# Patient Record
Sex: FEMALE | Race: BLACK OR AFRICAN AMERICAN | NOT HISPANIC OR LATINO | Employment: OTHER | URBAN - METROPOLITAN AREA
[De-identification: names, ages, dates, MRNs, and addresses within clinical notes are randomized per-mention and may not be internally consistent; named-entity substitution may affect disease eponyms.]

---

## 2017-01-06 ENCOUNTER — HOSPITAL ENCOUNTER (INPATIENT)
Facility: HOSPITAL | Age: 67
LOS: 10 days | Discharge: HOME HEALTH | DRG: 140 | End: 2017-01-16
Attending: EMERGENCY MEDICINE | Admitting: INTERNAL MEDICINE
Payer: COMMERCIAL

## 2017-01-06 ENCOUNTER — APPOINTMENT (EMERGENCY)
Dept: RADIOLOGY | Facility: HOSPITAL | Age: 67
DRG: 140 | End: 2017-01-06
Payer: COMMERCIAL

## 2017-01-06 DIAGNOSIS — J44.1 ACUTE EXACERBATION OF CHRONIC OBSTRUCTIVE PULMONARY DISEASE (COPD) (HCC): Primary | ICD-10-CM

## 2017-01-06 LAB
ANION GAP SERPL CALCULATED.3IONS-SCNC: 8 MMOL/L (ref 4–13)
APTT PPP: 29 SECONDS (ref 24–36)
ATRIAL RATE: 89 BPM
BASOPHILS # BLD AUTO: 0.04 THOUSANDS/ΜL (ref 0–0.1)
BASOPHILS NFR BLD AUTO: 1 % (ref 0–1)
BUN SERPL-MCNC: 12 MG/DL (ref 5–25)
CALCIUM SERPL-MCNC: 10 MG/DL (ref 8.3–10.1)
CHLORIDE SERPL-SCNC: 103 MMOL/L (ref 100–108)
CO2 SERPL-SCNC: 33 MMOL/L (ref 21–32)
CREAT SERPL-MCNC: 0.86 MG/DL (ref 0.6–1.3)
EOSINOPHIL # BLD AUTO: 0.26 THOUSAND/ΜL (ref 0–0.61)
EOSINOPHIL NFR BLD AUTO: 3 % (ref 0–6)
ERYTHROCYTE [DISTWIDTH] IN BLOOD BY AUTOMATED COUNT: 15.4 % (ref 11.6–15.1)
GFR SERPL CREATININE-BSD FRML MDRD: >60 ML/MIN/1.73SQ M
GLUCOSE SERPL-MCNC: 352 MG/DL (ref 65–140)
GLUCOSE SERPL-MCNC: 94 MG/DL (ref 65–140)
HCT VFR BLD AUTO: 32 % (ref 34.8–46.1)
HGB BLD-MCNC: 10.1 G/DL (ref 11.5–15.4)
INR PPP: 1 (ref 0.86–1.16)
LYMPHOCYTES # BLD AUTO: 2.44 THOUSANDS/ΜL (ref 0.6–4.47)
LYMPHOCYTES NFR BLD AUTO: 31 % (ref 14–44)
MCH RBC QN AUTO: 27.7 PG (ref 26.8–34.3)
MCHC RBC AUTO-ENTMCNC: 31.6 G/DL (ref 31.4–37.4)
MCV RBC AUTO: 88 FL (ref 82–98)
MONOCYTES # BLD AUTO: 0.55 THOUSAND/ΜL (ref 0.17–1.22)
MONOCYTES NFR BLD AUTO: 7 % (ref 4–12)
NEUTROPHILS # BLD AUTO: 4.51 THOUSANDS/ΜL (ref 1.85–7.62)
NEUTS SEG NFR BLD AUTO: 58 % (ref 43–75)
NRBC BLD AUTO-RTO: 0 /100 WBCS
NT-PROBNP SERPL-MCNC: 213 PG/ML
P AXIS: 48 DEGREES
PLATELET # BLD AUTO: 319 THOUSANDS/UL (ref 149–390)
PMV BLD AUTO: 8.8 FL (ref 8.9–12.7)
POTASSIUM SERPL-SCNC: 3 MMOL/L (ref 3.5–5.3)
PR INTERVAL: 206 MS
PROTHROMBIN TIME: 13.2 SECONDS (ref 12–14.3)
QRS AXIS: -54 DEGREES
QRSD INTERVAL: 106 MS
QT INTERVAL: 332 MS
QTC INTERVAL: 403 MS
RBC # BLD AUTO: 3.65 MILLION/UL (ref 3.81–5.12)
SODIUM SERPL-SCNC: 144 MMOL/L (ref 136–145)
SPECIMEN SOURCE: NORMAL
T WAVE AXIS: 17 DEGREES
TROPONIN I BLD-MCNC: 0 NG/ML (ref 0–0.08)
VENTRICULAR RATE: 89 BPM
WBC # BLD AUTO: 7.8 THOUSAND/UL (ref 4.31–10.16)

## 2017-01-06 PROCEDURE — 84484 ASSAY OF TROPONIN QUANT: CPT

## 2017-01-06 PROCEDURE — 85610 PROTHROMBIN TIME: CPT | Performed by: EMERGENCY MEDICINE

## 2017-01-06 PROCEDURE — 93005 ELECTROCARDIOGRAM TRACING: CPT

## 2017-01-06 PROCEDURE — 83880 ASSAY OF NATRIURETIC PEPTIDE: CPT | Performed by: EMERGENCY MEDICINE

## 2017-01-06 PROCEDURE — 71020 HB CHEST X-RAY 2VW FRONTAL&LATL: CPT

## 2017-01-06 PROCEDURE — 82948 REAGENT STRIP/BLOOD GLUCOSE: CPT

## 2017-01-06 PROCEDURE — 94640 AIRWAY INHALATION TREATMENT: CPT

## 2017-01-06 PROCEDURE — 85730 THROMBOPLASTIN TIME PARTIAL: CPT | Performed by: EMERGENCY MEDICINE

## 2017-01-06 PROCEDURE — 99285 EMERGENCY DEPT VISIT HI MDM: CPT

## 2017-01-06 PROCEDURE — 80048 BASIC METABOLIC PNL TOTAL CA: CPT | Performed by: EMERGENCY MEDICINE

## 2017-01-06 PROCEDURE — 96374 THER/PROPH/DIAG INJ IV PUSH: CPT

## 2017-01-06 PROCEDURE — 85025 COMPLETE CBC W/AUTO DIFF WBC: CPT | Performed by: EMERGENCY MEDICINE

## 2017-01-06 PROCEDURE — 36415 COLL VENOUS BLD VENIPUNCTURE: CPT | Performed by: EMERGENCY MEDICINE

## 2017-01-06 RX ORDER — BUPROPION HYDROCHLORIDE 100 MG/1
200 TABLET, EXTENDED RELEASE ORAL EVERY 12 HOURS
Status: DISCONTINUED | OUTPATIENT
Start: 2017-01-06 | End: 2017-01-16 | Stop reason: HOSPADM

## 2017-01-06 RX ORDER — QUETIAPINE FUMARATE 25 MG/1
100 TABLET, FILM COATED ORAL
Status: DISCONTINUED | OUTPATIENT
Start: 2017-01-06 | End: 2017-01-16 | Stop reason: HOSPADM

## 2017-01-06 RX ORDER — METHYLPREDNISOLONE SODIUM SUCCINATE 125 MG/2ML
125 INJECTION, POWDER, LYOPHILIZED, FOR SOLUTION INTRAMUSCULAR; INTRAVENOUS ONCE
Status: COMPLETED | OUTPATIENT
Start: 2017-01-06 | End: 2017-01-06

## 2017-01-06 RX ORDER — LEVALBUTEROL 1.25 MG/.5ML
1.25 SOLUTION, CONCENTRATE RESPIRATORY (INHALATION)
Status: DISCONTINUED | OUTPATIENT
Start: 2017-01-06 | End: 2017-01-06

## 2017-01-06 RX ORDER — ACETAMINOPHEN 325 MG/1
650 TABLET ORAL EVERY 6 HOURS PRN
Status: DISCONTINUED | OUTPATIENT
Start: 2017-01-06 | End: 2017-01-16 | Stop reason: HOSPADM

## 2017-01-06 RX ORDER — LEVOTHYROXINE SODIUM 0.1 MG/1
100 TABLET ORAL
Status: DISCONTINUED | OUTPATIENT
Start: 2017-01-07 | End: 2017-01-16 | Stop reason: HOSPADM

## 2017-01-06 RX ORDER — IPRATROPIUM BROMIDE AND ALBUTEROL SULFATE 2.5; .5 MG/3ML; MG/3ML
SOLUTION RESPIRATORY (INHALATION)
Status: COMPLETED
Start: 2017-01-06 | End: 2017-01-06

## 2017-01-06 RX ORDER — SODIUM CHLORIDE FOR INHALATION 0.9 %
3 VIAL, NEBULIZER (ML) INHALATION
Status: DISCONTINUED | OUTPATIENT
Start: 2017-01-06 | End: 2017-01-16 | Stop reason: HOSPADM

## 2017-01-06 RX ORDER — METHYLPREDNISOLONE SODIUM SUCCINATE 40 MG/ML
40 INJECTION, POWDER, LYOPHILIZED, FOR SOLUTION INTRAMUSCULAR; INTRAVENOUS EVERY 8 HOURS
Status: DISCONTINUED | OUTPATIENT
Start: 2017-01-06 | End: 2017-01-07

## 2017-01-06 RX ORDER — LISINOPRIL 10 MG/1
10 TABLET ORAL DAILY
Status: DISCONTINUED | OUTPATIENT
Start: 2017-01-07 | End: 2017-01-16 | Stop reason: HOSPADM

## 2017-01-06 RX ORDER — FUROSEMIDE 20 MG/1
20 TABLET ORAL 2 TIMES DAILY
Status: DISCONTINUED | OUTPATIENT
Start: 2017-01-06 | End: 2017-01-16 | Stop reason: HOSPADM

## 2017-01-06 RX ORDER — GABAPENTIN 400 MG/1
400 CAPSULE ORAL
Status: DISCONTINUED | OUTPATIENT
Start: 2017-01-07 | End: 2017-01-16 | Stop reason: HOSPADM

## 2017-01-06 RX ORDER — SODIUM CHLORIDE FOR INHALATION 0.9 %
3 VIAL, NEBULIZER (ML) INHALATION EVERY 6 HOURS PRN
Status: DISCONTINUED | OUTPATIENT
Start: 2017-01-06 | End: 2017-01-06

## 2017-01-06 RX ORDER — IPRATROPIUM BROMIDE AND ALBUTEROL SULFATE 2.5; .5 MG/3ML; MG/3ML
3 SOLUTION RESPIRATORY (INHALATION)
Status: DISCONTINUED | OUTPATIENT
Start: 2017-01-06 | End: 2017-01-06

## 2017-01-06 RX ORDER — DILTIAZEM HYDROCHLORIDE 300 MG/1
300 CAPSULE, COATED, EXTENDED RELEASE ORAL DAILY
Status: DISCONTINUED | OUTPATIENT
Start: 2017-01-07 | End: 2017-01-16 | Stop reason: HOSPADM

## 2017-01-06 RX ORDER — ASPIRIN 81 MG/1
81 TABLET, CHEWABLE ORAL DAILY
Status: DISCONTINUED | OUTPATIENT
Start: 2017-01-07 | End: 2017-01-16 | Stop reason: HOSPADM

## 2017-01-06 RX ORDER — POTASSIUM CHLORIDE 20 MEQ/1
40 TABLET, EXTENDED RELEASE ORAL ONCE
Status: COMPLETED | OUTPATIENT
Start: 2017-01-06 | End: 2017-01-06

## 2017-01-06 RX ORDER — ASPIRIN 81 MG/1
81 TABLET, CHEWABLE ORAL ONCE
Status: COMPLETED | OUTPATIENT
Start: 2017-01-06 | End: 2017-01-06

## 2017-01-06 RX ORDER — LEVALBUTEROL 1.25 MG/.5ML
1.25 SOLUTION, CONCENTRATE RESPIRATORY (INHALATION)
Status: DISCONTINUED | OUTPATIENT
Start: 2017-01-07 | End: 2017-01-16 | Stop reason: HOSPADM

## 2017-01-06 RX ORDER — HEPARIN SODIUM 5000 [USP'U]/ML
5000 INJECTION, SOLUTION INTRAVENOUS; SUBCUTANEOUS EVERY 8 HOURS SCHEDULED
Status: DISCONTINUED | OUTPATIENT
Start: 2017-01-06 | End: 2017-01-16 | Stop reason: HOSPADM

## 2017-01-06 RX ORDER — GUAIFENESIN 600 MG
1200 TABLET, EXTENDED RELEASE 12 HR ORAL EVERY 12 HOURS
Status: DISCONTINUED | OUTPATIENT
Start: 2017-01-06 | End: 2017-01-16 | Stop reason: HOSPADM

## 2017-01-06 RX ADMIN — IPRATROPIUM BROMIDE AND ALBUTEROL SULFATE 3 ML: 2.5; .5 SOLUTION RESPIRATORY (INHALATION) at 17:00

## 2017-01-06 RX ADMIN — BUPROPION HYDROCHLORIDE 200 MG: 100 TABLET, FILM COATED, EXTENDED RELEASE ORAL at 12:17

## 2017-01-06 RX ADMIN — ISODIUM CHLORIDE 3 ML: 0.03 SOLUTION RESPIRATORY (INHALATION) at 21:00

## 2017-01-06 RX ADMIN — QUETIAPINE FUMARATE 100 MG: 25 TABLET, FILM COATED ORAL at 21:46

## 2017-01-06 RX ADMIN — HEPARIN SODIUM 5000 UNITS: 5000 INJECTION, SOLUTION INTRAVENOUS; SUBCUTANEOUS at 21:49

## 2017-01-06 RX ADMIN — IPRATROPIUM BROMIDE AND ALBUTEROL SULFATE 3 ML: .5; 3 SOLUTION RESPIRATORY (INHALATION) at 17:00

## 2017-01-06 RX ADMIN — FLUTICASONE PROPIONATE AND SALMETEROL 1 PUFF: 50; 250 POWDER RESPIRATORY (INHALATION) at 23:11

## 2017-01-06 RX ADMIN — LEVALBUTEROL 1.25 MG: 1.25 SOLUTION, CONCENTRATE RESPIRATORY (INHALATION) at 21:00

## 2017-01-06 RX ADMIN — IPRATROPIUM BROMIDE 1 MG: 0.5 SOLUTION RESPIRATORY (INHALATION) at 18:34

## 2017-01-06 RX ADMIN — GUAIFENESIN 1200 MG: 600 TABLET, EXTENDED RELEASE ORAL at 21:46

## 2017-01-06 RX ADMIN — ALBUTEROL SULFATE 10 MG: 2.5 SOLUTION RESPIRATORY (INHALATION) at 18:34

## 2017-01-06 RX ADMIN — ASPIRIN 81 MG 81 MG: 81 TABLET ORAL at 16:59

## 2017-01-06 RX ADMIN — POTASSIUM CHLORIDE 40 MEQ: 1500 TABLET, EXTENDED RELEASE ORAL at 18:34

## 2017-01-06 RX ADMIN — METHYLPREDNISOLONE SODIUM SUCCINATE 125 MG: 125 INJECTION, POWDER, FOR SOLUTION INTRAMUSCULAR; INTRAVENOUS at 17:00

## 2017-01-07 LAB
GLUCOSE SERPL-MCNC: 154 MG/DL (ref 65–140)
GLUCOSE SERPL-MCNC: 204 MG/DL (ref 65–140)
GLUCOSE SERPL-MCNC: 236 MG/DL (ref 65–140)
GLUCOSE SERPL-MCNC: 326 MG/DL (ref 65–140)

## 2017-01-07 PROCEDURE — 82948 REAGENT STRIP/BLOOD GLUCOSE: CPT

## 2017-01-07 PROCEDURE — 94760 N-INVAS EAR/PLS OXIMETRY 1: CPT

## 2017-01-07 PROCEDURE — 94640 AIRWAY INHALATION TREATMENT: CPT

## 2017-01-07 RX ORDER — LORAZEPAM 0.5 MG/1
0.5 TABLET ORAL EVERY 8 HOURS PRN
Status: DISCONTINUED | OUTPATIENT
Start: 2017-01-07 | End: 2017-01-16 | Stop reason: HOSPADM

## 2017-01-07 RX ORDER — METHYLPREDNISOLONE SODIUM SUCCINATE 40 MG/ML
20 INJECTION, POWDER, LYOPHILIZED, FOR SOLUTION INTRAMUSCULAR; INTRAVENOUS EVERY 8 HOURS
Status: DISCONTINUED | OUTPATIENT
Start: 2017-01-07 | End: 2017-01-11

## 2017-01-07 RX ORDER — POTASSIUM CHLORIDE 20 MEQ/1
20 TABLET, EXTENDED RELEASE ORAL 2 TIMES DAILY
Status: DISCONTINUED | OUTPATIENT
Start: 2017-01-07 | End: 2017-01-16 | Stop reason: HOSPADM

## 2017-01-07 RX ORDER — OXYCODONE HYDROCHLORIDE 5 MG/1
5 TABLET ORAL ONCE
Status: COMPLETED | OUTPATIENT
Start: 2017-01-07 | End: 2017-01-07

## 2017-01-07 RX ADMIN — ACETAMINOPHEN 650 MG: 325 TABLET, FILM COATED ORAL at 22:41

## 2017-01-07 RX ADMIN — GABAPENTIN 400 MG: 400 CAPSULE ORAL at 16:50

## 2017-01-07 RX ADMIN — ISODIUM CHLORIDE 3 ML: 0.03 SOLUTION RESPIRATORY (INHALATION) at 13:30

## 2017-01-07 RX ADMIN — METHYLPREDNISOLONE SODIUM SUCCINATE 40 MG: 40 INJECTION, POWDER, FOR SOLUTION INTRAMUSCULAR; INTRAVENOUS at 05:12

## 2017-01-07 RX ADMIN — BUPROPION HYDROCHLORIDE 200 MG: 100 TABLET, FILM COATED, EXTENDED RELEASE ORAL at 20:02

## 2017-01-07 RX ADMIN — LEVALBUTEROL 1.25 MG: 1.25 SOLUTION, CONCENTRATE RESPIRATORY (INHALATION) at 07:21

## 2017-01-07 RX ADMIN — FLUTICASONE PROPIONATE AND SALMETEROL 1 PUFF: 50; 250 POWDER RESPIRATORY (INHALATION) at 08:28

## 2017-01-07 RX ADMIN — FUROSEMIDE 20 MG: 20 TABLET ORAL at 16:51

## 2017-01-07 RX ADMIN — LISINOPRIL 10 MG: 10 TABLET ORAL at 08:20

## 2017-01-07 RX ADMIN — METHYLPREDNISOLONE SODIUM SUCCINATE 20 MG: 40 INJECTION, POWDER, FOR SOLUTION INTRAMUSCULAR; INTRAVENOUS at 20:02

## 2017-01-07 RX ADMIN — GUAIFENESIN 1200 MG: 600 TABLET, EXTENDED RELEASE ORAL at 20:02

## 2017-01-07 RX ADMIN — FUROSEMIDE 20 MG: 20 TABLET ORAL at 08:20

## 2017-01-07 RX ADMIN — INSULIN LISPRO 8 UNITS: 100 INJECTION, SOLUTION INTRAVENOUS; SUBCUTANEOUS at 16:49

## 2017-01-07 RX ADMIN — QUETIAPINE FUMARATE 100 MG: 25 TABLET, FILM COATED ORAL at 21:55

## 2017-01-07 RX ADMIN — HEPARIN SODIUM 5000 UNITS: 5000 INJECTION, SOLUTION INTRAVENOUS; SUBCUTANEOUS at 21:56

## 2017-01-07 RX ADMIN — DILTIAZEM HYDROCHLORIDE 300 MG: 300 CAPSULE, COATED, EXTENDED RELEASE ORAL at 08:31

## 2017-01-07 RX ADMIN — INSULIN LISPRO 4 UNITS: 100 INJECTION, SOLUTION INTRAVENOUS; SUBCUTANEOUS at 14:29

## 2017-01-07 RX ADMIN — POTASSIUM CHLORIDE 20 MEQ: 1500 TABLET, EXTENDED RELEASE ORAL at 12:33

## 2017-01-07 RX ADMIN — GABAPENTIN 400 MG: 400 CAPSULE ORAL at 08:20

## 2017-01-07 RX ADMIN — BUPROPION HYDROCHLORIDE 200 MG: 100 TABLET, FILM COATED, EXTENDED RELEASE ORAL at 08:30

## 2017-01-07 RX ADMIN — METHYLPREDNISOLONE SODIUM SUCCINATE 20 MG: 40 INJECTION, POWDER, FOR SOLUTION INTRAMUSCULAR; INTRAVENOUS at 12:33

## 2017-01-07 RX ADMIN — ISODIUM CHLORIDE 3 ML: 0.03 SOLUTION RESPIRATORY (INHALATION) at 07:21

## 2017-01-07 RX ADMIN — INSULIN LISPRO 2 UNITS: 100 INJECTION, SOLUTION INTRAVENOUS; SUBCUTANEOUS at 09:30

## 2017-01-07 RX ADMIN — POTASSIUM CHLORIDE 20 MEQ: 1500 TABLET, EXTENDED RELEASE ORAL at 16:50

## 2017-01-07 RX ADMIN — GUAIFENESIN 1200 MG: 600 TABLET, EXTENDED RELEASE ORAL at 08:20

## 2017-01-07 RX ADMIN — LEVOTHYROXINE SODIUM 100 MCG: 100 TABLET ORAL at 06:10

## 2017-01-07 RX ADMIN — LEVALBUTEROL 1.25 MG: 1.25 SOLUTION, CONCENTRATE RESPIRATORY (INHALATION) at 19:19

## 2017-01-07 RX ADMIN — LORAZEPAM 0.5 MG: 0.5 TABLET ORAL at 16:50

## 2017-01-07 RX ADMIN — TIOTROPIUM BROMIDE 18 MCG: 18 CAPSULE ORAL; RESPIRATORY (INHALATION) at 08:20

## 2017-01-07 RX ADMIN — ASPIRIN 81 MG 81 MG: 81 TABLET ORAL at 08:20

## 2017-01-07 RX ADMIN — LEVALBUTEROL 1.25 MG: 1.25 SOLUTION, CONCENTRATE RESPIRATORY (INHALATION) at 13:30

## 2017-01-07 RX ADMIN — OXYCODONE HYDROCHLORIDE 5 MG: 5 TABLET ORAL at 22:42

## 2017-01-07 RX ADMIN — ISODIUM CHLORIDE 3 ML: 0.03 SOLUTION RESPIRATORY (INHALATION) at 19:19

## 2017-01-07 RX ADMIN — FLUTICASONE PROPIONATE AND SALMETEROL 1 PUFF: 50; 250 POWDER RESPIRATORY (INHALATION) at 21:55

## 2017-01-08 LAB
GLUCOSE SERPL-MCNC: 138 MG/DL (ref 65–140)
GLUCOSE SERPL-MCNC: 163 MG/DL (ref 65–140)
GLUCOSE SERPL-MCNC: 176 MG/DL (ref 65–140)
GLUCOSE SERPL-MCNC: 290 MG/DL (ref 65–140)

## 2017-01-08 PROCEDURE — 94640 AIRWAY INHALATION TREATMENT: CPT

## 2017-01-08 PROCEDURE — 94760 N-INVAS EAR/PLS OXIMETRY 1: CPT

## 2017-01-08 PROCEDURE — 82948 REAGENT STRIP/BLOOD GLUCOSE: CPT

## 2017-01-08 RX ORDER — INSULIN GLARGINE 100 [IU]/ML
10 INJECTION, SOLUTION SUBCUTANEOUS
Status: DISCONTINUED | OUTPATIENT
Start: 2017-01-08 | End: 2017-01-13

## 2017-01-08 RX ORDER — POTASSIUM CHLORIDE 20 MEQ/1
20 TABLET, EXTENDED RELEASE ORAL 2 TIMES DAILY
Status: DISCONTINUED | OUTPATIENT
Start: 2017-01-08 | End: 2017-01-08

## 2017-01-08 RX ADMIN — GUAIFENESIN 1200 MG: 600 TABLET, EXTENDED RELEASE ORAL at 08:07

## 2017-01-08 RX ADMIN — HEPARIN SODIUM 5000 UNITS: 5000 INJECTION, SOLUTION INTRAVENOUS; SUBCUTANEOUS at 21:34

## 2017-01-08 RX ADMIN — ASPIRIN 81 MG 81 MG: 81 TABLET ORAL at 08:07

## 2017-01-08 RX ADMIN — ACETAMINOPHEN 650 MG: 325 TABLET, FILM COATED ORAL at 13:56

## 2017-01-08 RX ADMIN — LEVALBUTEROL 1.25 MG: 1.25 SOLUTION, CONCENTRATE RESPIRATORY (INHALATION) at 13:22

## 2017-01-08 RX ADMIN — LEVALBUTEROL 1.25 MG: 1.25 SOLUTION, CONCENTRATE RESPIRATORY (INHALATION) at 18:50

## 2017-01-08 RX ADMIN — LISINOPRIL 10 MG: 10 TABLET ORAL at 08:07

## 2017-01-08 RX ADMIN — POTASSIUM CHLORIDE 20 MEQ: 1500 TABLET, EXTENDED RELEASE ORAL at 17:09

## 2017-01-08 RX ADMIN — POTASSIUM CHLORIDE 20 MEQ: 1500 TABLET, EXTENDED RELEASE ORAL at 08:07

## 2017-01-08 RX ADMIN — LEVOTHYROXINE SODIUM 100 MCG: 100 TABLET ORAL at 06:15

## 2017-01-08 RX ADMIN — HEPARIN SODIUM 5000 UNITS: 5000 INJECTION, SOLUTION INTRAVENOUS; SUBCUTANEOUS at 13:54

## 2017-01-08 RX ADMIN — METHYLPREDNISOLONE SODIUM SUCCINATE 20 MG: 40 INJECTION, POWDER, FOR SOLUTION INTRAMUSCULAR; INTRAVENOUS at 11:47

## 2017-01-08 RX ADMIN — INSULIN GLARGINE 10 UNITS: 100 INJECTION, SOLUTION SUBCUTANEOUS at 21:34

## 2017-01-08 RX ADMIN — ISODIUM CHLORIDE 3 ML: 0.03 SOLUTION RESPIRATORY (INHALATION) at 18:50

## 2017-01-08 RX ADMIN — BUPROPION HYDROCHLORIDE 200 MG: 100 TABLET, FILM COATED, EXTENDED RELEASE ORAL at 21:33

## 2017-01-08 RX ADMIN — FLUTICASONE PROPIONATE AND SALMETEROL 1 PUFF: 50; 250 POWDER RESPIRATORY (INHALATION) at 08:06

## 2017-01-08 RX ADMIN — METHYLPREDNISOLONE SODIUM SUCCINATE 20 MG: 40 INJECTION, POWDER, FOR SOLUTION INTRAMUSCULAR; INTRAVENOUS at 21:34

## 2017-01-08 RX ADMIN — ISODIUM CHLORIDE 3 ML: 0.03 SOLUTION RESPIRATORY (INHALATION) at 13:22

## 2017-01-08 RX ADMIN — HEPARIN SODIUM 5000 UNITS: 5000 INJECTION, SOLUTION INTRAVENOUS; SUBCUTANEOUS at 06:14

## 2017-01-08 RX ADMIN — DILTIAZEM HYDROCHLORIDE 300 MG: 300 CAPSULE, COATED, EXTENDED RELEASE ORAL at 08:06

## 2017-01-08 RX ADMIN — GABAPENTIN 400 MG: 400 CAPSULE ORAL at 08:07

## 2017-01-08 RX ADMIN — BUPROPION HYDROCHLORIDE 200 MG: 100 TABLET, FILM COATED, EXTENDED RELEASE ORAL at 08:07

## 2017-01-08 RX ADMIN — TIOTROPIUM BROMIDE 18 MCG: 18 CAPSULE ORAL; RESPIRATORY (INHALATION) at 08:06

## 2017-01-08 RX ADMIN — INSULIN LISPRO 6 UNITS: 100 INJECTION, SOLUTION INTRAVENOUS; SUBCUTANEOUS at 11:47

## 2017-01-08 RX ADMIN — LEVALBUTEROL 1.25 MG: 1.25 SOLUTION, CONCENTRATE RESPIRATORY (INHALATION) at 07:14

## 2017-01-08 RX ADMIN — FLUTICASONE PROPIONATE AND SALMETEROL 1 PUFF: 50; 250 POWDER RESPIRATORY (INHALATION) at 21:33

## 2017-01-08 RX ADMIN — GABAPENTIN 400 MG: 400 CAPSULE ORAL at 17:09

## 2017-01-08 RX ADMIN — GUAIFENESIN 1200 MG: 600 TABLET, EXTENDED RELEASE ORAL at 21:33

## 2017-01-08 RX ADMIN — QUETIAPINE FUMARATE 100 MG: 25 TABLET, FILM COATED ORAL at 21:33

## 2017-01-08 RX ADMIN — INSULIN LISPRO 2 UNITS: 100 INJECTION, SOLUTION INTRAVENOUS; SUBCUTANEOUS at 08:06

## 2017-01-08 RX ADMIN — FUROSEMIDE 20 MG: 20 TABLET ORAL at 17:09

## 2017-01-08 RX ADMIN — METHYLPREDNISOLONE SODIUM SUCCINATE 20 MG: 40 INJECTION, POWDER, FOR SOLUTION INTRAMUSCULAR; INTRAVENOUS at 04:28

## 2017-01-08 RX ADMIN — ISODIUM CHLORIDE 3 ML: 0.03 SOLUTION RESPIRATORY (INHALATION) at 07:15

## 2017-01-08 RX ADMIN — FUROSEMIDE 20 MG: 20 TABLET ORAL at 08:07

## 2017-01-08 RX ADMIN — LORAZEPAM 0.5 MG: 0.5 TABLET ORAL at 13:56

## 2017-01-09 LAB
GLUCOSE SERPL-MCNC: 137 MG/DL (ref 65–140)
GLUCOSE SERPL-MCNC: 222 MG/DL (ref 65–140)

## 2017-01-09 PROCEDURE — 94760 N-INVAS EAR/PLS OXIMETRY 1: CPT

## 2017-01-09 PROCEDURE — 94640 AIRWAY INHALATION TREATMENT: CPT

## 2017-01-09 PROCEDURE — 82948 REAGENT STRIP/BLOOD GLUCOSE: CPT

## 2017-01-09 RX ADMIN — POTASSIUM CHLORIDE 20 MEQ: 1500 TABLET, EXTENDED RELEASE ORAL at 09:39

## 2017-01-09 RX ADMIN — HEPARIN SODIUM 5000 UNITS: 5000 INJECTION, SOLUTION INTRAVENOUS; SUBCUTANEOUS at 06:00

## 2017-01-09 RX ADMIN — LISINOPRIL 10 MG: 10 TABLET ORAL at 09:40

## 2017-01-09 RX ADMIN — BUPROPION HYDROCHLORIDE 200 MG: 100 TABLET, FILM COATED, EXTENDED RELEASE ORAL at 09:40

## 2017-01-09 RX ADMIN — INSULIN GLARGINE 10 UNITS: 100 INJECTION, SOLUTION SUBCUTANEOUS at 22:06

## 2017-01-09 RX ADMIN — ISODIUM CHLORIDE 3 ML: 0.03 SOLUTION RESPIRATORY (INHALATION) at 07:16

## 2017-01-09 RX ADMIN — GUAIFENESIN 1200 MG: 600 TABLET, EXTENDED RELEASE ORAL at 20:49

## 2017-01-09 RX ADMIN — DILTIAZEM HYDROCHLORIDE 300 MG: 300 CAPSULE, COATED, EXTENDED RELEASE ORAL at 09:40

## 2017-01-09 RX ADMIN — POTASSIUM CHLORIDE 20 MEQ: 1500 TABLET, EXTENDED RELEASE ORAL at 18:33

## 2017-01-09 RX ADMIN — FLUTICASONE PROPIONATE AND SALMETEROL 1 PUFF: 50; 250 POWDER RESPIRATORY (INHALATION) at 09:38

## 2017-01-09 RX ADMIN — QUETIAPINE FUMARATE 100 MG: 25 TABLET, FILM COATED ORAL at 22:06

## 2017-01-09 RX ADMIN — ACETAMINOPHEN 650 MG: 325 TABLET, FILM COATED ORAL at 15:34

## 2017-01-09 RX ADMIN — HEPARIN SODIUM 5000 UNITS: 5000 INJECTION, SOLUTION INTRAVENOUS; SUBCUTANEOUS at 13:19

## 2017-01-09 RX ADMIN — GABAPENTIN 400 MG: 400 CAPSULE ORAL at 15:31

## 2017-01-09 RX ADMIN — METHYLPREDNISOLONE SODIUM SUCCINATE 20 MG: 40 INJECTION, POWDER, FOR SOLUTION INTRAMUSCULAR; INTRAVENOUS at 12:18

## 2017-01-09 RX ADMIN — FUROSEMIDE 20 MG: 20 TABLET ORAL at 18:33

## 2017-01-09 RX ADMIN — FLUTICASONE PROPIONATE AND SALMETEROL 1 PUFF: 50; 250 POWDER RESPIRATORY (INHALATION) at 20:51

## 2017-01-09 RX ADMIN — METHYLPREDNISOLONE SODIUM SUCCINATE 20 MG: 40 INJECTION, POWDER, FOR SOLUTION INTRAMUSCULAR; INTRAVENOUS at 20:50

## 2017-01-09 RX ADMIN — BUPROPION HYDROCHLORIDE 200 MG: 100 TABLET, FILM COATED, EXTENDED RELEASE ORAL at 20:49

## 2017-01-09 RX ADMIN — LEVALBUTEROL 1.25 MG: 1.25 SOLUTION, CONCENTRATE RESPIRATORY (INHALATION) at 19:01

## 2017-01-09 RX ADMIN — GUAIFENESIN 1200 MG: 600 TABLET, EXTENDED RELEASE ORAL at 09:39

## 2017-01-09 RX ADMIN — LEVALBUTEROL 1.25 MG: 1.25 SOLUTION, CONCENTRATE RESPIRATORY (INHALATION) at 13:15

## 2017-01-09 RX ADMIN — FUROSEMIDE 20 MG: 20 TABLET ORAL at 09:41

## 2017-01-09 RX ADMIN — ISODIUM CHLORIDE 3 ML: 0.03 SOLUTION RESPIRATORY (INHALATION) at 13:15

## 2017-01-09 RX ADMIN — LEVOTHYROXINE SODIUM 100 MCG: 100 TABLET ORAL at 06:00

## 2017-01-09 RX ADMIN — LEVALBUTEROL 1.25 MG: 1.25 SOLUTION, CONCENTRATE RESPIRATORY (INHALATION) at 07:16

## 2017-01-09 RX ADMIN — ASPIRIN 81 MG 81 MG: 81 TABLET ORAL at 09:40

## 2017-01-09 RX ADMIN — METHYLPREDNISOLONE SODIUM SUCCINATE 20 MG: 40 INJECTION, POWDER, FOR SOLUTION INTRAMUSCULAR; INTRAVENOUS at 04:52

## 2017-01-09 RX ADMIN — LORAZEPAM 0.5 MG: 0.5 TABLET ORAL at 15:33

## 2017-01-09 RX ADMIN — ACETAMINOPHEN 650 MG: 325 TABLET, FILM COATED ORAL at 09:44

## 2017-01-09 RX ADMIN — ISODIUM CHLORIDE 3 ML: 0.03 SOLUTION RESPIRATORY (INHALATION) at 19:01

## 2017-01-09 RX ADMIN — TIOTROPIUM BROMIDE 18 MCG: 18 CAPSULE ORAL; RESPIRATORY (INHALATION) at 09:38

## 2017-01-09 RX ADMIN — GABAPENTIN 400 MG: 400 CAPSULE ORAL at 08:00

## 2017-01-09 RX ADMIN — HEPARIN SODIUM 5000 UNITS: 5000 INJECTION, SOLUTION INTRAVENOUS; SUBCUTANEOUS at 22:05

## 2017-01-10 LAB
GLUCOSE SERPL-MCNC: 126 MG/DL (ref 65–140)
GLUCOSE SERPL-MCNC: 132 MG/DL (ref 65–140)

## 2017-01-10 PROCEDURE — 94640 AIRWAY INHALATION TREATMENT: CPT

## 2017-01-10 PROCEDURE — 82948 REAGENT STRIP/BLOOD GLUCOSE: CPT

## 2017-01-10 PROCEDURE — 94760 N-INVAS EAR/PLS OXIMETRY 1: CPT

## 2017-01-10 RX ORDER — LEVALBUTEROL 1.25 MG/.5ML
1.25 SOLUTION, CONCENTRATE RESPIRATORY (INHALATION) EVERY 6 HOURS PRN
Status: DISCONTINUED | OUTPATIENT
Start: 2017-01-10 | End: 2017-01-16 | Stop reason: HOSPADM

## 2017-01-10 RX ORDER — MIRTAZAPINE 15 MG/1
15 TABLET, FILM COATED ORAL
Status: DISCONTINUED | OUTPATIENT
Start: 2017-01-10 | End: 2017-01-16 | Stop reason: HOSPADM

## 2017-01-10 RX ORDER — TRAMADOL HYDROCHLORIDE 50 MG/1
50 TABLET ORAL EVERY 6 HOURS PRN
Status: DISCONTINUED | OUTPATIENT
Start: 2017-01-10 | End: 2017-01-16 | Stop reason: HOSPADM

## 2017-01-10 RX ORDER — SODIUM CHLORIDE FOR INHALATION 0.9 %
3 VIAL, NEBULIZER (ML) INHALATION EVERY 6 HOURS PRN
Status: DISCONTINUED | OUTPATIENT
Start: 2017-01-10 | End: 2017-01-16 | Stop reason: HOSPADM

## 2017-01-10 RX ADMIN — METHYLPREDNISOLONE SODIUM SUCCINATE 20 MG: 40 INJECTION, POWDER, FOR SOLUTION INTRAMUSCULAR; INTRAVENOUS at 05:23

## 2017-01-10 RX ADMIN — LEVALBUTEROL 1.25 MG: 1.25 SOLUTION, CONCENTRATE RESPIRATORY (INHALATION) at 19:41

## 2017-01-10 RX ADMIN — GABAPENTIN 400 MG: 400 CAPSULE ORAL at 18:21

## 2017-01-10 RX ADMIN — HEPARIN SODIUM 5000 UNITS: 5000 INJECTION, SOLUTION INTRAVENOUS; SUBCUTANEOUS at 21:58

## 2017-01-10 RX ADMIN — BUPROPION HYDROCHLORIDE 200 MG: 100 TABLET, FILM COATED, EXTENDED RELEASE ORAL at 08:32

## 2017-01-10 RX ADMIN — POTASSIUM CHLORIDE 20 MEQ: 1500 TABLET, EXTENDED RELEASE ORAL at 08:33

## 2017-01-10 RX ADMIN — GUAIFENESIN 1200 MG: 600 TABLET, EXTENDED RELEASE ORAL at 08:32

## 2017-01-10 RX ADMIN — GABAPENTIN 400 MG: 400 CAPSULE ORAL at 08:33

## 2017-01-10 RX ADMIN — LEVALBUTEROL 1.25 MG: 1.25 SOLUTION, CONCENTRATE RESPIRATORY (INHALATION) at 07:48

## 2017-01-10 RX ADMIN — GUAIFENESIN 1200 MG: 600 TABLET, EXTENDED RELEASE ORAL at 21:57

## 2017-01-10 RX ADMIN — HEPARIN SODIUM 5000 UNITS: 5000 INJECTION, SOLUTION INTRAVENOUS; SUBCUTANEOUS at 05:23

## 2017-01-10 RX ADMIN — LISINOPRIL 10 MG: 10 TABLET ORAL at 08:32

## 2017-01-10 RX ADMIN — ACETAMINOPHEN 650 MG: 325 TABLET, FILM COATED ORAL at 05:25

## 2017-01-10 RX ADMIN — ACETAMINOPHEN 650 MG: 325 TABLET, FILM COATED ORAL at 11:45

## 2017-01-10 RX ADMIN — TIOTROPIUM BROMIDE 18 MCG: 18 CAPSULE ORAL; RESPIRATORY (INHALATION) at 08:29

## 2017-01-10 RX ADMIN — ISODIUM CHLORIDE 3 ML: 0.03 SOLUTION RESPIRATORY (INHALATION) at 13:17

## 2017-01-10 RX ADMIN — FLUTICASONE PROPIONATE AND SALMETEROL 1 PUFF: 50; 250 POWDER RESPIRATORY (INHALATION) at 08:29

## 2017-01-10 RX ADMIN — QUETIAPINE FUMARATE 100 MG: 25 TABLET, FILM COATED ORAL at 21:57

## 2017-01-10 RX ADMIN — FLUTICASONE PROPIONATE AND SALMETEROL 1 PUFF: 50; 250 POWDER RESPIRATORY (INHALATION) at 22:03

## 2017-01-10 RX ADMIN — DILTIAZEM HYDROCHLORIDE 300 MG: 300 CAPSULE, COATED, EXTENDED RELEASE ORAL at 08:33

## 2017-01-10 RX ADMIN — FUROSEMIDE 20 MG: 20 TABLET ORAL at 18:21

## 2017-01-10 RX ADMIN — INSULIN GLARGINE 10 UNITS: 100 INJECTION, SOLUTION SUBCUTANEOUS at 21:57

## 2017-01-10 RX ADMIN — MIRTAZAPINE 15 MG: 15 TABLET, FILM COATED ORAL at 21:55

## 2017-01-10 RX ADMIN — ASPIRIN 81 MG 81 MG: 81 TABLET ORAL at 08:33

## 2017-01-10 RX ADMIN — ISODIUM CHLORIDE 3 ML: 0.03 SOLUTION RESPIRATORY (INHALATION) at 19:41

## 2017-01-10 RX ADMIN — FUROSEMIDE 20 MG: 20 TABLET ORAL at 08:32

## 2017-01-10 RX ADMIN — METHYLPREDNISOLONE SODIUM SUCCINATE 20 MG: 40 INJECTION, POWDER, FOR SOLUTION INTRAMUSCULAR; INTRAVENOUS at 21:58

## 2017-01-10 RX ADMIN — ISODIUM CHLORIDE 3 ML: 0.03 SOLUTION RESPIRATORY (INHALATION) at 07:48

## 2017-01-10 RX ADMIN — POTASSIUM CHLORIDE 20 MEQ: 1500 TABLET, EXTENDED RELEASE ORAL at 18:21

## 2017-01-10 RX ADMIN — BUPROPION HYDROCHLORIDE 200 MG: 100 TABLET, FILM COATED, EXTENDED RELEASE ORAL at 22:03

## 2017-01-10 RX ADMIN — HEPARIN SODIUM 5000 UNITS: 5000 INJECTION, SOLUTION INTRAVENOUS; SUBCUTANEOUS at 14:05

## 2017-01-10 RX ADMIN — TRAMADOL HYDROCHLORIDE 50 MG: 50 TABLET, COATED ORAL at 18:21

## 2017-01-10 RX ADMIN — LEVALBUTEROL 1.25 MG: 1.25 SOLUTION, CONCENTRATE RESPIRATORY (INHALATION) at 13:17

## 2017-01-10 RX ADMIN — METHYLPREDNISOLONE SODIUM SUCCINATE 20 MG: 40 INJECTION, POWDER, FOR SOLUTION INTRAMUSCULAR; INTRAVENOUS at 13:30

## 2017-01-10 RX ADMIN — LEVOTHYROXINE SODIUM 100 MCG: 100 TABLET ORAL at 06:02

## 2017-01-11 ENCOUNTER — APPOINTMENT (INPATIENT)
Dept: RADIOLOGY | Facility: HOSPITAL | Age: 67
DRG: 140 | End: 2017-01-11
Payer: COMMERCIAL

## 2017-01-11 PROCEDURE — 71020 HB CHEST X-RAY 2VW FRONTAL&LATL: CPT

## 2017-01-11 PROCEDURE — 94640 AIRWAY INHALATION TREATMENT: CPT

## 2017-01-11 PROCEDURE — 94760 N-INVAS EAR/PLS OXIMETRY 1: CPT

## 2017-01-11 RX ORDER — PREDNISONE 20 MG/1
40 TABLET ORAL DAILY
Status: DISCONTINUED | OUTPATIENT
Start: 2017-01-12 | End: 2017-01-16 | Stop reason: HOSPADM

## 2017-01-11 RX ADMIN — ISODIUM CHLORIDE 3 ML: 0.03 SOLUTION RESPIRATORY (INHALATION) at 20:34

## 2017-01-11 RX ADMIN — FUROSEMIDE 20 MG: 20 TABLET ORAL at 09:15

## 2017-01-11 RX ADMIN — HEPARIN SODIUM 5000 UNITS: 5000 INJECTION, SOLUTION INTRAVENOUS; SUBCUTANEOUS at 13:13

## 2017-01-11 RX ADMIN — LEVALBUTEROL 1.25 MG: 1.25 SOLUTION, CONCENTRATE RESPIRATORY (INHALATION) at 20:34

## 2017-01-11 RX ADMIN — TRAMADOL HYDROCHLORIDE 50 MG: 50 TABLET, COATED ORAL at 09:16

## 2017-01-11 RX ADMIN — FLUTICASONE PROPIONATE AND SALMETEROL 1 PUFF: 50; 250 POWDER RESPIRATORY (INHALATION) at 22:07

## 2017-01-11 RX ADMIN — HEPARIN SODIUM 5000 UNITS: 5000 INJECTION, SOLUTION INTRAVENOUS; SUBCUTANEOUS at 22:08

## 2017-01-11 RX ADMIN — METHYLPREDNISOLONE SODIUM SUCCINATE 20 MG: 40 INJECTION, POWDER, FOR SOLUTION INTRAMUSCULAR; INTRAVENOUS at 13:08

## 2017-01-11 RX ADMIN — TRAMADOL HYDROCHLORIDE 50 MG: 50 TABLET, COATED ORAL at 16:33

## 2017-01-11 RX ADMIN — POTASSIUM CHLORIDE 20 MEQ: 1500 TABLET, EXTENDED RELEASE ORAL at 17:25

## 2017-01-11 RX ADMIN — BUPROPION HYDROCHLORIDE 200 MG: 100 TABLET, FILM COATED, EXTENDED RELEASE ORAL at 22:08

## 2017-01-11 RX ADMIN — LEVALBUTEROL 1.25 MG: 1.25 SOLUTION, CONCENTRATE RESPIRATORY (INHALATION) at 13:16

## 2017-01-11 RX ADMIN — TRAMADOL HYDROCHLORIDE 50 MG: 50 TABLET, COATED ORAL at 01:25

## 2017-01-11 RX ADMIN — ACETAMINOPHEN 650 MG: 325 TABLET, FILM COATED ORAL at 23:36

## 2017-01-11 RX ADMIN — ISODIUM CHLORIDE 3 ML: 0.03 SOLUTION RESPIRATORY (INHALATION) at 07:38

## 2017-01-11 RX ADMIN — GUAIFENESIN 1200 MG: 600 TABLET, EXTENDED RELEASE ORAL at 09:15

## 2017-01-11 RX ADMIN — GABAPENTIN 400 MG: 400 CAPSULE ORAL at 16:33

## 2017-01-11 RX ADMIN — ASPIRIN 81 MG 81 MG: 81 TABLET ORAL at 09:15

## 2017-01-11 RX ADMIN — FLUTICASONE PROPIONATE AND SALMETEROL 1 PUFF: 50; 250 POWDER RESPIRATORY (INHALATION) at 09:18

## 2017-01-11 RX ADMIN — LEVOTHYROXINE SODIUM 100 MCG: 100 TABLET ORAL at 05:20

## 2017-01-11 RX ADMIN — POTASSIUM CHLORIDE 20 MEQ: 1500 TABLET, EXTENDED RELEASE ORAL at 09:16

## 2017-01-11 RX ADMIN — TIOTROPIUM BROMIDE 18 MCG: 18 CAPSULE ORAL; RESPIRATORY (INHALATION) at 09:18

## 2017-01-11 RX ADMIN — LEVALBUTEROL 1.25 MG: 1.25 SOLUTION, CONCENTRATE RESPIRATORY (INHALATION) at 07:38

## 2017-01-11 RX ADMIN — ISODIUM CHLORIDE 3 ML: 0.03 SOLUTION RESPIRATORY (INHALATION) at 13:16

## 2017-01-11 RX ADMIN — HEPARIN SODIUM 5000 UNITS: 5000 INJECTION, SOLUTION INTRAVENOUS; SUBCUTANEOUS at 05:20

## 2017-01-11 RX ADMIN — FUROSEMIDE 20 MG: 20 TABLET ORAL at 17:25

## 2017-01-11 RX ADMIN — MIRTAZAPINE 15 MG: 15 TABLET, FILM COATED ORAL at 22:08

## 2017-01-11 RX ADMIN — METHYLPREDNISOLONE SODIUM SUCCINATE 20 MG: 40 INJECTION, POWDER, FOR SOLUTION INTRAMUSCULAR; INTRAVENOUS at 05:20

## 2017-01-11 RX ADMIN — BUPROPION HYDROCHLORIDE 200 MG: 100 TABLET, FILM COATED, EXTENDED RELEASE ORAL at 09:16

## 2017-01-11 RX ADMIN — QUETIAPINE FUMARATE 100 MG: 25 TABLET, FILM COATED ORAL at 22:08

## 2017-01-11 RX ADMIN — LORAZEPAM 0.5 MG: 0.5 TABLET ORAL at 10:58

## 2017-01-11 RX ADMIN — GABAPENTIN 400 MG: 400 CAPSULE ORAL at 09:00

## 2017-01-11 RX ADMIN — LISINOPRIL 10 MG: 10 TABLET ORAL at 09:16

## 2017-01-11 RX ADMIN — INSULIN GLARGINE 10 UNITS: 100 INJECTION, SOLUTION SUBCUTANEOUS at 22:08

## 2017-01-11 RX ADMIN — GUAIFENESIN 1200 MG: 600 TABLET, EXTENDED RELEASE ORAL at 22:07

## 2017-01-11 RX ADMIN — DILTIAZEM HYDROCHLORIDE 300 MG: 300 CAPSULE, COATED, EXTENDED RELEASE ORAL at 09:16

## 2017-01-12 PROCEDURE — 94760 N-INVAS EAR/PLS OXIMETRY 1: CPT

## 2017-01-12 PROCEDURE — 94640 AIRWAY INHALATION TREATMENT: CPT

## 2017-01-12 RX ADMIN — LORAZEPAM 0.5 MG: 0.5 TABLET ORAL at 22:32

## 2017-01-12 RX ADMIN — HEPARIN SODIUM 5000 UNITS: 5000 INJECTION, SOLUTION INTRAVENOUS; SUBCUTANEOUS at 14:18

## 2017-01-12 RX ADMIN — ISODIUM CHLORIDE 3 ML: 0.03 SOLUTION RESPIRATORY (INHALATION) at 13:47

## 2017-01-12 RX ADMIN — MENTHOL 5.4 MG: 5.4 LOZENGE ORAL at 14:22

## 2017-01-12 RX ADMIN — FUROSEMIDE 20 MG: 20 TABLET ORAL at 10:00

## 2017-01-12 RX ADMIN — GUAIFENESIN 1200 MG: 600 TABLET, EXTENDED RELEASE ORAL at 10:00

## 2017-01-12 RX ADMIN — ISODIUM CHLORIDE 3 ML: 0.03 SOLUTION RESPIRATORY (INHALATION) at 07:49

## 2017-01-12 RX ADMIN — TIOTROPIUM BROMIDE 18 MCG: 18 CAPSULE ORAL; RESPIRATORY (INHALATION) at 10:00

## 2017-01-12 RX ADMIN — BUPROPION HYDROCHLORIDE 200 MG: 100 TABLET, FILM COATED, EXTENDED RELEASE ORAL at 10:00

## 2017-01-12 RX ADMIN — INSULIN GLARGINE 10 UNITS: 100 INJECTION, SOLUTION SUBCUTANEOUS at 22:34

## 2017-01-12 RX ADMIN — POTASSIUM CHLORIDE 20 MEQ: 1500 TABLET, EXTENDED RELEASE ORAL at 17:11

## 2017-01-12 RX ADMIN — HEPARIN SODIUM 5000 UNITS: 5000 INJECTION, SOLUTION INTRAVENOUS; SUBCUTANEOUS at 06:03

## 2017-01-12 RX ADMIN — LORAZEPAM 0.5 MG: 0.5 TABLET ORAL at 10:14

## 2017-01-12 RX ADMIN — LEVALBUTEROL 1.25 MG: 1.25 SOLUTION, CONCENTRATE RESPIRATORY (INHALATION) at 07:49

## 2017-01-12 RX ADMIN — ASPIRIN 81 MG 81 MG: 81 TABLET ORAL at 10:00

## 2017-01-12 RX ADMIN — PREDNISONE 40 MG: 20 TABLET ORAL at 10:00

## 2017-01-12 RX ADMIN — POTASSIUM CHLORIDE 20 MEQ: 1500 TABLET, EXTENDED RELEASE ORAL at 10:00

## 2017-01-12 RX ADMIN — FLUTICASONE PROPIONATE AND SALMETEROL 1 PUFF: 50; 250 POWDER RESPIRATORY (INHALATION) at 22:31

## 2017-01-12 RX ADMIN — MIRTAZAPINE 15 MG: 15 TABLET, FILM COATED ORAL at 22:32

## 2017-01-12 RX ADMIN — FUROSEMIDE 20 MG: 20 TABLET ORAL at 17:11

## 2017-01-12 RX ADMIN — GABAPENTIN 400 MG: 400 CAPSULE ORAL at 09:00

## 2017-01-12 RX ADMIN — TRAMADOL HYDROCHLORIDE 50 MG: 50 TABLET, COATED ORAL at 16:16

## 2017-01-12 RX ADMIN — HEPARIN SODIUM 5000 UNITS: 5000 INJECTION, SOLUTION INTRAVENOUS; SUBCUTANEOUS at 22:34

## 2017-01-12 RX ADMIN — LEVALBUTEROL 1.25 MG: 1.25 SOLUTION, CONCENTRATE RESPIRATORY (INHALATION) at 13:47

## 2017-01-12 RX ADMIN — GABAPENTIN 400 MG: 400 CAPSULE ORAL at 16:13

## 2017-01-12 RX ADMIN — FLUTICASONE PROPIONATE AND SALMETEROL 1 PUFF: 50; 250 POWDER RESPIRATORY (INHALATION) at 10:00

## 2017-01-12 RX ADMIN — ISODIUM CHLORIDE 3 ML: 0.03 SOLUTION RESPIRATORY (INHALATION) at 19:58

## 2017-01-12 RX ADMIN — LEVALBUTEROL 1.25 MG: 1.25 SOLUTION, CONCENTRATE RESPIRATORY (INHALATION) at 19:58

## 2017-01-12 RX ADMIN — TRAMADOL HYDROCHLORIDE 50 MG: 50 TABLET, COATED ORAL at 10:14

## 2017-01-12 RX ADMIN — LISINOPRIL 10 MG: 10 TABLET ORAL at 10:00

## 2017-01-12 RX ADMIN — BUPROPION HYDROCHLORIDE 200 MG: 100 TABLET, FILM COATED, EXTENDED RELEASE ORAL at 22:32

## 2017-01-12 RX ADMIN — GUAIFENESIN 1200 MG: 600 TABLET, EXTENDED RELEASE ORAL at 22:31

## 2017-01-12 RX ADMIN — QUETIAPINE FUMARATE 100 MG: 25 TABLET, FILM COATED ORAL at 22:32

## 2017-01-12 RX ADMIN — DILTIAZEM HYDROCHLORIDE 300 MG: 300 CAPSULE, COATED, EXTENDED RELEASE ORAL at 10:00

## 2017-01-12 RX ADMIN — LEVOTHYROXINE SODIUM 100 MCG: 100 TABLET ORAL at 06:03

## 2017-01-13 LAB
GLUCOSE SERPL-MCNC: 123 MG/DL (ref 65–140)
GLUCOSE SERPL-MCNC: 138 MG/DL (ref 65–140)

## 2017-01-13 PROCEDURE — 82948 REAGENT STRIP/BLOOD GLUCOSE: CPT

## 2017-01-13 PROCEDURE — 94760 N-INVAS EAR/PLS OXIMETRY 1: CPT

## 2017-01-13 PROCEDURE — 94640 AIRWAY INHALATION TREATMENT: CPT

## 2017-01-13 RX ADMIN — ISODIUM CHLORIDE 3 ML: 0.03 SOLUTION RESPIRATORY (INHALATION) at 19:22

## 2017-01-13 RX ADMIN — DILTIAZEM HYDROCHLORIDE 300 MG: 300 CAPSULE, COATED, EXTENDED RELEASE ORAL at 08:26

## 2017-01-13 RX ADMIN — POTASSIUM CHLORIDE 20 MEQ: 1500 TABLET, EXTENDED RELEASE ORAL at 17:06

## 2017-01-13 RX ADMIN — ACETAMINOPHEN 650 MG: 325 TABLET, FILM COATED ORAL at 14:56

## 2017-01-13 RX ADMIN — TRAMADOL HYDROCHLORIDE 50 MG: 50 TABLET, COATED ORAL at 08:33

## 2017-01-13 RX ADMIN — FUROSEMIDE 20 MG: 20 TABLET ORAL at 17:05

## 2017-01-13 RX ADMIN — PREDNISONE 40 MG: 20 TABLET ORAL at 08:25

## 2017-01-13 RX ADMIN — FUROSEMIDE 20 MG: 20 TABLET ORAL at 08:25

## 2017-01-13 RX ADMIN — TIOTROPIUM BROMIDE 18 MCG: 18 CAPSULE ORAL; RESPIRATORY (INHALATION) at 08:26

## 2017-01-13 RX ADMIN — GUAIFENESIN 1200 MG: 600 TABLET, EXTENDED RELEASE ORAL at 08:26

## 2017-01-13 RX ADMIN — LEVALBUTEROL 1.25 MG: 1.25 SOLUTION, CONCENTRATE RESPIRATORY (INHALATION) at 13:56

## 2017-01-13 RX ADMIN — METFORMIN HYDROCHLORIDE 500 MG: 500 TABLET, FILM COATED ORAL at 17:05

## 2017-01-13 RX ADMIN — FLUTICASONE PROPIONATE AND SALMETEROL 1 PUFF: 50; 250 POWDER RESPIRATORY (INHALATION) at 21:24

## 2017-01-13 RX ADMIN — ASPIRIN 81 MG 81 MG: 81 TABLET ORAL at 08:25

## 2017-01-13 RX ADMIN — HEPARIN SODIUM 5000 UNITS: 5000 INJECTION, SOLUTION INTRAVENOUS; SUBCUTANEOUS at 05:39

## 2017-01-13 RX ADMIN — ISODIUM CHLORIDE 3 ML: 0.03 SOLUTION RESPIRATORY (INHALATION) at 13:56

## 2017-01-13 RX ADMIN — FLUTICASONE PROPIONATE AND SALMETEROL 1 PUFF: 50; 250 POWDER RESPIRATORY (INHALATION) at 08:26

## 2017-01-13 RX ADMIN — LEVALBUTEROL 1.25 MG: 1.25 SOLUTION, CONCENTRATE RESPIRATORY (INHALATION) at 19:21

## 2017-01-13 RX ADMIN — LISINOPRIL 10 MG: 10 TABLET ORAL at 08:25

## 2017-01-13 RX ADMIN — GABAPENTIN 400 MG: 400 CAPSULE ORAL at 17:06

## 2017-01-13 RX ADMIN — BUPROPION HYDROCHLORIDE 200 MG: 100 TABLET, FILM COATED, EXTENDED RELEASE ORAL at 08:26

## 2017-01-13 RX ADMIN — ISODIUM CHLORIDE 3 ML: 0.03 SOLUTION RESPIRATORY (INHALATION) at 08:09

## 2017-01-13 RX ADMIN — TRAMADOL HYDROCHLORIDE 50 MG: 50 TABLET, COATED ORAL at 21:34

## 2017-01-13 RX ADMIN — MIRTAZAPINE 15 MG: 15 TABLET, FILM COATED ORAL at 21:24

## 2017-01-13 RX ADMIN — TRAMADOL HYDROCHLORIDE 50 MG: 50 TABLET, COATED ORAL at 14:49

## 2017-01-13 RX ADMIN — LEVOTHYROXINE SODIUM 100 MCG: 100 TABLET ORAL at 05:39

## 2017-01-13 RX ADMIN — LEVALBUTEROL 1.25 MG: 1.25 SOLUTION, CONCENTRATE RESPIRATORY (INHALATION) at 08:09

## 2017-01-13 RX ADMIN — HEPARIN SODIUM 5000 UNITS: 5000 INJECTION, SOLUTION INTRAVENOUS; SUBCUTANEOUS at 21:24

## 2017-01-13 RX ADMIN — QUETIAPINE FUMARATE 100 MG: 25 TABLET, FILM COATED ORAL at 21:24

## 2017-01-13 RX ADMIN — GABAPENTIN 400 MG: 400 CAPSULE ORAL at 08:25

## 2017-01-13 RX ADMIN — BUPROPION HYDROCHLORIDE 200 MG: 100 TABLET, FILM COATED, EXTENDED RELEASE ORAL at 21:24

## 2017-01-13 RX ADMIN — METFORMIN HYDROCHLORIDE 500 MG: 500 TABLET, FILM COATED ORAL at 12:41

## 2017-01-13 RX ADMIN — HEPARIN SODIUM 5000 UNITS: 5000 INJECTION, SOLUTION INTRAVENOUS; SUBCUTANEOUS at 12:41

## 2017-01-13 RX ADMIN — POTASSIUM CHLORIDE 20 MEQ: 1500 TABLET, EXTENDED RELEASE ORAL at 08:25

## 2017-01-13 RX ADMIN — GUAIFENESIN 1200 MG: 600 TABLET, EXTENDED RELEASE ORAL at 21:24

## 2017-01-14 LAB
GLUCOSE SERPL-MCNC: 143 MG/DL (ref 65–140)
GLUCOSE SERPL-MCNC: 151 MG/DL (ref 65–140)
GLUCOSE SERPL-MCNC: 78 MG/DL (ref 65–140)

## 2017-01-14 PROCEDURE — 94760 N-INVAS EAR/PLS OXIMETRY 1: CPT

## 2017-01-14 PROCEDURE — 82948 REAGENT STRIP/BLOOD GLUCOSE: CPT

## 2017-01-14 PROCEDURE — 94640 AIRWAY INHALATION TREATMENT: CPT

## 2017-01-14 RX ADMIN — BUPROPION HYDROCHLORIDE 200 MG: 100 TABLET, FILM COATED, EXTENDED RELEASE ORAL at 21:44

## 2017-01-14 RX ADMIN — QUETIAPINE FUMARATE 100 MG: 25 TABLET, FILM COATED ORAL at 21:44

## 2017-01-14 RX ADMIN — MENTHOL 5.4 MG: 5.4 LOZENGE ORAL at 10:27

## 2017-01-14 RX ADMIN — DILTIAZEM HYDROCHLORIDE 300 MG: 300 CAPSULE, COATED, EXTENDED RELEASE ORAL at 08:24

## 2017-01-14 RX ADMIN — POTASSIUM CHLORIDE 20 MEQ: 1500 TABLET, EXTENDED RELEASE ORAL at 17:26

## 2017-01-14 RX ADMIN — ASPIRIN 81 MG 81 MG: 81 TABLET ORAL at 08:22

## 2017-01-14 RX ADMIN — GUAIFENESIN 1200 MG: 600 TABLET, EXTENDED RELEASE ORAL at 21:45

## 2017-01-14 RX ADMIN — MENTHOL 5.4 MG: 5.4 LOZENGE ORAL at 05:15

## 2017-01-14 RX ADMIN — ISODIUM CHLORIDE 3 ML: 0.03 SOLUTION RESPIRATORY (INHALATION) at 13:10

## 2017-01-14 RX ADMIN — TRAMADOL HYDROCHLORIDE 50 MG: 50 TABLET, COATED ORAL at 10:27

## 2017-01-14 RX ADMIN — ISODIUM CHLORIDE 3 ML: 0.03 SOLUTION RESPIRATORY (INHALATION) at 19:17

## 2017-01-14 RX ADMIN — GUAIFENESIN 1200 MG: 600 TABLET, EXTENDED RELEASE ORAL at 08:20

## 2017-01-14 RX ADMIN — ISODIUM CHLORIDE 3 ML: 0.03 SOLUTION RESPIRATORY (INHALATION) at 08:23

## 2017-01-14 RX ADMIN — BUPROPION HYDROCHLORIDE 200 MG: 100 TABLET, FILM COATED, EXTENDED RELEASE ORAL at 08:25

## 2017-01-14 RX ADMIN — FUROSEMIDE 20 MG: 20 TABLET ORAL at 17:26

## 2017-01-14 RX ADMIN — GABAPENTIN 400 MG: 400 CAPSULE ORAL at 08:22

## 2017-01-14 RX ADMIN — FUROSEMIDE 20 MG: 20 TABLET ORAL at 08:22

## 2017-01-14 RX ADMIN — MIRTAZAPINE 15 MG: 15 TABLET, FILM COATED ORAL at 21:45

## 2017-01-14 RX ADMIN — METFORMIN HYDROCHLORIDE 500 MG: 500 TABLET, FILM COATED ORAL at 08:22

## 2017-01-14 RX ADMIN — POTASSIUM CHLORIDE 20 MEQ: 1500 TABLET, EXTENDED RELEASE ORAL at 08:22

## 2017-01-14 RX ADMIN — HEPARIN SODIUM 5000 UNITS: 5000 INJECTION, SOLUTION INTRAVENOUS; SUBCUTANEOUS at 13:55

## 2017-01-14 RX ADMIN — LEVALBUTEROL 1.25 MG: 1.25 SOLUTION, CONCENTRATE RESPIRATORY (INHALATION) at 08:22

## 2017-01-14 RX ADMIN — METFORMIN HYDROCHLORIDE 500 MG: 500 TABLET, FILM COATED ORAL at 16:02

## 2017-01-14 RX ADMIN — MENTHOL 5.4 MG: 5.4 LOZENGE ORAL at 16:00

## 2017-01-14 RX ADMIN — GABAPENTIN 400 MG: 400 CAPSULE ORAL at 16:02

## 2017-01-14 RX ADMIN — LISINOPRIL 10 MG: 10 TABLET ORAL at 08:22

## 2017-01-14 RX ADMIN — HEPARIN SODIUM 5000 UNITS: 5000 INJECTION, SOLUTION INTRAVENOUS; SUBCUTANEOUS at 21:45

## 2017-01-14 RX ADMIN — TIOTROPIUM BROMIDE 18 MCG: 18 CAPSULE ORAL; RESPIRATORY (INHALATION) at 08:32

## 2017-01-14 RX ADMIN — LEVOTHYROXINE SODIUM 100 MCG: 100 TABLET ORAL at 05:11

## 2017-01-14 RX ADMIN — HEPARIN SODIUM 5000 UNITS: 5000 INJECTION, SOLUTION INTRAVENOUS; SUBCUTANEOUS at 05:11

## 2017-01-14 RX ADMIN — ACETAMINOPHEN 650 MG: 325 TABLET, FILM COATED ORAL at 16:02

## 2017-01-14 RX ADMIN — MENTHOL 5.4 MG: 5.4 LOZENGE ORAL at 02:02

## 2017-01-14 RX ADMIN — FLUTICASONE PROPIONATE AND SALMETEROL 1 PUFF: 50; 250 POWDER RESPIRATORY (INHALATION) at 21:45

## 2017-01-14 RX ADMIN — FLUTICASONE PROPIONATE AND SALMETEROL 1 PUFF: 50; 250 POWDER RESPIRATORY (INHALATION) at 08:24

## 2017-01-14 RX ADMIN — LEVALBUTEROL 1.25 MG: 1.25 SOLUTION, CONCENTRATE RESPIRATORY (INHALATION) at 13:10

## 2017-01-14 RX ADMIN — LEVALBUTEROL 1.25 MG: 1.25 SOLUTION, CONCENTRATE RESPIRATORY (INHALATION) at 19:17

## 2017-01-14 RX ADMIN — PREDNISONE 40 MG: 20 TABLET ORAL at 08:22

## 2017-01-14 RX ADMIN — TRAMADOL HYDROCHLORIDE 50 MG: 50 TABLET, COATED ORAL at 21:45

## 2017-01-15 PROCEDURE — 94640 AIRWAY INHALATION TREATMENT: CPT

## 2017-01-15 PROCEDURE — 94760 N-INVAS EAR/PLS OXIMETRY 1: CPT

## 2017-01-15 RX ADMIN — MENTHOL 5.4 MG: 5.4 LOZENGE ORAL at 17:21

## 2017-01-15 RX ADMIN — HEPARIN SODIUM 5000 UNITS: 5000 INJECTION, SOLUTION INTRAVENOUS; SUBCUTANEOUS at 14:35

## 2017-01-15 RX ADMIN — ISODIUM CHLORIDE 3 ML: 0.03 SOLUTION RESPIRATORY (INHALATION) at 19:25

## 2017-01-15 RX ADMIN — DILTIAZEM HYDROCHLORIDE 300 MG: 300 CAPSULE, COATED, EXTENDED RELEASE ORAL at 08:53

## 2017-01-15 RX ADMIN — METFORMIN HYDROCHLORIDE 500 MG: 500 TABLET, FILM COATED ORAL at 16:06

## 2017-01-15 RX ADMIN — ASPIRIN 81 MG 81 MG: 81 TABLET ORAL at 08:51

## 2017-01-15 RX ADMIN — TIOTROPIUM BROMIDE 18 MCG: 18 CAPSULE ORAL; RESPIRATORY (INHALATION) at 08:52

## 2017-01-15 RX ADMIN — METFORMIN HYDROCHLORIDE 500 MG: 500 TABLET, FILM COATED ORAL at 08:52

## 2017-01-15 RX ADMIN — LEVALBUTEROL 1.25 MG: 1.25 SOLUTION, CONCENTRATE RESPIRATORY (INHALATION) at 19:25

## 2017-01-15 RX ADMIN — QUETIAPINE FUMARATE 100 MG: 25 TABLET, FILM COATED ORAL at 22:35

## 2017-01-15 RX ADMIN — ISODIUM CHLORIDE 3 ML: 0.03 SOLUTION RESPIRATORY (INHALATION) at 13:23

## 2017-01-15 RX ADMIN — LEVALBUTEROL 1.25 MG: 1.25 SOLUTION, CONCENTRATE RESPIRATORY (INHALATION) at 08:15

## 2017-01-15 RX ADMIN — GABAPENTIN 400 MG: 400 CAPSULE ORAL at 15:11

## 2017-01-15 RX ADMIN — GUAIFENESIN 1200 MG: 600 TABLET, EXTENDED RELEASE ORAL at 08:51

## 2017-01-15 RX ADMIN — LEVOTHYROXINE SODIUM 100 MCG: 100 TABLET ORAL at 09:12

## 2017-01-15 RX ADMIN — BUPROPION HYDROCHLORIDE 200 MG: 100 TABLET, FILM COATED, EXTENDED RELEASE ORAL at 20:39

## 2017-01-15 RX ADMIN — LEVALBUTEROL 1.25 MG: 1.25 SOLUTION, CONCENTRATE RESPIRATORY (INHALATION) at 13:23

## 2017-01-15 RX ADMIN — FUROSEMIDE 20 MG: 20 TABLET ORAL at 17:21

## 2017-01-15 RX ADMIN — GUAIFENESIN 1200 MG: 600 TABLET, EXTENDED RELEASE ORAL at 20:39

## 2017-01-15 RX ADMIN — PREDNISONE 40 MG: 20 TABLET ORAL at 08:52

## 2017-01-15 RX ADMIN — GABAPENTIN 400 MG: 400 CAPSULE ORAL at 08:51

## 2017-01-15 RX ADMIN — MIRTAZAPINE 15 MG: 15 TABLET, FILM COATED ORAL at 22:35

## 2017-01-15 RX ADMIN — TRAMADOL HYDROCHLORIDE 50 MG: 50 TABLET, COATED ORAL at 15:11

## 2017-01-15 RX ADMIN — ISODIUM CHLORIDE 3 ML: 0.03 SOLUTION RESPIRATORY (INHALATION) at 08:15

## 2017-01-15 RX ADMIN — MENTHOL 5.4 MG: 5.4 LOZENGE ORAL at 10:31

## 2017-01-15 RX ADMIN — FUROSEMIDE 20 MG: 20 TABLET ORAL at 08:52

## 2017-01-15 RX ADMIN — FLUTICASONE PROPIONATE AND SALMETEROL 1 PUFF: 50; 250 POWDER RESPIRATORY (INHALATION) at 20:39

## 2017-01-15 RX ADMIN — HEPARIN SODIUM 5000 UNITS: 5000 INJECTION, SOLUTION INTRAVENOUS; SUBCUTANEOUS at 06:14

## 2017-01-15 RX ADMIN — LISINOPRIL 10 MG: 10 TABLET ORAL at 08:51

## 2017-01-15 RX ADMIN — BUPROPION HYDROCHLORIDE 200 MG: 100 TABLET, FILM COATED, EXTENDED RELEASE ORAL at 08:53

## 2017-01-15 RX ADMIN — FLUTICASONE PROPIONATE AND SALMETEROL 1 PUFF: 50; 250 POWDER RESPIRATORY (INHALATION) at 08:52

## 2017-01-15 RX ADMIN — POTASSIUM CHLORIDE 20 MEQ: 1500 TABLET, EXTENDED RELEASE ORAL at 08:51

## 2017-01-15 RX ADMIN — HEPARIN SODIUM 5000 UNITS: 5000 INJECTION, SOLUTION INTRAVENOUS; SUBCUTANEOUS at 22:35

## 2017-01-15 RX ADMIN — POTASSIUM CHLORIDE 20 MEQ: 1500 TABLET, EXTENDED RELEASE ORAL at 17:21

## 2017-01-16 VITALS
HEART RATE: 76 BPM | RESPIRATION RATE: 16 BRPM | DIASTOLIC BLOOD PRESSURE: 57 MMHG | HEIGHT: 62 IN | BODY MASS INDEX: 45.84 KG/M2 | SYSTOLIC BLOOD PRESSURE: 101 MMHG | OXYGEN SATURATION: 99 % | WEIGHT: 249.12 LBS | TEMPERATURE: 97.2 F

## 2017-01-16 PROCEDURE — 94760 N-INVAS EAR/PLS OXIMETRY 1: CPT

## 2017-01-16 PROCEDURE — 94640 AIRWAY INHALATION TREATMENT: CPT

## 2017-01-16 RX ORDER — POTASSIUM CHLORIDE 20 MEQ/1
20 TABLET, EXTENDED RELEASE ORAL 2 TIMES DAILY
Qty: 60 TABLET | Refills: 0 | Status: SHIPPED | OUTPATIENT
Start: 2017-01-16 | End: 2017-12-20

## 2017-01-16 RX ORDER — LEVALBUTEROL 1.25 MG/.5ML
1.25 SOLUTION, CONCENTRATE RESPIRATORY (INHALATION) EVERY 6 HOURS PRN
Qty: 1 VIAL | Refills: 0 | Status: SHIPPED | OUTPATIENT
Start: 2017-01-16 | End: 2017-02-15

## 2017-01-16 RX ORDER — PREDNISONE 10 MG/1
TABLET ORAL
Qty: 30 TABLET | Refills: 0 | Status: SHIPPED | OUTPATIENT
Start: 2017-01-16 | End: 2017-12-20

## 2017-01-16 RX ORDER — MIRTAZAPINE 15 MG/1
15 TABLET, FILM COATED ORAL
Qty: 30 TABLET | Refills: 0 | Status: SHIPPED | OUTPATIENT
Start: 2017-01-16 | End: 2017-02-05 | Stop reason: HOSPADM

## 2017-01-16 RX ADMIN — ASPIRIN 81 MG 81 MG: 81 TABLET ORAL at 08:02

## 2017-01-16 RX ADMIN — LEVOTHYROXINE SODIUM 100 MCG: 100 TABLET ORAL at 06:12

## 2017-01-16 RX ADMIN — TRAMADOL HYDROCHLORIDE 50 MG: 50 TABLET, COATED ORAL at 06:12

## 2017-01-16 RX ADMIN — BUPROPION HYDROCHLORIDE 200 MG: 100 TABLET, FILM COATED, EXTENDED RELEASE ORAL at 08:03

## 2017-01-16 RX ADMIN — FUROSEMIDE 20 MG: 20 TABLET ORAL at 08:02

## 2017-01-16 RX ADMIN — LEVALBUTEROL 1.25 MG: 1.25 SOLUTION, CONCENTRATE RESPIRATORY (INHALATION) at 08:19

## 2017-01-16 RX ADMIN — POTASSIUM CHLORIDE 20 MEQ: 1500 TABLET, EXTENDED RELEASE ORAL at 08:02

## 2017-01-16 RX ADMIN — GUAIFENESIN 1200 MG: 600 TABLET, EXTENDED RELEASE ORAL at 08:02

## 2017-01-16 RX ADMIN — METFORMIN HYDROCHLORIDE 500 MG: 500 TABLET, FILM COATED ORAL at 08:03

## 2017-01-16 RX ADMIN — TIOTROPIUM BROMIDE 18 MCG: 18 CAPSULE ORAL; RESPIRATORY (INHALATION) at 08:06

## 2017-01-16 RX ADMIN — ISODIUM CHLORIDE 3 ML: 0.03 SOLUTION RESPIRATORY (INHALATION) at 08:18

## 2017-01-16 RX ADMIN — DILTIAZEM HYDROCHLORIDE 300 MG: 300 CAPSULE, COATED, EXTENDED RELEASE ORAL at 08:05

## 2017-01-16 RX ADMIN — PREDNISONE 40 MG: 20 TABLET ORAL at 08:02

## 2017-01-16 RX ADMIN — GABAPENTIN 400 MG: 400 CAPSULE ORAL at 08:03

## 2017-01-16 RX ADMIN — FLUTICASONE PROPIONATE AND SALMETEROL 1 PUFF: 50; 250 POWDER RESPIRATORY (INHALATION) at 08:02

## 2017-01-16 RX ADMIN — LISINOPRIL 10 MG: 10 TABLET ORAL at 08:02

## 2017-01-17 ENCOUNTER — GENERIC CONVERSION - ENCOUNTER (OUTPATIENT)
Dept: OTHER | Facility: OTHER | Age: 67
End: 2017-01-17

## 2017-01-31 ENCOUNTER — HOSPITAL ENCOUNTER (INPATIENT)
Facility: HOSPITAL | Age: 67
LOS: 5 days | Discharge: HOME HEALTH | DRG: 140 | End: 2017-02-05
Attending: EMERGENCY MEDICINE | Admitting: INTERNAL MEDICINE
Payer: COMMERCIAL

## 2017-01-31 ENCOUNTER — APPOINTMENT (EMERGENCY)
Dept: RADIOLOGY | Facility: HOSPITAL | Age: 67
DRG: 140 | End: 2017-01-31
Payer: COMMERCIAL

## 2017-01-31 DIAGNOSIS — Z72.0 TOBACCO ABUSE: ICD-10-CM

## 2017-01-31 DIAGNOSIS — J44.1 COPD EXACERBATION (HCC): Primary | ICD-10-CM

## 2017-01-31 DIAGNOSIS — I10 HYPERTENSION: ICD-10-CM

## 2017-01-31 DIAGNOSIS — J45.909 ASTHMA: ICD-10-CM

## 2017-01-31 DIAGNOSIS — B20 HIV DISEASE (HCC): ICD-10-CM

## 2017-01-31 PROBLEM — F32.A DEPRESSION: Chronic | Status: ACTIVE | Noted: 2017-01-31

## 2017-01-31 PROBLEM — R06.02 SHORTNESS OF BREATH: Status: ACTIVE | Noted: 2017-01-31

## 2017-01-31 LAB
ALBUMIN SERPL BCP-MCNC: 3.5 G/DL (ref 3.5–5)
ALP SERPL-CCNC: 102 U/L (ref 46–116)
ALT SERPL W P-5'-P-CCNC: 19 U/L (ref 12–78)
ANION GAP SERPL CALCULATED.3IONS-SCNC: 7 MMOL/L (ref 4–13)
AST SERPL W P-5'-P-CCNC: 10 U/L (ref 5–45)
ATRIAL RATE: 89 BPM
BASOPHILS # BLD AUTO: 0.02 THOUSANDS/ΜL (ref 0–0.1)
BASOPHILS NFR BLD AUTO: 0 % (ref 0–1)
BILIRUB SERPL-MCNC: 0.13 MG/DL (ref 0.2–1)
BUN SERPL-MCNC: 15 MG/DL (ref 5–25)
CALCIUM SERPL-MCNC: 10 MG/DL (ref 8.3–10.1)
CHLORIDE SERPL-SCNC: 109 MMOL/L (ref 100–108)
CO2 SERPL-SCNC: 31 MMOL/L (ref 21–32)
CREAT SERPL-MCNC: 1.03 MG/DL (ref 0.6–1.3)
EOSINOPHIL # BLD AUTO: 0.22 THOUSAND/ΜL (ref 0–0.61)
EOSINOPHIL NFR BLD AUTO: 4 % (ref 0–6)
ERYTHROCYTE [DISTWIDTH] IN BLOOD BY AUTOMATED COUNT: 17 % (ref 11.6–15.1)
GFR SERPL CREATININE-BSD FRML MDRD: >60 ML/MIN/1.73SQ M
GLUCOSE SERPL-MCNC: 137 MG/DL (ref 65–140)
GLUCOSE SERPL-MCNC: 99 MG/DL (ref 65–140)
HCT VFR BLD AUTO: 32.1 % (ref 34.8–46.1)
HGB BLD-MCNC: 10.1 G/DL (ref 11.5–15.4)
LYMPHOCYTES # BLD AUTO: 2 THOUSANDS/ΜL (ref 0.6–4.47)
LYMPHOCYTES NFR BLD AUTO: 35 % (ref 14–44)
MCH RBC QN AUTO: 28.5 PG (ref 26.8–34.3)
MCHC RBC AUTO-ENTMCNC: 31.5 G/DL (ref 31.4–37.4)
MCV RBC AUTO: 90 FL (ref 82–98)
MONOCYTES # BLD AUTO: 0.34 THOUSAND/ΜL (ref 0.17–1.22)
MONOCYTES NFR BLD AUTO: 6 % (ref 4–12)
NEUTROPHILS # BLD AUTO: 3.15 THOUSANDS/ΜL (ref 1.85–7.62)
NEUTS SEG NFR BLD AUTO: 55 % (ref 43–75)
NRBC BLD AUTO-RTO: 0 /100 WBCS
NT-PROBNP SERPL-MCNC: 61 PG/ML
P AXIS: 43 DEGREES
PLATELET # BLD AUTO: 430 THOUSANDS/UL (ref 149–390)
PMV BLD AUTO: 8.8 FL (ref 8.9–12.7)
POTASSIUM SERPL-SCNC: 3.5 MMOL/L (ref 3.5–5.3)
PR INTERVAL: 188 MS
PROT SERPL-MCNC: 7.6 G/DL (ref 6.4–8.2)
QRS AXIS: -54 DEGREES
QRSD INTERVAL: 100 MS
QT INTERVAL: 332 MS
QTC INTERVAL: 403 MS
RBC # BLD AUTO: 3.55 MILLION/UL (ref 3.81–5.12)
SODIUM SERPL-SCNC: 147 MMOL/L (ref 136–145)
SPECIMEN SOURCE: NORMAL
T WAVE AXIS: 24 DEGREES
TROPONIN I BLD-MCNC: 0.01 NG/ML (ref 0–0.08)
VENTRICULAR RATE: 89 BPM
WBC # BLD AUTO: 5.73 THOUSAND/UL (ref 4.31–10.16)

## 2017-01-31 PROCEDURE — 84484 ASSAY OF TROPONIN QUANT: CPT

## 2017-01-31 PROCEDURE — 94644 CONT INHLJ TX 1ST HOUR: CPT

## 2017-01-31 PROCEDURE — 85025 COMPLETE CBC W/AUTO DIFF WBC: CPT | Performed by: PHYSICIAN ASSISTANT

## 2017-01-31 PROCEDURE — 99285 EMERGENCY DEPT VISIT HI MDM: CPT

## 2017-01-31 PROCEDURE — 96374 THER/PROPH/DIAG INJ IV PUSH: CPT

## 2017-01-31 PROCEDURE — 71020 HB CHEST X-RAY 2VW FRONTAL&LATL: CPT

## 2017-01-31 PROCEDURE — 82948 REAGENT STRIP/BLOOD GLUCOSE: CPT

## 2017-01-31 PROCEDURE — 83880 ASSAY OF NATRIURETIC PEPTIDE: CPT | Performed by: PHYSICIAN ASSISTANT

## 2017-01-31 PROCEDURE — 94640 AIRWAY INHALATION TREATMENT: CPT

## 2017-01-31 PROCEDURE — 80053 COMPREHEN METABOLIC PANEL: CPT | Performed by: PHYSICIAN ASSISTANT

## 2017-01-31 PROCEDURE — 36415 COLL VENOUS BLD VENIPUNCTURE: CPT | Performed by: PHYSICIAN ASSISTANT

## 2017-01-31 PROCEDURE — 93005 ELECTROCARDIOGRAM TRACING: CPT

## 2017-01-31 RX ORDER — GABAPENTIN 400 MG/1
400 CAPSULE ORAL
Status: DISCONTINUED | OUTPATIENT
Start: 2017-02-01 | End: 2017-02-05 | Stop reason: HOSPADM

## 2017-01-31 RX ORDER — LISINOPRIL 10 MG/1
10 TABLET ORAL DAILY
Status: DISCONTINUED | OUTPATIENT
Start: 2017-02-01 | End: 2017-02-05 | Stop reason: HOSPADM

## 2017-01-31 RX ORDER — METHYLPREDNISOLONE SODIUM SUCCINATE 125 MG/2ML
125 INJECTION, POWDER, LYOPHILIZED, FOR SOLUTION INTRAMUSCULAR; INTRAVENOUS ONCE
Status: COMPLETED | OUTPATIENT
Start: 2017-01-31 | End: 2017-01-31

## 2017-01-31 RX ORDER — DILTIAZEM HYDROCHLORIDE 300 MG/1
300 CAPSULE, COATED, EXTENDED RELEASE ORAL DAILY
Status: DISCONTINUED | OUTPATIENT
Start: 2017-02-01 | End: 2017-02-05 | Stop reason: HOSPADM

## 2017-01-31 RX ORDER — MIRTAZAPINE 15 MG/1
15 TABLET, FILM COATED ORAL
Status: DISCONTINUED | OUTPATIENT
Start: 2017-01-31 | End: 2017-02-04

## 2017-01-31 RX ORDER — ASPIRIN 81 MG/1
81 TABLET, CHEWABLE ORAL DAILY
Status: DISCONTINUED | OUTPATIENT
Start: 2017-02-01 | End: 2017-02-05 | Stop reason: HOSPADM

## 2017-01-31 RX ORDER — BUPROPION HYDROCHLORIDE 100 MG/1
200 TABLET, EXTENDED RELEASE ORAL 2 TIMES DAILY
Status: DISCONTINUED | OUTPATIENT
Start: 2017-02-01 | End: 2017-02-05 | Stop reason: HOSPADM

## 2017-01-31 RX ORDER — LEVALBUTEROL 1.25 MG/.5ML
1.25 SOLUTION, CONCENTRATE RESPIRATORY (INHALATION) EVERY 6 HOURS PRN
Status: DISCONTINUED | OUTPATIENT
Start: 2017-01-31 | End: 2017-02-01

## 2017-01-31 RX ORDER — LEVALBUTEROL 1.25 MG/.5ML
1.25 SOLUTION, CONCENTRATE RESPIRATORY (INHALATION)
Status: DISCONTINUED | OUTPATIENT
Start: 2017-02-01 | End: 2017-02-05 | Stop reason: HOSPADM

## 2017-01-31 RX ORDER — POTASSIUM CHLORIDE 20 MEQ/1
20 TABLET, EXTENDED RELEASE ORAL 2 TIMES DAILY
Status: DISCONTINUED | OUTPATIENT
Start: 2017-02-01 | End: 2017-02-05 | Stop reason: HOSPADM

## 2017-01-31 RX ORDER — LEVOTHYROXINE SODIUM 0.1 MG/1
100 TABLET ORAL
Status: DISCONTINUED | OUTPATIENT
Start: 2017-02-01 | End: 2017-02-05 | Stop reason: HOSPADM

## 2017-01-31 RX ORDER — GUAIFENESIN 600 MG
1200 TABLET, EXTENDED RELEASE 12 HR ORAL 2 TIMES DAILY PRN
Status: DISCONTINUED | OUTPATIENT
Start: 2017-01-31 | End: 2017-02-03

## 2017-01-31 RX ORDER — FUROSEMIDE 20 MG/1
20 TABLET ORAL 2 TIMES DAILY
Status: DISCONTINUED | OUTPATIENT
Start: 2017-02-01 | End: 2017-02-05 | Stop reason: HOSPADM

## 2017-01-31 RX ORDER — QUETIAPINE FUMARATE 25 MG/1
100 TABLET, FILM COATED ORAL
Status: DISCONTINUED | OUTPATIENT
Start: 2017-01-31 | End: 2017-02-05 | Stop reason: HOSPADM

## 2017-01-31 RX ADMIN — IPRATROPIUM BROMIDE 1 MG: 0.5 SOLUTION RESPIRATORY (INHALATION) at 16:02

## 2017-01-31 RX ADMIN — ALBUTEROL SULFATE 10 MG: 2.5 SOLUTION RESPIRATORY (INHALATION) at 16:02

## 2017-01-31 RX ADMIN — METHYLPREDNISOLONE SODIUM SUCCINATE 125 MG: 125 INJECTION, POWDER, FOR SOLUTION INTRAMUSCULAR; INTRAVENOUS at 19:08

## 2017-02-01 LAB
GLUCOSE SERPL-MCNC: 122 MG/DL (ref 65–140)
GLUCOSE SERPL-MCNC: 158 MG/DL (ref 65–140)
GLUCOSE SERPL-MCNC: 175 MG/DL (ref 65–140)
GLUCOSE SERPL-MCNC: 198 MG/DL (ref 65–140)

## 2017-02-01 PROCEDURE — 94760 N-INVAS EAR/PLS OXIMETRY 1: CPT

## 2017-02-01 PROCEDURE — 82948 REAGENT STRIP/BLOOD GLUCOSE: CPT

## 2017-02-01 PROCEDURE — 94640 AIRWAY INHALATION TREATMENT: CPT

## 2017-02-01 RX ORDER — TRAMADOL HYDROCHLORIDE 50 MG/1
50 TABLET ORAL EVERY 6 HOURS PRN
Status: DISCONTINUED | OUTPATIENT
Start: 2017-02-01 | End: 2017-02-05 | Stop reason: HOSPADM

## 2017-02-01 RX ORDER — METHYLPREDNISOLONE SODIUM SUCCINATE 40 MG/ML
40 INJECTION, POWDER, LYOPHILIZED, FOR SOLUTION INTRAMUSCULAR; INTRAVENOUS EVERY 8 HOURS SCHEDULED
Status: DISCONTINUED | OUTPATIENT
Start: 2017-02-01 | End: 2017-02-03

## 2017-02-01 RX ADMIN — BUPROPION HYDROCHLORIDE 200 MG: 100 TABLET, FILM COATED, EXTENDED RELEASE ORAL at 08:19

## 2017-02-01 RX ADMIN — POTASSIUM CHLORIDE 20 MEQ: 1500 TABLET, EXTENDED RELEASE ORAL at 17:01

## 2017-02-01 RX ADMIN — METHYLPREDNISOLONE SODIUM SUCCINATE 40 MG: 40 INJECTION, POWDER, FOR SOLUTION INTRAMUSCULAR; INTRAVENOUS at 21:34

## 2017-02-01 RX ADMIN — LISINOPRIL 10 MG: 10 TABLET ORAL at 08:19

## 2017-02-01 RX ADMIN — QUETIAPINE FUMARATE 100 MG: 25 TABLET, FILM COATED ORAL at 21:34

## 2017-02-01 RX ADMIN — LEVALBUTEROL 1.25 MG: 1.25 SOLUTION, CONCENTRATE RESPIRATORY (INHALATION) at 01:00

## 2017-02-01 RX ADMIN — LEVALBUTEROL 1.25 MG: 1.25 SOLUTION, CONCENTRATE RESPIRATORY (INHALATION) at 20:11

## 2017-02-01 RX ADMIN — MIRTAZAPINE 15 MG: 15 TABLET, FILM COATED ORAL at 21:34

## 2017-02-01 RX ADMIN — GABAPENTIN 400 MG: 400 CAPSULE ORAL at 17:00

## 2017-02-01 RX ADMIN — GABAPENTIN 400 MG: 400 CAPSULE ORAL at 08:18

## 2017-02-01 RX ADMIN — INSULIN LISPRO 1 UNITS: 100 INJECTION, SOLUTION INTRAVENOUS; SUBCUTANEOUS at 08:29

## 2017-02-01 RX ADMIN — LEVALBUTEROL 1.25 MG: 1.25 SOLUTION, CONCENTRATE RESPIRATORY (INHALATION) at 13:53

## 2017-02-01 RX ADMIN — ENOXAPARIN SODIUM 40 MG: 40 INJECTION SUBCUTANEOUS at 08:19

## 2017-02-01 RX ADMIN — IPRATROPIUM BROMIDE 0.5 MG: 0.5 SOLUTION RESPIRATORY (INHALATION) at 13:53

## 2017-02-01 RX ADMIN — BUPROPION HYDROCHLORIDE 200 MG: 100 TABLET, FILM COATED, EXTENDED RELEASE ORAL at 17:04

## 2017-02-01 RX ADMIN — ASPIRIN 81 MG 81 MG: 81 TABLET ORAL at 08:19

## 2017-02-01 RX ADMIN — FLUTICASONE PROPIONATE AND SALMETEROL 1 PUFF: 50; 250 POWDER RESPIRATORY (INHALATION) at 08:19

## 2017-02-01 RX ADMIN — NICOTINE 1 PATCH: 7 PATCH, EXTENDED RELEASE TRANSDERMAL at 08:19

## 2017-02-01 RX ADMIN — QUETIAPINE FUMARATE 100 MG: 25 TABLET, FILM COATED ORAL at 00:01

## 2017-02-01 RX ADMIN — METFORMIN HYDROCHLORIDE 500 MG: 500 TABLET, FILM COATED ORAL at 17:01

## 2017-02-01 RX ADMIN — METFORMIN HYDROCHLORIDE 500 MG: 500 TABLET, FILM COATED ORAL at 08:19

## 2017-02-01 RX ADMIN — POTASSIUM CHLORIDE 20 MEQ: 1500 TABLET, EXTENDED RELEASE ORAL at 08:18

## 2017-02-01 RX ADMIN — INSULIN LISPRO 2 UNITS: 100 INJECTION, SOLUTION INTRAVENOUS; SUBCUTANEOUS at 11:25

## 2017-02-01 RX ADMIN — DILTIAZEM HYDROCHLORIDE 300 MG: 300 CAPSULE, COATED, EXTENDED RELEASE ORAL at 08:19

## 2017-02-01 RX ADMIN — TRAMADOL HYDROCHLORIDE 50 MG: 50 TABLET, COATED ORAL at 21:33

## 2017-02-01 RX ADMIN — FLUTICASONE PROPIONATE AND SALMETEROL 1 PUFF: 50; 250 POWDER RESPIRATORY (INHALATION) at 21:33

## 2017-02-01 RX ADMIN — MIRTAZAPINE 15 MG: 15 TABLET, FILM COATED ORAL at 00:31

## 2017-02-01 RX ADMIN — TIOTROPIUM BROMIDE 18 MCG: 18 CAPSULE ORAL; RESPIRATORY (INHALATION) at 08:19

## 2017-02-01 RX ADMIN — LEVALBUTEROL 1.25 MG: 1.25 SOLUTION, CONCENTRATE RESPIRATORY (INHALATION) at 07:45

## 2017-02-01 RX ADMIN — METHYLPREDNISOLONE SODIUM SUCCINATE 40 MG: 40 INJECTION, POWDER, FOR SOLUTION INTRAMUSCULAR; INTRAVENOUS at 14:34

## 2017-02-01 RX ADMIN — LEVOTHYROXINE SODIUM 100 MCG: 100 TABLET ORAL at 05:51

## 2017-02-01 RX ADMIN — IPRATROPIUM BROMIDE 0.5 MG: 0.5 SOLUTION RESPIRATORY (INHALATION) at 07:45

## 2017-02-01 RX ADMIN — IPRATROPIUM BROMIDE 0.5 MG: 0.5 SOLUTION RESPIRATORY (INHALATION) at 01:00

## 2017-02-01 RX ADMIN — FUROSEMIDE 20 MG: 20 TABLET ORAL at 08:19

## 2017-02-01 RX ADMIN — IPRATROPIUM BROMIDE 0.5 MG: 0.5 SOLUTION RESPIRATORY (INHALATION) at 20:11

## 2017-02-01 RX ADMIN — FUROSEMIDE 20 MG: 20 TABLET ORAL at 17:01

## 2017-02-02 LAB
GLUCOSE SERPL-MCNC: 110 MG/DL (ref 65–140)
GLUCOSE SERPL-MCNC: 144 MG/DL (ref 65–140)
GLUCOSE SERPL-MCNC: 179 MG/DL (ref 65–140)
GLUCOSE SERPL-MCNC: 203 MG/DL (ref 65–140)

## 2017-02-02 PROCEDURE — 94760 N-INVAS EAR/PLS OXIMETRY 1: CPT

## 2017-02-02 PROCEDURE — 82948 REAGENT STRIP/BLOOD GLUCOSE: CPT

## 2017-02-02 PROCEDURE — 94640 AIRWAY INHALATION TREATMENT: CPT

## 2017-02-02 RX ADMIN — TRAMADOL HYDROCHLORIDE 50 MG: 50 TABLET, COATED ORAL at 05:44

## 2017-02-02 RX ADMIN — LEVOTHYROXINE SODIUM 100 MCG: 100 TABLET ORAL at 05:44

## 2017-02-02 RX ADMIN — ASPIRIN 81 MG 81 MG: 81 TABLET ORAL at 08:13

## 2017-02-02 RX ADMIN — TIOTROPIUM BROMIDE 18 MCG: 18 CAPSULE ORAL; RESPIRATORY (INHALATION) at 08:13

## 2017-02-02 RX ADMIN — IPRATROPIUM BROMIDE 0.5 MG: 0.5 SOLUTION RESPIRATORY (INHALATION) at 19:00

## 2017-02-02 RX ADMIN — NICOTINE 1 PATCH: 7 PATCH, EXTENDED RELEASE TRANSDERMAL at 08:13

## 2017-02-02 RX ADMIN — FLUTICASONE PROPIONATE AND SALMETEROL 1 PUFF: 50; 250 POWDER RESPIRATORY (INHALATION) at 08:14

## 2017-02-02 RX ADMIN — FUROSEMIDE 20 MG: 20 TABLET ORAL at 10:15

## 2017-02-02 RX ADMIN — POTASSIUM CHLORIDE 20 MEQ: 1500 TABLET, EXTENDED RELEASE ORAL at 08:13

## 2017-02-02 RX ADMIN — LEVALBUTEROL 1.25 MG: 1.25 SOLUTION, CONCENTRATE RESPIRATORY (INHALATION) at 19:00

## 2017-02-02 RX ADMIN — BUPROPION HYDROCHLORIDE 200 MG: 100 TABLET, FILM COATED, EXTENDED RELEASE ORAL at 17:39

## 2017-02-02 RX ADMIN — BUPROPION HYDROCHLORIDE 200 MG: 100 TABLET, FILM COATED, EXTENDED RELEASE ORAL at 08:14

## 2017-02-02 RX ADMIN — GABAPENTIN 400 MG: 400 CAPSULE ORAL at 17:39

## 2017-02-02 RX ADMIN — METFORMIN HYDROCHLORIDE 500 MG: 500 TABLET, FILM COATED ORAL at 08:13

## 2017-02-02 RX ADMIN — LISINOPRIL 10 MG: 10 TABLET ORAL at 10:14

## 2017-02-02 RX ADMIN — LEVALBUTEROL 1.25 MG: 1.25 SOLUTION, CONCENTRATE RESPIRATORY (INHALATION) at 13:25

## 2017-02-02 RX ADMIN — GABAPENTIN 400 MG: 400 CAPSULE ORAL at 08:12

## 2017-02-02 RX ADMIN — LEVALBUTEROL 1.25 MG: 1.25 SOLUTION, CONCENTRATE RESPIRATORY (INHALATION) at 07:58

## 2017-02-02 RX ADMIN — METFORMIN HYDROCHLORIDE 500 MG: 500 TABLET, FILM COATED ORAL at 17:39

## 2017-02-02 RX ADMIN — DILTIAZEM HYDROCHLORIDE 300 MG: 300 CAPSULE, COATED, EXTENDED RELEASE ORAL at 10:15

## 2017-02-02 RX ADMIN — TRAMADOL HYDROCHLORIDE 50 MG: 50 TABLET, COATED ORAL at 19:42

## 2017-02-02 RX ADMIN — INSULIN LISPRO 1 UNITS: 100 INJECTION, SOLUTION INTRAVENOUS; SUBCUTANEOUS at 08:14

## 2017-02-02 RX ADMIN — POTASSIUM CHLORIDE 20 MEQ: 1500 TABLET, EXTENDED RELEASE ORAL at 17:39

## 2017-02-02 RX ADMIN — MIRTAZAPINE 15 MG: 15 TABLET, FILM COATED ORAL at 21:13

## 2017-02-02 RX ADMIN — INSULIN LISPRO 2 UNITS: 100 INJECTION, SOLUTION INTRAVENOUS; SUBCUTANEOUS at 12:31

## 2017-02-02 RX ADMIN — ENOXAPARIN SODIUM 40 MG: 40 INJECTION SUBCUTANEOUS at 08:12

## 2017-02-02 RX ADMIN — IPRATROPIUM BROMIDE 0.5 MG: 0.5 SOLUTION RESPIRATORY (INHALATION) at 07:58

## 2017-02-02 RX ADMIN — FLUTICASONE PROPIONATE AND SALMETEROL 1 PUFF: 50; 250 POWDER RESPIRATORY (INHALATION) at 21:13

## 2017-02-02 RX ADMIN — FUROSEMIDE 20 MG: 20 TABLET ORAL at 17:39

## 2017-02-02 RX ADMIN — IPRATROPIUM BROMIDE 0.5 MG: 0.5 SOLUTION RESPIRATORY (INHALATION) at 13:25

## 2017-02-02 RX ADMIN — QUETIAPINE FUMARATE 100 MG: 25 TABLET, FILM COATED ORAL at 21:13

## 2017-02-03 LAB
GLUCOSE SERPL-MCNC: 115 MG/DL (ref 65–140)
GLUCOSE SERPL-MCNC: 148 MG/DL (ref 65–140)
GLUCOSE SERPL-MCNC: 149 MG/DL (ref 65–140)
GLUCOSE SERPL-MCNC: 98 MG/DL (ref 65–140)

## 2017-02-03 PROCEDURE — 94640 AIRWAY INHALATION TREATMENT: CPT

## 2017-02-03 PROCEDURE — 94760 N-INVAS EAR/PLS OXIMETRY 1: CPT

## 2017-02-03 PROCEDURE — 82948 REAGENT STRIP/BLOOD GLUCOSE: CPT

## 2017-02-03 RX ORDER — PREDNISONE 20 MG/1
40 TABLET ORAL 2 TIMES DAILY WITH MEALS
Status: DISCONTINUED | OUTPATIENT
Start: 2017-02-03 | End: 2017-02-04

## 2017-02-03 RX ORDER — GUAIFENESIN 600 MG
1200 TABLET, EXTENDED RELEASE 12 HR ORAL 2 TIMES DAILY
Status: DISCONTINUED | OUTPATIENT
Start: 2017-02-03 | End: 2017-02-05 | Stop reason: HOSPADM

## 2017-02-03 RX ADMIN — DILTIAZEM HYDROCHLORIDE 300 MG: 300 CAPSULE, COATED, EXTENDED RELEASE ORAL at 08:30

## 2017-02-03 RX ADMIN — LEVOTHYROXINE SODIUM 100 MCG: 100 TABLET ORAL at 05:33

## 2017-02-03 RX ADMIN — GABAPENTIN 400 MG: 400 CAPSULE ORAL at 17:26

## 2017-02-03 RX ADMIN — GUAIFENESIN 1200 MG: 600 TABLET, EXTENDED RELEASE ORAL at 17:25

## 2017-02-03 RX ADMIN — ENOXAPARIN SODIUM 40 MG: 40 INJECTION SUBCUTANEOUS at 08:31

## 2017-02-03 RX ADMIN — BUPROPION HYDROCHLORIDE 200 MG: 100 TABLET, FILM COATED, EXTENDED RELEASE ORAL at 17:25

## 2017-02-03 RX ADMIN — TRAMADOL HYDROCHLORIDE 50 MG: 50 TABLET, COATED ORAL at 12:13

## 2017-02-03 RX ADMIN — POTASSIUM CHLORIDE 20 MEQ: 1500 TABLET, EXTENDED RELEASE ORAL at 17:26

## 2017-02-03 RX ADMIN — FUROSEMIDE 20 MG: 20 TABLET ORAL at 08:31

## 2017-02-03 RX ADMIN — LISINOPRIL 10 MG: 10 TABLET ORAL at 08:31

## 2017-02-03 RX ADMIN — FLUTICASONE PROPIONATE AND SALMETEROL 1 PUFF: 50; 250 POWDER RESPIRATORY (INHALATION) at 08:30

## 2017-02-03 RX ADMIN — POTASSIUM CHLORIDE 20 MEQ: 1500 TABLET, EXTENDED RELEASE ORAL at 08:31

## 2017-02-03 RX ADMIN — PREDNISONE 40 MG: 20 TABLET ORAL at 17:26

## 2017-02-03 RX ADMIN — TIOTROPIUM BROMIDE 18 MCG: 18 CAPSULE ORAL; RESPIRATORY (INHALATION) at 08:30

## 2017-02-03 RX ADMIN — MIRTAZAPINE 15 MG: 15 TABLET, FILM COATED ORAL at 21:15

## 2017-02-03 RX ADMIN — BUPROPION HYDROCHLORIDE 200 MG: 100 TABLET, FILM COATED, EXTENDED RELEASE ORAL at 08:30

## 2017-02-03 RX ADMIN — ASPIRIN 81 MG 81 MG: 81 TABLET ORAL at 08:31

## 2017-02-03 RX ADMIN — LEVALBUTEROL 1.25 MG: 1.25 SOLUTION, CONCENTRATE RESPIRATORY (INHALATION) at 19:49

## 2017-02-03 RX ADMIN — LEVALBUTEROL 1.25 MG: 1.25 SOLUTION, CONCENTRATE RESPIRATORY (INHALATION) at 07:47

## 2017-02-03 RX ADMIN — IPRATROPIUM BROMIDE 0.5 MG: 0.5 SOLUTION RESPIRATORY (INHALATION) at 14:20

## 2017-02-03 RX ADMIN — FUROSEMIDE 20 MG: 20 TABLET ORAL at 17:33

## 2017-02-03 RX ADMIN — LEVALBUTEROL 1.25 MG: 1.25 SOLUTION, CONCENTRATE RESPIRATORY (INHALATION) at 14:20

## 2017-02-03 RX ADMIN — METFORMIN HYDROCHLORIDE 500 MG: 500 TABLET, FILM COATED ORAL at 08:31

## 2017-02-03 RX ADMIN — GUAIFENESIN 1200 MG: 600 TABLET, EXTENDED RELEASE ORAL at 12:03

## 2017-02-03 RX ADMIN — METFORMIN HYDROCHLORIDE 500 MG: 500 TABLET, FILM COATED ORAL at 17:26

## 2017-02-03 RX ADMIN — TRAMADOL HYDROCHLORIDE 50 MG: 50 TABLET, COATED ORAL at 05:33

## 2017-02-03 RX ADMIN — IPRATROPIUM BROMIDE 0.5 MG: 0.5 SOLUTION RESPIRATORY (INHALATION) at 19:49

## 2017-02-03 RX ADMIN — IPRATROPIUM BROMIDE 0.5 MG: 0.5 SOLUTION RESPIRATORY (INHALATION) at 07:47

## 2017-02-03 RX ADMIN — QUETIAPINE FUMARATE 100 MG: 25 TABLET, FILM COATED ORAL at 21:15

## 2017-02-03 RX ADMIN — TRAMADOL HYDROCHLORIDE 50 MG: 50 TABLET, COATED ORAL at 23:00

## 2017-02-03 RX ADMIN — GABAPENTIN 400 MG: 400 CAPSULE ORAL at 08:31

## 2017-02-04 LAB
GLUCOSE SERPL-MCNC: 101 MG/DL (ref 65–140)
GLUCOSE SERPL-MCNC: 119 MG/DL (ref 65–140)
GLUCOSE SERPL-MCNC: 124 MG/DL (ref 65–140)
GLUCOSE SERPL-MCNC: 365 MG/DL (ref 65–140)

## 2017-02-04 PROCEDURE — 94640 AIRWAY INHALATION TREATMENT: CPT

## 2017-02-04 PROCEDURE — 94760 N-INVAS EAR/PLS OXIMETRY 1: CPT

## 2017-02-04 PROCEDURE — 82948 REAGENT STRIP/BLOOD GLUCOSE: CPT

## 2017-02-04 RX ORDER — POTASSIUM CHLORIDE 750 MG/1
10 TABLET, EXTENDED RELEASE ORAL DAILY
Status: DISCONTINUED | OUTPATIENT
Start: 2017-02-04 | End: 2017-02-05 | Stop reason: HOSPADM

## 2017-02-04 RX ORDER — PREDNISONE 20 MG/1
60 TABLET ORAL DAILY
Status: DISCONTINUED | OUTPATIENT
Start: 2017-02-05 | End: 2017-02-05 | Stop reason: HOSPADM

## 2017-02-04 RX ORDER — PREDNISONE 20 MG/1
20 TABLET ORAL DAILY
Status: COMPLETED | OUTPATIENT
Start: 2017-02-04 | End: 2017-02-04

## 2017-02-04 RX ORDER — MIRTAZAPINE 15 MG/1
7.5 TABLET, FILM COATED ORAL
Status: DISCONTINUED | OUTPATIENT
Start: 2017-02-04 | End: 2017-02-05 | Stop reason: HOSPADM

## 2017-02-04 RX ADMIN — GUAIFENESIN 1200 MG: 600 TABLET, EXTENDED RELEASE ORAL at 08:38

## 2017-02-04 RX ADMIN — BUPROPION HYDROCHLORIDE 200 MG: 100 TABLET, FILM COATED, EXTENDED RELEASE ORAL at 08:39

## 2017-02-04 RX ADMIN — PREDNISONE 20 MG: 20 TABLET ORAL at 14:47

## 2017-02-04 RX ADMIN — METFORMIN HYDROCHLORIDE 500 MG: 500 TABLET, FILM COATED ORAL at 16:22

## 2017-02-04 RX ADMIN — GUAIFENESIN 1200 MG: 600 TABLET, EXTENDED RELEASE ORAL at 17:24

## 2017-02-04 RX ADMIN — POTASSIUM CHLORIDE 10 MEQ: 750 TABLET, EXTENDED RELEASE ORAL at 16:21

## 2017-02-04 RX ADMIN — DILTIAZEM HYDROCHLORIDE 300 MG: 300 CAPSULE, COATED, EXTENDED RELEASE ORAL at 08:39

## 2017-02-04 RX ADMIN — POTASSIUM CHLORIDE 20 MEQ: 1500 TABLET, EXTENDED RELEASE ORAL at 17:27

## 2017-02-04 RX ADMIN — PREDNISONE 40 MG: 20 TABLET ORAL at 08:38

## 2017-02-04 RX ADMIN — ASPIRIN 81 MG 81 MG: 81 TABLET ORAL at 08:38

## 2017-02-04 RX ADMIN — LISINOPRIL 10 MG: 10 TABLET ORAL at 08:38

## 2017-02-04 RX ADMIN — TIOTROPIUM BROMIDE 18 MCG: 18 CAPSULE ORAL; RESPIRATORY (INHALATION) at 08:39

## 2017-02-04 RX ADMIN — POTASSIUM CHLORIDE 20 MEQ: 1500 TABLET, EXTENDED RELEASE ORAL at 08:38

## 2017-02-04 RX ADMIN — LEVALBUTEROL 1.25 MG: 1.25 SOLUTION, CONCENTRATE RESPIRATORY (INHALATION) at 07:54

## 2017-02-04 RX ADMIN — GABAPENTIN 400 MG: 400 CAPSULE ORAL at 08:37

## 2017-02-04 RX ADMIN — ENOXAPARIN SODIUM 40 MG: 40 INJECTION SUBCUTANEOUS at 08:38

## 2017-02-04 RX ADMIN — IPRATROPIUM BROMIDE 0.5 MG: 0.5 SOLUTION RESPIRATORY (INHALATION) at 19:57

## 2017-02-04 RX ADMIN — IPRATROPIUM BROMIDE 0.5 MG: 0.5 SOLUTION RESPIRATORY (INHALATION) at 13:44

## 2017-02-04 RX ADMIN — GABAPENTIN 400 MG: 400 CAPSULE ORAL at 16:20

## 2017-02-04 RX ADMIN — INSULIN LISPRO 6 UNITS: 100 INJECTION, SOLUTION INTRAVENOUS; SUBCUTANEOUS at 17:26

## 2017-02-04 RX ADMIN — LEVOTHYROXINE SODIUM 100 MCG: 100 TABLET ORAL at 05:10

## 2017-02-04 RX ADMIN — LEVALBUTEROL 1.25 MG: 1.25 SOLUTION, CONCENTRATE RESPIRATORY (INHALATION) at 19:57

## 2017-02-04 RX ADMIN — QUETIAPINE FUMARATE 100 MG: 25 TABLET, FILM COATED ORAL at 21:55

## 2017-02-04 RX ADMIN — BUPROPION HYDROCHLORIDE 200 MG: 100 TABLET, FILM COATED, EXTENDED RELEASE ORAL at 17:25

## 2017-02-04 RX ADMIN — METFORMIN HYDROCHLORIDE 500 MG: 500 TABLET, FILM COATED ORAL at 08:38

## 2017-02-04 RX ADMIN — IPRATROPIUM BROMIDE 0.5 MG: 0.5 SOLUTION RESPIRATORY (INHALATION) at 07:54

## 2017-02-04 RX ADMIN — FUROSEMIDE 20 MG: 20 TABLET ORAL at 17:25

## 2017-02-04 RX ADMIN — FUROSEMIDE 20 MG: 20 TABLET ORAL at 08:38

## 2017-02-04 RX ADMIN — FOLIC ACID-PYRIDOXINE-CYANOCOBALAMIN TAB 2.5-25-2 MG 1 TABLET: 2.5-25-2 TAB at 17:25

## 2017-02-04 RX ADMIN — MIRTAZAPINE 7.5 MG: 15 TABLET, FILM COATED ORAL at 21:57

## 2017-02-04 RX ADMIN — LEVALBUTEROL 1.25 MG: 1.25 SOLUTION, CONCENTRATE RESPIRATORY (INHALATION) at 13:44

## 2017-02-04 RX ADMIN — TRAMADOL HYDROCHLORIDE 50 MG: 50 TABLET, COATED ORAL at 16:25

## 2017-02-05 VITALS
WEIGHT: 244.05 LBS | DIASTOLIC BLOOD PRESSURE: 74 MMHG | TEMPERATURE: 97.9 F | HEIGHT: 62 IN | RESPIRATION RATE: 20 BRPM | SYSTOLIC BLOOD PRESSURE: 115 MMHG | HEART RATE: 102 BPM | OXYGEN SATURATION: 97 % | BODY MASS INDEX: 44.91 KG/M2

## 2017-02-05 LAB
GLUCOSE SERPL-MCNC: 87 MG/DL (ref 65–140)
GLUCOSE SERPL-MCNC: 95 MG/DL (ref 65–140)

## 2017-02-05 PROCEDURE — 94640 AIRWAY INHALATION TREATMENT: CPT

## 2017-02-05 PROCEDURE — 94760 N-INVAS EAR/PLS OXIMETRY 1: CPT

## 2017-02-05 PROCEDURE — 82948 REAGENT STRIP/BLOOD GLUCOSE: CPT

## 2017-02-05 PROCEDURE — 94761 N-INVAS EAR/PLS OXIMETRY MLT: CPT

## 2017-02-05 RX ORDER — MIRTAZAPINE 7.5 MG/1
7.5 TABLET, FILM COATED ORAL
Qty: 30 TABLET | Refills: 0 | Status: SHIPPED | OUTPATIENT
Start: 2017-02-05 | End: 2018-04-16 | Stop reason: HOSPADM

## 2017-02-05 RX ORDER — TRAMADOL HYDROCHLORIDE 50 MG/1
50 TABLET ORAL EVERY 6 HOURS PRN
Qty: 10 TABLET | Refills: 0 | Status: SHIPPED | OUTPATIENT
Start: 2017-02-05 | End: 2017-12-20

## 2017-02-05 RX ADMIN — FOLIC ACID-PYRIDOXINE-CYANOCOBALAMIN TAB 2.5-25-2 MG 1 TABLET: 2.5-25-2 TAB at 09:15

## 2017-02-05 RX ADMIN — POTASSIUM CHLORIDE 10 MEQ: 750 TABLET, EXTENDED RELEASE ORAL at 09:17

## 2017-02-05 RX ADMIN — PREDNISONE 60 MG: 20 TABLET ORAL at 09:13

## 2017-02-05 RX ADMIN — ENOXAPARIN SODIUM 40 MG: 40 INJECTION SUBCUTANEOUS at 09:12

## 2017-02-05 RX ADMIN — LEVALBUTEROL 1.25 MG: 1.25 SOLUTION, CONCENTRATE RESPIRATORY (INHALATION) at 07:33

## 2017-02-05 RX ADMIN — FUROSEMIDE 20 MG: 20 TABLET ORAL at 09:16

## 2017-02-05 RX ADMIN — DILTIAZEM HYDROCHLORIDE 300 MG: 300 CAPSULE, COATED, EXTENDED RELEASE ORAL at 09:12

## 2017-02-05 RX ADMIN — ASPIRIN 81 MG 81 MG: 81 TABLET ORAL at 09:13

## 2017-02-05 RX ADMIN — IPRATROPIUM BROMIDE 0.5 MG: 0.5 SOLUTION RESPIRATORY (INHALATION) at 07:33

## 2017-02-05 RX ADMIN — GUAIFENESIN 1200 MG: 600 TABLET, EXTENDED RELEASE ORAL at 09:12

## 2017-02-05 RX ADMIN — TRAMADOL HYDROCHLORIDE 50 MG: 50 TABLET, COATED ORAL at 05:10

## 2017-02-05 RX ADMIN — LEVALBUTEROL 1.25 MG: 1.25 SOLUTION, CONCENTRATE RESPIRATORY (INHALATION) at 13:52

## 2017-02-05 RX ADMIN — GABAPENTIN 400 MG: 400 CAPSULE ORAL at 09:13

## 2017-02-05 RX ADMIN — BUPROPION HYDROCHLORIDE 200 MG: 100 TABLET, FILM COATED, EXTENDED RELEASE ORAL at 09:13

## 2017-02-05 RX ADMIN — IPRATROPIUM BROMIDE 0.5 MG: 0.5 SOLUTION RESPIRATORY (INHALATION) at 13:52

## 2017-02-05 RX ADMIN — POTASSIUM CHLORIDE 20 MEQ: 1500 TABLET, EXTENDED RELEASE ORAL at 09:18

## 2017-02-05 RX ADMIN — LISINOPRIL 10 MG: 10 TABLET ORAL at 09:12

## 2017-02-05 RX ADMIN — METFORMIN HYDROCHLORIDE 500 MG: 500 TABLET, FILM COATED ORAL at 09:15

## 2017-02-05 RX ADMIN — NICOTINE 1 PATCH: 7 PATCH, EXTENDED RELEASE TRANSDERMAL at 09:27

## 2017-02-05 RX ADMIN — LEVOTHYROXINE SODIUM 100 MCG: 100 TABLET ORAL at 06:35

## 2017-02-06 ENCOUNTER — GENERIC CONVERSION - ENCOUNTER (OUTPATIENT)
Dept: OTHER | Facility: OTHER | Age: 67
End: 2017-02-06

## 2017-12-20 ENCOUNTER — HOSPITAL ENCOUNTER (EMERGENCY)
Facility: HOSPITAL | Age: 67
Discharge: HOME | End: 2017-12-20
Attending: EMERGENCY MEDICINE | Admitting: EMERGENCY MEDICINE
Payer: MEDICARE

## 2017-12-20 VITALS
TEMPERATURE: 98.2 F | SYSTOLIC BLOOD PRESSURE: 152 MMHG | DIASTOLIC BLOOD PRESSURE: 109 MMHG | RESPIRATION RATE: 20 BRPM | OXYGEN SATURATION: 94 % | HEART RATE: 98 BPM

## 2017-12-20 DIAGNOSIS — E11.40 DIABETIC NEUROPATHY, PAINFUL (HCC): Primary | ICD-10-CM

## 2017-12-20 DIAGNOSIS — T78.3XXA ANGIOEDEMA OF LIPS: ICD-10-CM

## 2017-12-20 DIAGNOSIS — E87.6 HYPOKALEMIA: ICD-10-CM

## 2017-12-20 LAB
ALBUMIN SERPL BCP-MCNC: 3.9 G/DL (ref 3.5–5)
ALP SERPL-CCNC: 81 U/L (ref 46–116)
ALT SERPL W P-5'-P-CCNC: 16 U/L (ref 12–78)
ANION GAP SERPL CALCULATED.3IONS-SCNC: 9 MMOL/L (ref 4–13)
AST SERPL W P-5'-P-CCNC: 12 U/L (ref 5–45)
BASOPHILS # BLD AUTO: 0.03 THOUSANDS/ΜL (ref 0–0.1)
BASOPHILS NFR BLD AUTO: 1 % (ref 0–1)
BILIRUB SERPL-MCNC: 0.35 MG/DL (ref 0.2–1)
BUN SERPL-MCNC: 12 MG/DL (ref 5–25)
CALCIUM SERPL-MCNC: 10.3 MG/DL (ref 8.3–10.1)
CHLORIDE SERPL-SCNC: 105 MMOL/L (ref 100–108)
CO2 SERPL-SCNC: 30 MMOL/L (ref 21–32)
CREAT SERPL-MCNC: 0.96 MG/DL (ref 0.6–1.3)
EOSINOPHIL # BLD AUTO: 0.15 THOUSAND/ΜL (ref 0–0.61)
EOSINOPHIL NFR BLD AUTO: 3 % (ref 0–6)
ERYTHROCYTE [DISTWIDTH] IN BLOOD BY AUTOMATED COUNT: 16.7 % (ref 11.6–15.1)
GFR SERPL CREATININE-BSD FRML MDRD: 72 ML/MIN/1.73SQ M
GLUCOSE SERPL-MCNC: 118 MG/DL (ref 65–140)
HCT VFR BLD AUTO: 35.5 % (ref 34.8–46.1)
HGB BLD-MCNC: 11.1 G/DL (ref 11.5–15.4)
LYMPHOCYTES # BLD AUTO: 2.19 THOUSANDS/ΜL (ref 0.6–4.47)
LYMPHOCYTES NFR BLD AUTO: 39 % (ref 14–44)
MAGNESIUM SERPL-MCNC: 1.9 MG/DL (ref 1.6–2.6)
MCH RBC QN AUTO: 27.1 PG (ref 26.8–34.3)
MCHC RBC AUTO-ENTMCNC: 31.3 G/DL (ref 31.4–37.4)
MCV RBC AUTO: 87 FL (ref 82–98)
MONOCYTES # BLD AUTO: 0.36 THOUSAND/ΜL (ref 0.17–1.22)
MONOCYTES NFR BLD AUTO: 6 % (ref 4–12)
NEUTROPHILS # BLD AUTO: 2.91 THOUSANDS/ΜL (ref 1.85–7.62)
NEUTS SEG NFR BLD AUTO: 51 % (ref 43–75)
NRBC BLD AUTO-RTO: 0 /100 WBCS
PLATELET # BLD AUTO: 313 THOUSANDS/UL (ref 149–390)
PMV BLD AUTO: 9.6 FL (ref 8.9–12.7)
POTASSIUM SERPL-SCNC: 3 MMOL/L (ref 3.5–5.3)
PROT SERPL-MCNC: 7.9 G/DL (ref 6.4–8.2)
RBC # BLD AUTO: 4.09 MILLION/UL (ref 3.81–5.12)
SODIUM SERPL-SCNC: 144 MMOL/L (ref 136–145)
WBC # BLD AUTO: 5.64 THOUSAND/UL (ref 4.31–10.16)

## 2017-12-20 PROCEDURE — 96375 TX/PRO/DX INJ NEW DRUG ADDON: CPT

## 2017-12-20 PROCEDURE — 80053 COMPREHEN METABOLIC PANEL: CPT | Performed by: EMERGENCY MEDICINE

## 2017-12-20 PROCEDURE — 96374 THER/PROPH/DIAG INJ IV PUSH: CPT

## 2017-12-20 PROCEDURE — 83735 ASSAY OF MAGNESIUM: CPT | Performed by: EMERGENCY MEDICINE

## 2017-12-20 PROCEDURE — 99284 EMERGENCY DEPT VISIT MOD MDM: CPT

## 2017-12-20 PROCEDURE — 85025 COMPLETE CBC W/AUTO DIFF WBC: CPT | Performed by: EMERGENCY MEDICINE

## 2017-12-20 PROCEDURE — 36415 COLL VENOUS BLD VENIPUNCTURE: CPT | Performed by: EMERGENCY MEDICINE

## 2017-12-20 RX ORDER — DILTIAZEM HYDROCHLORIDE 180 MG/1
180 CAPSULE, COATED, EXTENDED RELEASE ORAL DAILY
COMMUNITY

## 2017-12-20 RX ORDER — LORAZEPAM 2 MG/ML
1 INJECTION INTRAMUSCULAR ONCE
Status: COMPLETED | OUTPATIENT
Start: 2017-12-20 | End: 2017-12-20

## 2017-12-20 RX ORDER — POTASSIUM CHLORIDE 20 MEQ/1
40 TABLET, EXTENDED RELEASE ORAL ONCE
Status: COMPLETED | OUTPATIENT
Start: 2017-12-20 | End: 2017-12-20

## 2017-12-20 RX ORDER — DIPHENHYDRAMINE HYDROCHLORIDE 50 MG/ML
50 INJECTION INTRAMUSCULAR; INTRAVENOUS ONCE
Status: COMPLETED | OUTPATIENT
Start: 2017-12-20 | End: 2017-12-20

## 2017-12-20 RX ORDER — ATORVASTATIN CALCIUM 20 MG/1
20 TABLET, FILM COATED ORAL DAILY
COMMUNITY

## 2017-12-20 RX ORDER — MELATONIN
1000 DAILY
COMMUNITY

## 2017-12-20 RX ORDER — OXYCODONE HYDROCHLORIDE AND ACETAMINOPHEN 5; 325 MG/1; MG/1
1 TABLET ORAL ONCE
Status: COMPLETED | OUTPATIENT
Start: 2017-12-20 | End: 2017-12-20

## 2017-12-20 RX ORDER — BUPROPION HYDROCHLORIDE 300 MG/1
300 TABLET ORAL DAILY
COMMUNITY
End: 2020-05-27

## 2017-12-20 RX ORDER — OXYCODONE HYDROCHLORIDE AND ACETAMINOPHEN 5; 325 MG/1; MG/1
1 TABLET ORAL EVERY 4 HOURS PRN
Qty: 10 TABLET | Refills: 0 | Status: SHIPPED | OUTPATIENT
Start: 2017-12-20 | End: 2017-12-30

## 2017-12-20 RX ORDER — TOPIRAMATE 50 MG/1
50 TABLET, FILM COATED ORAL EVERY 12 HOURS SCHEDULED
COMMUNITY

## 2017-12-20 RX ORDER — MONTELUKAST SODIUM 10 MG/1
10 TABLET ORAL
COMMUNITY

## 2017-12-20 RX ORDER — POTASSIUM CHLORIDE 20 MEQ/1
40 TABLET, EXTENDED RELEASE ORAL 2 TIMES DAILY
Qty: 4 TABLET | Refills: 0 | Status: SHIPPED | OUTPATIENT
Start: 2017-12-20 | End: 2018-04-16 | Stop reason: HOSPADM

## 2017-12-20 RX ORDER — METHYLPREDNISOLONE SODIUM SUCCINATE 125 MG/2ML
125 INJECTION, POWDER, LYOPHILIZED, FOR SOLUTION INTRAMUSCULAR; INTRAVENOUS ONCE
Status: COMPLETED | OUTPATIENT
Start: 2017-12-20 | End: 2017-12-20

## 2017-12-20 RX ADMIN — FAMOTIDINE 20 MG: 10 INJECTION, SOLUTION INTRAVENOUS at 08:33

## 2017-12-20 RX ADMIN — DIPHENHYDRAMINE HYDROCHLORIDE 50 MG: 50 INJECTION, SOLUTION INTRAMUSCULAR; INTRAVENOUS at 08:36

## 2017-12-20 RX ADMIN — OXYCODONE HYDROCHLORIDE AND ACETAMINOPHEN 1 TABLET: 5; 325 TABLET ORAL at 10:17

## 2017-12-20 RX ADMIN — METHYLPREDNISOLONE SODIUM SUCCINATE 125 MG: 125 INJECTION, POWDER, FOR SOLUTION INTRAMUSCULAR; INTRAVENOUS at 08:30

## 2017-12-20 RX ADMIN — LORAZEPAM 1 MG: 2 INJECTION INTRAMUSCULAR; INTRAVENOUS at 08:28

## 2017-12-20 RX ADMIN — POTASSIUM CHLORIDE 40 MEQ: 1500 TABLET, EXTENDED RELEASE ORAL at 09:33

## 2017-12-20 NOTE — DISCHARGE INSTRUCTIONS
Angioedema   WHAT YOU NEED TO KNOW:   Angioedema is sudden swelling caused by fluid that collects in deep layers of the skin  Swelling occurs most often on the face, lips, tongue, or throat, but it can happen anywhere on the body  Your risk for angioedema increases if you have food or insect allergies or a family history of angioedema  Emotional stress, autoimmune disorders, and medicines, such as ACE inhibitors, also increase your risk  Your symptoms may be mild, or you may develop anaphylaxis  Anaphylaxis is a sudden, life-threatening reaction that needs immediate treatment  DISCHARGE INSTRUCTIONS:   Call 911 for signs or symptoms of anaphylaxis,  such as trouble breathing, swelling in your mouth or throat, or wheezing  You may also have itching, a rash, hives, or feel like you are going to faint  Return to the emergency department if:   · You have sudden behavior changes or irritability  · You are dizzy and your heart is beating faster than usual   Contact your healthcare provider if:   · Your swelling does not improve, even after you take your medicines  · You have questions or concerns about your condition or care  Medicines:   · Antihistamines  decrease mild symptoms such as itching or a rash  · Epinephrine  is used to treat severe allergic reactions such as anaphylaxis  · Steroids: This medicine may be given to decrease redness, pain, and swelling  · Take your medicine as directed  Contact your healthcare provider if you think your medicine is not helping or if you have side effects  Tell him of her if you are allergic to any medicine  Keep a list of the medicines, vitamins, and herbs you take  Include the amounts, and when and why you take them  Bring the list or the pill bottles to follow-up visits  Carry your medicine list with you in case of an emergency    Steps to take for signs or symptoms of anaphylaxis:   · Immediately  give 1 shot of epinephrine only into the outer thigh muscle  · Leave the shot in place  as directed  Your healthcare provider may recommend you leave it in place for up to 10 seconds before you remove it  This helps make sure all of the epinephrine is delivered  · Call 911 and go to the emergency department,  even if the shot improved symptoms  Do not drive yourself  Bring the used epinephrine shot with you  Safety precautions to take if you are at risk for anaphylaxis:   · Keep 2 shots of epinephrine with you at all times  You may need a second shot, because epinephrine only works for about 20 minutes and symptoms may return  Your healthcare provider can show you and family members how to give the shot  Check the expiration date every month and replace it before it expires  · Create an action plan  Your healthcare provider can help you create a written plan that explains the allergy and an emergency plan to treat a reaction  The plan explains when to give a second epinephrine shot if symptoms return or do not improve after the first  Give copies of the action plan and emergency instructions to family members, work and school staff, and  providers  Show them how to give a shot of epinephrine  · Be careful when you exercise  If you have had exercise-induced anaphylaxis, do not exercise right after you eat  Stop exercising right away if you start to develop any signs or symptoms of anaphylaxis  You may first feel tired, warm, or have itchy skin  Hives, swelling, and severe breathing problems may develop if you continue to exercise  · Carry medical alert identification  Wear medical alert jewelry or carry a card that explains the allergy  Ask your healthcare provider where to get these items  · Keep a symptom diary  Include information about how often your symptoms occur, how long they last, and if they are mild or severe  Also keep information on what you ate, what happened, or which medicines you took before the swelling started  · Avoid triggers  Triggers include foods, medicines, and other things that you know cause symptoms  You may need to see a specialist, such as an allergist or dietitian, to learn what to avoid  Follow up with your healthcare provider as directed:  Write down your questions so you remember to ask them during your visits  © 2017 [facility] Information is for End User's use only and may not be sold, redistributed or otherwise used for commercial purposes  All illustrations and images included in CareNotes® are the copyrighted property of [employer]  or Ahmet Boswell  The above information is an  only  It is not intended as medical advice for individual conditions or treatments  Talk to your doctor, nurse or pharmacist before following any medical regimen to see if it is safe and effective for you  Diabetic Peripheral Neuropathy   WHAT YOU NEED TO KNOW:   Diabetic peripheral neuropathy (DPN) is damage to the nerves in your arms, hands, legs, and feet  DPN is most common in the legs and feet and can increase your risk for foot ulcers  Nerve pain caused by DPN can limit your mobility, and affect your quality of life  DISCHARGE INSTRUCTIONS:   Return to the emergency department if:   · Your legs or feet start to turn blue or black  · You have a wound that does not heal or is red, swollen, or draining fluid  Contact your healthcare provider if:   · You begin to have symptoms  · Your blood sugar level is higher or lower than healthcare providers have told you it should be  · You have redness, calluses, or sores on your feet  · You have questions or concerns about your condition or care  Medicines:   · Medicine  may be given to decrease nerve pain  · Take your medicine as directed  Contact your healthcare provider if you think your medicine is not helping or if you have side effects  Tell him or her if you are allergic to any medicine   Keep a list of the medicines, vitamins, and herbs you take  Include the amounts, and when and why you take them  Bring the list or the pill bottles to follow-up visits  Carry your medicine list with you in case of an emergency  Control your blood sugar:  Keep your blood sugar levels as close to normal as possible by taking your medicines as directed  Check your blood sugar levels as often as directed  Contact your healthcare provider if your levels are higher than they should be  · Follow the meal plan  that your healthcare or dietitian gave you  This meal plan can help you control your blood sugar and decrease your symptoms  · Exercise regularly  Exercise can help keep your blood sugar level steady and help you manage your weight  Exercise for at least 30 minutes, 5 days a week  Ask your healthcare provider about the best exercise plan for you  Use caution when you exercise if you have decreased feeling in your feet  · Maintain a healthy weight  Ask your healthcare provider how much you should weigh  A healthy weight can help you control your diabetes  Ask him to help you create a weight loss plan if you are overweight  Even a 10 to 15 pound weight loss can help you manage your blood sugar level  · Limit alcohol  Alcohol affects your blood sugar level and can make it harder to manage your diabetes  Women should limit alcohol to 1 drink a day  Men should limit alcohol to 2 drinks a day  A drink of alcohol is 12 ounces of beer, 5 ounces of wine, or 1½ ounces of liquor  Care for your feet:  Check your feet each day for cuts, scratches, calluses, or other wounds  Look for redness and swelling, and feel for warmth  Wear shoes that fit well  Check your shoes for rocks or other objects that can hurt your feet  Do not walk barefoot or wear shoes without socks  Wear cotton socks to help keep your feet dry  Do not smoke:  Nicotine can worsen your symptoms and make it more difficult to manage your diabetes   Do not use e-cigarettes or smokeless tobacco in place of cigarettes or to help you quit  They still contain nicotine  Ask your healthcare provider for information if you currently smoke and need help quitting  Follow up with your healthcare provider as directed: You will need to have your feet checked at least once each year  Write down your questions so you remember to ask them during your visits  © 2017 [address] Information is for End User's use only and may not be sold, redistributed or otherwise used for commercial purposes  All illustrations and images included in CareNotes® are the copyrighted property of [employer]  or Ahmet Boswell  The above information is an  only  It is not intended as medical advice for individual conditions or treatments  Talk to your doctor, nurse or pharmacist before following any medical regimen to see if it is safe and effective for you  Hypokalemia   WHAT YOU NEED TO KNOW:   Hypokalemia is a low level of potassium in your blood  Potassium helps control how your muscles, heart, and digestive system work  Hypokalemia occurs when your body loses too much potassium or does not absorb enough from food  DISCHARGE INSTRUCTIONS:   Return to the emergency department if:   · You cannot move your arm or leg  · You have a fast or irregular heartbeat  · You are too tired or weak to stand up  Contact your healthcare provider if:   · You are vomiting, or you have diarrhea  · You have numbness or tingling in your arms or legs  · Your symptoms do not go away or they get worse  · You have questions or concerns about your condition or care  Medicines:   · Potassium  will be given to bring your potassium levels back to normal     · Take your medicine as directed  Contact your healthcare provider if you think your medicine is not helping or if you have side effects  Tell him of her if you are allergic to any medicine   Keep a list of the medicines, vitamins, and herbs you take  Include the amounts, and when and why you take them  Bring the list or the pill bottles to follow-up visits  Carry your medicine list with you in case of an emergency  Eat foods that are high in potassium:  Foods that are high in potassium include bananas, oranges, tomatoes, potatoes, and avocado  [place] beans, turkey, salmon, lean beef, yogurt, and milk are also high in potassium  Ask your healthcare provider or dietitian for more information about foods that are high in potassium  Follow up with your healthcare provider as directed:  Write down your questions so you remember to ask them during your visits  © 2017 [address] Information is for End User's use only and may not be sold, redistributed or otherwise used for commercial purposes  All illustrations and images included in CareNotes® are the copyrighted property of [employer]  or Ahmet Boswell  The above information is an  only  It is not intended as medical advice for individual conditions or treatments  Talk to your doctor, nurse or pharmacist before following any medical regimen to see if it is safe and effective for you

## 2017-12-20 NOTE — ED NOTES
Patient c/o pain attempted to call Dr Jd Sue, unable to get [facility] of physician at this time  Patient however reports that tongue and lip swelling feels better and reports speech has also improved       Eduard Jackson RN  12/20/17 1015

## 2017-12-20 NOTE — ED NOTES
Patient moving up and down, side to side, throwing body backwards and forwards, reports its uncontrollable "Buzzing" and she cant help it, Reports it is pain reaction        Snow Clinton RN  12/20/17 3616

## 2017-12-20 NOTE — ED NOTES
Patient now reporting tongue swelling and lower lip swelling, reports she is talking differently  Report by EMS was that they were dispatched to the house for Allergic reaction but that upon arrival patient reported leg pain       Cecily Bonilla RN  12/20/17 0800

## 2017-12-20 NOTE — ED PROVIDER NOTES
History  Chief Complaint   Patient presents with    Leg Pain     Patient reports bilateral leg pain, reports hx  of diabetic neuropathy for which she takes gabapentin for  Patient presents to ED via EMS wearing formal high heel shoes  Patient reports she has been wearing high heels her whole life  17-year-old female presents for evaluation of tongue and lower lip swelling beginning today  The patient also has a complaint of worsening of her chronic neuropathic pain in her legs  She states that that got significantly worse this morning  The patient called an ambulance for an allergic reaction but then was primarily complaining about the leg pain to EMS  The patient denies any associated chest pain, pressure  The patient states she has chronic shortness of breath due to her asthma and it is not any significantly worse than usual   The patient denies trouble handling her secretions  History provided by:  Patient  Leg Pain   Associated symptoms: no fever        Prior to Admission Medications   Prescriptions Last Dose Informant Patient Reported? Taking? QUEtiapine (SEROquel) 100 mg tablet   Yes No   Sig: Take 200 mg by mouth daily at bedtime     aspirin 81 MG tablet   Yes No   Sig: Take 81 mg by mouth daily  atorvastatin (LIPITOR) 20 mg tablet   Yes Yes   Sig: Take 20 mg by mouth daily   buPROPion (WELLBUTRIN XL) 300 mg 24 hr tablet   Yes Yes   Sig: Take 300 mg by mouth daily   cholecalciferol (VITAMIN D3) 1,000 units tablet   Yes Yes   Sig: Take 1,000 Units by mouth daily   diltiazem (CARDIZEM CD) 180 mg 24 hr capsule   Yes Yes   Sig: Take 180 mg by mouth daily   fluticasone-salmeterol (ADVAIR) 250-50 mcg/dose   Yes No   Sig: Inhale 1 puff every 12 (twelve) hours  gabapentin (NEURONTIN) 400 mg capsule   Yes No   Sig: Take 800 mg by mouth 3 (three) times a day     levothyroxine 100 mcg tablet   Yes No   Sig: Take 100 mcg by mouth daily     metFORMIN (GLUCOPHAGE) 500 mg tablet   Yes No   Sig: Take 500 mg by mouth daily with breakfast     mirtazapine (REMERON) 7 5 MG tablet Past Week at Unknown time  No Yes   Sig: Take 1 tablet by mouth daily at bedtime for 30 days   montelukast (SINGULAIR) 10 mg tablet   Yes Yes   Sig: Take 10 mg by mouth daily at bedtime   tiotropium (SPIRIVA) 18 mcg inhalation capsule   Yes No   Sig: Place 18 mcg into inhaler and inhale daily  topiramate (TOPAMAX) 50 MG tablet   Yes Yes   Sig: Take 50 mg by mouth every 12 (twelve) hours      Facility-Administered Medications: None       Past Medical History:   Diagnosis Date    Bipolar 1 disorder ([place] [address] )     Cardiac disease     COPD (chronic obstructive pulmonary disease) ([place] [address] )     Diabetes mellitus ([place] [address] )     HIV (human immunodeficiency virus infection) ([place] [address] ) 3/24/2016    Hypertension     Osteoarthritis     Tobacco abuse        History reviewed  No pertinent surgical history  Family History   Problem Relation Age of Onset    No Known Problems Mother     Diabetes Father     Heart disease Neg Hx     Cancer Neg Hx      I have reviewed and agree with the history as documented  Social History   Substance Use Topics    Smoking status: Current Every Day Smoker     Packs/day: 0 20     Years: 45 00     Types: Cigarettes    Smokeless tobacco: Never Used      Comment: Currently smoking 1-2 cigarettes a day    Alcohol use No        Review of Systems   Constitutional: Negative for chills and fever  HENT: Positive for facial swelling ( Lower lip)  Respiratory: Positive for shortness of breath  Negative for chest tightness  Cardiovascular: Negative for chest pain and leg swelling  Musculoskeletal:        Leg pain   All other systems reviewed and are negative        Physical Exam  ED Triage Vitals   Temperature Pulse Respirations Blood Pressure SpO2   12/20/17 0756 12/20/17 0756 12/20/17 0756 12/20/17 0756 12/20/17 0756   98 1 °F (36 7 °C) 84 20 105/71 93 %      Temp Source Heart Rate Source Patient Position - Orthostatic VS BP Location FiO2 (%)   12/20/17 0756 12/20/17 0756 12/20/17 0756 12/20/17 0756 --   Oral Monitor Lying Right arm       Pain Score       12/20/17 0757       Worst Possible Pain           Orthostatic Vital Signs  Vitals:    12/20/17 0756 12/20/17 1006 12/20/17 1015   BP: 105/71 (!) 152/109    Pulse: 84 98 98   Patient Position - Orthostatic VS: Lying Lying        Physical Exam   Constitutional: She is oriented to person, place, and time  She appears well-developed and well-nourished  No distress  Patient is restless in the bed constantly moving her legs   HENT:   Head: Normocephalic and atraumatic  Right Ear: External ear normal    Left Ear: External ear normal    Patient was swelling of tongue and lower lip   Eyes: Conjunctivae and EOM are normal  Pupils are equal, round, and reactive to light  No scleral icterus  Neck: Normal range of motion  Cardiovascular: Normal rate, regular rhythm and normal heart sounds  Pulmonary/Chest: Effort normal  No respiratory distress  She has wheezes ( [place]) in the right lower field and the left lower field  Abdominal: Soft  Bowel sounds are normal  There is no tenderness  There is no rebound and no guarding  Musculoskeletal: Normal range of motion  She exhibits no edema  Neurological: She is alert and oriented to person, place, and time  Skin: Skin is warm and dry  No rash noted  Psychiatric: Her mood appears anxious  Nursing note and vitals reviewed        ED Medications  Medications   diphenhydrAMINE (BENADRYL) injection 50 mg (50 mg Intravenous Given 12/20/17 0836)   LORazepam (ATIVAN) 2 mg/mL injection 1 mg (1 mg Intravenous Given 12/20/17 0828)   famotidine (PEPCID) injection 20 mg (20 mg Intravenous Given 12/20/17 0833)   methylPREDNISolone sodium succinate (Solu-MEDROL) injection 125 mg (125 mg Intravenous Given 12/20/17 0830)   potassium chloride (K-DUR,KLOR-CON) CR tablet 40 mEq (40 mEq Oral Given 12/20/17 0933)   oxyCODONE-acetaminophen (PERCOCET) 5-325 mg per tablet 1 tablet (1 tablet Oral Given 12/20/17 1017)       Diagnostic Studies  Results Reviewed     Procedure Component Value Units Date/Time    Comprehensive metabolic panel [[id number]]  (Abnormal) Collected:  12/20/17 0839    Lab Status:  Final result Specimen:  Blood from Arm, Right Updated:  12/20/17 9748     Sodium 144 mmol/L      Potassium 3 0 (L) mmol/L      Chloride 105 mmol/L      CO2 30 mmol/L      Anion Gap 9 mmol/L      BUN 12 mg/dL      Creatinine 0 96 mg/dL      Glucose 118 mg/dL      Calcium 10 3 (H) mg/dL      AST 12 U/L      ALT 16 U/L      Alkaline Phosphatase 81 U/L      Total Protein 7 9 g/dL      Albumin 3 9 g/dL      Total Bilirubin 0 35 mg/dL      eGFR 72 ml/min/1 73sq m     Narrative:         National Kidney Disease Education Program recommendations are as follows:  GFR calculation is accurate only with a steady state creatinine  Chronic Kidney disease less than 60 ml/min/1 73 sq  meters  Kidney failure less than 15 ml/min/1 73 sq  meters      Magnesium [[id number]]  (Normal) Collected:  12/20/17 0839    Lab Status:  Final result Specimen:  Blood from Arm, Right Updated:  12/20/17 0903     Magnesium 1 9 mg/dL     CBC and differential [[id number]]  (Abnormal) Collected:  12/20/17 0839    Lab Status:  Final result Specimen:  Blood from Arm, Right Updated:  12/20/17 0847     WBC 5 64 Thousand/uL      RBC 4 09 Million/uL      Hemoglobin 11 1 (L) g/dL      Hematocrit 35 5 %      MCV 87 fL      MCH 27 1 pg      MCHC 31 3 (L) g/dL      RDW 16 7 (H) %      MPV 9 6 fL      Platelets 614 Thousands/uL      nRBC 0 /100 WBCs      Neutrophils Relative 51 %      Lymphocytes Relative 39 %      Monocytes Relative 6 %      Eosinophils Relative 3 %      Basophils Relative 1 %      Neutrophils Absolute 2 91 Thousands/µL      Lymphocytes Absolute 2 19 Thousands/µL      Monocytes Absolute 0 36 Thousand/µL      Eosinophils Absolute 0 15 Thousand/µL      Basophils Absolute 0 03 Thousands/µL                  No orders to display              Procedures  Procedures       Phone Contacts  ED Phone Contact    ED Course  ED Course as of Dec 20 1620   Wed Dec 20, 2017   0911 K-Dur ordered Potassium: (!) 3 0   0913 Patient was sleeping in no acute distress when I went back to re-evaluate the patient  I discussed the low potassium and she states that she has had leg pain before with low potassium  She symptomatic leave feels improved related to her swollen tongue  1105 Patient re-evaluated after pain medication once again she is sleeping and in no distress  The plan is for the patient be discharged home with a prescription for some potassium  She should follow up with her primary care doctor to discuss replacing her lisinopril                                MDM  Number of Diagnoses or Management Options  Angioedema of lips: new and requires workup  Diabetic neuropathy, painful ([facility] [address] ): new and requires workup  Hypokalemia: new and requires workup     Amount and/or Complexity of Data Reviewed  Clinical lab tests: ordered and reviewed  Review and summarize past medical records: yes      CritCare Time    Disposition  Final diagnoses:   Diabetic neuropathy, painful ([address] )   Hypokalemia   Angioedema of lips     Time reflects when diagnosis was documented in both MDM as applicable and the Disposition within this note     Time User Action Codes Description Comment    12/20/2017  9:12 AM Myrtle Rumble Add [E11 40] Diabetic neuropathy, painful ([address] )     12/20/2017  9:12 AM Myrtle Rumble Add [E87 6] Hypokalemia     12/20/2017 11:05 AM Myrtle Rumble Add Stevphen Pinta  3XXA] Angioedema of lips       ED Disposition     ED Disposition Condition Comment    Discharge  [facility] discharge to home/self care      Condition at discharge: Stable        Follow-up Information     Follow up With Specialties Details Why Contact Info    Bassam Masterson DO  Schedule an appointment as soon as possible for a visit For further evaluation of your blood pressure meds as you should stop the lisinopril [address]  [address]  [place] [address] [phone]          Discharge Medication List as of 12/20/2017 11:08 AM      START taking these medications    Details   oxyCODONE-acetaminophen (PERCOCET) 5-325 mg per tablet Take 1 tablet by mouth every 4 (four) hours as needed for moderate pain for up to 10 days Max Daily Amount: 6 tablets, Starting Wed 12/20/2017, Until Sat 12/30/2017, Print      potassium chloride (K-DUR,KLOR-CON) 20 mEq tablet Take 2 tablets by mouth 2 (two) times a day for 1 day, Starting Wed 12/20/2017, Until Thu 12/21/2017, Print         CONTINUE these medications which have NOT CHANGED    Details   atorvastatin (LIPITOR) 20 mg tablet Take 20 mg by mouth daily, Historical Med      buPROPion (WELLBUTRIN XL) 300 mg 24 hr tablet Take 300 mg by mouth daily, Historical Med      cholecalciferol (VITAMIN D3) 1,000 units tablet Take 1,000 Units by mouth daily, Historical Med      diltiazem (CARDIZEM CD) 180 mg 24 hr capsule Take 180 mg by mouth daily, Historical Med      mirtazapine (REMERON) 7 5 MG tablet Take 1 tablet by mouth daily at bedtime for 30 days, Starting Sun 2/5/2017, Until Wed 12/20/2017, Print      montelukast (SINGULAIR) 10 mg tablet Take 10 mg by mouth daily at bedtime, Historical Med      topiramate (TOPAMAX) 50 MG tablet Take 50 mg by mouth every 12 (twelve) hours, Historical Med      aspirin 81 MG tablet Take 81 mg by mouth daily  , Until Discontinued, Historical Med      fluticasone-salmeterol (ADVAIR) 250-50 mcg/dose Inhale 1 puff every 12 (twelve) hours  , Until Discontinued, Historical Med      gabapentin (NEURONTIN) 400 mg capsule Take 800 mg by mouth 3 (three) times a day  , Historical Med      levothyroxine 100 mcg tablet Take 100 mcg by mouth daily  , Until Discontinued, Historical Med      metFORMIN (GLUCOPHAGE) 500 mg tablet Take 500 mg by mouth daily with breakfast  , Historical Med      QUEtiapine (SEROquel) 100 mg tablet Take 200 mg by mouth daily at bedtime  , Historical Med      tiotropium (SPIRIVA) 18 mcg inhalation capsule Place 18 mcg into inhaler and inhale daily  , Until Discontinued, Historical Med           No discharge procedures on file      ED Provider  Electronically Signed by           Felicitas Wilkerson DO  12/20/17 9422

## 2018-01-16 NOTE — MISCELLANEOUS
History of Present Illness  HPI: HFU call  Pt admitted 1/6-1/16/2017 with AE COPD  Only contact number available is to daughterFelix, whom was not available to talk at present, asked to call back at another time    COPD Hospital Discharge Initial Follow-Up Call: The patient is being contacted for follow-up after hospitalization  The date of discharge is 1/16/2017  I spoke with daughterFelix   Patient was identified as medium risk for readmission per risk stratification  The patient was discharged to home with [facility]           Signatures   Electronically signed by : Kajal Green, ; Jan 17 2017  1:38PM EST                       (Author)

## 2018-03-07 NOTE — MISCELLANEOUS
History of Present Illness  COPD Subsequent Hospital Discharge Phone Calls: The patient is being contacted for continued follow-up after hospitalization  The patient was discharged from the hospital on 2/5/17  A message was left on voicemail requesting a return call  Narrative summary:  Patient had 2 admissions within 30 days  Unable to reach by phone  Left detailed message on # listed in [employer]  Will communicate with ORI RN  Signatures   Electronically signed by :  RT Katarzyna; Feb 6 2017 12:35PM EST                       (Author)

## 2018-03-07 NOTE — MISCELLANEOUS
History of Present Illness  COPD Subsequent Hospital Discharge Phone Calls: The patient is being contacted for continued follow-up after hospitalization  The patient was discharged from the hospital on 2/5/17  She was readmitted on 1/31/17  I spoke with Magy Sanchez, Daughter  Patient was identified as high risk for readmission per risk stratification  The patient was discharged to home with [address] [place] [address]  The patient was seen by her PCP on 2/6/17  The patient has not been seen by her Pulmonologist since discharge from the hospital      The patient is a PPD smoker  mMRC Scale Rating:   II -- Walks slower than people of the same age on level ground because of SOB and/or have to stop for breath when walking at my own pace  Counseling and topics reviewed:  maintenance inhaler and frequency are Advair, Spiriva, Levalbuterol, other medications reviewed are Prednisone, Nicotine patch, importance of continued adherence to medication regimen and physician follow-up and COPD Care Number given  Narrative summary:  Patient was at her Dr  Isai Cousin when I called  She missed her 12 noon appt and is waiting to be seen at 2 PM  We reviewed her medication and she had not filled the scripts due to concern over the changes that were not explained  clearly  She will ask the PCP and also ask for a script for the nebulizer and portable O2  Oxygen concentrator beeping last night  [facility] made a swap after daughter contacted them  Contacted [facility] by email for clarification on O2 and nebulizer  ORI RN to resume care and review medication  Future Appointments    Date/Time Provider Specialty Site   02/14/2017 10:25 MARIA LUISA Boateng  [facility]ulmonary [facility]     Signatures   Electronically signed by :  RT Placido; Feb 6 2017  1:58PM EST                       (Author)

## 2018-03-07 NOTE — MISCELLANEOUS
History of Present Illness  HPI: Late entry: HFU call placed on March 31st 2016  Message left on daughters voice mail  Home number provided not in service        Signatures   Electronically signed by : Makenna Branch, ; Apr 22 2016  2:02PM EST                       (Author)

## 2018-03-30 ENCOUNTER — HOSPITAL ENCOUNTER (INPATIENT)
Facility: HOSPITAL | Age: 68
LOS: 4 days | DRG: 459 | End: 2018-04-04
Attending: EMERGENCY MEDICINE | Admitting: INTERNAL MEDICINE
Payer: MEDICARE

## 2018-03-30 ENCOUNTER — APPOINTMENT (OUTPATIENT)
Dept: RADIOLOGY | Facility: HOSPITAL | Age: 68
DRG: 459 | End: 2018-03-30
Payer: MEDICARE

## 2018-03-30 DIAGNOSIS — E11.9 DIABETES MELLITUS (HCC): ICD-10-CM

## 2018-03-30 DIAGNOSIS — R26.2 AMBULATORY DYSFUNCTION: ICD-10-CM

## 2018-03-30 DIAGNOSIS — M54.59 INTRACTABLE LOW BACK PAIN: ICD-10-CM

## 2018-03-30 DIAGNOSIS — M54.9 BACK PAIN: ICD-10-CM

## 2018-03-30 DIAGNOSIS — I10 HYPERTENSION: ICD-10-CM

## 2018-03-30 DIAGNOSIS — F31.9 BIPOLAR 1 DISORDER (HCC): ICD-10-CM

## 2018-03-30 DIAGNOSIS — M25.551 PAIN OF RIGHT HIP JOINT: ICD-10-CM

## 2018-03-30 DIAGNOSIS — F32.A DEPRESSION, UNSPECIFIED DEPRESSION TYPE: Chronic | ICD-10-CM

## 2018-03-30 DIAGNOSIS — J44.1 COPD WITH ACUTE EXACERBATION (HCC): Primary | ICD-10-CM

## 2018-03-30 PROBLEM — M25.559 HIP PAIN: Status: ACTIVE | Noted: 2018-03-30

## 2018-03-30 LAB
ANION GAP SERPL CALCULATED.3IONS-SCNC: 9 MMOL/L (ref 4–13)
BACTERIA UR QL AUTO: ABNORMAL /HPF
BASOPHILS # BLD AUTO: 0.04 THOUSANDS/ΜL (ref 0–0.1)
BASOPHILS NFR BLD AUTO: 1 % (ref 0–1)
BILIRUB UR QL STRIP: NEGATIVE
BUN SERPL-MCNC: 13 MG/DL (ref 5–25)
CALCIUM SERPL-MCNC: 10.7 MG/DL (ref 8.3–10.1)
CHLORIDE SERPL-SCNC: 106 MMOL/L (ref 100–108)
CLARITY UR: CLEAR
CO2 SERPL-SCNC: 28 MMOL/L (ref 21–32)
COLOR UR: YELLOW
CREAT SERPL-MCNC: 0.93 MG/DL (ref 0.6–1.3)
EOSINOPHIL # BLD AUTO: 0.22 THOUSAND/ΜL (ref 0–0.61)
EOSINOPHIL NFR BLD AUTO: 4 % (ref 0–6)
ERYTHROCYTE [DISTWIDTH] IN BLOOD BY AUTOMATED COUNT: 15.8 % (ref 11.6–15.1)
EXT PREG TEST URINE: NEGATIVE
GFR SERPL CREATININE-BSD FRML MDRD: 75 ML/MIN/1.73SQ M
GLUCOSE SERPL-MCNC: 101 MG/DL (ref 65–140)
GLUCOSE SERPL-MCNC: 111 MG/DL (ref 65–140)
GLUCOSE UR STRIP-MCNC: NEGATIVE MG/DL
HCT VFR BLD AUTO: 34.7 % (ref 34.8–46.1)
HGB BLD-MCNC: 11 G/DL (ref 11.5–15.4)
HGB UR QL STRIP.AUTO: ABNORMAL
KETONES UR STRIP-MCNC: NEGATIVE MG/DL
LEUKOCYTE ESTERASE UR QL STRIP: ABNORMAL
LYMPHOCYTES # BLD AUTO: 2.08 THOUSANDS/ΜL (ref 0.6–4.47)
LYMPHOCYTES NFR BLD AUTO: 33 % (ref 14–44)
MCH RBC QN AUTO: 27.3 PG (ref 26.8–34.3)
MCHC RBC AUTO-ENTMCNC: 31.7 G/DL (ref 31.4–37.4)
MCV RBC AUTO: 86 FL (ref 82–98)
MONOCYTES # BLD AUTO: 0.63 THOUSAND/ΜL (ref 0.17–1.22)
MONOCYTES NFR BLD AUTO: 10 % (ref 4–12)
NEUTROPHILS # BLD AUTO: 3.29 THOUSANDS/ΜL (ref 1.85–7.62)
NEUTS SEG NFR BLD AUTO: 52 % (ref 43–75)
NITRITE UR QL STRIP: NEGATIVE
NON-SQ EPI CELLS URNS QL MICRO: ABNORMAL /HPF
NRBC BLD AUTO-RTO: 0 /100 WBCS
PH UR STRIP.AUTO: 6 [PH] (ref 4.5–8)
PLATELET # BLD AUTO: 301 THOUSANDS/UL (ref 149–390)
PMV BLD AUTO: 9.4 FL (ref 8.9–12.7)
POTASSIUM SERPL-SCNC: 3.1 MMOL/L (ref 3.5–5.3)
PROT UR STRIP-MCNC: NEGATIVE MG/DL
RBC # BLD AUTO: 4.03 MILLION/UL (ref 3.81–5.12)
RBC #/AREA URNS AUTO: ABNORMAL /HPF
SODIUM SERPL-SCNC: 143 MMOL/L (ref 136–145)
SP GR UR STRIP.AUTO: <=1.005 (ref 1–1.03)
UROBILINOGEN UR QL STRIP.AUTO: 0.2 E.U./DL
WBC # BLD AUTO: 6.26 THOUSAND/UL (ref 4.31–10.16)
WBC #/AREA URNS AUTO: ABNORMAL /HPF

## 2018-03-30 PROCEDURE — 99285 EMERGENCY DEPT VISIT HI MDM: CPT

## 2018-03-30 PROCEDURE — 81002 URINALYSIS NONAUTO W/O SCOPE: CPT | Performed by: EMERGENCY MEDICINE

## 2018-03-30 PROCEDURE — 82948 REAGENT STRIP/BLOOD GLUCOSE: CPT

## 2018-03-30 PROCEDURE — 73521 X-RAY EXAM HIPS BI 2 VIEWS: CPT

## 2018-03-30 PROCEDURE — 81025 URINE PREGNANCY TEST: CPT | Performed by: EMERGENCY MEDICINE

## 2018-03-30 PROCEDURE — 99220 PR INITIAL OBSERVATION CARE/DAY 70 MINUTES: CPT | Performed by: PHYSICIAN ASSISTANT

## 2018-03-30 PROCEDURE — 94640 AIRWAY INHALATION TREATMENT: CPT

## 2018-03-30 PROCEDURE — 85025 COMPLETE CBC W/AUTO DIFF WBC: CPT | Performed by: EMERGENCY MEDICINE

## 2018-03-30 PROCEDURE — 36415 COLL VENOUS BLD VENIPUNCTURE: CPT | Performed by: EMERGENCY MEDICINE

## 2018-03-30 PROCEDURE — 80048 BASIC METABOLIC PNL TOTAL CA: CPT | Performed by: EMERGENCY MEDICINE

## 2018-03-30 PROCEDURE — 81001 URINALYSIS AUTO W/SCOPE: CPT

## 2018-03-30 RX ORDER — LEVOTHYROXINE SODIUM 0.1 MG/1
100 TABLET ORAL
Status: DISCONTINUED | OUTPATIENT
Start: 2018-03-31 | End: 2018-04-04 | Stop reason: HOSPADM

## 2018-03-30 RX ORDER — ATORVASTATIN CALCIUM 20 MG/1
20 TABLET, FILM COATED ORAL
Status: DISCONTINUED | OUTPATIENT
Start: 2018-03-31 | End: 2018-04-04 | Stop reason: HOSPADM

## 2018-03-30 RX ORDER — QUETIAPINE FUMARATE 200 MG/1
200 TABLET, FILM COATED ORAL
Status: DISCONTINUED | OUTPATIENT
Start: 2018-03-30 | End: 2018-04-04 | Stop reason: HOSPADM

## 2018-03-30 RX ORDER — LIDOCAINE 50 MG/G
1 PATCH TOPICAL ONCE
Status: COMPLETED | OUTPATIENT
Start: 2018-03-30 | End: 2018-03-31

## 2018-03-30 RX ORDER — ASPIRIN 81 MG/1
81 TABLET, CHEWABLE ORAL DAILY
Status: DISCONTINUED | OUTPATIENT
Start: 2018-03-31 | End: 2018-04-04 | Stop reason: HOSPADM

## 2018-03-30 RX ORDER — POTASSIUM CHLORIDE 20 MEQ/1
40 TABLET, EXTENDED RELEASE ORAL 2 TIMES DAILY
Status: DISCONTINUED | OUTPATIENT
Start: 2018-03-31 | End: 2018-04-03

## 2018-03-30 RX ORDER — GABAPENTIN 400 MG/1
800 CAPSULE ORAL 3 TIMES DAILY
Status: DISCONTINUED | OUTPATIENT
Start: 2018-03-30 | End: 2018-04-04 | Stop reason: HOSPADM

## 2018-03-30 RX ORDER — MIRTAZAPINE 15 MG/1
7.5 TABLET, FILM COATED ORAL
Status: DISCONTINUED | OUTPATIENT
Start: 2018-03-30 | End: 2018-04-04 | Stop reason: HOSPADM

## 2018-03-30 RX ORDER — METHOCARBAMOL 500 MG/1
1000 TABLET, FILM COATED ORAL ONCE
Status: COMPLETED | OUTPATIENT
Start: 2018-03-30 | End: 2018-03-30

## 2018-03-30 RX ORDER — IPRATROPIUM BROMIDE AND ALBUTEROL SULFATE 2.5; .5 MG/3ML; MG/3ML
3 SOLUTION RESPIRATORY (INHALATION) ONCE
Status: COMPLETED | OUTPATIENT
Start: 2018-03-30 | End: 2018-03-30

## 2018-03-30 RX ORDER — MAGNESIUM HYDROXIDE/ALUMINUM HYDROXICE/SIMETHICONE 120; 1200; 1200 MG/30ML; MG/30ML; MG/30ML
30 SUSPENSION ORAL EVERY 6 HOURS PRN
Status: DISCONTINUED | OUTPATIENT
Start: 2018-03-30 | End: 2018-04-04 | Stop reason: HOSPADM

## 2018-03-30 RX ORDER — MELATONIN
1000 DAILY
Status: DISCONTINUED | OUTPATIENT
Start: 2018-03-31 | End: 2018-04-04 | Stop reason: HOSPADM

## 2018-03-30 RX ORDER — LIDOCAINE 50 MG/G
1 PATCH TOPICAL DAILY
Status: DISCONTINUED | OUTPATIENT
Start: 2018-03-31 | End: 2018-04-04 | Stop reason: HOSPADM

## 2018-03-30 RX ORDER — METHOCARBAMOL 500 MG/1
750 TABLET, FILM COATED ORAL EVERY 6 HOURS PRN
Status: DISCONTINUED | OUTPATIENT
Start: 2018-03-30 | End: 2018-04-04 | Stop reason: HOSPADM

## 2018-03-30 RX ORDER — DILTIAZEM HYDROCHLORIDE 180 MG/1
180 CAPSULE, COATED, EXTENDED RELEASE ORAL DAILY
Status: DISCONTINUED | OUTPATIENT
Start: 2018-03-31 | End: 2018-04-04 | Stop reason: HOSPADM

## 2018-03-30 RX ORDER — ACETAMINOPHEN 325 MG/1
650 TABLET ORAL EVERY 6 HOURS PRN
Status: DISCONTINUED | OUTPATIENT
Start: 2018-03-30 | End: 2018-04-04 | Stop reason: HOSPADM

## 2018-03-30 RX ORDER — MONTELUKAST SODIUM 10 MG/1
10 TABLET ORAL
Status: DISCONTINUED | OUTPATIENT
Start: 2018-03-30 | End: 2018-04-04 | Stop reason: HOSPADM

## 2018-03-30 RX ORDER — POTASSIUM CHLORIDE 20 MEQ/1
20 TABLET, EXTENDED RELEASE ORAL 2 TIMES DAILY
Status: DISCONTINUED | OUTPATIENT
Start: 2018-03-31 | End: 2018-04-03

## 2018-03-30 RX ORDER — OXYCODONE HYDROCHLORIDE AND ACETAMINOPHEN 5; 325 MG/1; MG/1
1 TABLET ORAL ONCE
Status: COMPLETED | OUTPATIENT
Start: 2018-03-30 | End: 2018-03-30

## 2018-03-30 RX ORDER — TOPIRAMATE 25 MG/1
50 TABLET ORAL EVERY 12 HOURS SCHEDULED
Status: DISCONTINUED | OUTPATIENT
Start: 2018-03-30 | End: 2018-04-04 | Stop reason: HOSPADM

## 2018-03-30 RX ORDER — OXYCODONE HYDROCHLORIDE AND ACETAMINOPHEN 5; 325 MG/1; MG/1
1 TABLET ORAL EVERY 4 HOURS PRN
Status: DISCONTINUED | OUTPATIENT
Start: 2018-03-30 | End: 2018-04-02

## 2018-03-30 RX ORDER — NICOTINE 21 MG/24HR
1 PATCH, TRANSDERMAL 24 HOURS TRANSDERMAL DAILY
Status: DISCONTINUED | OUTPATIENT
Start: 2018-03-31 | End: 2018-04-04 | Stop reason: HOSPADM

## 2018-03-30 RX ORDER — IBUPROFEN 600 MG/1
600 TABLET ORAL ONCE
Status: COMPLETED | OUTPATIENT
Start: 2018-03-30 | End: 2018-03-30

## 2018-03-30 RX ORDER — BUPROPION HYDROCHLORIDE 150 MG/1
300 TABLET ORAL DAILY
Status: DISCONTINUED | OUTPATIENT
Start: 2018-03-31 | End: 2018-04-04 | Stop reason: HOSPADM

## 2018-03-30 RX ORDER — ONDANSETRON 2 MG/ML
4 INJECTION INTRAMUSCULAR; INTRAVENOUS EVERY 6 HOURS PRN
Status: DISCONTINUED | OUTPATIENT
Start: 2018-03-30 | End: 2018-04-04 | Stop reason: HOSPADM

## 2018-03-30 RX ADMIN — LIDOCAINE 1 PATCH: 50 PATCH CUTANEOUS at 19:04

## 2018-03-30 RX ADMIN — TOPIRAMATE 50 MG: 25 TABLET, FILM COATED ORAL at 23:18

## 2018-03-30 RX ADMIN — IPRATROPIUM BROMIDE AND ALBUTEROL SULFATE 3 ML: .5; 3 SOLUTION RESPIRATORY (INHALATION) at 19:06

## 2018-03-30 RX ADMIN — METHOCARBAMOL 1000 MG: 500 TABLET ORAL at 19:03

## 2018-03-30 RX ADMIN — OXYCODONE HYDROCHLORIDE AND ACETAMINOPHEN 1 TABLET: 5; 325 TABLET ORAL at 19:03

## 2018-03-30 RX ADMIN — QUETIAPINE FUMARATE 200 MG: 200 TABLET, FILM COATED ORAL at 23:17

## 2018-03-30 RX ADMIN — IBUPROFEN 600 MG: 600 TABLET ORAL at 19:03

## 2018-03-30 RX ADMIN — MONTELUKAST SODIUM 10 MG: 10 TABLET, COATED ORAL at 23:17

## 2018-03-30 RX ADMIN — MIRTAZAPINE 7.5 MG: 15 TABLET, FILM COATED ORAL at 23:17

## 2018-03-30 RX ADMIN — GABAPENTIN 800 MG: 400 CAPSULE ORAL at 23:17

## 2018-03-30 NOTE — ED PROVIDER NOTES
History  Chief Complaint   Patient presents with    Back Pain     States with history of back pain was seen at PCP x1 month ago sent for injections in lower back for pain unsure of what kind of medication was used states with back pain off and on since, x1 weeks ago pain got worse at times legs go numb and out form under her had multiple falls in past few days  Patient with chronic back pain, worse over the past 2 days to the point where it has become severe  Patient now complaining of trouble walking and trouble standing secondary to pain  Pain worse with attempted movement  Patient states that she has been taking an unknown pain medication at home without relief  The patient also has associated shortness of breath and wheezing  History provided by:  Patient      Prior to Admission Medications   Prescriptions Last Dose Informant Patient Reported? Taking? QUEtiapine (SEROquel) 100 mg tablet   Yes Yes   Sig: Take 200 mg by mouth daily at bedtime     aspirin 81 MG tablet   Yes Yes   Sig: Take 81 mg by mouth daily  atorvastatin (LIPITOR) 20 mg tablet   Yes Yes   Sig: Take 20 mg by mouth daily   buPROPion (WELLBUTRIN XL) 300 mg 24 hr tablet   Yes Yes   Sig: Take 300 mg by mouth daily   cholecalciferol (VITAMIN D3) 1,000 units tablet   Yes Yes   Sig: Take 1,000 Units by mouth daily   diltiazem (CARDIZEM CD) 180 mg 24 hr capsule   Yes Yes   Sig: Take 180 mg by mouth daily   fluticasone-salmeterol (ADVAIR) 250-50 mcg/dose   Yes Yes   Sig: Inhale 1 puff every 12 (twelve) hours  gabapentin (NEURONTIN) 400 mg capsule   Yes Yes   Sig: Take 800 mg by mouth 3 (three) times a day     levothyroxine 100 mcg tablet   Yes Yes   Sig: Take 100 mcg by mouth daily     metFORMIN (GLUCOPHAGE) 500 mg tablet   Yes Yes   Sig: Take 500 mg by mouth daily with breakfast     mirtazapine (REMERON) 7 5 MG tablet   No Yes   Sig: Take 1 tablet by mouth daily at bedtime for 30 days   montelukast (SINGULAIR) 10 mg tablet   Yes Yes Sig: Take 10 mg by mouth daily at bedtime   potassium chloride (K-DUR,KLOR-CON) 20 mEq tablet   No Yes   Sig: Take 2 tablets by mouth 2 (two) times a day for 1 day   tiotropium (SPIRIVA) 18 mcg inhalation capsule   Yes Yes   Sig: Place 18 mcg into inhaler and inhale daily  topiramate (TOPAMAX) 50 MG tablet   Yes Yes   Sig: Take 50 mg by mouth every 12 (twelve) hours      Facility-Administered Medications: None       Past Medical History:   Diagnosis Date    Bipolar 1 disorder ([place] [address] )     Cardiac disease     COPD (chronic obstructive pulmonary disease) ([place] [address] )     Diabetes mellitus ([place] [address] )     Disease of thyroid gland     HIV (human immunodeficiency virus infection) ([place] [address] ) 3/24/2016    Hypertension     Osteoarthritis     Tobacco abuse        Past Surgical History:   Procedure Laterality Date    HERNIA REPAIR      THYROIDECTOMY         Family History   Problem Relation Age of Onset    No Known Problems Mother     Diabetes Father     Heart disease Neg Hx     Cancer Neg Hx      I have reviewed and agree with the history as documented  Social History   Substance Use Topics    Smoking status: Current Every Day Smoker     Packs/day: 0 20     Years: 45 00     Types: Cigarettes    Smokeless tobacco: Never Used      Comment: Currently smoking 1-2 cigarettes a day    Alcohol use No        Review of Systems   Constitutional: Negative for chills and fever  Respiratory: Positive for shortness of breath and wheezing  Gastrointestinal: Negative for abdominal distention and abdominal pain  Genitourinary: Negative for difficulty urinating and dysuria  Musculoskeletal: Positive for back pain and gait problem  All other systems reviewed and are negative        Physical Exam  ED Triage Vitals [03/30/18 1835]   Temperature Pulse Respirations Blood Pressure SpO2   98 2 °F (36 8 °C) 88 22 146/82 98 %      Temp Source Heart Rate Source Patient Position - Orthostatic VS BP Location FiO2 (%)   Oral Monitor Sitting Right arm --      Pain Score       Worst Possible Pain           Orthostatic Vital Signs  Vitals:    03/30/18 1835 03/30/18 2235   BP: 146/82 142/78   Pulse: 88 86   Patient Position - Orthostatic VS: Sitting Sitting       Physical Exam   Constitutional: She is oriented to person, place, and time  She appears well-developed and well-nourished  No distress  HENT:   Head: Normocephalic and atraumatic  Right Ear: External ear normal    Left Ear: External ear normal    Eyes: Conjunctivae and EOM are normal  Pupils are equal, round, and reactive to light  No scleral icterus  Neck: Normal range of motion  Cardiovascular: Normal rate, regular rhythm and normal heart sounds  Pulmonary/Chest: Effort normal  Tachypnea noted  No respiratory distress  She has decreased breath sounds in the right lower field and the left lower field  She has wheezes in the right middle field, the right lower field, the left middle field and the left lower field  Abdominal: Soft  Bowel sounds are normal  There is no tenderness  There is no rebound and no guarding  Musculoskeletal: She exhibits no edema  Lumbar back: She exhibits decreased range of motion, pain and spasm  She exhibits no bony tenderness  Neurological: She is alert and oriented to person, place, and time  Skin: Skin is warm and dry  No rash noted  Psychiatric: She has a normal mood and affect  Nursing note and vitals reviewed        ED Medications  Medications   lidocaine (LIDODERM) 5 % patch 1 patch (1 patch Transdermal Medication Applied 3/30/18 1904)   aspirin chewable tablet 81 mg (not administered)   atorvastatin (LIPITOR) tablet 20 mg (not administered)   buPROPion (WELLBUTRIN XL) 24 hr tablet 300 mg (not administered)   cholecalciferol (VITAMIN D3) tablet 1,000 Units (not administered)   diltiazem (CARDIZEM CD) 24 hr capsule 180 mg (not administered)   fluticasone-salmeterol (ADVAIR) 250-50 mcg/dose inhaler 1 puff (not administered)   gabapentin (NEURONTIN) capsule 800 mg (not administered)   levothyroxine tablet 100 mcg (not administered)   metFORMIN (GLUCOPHAGE) tablet 500 mg (not administered)   mirtazapine (REMERON) tablet 7 5 mg (not administered)   montelukast (SINGULAIR) tablet 10 mg (not administered)   potassium chloride (K-DUR,KLOR-CON) CR tablet 40 mEq (not administered)   QUEtiapine (SEROquel) tablet 200 mg (not administered)   tiotropium (SPIRIVA) capsule for inhaler 18 mcg (not administered)   topiramate (TOPAMAX) tablet 50 mg (not administered)   ondansetron (ZOFRAN) injection 4 mg (not administered)   aluminum-magnesium hydroxide-simethicone (MYLANTA) 200-200-20 mg/5 mL oral suspension 30 mL (not administered)   nicotine (NICODERM CQ) 14 mg/24hr TD 24 hr patch 1 patch (not administered)   enoxaparin (LOVENOX) subcutaneous injection 40 mg (not administered)   insulin lispro (HumaLOG) 100 units/mL subcutaneous injection 1-6 Units (not administered)   acetaminophen (TYLENOL) tablet 650 mg (not administered)   methocarbamol (ROBAXIN) tablet 750 mg (not administered)   lidocaine (LIDODERM) 5 % patch 1 patch (not administered)   oxyCODONE-acetaminophen (PERCOCET) 5-325 mg per tablet 1 tablet (not administered)   potassium chloride (K-DUR,KLOR-CON) CR tablet 20 mEq (not administered)   methocarbamol (ROBAXIN) tablet 1,000 mg (1,000 mg Oral Given 3/30/18 1903)   oxyCODONE-acetaminophen (PERCOCET) 5-325 mg per tablet 1 tablet (1 tablet Oral Given 3/30/18 1903)   ibuprofen (MOTRIN) tablet 600 mg (600 mg Oral Given 3/30/18 1903)   ipratropium-albuterol (DUO-NEB) 0 5-2 5 mg/3 mL inhalation solution 3 mL (3 mL Nebulization Given 3/30/18 1906)       Diagnostic Studies  Results Reviewed     Procedure Component Value Units Date/Time    Basic metabolic panel [[id number]]  (Abnormal) Collected:  03/30/18 2124    Lab Status:  Final result Specimen:  Blood from Arm, Left Updated:  03/30/18 2150     Sodium 143 mmol/L      Potassium 3 1 (L) mmol/L      Chloride 106 mmol/L      CO2 28 mmol/L      Anion Gap 9 mmol/L      BUN 13 mg/dL      Creatinine 0 93 mg/dL      Glucose 111 mg/dL      Calcium 10 7 (H) mg/dL      eGFR 75 ml/min/1 73sq m     Narrative:         National Kidney Disease Education Program recommendations are as follows:  GFR calculation is accurate only with a steady state creatinine  Chronic Kidney disease less than 60 ml/min/1 73 sq  meters  Kidney failure less than 15 ml/min/1 73 sq  meters      CBC and differential [[id number]]  (Abnormal) Collected:  03/30/18 2124    Lab Status:  Final result Specimen:  Blood from Arm, Left Updated:  03/30/18 2143     WBC 6 26 Thousand/uL      RBC 4 03 Million/uL      Hemoglobin 11 0 (L) g/dL      Hematocrit 34 7 (L) %      MCV 86 fL      MCH 27 3 pg      MCHC 31 7 g/dL      RDW 15 8 (H) %      MPV 9 4 fL      Platelets 654 Thousands/uL      nRBC 0 /100 WBCs      Neutrophils Relative 52 %      Lymphocytes Relative 33 %      Monocytes Relative 10 %      Eosinophils Relative 4 %      Basophils Relative 1 %      Neutrophils Absolute 3 29 Thousands/µL      Lymphocytes Absolute 2 08 Thousands/µL      Monocytes Absolute 0 63 Thousand/µL      Eosinophils Absolute 0 22 Thousand/µL      Basophils Absolute 0 04 Thousands/µL     Urine Microscopic [[id number]]  (Abnormal) Collected:  03/30/18 1858    Lab Status:  Final result Specimen:  Urine from Urine, Clean Catch Updated:  03/30/18 1935     RBC, UA None Seen /hpf      WBC, UA 2-4 (A) /hpf      Epithelial Cells Occasional /hpf      Bacteria, UA Occasional /hpf     POCT urinalysis dipstick [[id number]]  (Abnormal) Resulted:  03/30/18 1900    Lab Status:  Final result Specimen:  Urine Updated:  03/30/18 1900    POCT pregnancy, urine [[id number]]  (Normal) Resulted:  03/30/18 1900    Lab Status:  Final result Updated:  03/30/18 1900     EXT PREG TEST UR (Ref: Negative) negative    ED Urine Macroscopic [[id number]]  (Abnormal) Collected:  03/30/18 1858    Lab Status:  Final result Specimen:  Urine Updated:  03/30/18 1858     Color, UA Yellow     Clarity, UA Clear     pH, UA 6 0     Leukocytes, UA Small (A)     Nitrite, UA Negative     Protein, UA Negative mg/dl      Glucose, UA Negative mg/dl      Ketones, UA Negative mg/dl      Urobilinogen, UA 0 2 E U /dl      Bilirubin, UA Negative     Blood, UA Small (A)     Specific [place], UA <=1 005    Narrative:       CLINITEK RESULT                 XR hips bilateral 2 vw w pelvis if performed    (Results Pending)   MRI inpatient order    (Results Pending)              Procedures  Procedures       Phone Contacts  ED Phone Contact    ED Course  ED Course as of Mar 30 2253   Fri Mar 30, 2018   2034 Patient with improved work of breathing after [place]  The patient still unable to ambulate after pain medication  SLIM paged    2039 I discussed the case with the hospitalist  We reviewed the HPI, pertinent PMH, ED course and workup  Hospitalist agreed with plan and will admit the patient to the hospital                                 MDM  Number of Diagnoses or Management Options  Ambulatory dysfunction: new and requires workup  Bipolar 1 disorder ([facility] ): minor  COPD with acute exacerbation ([facility] ): new and requires workup  Diabetes mellitus ([facility] ): minor  Hypertension: minor  Intractable low back pain: new and requires workup  Diagnosis management comments: The plan is to give the patient a DuoNeb for the shortness of breath and begin pain management and attempt to control the low back pain which is preventing ambulation         Amount and/or Complexity of Data Reviewed  Clinical lab tests: ordered and reviewed  Review and summarize past medical records: yes      CritCare Time    Disposition  Final diagnoses:   COPD with acute exacerbation ([facility] )   Ambulatory dysfunction   Intractable low back pain   Diabetes mellitus ([facility] )   Hypertension   [facility]     Time reflects when diagnosis was documented in both MDM as applicable and the Disposition within this note Time User Action Codes Description Comment    3/30/2018  8:44 PM Lore Nayak Add [J44 1] COPD with acute exacerbation ([facility] )     3/30/2018  8:44 PM Lula Curling B Add [R26 2] Ambulatory dysfunction     3/30/2018  8:44 PM Lula Curling B Add [M54 5] Intractable low back pain     3/30/2018  8:44 PM Lore Nayak Add [E11 9] Diabetes mellitus ([place] [address] )     3/30/2018  8:44 PM Lore Nayak Add [I10] Hypertension     3/30/2018  8:44 PM Lula Curling B Add [F31 9] Bipolar 1 disorder ([place] [address] )     3/30/2018 10:27 PM Iam Boots A Add [M25 551] Pain of right hip joint     3/30/2018 10:27 PM Bernice Lyla Modify [M25 551] Pain of right hip joint     3/30/2018 10:27 PM Daysi Beck Add [M54 9] Back pain     3/30/2018 10:27 PM Ritika Beck Modify [M54 9] Back pain       ED Disposition     ED Disposition Condition Comment    Admit  Case was discussed with Daysi Agustin and the patient's admission status was agreed to be Admission Status: observation status to the service of Dr Victoria Code   Follow-up Information    None       Current Discharge Medication List      CONTINUE these medications which have NOT CHANGED    Details   aspirin 81 MG tablet Take 81 mg by mouth daily  atorvastatin (LIPITOR) 20 mg tablet Take 20 mg by mouth daily      buPROPion (WELLBUTRIN XL) 300 mg 24 hr tablet Take 300 mg by mouth daily      cholecalciferol (VITAMIN D3) 1,000 units tablet Take 1,000 Units by mouth daily      diltiazem (CARDIZEM CD) 180 mg 24 hr capsule Take 180 mg by mouth daily      fluticasone-salmeterol (ADVAIR) 250-50 mcg/dose Inhale 1 puff every 12 (twelve) hours  gabapentin (NEURONTIN) 400 mg capsule Take 800 mg by mouth 3 (three) times a day        levothyroxine 100 mcg tablet Take 100 mcg by mouth daily        metFORMIN (GLUCOPHAGE) 500 mg tablet Take 500 mg by mouth daily with breakfast        mirtazapine (REMERON) 7 5 MG tablet Take 1 tablet by mouth daily at bedtime for 30 days  Qty: 30 tablet, Refills: 0      montelukast (SINGULAIR) 10 mg tablet Take 10 mg by mouth daily at bedtime      potassium chloride (K-DUR,KLOR-CON) 20 mEq tablet Take 2 tablets by mouth 2 (two) times a day for 1 day  Qty: 4 tablet, Refills: 0      QUEtiapine (SEROquel) 100 mg tablet Take 200 mg by mouth daily at bedtime        tiotropium (SPIRIVA) 18 mcg inhalation capsule Place 18 mcg into inhaler and inhale daily  topiramate (TOPAMAX) 50 MG tablet Take 50 mg by mouth every 12 (twelve) hours           No discharge procedures on file      ED Provider  Electronically Signed by           Jessie Alcantara DO  03/30/18 6501

## 2018-03-31 ENCOUNTER — APPOINTMENT (OUTPATIENT)
Dept: MRI IMAGING | Facility: HOSPITAL | Age: 68
DRG: 459 | End: 2018-03-31
Payer: MEDICARE

## 2018-03-31 PROBLEM — F32.A DEPRESSION: Chronic | Status: RESOLVED | Noted: 2017-01-31 | Resolved: 2018-03-31

## 2018-03-31 LAB
ERYTHROCYTE [DISTWIDTH] IN BLOOD BY AUTOMATED COUNT: 16.2 % (ref 11.6–15.1)
EST. AVERAGE GLUCOSE BLD GHB EST-MCNC: 146 MG/DL
GLUCOSE SERPL-MCNC: 100 MG/DL (ref 65–140)
GLUCOSE SERPL-MCNC: 114 MG/DL (ref 65–140)
GLUCOSE SERPL-MCNC: 122 MG/DL (ref 65–140)
GLUCOSE SERPL-MCNC: 205 MG/DL (ref 65–140)
HBA1C MFR BLD: 6.7 % (ref 4.2–6.3)
HCT VFR BLD AUTO: 34.4 % (ref 34.8–46.1)
HGB BLD-MCNC: 10.7 G/DL (ref 11.5–15.4)
MCH RBC QN AUTO: 27.2 PG (ref 26.8–34.3)
MCHC RBC AUTO-ENTMCNC: 31.1 G/DL (ref 31.4–37.4)
MCV RBC AUTO: 88 FL (ref 82–98)
PLATELET # BLD AUTO: 271 THOUSANDS/UL (ref 149–390)
PMV BLD AUTO: 9.6 FL (ref 8.9–12.7)
RBC # BLD AUTO: 3.93 MILLION/UL (ref 3.81–5.12)
WBC # BLD AUTO: 7.1 THOUSAND/UL (ref 4.31–10.16)

## 2018-03-31 PROCEDURE — 82948 REAGENT STRIP/BLOOD GLUCOSE: CPT

## 2018-03-31 PROCEDURE — 99232 SBSQ HOSP IP/OBS MODERATE 35: CPT | Performed by: INTERNAL MEDICINE

## 2018-03-31 PROCEDURE — 99222 1ST HOSP IP/OBS MODERATE 55: CPT | Performed by: ORTHOPAEDIC SURGERY

## 2018-03-31 PROCEDURE — 94760 N-INVAS EAR/PLS OXIMETRY 1: CPT

## 2018-03-31 PROCEDURE — 94660 CPAP INITIATION&MGMT: CPT

## 2018-03-31 PROCEDURE — 83036 HEMOGLOBIN GLYCOSYLATED A1C: CPT | Performed by: PHYSICIAN ASSISTANT

## 2018-03-31 PROCEDURE — 85027 COMPLETE CBC AUTOMATED: CPT | Performed by: PHYSICIAN ASSISTANT

## 2018-03-31 RX ADMIN — ASPIRIN 81 MG 81 MG: 81 TABLET ORAL at 09:09

## 2018-03-31 RX ADMIN — FLUTICASONE PROPIONATE AND SALMETEROL 1 PUFF: 50; 250 POWDER RESPIRATORY (INHALATION) at 09:10

## 2018-03-31 RX ADMIN — TOPIRAMATE 50 MG: 25 TABLET, FILM COATED ORAL at 09:09

## 2018-03-31 RX ADMIN — MONTELUKAST SODIUM 10 MG: 10 TABLET, COATED ORAL at 22:10

## 2018-03-31 RX ADMIN — MIRTAZAPINE 7.5 MG: 15 TABLET, FILM COATED ORAL at 22:09

## 2018-03-31 RX ADMIN — FLUTICASONE PROPIONATE AND SALMETEROL 1 PUFF: 50; 250 POWDER RESPIRATORY (INHALATION) at 22:10

## 2018-03-31 RX ADMIN — OXYCODONE HYDROCHLORIDE AND ACETAMINOPHEN 1 TABLET: 5; 325 TABLET ORAL at 22:10

## 2018-03-31 RX ADMIN — VITAMIN D, TAB 1000IU (100/BT) 1000 UNITS: 25 TAB at 09:10

## 2018-03-31 RX ADMIN — NICOTINE 1 PATCH: 14 PATCH TRANSDERMAL at 10:28

## 2018-03-31 RX ADMIN — GABAPENTIN 800 MG: 400 CAPSULE ORAL at 22:09

## 2018-03-31 RX ADMIN — ATORVASTATIN CALCIUM 20 MG: 20 TABLET, FILM COATED ORAL at 16:15

## 2018-03-31 RX ADMIN — BUPROPION HYDROCHLORIDE 300 MG: 150 TABLET, FILM COATED, EXTENDED RELEASE ORAL at 09:10

## 2018-03-31 RX ADMIN — LIDOCAINE 1 PATCH: 50 PATCH CUTANEOUS at 09:10

## 2018-03-31 RX ADMIN — TOPIRAMATE 50 MG: 25 TABLET, FILM COATED ORAL at 22:08

## 2018-03-31 RX ADMIN — TIOTROPIUM BROMIDE 18 MCG: 18 CAPSULE ORAL; RESPIRATORY (INHALATION) at 09:10

## 2018-03-31 RX ADMIN — INSULIN LISPRO 2 UNITS: 100 INJECTION, SOLUTION INTRAVENOUS; SUBCUTANEOUS at 18:10

## 2018-03-31 RX ADMIN — POTASSIUM CHLORIDE 40 MEQ: 1500 TABLET, EXTENDED RELEASE ORAL at 18:10

## 2018-03-31 RX ADMIN — QUETIAPINE FUMARATE 200 MG: 200 TABLET, FILM COATED ORAL at 22:09

## 2018-03-31 RX ADMIN — LEVOTHYROXINE SODIUM 100 MCG: 100 TABLET ORAL at 05:29

## 2018-03-31 RX ADMIN — ENOXAPARIN SODIUM 40 MG: 40 INJECTION SUBCUTANEOUS at 09:10

## 2018-03-31 RX ADMIN — OXYCODONE HYDROCHLORIDE AND ACETAMINOPHEN 1 TABLET: 5; 325 TABLET ORAL at 16:14

## 2018-03-31 RX ADMIN — POTASSIUM CHLORIDE 40 MEQ: 1500 TABLET, EXTENDED RELEASE ORAL at 09:09

## 2018-03-31 RX ADMIN — METFORMIN HYDROCHLORIDE 500 MG: 500 TABLET, FILM COATED ORAL at 09:10

## 2018-03-31 RX ADMIN — GABAPENTIN 800 MG: 400 CAPSULE ORAL at 16:15

## 2018-03-31 RX ADMIN — GABAPENTIN 800 MG: 400 CAPSULE ORAL at 09:09

## 2018-03-31 NOTE — H&P
History and Physical - [facility] Internal Medicine    Patient Information: Emelia Katz 74 y o  female MRN: [id number]  Unit/Bed#: ED 14 Encounter: [id number]  Admitting Physician: Sudhir Matthew PA-C  PCP: Myrtle Cueva DO  Date of Admission:  03/30/18    Assessment/Plan:    Hospital Problem List:     Principal Problem:    Back pain  Active Problems:    HIV (human immunodeficiency virus infection) ([place] [address] )    Hypertension    Diabetes mellitus ([place] [address] )    COPD (chronic obstructive pulmonary disease) ([place] [address] )    Bipolar 1 disorder ([place] [address] )    Depression    Hip pain    hypokalemia    Plan for the Primary Problem(s):  · Intractable back/right hip/right leg pain  · Admit to med/surg  Continue PO narcotics and muscle relaxer  Continue neurontin  Order hip xray and MRI lumbar spine  Consult ortho  Continue PT and case management  Plan for Additional Problems:   · HIV- patient states her CD4 counts are good and she is not on meds at home  · HTN- continue home meds  · DM- blood glucose well controlled on metformin, will continue  Order accuchecks with coverage  · COPD- continue home inhalers and oxygen  · Bipolar- on seroquel, remeron, wellbutrin  · hypokalemia    VTE Prophylaxis: Enoxaparin (Lovenox)  / sequential compression device   Code Status: full code  POLST: There is no POLST form on file for this patient (pre-hospital)    Anticipated Length of Stay:  Patient will be admitted on an Observation basis with an anticipated length of stay of  Less than 2 midnights  Justification for Hospital Stay: patient requires PT and ortho consult    Total Time for Visit, including Counseling / Coordination of Care: 45 minutes  Greater than 50% of this total time spent on direct patient counseling and coordination of care  Chief Complaint:   Back and hip pain    History of Present Illness:    Emelia Katz is a 74 y o  female who presents with intractable low back and right leg pain   She states her legs has been giving out on her and she is falling at home  She had had pain for years but severe over the past 2 days  She has seen KIKI Palomo physical medicine and rehab at [address] [place] [address] [facility] and has a L5-S1 facet block with no relief  She also reportedly saw Dr Manav Carlson from [facility] and had a hip injection for severe right hip DJD  She also complains of some wheezing in the ED  She has known COPD and uses oxygen at home  Review of Systems:    Review of Systems   Constitutional: Positive for activity change  HENT: Negative  Eyes: Negative  Respiratory: Positive for wheezing  Cardiovascular: Negative  Gastrointestinal: Negative  Endocrine: Negative  Genitourinary: Negative  Musculoskeletal: Positive for arthralgias, back pain and gait problem  Skin: Negative  Allergic/Immunologic: Negative  Hematological: Negative  Psychiatric/Behavioral: Negative  Past Medical and Surgical History:     Past Medical History:   Diagnosis Date    Bipolar 1 disorder ([place] [address] )     Cardiac disease     COPD (chronic obstructive pulmonary disease) ([place] [address] )     Diabetes mellitus ([place] [address] )     Disease of thyroid gland     HIV (human immunodeficiency virus infection) ([place] [address] ) 3/24/2016    Hypertension     Osteoarthritis     Tobacco abuse        Past Surgical History:   Procedure Laterality Date    HERNIA REPAIR      THYROIDECTOMY         Meds/Allergies:    Prior to Admission medications    Medication Sig Start Date End Date Taking? Authorizing Provider   aspirin 81 MG tablet Take 81 mg by mouth daily     Yes Historical Provider, MD   atorvastatin (LIPITOR) 20 mg tablet Take 20 mg by mouth daily   Yes Historical Provider, MD   buPROPion (WELLBUTRIN XL) 300 mg 24 hr tablet Take 300 mg by mouth daily   Yes Historical Provider, MD   cholecalciferol (VITAMIN D3) 1,000 units tablet Take 1,000 Units by mouth daily   Yes Historical Provider, MD   diltiazem (CARDIZEM CD) 180 mg 24 hr capsule Take 180 mg by mouth daily   Yes Historical Provider, MD fluticasone-salmeterol (ADVAIR) 250-50 mcg/dose Inhale 1 puff every 12 (twelve) hours  Yes Historical Provider, MD   gabapentin (NEURONTIN) 400 mg capsule Take 800 mg by mouth 3 (three) times a day     Yes Historical Provider, MD   levothyroxine 100 mcg tablet Take 100 mcg by mouth daily  Yes Historical Provider, MD   metFORMIN (GLUCOPHAGE) 500 mg tablet Take 500 mg by mouth daily with breakfast     Yes Historical Provider, MD   mirtazapine (REMERON) 7 5 MG tablet Take 1 tablet by mouth daily at bedtime for 30 days 2/5/17 3/30/18 Yes Shahid Contreras MD   montelukast (SINGULAIR) 10 mg tablet Take 10 mg by mouth daily at bedtime   Yes Historical Provider, MD   potassium chloride (K-DUR,KLOR-CON) 20 mEq tablet Take 2 tablets by mouth 2 (two) times a day for 1 day 12/20/17 3/30/18 Yes Von Robledo DO   QUEtiapine (SEROquel) 100 mg tablet Take 200 mg by mouth daily at bedtime     Yes Historical Provider, MD   tiotropium (SPIRIVA) 18 mcg inhalation capsule Place 18 mcg into inhaler and inhale daily  Yes Historical Provider, MD   topiramate (TOPAMAX) 50 MG tablet Take 50 mg by mouth every 12 (twelve) hours   Yes Historical Provider, MD     I have reveiwed home medications using records provided by [facility]  Allergies:    Allergies   Allergen Reactions    Prozac [Fluoxetine Hcl] Rash    Sulfa Antibiotics Itching    Quinine        Social History:     Marital Status:      Patient Pre-hospital Living Situation: lives with daughter  Patient Pre-hospital Level of Mobility: limited   Patient Pre-hospital Diet Restrictions: diabetic  Substance Use History:   History   Alcohol Use No     History   Smoking Status    Current Every Day Smoker    Packs/day: 0 20    Years: 45 00    Types: Cigarettes   Smokeless Tobacco    Never Used     Comment: Currently smoking 1-2 cigarettes a day     History   Drug Use No     Comment: former IV drug user       Family History:    non-contributory    Physical Exam:     Vitals: Blood Pressure: 146/82 (03/30/18 1835)  Pulse: 88 (03/30/18 1835)  Temperature: 98 2 °F (36 8 °C) (03/30/18 1835)  Temp Source: Oral (03/30/18 1835)  Respirations: 22 (03/30/18 1835)  Weight - Scale: 111 kg (245 lb 9 5 oz) (03/30/18 1835)  SpO2: 98 % (03/30/18 1835)    Physical Exam   Constitutional: She is oriented to person, place, and time  She appears well-developed and well-nourished  No distress  HENT:   Head: Normocephalic and atraumatic  Eyes: Conjunctivae are normal  Pupils are equal, round, and reactive to light  Neck: Normal range of motion  Neck supple  No JVD present  No tracheal deviation present  No thyromegaly present  Cardiovascular: Normal rate and regular rhythm  Pulmonary/Chest: Effort normal  No respiratory distress  She has wheezes  Abdominal: Soft  She exhibits no distension and no mass  There is no tenderness  There is no rebound and no guarding  Musculoskeletal:   Painful restricted ROM right hip  Pain to palpation over lumbar spine  Sensation intact  Motor difficult to assess due to pain  Lymphadenopathy:     She has no cervical adenopathy  Neurological: She is alert and oriented to person, place, and time  Skin: Skin is warm and dry  No rash noted  She is not diaphoretic  No erythema  No pallor  Psychiatric: She has a normal mood and affect  Vitals reviewed  Additional Data:     Lab Results: I have personally reviewed pertinent reports  Results from last 7 days  Lab Units 03/30/18 2124   WBC Thousand/uL 6 26   HEMOGLOBIN g/dL 11 0*   HEMATOCRIT % 34 7*   PLATELETS Thousands/uL 301   NEUTROS PCT % 52   LYMPHS PCT % 33   MONOS PCT % 10   EOS PCT % 4       Results from last 7 days  Lab Units 03/30/18  2124   SODIUM mmol/L 143   POTASSIUM mmol/L 3 1*   CHLORIDE mmol/L 106   CO2 mmol/L 28   BUN mg/dL 13   CREATININE mg/dL 0 93   CALCIUM mg/dL 10 7*   GLUCOSE RANDOM mg/dL 111           Imaging: I have personally reviewed pertinent reports        No results found  EKG, Pathology, and Other Studies Reviewed on Admission:   · EKG: none    Allscripts / Epic Records Reviewed: Yes     ** Please Note: This note has been constructed using a voice recognition system   **

## 2018-03-31 NOTE — CASE MANAGEMENT
Initial Clinical Review    Admission: Date/Time/Statement:   OBS  ORDER    3/30  @    2046     Orders Placed This Encounter   Procedures    Place in Observation (expected length of stay for this patient is less than two midnights)     Standing Status:   Standing     Number of Occurrences:   1     Order Specific Question:   Admitting Physician     Answer:   Hector Ackerman [[id number]]     Order Specific Question:   Level of Care     Answer:   Med Surg [16]         ED: Date/Time/Mode of Arrival:   ED Arrival Information     Expected Arrival Acuity Means of Arrival Escorted By Service Admission Type    - 3/30/2018 18:17 Urgent Ambulance [facility] General Medicine Urgent    Arrival Complaint    Back Pain          Chief Complaint:   Chief Complaint   Patient presents with    Back Pain     States with history of back pain was seen at PCP x1 month ago sent for injections in lower back for pain unsure of what kind of medication was used states with back pain off and on since, x1 weeks ago pain got worse at times legs go numb and out form under her had multiple falls in past few days  History of Illness:    Isra Westbrook is a 74 y o  female who presents with intractable low back and right leg pain  She states her legs has been giving out on her and she is falling at home  She had had pain for years but severe over the past 2 days  She has seen KIKI Palomo physical medicine and rehab at [address] [place] [address] [address] and has a L5-S1 facet block with no relief  She also reportedly saw Dr Hargrove Late from ortho and had a hip injection for severe right hip DJD  She also complains of some wheezing in the ED   She has known COPD and uses oxygen at home         ED Vital Signs:   ED Triage Vitals [03/30/18 1835]   Temperature Pulse Respirations Blood Pressure SpO2   98 2 °F (36 8 °C) 88 22 146/82 98 %      Temp Source Heart Rate Source Patient Position - Orthostatic VS BP Location FiO2 (%)   Oral Monitor Sitting Right arm --      Pain Score       Worst Possible Pain        Wt Readings from Last 1 Encounters:   03/30/18 117 kg (257 lb 15 oz)       Vital Signs (abnormal):   above    Abnormal Labs/Diagnostic Test Results:    K  3 1  H/H   11/34 7  X ray  B/L  Hips:     Right greater than left hip degenerative osteoarthritis  ED Treatment:   Medication Administration from 03/30/2018 1817 to 03/30/2018 5352       Date/Time Order Dose Route Action Action by Comments     03/30/2018 1903 methocarbamol (ROBAXIN) tablet 1,000 mg 1,000 mg Oral Given Hailey Grijalva RN      03/30/2018 1904 lidocaine (LIDODERM) 5 % patch 1 patch 1 patch Transdermal Medication Applied Hailey Grijalva RN      03/30/2018 1903 oxyCODONE-acetaminophen (PERCOCET) 5-325 mg per tablet 1 tablet 1 tablet Oral Given Hailey Grijalva RN      03/30/2018 1903 ibuprofen (MOTRIN) tablet 600 mg 600 mg Oral Given Hailey Grijalva RN      03/30/2018 1906 ipratropium-albuterol (DUO-NEB) 0 5-2 5 mg/3 mL inhalation solution 3 mL 3 mL Nebulization Given Hailey Grijalva RN           Past Medical/Surgical History:    Active Ambulatory Problems     Diagnosis Date Noted    Arthritis 03/24/2016    HIV [facility] immunodeficiency virus infection) ([facility] ) 03/24/2016    Osteoarthritis 03/24/2016    Acute exacerbation of chronic obstructive pulmonary disease (COPD) ([facility] ) 03/30/2016    Hypertension     Diabetes mellitus ([facility] )     COPD (chronic obstructive pulmonary disease) ([place] [address] )     Bipolar 1 disorder (HCC)     Shortness of breath 01/31/2017    Depression 01/31/2017     Resolved Ambulatory Problems     Diagnosis Date Noted    COPD exacerbation ([facility] ) 03/24/2016    Bronchial asthma 03/24/2016    Type 2 diabetes mellitus ([facility] ) 03/24/2016    HTN (hypertension) 03/24/2016    DVT (deep vein thrombosis) in pregnancy ([facility] ) 03/24/2016    HIV disease ([facility] )     Asthma     Tobacco abuse     Asthma 01/31/2017    Hypertension 01/31/2017     Past Medical History:   Diagnosis Date    Bipolar 1 disorder ([facility] )     Cardiac disease     COPD (chronic obstructive pulmonary disease) ([place] [address] )     Diabetes mellitus ([place] [address] )     Disease of thyroid gland     HIV (human immunodeficiency virus infection) ([place] [address] ) 3/24/2016    Hypertension     Osteoarthritis     Tobacco abuse        Admitting Diagnosis: Diabetes mellitus ([place] [address] ) [E11 9]  Back pain [M54 9]  Hypertension [I10]  COPD with acute exacerbation ([place] [address] ) [J44 1]  Bipolar 1 disorder ([place] [address] ) [F31 9]  Intractable low back pain [M54 5]  Ambulatory dysfunction [R26 2]  Pain of right hip joint [M25 551]    Age/Sex: 74 y o  female    Assessment/Plan:   Plan for the Primary Problem(s):  · Intractable back/right hip/right leg pain  ? Admit to med/surg  Continue PO narcotics and muscle relaxer  Continue neurontin  Order hip xray and MRI lumbar spine  Consult ortho  Continue PT and case management       Plan for Additional Problems:   · HIV- patient states her CD4 counts are good and she is not on meds at home  · HTN- continue home meds  · DM- blood glucose well controlled on metformin, will continue  Order accuchecks with coverage  · COPD- continue home inhalers and oxygen  · Bipolar- on seroquel, remeron, wellbutrin  · hypokalemia     VTE Prophylaxis: Enoxaparin (Lovenox)  / sequential compression device   Code Status: full code  POLST: There is no POLST form on file for this patient (pre-hospital)     Anticipated Length of Stay:  Patient will be admitted on an Observation basis with an anticipated length of stay of  Less than 2 midnights     Justification for Hospital Stay: patient requires PT and ortho consult    Admission Orders:  OBS  ORDER    3/30  @   2046  Scheduled Meds:   Current Facility-Administered Medications:  acetaminophen 650 mg Oral Q6H PRN Shadi Serna, PA-C   aluminum-magnesium hydroxide-simethicone 30 mL Oral Q6H PRN Shadi Serna, PA-GUILLE   aspirin 81 mg Oral Daily BETINA Benítez-GUILLE   atorvastatin 20 mg Oral Daily With [employer], PA-C   buPROPion 300 mg Oral Daily [employer] Kiran, LEA   cholecalciferol 1,000 Units Oral Daily Dario Ortiz, LEA   diltiazem 180 mg Oral Daily Dario Ortiz, LEA   enoxaparin 40 mg Subcutaneous Daily Dario Ortiz, LEA   fluticasone-salmeterol 1 puff Inhalation Q12H [facility] HOME Dario Ortiz, LEA   gabapentin 800 mg Oral TID Dario Ortiz, LEA   insulin lispro 1-6 Units Subcutaneous TID AC Dario Ortiz, LEA   levothyroxine 100 mcg Oral Early Morning Dario Ortiz, LEA   lidocaine 1 patch Transdermal Daily Dario Ortiz, LEA   metFORMIN 500 mg Oral Daily With Breakfast Dario Ortiz, LEA   methocarbamol 750 mg Oral Q6H PRN Dario Ortiz, LEA   mirtazapine 7 5 mg Oral HS Dario Ortiz, LEA   montelukast 10 mg Oral HS Dario Ortiz, LEA   nicotine 1 patch Transdermal Daily Dario Ortiz, LEA   ondansetron 4 mg Intravenous Q6H PRN Dario Ortiz, LEA   oxyCODONE-acetaminophen 1 tablet Oral Q4H PRN Dario Ortiz, LEA   potassium chloride 20 mEq Oral BID Dario Ortiz, LEA   potassium chloride 40 mEq Oral BID Dario Ortiz, LEA   QUEtiapine 200 mg Oral HS Dario Ortiz, LEA   tiotropium 18 mcg Inhalation Daily Dario Ortiz, LEA   topiramate 50 mg Oral Q12H [facility] HOME Dario Ortiz, LEA     Continuous Infusions:    PRN Meds:   acetaminophen    aluminum-magnesium hydroxide-simethicone    methocarbamol    ondansetron    oxyCODONE-acetaminophen     CCD diet  PT/OT  Cons ortho  MRI  L/S  Spine  O2  2L

## 2018-03-31 NOTE — MALNUTRITION/BMI
This medical record reflects one or more clinical indicators suggestive of malnutrition and/or morbid obesity  Malnutrition Findings:              BMI Findings:  BMI Classifications: Morbid Obesity 45-49 9     briefly discussed balance at meals, reviewed CHO foods, pt has had diet ed in the past, provided outpt RD contact information for one on one nutrition counseling, adjusted diet to CCD1 which can aid w/ wt loss     Body mass index is 47 18 kg/m²  See Nutrition note dated 3/31/18 for additional details  Completed nutrition assessment is viewable in the nutrition documentation

## 2018-03-31 NOTE — PHYSICAL THERAPY NOTE
Physical Therapy Cancellation Note        Pt off floor for MRI  Will cont to follow       El Latif, PT

## 2018-03-31 NOTE — CONSULTS
Consultation - Cookie Tuan 74 y o  female MRN: [id number]  Unit/Bed#: E5 -01 Encounter: [id number]      Assessment/Plan     Assessment:  Severe R hip OA, back pain    Plan: Will check XR lumbar spine  MRI lumbar spine pending  Pain control prn  PT/OT- WBAT  Med mgt per SLIM    History of Present Illness   Physician Requesting Consult: Jarret Manning MD  Reason for Consult / Principal Problem: back pain, R hip pain  HPI: Anna Delcid is a 74y o  year old female who presents with R hip pain, back pain radiating into bilateral thighs  Pain in back started on Wednesday  Denies falls or trauma  Pain worsened Thursday and she had difficulty walking  Admits pain in R hip for a long time  Pain is across low back and radiates to the lateral aspect of hip and thigh  Denies pain going down legs past knees  She admits hx of sciatica  She states she has not seen a specialist in the past for these problems  Consults    ROS: see HPI, all other systems negative      Historical Information   Past Medical History:   Diagnosis Date    [facility] disorder [facility] )     Cardiac disease     COPD (chronic obstructive pulmonary disease) ([facility] )     Diabetes mellitus ([facility] )     Disease of thyroid gland     HIV (human immunodeficiency virus infection) ([place] [address] ) 3/24/2016    Hypertension     Osteoarthritis     Tobacco abuse      Past Surgical History:   Procedure Laterality Date    HERNIA REPAIR      THYROIDECTOMY       Social History   History   Alcohol Use No     History   Drug Use No     Comment: former IV drug user     History   Smoking Status    Current Every Day Smoker    Packs/day: 0 20    Years: 45 00    Types: Cigarettes   Smokeless Tobacco    Never Used     Comment: Currently smoking 1-2 cigarettes a day     Family History:   Family History   Problem Relation Age of Onset    No Known Problems Mother     Diabetes Father     Heart disease Neg Hx     Cancer Neg Hx        Meds/Allergies Prescriptions Prior to Admission   Medication    aspirin 81 MG tablet    atorvastatin (LIPITOR) 20 mg tablet    buPROPion (WELLBUTRIN XL) 300 mg 24 hr tablet    cholecalciferol (VITAMIN D3) 1,000 units tablet    diltiazem (CARDIZEM CD) 180 mg 24 hr capsule    fluticasone-salmeterol (ADVAIR) 250-50 mcg/dose    gabapentin (NEURONTIN) 400 mg capsule    levothyroxine 100 mcg tablet    metFORMIN (GLUCOPHAGE) 500 mg tablet    mirtazapine (REMERON) 7 5 MG tablet    montelukast (SINGULAIR) 10 mg tablet    potassium chloride (K-DUR,KLOR-CON) 20 mEq tablet    QUEtiapine (SEROquel) 100 mg tablet    tiotropium (SPIRIVA) 18 mcg inhalation capsule    topiramate (TOPAMAX) 50 MG tablet     Allergies   Allergen Reactions    Prozac [Fluoxetine Hcl] Rash    Sulfa Antibiotics Itching    Quinine        Objective   Vitals: Blood pressure 108/65, pulse 63, temperature (!) 97 °F (36 1 °C), temperature source Temporal, resp  rate 18, height 5' 2" (1 575 m), weight 117 kg (257 lb 15 oz), SpO2 98 %, not currently breastfeeding  ,Body mass index is 47 18 kg/m²  No intake or output data in the 24 hours ending 03/31/18 0822  No intake/output data recorded      Invasive Devices     Peripheral Intravenous Line            Peripheral IV 12/20/17 Right Arm 100 days                PE:  Gen:  Awake and alert  HEENT:  Hearing intact  Heart:  Regular rate  Lungs: no audible wheezing  GI: no abdominal distension    Ortho Exam   B/L LE:  EHL/AT/GS/quads/hamstrings 5/5, R iliopsoas 4/5, L iliopsoas 5/5, sensation decreased L4, L5, sensation grossly intact S1, palpable pedal pulses, tolerates PROM without pain L leg, pain with R hip ROM  R hip:  Pain with ROM, no TTP over greater trochanter, no TTP over R SIJ, no erythema, no swelling  L hip:  No pain with ROM  Back:  Negative SLR, no TTP over spinous processes or paraspinal musculature    Lab Results:   CBC:   Lab Results   Component Value Date    WBC 7 10 03/31/2018    HGB 10 7 (L) 03/31/2018    HCT 34 4 (L) 03/31/2018    MCV 88 03/31/2018     03/31/2018    MCH 27 2 03/31/2018    MCHC 31 1 (L) 03/31/2018    RDW 16 2 (H) 03/31/2018    MPV 9 6 03/31/2018    NRBC 0 03/30/2018     CMP:   Lab Results   Component Value Date     03/30/2018     03/30/2018    CO2 28 03/30/2018    ANIONGAP 9 03/30/2018    BUN 13 03/30/2018    CREATININE 0 93 03/30/2018    GLUCOSE 111 03/30/2018    CALCIUM 10 7 (H) 03/30/2018    EGFR 75 03/30/2018     Imaging Studies: I have personally reviewed pertinent films in PACS   XR R hip:  Severe DJD    Code Status: Level 1 - Full Code  Advance Directive and Living Will:      Power of :    POLST:

## 2018-03-31 NOTE — PROGRESS NOTES
2 attempts to draw blood from pt  Both times pt is pulling arm away while needle in pt's arm  Pt yelling at nurses "this hurts, you would pull away too!"  Attempt made to calm pt in order to finish collecting blood however pt still remains uncooperative, only able to send for CBC at this time  Will attempt at a later time

## 2018-03-31 NOTE — PROGRESS NOTES
Progress Note - Den Pires 1950, 74 y o  female MRN: [id number]    Unit/Bed#: E5 -01 Encounter: [id number]    Primary Care Provider: Kala Esquivel DO   Date and time admitted to hospital: 3/30/2018  6:17 PM        Back pain   Assessment & Plan    Currently in MRI   PT/OOB         * Hip pain   Assessment & Plan    2/2 OA  Appreciate ortho eval   Conservative management, PT/OOB  Bipolar 1 disorder ([place] [address] )   Assessment & Plan    C/w home meds         COPD (chronic obstructive pulmonary disease) ([place] [address] )   Assessment & Plan    C/w home meds         Diabetes mellitus ([place] [address] )   Assessment & Plan    Controlled, accuchecks, c/w metformin, ISS         Hypertension   Assessment & Plan    C/w home meds         HIV (human immunodeficiency virus infection) ([place] [address] )   Assessment & Plan    Not on meds                 Subjective:   Having some pain in her hip and back  No other complaints  Objective:     Vitals:   Temp (24hrs), Av 5 °F (36 4 °C), Min:97 °F (36 1 °C), Max:98 2 °F (36 8 °C)    HR:  [63-88] 63  Resp:  [18-22] 18  BP: (108-146)/(65-82) 108/65  SpO2:  [93 %-99 %] 98 %  Body mass index is 47 18 kg/m²       Input and Output Summary (last 24 hours):     No intake or output data in the 24 hours ending 18 1048    Physical Exam:     Physical Exam  GEN: NAD  HEENT: PERRL  CARDIO: s1 s2 RRR  LUNGS: CTA  ABD: Soft, NT/ND  EXT: No edema   Musculo: mild pain with RLE ROM         Additional Data:     Labs:      Results from last 7 days  Lab Units 18  0609 18  2124   WBC Thousand/uL 7 10 6 26   HEMOGLOBIN g/dL 10 7* 11 0*   HEMATOCRIT % 34 4* 34 7*   PLATELETS Thousands/uL 271 301   NEUTROS PCT %  --  52   LYMPHS PCT %  --  33   MONOS PCT %  --  10   EOS PCT %  --  4       Results from last 7 days  Lab Units 18  2124   SODIUM mmol/L 143   POTASSIUM mmol/L 3 1*   CHLORIDE mmol/L 106   CO2 mmol/L 28   BUN mg/dL 13   CREATININE mg/dL 0 93   CALCIUM mg/dL 10 7*   GLUCOSE RANDOM mg/dL 111 * I Have Reviewed All Lab Data Listed Above  * Additional Pertinent Lab Tests Reviewed: All Labs For Current Hospital Admission Reviewed    Imaging:    Imaging Reports Reviewed Today Include:  All available       Recent Cultures (last 7 days):           Last 24 Hours Medication List:     Current Facility-Administered Medications:  acetaminophen 650 mg Oral Q6H PRN Chevis Moras, PA-C   aluminum-magnesium hydroxide-simethicone 30 mL Oral Q6H PRN Chevis Moras, PA-C   aspirin 81 mg Oral Daily Chevis Moras, PA-C   atorvastatin 20 mg Oral Daily With [employer], PA-C   buPROPion 300 mg Oral Daily Chevis Moras, PA-C   cholecalciferol 1,000 Units Oral Daily Chevis Moras, PA-C   diltiazem 180 mg Oral Daily Chevis Moras, PA-C   enoxaparin 40 mg Subcutaneous Daily Chevis Moras, PA-C   fluticasone-salmeterol 1 puff Inhalation Q12H [facility] HOME Chevis Moras, PA-C   gabapentin 800 mg Oral TID Chevis Moras, PA-C   insulin lispro 1-6 Units Subcutaneous TID AC Chevis Moras, PA-C   levothyroxine 100 mcg Oral Early Morning Chevis Moras, PA-C   lidocaine 1 patch Transdermal Daily Chevis Moras, PA-C   metFORMIN 500 mg Oral Daily With Breakfast Chevis Moras, PA-C   methocarbamol 750 mg Oral Q6H PRN Chevis Moras, PA-C   mirtazapine 7 5 mg Oral HS Chevis Moras, PA-C   montelukast 10 mg Oral HS Chevis Moras, PA-C   nicotine 1 patch Transdermal Daily Chevis Moras, PA-C   ondansetron 4 mg Intravenous Q6H PRN Chevis Moras, PA-C   oxyCODONE-acetaminophen 1 tablet Oral Q4H PRN Chevis Moras, PA-C   potassium chloride 20 mEq Oral BID Chevis Moras, PA-C   potassium chloride 40 mEq Oral BID Chevis Moras, PA-C   QUEtiapine 200 mg Oral HS Chevis Moras, PA-C   tiotropium 18 mcg Inhalation Daily Chevis Moras, PA-C   topiramate 50 mg Oral Q12H [facility] HOME Chevis Ab, PA-C        Today, Patient Was Seen By: Jarret Manning MD    ** Please Note: This note has been constructed using a voice recognition system   **

## 2018-03-31 NOTE — ED NOTES
Multiple attempts made to obtain IV access without success  Admitting made aware of same, ok to send pt to floor without IV        Shante Lim, LUZMA  03/30/18 7022

## 2018-03-31 NOTE — ED NOTES
Pt ringing call bell stating she needs to void  Attempted to get pt oob to commode, pt unable to bear weight on lower extremities, assisted back to sitting at edge of bed, then placed on bedpan  Pt then requested to sit on commode, stood up and was able to pivot to sit on commode        Rashid Melendez RN  03/30/18 2028

## 2018-04-01 ENCOUNTER — APPOINTMENT (INPATIENT)
Dept: MRI IMAGING | Facility: HOSPITAL | Age: 68
DRG: 459 | End: 2018-04-01
Payer: MEDICARE

## 2018-04-01 LAB
GLUCOSE SERPL-MCNC: 117 MG/DL (ref 65–140)
GLUCOSE SERPL-MCNC: 149 MG/DL (ref 65–140)
GLUCOSE SERPL-MCNC: 164 MG/DL (ref 65–140)
GLUCOSE SERPL-MCNC: 166 MG/DL (ref 65–140)

## 2018-04-01 PROCEDURE — 99232 SBSQ HOSP IP/OBS MODERATE 35: CPT | Performed by: INTERNAL MEDICINE

## 2018-04-01 PROCEDURE — 94660 CPAP INITIATION&MGMT: CPT

## 2018-04-01 PROCEDURE — 99231 SBSQ HOSP IP/OBS SF/LOW 25: CPT | Performed by: ORTHOPAEDIC SURGERY

## 2018-04-01 PROCEDURE — 72148 MRI LUMBAR SPINE W/O DYE: CPT

## 2018-04-01 PROCEDURE — 97163 PT EVAL HIGH COMPLEX 45 MIN: CPT | Performed by: PHYSICAL THERAPIST

## 2018-04-01 PROCEDURE — 82948 REAGENT STRIP/BLOOD GLUCOSE: CPT

## 2018-04-01 PROCEDURE — G8979 MOBILITY GOAL STATUS: HCPCS | Performed by: PHYSICAL THERAPIST

## 2018-04-01 PROCEDURE — G8978 MOBILITY CURRENT STATUS: HCPCS | Performed by: PHYSICAL THERAPIST

## 2018-04-01 RX ORDER — DEXAMETHASONE SODIUM PHOSPHATE 4 MG/ML
4 INJECTION, SOLUTION INTRA-ARTICULAR; INTRALESIONAL; INTRAMUSCULAR; INTRAVENOUS; SOFT TISSUE EVERY 6 HOURS SCHEDULED
Status: DISCONTINUED | OUTPATIENT
Start: 2018-04-01 | End: 2018-04-01

## 2018-04-01 RX ORDER — MORPHINE SULFATE 2 MG/ML
2 INJECTION, SOLUTION INTRAMUSCULAR; INTRAVENOUS ONCE
Status: DISCONTINUED | OUTPATIENT
Start: 2018-04-01 | End: 2018-04-04 | Stop reason: HOSPADM

## 2018-04-01 RX ORDER — DEXAMETHASONE 4 MG/1
4 TABLET ORAL EVERY 6 HOURS SCHEDULED
Status: DISCONTINUED | OUTPATIENT
Start: 2018-04-02 | End: 2018-04-04 | Stop reason: HOSPADM

## 2018-04-01 RX ADMIN — METFORMIN HYDROCHLORIDE 500 MG: 500 TABLET, FILM COATED ORAL at 10:41

## 2018-04-01 RX ADMIN — ASPIRIN 81 MG 81 MG: 81 TABLET ORAL at 10:40

## 2018-04-01 RX ADMIN — OXYCODONE HYDROCHLORIDE AND ACETAMINOPHEN 1 TABLET: 5; 325 TABLET ORAL at 18:12

## 2018-04-01 RX ADMIN — GABAPENTIN 800 MG: 400 CAPSULE ORAL at 10:40

## 2018-04-01 RX ADMIN — DILTIAZEM HYDROCHLORIDE 180 MG: 180 CAPSULE, COATED, EXTENDED RELEASE ORAL at 10:38

## 2018-04-01 RX ADMIN — FLUTICASONE PROPIONATE AND SALMETEROL 1 PUFF: 50; 250 POWDER RESPIRATORY (INHALATION) at 10:43

## 2018-04-01 RX ADMIN — BUPROPION HYDROCHLORIDE 300 MG: 150 TABLET, FILM COATED, EXTENDED RELEASE ORAL at 10:39

## 2018-04-01 RX ADMIN — OXYCODONE HYDROCHLORIDE AND ACETAMINOPHEN 1 TABLET: 5; 325 TABLET ORAL at 12:21

## 2018-04-01 RX ADMIN — INSULIN LISPRO 1 UNITS: 100 INJECTION, SOLUTION INTRAVENOUS; SUBCUTANEOUS at 18:13

## 2018-04-01 RX ADMIN — FLUTICASONE PROPIONATE AND SALMETEROL 1 PUFF: 50; 250 POWDER RESPIRATORY (INHALATION) at 21:27

## 2018-04-01 RX ADMIN — LIDOCAINE 1 PATCH: 50 PATCH CUTANEOUS at 10:41

## 2018-04-01 RX ADMIN — TOPIRAMATE 50 MG: 25 TABLET, FILM COATED ORAL at 10:39

## 2018-04-01 RX ADMIN — DEXAMETHASONE 4 MG: 4 TABLET ORAL at 23:20

## 2018-04-01 RX ADMIN — POTASSIUM CHLORIDE 40 MEQ: 1500 TABLET, EXTENDED RELEASE ORAL at 18:12

## 2018-04-01 RX ADMIN — OXYCODONE HYDROCHLORIDE AND ACETAMINOPHEN 1 TABLET: 5; 325 TABLET ORAL at 07:32

## 2018-04-01 RX ADMIN — ATORVASTATIN CALCIUM 20 MG: 20 TABLET, FILM COATED ORAL at 18:12

## 2018-04-01 RX ADMIN — LEVOTHYROXINE SODIUM 100 MCG: 100 TABLET ORAL at 06:51

## 2018-04-01 RX ADMIN — NICOTINE 1 PATCH: 14 PATCH TRANSDERMAL at 10:42

## 2018-04-01 RX ADMIN — ENOXAPARIN SODIUM 40 MG: 40 INJECTION SUBCUTANEOUS at 10:38

## 2018-04-01 RX ADMIN — TOPIRAMATE 50 MG: 25 TABLET, FILM COATED ORAL at 21:28

## 2018-04-01 RX ADMIN — GABAPENTIN 800 MG: 400 CAPSULE ORAL at 21:27

## 2018-04-01 RX ADMIN — MONTELUKAST SODIUM 10 MG: 10 TABLET, COATED ORAL at 21:28

## 2018-04-01 RX ADMIN — POTASSIUM CHLORIDE 40 MEQ: 1500 TABLET, EXTENDED RELEASE ORAL at 10:39

## 2018-04-01 RX ADMIN — MIRTAZAPINE 7.5 MG: 15 TABLET, FILM COATED ORAL at 21:28

## 2018-04-01 RX ADMIN — VITAMIN D, TAB 1000IU (100/BT) 1000 UNITS: 25 TAB at 10:39

## 2018-04-01 RX ADMIN — GABAPENTIN 800 MG: 400 CAPSULE ORAL at 18:12

## 2018-04-01 RX ADMIN — TIOTROPIUM BROMIDE 18 MCG: 18 CAPSULE ORAL; RESPIRATORY (INHALATION) at 10:37

## 2018-04-01 RX ADMIN — QUETIAPINE FUMARATE 200 MG: 200 TABLET, FILM COATED ORAL at 21:27

## 2018-04-01 NOTE — PHYSICIAN ADVISOR
Current patient class: Observation  The patient is currently on Hospital Day: 2      The patient was admitted to the hospital at N/A on N/A for the following diagnosis:  Diabetes mellitus ([facility] ) [E11 9]  Back pain [M54 9]  Hypertension [I10]  COPD with acute exacerbation ([facility] ) [J44 1]  Bipolar 1 disorder ([place] [address] ) [F31 9]  Intractable low back pain [M54 5]  Ambulatory dysfunction [R26 2]  Pain of right hip joint [M25 551]       There is documentation in the medical record of an expected length of stay of at least 2 midnights  The patient is therefore expected to satisfy the 2 midnight benchmark and given the 2 midnight presumption is appropriate for INPATIENT ADMISSION  Given this expectation of a satisfying stay, CMS instructs us that the patient is most often appropriate for inpatient admission under part A provided medical necessity is documented in the chart  After review of the relevant documentation, labs, vital signs and test results, the patient is appropriate for INPATIENT ADMISSION  Admission to the hospital as an inpatient is a complex decision making process which requires the practitioner to consider the patients presenting complaint, history and physical examination and all relevant testing  With this in mind, in this case, the patient was deemed appropriate for INPATIENT ADMISSION  After review of the documentation and testing available at the time of the admission I concur with this clinical determination of medical necessity  Rationale is as follows:     The patient is a 74 yrs old Female who presented to the ED at 3/30/2018  6:17 PM with a chief complaint of Back Pain (States with history of back pain was seen at PCP x1 month ago sent for injections in lower back for pain unsure of what kind of medication was used states with back pain off and on since, x1 weeks ago pain got worse at times legs go numb and out form under her had multiple falls in past few days  )    The patients vitals on arrival were ED Triage Vitals [03/30/18 1835]   Temperature Pulse Respirations Blood Pressure SpO2   98 2 °F (36 8 °C) 88 22 146/82 98 %      Temp Source Heart Rate Source Patient Position - Orthostatic VS BP Location FiO2 (%)   Oral Monitor Sitting Right arm --      Pain Score       Worst Possible Pain           Past Medical History:   Diagnosis Date    Bipolar 1 disorder (HCC)     Cardiac disease     [facility]) [facility])     Diabetes mellitus ([facility] )     Disease of thyroid gland     HIV (human immunodeficiency virus infection) ([facility] ) 3/24/2016    Hypertension     Osteoarthritis     Tobacco abuse      Past Surgical History:   Procedure Laterality Date    HERNIA REPAIR      THYROIDECTOMY             Consults have been placed to:   IP CONSULT TO CASE MANAGEMENT  IP CONSULT TO ORTHOPEDIC SURGERY    Vitals:    03/31/18 0115 03/31/18 0359 03/31/18 0753 03/31/18 1616   BP:   108/65 145/84   BP Location:   Right arm Right arm   Pulse:   63 80   Resp:   18 18   Temp:   (!) 97 °F (36 1 °C) 98 1 °F (36 7 °C)   TempSrc:   Temporal Temporal   SpO2: 93% 94% 98% 96%   Weight:       Height:           Most recent labs:    Recent Labs      03/30/18   2124  03/31/18   0609   WBC  6 26  7 10   HGB  11 0*  10 7*   HCT  34 7*  34 4*   PLT  301  271   K  3 1*   --    NA  143   --    CALCIUM  10 7*   --    BUN  13   --    CREATININE  0 93   --        Scheduled Meds:  Current Facility-Administered Medications:  acetaminophen 650 mg Oral Q6H PRN Oris Push, PA-C   aluminum-magnesium hydroxide-simethicone 30 mL Oral Q6H PRN Oris Push, PA-C   aspirin 81 mg Oral Daily Oris Push, PA-C   atorvastatin 20 mg Oral Daily With [employer], PA-C   buPROPion 300 mg Oral Daily Oris Push, PA-C   cholecalciferol 1,000 Units Oral Daily Oris Push, PA-C   diltiazem 180 mg Oral Daily Oris Push, PA-C   enoxaparin 40 mg Subcutaneous Daily Oris Push, PA-C fluticasone-salmeterol 1 puff Inhalation Q12H [facility] Marylou Wade PA-C   gabapentin 800 mg Oral TID Marylou Wade PA-C   insulin lispro 1-6 Units Subcutaneous TID AC Marylou Wade PA-C   levothyroxine 100 mcg Oral Early Morning Marylou Wade PA-C   lidocaine 1 patch Transdermal Daily Marylou Wade PA-C   metFORMIN 500 mg Oral Daily With Breakfast Marylou Wade PA-C   methocarbamol 750 mg Oral Q6H PRN Marylou Wade PA-C   mirtazapine 7 5 mg Oral HS Marylou Wade PA-C   montelukast 10 mg Oral HS Marylou Wade PA-C   nicotine 1 patch Transdermal Daily Marylou Wade PA-C   ondansetron 4 mg Intravenous Q6H PRN Marylou Wade PA-C   oxyCODONE-acetaminophen 1 tablet Oral Q4H PRN Marylou Wade PA-C   potassium chloride 20 mEq Oral BID Marylou Wade PA-C   potassium chloride 40 mEq Oral BID Marylou Wade PA-C   QUEtiapine 200 mg Oral HS Marylou Wade PA-C   tiotropium 18 mcg Inhalation Daily Marylou Wade PA-C   topiramate 50 mg Oral Q12H [facility] Marylou Wade PA-C     Continuous Infusions:   PRN Meds:   acetaminophen    aluminum-magnesium hydroxide-simethicone    methocarbamol    ondansetron    oxyCODONE-acetaminophen    Surgical procedures (if appropriate):

## 2018-04-01 NOTE — RESPIRATORY THERAPY NOTE
Pt refusing CPAP tonight    Pt states "she does not like the mask on her face while sleeping and she is tired of fighting with it " Pt comfortable on 2L NC

## 2018-04-01 NOTE — PROGRESS NOTES
Progress Note - Orthopedics   Dago Alberto 74 y o  female MRN: [id number]  Unit/Bed#: E5 -01 Encounter: [id number]    Assessment:  Lumbar foraminal stenosis, thoracic disc herniation, severe R hip OA    Plan: Will obtain MRI thoracic spine to further evaluate disc herniation in low thoracic spine noted on lumbar spine MRI  PT/OT-WBAT with walker  Pain control prn  Med mgt per SLIM    Subjective:  Patient seen examined  Patient is sitting up in bed eating lunch  Appears comfortable  Admits low back pain  Per old records patient last saw PM&R on 10/12/17  She has had a L5-S1 facet block in the past   She has also had bilateral lumbar medial branch blocks at L5-S1 in the past   It was also noted that she had a hip injection and has seen Dr Sheila Obrien in the past     Vitals: Blood pressure 117/58, pulse 83, temperature 98 8 °F (37 1 °C), temperature source Temporal, resp  rate 18, height 5' 2" (1 575 m), weight 117 kg (257 lb 15 oz), SpO2 96 %, not currently breastfeeding  ,Body mass index is 47 18 kg/m²      No intake or output data in the 24 hours ending 04/01/18 1229    Invasive Devices     Peripheral Intravenous Line            Peripheral IV 12/20/17 Right Arm 102 days                Ortho Exam:   B/L LE:  L EHL/AT/GS/quads/hamstrings/iliopsoas 5/5, R AT/GS 5/5, EHL 4/5, Quads/hamstrings/iliopsoas 3/5, sensation grossly intact L4, L5, S1, palpable pedal pulse  R hip:  Pain with AROM/PROM  Back: negative SLR B/L, negative clonus    Lab, Imaging and other studies:  MRI lumbar spine:  L3-4 mild bilateral foraminal stenosis, L4-L5 disc herniation with bilateral foraminal stenosis-severe on the right, L5-S1 disc herniation with moderate bilateral foraminal stenosis, right worse than left, T10/11 disc herniation, T11-T12 small disc herniation

## 2018-04-01 NOTE — PHYSICAL THERAPY NOTE
Physical Therapy Evaluation      Patient Active Problem List   Diagnosis    Arthritis    HIV (human immunodeficiency virus infection) ([place] [address] )    Osteoarthritis    Acute exacerbation of chronic obstructive pulmonary disease (COPD) ([place] [address] )    Hypertension    Diabetes mellitus ([place] [address] )    COPD (chronic obstructive pulmonary disease) ([place] [address] )    Bipolar 1 disorder (HCC)    Shortness of breath    Back pain    Hip pain       Past Medical History:   Diagnosis Date    Bipolar 1 disorder ([place] [address] )     Cardiac disease     COPD (chronic obstructive pulmonary disease) ([place] [address] )     Diabetes mellitus ([place] [address] )     Disease of thyroid gland     HIV (human immunodeficiency virus infection) ([place] [address] ) 3/24/2016    Hypertension     Osteoarthritis     Tobacco abuse        Past Surgical History:   Procedure Laterality Date    HERNIA REPAIR      THYROIDECTOMY        04/01/18 1133   Note Type   Note type Eval only   Pain Assessment   Pain Assessment FLACC   Pain Rating: FLACC (Rest) - Face 0   Pain Rating: FLACC (Rest) - Legs 0   Pain Rating: FLACC (Rest) - Activity 0   Pain Rating: FLACC (Rest) - Cry 1   Pain Rating: FLACC (Rest) - Consolability 0   Score: FLACC (Rest) 1   Home Living   Type of Home House   Home Layout Two level;Stairs to enter with rails  (2+1 stairs to enter, 16 stairs to inside flight)   Prior Function   Level of [place] Independent with ADLs and functional mobility   Lives With Daughter   Receives Help From Family   ADL Assistance Independent   IADLs Needs assistance   Falls in the last 6 months 5 to 10   Comments pt reports using rw for amb  had 2 Marry about 1 month ago and now having all these falls  pt reporting back and hip pain R>L  has djd  pt reports legs just give out without warning  reports she has home O2, uses HS with c pap, used continuously in the past  reports she is often SOB   daughter works and is not home much   Restrictions/Precautions   Other Precautions Fall Risk;Pain;O2   General   Additional Pertinent History pt was breathless when talking during intervieww without O2, O2 placed at 2L and pt able to speak more easily  Family/Caregiver Present No   Cognition   Overall Cognitive Status WFL   Orientation Level Oriented X4   RUE Assessment   RUE Assessment X  (strength 4+/5)   LUE Assessment   LUE Assessment X  (strength 4+/5)   RLE Assessment   RLE Assessment X  (strength 3-/5)   LLE Assessment   LLE Assessment X  (strength 3/5)   Coordination   Movements are Fluid and Coordinated (? submaximal effort  on testing, give way weakness)   Sensation X   Light Touch   RLE Light Touch Impaired   RLE Light Touch Comments foot   LLE Light Touch Impaired   LLE Light Touch Comments foot   Bed Mobility   Rolling R 7  Independent   Rolling L 7  Independent   Supine to Sit 7  Independent   Sit to Supine 7  Independent   Transfers   Sit to Stand 3  Moderate assistance   Additional items Assist x 1;Verbal cues; Impulsive   Stand to Sit 3  Moderate assistance   Additional items Assist x 1; Impulsive;Verbal cues   Ambulation/Elevation   Gait pattern Not tested  (unable to take step forward, knees giving way)   Balance   Static Sitting Normal   Dynamic Sitting Normal   Static Standing Poor   Dynamic Standing Poor   Endurance Deficit   Endurance Deficit Yes   Endurance Deficit Description kim   Activity Tolerance   Activity Tolerance Treatment limited secondary to medical complications (Comment); Patient limited by fatigue   Assessment   Prognosis Fair   Problem List Decreased strength;Decreased endurance; Impaired balance;Decreased mobility; Impaired judgement;Decreased safety awareness; Impaired sensation;Obesity;Pain   Assessment pt admitted with 5 falls in the last month, 4 in the last week  pt dx with lbp, r>l hip OA and refered to PT   pt was indep using rw PTA but reporting that for the last week her knees have just given way  pt cites that she has no warning that that is going to happen   pt demonstrated severe functoinal limitatoins due to recent falls and chronic debility  pt reports falls have increased since her last CASSANDRA  pt also reports being out of breath due to asthma is also a concern  pt has severe deficits in strength, balance, gait sequencing and stability, safety awareness   pt does not have good family support  curently very high fall ris, pt was able to stand for only 15 seconds, on 2 attempts  pt will need skilled PT and rehab  Barriers to Discharge Decreased caregiver support; Inaccessible home environment   Goals   Patient Goals not fall    STG Expiration Date 04/08/18   Short Term Goal #1 min assist for transfers, amb using rw for 25'  improve strength and balance by 1/2 grade  demonstrate good safety practices   improve activity tolerance to 45 minutes  Plan   Treatment/Interventions Functional transfer training;LE strengthening/ROM; Therapeutic exercise; Endurance training;Patient/family training;Equipment eval/education; Bed mobility;Gait training; Compensatory technique education   PT Frequency 5x/wk   Recommendation   Recommendation Post acute IP rehab   PT - OK to Discharge Yes  (rehab)   Additional Comments rehab   Modified [place] Scale   Modified [place] Scale 4   Barthel Index   Feeding 10   Bathing 5   Grooming Score 5   Dressing Score 5   Bladder Score 10   Bowels Score 10   Toilet Use Score 5   Transfers (Bed/Chair) Score 5   Mobility (Level Surface) Score 0   Stairs Score 0   Barthel Index Score 55   History: co - morbidities, fall risk, use of assistive device, assist for adl's, O2, inaccesible home  Exam: impairments in locomotion, musculoskeletal, balance, joint integrity,  pulmonary,   Clinical: unstable/unpredictable  Complexity:high Joya Cade, PT

## 2018-04-01 NOTE — PROGRESS NOTES
Progress Note - Moncho Valdez 1950, 74 y o  female MRN: [id number]    Unit/Bed#: E5 -01 Encounter: [id number]    Primary Care Provider: Eduardo Quiros DO   Date and time admitted to hospital: 3/30/2018  6:17 PM        Back pain   Assessment & Plan    MRI today  Pain control   PT/OOB         * Hip pain   Assessment & Plan    2/2 OA  Appreciate ortho eval   Conservative management, PT/OOB  Bipolar 1 disorder ([facility] )   Assessment & Plan    C/w home meds         COPD (chronic obstructive pulmonary disease) ([place] [address] )   Assessment & Plan    C/w home meds         Diabetes mellitus ([place] [address] )   Assessment & Plan    Controlled, accuchecks, c/w metformin, ISS         Hypertension   Assessment & Plan    C/w home meds         HIV (human immunodeficiency virus infection) ([place] [address] )   Assessment & Plan    Not on meds               Subjective:   Couldn't tolerate MRI yesterday due to back pain  She is going today  Objective:     Vitals:   Temp (24hrs), Av 2 °F (36 8 °C), Min:97 9 °F (36 6 °C), Max:98 8 °F (37 1 °C)    HR:  [79-83] 83  Resp:  [18] 18  BP: (117-145)/(58-92) 117/58  SpO2:  [93 %-96 %] 96 %  Body mass index is 47 18 kg/m²       Input and Output Summary (last 24 hours):     No intake or output data in the 24 hours ending 18 1005    Physical Exam:     Physical Exam    GEN: NAD  HEENT: PERRL  CARDIO: s1 s2 RRR  LUNGS: CTA  ABD: Soft, NT/ND  EXT: No edema   Musculo: mild lumbar paraspinal tenderness, mild pain with RLE ROM       Additional Data:     Labs:      Results from last 7 days  Lab Units 18  0609 18  2124   WBC Thousand/uL 7 10 6 26   HEMOGLOBIN g/dL 10 7* 11 0*   HEMATOCRIT % 34 4* 34 7*   PLATELETS Thousands/uL 271 301   NEUTROS PCT %  --  52   LYMPHS PCT %  --  33   MONOS PCT %  --  10   EOS PCT %  --  4       Results from last 7 days  Lab Units 18  2124   SODIUM mmol/L 143   POTASSIUM mmol/L 3 1*   CHLORIDE mmol/L 106   CO2 mmol/L 28   BUN mg/dL 13   CREATININE mg/dL 0 93 CALCIUM mg/dL 10 7*   GLUCOSE RANDOM mg/dL 111           * I Have Reviewed All Lab Data Listed Above  * Additional Pertinent Lab Tests Reviewed:  [facility] [facility] Admission Reviewed      Recent Cultures (last 7 days):           Last 24 Hours Medication List:     Current Facility-Administered Medications:  acetaminophen 650 mg Oral Q6H PRN Delcia Lines, PA-C   aluminum-magnesium hydroxide-simethicone 30 mL Oral Q6H PRN Delcia Lines, PA-C   aspirin 81 mg Oral Daily Delcia Lines, PA-C   atorvastatin 20 mg Oral Daily With [employer], PA-C   buPROPion 300 mg Oral Daily Delcia Lines, PA-C   cholecalciferol 1,000 Units Oral Daily Delcia Lines, PA-C   diltiazem 180 mg Oral Daily Delcia Lines, PA-C   enoxaparin 40 mg Subcutaneous Daily Delcia Lines, PA-C   fluticasone-salmeterol 1 puff Inhalation Q12H [facility] Delcia Lines, PA-C   gabapentin 800 mg Oral TID Delcia Lines, PA-C   insulin lispro 1-6 Units Subcutaneous TID AC Delcia Lines, PA-C   levothyroxine 100 mcg Oral Early Morning Delcia Lines, PA-C   lidocaine 1 patch Transdermal Daily Delcia Lines, PA-C   metFORMIN 500 mg Oral Daily With Breakfast Delcia Lines, PA-C   methocarbamol 750 mg Oral Q6H PRN Delcia Lines, PA-C   mirtazapine 7 5 mg Oral HS Delcia Lines, PA-C   montelukast 10 mg Oral HS Delcia Lines, PA-C   morphine injection 2 mg Intravenous Once Barb Mckay MD   nicotine 1 patch Transdermal Daily Delcia Lines, PA-C   ondansetron 4 mg Intravenous Q6H PRN Delcia Lines, PA-C   oxyCODONE-acetaminophen 1 tablet Oral Q4H PRN Delcia Lines, PA-C   potassium chloride 20 mEq Oral BID Delcia Lines, PA-C   potassium chloride 40 mEq Oral BID Delcia Lines, PA-C   QUEtiapine 200 mg Oral HS Delcia Lines, PA-C   tiotropium 18 mcg Inhalation Daily Delcia Lines, PA-C   topiramate 50 mg Oral Q12H [facility] Guillen        Today, Patient Was Seen By: Barb Mckay, MD    ** Please Note: This note has been constructed using a voice recognition system   **

## 2018-04-01 NOTE — PLAN OF CARE
Problem: PHYSICAL THERAPY ADULT  Goal: Performs mobility at highest level of function for planned discharge setting  See evaluation for individualized goals  Treatment/Interventions: Functional transfer training, LE strengthening/ROM, Therapeutic exercise, Endurance training, Patient/family training, Equipment eval/education, Bed mobility, Gait training, Compensatory technique education          See flowsheet documentation for full assessment, interventions and recommendations  Prognosis: Fair  Problem List: Decreased strength, Decreased endurance, Impaired balance, Decreased mobility, Impaired judgement, Decreased safety awareness, Impaired sensation, Obesity, Pain  Assessment: pt admitted with 5 falls in the last month, 4 in the last week  pt dx with lbp, r>l hip OA and refered to PT   pt was indep using rw PTA but reporting that for the last week her knees have just given way  pt cites that she has no warning that that is going to happen  pt demonstrated severe functoinal limitatoins due to recent falls and chronic debility  pt reports falls have increased since her last CASSANDRA  pt also reports being out of breath due to asthma is also a concern  pt has severe deficits in strength, balance, gait sequencing and stability, safety awareness   pt does not have good family support  curently very high fall ris, pt was able to stand for only 15 seconds, on 2 attempts  pt will need skilled PT and rehab  Barriers to Discharge: Decreased caregiver support, Inaccessible home environment     Recommendation: Post acute IP rehab     PT - OK to Discharge: Yes (rehab)    See flowsheet documentation for full assessment

## 2018-04-02 PROBLEM — E66.01 MORBID OBESITY (HCC): Status: ACTIVE | Noted: 2018-04-02

## 2018-04-02 LAB
AMORPH PHOS CRY URNS QL MICRO: ABNORMAL /HPF
BACTERIA UR QL AUTO: ABNORMAL /HPF
BILIRUB UR QL STRIP: NEGATIVE
CLARITY UR: CLEAR
COLOR UR: YELLOW
GLUCOSE SERPL-MCNC: 156 MG/DL (ref 65–140)
GLUCOSE SERPL-MCNC: 156 MG/DL (ref 65–140)
GLUCOSE SERPL-MCNC: 189 MG/DL (ref 65–140)
GLUCOSE SERPL-MCNC: 219 MG/DL (ref 65–140)
GLUCOSE UR STRIP-MCNC: NEGATIVE MG/DL
HGB UR QL STRIP.AUTO: ABNORMAL
KETONES UR STRIP-MCNC: NEGATIVE MG/DL
LEUKOCYTE ESTERASE UR QL STRIP: NEGATIVE
NITRITE UR QL STRIP: NEGATIVE
NON-SQ EPI CELLS URNS QL MICRO: ABNORMAL /HPF
PH UR STRIP.AUTO: 7.5 [PH] (ref 4.5–8)
PROT UR STRIP-MCNC: NEGATIVE MG/DL
RBC #/AREA URNS AUTO: ABNORMAL /HPF
SP GR UR STRIP.AUTO: 1.02 (ref 1–1.03)
UROBILINOGEN UR QL STRIP.AUTO: 0.2 E.U./DL
WBC #/AREA URNS AUTO: ABNORMAL /HPF

## 2018-04-02 PROCEDURE — 97530 THERAPEUTIC ACTIVITIES: CPT | Performed by: PHYSICAL THERAPIST

## 2018-04-02 PROCEDURE — 99232 SBSQ HOSP IP/OBS MODERATE 35: CPT | Performed by: INTERNAL MEDICINE

## 2018-04-02 PROCEDURE — G8987 SELF CARE CURRENT STATUS: HCPCS

## 2018-04-02 PROCEDURE — 97116 GAIT TRAINING THERAPY: CPT | Performed by: PHYSICAL THERAPIST

## 2018-04-02 PROCEDURE — 82948 REAGENT STRIP/BLOOD GLUCOSE: CPT

## 2018-04-02 PROCEDURE — 81001 URINALYSIS AUTO W/SCOPE: CPT | Performed by: INTERNAL MEDICINE

## 2018-04-02 PROCEDURE — 97166 OT EVAL MOD COMPLEX 45 MIN: CPT

## 2018-04-02 PROCEDURE — 94660 CPAP INITIATION&MGMT: CPT

## 2018-04-02 PROCEDURE — G8988 SELF CARE GOAL STATUS: HCPCS

## 2018-04-02 PROCEDURE — 94760 N-INVAS EAR/PLS OXIMETRY 1: CPT

## 2018-04-02 RX ORDER — HYDROMORPHONE HYDROCHLORIDE 2 MG/1
1 TABLET ORAL EVERY 4 HOURS PRN
Status: DISCONTINUED | OUTPATIENT
Start: 2018-04-02 | End: 2018-04-04 | Stop reason: HOSPADM

## 2018-04-02 RX ORDER — OXYCODONE HCL 10 MG/1
10 TABLET, FILM COATED, EXTENDED RELEASE ORAL EVERY 12 HOURS SCHEDULED
Status: DISCONTINUED | OUTPATIENT
Start: 2018-04-02 | End: 2018-04-04 | Stop reason: HOSPADM

## 2018-04-02 RX ADMIN — MONTELUKAST SODIUM 10 MG: 10 TABLET, COATED ORAL at 22:00

## 2018-04-02 RX ADMIN — TOPIRAMATE 50 MG: 25 TABLET, FILM COATED ORAL at 21:59

## 2018-04-02 RX ADMIN — TOPIRAMATE 50 MG: 25 TABLET, FILM COATED ORAL at 08:36

## 2018-04-02 RX ADMIN — OXYCODONE HYDROCHLORIDE 10 MG: 10 TABLET, FILM COATED, EXTENDED RELEASE ORAL at 16:14

## 2018-04-02 RX ADMIN — ENOXAPARIN SODIUM 40 MG: 40 INJECTION SUBCUTANEOUS at 08:38

## 2018-04-02 RX ADMIN — POTASSIUM CHLORIDE 40 MEQ: 1500 TABLET, EXTENDED RELEASE ORAL at 08:37

## 2018-04-02 RX ADMIN — MIRTAZAPINE 7.5 MG: 15 TABLET, FILM COATED ORAL at 22:00

## 2018-04-02 RX ADMIN — POTASSIUM CHLORIDE 20 MEQ: 1500 TABLET, EXTENDED RELEASE ORAL at 17:42

## 2018-04-02 RX ADMIN — INSULIN LISPRO 1 UNITS: 100 INJECTION, SOLUTION INTRAVENOUS; SUBCUTANEOUS at 08:34

## 2018-04-02 RX ADMIN — ATORVASTATIN CALCIUM 20 MG: 20 TABLET, FILM COATED ORAL at 16:14

## 2018-04-02 RX ADMIN — DILTIAZEM HYDROCHLORIDE 180 MG: 180 CAPSULE, COATED, EXTENDED RELEASE ORAL at 08:37

## 2018-04-02 RX ADMIN — DEXAMETHASONE 4 MG: 4 TABLET ORAL at 17:41

## 2018-04-02 RX ADMIN — DEXAMETHASONE 4 MG: 4 TABLET ORAL at 11:39

## 2018-04-02 RX ADMIN — GABAPENTIN 800 MG: 400 CAPSULE ORAL at 16:14

## 2018-04-02 RX ADMIN — NICOTINE 1 PATCH: 14 PATCH TRANSDERMAL at 08:38

## 2018-04-02 RX ADMIN — HYDROMORPHONE HYDROCHLORIDE 1 MG: 2 TABLET ORAL at 22:11

## 2018-04-02 RX ADMIN — GABAPENTIN 800 MG: 400 CAPSULE ORAL at 08:37

## 2018-04-02 RX ADMIN — BUPROPION HYDROCHLORIDE 300 MG: 150 TABLET, FILM COATED, EXTENDED RELEASE ORAL at 08:37

## 2018-04-02 RX ADMIN — FLUTICASONE PROPIONATE AND SALMETEROL 1 PUFF: 50; 250 POWDER RESPIRATORY (INHALATION) at 22:02

## 2018-04-02 RX ADMIN — DEXAMETHASONE 4 MG: 4 TABLET ORAL at 23:27

## 2018-04-02 RX ADMIN — GABAPENTIN 800 MG: 400 CAPSULE ORAL at 22:00

## 2018-04-02 RX ADMIN — METFORMIN HYDROCHLORIDE 500 MG: 500 TABLET, FILM COATED ORAL at 08:37

## 2018-04-02 RX ADMIN — LEVOTHYROXINE SODIUM 100 MCG: 100 TABLET ORAL at 06:07

## 2018-04-02 RX ADMIN — DEXAMETHASONE 4 MG: 4 TABLET ORAL at 06:07

## 2018-04-02 RX ADMIN — INSULIN LISPRO 1 UNITS: 100 INJECTION, SOLUTION INTRAVENOUS; SUBCUTANEOUS at 16:19

## 2018-04-02 RX ADMIN — INSULIN LISPRO 2 UNITS: 100 INJECTION, SOLUTION INTRAVENOUS; SUBCUTANEOUS at 11:39

## 2018-04-02 RX ADMIN — LIDOCAINE 1 PATCH: 50 PATCH CUTANEOUS at 08:38

## 2018-04-02 RX ADMIN — FLUTICASONE PROPIONATE AND SALMETEROL 1 PUFF: 50; 250 POWDER RESPIRATORY (INHALATION) at 10:00

## 2018-04-02 RX ADMIN — VITAMIN D, TAB 1000IU (100/BT) 1000 UNITS: 25 TAB at 08:37

## 2018-04-02 RX ADMIN — QUETIAPINE FUMARATE 200 MG: 200 TABLET, FILM COATED ORAL at 21:59

## 2018-04-02 RX ADMIN — TIOTROPIUM BROMIDE 18 MCG: 18 CAPSULE ORAL; RESPIRATORY (INHALATION) at 08:35

## 2018-04-02 RX ADMIN — HYDROMORPHONE HYDROCHLORIDE 1 MG: 2 TABLET ORAL at 17:44

## 2018-04-02 RX ADMIN — POTASSIUM CHLORIDE 40 MEQ: 1500 TABLET, EXTENDED RELEASE ORAL at 17:41

## 2018-04-02 RX ADMIN — POTASSIUM CHLORIDE 20 MEQ: 1500 TABLET, EXTENDED RELEASE ORAL at 08:39

## 2018-04-02 RX ADMIN — ASPIRIN 81 MG 81 MG: 81 TABLET ORAL at 08:37

## 2018-04-02 NOTE — PLAN OF CARE
Problem: OCCUPATIONAL THERAPY ADULT  Goal: Performs self-care activities at highest level of function for planned discharge setting  See evaluation for individualized goals  Treatment Interventions: ADL retraining, Functional transfer training, UE strengthening/ROM, Endurance training, Cognitive reorientation, Patient/family training, Equipment evaluation/education, Compensatory technique education, Energy conservation, Activityengagement          See flowsheet documentation for full assessment, interventions and recommendations  Limitation: Decreased ADL status, Decreased UE strength, Decreased Safe judgement during ADL, Decreased cognition, Decreased endurance, Decreased self-care trans, Decreased high-level ADLs  Prognosis: Good, Fair  Assessment: Pt is a 74 y o  female seen for OT evaluation s/p admit to [facility] [address] on 3/30/2018 w/ Hip pain and back pain  Comorbidities affecting pt's functional performance at time of assessment include: arthritis, HTN, COPD, HIV, DM, bipolar disorder, COPD  Personal factors affecting pt at time of IE include:impaired cognition, alone at times during day while daughter works  Prior to admission, pt was living w/ daughter and reports mainly stays on 2nd floor  Pt reports independent w/ UB ADLs, assist at times for LB ADLs, assist showers, independent functional transfers and mobility w/ RW at times  Upon evaluation: Pt requires MIN assist sit>stand w/ VCs for UE positioning, MIN assist functional transfers w/ RW, MIN assist functional mobility w/ RW, MOD assist LB ADLs, MAX assist toileting 2* the following deficits impacting occupational performance: decreased strength and endurance, shakiness and tremors, impaired balance, impaired activity tolerance, impaired functional reach, increased back pain   Pt to benefit from continued skilled OT tx while in the hospital to address deficits as defined above and maximize level of functional independence w ADL's and functional mobility  Occupational Performance areas to address include: grooming, bathing/shower, toilet hygiene, dressing, functional mobility and clothing management, formal cognitive assessment  From OT standpoint, recommendation at time of d/c would be short term rehab       OT Discharge Recommendation: Short Term Rehab  OT - OK to Discharge:  (to rehab when medically stable)      Comments: Harry Herrera MS, OTR/L

## 2018-04-02 NOTE — CASE MANAGEMENT
Continued Stay Review    Date: 3/31/18    Vital Signs:     Temp (24hrs), Av 5 °F (36 4 °C), Min:97 °F (36 1 °C), Max:98 2 °F (36 8 °C)   HR:  [63-88] 63  Resp:  [18-22] 18  BP: (108-146)/(65-82) 108/65  SpO2:  [93 %-99 %] 98 %      Current Facility-Administered Medications:  acetaminophen 650 mg Oral Q6H PRN   aluminum-magnesium hydroxide-simethicone 30 mL Oral Q6H PRN   aspirin 81 mg Oral Daily   atorvastatin 20 mg Oral Daily With Dinner   buPROPion 300 mg Oral Daily   cholecalciferol 1,000 Units Oral Daily   diltiazem 180 mg Oral Daily   enoxaparin 40 mg Subcutaneous Daily   fluticasone-salmeterol 1 puff Inhalation Q12H CARLOS   gabapentin 800 mg Oral TID   insulin lispro 1-6 Units Subcutaneous TID AC   levothyroxine 100 mcg Oral Early Morning   lidocaine 1 patch Transdermal Daily   metFORMIN 500 mg Oral Daily With Breakfast   methocarbamol 750 mg Oral Q6H PRN   mirtazapine 7 5 mg Oral HS   montelukast 10 mg Oral HS   nicotine 1 patch Transdermal Daily   ondansetron 4 mg Intravenous Q6H PRN   oxyCODONE-acetaminophen 1 tablet Oral Q4H PRN   potassium chloride 20 mEq Oral BID   potassium chloride 40 mEq Oral BID   QUEtiapine 200 mg Oral HS   tiotropium 18 mcg Inhalation Daily   topiramate 50 mg Oral Q12H [address]        Abnormal Labs/Diagnostic Results:       MRI pending       Results from last 7 days  Lab Units 18  0609 18  2124   WBC Thousand/uL 7 10 6 26   HEMOGLOBIN g/dL 10 7* 11 0*   HEMATOCRIT % 34 4* 34 7*   PLATELETS Thousands/uL 271 301   NEUTROS PCT %  --  52   LYMPHS PCT %  --  33   MONOS PCT %  --  10   EOS PCT           Results from last 7 days  Lab Units 18  2124   SODIUM mmol/L 143   POTASSIUM mmol/L 3 1*   CHLORIDE mmol/L 106   CO2 mmol/L 28   BUN mg/dL 13   CREATININE mg/dL 0 93   CALCIUM mg/dL 10 7*   GLUCOSE RANDOM mg/dL 111        Age/Sex: 74 y o  female     Assessment/Plan:     Back pain   Assessment & Plan     Currently in MRI   PT/OOB           * Hip pain   Assessment & Plan     2/2 OA  Appreciate ortho eval   Conservative management, PT/OOB            Bipolar 1 disorder (formerly [facility])   Assessment & Plan     C/w home meds           COPD (chronic obstructive pulmonary disease) (formerly [facility])   Assessment & Plan     C/w home meds           Diabetes mellitus ([facility] )   Assessment & Plan     Controlled, accuchecks, c/w metformin, ISS           Hypertension   Assessment & Plan     C/w home meds           HIV (human immunodeficiency virus infection) ([facility] )   Assessment & Plan     Not on meds                Discharge Plan: TBD

## 2018-04-02 NOTE — ASSESSMENT & PLAN NOTE
Patient states the city for count has been good  No CD4 count in our system  Patient difficulty IV access  Patient not on any anti retrovirals

## 2018-04-02 NOTE — OCCUPATIONAL THERAPY NOTE
[address] Evaluation     Patient Name: Emory Grey  FKMXT'E Date: 4/2/2018  Problem List  Patient Active Problem List   Diagnosis    Arthritis    HIV (human immunodeficiency virus infection) ([place] [address] )    Osteoarthritis    Acute exacerbation of chronic obstructive pulmonary disease (COPD) ([place] [address] )    Hypertension    Diabetes mellitus ([place] [address] )    COPD (chronic obstructive pulmonary disease) ([place] [address] )    Bipolar 1 disorder ([place] [address] )    Tobacco use    Shortness of breath    Back pain    Hip pain     Past Medical History  Past Medical History:   Diagnosis Date    Bipolar 1 disorder ([place] [address] )     Cardiac disease     COPD (chronic obstructive pulmonary disease) ([place] [address] )     Diabetes mellitus ([place] [address] )     Disease of thyroid gland     HIV (human immunodeficiency virus infection) ([place] [address] ) 3/24/2016    Hypertension     Osteoarthritis     Tobacco abuse      Past Surgical History  Past Surgical History:   Procedure Laterality Date    HERNIA REPAIR      THYROIDECTOMY             04/02/18 1507   Note Type   Note type Eval/Treat   Restrictions/Precautions   Other Precautions Cognitive; Fall Risk;Pain;O2   Pain Assessment   Pain Assessment 0-10   Pain Score 5   Pain Type Acute pain;Chronic pain   Pain Location Back   Pain Orientation Left   Hospital Pain Intervention(s) Ambulation/increased activity;Repositioned   Response to Interventions tolerated   Home Living   Type of [facility] Two level;Stairs to enter with rails;Bed/bath upstairs  (2+1 SARAVANAN; 15 steps to bed/bath)   Bathroom Shower/Tub Tub/shower unit   Bathroom Toilet Standard   Bathroom Equipment Grab bars in shower; Shower chair;Commode   [address] Walker   Additional Comments reports us eof walker occaisonally; pt reports has home aides from 8-2pm to assist w/ showers, cooking and cleaning   Prior Function   Level of [place] Independent with ADLs and functional mobility   Lives With Daughter   Robe Watson From Family   ADL Assistance Independent  (aides assist w/ showers; socks and shoes at times)   IADLs Needs assistance   Falls in the last 6 months 5 to 10   Vocational Retired   Comments pt reports use of walker occaisonally; reports daughter works during day and she does have a cellphone on her at all times; pt reports legs give out on her at times; pt reports mainly [address] aides assist at times; mainly stays on 2nd floor of home   Lifestyle   Autonomy per pt independent w/ ADLs, independent w/ functional transfers and mobility w/ no AD, daughter transports her to appointments and leaves her food and drinks in her room when she leaves for work   Reciprocal Relationships daughter and home aides   Service to Others retired [employer]   Intrinsic Gratification playing cards w/ granddaughter   ADL   Where Assessed Chair   Eating Assistance 5  Supervision/Setup   Grooming Assistance 5  Supervision/Setup   UB [address] 4  Minimal Assistance   LB [address] 3  Moderate Assistance   [address] 4  Minimal Assistance   [address] 3  Moderate Assistance   [address]  2  Maximal Assistance   Bed Mobility   Additional Comments pt OOB in [facility] upon start of session   Transfers   Sit to Stand 4  Minimal assistance   Additional items Assist x 1; Increased time required;Verbal cues;Armrests   Stand to Sit 4  Minimal assistance   Additional items Assist x 1; Increased time required;Verbal cues   Additional Comments VCs positioning   Functional Mobility   Functional Mobility 3  Moderate assistance   Additional Comments assist x 1   Additional items Rolling walker   Balance   Static Sitting Good   Dynamic Sitting Fair +   Static Standing Poor +   Dynamic Standing Poor   Ambulatory Poor   Activity Tolerance   Activity Tolerance Patient limited by fatigue;Treatment limited secondary to medical complications (Comment)   Nurse Made Aware appropriate to see per Ze SEGAL Assessment   RUE Assessment WFL  (4-/5)   LUE Assessment   LUE Assessment WFL  (4-/5)   Hand Function   Gross Motor Coordination Functional   Fine Motor Coordination Functional   Sensation   Light Touch No apparent deficits   Sharp/Dull No apparent deficits   Proprioception   Proprioception No apparent deficits   Vision-Basic Assessment   Current Vision Wears glasses all the time   Vision - Complex Assessment   Ocular Range of Motion [facility]   Acuity Able to read clock/calendar on wall without difficulty   Perception   Inattention/Neglect Appears intact   Cognition   Overall Cognitive Status Impaired   Arousal/Participation Alert; Cooperative   Attention Attends with cues to redirect   Orientation Level Oriented to person;Oriented to place  (reported March and 2012)   Memory Decreased short term memory;Decreased recall of precautions   Following Commands Follows one step commands with increased time or repetition   Comments pt w/ impaired insight, impaired safety awareness, decreased insight   Assessment   Limitation Decreased ADL status; Decreased UE strength;Decreased Safe judgement during ADL;Decreased cognition;Decreased endurance;Decreased self-care trans;Decreased high-level ADLs   Prognosis Good;Fair   Assessment Pt is a 74 y o  female seen for OT evaluation s/p admit to [facility] [address]  on 3/30/2018 w/ Hip pain and back pain  Comorbidities affecting pt's functional performance at time of assessment include: arthritis, HTN, COPD, HIV, DM, bipolar disorder, COPD  Personal factors affecting pt at time of IE include:impaired cognition, alone at times during day while daughter works  Prior to admission, pt was living w/ daughter and reports mainly stays on 2nd floor  Pt reports independent w/ UB ADLs, assist at times for LB ADLs, assist showers, independent functional transfers and mobility w/ RW at times   Upon evaluation: Pt requires MIN assist sit>stand w/ VCs for UE positioning, MIN assist functional transfers w/ RW, MIN assist functional mobility w/ RW, MOD assist LB ADLs, MAX assist toileting 2* the following deficits impacting occupational performance: decreased strength and endurance, shakiness and tremors, impaired balance, impaired activity tolerance, impaired functional reach, increased back pain  Pt SPO2 on 2L after standing dropped to 90% from 96% at rest Pt to benefit from continued skilled OT tx while in the hospital to address deficits as defined above and maximize level of functional independence w ADL's and functional mobility  Occupational Performance areas to address include: grooming, bathing/shower, toilet hygiene, dressing, functional mobility and clothing management, formal cognitive assessment  From OT standpoint, recommendation at time of d/c would be short term rehab  Goals   Patient Goals get better   LTG Time Frame 10-14   Long Term Goal please see below goals   Plan   Treatment Interventions ADL retraining;Functional transfer training;UE strengthening/ROM; Endurance training;Cognitive reorientation;Patient/family training;Equipment evaluation/education; Compensatory technique education; Energy conservation; Activityengagement   Goal Expiration Date 04/16/18   OT Frequency 3-5x/wk   Recommendation   OT Discharge Recommendation Short Term Rehab   OT - OK to Discharge (to rehab when medically stable)   Barthel Index   Feeding 10   Bathing 0   Grooming Score 5   Dressing Score 5   Bladder Score 10   Bowels Score 10   Toilet Use Score 5   Transfers (Bed/Chair) Score 10   Mobility (Level Surface) Score 0   Stairs Score 0   Barthel Index Score 55   Modified [place] Scale   Modified [place] Scale 4     Occupational Therapy Goals to be met in 10-14 days:  1) Pt will improve activity tolerance to G for min 30 min txment sessions  2) Pt will complete ADLs/self care w/ supervision  3) Pt will complete toileting w/ supervision w/ G hygiene/thoroughness using DME PRN  4) Pt will improve functional transfers on/off all surfaces using DME PRN w/ G balance/safety including toileting w/ supervision  5) Pt will improve fx'l mobility during I/ADl/leisure tasks using DME PRN w/ g balance/safety w/ supervision  6) Pt will engage in ongoing cognitive assessment w/ G participation to A w/ safe d/c planning/recommendations  7) Pt will demonstrate G carryover of pt/caregiver education and training as appropriate w/ mod I  w/ G tolerance  8) Pt will engage in depression screen/leisure interest checklist w/ G participation to monitor s/s depression and ID 3 positive coping strategies to A w/ emotional regulation and management  9) Pt will demonstrate 100% carryover of E C  techniques w/ mod I t/o fx'l I/ADL/leisure tasks w/o cues s/p skilled education  10) Pt will engage in activity configuration activity w/ G participation and mod I to increase time management skills and improve participation in a structured routine to improve overall quality of life  11) Pt will demonstrate 100% carryover of LHAE for LB ADLs/self care and leisure s/p skilled education w/ mod I and G participation  12) Pt will demonstrate improved standing tolerance to 3-5 minutes during functional tasks w/ no LOB to enhance ADL performance    Documentation completed by: Audelia Calvillo MS, OTR/L

## 2018-04-02 NOTE — PROGRESS NOTES
Progress Note - Oswaldo Velez 1950, 74 y o  female MRN: [id number]    Unit/Bed#: E5 -01 Encounter: [id number]    Primary Care Provider: Nini Almanzar DO   Date and time admitted to hospital: 3/30/2018  6:17 PM        * Hip pain and back pain   Assessment & Plan    MRI thoracic medicine today  MRI LUMBAR spine from 04/01: Potentially significant right foraminal stenosis L4-L5, correlate for right L4 radiculitis  Severe low lumbar osteoarthritis  Cord compression at the T9-T10 level related to disc disease  ord edema/gliosis is present  Pain control needs to be better --patient has no IV access  Patient currently on oral Decadron  Add OxyContin, and Dilaudid as needed orally  Will discontinue Percocet  Orthopedic surgery on board           COPD (chronic obstructive pulmonary disease) ([facility] )   Assessment & Plan    Patient is on chronic home oxygen at 3 liters/minute  COPD at baseline not in acute exacerbation  Continue with Singulair, Spiriva, Advair and Decadron        Diabetes mellitus ([facility] )   Assessment & Plan    Continue on metformin, sliding scale insulin  Since patient is on steroids, blood sugars maybe erratic  Hemoglobin A1c at 6 7  Hypertension   Assessment & Plan    Blood pressure is reasonable  Continue Cardizem  Bipolar 1 disorder ([facility] )   Assessment & Plan    Stable active issues  Continue Seroquel  HIV (human immunodeficiency virus infection) ([facility] )   Assessment & Plan    Patient states the city for count has been good  No CD4 count in our system  Patient difficulty IV access  Patient not on any anti retrovirals  Tobacco use   Assessment & Plan    Patient continues smoke 1-2 cigarettes per day  Consult smoking cessation, patient currently nicotine patch                 ______________________________________________________________________    SUBJECTIVE:   Patient seen and examined    She appears comfortable with states have back pain continues to bother her  Patient has no IV access hence receiving oral pain medications  Continue getting necks and numbness of bilateral lower extremities  She denies bowel or bladder incontinence, perianal anesthesia  Awaiting MRI thoracic spine today  OBJECTIVE:     Vitals:   HR:  [67-87] 75  Resp:  [18] 18  BP: (144-153)/(73-92) 153/73  SpO2:  [95 %-99 %] 95 %  Temp (24hrs), Av 3 °F (36 8 °C), Min:97 8 °F (36 6 °C), Max:98 9 °F (37 2 °C)  Current: Temperature: 98 6 °F (37 °C)  No intake or output data in the 24 hours ending 18 1550    Physical Exam:   GENERAL: AAO x 3, obese  HEENT: atraumatic, normocephalic  Oral mucosa moist, no icterus, pallor  PERRLA +  Neck supple, no JVD, no lymphadenopathy, no thryomegaly  CHEST: B/L breath sounds heard, occasional wheezing  CVS: S1, S2  No cyanosis/clubbing or edema  ABDOMEN: Soft/obese/NT/BS heard  NEUROLOGICAL: CN II -XI grossly intact  No focal motor or sensory deficits  No signs of meningeal irritation or cerebellar dysfunction  EXTREMITIES: No cyanosis/clubbing or edema  MUSCULOSKELETAL: Mild lumbar paraspinal tenderness, mild pain with RLE ROM     LABS:  REVIEWED FROM     Scheduled Meds:    Current Facility-Administered Medications:  acetaminophen 650 mg Oral Q6H PRN [facility], PA-C   aluminum-magnesium hydroxide-simethicone 30 mL Oral Q6H PRN [facility], [facility]-C   aspirin 81 mg Oral Daily [facility], PA-C   atorvastatin 20 mg Oral Daily With [employer], PA-C   buPROPion 300 mg Oral Daily [place] [facility], PA-C   cholecalciferol 1,000 Units Oral Daily [facility], PA-C   dexamethasone 4 mg Oral Q6H [facility] Samir Root MD   diltiazem 180 mg Oral Daily [place] [facility], PA-C   enoxaparin 40 mg Subcutaneous Daily [place] Child, PA-C   fluticasone-salmeterol 1 puff Inhalation Q12H [facility], PA-C   gabapentin 800 mg Oral TID [facility], PA-C   HYDROmorphone 1 mg Oral Q4H PRN Jackson Doll MD insulin lispro 1-6 Units Subcutaneous TID AC Chevis Moras, PA-C   levothyroxine 100 mcg Oral Early Morning Chevis Moras, PA-C   lidocaine 1 patch Transdermal Daily Chevis Moras, PA-C   metFORMIN 500 mg Oral Daily With Breakfast Chevis Moras, PA-C   methocarbamol 750 mg Oral Q6H PRN Chevis Moras, PA-C   mirtazapine 7 5 mg Oral HS Chevis Moras, PA-C   montelukast 10 mg Oral HS Chevis Moras, PA-C   morphine injection 2 mg Intravenous Once Jarret Manning MD   nicotine 1 patch Transdermal Daily Chevis Moras, PA-C   ondansetron 4 mg Intravenous Q6H PRN Chevis Moras, PA-C   oxyCODONE 10 mg Oral Q12H [facility] Vitaly Gamble MD   potassium chloride 20 mEq Oral BID Chevis Moras, PA-C   potassium chloride 40 mEq Oral BID Chevis Moras, PA-C   QUEtiapine 200 mg Oral HS Chevis Moras, PA-C   tiotropium 18 mcg Inhalation Daily Chevis Moras, PA-C   topiramate 50 mg Oral Q12H [facility] Chevis Moras, PA-C       PRN Meds:    acetaminophen    aluminum-magnesium hydroxide-simethicone    HYDROmorphone    methocarbamol    ondansetron      VTE Prophylaxis:  Enoxaparin subcutaneously  Patient Centered Rounds: I have discussed today's plans with nursing staff today  DISPOSITION: Pending further workup  Anticipate hospitalization for next 2-3 days  Time Spent for Care: 20 minutes   More than 50% of total time spent on counseling and coordination of care as described above  Family will be updated  Joie Gil MD  HOSPITALIST SERVICES  4/2/2018    PLEASE NOTE:  This encounter was completed utilizing the [employer] Direct Speech Voice Recognition Software  Grammatical errors, random word insertions, pronoun errors and incomplete sentences are occasional consequences of the system due to software limitations, ambient noise and hardware issues  These may be missed by proof reading prior to affixing electronic signature   Any questions or concerns about the content, text or information contained within the body of this dictation should be directly addressed to the physician for clarification  Please do not hesitate to call me directly if you have any any questions or concerns

## 2018-04-02 NOTE — PHYSICAL THERAPY NOTE
Physical Therapy Progress Note     04/02/18 1010   Pain Assessment   Pain Assessment 0-10   Pain Score 4   Pain Type Acute pain;Chronic pain;Neuropathic pain   Pain Location Back;Leg   Pain Orientation Lower;Right   Hospital Pain Intervention(s) MD notified (Comment); Ambulation/increased activity; Emotional support   Response to Interventions tolerated well  Restrictions/Precautions   Other Precautions Fall Risk;Pain   General   Chart Reviewed Yes   Family/Caregiver Present No   Cognition   Overall Cognitive Status WFL   Orientation Level Oriented X4   Subjective   Subjective pain a little better today but increases when standing   Transfers   Sit to Stand 4  Minimal assistance   Additional items Assist x 1; Increased time required; Impulsive;Verbal cues   Stand to Sit 4  Minimal assistance   Additional items Assist x 1; Increased time required; Impulsive;Verbal cues   Ambulation/Elevation   Gait pattern Ataxia; Short stride; Inconsistent zara; Shuffling;Decreased foot clearance   Gait Assistance 3  Moderate assist   Additional items Assist x 1;Verbal cues; Tactile cues   Assistive Device Rolling walker   Distance 4' forward and back   Balance   Static Sitting Normal   Dynamic Sitting Normal   Static Standing Poor   Dynamic Standing Poor   Ambulatory Poor   Endurance Deficit   Endurance Deficit Yes   Endurance Deficit Description marked kim after standing 30 seconds  spO2 dropped from 97% to 88% on first attempt standing  able to maintain spO2 at 97% on second try  room air   Activity Tolerance   Activity Tolerance Patient limited by fatigue;Patient limited by pain;Treatment limited secondary to medical complications (Comment)   Exercises   TKR Sitting;20 reps;AROM; Bilateral  (paced breathing)   Assessment   Prognosis Fair   Problem List Decreased strength;Decreased endurance; Impaired balance;Decreased mobility; Decreased safety awareness; Impaired sensation;Obesity;Pain   Assessment pt had less pain today   slr negative  pt able to perofrm 20 reps ther ex in sitting  pt stood for 30-45 seconds x3 using rw  pt able to take a few steps forward and back   noted to be breathless with minimal activity  pt instructed in paced breathing  pt tends to hold breath  monitored spO2 on 2 occasions while standing  pt started at spO2 97% on RA at rest  first time dropped to 88% standing and second time did not  pt appeared equally breathless both times  pt needed repeated instruction in safety  continues to need rehab  Barriers to Discharge Decreased caregiver support   Goals   Patient Goals not fall   STG Expiration Date 04/08/18   Treatment Day 1   Plan   Treatment/Interventions Functional transfer training;LE strengthening/ROM; Therapeutic exercise;Elevations; Endurance training;Patient/family training;Equipment eval/education; Bed mobility;Gait training;Spoke to nursing   PT Frequency 5x/wk   Recommendation   Recommendation Post acute IP rehab   PT - OK to Discharge Yes  (rehab)   Additional Comments rehab     Maren Vega, PT

## 2018-04-02 NOTE — ASSESSMENT & PLAN NOTE
Continue on metformin, sliding scale insulin  Since patient is on steroids, blood sugars maybe erratic  Hemoglobin A1c at 6 7

## 2018-04-02 NOTE — ASSESSMENT & PLAN NOTE
Patient continues smoke 1-2 cigarettes per day  Consult smoking cessation, patient currently nicotine patch

## 2018-04-02 NOTE — PLAN OF CARE
Problem: PHYSICAL THERAPY ADULT  Goal: Performs mobility at highest level of function for planned discharge setting  See evaluation for individualized goals  Treatment/Interventions: Functional transfer training, LE strengthening/ROM, Therapeutic exercise, Endurance training, Patient/family training, Equipment eval/education, Bed mobility, Gait training, Compensatory technique education          See flowsheet documentation for full assessment, interventions and recommendations  Outcome: Progressing  Prognosis: Fair  Problem List: Decreased strength, Decreased endurance, Impaired balance, Decreased mobility, Decreased safety awareness, Impaired sensation, Obesity, Pain  Assessment: pt had less pain today  slr negative  pt able to perofrm 20 reps ther ex in sitting  pt stood for 30-45 seconds x3 using rw  pt able to take a few steps forward and back   noted to be breathless with minimal activity  pt instructed in paced breathing  pt tends to hold breath  monitored spO2 on 2 occasions while standing  pt started at spO2 97% on RA at rest  first time dropped to 88% standing and second time did not  pt appeared equally breathless both times  pt needed repeated instruction in safety  continues to need rehab  Barriers to Discharge: Decreased caregiver support     Recommendation: Post acute IP rehab     PT - OK to Discharge: Yes (rehab)    See flowsheet documentation for full assessment

## 2018-04-02 NOTE — ASSESSMENT & PLAN NOTE
MRI thoracic medicine today  MRI LUMBAR spine from 04/01: Potentially significant right foraminal stenosis L4-L5, correlate for right L4 radiculitis  Severe low lumbar osteoarthritis  Cord compression at the T9-T10 level related to disc disease  ord edema/gliosis is present  Pain control needs to be better --patient has no IV access  Patient currently on oral Decadron  Add OxyContin, and Dilaudid as needed orally  Will discontinue Percocet    Orthopedic surgery on board

## 2018-04-02 NOTE — ASSESSMENT & PLAN NOTE
Patient is on chronic home oxygen at 3 liters/minute  COPD at baseline not in acute exacerbation    Continue with Singulair, Spiriva, Advair and Decadron

## 2018-04-03 ENCOUNTER — APPOINTMENT (INPATIENT)
Dept: MRI IMAGING | Facility: HOSPITAL | Age: 68
DRG: 459 | End: 2018-04-03
Payer: MEDICARE

## 2018-04-03 LAB
ANION GAP SERPL CALCULATED.3IONS-SCNC: 13 MMOL/L (ref 4–13)
BUN SERPL-MCNC: 29 MG/DL (ref 5–25)
CALCIUM SERPL-MCNC: 10.4 MG/DL (ref 8.3–10.1)
CHLORIDE SERPL-SCNC: 98 MMOL/L (ref 100–108)
CO2 SERPL-SCNC: 23 MMOL/L (ref 21–32)
CREAT SERPL-MCNC: 1.24 MG/DL (ref 0.6–1.3)
GFR SERPL CREATININE-BSD FRML MDRD: 53 ML/MIN/1.73SQ M
GLUCOSE SERPL-MCNC: 212 MG/DL (ref 65–140)
GLUCOSE SERPL-MCNC: 221 MG/DL (ref 65–140)
GLUCOSE SERPL-MCNC: 221 MG/DL (ref 65–140)
GLUCOSE SERPL-MCNC: 246 MG/DL (ref 65–140)
GLUCOSE SERPL-MCNC: 343 MG/DL (ref 65–140)
POTASSIUM SERPL-SCNC: 5 MMOL/L (ref 3.5–5.3)
SODIUM SERPL-SCNC: 134 MMOL/L (ref 136–145)

## 2018-04-03 PROCEDURE — 72146 MRI CHEST SPINE W/O DYE: CPT

## 2018-04-03 PROCEDURE — 99232 SBSQ HOSP IP/OBS MODERATE 35: CPT | Performed by: INTERNAL MEDICINE

## 2018-04-03 PROCEDURE — 94760 N-INVAS EAR/PLS OXIMETRY 1: CPT

## 2018-04-03 PROCEDURE — 94660 CPAP INITIATION&MGMT: CPT

## 2018-04-03 PROCEDURE — 82948 REAGENT STRIP/BLOOD GLUCOSE: CPT

## 2018-04-03 PROCEDURE — 80048 BASIC METABOLIC PNL TOTAL CA: CPT | Performed by: INTERNAL MEDICINE

## 2018-04-03 RX ADMIN — ASPIRIN 81 MG 81 MG: 81 TABLET ORAL at 08:41

## 2018-04-03 RX ADMIN — DEXAMETHASONE 4 MG: 4 TABLET ORAL at 17:50

## 2018-04-03 RX ADMIN — BUPROPION HYDROCHLORIDE 300 MG: 150 TABLET, FILM COATED, EXTENDED RELEASE ORAL at 08:40

## 2018-04-03 RX ADMIN — TOPIRAMATE 50 MG: 25 TABLET, FILM COATED ORAL at 08:41

## 2018-04-03 RX ADMIN — INSULIN LISPRO 2 UNITS: 100 INJECTION, SOLUTION INTRAVENOUS; SUBCUTANEOUS at 12:11

## 2018-04-03 RX ADMIN — MONTELUKAST SODIUM 10 MG: 10 TABLET, COATED ORAL at 22:27

## 2018-04-03 RX ADMIN — HYDROMORPHONE HYDROCHLORIDE 1 MG: 2 TABLET ORAL at 13:29

## 2018-04-03 RX ADMIN — METFORMIN HYDROCHLORIDE 500 MG: 500 TABLET, FILM COATED ORAL at 08:41

## 2018-04-03 RX ADMIN — FLUTICASONE PROPIONATE AND SALMETEROL 1 PUFF: 50; 250 POWDER RESPIRATORY (INHALATION) at 08:39

## 2018-04-03 RX ADMIN — TIOTROPIUM BROMIDE 18 MCG: 18 CAPSULE ORAL; RESPIRATORY (INHALATION) at 08:39

## 2018-04-03 RX ADMIN — GABAPENTIN 800 MG: 400 CAPSULE ORAL at 17:52

## 2018-04-03 RX ADMIN — TOPIRAMATE 50 MG: 25 TABLET, FILM COATED ORAL at 22:28

## 2018-04-03 RX ADMIN — DILTIAZEM HYDROCHLORIDE 180 MG: 180 CAPSULE, COATED, EXTENDED RELEASE ORAL at 08:41

## 2018-04-03 RX ADMIN — DEXAMETHASONE 4 MG: 4 TABLET ORAL at 05:11

## 2018-04-03 RX ADMIN — GABAPENTIN 800 MG: 400 CAPSULE ORAL at 08:40

## 2018-04-03 RX ADMIN — DEXAMETHASONE 4 MG: 4 TABLET ORAL at 12:11

## 2018-04-03 RX ADMIN — OXYCODONE HYDROCHLORIDE 10 MG: 10 TABLET, FILM COATED, EXTENDED RELEASE ORAL at 08:41

## 2018-04-03 RX ADMIN — QUETIAPINE FUMARATE 200 MG: 200 TABLET, FILM COATED ORAL at 23:42

## 2018-04-03 RX ADMIN — INSULIN LISPRO 2 UNITS: 100 INJECTION, SOLUTION INTRAVENOUS; SUBCUTANEOUS at 08:51

## 2018-04-03 RX ADMIN — INSULIN LISPRO 3 UNITS: 100 INJECTION, SOLUTION INTRAVENOUS; SUBCUTANEOUS at 17:49

## 2018-04-03 RX ADMIN — GABAPENTIN 800 MG: 400 CAPSULE ORAL at 22:27

## 2018-04-03 RX ADMIN — DEXAMETHASONE 4 MG: 4 TABLET ORAL at 23:43

## 2018-04-03 RX ADMIN — FLUTICASONE PROPIONATE AND SALMETEROL 1 PUFF: 50; 250 POWDER RESPIRATORY (INHALATION) at 22:28

## 2018-04-03 RX ADMIN — MIRTAZAPINE 7.5 MG: 15 TABLET, FILM COATED ORAL at 23:42

## 2018-04-03 RX ADMIN — NICOTINE 1 PATCH: 14 PATCH TRANSDERMAL at 08:42

## 2018-04-03 RX ADMIN — VITAMIN D, TAB 1000IU (100/BT) 1000 UNITS: 25 TAB at 08:41

## 2018-04-03 RX ADMIN — ATORVASTATIN CALCIUM 20 MG: 20 TABLET, FILM COATED ORAL at 17:50

## 2018-04-03 RX ADMIN — OXYCODONE HYDROCHLORIDE 10 MG: 10 TABLET, FILM COATED, EXTENDED RELEASE ORAL at 22:27

## 2018-04-03 RX ADMIN — ENOXAPARIN SODIUM 40 MG: 40 INJECTION SUBCUTANEOUS at 08:46

## 2018-04-03 RX ADMIN — POTASSIUM CHLORIDE 20 MEQ: 1500 TABLET, EXTENDED RELEASE ORAL at 08:43

## 2018-04-03 RX ADMIN — LEVOTHYROXINE SODIUM 100 MCG: 100 TABLET ORAL at 05:11

## 2018-04-03 RX ADMIN — LIDOCAINE 1 PATCH: 50 PATCH CUTANEOUS at 08:42

## 2018-04-03 NOTE — ASSESSMENT & PLAN NOTE
· MRI thoracic medicine awaited since 04/02/2018  · MRI LUMBAR spine from 04/01: Potentially significant right foraminal stenosis L4-L5, correlate for right L4 radiculitis  Severe low lumbar osteoarthritis  Cord compression at the T9-T10 level related to disc disease  ord edema/gliosis is present  · Patient currently on oral Decadron  · Continue with OxyContin, and Dilaudid as needed orally -- adequate response    · Orthopedic surgery on board

## 2018-04-03 NOTE — CONSULTS
Per pt, she has someone who comes into her home, daily, to assist w/ preparing meals  She usually eats two meals per day  Her daughter will prepare dinner  Pt sometimes skips this meal, or eats later in the evening  She mainly eats meats, mashed potatoes, some vegetables  She drinks juice  She stated she does not have a glucometer at home to test her blood sugars  If this is needed, please coordinate with case management prior to discharge  Began to review carb containing foods with patient and [employer] cooking and shopping  Will continue to review concepts tomorrow and during hospital stay  Pt also provided handouts

## 2018-04-03 NOTE — PROGRESS NOTES
Progress Note - Wenceslao Ribera 1950, 74 y o  female MRN: [id number]    Unit/Bed#: E5 -01 Encounter: [id number]    Primary Care Provider: Thaddeus Alberts DO   Date and time admitted to hospital: 3/30/2018  6:17 PM        * Hip pain   Assessment & Plan    · MRI thoracic medicine awaited since 04/02/2018  · MRI LUMBAR spine from 04/01: Potentially significant right foraminal stenosis L4-L5, correlate for right L4 radiculitis  Severe low lumbar osteoarthritis  Cord compression at the T9-T10 level related to disc disease  ord edema/gliosis is present  · Patient currently on oral Decadron  · Continue with OxyContin, and Dilaudid as needed orally -- adequate response  · Orthopedic surgery on board           Back pain   Assessment & Plan    · Refer above please  COPD (chronic obstructive pulmonary disease) ([address] )   Assessment & Plan    · Patient is on chronic home oxygen at 3 liters/minute  · COPD at baseline not in acute exacerbation  · Continue with Singulair, Spiriva, Advair and Decadron        Diabetes mellitus ([address] )   Assessment & Plan    · Continue on metformin, sliding scale insulin  · Since patient is on steroids, blood sugars maybe erratic  · Hemoglobin A1c at 6 7  Hypertension   Assessment & Plan    · Blood pressure is reasonable  · Continue Cardizem  Bipolar 1 disorder ([address] )   Assessment & Plan    · Stable active issues  · Continue Seroquel  HIV (human immunodeficiency virus infection) ([address] )   Assessment & Plan    · Patient states lety Cd4 count has been good  · No CD4 count in our system  · Patient difficulty IV access  · Patient not on any anti retrovirals  · Patient diagnosed in 12  Tobacco use   Assessment & Plan    · Patient continues smoke 1-2 cigarettes per day  · Consult smoking cessation, patient currently nicotine patch  Morbid obesity ([address] )   Assessment & Plan    · Lifestyle modifications          SUBJECTIVE:   Patient seen and examined  She appears comfortable, back pain much better controlled with current pain regimen  Patient still awaiting MRI thoracic spine  OBJECTIVE:      Vitals:   Vitals:    04/03/18 0743   BP: 107/54   Pulse: 83   Resp: 18   Temp: 97 9 °F (36 6 °C)   SpO2: 93%     No intake or output data in the 24 hours ending 04/03/18 1129       Physical Exam:   GENERAL: AAO x 3, obese  HEENT: atraumatic, normocephalic  Oral mucosa moist, no icterus, pallor  PERRLA +  Neck supple, no JVD, no lymphadenopathy, no thryomegaly  CHEST: B/L breath sounds heard, occasional wheezing  CVS: S1, S2  No cyanosis/clubbing or edema  ABDOMEN: Soft/obese/NT/BS heard  NEUROLOGICAL: CN II -XI grossly intact  No focal motor or sensory deficits  No signs of meningeal irritation or cerebellar dysfunction  EXTREMITIES: No cyanosis/clubbing or edema  MUSCULOSKELETAL: Mild lumbar paraspinal tenderness, mild pain with RLE ROM      LABS:  REVIEWED FROM 03/31    Scheduled Meds:     Current Facility-Administered Medications:  acetaminophen 650 mg Oral Q6H PRN Cahto Freshwater, PA-C   aluminum-magnesium hydroxide-simethicone 30 mL Oral Q6H PRN Cahto Freshwater, PA-C   aspirin 81 mg Oral Daily Cahto Freshwater, PA-C   atorvastatin 20 mg Oral Daily With [employer], PA-C   buPROPion 300 mg Oral Daily Cahto Freshwater, PA-C   cholecalciferol 1,000 Units Oral Daily Cahto Freshwater, PA-C   dexamethasone 4 mg Oral Q6H [facility] Lisa Grant MD   diltiazem 180 mg Oral Daily Cahto Freshwater, PA-C   enoxaparin 40 mg Subcutaneous Daily Cahto Freshwater, PA-C   fluticasone-salmeterol 1 puff Inhalation Q12H [facility] Cahto Freshwater, PA-C   gabapentin 800 mg Oral TID Cahto Freshwater, PA-C   HYDROmorphone 1 mg Oral Q4H PRN Victorino Humphreys MD   insulin lispro 1-6 Units Subcutaneous TID AC Cahto Freshwater, PA-C   levothyroxine 100 mcg Oral Early Morning Cahto Freshwater, PA-C   lidocaine 1 patch Transdermal Daily Cahto Freshwater, PA-C   metFORMIN 500 mg Oral Daily With Breakfast Orlin Gamble PA-C   methocarbamol 750 mg Oral Q6H PRN Orlin Gamble PA-C   mirtazapine 7 5 mg Oral HS BETINA Arredondo-GUILLE   montelukast 10 mg Oral HS Orlin Gamble PA-C   morphine injection 2 mg Intravenous Once Olaf Vasques MD   nicotine 1 patch Transdermal Daily Orlin Gamble PA-C   ondansetron 4 mg Intravenous Q6H PRN Orlin Gamble PA-C   oxyCODONE 10 mg Oral Q12H [address] Margret Gonzalez MD   potassium chloride 20 mEq Oral BID Orlin Gamble PA-C   QUEtiapine 200 mg Oral HS Orlin Gamble PA-C   tiotropium 18 mcg Inhalation Daily Orlin Gamble PA-C   topiramate 50 mg Oral Q12H [address] Orlin Gamble PA-C         PRN Meds:    acetaminophen    aluminum-magnesium hydroxide-simethicone    HYDROmorphone    methocarbamol    ondansetron        VTE Prophylaxis:  Enoxaparin subcutaneously      Patient Centered Rounds: I have discussed today's plans with nursing staff today      DISPOSITION: Pending further workup  Anticipate hospitalization for next 2-3 days      Time Spent for Care: 20 minutes   More than 50% of total time spent on counseling and coordination of care as described above  Family will be updated   Ratna Juarez MD  HOSPITALIST SERVICES  4/3/2018     PLEASE NOTE:  This encounter was completed utilizing the [employer] Direct Speech Voice Recognition Software  Grammatical errors, random word insertions, pronoun errors and incomplete sentences are occasional consequences of the system due to software limitations, ambient noise and hardware issues  These may be missed by proof reading prior to affixing electronic signature  Any questions or concerns about the content, text or information contained within the body of this dictation should be directly addressed to the physician for clarification  Please do not hesitate to call me directly if you have any any questions or concerns

## 2018-04-03 NOTE — ASSESSMENT & PLAN NOTE
· Patient is on chronic home oxygen at 3 liters/minute  · COPD at baseline not in acute exacerbation    · Continue with Singulair, Spiriva, Advair and Decadron

## 2018-04-03 NOTE — ASSESSMENT & PLAN NOTE
· Patient continues smoke 1-2 cigarettes per day  · Consult smoking cessation, patient currently nicotine patch

## 2018-04-03 NOTE — ASSESSMENT & PLAN NOTE
· Continue on metformin, sliding scale insulin  · Since patient is on steroids, blood sugars maybe erratic  · Hemoglobin A1c at 6 7

## 2018-04-03 NOTE — ASSESSMENT & PLAN NOTE
· Patient states lety Cd4 count has been good  · No CD4 count in our system  · Patient difficulty IV access  · Patient not on any anti retrovirals  · Patient diagnosed in 12

## 2018-04-04 ENCOUNTER — APPOINTMENT (INPATIENT)
Dept: RADIOLOGY | Facility: HOSPITAL | Age: 68
DRG: 459 | End: 2018-04-04
Payer: MEDICARE

## 2018-04-04 ENCOUNTER — HOSPITAL ENCOUNTER (INPATIENT)
Facility: HOSPITAL | Age: 68
LOS: 12 days | Discharge: NURSING, REHAB OR LONG-TERM CARE | DRG: 459 | End: 2018-04-16
Attending: INTERNAL MEDICINE | Admitting: INTERNAL MEDICINE
Payer: MEDICARE

## 2018-04-04 VITALS
SYSTOLIC BLOOD PRESSURE: 125 MMHG | RESPIRATION RATE: 21 BRPM | HEIGHT: 62 IN | HEART RATE: 74 BPM | OXYGEN SATURATION: 97 % | BODY MASS INDEX: 47.47 KG/M2 | WEIGHT: 257.94 LBS | DIASTOLIC BLOOD PRESSURE: 82 MMHG | TEMPERATURE: 99 F

## 2018-04-04 DIAGNOSIS — M54.9 BACK PAIN: ICD-10-CM

## 2018-04-04 DIAGNOSIS — Z01.818 PREOPERATIVE CLEARANCE: Primary | ICD-10-CM

## 2018-04-04 DIAGNOSIS — F31.9 BIPOLAR 1 DISORDER (HCC): ICD-10-CM

## 2018-04-04 DIAGNOSIS — R41.89 COGNITIVE DECLINE: ICD-10-CM

## 2018-04-04 DIAGNOSIS — B20 HIV (HUMAN IMMUNODEFICIENCY VIRUS INFECTION) (HCC): Chronic | ICD-10-CM

## 2018-04-04 DIAGNOSIS — M51.24 THORACIC DISC HERNIATION: ICD-10-CM

## 2018-04-04 DIAGNOSIS — F32.A DEPRESSION, UNSPECIFIED DEPRESSION TYPE: Chronic | ICD-10-CM

## 2018-04-04 DIAGNOSIS — R50.81 FEVER IN OTHER DISEASES: ICD-10-CM

## 2018-04-04 PROBLEM — R35.0 URINARY FREQUENCY: Status: ACTIVE | Noted: 2018-04-04

## 2018-04-04 PROBLEM — M48.061 NEURAL FORAMINAL STENOSIS OF LUMBAR SPINE: Status: ACTIVE | Noted: 2018-04-04

## 2018-04-04 LAB
ANION GAP SERPL CALCULATED.3IONS-SCNC: 5 MMOL/L (ref 4–13)
BASOPHILS # BLD AUTO: 0.01 THOUSANDS/ΜL (ref 0–0.1)
BASOPHILS NFR BLD AUTO: 0 % (ref 0–1)
BUN SERPL-MCNC: 36 MG/DL (ref 5–25)
CALCIUM SERPL-MCNC: 10.7 MG/DL (ref 8.3–10.1)
CHLORIDE SERPL-SCNC: 105 MMOL/L (ref 100–108)
CO2 SERPL-SCNC: 27 MMOL/L (ref 21–32)
CREAT SERPL-MCNC: 1.1 MG/DL (ref 0.6–1.3)
EOSINOPHIL # BLD AUTO: 0 THOUSAND/ΜL (ref 0–0.61)
EOSINOPHIL NFR BLD AUTO: 0 % (ref 0–6)
ERYTHROCYTE [DISTWIDTH] IN BLOOD BY AUTOMATED COUNT: 16.9 % (ref 11.6–15.1)
GFR SERPL CREATININE-BSD FRML MDRD: 61 ML/MIN/1.73SQ M
GLUCOSE SERPL-MCNC: 175 MG/DL (ref 65–140)
GLUCOSE SERPL-MCNC: 198 MG/DL (ref 65–140)
GLUCOSE SERPL-MCNC: 207 MG/DL (ref 65–140)
GLUCOSE SERPL-MCNC: 304 MG/DL (ref 65–140)
GLUCOSE SERPL-MCNC: 411 MG/DL (ref 65–140)
HCT VFR BLD AUTO: 37.9 % (ref 34.8–46.1)
HGB BLD-MCNC: 12.1 G/DL (ref 11.5–15.4)
LYMPHOCYTES # BLD AUTO: 1.53 THOUSANDS/ΜL (ref 0.6–4.47)
LYMPHOCYTES NFR BLD AUTO: 11 % (ref 14–44)
MCH RBC QN AUTO: 27.3 PG (ref 26.8–34.3)
MCHC RBC AUTO-ENTMCNC: 31.9 G/DL (ref 31.4–37.4)
MCV RBC AUTO: 85 FL (ref 82–98)
MONOCYTES # BLD AUTO: 1.4 THOUSAND/ΜL (ref 0.17–1.22)
MONOCYTES NFR BLD AUTO: 10 % (ref 4–12)
NEUTROPHILS # BLD AUTO: 11.49 THOUSANDS/ΜL (ref 1.85–7.62)
NEUTS SEG NFR BLD AUTO: 79 % (ref 43–75)
NRBC BLD AUTO-RTO: 0 /100 WBCS
PLATELET # BLD AUTO: 386 THOUSANDS/UL (ref 149–390)
PMV BLD AUTO: 9.1 FL (ref 8.9–12.7)
POTASSIUM SERPL-SCNC: 4.7 MMOL/L (ref 3.5–5.3)
RBC # BLD AUTO: 4.44 MILLION/UL (ref 3.81–5.12)
SODIUM SERPL-SCNC: 137 MMOL/L (ref 136–145)
WBC # BLD AUTO: 14.62 THOUSAND/UL (ref 4.31–10.16)

## 2018-04-04 PROCEDURE — 97116 GAIT TRAINING THERAPY: CPT

## 2018-04-04 PROCEDURE — 82948 REAGENT STRIP/BLOOD GLUCOSE: CPT

## 2018-04-04 PROCEDURE — 85025 COMPLETE CBC W/AUTO DIFF WBC: CPT | Performed by: INTERNAL MEDICINE

## 2018-04-04 PROCEDURE — 99239 HOSP IP/OBS DSCHRG MGMT >30: CPT | Performed by: INTERNAL MEDICINE

## 2018-04-04 PROCEDURE — 99231 SBSQ HOSP IP/OBS SF/LOW 25: CPT | Performed by: PHYSICIAN ASSISTANT

## 2018-04-04 PROCEDURE — 80048 BASIC METABOLIC PNL TOTAL CA: CPT | Performed by: INTERNAL MEDICINE

## 2018-04-04 PROCEDURE — 83036 HEMOGLOBIN GLYCOSYLATED A1C: CPT | Performed by: INTERNAL MEDICINE

## 2018-04-04 PROCEDURE — 87389 HIV-1 AG W/HIV-1&-2 AB AG IA: CPT | Performed by: INTERNAL MEDICINE

## 2018-04-04 PROCEDURE — 97530 THERAPEUTIC ACTIVITIES: CPT

## 2018-04-04 PROCEDURE — 99223 1ST HOSP IP/OBS HIGH 75: CPT | Performed by: INTERNAL MEDICINE

## 2018-04-04 PROCEDURE — 94660 CPAP INITIATION&MGMT: CPT

## 2018-04-04 PROCEDURE — 72128 CT CHEST SPINE W/O DYE: CPT

## 2018-04-04 PROCEDURE — 94760 N-INVAS EAR/PLS OXIMETRY 1: CPT

## 2018-04-04 RX ORDER — METHOCARBAMOL 500 MG/1
750 TABLET, FILM COATED ORAL EVERY 6 HOURS PRN
Status: DISCONTINUED | OUTPATIENT
Start: 2018-04-04 | End: 2018-04-16 | Stop reason: HOSPADM

## 2018-04-04 RX ORDER — QUETIAPINE FUMARATE 200 MG/1
200 TABLET, FILM COATED ORAL
Status: CANCELLED | OUTPATIENT
Start: 2018-04-04

## 2018-04-04 RX ORDER — MONTELUKAST SODIUM 10 MG/1
10 TABLET ORAL
Status: DISCONTINUED | OUTPATIENT
Start: 2018-04-04 | End: 2018-04-16 | Stop reason: HOSPADM

## 2018-04-04 RX ORDER — HYDROMORPHONE HYDROCHLORIDE 2 MG/1
1 TABLET ORAL EVERY 4 HOURS PRN
Status: DISCONTINUED | OUTPATIENT
Start: 2018-04-04 | End: 2018-04-09

## 2018-04-04 RX ORDER — ASPIRIN 81 MG/1
81 TABLET, CHEWABLE ORAL DAILY
Status: DISCONTINUED | OUTPATIENT
Start: 2018-04-05 | End: 2018-04-05

## 2018-04-04 RX ORDER — LEVOTHYROXINE SODIUM 0.1 MG/1
100 TABLET ORAL
Status: CANCELLED | OUTPATIENT
Start: 2018-04-05

## 2018-04-04 RX ORDER — LEVOTHYROXINE SODIUM 0.05 MG/1
100 TABLET ORAL
Status: DISCONTINUED | OUTPATIENT
Start: 2018-04-05 | End: 2018-04-16 | Stop reason: HOSPADM

## 2018-04-04 RX ORDER — ONDANSETRON 4 MG/1
4 TABLET, ORALLY DISINTEGRATING ORAL EVERY 6 HOURS PRN
Status: DISCONTINUED | OUTPATIENT
Start: 2018-04-04 | End: 2018-04-16 | Stop reason: HOSPADM

## 2018-04-04 RX ORDER — DILTIAZEM HYDROCHLORIDE 180 MG/1
180 CAPSULE, COATED, EXTENDED RELEASE ORAL DAILY
Status: DISCONTINUED | OUTPATIENT
Start: 2018-04-05 | End: 2018-04-16 | Stop reason: HOSPADM

## 2018-04-04 RX ORDER — GABAPENTIN 400 MG/1
800 CAPSULE ORAL 3 TIMES DAILY
Status: CANCELLED | OUTPATIENT
Start: 2018-04-04

## 2018-04-04 RX ORDER — ATORVASTATIN CALCIUM 20 MG/1
20 TABLET, FILM COATED ORAL
Status: DISCONTINUED | OUTPATIENT
Start: 2018-04-05 | End: 2018-04-16 | Stop reason: HOSPADM

## 2018-04-04 RX ORDER — MELATONIN
1000 DAILY
Status: DISCONTINUED | OUTPATIENT
Start: 2018-04-05 | End: 2018-04-16 | Stop reason: HOSPADM

## 2018-04-04 RX ORDER — MAGNESIUM HYDROXIDE/ALUMINUM HYDROXICE/SIMETHICONE 120; 1200; 1200 MG/30ML; MG/30ML; MG/30ML
30 SUSPENSION ORAL EVERY 6 HOURS PRN
Status: DISCONTINUED | OUTPATIENT
Start: 2018-04-04 | End: 2018-04-16 | Stop reason: HOSPADM

## 2018-04-04 RX ORDER — MIRTAZAPINE 15 MG/1
7.5 TABLET, FILM COATED ORAL
Status: DISCONTINUED | OUTPATIENT
Start: 2018-04-04 | End: 2018-04-16 | Stop reason: HOSPADM

## 2018-04-04 RX ORDER — DILTIAZEM HYDROCHLORIDE 180 MG/1
180 CAPSULE, COATED, EXTENDED RELEASE ORAL DAILY
Status: CANCELLED | OUTPATIENT
Start: 2018-04-05

## 2018-04-04 RX ORDER — INSULIN GLARGINE 100 [IU]/ML
18 INJECTION, SOLUTION SUBCUTANEOUS
Status: DISCONTINUED | OUTPATIENT
Start: 2018-04-04 | End: 2018-04-04

## 2018-04-04 RX ORDER — MAGNESIUM HYDROXIDE/ALUMINUM HYDROXICE/SIMETHICONE 120; 1200; 1200 MG/30ML; MG/30ML; MG/30ML
30 SUSPENSION ORAL EVERY 6 HOURS PRN
Status: CANCELLED | OUTPATIENT
Start: 2018-04-04

## 2018-04-04 RX ORDER — LIDOCAINE 50 MG/G
1 PATCH TOPICAL DAILY
Status: CANCELLED | OUTPATIENT
Start: 2018-04-05

## 2018-04-04 RX ORDER — NICOTINE 21 MG/24HR
1 PATCH, TRANSDERMAL 24 HOURS TRANSDERMAL DAILY
Status: DISCONTINUED | OUTPATIENT
Start: 2018-04-05 | End: 2018-04-16 | Stop reason: HOSPADM

## 2018-04-04 RX ORDER — ATORVASTATIN CALCIUM 20 MG/1
20 TABLET, FILM COATED ORAL
Status: CANCELLED | OUTPATIENT
Start: 2018-04-04

## 2018-04-04 RX ORDER — ACETAMINOPHEN 325 MG/1
650 TABLET ORAL EVERY 6 HOURS PRN
Status: DISCONTINUED | OUTPATIENT
Start: 2018-04-04 | End: 2018-04-06

## 2018-04-04 RX ORDER — QUETIAPINE FUMARATE 200 MG/1
200 TABLET, FILM COATED ORAL
Status: DISCONTINUED | OUTPATIENT
Start: 2018-04-04 | End: 2018-04-16 | Stop reason: HOSPADM

## 2018-04-04 RX ORDER — OXYCODONE HCL 10 MG/1
10 TABLET, FILM COATED, EXTENDED RELEASE ORAL EVERY 12 HOURS SCHEDULED
Status: DISCONTINUED | OUTPATIENT
Start: 2018-04-04 | End: 2018-04-06

## 2018-04-04 RX ORDER — TOPIRAMATE 25 MG/1
50 TABLET ORAL EVERY 12 HOURS SCHEDULED
Status: DISCONTINUED | OUTPATIENT
Start: 2018-04-04 | End: 2018-04-16 | Stop reason: HOSPADM

## 2018-04-04 RX ORDER — DEXAMETHASONE 4 MG/1
4 TABLET ORAL EVERY 6 HOURS SCHEDULED
Status: DISCONTINUED | OUTPATIENT
Start: 2018-04-04 | End: 2018-04-06

## 2018-04-04 RX ORDER — NICOTINE 21 MG/24HR
1 PATCH, TRANSDERMAL 24 HOURS TRANSDERMAL DAILY
Status: CANCELLED | OUTPATIENT
Start: 2018-04-05

## 2018-04-04 RX ORDER — BUPROPION HYDROCHLORIDE 150 MG/1
300 TABLET ORAL DAILY
Status: CANCELLED | OUTPATIENT
Start: 2018-04-05

## 2018-04-04 RX ORDER — MELATONIN
1000 DAILY
Status: CANCELLED | OUTPATIENT
Start: 2018-04-05

## 2018-04-04 RX ORDER — MIRTAZAPINE 15 MG/1
7.5 TABLET, FILM COATED ORAL
Status: CANCELLED | OUTPATIENT
Start: 2018-04-04

## 2018-04-04 RX ORDER — TOPIRAMATE 25 MG/1
50 TABLET ORAL EVERY 12 HOURS SCHEDULED
Status: CANCELLED | OUTPATIENT
Start: 2018-04-04

## 2018-04-04 RX ORDER — ACETAMINOPHEN 325 MG/1
650 TABLET ORAL EVERY 6 HOURS PRN
Status: CANCELLED | OUTPATIENT
Start: 2018-04-04

## 2018-04-04 RX ORDER — ONDANSETRON 2 MG/ML
4 INJECTION INTRAMUSCULAR; INTRAVENOUS EVERY 6 HOURS PRN
Status: CANCELLED | OUTPATIENT
Start: 2018-04-04

## 2018-04-04 RX ORDER — HYDROMORPHONE HYDROCHLORIDE 2 MG/1
1 TABLET ORAL EVERY 4 HOURS PRN
Status: CANCELLED | OUTPATIENT
Start: 2018-04-04

## 2018-04-04 RX ORDER — INSULIN GLARGINE 100 [IU]/ML
18 INJECTION, SOLUTION SUBCUTANEOUS
Status: CANCELLED | OUTPATIENT
Start: 2018-04-04

## 2018-04-04 RX ORDER — INSULIN GLARGINE 100 [IU]/ML
22 INJECTION, SOLUTION SUBCUTANEOUS
Status: DISCONTINUED | OUTPATIENT
Start: 2018-04-04 | End: 2018-04-15

## 2018-04-04 RX ORDER — MONTELUKAST SODIUM 10 MG/1
10 TABLET ORAL
Status: CANCELLED | OUTPATIENT
Start: 2018-04-04

## 2018-04-04 RX ORDER — LIDOCAINE 50 MG/G
1 PATCH TOPICAL DAILY
Status: DISCONTINUED | OUTPATIENT
Start: 2018-04-05 | End: 2018-04-16 | Stop reason: HOSPADM

## 2018-04-04 RX ORDER — ONDANSETRON 2 MG/ML
4 INJECTION INTRAMUSCULAR; INTRAVENOUS EVERY 6 HOURS PRN
Status: DISCONTINUED | OUTPATIENT
Start: 2018-04-04 | End: 2018-04-04

## 2018-04-04 RX ORDER — DEXAMETHASONE 4 MG/1
4 TABLET ORAL EVERY 6 HOURS SCHEDULED
Status: CANCELLED | OUTPATIENT
Start: 2018-04-04

## 2018-04-04 RX ORDER — METHOCARBAMOL 500 MG/1
750 TABLET, FILM COATED ORAL EVERY 6 HOURS PRN
Status: CANCELLED | OUTPATIENT
Start: 2018-04-04

## 2018-04-04 RX ORDER — GABAPENTIN 400 MG/1
800 CAPSULE ORAL 3 TIMES DAILY
Status: DISCONTINUED | OUTPATIENT
Start: 2018-04-04 | End: 2018-04-16 | Stop reason: HOSPADM

## 2018-04-04 RX ORDER — INSULIN GLARGINE 100 [IU]/ML
18 INJECTION, SOLUTION SUBCUTANEOUS
Status: DISCONTINUED | OUTPATIENT
Start: 2018-04-04 | End: 2018-04-04 | Stop reason: HOSPADM

## 2018-04-04 RX ORDER — NITROFURANTOIN 25; 75 MG/1; MG/1
100 CAPSULE ORAL 2 TIMES DAILY WITH MEALS
Status: DISCONTINUED | OUTPATIENT
Start: 2018-04-05 | End: 2018-04-06

## 2018-04-04 RX ORDER — BUPROPION HYDROCHLORIDE 150 MG/1
300 TABLET ORAL DAILY
Status: DISCONTINUED | OUTPATIENT
Start: 2018-04-05 | End: 2018-04-16 | Stop reason: HOSPADM

## 2018-04-04 RX ORDER — ASPIRIN 81 MG/1
81 TABLET, CHEWABLE ORAL DAILY
Status: CANCELLED | OUTPATIENT
Start: 2018-04-05

## 2018-04-04 RX ORDER — OXYCODONE HCL 10 MG/1
10 TABLET, FILM COATED, EXTENDED RELEASE ORAL EVERY 12 HOURS SCHEDULED
Status: CANCELLED | OUTPATIENT
Start: 2018-04-04

## 2018-04-04 RX ADMIN — DEXAMETHASONE 4 MG: 4 TABLET ORAL at 21:25

## 2018-04-04 RX ADMIN — ENOXAPARIN SODIUM 40 MG: 40 INJECTION SUBCUTANEOUS at 09:05

## 2018-04-04 RX ADMIN — MIRTAZAPINE 7.5 MG: 15 TABLET, FILM COATED ORAL at 21:24

## 2018-04-04 RX ADMIN — NICOTINE 1 PATCH: 14 PATCH TRANSDERMAL at 09:08

## 2018-04-04 RX ADMIN — ATORVASTATIN CALCIUM 20 MG: 20 TABLET, FILM COATED ORAL at 16:12

## 2018-04-04 RX ADMIN — BUPROPION HYDROCHLORIDE 300 MG: 150 TABLET, FILM COATED, EXTENDED RELEASE ORAL at 08:58

## 2018-04-04 RX ADMIN — QUETIAPINE 200 MG: 100 TABLET ORAL at 21:24

## 2018-04-04 RX ADMIN — OXYCODONE HYDROCHLORIDE 10 MG: 10 TABLET, FILM COATED, EXTENDED RELEASE ORAL at 21:25

## 2018-04-04 RX ADMIN — OXYCODONE HYDROCHLORIDE 10 MG: 10 TABLET, FILM COATED, EXTENDED RELEASE ORAL at 08:59

## 2018-04-04 RX ADMIN — TOPIRAMATE 50 MG: 25 TABLET, FILM COATED ORAL at 21:25

## 2018-04-04 RX ADMIN — DEXAMETHASONE 4 MG: 4 TABLET ORAL at 06:09

## 2018-04-04 RX ADMIN — INSULIN LISPRO 6 UNITS: 100 INJECTION, SOLUTION INTRAVENOUS; SUBCUTANEOUS at 09:15

## 2018-04-04 RX ADMIN — INSULIN GLARGINE 22 UNITS: 100 INJECTION, SOLUTION SUBCUTANEOUS at 21:25

## 2018-04-04 RX ADMIN — INSULIN LISPRO 3 UNITS: 100 INJECTION, SOLUTION INTRAVENOUS; SUBCUTANEOUS at 12:10

## 2018-04-04 RX ADMIN — GABAPENTIN 800 MG: 400 CAPSULE ORAL at 21:24

## 2018-04-04 RX ADMIN — TOPIRAMATE 50 MG: 25 TABLET, FILM COATED ORAL at 09:00

## 2018-04-04 RX ADMIN — FLUTICASONE PROPIONATE AND SALMETEROL 1 PUFF: 50; 250 POWDER RESPIRATORY (INHALATION) at 21:26

## 2018-04-04 RX ADMIN — DEXAMETHASONE 4 MG: 4 TABLET ORAL at 12:10

## 2018-04-04 RX ADMIN — FLUTICASONE PROPIONATE AND SALMETEROL 1 PUFF: 50; 250 POWDER RESPIRATORY (INHALATION) at 09:03

## 2018-04-04 RX ADMIN — MONTELUKAST SODIUM 10 MG: 10 TABLET, COATED ORAL at 21:25

## 2018-04-04 RX ADMIN — VITAMIN D, TAB 1000IU (100/BT) 1000 UNITS: 25 TAB at 09:00

## 2018-04-04 RX ADMIN — GABAPENTIN 800 MG: 400 CAPSULE ORAL at 08:59

## 2018-04-04 RX ADMIN — METFORMIN HYDROCHLORIDE 500 MG: 500 TABLET, FILM COATED ORAL at 09:00

## 2018-04-04 RX ADMIN — TIOTROPIUM BROMIDE 18 MCG: 18 CAPSULE ORAL; RESPIRATORY (INHALATION) at 09:13

## 2018-04-04 RX ADMIN — ASPIRIN 81 MG 81 MG: 81 TABLET ORAL at 08:59

## 2018-04-04 RX ADMIN — DILTIAZEM HYDROCHLORIDE 180 MG: 180 CAPSULE, COATED, EXTENDED RELEASE ORAL at 08:59

## 2018-04-04 RX ADMIN — INSULIN LISPRO 3 UNITS: 100 INJECTION, SOLUTION INTRAVENOUS; SUBCUTANEOUS at 09:09

## 2018-04-04 RX ADMIN — DEXAMETHASONE 4 MG: 4 TABLET ORAL at 23:04

## 2018-04-04 RX ADMIN — LIDOCAINE 1 PATCH: 50 PATCH CUTANEOUS at 09:01

## 2018-04-04 RX ADMIN — LEVOTHYROXINE SODIUM 100 MCG: 100 TABLET ORAL at 06:09

## 2018-04-04 RX ADMIN — INSULIN LISPRO 4 UNITS: 100 INJECTION, SOLUTION INTRAVENOUS; SUBCUTANEOUS at 12:11

## 2018-04-04 RX ADMIN — GABAPENTIN 800 MG: 400 CAPSULE ORAL at 16:12

## 2018-04-04 NOTE — ASSESSMENT & PLAN NOTE
· MRI thoracic medicine from 04/03:  broad-based the thoracic disc herniation at T9-T10 level along with the ligamentous hypertrophy with severe central canal narrowing with possible cord  edema,/gliosis related to cord impingement   Also bilateral moderate-to-severe foraminal narrowing  · Patient currently on oral Decadron, OxyContin, and Dilaudid as needed orally -- continue  · Case was discussed with Neurosurgery Dr Janiya Lainez --> recommended transfer to [facility] possible operative intervention  · Patient will require CT thoracic spine without contrast to evaluate for disc calcification and aid in operative planning  Ordered

## 2018-04-04 NOTE — ASSESSMENT & PLAN NOTE
Patient smokes 2-3 cigarettes daily approximately  Counseled about cessation  Continue nicotine patch for now

## 2018-04-04 NOTE — PLAN OF CARE
Problem: OCCUPATIONAL THERAPY ADULT  Goal: Performs self-care activities at highest level of function for planned discharge setting  See evaluation for individualized goals  Treatment Interventions: ADL retraining, Functional transfer training, UE strengthening/ROM, Endurance training, Cognitive reorientation, Patient/family training, Equipment evaluation/education, Compensatory technique education, Energy conservation, Activityengagement          See flowsheet documentation for full assessment, interventions and recommendations  Outcome: Progressing  Limitation: Decreased ADL status, Decreased UE strength, Decreased Safe judgement during ADL, Decreased cognition, Decreased endurance, Decreased self-care trans, Decreased high-level ADLs  Prognosis: Good, Fair  Assessment: Pt seen for skilled OT session focused on functional transfers and mobility  Pt required MOD assist sit>stand w/ VCs from positioning  Pt impulsively ambulating w/ RW and abruptly sitting down into recliner and required VCs for hand positioning and safety and demonstrated safe transfers  Pt w/ SOB noted and on RA pt w/ 89-90% and required cues for proper breathing and after a few minutes improved to 94% while on 2L O2  Pt stated, "I'm not doing anything else, I want to eat"  Pt seated in bedside chair at end of session w/ all needs met  Pt continues to be limited due to decreased strength and endurance, impaired balance, increased pain, impulsive, decreased safety awareness  Recommend STR when medically stable        OT Discharge Recommendation: Short Term Rehab  OT - OK to Discharge:  (to rehab when medically stable )      Comments: Jose Antonio Paniagua MS, OTR/L

## 2018-04-04 NOTE — OCCUPATIONAL THERAPY NOTE
[address] Progress Note     Patient Name: Bernardo Pepe  [id number] Date: 4/4/2018  Problem List  Patient Active Problem List   Diagnosis    Arthritis    HIV (human immunodeficiency virus infection) ([place] [address] )    Osteoarthritis    Acute exacerbation of chronic obstructive pulmonary disease (COPD) ([place] [address] )    Hypertension    Type 2 diabetes mellitus with hyperglycemia ([place] [address] )    COPD (chronic obstructive pulmonary disease) ([place] [address] )    Bipolar 1 disorder ([place] [address] )    Tobacco use    Shortness of breath    Back pain    Hip pain    Morbid obesity ([place] [address] )    Ambulatory dysfunction               04/04/18 1207   Restrictions/Precautions   Weight Bearing Precautions Per Order No   Other Precautions Cognitive; Fall Risk;O2   Pain Assessment   Pain Assessment 0-10   Pain Score 4   Pain Type Acute pain   Pain Location Back   Pain Orientation Bilateral;Left   Hospital Pain Intervention(s) Ambulation/increased activity;Repositioned   Response to Interventions tolerated   ADL   Where Assessed Chair   Eating Assistance 7  Independent   Transfers   Sit to Stand 3  Moderate assistance   Additional items Assist x 1; Increased time required;Verbal cues; Impulsive   Stand to Sit 3  Moderate assistance   Additional items Assist x 1; Increased time required;Verbal cues;Armrests   Stand pivot 3  Moderate assistance   Additional items Assist x 1; Increased time required;Verbal cues;Armrests   Additional Comments VCs for positioning and safety; implusive; not using UEs to reach back for recliner   Cognition   Overall Cognitive Status Impaired   Arousal/Participation Alert   Attention Attends with cues to redirect   Orientation Level Oriented to person;Oriented to place; Disoriented to time   Memory Decreased short term memory;Decreased recall of precautions;Decreased recall of recent events   Following Commands Follows one step commands with increased time or repetition   Comments pt w/ impaired safety awareness, decreased insight   Additional Activities   Additional Activities (education on sitting on surface w/ back support)   Additional Activities Comments pt receptive and requires continued education   Activity Tolerance   Activity Tolerance Patient limited by fatigue;Treatment limited secondary to medical complications (Comment)   Medical Staff Made Aware appropriate to see per RN   Assessment   Assessment Pt seen for skilled OT session focused on functional transfers and mobility  Pt required MOD assist sit>stand w/ VCs from positioning  Pt impulsively ambulating w/ RW and abruptly sitting down into recliner and required VCs for hand positioning and safety and demonstrated safe transfers  Pt w/ SOB noted and on RA pt w/ 89-90% and required cues for proper breathing and after a few minutes improved to 94% while on 2L O2  Pt stated, "I'm not doing anything else, I want to eat"  Pt seated in bedside chair at end of session w/ all needs met  Pt continues to be limited due to decreased strength and endurance, impaired balance, increased pain, impulsive, decreased safety awareness  Recommend STR when medically stable  Plan   Treatment Interventions ADL retraining;Functional transfer training; Endurance training;Patient/family training; Activityengagement   Goal Expiration Date 04/16/18   Treatment Day 1   OT Frequency 3-5x/wk   Recommendation   OT Discharge Recommendation Short Term Rehab   OT - OK to Discharge (to rehab when medically stable )   Barthel Index   Feeding 10   Bathing 0   Grooming Score 5   Dressing Score 5   Bladder Score 10   Bowels Score 10   Toilet Use Score 5   Transfers (Bed/Chair) Score 10   Mobility (Level Surface) Score 0   Stairs Score 0   Barthel Index Score 55   Modified [place] Scale   Modified [place] Scale 4     Documentation completed by: Fabby Glaser MS, OTR/L

## 2018-04-04 NOTE — PROGRESS NOTES
Progress Note - Gracie Chavis 1950, 74 y o  female MRN: [id number]    Unit/Bed#: E5 -01 Encounter: [id number]    Primary Care Provider: Vicenta Miller DO   Date and time admitted to hospital: 3/30/2018  6:17 PM        * Hip pain   Assessment & Plan    · MRI thoracic medicine from 04/03: There is broad-based the thoracic disc herniation at T9-T10 level along with the ligamentous hypertrophy with severe central canal narrowing with associated the increased signal intensity within the cord which may be due to cord  edema,/gliosis related to cord impingement  There is associated bilateral moderate-to-severe foraminal narrowing  This was also seen on the previous MRI of the lumbar spine performed on March 31, 2018  Mild central canal narrowing at the T8-9 level with the broad-based ridging with disc and osteophyte complex  Mild ridging at T7-8 level without significant central canal narrowing or foraminal narrowing  Moderate to severe bilateral foraminal narrowing at T10-11, T9-10 and severe left foraminal narrowing at T11-12  · MRI LUMBAR spine from 04/01: Potentially significant right foraminal stenosis L4-L5, correlate for right L4 radiculitis  Severe low lumbar osteoarthritis  Cord compression at the T9-T10 level related to disc disease  ord edema/gliosis is present  · Patient currently on oral Decadron  · Continue with OxyContin, and Dilaudid as needed orally -- adequate response  · Orthopedic surgery on board --reconsulted them again to comment on results of MRI thoracic spine from yesterday  · If no surgical intervention, plan for inpatient rehab            Back pain   Assessment & Plan    · Refer above please  COPD (chronic obstructive pulmonary disease) ([facility] [address] )   Assessment & Plan    · Patient is on chronic home oxygen at 3 liters/minute  · COPD at baseline not in acute exacerbation    · Continue with Singulair, Spiriva, Advair and Decadron        Type 2 diabetes mellitus with hyperglycemia ([facility] [address] )   Assessment & Plan    · Blood sugars uncontrolled most likely secondary to Decadron  · Start patient on Lantus, Humalog and sliding scale insulin to continue  · Continue on metformin, sliding scale insulin  · Hemoglobin A1c at 6 7 from 2018  Hypertension   Assessment & Plan    · Blood pressure is reasonable  · Continue Cardizem  Bipolar 1 disorder [facility])   Assessment & Plan    · Patient requesting to be seen by Psychiatry  · Continue Seroquel  HIV (human immunodeficiency virus infection) ([facility] [address] )   Assessment & Plan    · Patient states her Cd4 count has been good  · No CD4 count in our system  · Patient difficulty IV access  · Patient not on any anti retrovirals  · Patient diagnosed in 12  Tobacco use   Assessment & Plan    · Patient continues smoke 1-2 cigarettes per day  · Consult smoking cessation, patient currently nicotine patch                 ______________________________________________________________________    SUBJECTIVE:   Patient seen and examined  She appears comfortable  Continues to have back pain and tingling numbness pain of lower extremities  Blood sugars have been erratic secondary to Decadron  Finally MRI thoracic spine has been completed --awaiting Orthopedic surgery's input  Most likely require inpatient rehab  OBJECTIVE:     Vitals:   HR:  [] 104  Resp:  [20-21] 20  BP: (108-127)/(58-67) 127/67  SpO2:  [94 %-96 %] 94 %  Temp (24hrs), Av 3 °F (36 8 °C), Min:97 6 °F (36 4 °C), Max:99 1 °F (37 3 °C)  Current: Temperature: 98 3 °F (36 8 °C)  No intake or output data in the 24 hours ending 18 1143    Physical Exam:   GENERAL: AAO x 3, obese  HEENT: atraumatic, normocephalic  Oral mucosa moist, no icterus, pallor  PERRLA +  Neck supple, no JVD, no lymphadenopathy, no thryomegaly  CHEST: B/L breath sounds heard, occasional wheezing  CVS: S1, S2  No cyanosis/clubbing or edema    ABDOMEN: Soft/obese/NT/BS heard   NEUROLOGICAL: CN II -XI grossly intact  No focal motor or sensory deficits  No signs of meningeal irritation or cerebellar dysfunction    EXTREMITIES: Mild lumbar paraspinal tenderness, mild pain with RLE ROM     LABS:  Results for [place] (MRN [id number]) as of 4/4/2018 11:30   4/3/2018 12:01 4/3/2018 16:02 4/3/2018 20:24 4/4/2018 08:33 4/4/2018 10:52   POC Glucose  246 (H) 343 (H) 411 (H) 304 (H)   eGFR 53       Sodium 134 (L)       Potassium 5 0       Chloride 98 (L)       Anion Gap 13       BUN 29 (H)       Creatinine 1 24       Glucose 221 (H)       Calcium 10 4 (H)         Scheduled Meds:    Current Facility-Administered Medications:  acetaminophen 650 mg Oral Q6H PRN Jean Rice PA-C   aluminum-magnesium hydroxide-simethicone 30 mL Oral Q6H PRN Jean Rice PA-C   aspirin 81 mg Oral Daily Jean Rice PA-C   atorvastatin 20 mg Oral Daily With ComLEA mcdonald   buPROPion 300 mg Oral Daily Jean Rice PA-C   cholecalciferol 1,000 Units Oral Daily Jean Rice PA-C   dexamethasone 4 mg Oral Q6H [facility] Saira Piper MD   diltiazem 180 mg Oral Daily Jean Rice PA-C   enoxaparin 40 mg Subcutaneous Daily Jean Rice PA-C   fluticasone-salmeterol 1 puff Inhalation Q12H [facility] Jean Rice PA-C   gabapentin 800 mg Oral TID Jean Rice PA-C   HYDROmorphone 1 mg Oral Q4H PRN Rhonda Leblanc MD   insulin glargine 18 Units Subcutaneous HS Dana Park MD   insulin lispro 1-6 Units Subcutaneous TID AC Jean Rice PA-C   insulin lispro 3 Units Subcutaneous TID With Meals Rhonda Leblanc MD   levothyroxine 100 mcg Oral Early Morning Jean Rice PA-C   lidocaine 1 patch Transdermal Daily Jean Rice PA-C   metFORMIN 500 mg Oral Daily With Breakfast Jean Rice PA-C   methocarbamol 750 mg Oral Q6H PRN Jean Rice PA-C   mirtazapine 7 5 mg Oral HS Jean Rice PA-C   montelukast 10 mg Oral HS Jean Rice PA-C morphine injection 2 mg Intravenous Once Shawna Bailey MD   nicotine 1 patch Transdermal Daily Oris Push, PA-C   ondansetron 4 mg Intravenous Q6H PRN Oris Push, PA-C   oxyCODONE 10 mg Oral Q12H [facility] Susan Rico MD   QUEtiapine 200 mg Oral HS Oris Push, PA-C   tiotropium 18 mcg Inhalation Daily Oris Push, PA-C   topiramate 50 mg Oral Q12H [facility] Oris Push, PA-C     PRN Meds:    acetaminophen    aluminum-magnesium hydroxide-simethicone    HYDROmorphone    methocarbamol    ondansetron      VTE Prophylaxis:  Enoxaparin  Patient Centered Rounds: I have discussed today's plans with nursing staff today  DISPOSITION:  Awaiting Orthopedic surgery input  Most likely will require rehab  Time Spent for Care: 20 minutes   More than 50% of total time spent on counseling and coordination of care as described above  Family will be updated  Madhavi Connor MD  HOSPITALIST SERVICES  4/4/2018    PLEASE NOTE:  This encounter was completed utilizing the [employer] Direct Speech Voice Recognition Software  Grammatical errors, random word insertions, pronoun errors and incomplete sentences are occasional consequences of the system due to software limitations, ambient noise and hardware issues  These may be missed by proof reading prior to affixing electronic signature  Any questions or concerns about the content, text or information contained within the body of this dictation should be directly addressed to the physician for clarification  Please do not hesitate to call me directly if you have any any questions or concerns

## 2018-04-04 NOTE — ASSESSMENT & PLAN NOTE
· Blood sugars uncontrolled most likely secondary to Decadron  · Start patient on Lantus, Humalog and sliding scale insulin to continue  · Continue on metformin, sliding scale insulin  · Hemoglobin A1c at 6 7 from 03/31/2018

## 2018-04-04 NOTE — ASSESSMENT & PLAN NOTE
· Patient states her Cd4 count has been good  · No CD4 count in our system  · Patient difficulty IV access  · Patient not on any anti retrovirals  · Patient diagnosed in 12

## 2018-04-04 NOTE — ASSESSMENT & PLAN NOTE
Patient on metformin at home  Currently uncontrolled because of dexamethasone most likely  Will hold metformin for now  Continue Lantus and lispro standing plus sliding scale  I increased the dose of Lantus to 28 units from 18 and lispro from 3 units to 5 units standing 3 times daily with meals  Continue to monitor    Check HbA1c

## 2018-04-04 NOTE — ASSESSMENT & PLAN NOTE
· MRI thoracic medicine from 04/03: There is broad-based the thoracic disc herniation at T9-T10 level along with the ligamentous hypertrophy with severe central canal narrowing with associated the increased signal intensity within the cord which may be due to cord  edema,/gliosis related to cord impingement  There is associated bilateral moderate-to-severe foraminal narrowing  This was also seen on the previous MRI of the lumbar spine performed on March 31, 2018  Mild central canal narrowing at the T8-9 level with the broad-based ridging with disc and osteophyte complex  Mild ridging at T7-8 level without significant central canal narrowing or foraminal narrowing  Moderate to severe bilateral foraminal narrowing at T10-11, T9-10 and severe left foraminal narrowing at T11-12  · MRI LUMBAR spine from 04/01: Potentially significant right foraminal stenosis L4-L5, correlate for right L4 radiculitis  Severe low lumbar osteoarthritis  Cord compression at the T9-T10 level related to disc disease  ord edema/gliosis is present  · Patient currently on oral Decadron  · Continue with OxyContin, and Dilaudid as needed orally -- adequate response  · Orthopedic surgery on board --reconsulted them again to comment on results of MRI thoracic spine from yesterday    · If no surgical intervention, plan for inpatient rehab

## 2018-04-04 NOTE — ASSESSMENT & PLAN NOTE
Since last 2 days  No dysuria or fever  Possible cystitis or it could be due to increased blood sugars due to dexamethasone use  Urinalysis not very impressive for UTI but does show bacteriuria  Will start the patient on Macrobid for now pending urine cultures

## 2018-04-04 NOTE — PLAN OF CARE
Problem: DISCHARGE PLANNING - CARE MANAGEMENT  Goal: Discharge to post-acute care or home with appropriate resources  INTERVENTIONS:  - Conduct assessment to determine patient/family and health care team treatment goals, and need for post-acute services based on payer coverage, community resources, and patient preferences, and barriers to discharge  - Address psychosocial, clinical, and financial barriers to discharge as identified in assessment in conjunction with the patient/family and health care team  - Arrange appropriate level of post-acute services according to patients   needs and preference and payer coverage in collaboration with the physician and health care team  - Communicate with and update the patient/family, physician, and health care team regarding progress on the discharge plan  - Arrange appropriate transportation to post-acute venues  Outcome: Progressing  Referral sent to [facility] dept following for remainder of dcp

## 2018-04-04 NOTE — H&P
H&P- Yara Garcia 1950, 74 y o  female MRN: [id number]    Unit/Bed#: [facility] 932-01 Encounter: [id number]    Primary Care Provider: Antonino Alonzo DO   Date and time admitted to hospital: 4/4/2018  5:34 PM        * Thoracic disc herniation   Assessment & Plan    · MRI thoracic medicine from 04/03:  broad-based the thoracic disc herniation at T9-T10 level along with the ligamentous hypertrophy with severe central canal narrowing with possible cord  edema,/gliosis related to cord impingement   Also bilateral moderate-to-severe foraminal narrowing  · Patient currently on oral Decadron, OxyContin, and Dilaudid as needed orally -- continue  · Case was discussed with Neurosurgery Dr Thomas Escoto --> recommend ed transfer to [facility] possible operative intervention  · Patient will require CT thoracic spine without contrast to evaluate for disc calcification and aid in operative planning  Ordered  Neural foraminal stenosis of lumbar spine   Assessment & Plan    MRI LUMBAR spine from 04/01: Potentially significant right foraminal stenosis L4-L5, correlate for right L4 radiculitis   Severe low lumbar osteoarthritis  Ambulatory dysfunction   Assessment & Plan    Due to the vertebral disease as above  Had multiple falls  PT OT evaluation  Might need rehab  Back pain   Assessment & Plan    Due to thoracic disc herniation and lumbar foraminal stenosis  Continue oxy and Dilaudid for now the patient came in with as it is working well for her  Morbid obesity ([facility] [address] )   Assessment & Plan    Might be contributing to the vertebral disease  Will benefit from weight loss  Tobacco use   Assessment & Plan    Patient smokes 2-3 cigarettes daily approximately  Counseled about cessation  Continue nicotine patch for now  COPD (chronic obstructive pulmonary disease) ([facility] [address] )   Assessment & Plan    Not in exacerbation  Continue Spiriva and Singulair          Type 2 diabetes mellitus with hyperglycemia [facility])   Assessment & Plan    Patient on metformin at home  Currently uncontrolled because of dexamethasone most likely  Will hold metformin for now  Continue Lantus and lispro standing plus sliding scale  I increased the dose of Lantus to 28 units from 18 and lispro from 3 units to 5 units standing 3 times daily with meals  Continue to monitor  Check HbA1c        Hypertension   Assessment & Plan    Currently well controlled  Continue Cardizem  HIV (human immunodeficiency virus infection) ([facility] [address] )   Assessment & Plan    Diagnosed in 1986  Was never treated for it  As per the patient she never any problems with that  Will check HIV Ag/antibody and CD4 count for now  If positive can check viral load and consult ID  VTE Prophylaxis: Enoxaparin (Lovenox)  / sequential compression device   Code Status: full  POLST: POLST form is not discussed and not completed at this time  Discussion with family: patient     Anticipated Length of Stay:  Patient will be admitted on an Inpatient basis with an anticipated length of stay of  > 2 midnights  Justification for Hospital Stay: above    Total Time for Visit, including Counseling / Coordination of Care: 45 minutes  Greater than 50% of this total time spent on direct patient counseling and coordination of care  Chief Complaint:   Transferred from [facility] for Neurosurgical eval for thoracic intervertebral disc herniation with lower extremity weakness and ambulatory dysfunction  History of Present Illness:    Ramone Mary is a 74 y o  female who presents as a transfer from [facility] for neurosurgical evaluation for thoracic intervertebral disc herniation with bilateral lower extremity weakness and ambulatory dysfunction  Patient does have known history of chronic back pain  But in last 3 weeks the back pain just got worse  She also had 3-4 falls in last 3 weeks where her to legs would just give up    1st fall was when she was [place], 2 others were at home when she was just standing to get something out from the refrigerator and other time when she was trying to open the door  Denies any prodromal symptoms  After the fall she is not able to stand back herself because of significant weakness in the legs and pain in the lower back  She also complains of tingling and numbness in both her legs chronically that just got worse recently in last 2 weeks  She denies any prodromal symptoms prior to falls  She is also complaining of increased urinary frequency for last 2 days  Denies any dysuria or fever  She had MRI done at [facility] of the thoracic and the lumbar spine that showed disc herniation as above and lumbar neural foraminal stenosis  The case was discussed with Neurosurgery and they recommended transferring the patient to [place] for further evaluation and possible surgical intervention  As per the patient she does have history of HIV that was diagnosed in 12  She was never treated for it  She never had any problems with that  Review of Systems:    Review of Systems   Constitutional: Negative for activity change, appetite change, fatigue, fever and unexpected weight change  HENT: Negative for congestion and sore throat  Eyes: Negative for photophobia and visual disturbance  Respiratory: Negative for cough and shortness of breath  Cardiovascular: Negative for chest pain and palpitations  Gastrointestinal: Negative for abdominal pain, diarrhea, nausea and vomiting  Endocrine: Positive for polyuria  Genitourinary: Positive for frequency  Negative for difficulty urinating, dysuria and urgency  Musculoskeletal: Positive for back pain and gait problem  Negative for arthralgias and myalgias  Skin: Negative for rash  Neurological: Negative for dizziness, syncope and light-headedness  Psychiatric/Behavioral: Negative for agitation and behavioral problems         Past Medical and Surgical History:     Past Medical History:   Diagnosis Date    Bipolar 1 disorder ([place] [address] )     Cardiac disease     COPD (chronic obstructive pulmonary disease) ([place] [address] )     Diabetes mellitus ([place] [address] )     Disease of thyroid gland     HIV (human immunodeficiency virus infection) ([place] [address] ) 3/24/2016    Hypertension     Osteoarthritis     Tobacco abuse        Past Surgical History:   Procedure Laterality Date    HERNIA REPAIR      THYROIDECTOMY         Meds/Allergies:    Prior to Admission medications    Medication Sig Start Date End Date Taking? Authorizing Provider   aspirin 81 MG tablet Take 81 mg by mouth daily  Historical Provider, MD   atorvastatin (LIPITOR) 20 mg tablet Take 20 mg by mouth daily    Historical Provider, MD   buPROPion (WELLBUTRIN XL) 300 mg 24 hr tablet Take 300 mg by mouth daily    Historical Provider, MD   cholecalciferol (VITAMIN D3) 1,000 units tablet Take 1,000 Units by mouth daily    Historical Provider, MD   diltiazem (CARDIZEM CD) 180 mg 24 hr capsule Take 180 mg by mouth daily    Historical Provider, MD   fluticasone-salmeterol (ADVAIR) 250-50 mcg/dose Inhale 1 puff every 12 (twelve) hours  Historical Provider, MD   gabapentin (NEURONTIN) 400 mg capsule Take 800 mg by mouth 3 (three) times a day      Historical Provider, MD   levothyroxine 100 mcg tablet Take 100 mcg by mouth daily      Historical Provider, MD   metFORMIN (GLUCOPHAGE) 500 mg tablet Take 500 mg by mouth daily with breakfast      Historical Provider, MD   mirtazapine (REMERON) 7 5 MG tablet Take 1 tablet by mouth daily at bedtime for 30 days 2/5/17 3/30/18  Lul Hyman MD   montelukast (SINGULAIR) 10 mg tablet Take 10 mg by mouth daily at bedtime    Historical Provider, MD   potassium chloride (K-DUR,KLOR-CON) 20 mEq tablet Take 2 tablets by mouth 2 (two) times a day for 1 day 12/20/17 3/30/18  Rowena Howe DO   QUEtiapine (SEROquel) 100 mg tablet Take 200 mg by mouth daily at bedtime      Historical Provider, MD   tiotropium (SPIRIVA) 18 mcg inhalation capsule Place 18 mcg into inhaler and inhale daily  Historical Provider, MD   topiramate (TOPAMAX) 50 MG tablet Take 50 mg by mouth every 12 (twelve) hours    Historical Provider, MD     I have reviewed home medications with patient personally  Allergies: Allergies   Allergen Reactions    Prozac [Fluoxetine Hcl] Rash    Sulfa Antibiotics Itching    Quinine        Social History:     Marital Status:    Occupation:   Patient Pre-hospital Living Situation:   Patient Pre-hospital Level of Mobility:   Patient Pre-hospital Diet Restrictions:   Substance Use History:   History   Alcohol Use No     History   Smoking Status    Current Every Day Smoker    Packs/day: 0 20    Years: 45 00    Types: Cigarettes   Smokeless Tobacco    Never Used     Comment: Currently smoking 1-2 cigarettes a day     History   Drug Use No     Comment: former IV drug user       Family History:    Family History   Problem Relation Age of Onset    No Known Problems Mother     Diabetes Father     Heart disease Neg Hx     Cancer Neg Hx        Physical Exam:     Vitals:   Blood Pressure: 154/81 (04/04/18 1736)  Pulse: 94 (04/04/18 1736)  Temperature: 97 5 °F (36 4 °C) (04/04/18 1736)  Temp Source: Oral (04/04/18 1736)  Respirations: 18 (04/04/18 1736)  Height: 5' 2" (157 5 cm) (04/04/18 1736)  Weight - Scale: 90 7 kg (200 lb) (04/04/18 1736)  SpO2: 93 % (04/04/18 1736)    Physical Exam    Constitutional: Pt appears well-developed and well-nourished  Not in any acute distress  Morbidly obese  HENT:   Head: Normocephalic and atraumatic  Eyes: EOM are normal    Neck: Neck supple  Cardiovascular: Normal rate, regular rhythm, normal heart sounds and intact distal pulses  Exam reveals no gallop and no friction rub  No murmur heard  Pulmonary/Chest: Effort normal and breath sounds normal  No respiratory distress  Pt has no wheezes or rales  Abdominal: Soft  Non-distended, Non-tender  Bowel sounds are normal    Musculoskeletal: Normal range of motion  Neurological: alert and oriented to person, place, and time  Normal strength and sensations in upper extremities  Motor strength 4-5/5 in bilateral lower extremities with decreased sensations bilaterally  Psychiatric: normal mood and affect  Additional Data:     Lab Results: I have personally reviewed pertinent reports  Results from last 7 days  Lab Units 03/31/18  0609 03/30/18  2124   WBC Thousand/uL 7 10 6 26   HEMOGLOBIN g/dL 10 7* 11 0*   HEMATOCRIT % 34 4* 34 7*   PLATELETS Thousands/uL 271 301   NEUTROS PCT %  --  52   LYMPHS PCT %  --  33   MONOS PCT %  --  10   EOS PCT %  --  4       Results from last 7 days  Lab Units 04/03/18  1201   SODIUM mmol/L 134*   POTASSIUM mmol/L 5 0   CHLORIDE mmol/L 98*   CO2 mmol/L 23   BUN mg/dL 29*   CREATININE mg/dL 1 24   CALCIUM mg/dL 10 4*   GLUCOSE RANDOM mg/dL 221*           Imaging: I have personally reviewed pertinent reports  CT spine thoracic wo contrast    (Results Pending)       EKG, Pathology, and Other Studies Reviewed on Admission:   · EKG:     Allscripts / Epic Records Reviewed: Yes     ** Please Note: This note has been constructed using a voice recognition system   **

## 2018-04-04 NOTE — SOCIAL WORK
CM met with pt at bedside  She resides with her daughter, who works during the day  Pt has to climb steps at home and does have difficulty  Pt reported she uses a cane to ambulate  Pt has home O2 and is generally on 2L  She reported she is unsure who delivers oxygen, as her daughter sets up that  Previous note states that [place] deliver  Pt has home health aide, [employer], who assists with ADLs and transports to appts, etc   Pt is unsure which agency [employer] is from, as daughter set up  Hx of [facility]  Hx of STR at [facility]  Pt is unsure of pharmcy; daughter would know  Pt reported being diagnosed with bipolar, which is currently managed by her PCP  She stated PCP was in process of getting her seen by psychiatrist   Pt denied any inpatient hospitalization or suicide attempts within the past 2 years  Pt does not have POA/AD, but would want daughter Cyrilla Fothergill to make decisions for her, if necessary  PT is recommending STR  Pt chose [facility]  CM sent referral   CM dept following for remaining of dcp

## 2018-04-04 NOTE — ASSESSMENT & PLAN NOTE
MRI LUMBAR spine from 04/01: Potentially significant right foraminal stenosis L4-L5, correlate for right L4 radiculitis   Severe low lumbar osteoarthritis

## 2018-04-04 NOTE — DISCHARGE SUMMARY
1 \[facility] [place] [facility]\""    NAME: Tim Tomlin  AGE: 74 y o  SEX: female   MRN: [id number]   Encounter: [id number]   Unit/Bed: E5 -01     Admitting Provider:  Levi Canas MD  Discharge Provider:  Lana Smith MD  Admission Date: 3/30/2018       Discharge Date: 04/04/18   LOS: 4  Primary Care Physician at Discharge: Polly Borja [phone]    DISCHARGE DIAGNOSES  · Acute on chronic back pain with progressive gait abnormality  · Significant T9-T10 thoracic disc disease with cord edema  · Diabetes mellitus type 2 with hyperglycemia secondary to Decadron  · Hypokalemia -- resolved    SECONDARY DIAGNOSES  · COPD on home oxygen at 3 liters/minute continuous  · Chronic back pain with DJD  · Diabetes mellitus type 2 on metformin  · Hypertension  · Bipolar 1 disorder  · Depression  · HIV  · Ongoing tobacco use  · Obesity    HOSPITAL COURSE:  Tim Tomlin is a 74 y o  female who presents with intractable low back and right leg pain  She states her legs has been giving out on her and she is falling at home  She had had pain for years but severe over the past 2 days  She has seen KIKI Palomo physical medicine and rehab at [facility] and has a L5-S1 facet block with no relief  She also reportedly saw Dr Manav Carlson from ortho and had a hip injection for severe right hip DJD  She also complains of some wheezing in the ED  She has   Below is the short summary of hospital stay:  Acute on chronic back pain with significant T9-T10 thoracic disc disease with cord edema  Right hip pain     · MRI thoracic medicine from 04/03: There is broad-based the thoracic disc herniation at T9-T10 level along with the ligamentous hypertrophy with severe central canal narrowing with associated the increased signal intensity within the cord which may be due to cord  edema,/gliosis related to cord impingement   There is associated bilateral moderate-to-severe foraminal narrowing  This was also seen on the previous MRI of the lumbar spine performed on March 31, 2018  Mild central canal narrowing at the T8-9 level with the broad-based ridging with disc and osteophyte complex  Mild ridging at T7-8 level without significant central canal narrowing or foraminal narrowing  Moderate to severe bilateral foraminal narrowing at T10-11, T9-10 and severe left foraminal narrowing at T11-12  · MRI LUMBAR spine from 04/01: Potentially significant right foraminal stenosis L4-L5, correlate for right L4 radiculitis   Severe low lumbar osteoarthritis  Cord compression at the T9-T10 level related to disc disease   ord edema/gliosis is present  · Patient currently on oral Decadron, OxyContin, and Dilaudid as needed orally -- adequate response  · Case discussed extensively with Neurosurgery Dr Kaushal Salgado --> recommends transfer to [facility] possible operative intervention  · Patient will require CT thoracic spine without contrast to evaluate for disc calcification and aid in operative planning  · I I have discussed the case with [facility] at [facility] ( Dr Delia Tran MD) --> patient has been accepted  · Update patient and she is agreeable  COPD (chronic obstructive pulmonary disease) ([facility] [address] )     · Patient is on chronic home oxygen at 3 liters/minute  · COPD at baseline not in acute exacerbation  · Continue with Singulair, Spiriva, Advair and Decadron  Type 2 diabetes mellitus with hyperglycemia (HCC)     · Blood sugars uncontrolled most likely secondary to Decadron  · Start patient on Lantus, Humalog and sliding scale insulin to continue  · Continue on metformin, sliding scale insulin  · Hemoglobin A1c at 6 7 from 03/31/2018  Hypertension     · Blood pressure is reasonable  · Continue Cardizem  Bipolar 1 disorder ([address] )     · Patient requesting to be seen by Psychiatry    · Continue Seroquel  HIV (human immunodeficiency virus infection) ([facility] [address] )   Assessment & Plan     · Patient states her Cd4 count has been good  · No CD4 count in our system  · Patient difficulty IV access  · Patient not on any anti retrovirals  · Patient diagnosed in 12  Tobacco use     · Patient continues smoke 1-2 cigarettes per day  · Consult smoking cessation, patient currently nicotine patch  CONSULTING PROVIDERS   · Orthopedic surgery      PROCEDURES PERFORMED  Xr Hips Bilateral 2 Vw W Pelvis If Performed    Result Date: 3/31/2018  Narrative: BILATERAL HIPS AND PELVIS INDICATION:  Hip pain COMPARISON:  12/12/2014 VIEWS:  XR HIPS BILATERAL 2 VW W PELVIS IF PERFORMED  FINDINGS: The bony pelvis appears intact  Visualized lower lumbar spine is unremarkable  LEFT HIP: Moderate to severe degenerative change  Joint space alignment is maintained  Soft tissues are unremarkable  RIGHT HIP: Severe degenerative osteoarthritis  Joint space alignment is maintained  Soft tissues are unremarkable  Impression: Right greater than left hip degenerative osteoarthritis  Workstation performed: [id number]     Mri Thoracic Spine Wo Contrast    Result Date: 4/4/2018  Narrative: MRI THORACIC SPINE WITHOUT CONTRAST INDICATION:  Disc herniation COMPARISON:  March 31, 2018 TECHNIQUE:  Sagittal T1, sagittal T2, sagittal inversion recovery, axial T2,  axial 2D MERGE  IMAGE QUALITY: Diagnostic  FINDINGS: ALIGNMENT: Normal alignment of the thoracic spine  No compression fracture  No subluxation  No scoliosis  MARROW SIGNAL:  Normal marrow signal is identified within the visualized bony structures  No discrete marrow lesion  THORACIC CORD: There is T2 hyperintensity noted within the cord at the level of foot T9-T10 level which may be due to cord edema or gliosis   PARAVERTEBRAL SOFT TISSUES:  Normal  THORACIC DEGENERATIVE CHANGE: At T9-10 level there is broad-based disc herniation with ligamentous hypertrophic with severe central canal narrowing  There is associated cord edema/gliosis  This was also seen on the previous MRI of the lumbar spine performed on March 31, 2018  There is moderate to severe bilateral foraminal narrowing At the T8-9 level there is a ridging with small disc and osteophyte complex with the mild ligamentous hypertrophy and mild central canal narrowing and there is moderate  bilateral foraminal narrowing At T10-11 level there is moderate to severe bilateral foraminal narrowing with ridging and foraminal protrusion  There is no significant central canal narrowing  There is mild ligamentous hypertrophy T11-12 level demonstrates the moderate to severe left foraminal narrowing and no significant right foraminal narrowing  There is no significant central canal narrowing At T12-L1 level appear unremarkable At T7-T8 level there is minimal ridging with no significant central canal narrowing At T6-7 level there is mild ridging with no significant central canal narrowing At T5-6 level there is no significant central canal narrowing of foraminal narrowing T4-5 level appear unremarkable T3-4 level appear unremarkable T2-3 level appear unremarkable T1-T2 level appear unremarkable     Impression: There is broad-based the thoracic disc herniation at T9-T10 level along with the ligamentous hypertrophy with severe central canal narrowing with associated the increased signal intensity within the cord which may be due to cord edema,/gliosis related to cord impingement  There is associated bilateral moderate-to-severe foraminal narrowing    This was also seen on the previous MRI of the lumbar spine performed on March 31, 2018 Mild central canal narrowing at the T8-9 level with the broad-based ridging with disc and osteophyte complex Mild ridging at T7-8 level without significant central canal narrowing or foraminal narrowing Moderate to severe bilateral foraminal narrowing at T10-11, T9-10 and severe left foraminal narrowing at T11-12 Workstation performed: [id number]     Mri Lumbar Spine Wo Contrast    Result Date: 4/1/2018  Narrative: MRI LUMBAR SPINE WITHOUT CONTRAST INDICATION:  Low back pain radiating into right leg COMPARISON:  None  TECHNIQUE:  Sagittal T1, sagittal T2, sagittal inversion recovery, axial T1 and axial T2, coronal T2   IMAGE QUALITY:  Diagnostic FINDINGS: ALIGNMENT:  Minor degenerative anterolisthesis L4-L5 and L5-S1  Thickening or possible ossification of the anterior longitudinal ligament from the L2 level to the L5 level  MARROW SIGNAL:  Normal marrow signal is identified within the visualized bony structures  No discrete marrow lesion  DISTAL CORD AND CONUS: Loss of disc height and circumferential bulge/protrusion at the T9-T10 level  The cord is compressed and high signal edema/gliosis is present  MR of the thoracic spine is suggested  Conus terminates at L1-L2 and is normal in appearance  PARASPINAL SOFT TISSUES:  Paraspinal soft tissues are unremarkable  High signal lower pole right renal mass consistent with cysts  SACRUM:  Normal signal within the sacrum  No evidence of insufficiency or stress fracture  LOWER THORACIC DISC SPACES:  Significant disc disease T9-10 resulting in canal stenosis, cord compression, at 10 cord edema/gliosis  Dedicated MR of the thoracic spine suggested  LUMBAR DISC SPACES: L1-L2:  Normal  L2-L3:  Minor bulge  L3-L4:  Minor bulge  Marginal osteophytes L4-L5:  Degenerative grade 1 anterolisthesis  Advanced bilateral facet arthrosis  Potentially significant right foraminal stenosis, correlate for right L4 radiculitis  L5-S1:  Advanced facet arthrosis, grade 1 anterolisthesis, mild left foraminal stenosis  Impression: Potentially significant right foraminal stenosis L4-L5, correlate for right L4 radiculitis  Severe low lumbar osteoarthritis  Cord compression at the T9-T10 level related to disc disease  Cord edema/gliosis is present    Dedicated MR of the thoracic spine is suggested  Workstation performed: [id number]       PERTINENT LAB DATAS  Results for Elena Crawford (MRN [id number]) as of 4/4/2018 13:53   4/3/2018 12:01 4/3/2018 16:02 4/3/2018 20:24 4/4/2018 08:33 4/4/2018 10:52   POC Glucose  246 (H) 343 (H) 411 (H) 304 (H)   eGFR 53       Sodium 134 (L)       Potassium 5 0       Chloride 98 (L)       CO2 23       Anion Gap 13       BUN 29 (H)       Creatinine 1 24       Glucose 221 (H)         PHYSICAL EXAM:  Vitals:   Blood Pressure: 127/67 (04/04/18 0835)  Pulse: 104 (04/04/18 0835)  Temperature: 98 3 °F (36 8 °C) (04/04/18 0835)  Temp Source: Temporal (04/04/18 0835)  Respirations: 20 (04/04/18 0835)  Height: 5' 2" (157 5 cm) (03/30/18 2335)  Weight - Scale: 117 kg (257 lb 15 oz) (03/30/18 2335)  SpO2: 94 % (04/04/18 0835)    GENERAL: AAO x 3, obese  HEENT: atraumatic, normocephalic  Oral mucosa moist, no icterus, pallor  PERRLA +  Neck supple, no JVD, no lymphadenopathy, no thryomegaly  CHEST: B/L breath sounds heard, occasional wheezing  CVS: S1, S2  No cyanosis/clubbing or edema  ABDOMEN: Soft/obese/NT/BS heard  NEUROLOGICAL: CN II -XI grossly intact  4/5 B/L  LE power  No signs of meningeal irritation or cerebellar dysfunction  EXTREMITIES: Mild lumbar paraspinal tenderness, mild pain with RLE ROM     Discharge Disposition:  Transfer to [place], [place]     Facility: Transfer to [place], [place] ( Accepting physician: Dr Delia Tran MD)    Test Results Pending at Discharge: NA    Medications   Please see After Visit Summary for reconciled discharge medications provided to patient and family        Diet restrictions:        Diet Orders            Start     Ordered    04/02/18 0826  Room Service  Once     Question:  Type of Service  Answer:  Room Service-Appropriate    04/02/18 0825 03/31/18 1215  Diet Joe/CHO Controlled; Consistent Carbohydrate Diet Level 1 (4 carb servings/60 grams CHO/meal)  (ED Bridging Orders Panel)  Diet effective now Question Answer Comment   Diet Type Joe/CHO Controlled    Joe/CHO Controlled Consistent Carbohydrate Diet Level 1 (4 carb servings/60 grams CHO/meal)    RD to adjust diet per protocol? Yes        03/31/18 1214        Activity restrictions: No strenuous activity  Discharge Condition: fair      Code Status: Level 1 - Full Code    Discharge Statement   I spent 37 minutes discharging the patient  This time was spent on the day of discharge  Greater than 50% of total time was spent with the patient and / or family counseling and / or coordination of care  Nicole Li MD  HOSPITALIST SERVICES  4/4/2018    PLEASE NOTE:  This encounter was completed utilizing the [employer] Direct Speech Voice Recognition Software  Grammatical errors, random word insertions, pronoun errors and incomplete sentences are occasional consequences of the system due to software limitations, ambient noise and hardware issues  These may be missed by proof reading prior to affixing electronic signature  Any questions or concerns about the content, text or information contained within the body of this dictation should be directly addressed to the physician for clarification  Please do not hesitate to call me directly if you have any any questions or concerns

## 2018-04-04 NOTE — PROGRESS NOTES
Called by Adrián Espino attending regarding Mrs Jyothi Mendoza  She recently presented with progressive gait difficulty  MRI L and T-spine were performed and significant for a T9-10 thoracic disc with cord edema  Given her symptomatology and radiographic findings I have recommended transfer to [facility] for evaluation  She should have a CT of her thoracic spine performed to evaluate for disc calcification and aid in operative planning    Please consult Neurosurgery and contact us upon arrival

## 2018-04-04 NOTE — PLAN OF CARE
Problem: PHYSICAL THERAPY ADULT  Goal: Performs mobility at highest level of function for planned discharge setting  See evaluation for individualized goals  Treatment/Interventions: Functional transfer training, LE strengthening/ROM, Therapeutic exercise, Endurance training, Patient/family training, Equipment eval/education, Bed mobility, Gait training, Compensatory technique education          See flowsheet documentation for full assessment, interventions and recommendations  Outcome: Progressing  Prognosis: Fair  Problem List: Decreased strength, Decreased range of motion, Decreased endurance, Impaired balance, Decreased mobility, Obesity, Impaired judgement, Decreased safety awareness, Decreased cognition, Pain, Impaired sensation  Assessment: Pt  seated at EOB upon my arrival  Performance of HEP seated at EOB  Continues to require increased A with all transfers with cues provided for proper technique  Pt  remains impulsive during transfers requiring constant cueing for proper hand placement/increased safety awareness  Progressed with a limited amb  trial with use of RW and A of therapist with cues provided for improvement of LE sequencing  Remains limited by fatigue, with a quick transfer into bedside chair  Repositioned seated in bedside chair at end of treatment session  PT will continue to recommend rehab upon d/c for continued improvement of noted impairments above  Barriers to Discharge: Decreased caregiver support, Inaccessible home environment     Recommendation: Post acute IP rehab     PT - OK to Discharge: Yes (if d/c to rehab when medically stable )    See flowsheet documentation for full assessment

## 2018-04-04 NOTE — PROGRESS NOTES
Progress Note - Orthopedics   Shady Lam 74 y o  female MRN: [id number]  Unit/Bed#: E5 -01 Encounter: [id number]    Assessment:  Lumbar foraminal stenosis, thoracic disc herniation, severe R hip OA    Plan:I  Continue with PT/OT-WBAT with walker  Pain control prn  Med mgt per SLIM  Patient currently stable recommend follow up as out patient with spine surgery on discharge  Subjective:  Patient seen examined  Patient is sitting up in bed comfortably and states that she is comfortable as long as she is not trying to ambulate  She has been transferring while in the hospital but denies any gait training  When she has pain she states it is in the R lower back with radiation to the lateral aspect of the R hip  States that her weakness has been a ongoing issue that has limited her activity for months  She does rely on a aid or family members at home for help  She did have a fall at home at which time she states her legs just gave out on her so that is when she decided to come to the hospital and she has had more pain since  Since being in the hospital her pain seems to have improve but she is not sure since she has not tried to walk  Per old records patient last saw PM&R on 10/12/17  She has had a L5-S1 facet block in the past   She has also had bilateral lumbar medial branch blocks at L5-S1 in the past   It was also noted that she had a hip injection and has seen Dr Mukesh Canas in the past     Vitals: Blood pressure 127/67, pulse 104, temperature 98 3 °F (36 8 °C), temperature source Temporal, resp  rate 20, height 5' 2" (1 575 m), weight 117 kg (257 lb 15 oz), SpO2 94 %, not currently breastfeeding  ,Body mass index is 47 18 kg/m²      No intake or output data in the 24 hours ending 04/04/18 1125    Invasive Devices     Peripheral Intravenous Line            Peripheral IV 12/20/17 Right Arm 105 days                Ortho Exam:   B/L LE:  L EHL/AT/GS/quads/hamstrings/iliopsoas 5/5, R AT/GS 5/5, EHL 5/5, Quads/hamstrings/iliopsoas 3/5, sensation grossly intact L4, L5, S1, palpable pedal pulse  R hip:  Pain with AROM/PROM in the aspect of the R gluteal/ SI area with some radiation to the R groin  Back: negative SLR B/L, negative clonus    Lab, Imaging and other studies:  MRI lumbar spine:  L3-4 mild bilateral foraminal stenosis, L4-L5 disc herniation with bilateral foraminal stenosis-severe on the right, L5-S1 disc herniation with moderate bilateral foraminal stenosis, right worse than left, T10/11 disc herniation, T11-T12 small disc herniation    MRI Thoracici spine: There is broad-based the thoracic disc herniation at T9-T10 level along with the ligamentous hypertrophy with severe central canal narrowing with associated the increased signal intensity within the cord which may be due to cord edema,/gliosis related to cord impingement  There is associated bilateral moderate-to-severe foraminal narrowing    Mild central canal narrowing at the T8-9 level with the broad-based ridging with disc and osteophyte complex, Mild ridging at T7-8 level without significant central canal narrowing or foraminal narrowing, Moderate to severe bilateral foraminal narrowing at T10-11, T9-10 and severe left foraminal narrowing at T11-12

## 2018-04-04 NOTE — PHYSICAL THERAPY NOTE
Physical Therapy Progress Note     04/04/18 1208   Pain Assessment   Pain Assessment 0-10   Pain Score 4   Pain Type Acute pain   Pain Location Back   Pain Orientation Bilateral;Left   Hospital Pain Intervention(s) Ambulation/increased activity;Repositioned   Response to Interventions Tolerated  Restrictions/Precautions   Weight Bearing Precautions Per Order No   Other Precautions Cognitive; Fall Risk;O2;Multiple lines;Pain   General   Chart Reviewed Yes   Response to Previous Treatment Patient reporting fatigue but able to participate  Family/Caregiver Present Yes   Subjective   Subjective Willing to participate in therapy this AM    Transfers   Sit to Stand 3  Moderate assistance   Additional items Assist x 1;Bedrails; Increased time required; Impulsive;Verbal cues   Stand to Sit 3  Moderate assistance   Additional items Assist x 1; Armrests; Increased time required; Impulsive;Verbal cues   Ambulation/Elevation   Gait pattern Wide SELENE; Decreased foot clearance; Forward Flexion; Short stride; Excessively slow; Step to   Gait Assistance 3  Moderate assist   Additional items Assist x 1;Verbal cues; Tactile cues; Other (Comment)  (second present for line management)   Assistive Device Rolling walker   Distance 10'   Balance   Static Sitting Good   Dynamic Sitting Fair +   Static Standing Poor +   Dynamic Standing Poor   Ambulatory Poor +   Endurance Deficit   Endurance Deficit Yes   Endurance Deficit Description fatigue/pain   Activity Tolerance   Activity Tolerance Patient limited by fatigue;Patient limited by pain   Nurse Made Aware Yes   Exercises   TKR Sitting;10 reps;AROM; Bilateral   Assessment   Prognosis Fair   Problem List Decreased strength;Decreased range of motion;Decreased endurance; Impaired balance;Decreased mobility;Obesity; Impaired judgement;Decreased safety awareness;Decreased cognition;Pain; Impaired sensation   Assessment Pt  seated at EOB upon my arrival  Performance of HEP seated at EOB   Continues to require increased A with all transfers with cues provided for proper technique  Pt  remains impulsive during transfers requiring constant cueing for proper hand placement/increased safety awareness  Progressed with a limited amb  trial with use of RW and A of therapist with cues provided for improvement of LE sequencing  Remains limited by fatigue, with a quick transfer into bedside chair  Repositioned seated in bedside chair at end of treatment session  PT will continue to recommend rehab upon d/c for continued improvement of noted impairments above  Barriers to Discharge Decreased caregiver support; Inaccessible home environment   Goals   Patient Goals To rest    STG Expiration Date 04/08/18   Treatment Day 2   Plan   Treatment/Interventions Functional transfer training;LE strengthening/ROM; Endurance training; Therapeutic exercise;Gait training;Spoke to case management;Spoke to nursing;OT;Family   Progress Slow progress, decreased activity tolerance   PT Frequency 5x/wk   Recommendation   Recommendation Post acute IP rehab   Equipment Recommended Walker  (RW)   PT - OK to Discharge Yes  (if d/c to rehab when medically stable )     Heidi Enriquez, PTA

## 2018-04-04 NOTE — ASSESSMENT & PLAN NOTE
Diagnosed in 1986  Was never treated for it  As per the patient she never any problems with that  Will check HIV Ag/antibody and CD4 count for now  If positive can check viral load and consult ID

## 2018-04-04 NOTE — CASE MANAGEMENT
Continued Stay Review    Date: 4/4/18    Vital Signs: /67 (BP Location: Left arm)   Pulse 104   Temp 98 3 °F (36 8 °C) (Temporal)   Resp 20   Ht 5' 2" (1 575 m)   Wt 117 kg (257 lb 15 oz)   LMP  (LMP Unknown)   SpO2 94%   BMI 47 18 kg/m²     Medications:   Scheduled Meds:   Current Facility-Administered Medications:  acetaminophen 650 mg Oral Q6H PRN Bonita Childes, PA-C   aluminum-magnesium hydroxide-simethicone 30 mL Oral Q6H PRN Bonita Childes, PA-C   aspirin 81 mg Oral Daily Kaylee Childes, PA-C   atorvastatin 20 mg Oral Daily With [employer], PA-C   buPROPion 300 mg Oral Daily Kaylee Childes, PA-C   cholecalciferol 1,000 Units Oral Daily Bonita Childes, PA-C   dexamethasone 4 mg Oral Q6H [facility] Samir Root MD   diltiazem 180 mg Oral Daily Bonita Childes, PA-C   enoxaparin 40 mg Subcutaneous Daily Bonita Childes, PA-C   fluticasone-salmeterol 1 puff Inhalation Q12H [facility] Childes, PA-C   gabapentin 800 mg Oral TID Kaylee Childes, PA-C   HYDROmorphone 1 mg Oral Q4H PRN Jackson Doll MD   insulin glargine 18 Units Subcutaneous HS Dana Wallace MD   insulin lispro 1-6 Units Subcutaneous TID AC Bonita Childes, PA-C   insulin lispro 3 Units Subcutaneous TID With Meals Jackson Doll MD   levothyroxine 100 mcg Oral Early Morning Kaylee Childes, PA-C   lidocaine 1 patch Transdermal Daily Bonita Childes, PA-C   metFORMIN 500 mg Oral Daily With Breakfast Bonita Childes, PA-C   methocarbamol 750 mg Oral Q6H PRN Bonita Childes, PA-C   mirtazapine 7 5 mg Oral HS Bonita Childes, PA-C   montelukast 10 mg Oral HS Bonita Childes, PA-C   morphine injection 2 mg Intravenous Once Samir Root MD   nicotine 1 patch Transdermal Daily Bonita Childes, PA-GUILLE   ondansetron 4 mg Intravenous Q6H PRN Bonita Childes, PA-C   oxyCODONE 10 mg Oral Q12H [facility] Jackson Doll MD   QUEtiapine 200 mg Oral HS Kaylee Lora PA-C   tiotropium 18 mcg Inhalation Daily Swetha Nowak LEA Beck   topiramate 50 mg Oral Q12H [facility] Maximiliano Deng PA-C     Continuous Infusions:    PRN Meds:   acetaminophen    aluminum-magnesium hydroxide-simethicone    HYDROmorphone X3    methocarbamol    ondansetron    Abnormal Labs/Diagnostic Results:   ,304    4/3 MRI THORACIC SPINE:There is broad-based the thoracic disc herniation at T9-T10 level along with the ligamentous hypertrophy with severe central canal narrowing with associated the increased signal intensity within the cord which may be due to cord   edema,/gliosis related to cord impingement  There is associated bilateral moderate-to-severe foraminal narrowing  This was also seen on the previous MRI of the lumbar spine performed on March 31, 2018     Mild central canal narrowing at the T8-9 level with the broad-based ridging with disc and osteophyte complex     Mild ridging at T7-8 level without significant central canal narrowing or foraminal narrowing     Moderate to severe bilateral foraminal narrowing at T10-11, T9-10 and severe left foraminal narrowing at T11-12    Age/Sex: 74 y o  female     Assessment/Plan:   * Hip pain   Assessment & Plan     · MRI thoracic medicine from 04/03: There is broad-based the thoracic disc herniation at T9-T10 level along with the ligamentous hypertrophy with severe central canal narrowing with associated the increased signal intensity within the cord which may be due to cord  edema,/gliosis related to cord impingement   There is associated bilateral moderate-to-severe foraminal narrowing  This was also seen on the previous MRI of the lumbar spine performed on March 31, 2018  Mild central canal narrowing at the T8-9 level with the broad-based ridging with disc and osteophyte complex  Mild ridging at T7-8 level without significant central canal narrowing or foraminal narrowing   Moderate to severe bilateral foraminal narrowing at T10-11, T9-10 and severe left foraminal narrowing at T11-12  · MRI LUMBAR spine from 04/01: Potentially significant right foraminal stenosis L4-L5, correlate for right L4 radiculitis   Severe low lumbar osteoarthritis  Cord compression at the T9-T10 level related to disc disease   ord edema/gliosis is present  · Patient currently on oral Decadron  · Continue with OxyContin, and Dilaudid as needed orally -- adequate response  · Orthopedic surgery on board --reconsulted them again to comment on results of MRI thoracic spine from yesterday  · If no surgical intervention, plan for inpatient rehab              Back pain   Assessment & Plan     · Refer above please           COPD (chronic obstructive pulmonary disease) ([facility] )   Assessment & Plan     · Patient is on chronic home oxygen at 3 liters/minute  · COPD at baseline not in acute exacerbation  · Continue with Singulair, Spiriva, Advair and Decadron          Type 2 diabetes mellitus with hyperglycemia (HCC)   Assessment & Plan     · Blood sugars uncontrolled most likely secondary to Decadron  · Start patient on Lantus, Humalog and sliding scale insulin to continue  · Continue on metformin, sliding scale insulin  · Hemoglobin A1c at 6 7 from 03/31/2018           Hypertension   Assessment & Plan     · Blood pressure is reasonable  · Continue Cardizem           Bipolar 1 disorder [facility])   Assessment & Plan     · Patient requesting to be seen by Psychiatry  · Continue Seroquel           HIV (human immunodeficiency virus infection) ([facility] )   Assessment & Plan     · Patient states her Cd4 count has been good  · No CD4 count in our system  · Patient difficulty IV access  · Patient not on any anti retrovirals  · Patient diagnosed in 12           Tobacco use   Assessment & Plan     · Patient continues smoke 1-2 cigarettes per day    · Consult smoking cessation, patient currently nicotine patch                  Discharge Plan: TO BE DETERMINED

## 2018-04-04 NOTE — ASSESSMENT & PLAN NOTE
Due to thoracic disc herniation and lumbar foraminal stenosis  Continue oxy and Dilaudid for now the patient came in with as it is working well for her

## 2018-04-05 PROBLEM — F31.9 BIPOLAR DISORDER (HCC): Status: RESOLVED | Noted: 2018-04-05 | Resolved: 2018-04-05

## 2018-04-05 PROBLEM — F31.9 BIPOLAR DISORDER (HCC): Status: ACTIVE | Noted: 2018-04-05

## 2018-04-05 PROBLEM — D72.829 LEUKOCYTOSIS: Status: ACTIVE | Noted: 2018-04-05

## 2018-04-05 PROBLEM — F31.32 BIPOLAR DISORDER, CURRENT EPISODE DEPRESSED, MODERATE (HCC): Status: ACTIVE | Noted: 2018-04-05

## 2018-04-05 LAB
ANION GAP SERPL CALCULATED.3IONS-SCNC: 7 MMOL/L (ref 4–13)
BUN SERPL-MCNC: 36 MG/DL (ref 5–25)
CALCIUM SERPL-MCNC: 10.4 MG/DL (ref 8.3–10.1)
CHLORIDE SERPL-SCNC: 104 MMOL/L (ref 100–108)
CO2 SERPL-SCNC: 25 MMOL/L (ref 21–32)
CREAT SERPL-MCNC: 0.99 MG/DL (ref 0.6–1.3)
ERYTHROCYTE [DISTWIDTH] IN BLOOD BY AUTOMATED COUNT: 17.1 % (ref 11.6–15.1)
EST. AVERAGE GLUCOSE BLD GHB EST-MCNC: 151 MG/DL
GFR SERPL CREATININE-BSD FRML MDRD: 69 ML/MIN/1.73SQ M
GLUCOSE SERPL-MCNC: 154 MG/DL (ref 65–140)
GLUCOSE SERPL-MCNC: 154 MG/DL (ref 65–140)
GLUCOSE SERPL-MCNC: 163 MG/DL (ref 65–140)
GLUCOSE SERPL-MCNC: 213 MG/DL (ref 65–140)
GLUCOSE SERPL-MCNC: 263 MG/DL (ref 65–140)
HBA1C MFR BLD: 6.9 % (ref 4.2–6.3)
HCT VFR BLD AUTO: 37.1 % (ref 34.8–46.1)
HGB BLD-MCNC: 11.4 G/DL (ref 11.5–15.4)
MCH RBC QN AUTO: 26.9 PG (ref 26.8–34.3)
MCHC RBC AUTO-ENTMCNC: 30.7 G/DL (ref 31.4–37.4)
MCV RBC AUTO: 88 FL (ref 82–98)
PLATELET # BLD AUTO: 399 THOUSANDS/UL (ref 149–390)
PMV BLD AUTO: 9.2 FL (ref 8.9–12.7)
POTASSIUM SERPL-SCNC: 4.6 MMOL/L (ref 3.5–5.3)
RBC # BLD AUTO: 4.24 MILLION/UL (ref 3.81–5.12)
SODIUM SERPL-SCNC: 136 MMOL/L (ref 136–145)
WBC # BLD AUTO: 13.6 THOUSAND/UL (ref 4.31–10.16)

## 2018-04-05 PROCEDURE — 97110 THERAPEUTIC EXERCISES: CPT

## 2018-04-05 PROCEDURE — 99222 1ST HOSP IP/OBS MODERATE 55: CPT | Performed by: PSYCHIATRY & NEUROLOGY

## 2018-04-05 PROCEDURE — 99233 SBSQ HOSP IP/OBS HIGH 50: CPT | Performed by: INTERNAL MEDICINE

## 2018-04-05 PROCEDURE — G8978 MOBILITY CURRENT STATUS: HCPCS

## 2018-04-05 PROCEDURE — 97163 PT EVAL HIGH COMPLEX 45 MIN: CPT

## 2018-04-05 PROCEDURE — 82948 REAGENT STRIP/BLOOD GLUCOSE: CPT

## 2018-04-05 PROCEDURE — 85027 COMPLETE CBC AUTOMATED: CPT | Performed by: INTERNAL MEDICINE

## 2018-04-05 PROCEDURE — 86361 T CELL ABSOLUTE COUNT: CPT | Performed by: INTERNAL MEDICINE

## 2018-04-05 PROCEDURE — 94760 N-INVAS EAR/PLS OXIMETRY 1: CPT

## 2018-04-05 PROCEDURE — 94660 CPAP INITIATION&MGMT: CPT

## 2018-04-05 PROCEDURE — G8979 MOBILITY GOAL STATUS: HCPCS

## 2018-04-05 PROCEDURE — 80048 BASIC METABOLIC PNL TOTAL CA: CPT | Performed by: INTERNAL MEDICINE

## 2018-04-05 PROCEDURE — 99222 1ST HOSP IP/OBS MODERATE 55: CPT | Performed by: PHYSICIAN ASSISTANT

## 2018-04-05 RX ADMIN — DEXAMETHASONE 4 MG: 4 TABLET ORAL at 17:05

## 2018-04-05 RX ADMIN — VITAMIN D, TAB 1000IU (100/BT) 1000 UNITS: 25 TAB at 08:24

## 2018-04-05 RX ADMIN — LEVOTHYROXINE SODIUM 100 MCG: 100 TABLET ORAL at 05:18

## 2018-04-05 RX ADMIN — DILTIAZEM HYDROCHLORIDE 180 MG: 180 CAPSULE, EXTENDED RELEASE ORAL at 08:26

## 2018-04-05 RX ADMIN — ATORVASTATIN CALCIUM 20 MG: 20 TABLET, FILM COATED ORAL at 17:04

## 2018-04-05 RX ADMIN — DEXAMETHASONE 4 MG: 4 TABLET ORAL at 11:52

## 2018-04-05 RX ADMIN — QUETIAPINE 200 MG: 100 TABLET ORAL at 21:31

## 2018-04-05 RX ADMIN — INSULIN GLARGINE 22 UNITS: 100 INJECTION, SOLUTION SUBCUTANEOUS at 21:31

## 2018-04-05 RX ADMIN — NITROFURANTOIN (MONOHYDRATE/MACROCRYSTALS) 100 MG: 75; 25 CAPSULE ORAL at 17:04

## 2018-04-05 RX ADMIN — INSULIN LISPRO 5 UNITS: 100 INJECTION, SOLUTION INTRAVENOUS; SUBCUTANEOUS at 17:03

## 2018-04-05 RX ADMIN — GABAPENTIN 800 MG: 400 CAPSULE ORAL at 21:31

## 2018-04-05 RX ADMIN — OXYCODONE HYDROCHLORIDE 10 MG: 10 TABLET, FILM COATED, EXTENDED RELEASE ORAL at 21:31

## 2018-04-05 RX ADMIN — MONTELUKAST SODIUM 10 MG: 10 TABLET, COATED ORAL at 21:31

## 2018-04-05 RX ADMIN — INSULIN LISPRO 3 UNITS: 100 INJECTION, SOLUTION INTRAVENOUS; SUBCUTANEOUS at 11:52

## 2018-04-05 RX ADMIN — GABAPENTIN 800 MG: 400 CAPSULE ORAL at 17:04

## 2018-04-05 RX ADMIN — HYDROMORPHONE HYDROCHLORIDE 1 MG: 2 TABLET ORAL at 17:10

## 2018-04-05 RX ADMIN — DEXAMETHASONE 4 MG: 4 TABLET ORAL at 05:18

## 2018-04-05 RX ADMIN — INSULIN LISPRO 5 UNITS: 100 INJECTION, SOLUTION INTRAVENOUS; SUBCUTANEOUS at 11:52

## 2018-04-05 RX ADMIN — INSULIN LISPRO 1 UNITS: 100 INJECTION, SOLUTION INTRAVENOUS; SUBCUTANEOUS at 17:03

## 2018-04-05 RX ADMIN — BUPROPION HYDROCHLORIDE 300 MG: 150 TABLET, FILM COATED, EXTENDED RELEASE ORAL at 08:24

## 2018-04-05 RX ADMIN — MIRTAZAPINE 7.5 MG: 15 TABLET, FILM COATED ORAL at 21:31

## 2018-04-05 RX ADMIN — INSULIN LISPRO 5 UNITS: 100 INJECTION, SOLUTION INTRAVENOUS; SUBCUTANEOUS at 08:27

## 2018-04-05 RX ADMIN — TOPIRAMATE 50 MG: 25 TABLET, FILM COATED ORAL at 21:31

## 2018-04-05 RX ADMIN — OXYCODONE HYDROCHLORIDE 10 MG: 10 TABLET, FILM COATED, EXTENDED RELEASE ORAL at 08:24

## 2018-04-05 RX ADMIN — GABAPENTIN 800 MG: 400 CAPSULE ORAL at 08:24

## 2018-04-05 RX ADMIN — ENOXAPARIN SODIUM 40 MG: 40 INJECTION SUBCUTANEOUS at 08:25

## 2018-04-05 RX ADMIN — TIOTROPIUM BROMIDE 18 MCG: 18 CAPSULE ORAL; RESPIRATORY (INHALATION) at 08:27

## 2018-04-05 RX ADMIN — LIDOCAINE 1 PATCH: 50 PATCH TOPICAL at 08:24

## 2018-04-05 RX ADMIN — FLUTICASONE PROPIONATE AND SALMETEROL 1 PUFF: 50; 250 POWDER RESPIRATORY (INHALATION) at 21:30

## 2018-04-05 RX ADMIN — HYDROMORPHONE HYDROCHLORIDE 1 MG: 2 TABLET ORAL at 12:02

## 2018-04-05 RX ADMIN — TOPIRAMATE 50 MG: 25 TABLET, FILM COATED ORAL at 08:24

## 2018-04-05 RX ADMIN — NICOTINE 1 PATCH: 14 PATCH, EXTENDED RELEASE TRANSDERMAL at 08:24

## 2018-04-05 RX ADMIN — NITROFURANTOIN (MONOHYDRATE/MACROCRYSTALS) 100 MG: 75; 25 CAPSULE ORAL at 08:27

## 2018-04-05 RX ADMIN — FLUTICASONE PROPIONATE AND SALMETEROL 1 PUFF: 50; 250 POWDER RESPIRATORY (INHALATION) at 08:26

## 2018-04-05 RX ADMIN — METHOCARBAMOL 750 MG: 750 TABLET ORAL at 12:03

## 2018-04-05 RX ADMIN — INSULIN LISPRO 1 UNITS: 100 INJECTION, SOLUTION INTRAVENOUS; SUBCUTANEOUS at 08:26

## 2018-04-05 NOTE — SOCIAL WORK
Cm reviewed role of cm to pt at bedside, and spoke with daughter Horacio Steele [phone] via telephone  Pt lives with daughter in 2 story home  PTA ADLs, uses cane  Pt has O2 delivered to home [facility]  Pt reports she has a nurses aide Vanessa Forte that comes to assist her 3x weekly, but does not recall agency  Pt reports her bipolar disorder is treated through her PCP  Pt has used [facility] in the past, pt and daughter agreeable to refer sent to facility  Horacio Steele reports pt was IV drug user, has been sober 10 yrs with no inpatient treatment       Cm sent referral to [facility]      ~I have reviewed and agree to above documentation~  Dion Reyes RN

## 2018-04-05 NOTE — ASSESSMENT & PLAN NOTE
- transferred from the [facility] for neurosurgery evaluation  - MRI revealed disc herniation at the T9-T10 level - and her CT imaging confirmed a similar findings with stenosis and to a lesser degree at the T7-T8 and T8-T9 levels   - PRN pain control - continue Decadron regimen for anti inflammatory/swelling effect  -s/p T9-11 fusion with T9-10 discectomy  -f/u NS recs

## 2018-04-05 NOTE — PHYSICAL THERAPY NOTE
Physical Therapy Initial Evaluation   Kaushik Valdez, PT   04/05/18 1023   Note Type   Note type Eval only   Pain Assessment   Pain Assessment 0-10   Pain Score 8   Pain Location Back; Hip   Pain Orientation Right   Hospital Pain Intervention(s) Repositioned; Ambulation/increased activity; Emotional support   Response to Interventions tolerated  Home Living   Type of [address] Two level;Bed/bath upstairs;Stairs to enter without rails  (full flight with rails to 2nd floor)   Bathroom Shower/Tub Tub/shower unit   Bathroom Toilet Standard   Bathroom Equipment Grab bars in shower; Shower chair   [address] Walker;Cane;Grab bars   Additional Comments Has home health aide 8am-2pm but alone 2 hrs/day   Prior Function   Level of [place] Needs assistance with IADLs; Needs assistance with ADLs and functional mobility   Lives With Daughter   [address] [facility] Needs assistance   IADLs Needs assistance   Falls in the last 6 months 5 to 10   Vocational (not working  )   Restrictions/Precautions   [place] Bearing Precautions Per Order No   Other Precautions Cognitive; Fall Risk;O2;Pain   General   Additional Pertinent History dx: transferred from [facility] for possible surg for thoracic disc herniation T9-10  PMH: mult recent falls at home, lumbar spinal stenosis, urinary frequency, HTN, + HIV, arthritis, Bipolar disorder, L4 radiculitis, + smoker, LE weakness, morbid obesity, ambulatory dysfunction  Family/Caregiver Present No   Cognition   Overall Cognitive Status Impaired   Arousal/Participation Alert   Attention Within functional limits   Orientation Level Oriented X4   Memory Decreased long term memory   Following Commands Follows one step commands with increased time or repetition   Comments Pleasant and cooperative      RLE Assessment   RLE Assessment (4-/5 but fluctuating during ADL's  )   LLE Assessment   LLE Assessment (4-/5 but fluctuating during ADL's  LE's give out  )   Coordination   Movements are Fluid and Coordinated 0  (Becomes ataxic when LE's weaken while walking  )   Sensation (numbness/tingling all 4's intermittently  )   Bed Mobility   Rolling R (Pt seated in recliner upon PT's entry into room  )   Additional Comments Pt remained seated in recliner with call bell on tray in front after completion of eval   Pt agreeable to calling for RN assist as needed for mobility  RN aware pt OOB  RN ok with no chair alarm  Transfers   Sit to Stand 5  Supervision   Additional items Assist x 1; Increased time required;Verbal cues   Stand to Sit 5  Supervision   Additional items Assist x 1; Impulsive;Verbal cues   Toilet transfer 5  Supervision   Additional items Assist x 1; Increased time required;Standard toilet  (grab bar used)   Additional Comments LE's tend to give out in upright positions due to transient weakness/fluctuating strength  Ambulation/Elevation   Gait pattern Improper Weight shift; Forward Flexion; Short stride; Excessively slow  (Kept RW too far anterior to body  Improved with VC's  )   Gait Assistance 5  Supervision   Additional items Assist x 1;Verbal cues   Assistive Device Rolling walker   Distance 6'X2 while on 2 L O2     (O2 sat's 97%   Pt with min-mod SOB  )   Stair Management Assistance Not tested   Balance   Static Sitting Good   Dynamic Sitting Fair +   Static Standing Fair -   Dynamic Standing Fair -   Ambulatory Poor +   Endurance Deficit   Endurance Deficit Yes   Endurance Deficit Description SOB/ LE weakness/ gen weakness  Activity Tolerance   Activity Tolerance Patient limited by fatigue;Patient limited by pain; Other (Comment)  (SOB  Was on 2 L O2 with sat's at 97%  )   Medical Staff Made Aware RNRuddy Plummer consented to allow pt to participate in PT eval   RN made aware of outcome of session  Nurse Made Aware yes   Assessment   Prognosis Fair   Problem List Decreased strength;Decreased endurance; Impaired balance;Decreased mobility; Decreased coordination;Decreased safety awareness; Obesity;Pain   Assessment Pt is 74 y o  female seen for PT evaluation s/p admit to [facility] on 4/4/2018 w/ Thoracic disc herniation  Pt was transferred from [facility] for neurosurg eval for possible surgery  PT consulted to assess pt's functional mobility and d/c needs  Order placed for PT eval and tx, w/ activity as tolerated order  Comorbidities affecting pt's physical performance at time of assessment include: hx of multiple falls at home, arthritis, obesity, hx of + HIV without rx with dx many yrs ago, lumbar spinal stenosis, urinary frequency, L 4 radiculitis, + smoker, and Bipolar disorder    PTA, pt was requiring A for mobility, ambulates household distances, has 2+1 SARAVANAN, lives w/ her daughter who works in 2 story level home and has home health daily from 8am to 2pm with only 2 hrs of alone time/day  Pt has hx of multiple falls at home    Personal factors affecting pt at time of IE include: lives in 2 story house, ambulating w/ assistive device, stairs to enter home, inability to ambulate household distances, inability to navigate level surfaces w/o external assistance, decreased cognition, positive fall history, limited insight into impairments, inability to perform IADLs, inability to perform ADLs, inability to live alone and is currently presenting with below baseline mobility skill level  Pt currenlty presenting with LE weakness that progressively worsens with increased distance ambulated as well as with slight ataxia that increases with increased upright time as well  This greatly negatively impacts pt's safety putting her at increased risk for falls    Please find objective findings from PT assessment regarding body systems outlined above with impairments and limitations including weakness, impaired balance, decreased endurance, impaired coordination, gait deviations, pain, decreased activity tolerance, decreased functional mobility tolerance, decreased safety awareness, fall risk, SOB upon exertion and decreased cognition  The following objective measures performed on IE also reveal limitations: Barthel Index: 50/100  Pt's clinical presentation is currently unstable/unpredictable seen in pt's presentation of pt most likely requiring upcoming surgery, pt with hx of both Bipolar disorder and untreated HIV, pt with hx of multiple falls at home, and pending and abnormal lab values present  Pt to benefit from continued PT tx to address deficits as defined above and maximize level of functional independent mobility and consistency  From PT/mobility sta   Barriers to Discharge Decreased caregiver support  (cannot make 1st floor arrangments as per pt report  )   Goals   Patient Goals Pt didn't express goals for PT rx's  Pt was fully cooperative, however  STG Expiration Date 04/15/18   Short Term Goal #1 1  Bed mobility activities performed with close supervision   2  Transfers with RW, supplimental O2, distant supervision and correct technique with re: to RW use  (Keeps body inside RW )  3  Ambulation 48' with RW, good safety, close supervision and correct RW technique  4  LE strength improves by 1/2 MMT in 10 days  Treatment Day 0   Plan   Treatment/Interventions Functional transfer training;LE strengthening/ROM; Patient/family training;Equipment eval/education; Bed mobility;Gait training;Spoke to nursing;Spoke to case management   PT Frequency 5x/wk   Recommendation   Recommendation Other (Comment)  (inpt rehab  )   Equipment Recommended Conan Boxer   PT - OK to Discharge Yes  (But dependent on medical status/possible interventions  )   Barthel Index   Feeding 10   Bathing 0   Grooming Score 5   Dressing Score 5   Bladder Score 5   Bowels Score 10   Toilet Use Score 5   Transfers (Bed/Chair) Score 10   Mobility (Level Surface) Score 0   Stairs Score 0   Barthel Index Score 50

## 2018-04-05 NOTE — PLAN OF CARE
INFECTION - ADULT     Absence or prevention of progression during hospitalization Progressing     Absence of fever/infection during neutropenic period Progressing        Knowledge Deficit     Patient/family/caregiver demonstrates understanding of disease process, treatment plan, medications, and discharge instructions Progressing        PAIN - ADULT     Verbalizes/displays adequate comfort level or baseline comfort level Progressing        SAFETY ADULT     Patient will remain free of falls Progressing     Maintain or return to baseline ADL function Progressing     Maintain or return mobility status to optimal level Progressing

## 2018-04-05 NOTE — PLAN OF CARE
Problem: PHYSICAL THERAPY ADULT  Goal: Performs mobility at highest level of function for planned discharge setting  See evaluation for individualized goals  Treatment/Interventions: Functional transfer training, LE strengthening/ROM, Patient/family training, Equipment eval/education, Bed mobility, Gait training, Spoke to nursing, Spoke to case management  Equipment Recommended: Sandy Duran       See flowsheet documentation for full assessment, interventions and recommendations  Prognosis: Fair  Problem List: Decreased strength, Decreased endurance, Impaired balance, Decreased mobility, Decreased coordination, Decreased safety awareness, Obesity, Pain  Assessment: Pt is 74 y o  female seen for PT evaluation s/p admit to [facility] on 4/4/2018 w/ Thoracic disc herniation  Pt was transferred from [facility] for neurosurg eval for possible surgery  PT consulted to assess pt's functional mobility and d/c needs  Order placed for PT eval and tx, w/ activity as tolerated order  Comorbidities affecting pt's physical performance at time of assessment include: hx of multiple falls at home, arthritis, obesity, hx of + HIV without rx with dx many yrs ago, lumbar spinal stenosis, urinary frequency, L 4 radiculitis, + smoker, and Bipolar disorder    PTA, pt was requiring A for mobility, ambulates household distances, has 2+1 SARAVANAN, lives w/ her daughter who works in 2 story level home and has home health daily from 8am to 2pm with only 2 hrs of alone time/day  Pt has hx of multiple falls at home     Personal factors affecting pt at time of IE include: lives in 2 story house, ambulating w/ assistive device, stairs to enter home, inability to ambulate household distances, inability to navigate level surfaces w/o external assistance, decreased cognition, positive fall history, limited insight into impairments, inability to perform IADLs, inability to perform ADLs, inability to live alone and is currently presenting with below baseline mobility skill level  Pt óscar presenting with LE weakness that progressively worsens with increased distance ambulated as well as with slight ataxia that increases with increased upright time as well  This greatly negatively impacts pt's safety putting her at increased risk for falls  Please find objective findings from PT assessment regarding body systems outlined above with impairments and limitations including weakness, impaired balance, decreased endurance, impaired coordination, gait deviations, pain, decreased activity tolerance, decreased functional mobility tolerance, decreased safety awareness, fall risk, SOB upon exertion and decreased cognition  The following objective measures performed on IE also reveal limitations: Barthel Index: 50/100  Pt's clinical presentation is currently unstable/unpredictable seen in pt's presentation of pt most likely requiring upcoming surgery, pt with hx of both Bipolar disorder and untreated HIV, pt with hx of multiple falls at home, and pending and abnormal lab values present  Pt to benefit from continued PT tx to address deficits as defined above and maximize level of functional independent mobility and consistency  From PT/mobility sta  Barriers to Discharge: Decreased caregiver support (cannot make 1st floor arrangments as per pt report  )     Recommendation: Other (Comment) (inpt rehab  )     PT - OK to Discharge: Yes (But dependent on medical status/possible interventions  )    See flowsheet documentation for full assessment

## 2018-04-05 NOTE — SOCIAL WORK
Cm received a return phone call from patient's daughter Charlotte Benson, who reported that patient lives with her in a 2 story home with 2 steps to enter with rails  Patient has a cane, c-pap, and nebulizer at home  Patient has not followed up with a psychiatrist as recommended by her PCP  Patient's HHA agency is through [address] [place] [address] with an aide provided from 8am-2pm  Patient's preferred pharmacy of choice is [facility] on [address] [facility]  Patient's PCP is through [facility] in [facility]  [employer] process started and psych consult pending

## 2018-04-05 NOTE — CASE MANAGEMENT
Continued Stay Review    Date: 4/4 Transfer from [address] [place] [address] Unit  Pt at [facility] from 3/30 thru 4/4 04/04/18 1820  Inpatient Admission Once     Transfer Service: General Medicine       Question Answer Comment   Admitting Physician Weston SWANSON    Level of Care Med Surg    Estimated length of stay More than 2 Midnights    Certification I certify that inpatient services are medically necessary for this patient for a duration of greater than two midnights  See H&P and MD Progress Notes for additional information about the patient's course of treatment                Vital Signs: /95 (BP Location: Left arm)   Pulse 93   Temp 97 8 °F (36 6 °C) (Oral)   Resp 18   Ht 5' 2" (1 575 m)   Wt 90 7 kg (200 lb)   LMP 04/01/2013 (Approximate)   SpO2 94%   BMI 36 58 kg/m²     Medications:   Scheduled Meds:   Current Facility-Administered Medications:  atorvastatin 20 mg Oral Daily With Dinner   buPROPion 300 mg Oral Daily   cholecalciferol 1,000 Units Oral Daily   dexamethasone 4 mg Oral Q6H [address]   diltiazem 180 mg Oral Daily   enoxaparin 40 mg Subcutaneous Daily   fluticasone-salmeterol 1 puff Inhalation Q12H CARLOS   gabapentin 800 mg Oral TID   HYDROmorphone 1 mg Oral Q4H PRN   insulin glargine 22 Units Subcutaneous HS   insulin lispro 1-6 Units Subcutaneous TID AC   insulin lispro 5 Units Subcutaneous TID With Meals   levothyroxine 100 mcg Oral Early Morning   lidocaine 1 patch Transdermal Daily   methocarbamol 750 mg Oral Q6H PRN   mirtazapine 7 5 mg Oral HS   montelukast 10 mg Oral HS   nicotine 1 patch Transdermal Daily   nitrofurantoin 100 mg Oral BID With Meals   oxyCODONE 10 mg Oral Q12H [address]   QUEtiapine 200 mg Oral HS   tiotropium 18 mcg Inhalation Daily   topiramate 50 mg Oral Q12H [address]     Continuous Infusions:    PRN Meds:   acetaminophen    aluminum-magnesium hydroxide-simethicone    HYDROmorphone    methocarbamol    ondansetron    Abnormal Labs/Diagnostic Results: CT Spine Thoracic - Marked degeneration of the thoracic spine as described  Multifactorial canal and foraminal stenosis at the T9-10 level as described  Changes, although less significant at T8-9 and T7-T8      04/04/18 1935    WBC 4 31 - 10 16 Thousand/uL 14 62         Age/Sex: 74 y o  female     Assessment/Plan:   Transferred from [facility] for Neurosurgical eval for thoracic intervertebral disc herniation with lower extremity weakness and ambulatory dysfunction      * Thoracic disc herniation   Assessment & Plan     · MRI thoracic medicine from 04/03:  broad-based the thoracic disc herniation at T9-T10 level along with the ligamentous hypertrophy with severe central canal narrowing with possible cord  edema,/gliosis related to cord impingement   Also bilateral moderate-to-severe foraminal narrowing  · Patient currently on oral Decadron, OxyContin, and Dilaudid as needed orally -- continue  · Case was discussed with Neurosurgery Dr Franco Burks ed transfer to [facility] possible operative intervention  · Patient will require CT thoracic spine without contrast to evaluate for disc calcification and aid in operative planning  Ordered           Neural foraminal stenosis of lumbar spine   Assessment & Plan     MRI LUMBAR spine from 04/01: Potentially significant right foraminal stenosis L4-L5, correlate for right L4 radiculitis   Severe low lumbar osteoarthritis           Ambulatory dysfunction   Assessment & Plan     Due to the vertebral disease as above  Had multiple falls  PT OT evaluation  Might need rehab           Back pain   Assessment & Plan     Due to thoracic disc herniation and lumbar foraminal stenosis  Continue oxy and Dilaudid for now the patient came in with as it is working well for her           Morbid obesity ([address] )   Assessment & Plan     Might be contributing to the vertebral disease    Will benefit from weight loss           Tobacco use   Assessment & Plan     Patient smokes 2-3 cigarettes daily approximately  Counseled about cessation  Continue nicotine patch for now           COPD (chronic obstructive pulmonary disease) ([facility] )   Assessment & Plan     Not in exacerbation  Continue Spiriva and Singulair           Type 2 diabetes mellitus with hyperglycemia ([facility] )   Assessment & Plan     Patient on metformin at home  Currently uncontrolled because of dexamethasone most likely  Will hold metformin for now  Continue Lantus and lispro standing plus sliding scale  I increased the dose of Lantus to 28 units from 18 and lispro from 3 units to 5 units standing 3 times daily with meals  Continue to monitor  Check HbA1c          Hypertension   Assessment & Plan     Currently well controlled  Continue Cardizem           HIV (human immunodeficiency virus infection) ([facility] )   Assessment & Plan     Diagnosed in 1986  Was never treated for it  As per the patient she never any problems with that  Will check HIV Ag/antibody and CD4 count for now  If positive can check viral load and consult ID              VTE Prophylaxis: Enoxaparin (Lovenox)  / sequential compression device   Code Status: full  POLST: POLST form is not discussed and not completed at this time  Discussion with family: patient      Anticipated Length of Stay:  Patient will be admitted on an Inpatient basis with an anticipated length of stay of  > 2 midnights     Justification for Hospital Stay: above      Discharge Plan:

## 2018-04-05 NOTE — SOCIAL WORK
MCG Guide Used for Initial Round: Back Pain RRG  Optimal GLOS: 1  Hospital Day: *1 day  DC Readiness:   · Discharge readiness is indicated by patient meeting Recovery Milestones, including ALL of the following:  ? Acute etiologies requiring continued inpatient care absent  ? Pain absent or managed  ? Ambulation as tolerated[K]  ? Oral hydration, medications, and diet  ? Discharge plans and education understood    Identified Barriers: Neurosurgical Intervention  Discussion Date (Time): 04/05/18 mehul HERNÁNDEZ

## 2018-04-05 NOTE — PROGRESS NOTES
Rounding note with Dr Villegas Public to patient re: continuing steroid medication, pain medications and need for surgery for patient comfort

## 2018-04-05 NOTE — PLAN OF CARE
Problem: PHYSICAL THERAPY ADULT  Goal: Performs mobility at highest level of function for planned discharge setting  See evaluation for individualized goals  Treatment/Interventions: Functional transfer training, LE strengthening/ROM, Patient/family training, Equipment eval/education, Bed mobility, Gait training, Spoke to nursing, Spoke to case management  Equipment Recommended: Josh Vaz       See flowsheet documentation for full assessment, interventions and recommendations  Outcome: Progressing  Prognosis: Fair  Problem List: Decreased strength, Decreased endurance, Impaired balance, Decreased mobility, Decreased safety awareness, Obesity, Pain  Assessment: Pt seen for physical therapy treatment consisting of completion of seated and standing LE exercises with limited pt tolerance due to pain and progressive LE weakness with increased time spent in standing  Pt was cooperative and willing to participate at her maximum level despite her physical limitations  Pt will require inpatient rehab once she is medically ready for d/c    Barriers to Discharge: Decreased caregiver support     Recommendation: Other (Comment) (inpatient rehab  )     PT - OK to Discharge: Yes    See flowsheet documentation for full assessment

## 2018-04-05 NOTE — CONSULTS
Consultation - Neurosurgery   Evangelist Brewer 74 y o  female MRN: [id number]  [facility] Encounter: [id number]  Consult completed on 4/5/18    Inpatient consult to Neurosurgery  Consult performed by: Pal Garcia ordered by: Joseline Muñiz          Assessment/Plan     Assessment:  1  T9-10 herniated disc  2  Central canal narrowing with associated cord signal change  3  History of COPD  4  History of HIV+  5  Diabetes mellitus  6  Cardiac disease     Plan:  · Exam: AAOx3  MONTIEL wo focal weakness  LT and PP intact  3/3 JPS intact  DSS intact  Midline T9-10 tenderness on palpation  · Imaging: personally reviewed and reviewed with attending on 4/5/18:  · CT thoracic wo  · MRI thoracic wo: broad based thoracic disc herniation at T9-10 with severe central canal narrowing with increased signal intensity within the cord, associated bilateral moderate-severe foraminal narrowing  · Patient has been receiving 81mg ASA - hold ASA for a minimal of 5 days  · DVT ppx: SCDs, lovenox  · Pain control deferred to primary team - recommend to d/c scheduled oxycodone and decadron   · Medical management:  · WBC 13 60 - possible reactive to decadron   · Hgb 6 9  · Patient on Macrobid - uncertain why? · UA with +blood, negative leukocytes and nitrates   · CD4 count pending   · Patient will likely need medical clearance, pulmonary and cardiology clearance  · No emergent surgical intervention  Patient will require medical management prior, likely OR next week  Call with questions or concerns  History of Present Illness    HPI: Evangelist Brewer is a 74y o  year old female with PMH including hypertension, HIV, diabetes mellitus, COPD, asthma, cardiac disease, bipolar who presents with complaint of back pain  The patient states within the past week she had 3 falls, one occurring each day  She states she started to feel "funny" the day following her last fall   She admits to constant low midline back pain with radiation intermittently into right hip that is exacerbated with movement  She rates the pain as a constant 8 out of 10 but with intermittent elevation of 10 out of 10  She did develop urinary complaints stating she is now "leaking" when attempting to walk to the bathroom  The patient states she called her daughter when her back was bothering her, and her daughter called EMS  When EMS arrived they assisted her to the bathroom where she did have an episode of urinary incontinence  She was evaluated at [facility], a MRI of the thoracic spine showed a large central herniated disc at T9-10 with cord signal changes  Mrs Joss Napoles admits to having a fall years ago in [place] landing straight on her back  She admits to intermittent aches and pains where she's be evaluated by a physician, receive medications and the pain would resolve  She denies any serious injuries following  The patient admits to a progressive gait decline she states she has to use a cane/walker more frequently this year than prior due to leg weakness  She admits she is still able to do her normal activities but her legs get tired quickly which she did not pay any mind to  Review of Systems   Constitutional: Negative for activity change  HENT: Negative for tinnitus and trouble swallowing  Eyes: Positive for visual disturbance (intermittent)  Respiratory: Positive for cough, shortness of breath and wheezing  Cardiovascular: Negative for chest pain  Gastrointestinal: Negative for abdominal pain, constipation, diarrhea, nausea and vomiting  Genitourinary: Positive for difficulty urinating (urinary incontinence), frequency and urgency  Negative for dysuria  Musculoskeletal: Positive for back pain  Negative for neck pain  Skin: Negative for rash and wound  Allergic/Immunologic: Negative for environmental allergies and food allergies  Neurological: Positive for weakness and headaches  Negative for dizziness and numbness  Hematological: Does not bruise/bleed easily         Historical Information   Past Medical History:   Diagnosis Date    Bipolar 1 disorder ([place] [address] )     Cardiac disease     COPD (chronic obstructive pulmonary disease) ([place] [address] )     Diabetes mellitus ([place] [address] )     Disease of thyroid gland     HIV (human immunodeficiency virus infection) ([place] [address] ) 3/24/2016    Hypertension     Osteoarthritis     Tobacco abuse      Past Surgical History:   Procedure Laterality Date    HERNIA REPAIR      THYROIDECTOMY       History   Alcohol Use No     History   Drug Use No     Comment: former IV drug user     History   Smoking Status    Current Every Day Smoker    Packs/day: 0 20    Years: 45 00    Types: Cigarettes   Smokeless Tobacco    Never Used     Comment: Currently smoking 1-2 cigarettes a day     Family History   Problem Relation Age of Onset    No Known Problems Mother     Diabetes Father     Heart disease Neg Hx     Cancer Neg Hx        Meds/Allergies   all current active meds have been reviewed and current meds:   Current Facility-Administered Medications   Medication Dose Route Frequency    acetaminophen (TYLENOL) tablet 650 mg  650 mg Oral Q6H PRN    aluminum-magnesium hydroxide-simethicone (MYLANTA) 200-200-20 mg/5 mL oral suspension 30 mL  30 mL Oral Q6H PRN    atorvastatin (LIPITOR) tablet 20 mg  20 mg Oral Daily With Dinner    buPROPion (WELLBUTRIN XL) 24 hr tablet 300 mg  300 mg Oral Daily    cholecalciferol (VITAMIN D3) tablet 1,000 Units  1,000 Units Oral Daily    dexamethasone (DECADRON) tablet 4 mg  4 mg Oral Q6H [address]    diltiazem (CARDIZEM CD) 24 hr capsule 180 mg  180 mg Oral Daily    enoxaparin (LOVENOX) subcutaneous injection 40 mg  40 mg Subcutaneous Daily    fluticasone-salmeterol (ADVAIR) 250-50 mcg/dose inhaler 1 puff  1 puff Inhalation Q12H [address]    gabapentin (NEURONTIN) capsule 800 mg  800 mg Oral TID    HYDROmorphone (DILAUDID) tablet 1 mg  1 mg Oral Q4H PRN    insulin glargine (LANTUS) subcutaneous injection 22 Units  22 Units Subcutaneous HS    insulin lispro (HumaLOG) 100 units/mL subcutaneous injection 1-6 Units  1-6 Units Subcutaneous TID AC    insulin lispro (HumaLOG) 100 units/mL subcutaneous injection 5 Units  5 Units Subcutaneous TID With Meals    levothyroxine tablet 100 mcg  100 mcg Oral Early Morning    lidocaine (LIDODERM) 5 % patch 1 patch  1 patch Transdermal Daily    methocarbamol (ROBAXIN) tablet 750 mg  750 mg Oral Q6H PRN    mirtazapine (REMERON) tablet 7 5 mg  7 5 mg Oral HS    montelukast (SINGULAIR) tablet 10 mg  10 mg Oral HS    nicotine (NICODERM CQ) 14 mg/24hr TD 24 hr patch 1 patch  1 patch Transdermal Daily    nitrofurantoin (MACROBID) extended-release capsule 100 mg  100 mg Oral BID With Meals    ondansetron (ZOFRAN-ODT) dispersible tablet 4 mg  4 mg Oral Q6H PRN    oxyCODONE (OxyCONTIN) 12 hr tablet 10 mg  10 mg Oral Q12H CARLOS    QUEtiapine (SEROquel) tablet 200 mg  200 mg Oral HS    tiotropium (SPIRIVA) capsule for inhaler 18 mcg  18 mcg Inhalation Daily    topiramate (TOPAMAX) tablet 50 mg  50 mg Oral Q12H [address]     Allergies   Allergen Reactions    Prozac [Fluoxetine Hcl] Rash    Sulfa Antibiotics Itching    Quinine        Objective   I/O       04/03 0701 - 04/04 0700 04/04 0701 - 04/05 0700 04/05 0701 - 04/06 0700    P  O   960 480    Total Intake(mL/kg)  960 (10 6) 480 (5 3)    Net   +960 +480           Unmeasured Urine Occurrence  2 x           Physical Exam   Constitutional: She appears well-developed and well-nourished  HENT:   Head: Normocephalic and atraumatic  Eyes: EOM are normal  Pupils are equal, round, and reactive to light  Cardiovascular: Normal rate  Pulmonary/Chest: Effort normal  No respiratory distress  She has wheezes (auditory wheezing present)  Abdominal: Soft  There is no tenderness  There is no rebound  Musculoskeletal:        Cervical back: She exhibits no bony tenderness          Thoracic back: She exhibits bony tenderness  Lumbar back: She exhibits no bony tenderness  Midline T9-10 tenderness on palpation      Neurological: She is alert  She has normal strength  Reflex Scores:       Bicep reflexes are 1+ on the right side and 1+ on the left side  Brachioradialis reflexes are 1+ on the right side and 1+ on the left side  Patellar reflexes are 1+ on the right side and 1+ on the left side  Achilles reflexes are 1+ on the right side and 1+ on the left side  Skin: Skin is warm  Psychiatric: Her speech is normal and behavior is normal  Thought content normal      Neurologic Exam     Mental Status   Oriented to person  Oriented to place  Disoriented to year and month  Registration: recalls 3 of 3 objects  Recall of objects at 5 minutes: recalls 0 out of 3 objects  Follows 2 step commands  Attention: decreased  Concentration: decreased  Speech: speech is normal   Level of consciousness: alert  Knowledge: good  Able to perform simple calculations  Able to name object  Able to repeat  Normal comprehension  Cranial Nerves     CN II   Visual fields full to confrontation  CN III, IV, VI   Pupils are equal, round, and reactive to light  Extraocular motions are normal    Right pupil: Size: 3 mm  Shape: regular  Reactivity: brisk  Left pupil: Size: 3 mm  Shape: regular  Reactivity: brisk  CN III: no CN III palsy  CN VI: no CN VI palsy  Nystagmus: none   Diplopia: none  Ophthalmoparesis: none  Upgaze: normal  Downgaze: normal  Conjugate gaze: present    CN V   Facial sensation intact  CN VII   Facial expression full, symmetric  CN VIII   CN VIII normal      CN XII   Tongue deviation: none    Motor Exam   Muscle bulk: normal  Overall muscle tone: normal  Right arm pronator drift: absent  Left arm pronator drift: absent    Strength   Strength 5/5 throughout       Sensory Exam   Light touch normal    Proprioception normal    Pinprick normal    Normal LT and PP in anterior and posterior thorax     Gait, Coordination, and Reflexes     Tremor   Resting tremor: absent    Reflexes   Right brachioradialis: 1+  Left brachioradialis: 1+  Right biceps: 1+  Left biceps: 1+  Right patellar: 1+  Left patellar: 1+  Right achilles: 1+  Left achilles: 1+  Right Ocllins: absent  Left Collins: absent  Right ankle clonus: absent  Left ankle clonus: absent      Vitals:Blood pressure 150/95, pulse 93, temperature 97 8 °F (36 6 °C), temperature source Oral, resp  rate 18, height 5' 2" (1 575 m), weight 90 7 kg (200 lb), last menstrual period 04/01/2013, SpO2 94 %, not currently breastfeeding  ,Body mass index is 36 58 kg/m²  Lab Results:     Results from last 7 days  Lab Units 04/05/18 0515 04/04/18 1935 03/31/18  0609 03/30/18  2124   WBC Thousand/uL 13 60* 14 62* 7 10 6 26   HEMOGLOBIN g/dL 11 4* 12 1 10 7* 11 0*   HEMATOCRIT % 37 1 37 9 34 4* 34 7*   PLATELETS Thousands/uL 399* 386 271 301   NEUTROS PCT %  --  79*  --  52   MONOS PCT %  --  10  --  10       Results from last 7 days  Lab Units 04/05/18 0515 04/04/18 1935 04/03/18  1201   SODIUM mmol/L 136 137 134*   POTASSIUM mmol/L 4 6 4 7 5 0   CHLORIDE mmol/L 104 105 98*   CO2 mmol/L 25 27 23   BUN mg/dL 36* 36* 29*   CREATININE mg/dL 0 99 1 10 1 24   CALCIUM mg/dL 10 4* 10 7* 10 4*   GLUCOSE RANDOM mg/dL 163* 175* 221*                 No results found for: TROPONINT  ABG:No results found for: PHART, PNY9VUG, PO2ART, DZT0AXA, Y1HOKLQG, BEART, SOURCE    Imaging Studies: I have personally reviewed pertinent reports  and I have personally reviewed pertinent films in PACS    EKG, Pathology, and Other Studies: I have personally reviewed pertinent reports     and I have personally reviewed pertinent films in PACS    VTE Prophylaxis: Sequential compression device Jenniferevgeny Goncalves)     Code Status: Level 1 - Full Code  Advance Directive and Living Will:      Power of :    POLST:      Counseling / Coordination of Care  I spent 45 minutes with the patient

## 2018-04-05 NOTE — PHYSICAL THERAPY NOTE
Physical Therapy Treatment Note:  Floyd Euceda, PT     04/05/18 1045   Pain Assessment   Pain Assessment 0-10   Pain Score 8   Pain Type Acute pain   Pain Location Back; Hip   Pain Orientation Right   Restrictions/Precautions   Weight Bearing Precautions Per Order No   Other Precautions Cognitive;O2;Fall Risk;Pain   General   Chart Reviewed Yes   Response to Previous Treatment Patient with no complaints from previous session  Family/Caregiver Present No   Cognition   Overall Cognitive Status Impaired   Arousal/Participation Alert; Responsive; Cooperative   Attention Within functional limits   Orientation Level Oriented X4   Memory Decreased long term memory   Following Commands Follows one step commands with increased time or repetition   Transfers   Sit to Stand 5  Supervision   Additional items Assist x 1; Increased time required;Verbal cues   Stand to Sit 5  Supervision   Additional items Assist x 1; Increased time required;Verbal cues   Ambulation/Elevation   Additional items (Pt too fatigued after ex's and walking to/from bathroom  )   Balance   Static Sitting Good   Dynamic Sitting Fair +   Static Standing Fair -   Dynamic Standing Fair -   Ambulatory Poor +   Endurance Deficit   Endurance Deficit Yes   Endurance Deficit Description SOB/ LE weakness/ gen weakness   Activity Tolerance   Activity Tolerance Patient limited by fatigue;Patient limited by pain   Medical Staff [address] [place] [address] consented to allow pt to participate in PT rx  Reinaldo Rosales also informed of PT rec for d/c  Nurse Made Aware yes   Exercises   Hip Flexion Sitting;20 reps;AROM; Bilateral   Hip Abduction Sitting;20 reps;AROM; Bilateral   Hip Adduction Sitting;20 reps;AROM; Bilateral   Knee AROM Long Arc Quad Sitting;20 reps;AROM; Bilateral   Ankle Pumps Sitting;20 reps;AROM; Bilateral   Squat Standing;AROM; Bilateral  (8 rep's  Knee buckling  )   Marching Standing;AROM; Bilateral;5 reps  (Limited by LE weakness  )   Assessment   Prognosis Fair Problem List Decreased strength;Decreased endurance; Impaired balance;Decreased mobility; Decreased safety awareness; Obesity;Pain   Assessment Pt seen for physical therapy treatment consisting of completion of seated and standing LE exercises with limited pt tolerance due to pain and progressive LE weakness with increased time spent in standing  Pt was cooperative and willing to participate at her maximum level despite her physical limitations  Pt will require inpatient rehab once she is medically ready for d/c     Barriers to Discharge Decreased caregiver support   Goals   Patient Goals " I want to be walking " as per pt report  Short Term Goal #1 Goals still appropriate  Treatment Day 1   Plan   Treatment/Interventions Functional transfer training;LE strengthening/ROM; Endurance training;Patient/family training;Equipment eval/education; Bed mobility;Gait training;Spoke to nursing   Progress Slow progress, decreased activity tolerance   PT Frequency 5x/wk   Recommendation   Recommendation Other (Comment)  (inpatient rehab  )   Equipment Recommended Chinyere Martinez   PT - OK to Discharge Yes   Additional Comments yes if to rehab when medically ready for d/c

## 2018-04-05 NOTE — ASSESSMENT & PLAN NOTE
- home Metformin regimen held during hospitalization  - continue inpatient basal insulin with additional SSI coverage per [employer] - likely exacerbated blood sugar level secondary to Decadron use  - HgA1c of 6 9

## 2018-04-05 NOTE — PROGRESS NOTES
[facility] - Internal Medicine Progress Note       PATIENT INFORMATION      Patient: Melissa Summers 74 y o  female   MRN: [id number]  PCP: Rolan Parsons DO  Unit/Bed#: [facility] 932-01 Encounter: [id number]  Date Of Visit: 04/05/18       ASSESSMENTS & PLAN     Thoracic disc herniation   Assessment & Plan    - transferred from the [facility] for neurosurgery evaluation  - MRI revealed disc herniation at the T9-T10 level - and her CT imaging confirmed a similar findings with stenosis and to a lesser degree at the T7-T8 and T8-T9 levels   - PRN pain control - continue Decadron regimen for anti inflammatory/swelling effect      Lumbar stenosis   Assessment & Plan    - imaging revealed stenosis at the L4-L5 level  - await neurosurgery input  - PRN pain control      Tobacco use   Assessment & Plan    - transdermal nicotine patch on board  - counseled on tobacco cessation      Leukocytosis   Assessment & Plan    - likely secondary to Decadron  - remains afebrile - monitor WBC count      COPD (chronic obstructive pulmonary disease) (HCC)   Assessment & Plan    - stable/asymptomatic  - continue Advair/Spiriva regimen      Type 2 diabetes mellitus with hyperglycemia (HCC)   Assessment & Plan    - home Metformin regimen held  - continue inpatient basal insulin with additional SSI coverage per [employer] - likely exacerbated blood sugar level secondary to Decadron use  - HgA1c of 6 9      Hypertension   Assessment & Plan    - low-sodium diet encouraged  - continue Cardizem      Bipolar disorder    Assessment & Plan    - continue Seroquel/Wellbutrin  - psychiatry input to be appreciated      HIV (human immunodeficiency virus infection)    Assessment & Plan    - await CD4 count   - diagnosed over 30 years ago and never underwent treatment         VTE Prophylaxis:  Lovenox      SUBJECTIVE     Seen and examined  Sitting upright in a chair upon my encounter    States pain is intermittent but better than when she came in yesterday  She denies any urinary or bowel incontinence but does state she has had some increased urinary frequency acknowledging she was recently diagnosed with diabetes  Denies any saddle anesthesia  Overall, she remains in pleasant/hopeful spirits  OBJECTIVE     Vitals:   Temp (24hrs), Av 1 °F (36 7 °C), Min:97 5 °F (36 4 °C), Max:99 °F (37 2 °C)    HR:  [74-94] 93  Resp:  [17-21] 18  BP: (125-154)/(79-95) 150/95  SpO2:  [93 %-97 %] 94 %  Body mass index is 36 58 kg/m²  Input and Output Summary (last 24 hours):        Intake/Output Summary (Last 24 hours) at 18 1056  Last data filed at 18 1000   Gross per 24 hour   Intake             1440 ml   Output                0 ml   Net             1440 ml       Physical Exam:     GENERAL:  Obese - intermittent distress from back pain  HEAD:  Normocephalic - atraumatic  EYES: PERRL - EOMI   MOUTH:  Mucosa moist  NECK:  Supple - full range of motion  CARDIAC:  Regular rate/rhythm - S1/S2 positive  PULMONARY:  Clear breath sounds bilaterally - nonlabored respirations  ABDOMEN:  Soft - nontender/nondistended - active bowel sounds  MUSCULOSKELETAL:  Motor strength/range of motion somewhat deconditioned  NEUROLOGIC:  Alert/oriented x 3 - no bowel/urinary incontinence - no saddle anesthesia  SKIN:  Chronic wrinkles/blemishes   PSYCHIATRIC:  Mood/affect age-appropriate      ADDITIONAL DATA       Labs & Recent Cultures:       Results from last 7 days  Lab Units 1815 18  1935   WBC Thousand/uL 13 60* 14 62*   HEMOGLOBIN g/dL 11 4* 12 1   HEMATOCRIT % 37 1 37 9   PLATELETS Thousands/uL 399* 386   NEUTROS PCT %  --  79*   LYMPHS PCT %  --  11*   MONOS PCT %  --  10   EOS PCT %  --  0       Results from last 7 days  Lab Units 18  0515   SODIUM mmol/L 136   POTASSIUM mmol/L 4 6   CHLORIDE mmol/L 104   CO2 mmol/L 25   BUN mg/dL 36*   CREATININE mg/dL 0 99   CALCIUM mg/dL 10 4*   GLUCOSE RANDOM mg/dL 163*         Last 24 Hours Medication List:     Current Facility-Administered Medications:  acetaminophen 650 mg Oral Q6H PRN Devin Carrera MD   aluminum-magnesium hydroxide-simethicone 30 mL Oral Q6H PRN Devin Carrera MD   atorvastatin 20 mg Oral Daily With Fariba Caal MD   buPROPion 300 mg Oral Daily Devin Carrera MD   cholecalciferol 1,000 Units Oral Daily Devin Carrera MD   dexamethasone 4 mg Oral Q6H [address] Devin Carrera MD   diltiazem 180 mg Oral Daily Devin Carrera MD   enoxaparin 40 mg Subcutaneous Daily Deivn Carrera MD   fluticasone-salmeterol 1 puff Inhalation Q12H [address] Devin Carrera MD   gabapentin 800 mg Oral TID Devin Carrera MD   HYDROmorphone 1 mg Oral Q4H PRN Devin Carrera MD   insulin glargine 22 Units Subcutaneous HS Devin Carrera MD   insulin lispro 1-6 Units Subcutaneous TID AC Devin Carrera MD   insulin lispro 5 Units Subcutaneous TID With Meals Devin Carrera MD   levothyroxine 100 mcg Oral Early Morning Devin Carrera MD   lidocaine 1 patch Transdermal Daily Devin Carrera MD   methocarbamol 750 mg Oral Q6H PRN Devin Carrera MD   mirtazapine 7 5 mg Oral HS Devin Carrera MD   montelukast 10 mg Oral HS Devin Carrera MD   nicotine 1 patch Transdermal Daily Devin Carrera MD   nitrofurantoin 100 mg Oral BID With Meals Devin Carrera MD   ondansetron 4 mg Oral Q6H PRN Arti Cantu PA-C   oxyCODONE 10 mg Oral Q12H [address] Devin Carrera MD   QUEtiapine 200 mg Oral HS Devin Carrera MD   tiotropium 18 mcg Inhalation Daily Devin Carrera MD   topiramate 50 mg Oral Q12H Valentina Wang MD          Time Spent for Care: 38 minutes  More than 50% of total time spent on counseling and coordination of care as described above        Current Length of Stay: 1 day(s)      Code Status: Level 1 - Full Code       ** Please Note: This note is constructed using a voice recognition dictation system   **

## 2018-04-05 NOTE — ASSESSMENT & PLAN NOTE
- imaging revealed stenosis at the L4-L5 level  - no emergent neurosurgery intervention  - PRN pain control

## 2018-04-05 NOTE — OCCUPATIONAL THERAPY NOTE
OCCUPATIONAL THERAPY SCREEN:    ORDERS RECEIVED  CHART REVIEW COMPLETED  PER MEDICAL TEAM DURING ROUNDS, PT IS PLANNED FOR OR WITH  NEUROSX  PLEASE RECONSULT POST-OP  RECOMMEND CONT PARTICIPATION IN SELF-CARE/MOBILITY WITH NURSING STAFF/RESTORATIVE AS APPROPRIATE       Chisé Diacik, MOT, OTR/L
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
## 2018-04-06 LAB
BASOPHILS # BLD AUTO: 0 X10E3/UL (ref 0–0.2)
BASOPHILS NFR BLD AUTO: 0 %
CD3+CD4+ CELLS # BLD: 422 /UL (ref 359–1519)
CD3+CD4+ CELLS NFR BLD: 26.4 % (ref 30.8–58.5)
EOSINOPHIL # BLD AUTO: 0 X10E3/UL (ref 0–0.4)
EOSINOPHIL NFR BLD AUTO: 0 %
ERYTHROCYTE [DISTWIDTH] IN BLOOD BY AUTOMATED COUNT: 17.3 % (ref 12.3–15.4)
GLUCOSE SERPL-MCNC: 148 MG/DL (ref 65–140)
GLUCOSE SERPL-MCNC: 159 MG/DL (ref 65–140)
GLUCOSE SERPL-MCNC: 169 MG/DL (ref 65–140)
GLUCOSE SERPL-MCNC: 221 MG/DL (ref 65–140)
HCT VFR BLD AUTO: 35.8 % (ref 34–46.6)
HGB BLD-MCNC: 11.5 G/DL (ref 11.1–15.9)
IMM GRANULOCYTES # BLD: 0.2 X10E3/UL (ref 0–0.1)
IMM GRANULOCYTES NFR BLD: 1 %
LYMPHOCYTES # BLD AUTO: 1.6 X10E3/UL (ref 0.7–3.1)
LYMPHOCYTES NFR BLD AUTO: 13 %
MCH RBC QN AUTO: 27.3 PG (ref 26.6–33)
MCHC RBC AUTO-ENTMCNC: 32.1 G/DL (ref 31.5–35.7)
MCV RBC AUTO: 85 FL (ref 79–97)
MONOCYTES # BLD AUTO: 0.8 X10E3/UL (ref 0.1–0.9)
MONOCYTES NFR BLD AUTO: 6 %
NEUTROPHILS # BLD AUTO: 9.9 X10E3/UL (ref 1.4–7)
NEUTROPHILS NFR BLD AUTO: 80 %
PLATELET # BLD AUTO: 250 X10E3/UL (ref 150–379)
RBC # BLD AUTO: 4.22 X10E6/UL (ref 3.77–5.28)
WBC # BLD AUTO: 12.5 X10E3/UL (ref 3.4–10.8)

## 2018-04-06 PROCEDURE — 97530 THERAPEUTIC ACTIVITIES: CPT

## 2018-04-06 PROCEDURE — 99222 1ST HOSP IP/OBS MODERATE 55: CPT | Performed by: INTERNAL MEDICINE

## 2018-04-06 PROCEDURE — 99233 SBSQ HOSP IP/OBS HIGH 50: CPT | Performed by: INTERNAL MEDICINE

## 2018-04-06 PROCEDURE — 99232 SBSQ HOSP IP/OBS MODERATE 35: CPT | Performed by: PHYSICIAN ASSISTANT

## 2018-04-06 PROCEDURE — 82948 REAGENT STRIP/BLOOD GLUCOSE: CPT

## 2018-04-06 RX ORDER — ACETAMINOPHEN 325 MG/1
975 TABLET ORAL EVERY 8 HOURS SCHEDULED
Status: DISCONTINUED | OUTPATIENT
Start: 2018-04-06 | End: 2018-04-16 | Stop reason: HOSPADM

## 2018-04-06 RX ORDER — OXYCODONE HYDROCHLORIDE 10 MG/1
10 TABLET ORAL EVERY 4 HOURS PRN
Status: DISCONTINUED | OUTPATIENT
Start: 2018-04-06 | End: 2018-04-11

## 2018-04-06 RX ORDER — OXYCODONE HYDROCHLORIDE 5 MG/1
5 TABLET ORAL EVERY 4 HOURS PRN
Status: DISCONTINUED | OUTPATIENT
Start: 2018-04-06 | End: 2018-04-11

## 2018-04-06 RX ORDER — DEXAMETHASONE 2 MG/1
2 TABLET ORAL EVERY 6 HOURS SCHEDULED
Status: DISCONTINUED | OUTPATIENT
Start: 2018-04-07 | End: 2018-04-08

## 2018-04-06 RX ADMIN — DEXAMETHASONE 4 MG: 4 TABLET ORAL at 17:30

## 2018-04-06 RX ADMIN — OXYCODONE HYDROCHLORIDE 10 MG: 10 TABLET ORAL at 17:33

## 2018-04-06 RX ADMIN — METHOCARBAMOL 750 MG: 750 TABLET ORAL at 14:16

## 2018-04-06 RX ADMIN — DEXAMETHASONE 4 MG: 4 TABLET ORAL at 05:10

## 2018-04-06 RX ADMIN — DILTIAZEM HYDROCHLORIDE 180 MG: 180 CAPSULE, EXTENDED RELEASE ORAL at 09:22

## 2018-04-06 RX ADMIN — MONTELUKAST SODIUM 10 MG: 10 TABLET, COATED ORAL at 21:19

## 2018-04-06 RX ADMIN — INSULIN GLARGINE 22 UNITS: 100 INJECTION, SOLUTION SUBCUTANEOUS at 21:18

## 2018-04-06 RX ADMIN — GABAPENTIN 800 MG: 400 CAPSULE ORAL at 21:17

## 2018-04-06 RX ADMIN — MIRTAZAPINE 7.5 MG: 15 TABLET, FILM COATED ORAL at 21:18

## 2018-04-06 RX ADMIN — LIDOCAINE 1 PATCH: 50 PATCH TOPICAL at 09:20

## 2018-04-06 RX ADMIN — VITAMIN D, TAB 1000IU (100/BT) 1000 UNITS: 25 TAB at 09:20

## 2018-04-06 RX ADMIN — GABAPENTIN 800 MG: 400 CAPSULE ORAL at 17:30

## 2018-04-06 RX ADMIN — TOPIRAMATE 50 MG: 25 TABLET, FILM COATED ORAL at 09:20

## 2018-04-06 RX ADMIN — HYDROMORPHONE HYDROCHLORIDE 1 MG: 2 TABLET ORAL at 05:13

## 2018-04-06 RX ADMIN — ACETAMINOPHEN 650 MG: 325 TABLET, FILM COATED ORAL at 14:16

## 2018-04-06 RX ADMIN — LEVOTHYROXINE SODIUM 100 MCG: 100 TABLET ORAL at 05:10

## 2018-04-06 RX ADMIN — ACETAMINOPHEN 975 MG: 325 TABLET, FILM COATED ORAL at 21:17

## 2018-04-06 RX ADMIN — DEXAMETHASONE 4 MG: 4 TABLET ORAL at 00:47

## 2018-04-06 RX ADMIN — BUPROPION HYDROCHLORIDE 300 MG: 150 TABLET, FILM COATED, EXTENDED RELEASE ORAL at 09:20

## 2018-04-06 RX ADMIN — NITROFURANTOIN (MONOHYDRATE/MACROCRYSTALS) 100 MG: 75; 25 CAPSULE ORAL at 09:22

## 2018-04-06 RX ADMIN — OXYCODONE HYDROCHLORIDE 10 MG: 10 TABLET, FILM COATED, EXTENDED RELEASE ORAL at 09:20

## 2018-04-06 RX ADMIN — QUETIAPINE 200 MG: 100 TABLET ORAL at 21:18

## 2018-04-06 RX ADMIN — INSULIN LISPRO 5 UNITS: 100 INJECTION, SOLUTION INTRAVENOUS; SUBCUTANEOUS at 09:21

## 2018-04-06 RX ADMIN — INSULIN LISPRO 1 UNITS: 100 INJECTION, SOLUTION INTRAVENOUS; SUBCUTANEOUS at 17:30

## 2018-04-06 RX ADMIN — ENOXAPARIN SODIUM 40 MG: 40 INJECTION SUBCUTANEOUS at 09:20

## 2018-04-06 RX ADMIN — ATORVASTATIN CALCIUM 20 MG: 20 TABLET, FILM COATED ORAL at 17:30

## 2018-04-06 RX ADMIN — NICOTINE 1 PATCH: 14 PATCH, EXTENDED RELEASE TRANSDERMAL at 09:20

## 2018-04-06 RX ADMIN — INSULIN LISPRO 5 UNITS: 100 INJECTION, SOLUTION INTRAVENOUS; SUBCUTANEOUS at 17:29

## 2018-04-06 RX ADMIN — GABAPENTIN 800 MG: 400 CAPSULE ORAL at 09:20

## 2018-04-06 RX ADMIN — INSULIN LISPRO 5 UNITS: 100 INJECTION, SOLUTION INTRAVENOUS; SUBCUTANEOUS at 11:39

## 2018-04-06 RX ADMIN — TOPIRAMATE 50 MG: 25 TABLET, FILM COATED ORAL at 21:17

## 2018-04-06 RX ADMIN — FLUTICASONE PROPIONATE AND SALMETEROL 1 PUFF: 50; 250 POWDER RESPIRATORY (INHALATION) at 09:21

## 2018-04-06 RX ADMIN — TIOTROPIUM BROMIDE 18 MCG: 18 CAPSULE ORAL; RESPIRATORY (INHALATION) at 09:21

## 2018-04-06 RX ADMIN — DEXAMETHASONE 4 MG: 4 TABLET ORAL at 11:39

## 2018-04-06 RX ADMIN — FLUTICASONE PROPIONATE AND SALMETEROL 1 PUFF: 50; 250 POWDER RESPIRATORY (INHALATION) at 21:21

## 2018-04-06 RX ADMIN — INSULIN LISPRO 2 UNITS: 100 INJECTION, SOLUTION INTRAVENOUS; SUBCUTANEOUS at 11:39

## 2018-04-06 NOTE — CONSULTS
Inpatient consult to Acute Pain Service  Consult performed by: [facility]  Consult ordered by: Eva Jones        Consultation - Anesthesia Acute Pain Management   Clifford Enriquez 74 y o  female MRN: [id number]  Unit/Bed#: [facility] 932-01 Encounter: [id number]               Consult Time:  30 minutes    2201 ; Patient aged 72 years & older:  2201 No  [facility] was not discussed  Assessment/Plan     Assessment:   74 y o  Female S/P falls at home, positive T9-10 herniated disc, being evaluated for acute pain  Plan: PDMP REVIEWED   1  Acute pain- Patient rates current pain level 8/10, located to her mid, lower-back  Described as stabbing in nature  States the medication she is receiving "sometimes" helps her  Pain has decreased to a level 06/10  Denies use of pain medications at home  PDMP reviewed  Tentative for upcoming neurosurgery next week  Start   Tylenol 975 mg PO Q 8 HRs scheduled ( DENIES LIVER DX, AST12/ALT16 in 12/2017)  Robaxin 750 mg PO Q 6 HRS PRN   Oxycodone 5 mg PO Q 4 HRS PRN moderate pain - hold for sedation   Oxycodone 10 mg PO Q 4 HRS PRN sever pain - hold for sedation    Discontinue   OxyconTIN ER 10 mg PO      Continue   Gabapentin 800 mg PO TID- (home med)   Lidoderm 5% patch, 1 patch topical daily     2  BOWEL REGIMEN - start colace, senna     3  PULSE OX- Place of continuous pulse ox while titration of new medication    4  Plan of care- I spoke to primary team patient will remain inpatient until surgery, receiving PICC line and pre-op clearances  APS TO FOLLOW, PLEASE CALL [phone] BETWEEN [phone] WITH QUESTIONS     History of Present Illness    Admit Date:  4/4/2018  Hospital Day:  2 days  Primary Service:  General Medicine  Attending Provider:  Naveen Monson MD  Physician Requesting Consult: Naveen Monson MD  Reason for Consult / Principal Problem: acute pain    HPI: Clifford Enriquez is a 74y o  year old female who presents with acute, mid, lower back pain    Patient states she has had chronic back pain, however, the pain has got increasingly worst over the last week  She has had multiple falls at home RT increased LE weakness  Patient is currently sitting OOB to chair rating her pain 8/10  States "sometimes" the medication she receives helps her  She is able to eat and drink, and watching movies on her I-PAD during my exam   I explained  Treating patient with a multimodal approach to her pain, patient was receptive  Rounding with SLIM occurred while I was present and patient will stay inpatient until upcoming surgery  Review of Systems   Constitutional: Negative for activity change, appetite change, chills, diaphoresis, fatigue and fever  Respiratory: Negative for apnea, cough, choking, chest tightness, shortness of breath, wheezing and stridor  Cardiovascular: Negative for chest pain, palpitations and leg swelling  Gastrointestinal: Negative for abdominal distention and abdominal pain  Musculoskeletal: Positive for arthralgias, back pain and gait problem  Neurological: Negative for dizziness, tremors, seizures, syncope, facial asymmetry, light-headedness, numbness and headaches  Psychiatric/Behavioral: Negative for agitation, behavioral problems, confusion, self-injury and suicidal ideas  The patient is not nervous/anxious and is not hyperactive  Pain History:  Pain History: chronic back pain   Current pain location(s): mid lower back   Pain Scale: 6-8/10   Severity:  Stabbing knife     Physical Exam   Constitutional: She is oriented to person, place, and time  She appears well-developed and well-nourished  No distress  HENT:   Head: Normocephalic  Eyes: Pupils are equal, round, and reactive to light  Neck: Normal range of motion  Cardiovascular: Normal rate  Pulmonary/Chest: Effort normal    Abdominal: Soft  Musculoskeletal: She exhibits edema, tenderness and deformity     Neurological: She is alert and oriented to person, place, and time    Skin: Skin is warm and dry  She is not diaphoretic  Psychiatric: She has a normal mood and affect   Her behavior is normal  Judgment and thought content normal        Historical Information   Past Medical History:   Diagnosis Date    Bipolar 1 disorder ([place] [address] )     Cardiac disease     COPD (chronic obstructive pulmonary disease) ([place] [address] )     Diabetes mellitus ([place] [address] )     Disease of thyroid gland     HIV (human immunodeficiency virus infection) ([place] [address] ) 3/24/2016    Hypertension     Osteoarthritis     Tobacco abuse      Past Surgical History:   Procedure Laterality Date    HERNIA REPAIR      THYROIDECTOMY       Social History   History   Alcohol Use No     History   Drug Use No     Comment: former IV drug user     History   Smoking Status    Current Every Day Smoker    Packs/day: 0 20    Years: 45 00    Types: Cigarettes   Smokeless Tobacco    Never Used     Comment: Currently smoking 1-2 cigarettes a day     Family History: non-contributory    Meds/Allergies   Current Facility-Administered Medications   Medication Dose Route Frequency    acetaminophen (TYLENOL) tablet 650 mg  650 mg Oral Q6H PRN    aluminum-magnesium hydroxide-simethicone (MYLANTA) 200-200-20 mg/5 mL oral suspension 30 mL  30 mL Oral Q6H PRN    atorvastatin (LIPITOR) tablet 20 mg  20 mg Oral Daily With Dinner    buPROPion (WELLBUTRIN XL) 24 hr tablet 300 mg  300 mg Oral Daily    cholecalciferol (VITAMIN D3) tablet 1,000 Units  1,000 Units Oral Daily    dexamethasone (DECADRON) tablet 4 mg  4 mg Oral Q6H [facility]    diltiazem (CARDIZEM CD) 24 hr capsule 180 mg  180 mg Oral Daily    enoxaparin (LOVENOX) subcutaneous injection 40 mg  40 mg Subcutaneous Daily    fluticasone-salmeterol (ADVAIR) 250-50 mcg/dose inhaler 1 puff  1 puff Inhalation Q12H [facility]    gabapentin (NEURONTIN) capsule 800 mg  800 mg Oral TID    HYDROmorphone (DILAUDID) tablet 1 mg  1 mg Oral Q4H PRN    insulin glargine (LANTUS) subcutaneous injection 22 Units  22 Units Subcutaneous HS    insulin lispro (HumaLOG) 100 units/mL subcutaneous injection 1-6 Units  1-6 Units Subcutaneous TID AC    insulin lispro (HumaLOG) 100 units/mL subcutaneous injection 5 Units  5 Units Subcutaneous TID With Meals    levothyroxine tablet 100 mcg  100 mcg Oral Early Morning    lidocaine (LIDODERM) 5 % patch 1 patch  1 patch Transdermal Daily    methocarbamol (ROBAXIN) tablet 750 mg  750 mg Oral Q6H PRN    mirtazapine (REMERON) tablet 7 5 mg  7 5 mg Oral HS    montelukast (SINGULAIR) tablet 10 mg  10 mg Oral HS    nicotine (NICODERM CQ) 14 mg/24hr TD 24 hr patch 1 patch  1 patch Transdermal Daily    ondansetron (ZOFRAN-ODT) dispersible tablet 4 mg  4 mg Oral Q6H PRN    oxyCODONE (OxyCONTIN) 12 hr tablet 10 mg  10 mg Oral Q12H CARLOS    QUEtiapine (SEROquel) tablet 200 mg  200 mg Oral HS    tiotropium (SPIRIVA) capsule for inhaler 18 mcg  18 mcg Inhalation Daily    topiramate (TOPAMAX) tablet 50 mg  50 mg Oral Q12H [facility]     Prescriptions Prior to Admission   Medication    aspirin 81 MG tablet    atorvastatin (LIPITOR) 20 mg tablet    buPROPion (WELLBUTRIN XL) 300 mg 24 hr tablet    cholecalciferol (VITAMIN D3) 1,000 units tablet    diltiazem (CARDIZEM CD) 180 mg 24 hr capsule    fluticasone-salmeterol (ADVAIR) 250-50 mcg/dose    gabapentin (NEURONTIN) 400 mg capsule    levothyroxine 100 mcg tablet    metFORMIN (GLUCOPHAGE) 500 mg tablet    mirtazapine (REMERON) 7 5 MG tablet    montelukast (SINGULAIR) 10 mg tablet    potassium chloride (K-DUR,KLOR-CON) 20 mEq tablet    QUEtiapine (SEROquel) 100 mg tablet    tiotropium (SPIRIVA) 18 mcg inhalation capsule    topiramate (TOPAMAX) 50 MG tablet         Allergies   Allergen Reactions    Prozac [Fluoxetine Hcl] Rash    Sulfa Antibiotics Itching    Quinine        Objective   Temp:  [96 7 °F (35 9 °C)-98 2 °F (36 8 °C)] 97 7 °F (36 5 °C)  HR:  [89-99] 90  Resp:  [18] 18  BP: (113-138)/(69-85) 138/85    Intake/Output Summary (Last 24 hours) at 04/06/18 1247  Last data filed at 04/06/18 0930   Gross per 24 hour   Intake             2740 ml   Output                0 ml   Net             2740 ml       Lab Results: I have personally reviewed pertinent labs  Imaging Studies: I have personally reviewed pertinent reports  EKG, Pathology, and Other Studies: I have personally reviewed pertinent reports  Counseling / Coordination of Care  Total floor / unit time spent today 30 minutes minutes  Greater than 50% of total time was spent with the patient and / or family counseling and / or coordination of care  Please note that the APS provides consultative services regarding pain management only  With the exception of ketamine and epidural infusions and except when indicated, final decisions regarding starting or changing doses of analgesic medications are at the discretion of the consulting service  Off hours consultation and/or medication management is generally not available  Karyle Pillion, CRNP  April 6, 2018  12:47 PM          Review of Systems   Constitutional: Negative for activity change, appetite change, chills, diaphoresis, fatigue and fever  Respiratory: Negative for apnea, cough, choking, chest tightness, shortness of breath, wheezing and stridor  Cardiovascular: Negative for chest pain, palpitations and leg swelling  Gastrointestinal: Negative for abdominal distention and abdominal pain  Musculoskeletal: Positive for arthralgias, back pain and gait problem  Neurological: Negative for dizziness, tremors, seizures, syncope, facial asymmetry, light-headedness, numbness and headaches  Psychiatric/Behavioral: Negative for agitation, behavioral problems, confusion, self-injury and suicidal ideas  The patient is not nervous/anxious and is not hyperactive  Physical Exam   Constitutional: She is oriented to person, place, and time  She appears well-developed and well-nourished  No distress     HENT:   Head: Normocephalic  Eyes: Pupils are equal, round, and reactive to light  Neck: Normal range of motion  Cardiovascular: Normal rate  Pulmonary/Chest: Effort normal    Abdominal: Soft  Musculoskeletal: She exhibits edema, tenderness and deformity  Neurological: She is alert and oriented to person, place, and time  Skin: Skin is warm and dry  She is not diaphoretic  Psychiatric: She has a normal mood and affect   Her behavior is normal  Judgment and thought content normal

## 2018-04-06 NOTE — CONSULTS
Consultation - Pulmonary Medicine   Leroy Martinez 74 y o  female MRN: [id number]  Unit/Bed#: [place] 932-01 Encounter: [id number]      Assessment/Plan:    1  Chronic hypoxic respiratory failure  1  Reports having 2 L oxygen therapy at home, during assessment she was currently on room air satting 97%  2  Continue to monitor oxygen saturation titrate oxygen therapy if necessary to maintain oxygen saturation of equal to or greater than 88%  3  Recommend pulmonary toilet to include incentive spirometry and out of bed as tolerated  2  Presumed Chronic obstructive pulmonary disease of unknown severity without acute exacerbation  1  She has not had a Chronic obstructive pulmonary disease exacerbation over year  2  Run her last exacerbation we recommended outpatient pulmonary follow-up for spirometry as well as outpatient sleep study which is not been completed  3  She maintains on Advair Spiriva p r n  nebulizer and rescue inhaler and reports compliance with this regimen  4  Currently no symptoms of Chronic obstructive pulmonary disease exacerbation and is currently at her baseline  3  Suspected obstructive sleep apnea  1  Continue CPAP q h s   2  Will continue recommend outpatient follow-up for sleep study    * from pulmonary standpoint she is on low to moderate risk for pulmonary complications and is appropriate for surgical intervention  We will sign off for now please call with any concerns    History of Present Illness   Physician Requesting Consult: Hannah Chun MD  Reason for Consult / Principal Problem: pre op clearance  Hx and PE limited by: none  Chief Complaint: preop clearance  HPI: Leroy Martinez is a 74 y o   female being seen in consult at [place] [address]  for preop clearance due to t9-10 herniated disc with central canal narrowing and cord signal change  She has a pmh significant for: HIV, presumed COPD, presumed PASHA, tobacco abuse, DM, and bipolar disorder     She was most recently hospitalized in February, 2017 with a COPD exacerbation  She has not been hospitalized or treated outpatient for an exacerbation since February  From a Pulmonary stand point she maintains on Advair, Spiriva and albuterol nebulizer  She reports compliance with this regimen at homes  She uses her albuterol nebulizer a few times a month as well as her rescue inhaler  She denies any recent increase in use  She currently feels her breathing is at her baseline  At home she intermittently wears oxygen at 2 [facility]  Inpatient consult to Pulmonology  Consult performed by: Nelia Tipton ordered by: Johann Ruggiero          Review of Systems   Constitutional: Negative for chills, fatigue, fever and unexpected weight change  HENT: Negative for congestion, postnasal drip, rhinorrhea, sinus pain, sinus pressure, sore throat and trouble swallowing  Eyes: Negative for discharge  Respiratory: Positive for shortness of breath (at baseline)  Negative for cough, choking, chest tightness, wheezing and stridor  Cardiovascular: Negative for chest pain and leg swelling  Gastrointestinal: Negative for constipation, diarrhea, nausea and vomiting  Endocrine: Negative for cold intolerance  Genitourinary: Negative for difficulty urinating  Musculoskeletal: Negative for arthralgias and myalgias  Skin: Negative for color change, pallor, rash and wound  Neurological: Negative for dizziness and headaches  Hematological: Does not bruise/bleed easily  Psychiatric/Behavioral: Negative for agitation, confusion and hallucinations  All other systems reviewed and are negative        Historical Information   Past Medical History:   Diagnosis Date    Bipolar 1 disorder ([place] [address] )     Cardiac disease     COPD (chronic obstructive pulmonary disease) ([place] [address] )     Diabetes mellitus ([place] [address] )     Disease of thyroid gland     HIV (human immunodeficiency virus infection) ([place] [address] ) 3/24/2016    Hypertension     Osteoarthritis     Tobacco abuse      Past Surgical History:   Procedure Laterality Date    HERNIA REPAIR      THYROIDECTOMY       Social History   History   Alcohol Use No     History   Drug Use No     Comment: former IV drug user     History   Smoking Status    Current Every Day Smoker    Packs/day: 0 20    Years: 45 00    Types: Cigarettes   Smokeless Tobacco    Never Used     Comment: Currently smoking 1-2 cigarettes a day     Occupational History: no exposures    Family History: non-contributory    Meds/Allergies   all current active meds have been reviewed, pertinent pulmonary meds have been reviewed, current meds:   Current Facility-Administered Medications   Medication Dose Route Frequency    acetaminophen (TYLENOL) tablet 650 mg  650 mg Oral Q6H PRN    aluminum-magnesium hydroxide-simethicone (MYLANTA) 200-200-20 mg/5 mL oral suspension 30 mL  30 mL Oral Q6H PRN    atorvastatin (LIPITOR) tablet 20 mg  20 mg Oral Daily With Dinner    buPROPion (WELLBUTRIN XL) 24 hr tablet 300 mg  300 mg Oral Daily    cholecalciferol (VITAMIN D3) tablet 1,000 Units  1,000 Units Oral Daily    dexamethasone (DECADRON) tablet 4 mg  4 mg Oral Q6H [address]    diltiazem (CARDIZEM CD) 24 hr capsule 180 mg  180 mg Oral Daily    enoxaparin (LOVENOX) subcutaneous injection 40 mg  40 mg Subcutaneous Daily    fluticasone-salmeterol (ADVAIR) 250-50 mcg/dose inhaler 1 puff  1 puff Inhalation Q12H [address]    gabapentin (NEURONTIN) capsule 800 mg  800 mg Oral TID    HYDROmorphone (DILAUDID) tablet 1 mg  1 mg Oral Q4H PRN    insulin glargine (LANTUS) subcutaneous injection 22 Units  22 Units Subcutaneous HS    insulin lispro (HumaLOG) 100 units/mL subcutaneous injection 1-6 Units  1-6 Units Subcutaneous TID AC    insulin lispro (HumaLOG) 100 units/mL subcutaneous injection 5 Units  5 Units Subcutaneous TID With Meals    levothyroxine tablet 100 mcg  100 mcg Oral Early Morning    lidocaine (LIDODERM) 5 % patch 1 patch  1 patch Transdermal Daily    methocarbamol (ROBAXIN) tablet 750 mg  750 mg Oral Q6H PRN    mirtazapine (REMERON) tablet 7 5 mg  7 5 mg Oral HS    montelukast (SINGULAIR) tablet 10 mg  10 mg Oral HS    nicotine (NICODERM CQ) 14 mg/24hr TD 24 hr patch 1 patch  1 patch Transdermal Daily    ondansetron (ZOFRAN-ODT) dispersible tablet 4 mg  4 mg Oral Q6H PRN    oxyCODONE (OxyCONTIN) 12 hr tablet 10 mg  10 mg Oral Q12H CARLOS    QUEtiapine (SEROquel) tablet 200 mg  200 mg Oral HS    tiotropium (SPIRIVA) capsule for inhaler 18 mcg  18 mcg Inhalation Daily    topiramate (TOPAMAX) tablet 50 mg  50 mg Oral Q12H [facility]    and [facility] meds:   Prior to Admission Medications   Prescriptions Last Dose Informant Patient Reported? Taking? QUEtiapine (SEROquel) 100 mg tablet   Yes No   Sig: Take 200 mg by mouth daily at bedtime     aspirin 81 MG tablet   Yes No   Sig: Take 81 mg by mouth daily  atorvastatin (LIPITOR) 20 mg tablet   Yes No   Sig: Take 20 mg by mouth daily   buPROPion (WELLBUTRIN XL) 300 mg 24 hr tablet   Yes No   Sig: Take 300 mg by mouth daily   cholecalciferol (VITAMIN D3) 1,000 units tablet   Yes No   Sig: Take 1,000 Units by mouth daily   diltiazem (CARDIZEM CD) 180 mg 24 hr capsule   Yes No   Sig: Take 180 mg by mouth daily   fluticasone-salmeterol (ADVAIR) 250-50 mcg/dose   Yes No   Sig: Inhale 1 puff every 12 (twelve) hours  gabapentin (NEURONTIN) 400 mg capsule   Yes No   Sig: Take 800 mg by mouth 3 (three) times a day     levothyroxine 100 mcg tablet   Yes No   Sig: Take 100 mcg by mouth daily     metFORMIN (GLUCOPHAGE) 500 mg tablet   Yes No   Sig: Take 500 mg by mouth daily with breakfast     mirtazapine (REMERON) 7 5 MG tablet   No No   Sig: Take 1 tablet by mouth daily at bedtime for 30 days   montelukast (SINGULAIR) 10 mg tablet   Yes No   Sig: Take 10 mg by mouth daily at bedtime   potassium chloride (K-DUR,KLOR-CON) 20 mEq tablet   No No   Sig: Take 2 tablets by mouth 2 (two) times a day for 1 day   tiotropium (SPIRIVA) 18 mcg inhalation capsule   Yes No   Sig: Place 18 mcg into inhaler and inhale daily  topiramate (TOPAMAX) 50 MG tablet   Yes No   Sig: Take 50 mg by mouth every 12 (twelve) hours      Facility-Administered Medications: None       Allergies   Allergen Reactions    Prozac [Fluoxetine Hcl] Rash    Sulfa Antibiotics Itching    Quinine        Objective   Vitals: Blood pressure 138/85, pulse 90, temperature 97 7 °F (36 5 °C), temperature source Oral, resp  rate 18, height 5' 2" (1 575 m), weight 90 7 kg (200 lb), last menstrual period 04/01/2013, SpO2 97 %, not currently breastfeeding  RA,Body mass index is 36 58 kg/m²  Intake/Output Summary (Last 24 hours) at 04/06/18 1438  Last data filed at 04/06/18 1300   Gross per 24 hour   Intake             2740 ml   Output                0 ml   Net             2740 ml     Invasive Devices     Peripheral Intravenous Line            Peripheral IV 12/20/17 Right Arm 107 days                Physical Exam   Constitutional: She is oriented to person, place, and time  She appears well-developed and well-nourished  No distress  HENT:   Head: Normocephalic and atraumatic  Eyes: Conjunctivae and EOM are normal  Pupils are equal, round, and reactive to light  Neck: Normal range of motion  Neck supple  No JVD present  Cardiovascular: Normal rate, regular rhythm and normal heart sounds  Exam reveals no gallop and no friction rub  No murmur heard  Pulmonary/Chest: Effort normal and breath sounds normal  No respiratory distress  She has no decreased breath sounds  She has no wheezes  She has no rhonchi  She has no rales  She exhibits no tenderness  Abdominal: Soft  Bowel sounds are normal    Musculoskeletal: Normal range of motion  She exhibits no edema  Lymphadenopathy:     She has no cervical adenopathy  Neurological: She is alert and oriented to person, place, and time  Skin: Skin is warm and dry  No rash noted  No erythema  No pallor     Psychiatric: She has a normal mood and affect  Her behavior is normal  Judgment and thought content normal    Vitals reviewed  Lab Results: I have personally reviewed pertinent lab results  ,    None    Imaging Studies: none     EKG, Pathology, and Other Studies:none    Pulmonary Results (PFTs, PSG): none    VTE Prophylaxis: Sequential compression device (Venodyne)  and Enoxaparin (Lovenox)    Code Status: Level 1 - Full Code        Portions of the record may have been created with voice recognition software  Occasional wrong word or "sound a like" substitutions may have occurred due to the inherent limitations of voice recognition software  Read the chart carefully and recognize, using context, where substitutions have occurred

## 2018-04-06 NOTE — PROGRESS NOTES
PICC Line attempted   Ros Vogel  Vein not cannulated and pt wanted to stop procedure and have PICC placed 04/07/2018

## 2018-04-06 NOTE — PROGRESS NOTES
[address] Hospitalist Service - Internal Medicine Progress Note       PATIENT INFORMATION      Patient: Ramone Mary 74 y o  female   MRN: [id number]  PCP: Jose Grullon DO  [facility] Encounter: [id number]  Date Of Visit: 04/06/18       ASSESSMENTS & PLAN     Thoracic disc herniation   Assessment & Plan    - transferred from the [facility] for neurosurgery evaluation who have agreed to OR intervention after optimization of cardiopulmonary status with pulmonology clearance   - MRI revealed disc herniation at the T9-T10 level - and her CT imaging confirmed a similar findings with stenosis and to a lesser degree at the T7-T8 and T8-T9 levels   - PRN pain control (pain management input appreciated & adjustments to analgesia regimen made accordingly)   - will wean off Decadron (has received QID dosing x 4 days already and cannot just discontinue) per neurosurgery input       Lumbar stenosis   Assessment & Plan    - imaging revealed stenosis at the L4-L5 level  - PRN pain and spasm control  - neurosurgery following       Tobacco use   Assessment & Plan    - transdermal nicotine patch on board  - counseled on tobacco cessation      Leukocytosis   Assessment & Plan    - likely secondary to Decadron  - remains afebrile - monitor WBC count      COPD (chronic obstructive pulmonary disease) - Chronic hypoxic respiratory failure    Assessment & Plan    - stable/asymptomatic - baseline of 2 L oxygen via cannula   - continue Advair/Spiriva regimen      Type 2 diabetes mellitus with hyperglycemia    Assessment & Plan    - home Metformin regimen held  - continue inpatient basal insulin with additional SSI coverage per [employer] - likely exacerbated blood sugar levels from Decadron use which should return to relative baseline ranges after tapering/cessation of steroid regimen  - HgA1c of 6 9      Hypertension   Assessment & Plan    - low-sodium diet encouraged  - continue Cardizem      Bipolar disorder Assessment & Plan    - continue Seroquel/Wellbutrin  - psychiatry input to be appreciated      HIV (human immunodeficiency virus infection)    Assessment & Plan    - awaiting CD4 count   - diagnosed over 30 years ago and never underwent treatment         VTE Prophylaxis:  Lovenox      SUBJECTIVE     Seen and examined earlier today  Per nursing, increased difficulty seen in placing and maintaining intravenous access  Patient was counseled on necessity of a PICC line and consent was obtained earlier today  She complains of intermittent back pain but denies any worsening at this time  OBJECTIVE     Vitals:   Temp (24hrs), Av 6 °F (36 4 °C), Min:96 7 °F (35 9 °C), Max:98 1 °F (36 7 °C)    HR:  [89-92] 92  Resp:  [17-18] 17  BP: (113-146)/(69-85) 146/82  SpO2:  [96 %-98 %] 98 %  Body mass index is 36 58 kg/m²  Input and Output Summary (last 24 hours):        Intake/Output Summary (Last 24 hours) at 18 1727  Last data filed at 18 1300   Gross per 24 hour   Intake             2740 ml   Output                0 ml   Net             2740 ml       Physical Exam:     GENERAL:  Obese - waxing/waning distress from back pain  HEAD:  Normocephalic - atraumatic  EYES: PERRL - EOMI   MOUTH:  Mucosa moist  NECK:  Supple - full range of motion  CARDIAC:  Regular rate/rhythm - S1/S2 positive  PULMONARY:  Clear to auscultation bilaterally - nonlabored respirations  ABDOMEN:  Soft - nontender/nondistended - active bowel sounds  MUSCULOSKELETAL:  Motor strength/range of motion deconditioned from back pain  NEUROLOGIC:  Alert/oriented x 3   SKIN:  Age-appropriate wrinkles/blemishes       ADDITIONAL DATA       Labs & Recent Cultures:       Results from last 7 days  Lab Units 18  0540 18  0515 18  1935   WBC Thousand/uL  --  13 60* 14 62*   HEMOGLOBIN g/dL  --  11 4* 12 1   SL AMB LAB HEMOGLOBIN g/dL 11 5  --   --    HEMATOCRIT %  --  37 1 37 9   SL AMB HEMATOCRIT % 35 8  --   --    PLATELETS Thousands/uL  --  399* 386   SL AMB PLATELET COUNT C69R4/  --   --    NEUTROS PCT %  --   --  79*   LYMPHS PCT %  --   --  11*   MONOS PCT %  --   --  10   EOS PCT %  --   --  0       Results from last 7 days  Lab Units 04/05/18  0515   SODIUM mmol/L 136   POTASSIUM mmol/L 4 6   CHLORIDE mmol/L 104   CO2 mmol/L 25   BUN mg/dL 36*   CREATININE mg/dL 0 99   CALCIUM mg/dL 10 4*   GLUCOSE RANDOM mg/dL 163*         Last 24 Hours Medication List:     Current Facility-Administered Medications:  acetaminophen 975 mg Oral Q8H Mathieu Berg MD   aluminum-magnesium hydroxide-simethicone 30 mL Oral Q6H PRN Varsha Guadarrama MD   atorvastatin 20 mg Oral Daily With Jennifer Louis MD   buPROPion 300 mg Oral Daily Varsha Guadarrama MD   cholecalciferol 1,000 Units Oral Daily Varsha Guadarrama MD   dexamethasone 4 mg Oral Q6H [address] Varsha Guadarrama MD   diltiazem 180 mg Oral Daily Varsha Guadarrama MD   enoxaparin 40 mg Subcutaneous Daily Varsha Guadarrama MD   fluticasone-salmeterol 1 puff Inhalation Q12H [address] Varsha Guadarrama MD   gabapentin 800 mg Oral TID Varsha Guadarrama MD   HYDROmorphone 1 mg Oral Q4H PRN Varsha Guadarrama MD   insulin glargine 22 Units Subcutaneous HS Varsha Guadarrama MD   insulin lispro 1-6 Units Subcutaneous TID AC Varsha Guadarrama MD   insulin lispro 5 Units Subcutaneous TID With Meals Varsha Guadarrama MD   levothyroxine 100 mcg Oral Early Morning Varsha Guadarrama MD   lidocaine 1 patch Transdermal Daily Varsha Guadarrama MD   methocarbamol 750 mg Oral Q6H PRN Varsha Guadarrama MD   mirtazapine 7 5 mg Oral HS Varsha Guadarrama MD   montelukast 10 mg Oral HS Varsha Guadarrama MD   nicotine 1 patch Transdermal Daily Varsha Guadarrama MD   ondansetron 4 mg Oral Q6H PRN Arti Cantu PA-C   oxyCODONE 10 mg Oral Q4H PRN Delonte Finney MD   oxyCODONE 5 mg Oral Q4H PRN Delonte Finney MD   QUEtiapine 200 mg Oral HS Varsha Guadarrama MD   tiotropium 18 mcg Inhalation Daily Sarah Adan MD   topiramate 50 mg Oral Q12H Abilio Rodriguez MD          Time Spent for Care: 37 minutes  More than 50% of total time spent on counseling and coordination of care as described above  Current Length of Stay: 2 day(s)      Code Status: Level 1 - Full Code         ** Please Note: This note is constructed using a voice recognition dictation system   **

## 2018-04-06 NOTE — PROGRESS NOTES
Progress Note - Neurosurgery   Cecilia Valdez 74 y o  female MRN: [id number]  Unit/Bed#: [facility] 932-01 Encounter: [id number]    Assessment:  1  T9-10 herniated disc  2  Central canal narrowing with associated cord signal change likely secondary to #1  3  History of COPD  4  History of HIV+  5  Diabetes mellitus  6  Cardiac disease     Plan:  · Exam: AAOx3  MONTIEL with slight decreased strength in HE 5-/5  LT intact  3/3 JPS intact  Generalized midline T6 to lumbar tenderness on palpation  · Imaging: personally reviewed and reviewed by attending:  · CT thoracic wo: demonstrating degeneration of thoracic spine with multifactorial canal and foraminal stenosis at T9-10    · History of ASA use - continue to hold ASA at this time  · PT/OT: recommend rehab   · DVT ppx: SCDs, lovenox  · Pain control: deferred to primary team - recommend d/c'ing scheduled oxycodone 10mg and decadron   · Medical management:  · WBC 13 6  · CD4 count  · Patient will need medical, pulmonary and cardiology clearance  No emergent surgical intervention  Patient will require medical management prior to any surgery  Planning for OR next week  Call with questions or concerns    Subjective/Objective   Chief Complaint: "I'm okay"    Subjective: patient admits to having generalized back pain that feels like it is starting in the right side and radiating to the left  She describes the pain as aching and rated 8 out of 10  She admits to having improvement with the medications  She does admit to still having urinary leaking when attempting to walk to the bathroom  She denies headaches, dizziness, change in vision, chest pain, abdominal pain/N/V, weakness or numbness  She admits to intermittently having SOB but nothing outside her normal, she does wear 2L of O2 at home  Objective: sitting in chair, resting head on table  NAD  I/O       04/04 0701 - 04/05 0700 04/05 0701 - 04/06 0700 04/06 0701 - 04/07 0700    P  O  960 2740 480    Total Intake(mL/kg) 960 (10 6) 2740 (30 2) 480 (5 3)    Net +960 +2740 +480           Unmeasured Urine Occurrence 2 x 9 x 1 x          Invasive Devices     Peripheral Intravenous Line            Peripheral IV 12/20/17 Right Arm 107 days                Physical Exam:  Vitals: Blood pressure 138/85, pulse 90, temperature 97 7 °F (36 5 °C), temperature source Oral, resp  rate 18, height 5' 2" (1 575 m), weight 90 7 kg (200 lb), last menstrual period 04/01/2013, SpO2 97 %, not currently breastfeeding  ,Body mass index is 36 58 kg/m²  General appearance: alert, appears stated age, cooperative and no distress  Head: Normocephalic, without obvious abnormality, atraumatic  Eyes: tracks appropriately in room  Neck: no midline cervical tenderness on palpation   Back: generalized midline tenderness on palpation from approximately T6 to low lumbar  Lungs: nasal cannula in place, non labored breathing  Heart: regular heart rate  Neurologic:   Mental status: Alert, oriented x2, disoriented to time, thought content appropriate  Speech is fluent, clear  Cranial nerves: grossly intact (Cranial nerves II-XII)  Facial symmetry at rest and with expression  Sensory: normal to LT in bilateral upper and lower extremities with exception to decreased sensation to ankles down bilaterally which is baseline  DSS intact  Motor: moving all extremities, strength 5/5 with exception to 5-/5 in HE and KE     Reflexes: 2+ and symmetric  negative negron, negative clonus  Coordination: 3/3 JPS intact      Lab Results:    Results from last 7 days  Lab Units 04/05/18 0515 04/04/18 1935 03/31/18  0609 03/30/18  2124   WBC Thousand/uL 13 60* 14 62* 7 10 6 26   HEMOGLOBIN g/dL 11 4* 12 1 10 7* 11 0*   HEMATOCRIT % 37 1 37 9 34 4* 34 7*   PLATELETS Thousands/uL 399* 386 271 301   NEUTROS PCT %  --  79*  --  52   MONOS PCT %  --  10  --  10       Results from last 7 days  Lab Units 04/05/18 0515 04/04/18 1935 04/03/18  1201   SODIUM mmol/L 136 137 134*   POTASSIUM mmol/L 4 6 4 7 5 0   CHLORIDE mmol/L 104 105 98*   CO2 mmol/L 25 27 23   BUN mg/dL 36* 36* 29*   CREATININE mg/dL 0 99 1 10 1 24   CALCIUM mg/dL 10 4* 10 7* 10 4*   GLUCOSE RANDOM mg/dL 163* 175* 221*                 No results found for: TROPONINT  ABG:No results found for: PHART, TPF6WOK, PO2ART, SPM5VTZ, A8XLWIRY, BEART, SOURCE    Imaging Studies: I have personally reviewed pertinent reports  and I have personally reviewed pertinent films in PACS  CT spine thoracic wo contrast   Final Result      Marked degeneration of the thoracic spine as described  Multifactorial canal and foraminal stenosis at the T9-10 level as described  Changes, although less significant at T8-9 and T7-T8  Workstation performed: [id number]           EKG, Pathology, and Other Studies: I have personally reviewed pertinent reports        VTE  Prophylaxis: Sequential compression device (Venodyne)  and Enoxaparin (Lovenox)

## 2018-04-06 NOTE — PHYSICAL THERAPY NOTE
PHYSICAL THERAPY NOTE      Patient Name: Shauna Casanova  BJGHI'[id number] Date: 4/6/2018 04/06/18 0945   Pain Assessment   Pain Assessment 0-10   Pain Score 6   Pain Type Acute pain   Pain Location Back   Restrictions/Precautions   Weight Bearing Precautions Per Order No   Other Precautions Cognitive; Fall Risk;Pain   General   Chart Reviewed Yes   Response to Previous Treatment Patient reporting fatigue but able to participate  Family/Caregiver Present No   Cognition   Overall Cognitive Status Impaired   Arousal/Participation Alert; Responsive   Attention Attends with cues to redirect   Orientation Level Oriented X4   Following Commands Follows one step commands with increased time or repetition   Subjective   Subjective Encouraged pt to ambulate, to which she responded "Hell no " Pt reports pain is too severe  Bed Mobility   Additional Comments pt already upright, sitting EOB   Transfers   Sit to Stand 5  Supervision   Additional items Assist x 1; Increased time required;Verbal cues   Stand to Sit 5  Supervision   Additional items Assist x 1;Verbal cues; Increased time required   Stand pivot 4  Minimal assistance   Additional items Assist x 1; Increased time required;Verbal cues   Ambulation/Elevation   Gait pattern (pt refusing at this time)   Balance   Static Sitting Good   Dynamic Sitting Fair +   Static Standing Fair -   Dynamic Standing Poor +   Endurance Deficit   Endurance Deficit Yes   Endurance Deficit Description pain   Activity Tolerance   Activity Tolerance Patient limited by pain; Patient limited by fatigue   Nurse Made Aware RN confirmed pt appropriate for PT tx  Assessment   Prognosis Fair   Problem List Decreased strength;Decreased endurance; Impaired balance;Decreased mobility; Decreased safety awareness; Obesity;Pain   Assessment Pt not agreeable to attempting ambulation today and was requesting to eat her cereal  Pt was seated EOB and leanind L due to knee portion of bed being elevated   Pt agreeable to transferring to chair to eat breakfast  Supervision sit<>stand and Estuardo/CGA for stand pivot transfer to chair  Pt was attempting to use rolling table for support, requiring max cues for safety  Will continue to follow pt and progress as able  Barriers to Discharge Inaccessible home environment;Decreased caregiver support   Goals   Patient Goals to eat her cereal   STG Expiration Date 04/15/18   Plan   Treatment/Interventions Functional transfer training;LE strengthening/ROM; Therapeutic exercise; Endurance training;Cognitive reorientation;Patient/family training;Equipment eval/education; Bed mobility;Gait training;Spoke to nursing   Progress Slow progress, decreased activity tolerance   PT Frequency 5x/wk   Recommendation   Recommendation Post acute IP rehab   Equipment Recommended Walker   PT - OK to Discharge Yes  (to rehab when medically cleared)     Relda John, PT

## 2018-04-06 NOTE — SOCIAL WORK
Cm reviewed patient during care coordination rounds  Patient is not medically stable for d/c today  Patient is anticipated for surgery next week  Patient is a [employer] and paperwork sent to [facility] office  Referral sent to [facility]  Awaiting medical clearance

## 2018-04-06 NOTE — PLAN OF CARE
Problem: PHYSICAL THERAPY ADULT  Goal: Performs mobility at highest level of function for planned discharge setting  See evaluation for individualized goals  Treatment/Interventions: Functional transfer training, LE strengthening/ROM, Patient/family training, Equipment eval/education, Bed mobility, Gait training, Spoke to nursing, Spoke to case management  Equipment Recommended: Laura Arrieta       See flowsheet documentation for full assessment, interventions and recommendations  Outcome: Not Progressing  Prognosis: Fair  Problem List: Decreased strength, Decreased endurance, Impaired balance, Decreased mobility, Decreased safety awareness, Obesity, Pain  Assessment: Pt not agreeable to attempting ambulation today and was requesting to eat her cereal  Pt was seated EOB and leanind L due to knee portion of bed being elevated  Pt agreeable to transferring to chair to eat breakfast  Supervision sit<>stand and Estuardo/CGA for stand pivot transfer to chair  Pt was attempting to use rolling table for support, requiring max cues for safety  Will continue to follow pt and progress as able  Barriers to Discharge: Inaccessible home environment, Decreased caregiver support     Recommendation: Post acute IP rehab     PT - OK to Discharge: Yes (to rehab when medically cleared)    See flowsheet documentation for full assessment

## 2018-04-07 ENCOUNTER — APPOINTMENT (INPATIENT)
Dept: RADIOLOGY | Facility: HOSPITAL | Age: 68
DRG: 459 | End: 2018-04-07
Payer: MEDICARE

## 2018-04-07 PROBLEM — E78.5 HYPERLIPIDEMIA: Status: ACTIVE | Noted: 2018-04-07

## 2018-04-07 LAB
ANION GAP SERPL CALCULATED.3IONS-SCNC: 5 MMOL/L (ref 4–13)
BASOPHILS # BLD MANUAL: 0 THOUSAND/UL (ref 0–0.1)
BASOPHILS NFR MAR MANUAL: 0 % (ref 0–1)
BUN SERPL-MCNC: 31 MG/DL (ref 5–25)
CALCIUM SERPL-MCNC: 10 MG/DL (ref 8.3–10.1)
CHLORIDE SERPL-SCNC: 100 MMOL/L (ref 100–108)
CO2 SERPL-SCNC: 31 MMOL/L (ref 21–32)
CREAT SERPL-MCNC: 0.91 MG/DL (ref 0.6–1.3)
EOSINOPHIL # BLD MANUAL: 0 THOUSAND/UL (ref 0–0.4)
EOSINOPHIL NFR BLD MANUAL: 0 % (ref 0–6)
ERYTHROCYTE [DISTWIDTH] IN BLOOD BY AUTOMATED COUNT: 16.5 % (ref 11.6–15.1)
GFR SERPL CREATININE-BSD FRML MDRD: 77 ML/MIN/1.73SQ M
GIANT PLATELETS BLD QL SMEAR: PRESENT
GLUCOSE SERPL-MCNC: 146 MG/DL (ref 65–140)
GLUCOSE SERPL-MCNC: 180 MG/DL (ref 65–140)
GLUCOSE SERPL-MCNC: 185 MG/DL (ref 65–140)
GLUCOSE SERPL-MCNC: 185 MG/DL (ref 65–140)
GLUCOSE SERPL-MCNC: 196 MG/DL (ref 65–140)
HCT VFR BLD AUTO: 36.9 % (ref 34.8–46.1)
HGB BLD-MCNC: 11.8 G/DL (ref 11.5–15.4)
LYMPHOCYTES # BLD AUTO: 1.37 THOUSAND/UL (ref 0.6–4.47)
LYMPHOCYTES # BLD AUTO: 10 % (ref 14–44)
MCH RBC QN AUTO: 27.2 PG (ref 26.8–34.3)
MCHC RBC AUTO-ENTMCNC: 32 G/DL (ref 31.4–37.4)
MCV RBC AUTO: 85 FL (ref 82–98)
METAMYELOCYTES NFR BLD MANUAL: 1 % (ref 0–1)
MONOCYTES # BLD AUTO: 0.96 THOUSAND/UL (ref 0–1.22)
MONOCYTES NFR BLD: 7 % (ref 4–12)
MYELOCYTES NFR BLD MANUAL: 3 % (ref 0–1)
NEUTROPHILS # BLD MANUAL: 10.16 THOUSAND/UL (ref 1.85–7.62)
NEUTS SEG NFR BLD AUTO: 74 % (ref 43–75)
NRBC BLD AUTO-RTO: 0 /100 WBCS
OVALOCYTES BLD QL SMEAR: PRESENT
PLATELET # BLD AUTO: 409 THOUSANDS/UL (ref 149–390)
PLATELET BLD QL SMEAR: ADEQUATE
PMV BLD AUTO: 8.9 FL (ref 8.9–12.7)
POTASSIUM SERPL-SCNC: 4.4 MMOL/L (ref 3.5–5.3)
RBC # BLD AUTO: 4.34 MILLION/UL (ref 3.81–5.12)
RBC MORPH BLD: PRESENT
SODIUM SERPL-SCNC: 136 MMOL/L (ref 136–145)
VARIANT LYMPHS # BLD AUTO: 5 %
WBC # BLD AUTO: 13.73 THOUSAND/UL (ref 4.31–10.16)

## 2018-04-07 PROCEDURE — 85027 COMPLETE CBC AUTOMATED: CPT | Performed by: INTERNAL MEDICINE

## 2018-04-07 PROCEDURE — 71045 X-RAY EXAM CHEST 1 VIEW: CPT

## 2018-04-07 PROCEDURE — 85007 BL SMEAR W/DIFF WBC COUNT: CPT | Performed by: INTERNAL MEDICINE

## 2018-04-07 PROCEDURE — 99231 SBSQ HOSP IP/OBS SF/LOW 25: CPT | Performed by: PHYSICIAN ASSISTANT

## 2018-04-07 PROCEDURE — 02HV33Z INSERTION OF INFUSION DEVICE INTO SUPERIOR VENA CAVA, PERCUTANEOUS APPROACH: ICD-10-PCS | Performed by: INTERNAL MEDICINE

## 2018-04-07 PROCEDURE — 36569 INSJ PICC 5 YR+ W/O IMAGING: CPT

## 2018-04-07 PROCEDURE — 94760 N-INVAS EAR/PLS OXIMETRY 1: CPT

## 2018-04-07 PROCEDURE — 97530 THERAPEUTIC ACTIVITIES: CPT

## 2018-04-07 PROCEDURE — 97116 GAIT TRAINING THERAPY: CPT

## 2018-04-07 PROCEDURE — 94660 CPAP INITIATION&MGMT: CPT

## 2018-04-07 PROCEDURE — 82948 REAGENT STRIP/BLOOD GLUCOSE: CPT

## 2018-04-07 PROCEDURE — C1751 CATH, INF, PER/CENT/MIDLINE: HCPCS

## 2018-04-07 PROCEDURE — 80048 BASIC METABOLIC PNL TOTAL CA: CPT | Performed by: INTERNAL MEDICINE

## 2018-04-07 PROCEDURE — 99233 SBSQ HOSP IP/OBS HIGH 50: CPT | Performed by: INTERNAL MEDICINE

## 2018-04-07 RX ADMIN — ACETAMINOPHEN 975 MG: 325 TABLET, FILM COATED ORAL at 05:58

## 2018-04-07 RX ADMIN — LIDOCAINE 1 PATCH: 50 PATCH TOPICAL at 09:19

## 2018-04-07 RX ADMIN — TIOTROPIUM BROMIDE 18 MCG: 18 CAPSULE ORAL; RESPIRATORY (INHALATION) at 09:10

## 2018-04-07 RX ADMIN — ALUMINUM HYDROXIDE, MAGNESIUM HYDROXIDE, AND SIMETHICONE 30 ML: 200; 200; 20 SUSPENSION ORAL at 14:14

## 2018-04-07 RX ADMIN — LEVOTHYROXINE SODIUM 100 MCG: 100 TABLET ORAL at 05:58

## 2018-04-07 RX ADMIN — INSULIN LISPRO 5 UNITS: 100 INJECTION, SOLUTION INTRAVENOUS; SUBCUTANEOUS at 17:52

## 2018-04-07 RX ADMIN — NICOTINE 1 PATCH: 14 PATCH, EXTENDED RELEASE TRANSDERMAL at 09:20

## 2018-04-07 RX ADMIN — ENOXAPARIN SODIUM 40 MG: 40 INJECTION SUBCUTANEOUS at 09:21

## 2018-04-07 RX ADMIN — OXYCODONE HYDROCHLORIDE 5 MG: 5 TABLET ORAL at 09:23

## 2018-04-07 RX ADMIN — QUETIAPINE 200 MG: 100 TABLET ORAL at 21:34

## 2018-04-07 RX ADMIN — INSULIN LISPRO 5 UNITS: 100 INJECTION, SOLUTION INTRAVENOUS; SUBCUTANEOUS at 09:11

## 2018-04-07 RX ADMIN — INSULIN LISPRO 5 UNITS: 100 INJECTION, SOLUTION INTRAVENOUS; SUBCUTANEOUS at 11:56

## 2018-04-07 RX ADMIN — ACETAMINOPHEN 975 MG: 325 TABLET, FILM COATED ORAL at 21:35

## 2018-04-07 RX ADMIN — MONTELUKAST SODIUM 10 MG: 10 TABLET, COATED ORAL at 21:34

## 2018-04-07 RX ADMIN — DEXAMETHASONE 2 MG: 2 TABLET ORAL at 11:56

## 2018-04-07 RX ADMIN — ACETAMINOPHEN 975 MG: 325 TABLET, FILM COATED ORAL at 14:13

## 2018-04-07 RX ADMIN — GABAPENTIN 800 MG: 400 CAPSULE ORAL at 09:22

## 2018-04-07 RX ADMIN — DEXAMETHASONE 2 MG: 2 TABLET ORAL at 01:04

## 2018-04-07 RX ADMIN — DILTIAZEM HYDROCHLORIDE 180 MG: 180 CAPSULE, EXTENDED RELEASE ORAL at 09:23

## 2018-04-07 RX ADMIN — TOPIRAMATE 50 MG: 25 TABLET, FILM COATED ORAL at 21:34

## 2018-04-07 RX ADMIN — MIRTAZAPINE 7.5 MG: 15 TABLET, FILM COATED ORAL at 21:35

## 2018-04-07 RX ADMIN — ATORVASTATIN CALCIUM 20 MG: 20 TABLET, FILM COATED ORAL at 16:08

## 2018-04-07 RX ADMIN — BUPROPION HYDROCHLORIDE 300 MG: 150 TABLET, FILM COATED, EXTENDED RELEASE ORAL at 09:22

## 2018-04-07 RX ADMIN — INSULIN LISPRO 2 UNITS: 100 INJECTION, SOLUTION INTRAVENOUS; SUBCUTANEOUS at 09:12

## 2018-04-07 RX ADMIN — INSULIN GLARGINE 22 UNITS: 100 INJECTION, SOLUTION SUBCUTANEOUS at 21:35

## 2018-04-07 RX ADMIN — GABAPENTIN 800 MG: 400 CAPSULE ORAL at 21:34

## 2018-04-07 RX ADMIN — VITAMIN D, TAB 1000IU (100/BT) 1000 UNITS: 25 TAB at 09:22

## 2018-04-07 RX ADMIN — DEXAMETHASONE 2 MG: 2 TABLET ORAL at 17:52

## 2018-04-07 RX ADMIN — DEXAMETHASONE 2 MG: 2 TABLET ORAL at 05:58

## 2018-04-07 RX ADMIN — OXYCODONE HYDROCHLORIDE 10 MG: 10 TABLET ORAL at 21:36

## 2018-04-07 RX ADMIN — OXYCODONE HYDROCHLORIDE 10 MG: 10 TABLET ORAL at 16:08

## 2018-04-07 RX ADMIN — TOPIRAMATE 50 MG: 25 TABLET, FILM COATED ORAL at 09:22

## 2018-04-07 RX ADMIN — GABAPENTIN 800 MG: 400 CAPSULE ORAL at 16:08

## 2018-04-07 RX ADMIN — FLUTICASONE PROPIONATE AND SALMETEROL 1 PUFF: 50; 250 POWDER RESPIRATORY (INHALATION) at 21:34

## 2018-04-07 RX ADMIN — INSULIN LISPRO 1 UNITS: 100 INJECTION, SOLUTION INTRAVENOUS; SUBCUTANEOUS at 17:53

## 2018-04-07 RX ADMIN — FLUTICASONE PROPIONATE AND SALMETEROL 1 PUFF: 50; 250 POWDER RESPIRATORY (INHALATION) at 09:10

## 2018-04-07 RX ADMIN — INSULIN LISPRO 1 UNITS: 100 INJECTION, SOLUTION INTRAVENOUS; SUBCUTANEOUS at 11:57

## 2018-04-07 NOTE — PHYSICAL THERAPY NOTE
Physical Therapy Progress Note     04/07/18 1130   Pain Assessment   Pain Assessment No/denies pain   Pain Score No Pain   Restrictions/Precautions   Other Precautions Fall Risk   General   Chart Reviewed Yes   Response to Previous Treatment Patient with no complaints from previous session  Family/Caregiver Present No   Cognition   Arousal/Participation Alert; Cooperative   Orientation Level Oriented X4   Following Commands Follows one step commands without difficulty   Subjective   Subjective has  no  c/o  "my legs give out on me "   Bed Mobility   Supine to Sit 7  Independent   Transfers   Sit to Stand 5  Supervision   Additional items Assist x 1; Increased time required; Impulsive   Stand to Sit 5  Supervision   Additional items Assist x 1; Increased time required;Verbal cues; Impulsive   Stand pivot 5  Supervision   Additional items Assist x 1; Impulsive;Verbal cues   Ambulation/Elevation   Gait pattern Forward Flexion;Decreased foot clearance; Excessively slow   Gait Assistance 4  Minimal assist   Additional items Assist x 1;Verbal cues; Tactile cues   Assistive Device Rolling walker   Distance 5ft 40ft   Balance   Static Sitting Good   Static Standing Fair -   Ambulatory Poor +   Endurance Deficit   Endurance Deficit Yes   Endurance Deficit Description fatigue SOB   Activity Tolerance   Activity Tolerance Patient limited by fatigue   Nurse [id number] [place] [address] [place] rn    Exercises   THR Sitting;20 reps;AROM; Bilateral   Assessment   Prognosis Fair   Problem List Decreased strength;Decreased endurance; Impaired balance;Decreased mobility; Decreased safety awareness; Obesity   Assessment pt is pleasant and  cooperative  but  impulsive  received pt  sitting on EOB   she  did  complete  therex BLE arom  on EOB  tx interuppted by RT  for stat chest  xray   pt  than  did amb  40 ft using  rw      gait is  very slow , mildly unsteady  c 1 episode of slight  knee buckling on  approach to  chr     pt  requires   min A for  upright mob   instruction needed  for proper rw  use   she  will  benefit  from  continued PT intervention    Barriers to Discharge Inaccessible home environment   Goals   Patient Goals have  surg next week    STG Expiration Date 04/15/18   Treatment Day 2   Plan   Treatment/Interventions Functional transfer training;LE strengthening/ROM; Patient/family training;Bed mobility;Gait training;Spoke to nursing   Progress Slow progress, medical status limitations   PT Frequency 5x/wk   Recommendation   Recommendation Post acute IP rehab   Equipment Recommended Walker   PT - OK to Discharge Yes  (to rehab  when med ready )     Kirti Cole, PTA

## 2018-04-07 NOTE — PLAN OF CARE
Problem: PHYSICAL THERAPY ADULT  Goal: Performs mobility at highest level of function for planned discharge setting  See evaluation for individualized goals  Treatment/Interventions: Functional transfer training, LE strengthening/ROM, Patient/family training, Equipment eval/education, Bed mobility, Gait training, Spoke to nursing, Spoke to case management  Equipment Recommended: Wero Harrington       See flowsheet documentation for full assessment, interventions and recommendations  Outcome: Progressing  Prognosis: Fair  Problem List: Decreased strength, Decreased endurance, Impaired balance, Decreased mobility, Decreased safety awareness, Obesity  Assessment: pt is pleasant and  cooperative  but  impulsive  received pt  sitting on EOB   she  did  complete  therex BLE arom  on EOB  tx interuppted by RT  for stat chest  xray   pt  than  did amb  40 ft using  rw      gait is  very slow , mildly unsteady  c 1 episode of slight  knee buckling on  approach to  chr     pt  requires   min A for  upright  mob   instruction needed  for proper rw  use   she  will  benefit  from  continued PT intervention   Barriers to Discharge: Inaccessible home environment     Recommendation: Post acute IP rehab     PT - OK to Discharge: Yes (to rehab  when med ready )    See flowsheet documentation for full assessment

## 2018-04-07 NOTE — PLAN OF CARE
Discharge to post-acute care or home with appropriate resources Progressing      Absence or prevention of progression during hospitalization Progressing      Absence of fever/infection during neutropenic period Progressing      Patient/family/caregiver demonstrates understanding of disease process, treatment plan, medications, and discharge instructions Progressing      Maintain or return mobility to safest level of function Progressing      Maintain proper alignment of affected body part Progressing      Verbalizes/displays adequate comfort level or baseline comfort level Progressing      Patient will remain free of falls Progressing      Patient will remain free of falls Progressing      Maintain or return to baseline ADL function Progressing

## 2018-04-07 NOTE — PROGRESS NOTES
[address] Hospitalist Service - Internal Medicine Progress Note       PATIENT INFORMATION      Patient: Lilia Mendoza 74 y o  female   MRN: [id number]  PCP: Andrew Rosales DO  Unit/Bed#: [facility] 932-01 Encounter: [id number]  Date Of Visit: 04/07/18       ASSESSMENTS & PLAN     Thoracic disc herniation   Assessment & Plan    - transferred from the [facility] for neurosurgery evaluation with tentative plan for OR intervention after optimization of cardiopulmonary status and pulmonology clearance   - MRI revealed disc herniation at the T9-T10 level - and her CT imaging confirmed a similar findings with stenosis and to a lesser degree at the T7-T8 and T8-T9 levels   - PRN pain control (pain management input appreciated & adjustments to analgesia regimen made accordingly)   - will wean off Decadron (has received QID dosing x 3-4 days prior to transfer to [facility]) per neurosurgery input - decrease to 2 mg q12 hrs tomorrow       Lumbar stenosis   Assessment & Plan    - imaging revealed stenosis at the L4-L5 level  - PRN pain and spasm control  - neurosurgery following - PT/OT as tolerated      Hyperlipidemia   Assessment & Plan    - continue Lipitor - held ASA in anticipation of surgical intervention         Tobacco use   Assessment & Plan    - transdermal nicotine patch on board  - counseled on tobacco cessation      Leukocytosis   Assessment & Plan    - likely secondary to Decadron  - remains afebrile - monitor WBC count      COPD (chronic obstructive pulmonary disease) - Chronic hypoxic respiratory failure    Assessment & Plan    - stable/asymptomatic - general baseline of 2 L oxygen via cannula although currently saturating on room air comfortably  - continue Advair/Spiriva regimen  - pulmonology following and clearance requested prior to surgical intervention      Type 2 diabetes mellitus with hyperglycemia    Assessment & Plan    - home Metformin regimen held  - continue inpatient basal insulin with additional SSI coverage per Accu-Cheks - likely exacerbated blood sugar levels from Decadron use which should return to relative baseline ranges after tapering/cessation of steroid regimen  - HgA1c of 6 9      Hypertension   Assessment & Plan    - low-sodium diet encouraged  - continue Cardizem      Bipolar disorder    Assessment & Plan    - continue Seroquel/Wellbutrin  - psychiatry input noted      HIV (human immunodeficiency virus infection)    Assessment & Plan    - absolute CD4 count within normal limits - CD4 % helper T-cell count low @ 26 4 (30 8 - 58 5% is normal range) - needs outpatient followup   - diagnosed over 30 years ago and never underwent treatment         VTE Prophylaxis:  Lovenocali DOBBS     Seen/examined today  Underwent successful placement of PICC line which she is happy about  She reports mild improvement in her back pain today and is generally more alert/awake  No other complaints currently  OBJECTIVE     Vitals:   Temp (24hrs), Av 9 °F (36 6 °C), Min:97 6 °F (36 4 °C), Max:98 2 °F (36 8 °C)    HR:  [92-96] 96  Resp:  [14-18] 18  BP: (134-146)/(78-82) 142/78  SpO2:  [96 %-99 %] 97 %  Body mass index is 36 58 kg/m²  Input and Output Summary (last 24 hours):        Intake/Output Summary (Last 24 hours) at 18  Last data filed at 18 1850   Gross per 24 hour   Intake             1005 ml   Output                1 ml   Net             1004 ml       Physical Exam:     GENERAL:  Obese - improved distress currently from back pain  HEAD:  Normocephalic - atraumatic  EYES: PERRL - EOMI   MOUTH:  Mucosa moist  NECK:  Supple - full range of motion  CARDIAC:  Regular rate/rhythm - S1/S2 positive  PULMONARY:  Clear breath sounds bilaterally - nonlabored respirations  ABDOMEN:  Soft - nontender/nondistended - active bowel sounds  MUSCULOSKELETAL:  Motor strength/range of motion deconditioned from back pain  NEUROLOGIC:  Alert/oriented x 3   SKIN:  Chronic wrinkles/blemishes       ADDITIONAL DATA       Labs & Recent Cultures:       Results from last 7 days  Lab Units 04/07/18  0539  04/04/18  1935   WBC Thousand/uL 13 73*  < > 14 62*   HEMOGLOBIN g/dL 11 8  < > 12 1   SL AMB LAB HEMOGLOBIN   --   < >  --    HEMATOCRIT % 36 9  < > 37 9   SL AMB HEMATOCRIT   --   < >  --    PLATELETS Thousands/uL 409*  < > 386   SL AMB PLATELET COUNT   --   < >  --    NEUTROS PCT %  --   --  79*   LYMPHS PCT %  --   --  11*   LYMPHO PCT % 10*  --   --    MONOS PCT %  --   --  10   MONO PCT MAN % 7  --   --    EOS PCT %  --   --  0   EOSINO PCT MANUAL % 0  --   --    < > = values in this interval not displayed      Results from last 7 days  Lab Units 04/07/18  0539   SODIUM mmol/L 136   POTASSIUM mmol/L 4 4   CHLORIDE mmol/L 100   CO2 mmol/L 31   BUN mg/dL 31*   CREATININE mg/dL 0 91   CALCIUM mg/dL 10 0   GLUCOSE RANDOM mg/dL 180*         Last 24 Hours Medication List:     Current Facility-Administered Medications:  acetaminophen 975 mg Oral Q8H Santi Mathur MD   aluminum-magnesium hydroxide-simethicone 30 mL Oral Q6H PRN Shanae Robert MD   atorvastatin 20 mg Oral Daily With Jacqui Godwin MD   buPROPion 300 mg Oral Daily Shanae Robert MD   cholecalciferol 1,000 Units Oral Daily Shanae Robert MD   dexamethasone 2 mg Oral Q6H [address] Charlie Pisano MD   diltiazem 180 mg Oral Daily Shanae Robert MD   enoxaparin 40 mg Subcutaneous Daily Shanae Robert MD   fluticasone-salmeterol 1 puff Inhalation Q12H Meryl Shelton MD   gabapentin 800 mg Oral TID Shanae Robert MD   HYDROmorphone 1 mg Oral Q4H PRN Shanae Robert MD   insulin glargine 22 Units Subcutaneous HS Shanae Robert MD   insulin lispro 1-6 Units Subcutaneous TID AC Shanae Robert MD   insulin lispro 5 Units Subcutaneous TID With Meals Shanae Robert MD   levothyroxine 100 mcg Oral Early Morning Shanae Robert MD   lidocaine 1 patch Transdermal Daily Orion Stewart MD   methocarbamol 750 mg Oral Q6H PRN Orion Stewart MD   mirtazapine 7 5 mg Oral HS Orion Stewart MD   montelukast 10 mg Oral HS Orion Stewart MD   nicotine 1 patch Transdermal Daily Orion Stewart MD   ondansetron 4 mg Oral Q6H PRN Arti Cantu PA-C   oxyCODONE 10 mg Oral Q4H PRN Kaveh Cotto MD   oxyCODONE 5 mg Oral Q4H PRN Kaveh Cotto MD   QUEtiapine 200 mg Oral HS Orion Stewart MD   tiotropium 18 mcg Inhalation Daily Orion Stewart MD   topiramate 50 mg Oral Q12H Frederic Ovalle MD          Time Spent for Care: 36 minutes  More than 50% of total time spent on counseling and coordination of care as described above  Current Length of Stay: 3 day(s)      Code Status: Level 1 - Full Code         ** Please Note: This note is constructed using a voice recognition dictation system   **

## 2018-04-07 NOTE — PROCEDURES
Insert PICC line  Date/Time: 4/7/2018 11:01 AM  Performed by: Erica Pollock  Authorized by: Kadie Andrews     Patient location:  Bedside  Other Assisting Provider: Yes (comment) (Beverly de la cruz)    Consent:     Consent obtained:  Written (Md obtained in chart)    Consent given by:  Patient  Universal protocol:     Procedure explained and questions answered to patient or proxy's satisfaction: yes      Relevant documents present and verified: yes      Test results available and properly labeled: yes      Imaging studies available: yes      Required blood products, implants, devices, and special equipment available: yes      Site/side marked: yes      Patient identity confirmed:  Verbally with patient and arm band  Pre-procedure details:     Hand hygiene: Hand hygiene performed prior to insertion      Sterile barrier technique: All elements of maximal sterile technique followed      Skin preparation:  ChloraPrep    Skin preparation agent: Skin preparation agent completely dried prior to procedure    Indications:     PICC line indications: no peripheral vascular access    Anesthesia (see MAR for exact dosages):      Anesthesia method:  Local infiltration    Local anesthetic:  Lidocaine 1% w/o epi  Procedure details:     Location:  Brachial    Vessel type: vein      Laterality:  Left    Approach: percutaneous endoscopic technique used      Patient position:  Flat    Procedural supplies:  Double lumen    Catheter size:  5 Fr    Landmarks identified: yes      Ultrasound guidance: yes      Sterile ultrasound techniques: Sterile gel and sterile probe covers were used      Number of attempts:  1    Successful placement: yes      Vessel of catheter tip end:  Chest Xray needed to confirm placement    Total catheter length (cm):  52    Catheter out on skin (cm):  0    Max flow rate:  999    Arm circumference:  36  Post-procedure details:     Post-procedure:  Securement device placed and dressing applied    Assessment:  Blood return through all ports, placement verification pending x-ray result and free fluid flow    Post-procedure complications: none      Patient tolerance of procedure:   Tolerated well, no immediate complications  Comments:      Pink stickers applied LUZMA garcia notified   Ref [id number]  Lot [id number]

## 2018-04-07 NOTE — PROGRESS NOTES
Progress Note - Neurosurgery   Den Pires 74 y o  female MRN: [id number]  Unit/Bed#: [facility] 932-01 Encounter: [id number]    Assessment:  1  T9-10 herniated disc  2  Central canal narrowing with associated cord signal change likely secondary to #1  3  History of COPD  4  History of HIV+  5  Diabetes mellitus  6  Cardiac disease       Plan:  -ASA on hold  - working with PT/OT  - pain management  - appropriate medical clearances  - for surgery sometime next week    Subjective/Objective     No specific complaints      Intake/Output       04/07/18 0701 - 04/08/18 0700      [id number] 0710-7205 Total       Intake    P O   480  -- 480    Total Intake 480 -- 480       Output    Urine  --  -- --    Unmeasured Urine Occurrence 1 x -- 1 x    Stool  --  -- --    Unmeasured Stool Occurrence 1 x -- 1 x    Total Output -- -- --       Net I/O     480 -- 480          Invasive Devices     Peripherally Inserted Central Catheter Line            PICC Line 04/07/18 Left Brachial less than 1 day          Peripheral Intravenous Line            Peripheral IV 12/20/17 Right Arm 108 days                Physical Exam:     Vitals: Blood pressure 134/80, pulse 94, temperature 98 2 °F (36 8 °C), temperature source Oral, resp  rate 14, height 5' 2" (1 575 m), weight 90 7 kg (200 lb), last menstrual period 04/01/2013, SpO2 98 %, not currently breastfeeding  ,Body mass index is 36 58 kg/m²  Awake and alert  Moves all extremities  Good lower extremity strength  Lungs clear  Heart regular  Abdomen soft    Lab Results: I have personally reviewed pertinent results  Imaging Studies: I have personally reviewed pertinent reports          VTE Pharmacologic Prophylaxis: Enoxaparin (Lovenox)    VTE Mechanical Prophylaxis: sequential compression device

## 2018-04-08 LAB
ANION GAP SERPL CALCULATED.3IONS-SCNC: 5 MMOL/L (ref 4–13)
BASOPHILS # BLD MANUAL: 0 THOUSAND/UL (ref 0–0.1)
BASOPHILS NFR MAR MANUAL: 0 % (ref 0–1)
BUN SERPL-MCNC: 36 MG/DL (ref 5–25)
CALCIUM SERPL-MCNC: 9.5 MG/DL (ref 8.3–10.1)
CHLORIDE SERPL-SCNC: 103 MMOL/L (ref 100–108)
CO2 SERPL-SCNC: 29 MMOL/L (ref 21–32)
CREAT SERPL-MCNC: 0.83 MG/DL (ref 0.6–1.3)
EOSINOPHIL # BLD MANUAL: 0 THOUSAND/UL (ref 0–0.4)
EOSINOPHIL NFR BLD MANUAL: 0 % (ref 0–6)
ERYTHROCYTE [DISTWIDTH] IN BLOOD BY AUTOMATED COUNT: 16.7 % (ref 11.6–15.1)
GFR SERPL CREATININE-BSD FRML MDRD: 86 ML/MIN/1.73SQ M
GLUCOSE SERPL-MCNC: 139 MG/DL (ref 65–140)
GLUCOSE SERPL-MCNC: 161 MG/DL (ref 65–140)
GLUCOSE SERPL-MCNC: 181 MG/DL (ref 65–140)
GLUCOSE SERPL-MCNC: 187 MG/DL (ref 65–140)
GLUCOSE SERPL-MCNC: 205 MG/DL (ref 65–140)
HCT VFR BLD AUTO: 34.6 % (ref 34.8–46.1)
HGB BLD-MCNC: 10.8 G/DL (ref 11.5–15.4)
LYMPHOCYTES # BLD AUTO: 1.12 THOUSAND/UL (ref 0.6–4.47)
LYMPHOCYTES # BLD AUTO: 8 % (ref 14–44)
MCH RBC QN AUTO: 27.1 PG (ref 26.8–34.3)
MCHC RBC AUTO-ENTMCNC: 31.2 G/DL (ref 31.4–37.4)
MCV RBC AUTO: 87 FL (ref 82–98)
METAMYELOCYTES NFR BLD MANUAL: 4 % (ref 0–1)
MONOCYTES # BLD AUTO: 1.81 THOUSAND/UL (ref 0–1.22)
MONOCYTES NFR BLD: 13 % (ref 4–12)
NEUTROPHILS # BLD MANUAL: 9.76 THOUSAND/UL (ref 1.85–7.62)
NEUTS BAND NFR BLD MANUAL: 1 % (ref 0–8)
NEUTS SEG NFR BLD AUTO: 69 % (ref 43–75)
NRBC BLD AUTO-RTO: 0 /100 WBCS
PLATELET # BLD AUTO: 359 THOUSANDS/UL (ref 149–390)
PLATELET BLD QL SMEAR: ADEQUATE
PMV BLD AUTO: 9 FL (ref 8.9–12.7)
POTASSIUM SERPL-SCNC: 4.1 MMOL/L (ref 3.5–5.3)
PROMYELOCYTES NFR BLD MANUAL: 1 % (ref 0–0)
RBC # BLD AUTO: 3.99 MILLION/UL (ref 3.81–5.12)
SODIUM SERPL-SCNC: 137 MMOL/L (ref 136–145)
TSH SERPL DL<=0.05 MIU/L-ACNC: 0.27 UIU/ML (ref 0.36–3.74)
VARIANT LYMPHS # BLD AUTO: 4 %
WBC # BLD AUTO: 13.94 THOUSAND/UL (ref 4.31–10.16)

## 2018-04-08 PROCEDURE — 85027 COMPLETE CBC AUTOMATED: CPT | Performed by: INTERNAL MEDICINE

## 2018-04-08 PROCEDURE — 99232 SBSQ HOSP IP/OBS MODERATE 35: CPT | Performed by: INTERNAL MEDICINE

## 2018-04-08 PROCEDURE — 99231 SBSQ HOSP IP/OBS SF/LOW 25: CPT | Performed by: PHYSICIAN ASSISTANT

## 2018-04-08 PROCEDURE — 80048 BASIC METABOLIC PNL TOTAL CA: CPT | Performed by: INTERNAL MEDICINE

## 2018-04-08 PROCEDURE — 94660 CPAP INITIATION&MGMT: CPT

## 2018-04-08 PROCEDURE — 85007 BL SMEAR W/DIFF WBC COUNT: CPT | Performed by: INTERNAL MEDICINE

## 2018-04-08 PROCEDURE — 82948 REAGENT STRIP/BLOOD GLUCOSE: CPT

## 2018-04-08 PROCEDURE — 94760 N-INVAS EAR/PLS OXIMETRY 1: CPT

## 2018-04-08 PROCEDURE — 84443 ASSAY THYROID STIM HORMONE: CPT | Performed by: INTERNAL MEDICINE

## 2018-04-08 RX ORDER — DEXAMETHASONE 2 MG/1
2 TABLET ORAL EVERY 12 HOURS SCHEDULED
Status: DISCONTINUED | OUTPATIENT
Start: 2018-04-08 | End: 2018-04-09

## 2018-04-08 RX ADMIN — VITAMIN D, TAB 1000IU (100/BT) 1000 UNITS: 25 TAB at 08:28

## 2018-04-08 RX ADMIN — GABAPENTIN 800 MG: 400 CAPSULE ORAL at 22:35

## 2018-04-08 RX ADMIN — BUPROPION HYDROCHLORIDE 300 MG: 150 TABLET, FILM COATED, EXTENDED RELEASE ORAL at 08:28

## 2018-04-08 RX ADMIN — OXYCODONE HYDROCHLORIDE 10 MG: 10 TABLET ORAL at 12:40

## 2018-04-08 RX ADMIN — ENOXAPARIN SODIUM 40 MG: 40 INJECTION SUBCUTANEOUS at 08:28

## 2018-04-08 RX ADMIN — FLUTICASONE PROPIONATE AND SALMETEROL 1 PUFF: 50; 250 POWDER RESPIRATORY (INHALATION) at 08:26

## 2018-04-08 RX ADMIN — GABAPENTIN 800 MG: 400 CAPSULE ORAL at 16:10

## 2018-04-08 RX ADMIN — TOPIRAMATE 50 MG: 25 TABLET, FILM COATED ORAL at 08:28

## 2018-04-08 RX ADMIN — MONTELUKAST SODIUM 10 MG: 10 TABLET, COATED ORAL at 22:42

## 2018-04-08 RX ADMIN — DEXAMETHASONE 2 MG: 2 TABLET ORAL at 01:11

## 2018-04-08 RX ADMIN — ACETAMINOPHEN 975 MG: 325 TABLET, FILM COATED ORAL at 22:36

## 2018-04-08 RX ADMIN — OXYCODONE HYDROCHLORIDE 10 MG: 10 TABLET ORAL at 08:29

## 2018-04-08 RX ADMIN — ACETAMINOPHEN 975 MG: 325 TABLET, FILM COATED ORAL at 13:49

## 2018-04-08 RX ADMIN — INSULIN LISPRO 5 UNITS: 100 INJECTION, SOLUTION INTRAVENOUS; SUBCUTANEOUS at 12:41

## 2018-04-08 RX ADMIN — NICOTINE 1 PATCH: 14 PATCH, EXTENDED RELEASE TRANSDERMAL at 08:28

## 2018-04-08 RX ADMIN — MIRTAZAPINE 7.5 MG: 15 TABLET, FILM COATED ORAL at 22:36

## 2018-04-08 RX ADMIN — INSULIN GLARGINE 22 UNITS: 100 INJECTION, SOLUTION SUBCUTANEOUS at 22:37

## 2018-04-08 RX ADMIN — ACETAMINOPHEN 975 MG: 325 TABLET, FILM COATED ORAL at 06:05

## 2018-04-08 RX ADMIN — LIDOCAINE 1 PATCH: 50 PATCH TOPICAL at 08:28

## 2018-04-08 RX ADMIN — DEXAMETHASONE 2 MG: 2 TABLET ORAL at 22:36

## 2018-04-08 RX ADMIN — TOPIRAMATE 50 MG: 25 TABLET, FILM COATED ORAL at 22:42

## 2018-04-08 RX ADMIN — OXYCODONE HYDROCHLORIDE 5 MG: 5 TABLET ORAL at 22:36

## 2018-04-08 RX ADMIN — QUETIAPINE 200 MG: 100 TABLET ORAL at 22:36

## 2018-04-08 RX ADMIN — DEXAMETHASONE 2 MG: 2 TABLET ORAL at 12:39

## 2018-04-08 RX ADMIN — TIOTROPIUM BROMIDE 18 MCG: 18 CAPSULE ORAL; RESPIRATORY (INHALATION) at 08:26

## 2018-04-08 RX ADMIN — INSULIN LISPRO 1 UNITS: 100 INJECTION, SOLUTION INTRAVENOUS; SUBCUTANEOUS at 12:42

## 2018-04-08 RX ADMIN — ATORVASTATIN CALCIUM 20 MG: 20 TABLET, FILM COATED ORAL at 16:10

## 2018-04-08 RX ADMIN — DILTIAZEM HYDROCHLORIDE 180 MG: 180 CAPSULE, EXTENDED RELEASE ORAL at 08:28

## 2018-04-08 RX ADMIN — INSULIN LISPRO 5 UNITS: 100 INJECTION, SOLUTION INTRAVENOUS; SUBCUTANEOUS at 08:29

## 2018-04-08 RX ADMIN — INSULIN LISPRO 5 UNITS: 100 INJECTION, SOLUTION INTRAVENOUS; SUBCUTANEOUS at 18:55

## 2018-04-08 RX ADMIN — LEVOTHYROXINE SODIUM 100 MCG: 100 TABLET ORAL at 06:05

## 2018-04-08 RX ADMIN — DEXAMETHASONE 2 MG: 2 TABLET ORAL at 06:05

## 2018-04-08 RX ADMIN — GABAPENTIN 800 MG: 400 CAPSULE ORAL at 08:28

## 2018-04-08 NOTE — PROGRESS NOTES
Progress Note - Neurosurgery   Shady Lam 74 y o  female MRN: [id number]  Unit/Bed#: [facility] 932-01 Encounter: [id number]    Assessment:  1  T9-10 herniated disc  2  Central canal narrowing with associated cord signal change likely secondary to #1  3  History of COPD  4  History of HIV+  5  Diabetes mellitus  6  Cardiac disease       Plan:  - ASA on hold  - PT/OT  - adequate pain control     - for surgery sometime next week        Subjective/Objective       No new complaints      Invasive Devices     Peripherally Inserted Central Catheter Line            PICC Line 04/07/18 Left Brachial 1 day          Peripheral Intravenous Line            Peripheral IV 12/20/17 Right Arm 109 days                Physical Exam:     Vitals: Blood pressure 139/97, pulse 92, temperature 97 5 °F (36 4 °C), temperature source Oral, resp  rate 18, height 5' 2" (1 575 m), weight 90 7 kg (200 lb), last menstrual period 04/01/2013, SpO2 97 %, not currently breastfeeding  ,Body mass index is 36 58 kg/m²  Awake and alert  Moves extremities  LE strength full  Lungs clear   Heart regular  Abdomen soft      Lab Results: I have personally reviewed pertinent results  Imaging Studies: I have personally reviewed pertinent reports          VTE Pharmacologic Prophylaxis: Enoxaparin (Lovenox)    VTE Mechanical Prophylaxis: sequential compression device

## 2018-04-08 NOTE — PROGRESS NOTES
[address] Hospitalist Service - Internal Medicine Progress Note       PATIENT INFORMATION      Patient: Angel Mora 74 y o  female   MRN: [id number]  PCP: Amador Gracia, DO  Unit/Bed#: [facility] 932-01 Encounter: [id number]  Date Of Visit: 04/08/18       ASSESSMENTS & PLAN     Thoracic disc herniation   Assessment & Plan    - transferred from the [facility] for neurosurgery evaluation with tentative plan for OR intervention this coming week as she has been cleared pre-op by pulmonology   - MRI revealed disc herniation at the T9-T10 level - and her CT imaging confirmed a similar findings with stenosis and to a lesser degree at the T7-T8 and T8-T9 levels   - PRN pain control (pain management input appreciated & adjustments to analgesia regimen made accordingly)   - being weaned off Decadron (has received QID dosing x 3-4 days prior to transfer to [facility]) per neurosurgery input - decreased to 2 mg q12 hrs today - patient reports mild improvement in back pain today      Lumbar stenosis   Assessment & Plan    - imaging revealed stenosis at the L4-L5 level  - PRN pain and spasm control   - neurosurgery following - PT/OT as tolerated      Hyperlipidemia   Assessment & Plan    - continue Lipitor - held ASA in anticipation of surgical intervention         Tobacco use   Assessment & Plan    - transdermal nicotine patch on board  - counseled on tobacco cessation      Leukocytosis   Assessment & Plan    - likely secondary to Decadron  - remains afebrile - monitor WBC count      COPD (chronic obstructive pulmonary disease) - Chronic hypoxic respiratory failure - history of PASHA   Assessment & Plan    - stable/asymptomatic - general baseline of 2 L oxygen via cannula although currently saturating on room air comfortably (patient reports only using it as needed at home) - c/w CPAP QHS and encourage weight loss as possible   - continue Advair/Spiriva regimen  - pulmonology following and have cleared patient for surgery       Type 2 diabetes mellitus with hyperglycemia    Assessment & Plan    - home Metformin regimen held  - continue inpatient basal insulin with additional SSI coverage per Accu-Cheks - likely exacerbated blood sugar levels from Decadron use which should return to relative baseline ranges after tapering/cessation of steroid regimen  - HgA1c of 6 9      Hypertension   Assessment & Plan    - low-sodium diet encouraged  - continue Cardizem      Bipolar disorder    Assessment & Plan    - continue Seroquel/Wellbutrin  - psychiatry input noted      HIV (human immunodeficiency virus infection)    Assessment & Plan    - absolute CD4 count within normal limits - CD4 % helper T-cell count low @ 26 4 (30 8 - 58 5% is normal range) - needs outpatient followup   - diagnosed over 30 years ago and never underwent treatment         VTE Prophylaxis:  Lovenox      SUBJECTIVE     No overnight events  She is sitting upright in her bed with visitors at bedside  States her back pain has somewhat improved today  She remains in pleasant spirits due to her visitors  Denies fever/chills or other constitutional symptoms at this time  Continues to deny urinary/bowel incontinence or saddle anesthesia  OBJECTIVE     Vitals:   Temp (24hrs), Av 6 °F (36 4 °C), Min:97 5 °F (36 4 °C), Max:97 9 °F (36 6 °C)    HR:  [92-99] 99  Resp:  [18] 18  BP: (115-139)/(72-97) 115/72  SpO2:  [96 %-98 %] 98 %  Body mass index is 36 58 kg/m²  Input and Output Summary (last 24 hours):        Intake/Output Summary (Last 24 hours) at 18 1618  Last data filed at 18 1443   Gross per 24 hour   Intake             1260 ml   Output                0 ml   Net             1260 ml       Physical Exam:     GENERAL:  Obese - no acute distress reporting improvement in back pain  HEAD:  Normocephalic - atraumatic  EYES: PERRL - EOMI   MOUTH:  Mucosa moist  NECK:  Supple - full range of motion  CARDIAC:  Regular rate/rhythm - S1/S2 positive  PULMONARY:  Clear to auscultation bilaterally - nonlabored respirations currently  ABDOMEN:  Soft - nontender/nondistended - active bowel sounds  MUSCULOSKELETAL:  Motor strength/range of motion deconditioned from back pain  NEUROLOGIC:  Alert/oriented x 3   SKIN:  Age-appropriate wrinkles/blemishes       ADDITIONAL DATA       Labs & Recent Cultures:       Results from last 7 days  Lab Units 04/08/18  0607  04/04/18  1935   WBC Thousand/uL 13 94*  < > 14 62*   HEMOGLOBIN g/dL 10 8*  < > 12 1   SL AMB LAB HEMOGLOBIN   --   < >  --    HEMATOCRIT % 34 6*  < > 37 9   SL AMB HEMATOCRIT   --   < >  --    PLATELETS Thousands/uL 359  < > 386   SL AMB PLATELET COUNT   --   < >  --    NEUTROS PCT %  --   --  79*   LYMPHS PCT %  --   --  11*   LYMPHO PCT % 8*  < >  --    MONOS PCT %  --   --  10   MONO PCT MAN % 13*  < >  --    EOS PCT %  --   --  0   EOSINO PCT MANUAL % 0  < >  --    < > = values in this interval not displayed      Results from last 7 days  Lab Units 04/08/18  0607   SODIUM mmol/L 137   POTASSIUM mmol/L 4 1   CHLORIDE mmol/L 103   CO2 mmol/L 29   BUN mg/dL 36*   CREATININE mg/dL 0 83   CALCIUM mg/dL 9 5   GLUCOSE RANDOM mg/dL 181*         Last 24 Hours Medication List:     Current Facility-Administered Medications:  acetaminophen 975 mg Oral Q8H Leigh Chandler MD   aluminum-magnesium hydroxide-simethicone 30 mL Oral Q6H PRN Hugo Moreland MD   atorvastatin 20 mg Oral Daily With Mendel Helper, MD   buPROPion 300 mg Oral Daily Hugo Moreland MD   cholecalciferol 1,000 Units Oral Daily Hugo Moreland MD   dexamethasone 2 mg Oral Q6H [address] Diane Hassan MD   diltiazem 180 mg Oral Daily Hugo Moreland MD   enoxaparin 40 mg Subcutaneous Daily Hugo Moreland MD   fluticasone-salmeterol 1 puff Inhalation Q12H [address] Hugo Moreland MD   gabapentin 800 mg Oral TID Hugo Moreland MD   HYDROmorphone 1 mg Oral Q4H PRN Hugo Moreland MD   insulin glargine 22 Units Subcutaneous HS Jamie Friday, MD   insulin lispro 1-6 Units Subcutaneous 4x Daily (AC & HS) Jamel Emerson MD   insulin lispro 5 Units Subcutaneous TID With Meals Jamie Friday, MD   levothyroxine 100 mcg Oral Early Morning Jamieey Friday, MD   lidocaine 1 patch Transdermal Daily Jamieey Friday, MD   methocarbamol 750 mg Oral Q6H PRN [facility] Friday, MD   mirtazapine 7 5 mg Oral HS Jamieey Friday, MD   montelukast 10 mg Oral HS Jamieey Friday, MD   nicotine 1 patch Transdermal Daily Jamieey Friday, MD   ondansetron 4 mg Oral Q6H PRN Arti Cantu PA-C   oxyCODONE 10 mg Oral Q4H PRN Jamel Emerson MD   oxyCODONE 5 mg Oral Q4H PRN Jamel Emerson MD   QUEtiapine 200 mg Oral HS Jamieey Friday, MD   tiotropium 18 mcg Inhalation Daily Jamieey Friday, MD   topiramate 50 mg Oral Q12H Yasmine Pérez MD          Time Spent for Care: 34 minutes  More than 50% of total time spent on counseling and coordination of care as described above  Current Length of Stay: 4 day(s)      Code Status: Level 1 - Full Code         ** Please Note: This note is constructed using a voice recognition dictation system   **

## 2018-04-09 ENCOUNTER — ANESTHESIA EVENT (INPATIENT)
Dept: PERIOP | Facility: HOSPITAL | Age: 68
DRG: 459 | End: 2018-04-09
Payer: MEDICARE

## 2018-04-09 LAB
ABO GROUP BLD: NORMAL
ANION GAP SERPL CALCULATED.3IONS-SCNC: 5 MMOL/L (ref 4–13)
ANISOCYTOSIS BLD QL SMEAR: PRESENT
BASOPHILS # BLD MANUAL: 0 THOUSAND/UL (ref 0–0.1)
BASOPHILS NFR MAR MANUAL: 0 % (ref 0–1)
BILIRUB UR QL STRIP: NEGATIVE
BLD GP AB SCN SERPL QL: NEGATIVE
BUN SERPL-MCNC: 31 MG/DL (ref 5–25)
CALCIUM SERPL-MCNC: 9.2 MG/DL (ref 8.3–10.1)
CHLORIDE SERPL-SCNC: 104 MMOL/L (ref 100–108)
CLARITY UR: CLEAR
CO2 SERPL-SCNC: 30 MMOL/L (ref 21–32)
COLOR UR: YELLOW
CREAT SERPL-MCNC: 0.8 MG/DL (ref 0.6–1.3)
EOSINOPHIL # BLD MANUAL: 0 THOUSAND/UL (ref 0–0.4)
EOSINOPHIL NFR BLD MANUAL: 0 % (ref 0–6)
ERYTHROCYTE [DISTWIDTH] IN BLOOD BY AUTOMATED COUNT: 17 % (ref 11.6–15.1)
GFR SERPL CREATININE-BSD FRML MDRD: 89 ML/MIN/1.73SQ M
GLUCOSE SERPL-MCNC: 128 MG/DL (ref 65–140)
GLUCOSE SERPL-MCNC: 132 MG/DL (ref 65–140)
GLUCOSE SERPL-MCNC: 136 MG/DL (ref 65–140)
GLUCOSE SERPL-MCNC: 189 MG/DL (ref 65–140)
GLUCOSE SERPL-MCNC: 97 MG/DL (ref 65–140)
GLUCOSE UR STRIP-MCNC: NEGATIVE MG/DL
HCT VFR BLD AUTO: 34.2 % (ref 34.8–46.1)
HGB BLD-MCNC: 10.5 G/DL (ref 11.5–15.4)
HGB UR QL STRIP.AUTO: NEGATIVE
HIV 1+2 AB+HIV1 P24 AG SERPL QL IA: REACTIVE
HYPERCHROMIA BLD QL SMEAR: PRESENT
INR PPP: 0.98 (ref 0.86–1.16)
KETONES UR STRIP-MCNC: NEGATIVE MG/DL
LEUKOCYTE ESTERASE UR QL STRIP: NEGATIVE
LYMPHOCYTES # BLD AUTO: 15 % (ref 14–44)
LYMPHOCYTES # BLD AUTO: 2.14 THOUSAND/UL (ref 0.6–4.47)
MCH RBC QN AUTO: 26.9 PG (ref 26.8–34.3)
MCHC RBC AUTO-ENTMCNC: 30.7 G/DL (ref 31.4–37.4)
MCV RBC AUTO: 88 FL (ref 82–98)
MONOCYTES # BLD AUTO: 1.85 THOUSAND/UL (ref 0–1.22)
MONOCYTES NFR BLD: 13 % (ref 4–12)
NEUTROPHILS # BLD MANUAL: 10.25 THOUSAND/UL (ref 1.85–7.62)
NEUTS BAND NFR BLD MANUAL: 3 % (ref 0–8)
NEUTS SEG NFR BLD AUTO: 69 % (ref 43–75)
NITRITE UR QL STRIP: NEGATIVE
NRBC BLD AUTO-RTO: 0 /100 WBCS
PH UR STRIP.AUTO: 7 [PH] (ref 4.5–8)
PLATELET # BLD AUTO: 321 THOUSANDS/UL (ref 149–390)
PLATELET BLD QL SMEAR: ADEQUATE
PMV BLD AUTO: 8.9 FL (ref 8.9–12.7)
POIKILOCYTOSIS BLD QL SMEAR: PRESENT
POTASSIUM SERPL-SCNC: 4.1 MMOL/L (ref 3.5–5.3)
PROT UR STRIP-MCNC: NEGATIVE MG/DL
PROTHROMBIN TIME: 13 SECONDS (ref 12.1–14.4)
RBC # BLD AUTO: 3.91 MILLION/UL (ref 3.81–5.12)
RBC MORPH BLD: PRESENT
RH BLD: NEGATIVE
SODIUM SERPL-SCNC: 139 MMOL/L (ref 136–145)
SP GR UR STRIP.AUTO: 1.01 (ref 1–1.03)
SPECIMEN EXPIRATION DATE: NORMAL
UROBILINOGEN UR QL STRIP.AUTO: 1 E.U./DL
WBC # BLD AUTO: 14.24 THOUSAND/UL (ref 4.31–10.16)

## 2018-04-09 PROCEDURE — 82948 REAGENT STRIP/BLOOD GLUCOSE: CPT

## 2018-04-09 PROCEDURE — 85027 COMPLETE CBC AUTOMATED: CPT | Performed by: INTERNAL MEDICINE

## 2018-04-09 PROCEDURE — 99233 SBSQ HOSP IP/OBS HIGH 50: CPT | Performed by: NEUROLOGICAL SURGERY

## 2018-04-09 PROCEDURE — 97116 GAIT TRAINING THERAPY: CPT

## 2018-04-09 PROCEDURE — 81003 URINALYSIS AUTO W/O SCOPE: CPT | Performed by: NEUROLOGICAL SURGERY

## 2018-04-09 PROCEDURE — 86850 RBC ANTIBODY SCREEN: CPT | Performed by: NEUROLOGICAL SURGERY

## 2018-04-09 PROCEDURE — 99232 SBSQ HOSP IP/OBS MODERATE 35: CPT | Performed by: INTERNAL MEDICINE

## 2018-04-09 PROCEDURE — 85610 PROTHROMBIN TIME: CPT | Performed by: NEUROLOGICAL SURGERY

## 2018-04-09 PROCEDURE — 80048 BASIC METABOLIC PNL TOTAL CA: CPT | Performed by: INTERNAL MEDICINE

## 2018-04-09 PROCEDURE — 97110 THERAPEUTIC EXERCISES: CPT

## 2018-04-09 PROCEDURE — 85007 BL SMEAR W/DIFF WBC COUNT: CPT | Performed by: INTERNAL MEDICINE

## 2018-04-09 PROCEDURE — 86901 BLOOD TYPING SEROLOGIC RH(D): CPT | Performed by: NEUROLOGICAL SURGERY

## 2018-04-09 PROCEDURE — 86900 BLOOD TYPING SEROLOGIC ABO: CPT | Performed by: NEUROLOGICAL SURGERY

## 2018-04-09 PROCEDURE — 86920 COMPATIBILITY TEST SPIN: CPT

## 2018-04-09 RX ORDER — SODIUM CHLORIDE AND POTASSIUM CHLORIDE .9; .15 G/100ML; G/100ML
75 SOLUTION INTRAVENOUS CONTINUOUS
Status: DISCONTINUED | OUTPATIENT
Start: 2018-04-10 | End: 2018-04-13

## 2018-04-09 RX ORDER — DEXAMETHASONE 2 MG/1
2 TABLET ORAL ONCE
Status: DISCONTINUED | OUTPATIENT
Start: 2018-04-09 | End: 2018-04-09

## 2018-04-09 RX ORDER — DEXAMETHASONE 2 MG/1
1 TABLET ORAL EVERY 12 HOURS SCHEDULED
Status: DISCONTINUED | OUTPATIENT
Start: 2018-04-10 | End: 2018-04-09

## 2018-04-09 RX ORDER — CHLORHEXIDINE GLUCONATE 0.12 MG/ML
15 RINSE ORAL EVERY 12 HOURS SCHEDULED
Status: DISPENSED | OUTPATIENT
Start: 2018-04-09 | End: 2018-04-10

## 2018-04-09 RX ADMIN — LIDOCAINE 1 PATCH: 50 PATCH TOPICAL at 09:17

## 2018-04-09 RX ADMIN — DILTIAZEM HYDROCHLORIDE 180 MG: 180 CAPSULE, EXTENDED RELEASE ORAL at 09:17

## 2018-04-09 RX ADMIN — VITAMIN D, TAB 1000IU (100/BT) 1000 UNITS: 25 TAB at 09:17

## 2018-04-09 RX ADMIN — MONTELUKAST SODIUM 10 MG: 10 TABLET, COATED ORAL at 21:07

## 2018-04-09 RX ADMIN — OXYCODONE HYDROCHLORIDE 10 MG: 10 TABLET ORAL at 13:10

## 2018-04-09 RX ADMIN — ACETAMINOPHEN 975 MG: 325 TABLET, FILM COATED ORAL at 21:06

## 2018-04-09 RX ADMIN — FLUTICASONE PROPIONATE AND SALMETEROL 1 PUFF: 50; 250 POWDER RESPIRATORY (INHALATION) at 21:06

## 2018-04-09 RX ADMIN — TOPIRAMATE 50 MG: 25 TABLET, FILM COATED ORAL at 21:06

## 2018-04-09 RX ADMIN — TIOTROPIUM BROMIDE 18 MCG: 18 CAPSULE ORAL; RESPIRATORY (INHALATION) at 09:17

## 2018-04-09 RX ADMIN — GABAPENTIN 800 MG: 400 CAPSULE ORAL at 17:33

## 2018-04-09 RX ADMIN — ENOXAPARIN SODIUM 40 MG: 40 INJECTION SUBCUTANEOUS at 09:18

## 2018-04-09 RX ADMIN — ACETAMINOPHEN 975 MG: 325 TABLET, FILM COATED ORAL at 05:05

## 2018-04-09 RX ADMIN — ACETAMINOPHEN 975 MG: 325 TABLET, FILM COATED ORAL at 15:22

## 2018-04-09 RX ADMIN — GABAPENTIN 800 MG: 400 CAPSULE ORAL at 09:17

## 2018-04-09 RX ADMIN — LEVOTHYROXINE SODIUM 100 MCG: 100 TABLET ORAL at 05:05

## 2018-04-09 RX ADMIN — ALUMINUM HYDROXIDE, MAGNESIUM HYDROXIDE, AND SIMETHICONE 30 ML: 200; 200; 20 SUSPENSION ORAL at 21:15

## 2018-04-09 RX ADMIN — OXYCODONE HYDROCHLORIDE 10 MG: 10 TABLET ORAL at 05:05

## 2018-04-09 RX ADMIN — DEXAMETHASONE 2 MG: 2 TABLET ORAL at 09:17

## 2018-04-09 RX ADMIN — INSULIN LISPRO 5 UNITS: 100 INJECTION, SOLUTION INTRAVENOUS; SUBCUTANEOUS at 12:31

## 2018-04-09 RX ADMIN — BUPROPION HYDROCHLORIDE 300 MG: 150 TABLET, FILM COATED, EXTENDED RELEASE ORAL at 09:17

## 2018-04-09 RX ADMIN — INSULIN LISPRO 5 UNITS: 100 INJECTION, SOLUTION INTRAVENOUS; SUBCUTANEOUS at 09:16

## 2018-04-09 RX ADMIN — NICOTINE 1 PATCH: 14 PATCH, EXTENDED RELEASE TRANSDERMAL at 09:20

## 2018-04-09 RX ADMIN — GABAPENTIN 800 MG: 400 CAPSULE ORAL at 21:07

## 2018-04-09 RX ADMIN — TOPIRAMATE 50 MG: 25 TABLET, FILM COATED ORAL at 09:17

## 2018-04-09 RX ADMIN — FLUTICASONE PROPIONATE AND SALMETEROL 1 PUFF: 50; 250 POWDER RESPIRATORY (INHALATION) at 09:17

## 2018-04-09 RX ADMIN — INSULIN LISPRO 5 UNITS: 100 INJECTION, SOLUTION INTRAVENOUS; SUBCUTANEOUS at 17:34

## 2018-04-09 RX ADMIN — FLUTICASONE PROPIONATE AND SALMETEROL 1 PUFF: 50; 250 POWDER RESPIRATORY (INHALATION) at 00:58

## 2018-04-09 RX ADMIN — ATORVASTATIN CALCIUM 20 MG: 20 TABLET, FILM COATED ORAL at 17:33

## 2018-04-09 RX ADMIN — MIRTAZAPINE 7.5 MG: 15 TABLET, FILM COATED ORAL at 21:07

## 2018-04-09 RX ADMIN — INSULIN GLARGINE 22 UNITS: 100 INJECTION, SOLUTION SUBCUTANEOUS at 21:06

## 2018-04-09 RX ADMIN — QUETIAPINE 200 MG: 100 TABLET ORAL at 21:06

## 2018-04-09 NOTE — PROGRESS NOTES
Progress Note - Anesthesia Acute Pain Management    Yara Garcia 74 y o  female MRN: [id number]  Unit/Bed#: [facility] 932-01 Encounter: [id number]      Assessment:   Principal Problem:    Thoracic disc herniation  Active Problems:    HIV (human immunodeficiency virus infection)     Hypertension    Type 2 diabetes mellitus with hyperglycemia (HCC)    COPD (chronic obstructive pulmonary disease) - Chronic hypoxic respiratory failure    Tobacco use    Lumbar stenosis    Leukocytosis    Bipolar disorder, current episode depressed, moderate (HCC)    Hyperlipidemia      Plan:   - Pt reports pain well controlled despite high pain scores   She has not been utilizing IV opioid medication   - Recommend:   - D/C IV opioids until after surgery   - No other changes at this time  - APS will continue to follow; will reassess after surgery  - D/W SLIM    Pain History  Current pain location(s): mid back  Pain Scale: 9/10    Meds/Allergies   all current active meds have been reviewed    Allergies   Allergen Reactions    Prozac [Fluoxetine Hcl] Rash    Sulfa Antibiotics Itching    Quinine        Objective     Temp:  [97 2 °F (36 2 °C)-97 9 °F (36 6 °C)] 97 8 °F (36 6 °C)  HR:  [91-99] 91  Resp:  [18] 18  BP: (115-148)/(61-72) 148/71      Intake/Output Summary (Last 24 hours) at 04/09/18 0911  Last data filed at 04/09/18 9813   Gross per 24 hour   Intake             2560 ml   Output                0 ml   Net             2560 ml                 Physical Exam: /71 (BP Location: Right arm)   Pulse 91   Temp 97 8 °F (36 6 °C) (Oral)   Resp 18   Ht 5' 2" (1 575 m)   Wt 90 7 kg (200 lb)   LMP 04/01/2013 (Approximate)   SpO2 98%   BMI 36 58 kg/m²     Gen: NAD, sleeping sitting up but easily awakened  HEENT:  PER, EOMI  CV: deferred  Pul: deferred  Ext:  No cyanosis  Neuro: AAOx3; CN II-XII grossly intact; moves all extremities    Lab Results:  Lab Results   Component Value Date    WBC 14 24 (H) 04/09/2018    HGB 10 5 (L) 04/09/2018 HCT 34 2 (L) 04/09/2018    MCV 88 04/09/2018     04/09/2018     Lab Results   Component Value Date    GLUCOSE 97 04/09/2018    CALCIUM 9 2 04/09/2018     04/09/2018    K 4 1 04/09/2018    CO2 30 04/09/2018     04/09/2018    BUN 31 (H) 04/09/2018    CREATININE 0 80 04/09/2018     Imaging Studies: I have personally reviewed pertinent reports  EKG, Pathology, and Other Studies: I have personally reviewed pertinent reports        SIGNATURE: Yandy Chacon MD  DATE: April 9, 2018  TIME: 9:11 AM

## 2018-04-09 NOTE — PHYSICAL THERAPY NOTE
Physical Therapy Progress Note     04/09/18 7135   Pain Assessment   Pain Assessment No/denies pain   Pain Score No Pain   Restrictions/Precautions   Weight Bearing Precautions Per Order No   Other Precautions Fall Risk   Subjective   Subjective The patient states she has no pain right now and says her left knee gives out when walking  Transfers   Sit to Stand 5  Supervision   Additional items Armrests; Impulsive;Verbal cues   Stand to Sit 5  Supervision   Additional items Armrests; Impulsive;Verbal cues   Toilet transfer 5  Supervision   Additional items Increased time required; Impulsive;Verbal cues;Standard toilet  (grab bar used)   Ambulation/Elevation   Gait pattern L Knee Narciso; Improper Weight shift; Forward Flexion;Decreased foot clearance;Decreased L stance; Short stride; Step to;Excessively slow   Gait Assistance 4  Minimal assist   Additional items Assist x 1;Verbal cues   Assistive Device Rolling walker   Distance 30ft, 40ft, 30ft, 45ft   Balance   Static Sitting Good   Dynamic Sitting Fair +   Static Standing Fair -   Dynamic Standing Poor +   Ambulatory Poor +   Endurance Deficit   Endurance Deficit Yes   Endurance Deficit Description fatigue   Activity Tolerance   Activity Tolerance Patient tolerated treatment well;Patient limited by fatigue   Nurse Neda Chavez RN   Exercises   Heelslides Sitting;10 reps;AROM; Bilateral   Ankle Pumps Sitting;10 reps;AROM; Bilateral   Assessment   Prognosis Fair   Problem List Decreased strength;Decreased endurance; Impaired balance;Decreased mobility; Decreased safety awareness; Obesity   Assessment The patient demonstrated improvement in mobility by ambulating a total of 145 feet today  She needed seated rest breaks when fatigued  She was impulsive when she wanted to sit down for a rest by continuing to advancing the walker before sitting  With continuous verbal instruction, she acknowledged to stop walking before sitting   Patient's left knee narciso while ambulating and seems to have decreased strength  Patient continues to require skilled PT to improve functional mobility  Barriers to Discharge Inaccessible home environment   Goals   Patient Goals To get better  STG Expiration Date 04/15/18   Treatment Day 3   Plan   Treatment/Interventions Functional transfer training;LE strengthening/ROM; Therapeutic exercise; Endurance training;Patient/family training;Equipment eval/education; Bed mobility;Gait training   Progress Progressing toward goals   PT Frequency 5x/wk   Recommendation   Recommendation Post acute IP rehab   Equipment Recommended Kristen MEREDITHA

## 2018-04-09 NOTE — CASE MANAGEMENT
Continued Stay Review    Date: 04/08/18    Vital Signs: /71 (BP Location: Right arm)   Pulse 91   Temp 97 8 °F (36 6 °C) (Oral)   Resp 18   Ht 5' 2" (1 575 m)   Wt 90 7 kg (200 lb)   LMP 04/01/2013 (Approximate)   SpO2 98%   BMI 36 58 kg/m²     Medications:   Scheduled Meds:   Current Facility-Administered Medications:  acetaminophen 975 mg Oral Q8H [facility]   aluminum-magnesium hydroxide-simethicone 30 mL Oral Q6H PRN   atorvastatin 20 mg Oral Daily With Dinner   buPROPion 300 mg Oral Daily   cholecalciferol 1,000 Units Oral Daily   dexamethasone 2 mg Oral Q12H [facility]   diltiazem 180 mg Oral Daily   enoxaparin 40 mg Subcutaneous Daily   fluticasone-salmeterol 1 puff Inhalation Q12H CARLOS   gabapentin 800 mg Oral TID   insulin glargine 22 Units Subcutaneous HS   insulin lispro 1-6 Units Subcutaneous 4x Daily (AC & HS)   insulin lispro 5 Units Subcutaneous TID With Meals   levothyroxine 100 mcg Oral Early Morning   lidocaine 1 patch Transdermal Daily   methocarbamol 750 mg Oral Q6H PRN   mirtazapine 7 5 mg Oral HS   montelukast 10 mg Oral HS   nicotine 1 patch Transdermal Daily   ondansetron 4 mg Oral Q6H PRN   oxyCODONE 10 mg Oral Q4H PRN   oxyCODONE 5 mg Oral Q4H PRN   QUEtiapine 200 mg Oral HS   tiotropium 18 mcg Inhalation Daily   topiramate 50 mg Oral Q12H [facility]     Continuous Infusions:    PRN Meds: aluminum-magnesium hydroxide-simethicone    methocarbamol    ondansetron    oxyCODONE    oxyCODONE    Abnormal Labs/Diagnostic Results:   Results from last 7 days  Lab 04/08/18  0607   WBC 13 94*   HEMOGLOBIN 10 8*   HEMATOCRIT 34 6*   LYMPHO PCT 8*   MONO PCT MAN 13*      Results from last 7 days  Lab 04/08/18  0607   BUN 36*   GLUCOSE RANDOM 181*     Age/Sex: 74 y o  female     Assessment/Plan:   Thoracic disc herniation   Assessment & Plan     - transferred from the [facility] for neurosurgery evaluation with tentative plan for OR intervention this coming week as she has been cleared pre-op by pulmonology   - MRI revealed disc herniation at the T9-T10 level - and her CT imaging confirmed a similar findings with stenosis and to a lesser degree at the T7-T8 and T8-T9 levels   - PRN pain control (pain management input appreciated & adjustments to analgesia regimen made accordingly)   - being weaned off Decadron (has received QID dosing x 3-4 days prior to transfer to [facility]) per neurosurgery input - decreased to 2 mg q12 hrs today - patient reports mild improvement in back pain today       Lumbar stenosis   Assessment & Plan     - imaging revealed stenosis at the L4-L5 level  - PRN pain and spasm control   - neurosurgery following - PT/OT as tolerated           Hyperlipidemia   Assessment & Plan     - continue Lipitor - held ASA in anticipation of surgical intervention          Tobacco use   Assessment & Plan     - transdermal nicotine patch on board  - counseled on tobacco cessation       Leukocytosis   Assessment & Plan     - likely secondary to Decadron  - remains afebrile - monitor WBC count       COPD (chronic obstructive pulmonary disease) - Chronic hypoxic respiratory failure - history of PASHA   Assessment & Plan     - stable/asymptomatic - general baseline of 2 L oxygen via cannula although currently saturating on room air comfortably (patient reports only using it as needed at home) - c/w CPAP QHS and encourage weight loss as possible   - continue Advair/Spiriva regimen  - pulmonology following and have cleared patient for surgery        Type 2 diabetes mellitus with hyperglycemia    Assessment & Plan     - home Metformin regimen held  - continue inpatient basal insulin with additional SSI coverage per [employer]u-[employer]ks - likely exacerbated blood sugar levels from Decadron use which should return to relative baseline ranges after tapering/cessation of steroid regimen  - HgA1c of 6 9       Hypertension   Assessment & Plan     - low-sodium diet encouraged  - continue Cardizem       Bipolar disorder Assessment & Plan     - continue Seroquel/Wellbutrin  - psychiatry input noted       HIV (human immunodeficiency virus infection)    Assessment & Plan     - absolute CD4 count within normal limits - CD4 % helper T-cell count low @ 26 4 (30 8 - 58 5% is normal range) - needs outpatient followup   - diagnosed over 30 years ago and never underwent treatment           VTE Prophylaxis:  Lovenox    Discharge Plan: TBD      Thank you,  [facility] in the [employer] by Ahmet Boswell for 2017  Network Utilization Review Department  Phone: [phone]; Fax [phone]  ATTENTION: The Network Utilization Review Department is now centralized for our 7 Facilities  Make a note that we have a new phone and fax numbers for our Department  Please call with any questions or concerns to [phone] and carefully follow the prompts so that you are directed to the right person  All voicemails are confidential  Fax any determinations, approvals, denials, and requests for initial or continue stay review clinical to [phone]  Due to HIGH CALL volume, it would be easier if you could please send faxed requests to expedite your requests and in part, help us provide discharge notifications faster

## 2018-04-09 NOTE — PLAN OF CARE
Problem: PHYSICAL THERAPY ADULT  Goal: Performs mobility at highest level of function for planned discharge setting  See evaluation for individualized goals  Treatment/Interventions: Functional transfer training, LE strengthening/ROM, Patient/family training, Equipment eval/education, Bed mobility, Gait training, Spoke to nursing, Spoke to case management  Equipment Recommended: Ferdinand Briscoe       See flowsheet documentation for full assessment, interventions and recommendations  Outcome: Progressing  Prognosis: Fair  Problem List: Decreased strength, Decreased endurance, Impaired balance, Decreased mobility, Decreased safety awareness, Obesity  Assessment: The patient demonstrated improvement in mobility by ambulating a total of 145 feet today  She needed seated rest breaks when fatigued  She was impulsive when she wanted to sit down for a rest by continuing to advancing the walker before sitting  With continuous verbal instruction, she acknowledged to stop walking before sitting  Patient's left knee narciso while ambulating and seems to have decreased strength  Patient continues to require skilled PT to improve functional mobility  Barriers to Discharge: Inaccessible home environment     Recommendation: Post acute IP rehab     PT - OK to Discharge: Yes (to rehab  when med ready )    See flowsheet documentation for full assessment

## 2018-04-09 NOTE — ANESTHESIA PREPROCEDURE EVALUATION
Review of Systems/Medical History  Patient summary reviewed  Chart reviewed  No history of anesthetic complications     Cardiovascular  EKG reviewed, Exercise tolerance: poor,  Hyperlipidemia, Hypertension ,    Pulmonary  Smoker cigarette smoker  , COPD moderate- medication dependent , Shortness of breath, Not oxygen dependent ,        GI/Hepatic    GERD ,             Endo/Other  Diabetes well controlled type 2 Insulin,   Obesity  morbid obesity   GYN       Hematology  Anemia ,     Musculoskeletal    Arthritis     Neurology   Psychology   Psychiatric history, Depression , bipolar disorder,   Chronic opioid dependence            Physical Exam    Airway    Mallampati score: II  TM Distance: >3 FB  Neck ROM: limited     Dental   Comment: edentuolous,     Cardiovascular  Rhythm: regular, Rate: normal,     Pulmonary  Pulmonary exam normal No rhonchi, No wheezes,     Other Findings        Anesthesia Plan  ASA Score- 3     Anesthesia Type- general with ASA Monitors  Additional Monitors: arterial line and central venous line  Airway Plan: ETT  Comment: Moderate to high risk patient for moderate risk procedure  COPD and presumptive PASHA have been optimized        Plan Factors-  Patient did not smoke on day of surgery  Induction- intravenous  Postoperative Plan- Plan for postoperative opioid use  Planned trial extubation    Informed Consent- Anesthetic plan and risks discussed with patient  I personally reviewed this patient with the CRNA  Discussed and agreed on the Anesthesia Plan with the CRNA           Lab Results   Component Value Date    WBC 14 24 (H) 04/09/2018    HGB 10 5 (L) 04/09/2018    HCT 34 2 (L) 04/09/2018    MCV 88 04/09/2018     04/09/2018     Lab Results   Component Value Date    GLUCOSE 97 04/09/2018    CALCIUM 9 2 04/09/2018     04/09/2018    K 4 1 04/09/2018    CO2 30 04/09/2018     04/09/2018    BUN 31 (H) 04/09/2018    CREATININE 0 80 04/09/2018     Lab Results Component Value Date    INR 1 00 01/06/2017    INR 0 90 12/26/2014    INR 1 02 12/10/2014    PROTIME 13 2 01/06/2017    PROTIME 12 4 12/26/2014    PROTIME 13 6 12/10/2014     Lab Results   Component Value Date    PTT 29 01/06/2017

## 2018-04-09 NOTE — PROGRESS NOTES
Progress Note - Neurosurgery   Shady Lam 74 y o  female MRN: [id number]  Unit/Bed#: [facility] 932-01 Encounter: [id number]      Assessment:  1  T9-10 herniated disc  2  Central canal narrowing with associated cord signal change likely secondary to #1  3  History of COPD  4  History of HIV+  5  Diabetes mellitus  6  Cardiac disease     Plan:  · Exam: AAOx3  MONTIEL with slight decreased strength in HE 5/5  LT intact  3/3 JPS intact  Generalized midline T11 to lumbar tenderness on palpation  · Imaging: personally reviewed and reviewed by attending:  · CT thoracic wo: demonstrating degeneration of thoracic spine with multifactorial canal and foraminal stenosis at T9-10    · History of ASA use - continue to hold ASA at this time  · PT/OT: recommend rehab   · DVT ppx: SCDs, lovenox  · Pain control: deferred to primary team - recommend d/c'ing scheduled oxycodone 10mg and decadron   · Medical management:  · WBC 13 6  · CD4 count  · APS consulted - appreciate recommendations  · Recommend geriatric consult, patient with short term memory problems   No emergent surgical intervention  Patient will require medical management prior to any surgery  Planning for OR this week  Call with questions or concerns    Subjective/Objective   Chief Complaint: "I'm doing okay"    Subjective: patient states she's doing okay, she admits to still having back pain  She states the pain is located lower back that radiates across her back bilaterally and into her right knee  She states the pain is intermittent  She describes the pain as aching with intermittent sharp pain  She admits to still having "leaking" when she attempts to go to the bathroom but denies any bowel incontinence  She denies any weakness  Objective: sitting on edge of bed, feeding self  NAD  I/O       04/07 0701 - 04/08 0700 04/08 0701 - 04/09 0700 04/09 0701 - 04/10 0700    P  O  1125 2560 800    Total Intake(mL/kg) 1125 (12 4) 2560 (28 2) 800 (8 8)    Urine (mL/kg/hr) 1 (0)      Stool 0 (0)      Total Output 1        Net +1124 +2560 +800           Unmeasured Urine Occurrence 7 x 2 x     Unmeasured Stool Occurrence 1 x            Invasive Devices     Peripherally Inserted Central Catheter Line            PICC Line 18 Left Brachial 1 day          Peripheral Intravenous Line            Peripheral IV 17 Right Arm 110 days                Physical Exam:  Vitals: Blood pressure 148/71, pulse 91, temperature 97 8 °F (36 6 °C), temperature source Oral, resp  rate 18, height 5' 2" (1 575 m), weight 90 7 kg (200 lb), last menstrual period 2013, SpO2 98 %, not currently breastfeeding  ,Body mass index is 36 58 kg/m²  General appearance: alert, appears stated age, cooperative and no distress  Head: Normocephalic, without obvious abnormality, atraumatic  Eyes: tracks in room appropriately  Neck: no midline cervical tenderness on palpation   Back: midline T11 to L5 tenderness on palpation  Lungs: non labored breathing  Heart: regular heart rate  Neurologic:   Mental status: Alert, oriented x self, hospital  Disoriented to date  Difficulty with short term memory, able to tell me her daughter's  which she was informed was today, then asked today's date and patient was unable to answer  Speech is fluent, intermittently difficult to understand  Cranial nerves: grossly intact (Cranial nerves II-XII)  Facial symmetry at rest and with expression  Tongue is midline  Sensory: normal to LT in bilateral anterior and posterior thorax, upper and lower extremities  Reported "weird" sensation in right upper thigh  DSS intact  Motor: moving all extremities without focal weakness   Strength 5/5 throughout  Reflexes: 2+ and symmetric  negative negron, negative clonus  Coordination: 3/3 JPS intact      Lab Results:    Results from last 7 days  Lab Units 18  0458 18  0607 18  0539  18  1935   WBC Thousand/uL 14 24* 13 94* 13 73*  < > 14 62*   HEMOGLOBIN g/dL 10 5* 10 8* 11 8  < > 12 1   SL AMB LAB HEMOGLOBIN   --   --   --   < >  --    HEMATOCRIT % 34 2* 34 6* 36 9  < > 37 9   SL AMB HEMATOCRIT   --   --   --   < >  --    PLATELETS Thousands/uL 321 359 409*  < > 386   SL AMB PLATELET COUNT   --   --   --   < >  --    NEUTROS PCT %  --   --   --   --  79*   MONOS PCT %  --   --   --   --  10   MONO PCT MAN %  --  13* 7  --   --    < > = values in this interval not displayed  Results from last 7 days  Lab Units 04/09/18  0458 04/08/18  0607 04/07/18  0539   SODIUM mmol/L 139 137 136   POTASSIUM mmol/L 4 1 4 1 4 4   CHLORIDE mmol/L 104 103 100   CO2 mmol/L 30 29 31   BUN mg/dL 31* 36* 31*   CREATININE mg/dL 0 80 0 83 0 91   CALCIUM mg/dL 9 2 9 5 10 0   GLUCOSE RANDOM mg/dL 97 181* 180*                 No results found for: TROPONINT  ABG:No results found for: PHART, CHE5FEW, PO2ART, JZM0BCT, V3JHGFGG, BEART, SOURCE    Imaging Studies: I have personally reviewed pertinent reports  and I have personally reviewed pertinent films in PACS  XR chest PICC line portable   Final Result      Left PICC line tip overlying cavoatrial junction  Workstation performed: [id number]         XR chest PICC line portable   Final Result      Right PICC line tip overlying caudal right atrium recommend withdrawing 7 to 8 cm  The study was marked in [facility] for immediate notification  Workstation performed: [id number]         CT spine thoracic wo contrast   Final Result      Marked degeneration of the thoracic spine as described  Multifactorial canal and foraminal stenosis at the T9-10 level as described  Changes, although less significant at T8-9 and T7-T8  Workstation performed: [id number]           EKG, Pathology, and Other Studies: I have personally reviewed pertinent reports        VTE  Prophylaxis: Sequential compression device (Venodyne)  and Enoxaparin (Lovenox)

## 2018-04-09 NOTE — PROGRESS NOTES
[facility] Hospitalist Service - Internal Medicine Progress Note       PATIENT INFORMATION      Patient: Matt Holly 74 y o  female   MRN: [id number]  PCP: Amanda Lu DO  [facility]: [id number]  Date Of Visit: 04/09/18       ASSESSMENTS & PLAN     Thoracic disc herniation   Assessment & Plan    - transferred from [facility] for neurosurgery evaluation with tentative plan for OR intervention later this week as she has been cleared pre-op by pulmonology   - MRI revealed disc herniation at the T9-T10 level - and her CT imaging confirmed a similar findings with stenosis and to a lesser degree at the T7-T8 and T8-T9 levels   - PRN pain control (pain management input appreciated & adjustments to analgesia regimen made accordingly)   - continue to wean off Decadron (has received QID dosing x 3-4 days prior to transfer to [facility]) per neurosurgery input - will decrease to 1 mg Q12 hrs tomorrow - patient reports mild improvement in back pain with rest since admission      Lumbar stenosis   Assessment & Plan    - imaging revealed stenosis at the L4-L5 level  - PRN pain and spasm control   - neurosurgery following - PT/OT as tolerated      Hyperlipidemia   Assessment & Plan    - continue Lipitor - held ASA in anticipation of surgical intervention         Tobacco use   Assessment & Plan    - transdermal nicotine patch on board  - counseled on tobacco cessation      Leukocytosis   Assessment & Plan    - likely secondary to Decadron  - continues to remain afebrile - monitor WBC count      COPD (chronic obstructive pulmonary disease) - Chronic hypoxic respiratory failure - history of PASHA   Assessment & Plan    - stable/asymptomatic - previous "baseline of 2 L oxygen via cannula but continues to saturate on room air comfortably (patient reports only using it as needed at home) - c/w CPAP QHS and encourage weight loss as possible   - continue Advair/Spiriva regimen  - pulmonology input appreciated and have cleared patient for surgery       Type 2 diabetes mellitus with hyperglycemia    Assessment & Plan    - home Metformin regimen held  - continue inpatient basal insulin with additional SSI coverage per [employer] - likely exacerbated blood sugar levels from Decadron use which should return to relative baseline ranges after tapering/cessation of steroid regimen  - HgA1c of 6 9      Hypertension   Assessment & Plan    - low-sodium diet encouraged  - continue Cardizem      Bipolar disorder - Neurocognitive impairment   Assessment & Plan    - continue Seroquel/Wellbutrin  - psychiatry/behavior health input noted      HIV (human immunodeficiency virus infection)    Assessment & Plan    - absolute CD4 count within normal limits - CD4 % helper T-cell count low @ 26 4 (30 8 - 58 5% is normal range) - needs outpatient followup   - diagnosed over 30 years ago and never underwent treatment         VTE Prophylaxis:  Lovenox      SUBJECTIVE     No new complaints at this time  Resting comfortably sitting upright in a chair during my encounter earlier in the day  Awaiting surgical intervention later this week  Denies any shortness of breath or other constitutional symptoms at this time  OBJECTIVE     Vitals:   Temp (24hrs), Av 9 °F (36 6 °C), Min:97 2 °F (36 2 °C), Max:98 7 °F (37 1 °C)    HR:  [91-97] 96  Resp:  [18-20] 20  BP: (126-157)/(61-81) 157/81  SpO2:  [97 %-100 %] 97 %  Body mass index is 36 58 kg/m²  Input and Output Summary (last 24 hours):        Intake/Output Summary (Last 24 hours) at 18 1947  Last data filed at 18 1851   Gross per 24 hour   Intake             3400 ml   Output                0 ml   Net             3400 ml       Physical Exam:     GENERAL:  Obese - no immediate distress as back pain is stable/improved  HEAD:  Normocephalic - atraumatic  EYES: PERRL - EOMI   MOUTH:  Mucosa moist  NECK:  Supple - full range of motion  CARDIAC:  Regular rate/rhythm - S1/S2 positive  PULMONARY:  Clear but diminished breath sounds bilaterally - nonlabored respirations currently  ABDOMEN:  Soft - nontender/nondistended - active bowel sounds  MUSCULOSKELETAL:  Motor strength/range of motion deconditioned from back pain  NEUROLOGIC:  Alert/oriented x 3   SKIN:  Age-appropriate wrinkles/blemishes       ADDITIONAL DATA       Labs & Recent Cultures:       Results from last 7 days  Lab Units 04/09/18  0458  04/04/18  1935   WBC Thousand/uL 14 24*  < > 14 62*   HEMOGLOBIN g/dL 10 5*  < > 12 1   SL AMB LAB HEMOGLOBIN   --   < >  --    HEMATOCRIT % 34 2*  < > 37 9   SL AMB HEMATOCRIT   --   < >  --    PLATELETS Thousands/uL 321  < > 386   SL AMB PLATELET COUNT   --   < >  --    NEUTROS PCT %  --   --  79*   LYMPHS PCT %  --   --  11*   LYMPHO PCT % 15  < >  --    MONOS PCT %  --   --  10   MONO PCT MAN % 13*  < >  --    EOS PCT %  --   --  0   EOSINO PCT MANUAL % 0  < >  --    < > = values in this interval not displayed      Results from last 7 days  Lab Units 04/09/18  0458   SODIUM mmol/L 139   POTASSIUM mmol/L 4 1   CHLORIDE mmol/L 104   CO2 mmol/L 30   BUN mg/dL 31*   CREATININE mg/dL 0 80   CALCIUM mg/dL 9 2   GLUCOSE RANDOM mg/dL 97         Last 24 Hours Medication List:     Current Facility-Administered Medications:  acetaminophen 975 mg Oral Q8H Cecy Anna MD   aluminum-magnesium hydroxide-simethicone 30 mL Oral Q6H PRN Kvng Carson MD   atorvastatin 20 mg Oral Daily With Ashvin Davis MD   buPROPion 300 mg Oral Daily Kvng Carson MD   cholecalciferol 1,000 Units Oral Daily Kvng Carson MD   dexamethasone 2 mg Oral Q12H [facility] Chan Sanz MD   diltiazem 180 mg Oral Daily Kvng Carson MD   enoxaparin 40 mg Subcutaneous Daily Kvng Carson MD   fluticasone-salmeterol 1 puff Inhalation Q12H [facility] Kvng Carson MD   gabapentin 800 mg Oral TID Kvng Carson MD   insulin glargine 22 Units Subcutaneous HS Kvng Carson MD insulin lispro 1-6 Units Subcutaneous 4x Daily (AC & HS) Markie Duarte MD   insulin lispro 5 Units Subcutaneous TID With Meals Chika Crawley MD   levothyroxine 100 mcg Oral Early Morning Chika Crawley MD   lidocaine 1 patch Transdermal Daily Chika Crawley MD   methocarbamol 750 mg Oral Q6H PRN Chika Crawley MD   mirtazapine 7 5 mg Oral HS Chika Crawley MD   montelukast 10 mg Oral HS Chika Crawley MD   nicotine 1 patch Transdermal Daily Chika Crawley MD   ondansetron 4 mg Oral Q6H PRN Arti Cantu PA-C   oxyCODONE 10 mg Oral Q4H PRN Markie Duarte MD   oxyCODONE 5 mg Oral Q4H PRN Markie Duarte MD   QUEtiapine 200 mg Oral HS Chika Crawley MD   tiotropium 18 mcg Inhalation Daily Chika Crawley MD   topiramate 50 mg Oral Q12H Luz Davila MD          Time Spent for Care: 33 minutes  More than 50% of total time spent on counseling and coordination of care as described above  Current Length of Stay: 5 day(s)      Code Status: Level 1 - Full Code         ** Please Note: This note is constructed using a voice recognition dictation system   **

## 2018-04-10 ENCOUNTER — APPOINTMENT (INPATIENT)
Dept: RADIOLOGY | Facility: HOSPITAL | Age: 68
DRG: 459 | End: 2018-04-10
Payer: MEDICARE

## 2018-04-10 ENCOUNTER — APPOINTMENT (INPATIENT)
Dept: RADIOLOGY | Facility: HOSPITAL | Age: 68
DRG: 459 | End: 2018-04-10
Attending: ANESTHESIOLOGY
Payer: MEDICARE

## 2018-04-10 ENCOUNTER — ANESTHESIA (INPATIENT)
Dept: PERIOP | Facility: HOSPITAL | Age: 68
DRG: 459 | End: 2018-04-10
Payer: MEDICARE

## 2018-04-10 LAB
ANION GAP SERPL CALCULATED.3IONS-SCNC: 3 MMOL/L (ref 4–13)
ANISOCYTOSIS BLD QL SMEAR: PRESENT
APTT PPP: 18 SECONDS (ref 23–35)
BASOPHILS # BLD MANUAL: 0 THOUSAND/UL (ref 0–0.1)
BASOPHILS NFR MAR MANUAL: 0 % (ref 0–1)
BUN SERPL-MCNC: 23 MG/DL (ref 5–25)
CALCIUM SERPL-MCNC: 9.1 MG/DL
CHLORIDE SERPL-SCNC: 111 MMOL/L (ref 100–108)
CO2 SERPL-SCNC: 29 MMOL/L (ref 21–32)
CREAT SERPL-MCNC: 0.71 MG/DL (ref 0.6–1.3)
EOSINOPHIL # BLD MANUAL: 0.14 THOUSAND/UL (ref 0–0.4)
EOSINOPHIL NFR BLD MANUAL: 1 % (ref 0–6)
ERYTHROCYTE [DISTWIDTH] IN BLOOD BY AUTOMATED COUNT: 17 % (ref 11.6–15.1)
GFR SERPL CREATININE-BSD FRML MDRD: 103 ML/MIN/1.73SQ M
GLUCOSE SERPL-MCNC: 149 MG/DL (ref 65–140)
GLUCOSE SERPL-MCNC: 151 MG/DL (ref 65–140)
GLUCOSE SERPL-MCNC: 154 MG/DL (ref 65–140)
GLUCOSE SERPL-MCNC: 97 MG/DL (ref 65–140)
HCT VFR BLD AUTO: 33.1 % (ref 34.8–46.1)
HGB BLD-MCNC: 10.2 G/DL (ref 11.5–15.4)
INR PPP: 1.06 (ref 0.86–1.16)
LYMPHOCYTES # BLD AUTO: 1.77 THOUSAND/UL (ref 0.6–4.47)
LYMPHOCYTES # BLD AUTO: 13 % (ref 14–44)
MACROCYTES BLD QL AUTO: PRESENT
MCH RBC QN AUTO: 26.4 PG (ref 26.8–34.3)
MCHC RBC AUTO-ENTMCNC: 30.8 G/DL (ref 31.4–37.4)
MCV RBC AUTO: 86 FL (ref 82–98)
MONOCYTES # BLD AUTO: 0.54 THOUSAND/UL (ref 0–1.22)
MONOCYTES NFR BLD: 4 % (ref 4–12)
MYELOCYTES NFR BLD MANUAL: 2 % (ref 0–1)
NEUTROPHILS # BLD MANUAL: 10.89 THOUSAND/UL (ref 1.85–7.62)
NEUTS SEG NFR BLD AUTO: 80 % (ref 43–75)
NRBC BLD AUTO-RTO: 0 /100 WBCS
PLATELET # BLD AUTO: 292 THOUSANDS/UL (ref 149–390)
PLATELET BLD QL SMEAR: ADEQUATE
PMV BLD AUTO: 9 FL (ref 8.9–12.7)
POIKILOCYTOSIS BLD QL SMEAR: PRESENT
POTASSIUM SERPL-SCNC: 4.9 MMOL/L (ref 3.5–5.3)
PROTHROMBIN TIME: 13.8 SECONDS (ref 12.1–14.4)
RBC # BLD AUTO: 3.86 MILLION/UL (ref 3.81–5.12)
RBC MORPH BLD: PRESENT
SODIUM SERPL-SCNC: 143 MMOL/L (ref 136–145)
WBC # BLD AUTO: 13.61 THOUSAND/UL (ref 4.31–10.16)

## 2018-04-10 PROCEDURE — 0RG7071 FUSION OF 2 TO 7 THORACIC VERTEBRAL JOINTS WITH AUTOLOGOUS TISSUE SUBSTITUTE, POSTERIOR APPROACH, POSTERIOR COLUMN, OPEN APPROACH: ICD-10-PCS | Performed by: NEUROLOGICAL SURGERY

## 2018-04-10 PROCEDURE — 22610 ARTHRD PST TQ 1NTRSPC THRC: CPT | Performed by: NEUROLOGICAL SURGERY

## 2018-04-10 PROCEDURE — C1713 ANCHOR/SCREW BN/BN,TIS/BN: HCPCS | Performed by: NEUROLOGICAL SURGERY

## 2018-04-10 PROCEDURE — 63055 DECOMPRESS SPINAL CORD THRC: CPT | Performed by: NEUROLOGICAL SURGERY

## 2018-04-10 PROCEDURE — 84132 ASSAY OF SERUM POTASSIUM: CPT

## 2018-04-10 PROCEDURE — 94760 N-INVAS EAR/PLS OXIMETRY 1: CPT

## 2018-04-10 PROCEDURE — 20936 SP BONE AGRFT LOCAL ADD-ON: CPT | Performed by: NEUROLOGICAL SURGERY

## 2018-04-10 PROCEDURE — 85027 COMPLETE CBC AUTOMATED: CPT | Performed by: INTERNAL MEDICINE

## 2018-04-10 PROCEDURE — 82330 ASSAY OF CALCIUM: CPT

## 2018-04-10 PROCEDURE — 95940 IONM IN OPERATNG ROOM 15 MIN: CPT | Performed by: NEUROLOGICAL SURGERY

## 2018-04-10 PROCEDURE — 0RB90ZZ EXCISION OF THORACIC VERTEBRAL DISC, OPEN APPROACH: ICD-10-PCS | Performed by: NEUROLOGICAL SURGERY

## 2018-04-10 PROCEDURE — 85007 BL SMEAR W/DIFF WBC COUNT: CPT | Performed by: INTERNAL MEDICINE

## 2018-04-10 PROCEDURE — 85014 HEMATOCRIT: CPT

## 2018-04-10 PROCEDURE — 72070 X-RAY EXAM THORAC SPINE 2VWS: CPT

## 2018-04-10 PROCEDURE — 82947 ASSAY GLUCOSE BLOOD QUANT: CPT

## 2018-04-10 PROCEDURE — 85610 PROTHROMBIN TIME: CPT | Performed by: PHYSICIAN ASSISTANT

## 2018-04-10 PROCEDURE — 84295 ASSAY OF SERUM SODIUM: CPT

## 2018-04-10 PROCEDURE — 82803 BLOOD GASES ANY COMBINATION: CPT

## 2018-04-10 PROCEDURE — 82948 REAGENT STRIP/BLOOD GLUCOSE: CPT

## 2018-04-10 PROCEDURE — 99232 SBSQ HOSP IP/OBS MODERATE 35: CPT | Performed by: HOSPITALIST

## 2018-04-10 PROCEDURE — 85730 THROMBOPLASTIN TIME PARTIAL: CPT | Performed by: PHYSICIAN ASSISTANT

## 2018-04-10 PROCEDURE — 20930 SP BONE ALGRFT MORSEL ADD-ON: CPT | Performed by: NEUROLOGICAL SURGERY

## 2018-04-10 PROCEDURE — 94660 CPAP INITIATION&MGMT: CPT

## 2018-04-10 PROCEDURE — 22842 INSERT SPINE FIXATION DEVICE: CPT | Performed by: NEUROLOGICAL SURGERY

## 2018-04-10 PROCEDURE — 71045 X-RAY EXAM CHEST 1 VIEW: CPT

## 2018-04-10 PROCEDURE — 22614 ARTHRD PST TQ 1NTRSPC EA ADD: CPT | Performed by: NEUROLOGICAL SURGERY

## 2018-04-10 PROCEDURE — 87081 CULTURE SCREEN ONLY: CPT | Performed by: PHYSICIAN ASSISTANT

## 2018-04-10 PROCEDURE — 80048 BASIC METABOLIC PNL TOTAL CA: CPT | Performed by: INTERNAL MEDICINE

## 2018-04-10 DEVICE — IMPLANTABLE DEVICE: Type: IMPLANTABLE DEVICE | Site: SPINE THORACIC | Status: FUNCTIONAL

## 2018-04-10 DEVICE — BONE GRAFT SUBSTITUTE, FOAM PACK, BETA-TRICALCIUM PHOSPHATE AND TYPE I BOVINE COLLAGEN
Type: IMPLANTABLE DEVICE | Site: SPINE THORACIC | Status: FUNCTIONAL
Brand: VITOSS

## 2018-04-10 RX ORDER — LIDOCAINE HYDROCHLORIDE AND EPINEPHRINE 10; 10 MG/ML; UG/ML
INJECTION, SOLUTION INFILTRATION; PERINEURAL AS NEEDED
Status: DISCONTINUED | OUTPATIENT
Start: 2018-04-10 | End: 2018-04-10 | Stop reason: HOSPADM

## 2018-04-10 RX ORDER — KETAMINE HYDROCHLORIDE 50 MG/ML
INJECTION, SOLUTION, CONCENTRATE INTRAMUSCULAR; INTRAVENOUS AS NEEDED
Status: DISCONTINUED | OUTPATIENT
Start: 2018-04-10 | End: 2018-04-10 | Stop reason: SURG

## 2018-04-10 RX ORDER — ALBUTEROL SULFATE 90 UG/1
AEROSOL, METERED RESPIRATORY (INHALATION) AS NEEDED
Status: DISCONTINUED | OUTPATIENT
Start: 2018-04-10 | End: 2018-04-10 | Stop reason: SURG

## 2018-04-10 RX ORDER — GLYCOPYRROLATE 0.2 MG/ML
INJECTION INTRAMUSCULAR; INTRAVENOUS AS NEEDED
Status: DISCONTINUED | OUTPATIENT
Start: 2018-04-10 | End: 2018-04-10 | Stop reason: SURG

## 2018-04-10 RX ORDER — ONDANSETRON 2 MG/ML
INJECTION INTRAMUSCULAR; INTRAVENOUS AS NEEDED
Status: DISCONTINUED | OUTPATIENT
Start: 2018-04-10 | End: 2018-04-10 | Stop reason: SURG

## 2018-04-10 RX ORDER — SODIUM CHLORIDE, SODIUM LACTATE, POTASSIUM CHLORIDE, CALCIUM CHLORIDE 600; 310; 30; 20 MG/100ML; MG/100ML; MG/100ML; MG/100ML
INJECTION, SOLUTION INTRAVENOUS CONTINUOUS PRN
Status: DISCONTINUED | OUTPATIENT
Start: 2018-04-10 | End: 2018-04-10 | Stop reason: SURG

## 2018-04-10 RX ORDER — SODIUM CHLORIDE 9 MG/ML
100 INJECTION, SOLUTION INTRAVENOUS CONTINUOUS
Status: DISCONTINUED | OUTPATIENT
Start: 2018-04-10 | End: 2018-04-13

## 2018-04-10 RX ORDER — SUCCINYLCHOLINE CHLORIDE 20 MG/ML
INJECTION INTRAMUSCULAR; INTRAVENOUS AS NEEDED
Status: DISCONTINUED | OUTPATIENT
Start: 2018-04-10 | End: 2018-04-10 | Stop reason: SURG

## 2018-04-10 RX ORDER — ROCURONIUM BROMIDE 10 MG/ML
INJECTION, SOLUTION INTRAVENOUS AS NEEDED
Status: DISCONTINUED | OUTPATIENT
Start: 2018-04-10 | End: 2018-04-10 | Stop reason: SURG

## 2018-04-10 RX ORDER — PROPOFOL 10 MG/ML
INJECTION, EMULSION INTRAVENOUS CONTINUOUS PRN
Status: DISCONTINUED | OUTPATIENT
Start: 2018-04-10 | End: 2018-04-10 | Stop reason: SURG

## 2018-04-10 RX ORDER — MIDAZOLAM HYDROCHLORIDE 1 MG/ML
INJECTION INTRAMUSCULAR; INTRAVENOUS AS NEEDED
Status: DISCONTINUED | OUTPATIENT
Start: 2018-04-10 | End: 2018-04-10 | Stop reason: SURG

## 2018-04-10 RX ORDER — SODIUM CHLORIDE 9 MG/ML
INJECTION, SOLUTION INTRAVENOUS CONTINUOUS PRN
Status: DISCONTINUED | OUTPATIENT
Start: 2018-04-10 | End: 2018-04-10 | Stop reason: SURG

## 2018-04-10 RX ORDER — MAGNESIUM SULFATE HEPTAHYDRATE 40 MG/ML
INJECTION, SOLUTION INTRAVENOUS AS NEEDED
Status: DISCONTINUED | OUTPATIENT
Start: 2018-04-10 | End: 2018-04-10 | Stop reason: SURG

## 2018-04-10 RX ORDER — PROPOFOL 10 MG/ML
INJECTION, EMULSION INTRAVENOUS AS NEEDED
Status: DISCONTINUED | OUTPATIENT
Start: 2018-04-10 | End: 2018-04-10 | Stop reason: SURG

## 2018-04-10 RX ORDER — DIPHENHYDRAMINE HYDROCHLORIDE 50 MG/ML
12.5 INJECTION INTRAMUSCULAR; INTRAVENOUS ONCE AS NEEDED
Status: DISCONTINUED | OUTPATIENT
Start: 2018-04-10 | End: 2018-04-10 | Stop reason: HOSPADM

## 2018-04-10 RX ORDER — ONDANSETRON 2 MG/ML
4 INJECTION INTRAMUSCULAR; INTRAVENOUS ONCE AS NEEDED
Status: DISCONTINUED | OUTPATIENT
Start: 2018-04-10 | End: 2018-04-10 | Stop reason: HOSPADM

## 2018-04-10 RX ORDER — ALBUTEROL SULFATE 2.5 MG/3ML
2.5 SOLUTION RESPIRATORY (INHALATION) ONCE
Status: DISCONTINUED | OUTPATIENT
Start: 2018-04-10 | End: 2018-04-10 | Stop reason: HOSPADM

## 2018-04-10 RX ADMIN — CHLORHEXIDINE GLUCONATE 15 ML: 1.2 RINSE ORAL at 07:16

## 2018-04-10 RX ADMIN — TOPIRAMATE 50 MG: 25 TABLET, FILM COATED ORAL at 21:14

## 2018-04-10 RX ADMIN — SODIUM CHLORIDE AND POTASSIUM CHLORIDE 75 ML/HR: .9; .15 SOLUTION INTRAVENOUS at 00:46

## 2018-04-10 RX ADMIN — VANCOMYCIN HYDROCHLORIDE 1000 MG: 10 INJECTION, POWDER, LYOPHILIZED, FOR SOLUTION INTRAVENOUS at 08:30

## 2018-04-10 RX ADMIN — PROPOFOL 120 MG: 10 INJECTION, EMULSION INTRAVENOUS at 07:41

## 2018-04-10 RX ADMIN — ATORVASTATIN CALCIUM 20 MG: 20 TABLET, FILM COATED ORAL at 18:03

## 2018-04-10 RX ADMIN — DEXAMETHASONE SODIUM PHOSPHATE 10 MG: 10 INJECTION INTRAMUSCULAR; INTRAVENOUS at 09:09

## 2018-04-10 RX ADMIN — PROPOFOL 120 MCG/KG/MIN: 10 INJECTION, EMULSION INTRAVENOUS at 08:21

## 2018-04-10 RX ADMIN — ACETAMINOPHEN 975 MG: 325 TABLET, FILM COATED ORAL at 21:14

## 2018-04-10 RX ADMIN — PHENYLEPHRINE HYDROCHLORIDE 20 MCG/MIN: 10 INJECTION INTRAVENOUS at 08:21

## 2018-04-10 RX ADMIN — KETAMINE HYDROCHLORIDE 50 MG: 50 INJECTION, SOLUTION INTRAMUSCULAR; INTRAVENOUS at 08:30

## 2018-04-10 RX ADMIN — ONDANSETRON 4 MG: 2 INJECTION INTRAMUSCULAR; INTRAVENOUS at 12:08

## 2018-04-10 RX ADMIN — Medication 0.8 MCG/KG/HR: at 08:21

## 2018-04-10 RX ADMIN — DILTIAZEM HYDROCHLORIDE 180 MG: 180 CAPSULE, EXTENDED RELEASE ORAL at 05:13

## 2018-04-10 RX ADMIN — MONTELUKAST SODIUM 10 MG: 10 TABLET, COATED ORAL at 21:15

## 2018-04-10 RX ADMIN — OXYCODONE HYDROCHLORIDE 10 MG: 10 TABLET ORAL at 19:16

## 2018-04-10 RX ADMIN — HYDROMORPHONE HYDROCHLORIDE 1 MG: 1 INJECTION, SOLUTION INTRAMUSCULAR; INTRAVENOUS; SUBCUTANEOUS at 09:03

## 2018-04-10 RX ADMIN — ROCURONIUM BROMIDE 20 MG: 10 INJECTION INTRAVENOUS at 09:16

## 2018-04-10 RX ADMIN — INSULIN GLARGINE 22 UNITS: 100 INJECTION, SOLUTION SUBCUTANEOUS at 21:28

## 2018-04-10 RX ADMIN — HYDROMORPHONE HYDROCHLORIDE 0.5 MG: 1 INJECTION, SOLUTION INTRAMUSCULAR; INTRAVENOUS; SUBCUTANEOUS at 16:14

## 2018-04-10 RX ADMIN — FLUTICASONE PROPIONATE AND SALMETEROL 1 PUFF: 50; 250 POWDER RESPIRATORY (INHALATION) at 21:17

## 2018-04-10 RX ADMIN — HYDROMORPHONE HYDROCHLORIDE 0.5 MG: 1 INJECTION, SOLUTION INTRAMUSCULAR; INTRAVENOUS; SUBCUTANEOUS at 13:39

## 2018-04-10 RX ADMIN — ACETAMINOPHEN 975 MG: 325 TABLET, FILM COATED ORAL at 05:13

## 2018-04-10 RX ADMIN — CEFAZOLIN SODIUM 2000 MG: 2 SOLUTION INTRAVENOUS at 15:18

## 2018-04-10 RX ADMIN — HYDROMORPHONE HYDROCHLORIDE 0.5 MG: 1 INJECTION, SOLUTION INTRAMUSCULAR; INTRAVENOUS; SUBCUTANEOUS at 13:53

## 2018-04-10 RX ADMIN — SUCCINYLCHOLINE CHLORIDE 100 MG: 20 INJECTION, SOLUTION INTRAMUSCULAR; INTRAVENOUS at 07:41

## 2018-04-10 RX ADMIN — MAGNESIUM SULFATE HEPTAHYDRATE 2 G: 40 INJECTION, SOLUTION INTRAVENOUS at 11:37

## 2018-04-10 RX ADMIN — OXYCODONE HYDROCHLORIDE 10 MG: 10 TABLET ORAL at 15:07

## 2018-04-10 RX ADMIN — MIDAZOLAM HYDROCHLORIDE 2 MG: 1 INJECTION, SOLUTION INTRAMUSCULAR; INTRAVENOUS at 07:38

## 2018-04-10 RX ADMIN — PROPOFOL 30 MG: 10 INJECTION, EMULSION INTRAVENOUS at 08:15

## 2018-04-10 RX ADMIN — ALBUTEROL SULFATE 2.5 MG: 2.5 SOLUTION RESPIRATORY (INHALATION) at 12:30

## 2018-04-10 RX ADMIN — KETAMINE HYDROCHLORIDE 0.1 MG/KG/HR: 50 INJECTION, SOLUTION INTRAMUSCULAR; INTRAVENOUS at 08:21

## 2018-04-10 RX ADMIN — GABAPENTIN 800 MG: 400 CAPSULE ORAL at 05:13

## 2018-04-10 RX ADMIN — GLYCOPYRROLATE 0.2 MG: 0.2 INJECTION, SOLUTION INTRAMUSCULAR; INTRAVENOUS at 08:15

## 2018-04-10 RX ADMIN — CEFAZOLIN SODIUM 2000 MG: 2 SOLUTION INTRAVENOUS at 08:43

## 2018-04-10 RX ADMIN — LIDOCAINE HYDROCHLORIDE 5 ML: 20 INJECTION, SOLUTION INTRAVENOUS at 07:41

## 2018-04-10 RX ADMIN — MIRTAZAPINE 7.5 MG: 15 TABLET, FILM COATED ORAL at 21:15

## 2018-04-10 RX ADMIN — LEVOTHYROXINE SODIUM 100 MCG: 100 TABLET ORAL at 05:13

## 2018-04-10 RX ADMIN — INSULIN LISPRO 5 UNITS: 100 INJECTION, SOLUTION INTRAVENOUS; SUBCUTANEOUS at 18:02

## 2018-04-10 RX ADMIN — QUETIAPINE 200 MG: 100 TABLET ORAL at 21:14

## 2018-04-10 RX ADMIN — ALBUTEROL SULFATE 4 PUFF: 90 AEROSOL, METERED RESPIRATORY (INHALATION) at 07:38

## 2018-04-10 RX ADMIN — GABAPENTIN 800 MG: 400 CAPSULE ORAL at 21:15

## 2018-04-10 RX ADMIN — SODIUM CHLORIDE 100 ML/HR: 0.9 INJECTION, SOLUTION INTRAVENOUS at 13:56

## 2018-04-10 RX ADMIN — HYDROMORPHONE HYDROCHLORIDE 1 MG: 1 INJECTION, SOLUTION INTRAMUSCULAR; INTRAVENOUS; SUBCUTANEOUS at 08:40

## 2018-04-10 RX ADMIN — SODIUM CHLORIDE: 0.9 INJECTION, SOLUTION INTRAVENOUS at 08:15

## 2018-04-10 RX ADMIN — SODIUM CHLORIDE, SODIUM LACTATE, POTASSIUM CHLORIDE, AND CALCIUM CHLORIDE: .6; .31; .03; .02 INJECTION, SOLUTION INTRAVENOUS at 07:34

## 2018-04-10 NOTE — ANESTHESIA PROCEDURE NOTES
Arterial Line Insertion  Date/Time: 4/10/2018 7:46 AM  Performed by: Viji Carrillo by: Gene Armenta   Consent: Written consent obtained  Risks and benefits: risks, benefits and alternatives were discussed  Consent given by: patient  Required items: required blood products, implants, devices, and special equipment available  Patient identity confirmed: arm band and hospital-assigned identification number  Time out: Immediately prior to procedure a "time out" was called to verify the correct patient, procedure, equipment, support staff and site/side marked as required  Preparation: Patient was prepped and draped in the usual sterile fashion    Indications: hemodynamic monitoring  Orientation:  LeftLocation: radial artery    Sedation:  Patient sedated: yes (Under GA)  Abebe's test normal: yes  Needle gauge: 20  Seldinger technique: Seldinger technique used  Number of attempts: 1  Post-procedure: dressing applied and chlorhexidine patch applied  Post-procedure CNS: normal  Patient tolerance: Patient tolerated the procedure well with no immediate complications  Comments: Procedure time 746-748 AM

## 2018-04-10 NOTE — ANESTHESIA POSTPROCEDURE EVALUATION
Post-Op Assessment Note      CV Status:  Stable    Mental Status:  Alert and awake    Hydration Status:  Euvolemic    PONV Controlled:  Controlled    Airway Patency:  Patent    Post Op Vitals Reviewed: Yes          Staff: Anesthesiologist           /85 (04/10/18 1235)    Temp 98 1 °F (36 7 °C) (04/10/18 1235)    Pulse 77 (04/10/18 1235)   Resp 15 (04/10/18 1235)    SpO2 97 % (04/10/18 1235)

## 2018-04-10 NOTE — ANESTHESIA PROCEDURE NOTES
Central Line Insertion  Performed by: Fito Cole by: Nani Mcfadden   Date/Time: 4/10/2018 8:14 AM  Catheter Type:  triple lumen  Indications: vascular access  Location details: right internal jugular  Catheter size: 7 Fr  Patient position: Trendelenburg    Sedation:  Patient sedated: Under GA  Assessment: blood return through all ports  Preparation: skin prepped with 2% chlorhexidine  Skin prep agent dried: skin prep agent completely dried prior to procedure  Sterile barriers: all five maximum sterile barriers used - cap, mask, sterile gown, sterile gloves, and large sterile sheet  Hand hygiene: hand hygiene performed prior to central venous catheter insertion  Ultrasound guidance: yes  sterile gel and probe cover used in ultrasound-guided central venous catheter insertionultrasound permanent image saved  Consent: Written consent obtained  Risks and benefits: risks, benefits and alternatives were discussed  Consent given by: patient  Required items: required blood products, implants, devices, and special equipment available  Patient identity confirmed: arm band and hospital-assigned identification number  Time out: Immediately prior to procedure a "time out" was called to verify the correct patient, procedure, equipment, support staff and site/side marked as required    Site selection rationale: US guidance, morbid obesity   Pre-procedure: landmarks identified  Vessel of Catheter Tip End: SVC  Number of attempts: 1  Successful placement: yes  Post-procedure: line sutured and dressing applied  Patient tolerance: Patient tolerated the procedure well with no immediate complications  independent trained observer present for patient monitoring Shamar Webber RN  Comments: Procedure time [id number]  Lot # [id number] Triple Lumen 16cm

## 2018-04-10 NOTE — PROGRESS NOTES
Progress Note - Anesthesia Acute Pain Management    Antonieta Springer 74 y o  female MRN: [id number]  Unit/Bed#: OR POOL Encounter: [id number]      Assessment:   Principal Problem:    Thoracic disc herniation  Active Problems:    HIV (human immunodeficiency virus infection)     Hypertension    Type 2 diabetes mellitus with hyperglycemia (HCC)    COPD (chronic obstructive pulmonary disease) - Chronic hypoxic respiratory failure    Tobacco use    Lumbar stenosis    Leukocytosis    Bipolar disorder, current episode depressed, moderate (HCC)    Hyperlipidemia      Plan:   - Pt seen in PACU after undergoing T9-11 fusion with T9/10 discectomy  She was still very sleepy but able to be awakened and was answering questions appropriately  She reported marked improvement in her pain already before falling back to sleep   - Recommend:   - No changes to current regimen at this time  - APS will continue to follow      Meds/Allergies   all current active meds have been reviewed    Allergies   Allergen Reactions    Prozac [Fluoxetine Hcl] Rash    Sulfa Antibiotics Itching    Quinine        Objective     Temp:  [97 6 °F (36 4 °C)-98 7 °F (37 1 °C)] 98 1 °F (36 7 °C)  HR:  [76-96] 76  Resp:  [15-21] 21  BP: (115-157)/(75-85) 116/75      Intake/Output Summary (Last 24 hours) at 04/10/18 1306  Last data filed at 04/10/18 1243   Gross per 24 hour   Intake             4060 ml   Output             2500 ml   Net             1560 ml                 Physical Exam: /75   Pulse 76   Temp 98 1 °F (36 7 °C)   Resp 21   Ht 5' 2" (1 575 m)   Wt 90 7 kg (200 lb)   LMP 04/01/2013 (Approximate)   SpO2 96%   BMI 36 58 kg/m²     Gen: NAD, sleepy  HEENT:  PER, EOMI  CV: Regular rate  Pul: Nonlabored  Ext:  No cyanosis  Neuro: sleepy but rousable, oriented appropriately; CNs II-XII grossly intact, XI deferred; exam o/w deferred 2/2 pt sleepy    Lab Results:  Lab Results   Component Value Date    WBC 13 61 (H) 04/10/2018    HGB 10 2 (L) 04/10/2018    HCT 33 1 (L) 04/10/2018    MCV 86 04/10/2018     04/10/2018     Lab Results   Component Value Date    GLUCOSE 97 04/10/2018    CALCIUM 9 1 04/10/2018     04/10/2018    K 4 9 04/10/2018    CO2 29 04/10/2018     (H) 04/10/2018    BUN 23 04/10/2018    CREATININE 0 71 04/10/2018     Imaging Studies: I have personally reviewed pertinent reports  EKG, Pathology, and Other Studies: I have personally reviewed pertinent reports        SIGNATURE: Adenike Mcmahon MD  DATE: April 10, 2018  TIME: 1:06 PM

## 2018-04-10 NOTE — OP NOTE
OPERATIVE REPORT  PATIENT NAME: Shady Lam    :  1950  MRN: [id number]  Pt Location: [facility] OR ROOM 17    SURGERY DATE: 4/10/2018    Surgeon(s) and Role:     * Michael Schuler MD - Primary    Preop Diagnosis:  Thoracic disc herniation [M51 24]    Post-Op Diagnosis Codes: * Thoracic disc herniation [M51 24]    Procedures:  1  T9-11 pedical screw placement  2  T9-11 arthrodesis with Vitoss allograft and locally harvest autograft  3  T9/10 discectomy  4  Resection of left T10 pedicle  5  Bilateral T9/10 facetectomies  6  T9 and T10 laminectomies  7  Use of intraoperative ultrasound  8  Use of intraoperative SSEP, MEP, EMG  9  Use of intraoperative fluroscopy    Specimen(s):  * No specimens in log *    Estimated Blood Loss:   Minimal    Drains:  Closed/Suction Drain Posterior Back Bulb 15 Fr  (Active)   Site Description Unable to view 4/10/2018  1:30 PM   Dressing Status Clean;Dry; Intact 4/10/2018  1:30 PM   Drainage Appearance Bloody 4/10/2018  1:30 PM   Status To bulb suction 4/10/2018  1:30 PM   Intake (mL) 20 mL 4/10/2018  1:30 PM   Number of days: 0       Urethral Catheter Latex 16 Fr  (Active)   Site Assessment Clean;Skin intact 4/10/2018  1:30 PM   Collection Container Standard drainage bag 4/10/2018  1:30 PM   Securement Method Securing device (Describe) 4/10/2018  1:30 PM   Output (mL) 400 mL 4/10/2018  1:30 PM   Number of days: 0       Anesthesia Type:   General    Operative Indications:  Thoracic disc herniation [M51 24]  This is a 30-year-old female with multiple medical comorbidities including HIV, diabetes, COPD, morbid obesity, and home oxygen use who presented with several weeks of worsening falls and frequency of falls  She also had worsening symptoms of urinary urgency  As such she presents to the emergency room and MRI of her lumbar spine and thoracic spine were completed  This revealed a significant T9-10 disc with cord compression and cord edema both rostral and caudal to this    A CT scan revealed calcified portions of the disc on both sides of the cord ventrally  Additionally there was calcified ligamentum flavum  On exam she was found to be myelopathic with some right lower extremity weakness  As such we discussed the risks and benefits of a T9-10 diskectomy with possible instrumentation from T9-11  She understood the risks and benefits of the procedure including bleeding, infection, stroke, paralysis, and death  Operative Findings:  Severe T9/10 compression    Complications:   None    Procedure and Technique:  After obtaining written informed consent patient was brought to the operating room  General endotracheal anesthesia was then induced  Arterial line and central line placed per anesthesia's request   Her mean arterial pressures were then maintained greater than 85 for the entire duration of the case  After placement of neuro monitoring leads she was flipped prone onto a Santiago table  All her pressure points were padded  Back was then prepped and draped in the usual sterile fashion  Baseline motor evoked potential in set and the sensory evoked potentials were obtained  There were no motor evoked potentials present in the feet  The this was thought to be likely due to her multiple medical comorbidities and anesthetic  There were SSEPs  A surgical time-out was performed  Fluoroscopy was brought into the field and help localize the T9 to the 11 pedicles  An incision was made with a 10 blade  Monopolar cautery was then used to dissect down to the thoracodorsal fascia  The fascia was then dissected and then opened with monopolar cautery  In a subperiosteal fashion the paraspinal muscles were elevated from the T9, T10, and T11 lamina  The transverse processes of T9 through 11 or exposed  Allis clamps were placed on the transverse processes and fluoroscopy was used to confirm our levels  After level confirmation we began our instrumentation    Using a Master Equation drill a  hole was made at the transverse process laminar junction of T9  Using fluoroscopy a curved sharp pedicle probe was placed down the pedicle  A ball-tip probe was then used to feel and ensure all walls were intact  This  hole was then tapped with a 4 mm tap after confirming circumferential walls and a floor a 5 x 40 mm [employer] screw was placed  This was similarly done on the right-hand side as well as at the bilateral T11 pedicles  I then turned my attention to the decompression and diskectomy I performed a T9 and T10 laminectomy  The spinal cord was under clear compression  Based on the MRI there was significant calcification along the facet on the right-hand side  Using a combination of Midas Darryl drill and Kerrisons a right T9-10 facetectomy was performed  Significant amount of calcified ligamentum was resected  This was then repeated on the contralateral side  Based on the MRI there was significant calcified ventral disc compressing the cord as well  Because of this I decided to remove the left-hand side pedicle  Using a Midas Darryl drill the pedicle was shelled out and a curette was used to in fracture the medial pedicle wall  A portion of the T10 vertebral body was resected as well  This allowed access to the T9-10 disc space and allowed me to visualize the ventral portion of the thecal sac  Bipolar cautery was then used to coagulate epidural veins and 11 blade was used to incise the T9-10 disc space  Using combination of Dwayne curettes calcified portion of disc was then resected  The ventral portion here was significantly decompressed  In addition the lateral portion of the thecal sac was also decompressed  After completing this I turned my attention back to the right-hand side  I ensured that the complete facet of T9 and 10 on the right-hand side was removed  There was no more calcified ligamentum  Based on the MRI there was a small portion of calcified disc here  Bipolar cautery was used to coagulate epidural veins  Once again a diskectomy was performed using downgoing Dwayne curette  Once I was satisfied with my diskectomy the wound was irrigated and ultrasound was brought into the field  This helped confirm circumferential decompression of thecal sac  There was a free-floating and pulsatile spinal cord visualized  As the pedicle remained intact on the right T10 another 5 x 40 mm screw was placed  Next slightly kyphotic rods were placed and set caps were placed  These were then final tightened  Final AP and lateral images were then obtained  The wound was then copiously irrigated with 3 L antibiotic irrigation  The transverse processes then decorticated and 10 cc of Vitoss allograft was mixed with locally harvested autograft  This was used to aid in arthrodesis  Next a 13 Tamazight PVC drain was tunneled in a subfascial fashion  The muscle was closed using 0 Vicryl  The fascia was closed using 0 Vicryl  The deep dermal layer was closed using inverted 2 0 Vicryl  Skin was closed using a 3 0 V lock  Glue was used on the skin  The drain was secured in place with a 2 0 nylon  A sterile dressing was applied  Motor evoked potentials and somatosensory evoked potentials remained stable throughout the case  There were 2 uninterrupted and correct surgical counts  A surgical sign-out was performed  The patient was woken from general anesthesia and found to be in her baseline neurologic condition and moving her legs with baseline strength    She is transferred to the PACU         I was present for the entire procedure    Patient Disposition:  PACU     SIGNATURE: Salvador Vidales MD  DATE: April 10, 2018  TIME: 3:47 PM

## 2018-04-10 NOTE — PHYSICAL THERAPY NOTE
Physical Therapy Cancellation Note    The pt  Is off of the floor at the OR  Please re-consult physical therapy when medically appropriate  Thank you      Mark Gonzalez, PTA

## 2018-04-10 NOTE — PROGRESS NOTES
Progress Note - Londell Gain 1950, 74 y o  female MRN: [id number]    Unit/Bed#: [facility] 932-01 Encounter: [id number]    Primary Care Provider: Jose Grullon DO   Date and time admitted to hospital: 4/4/2018  5:34 PM        Hyperlipidemia   Assessment & Plan    Stable  Continue meds        Bipolar disorder, current episode depressed, moderate ([address] )   Assessment & Plan    Continue above mgt        Leukocytosis   Assessment & Plan    - likely secondary to Decadron  - remains afebrile - monitor WBC count        Lumbar stenosis   Assessment & Plan    - imaging revealed stenosis at the L4-L5 level  - no emergent neurosurgery intervention  - PRN pain control          Tobacco use   Assessment & Plan    - transdermal nicotine patch on board  - counseled on tobacco cessation          COPD (chronic obstructive pulmonary disease) - Chronic hypoxic respiratory failure   Assessment & Plan    - stable/asymptomatic  - continue Advair/Spiriva regimen          Type 2 diabetes mellitus with hyperglycemia (HCC)   Assessment & Plan    - home Metformin regimen held during hospitalization  - continue inpatient basal insulin with additional SSI coverage per [employer] - likely exacerbated blood sugar level secondary to Decadron use  - HgA1c of 6 9        Hypertension   Assessment & Plan    - low-sodium diet encouraged  -/75mmHg post-op  - continue Cardizem          HIV (human immunodeficiency virus infection)    Assessment & Plan    -HIV +  -Cd4% 26 4  -diagnosed over 30 years ago and never underwent treatment   -consult ID tomorrow- when patient is more awake and alert            * Thoracic disc herniation   Assessment & Plan    - transferred from the [facility] for [facility] evaluation  - MRI revealed disc herniation at the T9-T10 level - and her CT imaging confirmed a similar findings with stenosis and to a lesser degree at the T7-T8 and T8-T9 levels   - PRN pain control - continue Decadron regimen for anti inflammatory/swelling effect  -s/p T9-11 fusion with T9-10 discectomy  -f/u NS recs        Bipolar disorder (HCC)resolved as of 2018   Assessment & Plan    - continue Seroquel/Wellbutrin  - psychiatry input to be appreciated            VTE Pharmacologic Prophylaxis:   Pharmacologic: Enoxaparin (Lovenox)  Mechanical VTE Prophylaxis in Place: Yes    Patient Centered Rounds: I have performed bedside rounds with nursing staff today  Discussions with Specialists or Other Care Team Provider: yes    Education and Discussions with Family / Patient: yes    Time Spent for Care: 45 minutes  More than 50% of total time spent on counseling and coordination of care as described above  Current Length of Stay: 6 day(s)    Current Patient Status: Inpatient   Certification Statement: The patient will continue to require additional inpatient hospital stay due to med mgt    Discharge Plan: rehab    Code Status: Level 1 - Full Code      Subjective:   Patient was seen examined in bed, in no apparent clinical distress, somnolent due to anesthesia post-operatively  ROS remarkable for generalized pain/fatigue  ROS negative otherwise  Objective:     Vitals:   Temp (24hrs), Av 9 °F (36 6 °C), Min:97 5 °F (36 4 °C), Max:98 4 °F (36 9 °C)    HR:  [76-96] 80  Resp:  [15-29] 20  BP: (104-151)/(75-85) 141/75  SpO2:  [95 %-99 %] 96 %  Body mass index is 36 58 kg/m²  Input and Output Summary (last 24 hours): Intake/Output Summary (Last 24 hours) at 04/10/18 1659  Last data filed at 04/10/18 1356   Gross per 24 hour   Intake             4180 ml   Output             2900 ml   Net             1280 ml       Physical Exam:     Physical Exam   Constitutional: She is oriented to person, place, and time  No distress  Somnolent  Morbidly obese   HENT:   Head: Normocephalic and atraumatic  Mouth/Throat: Oropharynx is clear and moist    Eyes: Pupils are equal, round, and reactive to light  Neck: Normal range of motion   No JVD present  Cardiovascular: Normal rate  No murmur heard  Pulmonary/Chest: Effort normal and breath sounds normal  No respiratory distress  She has no wheezes  Abdominal: Soft  Bowel sounds are normal  She exhibits no distension  There is no tenderness  Musculoskeletal: Normal range of motion  She exhibits no edema  Neurological: She is alert and oriented to person, place, and time  No cranial nerve deficit  Skin: Skin is warm  No erythema  Psychiatric: She has a normal mood and affect  Her behavior is normal        Additional Data:     Labs:      Results from last 7 days  Lab Units 04/10/18  0504  04/04/18  1935   WBC Thousand/uL 13 61*  < > 14 62*   HEMOGLOBIN g/dL 10 2*  < > 12 1   SL AMB LAB HEMOGLOBIN   --   < >  --    HEMATOCRIT % 33 1*  < > 37 9   SL AMB HEMATOCRIT   --   < >  --    PLATELETS Thousands/uL 292  < > 386   SL AMB PLATELET COUNT   --   < >  --    NEUTROS PCT %  --   --  79*   LYMPHS PCT %  --   --  11*   LYMPHO PCT % 13*  < >  --    MONOS PCT %  --   --  10   MONO PCT MAN % 4  < >  --    EOS PCT %  --   --  0   EOSINO PCT MANUAL % 1  < >  --    < > = values in this interval not displayed  Results from last 7 days  Lab Units 04/10/18  0504   SODIUM mmol/L 143   POTASSIUM mmol/L 4 9   CHLORIDE mmol/L 111*   CO2 mmol/L 29   BUN mg/dL 23   CREATININE mg/dL 0 71   CALCIUM mg/dL 9 1   GLUCOSE RANDOM mg/dL 97       Results from last 7 days  Lab Units 04/09/18  2052   INR  0 98       * I Have Reviewed All Lab Data Listed Above  * Additional Pertinent Lab Tests Reviewed:  [address] Admission Reviewed    Imaging:    Imaging Reports Reviewed Today Recent Cultures (last 7 days):           Last 24 Hours Medication List:     Current Facility-Administered Medications:  acetaminophen 975 mg Oral Q8H Mathieu Berg MD    aluminum-magnesium hydroxide-simethicone 30 mL Oral Q6H PRN Varsha Guadarrama MD    atorvastatin 20 mg Oral Daily With Jennifer Louis MD buPROPion 300 mg Oral Daily Maira Burns MD    cefazolin 2,000 mg Intravenous Q8H Fela López MD Last Rate: 2,000 mg (04/10/18 1518)   chlorhexidine 15 mL Swish & Spit Q12H [facility] Fela López MD    cholecalciferol 1,000 Units Oral Daily Maira Burns MD    diltiazem 180 mg Oral Daily Maira Burns MD    fluticasone-salmeterol 1 puff Inhalation Q12H [facility] Maira Burns MD    gabapentin 800 mg Oral TID Maira Burns MD    HYDROmorphone 0 5 mg Intravenous Q3H PRN Fela López MD    insulin glargine 22 Units Subcutaneous HS Maira Burns MD    insulin lispro 1-6 Units Subcutaneous 4x Daily (AC & HS) Ana Huffman MD    insulin lispro 5 Units Subcutaneous TID With Meals Maira Burns MD    levothyroxine 100 mcg Oral Early Morning Maira Burns MD    lidocaine 1 patch Transdermal Daily Maira Burns MD    methocarbamol 750 mg Oral Q6H PRN Maira Burns MD    mirtazapine 7 5 mg Oral HS Maira Burns MD    montelukast 10 mg Oral HS Maira Burns MD    nicotine 1 patch Transdermal Daily Maira Burns MD    ondansetron 4 mg Oral Q6H PRN Arti Cantu PA-C    oxyCODONE 10 mg Oral Q4H PRN Ana Huffman MD    oxyCODONE 5 mg Oral Q4H PRN Ana Huffman MD    QUEtiapine 200 mg Oral HS Maira Burns MD    sodium chloride 100 mL/hr Intravenous Continuous Jacob Nguyen PA-C Last Rate: 100 mL/hr (04/10/18 1356)   sodium chloride 0 9 % with KCl 20 mEq/L 75 mL/hr Intravenous Continuous Fela López MD Last Rate: Stopped (04/10/18 1500)   tiotropium 18 mcg Inhalation Daily Maira Burns MD    topiramate 50 mg Oral Q12H Jah Shen MD         Today, Patient Was Seen By: Connor Mariscal MD    ** Please Note: Dictation voice to text software may have been used in the creation of this document   **

## 2018-04-10 NOTE — ANESTHESIA POSTPROCEDURE EVALUATION
Post-Op Assessment Note      /75 (04/10/18 1315)    Temp      Pulse 78 (04/10/18 1315)   Resp 18 (04/10/18 1315)    SpO2 99 % (04/10/18 1315)    CXR showed no ptx and appropriate position of RIJ  RIJ CVC not indicated for post op use so was removed in pacu without complications     Syd Carrillo DO

## 2018-04-11 ENCOUNTER — APPOINTMENT (INPATIENT)
Dept: RADIOLOGY | Facility: HOSPITAL | Age: 68
DRG: 459 | End: 2018-04-11
Payer: MEDICARE

## 2018-04-11 LAB
ALBUMIN SERPL BCP-MCNC: 2.5 G/DL (ref 3.5–5)
ALP SERPL-CCNC: 60 U/L (ref 46–116)
ALT SERPL W P-5'-P-CCNC: 16 U/L (ref 12–78)
ANION GAP SERPL CALCULATED.3IONS-SCNC: 4 MMOL/L (ref 4–13)
AST SERPL W P-5'-P-CCNC: 10 U/L (ref 5–45)
BASOPHILS # BLD AUTO: 0.01 THOUSANDS/ΜL (ref 0–0.1)
BASOPHILS NFR BLD AUTO: 0 % (ref 0–1)
BILIRUB SERPL-MCNC: 0.22 MG/DL (ref 0.2–1)
BUN SERPL-MCNC: 22 MG/DL (ref 5–25)
CALCIUM SERPL-MCNC: 7.7 MG/DL
CHLORIDE SERPL-SCNC: 111 MMOL/L (ref 100–108)
CO2 SERPL-SCNC: 27 MMOL/L (ref 21–32)
CREAT SERPL-MCNC: 0.54 MG/DL (ref 0.6–1.3)
EOSINOPHIL # BLD AUTO: 0 THOUSAND/ΜL (ref 0–0.61)
EOSINOPHIL NFR BLD AUTO: 0 % (ref 0–6)
ERYTHROCYTE [DISTWIDTH] IN BLOOD BY AUTOMATED COUNT: 17.4 % (ref 11.6–15.1)
GFR SERPL CREATININE-BSD FRML MDRD: 115 ML/MIN/1.73SQ M
GLUCOSE SERPL-MCNC: 113 MG/DL (ref 65–140)
GLUCOSE SERPL-MCNC: 117 MG/DL (ref 65–140)
GLUCOSE SERPL-MCNC: 118 MG/DL (ref 65–140)
GLUCOSE SERPL-MCNC: 326 MG/DL (ref 65–140)
GLUCOSE SERPL-MCNC: 79 MG/DL (ref 65–140)
HCT VFR BLD AUTO: 27.7 % (ref 34.8–46.1)
HGB BLD-MCNC: 8.4 G/DL (ref 11.5–15.4)
LYMPHOCYTES # BLD AUTO: 1.96 THOUSANDS/ΜL (ref 0.6–4.47)
LYMPHOCYTES NFR BLD AUTO: 17 % (ref 14–44)
MCH RBC QN AUTO: 26.8 PG (ref 26.8–34.3)
MCHC RBC AUTO-ENTMCNC: 30.3 G/DL (ref 31.4–37.4)
MCV RBC AUTO: 89 FL (ref 82–98)
MONOCYTES # BLD AUTO: 1.17 THOUSAND/ΜL (ref 0.17–1.22)
MONOCYTES NFR BLD AUTO: 10 % (ref 4–12)
MRSA NOSE QL CULT: NORMAL
NEUTROPHILS # BLD AUTO: 7.96 THOUSANDS/ΜL (ref 1.85–7.62)
NEUTS SEG NFR BLD AUTO: 73 % (ref 43–75)
NRBC BLD AUTO-RTO: 0 /100 WBCS
PLATELET # BLD AUTO: 181 THOUSANDS/UL (ref 149–390)
PMV BLD AUTO: 8.4 FL (ref 8.9–12.7)
POTASSIUM SERPL-SCNC: 3.8 MMOL/L (ref 3.5–5.3)
PROT SERPL-MCNC: 5.4 G/DL (ref 6.4–8.2)
RBC # BLD AUTO: 3.13 MILLION/UL (ref 3.81–5.12)
SODIUM SERPL-SCNC: 142 MMOL/L (ref 136–145)
WBC # BLD AUTO: 11.32 THOUSAND/UL (ref 4.31–10.16)

## 2018-04-11 PROCEDURE — 97164 PT RE-EVAL EST PLAN CARE: CPT

## 2018-04-11 PROCEDURE — G8987 SELF CARE CURRENT STATUS: HCPCS

## 2018-04-11 PROCEDURE — 97167 OT EVAL HIGH COMPLEX 60 MIN: CPT

## 2018-04-11 PROCEDURE — 85025 COMPLETE CBC W/AUTO DIFF WBC: CPT | Performed by: HOSPITALIST

## 2018-04-11 PROCEDURE — 72070 X-RAY EXAM THORAC SPINE 2VWS: CPT

## 2018-04-11 PROCEDURE — 82948 REAGENT STRIP/BLOOD GLUCOSE: CPT

## 2018-04-11 PROCEDURE — 99232 SBSQ HOSP IP/OBS MODERATE 35: CPT | Performed by: HOSPITALIST

## 2018-04-11 PROCEDURE — 80053 COMPREHEN METABOLIC PANEL: CPT | Performed by: HOSPITALIST

## 2018-04-11 PROCEDURE — G8988 SELF CARE GOAL STATUS: HCPCS

## 2018-04-11 RX ORDER — OXYCODONE HYDROCHLORIDE 5 MG/1
5 TABLET ORAL EVERY 4 HOURS PRN
Status: DISCONTINUED | OUTPATIENT
Start: 2018-04-11 | End: 2018-04-11

## 2018-04-11 RX ORDER — ZOLPIDEM TARTRATE 5 MG/1
5 TABLET ORAL
Status: DISCONTINUED | OUTPATIENT
Start: 2018-04-11 | End: 2018-04-16 | Stop reason: HOSPADM

## 2018-04-11 RX ORDER — OXYCODONE HYDROCHLORIDE 5 MG/1
2.5 TABLET ORAL EVERY 4 HOURS PRN
Status: DISCONTINUED | OUTPATIENT
Start: 2018-04-11 | End: 2018-04-16 | Stop reason: HOSPADM

## 2018-04-11 RX ORDER — OXYCODONE HYDROCHLORIDE 5 MG/1
5 TABLET ORAL EVERY 4 HOURS PRN
Status: DISCONTINUED | OUTPATIENT
Start: 2018-04-11 | End: 2018-04-16 | Stop reason: HOSPADM

## 2018-04-11 RX ADMIN — FLUTICASONE PROPIONATE AND SALMETEROL 1 PUFF: 50; 250 POWDER RESPIRATORY (INHALATION) at 10:04

## 2018-04-11 RX ADMIN — MONTELUKAST SODIUM 10 MG: 10 TABLET, COATED ORAL at 21:49

## 2018-04-11 RX ADMIN — HYDROMORPHONE HYDROCHLORIDE 0.5 MG: 1 INJECTION, SOLUTION INTRAMUSCULAR; INTRAVENOUS; SUBCUTANEOUS at 12:51

## 2018-04-11 RX ADMIN — OXYCODONE HYDROCHLORIDE 10 MG: 10 TABLET ORAL at 00:37

## 2018-04-11 RX ADMIN — TOPIRAMATE 50 MG: 25 TABLET, FILM COATED ORAL at 09:58

## 2018-04-11 RX ADMIN — GABAPENTIN 800 MG: 400 CAPSULE ORAL at 05:39

## 2018-04-11 RX ADMIN — LEVOTHYROXINE SODIUM 100 MCG: 100 TABLET ORAL at 05:40

## 2018-04-11 RX ADMIN — LIDOCAINE 1 PATCH: 50 PATCH TOPICAL at 10:00

## 2018-04-11 RX ADMIN — OXYCODONE HYDROCHLORIDE 5 MG: 5 TABLET ORAL at 22:57

## 2018-04-11 RX ADMIN — INSULIN GLARGINE 22 UNITS: 100 INJECTION, SOLUTION SUBCUTANEOUS at 21:57

## 2018-04-11 RX ADMIN — ENOXAPARIN SODIUM 40 MG: 40 INJECTION SUBCUTANEOUS at 13:44

## 2018-04-11 RX ADMIN — GABAPENTIN 800 MG: 400 CAPSULE ORAL at 12:51

## 2018-04-11 RX ADMIN — MIRTAZAPINE 7.5 MG: 15 TABLET, FILM COATED ORAL at 21:49

## 2018-04-11 RX ADMIN — TOPIRAMATE 50 MG: 25 TABLET, FILM COATED ORAL at 21:49

## 2018-04-11 RX ADMIN — SODIUM CHLORIDE 100 ML/HR: 0.9 INJECTION, SOLUTION INTRAVENOUS at 00:41

## 2018-04-11 RX ADMIN — BUPROPION HYDROCHLORIDE 300 MG: 150 TABLET, FILM COATED, EXTENDED RELEASE ORAL at 09:58

## 2018-04-11 RX ADMIN — OXYCODONE HYDROCHLORIDE 5 MG: 5 TABLET ORAL at 15:58

## 2018-04-11 RX ADMIN — ACETAMINOPHEN 975 MG: 325 TABLET, FILM COATED ORAL at 05:40

## 2018-04-11 RX ADMIN — ATORVASTATIN CALCIUM 20 MG: 20 TABLET, FILM COATED ORAL at 15:58

## 2018-04-11 RX ADMIN — CEFAZOLIN SODIUM 2000 MG: 2 SOLUTION INTRAVENOUS at 09:59

## 2018-04-11 RX ADMIN — VITAMIN D, TAB 1000IU (100/BT) 1000 UNITS: 25 TAB at 09:58

## 2018-04-11 RX ADMIN — ZOLPIDEM TARTRATE 5 MG: 5 TABLET ORAL at 01:35

## 2018-04-11 RX ADMIN — ACETAMINOPHEN 975 MG: 325 TABLET, FILM COATED ORAL at 21:49

## 2018-04-11 RX ADMIN — METHOCARBAMOL 750 MG: 750 TABLET ORAL at 10:50

## 2018-04-11 RX ADMIN — GABAPENTIN 800 MG: 400 CAPSULE ORAL at 21:48

## 2018-04-11 RX ADMIN — CEFAZOLIN SODIUM 2000 MG: 2 SOLUTION INTRAVENOUS at 00:38

## 2018-04-11 RX ADMIN — QUETIAPINE 200 MG: 100 TABLET ORAL at 21:49

## 2018-04-11 RX ADMIN — TIOTROPIUM BROMIDE 18 MCG: 18 CAPSULE ORAL; RESPIRATORY (INHALATION) at 10:03

## 2018-04-11 RX ADMIN — NICOTINE 1 PATCH: 14 PATCH, EXTENDED RELEASE TRANSDERMAL at 09:58

## 2018-04-11 RX ADMIN — OXYCODONE HYDROCHLORIDE 5 MG: 5 TABLET ORAL at 10:50

## 2018-04-11 RX ADMIN — INSULIN LISPRO 5 UNITS: 100 INJECTION, SOLUTION INTRAVENOUS; SUBCUTANEOUS at 12:52

## 2018-04-11 RX ADMIN — INSULIN LISPRO 5 UNITS: 100 INJECTION, SOLUTION INTRAVENOUS; SUBCUTANEOUS at 18:00

## 2018-04-11 RX ADMIN — ACETAMINOPHEN 975 MG: 325 TABLET, FILM COATED ORAL at 13:43

## 2018-04-11 RX ADMIN — METHOCARBAMOL 750 MG: 750 TABLET ORAL at 22:57

## 2018-04-11 RX ADMIN — DILTIAZEM HYDROCHLORIDE 180 MG: 180 CAPSULE, EXTENDED RELEASE ORAL at 05:39

## 2018-04-11 RX ADMIN — FLUTICASONE PROPIONATE AND SALMETEROL 1 PUFF: 50; 250 POWDER RESPIRATORY (INHALATION) at 21:48

## 2018-04-11 NOTE — PLAN OF CARE
Problem: PHYSICAL THERAPY ADULT  Goal: Performs mobility at highest level of function for planned discharge setting  See evaluation for individualized goals  Treatment/Interventions: Functional transfer training, LE strengthening/ROM, Elevations, Therapeutic exercise, Endurance training, Cognitive reorientation, Patient/family training, Equipment eval/education, Bed mobility, Gait training, Compensatory technique education, Continued evaluation, Spoke to nursing  Equipment Recommended: Xenia Arrington       See flowsheet documentation for full assessment, interventions and recommendations  Prognosis: Fair  Problem List: Decreased strength, Decreased endurance, Impaired balance, Decreased mobility, Decreased safety awareness, Obesity  Assessment: pt reassessed s/p T9-11 fusion currently in TLSO exhibits significant funct mob limitations; requires mod assist w transf and amb 3 ft bed -chair using RW; required increased assist x2 w sit-stand using raised bed height, and LE stabilization, improved stabilty w amb; O2 sats on NC declined w exertion to 72% improved w pursed lip breathing; rec  cont PT IP rehab when med cleared; updated goals as noted below  Barriers to Discharge: Inaccessible home environment     Recommendation: Post acute IP rehab     PT - OK to Discharge: Yes    See flowsheet documentation for full assessment

## 2018-04-11 NOTE — PROGRESS NOTES
Progress Note - Pool Endo 1950, 74 y o  female MRN: [id number]    Unit/Bed#: [facility] 932-01 Encounter: [id number]    Primary Care Provider: Bassam Masterson DO   Date and time admitted to hospital: 4/4/2018  5:34 PM        Hyperlipidemia   Assessment & Plan    Stable  Continue meds        Bipolar disorder, current episode depressed, moderate ([address] )   Assessment & Plan    Continue above mgt        Leukocytosis   Assessment & Plan    - likely secondary to Decadron  - remains afebrile - monitor WBC count        Lumbar stenosis   Assessment & Plan    - imaging revealed stenosis at the L4-L5 level  - no emergent neurosurgery intervention  - PRN pain control  -Aqua K back heating pad          Tobacco use   Assessment & Plan    - transdermal nicotine patch   - counseled on tobacco cessation          COPD (chronic obstructive pulmonary disease) - Chronic hypoxic respiratory failure   Assessment & Plan    - stable/asymptomatic  - continue Advair/Spiriva regimen          Type 2 diabetes mellitus with hyperglycemia (HCC)   Assessment & Plan    - home Metformin regimen held during hospitalization  - HgA1c of 6 9%  -continue BG monitoring  -ISS          Hypertension   Assessment & Plan    - low-sodium diet encouraged  -BPcontrolled  - continue Cardizem          HIV (human immunodeficiency virus infection)    Assessment & Plan    -HIV +  -Cd4% 26 4  -diagnosed over 30 years ago   -no HAART at this time  -follow with Dr Cordero in [facility]  -ID discussion about inpatient HAART therapy initiated during hospitalization not recommended            * Thoracic disc herniation   Assessment & Plan    - transferred from the [facility] for neurosurgery evaluation  - MRI revealed disc herniation at the T9-T10 level - and her CT imaging confirmed a similar findings with stenosis and to a lesser degree at the T7-T8 and T8-T9 levels   -s/p  POD#1 T9-11 fusion with T9-10 discectomy  -f/u NS recs  -pain control -  F/u recs by acute pain service            VTE Pharmacologic Prophylaxis:   Pharmacologic: lovenox  Mechanical VTE Prophylaxis in Place: Yes    Patient Centered Rounds: I have performed bedside rounds with nursing staff today  Discussions with Specialists or Other Care Team Provider: yes    Education and Discussions with Family / Patient: yes    Time Spent for Care: 45 minutes  More than 50% of total time spent on counseling and coordination of care as described above  Current Length of Stay: 7 day(s)    Current Patient Status: Inpatient   Certification Statement: The patient will continue to require additional inpatient hospital stay due to med mgt    Discharge Plan: rehab    Code Status: Level 1 - Full Code      Subjective:   Patient reported generalized body aches, and back pain  Denied any chest pain, shortness of breath, changes in bowel/urinary habits  ROS negative otherwise  Objective:     Vitals:   Temp (24hrs), Av 3 °F (36 8 °C), Min:97 5 °F (36 4 °C), Max:99 2 °F (37 3 °C)    HR:  [] 101  Resp:  [18-20] 20  BP: ()/(55-87) 98/55  SpO2:  [95 %-99 %] 97 %  Body mass index is 36 58 kg/m²  Input and Output Summary (last 24 hours): Intake/Output Summary (Last 24 hours) at 18 1720  Last data filed at 18 1527   Gross per 24 hour   Intake              530 ml   Output             1815 ml   Net            -1285 ml       Physical Exam:     Physical Exam   Constitutional: She is oriented to person, place, and time  She appears well-developed  She appears distressed  obese   HENT:   Head: Normocephalic and atraumatic  Mouth/Throat: Oropharynx is clear and moist    Eyes: EOM are normal  Pupils are equal, round, and reactive to light  Neck: Normal range of motion  No JVD present  Cardiovascular: Normal rate  Exam reveals no gallop and no friction rub  No murmur heard  Pulmonary/Chest: Effort normal and breath sounds normal  No respiratory distress  She has no wheezes  Abdominal: Soft  Bowel sounds are normal  She exhibits no distension  There is no tenderness  Musculoskeletal: Normal range of motion  She exhibits no edema, tenderness or deformity  Neurological: She is alert and oriented to person, place, and time  No cranial nerve deficit  Coordination normal    TRISTEN drain   Skin: Skin is warm  No erythema  Psychiatric: She has a normal mood and affect  Her behavior is normal  Judgment and thought content normal          Additional Data:     Labs:      Results from last 7 days  Lab Units 04/11/18  1017   WBC Thousand/uL 11 32*   HEMOGLOBIN g/dL 8 4*   HEMATOCRIT % 27 7*   PLATELETS Thousands/uL 181   NEUTROS PCT % 73   LYMPHS PCT % 17   MONOS PCT % 10   EOS PCT % 0       Results from last 7 days  Lab Units 04/11/18  1017   SODIUM mmol/L 142   POTASSIUM mmol/L 3 8   CHLORIDE mmol/L 111*   CO2 mmol/L 27   BUN mg/dL 22   CREATININE mg/dL 0 54*   CALCIUM mg/dL 7 7   TOTAL PROTEIN g/dL 5 4*   BILIRUBIN TOTAL mg/dL 0 22   ALK PHOS U/L 60   ALT U/L 16   AST U/L 10   GLUCOSE RANDOM mg/dL 79       Results from last 7 days  Lab Units 04/10/18  1529   INR  1 06       * I Have Reviewed All Lab Data Listed Above  * Additional Pertinent Lab Tests Reviewed:  [address] Admission Reviewed    Imaging:    Imaging Reports Reviewed Today  Recent Cultures (last 7 days):           Last 24 Hours Medication List:     Current Facility-Administered Medications:  acetaminophen 975 mg Oral Q8H [address] Shannon Mendoza MD    alteplase 2 mg Intracatheter Once aKrlos Jacobsen PA-C    aluminum-magnesium hydroxide-simethicone 30 mL Oral Q6H PRN Zoltan Gauthier MD    atorvastatin 20 mg Oral Daily With Jus Sims MD    buPROPion 300 mg Oral Daily Zoltan Gauthier MD    cholecalciferol 1,000 Units Oral Daily Zoltan Gauthier MD    diltiazem 180 mg Oral Daily Zoltan Gauthier MD    enoxaparin 40 mg Subcutaneous Q24H [address] Farooq Burt PA-C fluticasone-salmeterol 1 puff Inhalation Q12H [facility] Hal Guzman MD    gabapentin 800 mg Oral TID Hal Guzman MD    HYDROmorphone 0 5 mg Intravenous Q3H PRN Ronan Mccullough MD    insulin glargine 22 Units Subcutaneous HS Hal Guzman MD    insulin lispro 1-6 Units Subcutaneous 4x Daily (AC & HS) Chanelle Roldan MD    insulin lispro 5 Units Subcutaneous TID With Meals Hal Guzman MD    levothyroxine 100 mcg Oral Early Morning Hal Guzman MD    lidocaine 1 patch Transdermal Daily Hal Guzman MD    methocarbamol 750 mg Oral Q6H PRN Hal Guzman MD    mirtazapine 7 5 mg Oral HS Hal Guzman MD    montelukast 10 mg Oral HS Hal Guzman MD    nicotine 1 patch Transdermal Daily Hal Guzman MD    ondansetron 4 mg Oral Q6H PRN Arti Cantu PA-C    oxyCODONE 2 5 mg Oral Q4H PRN Brunilda Shaw MD    oxyCODONE 5 mg Oral Q4H PRN Brunilda Shaw MD    QUEtiapine 200 mg Oral HS Hal Guzman MD    sodium chloride 100 mL/hr Intravenous Continuous Julian Lynn PA-C Last Rate: 100 mL/hr (04/11/18 0041)   sodium chloride 0 9 % with KCl 20 mEq/L 75 mL/hr Intravenous Continuous Ronan Mccullough MD Last Rate: Stopped (04/10/18 1500)   tiotropium 18 mcg Inhalation Daily Hal Guzman MD    topiramate 50 mg Oral Q12H [facility] Hal Guzman MD    zolpidem 5 mg Oral HS PRN Tuyet Cantu MD         Today, Patient Was Seen By: Brunilda Shaw MD    ** Please Note: Dictation voice to text software may have been used in the creation of this document   **

## 2018-04-11 NOTE — ASSESSMENT & PLAN NOTE
- home Metformin regimen held during hospitalization  - HgA1c of 6 9%  -continue BG monitoring  -ISS

## 2018-04-11 NOTE — PLAN OF CARE
INFECTION - ADULT     Absence or prevention of progression during hospitalization Progressing     Absence of fever/infection during neutropenic period Progressing        Knowledge Deficit     Patient/family/caregiver demonstrates understanding of disease process, treatment plan, medications, and discharge instructions Progressing        MUSCULOSKELETAL - ADULT     Maintain or return mobility to safest level of function Progressing     Maintain proper alignment of affected body part Progressing        PAIN - ADULT     Verbalizes/displays adequate comfort level or baseline comfort level Progressing        Potential for Falls     Patient will remain free of falls Progressing        Prexisting or High Potential for Compromised Skin Integrity     Skin integrity is maintained or improved Progressing        SAFETY ADULT     Patient will remain free of falls Progressing     Maintain or return to baseline ADL function Progressing     Maintain or return mobility status to optimal level Progressing

## 2018-04-11 NOTE — OCCUPATIONAL THERAPY NOTE
[address] [facility] Evaluation     Patient Name: Ramone Mary  JRCGT'B Date: 4/11/2018  Problem List  Patient Active Problem List   Diagnosis    Arthritis    HIV (human immunodeficiency virus infection)     Osteoarthritis    Hypertension    Type 2 diabetes mellitus with hyperglycemia ([facility]    COPD (chronic obstructive pulmonary disease) - Chronic hypoxic respiratory failure    Bipolar 1 disorder (HCC)    Tobacco use    Shortness of breath    Back pain    Hip pain    Morbid obesity ([facility]    Ambulatory dysfunction    Thoracic disc herniation    Lumbar stenosis    Urinary frequency    Leukocytosis    Bipolar disorder, current episode depressed, moderate ([facility]    Hyperlipidemia     Past Medical History  Past Medical History:   Diagnosis Date    Bipolar 1 disorder ([facility]     Cardiac disease     COPD (chronic obstructive pulmonary disease) ([facility]     Diabetes mellitus ([facility]     Disease of thyroid gland     [facility] 3/24/2016    Hypertension     Osteoarthritis     Tobacco abuse      Past Surgical History  Past Surgical History:   Procedure Laterality Date    HERNIA REPAIR      LUMBAR FUSION N/A 4/10/2018    Procedure: T9/10 discectomy with T9-T11 fusion;  Surgeon: Linda Davison MD;  [facility];  Service: Neurosurgery    THYROIDECTOMY           04/11/18 8460   Note Type   Note type Eval/Treat   Restrictions/Precautions   Weight Bearing Precautions Per Order No   Braces or Orthoses TLSO   Other Precautions Chair Alarm;O2;Telemetry; Fall Risk;Pain;Spinal precautions  (Chair alarm on at end of therapy session )   Pain Assessment   Pain Assessment 0-10   Pain Score 8   Pain Type Acute pain;Surgical pain   Pain Location Back   Pain Orientation Upper   Hospital Pain Intervention(s) Ambulation/increased activity;Repositioned   Response to Interventions tolerated   Home Living   Type of Home House   Home Layout Two level   Bathroom Shower/Tub Tub/shower unit   Bathroom Toilet Standard   Bathroom Equipment Shower chair   Bathroom Accessibility Accessible   Home Equipment Walker;Cane   Additional Comments Pt lives with her daughter in a 2 story home and has home health aids from 8am-2pm   Prior Function   Level of [place] Independent with ADLs and functional mobility   Lives With Daughter   Receives Help From Family;Home health   ADL Assistance Independent   IADLs Independent   Falls in the last 6 months 1 to 4  (3)   Vocational Retired   Comments Pt reports she was I w/ ADLS and IADLS, & required use of rw or SPC for community use PTA   Lifestyle   Autonomy Pt reports I w/ ADLS/IADLS PTA  Pt denies driving    Reciprocal Relationships Pt lives with her daughter who works until [employer]  Pt has home health aids from 8am-2pm   Service to Others Pt is retired   Intrinsic Gratification Pt mostly sedentary PTA  Enjoys spending time with her grandchildren    Psychosocial   Psychosocial (WDL) WDL   ADL   Eating Assistance 5  Supervision/Setup   Grooming Assistance 5  Supervision/Setup   UB Bathing Assistance 3  Moderate Assistance   LB Bathing Assistance 2  Maximal [address] 3  Moderate [address] 1  Total [address]  2  Maximal Assistance   Bed Mobility   Supine to Sit 3  Moderate assistance   Additional items Assist x 2; Increased time required;Verbal cues;LE management   Sit to Supine Unable to assess   Transfers   Sit to Stand 3  Moderate assistance   Additional items Assist x 2; Increased time required;Verbal cues   Stand to Sit 3  Moderate assistance   Additional items Assist x 2; Increased time required;Verbal cues   Additional Comments Pt performed functional transfers w/ mod A x2 requiring A for force production to clear buttocks from bed   Pt required A x3rd person to block B/L knees to encourage proper weight shift   Functional Mobility   Functional Mobility 3  Moderate assistance Additional Comments Pt performed functional mobility for short in room distances using rw w/ mod A x1   Additional items Rolling walker   Balance   Static Sitting Fair -   Dynamic Sitting Poor +   Static Standing Poor +   Dynamic Standing Poor   Ambulatory Poor   Activity Tolerance   Activity Tolerance Patient limited by fatigue;Patient limited by pain   Nurse Made Aware LUZMA Almanza confirm pt appropriate for therapy session and updated post session    RUE Assessment   RUE Assessment WFL   LUE Assessment   LUE Assessment WFL   Hand Function   Gross Motor Coordination Functional   Fine Motor Coordination Functional   Cognition   Overall Cognitive Status WFL   Arousal/Participation Alert; Responsive; Cooperative   Attention Attends with cues to redirect   Memory Within functional limits   Following Commands Follows one step commands with increased time or repetition   Comments Pt is alert and cooperative  Pt easily distracted and anxious requiring cues to redirect  Pt educated on spinal precautions    Assessment   Limitation Decreased ADL status; Decreased endurance;Decreased self-care trans;Decreased high-level ADLs   Prognosis Fair   Assessment Pt is a 75 y/o female seen for OT eval s/p adm to [facility] as a transfer from [facility] for neurosurgical evaluation 2*  thoracic intervertebral disc herniation with bilateral lower extremity weakness and ambulatory dysfunction  Pt received the following procedures on 4/10/18: T9-11 pedical screw placement; T9-11 arthrodesis with Vitoss allograft and locally harvest autograft; T9/10 discectomy; Resection of left T10 pedicle; Bilateral T9/10 facetectomies; T9 and T10 laminectomies  Comorbidities include a h/o bipolar 1 disorder, COPD on 2L O2 at home, cardiac disease, DM, HIV, HTN, OA and tobacco use  Pt with active OT orders and up with assistance orders   Pt lives with her daughter in a 2 story home and has home health aids from 8am-2pm  Pt reports she was I w/ ADLS and IADLS, & required use of rw or SPC for community use PTA  Prior PT eval reports pt required A for ADLS/IADLS, along with other inaccuracies  Unclear if pt is a reliable historian  Pt is currently demonstrating the following occupational deficits: mod A UB ADLS, total A LB ADLS, mod A x2 functional transfers and mod A x1 (w/ SBA fo 2nd) for short in room mobility using rw  Pt with deficits and limitations in all baseline areas of occupation 2* significant pain, decreased endurance/activity tolerance, decreased functional forward reach, decreased ADL status, impaired balance, decreased mobility status, anxiety, spinal precautions, and inaccessible home environment  The following Occupational Performance Areas to address include: bathing/shower, toilet hygiene, dressing, functional mobility, community mobility, clothing management, meal prep and household maintenance  Pt scored overall 50/100 on the Barthel Index  Based on the aforementioned OT evaluation, functional performance deficits, and assessments, pt has been identified as a high complexity evaluation  Recommend STR upon D/C  Pt to continue to benefit from acute immediate OT services to address the following goals 3-5x/wk to  w/in 7-10 days:   Goals   Patient Goals to have less pain    Plan   Treatment Interventions ADL retraining;Functional transfer training; Endurance training;Cognitive reorientation;Patient/family training;Equipment evaluation/education; Compensatory technique education;Continued evaluation; Energy conservation; Activityengagement   Goal Expiration Date 18   OT Frequency 3-5x/wk   Recommendation   OT Discharge Recommendation Short Term Rehab   OT - OK to Discharge Yes  (to STR when medically stable)   Barthel Index   Feeding 10   Bathing 0   Grooming Score 5   Dressing Score 5   Bladder Score 10   Bowels Score 10   Toilet Use Score 5   Transfers (Bed/Chair) Score 5   Mobility (Level Surface) Score 0   Stairs Score 0   Barthel Index Score 50 Modified [place] Scale   Modified [place] Scale 4       GOALS:    1) Pt will improve activity tolerance to G for min 30 min txment sessions  2) Pt will complete ADLs/self care w/ min A w/ G hyiene/thoroughness using adaptive equipment as needed w/ min cues fro cog support  3) Pt will complete toileting w/ min A w/ G hygiene/thoroughness using DME as needed  4) Pt will improve functional transfers on/off all surfaces using DME as needed w/ G balance/safety including toileting w/ min A  5) Pt will improve functional mobility during ADL/IADL/leisure tasks using DME as needed w/ g balance/safety w/ min A  6) Pt will engage in ongoing cognitive assessment w/ G participation w/ min A to assist w/ safe d/c planning/recommendations  7) Pt will demonstrate G carryover of pt/caregiver education and training as appropriate w/ min A w/o cues w/ G tolerance  8) Pt will demonstrate 100% carryover of learned E C  techniques s/p skilled education w/o cues t/o functional ADL/ IADL/leisure interest tasks w/ min A  9) Pt will demonstrate 100% carryover of precautions s/p review w/o cues w/ min A w/ G tolerance/participation t/o functional ADL/IADL/leisure tasks      Linda Polanco MS, OTR/L

## 2018-04-11 NOTE — ASSESSMENT & PLAN NOTE
- transferred from the [facility] for neurosurgery evaluation  - MRI revealed disc herniation at the T9-T10 level - and her CT imaging confirmed a similar findings with stenosis and to a lesser degree at the T7-T8 and T8-T9 levels   -s/p  POD#1 T9-11 fusion with T9-10 discectomy  -f/u NS recs  -pain control -  F/u recs by acute pain service

## 2018-04-11 NOTE — PROGRESS NOTES
04/11/18 [address] Affiliation Shinto   Plan of Care   Comments  introduces self and begins to [facility] a caring relationship through which the pt is helped   provided a safe space for the pt to express her thoughts and feelings  Assessment Completed by: Other (Comment)  (Dept   referral)

## 2018-04-11 NOTE — PHYSICAL THERAPY NOTE
PT Reevaluation     04/11/18 1610   Note Type   Note type Re-eval   Pain Assessment   Pain Assessment 0-10   Pain Score 8   Pain Location Back   Hospital Pain Intervention(s) Ambulation/increased activity   Response to Interventions increaesd pain w mob   Home Living   Type of [address] Multi-level   Prior Function   Level of [place] Independent with ADLs and functional mobility   Lives With Daughter   Restrictions/Precautions   Braces or Orthoses TLSO   General   Family/Caregiver Present No   Cognition   Arousal/Participation Alert   Attention Attends with cues to redirect   Memory Within functional limits   RUE Assessment   RUE Assessment WFL   LUE Assessment   LUE Assessment WFL   RLE Assessment   RLE Assessment X   Strength RLE   RLE Overall Strength 2+/5   LLE Assessment   LLE Assessment X   Strength LLE   LLE Overall Strength 2+/5   Coordination   Movements are Fluid and Coordinated 0   Bed Mobility   Supine to Sit 3  Moderate assistance   Additional items HOB elevated; Increased time required;Verbal cues;LE management;Assist x 2   Transfers   Sit to Stand 3  Moderate assistance   Additional items Assist x 2; Increased time required;Verbal cues   Stand to Sit 3  Moderate assistance   Additional items Assist x 2;Verbal cues; Increased time required   Stand pivot 3  Moderate assistance   Additional items Increased time required;Verbal cues  (RW)   Ambulation/Elevation   Gait pattern Improper Weight shift; Poor UE support;Decreased foot clearance   Gait Assistance 3  Moderate assist   Additional items Verbal cues; Tactile cues   Assistive Device Rolling walker   Distance 3 ft   Balance   Static Standing Fair -  (RW)   Dynamic Standing Poor  (RW)   Endurance Deficit   Endurance Deficit Yes   Endurance Deficit Description fatigue   Activity Tolerance   Activity Tolerance Patient limited by fatigue   Nurse Made Aware RN   Assessment   Assessment pt reassessed s/p T9-11 fusion currently in TLSO exhibits significant funct mob limitations; requires mod assist w transf and amb 3 ft bed -chair using RW; required increased assist x2 w sit-stand using raised bed height, and LE stabilization, improved stabilty w amb; O2 sats on NC declined w exertion to 72% improved w pursed lip breathing; rec  cont PT IP rehab when med cleared; updated goals as noted below   Goals   Patient Goals get stronger   STG Expiration Date 04/25/18   Plan   Treatment/Interventions Functional transfer training;LE strengthening/ROM; Elevations; Therapeutic exercise; Endurance training;Cognitive reorientation;Patient/family training;Equipment eval/education; Bed mobility;Gait training; Compensatory technique education;Continued evaluation;Spoke to nursing   PT Frequency 5x/wk   Recommendation   Recommendation Post acute IP rehab   Equipment Recommended Walker   PT - OK to Discharge Yes   Barthel Index   Feeding 10   Bathing 0   Grooming Score 5   Dressing Score 5   Bladder Score 10   Bowels Score 10   Toilet Use Score 5   Transfers (Bed/Chair) Score 5   Mobility (Level Surface) Score 0   Stairs Score 0   Barthel Index Score 50

## 2018-04-11 NOTE — PROGRESS NOTES
Progress Note - Anesthesia Acute Pain Management    Evangelist Body 74 y o  female MRN: [id number]  Unit/Bed#: [facility] 932-01 Encounter: [id number]      SURGERY DATE: 4/10/2018  Post-Op Diagnosis Codes: * Thoracic disc herniation [M51 24]    Assessment:   74 y o  female status post Procedure(s):  T9/10 discectomy with T9-T11 fusion POD# 1    Principal Problem:    Thoracic disc herniation  Active Problems:    HIV (human immunodeficiency virus infection)     Hypertension    Type 2 diabetes mellitus with hyperglycemia (HCC)    COPD (chronic obstructive pulmonary disease) - Chronic hypoxic respiratory failure    Tobacco use    Lumbar stenosis    Leukocytosis    Bipolar disorder, current episode depressed, moderate (HCC)    Hyperlipidemia      Plan:   1  Acute on chronic pain- patient states her pain is uncontrolled  States "it hurts like hell"  Pain located to her mid back  Rates 08/10  On arrival patient was sleeping soundly and needed 2-3 verbal commands to wake up  Once awake, patient was alert  Recommend stopping oxy IR 10 mg for increased sleeping and drowsiness  Nursing states patient has been sleeping most of the morning  See below    Discontinue   Oxy IR 10 mg  IR PO Q 4 HRS    Change   Oxycodone IR 5 mg p o  Q 4 hours p r n  severe pain hold for sedation  Dilaudid 0 5 mg IV q 4 hours p r n  Breakthrough pain hold for sedation     Continue  Tylenol 975 mg p o  Q 8 hours scheduled  Gabapentin 800 mg p o  T i d   Lidoderm 5% patch, one patch, topical daily  Robaxin 750 mg p o  Q 6 hours p r n     2  Plan of care- I spoke to primary team and dicussed decreasing pain meds RT increased drowsiness  If patient becomes uncomfortable try PRN dose of robaxin    APS will continue to follow; please call  / [phone] ( [facility] [phone]) with any questions    Pain Course:  Current pain location(s): mid lower back   Pain Scale:08/10    Meds/Allergies   all current active meds have been reviewed    Allergies Allergen Reactions    Prozac [Fluoxetine Hcl] Rash    Sulfa Antibiotics Itching    Quinine        Objective     Physical Exam: /74 (BP Location: Right arm)   Pulse 91   Temp 98 1 °F (36 7 °C) (Oral)   Resp 20   Ht 5' 2" (1 575 m)   Wt 90 7 kg (200 lb)   LMP 04/01/2013 (Approximate)   SpO2 99%   BMI 36 58 kg/m²   Gen: Well appearing and well nourished, NAD  Skin: Warm, Dry   HEENT:  PERRLA, EOMI  Pul: non labored   Abd: large round   Ext:  No cyanosis or edema  Neuro: AAOx3; CN II-XII grossly intact; no gross focal neurologic deficits  Psych:  Pleasant, sleepy    Labs:        COAG - PT/INR/PTT: 13 8/1 06/18* (04/10 1529)         SIGNATURE: LUIS M Zhao  DATE: April 11, 2018  TIME: 8:53 AM    Please note that the APS provides consultative services regarding pain management only  With the exception of ketamine and epidural infusions and except when indicated, final decisions regarding starting or changing doses of analgesic medications are at the discretion of the consulting service  Off hours consultation and/or medication management is generally not available

## 2018-04-11 NOTE — PLAN OF CARE
Problem: OCCUPATIONAL THERAPY ADULT  Goal: Performs self-care activities at highest level of function for planned discharge setting  See evaluation for individualized goals  Treatment Interventions: ADL retraining, Functional transfer training, Endurance training, Cognitive reorientation, Patient/family training, Equipment evaluation/education, Compensatory technique education, Continued evaluation, Energy conservation, Activityengagement          See flowsheet documentation for full assessment, interventions and recommendations  Limitation: Decreased ADL status, Decreased endurance, Decreased self-care trans, Decreased high-level ADLs  Prognosis: Fair  Assessment: Pt is a 75 y/o female seen for OT eval s/p adm to [facility] as a transfer from [facility] for neurosurgical evaluation 2*  thoracic intervertebral disc herniation with bilateral lower extremity weakness and ambulatory dysfunction  Pt received the following procedures on 4/10/18: T9-11 pedical screw placement; T9-11 arthrodesis with Vitoss allograft and locally harvest autograft; T9/10 discectomy; Resection of left T10 pedicle; Bilateral T9/10 facetectomies; T9 and T10 laminectomies  Comorbidities include a h/o bipolar 1 disorder, COPD on 2L O2 at home, cardiac disease, DM, HIV, HTN, OA and tobacco use  Pt with active OT orders and up with assistance orders  Pt lives with her daughter in a 2 story home and has home health aids from 8am-2pm  Pt reports she was I w/ ADLS and IADLS, & required use of rw or SPC for community use PTA  Prior PT eval reports pt required A for ADLS/IADLS, along with other inaccuracies  Unclear if pt is a reliable historian  Pt is currently demonstrating the following occupational deficits: mod A UB ADLS, total A LB ADLS, mod A x2 functional transfers and mod A x1 (w/ SBA fo 2nd) for short in room mobility using rw   Pt with deficits and limitations in all baseline areas of occupation 2* significant pain, decreased endurance/activity tolerance, decreased functional forward reach, decreased ADL status, impaired balance, decreased mobility status, anxiety, spinal precautions, and inaccessible home environment  The following Occupational Performance Areas to address include: bathing/shower, toilet hygiene, dressing, functional mobility, community mobility, clothing management, meal prep and household maintenance  Pt scored overall 50/100 on the Barthel Index  Based on the aforementioned OT evaluation, functional performance deficits, and assessments, pt has been identified as a high complexity evaluation  Recommend STR upon D/C   Pt to continue to benefit from acute immediate OT services to address the following goals 3-5x/wk to  w/in 7-10 days:     OT Discharge Recommendation: Short Term Rehab  OT - OK to Discharge: Yes (to STR when medically stable)    Wayne Smith MS, OTR/L

## 2018-04-11 NOTE — ORTHOTIC NOTE
Orthotic Note            Date: 4/11/2018      Patient Name: Vannessa Ramon        Time:11:40am  Reason for Consult:  Patient Active Problem List   Diagnosis    Arthritis    HIV (human immunodeficiency virus infection)     Osteoarthritis    Hypertension    Type [facility]    COPD (chronic obstructive pulmonary disease) - Chronic hypoxic respiratory failure    Bipolar 1 disorder [facility]    Tobacco use    Shortness of breath    Back pain    Hip pain    Morbid obesity [facility]    Ambulatory dysfunction    Thoracic disc herniation    Lumbar stenosis    Urinary frequency    Leukocytosis    Bipolar disorder, current episode depressed, moderate ([facility]    Hyperlipidemia     [facility]   Per Neurosurgical    I measured patient for [facility]  while patient was supine in bed  Anterior plate set to level 2 for optimal fit  I will continue to follow up with patient along with PT/OT to obtain optimal fit  TLSO is at bedside and I will continue to follow up this PM  Instructions/adjustments reviewed and my contact information at bedside  RN aware  Recommendations:  Please call Mobility Coordinator at ext  [phone] in regards to bracing instruction and/or adjustment  Juanita Motta Mobility Coordinator LCFo, LCOF, ASOP R  O T, O B T

## 2018-04-11 NOTE — POST OP PROGRESS NOTES
Progress Note - Neurosurgery   Yara Garcia 74 y o  female MRN: [id number]  Unit/Bed#: [facility] 932-01 Encounter: [id number]    Assessment:  1  POD#1 T9-11 fusion with T9-10 discectomy  2  T9-10 herniated disc  3  Central canal narrowing with associated cord signal change likely secondary to #2  4  History of COPD  5  History of HIV+  6  Diabetes Mellitus  7  Cardiac disease     Plan:  · Exam: AAOx3  MONTIEL with decreased strength due to pain inhibition with right knee flexion, otherwise strength 5/5  LT intact  3/3 JPS intact  DSS intact  Thoracic dressing dry, clear and intact  · TRISTEN drain with 15cc output overnight, will monitor drain, likely d/c tomorrow  · TLSO brace when OOB and HOB >45 degrees  · PT/OT: may mobilize once brace is obtained  · Upright x-rays pending  · DVT ppx: SCDs  Okay to start Lovenox this afternoon  · Pain control: scheduled tylenol 975mg q8h, lidoderm patch, gabapentin 800mg TID with adjuncts of robaxin 750mh q6h prn, oxycodone 2 5-5mg q4h prn  · Medical management per primary team  · Recommend ID consult for history of HIV  · Remove rocha today  · Neurosurgery will continue to follow  Call with questions or concerns  Subjective/Objective   Chief Complaint: "I'm alright"    Subjective: patient states she is doing good  She admits to having back pain located in her low back with radiation down her right leg  She states if she does not move she does not have pain in her back  She currently states she does not have pain  She denies chest pain, SOB, abdominal pain/N/V, urinary complaints (although rocha still in place)  Or weakness  Objective: sitting up in bed  NAD  I/O       04/09 0701 - 04/10 0700 04/10 0701 - 04/11 0700 04/11 0701 - 04/12 0700    P  O  2640 0     I V  (mL/kg)  2800 (30 9)     NG/GT  20     IV Piggyback  50     Total Intake(mL/kg) 2640 (29 1) 2870 (31 6)     Urine (mL/kg/hr) 1000 (0 5) 3400 (1 6)     Drains  15 (0)     Blood  150 (0 1)     Total Output 1000 3565 Net +1640 -695             Unmeasured Urine Occurrence 5 x            Invasive Devices     Peripherally Inserted Central Catheter Line            PICC Line 04/07/18 Left Brachial 4 days          Drain            Closed/Suction Drain Posterior Back Bulb 15 Fr  less than 1 day                Physical Exam:  Vitals: Blood pressure 141/74, pulse 91, temperature 98 1 °F (36 7 °C), temperature source Oral, resp  rate 20, height 5' 2" (1 575 m), weight 90 7 kg (200 lb), last menstrual period 04/01/2013, SpO2 99 %, not currently breastfeeding  ,Body mass index is 36 58 kg/m²  General appearance: alert, appears stated age, cooperative and no distress  Head: Normocephalic, without obvious abnormality, atraumatic  Eyes: tracks room appropriately   Back: thoracic dressing dry, clear and intact  Lungs: non labored breathing  Heart: regular heart rate  Neurologic:   Mental status: Alert, oriented x self, location, thought content appropriate  Speech is fluent  Cranial nerves: grossly intact (Cranial nerves II-XII)  Facial symmetry at rest and with expression  Sensory: normal to LT in bilateral upper and lower extremities  DSS intact  Motor: moving all extremities with pain inhibited weakness in right knee flexion 3/5, otherwise 5/5 throughout  Reflexes: 2+ and symmetric  negative negron, negative clonus  Coordination: 3/3 JPS intact         Lab Results:    Results from last 7 days  Lab Units 04/11/18  1017 04/10/18  0504 04/09/18  0458  04/04/18  1935   WBC Thousand/uL 11 32* 13 61* 14 24*  < > 14 62*   HEMOGLOBIN g/dL 8 4* 10 2* 10 5*  < > 12 1   SL [facility] LAB HEMOGLOBIN   --   --   --   < >  --    HEMATOCRIT % 27 7* 33 1* 34 2*  < > 37 9   SL AMB HEMATOCRIT   --   --   --   < >  --    PLATELETS Thousands/uL 181 292 321  < > 386   SL AMB PLATELET COUNT   --   --   --   < >  --    NEUTROS PCT % 73  --   --   --  79*   MONOS PCT % 10  --   --   --  10   MONO PCT MAN %  --  4 13*  < >  --    < > = values in this interval not displayed  Results from last 7 days  Lab Units 04/11/18  1017 04/10/18  0504 04/09/18  0458   SODIUM mmol/L 142 143 139   POTASSIUM mmol/L 3 8 4 9 4 1   CHLORIDE mmol/L 111* 111* 104   CO2 mmol/L 27 29 30   BUN mg/dL 22 23 31*   CREATININE mg/dL 0 54* 0 71 0 80   CALCIUM mg/dL 7 7 9 1 9 2   TOTAL PROTEIN g/dL 5 4*  --   --    BILIRUBIN TOTAL mg/dL 0 22  --   --    ALK PHOS U/L 60  --   --    ALT U/L 16  --   --    AST U/L 10  --   --    GLUCOSE RANDOM mg/dL 79 97 97               Results from last 7 days  Lab Units 04/10/18  1529 04/09/18  2052   INR  1 06 0 98   PTT seconds 18*  --      No results found for: TROPONINT  ABG:No results found for: PHART, VEP3NMA, PO2ART, CPF0BCL, L2LCDHXR, BEART, SOURCE    Imaging Studies: I have personally reviewed pertinent reports  and I have personally reviewed pertinent films in PACS  XR spine thoracic 2 vw   Final Result      Fluoroscopic guidance provided for surgical procedure  Please refer to the separate procedure notes for additional details  Workstation performed: [id number]         XR chest portable   Final Result      No pneumothorax status post right IJ line placement  Workstation performed: [id number]         XR chest PICC line portable   Final Result      Left PICC line tip overlying cavoatrial junction  Workstation performed: [id number]         XR chest PICC line portable   Final Result      Right PICC line tip overlying caudal right atrium recommend withdrawing 7 to 8 cm  The study was marked in [facility] for immediate notification  Workstation performed: [id number]         CT spine thoracic wo contrast   Final Result      Marked degeneration of the thoracic spine as described  Multifactorial canal and foraminal stenosis at the T9-10 level as described  Changes, although less significant at T8-9 and T7-T8              Workstation performed: [id number]         XR spine thoracic 3 vw    (Results Pending)     EKG, Pathology, and Other Studies: I have personally reviewed pertinent reports        VTE  Prophylaxis: Sequential compression device (Venodyne)

## 2018-04-11 NOTE — ORTHOTIC NOTE
Orthotic Note            Date: 4/11/2018      Patient Name: Isra Offer        Time: 16:00pm     Reason for Consult:  Patient Active Problem List   Diagnosis    Arthritis    HIV (human immunodeficiency virus infection)     Osteoarthritis    Hypertension    Type 2 diabetes mellitus with hyperglycemia ([facility] [address] [facility]    COPD (chronic obstructive pulmonary disease) - Chronic hypoxic respiratory failure    Bipolar 1 disorder ([facility] [address] )    Tobacco use    Shortness of breath    Back pain    Hip pain    Morbid obesity ([facility] [address] [facility]    Ambulatory dysfunction    Thoracic disc herniation    Lumbar stenosis    Urinary frequency    Leukocytosis    Bipolar disorder, current episode depressed, moderate ([facility] [address] )    Hyperlipidemia   [employer]   [employer] Extension Panel [id number]  Per Neurosurgical    I re-measured, fit, and donned [employer] with additional expansion kit to patient while she was sitting up at edge of bed for therapy session  Anterior chest plate set to level 2  Patient tolerated well and instructions/adjustments reviewed  Patient is currently in chair at bedside  Please see PT/OT notes  TLSO will be in need of Adjustments while sitting and standing due to patient anatomy  I will continue daily follow up  RN aware  Recommendations:  Please call Mobility Coordinator at ext  [phone] in regards to bracing instruction and/or adjustment  Padmini Mccullough Mobility Coordinator LCFo, LCOF, ASOP R  O T, O B T

## 2018-04-11 NOTE — ASSESSMENT & PLAN NOTE
-HIV +  -Cd4% 26 4  -diagnosed over 30 years ago   -no HAART at this time  -follow with Xavier English in [facility]

## 2018-04-12 LAB
ALBUMIN SERPL BCP-MCNC: 2.5 G/DL (ref 3.5–5)
ALP SERPL-CCNC: 94 U/L (ref 46–116)
ALT SERPL W P-5'-P-CCNC: 36 U/L (ref 12–78)
ANION GAP SERPL CALCULATED.3IONS-SCNC: 4 MMOL/L (ref 4–13)
AST SERPL W P-5'-P-CCNC: 32 U/L (ref 5–45)
BASE EXCESS BLDA CALC-SCNC: -5 MMOL/L (ref -2–3)
BASOPHILS # BLD AUTO: 0.01 THOUSANDS/ΜL (ref 0–0.1)
BASOPHILS NFR BLD AUTO: 0 % (ref 0–1)
BILIRUB SERPL-MCNC: 0.31 MG/DL (ref 0.2–1)
BUN SERPL-MCNC: 26 MG/DL (ref 5–25)
CA-I BLD-SCNC: 1.1 MMOL/L (ref 1.12–1.32)
CALCIUM SERPL-MCNC: 8.5 MG/DL
CHLORIDE SERPL-SCNC: 107 MMOL/L (ref 100–108)
CO2 SERPL-SCNC: 28 MMOL/L (ref 21–32)
CREAT SERPL-MCNC: 0.6 MG/DL (ref 0.6–1.3)
EOSINOPHIL # BLD AUTO: 0.05 THOUSAND/ΜL (ref 0–0.61)
EOSINOPHIL NFR BLD AUTO: 0 % (ref 0–6)
ERYTHROCYTE [DISTWIDTH] IN BLOOD BY AUTOMATED COUNT: 17.5 % (ref 11.6–15.1)
FIO2 GAS DIL.REBREATH: 0.26 L
GFR SERPL CREATININE-BSD FRML MDRD: 111 ML/MIN/1.73SQ M
GLUCOSE SERPL-MCNC: 104 MG/DL (ref 65–140)
GLUCOSE SERPL-MCNC: 105 MG/DL (ref 65–140)
GLUCOSE SERPL-MCNC: 68 MG/DL (ref 65–140)
GLUCOSE SERPL-MCNC: 81 MG/DL (ref 65–140)
GLUCOSE SERPL-MCNC: 83 MG/DL (ref 65–140)
GLUCOSE SERPL-MCNC: 93 MG/DL (ref 65–140)
HCO3 BLDA-SCNC: 20.6 MMOL/L (ref 22–28)
HCT VFR BLD AUTO: 28.8 % (ref 34.8–46.1)
HCT VFR BLD CALC: 22 % (ref 34.8–46.1)
HGB BLD-MCNC: 8.9 G/DL (ref 11.5–15.4)
HGB BLDA-MCNC: 7.5 G/DL (ref 11.5–15.4)
LYMPHOCYTES # BLD AUTO: 2.89 THOUSANDS/ΜL (ref 0.6–4.47)
LYMPHOCYTES NFR BLD AUTO: 20 % (ref 14–44)
MCH RBC QN AUTO: 27.1 PG (ref 26.8–34.3)
MCHC RBC AUTO-ENTMCNC: 30.9 G/DL (ref 31.4–37.4)
MCV RBC AUTO: 88 FL (ref 82–98)
MONOCYTES # BLD AUTO: 1.32 THOUSAND/ΜL (ref 0.17–1.22)
MONOCYTES NFR BLD AUTO: 9 % (ref 4–12)
NEUTROPHILS # BLD AUTO: 9.9 THOUSANDS/ΜL (ref 1.85–7.62)
NEUTS SEG NFR BLD AUTO: 71 % (ref 43–75)
NRBC BLD AUTO-RTO: 0 /100 WBCS
PCO2 BLD: 22 MMOL/L (ref 21–32)
PCO2 BLD: 39.1 MM HG (ref 36–44)
PH BLD: 7.33 [PH] (ref 7.35–7.45)
PLATELET # BLD AUTO: 185 THOUSANDS/UL (ref 149–390)
PMV BLD AUTO: 8.5 FL (ref 8.9–12.7)
PO2 BLD: 94 MM HG (ref 75–129)
POTASSIUM BLD-SCNC: 3.4 MMOL/L (ref 3.5–5.3)
POTASSIUM SERPL-SCNC: 3.8 MMOL/L (ref 3.5–5.3)
PROT SERPL-MCNC: 6 G/DL (ref 6.4–8.2)
RBC # BLD AUTO: 3.28 MILLION/UL (ref 3.81–5.12)
SAO2 % BLD FROM PO2: 97 % (ref 95–98)
SODIUM BLD-SCNC: 148 MMOL/L (ref 136–145)
SODIUM SERPL-SCNC: 139 MMOL/L (ref 136–145)
SPECIMEN SOURCE: ABNORMAL
WBC # BLD AUTO: 14.32 THOUSAND/UL (ref 4.31–10.16)

## 2018-04-12 PROCEDURE — 99232 SBSQ HOSP IP/OBS MODERATE 35: CPT | Performed by: HOSPITALIST

## 2018-04-12 PROCEDURE — 80053 COMPREHEN METABOLIC PANEL: CPT | Performed by: HOSPITALIST

## 2018-04-12 PROCEDURE — 99232 SBSQ HOSP IP/OBS MODERATE 35: CPT | Performed by: PSYCHIATRY & NEUROLOGY

## 2018-04-12 PROCEDURE — 82948 REAGENT STRIP/BLOOD GLUCOSE: CPT

## 2018-04-12 PROCEDURE — 85025 COMPLETE CBC W/AUTO DIFF WBC: CPT | Performed by: HOSPITALIST

## 2018-04-12 PROCEDURE — 87040 BLOOD CULTURE FOR BACTERIA: CPT | Performed by: HOSPITALIST

## 2018-04-12 RX ORDER — QUETIAPINE FUMARATE 100 MG/1
TABLET, FILM COATED ORAL
Status: COMPLETED
Start: 2018-04-12 | End: 2018-04-12

## 2018-04-12 RX ORDER — TOPIRAMATE 25 MG/1
TABLET ORAL
Status: COMPLETED
Start: 2018-04-12 | End: 2018-04-12

## 2018-04-12 RX ADMIN — METHOCARBAMOL 750 MG: 750 TABLET ORAL at 18:13

## 2018-04-12 RX ADMIN — TIOTROPIUM BROMIDE 18 MCG: 18 CAPSULE ORAL; RESPIRATORY (INHALATION) at 09:01

## 2018-04-12 RX ADMIN — FLUTICASONE PROPIONATE AND SALMETEROL 1 PUFF: 50; 250 POWDER RESPIRATORY (INHALATION) at 22:09

## 2018-04-12 RX ADMIN — OXYCODONE HYDROCHLORIDE 5 MG: 5 TABLET ORAL at 22:08

## 2018-04-12 RX ADMIN — OXYCODONE HYDROCHLORIDE 5 MG: 5 TABLET ORAL at 14:12

## 2018-04-12 RX ADMIN — QUETIAPINE 200 MG: 100 TABLET ORAL at 22:07

## 2018-04-12 RX ADMIN — OXYCODONE HYDROCHLORIDE 5 MG: 5 TABLET ORAL at 10:07

## 2018-04-12 RX ADMIN — LEVOTHYROXINE SODIUM 100 MCG: 100 TABLET ORAL at 05:33

## 2018-04-12 RX ADMIN — LIDOCAINE 1 PATCH: 50 PATCH TOPICAL at 09:02

## 2018-04-12 RX ADMIN — GABAPENTIN 800 MG: 400 CAPSULE ORAL at 05:33

## 2018-04-12 RX ADMIN — MIRTAZAPINE 7.5 MG: 15 TABLET, FILM COATED ORAL at 22:06

## 2018-04-12 RX ADMIN — QUETIAPINE FUMARATE 200 MG: 100 TABLET, FILM COATED ORAL at 22:07

## 2018-04-12 RX ADMIN — FLUTICASONE PROPIONATE AND SALMETEROL 1 PUFF: 50; 250 POWDER RESPIRATORY (INHALATION) at 09:01

## 2018-04-12 RX ADMIN — TOPIRAMATE 50 MG: 25 TABLET, FILM COATED ORAL at 09:02

## 2018-04-12 RX ADMIN — ATORVASTATIN CALCIUM 20 MG: 20 TABLET, FILM COATED ORAL at 18:14

## 2018-04-12 RX ADMIN — GABAPENTIN 800 MG: 400 CAPSULE ORAL at 12:14

## 2018-04-12 RX ADMIN — GABAPENTIN 800 MG: 400 CAPSULE ORAL at 22:09

## 2018-04-12 RX ADMIN — DILTIAZEM HYDROCHLORIDE 180 MG: 180 CAPSULE, EXTENDED RELEASE ORAL at 09:02

## 2018-04-12 RX ADMIN — BUPROPION HYDROCHLORIDE 300 MG: 150 TABLET, FILM COATED, EXTENDED RELEASE ORAL at 09:02

## 2018-04-12 RX ADMIN — ACETAMINOPHEN 975 MG: 325 TABLET, FILM COATED ORAL at 22:07

## 2018-04-12 RX ADMIN — ENOXAPARIN SODIUM 40 MG: 40 INJECTION SUBCUTANEOUS at 14:12

## 2018-04-12 RX ADMIN — INSULIN LISPRO 5 UNITS: 100 INJECTION, SOLUTION INTRAVENOUS; SUBCUTANEOUS at 12:23

## 2018-04-12 RX ADMIN — TOPIRAMATE 50 MG: 25 TABLET, FILM COATED ORAL at 22:08

## 2018-04-12 RX ADMIN — MONTELUKAST SODIUM 10 MG: 10 TABLET, COATED ORAL at 22:09

## 2018-04-12 RX ADMIN — INSULIN GLARGINE 22 UNITS: 100 INJECTION, SOLUTION SUBCUTANEOUS at 22:34

## 2018-04-12 RX ADMIN — METHOCARBAMOL 750 MG: 750 TABLET ORAL at 12:14

## 2018-04-12 RX ADMIN — HYDROMORPHONE HYDROCHLORIDE 0.5 MG: 1 INJECTION, SOLUTION INTRAMUSCULAR; INTRAVENOUS; SUBCUTANEOUS at 12:14

## 2018-04-12 RX ADMIN — NICOTINE 1 PATCH: 14 PATCH, EXTENDED RELEASE TRANSDERMAL at 09:02

## 2018-04-12 RX ADMIN — INSULIN LISPRO 5 UNITS: 100 INJECTION, SOLUTION INTRAVENOUS; SUBCUTANEOUS at 09:01

## 2018-04-12 RX ADMIN — VITAMIN D, TAB 1000IU (100/BT) 1000 UNITS: 25 TAB at 09:02

## 2018-04-12 RX ADMIN — ACETAMINOPHEN 975 MG: 325 TABLET, FILM COATED ORAL at 14:11

## 2018-04-12 RX ADMIN — ACETAMINOPHEN 975 MG: 325 TABLET, FILM COATED ORAL at 05:33

## 2018-04-12 RX ADMIN — OXYCODONE HYDROCHLORIDE 5 MG: 5 TABLET ORAL at 18:13

## 2018-04-12 RX ADMIN — INSULIN LISPRO 5 UNITS: 100 INJECTION, SOLUTION INTRAVENOUS; SUBCUTANEOUS at 18:15

## 2018-04-12 NOTE — SOCIAL WORK
[facility] Extended Stay  Optimal GLOS: 1  Hospital Day: *8 days  Identified barriers for extended stay:   · Extended stay beyond goal length of stay may be needed for:  ? Spinal cord compression from stenosis, disk, or tumor  § Anticipate decompressive spinal surgery  § Anticipate radiation treatment for tumor  § Expect brief to moderate stay extension      Discussion Date (Time): 04/12/18 with Neurosurgery, SLIM

## 2018-04-12 NOTE — ASSESSMENT & PLAN NOTE
- transferred from the [facility] for neurosurgery evaluation  - MRI revealed disc herniation at the T9-T10 level - and her CT imaging confirmed a similar findings with stenosis and to a lesser degree at the T7-T8 and T8-T9 levels   -s/p  POD#2 T9-11 fusion with T9-10 discectomy  -f/u NS recs- TRISTEN drain present  -pain control -  F/u recs by acute pain service

## 2018-04-12 NOTE — PROGRESS NOTES
Progress Note - Anna Board 1950, 74 y o  female MRN: [id number]    [facility] Encounter: [id number]    Primary Care Provider: Rafa Gasca DO   Date and time admitted to hospital: 4/4/2018  5:34 PM        Hyperlipidemia   Assessment & Plan    Stable  Continue meds        Bipolar disorder, current episode depressed, moderate (HCC)   Assessment & Plan    Continue above mgt        Leukocytosis   Assessment & Plan     remains afebrile - monitor WBC count (14 32)  Continue to  Monitor clinically   Tmax  99 5F over 24h  Blood cultures today        Lumbar stenosis   Assessment & Plan    - imaging revealed stenosis at the L4-L5 level  - no emergent neurosurgery intervention  - PRN pain control          Tobacco use   Assessment & Plan    - transdermal nicotine patch   - counseled on tobacco cessation          COPD (chronic obstructive pulmonary disease) - Chronic hypoxic respiratory failure   Assessment & Plan    - stable/asymptomatic  - continue Advair/Spiriva regimen          Type 2 diabetes mellitus with hyperglycemia (HCC)   Assessment & Plan    - home Metformin regimen held during hospitalization  - HgA1c of 6 9%  -continue BG monitoring  -ISS          Hypertension   Assessment & Plan    - low-sodium diet encouraged  -BPcontrolled  - continue Cardizem          HIV (human immunodeficiency virus infection)    Assessment & Plan    -HIV +  -Cd4% 26 4  -diagnosed over 30 years ago   -no HAART at this time  -follow with Xavier English in [facility]          * Thoracic disc herniation   Assessment & Plan    - transferred from the [facility] for neurosurgery evaluation  - MRI revealed disc herniation at the T9-T10 level - and her CT imaging confirmed a similar findings with stenosis and to a lesser degree at the T7-T8 and T8-T9 levels   -s/p  POD#2 T9-11 fusion with T9-10 discectomy  -f/u NS recs- TRISTEN drain present  -pain control -  F/u recs by acute pain service  PT/OT            VTE Pharmacologic Prophylaxis:   Pharmacologic: Pharmacologic VTE Prophylaxis contraindicated due to TRISTEN drain spine  Mechanical VTE Prophylaxis in Place: Yes    Patient Centered Rounds: I have performed bedside rounds with nursing staff today  Discussions with Specialists or Other Care Team Provider: yes    Education and Discussions with Family / Patient: yes    Time Spent for Care: 45 minutes  More than 50% of total time spent on counseling and coordination of care as described above  Current Length of Stay: 8 day(s)    Current Patient Status: Inpatient   Certification Statement: The patient will continue to require additional inpatient hospital stay due to med mgt    Discharge Plan: rehab    Code Status: Level 1 - Full Code      Subjective:   Reported feeling fine with back discomfort  rOS negative otherwise  Objective:     Vitals:   Temp (24hrs), Av 9 °F (37 2 °C), Min:98 1 °F (36 7 °C), Max:99 5 °F (37 5 °C)    HR:  [] 94  Resp:  [18-20] 18  BP: (100-122)/(59-68) 103/59  SpO2:  [95 %-99 %] 99 %  Body mass index is 36 58 kg/m²  Input and Output Summary (last 24 hours): Intake/Output Summary (Last 24 hours) at 18 1743  Last data filed at 18 1707   Gross per 24 hour   Intake             1500 ml   Output             2763 ml   Net            -1263 ml       Physical Exam:     Physical Exam   Constitutional: She is oriented to person, place, and time  No distress  HENT:   Head: Normocephalic and atraumatic  Mouth/Throat: Oropharynx is clear and moist    Eyes: Conjunctivae and EOM are normal  Pupils are equal, round, and reactive to light  Neck: Normal range of motion  No JVD present  Cardiovascular: Normal rate  Exam reveals no gallop and no friction rub  No murmur heard  Pulmonary/Chest: Effort normal and breath sounds normal  No respiratory distress  She has no wheezes  She has no rales  Abdominal: Soft  Bowel sounds are normal  She exhibits no distension   There is no tenderness  There is no rebound and no guarding  Musculoskeletal: Normal range of motion  She exhibits no edema, tenderness or deformity  Neurological: She is alert and oriented to person, place, and time  No cranial nerve deficit  Coordination normal    Skin: Skin is warm  No erythema  Psychiatric: She has a normal mood and affect  Her behavior is normal          Additional Data:     Labs:      Results from last 7 days  Lab Units 04/12/18  0536   WBC Thousand/uL 14 32*   HEMOGLOBIN g/dL 8 9*   HEMATOCRIT % 28 8*   PLATELETS Thousands/uL 185   NEUTROS PCT % 71   LYMPHS PCT % 20   MONOS PCT % 9   EOS PCT % 0       Results from last 7 days  Lab Units 04/12/18  0535   SODIUM mmol/L 139   POTASSIUM mmol/L 3 8   CHLORIDE mmol/L 107   CO2 mmol/L 28   BUN mg/dL 26*   CREATININE mg/dL 0 60   CALCIUM mg/dL 8 5   TOTAL PROTEIN g/dL 6 0*   BILIRUBIN TOTAL mg/dL 0 31   ALK PHOS U/L 94   ALT U/L 36   AST U/L 32   GLUCOSE RANDOM mg/dL 68       Results from last 7 days  Lab Units 04/10/18  1529   INR  1 06       * I Have Reviewed All Lab Data Listed Above  * Additional Pertinent Lab Tests Reviewed:  Zohra [facility] Admission Reviewed    Imaging:    Imaging Reports Reviewed Today Recent Cultures (last 7 days):           Last 24 Hours Medication List:     Current Facility-Administered Medications:  acetaminophen 975 mg Oral Q8H Arianne Sandoval MD    alteplase 2 mg Intracatheter Once [employer]    aluminum-magnesium hydroxide-simethicone 30 mL Oral Q6H PRN Zoltan Gauthier MD    atorvastatin 20 mg Oral Daily With Jus Sims MD    buPROPion 300 mg Oral Daily Zoltan Gauthier MD    cholecalciferol 1,000 Units Oral Daily Zoltan Gauthier MD    diltiazem 180 mg Oral Daily Zoltan Gauthier MD    enoxaparin 40 mg Subcutaneous Q24H [address] Caitlin Valdovinos PA-C    fluticasone-salmeterol 1 puff Inhalation Q12H [address] [facility], MD    gabapentin 800 [facility]ID [facility]    HYDROmorphone 0 5 mg Intravenous Q3H PRN Loraine Webb MD    insulin glargine 22 Units Subcutaneous HS Beverley Bynum MD    insulin lispro 1-6 Units Subcutaneous 4x Daily (AC & HS) Angeline Mathis MD    insulin lispro 5 Units Subcutaneous TID With Meals Beverley Bynum MD    levothyroxine 100 mcg Oral Early Morning Beverley Bynum MD    lidocaine 1 patch Transdermal Daily Beverley Bynum MD    methocarbamol 750 mg Oral Q6H PRN Beverley Bynum MD    mirtazapine 7 5 mg Oral HS Beverley Bynum MD    montelukast 10 mg Oral HS Beverley Bynum MD    nicotine 1 patch Transdermal Daily Beverley Bynum MD    ondansetron 4 mg Oral Q6H PRN Arti Cantu PA-C    oxyCODONE 2 5 mg Oral Q4H PRN Anjelica Velez MD    oxyCODONE 5 mg Oral Q4H PRN Anjelica Velez MD    QUEtiapine 200 mg Oral HS Beverleybharat Bynum MD    sodium chloride 100 mL/hr Intravenous Continuous Tony Bowles PA-C Last Rate: 100 mL/hr (04/11/18 0041)   sodium chloride 0 9 % with KCl 20 mEq/L 75 mL/hr Intravenous Continuous Loraine Webb MD Last Rate: Stopped (04/10/18 1500)   tiotropium 18 mcg Inhalation Daily Beverley Bynum MD    topiramate 50 mg Oral Q12H [facility] Beverley Bynum MD    zolpidem 5 mg Oral HS PRN Tarik Wall MD         Today, Patient Was Seen By: Anjelica Velez MD    ** Please Note: Dictation voice to text software may have been used in the creation of this document   **

## 2018-04-12 NOTE — PROGRESS NOTES
04/12/18 1000   Plan of Care   Comments  attempted to visit pt     Assessment Completed by: Unit visit

## 2018-04-12 NOTE — POST OP PROGRESS NOTES
Progress Note - Neurosurgery   Tim Tomlin 74 y o  female MRN: [id number]  Unit/Bed#: [facility] 932-01 Encounter: [id number]      Assessment:  1  POD#2 T9-11 fusion with T9-10 discectomy  2  T9-10 herniated disc  3  Central canal narrowing with associated cord signal change likely secondary to #2  4  History of COPD  5  History of HIV+  6  Diabetes Mellitus  7  Cardiac disease     Plan:  · Exam: AAOx3  MONTIEL  LT intact  3/3 JPS intact  DSS intact  Thoracic dressing dry, clear and intact  · TRISTEN drain with 95cc output overnight, will continue to monitor drain   · Images personally reviewed and reviewed with attending on 4/12/18:  · 4/11/18 - upright thoracic XR: demonstrating good anatomical alignment with expected post-operative changes  · TLSO brace when OOB and HOB >45 degrees   · PT/OT: recommend rehab  · 4/12/18 - PMR consult placed  · DVT ppx: SCDs, Lovenox  · Pain control: APS consulted recommend continuing current regimen  · scheduled tylenol 975mg q8h, lidoderm patch, gabapentin 800mg TID with adjuncts of robaxin 750mh q6h prn, oxycodone 2 5-5mg q4h prn  · Medical management per primary team  · WBC 14 32, Tmax 99 5, currently 98 9  · Encourage deep breathing and IS  · Hgb 8 9 - continue to trend, transfuse if <8  · Neurosurgery will continue to follow  Call with questions or concerns  Subjective/Objective   Chief Complaint: "Oh I'm in so much pain"    Subjective: patient states she is in so much pain  She states she is having generalized pain every where  She mentions back pain with pain radiating to her right side and down her right leg  She admits to getting up to use the bathroom this morning and still had urinary incontinence  Objective: laying in bed, NAD  I/O       04/10 0701 - 04/11 0700 04/11 0701 - 04/12 0700 04/12 0701 - 04/13 0700    P  O  0 1260 480    I V  (mL/kg) 2800 (30 9)      NG/GT 20      IV Piggyback 50      Total Intake(mL/kg) 2870 (31 6) 1260 (13 9) 480 (5 3)    Urine (mL/kg/hr) 3400 (1 6) 750 (0 3) 957 (1 8)    Drains 15 (0) 75 (0) 40 (0 1)    Blood 150 (0 1)      Total Output 3565 825 997    Net -695 +435 -517           Unmeasured Urine Occurrence  1 x 1 x          Invasive Devices     Peripherally Inserted Central Catheter Line            PICC Line 04/07/18 Left Brachial 5 days          Drain            Closed/Suction Drain Posterior Back Bulb 15 Fr  2 days                Physical Exam:  Vitals: Blood pressure 122/66, pulse 88, temperature 98 9 °F (37 2 °C), temperature source Axillary, resp  rate 20, height 5' 2" (1 575 m), weight 90 7 kg (200 lb), last menstrual period 04/01/2013, SpO2 95 %, not currently breastfeeding  ,Body mass index is 36 58 kg/m²  General appearance: resting comfortably, appears stated age, cooperative and no distress  Head: Normocephalic, without obvious abnormality, atraumatic  Eyes: tracks appropriately in room  Back: thoracic dressing is dry, clear and intact  Right thoracic paraspinous muscle tenderness on light palpation  Lungs: non labored breathing  Heart: regular heart rate  Neurologic:   Mental status: Alert, oriented x3, thought content appropriate  Speech is fluent  Cranial nerves: grossly intact (Cranial nerves II-XII)  Facial symmetry at rest and with expression  Tongue is midline  Sensory: normal to LT in bilateral upper and lower extremities  DSS intact  Motor: moving all extremities without focal weakness, strength in 5-/5 throughout with exception of bilateral knee flexion 4/5  Reflexes: 2+ and symmetric  negative negron, negative clonus  Coordination: 3/3 JPS intact         Lab Results:    Results from last 7 days  Lab Units 04/12/18  0536 04/11/18  1017 04/10/18  0504   WBC Thousand/uL 14 32* 11 32* 13 61*   HEMOGLOBIN g/dL 8 9* 8 4* 10 2*   HEMATOCRIT % 28 8* 27 7* 33 1*   PLATELETS Thousands/uL 185 181 292   NEUTROS PCT % 71 73  --    MONOS PCT % 9 10  --    MONO PCT MAN %  --   --  4       Results from last 7 days  Lab Units 04/12/18  0535 04/11/18  1017 04/10/18  0504   SODIUM mmol/L 139 142 143   POTASSIUM mmol/L 3 8 3 8 4 9   CHLORIDE mmol/L 107 111* 111*   CO2 mmol/L 28 27 29   BUN mg/dL 26* 22 23   CREATININE mg/dL 0 60 0 54* 0 71   CALCIUM mg/dL 8 5 7 7 9 1   TOTAL PROTEIN g/dL 6 0* 5 4*  --    BILIRUBIN TOTAL mg/dL 0 31 0 22  --    ALK PHOS U/L 94 60  --    ALT U/L 36 16  --    AST U/L 32 10  --    GLUCOSE RANDOM mg/dL 68 79 97               Results from last 7 days  Lab Units 04/10/18  1529 04/09/18  2052   INR  1 06 0 98   PTT seconds 18*  --      No results found for: TROPONINT  ABG:No results found for: PHART, IZM0QEE, PO2ART, KMV7DIF, A0YZYKBU, BEART, SOURCE    Imaging Studies: I have personally reviewed pertinent reports  and I have personally reviewed pertinent films in PACS    XR spine thoracic 2 vw   Final Result   Postoperative thoracic spinal fusion  Workstation performed: [id number]         XR spine thoracic 2 vw   Final Result      Fluoroscopic guidance provided for surgical procedure  Please refer to the separate procedure notes for additional details  Workstation performed: [id number]         XR chest portable   Final Result      No pneumothorax status post right IJ line placement  Workstation performed: [id number]         XR chest PICC line portable   Final Result      Left PICC line tip overlying cavoatrial junction  Workstation performed: [id number]         XR chest PICC line portable   Final Result      Right PICC line tip overlying caudal right atrium recommend withdrawing 7 to 8 cm  The study was marked in [facility] for immediate notification  Workstation performed: [id number]         CT spine thoracic wo contrast   Final Result      Marked degeneration of the thoracic spine as described  Multifactorial canal and foraminal stenosis at the T9-10 level as described  Changes, although less significant at T8-9 and T7-T8              Workstation performed: [id number]             EKG, Pathology, and Other Studies: I have personally reviewed pertinent reports        VTE  Prophylaxis: Sequential compression device (Venodyne)  and Enoxaparin (Lovenox)

## 2018-04-12 NOTE — ASSESSMENT & PLAN NOTE
-HIV +  -Cd4% 26 4  -diagnosed over 30 years ago   -no HAART at this time  -follow with Alisha Walton in [facility]

## 2018-04-12 NOTE — PROGRESS NOTES
Progress Note - Anesthesia Acute Pain Management    Ramone Gain 74 y o  female MRN: [id number]  Unit/Bed#: [facility] 932-01 Encounter: [id number]      SURGERY DATE: 4/10/2018  Post-Op Diagnosis Codes: * Thoracic disc herniation [M51 24]    Assessment:   74 y o  female status post Procedure(s):  T9/10 discectomy with T9-T11 fusion POD# 2  With acute on chronic pain    Principal Problem:    Thoracic disc herniation  Active Problems:    HIV (human immunodeficiency virus infection)     Hypertension    Type 2 diabetes mellitus with hyperglycemia (HCC)    COPD (chronic obstructive pulmonary disease) - Chronic hypoxic respiratory failure    Tobacco use    Lumbar stenosis    Leukocytosis    Bipolar disorder, current episode depressed, moderate (HCC)    Hyperlipidemia    Plan:   1  Maintain current regimen  Possibility to start ketamine for 24 hours if patient will not walk  Call with questions    APS will continue to follow; please call  / [phone] ( [facility] [phone]) with any questions    Pain Course:  24 hour history:  Patient states that all of her pain is in the back only when she moves  No pain while lying still  She states 10/10 pain when moving  The pain medication makes her sleepy according to patient then she wakes up without good pain control  No other complaints today      Meds/Allergies     Inpatient Medications:  Scheduled Meds:   Current Facility-Administered Medications:  acetaminophen 975 mg Oral Q8H Mary Kay Yan MD    alteplase 2 mg Intracatheter Once [employer], PA-C    aluminum-magnesium hydroxide-simethicone 30 mL Oral Q6H PRN William Mitchell MD    atorvastatin 20 mg Oral Daily With Kennedi Rasmussen MD    buPROPion 300 mg Oral Daily William Mitchell MD    cholecalciferol 1,000 Units Oral Daily William Mitchell MD    diltiazem 180 mg Oral Daily William Mitchell MD    enoxaparin 40 mg Subcutaneous Q24H [address] Fausto RentonLEA    fluticasone-salmeterol 1 puff Inhalation Q12H [address] Beth Tovar MD    gabapentin 800 mg Oral TID Beth Tovar MD    HYDROmorphone 0 5 mg Intravenous Q3H PRN Christian Espitia MD    insulin glargine 22 Units Subcutaneous HS Beth Tovar MD    insulin lispro 1-6 Units Subcutaneous 4x Daily (AC & HS) Jamari Webber MD    insulin lispro 5 Units Subcutaneous TID With Meals Beth Tovar MD    levothyroxine 100 mcg Oral Early Morning Beth Tovar MD    lidocaine 1 patch Transdermal Daily Beth Tovar MD    methocarbamol 750 mg Oral Q6H PRN Beth Tovar MD    mirtazapine 7 5 mg Oral HS Beth Tovar MD    montelukast 10 mg Oral HS Beth Tovar MD    nicotine 1 patch Transdermal Daily Beth Tovar MD    ondansetron 4 mg Oral Q6H PRN Arti Cantu PA-C    oxyCODONE 2 5 mg Oral Q4H PRN Lore Rivera MD    oxyCODONE 5 mg Oral Q4H PRN Lore Rivera MD    QUEtiapine 200 mg Oral HS Beth Tovar MD    sodium chloride 100 mL/hr Intravenous Continuous Denilson Sanders PA-C Last Rate: 100 mL/hr (04/11/18 0041)   sodium chloride 0 9 % with KCl 20 mEq/L 75 mL/hr Intravenous Continuous Christian Espitia MD Last Rate: Stopped (04/10/18 1500)   tiotropium 18 mcg Inhalation Daily Beth Tovar MD    topiramate 50 mg Oral Q12H Bharat Garcia MD    zolpidem 5 mg Oral HS PRN Yenni Wynn MD      PRN Meds:    aluminum-magnesium hydroxide-simethicone 30 mL Q6H PRN   HYDROmorphone 0 5 mg Q3H PRN   methocarbamol 750 mg Q6H PRN   ondansetron 4 mg Q6H PRN   oxyCODONE 2 5 mg Q4H PRN   oxyCODONE 5 mg Q4H PRN   zolpidem 5 mg HS PRN     Allergies   Allergen Reactions    Prozac [Fluoxetine Hcl] Rash    Sulfa Antibiotics Itching    Quinine      Objective     Physical Exam: /66 (BP Location: Right arm)   Pulse 102   Temp 98 9 °F (37 2 °C) (Axillary)   Resp 20   Ht 5' 2" (1 575 m)   Wt 90 7 kg (200 lb)   LMP 04/01/2013 (Approximate)   SpO2 95%   BMI 36 58 kg/m²   Gen: NAD lying in bed eating breakfast  Skin: Warm, Dry   HEENT:  BRET BRUMFIELD  Pul: non labored resp effort  Ext:  No cyanosis or edema  Neuro: AAOx3; CN II-XII grossly intact; 5/5 BLUE, 5/5 BLLE; Sensation intact grossly   Psych:  appropriate    SIGNATURE: Viet Cardoza MD  DATE: April 12, 2018  TIME: 9:19 AM

## 2018-04-12 NOTE — ASSESSMENT & PLAN NOTE
remains afebrile - monitor WBC count (14 32)  Continue to  Monitor clinically   Tmax  99 5F over 24h

## 2018-04-12 NOTE — CASE MANAGEMENT
Continued Stay Review    Date: 4/12    Vital Signs: /66 (BP Location: Right arm)   Pulse 88   Temp 98 9 °F (37 2 °C) (Axillary)   Resp 20   Ht 5' 2" (1 575 m)   Wt 90 7 kg (200 lb)   LMP 04/01/2013 (Approximate)   SpO2 95%   BMI 36 58 kg/m²     Medications:   Scheduled Meds:   Current Facility-Administered Medications:  acetaminophen 975 mg Oral Q8H [address]   alteplase 2 mg Intracatheter Once   atorvastatin 20 mg Oral Daily With Dinner   buPROPion 300 mg Oral Daily   cholecalciferol 1,000 Units Oral Daily   diltiazem 180 mg Oral Daily   enoxaparin 40 mg Subcutaneous Q24H CARLOS   fluticasone-salmeterol 1 puff Inhalation Q12H CARLOS   gabapentin 800 mg Oral TID   HYDROmorphone 0 5 mg Intravenous Q3H PRN   insulin glargine 22 Units Subcutaneous HS   insulin lispro 1-6 Units Subcutaneous 4x Daily (AC & HS)   insulin lispro 5 Units Subcutaneous TID With Meals   levothyroxine 100 mcg Oral Early Morning   lidocaine 1 patch Transdermal Daily   mirtazapine 7 5 mg Oral HS   montelukast 10 mg Oral HS   nicotine 1 patch Transdermal Daily   QUEtiapine 200 mg Oral HS   tiotropium 18 mcg Inhalation Daily   topiramate 50 mg Oral Q12H [address]     Continuous Infusions:   sodium chloride 100 mL/hr Last Rate: 100 mL/hr (04/11/18 0041)   sodium chloride 0 9 % with KCl 20 mEq/L 75 mL/hr Last Rate: Stopped (04/10/18 1500)     PRN Meds: aluminum-magnesium hydroxide-simethicone    HYDROmorphoneIv x1    Methocarbamol po x1    ondansetron    oxyCODONE po x2    Abnormal Labs/Diagnostic Results:    04/12/18 0536    WBC 4 31 - 10 16 Thousand/uL 14 32     RBC 3 81 - 5 12 Million/uL 3 28     Hemoglobin 11 5 - 15 4 g/dL 8 9     Hematocrit 34 8 - 46 1 % 28 8         Age/Sex: 74 y o  female       4/12 Acute Pain Management progress notes:  Assessment:   74 y o  female status post Procedure(s):  T9/10 discectomy with T9-T11 fusion POD# 2  With acute on chronic pain     Principal Problem:    Thoracic disc herniation  Active Problems:    HIV (human immunodeficiency virus infection)     Hypertension    Type 2 diabetes mellitus with hyperglycemia (HCC)    COPD (chronic obstructive pulmonary disease) - Chronic hypoxic respiratory failure    Tobacco use    Lumbar stenosis    Leukocytosis    Bipolar disorder, current episode depressed, moderate (HCC)    Hyperlipidemia     Plan:   1  Maintain current regimen  Possibility to start ketamine for 24 hours if patient will not walk    Call with questions    Discharge Plan: TBD

## 2018-04-12 NOTE — PROGRESS NOTES
Progress Note - [facility] 74 y o  female MRN: [id number]  [facility] Encounter: [id number]           I came to see the patient for continuation of care  She states that she feels better but she has some pain  She denies any other issues  She denies any issue with sleep or appetite  She still feel weak  She is having some breathing issues         Behavior over the last 24 hours:  improved  Sleep: normal  Appetite: normal  Medication side effects: No  ROS: SOB and Back pain, difficulty ambulating    Mental Status Evaluation:  Appearance:  age appropriate   Behavior:  normal   Speech:  normal pitch and normal volume   Mood:  depressed   Affect:  mood-congruent   Language: naming objects and repeating phrases   Thought Process:  goal directed   Associations: intact associations   Thought Content:  normal   Perceptual Disturbances: None   Risk Potential: No suicidal homicidal ideation plan or intent   Sensorium:  person, place, time/date, situation, day of week and month of year   Memory:  recent and remote memory grossly intact   Cognition:  grossly intact   Consciousness:  alert and awake    Attention: attention span and concentration were age appropriate   Intellect: normal   Fund of Knowledge: awareness of current events: Fair   Insight:  fair   Judgment: fair   Muscle Strength and Tone: Within normal limits   Gait/Station: Difficulty ambulating   Motor Activity: no abnormal movements         Assessment/Plan  Oswaldo Velez is a 74 y o  female with multiple medical problems have been in the hospital for 8 days she was feeling depressed but she is taking the medication as prescribed and feels better    She states that she is going to a nursing facility to complete her treatment and she is looking forward to  Diagnosis:  Bipolar disorder depressed moderate without psychotic features F 31 32      Recommended Treatment:   Continue medical management  Continue current psychotropic medication  She will follow up with primary doctor upon discharge  List of outpatient therapist given to the patient  Discussed with primary team  I will sign off      Medications:   current meds:   Current Facility-Administered Medications   Medication Dose Route Frequency    acetaminophen (TYLENOL) tablet 975 mg  975 mg Oral Q8H [address]    alteplase (CATHFLO) injection 2 mg  2 mg Intracatheter Once    aluminum-magnesium hydroxide-simethicone (MYLANTA) 200-200-20 mg/5 mL oral suspension 30 mL  30 mL Oral Q6H PRN    atorvastatin (LIPITOR) tablet 20 mg  20 mg Oral Daily With Dinner    buPROPion (WELLBUTRIN XL) 24 hr tablet 300 mg  300 mg Oral Daily    cholecalciferol (VITAMIN D3) tablet 1,000 Units  1,000 Units Oral Daily    diltiazem (CARDIZEM CD) 24 hr capsule 180 mg  180 mg Oral Daily    enoxaparin (LOVENOX) subcutaneous injection 40 mg  40 mg Subcutaneous Q24H [address]    fluticasone-salmeterol (ADVAIR) 250-50 mcg/dose inhaler 1 puff  1 puff Inhalation Q12H [address]    gabapentin (NEURONTIN) capsule 800 mg  800 mg Oral TID    HYDROmorphone (DILAUDID) injection 0 5 mg  0 5 mg Intravenous Q3H PRN    insulin glargine (LANTUS) subcutaneous injection 22 Units  22 Units Subcutaneous HS    insulin lispro (HumaLOG) 100 units/mL subcutaneous injection 1-6 Units  1-6 Units Subcutaneous 4x Daily (AC & HS)    insulin lispro (HumaLOG) 100 units/mL subcutaneous injection 5 Units  5 Units Subcutaneous TID With Meals    levothyroxine tablet 100 mcg  100 mcg Oral Early Morning    lidocaine (LIDODERM) 5 % patch 1 patch  1 patch Transdermal Daily    methocarbamol (ROBAXIN) tablet 750 mg  750 mg Oral Q6H PRN    mirtazapine (REMERON) tablet 7 5 mg  7 5 mg Oral HS    montelukast (SINGULAIR) tablet 10 mg  10 mg Oral HS    nicotine (NICODERM CQ) 14 mg/24hr TD 24 hr patch 1 patch  1 patch Transdermal Daily    ondansetron (ZOFRAN-ODT) dispersible tablet 4 mg  4 mg Oral Q6H PRN    oxyCODONE (ROXICODONE) IR tablet 2 5 mg  2 5 mg Oral Q4H PRN    oxyCODONE (ROXICODONE) IR tablet 5 mg  5 mg Oral Q4H PRN    QUEtiapine (SEROquel) tablet 200 mg  200 mg Oral HS    sodium chloride 0 9 % infusion  100 mL/hr Intravenous Continuous    sodium chloride 0 9 % with KCl 20 mEq/L infusion (premix)  75 mL/hr Intravenous Continuous    tiotropium (SPIRIVA) capsule for inhaler 18 mcg  18 mcg Inhalation Daily    topiramate (TOPAMAX) tablet 50 mg  50 mg Oral Q12H [facility]    zolpidem (AMBIEN) tablet 5 mg  5 mg Oral HS PRN         Risks, benefits and possible side effects of Medications:     Risks, benefits, and possible side effects of medications explained to patient and patient verbalizes understanding  Labs: I have personally reviewed all pertinent laboratory results       Lab Results   Component Value Date    WBC 14 32 (H) 04/12/2018    HGB 8 9 (L) 04/12/2018    HCT 28 8 (L) 04/12/2018    MCV 88 04/12/2018     04/12/2018     Lab Results   Component Value Date     04/12/2018    K 3 8 04/12/2018     04/12/2018    CO2 28 04/12/2018    ANIONGAP 4 04/12/2018    BUN 26 (H) 04/12/2018    CREATININE 0 60 04/12/2018    GLUCOSE 68 04/12/2018    CALCIUM 8 5 04/12/2018    AST 32 04/12/2018    ALT 36 04/12/2018    ALKPHOS 94 04/12/2018    PROT 6 0 (L) 04/12/2018    BILITOT 0 31 04/12/2018    EGFR 111 04/12/2018         Medardo Romero MD

## 2018-04-13 LAB
ABO GROUP BLD BPU: NORMAL
ABO GROUP BLD BPU: NORMAL
BPU ID: NORMAL
BPU ID: NORMAL
CROSSMATCH: NORMAL
CROSSMATCH: NORMAL
GLUCOSE SERPL-MCNC: 114 MG/DL (ref 65–140)
GLUCOSE SERPL-MCNC: 125 MG/DL (ref 65–140)
GLUCOSE SERPL-MCNC: 148 MG/DL (ref 65–140)
GLUCOSE SERPL-MCNC: 155 MG/DL (ref 65–140)
GLUCOSE SERPL-MCNC: 240 MG/DL (ref 65–140)
UNIT DISPENSE STATUS: NORMAL
UNIT DISPENSE STATUS: NORMAL
UNIT PRODUCT CODE: NORMAL
UNIT PRODUCT CODE: NORMAL
UNIT RH: NORMAL
UNIT RH: NORMAL

## 2018-04-13 PROCEDURE — 87040 BLOOD CULTURE FOR BACTERIA: CPT | Performed by: HOSPITALIST

## 2018-04-13 PROCEDURE — 99232 SBSQ HOSP IP/OBS MODERATE 35: CPT | Performed by: HOSPITALIST

## 2018-04-13 PROCEDURE — 82948 REAGENT STRIP/BLOOD GLUCOSE: CPT

## 2018-04-13 PROCEDURE — 99223 1ST HOSP IP/OBS HIGH 75: CPT | Performed by: INTERNAL MEDICINE

## 2018-04-13 RX ADMIN — VITAMIN D, TAB 1000IU (100/BT) 1000 UNITS: 25 TAB at 09:54

## 2018-04-13 RX ADMIN — TOPIRAMATE 50 MG: 25 TABLET, FILM COATED ORAL at 20:50

## 2018-04-13 RX ADMIN — OXYCODONE HYDROCHLORIDE 5 MG: 5 TABLET ORAL at 22:22

## 2018-04-13 RX ADMIN — GABAPENTIN 800 MG: 400 CAPSULE ORAL at 05:29

## 2018-04-13 RX ADMIN — LEVOTHYROXINE SODIUM 100 MCG: 100 TABLET ORAL at 05:29

## 2018-04-13 RX ADMIN — HYDROMORPHONE HYDROCHLORIDE 0.5 MG: 1 INJECTION, SOLUTION INTRAMUSCULAR; INTRAVENOUS; SUBCUTANEOUS at 07:33

## 2018-04-13 RX ADMIN — TOPIRAMATE 50 MG: 25 TABLET, FILM COATED ORAL at 09:53

## 2018-04-13 RX ADMIN — BUPROPION HYDROCHLORIDE 300 MG: 150 TABLET, FILM COATED, EXTENDED RELEASE ORAL at 09:53

## 2018-04-13 RX ADMIN — INSULIN GLARGINE 22 UNITS: 100 INJECTION, SOLUTION SUBCUTANEOUS at 22:25

## 2018-04-13 RX ADMIN — NICOTINE 1 PATCH: 14 PATCH, EXTENDED RELEASE TRANSDERMAL at 09:55

## 2018-04-13 RX ADMIN — ATORVASTATIN CALCIUM 20 MG: 20 TABLET, FILM COATED ORAL at 17:15

## 2018-04-13 RX ADMIN — ALUMINUM HYDROXIDE, MAGNESIUM HYDROXIDE, AND SIMETHICONE 30 ML: 200; 200; 20 SUSPENSION ORAL at 22:27

## 2018-04-13 RX ADMIN — ACETAMINOPHEN 975 MG: 325 TABLET, FILM COATED ORAL at 05:29

## 2018-04-13 RX ADMIN — METHOCARBAMOL 750 MG: 750 TABLET ORAL at 07:33

## 2018-04-13 RX ADMIN — OXYCODONE HYDROCHLORIDE 5 MG: 5 TABLET ORAL at 07:02

## 2018-04-13 RX ADMIN — OXYCODONE HYDROCHLORIDE 5 MG: 5 TABLET ORAL at 17:15

## 2018-04-13 RX ADMIN — TIOTROPIUM BROMIDE 18 MCG: 18 CAPSULE ORAL; RESPIRATORY (INHALATION) at 09:53

## 2018-04-13 RX ADMIN — GABAPENTIN 800 MG: 400 CAPSULE ORAL at 12:47

## 2018-04-13 RX ADMIN — GABAPENTIN 800 MG: 400 CAPSULE ORAL at 20:50

## 2018-04-13 RX ADMIN — OXYCODONE HYDROCHLORIDE 5 MG: 5 TABLET ORAL at 12:48

## 2018-04-13 RX ADMIN — ACETAMINOPHEN 975 MG: 325 TABLET, FILM COATED ORAL at 14:41

## 2018-04-13 RX ADMIN — CEFEPIME HYDROCHLORIDE 2000 MG: 2 INJECTION, SOLUTION INTRAVENOUS at 19:26

## 2018-04-13 RX ADMIN — QUETIAPINE 200 MG: 100 TABLET ORAL at 22:23

## 2018-04-13 RX ADMIN — FLUTICASONE PROPIONATE AND SALMETEROL 1 PUFF: 50; 250 POWDER RESPIRATORY (INHALATION) at 09:55

## 2018-04-13 RX ADMIN — INSULIN LISPRO 5 UNITS: 100 INJECTION, SOLUTION INTRAVENOUS; SUBCUTANEOUS at 12:47

## 2018-04-13 RX ADMIN — MONTELUKAST SODIUM 10 MG: 10 TABLET, COATED ORAL at 22:23

## 2018-04-13 RX ADMIN — INSULIN LISPRO 5 UNITS: 100 INJECTION, SOLUTION INTRAVENOUS; SUBCUTANEOUS at 07:38

## 2018-04-13 RX ADMIN — LIDOCAINE 1 PATCH: 50 PATCH TOPICAL at 09:55

## 2018-04-13 RX ADMIN — METHOCARBAMOL 750 MG: 750 TABLET ORAL at 20:50

## 2018-04-13 RX ADMIN — INSULIN LISPRO 5 UNITS: 100 INJECTION, SOLUTION INTRAVENOUS; SUBCUTANEOUS at 18:36

## 2018-04-13 RX ADMIN — MIRTAZAPINE 7.5 MG: 15 TABLET, FILM COATED ORAL at 22:23

## 2018-04-13 RX ADMIN — FLUTICASONE PROPIONATE AND SALMETEROL 1 PUFF: 50; 250 POWDER RESPIRATORY (INHALATION) at 22:27

## 2018-04-13 RX ADMIN — ACETAMINOPHEN 975 MG: 325 TABLET, FILM COATED ORAL at 22:22

## 2018-04-13 NOTE — TREATMENT PLAN
Patient's TRISTEN drain with 15 cc output in 12 hours  Drain was d/c'd without complications, applied steri-strips to close drain site  Lovenox was held for 4/13/18  No additional neurosurgical intervention, signing off   Patient will follow up in 2 weeks for an incision check and 6 weeks for a post-operative visit

## 2018-04-13 NOTE — POST OP PROGRESS NOTES
Progress Note - Neurosurgery   Emory Grey 74 y o  female MRN: [id number]  Unit/Bed#: [facility] 932-01 Encounter: [id number]    Assessment:  1  POD#3 T9-11 fusion with T9-10 discectomy  2  T9-10 herniated disc  3  Central canal narrowing with associated cord signal change likely secondary to #2  4  History of COPD  5  History of HIV+  6  Diabetes Mellitus  7  Cardiac disease     Plan:  · Exam: AAOx3  MONTIEL  LT intact  3/3 JPS intact  DSS intact  Thoracic dressing dry, clear and intact  Thoracic incision without erythema, edema or active discharge, skin edges well approximated  · TRISTEN drain with 130cc output overnight, will continue to monitor drain  Changed to gravity  · Images personally reviewed and reviewed with attending on 4/12/18:  · 4/11/18 - upright thoracic XR: demonstrating good anatomical alignment with expected post-operative changes  · TLSO brace when OOB and HOB >45 degrees   · PT/OT: recommend rehab  · DVT ppx: SCDs, Lovenox  · Pain control: APS consulted recommend continuing current regimen  · scheduled tylenol 975mg q8h, lidoderm patch, gabapentin 800mg TID with adjuncts of robaxin 750mh q6h prn, oxycodone 2 5-5mg q4h prn, dilaudid 0 5 IV q4 for breakthrough  · APS plans to d/c dilaudid today   · Medical management per primary team  · WBC 14 32, Tmax 99 5, currently 98 9  · Encourage deep breathing and IS  · Blood cultures collected on 4/12/18 - pending   · Hgb 8 9 - continue to trend, transfuse if <8  · Neurosurgery will continue to follow  Call with questions or concerns  Subjective/Objective   Chief Complaint: "Caron Brown, I'm in pain"    Subjective: patient states she is having constant back pain, mainly in the mid back with radiation into the right sided of chest  She describes the pain as "throbbing", rated 7 out of 10 when she is not moving  She states with movement and trying to move herself up in bed her pain elevated to 10 out of 10 and is described as "sharp"   She denies any headaches, dizziness, chest pain, SOB, abdominal pain/N/V, urinary incontinence/retention or weakness  Discussed with nursing staff that overnight the patient had urinary incontinence     Objective: sitting up in bed, eating breakfast    I/O       04/11 0701 - 04/12 0700 04/12 0701 - 04/13 0700 04/13 0701 - 04/14 0700    P  O  1260 1020     I V  (mL/kg)       NG/GT       IV Piggyback       Total Intake(mL/kg) 1260 (13 9) 1020 (11 2)     Urine (mL/kg/hr) 750 (0 3) 2583 (1 2)     Drains 75 (0) 130 (0 1)     Blood       Total Output 825 2713      Net +[phone]             Unmeasured Urine Occurrence 1 x 4 x           Invasive Devices     Peripherally Inserted Central Catheter Line            PICC Line 04/07/18 Left Brachial 5 days          Drain            Closed/Suction Drain Posterior Back Bulb 15 Fr  2 days                Physical Exam:  Vitals: Blood pressure 110/58, pulse 98, temperature 98 8 °F (37 1 °C), temperature source Oral, resp  rate 18, height 5' 2" (1 575 m), weight 90 7 kg (200 lb), last menstrual period 04/01/2013, SpO2 98 %, not currently breastfeeding  ,Body mass index is 36 58 kg/m²  General appearance: alert, appears stated age, cooperative and no distress  Head: Normocephalic, without obvious abnormality, atraumatic  Eyes: tracks appropriately in room  Back: thoracic dressing is dry, clear and intact  Incision with well approximated skin edges without erythema, edema or discharge on palpation  Lungs: non labored breathing  Heart: regular heart rate   Neurologic:   Mental status: Alert, oriented x3, thought content appropriate  Speech is fluent  Cranial nerves: grossly intact (Cranial nerves II-XII)  Facial symmetry at rest and with expression  Sensory: normal to LT in bilateral upper and lower extremities, anterior and posterior thorax  DSS intact  Motor: moving all extremities without focal weakness  Strength 5-/5 throughout     Reflexes: 2+ and symmetric  negative negron, negative clonus  Coordination: 3/3 JPS intact    Lab Results:    Results from last 7 days  Lab Units 04/12/18  0536 04/11/18  1017 04/10/18  1028 04/10/18  0504   WBC Thousand/uL 14 32* 11 32*  --  13 61*   HEMOGLOBIN g/dL 8 9* 8 4*  --  10 2*   I STAT HEMOGLOBIN g/dl  --   --  7 5*  --    HEMATOCRIT % 28 8* 27 7*  --  33 1*   PLATELETS Thousands/uL 185 181  --  292   NEUTROS PCT % 71 73  --   --    MONOS PCT % 9 10  --   --    MONO PCT MAN %  --   --   --  4       Results from last 7 days  Lab Units 04/12/18  0535 04/11/18  1017 04/10/18  1028 04/10/18  0504   SODIUM mmol/L 139 142  --  143   POTASSIUM mmol/L 3 8 3 8  --  4 9   CHLORIDE mmol/L 107 111*  --  111*   CO2 mmol/L 28 27  --  29   BUN mg/dL 26* 22  --  23   CREATININE mg/dL 0 60 0 54*  --  0 71   CALCIUM mg/dL 8 5 7 7  --  9 1   TOTAL PROTEIN g/dL 6 0* 5 4*  --   --    BILIRUBIN TOTAL mg/dL 0 31 0 22  --   --    ALK PHOS U/L 94 60  --   --    ALT U/L 36 16  --   --    AST U/L 32 10  --   --    GLUCOSE RANDOM mg/dL 68 79  --  97   GLUCOSE, ISTAT mg/dl  --   --  104  --                Results from last 7 days  Lab Units 04/10/18  1529 04/09/18  2052   INR  1 06 0 98   PTT seconds 18*  --      No results found for: TROPONINT  ABG:No results found for: PHART, UAL0SLG, PO2ART, NRH6CUY, O5MBYOKZ, BEART, SOURCE    Imaging Studies: I have personally reviewed pertinent reports  and I have personally reviewed pertinent films in PACS  XR spine thoracic 2 vw   Final Result   Postoperative thoracic spinal fusion  Workstation performed: [id number]         XR spine thoracic 2 vw   Final Result      Fluoroscopic guidance provided for surgical procedure  Please refer to the separate procedure notes for additional details  Workstation performed: [id number]         XR chest portable   Final Result      No pneumothorax status post right IJ line placement              Workstation performed: [id number]         XR chest PICC line portable   Final Result      Left PICC line tip overlying cavoatrial junction  Workstation performed: [id number]         XR chest PICC line portable   Final Result      Right PICC line tip overlying caudal right atrium recommend withdrawing 7 to 8 cm  The study was marked in [facility] for immediate notification  Workstation performed: [id number]         CT spine thoracic wo contrast   Final Result      Marked degeneration of the thoracic spine as described  Multifactorial canal and foraminal stenosis at the T9-10 level as described  Changes, although less significant at T8-9 and T7-T8  Workstation performed: [id number]         EKG, Pathology, and Other Studies: I have personally reviewed pertinent reports        VTE  Prophylaxis: Sequential compression device (Venodyne)  and Enoxaparin (Lovenox)

## 2018-04-13 NOTE — ASSESSMENT & PLAN NOTE
-HIV +  -Cd4% 26 4  -diagnosed over 30 years ago   -no HAART at this time  -follow with Shira Fitzpatrick in [facility]

## 2018-04-13 NOTE — PROGRESS NOTES
Progress Note - Clifford Enriquez 1950, 74 y o  female MRN: [id number]    Unit/Bed#: [facility] 932-01 Encounter: [id number]    Primary Care Provider: Mir Mark DO   Date and time admitted to hospital: 4/4/2018  5:34 PM        Hyperlipidemia   Assessment & Plan    Stable  Continue meds        Bipolar disorder, current episode depressed, moderate ([address] )   Assessment & Plan    Continue above mgt        Leukocytosis   Assessment & Plan    See above mgt        Lumbar stenosis   Assessment & Plan    - imaging revealed stenosis at the L4-L5 level  - no emergent neurosurgery intervention  - PRN pain control          Tobacco use   Assessment & Plan    - transdermal nicotine patch   - counseled on tobacco cessation          COPD (chronic obstructive pulmonary disease) - Chronic hypoxic respiratory failure   Assessment & Plan    - stable/asymptomatic  - continue Advair/Spiriva regimen          Type 2 diabetes mellitus with hyperglycemia (HCC)   Assessment & Plan    - home Metformin regimen held during hospitalization  - HgA1c of 6 9%  -continue BG monitoring  -ISS          Hypertension   Assessment & Plan    - low-sodium diet encouraged  -BPcontrolled  - continue Cardizem          HIV (human immunodeficiency virus infection)    Assessment & Plan    -HIV +  -Cd4% 26 4  -diagnosed over 30 years ago   -no HAART at this time  -follow with Andrea Aly in [facility]          * Thoracic disc herniation   Assessment & Plan    - transferred from the [facility] for neurosurgery evaluation  - MRI revealed disc herniation at the T9-T10 level - and her CT imaging confirmed a similar findings with stenosis and to a lesser degree at the T7-T8 and T8-T9 levels   -s/p  POD#3 T9-11 fusion with T9-10 discectomy  -f/u NS recs- TRISTEN drain removed 4/13  -pain control -  F/u recs by acute pain service  Fevers present  Leukocytosis  Blood cultures 4/12 pending  Repeat Blood cultures today  Started cefepime today  ID consult            VTE Pharmacologic Prophylaxis:   Pharmacologic: Pharmacologic VTE Prophylaxis contraindicated due to NS intervention  Mechanical VTE Prophylaxis in Place: Yes    Patient Centered Rounds: I have performed bedside rounds with nursing staff today  Discussions with Specialists or Other Care Team Provider: yes    Education and Discussions with Family / Patient: yes  Time Spent for Care: 45 minutes  More than 50% of total time spent on counseling and coordination of care as described above  Current Length of Stay: 9 day(s)    Current Patient Status: Inpatient   Certification Statement: The patient will continue to require additional inpatient hospital stay due to med mgt    Discharge Plan: rehab    Code Status: Level 1 - Full Code      Subjective:   Reported back pain improved  ROS negative otherwise  Objective:     Vitals:   Temp (24hrs), Av 8 °F (37 7 °C), Min:98 8 °F (37 1 °C), Max:100 7 °F (38 2 °C)    HR:  [] 95  Resp:  [18] 18  BP: ()/(58-64) 96/61  SpO2:  [95 %-98 %] 98 %  Body mass index is 36 58 kg/m²  Input and Output Summary (last 24 hours): Intake/Output Summary (Last 24 hours) at 18 1926  Last data filed at 18 1441   Gross per 24 hour   Intake             1019 ml   Output             1344 ml   Net             -325 ml       Physical Exam:     Physical Exam   Constitutional: She is oriented to person, place, and time  No distress  HENT:   Head: Normocephalic and atraumatic  Mouth/Throat: Oropharynx is clear and moist    Eyes: EOM are normal  Pupils are equal, round, and reactive to light  Neck: Normal range of motion  No JVD present  Cardiovascular: Normal rate  Exam reveals no gallop  No murmur heard  Pulmonary/Chest: Effort normal and breath sounds normal  No respiratory distress  She has no wheezes  Abdominal: Soft  Bowel sounds are normal  She exhibits no distension  There is no tenderness  Musculoskeletal: Normal range of motion   She exhibits no edema, tenderness or deformity  Neurological: She is alert and oriented to person, place, and time  She displays normal reflexes  No cranial nerve deficit  She exhibits normal muscle tone  Coordination normal    Skin: Skin is warm  No rash noted  No erythema  Psychiatric: She has a normal mood and affect  Her behavior is normal          Additional Data:     Labs:      Results from last 7 days  Lab Units 04/12/18  0536   WBC Thousand/uL 14 32*   HEMOGLOBIN g/dL 8 9*   HEMATOCRIT % 28 8*   PLATELETS Thousands/uL 185   NEUTROS PCT % 71   LYMPHS PCT % 20   MONOS PCT % 9   EOS PCT % 0       Results from last 7 days  Lab Units 04/12/18  0535   SODIUM mmol/L 139   POTASSIUM mmol/L 3 8   CHLORIDE mmol/L 107   CO2 mmol/L 28   BUN mg/dL 26*   CREATININE mg/dL 0 60   CALCIUM mg/dL 8 5   TOTAL PROTEIN g/dL 6 0*   BILIRUBIN TOTAL mg/dL 0 31   ALK PHOS U/L 94   ALT U/L 36   AST U/L 32   GLUCOSE RANDOM mg/dL 68       Results from last 7 days  Lab Units 04/10/18  1529   INR  1 06       * I Have Reviewed All Lab Data Listed Above  * Additional Pertinent Lab Tests Reviewed:  [address] Admission Reviewed    Imaging:    Imaging Reports Reviewed Today Recent Cultures (last 7 days):           Last 24 Hours Medication List:     Current Facility-Administered Medications:  acetaminophen 975 mg Oral Q8H Shawn Baugh MD   alteplase 2 mg Intracatheter Once [employer], PA-C   aluminum-magnesium hydroxide-simethicone 30 mL Oral Q6H PRN Devin Carrera MD   atorvastatin 20 mg Oral Daily With Fariba Caal MD   buPROPion 300 mg Oral Daily Devin Carrera MD   cefepime 2,000 mg Intravenous Q12H Francisco Loredo MD   cholecalciferol 1,000 Units Oral Daily Devin Carrera MD   diltiazem 180 mg Oral Daily Devin Carrera MD   enoxaparin 40 mg Subcutaneous Q24H [facility] Mercedes Scanlon PA-C   fluticasone-salmeterol 1 puff Inhalation Q12H [address], MD   gabapentin 800 mg Oral TID Rashawn Castro MD   insulin glargine 22 Units Subcutaneous HS Rashawn Castro MD   insulin lispro 1-6 Units Subcutaneous 4x Daily (AC & HS) Saintclair Curling, MD   insulin lispro 5 Units Subcutaneous TID With Meals Rashawn Castro MD   levothyroxine 100 mcg Oral Early Morning Rashawn Castro MD   lidocaine 1 patch Transdermal Daily Rashawn Castro MD   methocarbamol 750 mg Oral Q6H PRN Rashawn Castro MD   mirtazapine 7 5 mg Oral HS Rashawn Castro MD   montelukast 10 mg Oral HS Rashawn Csatro MD   nicotine 1 patch Transdermal Daily Rashawn Castro MD   ondansetron 4 mg Oral Q6H PRN Arti Cantu PA-C   oxyCODONE 2 5 mg Oral Q4H PRN Marina Aguila MD   oxyCODONE 5 mg Oral Q4H PRN Marina Aguila MD   QUEtiapine 200 mg Oral HS Rashawn Castro MD   tiotropium 18 mcg Inhalation Daily Rashawn Castro MD   topiramate 50 mg Oral Q12H [address] Rashawn Castro MD   zolpidem 5 mg Oral HS PRN Charles Hernandez MD        Today, Patient Was Seen By: Marina Aguila MD    ** Please Note: Dictation voice to text software may have been used in the creation of this document   **

## 2018-04-13 NOTE — ASSESSMENT & PLAN NOTE
- transferred from the [facility] for neurosurgery evaluation  - MRI revealed disc herniation at the T9-T10 level - and her CT imaging confirmed a similar findings with stenosis and to a lesser degree at the T7-T8 and T8-T9 levels   -s/p  POD#3 T9-11 fusion with T9-10 discectomy  -f/u NS recs- TRISTEN drain removed 4/13  -pain control -  F/u recs by acute pain service  Fevers present  Leukocytosis  Blood cultures 4/12 pending  Repeat Blood cultures today  Started cefepime today  ID consult

## 2018-04-13 NOTE — PROGRESS NOTES
Progress Note - Anesthesia Acute Pain Management    Moncho Valdez 74 y o  female MRN: [id number]  Unit/Bed#: [facility] 932-01 Encounter: [id number]      SURGERY DATE: 4/10/2018  Post-Op Diagnosis Codes: * Thoracic disc herniation [M51 24]    Assessment:   74 y o  female status post Procedure(s):  T9/10 discectomy with T9-T11 fusion POD# 3    Principal Problem:    Thoracic disc herniation  Active Problems:    HIV (human immunodeficiency virus infection)     Hypertension    Type 2 diabetes mellitus with hyperglycemia (HCC)    COPD (chronic obstructive pulmonary disease) - Chronic hypoxic respiratory failure    Tobacco use    Lumbar stenosis    Leukocytosis    Bipolar disorder, current episode depressed, moderate (HCC)    Hyperlipidemia      Plan:   1  Acute surgical pain in the setting of chronic back pain- Patient states currently her pain is under control  However rates pain 12/10  Located to her back  States, "this is the first time I was able to doze off to sleep and now you woke me " Received two doses of IV dilaudid in the last 24 hours  In speaking with staff they report that patient has difficulty staying awake, falls asleep while eating  Upon my exam, patient was dozing off in mid-sentence  I recommend discontinuing IV dilaudid, patient is POD 3  Patient was OOB to chair last evening and tolerated well  She is able to eat and drink  Patient states "sometimes" pain decreases to 07/10, which is much improved from past pain levels prior to surgery  See changes below  Discontinue   IV dilaudid, POD #3    Continue   Tylenol 975 mg PO Q 8 HRS scheduled denies liver disease, AST 32/ALT36  Gabapentin 800 mg PO TID- home med   ROBAXIN 750 mg PO Q 6 HRS PRN  Lidoderm 5 % patch, 1 patch topical daily  Oxycodone 2 5 mg PO Q 4 hrs prn moderate pain   Oxycodone 5 mg PO Q 4 hrs prn severe pain - hold for sedation     2  Start bowel regimen    3  Plan of care- I spoke to primary team to discuss plan    DC IV Dilaudid today  Patient tentative rehab once TRISTEN drain can be pulled, later tonight or tomorrow   Please call if needed [phone], between [phone]     APS sign off  Thank you for the Consult  Please call  / [phone] ( [facility] [phone]) with any further questions      Pain Course:  Current pain location(s): posterior back   Pain Scale:  7/10 when still and 10/10 with movement  Patient states her pain is controlled, she is finally able to get rest   She was upset that I woke her for my exam   Received IV dilaudid this am   I recommend discontinuing IV dilaudid, today is POD 3  Pain is rated 07-12/10 and states there is improvement since surgery  Meds/Allergies   all current active meds have been reviewed    Allergies   Allergen Reactions    Prozac [Fluoxetine Hcl] Rash    Sulfa Antibiotics Itching    Quinine        Objective     Physical Exam: /58 (BP Location: Right arm)   Pulse 98   Temp 98 8 °F (37 1 °C) (Oral)   Resp 18   Ht 5' 2" (1 575 m)   Wt 90 7 kg (200 lb)   LMP 04/01/2013 (Approximate)   SpO2 98%   BMI 36 58 kg/m²   Gen: Well appearing and well nourished, NAD  Skin: Warm, Dry   HEENT:  OCTAVIANO EOMI  Pul: non labored   Abd: large round  Ext:  No cyanosis or edema  Neuro: AAOx3; CN II-XII grossly intact; no gross focal neurologic deficits  Psych:  Pleasant     Labs:                  SIGNATURE: LUIS M Araujo  DATE: April 13, 2018  TIME: 9:28 AM    Please note that the APS provides consultative services regarding pain management only  With the exception of ketamine and epidural infusions and except when indicated, final decisions regarding starting or changing doses of analgesic medications are at the discretion of the consulting service  Off hours consultation and/or medication management is generally not available

## 2018-04-14 LAB
ALBUMIN SERPL BCP-MCNC: 2.4 G/DL (ref 3.5–5)
ALP SERPL-CCNC: 174 U/L (ref 46–116)
ALT SERPL W P-5'-P-CCNC: 61 U/L (ref 12–78)
ANION GAP SERPL CALCULATED.3IONS-SCNC: 6 MMOL/L (ref 4–13)
AST SERPL W P-5'-P-CCNC: 35 U/L (ref 5–45)
BASOPHILS # BLD AUTO: 0 THOUSANDS/ΜL (ref 0–0.1)
BASOPHILS NFR BLD AUTO: 0 % (ref 0–1)
BILIRUB SERPL-MCNC: 0.61 MG/DL (ref 0.2–1)
BUN SERPL-MCNC: 16 MG/DL (ref 5–25)
CALCIUM SERPL-MCNC: 9.6 MG/DL
CHLORIDE SERPL-SCNC: 107 MMOL/L (ref 100–108)
CO2 SERPL-SCNC: 26 MMOL/L (ref 21–32)
CREAT SERPL-MCNC: 0.58 MG/DL (ref 0.6–1.3)
EOSINOPHIL # BLD AUTO: 0.2 THOUSAND/ΜL (ref 0–0.61)
EOSINOPHIL NFR BLD AUTO: 2 % (ref 0–6)
ERYTHROCYTE [DISTWIDTH] IN BLOOD BY AUTOMATED COUNT: 17.2 % (ref 11.6–15.1)
GFR SERPL CREATININE-BSD FRML MDRD: 112 ML/MIN/1.73SQ M
GLUCOSE SERPL-MCNC: 114 MG/DL (ref 65–140)
GLUCOSE SERPL-MCNC: 115 MG/DL (ref 65–140)
GLUCOSE SERPL-MCNC: 143 MG/DL (ref 65–140)
GLUCOSE SERPL-MCNC: 74 MG/DL (ref 65–140)
GLUCOSE SERPL-MCNC: 86 MG/DL (ref 65–140)
HCT VFR BLD AUTO: 25.1 % (ref 34.8–46.1)
HGB BLD-MCNC: 7.9 G/DL (ref 11.5–15.4)
LYMPHOCYTES # BLD AUTO: 2.1 THOUSANDS/ΜL (ref 0.6–4.47)
LYMPHOCYTES NFR BLD AUTO: 20 % (ref 14–44)
MCH RBC QN AUTO: 26.9 PG (ref 26.8–34.3)
MCHC RBC AUTO-ENTMCNC: 31.5 G/DL (ref 31.4–37.4)
MCV RBC AUTO: 85 FL (ref 82–98)
MONOCYTES # BLD AUTO: 0.92 THOUSAND/ΜL (ref 0.17–1.22)
MONOCYTES NFR BLD AUTO: 9 % (ref 4–12)
NEUTROPHILS # BLD AUTO: 7.4 THOUSANDS/ΜL (ref 1.85–7.62)
NEUTS SEG NFR BLD AUTO: 69 % (ref 43–75)
NRBC BLD AUTO-RTO: 0 /100 WBCS
PLATELET # BLD AUTO: 203 THOUSANDS/UL (ref 149–390)
PMV BLD AUTO: 9.2 FL (ref 8.9–12.7)
POTASSIUM SERPL-SCNC: 4.2 MMOL/L (ref 3.5–5.3)
PROCALCITONIN SERPL-MCNC: 0.19 NG/ML
PROT SERPL-MCNC: 6.6 G/DL (ref 6.4–8.2)
RBC # BLD AUTO: 2.94 MILLION/UL (ref 3.81–5.12)
SODIUM SERPL-SCNC: 139 MMOL/L (ref 136–145)
WBC # BLD AUTO: 10.72 THOUSAND/UL (ref 4.31–10.16)

## 2018-04-14 PROCEDURE — 85025 COMPLETE CBC W/AUTO DIFF WBC: CPT | Performed by: HOSPITALIST

## 2018-04-14 PROCEDURE — 80053 COMPREHEN METABOLIC PANEL: CPT | Performed by: HOSPITALIST

## 2018-04-14 PROCEDURE — 97110 THERAPEUTIC EXERCISES: CPT

## 2018-04-14 PROCEDURE — 99232 SBSQ HOSP IP/OBS MODERATE 35: CPT | Performed by: INTERNAL MEDICINE

## 2018-04-14 PROCEDURE — 94760 N-INVAS EAR/PLS OXIMETRY 1: CPT

## 2018-04-14 PROCEDURE — 82948 REAGENT STRIP/BLOOD GLUCOSE: CPT

## 2018-04-14 PROCEDURE — 84145 PROCALCITONIN (PCT): CPT | Performed by: INTERNAL MEDICINE

## 2018-04-14 PROCEDURE — 99232 SBSQ HOSP IP/OBS MODERATE 35: CPT | Performed by: HOSPITALIST

## 2018-04-14 RX ADMIN — ALTEPLASE 2 MG: 2.2 INJECTION, POWDER, LYOPHILIZED, FOR SOLUTION INTRAVENOUS at 01:18

## 2018-04-14 RX ADMIN — TIOTROPIUM BROMIDE 18 MCG: 18 CAPSULE ORAL; RESPIRATORY (INHALATION) at 08:40

## 2018-04-14 RX ADMIN — METHOCARBAMOL 750 MG: 750 TABLET ORAL at 12:02

## 2018-04-14 RX ADMIN — FLUTICASONE PROPIONATE AND SALMETEROL 1 PUFF: 50; 250 POWDER RESPIRATORY (INHALATION) at 21:53

## 2018-04-14 RX ADMIN — BUPROPION HYDROCHLORIDE 300 MG: 150 TABLET, FILM COATED, EXTENDED RELEASE ORAL at 08:41

## 2018-04-14 RX ADMIN — OXYCODONE HYDROCHLORIDE 5 MG: 5 TABLET ORAL at 22:28

## 2018-04-14 RX ADMIN — ZOLPIDEM TARTRATE 5 MG: 5 TABLET ORAL at 23:52

## 2018-04-14 RX ADMIN — OXYCODONE HYDROCHLORIDE 5 MG: 5 TABLET ORAL at 17:31

## 2018-04-14 RX ADMIN — GABAPENTIN 800 MG: 400 CAPSULE ORAL at 05:44

## 2018-04-14 RX ADMIN — ACETAMINOPHEN 975 MG: 325 TABLET, FILM COATED ORAL at 21:52

## 2018-04-14 RX ADMIN — ACETAMINOPHEN 975 MG: 325 TABLET, FILM COATED ORAL at 13:24

## 2018-04-14 RX ADMIN — VITAMIN D, TAB 1000IU (100/BT) 1000 UNITS: 25 TAB at 08:41

## 2018-04-14 RX ADMIN — ALTEPLASE 2 MG: 2.2 INJECTION, POWDER, LYOPHILIZED, FOR SOLUTION INTRAVENOUS at 02:20

## 2018-04-14 RX ADMIN — INSULIN GLARGINE 22 UNITS: 100 INJECTION, SOLUTION SUBCUTANEOUS at 21:52

## 2018-04-14 RX ADMIN — GABAPENTIN 800 MG: 400 CAPSULE ORAL at 20:50

## 2018-04-14 RX ADMIN — MIRTAZAPINE 7.5 MG: 15 TABLET, FILM COATED ORAL at 21:52

## 2018-04-14 RX ADMIN — OXYCODONE HYDROCHLORIDE 5 MG: 5 TABLET ORAL at 08:46

## 2018-04-14 RX ADMIN — ALUMINUM HYDROXIDE, MAGNESIUM HYDROXIDE, AND SIMETHICONE 30 ML: 200; 200; 20 SUSPENSION ORAL at 15:01

## 2018-04-14 RX ADMIN — NICOTINE 1 PATCH: 14 PATCH, EXTENDED RELEASE TRANSDERMAL at 08:41

## 2018-04-14 RX ADMIN — ALUMINUM HYDROXIDE, MAGNESIUM HYDROXIDE, AND SIMETHICONE 30 ML: 200; 200; 20 SUSPENSION ORAL at 20:53

## 2018-04-14 RX ADMIN — METHOCARBAMOL 750 MG: 750 TABLET ORAL at 18:58

## 2018-04-14 RX ADMIN — TOPIRAMATE 50 MG: 25 TABLET, FILM COATED ORAL at 08:41

## 2018-04-14 RX ADMIN — ATORVASTATIN CALCIUM 20 MG: 20 TABLET, FILM COATED ORAL at 17:31

## 2018-04-14 RX ADMIN — GABAPENTIN 800 MG: 400 CAPSULE ORAL at 12:36

## 2018-04-14 RX ADMIN — QUETIAPINE 200 MG: 100 TABLET ORAL at 21:52

## 2018-04-14 RX ADMIN — MONTELUKAST SODIUM 10 MG: 10 TABLET, COATED ORAL at 21:52

## 2018-04-14 RX ADMIN — ZOLPIDEM TARTRATE 5 MG: 5 TABLET ORAL at 01:17

## 2018-04-14 RX ADMIN — INSULIN LISPRO 5 UNITS: 100 INJECTION, SOLUTION INTRAVENOUS; SUBCUTANEOUS at 08:39

## 2018-04-14 RX ADMIN — OXYCODONE HYDROCHLORIDE 5 MG: 5 TABLET ORAL at 13:23

## 2018-04-14 RX ADMIN — DILTIAZEM HYDROCHLORIDE 180 MG: 180 CAPSULE, EXTENDED RELEASE ORAL at 08:40

## 2018-04-14 RX ADMIN — METHOCARBAMOL 750 MG: 750 TABLET ORAL at 01:20

## 2018-04-14 RX ADMIN — TOPIRAMATE 50 MG: 25 TABLET, FILM COATED ORAL at 20:50

## 2018-04-14 RX ADMIN — ENOXAPARIN SODIUM 40 MG: 40 INJECTION SUBCUTANEOUS at 13:24

## 2018-04-14 RX ADMIN — FLUTICASONE PROPIONATE AND SALMETEROL 1 PUFF: 50; 250 POWDER RESPIRATORY (INHALATION) at 08:40

## 2018-04-14 RX ADMIN — LEVOTHYROXINE SODIUM 100 MCG: 100 TABLET ORAL at 05:44

## 2018-04-14 RX ADMIN — LIDOCAINE 1 PATCH: 50 PATCH TOPICAL at 08:41

## 2018-04-14 RX ADMIN — ACETAMINOPHEN 975 MG: 325 TABLET, FILM COATED ORAL at 05:44

## 2018-04-14 NOTE — CONSULTS
Consultation - Infectious Disease   Ramone Mary 74 y o  female MRN: [id number]  Unit/Bed#: [facility] 932-01 Encounter: [id number]      IMPRESSION & RECOMMENDATIONS:   1  Low-grade fever-in the postoperative setting  Possible atelectasis  Possible hematoma  No clear infectious etiology appreciated  Patient has had a leukocytosis postoperatively but she did not have any labs done today  She has remained hemodynamically stable and nontoxic despite the low-grade fever  She has been given a single dose of cefepime after blood cultures were obtained  The patient is not significantly immunosuppressed from her HIV as her CD4 count is 422   -discontinue cefepime  -monitor off antibiotics for now  -follow-up blood cultures  -incentive spirometry  -check procalcitonin level  -recheck CBC with diff and CMP tomorrow  -no additional ID workup for now    2  Thoracic disc herniation with cord compression-status post diskectomy and fusion  Some postoperative pain but does not appear to have any definitive postoperative complications  She had the drain removed today  She seems to be moving her legs well without difficulty  The wound incision is clean without erythema or drainage  -pain management  -close neurosurgical follow-up  -serial exams    3  HIV-not significantly immunosuppressed with a CD4 count of 422  According to the patient she has not been on treatment in many years but is followed at [address] [place] [address] activity office in [facility]   -follow up at AIDS activity office for further management after discharge  -no need for any kind of antibiotic prophylaxis at this time    4  COPD-no clinical evidence of an acute exacerbation at this time    HISTORY OF PRESENT ILLNESS:  Reason for Consult:  Low-grade fever  HPI: Ramone Mary is a 74y o  year old female with a history of HIV with a CD4 count of 422 admitted to [facility] in [place] with back pain I am asked to assist with management of a low-grade fever    Patient presented to [facility] on the 30th of March with intractable back pain and change in the urinary habits with some difficulty urinating  She underwent MRI of the spine that revealed a large central herniated disc at T9-10 with cord signal changes  She was transferred to [facility] in [place] on the 4th of April for nurse surgical evaluation  As she has baseline COPD, she underwent preoperative evaluation and clearance by Pulmonary  On the 10th of April she underwent thoracic spine fusion  She has had postoperative pain has been difficult to manage  She received 24 hours of postoperative cefazolin  Today she had her drain removed by Neurosurgery  She apparently developed a low-grade fever up to 100 7 but denies having any shaking chills or drenching sweats  She has not been doing incentive spirometry like she was supposed to be doing  She denies any headache or stiff neck, denies any sore throat or rhinorrhea or nasal congestion, denies any increased cough or shortness of breath, denies any chest pain or abdominal pain, denies any dysuria or hematuria, denies any new rash or skin lesions, denies any other joint or muscle pains  REVIEW OF SYSTEMS:  A complete 12 point system-based review of systems is otherwise negative      PAST MEDICAL HISTORY:  Past Medical History:   Diagnosis Date    Bipolar 1 disorder ([facility]     Cardiac disease     COPD (chronic obstructive pulmonary disease) [facility]     Diabetes mellitus ([facility] )     Disease of thyroid gland     [facility] 3/24/2016    Hypertension     Osteoarthritis     Tobacco abuse      Past Surgical History:   Procedure Laterality Date    HERNIA REPAIR      LUMBAR FUSION N/A 4/10/2018    Procedure: T9/10 discectomy with T9-T11 fusion;  Surgeon: Arthur Blank MD;  [facility]: [facility];  Service: Neurosurgery    THYROIDECTOMY         FAMILY HISTORY:  Non-contributory    SOCIAL HISTORY:  Social History History   Alcohol Use No     History   Drug Use No     Comment: former IV drug user     History   Smoking Status    Current Every Day Smoker    Packs/day: 0 20    Years: 45 00    Types: Cigarettes   Smokeless Tobacco    Never Used     Comment: Currently smoking 1-2 cigarettes a day       ALLERGIES:  Allergies   Allergen Reactions    Prozac [Fluoxetine Hcl] Rash    Sulfa Antibiotics Itching    Quinine        MEDICATIONS:  All current active medications have been reviewed  Antibiotics:  Cefepime x1 dose    PHYSICAL EXAM:  HR:  [] 95  Resp:  [18] 18  BP: ()/(58-64) 96/61  SpO2:  [95 %-98 %] 98 %  Temp (24hrs), Av 8 °F (37 7 °C), Min:98 8 °F (37 1 °C), Max:100 7 °F (38 2 °C)  Current: Temperature: (!) 100 7 °F (38 2 °C)    Intake/Output Summary (Last 24 hours) at 18  Last data filed at 18 1441   Gross per 24 hour   Intake             1019 ml   Output             1344 ml   Net             -325 ml       General Appearance:  Appearing well, nontoxic, and in no distress   Head:  Normocephalic, without obvious abnormality, atraumatic   Eyes:  Conjunctiva pale and sclera anicteric, both eyes   Nose: Nares normal, mucosa normal, no drainage   Throat: Oropharynx moist without lesions   Neck: Supple, symmetrical, no adenopathy, no tenderness/mass/nodules   Back:   Symmetric, no curvature, ROM normal, no CVA tenderness  Midline thoracic incision without erythema or drainage  Lungs:   Decreased breath sounds bilaterally, respirations unlabored   Chest Wall:  No tenderness or deformity   Heart:  RRR; no murmur, rub or gallop   Abdomen:   Soft, non-tender, non-distended, positive bowel sounds    Extremities: No cyanosis, clubbing or edema   Skin: No rashes or lesions  No draining wounds noted     Lymph nodes: Cervical, supraclavicular nodes normal   Neurologic: Alert, answer simple questions appropriately, extremity strength 5/5 and symmetric       LABS, IMAGING, & OTHER STUDIES:  Lab Results:  I have personally reviewed pertinent labs  Results from last 7 days  Lab Units 04/12/18  0536 04/11/18  1017 04/10/18  1028 04/10/18  0504   WBC Thousand/uL 14 32* 11 32*  --  13 61*   HEMOGLOBIN g/dL 8 9* 8 4*  --  10 2*   I STAT HEMOGLOBIN g/dl  --   --  7 5*  --    PLATELETS Thousands/uL 185 181  --  292       Results from last 7 days  Lab Units 04/12/18  0535 04/11/18  1017  04/10/18  0504   SODIUM mmol/L 139 142  --  143   POTASSIUM mmol/L 3 8 3 8  --  4 9   CHLORIDE mmol/L 107 111*  --  111*   CO2 mmol/L 28 27  --  29   ANION GAP mmol/L 4 4  --  3*   BUN mg/dL 26* 22  --  23   CREATININE mg/dL 0 60 0 54*  --  0 71   EGFR ml/min/1 73sq m 111 115  --  103   GLUCOSE RANDOM mg/dL 68 79  --  97   GLUCOSE, ISTAT   --   --   < >  --    CALCIUM mg/dL 8 5 7 7  --  9 1   AST U/L 32 10  --   --    ALT U/L 36 16  < >  --    ALK PHOS U/L 94 60  < >  --    TOTAL PROTEIN g/dL 6 0* 5 4*  < >  --    BILIRUBIN TOTAL mg/dL 0 31 0 22  < >  --    < > = values in this interval not displayed  Results from last 7 days  Lab Units 04/10/18  1518   MRSA CULTURE ONLY  No Methicillin Resistant Staphlyococcus aureus (MRSA) isolated     Blood cultures x2 sets pending    Imaging Studies:   I have personally reviewed pertinent imaging study reports and images in PACS      X-ray spine--postoperative thoracic spine fusion

## 2018-04-14 NOTE — PHYSICAL THERAPY NOTE
Physical Therapy Progress Note     04/14/18 1515   Pain Assessment   Pain Assessment 0-10   Pain Score 8   Pain Type Acute pain   Pain Location Back   Pain Orientation Mid   Restrictions/Precautions   Weight Bearing Precautions Per Order No   Braces or Orthoses TLSO   Other Precautions O2;Telemetry;Spinal precautions; Fall Risk;Pain   General   Chart Reviewed Yes   Response to Previous Treatment Patient with no complaints from previous session  Family/Caregiver Present No   Cognition   Overall Cognitive Status WFL   Arousal/Participation Alert; Responsive; Cooperative   Attention Attends with cues to redirect   Orientation Level Oriented X4   Memory Within functional limits   Following Commands Follows one step commands with increased time or repetition   Subjective   Subjective Next week I will sit on the edge of the bed  Endurance Deficit   Endurance Deficit Yes   Endurance Deficit Description fatigue   Activity Tolerance   Activity Tolerance Patient limited by fatigue;Patient limited by pain   Nurse Made Aware yes, ok to see   Exercises   Quad Sets Supine;10 reps;AROM; Bilateral   Heelslides Supine;10 reps;AROM; Bilateral   Glute Sets Supine;10 reps;AROM; Bilateral   Knee AROM Short Arc Quad Supine;10 reps;AROM; Bilateral   Ankle Pumps Supine;20 reps;AROM; Bilateral   Assessment   Prognosis Fair   Problem List Decreased strength;Decreased endurance; Impaired balance;Decreased mobility; Decreased safety awareness; Obesity   Assessment pt was found supine in bed to begin session  Refusing treatment this morning, pt was willing to only perform supine exercises in bed today  Educated pt on the importance of bed mobility as well as repositioning  Pt performed all TE as charted to tolerance with fatigue noted and extrea breaks needed in between  Pt refused to sit on the edge of the bed multiple times during today's session  She noted that she'd be willing to attempt EOB sitting NV   Pt had all necessities met and callbell in reach  Pt would benefit from continued therapy to promote safe and functional mobility  Barriers to Discharge Inaccessible home environment   Goals   Patient Goals To sit at EOB next week   STG Expiration Date 04/25/18   Treatment Day 4   Plan   Treatment/Interventions Functional transfer training;LE strengthening/ROM; Therapeutic exercise;Elevations; Endurance training;Cognitive reorientation;Patient/family training;Equipment eval/education;Gait training;Bed mobility; Compensatory technique education;Continued evaluation;Spoke to nursing   Progress Progressing toward goals   PT Frequency 5x/wk   Recommendation   Recommendation Post acute IP rehab   Equipment Recommended Desiree Addison   PT - OK to Discharge Yes   Additional Comments when medically ready     Dago Number, PTA

## 2018-04-14 NOTE — SOCIAL WORK
CM faxed options determination letter to [facility]   CM also reached out to see if there was a bed available for the pt today

## 2018-04-14 NOTE — PROGRESS NOTES
Progress Note - Infectious Disease   Bonifacio Ayon 74 y o  female MRN: [id number]  Unit/Bed#: [facility] 932-01 Encounter: [id number]      Impression/Plan:  1  Low-grade fever-in the postoperative setting  Possible atelectasis  Possible hematoma  No clear infectious etiology appreciated  Patient has had a leukocytosis postoperatively but she did not have any labs done today  She has remained hemodynamically stable and nontoxic despite the low-grade fever  She has been given a single dose of cefepime after blood cultures were obtained  The patient is not significantly immunosuppressed from her HIV as her CD4 count is 422  Procalcitonin level is low   -monitor off antibiotics for now  -follow-up blood cultures  -incentive spirometry  -check procalcitonin level  -recheck CBC with diff and CMP tomorrow  -no additional ID workup for now     2  Thoracic disc herniation with cord compression-status post diskectomy and fusion  Some postoperative pain but does not appear to have any definitive postoperative complications  She had the drain removed today  She seems to be moving her legs well without difficulty  The wound incision is clean without erythema or drainage  -pain management  -close neurosurgical follow-up  -serial exams     3  HIV-not significantly immunosuppressed with a CD4 count of 422  According to the patient she has not been on treatment in many years but is followed at [address] [place] [address] activity office in [facility]   -follow up at AIDS activity office for further management after discharge  -no need for any kind of antibiotic prophylaxis at this time     4  COPD-no clinical evidence of an acute exacerbation at this time    Antibiotics:  None    Subjective:  Patient has no fever, chills, sweats; and low-grade fever overnight; no nausea, vomiting, diarrhea; no cough, shortness of breath; no increase pain  No new symptoms      Objective:  Vitals:  HR:  [] 58  Resp:  [18-20] 20  BP: ()/(56-79) 118/79  SpO2:  [95 %-99 %] 99 %  Temp (24hrs), Av 6 °F (37 6 °C), Min:98 6 °F (37 °C), Max:100 9 °F (38 3 °C)  Current: Temperature: 99 1 °F (37 3 °C)    Physical Exam:   General Appearance:  Alert, interactive, nontoxic, no acute distress  Throat: Oropharynx moist without lesions  Lungs:   Decreased breath sounds bilaterally; no wheezes, rhonchi or rales; respirations unlabored   Heart:  RRR; no murmur, rub or gallop   Abdomen:   Soft, non-tender, non-distended, positive bowel sounds  Extremities: No clubbing, cyanosis or edema   Skin: No new rashes or lesions  No draining wounds noted  Labs, Imaging, & Other studies:   All pertinent labs and imaging studies were personally reviewed    Results from last 7 days  Lab Units 18  0558 18  0536 18  1017   WBC Thousand/uL 10 72* 14 32* 11 32*   HEMOGLOBIN g/dL 7 9* 8 9* 8 4*   PLATELETS Thousands/uL 203 185 181       Results from last 7 days  Lab Units 18  0558 18  0535 18  1017   SODIUM mmol/L 139 139 142   POTASSIUM mmol/L 4 2 3 8 3 8   CHLORIDE mmol/L 107 107 111*   CO2 mmol/L 26 28 27   ANION GAP mmol/L 6 4 4   BUN mg/dL 16 26* 22   CREATININE mg/dL 0 58* 0 60 0 54*   EGFR ml/min/1 73sq m 112 111 115   GLUCOSE RANDOM mg/dL 115 68 79   CALCIUM mg/dL 9 6 8 5 7 7   AST U/L 35 32 10   ALT U/L 61 36 16   ALK PHOS U/L 174* 94 60   TOTAL PROTEIN g/dL 6 6 6 0* 5 4*   BILIRUBIN TOTAL mg/dL 0 61 0 31 0 22       Results from last 7 days  Lab Units 18  2224 18  2223 04/10/18  1518   BLOOD CULTURE  No Growth at 24 hrs  No Growth at 24 hrs    --    MRSA CULTURE ONLY   --   --  No Methicillin Resistant Staphlyococcus aureus (MRSA) isolated       Procalcitonin 0 19

## 2018-04-14 NOTE — PHYSICAL THERAPY NOTE
Physical Therapy Cancellation Note  Patient refusing physical therapy this morning stating "i'm in too much pain, maybe later " Therapist instructed patient on importance of exercise with patient then becoming agreeable to sitting at EOB to eat breakfast, however then patient became disagreeable stating "I am not doing at, I want to stay in bed " Therapist will continue to follow as appropriate       Loreta Medina, PTA

## 2018-04-14 NOTE — ASSESSMENT & PLAN NOTE
- transferred from the [facility] for neurosurgery evaluation  - MRI revealed disc herniation at the T9-T10 level - and her CT imaging confirmed a similar findings with stenosis and to a lesser degree at the T7-T8 and T8-T9 levels   -s/p  POD#4 T9-11 fusion with T9-10 discectomy  -f/u NS recs- TRISTEN drain removed 4/13  -pain control -  F/u recs by acute pain service  Fevers over past 24h T100 9F  Leukocytosis trending down 10 72  Blood cultures 4/12 pending--> first set NG after 24h, repeat Bcx prior to cefepime starting  Cefepime given, not recommended by ID to be continued  ID- continue to monitor off abx

## 2018-04-14 NOTE — ASSESSMENT & PLAN NOTE
-HIV +  -Cd4% 26 4  -diagnosed over 30 years ago   -no HAART at this time  -follow with Omega Zhao in [facility]

## 2018-04-14 NOTE — ASSESSMENT & PLAN NOTE
- imaging revealed stenosis at the L4-L5 level  - no emergent neurosurgery intervention  - PRN pain control  -chronic back pain reported

## 2018-04-14 NOTE — PROGRESS NOTES
Progress Note - Dalila Geiger 1950, 74 y o  female MRN: [id number]    Unit/Bed#: [facility] 932-01 Encounter: [id number]    Primary Care Provider: Shay Julio DO   Date and time admitted to hospital: 4/4/2018  5:34 PM        Hyperlipidemia   Assessment & Plan    Stable  Continue meds        Bipolar disorder, current episode depressed, moderate (HCC)   Assessment & Plan    Continue above mgt        Leukocytosis   Assessment & Plan    See above mgt        Lumbar stenosis   Assessment & Plan    - imaging revealed stenosis at the L4-L5 level  - no emergent neurosurgery intervention  - PRN pain control  -chronic back pain reported          Tobacco use   Assessment & Plan    - transdermal nicotine patch   - counseled on tobacco cessation          COPD (chronic obstructive pulmonary disease) - Chronic hypoxic respiratory failure   Assessment & Plan    - stable/asymptomatic  - continue Advair/Spiriva regimen          Type 2 diabetes mellitus with hyperglycemia (HCC)   Assessment & Plan    - home Metformin regimen held during hospitalization  - HgA1c of 6 9%  -continue BG monitoring  -ISS          Hypertension   Assessment & Plan    - low-sodium diet encouraged  -BPcontrolled  - continue Cardizem          HIV (human immunodeficiency virus infection)    Assessment & Plan    -HIV +  -Cd4% 26 4  -diagnosed over 30 years ago   -no HAART at this time  -follow with Alo Mora in [facility]          * Thoracic disc herniation   Assessment & Plan    - transferred from the [facility] for neurosurgery evaluation  - MRI revealed disc herniation at the T9-T10 level - and her CT imaging confirmed a similar findings with stenosis and to a lesser degree at the T7-T8 and T8-T9 levels   -s/p  POD#4 T9-11 fusion with T9-10 discectomy  -f/u NS recs- TRISTEN drain removed 4/13  -pain control -  F/u recs by acute pain service  Fevers over past 24h T100 9F  Leukocytosis trending down 10 72  Blood cultures 4/12 pending--> first set NG after 24h, repeat Bcx prior to cefepime starting  Cefepime given, not recommended by ID to be continued  ID- continue to monitor off abx            VTE Pharmacologic Prophylaxis:   Pharmacologic: Pharmacologic VTE Prophylaxis contraindicated due to NS procedure  Mechanical VTE Prophylaxis in Place: Yes    Patient Centered Rounds: I have performed bedside rounds with nursing staff today  Discussions with Specialists or Other Care Team Provider: yes    Education and Discussions with Family / Patient: yes    Time Spent for Care: 45 minutes  More than 50% of total time spent on counseling and coordination of care as described above  Current Length of Stay: 10 day(s)    Current Patient Status: Inpatient   Certification Statement: The patient will continue to require additional inpatient hospital stay due to med mgt    Discharge Plan: rehab    Code Status: Level 1 - Full Code      Subjective:   Reported back pain, asked for medications, has fever, with sweats last night  ROS negative otherwise  Objective:     Vitals:   Temp (24hrs), Av 8 °F (37 7 °C), Min:98 6 °F (37 °C), Max:100 9 °F (38 3 °C)    HR:  [] 58  Resp:  [18-20] 20  BP: ()/(56-79) 118/79  SpO2:  [95 %-99 %] 99 %  Body mass index is 36 58 kg/m²  Input and Output Summary (last 24 hours): Intake/Output Summary (Last 24 hours) at 18 1505  Last data filed at 18 1314   Gross per 24 hour   Intake              510 ml   Output              597 ml   Net              -87 ml       Physical Exam:     Physical Exam   Constitutional: She is oriented to person, place, and time  She appears distressed  Morbidly obese   HENT:   Head: Normocephalic and atraumatic  Mouth/Throat: Oropharynx is clear and moist    Eyes: Pupils are equal, round, and reactive to light  Neck: Normal range of motion  No JVD present  Cardiovascular: Normal rate  No murmur heard    Pulmonary/Chest: Effort normal and breath sounds normal  No respiratory distress  She has no wheezes  She has no rales  Abdominal: Soft  Bowel sounds are normal  She exhibits no distension  There is no tenderness  There is no rebound  Musculoskeletal: Normal range of motion  She exhibits no edema  Neurological: She is alert and oriented to person, place, and time  No cranial nerve deficit  Coordination normal    Skin: Skin is warm  No rash noted  No erythema  Psychiatric: She has a normal mood and affect  Her behavior is normal          Additional Data:     Labs:      Results from last 7 days  Lab Units 04/14/18  0558   WBC Thousand/uL 10 72*   HEMOGLOBIN g/dL 7 9*   HEMATOCRIT % 25 1*   PLATELETS Thousands/uL 203   NEUTROS PCT % 69   LYMPHS PCT % 20   MONOS PCT % 9   EOS PCT % 2       Results from last 7 days  Lab Units 04/14/18  0558   SODIUM mmol/L 139   POTASSIUM mmol/L 4 2   CHLORIDE mmol/L 107   CO2 mmol/L 26   BUN mg/dL 16   CREATININE mg/dL 0 58*   CALCIUM mg/dL 9 6   TOTAL PROTEIN g/dL 6 6   BILIRUBIN TOTAL mg/dL 0 61   ALK PHOS U/L 174*   ALT U/L 61   AST U/L 35   GLUCOSE RANDOM mg/dL 115       Results from last 7 days  Lab Units 04/10/18  1529   INR  1 06       * I Have Reviewed All Lab Data Listed Above  * Additional Pertinent Lab Tests Reviewed: Zohra 68 Admission Reviewed    Imaging:    Imaging Reports Reviewed Today Recent Cultures (last 7 days):       Results from last 7 days  Lab Units 04/12/18  2224 04/12/18  2223   BLOOD CULTURE  No Growth at 24 hrs  No Growth at 24 hrs         Last 24 Hours Medication List:     Current Facility-Administered Medications:  acetaminophen 975 mg Oral Q8H [facility] Chan Sanz MD   alteplase 2 mg Intracatheter Once Juni Christy PA-C   aluminum-magnesium hydroxide-simethicone 30 mL Oral Q6H PRN Kvng Carson MD   atorvastatin 20 mg Oral Daily With Ashvin Davis MD   buPROPion 300 mg Oral Daily Kvng Carson MD   cholecalciferol 1,000 Units Oral Daily Kvng Carson MD   diltiazem 180 mg Oral Daily Riaz Jones MD   enoxaparin 40 mg Subcutaneous Q24H [facility] Melonie Zhong PA-C   fluticasone-salmeterol 1 puff Inhalation Q12H Linda Price MD   gabapentin 800 mg Oral TID Riaz Jones MD   insulin glargine 22 Units Subcutaneous HS Riaz Jones MD   insulin lispro 1-6 Units Subcutaneous 4x Daily (AC & HS) Alejandro Jenkins MD   insulin lispro 5 Units Subcutaneous TID With Meals Riaz Jones MD   levothyroxine 100 mcg Oral Early Morning Riaz Jones MD   lidocaine 1 patch Transdermal Daily Riaz Jones MD   methocarbamol 750 mg Oral Q6H PRN Riaz Jones MD   mirtazapine 7 5 mg Oral HS Riaz Jones MD   montelukast 10 mg Oral HS Riaz Jones MD   nicotine 1 patch Transdermal Daily Riaz Jones MD   ondansetron 4 mg Oral Q6H PRN Arti Cantu PA-C   oxyCODONE 2 5 mg Oral Q4H PRN Niurka Leone MD   oxyCODONE 5 mg Oral Q4H PRN Niurka Leone MD   QUEtiapine 200 mg Oral HS Riaz Jones MD   tiotropium 18 mcg Inhalation Daily Riaz Jones MD   topiramate 50 mg Oral Q12H [facility] Riaz Jones MD   zolpidem 5 mg Oral HS PRN Tricia Billy MD        Today, Patient Was Seen By: Niurka Leone MD    ** Please Note: Dictation voice to text software may have been used in the creation of this document   **

## 2018-04-14 NOTE — PLAN OF CARE
Problem: PHYSICAL THERAPY ADULT  Goal: Performs mobility at highest level of function for planned discharge setting  See evaluation for individualized goals  Treatment/Interventions: Functional transfer training, LE strengthening/ROM, Elevations, Therapeutic exercise, Endurance training, Cognitive reorientation, Patient/family training, Equipment eval/education, Bed mobility, Gait training, Compensatory technique education, Continued evaluation, Spoke to nursing  Equipment Recommended: Chinyere Martinez       See flowsheet documentation for full assessment, interventions and recommendations  Outcome: Progressing  Prognosis: Fair  Problem List: Decreased strength, Decreased endurance, Impaired balance, Decreased mobility, Decreased safety awareness, Obesity  Assessment: pt was found supine in bed to begin session  Refusing treatment this morning, pt was willing to only perform supine exercises in bed today  Educated pt on the importance of bed mobility as well as repositioning  Pt performed all TE as charted to tolerance with fatigue noted and extrea breaks needed in between  Pt refused to sit on the edge of the bed multiple times during today's session  She noted that she'd be willing to attempt EOB sitting NV  Pt had all necessities met and callbell in reach  Pt would benefit from continued therapy to promote safe and functional mobility  Barriers to Discharge: Inaccessible home environment     Recommendation: Post acute IP rehab     PT - OK to Discharge: Yes    See flowsheet documentation for full assessment

## 2018-04-15 ENCOUNTER — APPOINTMENT (INPATIENT)
Dept: RADIOLOGY | Facility: HOSPITAL | Age: 68
DRG: 459 | End: 2018-04-15
Payer: MEDICARE

## 2018-04-15 PROBLEM — D72.829 LEUKOCYTOSIS: Status: RESOLVED | Noted: 2018-04-05 | Resolved: 2018-04-15

## 2018-04-15 LAB
ALBUMIN SERPL BCP-MCNC: 2.5 G/DL (ref 3.5–5)
ALP SERPL-CCNC: 200 U/L (ref 46–116)
ALT SERPL W P-5'-P-CCNC: 68 U/L (ref 12–78)
ANION GAP SERPL CALCULATED.3IONS-SCNC: 5 MMOL/L (ref 4–13)
AST SERPL W P-5'-P-CCNC: 42 U/L (ref 5–45)
BASOPHILS # BLD AUTO: 0.01 THOUSANDS/ΜL (ref 0–0.1)
BASOPHILS NFR BLD AUTO: 0 % (ref 0–1)
BILIRUB SERPL-MCNC: 0.36 MG/DL (ref 0.2–1)
BUN SERPL-MCNC: 14 MG/DL (ref 5–25)
CALCIUM SERPL-MCNC: 9.4 MG/DL
CHLORIDE SERPL-SCNC: 105 MMOL/L (ref 100–108)
CO2 SERPL-SCNC: 27 MMOL/L (ref 21–32)
CREAT SERPL-MCNC: 0.68 MG/DL (ref 0.6–1.3)
EOSINOPHIL # BLD AUTO: 0.23 THOUSAND/ΜL (ref 0–0.61)
EOSINOPHIL NFR BLD AUTO: 2 % (ref 0–6)
ERYTHROCYTE [DISTWIDTH] IN BLOOD BY AUTOMATED COUNT: 17.2 % (ref 11.6–15.1)
GFR SERPL CREATININE-BSD FRML MDRD: 106 ML/MIN/1.73SQ M
GLUCOSE SERPL-MCNC: 104 MG/DL (ref 65–140)
GLUCOSE SERPL-MCNC: 118 MG/DL (ref 65–140)
GLUCOSE SERPL-MCNC: 129 MG/DL (ref 65–140)
GLUCOSE SERPL-MCNC: 158 MG/DL (ref 65–140)
GLUCOSE SERPL-MCNC: 92 MG/DL (ref 65–140)
HCT VFR BLD AUTO: 24.6 % (ref 34.8–46.1)
HGB BLD-MCNC: 7.8 G/DL (ref 11.5–15.4)
LYMPHOCYTES # BLD AUTO: 2.33 THOUSANDS/ΜL (ref 0.6–4.47)
LYMPHOCYTES NFR BLD AUTO: 23 % (ref 14–44)
MCH RBC QN AUTO: 27 PG (ref 26.8–34.3)
MCHC RBC AUTO-ENTMCNC: 31.7 G/DL (ref 31.4–37.4)
MCV RBC AUTO: 85 FL (ref 82–98)
MONOCYTES # BLD AUTO: 1.14 THOUSAND/ΜL (ref 0.17–1.22)
MONOCYTES NFR BLD AUTO: 11 % (ref 4–12)
NEUTROPHILS # BLD AUTO: 6.15 THOUSANDS/ΜL (ref 1.85–7.62)
NEUTS SEG NFR BLD AUTO: 64 % (ref 43–75)
NRBC BLD AUTO-RTO: 0 /100 WBCS
PLATELET # BLD AUTO: 233 THOUSANDS/UL (ref 149–390)
PMV BLD AUTO: 9.1 FL (ref 8.9–12.7)
POTASSIUM SERPL-SCNC: 4.2 MMOL/L (ref 3.5–5.3)
PROT SERPL-MCNC: 6.7 G/DL (ref 6.4–8.2)
RBC # BLD AUTO: 2.89 MILLION/UL (ref 3.81–5.12)
SODIUM SERPL-SCNC: 137 MMOL/L (ref 136–145)
WBC # BLD AUTO: 9.97 THOUSAND/UL (ref 4.31–10.16)

## 2018-04-15 PROCEDURE — 82948 REAGENT STRIP/BLOOD GLUCOSE: CPT

## 2018-04-15 PROCEDURE — 71045 X-RAY EXAM CHEST 1 VIEW: CPT

## 2018-04-15 PROCEDURE — 99232 SBSQ HOSP IP/OBS MODERATE 35: CPT | Performed by: INTERNAL MEDICINE

## 2018-04-15 PROCEDURE — 80053 COMPREHEN METABOLIC PANEL: CPT | Performed by: HOSPITALIST

## 2018-04-15 PROCEDURE — 99232 SBSQ HOSP IP/OBS MODERATE 35: CPT | Performed by: HOSPITALIST

## 2018-04-15 PROCEDURE — 85025 COMPLETE CBC W/AUTO DIFF WBC: CPT | Performed by: HOSPITALIST

## 2018-04-15 RX ORDER — INSULIN GLARGINE 100 [IU]/ML
22 INJECTION, SOLUTION SUBCUTANEOUS
Status: DISCONTINUED | OUTPATIENT
Start: 2018-04-16 | End: 2018-04-16 | Stop reason: HOSPADM

## 2018-04-15 RX ORDER — INSULIN GLARGINE 100 [IU]/ML
10 INJECTION, SOLUTION SUBCUTANEOUS ONCE
Status: COMPLETED | OUTPATIENT
Start: 2018-04-15 | End: 2018-04-15

## 2018-04-15 RX ADMIN — GABAPENTIN 800 MG: 400 CAPSULE ORAL at 12:04

## 2018-04-15 RX ADMIN — TOPIRAMATE 50 MG: 25 TABLET, FILM COATED ORAL at 20:46

## 2018-04-15 RX ADMIN — QUETIAPINE 200 MG: 100 TABLET ORAL at 21:24

## 2018-04-15 RX ADMIN — VITAMIN D, TAB 1000IU (100/BT) 1000 UNITS: 25 TAB at 09:07

## 2018-04-15 RX ADMIN — GABAPENTIN 800 MG: 400 CAPSULE ORAL at 20:46

## 2018-04-15 RX ADMIN — INSULIN LISPRO 5 UNITS: 100 INJECTION, SOLUTION INTRAVENOUS; SUBCUTANEOUS at 09:09

## 2018-04-15 RX ADMIN — OXYCODONE HYDROCHLORIDE 5 MG: 5 TABLET ORAL at 12:04

## 2018-04-15 RX ADMIN — METHOCARBAMOL 750 MG: 750 TABLET ORAL at 20:46

## 2018-04-15 RX ADMIN — OXYCODONE HYDROCHLORIDE 5 MG: 5 TABLET ORAL at 17:51

## 2018-04-15 RX ADMIN — ZOLPIDEM TARTRATE 5 MG: 5 TABLET ORAL at 21:24

## 2018-04-15 RX ADMIN — ACETAMINOPHEN 975 MG: 325 TABLET, FILM COATED ORAL at 21:23

## 2018-04-15 RX ADMIN — LIDOCAINE 1 PATCH: 50 PATCH TOPICAL at 09:07

## 2018-04-15 RX ADMIN — LEVOTHYROXINE SODIUM 100 MCG: 100 TABLET ORAL at 05:18

## 2018-04-15 RX ADMIN — NICOTINE 1 PATCH: 14 PATCH, EXTENDED RELEASE TRANSDERMAL at 09:07

## 2018-04-15 RX ADMIN — GABAPENTIN 800 MG: 400 CAPSULE ORAL at 05:18

## 2018-04-15 RX ADMIN — MONTELUKAST SODIUM 10 MG: 10 TABLET, COATED ORAL at 21:23

## 2018-04-15 RX ADMIN — FLUTICASONE PROPIONATE AND SALMETEROL 1 PUFF: 50; 250 POWDER RESPIRATORY (INHALATION) at 20:46

## 2018-04-15 RX ADMIN — OXYCODONE HYDROCHLORIDE 5 MG: 5 TABLET ORAL at 21:58

## 2018-04-15 RX ADMIN — ATORVASTATIN CALCIUM 20 MG: 20 TABLET, FILM COATED ORAL at 17:48

## 2018-04-15 RX ADMIN — DILTIAZEM HYDROCHLORIDE 180 MG: 180 CAPSULE, EXTENDED RELEASE ORAL at 09:07

## 2018-04-15 RX ADMIN — INSULIN LISPRO 5 UNITS: 100 INJECTION, SOLUTION INTRAVENOUS; SUBCUTANEOUS at 17:52

## 2018-04-15 RX ADMIN — TIOTROPIUM BROMIDE 18 MCG: 18 CAPSULE ORAL; RESPIRATORY (INHALATION) at 09:06

## 2018-04-15 RX ADMIN — MIRTAZAPINE 7.5 MG: 15 TABLET, FILM COATED ORAL at 21:23

## 2018-04-15 RX ADMIN — TOPIRAMATE 50 MG: 25 TABLET, FILM COATED ORAL at 09:06

## 2018-04-15 RX ADMIN — FLUTICASONE PROPIONATE AND SALMETEROL 1 PUFF: 50; 250 POWDER RESPIRATORY (INHALATION) at 09:06

## 2018-04-15 RX ADMIN — ENOXAPARIN SODIUM 40 MG: 40 INJECTION SUBCUTANEOUS at 13:56

## 2018-04-15 RX ADMIN — INSULIN LISPRO 5 UNITS: 100 INJECTION, SOLUTION INTRAVENOUS; SUBCUTANEOUS at 12:39

## 2018-04-15 RX ADMIN — INSULIN GLARGINE 10 UNITS: 100 INJECTION, SOLUTION SUBCUTANEOUS at 21:23

## 2018-04-15 RX ADMIN — BUPROPION HYDROCHLORIDE 300 MG: 150 TABLET, FILM COATED, EXTENDED RELEASE ORAL at 09:06

## 2018-04-15 RX ADMIN — ACETAMINOPHEN 975 MG: 325 TABLET, FILM COATED ORAL at 13:56

## 2018-04-15 RX ADMIN — ACETAMINOPHEN 975 MG: 325 TABLET, FILM COATED ORAL at 05:18

## 2018-04-15 RX ADMIN — OXYCODONE HYDROCHLORIDE 5 MG: 5 TABLET ORAL at 05:18

## 2018-04-15 NOTE — PROGRESS NOTES
Progress Note - Infectious Disease   Cecilia Valdez 74 y o  female MRN: [id number]  Unit/Bed#: [facility] 932-01 Encounter: [id number]      Impression/Plan:  1   Low-grade fever-in the postoperative setting   Possible atelectasis   Possible hematoma   No clear infectious etiology appreciated   Patient has had a leukocytosis postoperatively but she did not have any labs done today   She has remained hemodynamically stable and nontoxic despite the low-grade fever  Wyline Barrier has been given a single dose of cefepime after blood cultures were obtained   The patient is not significantly immunosuppressed from her HIV as her CD4 count is 422  Procalcitonin level is low  The temperatures come down  Blood cultures negative thus far  -monitor off antibiotics for now  -follow-up blood cultures  -incentive spirometry  -no additional ID workup for now     2   Thoracic disc herniation with cord compression-status post diskectomy and fusion   Some postoperative pain but does not appear to have any definitive postoperative complications   She had the drain removed today   She seems to be moving her legs well without difficulty   The wound incision is clean without erythema or drainage  -pain management  -close neurosurgical follow-up  -serial exams     3   HIV-not significantly immunosuppressed with a CD4 count of 422   According to the patient she has not been on treatment in many years but is followed at [address] [place] [address] activity office in [facility]   -follow up at AIDS activity office for further management after discharge  -no need for any kind of antibiotic prophylaxis at this time     4   COPD-no clinical evidence of an acute exacerbation at this time    Antibiotics:  None    Subjective:  Patient has no fever, chills, sweats; no nausea, vomiting, diarrhea; no cough, shortness of breath; no increased pain  No new symptoms      Objective:  Vitals:  HR:  [89-98] 98  Resp:  [20-21] 21  BP: (123-131)/(59-72) 131/72  SpO2:  [94 %-98 %] 98 %  Temp (24hrs), Av 2 °F (37 3 °C), Min:99 °F (37 2 °C), Max:99 3 °F (37 4 °C)  Current: Temperature: 99 °F (37 2 °C)    Physical Exam:   General Appearance:  Somnolent, arousable, nontoxic, no acute distress  Throat: Oropharynx moist without lesions  Lungs:   Decreased breath sounds bilaterally; no wheezes, rhonchi or rales; respirations unlabored   Heart:  Tachycardic; no murmur, rub or gallop   Abdomen:   Soft, non-tender, non-distended, positive bowel sounds  Extremities: No clubbing, cyanosis or edema   Skin: No new rashes or lesions  No draining wounds noted  Labs, Imaging, & Other studies:   All pertinent labs and imaging studies were personally reviewed    Results from last 7 days  Lab Units 04/15/18  0442 18  0558 18  0536   WBC Thousand/uL 9 97 10 72* 14 32*   HEMOGLOBIN g/dL 7 8* 7 9* 8 9*   PLATELETS Thousands/uL 233 203 185       Results from last 7 days  Lab Units 04/15/18  0442 18  0558 18  0535   SODIUM mmol/L 137 139 139   POTASSIUM mmol/L 4 2 4 2 3 8   CHLORIDE mmol/L 105 107 107   CO2 mmol/L 27 26 28   ANION GAP mmol/L 5 6 4   BUN mg/dL 14 16 26*   CREATININE mg/dL 0 68 0 58* 0 60   EGFR ml/min/1 73sq m 106 112 111   GLUCOSE RANDOM mg/dL 92 115 68   CALCIUM mg/dL 9 4 9 6 8 5   AST U/L 42 35 32   ALT U/L 68 61 36   ALK PHOS U/L 200* 174* 94   TOTAL PROTEIN g/dL 6 7 6 6 6 0*   BILIRUBIN TOTAL mg/dL 0 36 0 61 0 31       Results from last 7 days  Lab Units 18  1914 18  1904 18  2224 18  2223 04/10/18  1518   BLOOD CULTURE  No Growth at 24 hrs  No Growth at 24 hrs  No Growth at 24 hrs  No Growth at 24 hrs    --    MRSA CULTURE ONLY   --   --   --   --  No Methicillin Resistant Staphlyococcus aureus (MRSA) isolated

## 2018-04-15 NOTE — ASSESSMENT & PLAN NOTE
-HIV +  -Cd4% 26 4  -diagnosed over 30 years ago   -no HAART at this time  -follow with Kaitlin Gan in [facility]

## 2018-04-15 NOTE — ASSESSMENT & PLAN NOTE
- transferred from the [facility] for neurosurgery evaluation  - MRI revealed disc herniation at the T9-T10 level - and her CT imaging confirmed a similar findings with stenosis and to a lesser degree at the T7-T8 and T8-T9 levels   -s/p  POD#5 T9-11 fusion with T9-10 discectomy  -f/u NS recs- lastJP drain removed 4/13  -pain control -  F/u recs by acute pain service  Fevers  resolved  Wbcs wnl  Blood cultures 4/12 NG  ID- continue to monitor off abx

## 2018-04-15 NOTE — PROGRESS NOTES
Progress Note - Charlene Alvarez 1950, 74 y o  female MRN: [id number]    Unit/Bed#: [facility] 932-01 Encounter: [id number]    Primary Care Provider: Angie Landrum DO   Date and time admitted to hospital: 4/4/2018  5:34 PM        Hyperlipidemia   Assessment & Plan    Stable  Continue meds        Bipolar disorder, current episode depressed, moderate (HCC)   Assessment & Plan    Continue above mgt        Lumbar stenosis   Assessment & Plan    - imaging revealed stenosis at the L4-L5 level  - no emergent neurosurgery intervention  - PRN pain control  -chronic back pain reported          Tobacco use   Assessment & Plan    - transdermal nicotine patch   - counseled on tobacco cessation          COPD (chronic obstructive pulmonary disease) - Chronic hypoxic respiratory failure   Assessment & Plan    - stable/asymptomatic  - continue Advair/Spiriva regimen          Type 2 diabetes mellitus with hyperglycemia (HCC)   Assessment & Plan    - home Metformin regimen held during hospitalization  - HgA1c of 6 9%  -continue BG monitoring  -ISS          Hypertension   Assessment & Plan    - low-sodium diet encouraged  -BPcontrolled  - continue Cardizem          HIV (human immunodeficiency virus infection)    Assessment & Plan    -HIV +  -Cd4% 26 4  -diagnosed over 30 years ago   -no HAART at this time  -follow with Marcus Valencia in [facility]          * Thoracic disc herniation   Assessment & Plan    - transferred from the [facility] for neurosurgery evaluation  - MRI revealed disc herniation at the T9-T10 level - and her CT imaging confirmed a similar findings with stenosis and to a lesser degree at the T7-T8 and T8-T9 levels   -s/p  POD#5 T9-11 fusion with T9-10 discectomy  -f/u NS recs- lastJP drain removed 4/13  -pain control -  F/u recs by acute pain service  Fevers  Persistent 100 9F Tmax  cxr  Wbcs wnl  Blood cultures 4/12 NG  ID- continue to monitor off abx        Leukocytosisresolved as of 4/15/2018   Assessment & Plan See above mgt            VTE Pharmacologic Prophylaxis:   Pharmacologic: Enoxaparin (Lovenox)  Mechanical VTE Prophylaxis in Place: Yes    Patient Centered Rounds: I have performed bedside rounds with nursing staff today  Discussions with Specialists or Other Care Team Provider: yes    Education and Discussions with Family / Patient:yes    Time Spent for Care: 45 minutes  More than 50% of total time spent on counseling and coordination of care as described above  Current Length of Stay: 11 day(s)    Current Patient Status: Inpatient   Certification Statement: The patient will continue to require additional inpatient hospital stay due to med mgt    Discharge Plan: rehab    Code Status: Level 1 - Full Code      Subjective:   Patient reported pain improved, fevers improved  Review of systems negative otherwise    Objective:     Vitals:   Temp (24hrs), Av 2 °F (37 3 °C), Min:99 °F (37 2 °C), Max:99 3 °F (37 4 °C)    HR:  [89-98] 98  Resp:  [20-21] 21  BP: (123-131)/(59-72) 131/72  SpO2:  [94 %-98 %] 98 %  Body mass index is 36 58 kg/m²  Input and Output Summary (last 24 hours): Intake/Output Summary (Last 24 hours) at 04/15/18 0937  Last data filed at 04/15/18 0300   Gross per 24 hour   Intake             1125 ml   Output              853 ml   Net              272 ml       Physical Exam:     Physical Exam   Constitutional: She is oriented to person, place, and time  No distress  HENT:   Head: Normocephalic and atraumatic  Mouth/Throat: Oropharynx is clear and moist    Eyes: EOM are normal  Pupils are equal, round, and reactive to light  Neck: Normal range of motion  No JVD present  Cardiovascular: Normal rate  No murmur heard  Pulmonary/Chest: Effort normal and breath sounds normal  No respiratory distress  She has no wheezes  She has no rales  Abdominal: Soft  Bowel sounds are normal  She exhibits no distension  There is no tenderness  There is no rebound and no guarding  Musculoskeletal: Normal range of motion  She exhibits no edema  Neurological: She is alert and oriented to person, place, and time  No cranial nerve deficit  Skin: Skin is warm  No rash noted  No erythema  Psychiatric: She has a normal mood and affect  Her behavior is normal  Thought content normal          Additional Data:     Labs:      Results from last 7 days  Lab Units 04/15/18  0442   WBC Thousand/uL 9 97   HEMOGLOBIN g/dL 7 8*   HEMATOCRIT % 24 6*   PLATELETS Thousands/uL 233   NEUTROS PCT % 64   LYMPHS PCT % 23   MONOS PCT % 11   EOS PCT % 2       Results from last 7 days  Lab Units 04/15/18  0442   SODIUM mmol/L 137   POTASSIUM mmol/L 4 2   CHLORIDE mmol/L 105   CO2 mmol/L 27   BUN mg/dL 14   CREATININE mg/dL 0 68   CALCIUM mg/dL 9 4   TOTAL PROTEIN g/dL 6 7   BILIRUBIN TOTAL mg/dL 0 36   ALK PHOS U/L 200*   ALT U/L 68   AST U/L 42   GLUCOSE RANDOM mg/dL 92       Results from last 7 days  Lab Units 04/10/18  1529   INR  1 06       * I Have Reviewed All Lab Data Listed Above  * Additional Pertinent Lab Tests Reviewed: Zohra 66 Admission Reviewed    Imaging:    Imaging Reports Reviewed Today    Recent Cultures (last 7 days):       Results from last 7 days  Lab Units 04/13/18  1914 04/13/18  1904 04/12/18  2224 04/12/18  2223   BLOOD CULTURE  No Growth at 24 hrs  No Growth at 24 hrs  No Growth at 48 hrs  No Growth at 48 hrs         Last 24 Hours Medication List:     Current Facility-Administered Medications:  acetaminophen 975 mg Oral Q8H Haris Berry MD   alteplase 2 mg Intracatheter Once [employer], PA-C   aluminum-magnesium hydroxide-simethicone 30 mL Oral Q6H PRN Sarah Adan MD   atorvastatin 20 mg Oral Daily With Charles Saldivar MD   buPROPion 300 mg Oral Daily Sarah Adan MD   cholecalciferol 1,000 Units Oral Daily Sarah Adan MD   diltiazem 180 mg Oral Daily Sarah Adan MD   enoxaparin 40 mg Subcutaneous Q24H [address] [place] TRACEY Peterson PA-C   fluticasone-salmeterol 1 puff Inhalation Q12H Teetee Rolon MD   gabapentin 800 mg Oral TID Cheral CourseMD cristina   insulin glargine 22 Units Subcutaneous HS Kathrin CourseMD cristina   insulin lispro 1-6 Units Subcutaneous 4x Daily (AC & HS) Young Valdes MD   insulin lispro 5 Units Subcutaneous TID With Meals Kathrin CourseMD cristina   levothyroxine 100 mcg Oral Early Morning Cheral Courser, MD   lidocaine 1 patch Transdermal Daily Cheral Courser, MD   methocarbamol 750 mg Oral Q6H PRN Kathrin Coursecristina, MD   mirtazapine 7 5 mg Oral HS Cheral Courser, MD   montelukast 10 mg Oral HS Cheral Courser, MD   nicotine 1 patch Transdermal Daily Cheral Courser, MD   ondansetron 4 mg Oral Q6H PRN Arti Cantu PA-C   oxyCODONE 2 5 mg Oral Q4H PRN Clay Uribe MD   oxyCODONE 5 mg Oral Q4H PRN Clay Uribe MD   QUEtiapine 200 mg Oral HS Cheral Courser, MD   tiotropium 18 mcg Inhalation Daily Cheral CourseMD cristina   topiramate 50 mg Oral Q12H [facility] Cheral CourseMD cristina   zolpidem 5 mg Oral HS PRN Kayla Alarcon MD        Today, Patient Was Seen By: Clay Uribe MD    ** Please Note: Dictation voice to text software may have been used in the creation of this document   **

## 2018-04-16 VITALS
WEIGHT: 200 LBS | SYSTOLIC BLOOD PRESSURE: 99 MMHG | DIASTOLIC BLOOD PRESSURE: 65 MMHG | HEART RATE: 92 BPM | RESPIRATION RATE: 21 BRPM | HEIGHT: 62 IN | BODY MASS INDEX: 36.8 KG/M2 | OXYGEN SATURATION: 97 % | TEMPERATURE: 98.8 F

## 2018-04-16 LAB
ALBUMIN SERPL BCP-MCNC: 2.5 G/DL (ref 3.5–5)
ALP SERPL-CCNC: 236 U/L (ref 46–116)
ALT SERPL W P-5'-P-CCNC: 82 U/L (ref 12–78)
ANION GAP SERPL CALCULATED.3IONS-SCNC: 7 MMOL/L (ref 4–13)
AST SERPL W P-5'-P-CCNC: 51 U/L (ref 5–45)
BASOPHILS # BLD AUTO: 0.01 THOUSANDS/ΜL (ref 0–0.1)
BASOPHILS NFR BLD AUTO: 0 % (ref 0–1)
BILIRUB SERPL-MCNC: 0.44 MG/DL (ref 0.2–1)
BUN SERPL-MCNC: 13 MG/DL (ref 5–25)
CALCIUM SERPL-MCNC: 9.5 MG/DL
CHLORIDE SERPL-SCNC: 105 MMOL/L (ref 100–108)
CO2 SERPL-SCNC: 27 MMOL/L (ref 21–32)
CREAT SERPL-MCNC: 0.62 MG/DL (ref 0.6–1.3)
EOSINOPHIL # BLD AUTO: 0.19 THOUSAND/ΜL (ref 0–0.61)
EOSINOPHIL NFR BLD AUTO: 2 % (ref 0–6)
ERYTHROCYTE [DISTWIDTH] IN BLOOD BY AUTOMATED COUNT: 17.1 % (ref 11.6–15.1)
GFR SERPL CREATININE-BSD FRML MDRD: 109 ML/MIN/1.73SQ M
GLUCOSE SERPL-MCNC: 114 MG/DL (ref 65–140)
GLUCOSE SERPL-MCNC: 135 MG/DL (ref 65–140)
GLUCOSE SERPL-MCNC: 147 MG/DL (ref 65–140)
GLUCOSE SERPL-MCNC: 72 MG/DL (ref 65–140)
GLUCOSE SERPL-MCNC: 80 MG/DL (ref 65–140)
HCT VFR BLD AUTO: 25.4 % (ref 34.8–46.1)
HGB BLD-MCNC: 7.9 G/DL (ref 11.5–15.4)
LYMPHOCYTES # BLD AUTO: 2.23 THOUSANDS/ΜL (ref 0.6–4.47)
LYMPHOCYTES NFR BLD AUTO: 22 % (ref 14–44)
MCH RBC QN AUTO: 27.1 PG (ref 26.8–34.3)
MCHC RBC AUTO-ENTMCNC: 31.1 G/DL (ref 31.4–37.4)
MCV RBC AUTO: 87 FL (ref 82–98)
MONOCYTES # BLD AUTO: 0.99 THOUSAND/ΜL (ref 0.17–1.22)
MONOCYTES NFR BLD AUTO: 10 % (ref 4–12)
NEUTROPHILS # BLD AUTO: 6.71 THOUSANDS/ΜL (ref 1.85–7.62)
NEUTS SEG NFR BLD AUTO: 66 % (ref 43–75)
NRBC BLD AUTO-RTO: 0 /100 WBCS
PLATELET # BLD AUTO: 266 THOUSANDS/UL (ref 149–390)
PMV BLD AUTO: 8.5 FL (ref 8.9–12.7)
POTASSIUM SERPL-SCNC: 4.1 MMOL/L (ref 3.5–5.3)
PROT SERPL-MCNC: 6.9 G/DL (ref 6.4–8.2)
RBC # BLD AUTO: 2.91 MILLION/UL (ref 3.81–5.12)
SODIUM SERPL-SCNC: 139 MMOL/L (ref 136–145)
WBC # BLD AUTO: 10.23 THOUSAND/UL (ref 4.31–10.16)

## 2018-04-16 PROCEDURE — 99239 HOSP IP/OBS DSCHRG MGMT >30: CPT | Performed by: HOSPITALIST

## 2018-04-16 PROCEDURE — 85025 COMPLETE CBC W/AUTO DIFF WBC: CPT | Performed by: HOSPITALIST

## 2018-04-16 PROCEDURE — 99232 SBSQ HOSP IP/OBS MODERATE 35: CPT | Performed by: INTERNAL MEDICINE

## 2018-04-16 PROCEDURE — 82948 REAGENT STRIP/BLOOD GLUCOSE: CPT

## 2018-04-16 PROCEDURE — 80053 COMPREHEN METABOLIC PANEL: CPT | Performed by: HOSPITALIST

## 2018-04-16 RX ORDER — OXYCODONE HYDROCHLORIDE 5 MG/1
2.5 TABLET ORAL EVERY 4 HOURS PRN
Qty: 30 TABLET | Refills: 0 | Status: SHIPPED | OUTPATIENT
Start: 2018-04-16 | End: 2018-04-26

## 2018-04-16 RX ORDER — ZOLPIDEM TARTRATE 5 MG/1
5 TABLET ORAL
Qty: 30 TABLET | Refills: 0 | Status: SHIPPED | OUTPATIENT
Start: 2018-04-16 | End: 2020-05-27

## 2018-04-16 RX ORDER — LIDOCAINE 50 MG/G
1 PATCH TOPICAL DAILY
Qty: 30 PATCH | Refills: 0 | Status: SHIPPED | OUTPATIENT
Start: 2018-04-17 | End: 2020-05-27

## 2018-04-16 RX ORDER — INSULIN GLARGINE 100 [IU]/ML
22 INJECTION, SOLUTION SUBCUTANEOUS
Qty: 10 ML | Refills: 0 | Status: SHIPPED | OUTPATIENT
Start: 2018-04-16 | End: 2020-05-27

## 2018-04-16 RX ORDER — METHOCARBAMOL 750 MG/1
750 TABLET, FILM COATED ORAL EVERY 6 HOURS PRN
Qty: 30 TABLET | Refills: 0 | Status: SHIPPED | OUTPATIENT
Start: 2018-04-16 | End: 2020-05-27

## 2018-04-16 RX ORDER — NICOTINE 21 MG/24HR
1 PATCH, TRANSDERMAL 24 HOURS TRANSDERMAL DAILY
Qty: 28 PATCH | Refills: 0 | Status: SHIPPED | OUTPATIENT
Start: 2018-04-17 | End: 2020-05-27

## 2018-04-16 RX ADMIN — VITAMIN D, TAB 1000IU (100/BT) 1000 UNITS: 25 TAB at 08:23

## 2018-04-16 RX ADMIN — ACETAMINOPHEN 975 MG: 325 TABLET, FILM COATED ORAL at 13:47

## 2018-04-16 RX ADMIN — LIDOCAINE 1 PATCH: 50 PATCH TOPICAL at 08:23

## 2018-04-16 RX ADMIN — LEVOTHYROXINE SODIUM 100 MCG: 100 TABLET ORAL at 05:35

## 2018-04-16 RX ADMIN — GABAPENTIN 800 MG: 400 CAPSULE ORAL at 05:35

## 2018-04-16 RX ADMIN — NICOTINE 1 PATCH: 14 PATCH, EXTENDED RELEASE TRANSDERMAL at 08:23

## 2018-04-16 RX ADMIN — BUPROPION HYDROCHLORIDE 300 MG: 150 TABLET, FILM COATED, EXTENDED RELEASE ORAL at 08:23

## 2018-04-16 RX ADMIN — INSULIN LISPRO 5 UNITS: 100 INJECTION, SOLUTION INTRAVENOUS; SUBCUTANEOUS at 12:57

## 2018-04-16 RX ADMIN — OXYCODONE HYDROCHLORIDE 5 MG: 5 TABLET ORAL at 10:27

## 2018-04-16 RX ADMIN — INSULIN LISPRO 5 UNITS: 100 INJECTION, SOLUTION INTRAVENOUS; SUBCUTANEOUS at 08:21

## 2018-04-16 RX ADMIN — TOPIRAMATE 50 MG: 25 TABLET, FILM COATED ORAL at 08:23

## 2018-04-16 RX ADMIN — ACETAMINOPHEN 975 MG: 325 TABLET, FILM COATED ORAL at 05:35

## 2018-04-16 RX ADMIN — OXYCODONE HYDROCHLORIDE 2.5 MG: 5 TABLET ORAL at 17:19

## 2018-04-16 RX ADMIN — OXYCODONE HYDROCHLORIDE 5 MG: 5 TABLET ORAL at 14:25

## 2018-04-16 RX ADMIN — FLUTICASONE PROPIONATE AND SALMETEROL 1 PUFF: 50; 250 POWDER RESPIRATORY (INHALATION) at 08:22

## 2018-04-16 RX ADMIN — ENOXAPARIN SODIUM 40 MG: 40 INJECTION SUBCUTANEOUS at 13:47

## 2018-04-16 RX ADMIN — DILTIAZEM HYDROCHLORIDE 180 MG: 180 CAPSULE, EXTENDED RELEASE ORAL at 08:24

## 2018-04-16 RX ADMIN — TIOTROPIUM BROMIDE 18 MCG: 18 CAPSULE ORAL; RESPIRATORY (INHALATION) at 08:22

## 2018-04-16 RX ADMIN — GABAPENTIN 800 MG: 400 CAPSULE ORAL at 12:57

## 2018-04-16 NOTE — PROGRESS NOTES
Progress Note - Infectious Disease   Chloé Murillo 74 y o  female MRN: [id number]  Unit/Bed#: [facility] 932-01 Encounter: [id number]      Impression/Plan:  1   Low-grade fever-in the postoperative setting   Possible atelectasis   Possible hematoma   No clear infectious etiology appreciated   Patient has had a leukocytosis postoperatively but she did not have any labs done today   She has remained hemodynamically stable and nontoxic despite the low-grade fever  Lu Mccarty has been given a single dose of cefepime after blood cultures were obtained   The patient is not significantly immunosuppressed from her HIV as her CD4 count is 422   Procalcitonin level is low  The temperatures come down  Blood cultures negative thus far    -monitor off antibiotics for now  -follow-up blood cultures  -stressed incentive spirometry  -no additional ID workup for now     2   Thoracic disc herniation with cord compression-status post diskectomy and fusion   Some postoperative pain but does not appear to have any definitive postoperative complications   She had the drain removed today   She seems to be moving her legs well without difficulty   The wound incision is clean without erythema or drainage  -pain management  -close neurosurgical follow-up  -serial exams     3   HIV-not significantly immunosuppressed with a CD4 count of 422   According to the patient she has not been on treatment in many years but is followed at [address] [place] [address] activity office in [facility]   -follow up at AIDS activity office for further management after discharge  -no need for any kind of antibiotic prophylaxis at this time     4   COPD-no clinical evidence of an acute exacerbation at this time    Antibiotics:  None    Subjective:  Patient has no fever, chills, sweats; no nausea, vomiting, diarrhea; no cough, shortness of breath; no pain  No new symptoms  Feels constipated      Objective:  Vitals:  HR:  [77-78] 77  Resp:  [20-21] 21  BP: ()/(54-59) 103/54  SpO2:  [97 %-99 %] 97 %  Temp (24hrs), Av 9 °F (37 2 °C), Min:98 8 °F (37 1 °C), Max:99 °F (37 2 °C)  Current: Temperature: 99 °F (37 2 °C)    Physical Exam:   General Appearance:  Alert, interactive, nontoxic, no acute distress  Throat: Oropharynx moist without lesions  Lungs:   Decreased breath sounds bilaterally; no wheezes, rhonchi or rales; respirations unlabored   Heart:  RRR; no murmur, rub or gallop   Abdomen:   Soft, non-tender, non-distended, positive bowel sounds  Extremities: No clubbing, cyanosis or edema   Skin: No new rashes or lesions  No draining wounds noted  Labs, Imaging, & Other studies:   All pertinent labs and imaging studies were personally reviewed    Results from last 7 days  Lab Units 18  0549 04/15/18  0442 18  0558   WBC Thousand/uL 10 23* 9 97 10 72*   HEMOGLOBIN g/dL 7 9* 7 8* 7 9*   PLATELETS Thousands/uL 266 233 203       Results from last 7 days  Lab Units 18  0549 04/15/18  0442 18  0558   SODIUM mmol/L 139 137 139   POTASSIUM mmol/L 4 1 4 2 4 2   CHLORIDE mmol/L 105 105 107   CO2 mmol/L 27 27 26   ANION GAP mmol/L 7 5 6   BUN mg/dL 13 14 16   CREATININE mg/dL 0 62 0 68 0 58*   EGFR ml/min/1 73sq m 109 106 112   GLUCOSE RANDOM mg/dL 80 92 115   CALCIUM mg/dL 9 5 9 4 9 6   AST U/L 51* 42 35   ALT U/L 82* 68 61   ALK PHOS U/L 236* 200* 174*   TOTAL PROTEIN g/dL 6 9 6 7 6 6   BILIRUBIN TOTAL mg/dL 0 44 0 36 0 61       Results from last 7 days  Lab Units 18  1914 18  1904 18  2224 18  2223 04/10/18  1518   BLOOD CULTURE  No Growth at 48 hrs  No Growth at 48 hrs  No Growth at 48 hrs  No Growth at 48 hrs    --    MRSA CULTURE ONLY   --   --   --   --  No Methicillin Resistant Staphlyococcus aureus (MRSA) isolated

## 2018-04-16 NOTE — DISCHARGE INSTRUCTIONS
Posterior Lumbar Decompression and Fusion/Fixation Discharge instructions:     Please keep incision clean and dry  Avoid applying creams, lotions or antiseptics to incision area   Allow steri-strips to fall off  May remove them completely in 10 days   Check the wound daily  If the incision becomes red, swollen, tender, warm, or has increased drainage please notify MD immediately   May shower 5 days after surgery, but do not soak in a tub and no swimming   No bending or twisting at the waist  No heavy lifting greater than 5 - 10lbs   No prolonged sitting greater than 30 minutes, but may walk as tolerated   Take few short walks each day  Increase your walking time as you heal    Please wear brace when out of bed for 6 weeks or until cleared by Neurosurgery  May put it on while sitting at bedside   Please take pain medications to relieved incision pain, and muscle relaxants to prevent spasms as directed by MD or Extender  See Medication reconciliation completed at time of discharge   Please take over the counter stool softeners such as colace or senna-s to avoid constipation while on narcotics   No driving for two weeks or while on narcotics or muscle relaxants   Returning to work will be discussed at follow up appt   Do not take ibuprofen, Naproxen/Aleve or any NSAID: May take Tylenol instead   If taking Coumadin, Aspirin, or Plavix, you may resume these medications once cleared by Neurosurgery   Return for your follow up appointment in 2 weeks for an incision check and staple removal as scheduled  Follow-up 6 weeks after surgery with repeat upright x-rays to be completed 2-3 days prior to visit at any [facility]                              **Please notify MD if you experience a fever 101  F or have increased back or leg pain, numbness and/or weakness in your legs**

## 2018-04-16 NOTE — SOCIAL WORK
Cm reviewed patient during care coordination rounds  Patient is medically stable for d/c today  Cm confirmed that patient will have a bed at [facility] today  Cm informed patient and she is in agreement  Cm arranged a BLS transport for 4:30pm today with [facility]  Cm left a voice message with patient's daughter about d/c plan and transport time  Cm also informed patient's RN and facility

## 2018-04-16 NOTE — DISCHARGE SUMMARY
Discharge- Shady Lam 1950, 74 y o  female MRN: [id number]    Unit/Bed#: [facility] 932-01 Encounter: [id number]    Primary Care Provider: González Patino DO   Date and time admitted to hospital: 4/4/2018  5:34 PM        Hyperlipidemia   Assessment & Plan    Stable  Continue meds        Bipolar disorder, current episode depressed, moderate (HCC)   Assessment & Plan    Continue above mgt        Lumbar stenosis   Assessment & Plan    - imaging revealed stenosis at the L4-L5 level  - no emergent neurosurgery intervention  - PRN pain control  -chronic back pain reported          Tobacco use   Assessment & Plan    - transdermal nicotine patch   - counseled on tobacco cessation          COPD (chronic obstructive pulmonary disease) - Chronic hypoxic respiratory failure   Assessment & Plan    - stable/asymptomatic  - continue Advair/Spiriva regimen          Type 2 diabetes mellitus with hyperglycemia (HCC)   Assessment & Plan    - home Metformin regimen held during hospitalization  - HgA1c of 6 9%  -continue BG monitoring  -ISS          Hypertension   Assessment & Plan    - low-sodium diet encouraged  -BPcontrolled  - continue Cardizem          HIV (human immunodeficiency virus infection)    Assessment & Plan    -HIV +  -Cd4% 26 4  -diagnosed over 30 years ago   -no HAART at this time  -follow with Omega Zhao in [facility]          * Thoracic disc herniation   Assessment & Plan    - transferred from the [facility] for neurosurgery evaluation  - MRI revealed disc herniation at the T9-T10 level - and her CT imaging confirmed a similar findings with stenosis and to a lesser degree at the T7-T8 and T8-T9 levels   -s/p  POD#6 T9-11 fusion with T9-10 discectomy  -f/u NS recs- lastJP drain removed 4/13  -pain control -  F/u recs by acute pain service  Fevers  resolved  Wbcs wnl  Blood cultures 4/12 NG  ID- continue to monitor off abx            Discharging Physician / Practitioner: Lele Petersen MD  PCP: González Patino DO  Admission Date:   Admission Orders     Ordered        04/04/18 1819  Inpatient Admission  Once             Discharge Date: 04/16/18    Resolved Problems  Date Reviewed: 4/16/2018          Resolved    Leukocytosis 4/15/2018     Resolved by  Lindsay Figueroa MD    Bipolar disorder ([facility] [address] ) 4/5/2018     Resolved by  Wiliam Diez MD          Consultations During Hospital Stay:  Neurosurgery  Infectious Disease  Pulmonology  Behavioral medicine  Orthopedics  Anesthesiologist    Procedures Performed:     Thoracic spinal fusion  Significant Findings / Test Results:   CT spine thoracic 04/05/2018:Marked degeneration of the thoracic spine as described   Multifactorial canal and foraminal stenosis at the T9-10 level as described   Changes, although less significant at T8-9 and T7-T8  MRI thoracic spine without contrast 04/04/2018  There is broad-based the thoracic disc herniation at T9-T10 level along with the ligamentous hypertrophy with severe central canal narrowing with associated the increased signal intensity within the cord which may be due to cord edema,/gliosis related to cord impingement   There is associated bilateral moderate-to-severe foraminal narrowing   This was also seen on the previous MRI of the lumbar spine performed on March 31, 2018 Mild central canal narrowing at the T8-9 level with the broad-based ridging with disc and osteophyte complex Mild ridging at T7-8 level without significant central canal narrowing or foraminal narrowing  Moderate to severe bilateral foraminal narrowing at T10-11, T9-10 and severe left foraminal narrowing at T11-12  Incidental Findings:   None  Test Results Pending at Discharge (will require follow up):    None   Outpatient Tests Requested:  None  Complications:  None  Reason for Admission:  Transfer from [place] [address] for neurosurgery evaluation    Hospital Course:     Dago Alberto is a 74 y o  female patient who originally presented to the hospital on 4/4/2018 due to transfer from [place] [address] for neurosurgical evaluation for thoracic intervertebral disc herniation with bilateral lower extremity weakness and ambulatory dysfunction  Patient is noted to have chronic lower back pain, associated with 3-4 falls within the last 3 weeks prior to admission, with sudden onset weakness in her legs predisposing her to these falls  MRI completed in [facility] showed thoracic and lumbar spine disc herniation as well as lumbar Neural foraminal stenosis  Patient was transferred [facility] for neurosurgical evaluation  Patient had T9-11 fusion with T9-10 diskectomy  Patient received physical therapy during hospitalization with recommendation for post acute inpatient rehab  Patient has underlying HIV, not on anti-retroviral treatment no anti-retroviral therapy was started during inpatient hospitalization infectious Disease followed the patient due to persistent fevers postoperatively, blood cultures unremarkable for acute infection and was recommended the patient follow up with her HIV doctor in [facility]  Patient received TL as 0 brace postoperatively pain control provided postoperatively  Leukocytosis persisted during postoperatively repeat chest x-ray showed scattered atelectasis, stable cardiomegaly, was recommended for patient to continue incentive spirometry/cpap  No antimicrobial therapy for now  Patient is medically stable to be discharged to rehab, follow-up Neurosurgery and Interventional neuro Radiology  Pulmonary Medicine recommended outpatient sleep study for evaluation of obstructive sleep apnea  Please see above list of diagnoses and related plan for additional information       Condition at Discharge: good     Discharge Day Visit / Exam:     Subjective:   Vitals: Blood Pressure: 99/65 (04/16/18 0809)  Pulse: 92 (04/16/18 0809)  Temperature: 98 8 °F (37 1 °C) (04/16/18 0809)  Temp Source: Oral (04/16/18 0809)  Respirations: 21 (04/16/18 0809)  Height: 5' 2" (157 5 cm) (04/04/18 1736)  Weight - Scale: 90 7 kg (200 lb) (04/04/18 1736)  SpO2: 97 % (04/16/18 0809)  Exam:   Physical Exam   Constitutional: She is oriented to person, place, and time  No distress  HENT:   Head: Normocephalic and atraumatic  Mouth/Throat: Oropharynx is clear and moist    Eyes: EOM are normal  Pupils are equal, round, and reactive to light  Neck: Normal range of motion  No JVD present  Cardiovascular: Normal rate  Exam reveals no gallop and no friction rub  No murmur heard  Pulmonary/Chest: Effort normal and breath sounds normal  No respiratory distress  She has no wheezes  She has no rales  Abdominal: Soft  Bowel sounds are normal  She exhibits no distension  There is no tenderness  There is no rebound and no guarding  Musculoskeletal: Normal range of motion  She exhibits no edema  Neurological: She is alert and oriented to person, place, and time  No cranial nerve deficit  Skin: Skin is warm  No erythema  Psychiatric: She has a normal mood and affect  Her behavior is normal        Discussion with Family: yes    Discharge instructions/Information to patient and family:   See after visit summary for information provided to patient and family  Provisions for Follow-Up Care:  See after visit summary for information related to follow-up care and any pertinent home health orders  Disposition:     rehab with  [address] [place] [address] SNF:   · Not Applicable to this Patient - Not Applicable to this Patient    Planned Readmission: none     Discharge Statement:  I spent 35 minutes discharging the patient  This time was spent on the day of discharge  I had direct contact with the patient on the day of discharge   Greater than 50% of the total time was spent examining patient, answering all patient questions, arranging and discussing plan of care with patient as well as directly providing post-discharge instructions  Additional time then spent on discharge activities  Discharge Medications:  See after visit summary for reconciled discharge medications provided to patient and family        ** Please Note: This note has been constructed using a voice recognition system **

## 2018-04-16 NOTE — ASSESSMENT & PLAN NOTE
-HIV +  -Cd4% 26 4  -diagnosed over 30 years ago   -no HAART at this time  -follow with Cora Gregory in [facility]

## 2018-04-16 NOTE — ASSESSMENT & PLAN NOTE
- transferred from the [facility] for neurosurgery evaluation  - MRI revealed disc herniation at the T9-T10 level - and her CT imaging confirmed a similar findings with stenosis and to a lesser degree at the T7-T8 and T8-T9 levels   -s/p  POD#6 T9-11 fusion with T9-10 discectomy  -f/u NS recs- lastJP drain removed 4/13  -pain control -  F/u recs by acute pain service  Fevers  resolved  Wbcs wnl  Blood cultures 4/12 NG  ID- continue to monitor off abx

## 2018-04-18 LAB
BACTERIA BLD CULT: NORMAL

## 2018-04-19 ENCOUNTER — TELEPHONE (OUTPATIENT)
Dept: NEUROSURGERY | Facility: CLINIC | Age: 68
End: 2018-04-19

## 2018-04-19 NOTE — TELEPHONE ENCOUNTER
Per patients D/C notes found the patient had been d/c to [facility] for rehab  Spoke to Hal Nolasco her nurse who reported that she is doing well this AM - is sitting on the side of bed but unsure of patients mobility at this time  Said her incision is clean, dry, and in tact without signs of infection such as drainage, swelling, or fever, states she did not notice any sutures or staples when visualized earlier today - pain is currently well controlled  Advised the patient has an office visit here in the office for 2 week nurse visit for incision check 4/25/2018 1300  Provided address so transport can be arranged

## 2018-04-25 ENCOUNTER — CLINICAL SUPPORT (OUTPATIENT)
Dept: NEUROSURGERY | Facility: CLINIC | Age: 68
End: 2018-04-25

## 2018-04-25 VITALS
DIASTOLIC BLOOD PRESSURE: 78 MMHG | RESPIRATION RATE: 18 BRPM | SYSTOLIC BLOOD PRESSURE: 110 MMHG | WEIGHT: 250 LBS | BODY MASS INDEX: 46.01 KG/M2 | HEIGHT: 62 IN | TEMPERATURE: 97.9 F

## 2018-04-25 DIAGNOSIS — Z98.890 POST-OPERATIVE STATE: Primary | ICD-10-CM

## 2018-04-25 PROCEDURE — 99024 POSTOP FOLLOW-UP VISIT: CPT

## 2018-04-25 NOTE — PROGRESS NOTES
Post-Op Visit-Neurosurgery    Jayy Houser 74 y o  female MRN: [id number]    Chief Complaint  Patient presents post: T9/10 discectomy with T9-T11 fusion (N/A Spine Thoracic)    History of Present Illness  Patient presents for 2 week POV for incision check  Patient was brought to the office by transport from [facility] in wheelchair  She states that she is walking as tolerated during therapy  Patient reports that she is having 8/10 pain in her lower back, and the brace is very uncomfortable (noted brace not positioned correctly on patient)  She also stated that she has not had any falls or near falls since the surgery and feels that her leg strength has improved, also reports that the tingling and numbness felt in both of her legs presurgically has subsided  Has not had a shower since her transfer to facility, but is being "washed up" by the staff  Assessment  Wound Exam: Incision is clean, dry, and in tact, well approximated without heat, redness, drainage, or swelling  Some scabbed areas noted mid incision and at drain insertion site  Procedure  Staple/suture observation  location: Thoracic spine region   Procedure Note: Dissolvable sutures noted at the thoracic surgical site and nylon drain suture appears to have been removed already  Complications: None  Incision LOU  Discussion/Summary  Provided patient with instructions to give to facilty upon her return  Repositioned the [place] brace so it was supporting her lower back as it was noted to be too high on her back and not providing support in the correct area, advised her this could be contributing to her lower back discomfort  Advised the patient that if she was physically able to tolerate a shower she is able to do so at this time and it is encouraged  Explained however not to apply any lotions, creams, or ointments, & not to submerge in water such as during tub bath or swimming   She is to maintain activity restrictions including no push/pull/lift >5-10 pounds and continue wearing [place] brace when OOB until cleared by the surgeon at 6 week POV  Reviewed incision care with patient including daily observation for s/s infection such as increased erythema, edema, drainage, dehiscence of incision or fever >101  Advised no NSAID medications at this time until cleared by neurosurgery  Verified date/time/location of upcoming POV and reminded her to complete x-rays prior to this visit on 5/23/2018  Sent paper Rx for xray with her in case facility decides to complete imaging themselves before this visit

## 2018-05-23 ENCOUNTER — TELEPHONE (OUTPATIENT)
Dept: NEUROSURGERY | Facility: CLINIC | Age: 68
End: 2018-05-23

## 2018-05-23 NOTE — TELEPHONE ENCOUNTER
05/23/2018-CALLED [facility], [facility], AND BOTH BU[facility] FOR [facility] IN [place]- NO PT FOUND  CALLED PT'S PHONE# LISTED IN CHART AND LEFT MESSAGE ON MACHINE TO RESCHEDULE TODAY'S 6WK POV "NO SHOW" APPT

## 2018-06-25 ENCOUNTER — TELEPHONE (OUTPATIENT)
Dept: NEUROSURGERY | Facility: CLINIC | Age: 68
End: 2018-06-25

## 2018-06-26 ENCOUNTER — TELEPHONE (OUTPATIENT)
Dept: NEUROSURGERY | Facility: CLINIC | Age: 68
End: 2018-06-26

## 2018-06-26 NOTE — TELEPHONE ENCOUNTER
Called nargis brown on primary contacted number to attempt rescheduling - was d/c from [address] [place] [address] care 5/3 home with her daughter - got no answer left message with direct extension to reschedule her appt

## 2018-06-28 NOTE — TELEPHONE ENCOUNTER
Called Harsha Royal patients primary contact in an attempt to get her appt rescheduled  Left message on machine to call back direct extension

## 2018-07-02 NOTE — TELEPHONE ENCOUNTER
Made attempt to contact the patient and her daughter on primary contact number  Left message with direct extension to call back  Will await callback if none received will send a letter informing them we have been trying to reach

## 2018-07-03 NOTE — TELEPHONE ENCOUNTER
No callback received, sent letter to patient letting her know we are trying to reach her and get her rescheduled

## 2018-07-17 NOTE — TELEPHONE ENCOUNTER
After reviewing care everywhere found another contact number made attempt to reach her there [phone] - got a response from her "caregiver" reporting she is in the hospital currently with possible d/c tomorrow  She will have the patient call the office after d/c unable to release info to this person since she is not listed as a  for this patient

## 2018-10-10 ENCOUNTER — TELEPHONE (OUTPATIENT)
Dept: NEUROSURGERY | Facility: CLINIC | Age: 68
End: 2018-10-10

## 2018-10-10 NOTE — TELEPHONE ENCOUNTER
Received call from patient's caregiver, Juan Luis Brown, requesting an appt for patient with Dr Benna Lefort  After some investigating, saw that patient never followed up with our office after surgery  I informed Juan Luis Brown that patient needs to have her X-ray completed prior to her appt with Dr Benna Lefort and she states that she will have patient get the x-ray done on Monday  Appt with Dr Benna Lefort scheduled for 10/24/18 @ 1130am (needs morning due to only being with the patient until 2pm)  She was appreciative for the call back

## 2018-10-23 ENCOUNTER — TELEPHONE (OUTPATIENT)
Dept: NEUROSURGERY | Facility: CLINIC | Age: 68
End: 2018-10-23

## 2018-10-23 NOTE — TELEPHONE ENCOUNTER
Spoke to chris about Tawana's appointment tomorrow 10/24/18 to remind her to get xrays  Westonarik Mikey told me that she was recently in the hospital and is still in a rehab facility and isn't home so she wanted to reschedule  Rescheduled for 11/21/18  And will be sending home a letter to remind her of appointment and to complete xrays as well

## 2019-01-02 ENCOUNTER — OFFICE VISIT (OUTPATIENT)
Dept: NEUROSURGERY | Facility: CLINIC | Age: 69
End: 2019-01-02
Payer: MEDICARE

## 2019-01-02 VITALS
TEMPERATURE: 98.1 F | RESPIRATION RATE: 16 BRPM | SYSTOLIC BLOOD PRESSURE: 151 MMHG | DIASTOLIC BLOOD PRESSURE: 85 MMHG | HEART RATE: 86 BPM

## 2019-01-02 DIAGNOSIS — M51.24 THORACIC DISC HERNIATION: Primary | ICD-10-CM

## 2019-01-02 DIAGNOSIS — M48.061 NEURAL FORAMINAL STENOSIS OF LUMBAR SPINE: ICD-10-CM

## 2019-01-02 PROCEDURE — 99213 OFFICE O/P EST LOW 20 MIN: CPT | Performed by: NEUROLOGICAL SURGERY

## 2019-01-02 NOTE — PROGRESS NOTES
Patient Id: Efrain Story is a 79 y o  female        Handedness: Right     Assessment/Plan:    Diagnoses and all orders for this visit:    Thoracic disc herniation  -     Ambulatory referral to Pain Management; Future    Lumbar stenosis  -     Ambulatory referral to Pain Management; Future        Discussion Summary:   1  8m s/p T9/10 discectomy for myelopathy and urinary frequency  While it appears her urinary frequency/incontinece has largely improved, she continues to have right sided leg weakness and sensory changes  She complains of constant numbness in her right lateral thigh  He is largely wheelchair-bound due to a combination her leg and her significant respiratory distress from ambulation  I reviewed her lumbar MRI as well, and while this has some mild foraminal stenosis bilaterally I believe that this is likely to be addressed with conservative therapy and possible injections  I do not believe that she is a candidate for surgical intervention given her significant respiratory difficulties and poor state health  In addition I believe that some of this dysfunction is related to her spinal cord injury  I have explained this to her  Given the length of time that has passed since surgery this is likely her new baseline  I explained this to her as well  At this juncture I will see her back as needed  I spent 40 minutes with the patient greater than 50 percent of which was spent in counseling  Chief Complaint: Follow-up (6 month follow up )        HPI:   72-year-old female who originally presented in April of 2018 with progressive gait difficulty and urinary incontinence and was found to have a T9-T10 thoracic disc with spinal cord compression and spinal cord signal change  For this reason she underwent a T9-11 instrumented fusion and transpedicular diskectomy of 9/10  Preoperatively she was having significant right sided leg sensory changes, weakness, and urinary incontinence    Per report she states that her urinary incontinence has improved  She still has numbness which is constant along the lateral aspect of her right thigh a decreased sensation  She has some weakness in that leg as well  She is minimally ambulatory at baseline  She has oxygen at home  She is transitioning to an electric wheelchair for which she has her evaluation short coming  She has frequent low back pain, but no complaints of radiculopathy  She is able to ambulate to the bathroom the patient short distance  Review of systems obtained by the MA reviewed and updated below  Review of Systems   Constitutional: Negative  HENT: Negative  Eyes: Negative  Respiratory: Positive for shortness of breath (asthma)  Negative for apnea, cough, choking, chest tightness, wheezing and stridor  Cardiovascular: Negative  Gastrointestinal: Negative  Endocrine: Negative  Genitourinary: Negative  Musculoskeletal: Positive for back pain (mid to lower back pain) and gait problem (shakey)  Negative for arthralgias, joint swelling, myalgias, neck pain and neck stiffness  Skin: Negative  Allergic/Immunologic: Negative  Neurological: Positive for numbness (sometimes in hands)  Negative for dizziness, tremors, seizures, syncope, facial asymmetry, speech difficulty, weakness, light-headedness and headaches  Hematological: Negative  Psychiatric/Behavioral: Negative  Physical Exam  Vitals:    01/02/19 1009   BP: 151/85   Pulse: 86   Resp: 16   Temp: 98 1 °F (36 7 °C)   She is obese  She is out of breath frequently  She is in wheelchair Affect is appropriate  There is no height or weight on file to calculate BMI    She is awake alert and oriented  Hearing and vision are grossly intact  Her pupils are equal round reactive to light  Her extraocular movements are intact  Her face is symmetric  Tongue is midline  Facial sensation is intact and symmetric throughout  Shoulder shrug is 5/5    There is no drift or dysmetria      She has full strength in her bilateral upper extremities  her right lower extremity is weaker than her left  She has approximately a 3/5 for right hip flexion, knee extension, knee flexion, plantar flexion, dorsiflexion, and plantar flexion are all 4+ out of 5  On her left she has 4+ for hip flexion and 4+ to 5-distally throughout  She has decreased sensation along the right lateral aspect of her leg  There is extinction to simultaneous touch  However unilateral light touch can be felt bilaterally  She has decreased sensation to pinprick on her right lower extremity  Her patellar reflexes are 2+ symmetric                                Her heart rate is regular  Her breath sounds are clear  2+ radial pulses    Incision well healed         The following portions of the patient's history were reviewed and updated as appropriate: allergies, current medications, past family history, past medical history, past social history, past surgical history and problem list     Active Ambulatory Problems     Diagnosis Date Noted    Arthritis 03/24/2016    HIV (human immunodeficiency virus infection)  03/24/2016    Osteoarthritis 03/24/2016    Hypertension     Type 2 diabetes mellitus with hyperglycemia ([facility] )     COPD (chronic obstructive pulmonary disease) - Chronic hypoxic respiratory failure     B[facility] disorder [facility]     Tobacco use     Shortness of breath 01/31/2017    Back pain 03/30/2018    Hip pain 03/30/2018    Morbid obesity [facility] ) 04/02/2018    Ambulatory dysfunction     Thoracic disc herniation 04/04/2018    Lumbar stenosis 04/04/2018    Urinary frequency 04/04/2018    Bipolar disorder, current episode depressed, moderate ([facility] ) 04/05/2018    Hyperlipidemia 04/07/2018     Resolved Ambulatory Problems     Diagnosis Date Noted    COPD exacerbation ([facility] ) 03/24/2016    Bronchial asthma 03/24/2016    Type 2 diabetes mellitus ([facility] ) 03/24/2016    HTN (hypertension) 03/24/2016    DVT (deep vein thrombosis) in pregnancy ([place] [address] ) 03/24/2016    HIV disease ([place] [address] )     Asthma     Asthma 01/31/2017    Hypertension 01/31/2017    Depression 01/31/2017    Leukocytosis 04/05/2018    Bipolar disorder ([place] [address] ) 04/05/2018     Past Medical History:   Diagnosis Date    Bipolar 1 disorder ([place] [address] )     Cardiac disease     COPD (chronic obstructive pulmonary disease) ([place] [address] )     Diabetes mellitus ([place] [address] )     Disease of thyroid gland     HIV (human immunodeficiency virus infection) ([place] [address] ) 3/24/2016    Hypertension     Osteoarthritis     Tobacco abuse        Past Surgical History:   Procedure Laterality Date    HERNIA REPAIR      LUMBAR FUSION N/A 4/10/2018    Procedure: T9/10 discectomy with T9-T11 fusion;  Surgeon: Diego Li MD;  [facility];  Service: Neurosurgery    THYROIDECTOMY           Current Outpatient Prescriptions:     aspirin 81 MG tablet, Take 81 mg by mouth daily  , Disp: , Rfl:     atorvastatin (LIPITOR) 20 mg tablet, Take 20 mg by mouth daily, Disp: , Rfl:     fluticasone-salmeterol (ADVAIR) 250-50 mcg/dose, Inhale 1 puff every 12 (twelve) hours  , Disp: , Rfl:     gabapentin (NEURONTIN) 400 mg capsule, Take 800 mg by mouth 3 (three) times a day  , Disp: , Rfl:     levothyroxine 100 mcg tablet, Take 100 mcg by mouth daily  , Disp: , Rfl:     metFORMIN (GLUCOPHAGE) 500 mg tablet, Take 500 mg by mouth daily with breakfast  , Disp: , Rfl:     montelukast (SINGULAIR) 10 mg tablet, Take 10 mg by mouth daily at bedtime, Disp: , Rfl:     QUEtiapine (SEROquel) 100 mg tablet, Take 200 mg by mouth daily at bedtime  , Disp: , Rfl:     tiotropium (SPIRIVA) 18 mcg inhalation capsule, Place 18 mcg into inhaler and inhale daily  , Disp: , Rfl:     buPROPion (WELLBUTRIN XL) 300 mg 24 hr tablet, Take 300 mg by mouth daily, Disp: , Rfl:     cholecalciferol (VITAMIN D3) 1,000 units tablet, Take 1,000 Units by mouth daily, Disp: , Rfl:     diltiazem (CARDIZEM CD) 180 mg 24 hr capsule, Take 180 mg by mouth daily, Disp: , Rfl:     insulin glargine (LANTUS) 100 units/mL subcutaneous injection, Inject 22 Units under the skin daily at bedtime (Patient not taking: Reported on 1/2/2019 ), Disp: 10 mL, Rfl: 0    insulin lispro (HumaLOG) 100 units/mL injection, Inject 5 Units under the skin 3 (three) times a day with meals (Patient not taking: Reported on 1/2/2019 ), Disp: 10 mL, Rfl: 0    lidocaine (LIDODERM) 5 %, Place 1 patch on the skin daily Remove & Discard patch within 12 hours or as directed by MD (Patient not taking: Reported on 1/2/2019 ), Disp: 30 patch, Rfl: 0    methocarbamol (ROBAXIN) 750 mg tablet, Take 1 tablet (750 mg total) by mouth every 6 (six) hours as needed for muscle spasms (Patient not taking: Reported on 1/2/2019 ), Disp: 30 tablet, Rfl: 0    nicotine (NICODERM CQ) 14 mg/24hr TD 24 hr patch, Place 1 patch on the skin daily (Patient not taking: Reported on 1/2/2019 ), Disp: 28 patch, Rfl: 0    topiramate (TOPAMAX) 50 MG tablet, Take 50 mg by mouth every 12 (twelve) hours, Disp: , Rfl:     zolpidem (AMBIEN) 5 mg tablet, Take 1 tablet (5 mg total) by mouth daily at bedtime as needed for sleep for up to 10 days, Disp: 30 tablet, Rfl: 0    Results/Data: We reviewed her most recent x-rays as well as her imaging upon presentation

## 2019-05-10 ENCOUNTER — DOCUMENTATION (OUTPATIENT)
Dept: NEUROSURGERY | Facility: CLINIC | Age: 69
End: 2019-05-10

## 2020-05-27 ENCOUNTER — APPOINTMENT (EMERGENCY)
Dept: RADIOLOGY | Facility: HOSPITAL | Age: 70
DRG: 191 | End: 2020-05-27
Payer: COMMERCIAL

## 2020-05-27 ENCOUNTER — HOSPITAL ENCOUNTER (INPATIENT)
Facility: HOSPITAL | Age: 70
LOS: 2 days | Discharge: HOME HEALTH | DRG: 191 | End: 2020-05-29
Attending: EMERGENCY MEDICINE | Admitting: INTERNAL MEDICINE
Payer: COMMERCIAL

## 2020-05-27 ENCOUNTER — HOSPITAL ENCOUNTER (EMERGENCY)
Facility: HOSPITAL | Age: 70
Discharge: HOME | DRG: 191 | End: 2020-05-27
Attending: EMERGENCY MEDICINE | Admitting: EMERGENCY MEDICINE
Payer: COMMERCIAL

## 2020-05-27 VITALS
OXYGEN SATURATION: 96 % | WEIGHT: 233.47 LBS | BODY MASS INDEX: 42.7 KG/M2 | TEMPERATURE: 98.5 F | SYSTOLIC BLOOD PRESSURE: 134 MMHG | HEART RATE: 89 BPM | RESPIRATION RATE: 18 BRPM | DIASTOLIC BLOOD PRESSURE: 60 MMHG
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
## 2020-05-27 DIAGNOSIS — R07.81 RIB PAIN ON RIGHT SIDE: Primary | ICD-10-CM

## 2020-05-27 DIAGNOSIS — M51.24 THORACIC DISC HERNIATION: ICD-10-CM

## 2020-05-27 DIAGNOSIS — J44.1 COPD WITH ACUTE EXACERBATION (HCC): ICD-10-CM
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
## 2020-05-27 DIAGNOSIS — J44.1 COPD EXACERBATION (HCC): Primary | ICD-10-CM

## 2020-05-27 DIAGNOSIS — R07.9 CHEST PAIN: ICD-10-CM

## 2020-05-27 DIAGNOSIS — E11.65 TYPE 2 DIABETES MELLITUS WITH HYPERGLYCEMIA, WITHOUT LONG-TERM CURRENT USE OF INSULIN (HCC): ICD-10-CM

## 2020-05-27 DIAGNOSIS — M54.9 BACK PAIN: ICD-10-CM

## 2020-05-27 DIAGNOSIS — B20 HIV INFECTION, UNSPECIFIED SYMPTOM STATUS (HCC): Chronic | ICD-10-CM
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
## 2020-05-27 PROBLEM — F17.200 TOBACCO DEPENDENCE: Chronic | Status: ACTIVE | Noted: 2020-05-27

## 2020-05-27 PROBLEM — G47.33 OSA ON CPAP: Chronic | Status: ACTIVE | Noted: 2020-05-27

## 2020-05-27 PROBLEM — I48.0 PAROXYSMAL ATRIAL FIBRILLATION (HCC): Chronic | Status: ACTIVE | Noted: 2020-05-27

## 2020-05-27 PROBLEM — Z99.89 OSA ON CPAP: Chronic | Status: ACTIVE | Noted: 2020-05-27

## 2020-05-27 LAB
ANION GAP SERPL CALCULATED.3IONS-SCNC: 11 MMOL/L (ref 4–13)
BASOPHILS # BLD AUTO: 0.05 THOUSANDS/ΜL (ref 0–0.1)
BASOPHILS NFR BLD AUTO: 1 % (ref 0–1)
BUN SERPL-MCNC: 17 MG/DL (ref 5–25)
CALCIUM SERPL-MCNC: 10.4 MG/DL (ref 8.3–10.1)
CHLORIDE SERPL-SCNC: 106 MMOL/L (ref 100–108)
CO2 SERPL-SCNC: 29 MMOL/L (ref 21–32)
CREAT SERPL-MCNC: 0.92 MG/DL (ref 0.6–1.3)
EOSINOPHIL # BLD AUTO: 0.16 THOUSAND/ΜL (ref 0–0.61)
EOSINOPHIL NFR BLD AUTO: 3 % (ref 0–6)
ERYTHROCYTE [DISTWIDTH] IN BLOOD BY AUTOMATED COUNT: 14.8 % (ref 11.6–15.1)
GFR SERPL CREATININE-BSD FRML MDRD: 75 ML/MIN/1.73SQ M
GLUCOSE SERPL-MCNC: 99 MG/DL (ref 65–140)
HCT VFR BLD AUTO: 38.3 % (ref 34.8–46.1)
HGB BLD-MCNC: 11.3 G/DL (ref 11.5–15.4)
IMM GRANULOCYTES # BLD AUTO: 0.01 THOUSAND/UL (ref 0–0.2)
IMM GRANULOCYTES NFR BLD AUTO: 0 % (ref 0–2)
LYMPHOCYTES # BLD AUTO: 2.64 THOUSANDS/ΜL (ref 0.6–4.47)
LYMPHOCYTES NFR BLD AUTO: 47 % (ref 14–44)
MCH RBC QN AUTO: 28.2 PG (ref 26.8–34.3)
MCHC RBC AUTO-ENTMCNC: 29.5 G/DL (ref 31.4–37.4)
MCV RBC AUTO: 96 FL (ref 82–98)
MONOCYTES # BLD AUTO: 0.47 THOUSAND/ΜL (ref 0.17–1.22)
MONOCYTES NFR BLD AUTO: 8 % (ref 4–12)
NEUTROPHILS # BLD AUTO: 2.28 THOUSANDS/ΜL (ref 1.85–7.62)
NEUTS SEG NFR BLD AUTO: 41 % (ref 43–75)
NRBC BLD AUTO-RTO: 0 /100 WBCS
NT-PROBNP SERPL-MCNC: 65 PG/ML
PLATELET # BLD AUTO: 327 THOUSANDS/UL (ref 149–390)
PMV BLD AUTO: 9.1 FL (ref 8.9–12.7)
POTASSIUM SERPL-SCNC: 3.6 MMOL/L (ref 3.5–5.3)
RBC # BLD AUTO: 4.01 MILLION/UL (ref 3.81–5.12)
SARS-COV-2 RNA RESP QL NAA+PROBE: NEGATIVE
SODIUM SERPL-SCNC: 146 MMOL/L (ref 136–145)
TROPONIN I SERPL-MCNC: <0.02 NG/ML
WBC # BLD AUTO: 5.61 THOUSAND/UL (ref 4.31–10.16)

## 2020-05-27 PROCEDURE — 93005 ELECTROCARDIOGRAM TRACING: CPT

## 2020-05-27 PROCEDURE — 83880 ASSAY OF NATRIURETIC PEPTIDE: CPT | Performed by: PHYSICIAN ASSISTANT

## 2020-05-27 PROCEDURE — 84484 ASSAY OF TROPONIN QUANT: CPT | Performed by: PHYSICIAN ASSISTANT

## 2020-05-27 PROCEDURE — 94640 AIRWAY INHALATION TREATMENT: CPT

## 2020-05-27 PROCEDURE — 99285 EMERGENCY DEPT VISIT HI MDM: CPT

## 2020-05-27 PROCEDURE — 99283 EMERGENCY DEPT VISIT LOW MDM: CPT

## 2020-05-27 PROCEDURE — 82948 REAGENT STRIP/BLOOD GLUCOSE: CPT

## 2020-05-27 PROCEDURE — 85025 COMPLETE CBC W/AUTO DIFF WBC: CPT | Performed by: PHYSICIAN ASSISTANT

## 2020-05-27 PROCEDURE — 96365 THER/PROPH/DIAG IV INF INIT: CPT

## 2020-05-27 PROCEDURE — 99223 1ST HOSP IP/OBS HIGH 75: CPT | Performed by: INTERNAL MEDICINE

## 2020-05-27 PROCEDURE — 94640 AIRWAY INHALATION TREATMENT: CPT | Performed by: PHYSICIAN ASSISTANT

## 2020-05-27 PROCEDURE — 99285 EMERGENCY DEPT VISIT HI MDM: CPT | Performed by: EMERGENCY MEDICINE

## 2020-05-27 PROCEDURE — 87635 SARS-COV-2 COVID-19 AMP PRB: CPT | Performed by: PHYSICIAN ASSISTANT

## 2020-05-27 PROCEDURE — 99285 EMERGENCY DEPT VISIT HI MDM: CPT | Performed by: PHYSICIAN ASSISTANT

## 2020-05-27 PROCEDURE — 80048 BASIC METABOLIC PNL TOTAL CA: CPT | Performed by: PHYSICIAN ASSISTANT

## 2020-05-27 PROCEDURE — 96372 THER/PROPH/DIAG INJ SC/IM: CPT

## 2020-05-27 PROCEDURE — 71101 X-RAY EXAM UNILAT RIBS/CHEST: CPT

## 2020-05-27 PROCEDURE — 36415 COLL VENOUS BLD VENIPUNCTURE: CPT | Performed by: PHYSICIAN ASSISTANT

## 2020-05-27 RX ORDER — ALBUTEROL SULFATE 90 UG/1
2 AEROSOL, METERED RESPIRATORY (INHALATION) ONCE
Status: COMPLETED | OUTPATIENT
Start: 2020-05-27 | End: 2020-05-27

## 2020-05-27 RX ORDER — MONTELUKAST SODIUM 10 MG/1
10 TABLET ORAL
Status: DISCONTINUED | OUTPATIENT
Start: 2020-05-27 | End: 2020-05-29 | Stop reason: HOSPADM

## 2020-05-27 RX ORDER — ATORVASTATIN CALCIUM 20 MG/1
20 TABLET, FILM COATED ORAL DAILY
Status: DISCONTINUED | OUTPATIENT
Start: 2020-05-28 | End: 2020-05-29 | Stop reason: HOSPADM

## 2020-05-27 RX ORDER — SODIUM CHLORIDE FOR INHALATION 0.9 %
3 VIAL, NEBULIZER (ML) INHALATION
Status: DISCONTINUED | OUTPATIENT
Start: 2020-05-28 | End: 2020-05-29 | Stop reason: HOSPADM

## 2020-05-27 RX ORDER — PREDNISONE 20 MG/1
40 TABLET ORAL ONCE
Status: COMPLETED | OUTPATIENT
Start: 2020-05-27 | End: 2020-05-27

## 2020-05-27 RX ORDER — TIZANIDINE 2 MG/1
2 TABLET ORAL EVERY 8 HOURS PRN
Qty: 10 TABLET | Refills: 0 | Status: SHIPPED | OUTPATIENT
Start: 2020-05-27

## 2020-05-27 RX ORDER — GABAPENTIN 300 MG/1
600 CAPSULE ORAL 3 TIMES DAILY
Status: DISCONTINUED | OUTPATIENT
Start: 2020-05-27 | End: 2020-05-29 | Stop reason: HOSPADM

## 2020-05-27 RX ORDER — IBUPROFEN 400 MG/1
400 TABLET ORAL EVERY 6 HOURS PRN
Qty: 30 TABLET | Refills: 0 | Status: SHIPPED | OUTPATIENT
Start: 2020-05-27

## 2020-05-27 RX ORDER — TOPIRAMATE 25 MG/1
50 TABLET ORAL EVERY 12 HOURS SCHEDULED
Status: DISCONTINUED | OUTPATIENT
Start: 2020-05-27 | End: 2020-05-29 | Stop reason: HOSPADM

## 2020-05-27 RX ORDER — DILTIAZEM HYDROCHLORIDE 180 MG/1
180 CAPSULE, COATED, EXTENDED RELEASE ORAL DAILY
Status: DISCONTINUED | OUTPATIENT
Start: 2020-05-28 | End: 2020-05-29 | Stop reason: HOSPADM

## 2020-05-27 RX ORDER — ONDANSETRON 2 MG/ML
4 INJECTION INTRAMUSCULAR; INTRAVENOUS EVERY 6 HOURS PRN
Status: DISCONTINUED | OUTPATIENT
Start: 2020-05-27 | End: 2020-05-29 | Stop reason: HOSPADM

## 2020-05-27 RX ORDER — PREDNISONE 20 MG/1
40 TABLET ORAL DAILY
Status: DISCONTINUED | OUTPATIENT
Start: 2020-05-28 | End: 2020-05-28

## 2020-05-27 RX ORDER — ALBUTEROL SULFATE 2.5 MG/3ML
2.5 SOLUTION RESPIRATORY (INHALATION) ONCE
Status: COMPLETED | OUTPATIENT
Start: 2020-05-27 | End: 2020-05-27

## 2020-05-27 RX ORDER — ACETAMINOPHEN 325 MG/1
650 TABLET ORAL EVERY 6 HOURS PRN
Status: DISCONTINUED | OUTPATIENT
Start: 2020-05-27 | End: 2020-05-29 | Stop reason: HOSPADM

## 2020-05-27 RX ORDER — QUETIAPINE FUMARATE 200 MG/1
200 TABLET, FILM COATED ORAL
Status: DISCONTINUED | OUTPATIENT
Start: 2020-05-27 | End: 2020-05-29 | Stop reason: HOSPADM

## 2020-05-27 RX ORDER — TIZANIDINE 2 MG/1
2 TABLET ORAL EVERY 8 HOURS PRN
Status: DISCONTINUED | OUTPATIENT
Start: 2020-05-27 | End: 2020-05-29 | Stop reason: HOSPADM

## 2020-05-27 RX ORDER — KETOROLAC TROMETHAMINE 30 MG/ML
15 INJECTION, SOLUTION INTRAMUSCULAR; INTRAVENOUS ONCE
Status: COMPLETED | OUTPATIENT
Start: 2020-05-27 | End: 2020-05-27

## 2020-05-27 RX ORDER — LEVALBUTEROL 1.25 MG/.5ML
1.25 SOLUTION, CONCENTRATE RESPIRATORY (INHALATION)
Status: DISCONTINUED | OUTPATIENT
Start: 2020-05-28 | End: 2020-05-29 | Stop reason: HOSPADM

## 2020-05-27 RX ORDER — FLUTICASONE FUROATE AND VILANTEROL 200; 25 UG/1; UG/1
1 POWDER RESPIRATORY (INHALATION) DAILY
Status: DISCONTINUED | OUTPATIENT
Start: 2020-05-28 | End: 2020-05-29 | Stop reason: HOSPADM

## 2020-05-27 RX ORDER — LEVOTHYROXINE SODIUM 0.1 MG/1
100 TABLET ORAL
Status: DISCONTINUED | OUTPATIENT
Start: 2020-05-28 | End: 2020-05-29 | Stop reason: HOSPADM

## 2020-05-27 RX ORDER — TRAMADOL HYDROCHLORIDE 50 MG/1
50 TABLET ORAL EVERY 6 HOURS PRN
Status: DISCONTINUED | OUTPATIENT
Start: 2020-05-27 | End: 2020-05-29 | Stop reason: HOSPADM

## 2020-05-27 RX ORDER — METHOCARBAMOL 500 MG/1
500 TABLET, FILM COATED ORAL ONCE
Status: COMPLETED | OUTPATIENT
Start: 2020-05-27 | End: 2020-05-27

## 2020-05-27 RX ORDER — MELATONIN
1000 DAILY
Status: DISCONTINUED | OUTPATIENT
Start: 2020-05-28 | End: 2020-05-29 | Stop reason: HOSPADM

## 2020-05-27 RX ORDER — MAGNESIUM SULFATE HEPTAHYDRATE 40 MG/ML
2 INJECTION, SOLUTION INTRAVENOUS ONCE
Status: COMPLETED | OUTPATIENT
Start: 2020-05-27 | End: 2020-05-27

## 2020-05-27 RX ORDER — ASPIRIN 81 MG/1
81 TABLET ORAL DAILY
Status: DISCONTINUED | OUTPATIENT
Start: 2020-05-28 | End: 2020-05-29 | Stop reason: HOSPADM

## 2020-05-27 RX ADMIN — ALBUTEROL SULFATE 2 PUFF: 90 AEROSOL, METERED RESPIRATORY (INHALATION) at 15:45

## 2020-05-27 RX ADMIN — PREDNISONE 40 MG: 20 TABLET ORAL at 17:53

## 2020-05-27 RX ADMIN — KETOROLAC TROMETHAMINE 15 MG: 30 INJECTION, SOLUTION INTRAMUSCULAR at 14:58

## 2020-05-27 RX ADMIN — IPRATROPIUM BROMIDE 0.5 MG: 0.5 SOLUTION RESPIRATORY (INHALATION) at 17:53

## 2020-05-27 RX ADMIN — MAGNESIUM SULFATE HEPTAHYDRATE 2 G: 40 INJECTION, SOLUTION INTRAVENOUS at 20:14

## 2020-05-27 RX ADMIN — ALBUTEROL SULFATE 2.5 MG: 2.5 SOLUTION RESPIRATORY (INHALATION) at 17:53

## 2020-05-27 RX ADMIN — ALBUTEROL SULFATE 2.5 MG: 2.5 SOLUTION RESPIRATORY (INHALATION) at 19:27

## 2020-05-27 RX ADMIN — METHOCARBAMOL TABLETS 500 MG: 500 TABLET, COATED ORAL at 14:58

## 2020-05-27 RX ADMIN — IPRATROPIUM BROMIDE 0.5 MG: 0.5 SOLUTION RESPIRATORY (INHALATION) at 19:27

## 2020-05-28 LAB
ANION GAP SERPL CALCULATED.3IONS-SCNC: 9 MMOL/L (ref 4–13)
ATRIAL RATE: 75 BPM
BASOPHILS # BLD AUTO: 0.02 THOUSANDS/ΜL (ref 0–0.1)
BASOPHILS NFR BLD AUTO: 0 % (ref 0–1)
BUN SERPL-MCNC: 16 MG/DL (ref 5–25)
CALCIUM SERPL-MCNC: 10.2 MG/DL (ref 8.3–10.1)
CHLORIDE SERPL-SCNC: 107 MMOL/L (ref 100–108)
CO2 SERPL-SCNC: 27 MMOL/L (ref 21–32)
CREAT SERPL-MCNC: 1.05 MG/DL (ref 0.6–1.3)
EOSINOPHIL # BLD AUTO: 0.01 THOUSAND/ΜL (ref 0–0.61)
EOSINOPHIL NFR BLD AUTO: 0 % (ref 0–6)
ERYTHROCYTE [DISTWIDTH] IN BLOOD BY AUTOMATED COUNT: 15 % (ref 11.6–15.1)
GFR SERPL CREATININE-BSD FRML MDRD: 64 ML/MIN/1.73SQ M
GLUCOSE SERPL-MCNC: 117 MG/DL (ref 65–140)
GLUCOSE SERPL-MCNC: 143 MG/DL (ref 65–140)
GLUCOSE SERPL-MCNC: 149 MG/DL (ref 65–140)
GLUCOSE SERPL-MCNC: 189 MG/DL (ref 65–140)
GLUCOSE SERPL-MCNC: 200 MG/DL (ref 65–140)
GLUCOSE SERPL-MCNC: 207 MG/DL (ref 65–140)
HCT VFR BLD AUTO: 34.9 % (ref 34.8–46.1)
HGB BLD-MCNC: 10.7 G/DL (ref 11.5–15.4)
IMM GRANULOCYTES # BLD AUTO: 0.04 THOUSAND/UL (ref 0–0.2)
IMM GRANULOCYTES NFR BLD AUTO: 1 % (ref 0–2)
LYMPHOCYTES # BLD AUTO: 1.19 THOUSANDS/ΜL (ref 0.6–4.47)
LYMPHOCYTES NFR BLD AUTO: 21 % (ref 14–44)
MCH RBC QN AUTO: 28.3 PG (ref 26.8–34.3)
MCHC RBC AUTO-ENTMCNC: 30.7 G/DL (ref 31.4–37.4)
MCV RBC AUTO: 92 FL (ref 82–98)
MONOCYTES # BLD AUTO: 0.32 THOUSAND/ΜL (ref 0.17–1.22)
MONOCYTES NFR BLD AUTO: 6 % (ref 4–12)
NEUTROPHILS # BLD AUTO: 4.18 THOUSANDS/ΜL (ref 1.85–7.62)
NEUTS SEG NFR BLD AUTO: 72 % (ref 43–75)
NRBC BLD AUTO-RTO: 0 /100 WBCS
P AXIS: 51 DEGREES
PLATELET # BLD AUTO: 329 THOUSANDS/UL (ref 149–390)
PMV BLD AUTO: 9.3 FL (ref 8.9–12.7)
POTASSIUM SERPL-SCNC: 3.6 MMOL/L (ref 3.5–5.3)
QRS AXIS: -50 DEGREES
QRSD INTERVAL: 112 MS
QT INTERVAL: 690 MS
QTC INTERVAL: 770 MS
RBC # BLD AUTO: 3.78 MILLION/UL (ref 3.81–5.12)
SODIUM SERPL-SCNC: 143 MMOL/L (ref 136–145)
T WAVE AXIS: 13 DEGREES
VENTRICULAR RATE: 75 BPM
WBC # BLD AUTO: 5.76 THOUSAND/UL (ref 4.31–10.16)

## 2020-05-28 PROCEDURE — 97167 OT EVAL HIGH COMPLEX 60 MIN: CPT

## 2020-05-28 PROCEDURE — 94002 VENT MGMT INPAT INIT DAY: CPT

## 2020-05-28 PROCEDURE — 93010 ELECTROCARDIOGRAM REPORT: CPT | Performed by: INTERNAL MEDICINE

## 2020-05-28 PROCEDURE — 82948 REAGENT STRIP/BLOOD GLUCOSE: CPT

## 2020-05-28 PROCEDURE — 80048 BASIC METABOLIC PNL TOTAL CA: CPT | Performed by: NURSE PRACTITIONER

## 2020-05-28 PROCEDURE — 85025 COMPLETE CBC W/AUTO DIFF WBC: CPT | Performed by: NURSE PRACTITIONER

## 2020-05-28 PROCEDURE — 99232 SBSQ HOSP IP/OBS MODERATE 35: CPT | Performed by: INTERNAL MEDICINE

## 2020-05-28 PROCEDURE — 94640 AIRWAY INHALATION TREATMENT: CPT

## 2020-05-28 PROCEDURE — 94760 N-INVAS EAR/PLS OXIMETRY 1: CPT

## 2020-05-28 PROCEDURE — 97163 PT EVAL HIGH COMPLEX 45 MIN: CPT

## 2020-05-28 RX ORDER — METHYLPREDNISOLONE SODIUM SUCCINATE 40 MG/ML
40 INJECTION, POWDER, LYOPHILIZED, FOR SOLUTION INTRAMUSCULAR; INTRAVENOUS EVERY 12 HOURS SCHEDULED
Status: DISCONTINUED | OUTPATIENT
Start: 2020-05-28 | End: 2020-05-29

## 2020-05-28 RX ORDER — LIDOCAINE 50 MG/G
1 PATCH TOPICAL DAILY
Status: DISCONTINUED | OUTPATIENT
Start: 2020-05-28 | End: 2020-05-29 | Stop reason: HOSPADM

## 2020-05-28 RX ADMIN — VITAMIN D, TAB 1000IU (100/BT) 1000 UNITS: 25 TAB at 09:53

## 2020-05-28 RX ADMIN — ACETAMINOPHEN 650 MG: 325 TABLET ORAL at 17:31

## 2020-05-28 RX ADMIN — METHYLPREDNISOLONE SODIUM SUCCINATE 40 MG: 40 INJECTION, POWDER, FOR SOLUTION INTRAMUSCULAR; INTRAVENOUS at 21:49

## 2020-05-28 RX ADMIN — ISODIUM CHLORIDE 3 ML: 0.03 SOLUTION RESPIRATORY (INHALATION) at 19:00

## 2020-05-28 RX ADMIN — QUETIAPINE FUMARATE 200 MG: 200 TABLET ORAL at 21:49

## 2020-05-28 RX ADMIN — GABAPENTIN 600 MG: 300 CAPSULE ORAL at 21:49

## 2020-05-28 RX ADMIN — TIZANIDINE 2 MG: 2 TABLET ORAL at 17:31

## 2020-05-28 RX ADMIN — LEVALBUTEROL HYDROCHLORIDE 1.25 MG: 1.25 SOLUTION, CONCENTRATE RESPIRATORY (INHALATION) at 13:48

## 2020-05-28 RX ADMIN — MONTELUKAST SODIUM 10 MG: 10 TABLET, COATED ORAL at 21:49

## 2020-05-28 RX ADMIN — TOPIRAMATE 50 MG: 25 TABLET, FILM COATED ORAL at 21:49

## 2020-05-28 RX ADMIN — LIDOCAINE 1 PATCH: 50 PATCH TOPICAL at 15:53

## 2020-05-28 RX ADMIN — ATORVASTATIN CALCIUM 20 MG: 20 TABLET, FILM COATED ORAL at 09:53

## 2020-05-28 RX ADMIN — IPRATROPIUM BROMIDE 0.5 MG: 0.5 SOLUTION RESPIRATORY (INHALATION) at 19:00

## 2020-05-28 RX ADMIN — IPRATROPIUM BROMIDE 0.5 MG: 0.5 SOLUTION RESPIRATORY (INHALATION) at 07:49

## 2020-05-28 RX ADMIN — GABAPENTIN 600 MG: 300 CAPSULE ORAL at 00:03

## 2020-05-28 RX ADMIN — TOPIRAMATE 50 MG: 25 TABLET, FILM COATED ORAL at 00:04

## 2020-05-28 RX ADMIN — GABAPENTIN 600 MG: 300 CAPSULE ORAL at 15:53

## 2020-05-28 RX ADMIN — ASPIRIN 81 MG: 81 TABLET, COATED ORAL at 09:53

## 2020-05-28 RX ADMIN — INSULIN LISPRO 2 UNITS: 100 INJECTION, SOLUTION INTRAVENOUS; SUBCUTANEOUS at 21:49

## 2020-05-28 RX ADMIN — TOPIRAMATE 50 MG: 25 TABLET, FILM COATED ORAL at 09:53

## 2020-05-28 RX ADMIN — FLUTICASONE FUROATE AND VILANTEROL TRIFENATATE 1 PUFF: 200; 25 POWDER RESPIRATORY (INHALATION) at 09:53

## 2020-05-28 RX ADMIN — IPRATROPIUM BROMIDE 0.5 MG: 0.5 SOLUTION RESPIRATORY (INHALATION) at 13:48

## 2020-05-28 RX ADMIN — LEVALBUTEROL HYDROCHLORIDE 1.25 MG: 1.25 SOLUTION, CONCENTRATE RESPIRATORY (INHALATION) at 07:49

## 2020-05-28 RX ADMIN — GABAPENTIN 600 MG: 300 CAPSULE ORAL at 09:53

## 2020-05-28 RX ADMIN — INSULIN LISPRO 1 UNITS: 100 INJECTION, SOLUTION INTRAVENOUS; SUBCUTANEOUS at 12:03

## 2020-05-28 RX ADMIN — MONTELUKAST SODIUM 10 MG: 10 TABLET, COATED ORAL at 00:04

## 2020-05-28 RX ADMIN — LEVALBUTEROL HYDROCHLORIDE 1.25 MG: 1.25 SOLUTION, CONCENTRATE RESPIRATORY (INHALATION) at 19:00

## 2020-05-28 RX ADMIN — METHYLPREDNISOLONE SODIUM SUCCINATE 40 MG: 40 INJECTION, POWDER, FOR SOLUTION INTRAMUSCULAR; INTRAVENOUS at 09:44

## 2020-05-28 RX ADMIN — LEVOTHYROXINE SODIUM 100 MCG: 100 TABLET ORAL at 09:54

## 2020-05-28 RX ADMIN — ENOXAPARIN SODIUM 40 MG: 40 INJECTION SUBCUTANEOUS at 09:53

## 2020-05-28 RX ADMIN — DILTIAZEM HYDROCHLORIDE 180 MG: 180 CAPSULE, COATED, EXTENDED RELEASE ORAL at 09:53

## 2020-05-28 RX ADMIN — INSULIN LISPRO 2 UNITS: 100 INJECTION, SOLUTION INTRAVENOUS; SUBCUTANEOUS at 00:03

## 2020-05-28 RX ADMIN — QUETIAPINE FUMARATE 200 MG: 200 TABLET ORAL at 00:03

## 2020-05-29 VITALS
BODY MASS INDEX: 42.74 KG/M2 | RESPIRATION RATE: 18 BRPM | WEIGHT: 233.69 LBS | OXYGEN SATURATION: 94 % | SYSTOLIC BLOOD PRESSURE: 144 MMHG | HEART RATE: 87 BPM | TEMPERATURE: 96.9 F | DIASTOLIC BLOOD PRESSURE: 94 MMHG

## 2020-05-29 LAB
ANION GAP SERPL CALCULATED.3IONS-SCNC: 8 MMOL/L (ref 4–13)
BASOPHILS # BLD AUTO: 0.01 THOUSANDS/ΜL (ref 0–0.1)
BASOPHILS NFR BLD AUTO: 0 % (ref 0–1)
BUN SERPL-MCNC: 16 MG/DL (ref 5–25)
CALCIUM SERPL-MCNC: 10.7 MG/DL (ref 8.3–10.1)
CHLORIDE SERPL-SCNC: 106 MMOL/L (ref 100–108)
CO2 SERPL-SCNC: 29 MMOL/L (ref 21–32)
CREAT SERPL-MCNC: 1.02 MG/DL (ref 0.6–1.3)
EOSINOPHIL # BLD AUTO: 0 THOUSAND/ΜL (ref 0–0.61)
EOSINOPHIL NFR BLD AUTO: 0 % (ref 0–6)
ERYTHROCYTE [DISTWIDTH] IN BLOOD BY AUTOMATED COUNT: 15.3 % (ref 11.6–15.1)
GFR SERPL CREATININE-BSD FRML MDRD: 66 ML/MIN/1.73SQ M
GLUCOSE SERPL-MCNC: 134 MG/DL (ref 65–140)
GLUCOSE SERPL-MCNC: 169 MG/DL (ref 65–140)
GLUCOSE SERPL-MCNC: 179 MG/DL (ref 65–140)
HCT VFR BLD AUTO: 37.1 % (ref 34.8–46.1)
HGB BLD-MCNC: 11.4 G/DL (ref 11.5–15.4)
IMM GRANULOCYTES # BLD AUTO: 0.07 THOUSAND/UL (ref 0–0.2)
IMM GRANULOCYTES NFR BLD AUTO: 1 % (ref 0–2)
LYMPHOCYTES # BLD AUTO: 1.04 THOUSANDS/ΜL (ref 0.6–4.47)
LYMPHOCYTES NFR BLD AUTO: 13 % (ref 14–44)
MCH RBC QN AUTO: 28.7 PG (ref 26.8–34.3)
MCHC RBC AUTO-ENTMCNC: 30.7 G/DL (ref 31.4–37.4)
MCV RBC AUTO: 94 FL (ref 82–98)
MONOCYTES # BLD AUTO: 0.33 THOUSAND/ΜL (ref 0.17–1.22)
MONOCYTES NFR BLD AUTO: 4 % (ref 4–12)
NEUTROPHILS # BLD AUTO: 6.41 THOUSANDS/ΜL (ref 1.85–7.62)
NEUTS SEG NFR BLD AUTO: 82 % (ref 43–75)
NRBC BLD AUTO-RTO: 0 /100 WBCS
PLATELET # BLD AUTO: 328 THOUSANDS/UL (ref 149–390)
PMV BLD AUTO: 9.3 FL (ref 8.9–12.7)
POTASSIUM SERPL-SCNC: 4.1 MMOL/L (ref 3.5–5.3)
RBC # BLD AUTO: 3.97 MILLION/UL (ref 3.81–5.12)
SODIUM SERPL-SCNC: 143 MMOL/L (ref 136–145)
WBC # BLD AUTO: 7.86 THOUSAND/UL (ref 4.31–10.16)

## 2020-05-29 PROCEDURE — 99239 HOSP IP/OBS DSCHRG MGMT >30: CPT | Performed by: INTERNAL MEDICINE

## 2020-05-29 PROCEDURE — 97116 GAIT TRAINING THERAPY: CPT

## 2020-05-29 PROCEDURE — 94660 CPAP INITIATION&MGMT: CPT

## 2020-05-29 PROCEDURE — 97530 THERAPEUTIC ACTIVITIES: CPT

## 2020-05-29 PROCEDURE — 80048 BASIC METABOLIC PNL TOTAL CA: CPT | Performed by: INTERNAL MEDICINE

## 2020-05-29 PROCEDURE — 82948 REAGENT STRIP/BLOOD GLUCOSE: CPT

## 2020-05-29 PROCEDURE — 85025 COMPLETE CBC W/AUTO DIFF WBC: CPT | Performed by: INTERNAL MEDICINE

## 2020-05-29 PROCEDURE — 94640 AIRWAY INHALATION TREATMENT: CPT

## 2020-05-29 RX ORDER — LIDOCAINE 50 MG/G
1 PATCH TOPICAL DAILY
Qty: 14 PATCH | Refills: 0 | Status: SHIPPED | OUTPATIENT
Start: 2020-05-30

## 2020-05-29 RX ORDER — PREDNISONE 10 MG/1
TABLET ORAL
Qty: 30 TABLET | Refills: 0 | Status: ON HOLD | OUTPATIENT
Start: 2020-05-29 | End: 2022-06-26 | Stop reason: SDUPTHER

## 2020-05-29 RX ORDER — PREDNISONE 20 MG/1
40 TABLET ORAL DAILY
Status: DISCONTINUED | OUTPATIENT
Start: 2020-05-29 | End: 2020-05-29 | Stop reason: HOSPADM

## 2020-05-29 RX ORDER — TRAMADOL HYDROCHLORIDE 50 MG/1
50 TABLET ORAL EVERY 6 HOURS PRN
Qty: 30 TABLET | Refills: 0 | Status: SHIPPED | OUTPATIENT
Start: 2020-05-29 | End: 2020-06-08

## 2020-05-29 RX ADMIN — TRAMADOL HYDROCHLORIDE 50 MG: 50 TABLET, FILM COATED ORAL at 08:20

## 2020-05-29 RX ADMIN — LEVALBUTEROL HYDROCHLORIDE 1.25 MG: 1.25 SOLUTION, CONCENTRATE RESPIRATORY (INHALATION) at 07:52

## 2020-05-29 RX ADMIN — LEVOTHYROXINE SODIUM 100 MCG: 100 TABLET ORAL at 05:56

## 2020-05-29 RX ADMIN — PREDNISONE 40 MG: 20 TABLET ORAL at 12:42

## 2020-05-29 RX ADMIN — FLUTICASONE FUROATE AND VILANTEROL TRIFENATATE 1 PUFF: 200; 25 POWDER RESPIRATORY (INHALATION) at 08:19

## 2020-05-29 RX ADMIN — ASPIRIN 81 MG: 81 TABLET, COATED ORAL at 08:19

## 2020-05-29 RX ADMIN — TOPIRAMATE 50 MG: 25 TABLET, FILM COATED ORAL at 08:19

## 2020-05-29 RX ADMIN — LEVALBUTEROL HYDROCHLORIDE 1.25 MG: 1.25 SOLUTION, CONCENTRATE RESPIRATORY (INHALATION) at 13:15

## 2020-05-29 RX ADMIN — LIDOCAINE 1 PATCH: 50 PATCH TOPICAL at 08:20

## 2020-05-29 RX ADMIN — IPRATROPIUM BROMIDE 0.5 MG: 0.5 SOLUTION RESPIRATORY (INHALATION) at 07:52

## 2020-05-29 RX ADMIN — ATORVASTATIN CALCIUM 20 MG: 20 TABLET, FILM COATED ORAL at 08:19

## 2020-05-29 RX ADMIN — ENOXAPARIN SODIUM 40 MG: 40 INJECTION SUBCUTANEOUS at 08:19

## 2020-05-29 RX ADMIN — DILTIAZEM HYDROCHLORIDE 180 MG: 180 CAPSULE, COATED, EXTENDED RELEASE ORAL at 08:19

## 2020-05-29 RX ADMIN — GABAPENTIN 600 MG: 300 CAPSULE ORAL at 08:19

## 2020-05-29 RX ADMIN — IPRATROPIUM BROMIDE 0.5 MG: 0.5 SOLUTION RESPIRATORY (INHALATION) at 13:15

## 2020-05-29 RX ADMIN — INSULIN LISPRO 1 UNITS: 100 INJECTION, SOLUTION INTRAVENOUS; SUBCUTANEOUS at 08:18

## 2020-05-29 RX ADMIN — VITAMIN D, TAB 1000IU (100/BT) 1000 UNITS: 25 TAB at 08:19

## 2021-03-26 ENCOUNTER — IMMUNIZATIONS (OUTPATIENT)
Dept: FAMILY MEDICINE CLINIC | Facility: HOSPITAL | Age: 71
End: 2021-03-26

## 2021-03-26 DIAGNOSIS — Z23 ENCOUNTER FOR IMMUNIZATION: Primary | ICD-10-CM

## 2021-03-26 PROCEDURE — 91301 SARS-COV-2 / COVID-19 MRNA VACCINE (MODERNA) 100 MCG: CPT

## 2021-03-26 PROCEDURE — 0011A SARS-COV-2 / COVID-19 MRNA VACCINE (MODERNA) 100 MCG: CPT

## 2021-04-26 ENCOUNTER — IMMUNIZATIONS (OUTPATIENT)
Dept: FAMILY MEDICINE CLINIC | Facility: HOSPITAL | Age: 71
End: 2021-04-26

## 2021-04-26 DIAGNOSIS — Z23 ENCOUNTER FOR IMMUNIZATION: Primary | ICD-10-CM

## 2021-04-26 PROCEDURE — 91301 SARS-COV-2 / COVID-19 MRNA VACCINE (MODERNA) 100 MCG: CPT

## 2021-04-26 PROCEDURE — 0012A SARS-COV-2 / COVID-19 MRNA VACCINE (MODERNA) 100 MCG: CPT

## 2022-06-21 ENCOUNTER — HOSPITAL ENCOUNTER (INPATIENT)
Facility: HOSPITAL | Age: 72
LOS: 4 days | Discharge: HOME | DRG: 191 | End: 2022-06-26
Attending: EMERGENCY MEDICINE | Admitting: STUDENT IN AN ORGANIZED HEALTH CARE EDUCATION/TRAINING PROGRAM
Payer: COMMERCIAL

## 2022-06-21 ENCOUNTER — APPOINTMENT (EMERGENCY)
Dept: RADIOLOGY | Facility: HOSPITAL | Age: 72
DRG: 191 | End: 2022-06-21
Payer: COMMERCIAL

## 2022-06-21 DIAGNOSIS — J44.1 COPD EXACERBATION (HCC): Primary | ICD-10-CM

## 2022-06-21 DIAGNOSIS — J44.1 COPD WITH ACUTE EXACERBATION (HCC): ICD-10-CM

## 2022-06-21 LAB
2HR DELTA HS TROPONIN: 0 NG/L
ANION GAP SERPL CALCULATED.3IONS-SCNC: 12 MMOL/L (ref 4–13)
ATRIAL RATE: 100 BPM
ATRIAL RATE: 91 BPM
BASOPHILS # BLD AUTO: 0.04 THOUSANDS/ΜL (ref 0–0.1)
BASOPHILS NFR BLD AUTO: 1 % (ref 0–1)
BUN SERPL-MCNC: 7 MG/DL (ref 5–25)
CALCIUM SERPL-MCNC: 9.9 MG/DL (ref 8.3–10.1)
CARDIAC TROPONIN I PNL SERPL HS: 8 NG/L
CARDIAC TROPONIN I PNL SERPL HS: 8 NG/L
CHLORIDE SERPL-SCNC: 105 MMOL/L (ref 100–108)
CO2 SERPL-SCNC: 24 MMOL/L (ref 21–32)
CREAT SERPL-MCNC: 0.69 MG/DL (ref 0.6–1.3)
EOSINOPHIL # BLD AUTO: 0.07 THOUSAND/ΜL (ref 0–0.61)
EOSINOPHIL NFR BLD AUTO: 1 % (ref 0–6)
ERYTHROCYTE [DISTWIDTH] IN BLOOD BY AUTOMATED COUNT: 14.4 % (ref 11.6–15.1)
EST. AVERAGE GLUCOSE BLD GHB EST-MCNC: 134 MG/DL
GFR SERPL CREATININE-BSD FRML MDRD: 89 ML/MIN/1.73SQ M
GLUCOSE SERPL-MCNC: 111 MG/DL (ref 65–140)
GLUCOSE SERPL-MCNC: 255 MG/DL (ref 65–140)
GLUCOSE SERPL-MCNC: 275 MG/DL (ref 65–140)
HBA1C MFR BLD: 6.3 %
HCT VFR BLD AUTO: 41.3 % (ref 34.8–46.1)
HGB BLD-MCNC: 12.6 G/DL (ref 11.5–15.4)
IMM GRANULOCYTES # BLD AUTO: 0.09 THOUSAND/UL (ref 0–0.2)
IMM GRANULOCYTES NFR BLD AUTO: 1 % (ref 0–2)
LYMPHOCYTES # BLD AUTO: 1.62 THOUSANDS/ΜL (ref 0.6–4.47)
LYMPHOCYTES NFR BLD AUTO: 20 % (ref 14–44)
MCH RBC QN AUTO: 28.8 PG (ref 26.8–34.3)
MCHC RBC AUTO-ENTMCNC: 30.5 G/DL (ref 31.4–37.4)
MCV RBC AUTO: 94 FL (ref 82–98)
MONOCYTES # BLD AUTO: 0.58 THOUSAND/ΜL (ref 0.17–1.22)
MONOCYTES NFR BLD AUTO: 7 % (ref 4–12)
NEUTROPHILS # BLD AUTO: 5.72 THOUSANDS/ΜL (ref 1.85–7.62)
NEUTS SEG NFR BLD AUTO: 70 % (ref 43–75)
NRBC BLD AUTO-RTO: 0 /100 WBCS
P AXIS: 34 DEGREES
P AXIS: 34 DEGREES
PLATELET # BLD AUTO: 337 THOUSANDS/UL (ref 149–390)
PMV BLD AUTO: 9.2 FL (ref 8.9–12.7)
POTASSIUM SERPL-SCNC: 3.7 MMOL/L (ref 3.5–5.3)
PR INTERVAL: 178 MS
PR INTERVAL: 182 MS
PROCALCITONIN SERPL-MCNC: <0.05 NG/ML
QRS AXIS: -63 DEGREES
QRS AXIS: -65 DEGREES
QRSD INTERVAL: 104 MS
QRSD INTERVAL: 110 MS
QT INTERVAL: 312 MS
QT INTERVAL: 326 MS
QTC INTERVAL: 400 MS
QTC INTERVAL: 402 MS
RBC # BLD AUTO: 4.38 MILLION/UL (ref 3.81–5.12)
SODIUM SERPL-SCNC: 141 MMOL/L (ref 136–145)
T WAVE AXIS: 37 DEGREES
T WAVE AXIS: 44 DEGREES
VENTRICULAR RATE: 100 BPM
VENTRICULAR RATE: 91 BPM
WBC # BLD AUTO: 8.12 THOUSAND/UL (ref 4.31–10.16)

## 2022-06-21 PROCEDURE — 99285 EMERGENCY DEPT VISIT HI MDM: CPT

## 2022-06-21 PROCEDURE — 84484 ASSAY OF TROPONIN QUANT: CPT | Performed by: EMERGENCY MEDICINE

## 2022-06-21 PROCEDURE — 84145 PROCALCITONIN (PCT): CPT | Performed by: PHYSICIAN ASSISTANT

## 2022-06-21 PROCEDURE — 80048 BASIC METABOLIC PNL TOTAL CA: CPT | Performed by: EMERGENCY MEDICINE

## 2022-06-21 PROCEDURE — 94640 AIRWAY INHALATION TREATMENT: CPT

## 2022-06-21 PROCEDURE — 36415 COLL VENOUS BLD VENIPUNCTURE: CPT | Performed by: EMERGENCY MEDICINE

## 2022-06-21 PROCEDURE — 83036 HEMOGLOBIN GLYCOSYLATED A1C: CPT | Performed by: NURSE PRACTITIONER

## 2022-06-21 PROCEDURE — 99220 PR INITIAL OBSERVATION CARE/DAY 70 MINUTES: CPT | Performed by: PHYSICIAN ASSISTANT

## 2022-06-21 PROCEDURE — 99291 CRITICAL CARE FIRST HOUR: CPT | Performed by: EMERGENCY MEDICINE

## 2022-06-21 PROCEDURE — 93005 ELECTROCARDIOGRAM TRACING: CPT

## 2022-06-21 PROCEDURE — 93010 ELECTROCARDIOGRAM REPORT: CPT | Performed by: INTERNAL MEDICINE

## 2022-06-21 PROCEDURE — 85025 COMPLETE CBC W/AUTO DIFF WBC: CPT | Performed by: EMERGENCY MEDICINE

## 2022-06-21 PROCEDURE — 71045 X-RAY EXAM CHEST 1 VIEW: CPT

## 2022-06-21 PROCEDURE — 82948 REAGENT STRIP/BLOOD GLUCOSE: CPT

## 2022-06-21 RX ORDER — QUETIAPINE FUMARATE 200 MG/1
200 TABLET, FILM COATED ORAL
Status: DISCONTINUED | OUTPATIENT
Start: 2022-06-21 | End: 2022-06-26 | Stop reason: HOSPADM

## 2022-06-21 RX ORDER — ASPIRIN 81 MG/1
81 TABLET ORAL DAILY
Status: DISCONTINUED | OUTPATIENT
Start: 2022-06-22 | End: 2022-06-26 | Stop reason: HOSPADM

## 2022-06-21 RX ORDER — METHYLPREDNISOLONE SODIUM SUCCINATE 125 MG/2ML
60 INJECTION, POWDER, LYOPHILIZED, FOR SOLUTION INTRAMUSCULAR; INTRAVENOUS ONCE
Status: DISCONTINUED | OUTPATIENT
Start: 2022-06-21 | End: 2022-06-21

## 2022-06-21 RX ORDER — SODIUM CHLORIDE FOR INHALATION 0.9 %
3 VIAL, NEBULIZER (ML) INHALATION ONCE
Status: DISCONTINUED | OUTPATIENT
Start: 2022-06-21 | End: 2022-06-21

## 2022-06-21 RX ORDER — GABAPENTIN 400 MG/1
800 CAPSULE ORAL 3 TIMES DAILY
Status: DISCONTINUED | OUTPATIENT
Start: 2022-06-21 | End: 2022-06-26 | Stop reason: HOSPADM

## 2022-06-21 RX ORDER — ONDANSETRON 2 MG/ML
4 INJECTION INTRAMUSCULAR; INTRAVENOUS EVERY 6 HOURS PRN
Status: DISCONTINUED | OUTPATIENT
Start: 2022-06-21 | End: 2022-06-26 | Stop reason: HOSPADM

## 2022-06-21 RX ORDER — INSULIN LISPRO 100 [IU]/ML
1-5 INJECTION, SOLUTION INTRAVENOUS; SUBCUTANEOUS
Status: DISCONTINUED | OUTPATIENT
Start: 2022-06-21 | End: 2022-06-26 | Stop reason: HOSPADM

## 2022-06-21 RX ORDER — GUAIFENESIN 600 MG
600 TABLET, EXTENDED RELEASE 12 HR ORAL 2 TIMES DAILY
Status: DISCONTINUED | OUTPATIENT
Start: 2022-06-21 | End: 2022-06-26 | Stop reason: HOSPADM

## 2022-06-21 RX ORDER — LEVOTHYROXINE SODIUM 0.1 MG/1
100 TABLET ORAL
Status: DISCONTINUED | OUTPATIENT
Start: 2022-06-22 | End: 2022-06-26 | Stop reason: HOSPADM

## 2022-06-21 RX ORDER — MONTELUKAST SODIUM 10 MG/1
10 TABLET ORAL
Status: DISCONTINUED | OUTPATIENT
Start: 2022-06-21 | End: 2022-06-26 | Stop reason: HOSPADM

## 2022-06-21 RX ORDER — ATORVASTATIN CALCIUM 20 MG/1
20 TABLET, FILM COATED ORAL
Status: DISCONTINUED | OUTPATIENT
Start: 2022-06-21 | End: 2022-06-26 | Stop reason: HOSPADM

## 2022-06-21 RX ORDER — ALBUTEROL SULFATE 2.5 MG/3ML
10 SOLUTION RESPIRATORY (INHALATION) ONCE
Status: COMPLETED | OUTPATIENT
Start: 2022-06-21 | End: 2022-06-21

## 2022-06-21 RX ORDER — TRAMADOL HYDROCHLORIDE 50 MG/1
50 TABLET ORAL EVERY 6 HOURS PRN
Status: DISCONTINUED | OUTPATIENT
Start: 2022-06-21 | End: 2022-06-26 | Stop reason: HOSPADM

## 2022-06-21 RX ORDER — NICOTINE 21 MG/24HR
1 PATCH, TRANSDERMAL 24 HOURS TRANSDERMAL DAILY
Status: DISCONTINUED | OUTPATIENT
Start: 2022-06-22 | End: 2022-06-26 | Stop reason: HOSPADM

## 2022-06-21 RX ORDER — IPRATROPIUM BROMIDE AND ALBUTEROL SULFATE .5; 3 MG/3ML; MG/3ML
1 SOLUTION RESPIRATORY (INHALATION) ONCE
Status: COMPLETED | OUTPATIENT
Start: 2022-06-21 | End: 2022-06-21

## 2022-06-21 RX ORDER — ACETAMINOPHEN 325 MG/1
650 TABLET ORAL EVERY 6 HOURS PRN
Status: DISCONTINUED | OUTPATIENT
Start: 2022-06-21 | End: 2022-06-26 | Stop reason: HOSPADM

## 2022-06-21 RX ORDER — TOPIRAMATE 25 MG/1
50 TABLET ORAL EVERY 12 HOURS SCHEDULED
Status: DISCONTINUED | OUTPATIENT
Start: 2022-06-21 | End: 2022-06-26 | Stop reason: HOSPADM

## 2022-06-21 RX ORDER — BENZONATATE 100 MG/1
100 CAPSULE ORAL 3 TIMES DAILY PRN
Status: DISCONTINUED | OUTPATIENT
Start: 2022-06-21 | End: 2022-06-26 | Stop reason: HOSPADM

## 2022-06-21 RX ORDER — DOCUSATE SODIUM 100 MG/1
100 CAPSULE, LIQUID FILLED ORAL 2 TIMES DAILY
Status: DISCONTINUED | OUTPATIENT
Start: 2022-06-21 | End: 2022-06-26 | Stop reason: HOSPADM

## 2022-06-21 RX ORDER — TIZANIDINE 2 MG/1
2 TABLET ORAL EVERY 8 HOURS PRN
Status: DISCONTINUED | OUTPATIENT
Start: 2022-06-21 | End: 2022-06-26 | Stop reason: HOSPADM

## 2022-06-21 RX ORDER — PREDNISONE 20 MG/1
60 TABLET ORAL ONCE
Status: COMPLETED | OUTPATIENT
Start: 2022-06-21 | End: 2022-06-21

## 2022-06-21 RX ORDER — ALBUTEROL SULFATE 2.5 MG/3ML
2.5 SOLUTION RESPIRATORY (INHALATION)
Status: DISCONTINUED | OUTPATIENT
Start: 2022-06-21 | End: 2022-06-26 | Stop reason: HOSPADM

## 2022-06-21 RX ORDER — LANOLIN ALCOHOL/MO/W.PET/CERES
6 CREAM (GRAM) TOPICAL
Status: DISCONTINUED | OUTPATIENT
Start: 2022-06-21 | End: 2022-06-26 | Stop reason: HOSPADM

## 2022-06-21 RX ORDER — BUDESONIDE 0.5 MG/2ML
0.5 INHALANT ORAL
Status: DISCONTINUED | OUTPATIENT
Start: 2022-06-21 | End: 2022-06-26 | Stop reason: HOSPADM

## 2022-06-21 RX ORDER — DILTIAZEM HYDROCHLORIDE 180 MG/1
180 CAPSULE, COATED, EXTENDED RELEASE ORAL DAILY
Status: DISCONTINUED | OUTPATIENT
Start: 2022-06-22 | End: 2022-06-26 | Stop reason: HOSPADM

## 2022-06-21 RX ORDER — METHYLPREDNISOLONE SODIUM SUCCINATE 40 MG/ML
40 INJECTION, POWDER, LYOPHILIZED, FOR SOLUTION INTRAMUSCULAR; INTRAVENOUS EVERY 8 HOURS
Status: DISCONTINUED | OUTPATIENT
Start: 2022-06-21 | End: 2022-06-22

## 2022-06-21 RX ORDER — INSULIN LISPRO 100 [IU]/ML
1-6 INJECTION, SOLUTION INTRAVENOUS; SUBCUTANEOUS
Status: DISCONTINUED | OUTPATIENT
Start: 2022-06-22 | End: 2022-06-26 | Stop reason: HOSPADM

## 2022-06-21 RX ORDER — HEPARIN SODIUM 5000 [USP'U]/ML
5000 INJECTION, SOLUTION INTRAVENOUS; SUBCUTANEOUS EVERY 8 HOURS SCHEDULED
Status: DISCONTINUED | OUTPATIENT
Start: 2022-06-21 | End: 2022-06-24

## 2022-06-21 RX ADMIN — GABAPENTIN 800 MG: 400 CAPSULE ORAL at 20:06

## 2022-06-21 RX ADMIN — MELATONIN 6 MG: at 21:43

## 2022-06-21 RX ADMIN — IPRATROPIUM BROMIDE 1 MG: 0.5 SOLUTION RESPIRATORY (INHALATION) at 13:58

## 2022-06-21 RX ADMIN — ACETAMINOPHEN 650 MG: 325 TABLET, FILM COATED ORAL at 21:35

## 2022-06-21 RX ADMIN — IPRATROPIUM BROMIDE 0.5 MG: 0.5 SOLUTION RESPIRATORY (INHALATION) at 20:23

## 2022-06-21 RX ADMIN — INSULIN LISPRO 2 UNITS: 100 INJECTION, SOLUTION INTRAVENOUS; SUBCUTANEOUS at 21:43

## 2022-06-21 RX ADMIN — MONTELUKAST 10 MG: 10 TABLET, FILM COATED ORAL at 21:35

## 2022-06-21 RX ADMIN — BUDESONIDE 0.5 MG: 0.5 INHALANT ORAL at 20:23

## 2022-06-21 RX ADMIN — TOPIRAMATE 50 MG: 25 TABLET, FILM COATED ORAL at 20:06

## 2022-06-21 RX ADMIN — HEPARIN SODIUM 5000 UNITS: 5000 INJECTION INTRAVENOUS; SUBCUTANEOUS at 21:40

## 2022-06-21 RX ADMIN — ATORVASTATIN CALCIUM 20 MG: 20 TABLET, FILM COATED ORAL at 18:33

## 2022-06-21 RX ADMIN — PREDNISONE 60 MG: 20 TABLET ORAL at 14:41

## 2022-06-21 RX ADMIN — TRAMADOL HYDROCHLORIDE 50 MG: 50 TABLET, COATED ORAL at 20:06

## 2022-06-21 RX ADMIN — ALBUTEROL SULFATE 10 MG: 2.5 SOLUTION RESPIRATORY (INHALATION) at 13:58

## 2022-06-21 RX ADMIN — METHYLPREDNISOLONE SODIUM SUCCINATE 40 MG: 40 INJECTION, POWDER, FOR SOLUTION INTRAMUSCULAR; INTRAVENOUS at 21:37

## 2022-06-21 RX ADMIN — ALBUTEROL SULFATE 2.5 MG: 2.5 SOLUTION RESPIRATORY (INHALATION) at 20:23

## 2022-06-21 RX ADMIN — GUAIFENESIN 600 MG: 600 TABLET, EXTENDED RELEASE ORAL at 18:33

## 2022-06-21 RX ADMIN — QUETIAPINE FUMARATE 200 MG: 200 TABLET ORAL at 21:35

## 2022-06-21 NOTE — ASSESSMENT & PLAN NOTE
Presenting to the emergency department with worsening shortness of breath that began this morning, feels similar to previous COPD exacerbations  · Received DuoNeb as well as steroids with improvement, still experiencing mild dyspnea  · On baseline oxygen  · Continue Xopenex/Atrovent t i d , Pulmicort b i d   · IV steroids  · Mucinex b i d  · Tessalon p r n

## 2022-06-21 NOTE — ED PROCEDURE NOTE
PROCEDURE  CriticalCare Time  Performed by: Wu Martino MD  Authorized by: Wu Martino MD     Critical care provider statement:     Critical care time (minutes):  35    Critical care time was exclusive of:  Separately billable procedures and treating other patients and teaching time    Critical care was necessary to treat or prevent imminent or life-threatening deterioration of the following conditions:  Respiratory failure    Critical care was time spent personally by me on the following activities:  Blood draw for specimens, obtaining history from patient or surrogate, development of treatment plan with patient or surrogate, discussions with consultants, evaluation of patient's response to treatment, examination of patient, interpretation of cardiac output measurements, ordering and performing treatments and interventions, ordering and review of laboratory studies, ordering and review of radiographic studies, re-evaluation of patient's condition and review of old Marycarmen Lopez MD  06/21/22 2850

## 2022-06-21 NOTE — ED ATTENDING ATTESTATION
6/21/2022  IRakesh MD, saw and evaluated the patient  I have discussed the patient with the resident/non-physician practitioner and agree with the resident's/non-physician practitioner's findings, Plan of Care, and MDM as documented in the resident's/non-physician practitioner's note, except where noted  All available labs and Radiology studies were reviewed  I was present for key portions of any procedure(s) performed by the resident/non-physician practitioner and I was immediately available to provide assistance  At this point I agree with the current assessment done in the Emergency Department  I have conducted an independent evaluation of this patient a history and physical is as follows:  65-year-old female presents for evaluation shortness of breath, chest tightness that started earlier today  Associated with chest heaviness as well  She states that feels similar to her prior COPD exacerbations  She denies lower extremity or calf pain, cough UR symptoms  Ten systems reviewed otherwise negative  Exam no distress, cardiac normal, lungs diffuse wheezing noted, abdomen normal, no lower extremity edema  Medical decision making;-will do heart neb, cardiac workup, reassess for disposition    ED Course         Critical Care Time  Procedures

## 2022-06-21 NOTE — ASSESSMENT & PLAN NOTE
Lab Results   Component Value Date    HGBA1C 6 0 (H) 03/23/2022       No results for input(s): POCGLU in the last 72 hours      Blood Sugar Average: Last 72 hrs:   sliding scale insulin a c  HS, hold metformin

## 2022-06-21 NOTE — ASSESSMENT & PLAN NOTE
Continue Cardizem 180 mg daily  · Unclear patient follows with Cardiology as an outpatient, recommend follow-up  · Not maintained on anticoagulation secondary to fall risk

## 2022-06-21 NOTE — ED PROVIDER NOTES
History  Chief Complaint   Patient presents with    Shortness of Breath     Patient arrives via EMS from home, reports increased dyspnea, duo neb given without relief  68-year-old female with history of HIV on Biktarvy, AFib on Eliquis, diabetes and COPD presents to the emergency department for evaluation of a suspected COPD exacerbation  The patient reports that she started having shortness of breath earlier this morning  She states that this feels similar to COPD exacerbations that she has had in the past   Reports associated chest heaviness that started this morning as well  The patient reports that the shortness of breath and chest heaviness are worse with exertion  Patient denies any radiation of her chest pain  She denies fevers, chills, nausea, vomiting diarrhea, sick contacts and recent travel  Prior to Admission Medications   Prescriptions Last Dose Informant Patient Reported? Taking? QUEtiapine (SEROquel) 100 mg tablet   Yes No   Sig: Take 200 mg by mouth daily at bedtime     aspirin 81 MG tablet   Yes No   Sig: Take 81 mg by mouth daily  atorvastatin (LIPITOR) 20 mg tablet   Yes No   Sig: Take 20 mg by mouth daily   cholecalciferol (VITAMIN D3) 1,000 units tablet   Yes No   Sig: Take 1,000 Units by mouth daily   diltiazem (CARDIZEM CD) 180 mg 24 hr capsule   Yes No   Sig: Take 180 mg by mouth daily   fluticasone-salmeterol (ADVAIR) 250-50 mcg/dose   Yes No   Sig: Inhale 1 puff every 12 (twelve) hours  gabapentin (NEURONTIN) 400 mg capsule   Yes No   Sig: Take 800 mg by mouth 3 (three) times a day     ibuprofen (MOTRIN) 400 mg tablet   No No   Sig: Take 1 tablet (400 mg total) by mouth every 6 (six) hours as needed for mild pain or moderate pain   levothyroxine 100 mcg tablet   Yes No   Sig: Take 100 mcg by mouth daily     lidocaine (LIDODERM) 5 %   No No   Sig: Apply 1 patch topically daily Remove & Discard patch within 12 hours or as directed by MD   metFORMIN (GLUCOPHAGE) 500 mg tablet   Yes No   Sig: Take 500 mg by mouth daily with breakfast     montelukast (SINGULAIR) 10 mg tablet   Yes No   Sig: Take 10 mg by mouth daily at bedtime   predniSONE 10 mg tablet   No No   Si tabs x 3 days, 3 tabs x 3tabs, 2 tabs x 3 days, 1 tab x 3 days, then stop   tiZANidine (ZANAFLEX) 2 mg tablet   No No   Sig: Take 1 tablet (2 mg total) by mouth every 8 (eight) hours as needed for muscle spasms   tiotropium (SPIRIVA) 18 mcg inhalation capsule   Yes No   Sig: Place 18 mcg into inhaler and inhale daily  topiramate (TOPAMAX) 50 MG tablet   Yes No   Sig: Take 50 mg by mouth every 12 (twelve) hours      Facility-Administered Medications: None       Past Medical History:   Diagnosis Date    Bipolar 1 disorder ([place] [address] )     Cardiac disease     COPD (chronic obstructive pulmonary disease) ([place] [address] )     Diabetes mellitus ([place] [address] )     Disease of thyroid gland     HIV (human immunodeficiency virus infection) ([place] [address] ) 3/24/2016    Hypertension     Osteoarthritis     Tobacco abuse        Past Surgical History:   Procedure Laterality Date    HERNIA REPAIR      LUMBAR FUSION N/A 4/10/2018    Procedure: T9/10 discectomy with T9-T11 fusion;  Surgeon: Latonya Garcia MD;  [facility];  Service: Neurosurgery    THYROIDECTOMY         Family History   Problem Relation Age of Onset    No Known Problems Mother     Diabetes Father     Heart disease Neg Hx     Cancer Neg Hx      I have reviewed and agree with the history as documented  E-Cigarette/Vaping     E-Cigarette/Vaping Substances     Social History     Tobacco Use    Smoking status: Current Every Day Smoker     Packs/day: 0 20     Years: 45 00     Pack years: 9 00     Types: Cigarettes    Smokeless tobacco: Never Used    Tobacco comment: Currently smoking 1-2 cigarettes a day   Substance Use Topics    Alcohol use: Yes    Drug use: No     Comment: former IV drug user        Review of Systems   Constitutional: Negative for chills and fever     HENT: Negative for ear pain and sore throat  Eyes: Negative for pain and visual disturbance  Respiratory: Positive for shortness of breath  Negative for cough  Cardiovascular: Positive for chest pain  Negative for palpitations  Gastrointestinal: Negative for abdominal pain and vomiting  Genitourinary: Negative for dysuria and hematuria  Musculoskeletal: Negative for arthralgias and back pain  Skin: Negative for color change and rash  Neurological: Negative for seizures and syncope  All other systems reviewed and are negative  Physical Exam  ED Triage Vitals   Temperature Pulse Respirations Blood Pressure SpO2   06/21/22 1318 06/21/22 1315 06/21/22 1315 06/21/22 1315 06/21/22 1315   98 3 °F (36 8 °C) 99 (!) 28 125/76 98 %      Temp Source Heart Rate Source Patient Position - Orthostatic VS BP Location FiO2 (%)   06/21/22 1318 06/21/22 1415 06/21/22 1415 06/21/22 1415 --   Oral Monitor Sitting Left arm       Pain Score       06/21/22 1315       No Pain             Orthostatic Vital Signs  Vitals:    06/21/22 1315 06/21/22 1415 06/21/22 1530   BP: 125/76 124/84 124/70   Pulse: 99 92 97   Patient Position - Orthostatic VS:  Sitting Sitting       Physical Exam  Vitals and nursing note reviewed  Constitutional:       General: She is not in acute distress  Appearance: She is well-developed  HENT:      Head: Normocephalic and atraumatic  Eyes:      Conjunctiva/sclera: Conjunctivae normal    Cardiovascular:      Rate and Rhythm: Normal rate and regular rhythm  Heart sounds: No murmur heard  Pulmonary:      Effort: Pulmonary effort is normal  Tachypnea and prolonged expiration present  Breath sounds: Wheezing present  Abdominal:      Palpations: Abdomen is soft  Tenderness: There is no abdominal tenderness  Musculoskeletal:      Cervical back: Neck supple  Skin:     General: Skin is warm and dry  Neurological:      Mental Status: She is alert           ED Medications  Medications   ipratropium-albuterol (FOR EMS ONLY) (DUO-NEB) 0 5-2 5 mg/3 mL inhalation solution 3 mL (0 mL Does not apply Given to EMS 6/21/22 1338)   ipratropium (ATROVENT) 0 02 % inhalation solution 1 mg (1 mg Nebulization Given 6/21/22 1358)   albuterol inhalation solution 10 mg (10 mg Nebulization Given 6/21/22 1358)   predniSONE tablet 60 mg (60 mg Oral Given 6/21/22 1441)       Diagnostic Studies  Results Reviewed     Procedure Component Value Units Date/Time    HS Troponin I 2hr [[id number]]     Lab Status: No result Specimen: Blood     HS Troponin 0hr (reflex protocol) [[id number]]  (Normal) Collected: 06/21/22 1422    Lab Status: Final result Specimen: Blood from Arm, Right Updated: 06/21/22 1453     hs TnI 0hr 8 ng/L     Basic metabolic panel [[id number]] Collected: 06/21/22 1422    Lab Status: Final result Specimen: Blood from Arm, Right Updated: 06/21/22 1439     Sodium 141 mmol/L      Potassium 3 7 mmol/L      Chloride 105 mmol/L      CO2 24 mmol/L      ANION GAP 12 mmol/L      BUN 7 mg/dL      Creatinine 0 69 mg/dL      Glucose 111 mg/dL      Calcium 9 9 mg/dL      eGFR 89 ml/min/1 73sq m     Narrative:      [place] guidelines for Chronic Kidney Disease (CKD):     Stage 1 with normal or high GFR (GFR > 90 mL/min/1 73 square meters)    Stage 2 Mild CKD (GFR = 60-89 mL/min/1 73 square meters)    Stage 3A Moderate CKD (GFR = 45-59 mL/min/1 73 square meters)    Stage 3B Moderate CKD (GFR = 30-44 mL/min/1 73 square meters)    Stage 4 Severe CKD (GFR = 15-29 mL/min/1 73 square meters)    Stage 5 End Stage CKD (GFR <15 mL/min/1 73 square meters)  Note: GFR calculation is accurate only with a steady state creatinine    CBC and differential [[id number]]  (Abnormal) Collected: 06/21/22 1422    Lab Status: Final result Specimen: Blood from Arm, Right Updated: 06/21/22 1427     WBC 8 12 Thousand/uL      RBC 4 38 Million/uL      Hemoglobin 12 6 g/dL      Hematocrit 41 3 % MCV 94 fL      MCH 28 8 pg      MCHC 30 5 g/dL      RDW 14 4 %      MPV 9 2 fL      Platelets 352 Thousands/uL      nRBC 0 /100 WBCs      Neutrophils Relative 70 %      Immat GRANS % 1 %      Lymphocytes Relative 20 %      Monocytes Relative 7 %      Eosinophils Relative 1 %      Basophils Relative 1 %      Neutrophils Absolute 5 72 Thousands/µL      Immature Grans Absolute 0 09 Thousand/uL      Lymphocytes Absolute 1 62 Thousands/µL      Monocytes Absolute 0 58 Thousand/µL      Eosinophils Absolute 0 07 Thousand/µL      Basophils Absolute 0 04 Thousands/µL                  XR chest 1 view portable    (Results Pending)         Procedures  ECG 12 Lead Documentation Only    Date/Time: 6/21/2022 2:13 PM  Performed by: Tremaine Jameson MD  Authorized by: Tremaine Jameson MD     ECG reviewed by me, the ED Provider: yes    Patient location:  ED  Previous ECG:     Previous ECG:  Compared to current    Similarity:  Changes noted    Comparison to cardiac monitor: Yes    Interpretation:     Interpretation: abnormal    Rate:     ECG rate assessment: normal    Rhythm:     Rhythm: sinus rhythm    Ectopy:     Ectopy: none    QRS:     QRS axis:  Normal  Conduction:     Conduction: abnormal      Abnormal conduction: LAFB    ST segments:     ST segments:  Normal  T waves:     T waves: non-specific            ED Course             HEART Risk Score    Flowsheet Row Most Recent Value   Heart Score Risk Calculator    History 0 Filed at: 06/21/2022 1403   ECG 1 Filed at: 06/21/2022 1403   Age 2 Filed at: 06/21/2022 1403   Risk Factors 1 Filed at: 06/21/2022 1403   Troponin 0 Filed at: 06/21/2022 1403   HEART Score 4 Filed at: 06/21/2022 1403                      MDM  Number of Diagnoses or Management Options  COPD exacerbation ([facility] )  Diagnosis management comments: 65-year-old female presented to the emergency department for evaluation of a COPD exacerbation    On arrival the patient was awake, alert, oriented and in no acute distress  The patient was treated with a DuoNeb prior to arrival   On arrival the patient was given a heart nebulizer and steroids  On re-evaluation the patient reported that she was still short of breath  Recommendation was made for the patient to be admitted to the hospital for further treatment and evaluation  Disposition  Final diagnoses:   COPD exacerbation ([address] )     Time reflects when diagnosis was documented in both MDM as applicable and the Disposition within this note     Time User Action Codes Description Comment    6/21/2022  3:48 PM Narciso Turk Add [J44 1] COPD exacerbation [facility])       ED Disposition     ED Disposition   Admit    Condition   Stable    Date/Time   Tue Jun 21, 2022  3:48 PM    Comment   Case was discussed with florina and the patient's admission status was agreed to be Admission Status: observation status to the service of Dr Daylin Etienne   Follow-up Information    None         Patient's Medications   Discharge Prescriptions    No medications on file     No discharge procedures on file  PDMP Review     None           ED Provider  Attending physically available and evaluated Ky Exon  I managed the patient along with the ED Attending      Electronically Signed by         Eren Camacho MD  06/21/22 2495

## 2022-06-21 NOTE — H&P
[address] [facility]  H&P- Alphonse Doing 1950, 73 y o  female MRN: [id number]  Unit/Bed#: ED 31 Encounter: [id number]  Primary Care Provider: Charmaine Bennett DO   Date and time admitted to hospital: 6/21/2022  1:14 PM    * COPD with acute exacerbation [facility])  Assessment & Plan  Presenting to the emergency department with worsening shortness of breath that began this morning, feels similar to previous COPD exacerbations  · Received DuoNeb as well as steroids with improvement, still experiencing mild dyspnea  · On baseline oxygen  · Continue Xopenex/Atrovent t i d , Pulmicort b i d   · IV steroids  · Mucinex b i d  · Tessalon p r n  Tobacco dependence  Assessment & Plan  Nicotine patch ordered, recommend cessation    Paroxysmal atrial fibrillation (HCC)  Assessment & Plan  Continue Cardizem 180 mg daily  · Unclear patient follows with Cardiology as an outpatient, recommend follow-up  · Not maintained on anticoagulation secondary to fall risk    PASHA on CPAP  Assessment & Plan  Continue CPAP q h s  Bipolar 1 disorder ([facility] )  Assessment & Plan  Continue home regimen of Seroquel 200 mg HS    Chronic respiratory failure with hypoxia (HCC)  Assessment & Plan  Currently requiring baseline oxygen to maintain oxygen saturations greater than 88%    Type 2 diabetes mellitus with hyperglycemia (HCC)  Assessment & Plan  Lab Results   Component Value Date    HGBA1C 6 0 (H) 03/23/2022       No results for input(s): POCGLU in the last 72 hours  Blood Sugar Average: Last 72 hrs:   sliding scale insulin a c  HS, hold metformin    HIV (human immunodeficiency virus infection)   Assessment & Plan  Maintained on Biktarvy      VTE Pharmacologic Prophylaxis:   Moderate Risk (Score 3-4) - Pharmacological DVT Prophylaxis Ordered: heparin  Code Status: Prior full code  Discussion with family: Patient declined call to        Anticipated Length of Stay: Patient will be admitted on an observation basis with an anticipated length of stay of less than 2 midnights secondary to COPD  Total Time for Visit, including Counseling / Coordination of Care: 60 minutes Greater than 50% of this total time spent on direct patient counseling and coordination of care  Chief Complaint:  Shortness of breath    History of Present Illness:  Radha Santacruz is a 73 y o  female with a PMH of diabetes, chronic respiratory failure, bipolar disorder, HIV, COPD who presents with shortness of breath  According to the patient, she was in her normal state of health yesterday and upon awakening this morning she noticed significant difficulty breathing  This prompted her son to call EMS so she could be brought to the ED for further evaluation  She denies any associated cough, fevers/chills, nausea, vomiting, diaphoresis  She states that this feels similar to previous COPD exacerbations which have always resulted in admission to the hospital   Patient follows with [facility] pulmonology  The patient does still smoke, counseled on cessation  Review of Systems:  Review of Systems   Constitutional: Negative for appetite change, chills, fever and unexpected weight change  HENT: Negative for hearing loss, rhinorrhea and sore throat  Eyes: Negative for pain, redness and visual disturbance  Respiratory: Positive for shortness of breath and wheezing  Negative for cough and chest tightness  Cardiovascular: Negative for chest pain, palpitations and leg swelling  Gastrointestinal: Negative for constipation, diarrhea, nausea and vomiting  Endocrine: Negative for polydipsia, polyphagia and polyuria  Genitourinary: Negative for dysuria, frequency and urgency  Neurological: Negative for dizziness, light-headedness and headaches         Past Medical and Surgical History:   Past Medical History:   Diagnosis Date    Bipolar 1 disorder ([facility] )     Cardiac disease     COPD (chronic obstructive pulmonary disease) ([facility] )     Diabetes mellitus ([facility] )     Disease of thyroid gland     HIV (human immunodeficiency virus infection) ([facility] ) 3/24/2016    Hypertension     Osteoarthritis     Tobacco abuse        Past Surgical History:   Procedure Laterality Date    HERNIA REPAIR      LUMBAR FUSION N/A 4/10/2018    Procedure: T9/10 discectomy with T9-T11 fusion;  Surgeon: Yan Gregory MD;  [facility];  Service: Neurosurgery    THYROIDECTOMY         Meds/Allergies:  Prior to Admission medications    Medication Sig Start Date End Date Taking? Authorizing Provider   aspirin 81 MG tablet Take 81 mg by mouth daily  Historical Provider, MD   atorvastatin (LIPITOR) 20 mg tablet Take 20 mg by mouth daily    Historical Provider, MD   cholecalciferol (VITAMIN D3) 1,000 units tablet Take 1,000 Units by mouth daily    Historical Provider, MD   diltiazem (CARDIZEM CD) 180 mg 24 hr capsule Take 180 mg by mouth daily    Historical Provider, MD   fluticasone-salmeterol (ADVAIR) 250-50 mcg/dose Inhale 1 puff every 12 (twelve) hours  Historical Provider, MD   gabapentin (NEURONTIN) 400 mg capsule Take 800 mg by mouth 3 (three) times a day      Historical Provider, MD   ibuprofen (MOTRIN) 400 mg tablet Take 1 tablet (400 mg total) by mouth every 6 (six) hours as needed for mild pain or moderate pain 5/27/20   Memo Skelton,    levothyroxine 100 mcg tablet Take 100 mcg by mouth daily      Historical Provider, MD   lidocaine (LIDODERM) 5 % Apply 1 patch topically daily Remove & Discard patch within 12 hours or as directed by MD 5/30/20   Regis Borjas,    metFORMIN (GLUCOPHAGE) 500 mg tablet Take 500 mg by mouth daily with breakfast      Historical Provider, MD   montelukast (SINGULAIR) 10 mg tablet Take 10 mg by mouth daily at bedtime    Historical Provider, MD   predniSONE 10 mg tablet 4 tabs x 3 days, 3 tabs x 3tabs, 2 tabs x 3 days, 1 tab x 3 days, then stop 5/29/20   Romel Monge, DO   QUEtiapine (SEROquel) 100 mg tablet Take 200 mg by mouth daily at bedtime      Historical Provider, MD   tiotropium (SPIRIVA) 18 mcg inhalation capsule Place 18 mcg into inhaler and inhale daily  Historical Provider, MD   tiZANidine (ZANAFLEX) 2 mg tablet Take 1 tablet (2 mg total) by mouth every 8 (eight) hours as needed for muscle spasms 5/27/20   Memo Skelton DO   topiramate (TOPAMAX) 50 MG tablet Take 50 mg by mouth every 12 (twelve) hours    Historical Provider, MD     I have reviewed home medications with patient personally  Allergies: Allergies   Allergen Reactions    Lisinopril Angioedema    Prozac [Fluoxetine Hcl] Rash    Sulfa Antibiotics Itching    Quinine        Social History:  Marital Status:    Occupation: n/a  Patient Pre-hospital Living Situation: Home  Patient Pre-hospital Level of Mobility: walks  Patient Pre-hospital Diet Restrictions: none  Substance Use History:   Social History     Substance and Sexual Activity   Alcohol Use Yes     Social History     Tobacco Use   Smoking Status Current Every Day Smoker    Packs/day: 0 20    Years: 45 00    Pack years: 9 00    Types: Cigarettes   Smokeless Tobacco Never Used   Tobacco Comment    Currently smoking 1-2 cigarettes a day     Social History     Substance and Sexual Activity   Drug Use No    Comment: former IV drug user       Family History:  Family History   Problem Relation Age of Onset    No Known Problems Mother     Diabetes Father     Heart disease Neg Hx     Cancer Neg Hx        Physical Exam:     Vitals:   Blood Pressure: 124/70 (06/21/22 1530)  Pulse: 97 (06/21/22 1530)  Temperature: 98 3 °F (36 8 °C) (06/21/22 1318)  Temp Source: Oral (06/21/22 1318)  Respirations: 17 (06/21/22 1530)  Height: 5' 2" (157 5 cm) (06/21/22 1315)  Weight - Scale: 100 kg (220 lb 7 4 oz) (06/21/22 1315)  SpO2: 98 % (06/21/22 1530)    Physical Exam  Vitals and nursing note reviewed  Constitutional:       General: She is not in acute distress  Appearance: Normal appearance   She is not ill-appearing, toxic-appearing or diaphoretic  Eyes:      General: No scleral icterus  Conjunctiva/sclera: Conjunctivae normal    Cardiovascular:      Rate and Rhythm: Regular rhythm  Tachycardia present  Heart sounds: No murmur heard  No friction rub  No gallop  Pulmonary:      Effort: Pulmonary effort is normal  No respiratory distress  Breath sounds: No stridor  Wheezing present  No rhonchi or rales  Comments: Diffuse expiratory wheezing  Chest:      Chest wall: No tenderness  Abdominal:      General: Abdomen is flat  Bowel sounds are normal  There is no distension  Palpations: Abdomen is soft  Tenderness: There is no abdominal tenderness  Musculoskeletal:      Right lower leg: No edema  Left lower leg: No edema  Skin:     General: Skin is warm and dry  Coloration: Skin is not jaundiced or pale  Neurological:      General: No focal deficit present  Mental Status: She is alert and oriented to person, place, and time  Mental status is at baseline  Additional Data:     Lab Results:  Results from last 7 days   Lab Units 06/21/22  1422   WBC Thousand/uL 8 12   HEMOGLOBIN g/dL 12 6   HEMATOCRIT % 41 3   PLATELETS Thousands/uL 337   NEUTROS PCT % 70   LYMPHS PCT % 20   MONOS PCT % 7   EOS PCT % 1     Results from last 7 days   Lab Units 06/21/22  1422   SODIUM mmol/L 141   POTASSIUM mmol/L 3 7   CHLORIDE mmol/L 105   CO2 mmol/L 24   BUN mg/dL 7   CREATININE mg/dL 0 69   ANION GAP mmol/L 12   CALCIUM mg/dL 9 9   GLUCOSE RANDOM mg/dL 111                       Imaging: Reviewed radiology reports from this admission including: chest xray  XR chest 1 view portable    (Results Pending)       EKG and Other Studies Reviewed on Admission:   · EKG: NSR  HR 91     ** Please Note: This note has been constructed using a voice recognition system   **

## 2022-06-21 NOTE — PLAN OF CARE
Problem: RESPIRATORY - ADULT  Goal: Achieves optimal ventilation and oxygenation  Description: INTERVENTIONS:  - Assess for changes in respiratory status  - Assess for changes in mentation and behavior  - Position to facilitate oxygenation and minimize respiratory effort  - Oxygen administered by appropriate delivery if ordered  - Initiate smoking cessation education as indicated  - Encourage broncho-pulmonary hygiene including cough, deep breathe, Incentive Spirometry  - Assess the need for suctioning and aspirate as needed  - Assess and instruct to report SOB or any respiratory difficulty  - Respiratory Therapy support as indicated  Outcome: Progressing     Problem: METABOLIC, FLUID AND ELECTROLYTES - ADULT  Goal: Glucose maintained within target range  Description: INTERVENTIONS:  - Monitor Blood Glucose as ordered  - Assess for signs and symptoms of hyperglycemia and hypoglycemia  - Administer ordered medications to maintain glucose within target range  - Assess nutritional intake and initiate nutrition service referral as needed  Outcome: Progressing     Problem: SKIN/TISSUE INTEGRITY - ADULT  Goal: Skin Integrity remains intact(Skin Breakdown Prevention)  Description: Assess:  -Perform Jonathan assessment bid  -Clean and moisturize skin every day  -Inspect skin when repositioning, toileting, and assisting with ADLS  -Assess extremities for adequate circulation and sensation     Bed Management:  -Have minimal linens on bed & keep smooth, unwrinkled  -Change linens as needed when moist or perspiring      Toileting:  -Offer bedside commode    Activity:  -Mobilize patient 3 times a day  -Encourage activity and walks on unit  -Encourage or provide ROM exercises   -Use appropriate equipment to lift or move patient in bed      Skin Care:  -Avoid use of baby powder, tape, friction and shearing, hot water or constrictive clothing  -Do not massage red bony areas      Outcome: Progressing     Problem: PAIN - ADULT  Goal: Verbalizes/displays adequate comfort level or baseline comfort level  Description: Interventions:  - Encourage patient to monitor pain and request assistance  - Assess pain using appropriate pain scale  - Administer analgesics based on type and severity of pain and evaluate response  - Implement non-pharmacological measures as appropriate and evaluate response  - Consider cultural and social influences on pain and pain management  - Notify physician/advanced practitioner if interventions unsuccessful or patient reports new pain  Outcome: Progressing     Problem: SAFETY ADULT  Goal: Patient will remain free of falls  Description: INTERVENTIONS:  - Educate patient/family on patient safety including physical limitations  - Instruct patient to call for assistance with activity   - Consult OT/PT to assist with strengthening/mobility   - Keep Call bell within reach  - Keep bed low and locked with side rails adjusted as appropriate  - Keep care items and personal belongings within reach  - Initiate and maintain comfort rounds  - Apply yellow socks and bracelet for high fall risk patients  - Consider moving patient to room near nurses station  Outcome: Progressing     Problem: Knowledge Deficit  Goal: Patient/family/caregiver demonstrates understanding of disease process, treatment plan, medications, and discharge instructions  Description: Complete learning assessment and assess knowledge base    Interventions:  - Provide teaching at level of understanding  - Provide teaching via preferred learning methods  Outcome: Progressing

## 2022-06-22 LAB
GLUCOSE SERPL-MCNC: 157 MG/DL (ref 65–140)
GLUCOSE SERPL-MCNC: 172 MG/DL (ref 65–140)
GLUCOSE SERPL-MCNC: 191 MG/DL (ref 65–140)
GLUCOSE SERPL-MCNC: 245 MG/DL (ref 65–140)

## 2022-06-22 PROCEDURE — 94640 AIRWAY INHALATION TREATMENT: CPT

## 2022-06-22 PROCEDURE — 82948 REAGENT STRIP/BLOOD GLUCOSE: CPT

## 2022-06-22 PROCEDURE — 99232 SBSQ HOSP IP/OBS MODERATE 35: CPT | Performed by: STUDENT IN AN ORGANIZED HEALTH CARE EDUCATION/TRAINING PROGRAM

## 2022-06-22 RX ORDER — METHYLPREDNISOLONE SODIUM SUCCINATE 40 MG/ML
40 INJECTION, POWDER, LYOPHILIZED, FOR SOLUTION INTRAMUSCULAR; INTRAVENOUS EVERY 12 HOURS SCHEDULED
Status: COMPLETED | OUTPATIENT
Start: 2022-06-22 | End: 2022-06-22

## 2022-06-22 RX ORDER — PREDNISONE 20 MG/1
40 TABLET ORAL DAILY
Status: DISCONTINUED | OUTPATIENT
Start: 2022-06-23 | End: 2022-06-23

## 2022-06-22 RX ADMIN — GABAPENTIN 800 MG: 400 CAPSULE ORAL at 17:10

## 2022-06-22 RX ADMIN — GABAPENTIN 800 MG: 400 CAPSULE ORAL at 09:43

## 2022-06-22 RX ADMIN — HEPARIN SODIUM 5000 UNITS: 5000 INJECTION INTRAVENOUS; SUBCUTANEOUS at 05:15

## 2022-06-22 RX ADMIN — LEVOTHYROXINE SODIUM 100 MCG: 100 TABLET ORAL at 05:15

## 2022-06-22 RX ADMIN — IPRATROPIUM BROMIDE 0.5 MG: 0.5 SOLUTION RESPIRATORY (INHALATION) at 19:44

## 2022-06-22 RX ADMIN — ALBUTEROL SULFATE 2.5 MG: 2.5 SOLUTION RESPIRATORY (INHALATION) at 08:13

## 2022-06-22 RX ADMIN — IPRATROPIUM BROMIDE 0.5 MG: 0.5 SOLUTION RESPIRATORY (INHALATION) at 08:13

## 2022-06-22 RX ADMIN — BUDESONIDE 0.5 MG: 0.5 INHALANT ORAL at 08:13

## 2022-06-22 RX ADMIN — MELATONIN 6 MG: at 21:28

## 2022-06-22 RX ADMIN — BUDESONIDE 0.5 MG: 0.5 INHALANT ORAL at 19:44

## 2022-06-22 RX ADMIN — INSULIN LISPRO 1 UNITS: 100 INJECTION, SOLUTION INTRAVENOUS; SUBCUTANEOUS at 21:35

## 2022-06-22 RX ADMIN — DILTIAZEM HYDROCHLORIDE 180 MG: 180 CAPSULE, COATED, EXTENDED RELEASE ORAL at 09:43

## 2022-06-22 RX ADMIN — ALBUTEROL SULFATE 2.5 MG: 2.5 SOLUTION RESPIRATORY (INHALATION) at 19:44

## 2022-06-22 RX ADMIN — INSULIN LISPRO 3 UNITS: 100 INJECTION, SOLUTION INTRAVENOUS; SUBCUTANEOUS at 14:06

## 2022-06-22 RX ADMIN — GUAIFENESIN 600 MG: 600 TABLET, EXTENDED RELEASE ORAL at 17:10

## 2022-06-22 RX ADMIN — INSULIN LISPRO 1 UNITS: 100 INJECTION, SOLUTION INTRAVENOUS; SUBCUTANEOUS at 09:43

## 2022-06-22 RX ADMIN — TOPIRAMATE 50 MG: 25 TABLET, FILM COATED ORAL at 21:28

## 2022-06-22 RX ADMIN — IPRATROPIUM BROMIDE 0.5 MG: 0.5 SOLUTION RESPIRATORY (INHALATION) at 14:12

## 2022-06-22 RX ADMIN — GUAIFENESIN 600 MG: 600 TABLET, EXTENDED RELEASE ORAL at 09:43

## 2022-06-22 RX ADMIN — ATORVASTATIN CALCIUM 20 MG: 20 TABLET, FILM COATED ORAL at 17:10

## 2022-06-22 RX ADMIN — MONTELUKAST 10 MG: 10 TABLET, FILM COATED ORAL at 21:29

## 2022-06-22 RX ADMIN — HEPARIN SODIUM 5000 UNITS: 5000 INJECTION INTRAVENOUS; SUBCUTANEOUS at 21:30

## 2022-06-22 RX ADMIN — TOPIRAMATE 50 MG: 25 TABLET, FILM COATED ORAL at 09:43

## 2022-06-22 RX ADMIN — METHYLPREDNISOLONE SODIUM SUCCINATE 40 MG: 40 INJECTION, POWDER, FOR SOLUTION INTRAMUSCULAR; INTRAVENOUS at 21:26

## 2022-06-22 RX ADMIN — QUETIAPINE FUMARATE 200 MG: 200 TABLET ORAL at 21:28

## 2022-06-22 RX ADMIN — ALBUTEROL SULFATE 2.5 MG: 2.5 SOLUTION RESPIRATORY (INHALATION) at 14:11

## 2022-06-22 RX ADMIN — METHYLPREDNISOLONE SODIUM SUCCINATE 40 MG: 40 INJECTION, POWDER, FOR SOLUTION INTRAMUSCULAR; INTRAVENOUS at 05:15

## 2022-06-22 RX ADMIN — ASPIRIN 81 MG: 81 TABLET, COATED ORAL at 09:43

## 2022-06-22 RX ADMIN — GABAPENTIN 800 MG: 400 CAPSULE ORAL at 21:28

## 2022-06-22 RX ADMIN — INSULIN LISPRO 1 UNITS: 100 INJECTION, SOLUTION INTRAVENOUS; SUBCUTANEOUS at 17:10

## 2022-06-22 RX ADMIN — HEPARIN SODIUM 5000 UNITS: 5000 INJECTION INTRAVENOUS; SUBCUTANEOUS at 14:06

## 2022-06-22 NOTE — ASSESSMENT & PLAN NOTE
Lab Results   Component Value Date    HGBA1C 6 3 (H) 06/21/2022       Recent Labs     06/21/22  1750 06/21/22  2118 06/22/22  0736 06/22/22  1055   POCGLU 275* 255* 157* 245*       Blood Sugar Average: Last 72 hrs:  (P) 233 sliding scale insulin a c  HS, hold metformin

## 2022-06-22 NOTE — PLAN OF CARE
Problem: RESPIRATORY - ADULT  Goal: Achieves optimal ventilation and oxygenation  Description: INTERVENTIONS:  - Assess for changes in respiratory status  - Assess for changes in mentation and behavior  - Position to facilitate oxygenation and minimize respiratory effort  - Oxygen administered by appropriate delivery if ordered  - Initiate smoking cessation education as indicated  - Encourage broncho-pulmonary hygiene including cough, deep breathe, Incentive Spirometry  - Assess the need for suctioning and aspirate as needed  - Assess and instruct to report SOB or any respiratory difficulty  - Respiratory Therapy support as indicated  6/22/2022 1114 by Tori Garcia RN  Outcome: Progressing  6/22/2022 1113 by Tori Garcia RN  Outcome: Progressing     Problem: METABOLIC, FLUID AND ELECTROLYTES - ADULT  Goal: Glucose maintained within target range  Description: INTERVENTIONS:  - Monitor Blood Glucose as ordered  - Assess for signs and symptoms of hyperglycemia and hypoglycemia  - Administer ordered medications to maintain glucose within target range  - Assess nutritional intake and initiate nutrition service referral as needed  6/22/2022 1114 by Tori Garcia RN  Outcome: Progressing  6/22/2022 1113 by Tori Garcia RN  Outcome: Progressing     Problem: SKIN/TISSUE INTEGRITY - ADULT  Goal: Skin Integrity remains intact(Skin Breakdown Prevention)  Description: Assess:  -Perform Jonathan assessment bid  -Clean and moisturize skin every day  -Inspect skin when repositioning, toileting, and assisting with ADLS  -Assess extremities for adequate circulation and sensation     Bed Management:  -Have minimal linens on bed & keep smooth, unwrinkled  -Change linens as needed when moist or perspiring      Toileting:  -Offer bedside commode    Activity:  -Mobilize patient 3 times a day  -Encourage activity and walks on unit  -Encourage or provide ROM exercises   -Use appropriate equipment to lift or move patient in bed      Skin Care:  -Avoid use of baby powder, tape, friction and shearing, hot water or constrictive clothing  -Do not massage red bony areas      6/22/2022 1114 by Avelino Marin RN  Outcome: Progressing  6/22/2022 1113 by Avelino Marin RN  Outcome: Progressing     Problem: PAIN - ADULT  Goal: Verbalizes/displays adequate comfort level or baseline comfort level  Description: Interventions:  - Encourage patient to monitor pain and request assistance  - Assess pain using appropriate pain scale  - Administer analgesics based on type and severity of pain and evaluate response  - Implement non-pharmacological measures as appropriate and evaluate response  - Consider cultural and social influences on pain and pain management  - Notify physician/advanced practitioner if interventions unsuccessful or patient reports new pain  6/22/2022 1114 by Avelino Marin RN  Outcome: Progressing  6/22/2022 1113 by Avelino Marin RN  Outcome: Progressing     Problem: SAFETY ADULT  Goal: Patient will remain free of falls  Description: INTERVENTIONS:  - Educate patient/family on patient safety including physical limitations  - Instruct patient to call for assistance with activity   - Consult OT/PT to assist with strengthening/mobility   - Keep Call bell within reach  - Keep bed low and locked with side rails adjusted as appropriate  - Keep care items and personal belongings within reach  - Initiate and maintain comfort rounds  - Apply yellow socks and bracelet for high fall risk patients  - Consider moving patient to room near nurses station  6/22/2022 1114 by Avelino Marin RN  Outcome: Progressing  6/22/2022 1113 by Avelino Marin RN  Outcome: Progressing     Problem: Knowledge Deficit  Goal: Patient/family/caregiver demonstrates understanding of disease process, treatment plan, medications, and discharge instructions  Description: Complete learning assessment and assess knowledge base    Interventions:  - Provide teaching at level of understanding  - Provide teaching via preferred learning methods  6/22/2022 1114 by Kaitlyn Zacarias RN  Outcome: Progressing  6/22/2022 1113 by Kaitlyn Zacarias RN  Outcome: Progressing     Problem: MOBILITY - ADULT  Goal: Maintain or return to baseline ADL function  Description: INTERVENTIONS:  -  Assess patient's ability to carry out ADLs; assess patient's baseline for ADL function and identify physical deficits which impact ability to perform ADLs (bathing, care of mouth/teeth, toileting, grooming, dressing, etc )  - Assess/evaluate cause of self-care deficits   - Assess range of motion  - Assess patient's mobility; develop plan if impaired  - Assess patient's need for assistive devices and provide as appropriate  - Encourage maximum independence but intervene and supervise when necessary  - Involve family in performance of ADLs  - Assess for home care needs following discharge   - Consider OT consult to assist with ADL evaluation and planning for discharge  - Provide patient education as appropriate  6/22/2022 1114 by Kaitlyn Zacarias RN  Outcome: Progressing  6/22/2022 1113 by Kaitlyn Zacarias RN  Outcome: Progressing  Goal: Maintains/Returns to pre admission functional level  Description: INTERVENTIONS:  - Perform BMAT or MOVE assessment daily    - Set and communicate daily mobility goal to care team and patient/family/caregiver     - Collaborate with rehabilitation services on mobility goals if consulted  - Out of bed for meals 3 times a day  - Out of bed for toileting  - Record patient progress and toleration of activity level   6/22/2022 1114 by Kaitlyn Zacarias RN  Outcome: Progressing  6/22/2022 1113 by Kaitlyn Zacarias RN  Outcome: Progressing     Problem: Potential for Falls  Goal: Patient will remain free of falls  Description: INTERVENTIONS:  - Educate patient/family on patient safety including physical limitations  - Instruct patient to call for assistance with activity   - Consult OT/PT to assist with strengthening/mobility   - Keep Call bell within reach  - Keep bed low and locked with side rails adjusted as appropriate  - Keep care items and personal belongings within reach  - Initiate and maintain comfort rounds  - Apply yellow socks and bracelet for high fall risk patients  - Consider moving patient to room near nurses station  6/22/2022 1114 by Margie Holly, RN  Outcome: Progressing  6/22/2022 1113 by Margie Holly, RN  Outcome: Progressing

## 2022-06-22 NOTE — PLAN OF CARE
Problem: RESPIRATORY - ADULT  Goal: Achieves optimal ventilation and oxygenation  Description: INTERVENTIONS:  - Assess for changes in respiratory status  - Assess for changes in mentation and behavior  - Position to facilitate oxygenation and minimize respiratory effort  - Oxygen administered by appropriate delivery if ordered  - Initiate smoking cessation education as indicated  - Encourage broncho-pulmonary hygiene including cough, deep breathe, Incentive Spirometry  - Assess the need for suctioning and aspirate as needed  - Assess and instruct to report SOB or any respiratory difficulty  - Respiratory Therapy support as indicated  Outcome: Progressing     Problem: METABOLIC, FLUID AND ELECTROLYTES - ADULT  Goal: Glucose maintained within target range  Description: INTERVENTIONS:  - Monitor Blood Glucose as ordered  - Assess for signs and symptoms of hyperglycemia and hypoglycemia  - Administer ordered medications to maintain glucose within target range  - Assess nutritional intake and initiate nutrition service referral as needed  Outcome: Progressing     Problem: SKIN/TISSUE INTEGRITY - ADULT  Goal: Skin Integrity remains intact(Skin Breakdown Prevention)  Description: Assess:  -Perform Jonathan assessment bid  -Clean and moisturize skin every day  -Inspect skin when repositioning, toileting, and assisting with ADLS  -Assess extremities for adequate circulation and sensation     Bed Management:  -Have minimal linens on bed & keep smooth, unwrinkled  -Change linens as needed when moist or perspiring      Toileting:  -Offer bedside commode    Activity:  -Mobilize patient 3 times a day  -Encourage activity and walks on unit  -Encourage or provide ROM exercises   -Use appropriate equipment to lift or move patient in bed      Skin Care:  -Avoid use of baby powder, tape, friction and shearing, hot water or constrictive clothing  -Do not massage red bony areas      Outcome: Progressing     Problem: PAIN - ADULT  Goal: Verbalizes/displays adequate comfort level or baseline comfort level  Description: Interventions:  - Encourage patient to monitor pain and request assistance  - Assess pain using appropriate pain scale  - Administer analgesics based on type and severity of pain and evaluate response  - Implement non-pharmacological measures as appropriate and evaluate response  - Consider cultural and social influences on pain and pain management  - Notify physician/advanced practitioner if interventions unsuccessful or patient reports new pain  Outcome: Progressing     Problem: SAFETY ADULT  Goal: Patient will remain free of falls  Description: INTERVENTIONS:  - Educate patient/family on patient safety including physical limitations  - Instruct patient to call for assistance with activity   - Consult OT/PT to assist with strengthening/mobility   - Keep Call bell within reach  - Keep bed low and locked with side rails adjusted as appropriate  - Keep care items and personal belongings within reach  - Initiate and maintain comfort rounds  - Apply yellow socks and bracelet for high fall risk patients  - Consider moving patient to room near nurses station  Outcome: Progressing     Problem: Knowledge Deficit  Goal: Patient/family/caregiver demonstrates understanding of disease process, treatment plan, medications, and discharge instructions  Description: Complete learning assessment and assess knowledge base    Interventions:  - Provide teaching at level of understanding  - Provide teaching via preferred learning methods  Outcome: Progressing     Problem: MOBILITY - ADULT  Goal: Maintain or return to baseline ADL function  Description: INTERVENTIONS:  -  Assess patient's ability to carry out ADLs; assess patient's baseline for ADL function and identify physical deficits which impact ability to perform ADLs (bathing, care of mouth/teeth, toileting, grooming, dressing, etc )  - Assess/evaluate cause of self-care deficits   - Assess range of motion  - Assess patient's mobility; develop plan if impaired  - Assess patient's need for assistive devices and provide as appropriate  - Encourage maximum independence but intervene and supervise when necessary  - Involve family in performance of ADLs  - Assess for home care needs following discharge   - Consider OT consult to assist with ADL evaluation and planning for discharge  - Provide patient education as appropriate  Outcome: Progressing  Goal: Maintains/Returns to pre admission functional level  Description: INTERVENTIONS:  - Perform BMAT or MOVE assessment daily    - Set and communicate daily mobility goal to care team and patient/family/caregiver     - Collaborate with rehabilitation services on mobility goals if consulted  - Out of bed for toileting  - Record patient progress and toleration of activity level   Outcome: Progressing     Problem: Potential for Falls  Goal: Patient will remain free of falls  Description: INTERVENTIONS:  - Educate patient/family on patient safety including physical limitations  - Instruct patient to call for assistance with activity   - Consult OT/PT to assist with strengthening/mobility   - Keep Call bell within reach  - Keep bed low and locked with side rails adjusted as appropriate  - Keep care items and personal belongings within reach  - Initiate and maintain comfort rounds  - Apply yellow socks and bracelet for high fall risk patients  - Consider moving patient to room near nurses station  Outcome: Progressing

## 2022-06-22 NOTE — UTILIZATION REVIEW
Initial Clinical Review    Admission: Date/Time/Statement:   Admission Orders (From admission, onward)     Ordered        06/22/22 1237  Inpatient Admission  Once            06/21/22 1544  Place in Observation  Once                      Orders Placed This Encounter   Procedures    Place in Observation     Standing Status:   Standing     Number of Occurrences:   1     Order Specific Question:   Level of Care     Answer:   Med Surg [16]    Inpatient Admission     Standing Status:   Standing     Number of Occurrences:   1     Order Specific Question:   Level of Care     Answer:   Med Surg [16]     Order Specific Question:   Estimated length of stay     Answer:   More than 2 Midnights     Order Specific Question:   Certification     Answer:   I certify that inpatient services are medically necessary for this patient for a duration of greater than two midnights  See H&P and MD Progress Notes for additional information about the patient's course of treatment  OBSERVATION   6/21  @  9557 CHANGED TO IP ADMISSION 6/22 @  [phone] 14  The patient will continue to require additional inpatient hospital stay due to IV steroids, bronchodilators    ED Arrival Information     Expected   -    Arrival   6/21/2022 13:14    Acuity   Emergent            Means of arrival   Ambulance    Escorted by   [facility] ([facility])    Service   Hospitalist    Admission type   Emergency            Arrival complaint   SOB           Chief Complaint   Patient presents with    Shortness of Breath     Patient arrives via EMS from home, reports increased dyspnea, duo neb given without relief  Initial Presentation: 73 y o  female presents to ED via  EMS  From home  With shortness of breath since the morning of admission  Had significant difficulty breathing upon waking  States  Feels  Similar to previous  COPD  Exacerbations/hospitalizations  Continues to smoke    Additional PMH is  HIV,  PAF,  Bipolar, diabetes and chronic respiratory failure  Admit   Observation with  Acute  Exacerbation  COPD and plan is  IV  Steroids, nebulizers, O2 and continue home meds  Date:     6/23        Day 2:   Continue IV  Steroids  Still with shortness of breath when ambulating, breathing has improved  Does not feel ready to discharge  Still with mild expiratory wheezing  Still on  O2  2-3  L  Continue nebulizers  Needs tobacco cessation  Continue current meds/treatment  ED Triage Vitals   Temperature Pulse Respirations Blood Pressure SpO2   06/21/22 1318 06/21/22 1315 06/21/22 1315 06/21/22 1315 06/21/22 1315   98 3 °F (36 8 °C) 99 (!) 28 125/76 98 %      Temp Source Heart Rate Source Patient Position - Orthostatic VS BP Location FiO2 (%)   06/21/22 1318 06/21/22 1415 06/21/22 1415 06/21/22 1415 --   Oral Monitor Sitting Left arm       Pain Score       06/21/22 1315       No Pain          Wt Readings from Last 1 Encounters:   06/21/22 100 kg (220 lb 7 4 oz)     Additional Vital Signs:   6/23       97 8 °F (36 6 °C) 89 22 114/75 88 95 % -- -- -- Nasal cannula Lying        -- -- -- -- 93 % 28 -- 2 L/min -- --    06/22/22 08:14:31 -- 90 20 120/75 90 99 % -- -- -- -- --   06/21/22 2121 98 3 °F (36 8 °C) 100 20 123/75 91 96 % -- -- -- Nasal cannula Sitting   06/21/22 2026 -- -- -- -- -- 97 % -- -- -- -- --   06/21/22 17:46:41 97 8 °F (36 6 °C) 105 21 135/73 94 97 % 28 -- 2 L/min Nasal cannula --   06/21/22 1635 -- 106 Abnormal  -- 121/78 -- -- -- -- -- -- Lying   06/21/22 1530 -- 97 17 124/70 -- 98 % -- 2 L/min -- Nasal cannula Sitting   06/21/22 1415 -- 92 28 Abnormal  124/84 100 96 % 32 -- 3 L/min Nasal cannula Sitting   06/21/22 1318 98 3 °F (36 8 °C) -- -- -- -- -- -- -- -- -- --   06/21/22 1315 -- 99 28 Abnormal  125/76 93 98 % 32 -- 3 L/min Nasal cannula --       Pertinent Labs/Diagnostic Test Results:   XR chest 1 view portable   Final Result by Randal Han MD (06/22 2138)      No acute cardiopulmonary disease  Workstation performed: [id number]               Results from last 7 days   Lab Units 06/21/22  1422   WBC Thousand/uL 8 12   HEMOGLOBIN g/dL 12 6   HEMATOCRIT % 41 3   PLATELETS Thousands/uL 337   NEUTROS ABS Thousands/µL 5 72         Results from last 7 days   Lab Units 06/21/22  1422   SODIUM mmol/L 141   POTASSIUM mmol/L 3 7   CHLORIDE mmol/L 105   CO2 mmol/L 24   ANION GAP mmol/L 12   BUN mg/dL 7   CREATININE mg/dL 0 69   EGFR ml/min/1 73sq m 89   CALCIUM mg/dL 9 9         Results from last 7 days   Lab Units 06/22/22  1605 06/22/22  1055 06/22/22  0736 06/21/22  2118 06/21/22  1750   POC GLUCOSE mg/dl 172* 245* 157* 255* 275*     Results from last 7 days   Lab Units 06/21/22  1422   GLUCOSE RANDOM mg/dL 111         Results from last 7 days   Lab Units 06/21/22  1422   HEMOGLOBIN A1C % 6 3*   EAG mg/dl 134           Results from last 7 days   Lab Units 06/21/22  1622 06/21/22  1422   HS TNI 0HR ng/L  --  8   HS TNI 2HR ng/L 8  --    HSTNI D2 ng/L 0  --                  Results from last 7 days   Lab Units 06/21/22  1422   PROCALCITONIN ng/ml <0 05             ED Treatment:   Medication Administration from 06/21/2022 1313 to 06/21/2022 1738       Date/Time Order Dose Route Action Comments     06/21/2022 1338 ipratropium-albuterol (FOR EMS ONLY) (DUO-NEB) 0 5-2 5 mg/3 mL inhalation solution 3 mL 0 mL Does not apply Given to EMS      06/21/2022 1435 methylPREDNISolone sodium succinate (Solu-MEDROL) injection 60 mg 60 mg Intravenous Not Given      06/21/2022 1418 albuterol inhalation solution 10 mg 10 mg Nebulization Not Given      06/21/2022 1358 ipratropium (ATROVENT) 0 02 % inhalation solution 1 mg 1 mg Nebulization Given      06/21/2022 1418 sodium chloride 0 9 % inhalation solution 3 mL 3 mL Nebulization Not Given      06/21/2022 1358 albuterol inhalation solution 10 mg 10 mg Nebulization Given      06/21/2022 1441 predniSONE tablet 60 mg 60 mg Oral Given         Present on Admission:   HIV (human immunodeficiency virus infection)    Paroxysmal atrial fibrillation (HCC)   Tobacco dependence   Type 2 diabetes mellitus with hyperglycemia (HCC)   Chronic respiratory failure with hypoxia (HCC)   Bipolar 1 disorder (HCC)   COPD with acute exacerbation (HCC)      Admitting Diagnosis: SOB (shortness of breath) [R06 02]  COPD exacerbation (HCC) [J44 1]  Age/Sex: 73 y o  female  Admission Orders:  Scheduled Medications:  albuterol, 2 5 mg, Nebulization, TID  aspirin, 81 mg, Oral, Daily  atorvastatin, 20 mg, Oral, Daily With Dinner  budesonide, 0 5 mg, Nebulization, Q12H  diltiazem, 180 mg, Oral, Daily  docusate sodium, 100 mg, Oral, BID  gabapentin, 800 mg, Oral, TID  guaiFENesin, 600 mg, Oral, BID  heparin (porcine), 5,000 Units, Subcutaneous, Q8H [address]  insulin lispro, 1-5 Units, Subcutaneous, HS  insulin lispro, 1-6 Units, Subcutaneous, TID AC  ipratropium, 0 5 mg, Nebulization, TID  levothyroxine, 100 mcg, Oral, Early Morning  melatonin, 6 mg, Oral, HS  methylPREDNISolone sodium succinate, 40 mg, Intravenous, Q12H CARLOS  montelukast, 10 mg, Oral, HS  nicotine, 1 patch, Transdermal, Daily  [START ON 6/23/2022] predniSONE, 40 mg, Oral, Daily  QUEtiapine, 200 mg, Oral, HS  topiramate, 50 mg, Oral, Q12H [address]      Continuous IV Infusions:     PRN Meds:  acetaminophen, 650 mg, Oral, Q6H PRN  benzonatate, 100 mg, Oral, TID PRN  ondansetron, 4 mg, Intravenous, Q6H PRN  tiZANidine, 2 mg, Oral, Q8H PRN  traMADol, 50 mg, Oral, Q6H PRN          Network Utilization Review Department  ATTENTION: Please call with any questions or concerns to [phone] and carefully listen to the prompts so that you are directed to the right person  All voicemails are confidential   Jennifer Muss all requests for admission clinical reviews, approved or denied determinations and any other requests to dedicated fax number below belonging to the campus where the patient is receiving treatment   List of dedicated fax numbers for the Facilities:  Fred Brasher NAME UR FAX NUMBER   ADMISSION DENIALS (Administrative/Medical Necessity) [phone]   PARENT CHILD HEALTH (Maternity/NICU/Pediatrics) [address] [facility],[address]  [phone]   Shauna Vargas [address] [address] [place] [address] Jenise Yakov [address] [facility] [address] [facility] [phone]

## 2022-06-22 NOTE — ASSESSMENT & PLAN NOTE
Presenting to the emergency department with worsening shortness of breath that began this morning, feels similar to previous COPD exacerbations  · Received DuoNeb as well as steroids with improvement, still experiencing mild dyspnea  · On baseline oxygen, 2-3L NC  · Continue Xopenex/Atrovent t i d , Pulmicort b i d    · Breathing improved, plan to transition to PO steroids tomorrow

## 2022-06-22 NOTE — PROGRESS NOTES
[address]  Progress Note - Radha Santacruz 1950, 73 y o  female MRN: [id number]  Unit/Bed#: Carolann Shannon 2 [address] 87 223-01 Encounter: [id number]  Primary Care Provider: Marva Berry DO   Date and time admitted to hospital: 6/21/2022  1:14 PM    * COPD with acute exacerbation [facility])  Assessment & Plan  Presenting to the emergency department with worsening shortness of breath that began this morning, feels similar to previous COPD exacerbations  · Received DuoNeb as well as steroids with improvement, still experiencing mild dyspnea  · On baseline oxygen, 2-3L NC  · Continue Xopenex/Atrovent t i d , Pulmicort b i d  · Breathing improved, plan to transition to PO steroids tomorrow    Tobacco dependence  Assessment & Plan  Nicotine patch ordered, recommend cessation    Paroxysmal atrial fibrillation (HCC)  Assessment & Plan  Continue Cardizem 180 mg daily  · Not maintained on anticoagulation secondary to fall risk    PASHA on CPAP  Assessment & Plan  Continue CPAP q h s  Bipolar 1 disorder ([facility] [address] )  Assessment & Plan  Continue home regimen of Seroquel 200 mg HS    Chronic respiratory failure with hypoxia (HCC)  Assessment & Plan  Currently requiring baseline oxygen to maintain oxygen saturations greater than 88%    Type 2 diabetes mellitus with hyperglycemia [facility])  Assessment & Plan  Lab Results   Component Value Date    HGBA1C 6 3 (H) 06/21/2022       Recent Labs     06/21/22  1750 06/21/22  2118 06/22/22  0736 06/22/22  1055   POCGLU 275* 255* 157* 245*       Blood Sugar Average: Last 72 hrs:  (P) 233 sliding scale insulin a c  HS, hold metformin    HIV (human immunodeficiency virus infection)   Assessment & Plan  Maintained on Biktarvy        VTE Pharmacologic Prophylaxis:   Pharmacologic: Heparin  Mechanical VTE Prophylaxis in Place: Yes    Patient Centered Rounds: I have performed bedside rounds with nursing staff today      Discussions with Specialists or Other Care Team Provider: CM    Education and Discussions with Family / Patient: patient    Time Spent for Care: 30 minutes  More than 50% of total time spent on counseling and coordination of care as described above  Current Length of Stay: 0 day(s)    Current Patient Status: Observation   Certification Statement: The patient will continue to require additional inpatient hospital stay due to IV steroids, bronchodilators    Discharge Plan: tomorrow    Code Status: Level 1 - Full Code      Subjective:   Breathing better today, no significant cough  Tolerating diet  No complaints  Objective:     Vitals:   Temp (24hrs), Av 1 °F (36 7 °C), Min:97 8 °F (36 6 °C), Max:98 3 °F (36 8 °C)    Temp:  [97 8 °F (36 6 °C)-98 3 °F (36 8 °C)] 98 3 °F (36 8 °C)  HR:  [] 90  Resp:  [17-28] 20  BP: (120-135)/(70-84) 120/75  SpO2:  [93 %-99 %] 93 %  Body mass index is 40 32 kg/m²  Input and Output Summary (last 24 hours):     No intake or output data in the 24 hours ending 22 1211    Physical Exam:     Physical Exam  Vitals and nursing note reviewed  Constitutional:       General: She is not in acute distress  Appearance: Normal appearance  She is not ill-appearing  Eyes:      General: No scleral icterus  Conjunctiva/sclera: Conjunctivae normal    Cardiovascular:      Rate and Rhythm: Regular rhythm  Tachycardia present  Pulmonary:      Effort: Pulmonary effort is normal  No respiratory distress  Breath sounds: No stridor  Wheezing ( improved) present  No rhonchi  Comments: Mild expiratory wheezing  Abdominal:      General: Abdomen is flat  Bowel sounds are normal  There is no distension  Palpations: Abdomen is soft  Musculoskeletal:      Right lower leg: No edema  Left lower leg: No edema  Skin:     General: Skin is warm and dry  Coloration: Skin is not jaundiced or pale  Neurological:      General: No focal deficit present  Mental Status: She is alert  Mental status is at baseline           Additional Data:     Labs:    Results from last 7 days   Lab Units 06/21/22  1422   WBC Thousand/uL 8 12   HEMOGLOBIN g/dL 12 6   HEMATOCRIT % 41 3   PLATELETS Thousands/uL 337   NEUTROS PCT % 70   LYMPHS PCT % 20   MONOS PCT % 7   EOS PCT % 1     Results from last 7 days   Lab Units 06/21/22  1422   SODIUM mmol/L 141   POTASSIUM mmol/L 3 7   CHLORIDE mmol/L 105   CO2 mmol/L 24   BUN mg/dL 7   CREATININE mg/dL 0 69   ANION GAP mmol/L 12   CALCIUM mg/dL 9 9   GLUCOSE RANDOM mg/dL 111         Results from last 7 days   Lab Units 06/22/22  1055 06/22/22  0736 06/21/22  2118 06/21/22  1750   POC GLUCOSE mg/dl 245* 157* 255* 275*     Results from last 7 days   Lab Units 06/21/22  1422   HEMOGLOBIN A1C % 6 3*     Results from last 7 days   Lab Units 06/21/22  1422   PROCALCITONIN ng/ml <0 05           * I Have Reviewed All Lab Data Listed Above  * Additional Pertinent Lab Tests Reviewed: All Labs For Current Hospital Admission Reviewed    Imaging:    XR chest 1 view portable    Result Date: 6/22/2022  Impression: No acute cardiopulmonary disease   Workstation performed: [id number]       Recent Cultures (last 7 days):           Last 24 Hours Medication List:   Current Facility-Administered Medications   Medication Dose Route Frequency Provider Last Rate    acetaminophen  650 mg Oral Q6H PRN Richard Valenzuela PA-C      albuterol  2 5 mg Nebulization TID Richard Valenzuela PA-C      aspirin  81 mg Oral Daily [place]      atorvastatin  20 mg Oral Daily [facility] [employer] [place], [place]      benzonatate  100 mg Oral TID PRN Richard Valenzuela PA-C      budesonide  0 5 mg Nebulization Q12H Richard Valenzuela PA-C      diltiazem  180 mg Oral Daily Richard [place], [place]      docusate sodium  100 mg Oral BID Richard Valenzuela PA-C      gabapentin  800 mg Oral TID Richard Valenzuela PA-C      guaiFENesin  600 mg Oral BID Richard Valenzuela PA-C      heparin (porcine)  5,000 Units Subcutaneous [facility] [address] [place], [place]      insulin lispro  1-5 Units Subcutaneous HS LUIS M Trejo      insulin lispro  1-6 Units Subcutaneous TID AC LUIS M [place]      ipratropium  0 5 mg Nebulization TID Edwin Ferguson PA-C      levothyroxine  100 mcg Oral Early Morning Edwin Ferguson [place]      melatonin  6 mg Oral HS LUIS M Trejo      methylPREDNISolone sodium succinate  40 mg Intravenous Q12H [facility] Shad Hidalgo MD      montelukast  10 mg Oral HS Edwin Ferguson PA-C      nicotine  1 patch Transdermal Daily Edwin Ferguson PA-C      ondansetron  4 mg Intravenous Q6H PRN Edwin [place], [place]      [START ON 6/23/2022] predniSONE  40 mg Oral Daily Teresa Ladd MD      QUEtiapine  200 mg Oral HS [place], [place]      tiZANidine  2 mg Oral Q8H PRN Edwin Ferguson PA-C      topiramate  50 mg Oral Q12H [facility] Edwin Ferguson PA-C      traMADol  50 mg Oral Q6H PRN LUIS M Trejo          Today, Patient Was Seen By: Teresa Ladd MD    ** Please Note: Dictation voice to text software may have been used in the creation of this document   **

## 2022-06-23 LAB
GLUCOSE SERPL-MCNC: 127 MG/DL (ref 65–140)
GLUCOSE SERPL-MCNC: 144 MG/DL (ref 65–140)
GLUCOSE SERPL-MCNC: 184 MG/DL (ref 65–140)
GLUCOSE SERPL-MCNC: 220 MG/DL (ref 65–140)

## 2022-06-23 PROCEDURE — 94002 VENT MGMT INPAT INIT DAY: CPT

## 2022-06-23 PROCEDURE — 99232 SBSQ HOSP IP/OBS MODERATE 35: CPT | Performed by: STUDENT IN AN ORGANIZED HEALTH CARE EDUCATION/TRAINING PROGRAM

## 2022-06-23 PROCEDURE — 94640 AIRWAY INHALATION TREATMENT: CPT

## 2022-06-23 PROCEDURE — 82948 REAGENT STRIP/BLOOD GLUCOSE: CPT

## 2022-06-23 RX ORDER — METHYLPREDNISOLONE SODIUM SUCCINATE 40 MG/ML
40 INJECTION, POWDER, LYOPHILIZED, FOR SOLUTION INTRAMUSCULAR; INTRAVENOUS EVERY 8 HOURS SCHEDULED
Status: DISCONTINUED | OUTPATIENT
Start: 2022-06-23 | End: 2022-06-24

## 2022-06-23 RX ADMIN — LEVOTHYROXINE SODIUM 100 MCG: 100 TABLET ORAL at 05:14

## 2022-06-23 RX ADMIN — METHYLPREDNISOLONE SODIUM SUCCINATE 40 MG: 40 INJECTION, POWDER, FOR SOLUTION INTRAMUSCULAR; INTRAVENOUS at 22:45

## 2022-06-23 RX ADMIN — PREDNISONE 40 MG: 20 TABLET ORAL at 08:32

## 2022-06-23 RX ADMIN — HEPARIN SODIUM 5000 UNITS: 5000 INJECTION INTRAVENOUS; SUBCUTANEOUS at 05:14

## 2022-06-23 RX ADMIN — IPRATROPIUM BROMIDE 0.5 MG: 0.5 SOLUTION RESPIRATORY (INHALATION) at 13:35

## 2022-06-23 RX ADMIN — GUAIFENESIN 600 MG: 600 TABLET, EXTENDED RELEASE ORAL at 16:58

## 2022-06-23 RX ADMIN — GABAPENTIN 800 MG: 400 CAPSULE ORAL at 22:45

## 2022-06-23 RX ADMIN — IPRATROPIUM BROMIDE 0.5 MG: 0.5 SOLUTION RESPIRATORY (INHALATION) at 19:30

## 2022-06-23 RX ADMIN — DOCUSATE SODIUM 100 MG: 100 CAPSULE, LIQUID FILLED ORAL at 16:58

## 2022-06-23 RX ADMIN — GABAPENTIN 800 MG: 400 CAPSULE ORAL at 08:32

## 2022-06-23 RX ADMIN — ATORVASTATIN CALCIUM 20 MG: 20 TABLET, FILM COATED ORAL at 16:58

## 2022-06-23 RX ADMIN — BUDESONIDE 0.5 MG: 0.5 INHALANT ORAL at 07:21

## 2022-06-23 RX ADMIN — IPRATROPIUM BROMIDE 0.5 MG: 0.5 SOLUTION RESPIRATORY (INHALATION) at 07:21

## 2022-06-23 RX ADMIN — MONTELUKAST 10 MG: 10 TABLET, FILM COATED ORAL at 22:45

## 2022-06-23 RX ADMIN — QUETIAPINE FUMARATE 200 MG: 200 TABLET ORAL at 22:45

## 2022-06-23 RX ADMIN — BUDESONIDE 0.5 MG: 0.5 INHALANT ORAL at 19:30

## 2022-06-23 RX ADMIN — INSULIN LISPRO 2 UNITS: 100 INJECTION, SOLUTION INTRAVENOUS; SUBCUTANEOUS at 16:59

## 2022-06-23 RX ADMIN — HEPARIN SODIUM 5000 UNITS: 5000 INJECTION INTRAVENOUS; SUBCUTANEOUS at 22:45

## 2022-06-23 RX ADMIN — ASPIRIN 81 MG: 81 TABLET, COATED ORAL at 08:32

## 2022-06-23 RX ADMIN — TOPIRAMATE 50 MG: 25 TABLET, FILM COATED ORAL at 22:45

## 2022-06-23 RX ADMIN — INSULIN LISPRO 1 UNITS: 100 INJECTION, SOLUTION INTRAVENOUS; SUBCUTANEOUS at 08:33

## 2022-06-23 RX ADMIN — HEPARIN SODIUM 5000 UNITS: 5000 INJECTION INTRAVENOUS; SUBCUTANEOUS at 13:50

## 2022-06-23 RX ADMIN — ALBUTEROL SULFATE 2.5 MG: 2.5 SOLUTION RESPIRATORY (INHALATION) at 13:35

## 2022-06-23 RX ADMIN — MELATONIN 6 MG: at 22:45

## 2022-06-23 RX ADMIN — METHYLPREDNISOLONE SODIUM SUCCINATE 40 MG: 40 INJECTION, POWDER, FOR SOLUTION INTRAMUSCULAR; INTRAVENOUS at 13:50

## 2022-06-23 RX ADMIN — ALBUTEROL SULFATE 2.5 MG: 2.5 SOLUTION RESPIRATORY (INHALATION) at 07:21

## 2022-06-23 RX ADMIN — DILTIAZEM HYDROCHLORIDE 180 MG: 180 CAPSULE, COATED, EXTENDED RELEASE ORAL at 08:32

## 2022-06-23 RX ADMIN — GABAPENTIN 800 MG: 400 CAPSULE ORAL at 16:58

## 2022-06-23 RX ADMIN — ALBUTEROL SULFATE 2.5 MG: 2.5 SOLUTION RESPIRATORY (INHALATION) at 19:30

## 2022-06-23 RX ADMIN — GUAIFENESIN 600 MG: 600 TABLET, EXTENDED RELEASE ORAL at 08:32

## 2022-06-23 RX ADMIN — TOPIRAMATE 50 MG: 25 TABLET, FILM COATED ORAL at 08:32

## 2022-06-23 NOTE — UTILIZATION REVIEW
Inpatient Admission Authorization Request   NOTIFICATION OF INPATIENT ADMISSION/INPATIENT AUTHORIZATION REQUEST   SERVICING FACILITY:   [address] [place] [address], [facility], [facility]  Tax ID: [id number]  NPI: [id number]  Place of Service: Inpatient [address] [facility] [address] [facility]  [address]  Place of Service Code: 24     ATTENDING PROVIDER:  Attending Name and NPI#: Amrit Ocasioma [[id number]]  Address: [address] [place] [address], [facility], [facility]  Phone: [phone]     UTILIZATION REVIEW CONTACT:  Alix Pearce, Utilization   Network Utilization Review Department  Phone: [phone]  Fax: [phone]  Email: [email or web address]     PHYSICIAN ADVISORY SERVICES:  FOR YNKZ-NX-CICL REVIEW - MEDICAL NECESSITY DENIAL  Phone: [phone]  Fax: [phone]  Email: [email or web address]     TYPE OF REQUEST:  Inpatient Status     ADMISSION INFORMATION:  ADMISSION DATE/TIME: 6/22/22 12:37 PM  PATIENT DIAGNOSIS CODE/DESCRIPTION:  SOB (shortness of breath) [R06 02]  COPD exacerbation (HCC) [J44 1]  DISCHARGE DATE/TIME: No discharge date for patient encounter  IMPORTANT INFORMATION:  Please contact Alix Pearce directly with any questions or concerns regarding this request  Department voicemails are confidential     Send requests for admission clinical reviews, concurrent reviews, approvals, and administrative denials due to lack of clinical to fax [phone]

## 2022-06-23 NOTE — ASSESSMENT & PLAN NOTE
Lab Results   Component Value Date    HGBA1C 6 3 (H) 06/21/2022       Recent Labs     06/22/22  1605 06/22/22  2104 06/23/22  0727 06/23/22  1100   POCGLU 172* 191* 184* 127     Continue sliding scale insulin, monitor on IV steroids

## 2022-06-23 NOTE — ASSESSMENT & PLAN NOTE
Presenting to the emergency department with worsening shortness of breath that began this morning, feels similar to previous COPD exacerbations  · Received DuoNeb as well as steroids with improvement, still experiencing mild dyspnea  · On baseline oxygen, 2-3L NC  · Continue Xopenex/Atrovent t i d , Pulmicort b i d  · Breathing improved, patient on baseline oxygen though reports not yet feeling at her baseline  · Continue IV steroids, plan to transition to p o   Tomorrow and possible discharge home 13.0   6.43  )-----------( 184      ( 01 Aug 2019 18:56 )             40.8         139  |  101  |  15  ----------------------------<  99  4.1   |  25  |  1.0    Ca    9.3      01 Aug 2019 18:56    TPro  6.8  /  Alb  3.5  /  TBili  0.4  /  DBili  x   /  AST  19  /  ALT  11  /  AlkPhos  90            Urinalysis Basic - ( 01 Aug 2019 18:56 )    Color: Yellow / Appearance: Cloudy / S.015 / pH: x  Gluc: x / Ketone: Trace  / Bili: Negative / Urobili: 0.2 mg/dL   Blood: x / Protein: 100 mg/dL / Nitrite: Positive   Leuk Esterase: Large / RBC: 1-2 /HPF / WBC 10-25 /HPF   Sq Epi: x / Non Sq Epi: Few /HPF / Bacteria: TNTC /HPF      PT/INR - ( 01 Aug 2019 18:56 )   PT: 11.80 sec;   INR: 1.03 ratio         PTT - ( 01 Aug 2019 18:56 )  PTT:25.4 sec  Lactate Trend   @ 18:56 Lactate:1.1         CAPILLARY BLOOD GLUCOSE

## 2022-06-23 NOTE — PLAN OF CARE
Problem: RESPIRATORY - ADULT  Goal: Achieves optimal ventilation and oxygenation  Description: INTERVENTIONS:  - Assess for changes in respiratory status  - Assess for changes in mentation and behavior  - Position to facilitate oxygenation and minimize respiratory effort  - Oxygen administered by appropriate delivery if ordered  - Initiate smoking cessation education as indicated  - Encourage broncho-pulmonary hygiene including cough, deep breathe, Incentive Spirometry  - Assess the need for suctioning and aspirate as needed  - Assess and instruct to report SOB or any respiratory difficulty  - Respiratory Therapy support as indicated  Outcome: Progressing     Problem: METABOLIC, FLUID AND ELECTROLYTES - ADULT  Goal: Glucose maintained within target range  Description: INTERVENTIONS:  - Monitor Blood Glucose as ordered  - Assess for signs and symptoms of hyperglycemia and hypoglycemia  - Administer ordered medications to maintain glucose within target range  - Assess nutritional intake and initiate nutrition service referral as needed  Outcome: Progressing     Problem: SKIN/TISSUE INTEGRITY - ADULT  Goal: Skin Integrity remains intact(Skin Breakdown Prevention)  Description: Assess:  -Perform Jonathan assessment bid  -Clean and moisturize skin every day  -Inspect skin when repositioning, toileting, and assisting with ADLS  -Assess extremities for adequate circulation and sensation     Bed Management:  -Have minimal linens on bed & keep smooth, unwrinkled  -Change linens as needed when moist or perspiring      Toileting:  -Offer bedside commode    Activity:  -Mobilize patient 3 times a day  -Encourage activity and walks on unit  -Encourage or provide ROM exercises   -Use appropriate equipment to lift or move patient in bed      Skin Care:  -Avoid use of baby powder, tape, friction and shearing, hot water or constrictive clothing  -Do not massage red bony areas      Outcome: Progressing     Problem: PAIN - ADULT  Goal: Verbalizes/displays adequate comfort level or baseline comfort level  Description: Interventions:  - Encourage patient to monitor pain and request assistance  - Assess pain using appropriate pain scale  - Administer analgesics based on type and severity of pain and evaluate response  - Implement non-pharmacological measures as appropriate and evaluate response  - Consider cultural and social influences on pain and pain management  - Notify physician/advanced practitioner if interventions unsuccessful or patient reports new pain  Outcome: Progressing     Problem: SAFETY ADULT  Goal: Patient will remain free of falls  Description: INTERVENTIONS:  - Educate patient/family on patient safety including physical limitations  - Instruct patient to call for assistance with activity   - Consult OT/PT to assist with strengthening/mobility   - Keep Call bell within reach  - Keep bed low and locked with side rails adjusted as appropriate  - Keep care items and personal belongings within reach  - Initiate and maintain comfort rounds  - Apply yellow socks and bracelet for high fall risk patients  - Consider moving patient to room near nurses station  Outcome: Progressing     Problem: Knowledge Deficit  Goal: Patient/family/caregiver demonstrates understanding of disease process, treatment plan, medications, and discharge instructions  Description: Complete learning assessment and assess knowledge base    Interventions:  - Provide teaching at level of understanding  - Provide teaching via preferred learning methods  Outcome: Progressing     Problem: MOBILITY - ADULT  Goal: Maintain or return to baseline ADL function  Description: INTERVENTIONS:  -  Assess patient's ability to carry out ADLs; assess patient's baseline for ADL function and identify physical deficits which impact ability to perform ADLs (bathing, care of mouth/teeth, toileting, grooming, dressing, etc )  - Assess/evaluate cause of self-care deficits   - Assess range of motion  - Assess patient's mobility; develop plan if impaired  - Assess patient's need for assistive devices and provide as appropriate  - Encourage maximum independence but intervene and supervise when necessary  - Involve family in performance of ADLs  - Assess for home care needs following discharge   - Consider OT consult to assist with ADL evaluation and planning for discharge  - Provide patient education as appropriate  Outcome: Progressing  Goal: Maintains/Returns to pre admission functional level  Description: INTERVENTIONS:  - Perform BMAT or MOVE assessment daily    - Set and communicate daily mobility goal to care team and patient/family/caregiver     - Collaborate with rehabilitation services on mobility goals if consulted  - Out of bed for meals 3 times a day  - Out of bed for toileting  - Record patient progress and toleration of activity level   Outcome: Progressing     Problem: Potential for Falls  Goal: Patient will remain free of falls  Description: INTERVENTIONS:  - Educate patient/family on patient safety including physical limitations  - Instruct patient to call for assistance with activity   - Consult OT/PT to assist with strengthening/mobility   - Keep Call bell within reach  - Keep bed low and locked with side rails adjusted as appropriate  - Keep care items and personal belongings within reach  - Initiate and maintain comfort rounds  - Apply yellow socks and bracelet for high fall risk patients  - Consider moving patient to room near nurses station  Outcome: Progressing

## 2022-06-24 PROBLEM — I50.32 CHRONIC DIASTOLIC (CONGESTIVE) HEART FAILURE (HCC): Status: ACTIVE | Noted: 2022-06-24

## 2022-06-24 LAB
GLUCOSE SERPL-MCNC: 160 MG/DL (ref 65–140)
GLUCOSE SERPL-MCNC: 182 MG/DL (ref 65–140)
GLUCOSE SERPL-MCNC: 204 MG/DL (ref 65–140)
GLUCOSE SERPL-MCNC: 210 MG/DL (ref 65–140)

## 2022-06-24 PROCEDURE — 82948 REAGENT STRIP/BLOOD GLUCOSE: CPT

## 2022-06-24 PROCEDURE — 99232 SBSQ HOSP IP/OBS MODERATE 35: CPT | Performed by: INTERNAL MEDICINE

## 2022-06-24 PROCEDURE — 99223 1ST HOSP IP/OBS HIGH 75: CPT | Performed by: INTERNAL MEDICINE

## 2022-06-24 PROCEDURE — 94640 AIRWAY INHALATION TREATMENT: CPT

## 2022-06-24 RX ORDER — METHYLPREDNISOLONE SODIUM SUCCINATE 40 MG/ML
40 INJECTION, POWDER, LYOPHILIZED, FOR SOLUTION INTRAMUSCULAR; INTRAVENOUS ONCE
Status: DISCONTINUED | OUTPATIENT
Start: 2022-06-24 | End: 2022-06-24

## 2022-06-24 RX ORDER — FUROSEMIDE 10 MG/ML
40 INJECTION INTRAMUSCULAR; INTRAVENOUS ONCE
Status: COMPLETED | OUTPATIENT
Start: 2022-06-24 | End: 2022-06-24

## 2022-06-24 RX ORDER — BICTEGRAVIR SODIUM, EMTRICITABINE, AND TENOFOVIR ALAFENAMIDE FUMARATE 50; 200; 25 MG/1; MG/1; MG/1
1 TABLET ORAL DAILY
COMMUNITY
Start: 2022-05-09

## 2022-06-24 RX ORDER — TORSEMIDE 20 MG/1
10 TABLET ORAL DAILY
COMMUNITY
Start: 2022-02-22 | End: 2023-02-22

## 2022-06-24 RX ORDER — PREDNISONE 20 MG/1
40 TABLET ORAL DAILY
Status: DISCONTINUED | OUTPATIENT
Start: 2022-06-25 | End: 2022-06-24

## 2022-06-24 RX ORDER — METHYLPREDNISOLONE SODIUM SUCCINATE 40 MG/ML
40 INJECTION, POWDER, LYOPHILIZED, FOR SOLUTION INTRAMUSCULAR; INTRAVENOUS EVERY 8 HOURS SCHEDULED
Status: DISCONTINUED | OUTPATIENT
Start: 2022-06-24 | End: 2022-06-25

## 2022-06-24 RX ORDER — PREDNISONE 20 MG/1
20 TABLET ORAL DAILY
Status: DISCONTINUED | OUTPATIENT
Start: 2022-07-01 | End: 2022-06-24

## 2022-06-24 RX ORDER — PREDNISONE 10 MG/1
10 TABLET ORAL DAILY
Status: DISCONTINUED | OUTPATIENT
Start: 2022-07-04 | End: 2022-06-24

## 2022-06-24 RX ORDER — TORSEMIDE 10 MG/1
10 TABLET ORAL DAILY
Status: DISCONTINUED | OUTPATIENT
Start: 2022-06-24 | End: 2022-06-26 | Stop reason: HOSPADM

## 2022-06-24 RX ADMIN — MELATONIN 6 MG: at 22:46

## 2022-06-24 RX ADMIN — APIXABAN 5 MG: 5 TABLET, FILM COATED ORAL at 17:32

## 2022-06-24 RX ADMIN — QUETIAPINE FUMARATE 200 MG: 200 TABLET ORAL at 22:47

## 2022-06-24 RX ADMIN — TORSEMIDE 10 MG: 10 TABLET ORAL at 14:31

## 2022-06-24 RX ADMIN — IPRATROPIUM BROMIDE 0.5 MG: 0.5 SOLUTION RESPIRATORY (INHALATION) at 07:42

## 2022-06-24 RX ADMIN — ASPIRIN 81 MG: 81 TABLET, COATED ORAL at 08:20

## 2022-06-24 RX ADMIN — TRAMADOL HYDROCHLORIDE 50 MG: 50 TABLET, COATED ORAL at 06:09

## 2022-06-24 RX ADMIN — BICTEGRAVIR SODIUM, EMTRICITABINE, AND TENOFOVIR ALAFENAMIDE FUMARATE 1 TABLET: 50; 200; 25 TABLET ORAL at 16:32

## 2022-06-24 RX ADMIN — INSULIN LISPRO 2 UNITS: 100 INJECTION, SOLUTION INTRAVENOUS; SUBCUTANEOUS at 17:33

## 2022-06-24 RX ADMIN — TOPIRAMATE 50 MG: 25 TABLET, FILM COATED ORAL at 08:20

## 2022-06-24 RX ADMIN — ALBUTEROL SULFATE 2.5 MG: 2.5 SOLUTION RESPIRATORY (INHALATION) at 19:47

## 2022-06-24 RX ADMIN — IPRATROPIUM BROMIDE 0.5 MG: 0.5 SOLUTION RESPIRATORY (INHALATION) at 13:53

## 2022-06-24 RX ADMIN — TOPIRAMATE 50 MG: 25 TABLET, FILM COATED ORAL at 22:47

## 2022-06-24 RX ADMIN — GABAPENTIN 800 MG: 400 CAPSULE ORAL at 15:44

## 2022-06-24 RX ADMIN — DOCUSATE SODIUM 100 MG: 100 CAPSULE, LIQUID FILLED ORAL at 08:20

## 2022-06-24 RX ADMIN — DILTIAZEM HYDROCHLORIDE 180 MG: 180 CAPSULE, COATED, EXTENDED RELEASE ORAL at 08:20

## 2022-06-24 RX ADMIN — METHYLPREDNISOLONE SODIUM SUCCINATE 40 MG: 40 INJECTION, POWDER, FOR SOLUTION INTRAMUSCULAR; INTRAVENOUS at 05:54

## 2022-06-24 RX ADMIN — INSULIN LISPRO 1 UNITS: 100 INJECTION, SOLUTION INTRAVENOUS; SUBCUTANEOUS at 07:21

## 2022-06-24 RX ADMIN — BUDESONIDE 0.5 MG: 0.5 INHALANT ORAL at 07:42

## 2022-06-24 RX ADMIN — GUAIFENESIN 600 MG: 600 TABLET, EXTENDED RELEASE ORAL at 17:32

## 2022-06-24 RX ADMIN — MONTELUKAST 10 MG: 10 TABLET, FILM COATED ORAL at 22:47

## 2022-06-24 RX ADMIN — IPRATROPIUM BROMIDE 0.5 MG: 0.5 SOLUTION RESPIRATORY (INHALATION) at 19:47

## 2022-06-24 RX ADMIN — HEPARIN SODIUM 5000 UNITS: 5000 INJECTION INTRAVENOUS; SUBCUTANEOUS at 05:54

## 2022-06-24 RX ADMIN — ALBUTEROL SULFATE 2.5 MG: 2.5 SOLUTION RESPIRATORY (INHALATION) at 13:53

## 2022-06-24 RX ADMIN — FUROSEMIDE 40 MG: 10 INJECTION, SOLUTION INTRAVENOUS at 14:44

## 2022-06-24 RX ADMIN — BUDESONIDE 0.5 MG: 0.5 INHALANT ORAL at 19:47

## 2022-06-24 RX ADMIN — ATORVASTATIN CALCIUM 20 MG: 20 TABLET, FILM COATED ORAL at 16:32

## 2022-06-24 RX ADMIN — METHYLPREDNISOLONE SODIUM SUCCINATE 40 MG: 40 INJECTION, POWDER, FOR SOLUTION INTRAMUSCULAR; INTRAVENOUS at 15:44

## 2022-06-24 RX ADMIN — GUAIFENESIN 600 MG: 600 TABLET, EXTENDED RELEASE ORAL at 08:20

## 2022-06-24 RX ADMIN — GABAPENTIN 800 MG: 400 CAPSULE ORAL at 22:46

## 2022-06-24 RX ADMIN — ALBUTEROL SULFATE 2.5 MG: 2.5 SOLUTION RESPIRATORY (INHALATION) at 07:42

## 2022-06-24 RX ADMIN — INSULIN LISPRO 2 UNITS: 100 INJECTION, SOLUTION INTRAVENOUS; SUBCUTANEOUS at 12:30

## 2022-06-24 RX ADMIN — LEVOTHYROXINE SODIUM 100 MCG: 100 TABLET ORAL at 05:54

## 2022-06-24 RX ADMIN — METHYLPREDNISOLONE SODIUM SUCCINATE 40 MG: 40 INJECTION, POWDER, FOR SOLUTION INTRAMUSCULAR; INTRAVENOUS at 22:47

## 2022-06-24 RX ADMIN — GABAPENTIN 800 MG: 400 CAPSULE ORAL at 08:20

## 2022-06-24 RX ADMIN — INSULIN LISPRO 1 UNITS: 100 INJECTION, SOLUTION INTRAVENOUS; SUBCUTANEOUS at 22:47

## 2022-06-24 NOTE — ASSESSMENT & PLAN NOTE
Wt Readings from Last 3 Encounters:   06/21/22 100 kg (220 lb 7 4 oz)   05/27/20 106 kg (233 lb 11 oz)   05/27/20 106 kg (233 lb 7 5 oz)     · Restart torsemide 10 mg daily

## 2022-06-24 NOTE — CONSULTS
Consultation - Pulmonary Medicine   Lamar Pagan 73 y o  female MRN: [id number]  Unit/Bed#: [place] [address] [address] [id number] Encounter: [id number]      Assessment/Plan:    1  Acute pulmonary insufficiency on chronic hypoxic respiratory failure       -  currently on home O2 requirements of 2 L-94%       -  maintain saturations greater than 88%       -  pulmonary toileting:  Deep breathing cough, OOB, as tolerated    2  Moderate COPD with possible acute exacerbation       -  Inpatient:  IV Solu-Medrol 40 mg will decrease to t i d - can likely transition back over to prednisone tomorrow, Xopenex/Atrovent       -  home regimen: Advair 250/50 mcg 1 puff b i d , Spiriva 18 mcg 1 puff daily, and albuterol as needed       -  will need updated PFTs and close pulmonary follow-up    3  Chronic grade 1 diastolic CHF w/ PAF       -  2/21/2022- EF 65%     will obtain proBNP       -  will give 1 IV Lasix 40 mg       -  recommend keeping patient on drier side         4  PASHA       -  patient reports 100% compliance with her CPAP       -  ramp of 5-15 cm of H2O w/ 2L       -  will continue CPAP q h s     5  Active tobacco abuse       -  patient reports smoking 1-2 cigarettes a day       -  encourage in educated on tobacco cessation       -  patient appears in pre contemplation stage         History of Present Illness   Physician Requesting Consult: Nicole Gardiner DO  Reason for Consult / Principal Problem:  COPD  Hx and PE limited by:  Nothing  Chief Complaint: "I am feeling well a better"  HPI: Lamar Pagan is a 73 y o   female who presented to [place] [address]  with complaints of shortness of breath  Patient has past medical history of COPD, PASHA, HIV, AFib on Eliquis, and diabetes  Patient presents to the emergency room on 6/21/2022 for shortness of breath  Patient was administered 60 mg IV Solu-Medrol, and nebulizer treatment  Pulmonary was consulted for COPD exacerbation    Today but on examination patient did have some diminished aeration at the bases but was able to have noted aeration in the upper lobes  Patient does report she overall feels better since admission  Patient currently denying any fevers, chills hemoptysis, headaches, night sweats, pleuritic chest pain, or palpitations  From a pulmonary standpoint, patient follows with Dr Zulema Pereira for his moderate COPD  Patient reports she is still an active 1-2 cigarettes a day smoker  Patient reports she started smoking around the age of 14  Patient is currently maintained on Advair 250/50 mcg 1 puff b i d , Spiriva 18 mcg 1 puff daily, and albuterol as needed  Patient is currently maintained on 2 L oxygen therapy  Patient reports some occupational exposures as she worked in a [employer] [employer]  Patient denies any symptoms of GERD, dysphagia, or postnasal drip  Patient does report history of seasonal allergies in which she is maintained on singular  Patient's sleep study 3/2022 for moderate sleep apnea  Patient is currently maintained on CPAP 5-15 cm of H2O with 2 L  Patient denies any acute exposures to dust, mold, asbestos, or silica  Inpatient consult to Pulmonology  Consult performed by: LUIS M Horta  Consult ordered by: Layne Sifuentes DO          Review of Systems   Constitutional: Negative for chills and fever  HENT: Negative for ear pain and sore throat  Eyes: Negative for pain and visual disturbance  Respiratory: Positive for cough and shortness of breath  Negative for apnea, choking, chest tightness, wheezing and stridor  Cardiovascular: Negative for chest pain and palpitations  Gastrointestinal: Negative for abdominal pain and vomiting  Genitourinary: Negative for dysuria and hematuria  Musculoskeletal: Negative for arthralgias and back pain  Skin: Negative for color change and rash  Neurological: Negative for seizures and syncope  Psychiatric/Behavioral: Negative for agitation and behavioral problems     All other systems reviewed and are negative  Historical Information   Past Medical History:   Diagnosis Date    Bipolar 1 disorder ([facility] )     Cardiac disease     COPD (chronic obstructive pulmonary disease) ([facility] )     Diabetes mellitus ([facility] )     Disease of thyroid gland     HIV (human immunodeficiency virus infection) ([place] [address] ) 3/24/2016    Hypertension     Osteoarthritis     Tobacco abuse      Past Surgical History:   Procedure Laterality Date    HERNIA REPAIR      LUMBAR FUSION N/A 4/10/2018    Procedure: T9/10 discectomy with T9-T11 fusion;  Surgeon: Marcie Jin MD;  Location: [facility];  Service: Neurosurgery    THYROIDECTOMY       Social History   Social History     Substance and Sexual Activity   Alcohol Use Yes     Social History     Substance and Sexual Activity   Drug Use No    Comment: former IV drug user     Social History     Tobacco Use   Smoking Status Current Every Day Smoker    Packs/day: 0 20    Years: 45 00    Pack years: 9 00    Types: Cigarettes   Smokeless Tobacco Never Used   Tobacco Comment    Currently smoking 1-2 cigarettes a day     E-Cigarette/Vaping     E-Cigarette/Vaping Substances     Occupational History:      Family History:   Family History   Problem Relation Age of Onset    No Known Problems Mother     Diabetes Father     Heart disease Neg Hx     Cancer Neg Hx        Meds/Allergies   pertinent pulmonary meds have been reviewed    Allergies   Allergen Reactions    Lisinopril Angioedema    Prozac [Fluoxetine Hcl] Rash    Sulfa Antibiotics Itching    Quinine        Objective   Vitals: Blood pressure 122/74, pulse 81, temperature 97 5 °F (36 4 °C), temperature source Oral, resp  rate 16, height 5' 2" (1 575 m), weight 100 kg (220 lb 7 4 oz), last menstrual period 04/01/2013, SpO2 95 %, not currently breastfeeding  RA,Body mass index is 40 32 kg/m²    No intake or output data in the 24 hours ending 06/24/22 1232  Invasive Devices  Report    Peripheral Intravenous Line  Duration Peripheral IV 06/21/22 Left Forearm 3 days                Physical Exam  Constitutional:       General: She is not in acute distress  Appearance: Normal appearance  She is normal weight  She is not ill-appearing  HENT:      Head: Normocephalic and atraumatic  Nose: Nose normal  No congestion or rhinorrhea  Mouth/Throat:      Mouth: Mucous membranes are dry  Pharynx: No oropharyngeal exudate or posterior oropharyngeal erythema  Cardiovascular:      Rate and Rhythm: Normal rate and regular rhythm  Pulses: Normal pulses  Heart sounds: Normal heart sounds  No murmur heard  No friction rub  No gallop  Pulmonary:      Effort: Pulmonary effort is normal  No tachypnea, bradypnea, accessory muscle usage or respiratory distress  Breath sounds: Decreased air movement present  No stridor  Decreased breath sounds present  No wheezing, rhonchi or rales  Comments: Diminished  upper airway  Chest:      Chest wall: No tenderness  Abdominal:      General: Abdomen is flat  Bowel sounds are normal  There is no distension  Palpations: Abdomen is soft  There is no mass  Musculoskeletal:         General: No swelling or tenderness  Normal range of motion  Cervical back: Normal range of motion  No rigidity  Skin:     General: Skin is warm and dry  Coloration: Skin is not jaundiced or pale  Neurological:      General: No focal deficit present  Mental Status: She is alert and oriented to person, place, and time  Mental status is at baseline     Psychiatric:         Mood and Affect: Mood normal          Behavior: Behavior normal          Lab Results: I have personally reviewed pertinent lab result 6/23/2022    Imaging Studies: I have personally reviewed pertinent films in PACS     Chest x-ray- 6/21/2022- no acute cardiopulmonary process    EKG, Pathology, and Other Studies: I have personally reviewed pertinent films in PACS     Echo 2/21/2022- EF- 60 65% grade 1 diastolic dysfunction    Pulmonary Results (PFTs, PSG): I have personally reviewed pertinent films in PACS     Results: 11/16/2021  The FVC is reduced [1 43L (62%)]  The FEV1 is normal [1 30L (73%)]  The FEV1/FVC is normal [91]  No bronchodilator was administered  The TLC is not measured  The DLCO is not measured  Impression:   The patient has suggestion of possible mild restriction based on   spirometry and flow volume loop  However, exhalation time was less than 2   seconds and patient had significant difficulty with the spirometry so this   is a suboptimal test and thus not able to be definitively interpreted  VTE Prophylaxis: Sequential compression device (Venodyne)     Code Status: Level 1 - Full Code    None    Portions of the record may have been created with voice recognition software  Occasional wrong word or "sound a like" substitutions may have occurred due to the inherent limitations of voice recognition software  Read the chart carefully and recognize, using context, where substitutions have occurred

## 2022-06-24 NOTE — PROGRESS NOTES
[facility]  Progress Note - Radha Santacruz 1950, 73 y o  female MRN: [id number]  Unit/Bed#: [place] [address] [id number] Encounter: [id number]  Primary Care Provider: Marva Berry,    Date and time admitted to hospital: 6/21/2022  1:14 PM    * COPD with acute exacerbation [facility])  Assessment & Plan  71-year-old female with HIV defibrillation persistent atrial fibrillation COPD and chronic oxygen use who presents to the hospital with exacerbation of COPD  · Currently on IV methylprednisolone 40 mg q 8 hours  · Consulted pulmonary, appreciate recommendations  · Advised smoking cessation    Chronic diastolic (congestive) heart failure (HCC)  Assessment & Plan  Wt Readings from Last 3 Encounters:   06/21/22 100 kg (220 lb 7 4 oz)   05/27/20 106 kg (233 lb 11 oz)   05/27/20 106 kg (233 lb 7 5 oz)     · Restart torsemide 10 mg daily    Paroxysmal atrial fibrillation (HCC)  Assessment & Plan  · Continue diltiazem  Restart patient's eliquis  Bipolar 1 disorder ([facility] )  Assessment & Plan  · Continue quetiapine at bedtime    Chronic respiratory failure with hypoxia ([facility] )  Assessment & Plan  · Chronically on 2L secondary to COPD    Type 2 diabetes mellitus with hyperglycemia [facility])  Assessment & Plan  Lab Results   Component Value Date    HGBA1C 6 3 (H) 06/21/2022     Recent Labs     06/23/22  1611 06/23/22  2106 06/24/22  0707 06/24/22  1055   POCGLU 220* 144* 160* 210*     · Continue sliding scale insulin for steroid-induced hyperglycemia    HIV (human immunodeficiency virus infection)   Assessment & Plan  · Restart biktarvy      VTE Pharmacologic Prophylaxis:   High Risk (Score >/= 5) - Pharmacological DVT Prophylaxis Ordered: Restart eliquis  Sequential Compression Devices Ordered  Patient Centered Rounds: I have performed bedside rounds with nursing staff today    Discussions with Specialists or Other Care Team Provider:  Pulmonology    Education and Discussions with Family / Patient:  Daughter on telephone    Time Spent for Care: 20 mins  More than 50% of total time spent on counseling and coordination of care as described above  Current Length of Stay: 2 day(s)  Current Patient Status: Inpatient   Certification Statement: The patient will continue to require additional inpatient hospital stay due to COPD exacerbation  Discharge Plan / Estimated Discharge Date: Anticipate discharge in 48 hrs to home  Code Status: Level 1 - Full Code      Subjective:   Patient seen and examined  Still tight, less short of breath    Objective:   Vitals: Blood pressure 122/74, pulse 81, temperature 97 5 °F (36 4 °C), temperature source Oral, resp  rate 16, height 5' 2" (1 575 m), weight 100 kg (220 lb 7 4 oz), last menstrual period 04/01/2013, SpO2 95 %, not currently breastfeeding  Physical Exam  Vitals reviewed  Constitutional:       General: She is not in acute distress  Appearance: Normal appearance  She is obese  HENT:      Head: Atraumatic  Cardiovascular:      Rate and Rhythm: Regular rhythm  Pulmonary:      Breath sounds: Decreased breath sounds present  No wheezing  Abdominal:      General: Bowel sounds are normal       Palpations: Abdomen is soft  Tenderness: There is no guarding or rebound  Musculoskeletal:         General: No swelling  Skin:     General: Skin is warm  Neurological:      General: No focal deficit present  Mental Status: She is alert         Additional Data:   Labs:  Results from last 7 days   Lab Units 06/21/22  1422   WBC Thousand/uL 8 12   HEMOGLOBIN g/dL 12 6   HEMATOCRIT % 41 3   MCV fL 94   PLATELETS Thousands/uL 337     Results from last 7 days   Lab Units 06/21/22  1422   SODIUM mmol/L 141   POTASSIUM mmol/L 3 7   CHLORIDE mmol/L 105   CO2 mmol/L 24   ANION GAP mmol/L 12   BUN mg/dL 7   CREATININE mg/dL 0 69   CALCIUM mg/dL 9 9   EGFR ml/min/1 73sq m 89   GLUCOSE RANDOM mg/dL 111             Results from last 7 days   Lab Units 06/21/22  1622 06/21/22  1422   HS TNI 0HR ng/L  --  8   HS TNI 2HR ng/L 8  --           Results from last 7 days   Lab Units 06/21/22  1422   PROCALCITONIN ng/ml <0 05     Results from last 7 days   Lab Units 06/24/22  1055 06/24/22  0707 06/23/22  2106 06/23/22  1611 06/23/22  1100 06/23/22  0727 06/22/22  2104 06/22/22  1605 06/22/22  1055 06/22/22  0736 06/21/22  2118 06/21/22  1750   POC GLUCOSE mg/dl 210* 160* 144* 220* 127 184* 191* 172* 245* 157* 255* 275*     Results from last 7 days   Lab Units 06/21/22  1422   HEMOGLOBIN A1C % 6 3*         * I Have Reviewed All Lab Data Listed Above  Cultures:                   Lines/Drains:  Invasive Devices  Report    Peripheral Intravenous Line  Duration           Peripheral IV 06/21/22 Left Forearm 3 days              Telemetry:      Imaging:  Imaging Reports Reviewed Today Include:   XR chest 1 view portable    Result Date: 6/22/2022  Impression: No acute cardiopulmonary disease   Workstation performed: [id number]       Scheduled Meds:  Current Facility-Administered Medications   Medication Dose Route Frequency Provider Last Rate    acetaminophen  650 mg Oral Q6H PRN Amanda Rodas PA-C      albuterol  2 5 mg Nebulization TID Amanda Rodas PA-C      aspirin  81 mg Oral Daily Amanda Martinezing, [place]      atorvastatin  20 mg Oral Daily With [employer] [place], [place]      benzonatate  100 mg Oral TID PRN Amanda Rodas PA-C      budesonide  0 5 mg Nebulization Q12H Amanda Rodas PA-C      diltiazem  180 mg Oral Daily Amanda Rodas [place]      docusate sodium  100 mg Oral BID Amanda Rodas PA-C      furosemide  40 mg Intravenous Once LUIS M Ferro      gabapentin  800 mg Oral TID Amanda Rodas PA-C      guaiFENesin  600 mg Oral BID Amanda Rodas PA-C      heparin (porcine)  5,000 Units Subcutaneous [address] Jessica Lopez      insulin lispro  1-5 Units Subcutaneous HS LUIS M Benedict      insulin lispro  1-6 Units Subcutaneous TID AC LUIS M Benedict      ipratropium  0 5 mg Nebulization TID Lula Ribeiro PA-C      levothyroxine  100 mcg Oral Early Morning Jessica Chow      melatonin  6 mg Oral HS LUIS M Crespo      methylPREDNISolone sodium succinate  40 mg Intravenous [facility] LUIS M Vallecillo      montelukast  10 mg Oral HS Lula Ribeiro PA-C      nicotine  1 patch Transdermal Daily Jsesica Chow      ondansetron  4 mg Intravenous Q6H PRN Lula Ribeiro PA-C      QUEtiapine  200 mg Oral HS Jessica Chow      tiZANidine  2 mg Oral Q8H PRN Lula Ribeiro PA-C      topiramate  50 mg Oral Q12H [address] Jessica Chow      traMADol  50 mg Oral Q6H PRN LUIS M Crespo         Today, Patient Was Seen By: Sven Stout DO    ** Please Note: Dictation voice to text software may have been used in the creation of this document   **

## 2022-06-24 NOTE — ASSESSMENT & PLAN NOTE
Lab Results   Component Value Date    HGBA1C 6 3 (H) 06/21/2022     Recent Labs     06/23/22  1611 06/23/22  2106 06/24/22  0707 06/24/22  1055   POCGLU 220* 144* 160* 210*     · Continue sliding scale insulin for steroid-induced hyperglycemia

## 2022-06-24 NOTE — ASSESSMENT & PLAN NOTE
65-year-old female with HIV defibrillation persistent atrial fibrillation COPD and chronic oxygen use who presents to the hospital with exacerbation of COPD  · Currently on IV methylprednisolone 40 mg q 8 hours    · Consulted pulmonary, appreciate recommendations  · Advised smoking cessation

## 2022-06-24 NOTE — PLAN OF CARE
Problem: RESPIRATORY - ADULT  Goal: Achieves optimal ventilation and oxygenation  Description: INTERVENTIONS:  - Assess for changes in respiratory status  - Assess for changes in mentation and behavior  - Position to facilitate oxygenation and minimize respiratory effort  - Oxygen administered by appropriate delivery if ordered  - Initiate smoking cessation education as indicated  - Encourage broncho-pulmonary hygiene including cough, deep breathe, Incentive Spirometry  - Assess the need for suctioning and aspirate as needed  - Assess and instruct to report SOB or any respiratory difficulty  - Respiratory Therapy support as indicated  Outcome: Progressing     Problem: METABOLIC, FLUID AND ELECTROLYTES - ADULT  Goal: Glucose maintained within target range  Description: INTERVENTIONS:  - Monitor Blood Glucose as ordered  - Assess for signs and symptoms of hyperglycemia and hypoglycemia  - Administer ordered medications to maintain glucose within target range  - Assess nutritional intake and initiate nutrition service referral as needed  Outcome: Progressing     Problem: SKIN/TISSUE INTEGRITY - ADULT  Goal: Skin Integrity remains intact(Skin Breakdown Prevention)  Description: Assess:  -Perform Jonathan assessment bid  -Clean and moisturize skin every day  -Inspect skin when repositioning, toileting, and assisting with ADLS  -Assess extremities for adequate circulation and sensation     Bed Management:  -Have minimal linens on bed & keep smooth, unwrinkled  -Change linens as needed when moist or perspiring      Toileting:  -Offer bedside commode    Activity:  -Mobilize patient 3 times a day  -Encourage activity and walks on unit  -Encourage or provide ROM exercises   -Use appropriate equipment to lift or move patient in bed      Skin Care:  -Avoid use of baby powder, tape, friction and shearing, hot water or constrictive clothing  -Do not massage red bony areas      Outcome: Progressing     Problem: PAIN - ADULT  Goal: Verbalizes/displays adequate comfort level or baseline comfort level  Description: Interventions:  - Encourage patient to monitor pain and request assistance  - Assess pain using appropriate pain scale  - Administer analgesics based on type and severity of pain and evaluate response  - Implement non-pharmacological measures as appropriate and evaluate response  - Consider cultural and social influences on pain and pain management  - Notify physician/advanced practitioner if interventions unsuccessful or patient reports new pain  Outcome: Progressing     Problem: SAFETY ADULT  Goal: Patient will remain free of falls  Description: INTERVENTIONS:  - Educate patient/family on patient safety including physical limitations  - Instruct patient to call for assistance with activity   - Consult OT/PT to assist with strengthening/mobility   - Keep Call bell within reach  - Keep bed low and locked with side rails adjusted as appropriate  - Keep care items and personal belongings within reach  - Initiate and maintain comfort rounds  - Apply yellow socks and bracelet for high fall risk patients  - Consider moving patient to room near nurses station  Outcome: Progressing     Problem: Knowledge Deficit  Goal: Patient/family/caregiver demonstrates understanding of disease process, treatment plan, medications, and discharge instructions  Description: Complete learning assessment and assess knowledge base    Interventions:  - Provide teaching at level of understanding  - Provide teaching via preferred learning methods  Outcome: Progressing     Problem: MOBILITY - ADULT  Goal: Maintain or return to baseline ADL function  Description: INTERVENTIONS:  -  Assess patient's ability to carry out ADLs; assess patient's baseline for ADL function and identify physical deficits which impact ability to perform ADLs (bathing, care of mouth/teeth, toileting, grooming, dressing, etc )  - Assess/evaluate cause of self-care deficits   - Assess range of motion  - Assess patient's mobility; develop plan if impaired  - Assess patient's need for assistive devices and provide as appropriate  - Encourage maximum independence but intervene and supervise when necessary  - Involve family in performance of ADLs  - Assess for home care needs following discharge   - Consider OT consult to assist with ADL evaluation and planning for discharge  - Provide patient education as appropriate  Outcome: Progressing  Goal: Maintains/Returns to pre admission functional level  Description: INTERVENTIONS:  - Perform BMAT or MOVE assessment daily    - Set and communicate daily mobility goal to care team and patient/family/caregiver  - Collaborate with rehabilitation services on mobility goals if consulted  - Out of bed for meals 3 times a day  - Out of bed for toileting  - Record patient progress and toleration of activity level   Outcome: Progressing     Problem: Potential for Falls  Goal: Patient will remain free of falls  Description: INTERVENTIONS:  - Educate patient/family on patient safety including physical limitations  - Instruct patient to call for assistance with activity   - Consult OT/PT to assist with strengthening/mobility   - Keep Call bell within reach  - Keep bed low and locked with side rails adjusted as appropriate  - Keep care items and personal belongings within reach  - Initiate and maintain comfort rounds  - Apply yellow socks and bracelet for high fall risk patients  - Consider moving patient to room near nurses station  Outcome: Progressing     Problem: Nutrition/Hydration-ADULT  Goal: Nutrient/Hydration intake appropriate for improving, restoring or maintaining nutritional needs  Description: Monitor and assess patient's nutrition/hydration status for malnutrition  Collaborate with interdisciplinary team and initiate plan and interventions as ordered  Monitor patient's weight and dietary intake as ordered or per policy   Utilize nutrition screening tool and intervene as necessary  Determine patient's food preferences and provide high-protein, high-caloric foods as appropriate       INTERVENTIONS:  - Monitor oral intake, urinary output, labs, and treatment plans  - Assess nutrition and hydration status and recommend course of action  - Evaluate amount of meals eaten  - Assist patient with eating if necessary   - Allow adequate time for meals  - Recommend/ encourage appropriate diets, oral nutritional supplements, and vitamin/mineral supplements  - Order, calculate, and assess calorie counts as needed  - Recommend, monitor, and adjust tube feedings and TPN/PPN based on assessed needs  - Assess need for intravenous fluids  - Provide specific nutrition/hydration education as appropriate  - Include patient/family/caregiver in decisions related to nutrition  Outcome: Progressing

## 2022-06-25 LAB
GLUCOSE SERPL-MCNC: 152 MG/DL (ref 65–140)
GLUCOSE SERPL-MCNC: 166 MG/DL (ref 65–140)
GLUCOSE SERPL-MCNC: 178 MG/DL (ref 65–140)
GLUCOSE SERPL-MCNC: 211 MG/DL (ref 65–140)

## 2022-06-25 PROCEDURE — 82948 REAGENT STRIP/BLOOD GLUCOSE: CPT

## 2022-06-25 PROCEDURE — 99232 SBSQ HOSP IP/OBS MODERATE 35: CPT | Performed by: NURSE PRACTITIONER

## 2022-06-25 PROCEDURE — 94640 AIRWAY INHALATION TREATMENT: CPT

## 2022-06-25 PROCEDURE — 99232 SBSQ HOSP IP/OBS MODERATE 35: CPT | Performed by: INTERNAL MEDICINE

## 2022-06-25 RX ORDER — PREDNISONE 20 MG/1
40 TABLET ORAL DAILY
Status: DISCONTINUED | OUTPATIENT
Start: 2022-06-25 | End: 2022-06-26 | Stop reason: HOSPADM

## 2022-06-25 RX ADMIN — ALBUTEROL SULFATE 2.5 MG: 2.5 SOLUTION RESPIRATORY (INHALATION) at 13:14

## 2022-06-25 RX ADMIN — BUDESONIDE 0.5 MG: 0.5 INHALANT ORAL at 07:11

## 2022-06-25 RX ADMIN — BENZONATATE 100 MG: 100 CAPSULE ORAL at 10:43

## 2022-06-25 RX ADMIN — APIXABAN 5 MG: 5 TABLET, FILM COATED ORAL at 17:22

## 2022-06-25 RX ADMIN — PREDNISONE 40 MG: 20 TABLET ORAL at 14:37

## 2022-06-25 RX ADMIN — MELATONIN 6 MG: at 21:46

## 2022-06-25 RX ADMIN — INSULIN LISPRO 1 UNITS: 100 INJECTION, SOLUTION INTRAVENOUS; SUBCUTANEOUS at 07:51

## 2022-06-25 RX ADMIN — BUDESONIDE 0.5 MG: 0.5 INHALANT ORAL at 19:54

## 2022-06-25 RX ADMIN — GUAIFENESIN 600 MG: 600 TABLET, EXTENDED RELEASE ORAL at 10:53

## 2022-06-25 RX ADMIN — ASPIRIN 81 MG: 81 TABLET, COATED ORAL at 10:42

## 2022-06-25 RX ADMIN — MONTELUKAST 10 MG: 10 TABLET, FILM COATED ORAL at 21:50

## 2022-06-25 RX ADMIN — INSULIN LISPRO 1 UNITS: 100 INJECTION, SOLUTION INTRAVENOUS; SUBCUTANEOUS at 17:23

## 2022-06-25 RX ADMIN — DOCUSATE SODIUM 100 MG: 100 CAPSULE, LIQUID FILLED ORAL at 17:22

## 2022-06-25 RX ADMIN — TOPIRAMATE 50 MG: 25 TABLET, FILM COATED ORAL at 10:42

## 2022-06-25 RX ADMIN — DILTIAZEM HYDROCHLORIDE 180 MG: 180 CAPSULE, COATED, EXTENDED RELEASE ORAL at 10:53

## 2022-06-25 RX ADMIN — INSULIN LISPRO 1 UNITS: 100 INJECTION, SOLUTION INTRAVENOUS; SUBCUTANEOUS at 21:50

## 2022-06-25 RX ADMIN — APIXABAN 5 MG: 5 TABLET, FILM COATED ORAL at 10:42

## 2022-06-25 RX ADMIN — BICTEGRAVIR SODIUM, EMTRICITABINE, AND TENOFOVIR ALAFENAMIDE FUMARATE 1 TABLET: 50; 200; 25 TABLET ORAL at 10:43

## 2022-06-25 RX ADMIN — TOPIRAMATE 50 MG: 25 TABLET, FILM COATED ORAL at 21:46

## 2022-06-25 RX ADMIN — GUAIFENESIN 600 MG: 600 TABLET, EXTENDED RELEASE ORAL at 17:22

## 2022-06-25 RX ADMIN — IPRATROPIUM BROMIDE 0.5 MG: 0.5 SOLUTION RESPIRATORY (INHALATION) at 14:13

## 2022-06-25 RX ADMIN — INSULIN LISPRO 1 UNITS: 100 INJECTION, SOLUTION INTRAVENOUS; SUBCUTANEOUS at 12:13

## 2022-06-25 RX ADMIN — GABAPENTIN 800 MG: 400 CAPSULE ORAL at 10:53

## 2022-06-25 RX ADMIN — TRAMADOL HYDROCHLORIDE 50 MG: 50 TABLET, COATED ORAL at 00:23

## 2022-06-25 RX ADMIN — IPRATROPIUM BROMIDE 0.5 MG: 0.5 SOLUTION RESPIRATORY (INHALATION) at 19:54

## 2022-06-25 RX ADMIN — TORSEMIDE 10 MG: 10 TABLET ORAL at 10:43

## 2022-06-25 RX ADMIN — IPRATROPIUM BROMIDE 0.5 MG: 0.5 SOLUTION RESPIRATORY (INHALATION) at 07:11

## 2022-06-25 RX ADMIN — ALBUTEROL SULFATE 2.5 MG: 2.5 SOLUTION RESPIRATORY (INHALATION) at 07:11

## 2022-06-25 RX ADMIN — QUETIAPINE FUMARATE 200 MG: 200 TABLET ORAL at 21:46

## 2022-06-25 RX ADMIN — LEVOTHYROXINE SODIUM 100 MCG: 100 TABLET ORAL at 07:51

## 2022-06-25 RX ADMIN — GABAPENTIN 800 MG: 400 CAPSULE ORAL at 17:22

## 2022-06-25 RX ADMIN — GABAPENTIN 800 MG: 400 CAPSULE ORAL at 21:46

## 2022-06-25 RX ADMIN — ATORVASTATIN CALCIUM 20 MG: 20 TABLET, FILM COATED ORAL at 17:22

## 2022-06-25 RX ADMIN — ALBUTEROL SULFATE 2.5 MG: 2.5 SOLUTION RESPIRATORY (INHALATION) at 19:54

## 2022-06-25 NOTE — ASSESSMENT & PLAN NOTE
Lab Results   Component Value Date    HGBA1C 6 3 (H) 06/21/2022     Recent Labs     06/24/22  2106 06/25/22  0749 06/25/22  1104 06/25/22  1545   POCGLU 182* 178* 152* 166*     · Continue sliding scale insulin for steroid-induced hyperglycemia

## 2022-06-25 NOTE — ASSESSMENT & PLAN NOTE
68-year-old female with HIV defibrillation persistent atrial fibrillation COPD and chronic oxygen use who presents to the hospital with exacerbation of COPD  · Currently on IV methylprednisolone 40 mg q 8 hours  · Transition to prednisone by pulmonology today  · Advised smoking cessation  · Hypoxic when coming back from bathroom    Will need ambulatory desaturation study time of discharge

## 2022-06-25 NOTE — ASSESSMENT & PLAN NOTE
Wt Readings from Last 3 Encounters:   06/21/22 100 kg (220 lb 7 4 oz)   05/27/20 106 kg (233 lb 11 oz)   05/27/20 106 kg (233 lb 7 5 oz)     · Continue torsemide 10 mg daily

## 2022-06-25 NOTE — PROGRESS NOTES
[address] [place] [address]  Progress Note - Aida Stout 1950, 73 y o  female MRN: [id number]  [facility] [address] 223-01 Encounter: [id number]  Primary Care Provider: Flaco Amor DO   Date and time admitted to hospital: 6/21/2022  1:14 PM    * COPD with acute exacerbation [facility])  Assessment & Plan  45-year-old female with HIV defibrillation persistent atrial fibrillation COPD and chronic oxygen use who presents to the hospital with exacerbation of COPD  · Currently on IV methylprednisolone 40 mg q 8 hours  · Transition to prednisone by pulmonology today  · Advised smoking cessation  · Hypoxic when coming back from bathroom  Will need ambulatory desaturation study time of discharge    Chronic diastolic (congestive) heart failure (HCC)  Assessment & Plan  Wt Readings from Last 3 Encounters:   06/21/22 100 kg (220 lb 7 4 oz)   05/27/20 106 kg (233 lb 11 oz)   05/27/20 106 kg (233 lb 7 5 oz)     · Continue torsemide 10 mg daily    Paroxysmal atrial fibrillation (HCC)  Assessment & Plan  · Continue diltiazem and eliquis  Bipolar 1 disorder ([facility] [address] )  Assessment & Plan  · Continue quetiapine and topiramate    Chronic respiratory failure with hypoxia (HCC)  Assessment & Plan  · Chronically on 2L secondary to COPD    Type 2 diabetes mellitus with hyperglycemia [facility])  Assessment & Plan  Lab Results   Component Value Date    HGBA1C 6 3 (H) 06/21/2022     Recent Labs     06/24/22  2106 06/25/22  0749 06/25/22  1104 06/25/22  1545   POCGLU 182* 178* 152* 166*     · Continue sliding scale insulin for steroid-induced hyperglycemia    HIV (human immunodeficiency virus infection)   Assessment & Plan  · Continue biktarvy      VTE Pharmacologic Prophylaxis:   Moderate Risk (Score 3-4) - Pharmacological DVT Prophylaxis Ordered: Apixaban (Eliquis)  Patient Centered Rounds: I have performed bedside rounds with nursing staff today    Discussions with Specialists or Other Care Team Provider: Pulmonology    Education and Discussions with Family / Patient:     Time Spent for Care: 20 mins  More than 50% of total time spent on counseling and coordination of care as described above  Current Length of Stay: 3 day(s)  Current Patient Status: Inpatient   Certification Statement: The patient will continue to require additional inpatient hospital stay due to COPD exacerbation  Discharge Plan / Estimated Discharge Date: Anticipate discharge tomorrow to home  Code Status: Level 1 - Full Code      Subjective:   Patient seen and examined  No new complaints, came back from bathroom and became very hypoxic    Objective:   Vitals: Blood pressure 132/79, pulse 91, temperature (!) 97 3 °F (36 3 °C), resp  rate 18, height 5' 2" (1 575 m), weight 100 kg (220 lb 7 4 oz), last menstrual period 04/01/2013, SpO2 94 %, not currently breastfeeding  Physical Exam  Vitals reviewed  Constitutional:       General: She is not in acute distress  Appearance: Normal appearance  She is obese  HENT:      Head: Atraumatic  Cardiovascular:      Rate and Rhythm: Regular rhythm  Pulmonary:      Breath sounds: Decreased breath sounds present  No wheezing  Abdominal:      General: Bowel sounds are normal  There is no distension  Tenderness: There is no guarding or rebound  Musculoskeletal:         General: No swelling  Skin:     General: Skin is warm  Neurological:      Mental Status: She is alert  Cranial Nerves: No cranial nerve deficit     Psychiatric:         Mood and Affect: Mood normal        Additional Data:   Labs:  Results from last 7 days   Lab Units 06/21/22  1422   WBC Thousand/uL 8 12   HEMOGLOBIN g/dL 12 6   HEMATOCRIT % 41 3   MCV fL 94   PLATELETS Thousands/uL 337     Results from last 7 days   Lab Units 06/21/22  1422   SODIUM mmol/L 141   POTASSIUM mmol/L 3 7   CHLORIDE mmol/L 105   CO2 mmol/L 24   ANION GAP mmol/L 12   BUN mg/dL 7   CREATININE mg/dL 0 69   CALCIUM mg/dL 9 9   EGFR ml/min/1 73sq m 89   GLUCOSE RANDOM mg/dL 111             Results from last 7 days   Lab Units 06/21/22  1622 06/21/22  1422   HS TNI 0HR ng/L  --  8   HS TNI 2HR ng/L 8  --           Results from last 7 days   Lab Units 06/21/22  1422   PROCALCITONIN ng/ml <0 05     Results from last 7 days   Lab Units 06/25/22  1545 06/25/22  1104 06/25/22  0749 06/24/22  2106 06/24/22  1621 06/24/22  1055 06/24/22  0707 06/23/22  2106 06/23/22  1611 06/23/22  1100 06/23/22  0727 06/22/22  2104   POC GLUCOSE mg/dl 166* 152* 178* 182* 204* 210* 160* 144* 220* 127 184* 191*     Results from last 7 days   Lab Units 06/21/22  1422   HEMOGLOBIN A1C % 6 3*         * I Have Reviewed All Lab Data Listed Above  Cultures:                   Lines/Drains:  Invasive Devices  Report    None               Telemetry:      Imaging:  Imaging Reports Reviewed Today Include:   XR chest 1 view portable    Result Date: 6/22/2022  Impression: No acute cardiopulmonary disease   Workstation performed: [id number]       Scheduled Meds:  Current Facility-Administered Medications   Medication Dose Route Frequency Provider Last Rate    acetaminophen  650 mg Oral Q6H PRN Myriam Velez PA-C      albuterol  2 5 mg Nebulization TID Myriam Velez PA-C      apixaban  5 mg Oral BID Layne Sifuentes DO      aspirin  81 mg Oral Daily Lucita Ledezma      atorvastatin  20 mg Oral Daily With [email or web address] Lucita Jensen      benzonatate  100 mg Oral TID PRN Myriamkyrie Velez PA-C      bictegravir-emtricitab-tenofovir alafenamide  1 tablet Oral Daily Isidro Seaman DO      budesonide  0 5 mg Nebulization Q12H Myriam Velez PA-C      diltiazem  180 mg Oral Daily Lucita Ledezma      docusate sodium  100 mg Oral BID Myriamkyrie Velez PA-C      gabapentin  800 mg Oral TID Myriam Velez PA-C      guaiFENesin  600 mg Oral BID Lucita Ledezma      insulin lispro  1-5 Units Subcutaneous HS Mary Ann Hdz, CRNP      insulin lispro  1-6 Units Subcutaneous TID AC Mary Ann Hdz, LUIS M      ipratropium  0 5 mg Nebulization TID [facility]      levothyroxine  100 mcg Oral Early Morning [place], [place]      melatonin  6 mg Oral HS LUIS M Roque      montelukast  10 mg Oral HS [place] Person, LEA      nicotine  1 patch Transdermal Daily [place], [place]      ondansetron  4 mg Intravenous Q6H PRN [place] Person, LEA      predniSONE  40 mg Oral Daily Shekhar Latrice, [address]      QUEtiapine  200 mg Oral HS [place]      tiZANidine  2 mg Oral Q8H PRN [place] Person, LEA      topiramate  50 mg Oral Q12H [address] [place], [place]      torsemide  10 mg Oral Daily Winnie ballesteros DO      traMADol  50 mg Oral Q6H PRN LUIS M Roque         Today, Patient Was Seen By: Winnie ballesteros DO    ** Please Note: Dictation voice to text software may have been used in the creation of this document   **

## 2022-06-25 NOTE — PROGRESS NOTES
Progress Note - Pulmonary   Erma Hernandez 73 y o  female MRN: [id number]  Unit/Bed#: [place] 2 -01 Encounter: [id number]    Assessment/Plan:    1  Acute on chronic hypoxic respiratory failure likely multifaceted as listed below       -  currently on home O2 requirement of 2 L-94%       -  significant hypoxia noted upon ambulation on 2 L       -  will obtain home O2 evaluation- patient has significant hypoxia will need to obtain D-dimer       -  pulmonary toileting:  Deep breathing cough, OOB as tolerated, IS Q 1 hr    2  Moderate COPD with possible acute exacerbation       -  Inpatient:  Transition to prednisone 40 mg decreased by 10 mg q 3 days, Xopenex/Atrovent t i d        -  home regimen: Advair 250/50 mcg 1 puff b i d , Spiriva 18 mcg 1 puff daily, and albuterol as needed       -  will need updated PFTs and close pulmonary follow-up    3  Chronic grade 1 diastolic CHF w/ PAF       -  2/21/2022- EF 65%     will obtain proBNP       -  S/p OT Lasix with minimal improvement in hypoxia       -  recommend keeping patient on drier side       -  patient missed 3 days of Eliquis-  has been restarted    4  PASHA       -  patient reports 100% compliance with CPAP       -  ramp of 5-15 cm of H2O with 2L       - will continue CPAP q h s     5  Active tobacco abuse       -  patient reports smoking 1-2 cigarettes a day       -  encourage in educated on tobacco cessation       -  patient appears in pre contemplation stage        Chief Complaint:    "I feel a little short of breath"    Subjective:    Tawana was sitting in her bed  Patient walked somewhat to the bathroom upon arrival she was at 72% on 2 L  Patient reports that her saturations do dip down frequently upon ambulation  Patient currently denying any fevers, chills, hemoptysis, headaches, night sweats, pleuritic chest pain, or palpitations  Objective:    Vitals: Blood pressure 123/75, pulse 86, temperature 98 °F (36 7 °C), resp   rate 20, height 5' 2" (1 575 m), weight 100 kg (220 lb 7 4 oz), last menstrual period 04/01/2013, SpO2 93 %, not currently breastfeeding  2L,Body mass index is 40 32 kg/m²  No intake or output data in the 24 hours ending 06/25/22 1326    Invasive Devices  Report    None                 Physical Exam:   Physical Exam  Constitutional:       General: She is not in acute distress  Appearance: Normal appearance  She is normal weight  She is not ill-appearing  HENT:      Head: Normocephalic and atraumatic  Nose: No congestion or rhinorrhea  Mouth/Throat:      Mouth: Mucous membranes are dry  Pharynx: Oropharynx is clear  No oropharyngeal exudate or posterior oropharyngeal erythema  Cardiovascular:      Rate and Rhythm: Normal rate and regular rhythm  Pulses: Normal pulses  Heart sounds: Normal heart sounds  No murmur heard  No friction rub  No gallop  Pulmonary:      Effort: Pulmonary effort is normal  No tachypnea, bradypnea, accessory muscle usage or respiratory distress  Breath sounds: Decreased air movement present  No stridor  Decreased breath sounds and wheezing present  No rhonchi or rales  Comments: Some scattered wheezing  Chest:      Chest wall: No tenderness  Abdominal:      General: Abdomen is flat  Bowel sounds are normal  There is no distension  Palpations: Abdomen is soft  There is no mass  Musculoskeletal:         General: No swelling or tenderness  Normal range of motion  Cervical back: Normal range of motion  No rigidity or tenderness  Skin:     General: Skin is warm and dry  Coloration: Skin is not jaundiced or pale  Neurological:      General: No focal deficit present  Mental Status: She is alert and oriented to person, place, and time  Mental status is at baseline  Psychiatric:         Mood and Affect: Mood normal          Behavior: Behavior normal          Labs:  I have personally reviewed pertinent lab results 6/24/2022    Imaging and other studies: I have personally reviewed pertinent films in PACS     Chest x-ray- 6/21/2022- no acute cardiopulmonary process

## 2022-06-26 VITALS
DIASTOLIC BLOOD PRESSURE: 80 MMHG | HEIGHT: 62 IN | HEART RATE: 75 BPM | WEIGHT: 220.46 LBS | OXYGEN SATURATION: 94 % | SYSTOLIC BLOOD PRESSURE: 128 MMHG | RESPIRATION RATE: 15 BRPM | TEMPERATURE: 97.4 F | BODY MASS INDEX: 40.57 KG/M2

## 2022-06-26 LAB
GLUCOSE SERPL-MCNC: 118 MG/DL (ref 65–140)
GLUCOSE SERPL-MCNC: 94 MG/DL (ref 65–140)

## 2022-06-26 PROCEDURE — 94761 N-INVAS EAR/PLS OXIMETRY MLT: CPT

## 2022-06-26 PROCEDURE — 94660 CPAP INITIATION&MGMT: CPT

## 2022-06-26 PROCEDURE — 99239 HOSP IP/OBS DSCHRG MGMT >30: CPT | Performed by: INTERNAL MEDICINE

## 2022-06-26 PROCEDURE — 82948 REAGENT STRIP/BLOOD GLUCOSE: CPT

## 2022-06-26 PROCEDURE — 94640 AIRWAY INHALATION TREATMENT: CPT

## 2022-06-26 RX ORDER — PREDNISONE 10 MG/1
TABLET ORAL
Qty: 30 TABLET | Refills: 0 | Status: SHIPPED | OUTPATIENT
Start: 2022-06-26

## 2022-06-26 RX ORDER — ALBUTEROL SULFATE 2.5 MG/3ML
2.5 SOLUTION RESPIRATORY (INHALATION) EVERY 6 HOURS PRN
Qty: 75 ML | Refills: 0 | Status: SHIPPED | OUTPATIENT
Start: 2022-06-26

## 2022-06-26 RX ADMIN — TRAMADOL HYDROCHLORIDE 50 MG: 50 TABLET, COATED ORAL at 00:15

## 2022-06-26 RX ADMIN — TOPIRAMATE 50 MG: 25 TABLET, FILM COATED ORAL at 09:06

## 2022-06-26 RX ADMIN — GUAIFENESIN 600 MG: 600 TABLET, EXTENDED RELEASE ORAL at 09:06

## 2022-06-26 RX ADMIN — BUDESONIDE 0.5 MG: 0.5 INHALANT ORAL at 07:06

## 2022-06-26 RX ADMIN — ASPIRIN 81 MG: 81 TABLET, COATED ORAL at 09:07

## 2022-06-26 RX ADMIN — BICTEGRAVIR SODIUM, EMTRICITABINE, AND TENOFOVIR ALAFENAMIDE FUMARATE 1 TABLET: 50; 200; 25 TABLET ORAL at 09:06

## 2022-06-26 RX ADMIN — PREDNISONE 40 MG: 20 TABLET ORAL at 09:07

## 2022-06-26 RX ADMIN — IPRATROPIUM BROMIDE 0.5 MG: 0.5 SOLUTION RESPIRATORY (INHALATION) at 07:06

## 2022-06-26 RX ADMIN — ALBUTEROL SULFATE 2.5 MG: 2.5 SOLUTION RESPIRATORY (INHALATION) at 07:06

## 2022-06-26 RX ADMIN — APIXABAN 5 MG: 5 TABLET, FILM COATED ORAL at 09:06

## 2022-06-26 RX ADMIN — LEVOTHYROXINE SODIUM 100 MCG: 100 TABLET ORAL at 06:38

## 2022-06-26 RX ADMIN — DILTIAZEM HYDROCHLORIDE 180 MG: 180 CAPSULE, COATED, EXTENDED RELEASE ORAL at 09:07

## 2022-06-26 RX ADMIN — DOCUSATE SODIUM 100 MG: 100 CAPSULE, LIQUID FILLED ORAL at 09:06

## 2022-06-26 RX ADMIN — GABAPENTIN 800 MG: 400 CAPSULE ORAL at 09:07

## 2022-06-26 RX ADMIN — TORSEMIDE 10 MG: 10 TABLET ORAL at 09:06

## 2022-06-26 NOTE — DISCHARGE SUMMARY
[facility]  Discharge- Abdias Avalosmpf 1950, 73 y o  female MRN: [id number]  Unit/Bed#: [facility] Ph[facility] 2 [address] 223-01 Encounter: [id number]  Primary Care Provider: Chica Delacruz DO   Date and time admitted to hospital: 6/21/2022  1:14 PM    Admitting Provider:  Jennifer Mayer MD  Discharge Provider:  Kathy Collins DO  Admission Date: 6/21/2022       Discharge Date: 06/26/22   LOS: 4  Primary Care Physician at Discharge: Chica Delacruz [phone]    DISCHARGE DIAGNOSES  * COPD with acute exacerbation [facility])  Assessment & Plan  53-year-old female with HIV defibrillation persistent atrial fibrillation COPD and chronic oxygen use who presents to the hospital with exacerbation of COPD  · Was on IV methylprednisolone 40 mg q 8 hours  · Transitioned to prednisone by pulmonology  · Advised smoking cessation  · Had ambulatory desaturation study which the patient actually did quite well today  · Needs close follow-up with her primary pulmonologist at [facility]    Chronic diastolic (congestive) heart failure [facility])  Assessment & Plan  Wt Readings from Last 3 Encounters:   06/21/22 100 kg (220 lb 7 4 oz)   05/27/20 106 kg (233 lb 11 oz)   05/27/20 106 kg (233 lb 7 5 oz)     · Continue torsemide 10 mg daily    Paroxysmal atrial fibrillation (HCC)  Assessment & Plan  · Continue diltiazem and eliquis      Bipolar 1 disorder ([address] )  Assessment & Plan  · Continue quetiapine and topiramate    Chronic respiratory failure with hypoxia (HCC)  Assessment & Plan  · Chronically on 2L secondary to COPD    Type 2 diabetes mellitus with hyperglycemia [facility])  Assessment & Plan  Lab Results   Component Value Date    HGBA1C 6 3 (H) 06/21/2022     Recent Labs     06/25/22  1545 06/25/22  2059 06/26/22  0737 06/26/22  1111   POCGLU 166* 211* 118 94     · Was on sliding scale insulin for steroid-induced hyperglycemia during hospitalization  · Continue metformin as previously taken    HIV (human immunodeficiency virus infection) Assessment & Plan  · Continue biktarvy    REASON FOR ADMISSION  Shortness of Breath (Patient arrives via EMS from home, reports increased dyspnea, duo neb given without relief  )    HOSPITAL COURSE:  Fela Tay is a 73 y o  female with a history of HIV diabetes bipolar and COPD who presented the hospital with worsening shortness of breast via EMS  She was found have COPD exacerbation  She was seen by pulmonology and was on IV methylprednisolone  She lost IV access and despite multiple attempts was unsuccessful in replacing  The methylprednisolone IV was transitioned prednisone which she continued to improve on  She is being discharged with a prednisone taper, nebulizer machine, and albuterol nebulize to use  He needs close follow-up with her pulmonologist at [facility]    The case was discussed with daughter on telephone on day of discharge  Please see problem list listed above  CONSULTING PROVIDERS   IP CONSULT TO PULMONOLOGY    PROCEDURES PERFORMED  * No surgery found *    RADIOLOGY RESULTS  XR chest 1 view portable    Result Date: 6/22/2022  Impression: No acute cardiopulmonary disease   Workstation performed: [id number]       LABS  Results from last 7 days   Lab Units 06/21/22  1422   WBC Thousand/uL 8 12   HEMOGLOBIN g/dL 12 6   HEMATOCRIT % 41 3   MCV fL 94   PLATELETS Thousands/uL 337     Results from last 7 days   Lab Units 06/21/22  1422   SODIUM mmol/L 141   POTASSIUM mmol/L 3 7   CHLORIDE mmol/L 105   CO2 mmol/L 24   BUN mg/dL 7   CREATININE mg/dL 0 69   CALCIUM mg/dL 9 9   EGFR ml/min/1 73sq m 89   GLUCOSE RANDOM mg/dL 111         Results from last 7 days   Lab Units 06/21/22  1622 06/21/22  1422   HS TNI 0HR ng/L  --  8   HS TNI 2HR ng/L 8  --               Results from last 7 days   Lab Units 06/26/22  1111 06/26/22  0737 06/25/22  2059 06/25/22  1545 06/25/22  1104 06/25/22  0749 06/24/22  2106 06/24/22  1621 06/24/22  1055 06/24/22  0707   POC GLUCOSE mg/dl 94 118 211* 166* 152* 178* 182* 204* 210* 160*     Results from last 7 days   Lab Units 06/21/22  1422   HEMOGLOBIN A1C % 6 3*         Results from last 7 days   Lab Units 06/21/22  1422   PROCALCITONIN ng/ml <0 05           DISCHARGE DAY VISIT AND PHYSICAL EXAM:  Subjective:  No new complaints, so shortness of breath when going to the bathroom but otherwise feeling good    Vitals:   Blood Pressure: 128/80 (06/26/22 0738)  Pulse: 75 (06/26/22 0738)  Temperature: (!) 97 4 °F (36 3 °C) (06/26/22 0738)  Temp Source: Oral (06/24/22 1513)  Respirations: 15 (06/26/22 0738)  Height: 5' 2" (157 5 cm) (06/21/22 1315)  Weight - Scale: 100 kg (220 lb 7 4 oz) (06/21/22 1315)  SpO2: 94 % (06/26/22 0738)    Physical Exam  Vitals reviewed  Constitutional:       General: She is not in acute distress  Appearance: Normal appearance  HENT:      Head: Atraumatic  Cardiovascular:      Rate and Rhythm: Regular rhythm  Heart sounds: Normal heart sounds  Pulmonary:      Breath sounds: Decreased breath sounds present  No wheezing  Abdominal:      General: Bowel sounds are normal       Palpations: Abdomen is soft  Tenderness: There is no guarding or rebound  Musculoskeletal:         General: No swelling  Skin:     General: Skin is warm  Neurological:      General: No focal deficit present  Mental Status: She is alert  Psychiatric:         Mood and Affect: Mood normal        Planned Re-admission:  No  Discharge Disposition:  Home    Test Results Pending at Discharge:   Incidental findings:     Medications   · Discharge Medication List: See after visit summary for reconciled discharge medications  Diet restrictions:  Diabetic diet   Activity restrictions: No strenuous activity  Discharge Condition: stable    Outpatient Follow-Up and Discharge Instructions  See after visit summary section titled Discharge Instructions for information provided to patient and family        Code Status: Level 1 - Full Code  Discharge Statement   I spent 35 minutes discharging the patient  This time was spent on the day of discharge  Greater than 50% of total time was spent with the patient and / or family counseling and / or coordination of care  ** Please Note: This note has been constructed using a voice recognition system   **

## 2022-06-26 NOTE — ASSESSMENT & PLAN NOTE
62-year-old female with HIV defibrillation persistent atrial fibrillation COPD and chronic oxygen use who presents to the hospital with exacerbation of COPD  · Was on IV methylprednisolone 40 mg q 8 hours  · Transitioned to prednisone by pulmonology  · Advised smoking cessation  · Had ambulatory desaturation study which the patient actually did quite well today    · Needs close follow-up with her primary pulmonologist at [facility]

## 2022-06-26 NOTE — RESPIRATORY THERAPY NOTE
Home Oxygen Qualifying Test     Patient name: Candy Sanchez        : 1950   Date of Test:  2022  Diagnosis:    Home Oxygen Test:    **Medicare Guidelines require item(s) 1-5 on all ambulatory patients or 1 and 2 on non-ambulatory patients  1  Baseline SPO2 on Room Air at rest 94 %   a  If <= 88% on Room Air add O2 via NC to obtain SpO2 >=88%  If LPM needed, document LPM N/A needed to reach =>88%    2  SPO2 during exertion on Room Air 95  %  a  During exertion monitor SPO2  If SPO2 increases >=89%, do not add supplemental oxygen    3  SPO2 on Oxygen at Rest N/A % at N/A LPM    4  SPO2 during exertion on Oxygen N/A  % at N/A LPM    5  Test performed during exertion activity  Walk unassisted in room      []  Supplemental Home Oxygen is indicated  [x]  Client does not qualify for home oxygen      Respiratory Additional Notes-NURSING AWARE OF RESULTS    Pradip Carlos, RT

## 2022-06-26 NOTE — NURSING NOTE
No IV to remove  Pt d/c with belongings including nebulizer   AVS reviewed with patient who verbalized confirmation

## 2022-06-26 NOTE — ASSESSMENT & PLAN NOTE
Lab Results   Component Value Date    HGBA1C 6 3 (H) 06/21/2022     Recent Labs     06/25/22  1545 06/25/22 2059 06/26/22  0737 06/26/22  1111   POCGLU 166* 211* 118 94     · Was on sliding scale insulin for steroid-induced hyperglycemia during hospitalization  · Continue metformin as previously taken

## 2022-06-27 NOTE — UTILIZATION REVIEW
Notification of Discharge   This is a Notification of Discharge from our facility [address]  Please be advised that this patient has been discharge from our facility  Below you will find the admission and discharge date and time including the patients disposition  UTILIZATION REVIEW CONTACT:  Kay Hudson MA  Utilization   Network Utilization Review Department  Phone: [phone] x carefully listen to the prompts  All voicemails are confidential   Email: [email or web address]     PHYSICIAN ADVISORY SERVICES:  FOR YTRQ-OM-POGL REVIEW - MEDICAL NECESSITY DENIAL  Phone: [phone]  Fax: [phone]  Email: [email or web address]     PRESENTATION DATE: 6/21/2022  1:14 PM  OBERVATION ADMISSION DATE:   INPATIENT ADMISSION DATE: 6/22/22 12:37 PM   DISCHARGE DATE: 6/26/2022  3:52 PM  DISPOSITION: Home/Self Care Home/Self Care      IMPORTANT INFORMATION:  Send all requests for admission clinical reviews, approved or denied determinations and any other requests to dedicated fax number below belonging to the campus where the patient is receiving treatment   List of dedicated fax numbers:  [address] DENIALS (Administrative/Medical Necessity) [phone]   [address] (Maternity/NICU/Pediatrics) [phone]   [facility] [phone]   [address] [facility] [phone]   [address] [place] [address] [phone]   [address] [phone]   [facility]  [phone]   [address] [place] [address], [facility]  [facility] [address] [facility] [phone]

## 2022-06-28 LAB
DME PARACHUTE DELIVERY DATE ACTUAL: NORMAL
DME PARACHUTE DELIVERY DATE REQUESTED: NORMAL
DME PARACHUTE ITEM DESCRIPTION: NORMAL
DME PARACHUTE ORDER STATUS: NORMAL
DME PARACHUTE SUPPLIER NAME: NORMAL
DME PARACHUTE SUPPLIER PHONE: NORMAL

## 2022-07-31 ENCOUNTER — HOSPITAL ENCOUNTER (EMERGENCY)
Facility: HOSPITAL | Age: 72
Discharge: HOME | End: 2022-07-31
Attending: EMERGENCY MEDICINE
Payer: COMMERCIAL

## 2022-07-31 ENCOUNTER — APPOINTMENT (EMERGENCY)
Dept: RADIOLOGY | Facility: HOSPITAL | Age: 72
End: 2022-07-31
Payer: COMMERCIAL

## 2022-07-31 VITALS
DIASTOLIC BLOOD PRESSURE: 88 MMHG | RESPIRATION RATE: 20 BRPM | OXYGEN SATURATION: 98 % | WEIGHT: 218.7 LBS | TEMPERATURE: 98.4 F | BODY MASS INDEX: 40.25 KG/M2 | SYSTOLIC BLOOD PRESSURE: 117 MMHG | HEART RATE: 92 BPM | HEIGHT: 62 IN

## 2022-07-31 DIAGNOSIS — J44.1 COPD EXACERBATION (HCC): Primary | ICD-10-CM

## 2022-07-31 LAB
ANION GAP SERPL CALCULATED.3IONS-SCNC: 9 MMOL/L (ref 4–13)
BASOPHILS # BLD AUTO: 0.04 THOUSANDS/ΜL (ref 0–0.1)
BASOPHILS NFR BLD AUTO: 1 % (ref 0–1)
BUN SERPL-MCNC: 15 MG/DL (ref 5–25)
CALCIUM SERPL-MCNC: 10.4 MG/DL (ref 8.3–10.1)
CARDIAC TROPONIN I PNL SERPL HS: 7 NG/L
CHLORIDE SERPL-SCNC: 105 MMOL/L (ref 96–108)
CO2 SERPL-SCNC: 28 MMOL/L (ref 21–32)
CREAT SERPL-MCNC: 0.97 MG/DL (ref 0.6–1.3)
EOSINOPHIL # BLD AUTO: 0.11 THOUSAND/ΜL (ref 0–0.61)
EOSINOPHIL NFR BLD AUTO: 2 % (ref 0–6)
ERYTHROCYTE [DISTWIDTH] IN BLOOD BY AUTOMATED COUNT: 15.1 % (ref 11.6–15.1)
GFR SERPL CREATININE-BSD FRML MDRD: 59 ML/MIN/1.73SQ M
GLUCOSE SERPL-MCNC: 96 MG/DL (ref 65–140)
HCT VFR BLD AUTO: 35.3 % (ref 34.8–46.1)
HGB BLD-MCNC: 11 G/DL (ref 11.5–15.4)
IMM GRANULOCYTES # BLD AUTO: 0.02 THOUSAND/UL (ref 0–0.2)
IMM GRANULOCYTES NFR BLD AUTO: 0 % (ref 0–2)
LYMPHOCYTES # BLD AUTO: 1.76 THOUSANDS/ΜL (ref 0.6–4.47)
LYMPHOCYTES NFR BLD AUTO: 30 % (ref 14–44)
MCH RBC QN AUTO: 28.6 PG (ref 26.8–34.3)
MCHC RBC AUTO-ENTMCNC: 31.2 G/DL (ref 31.4–37.4)
MCV RBC AUTO: 92 FL (ref 82–98)
MONOCYTES # BLD AUTO: 0.57 THOUSAND/ΜL (ref 0.17–1.22)
MONOCYTES NFR BLD AUTO: 10 % (ref 4–12)
NEUTROPHILS # BLD AUTO: 3.43 THOUSANDS/ΜL (ref 1.85–7.62)
NEUTS SEG NFR BLD AUTO: 57 % (ref 43–75)
NRBC BLD AUTO-RTO: 0 /100 WBCS
PLATELET # BLD AUTO: 348 THOUSANDS/UL (ref 149–390)
PMV BLD AUTO: 8.9 FL (ref 8.9–12.7)
POTASSIUM SERPL-SCNC: 3.5 MMOL/L (ref 3.5–5.3)
RBC # BLD AUTO: 3.85 MILLION/UL (ref 3.81–5.12)
SARS-COV-2 RNA RESP QL NAA+PROBE: NEGATIVE
SODIUM SERPL-SCNC: 142 MMOL/L (ref 135–147)
WBC # BLD AUTO: 5.93 THOUSAND/UL (ref 4.31–10.16)

## 2022-07-31 PROCEDURE — U0003 INFECTIOUS AGENT DETECTION BY NUCLEIC ACID (DNA OR RNA); SEVERE ACUTE RESPIRATORY SYNDROME CORONAVIRUS 2 (SARS-COV-2) (CORONAVIRUS DISEASE [COVID-19]), AMPLIFIED PROBE TECHNIQUE, MAKING USE OF HIGH THROUGHPUT TECHNOLOGIES AS DESCRIBED BY CMS-2020-01-R: HCPCS | Performed by: EMERGENCY MEDICINE

## 2022-07-31 PROCEDURE — 99291 CRITICAL CARE FIRST HOUR: CPT | Performed by: EMERGENCY MEDICINE

## 2022-07-31 PROCEDURE — 71045 X-RAY EXAM CHEST 1 VIEW: CPT

## 2022-07-31 PROCEDURE — 94640 AIRWAY INHALATION TREATMENT: CPT

## 2022-07-31 PROCEDURE — 85025 COMPLETE CBC W/AUTO DIFF WBC: CPT | Performed by: EMERGENCY MEDICINE

## 2022-07-31 PROCEDURE — 80048 BASIC METABOLIC PNL TOTAL CA: CPT | Performed by: EMERGENCY MEDICINE

## 2022-07-31 PROCEDURE — U0005 INFEC AGEN DETEC AMPLI PROBE: HCPCS | Performed by: EMERGENCY MEDICINE

## 2022-07-31 PROCEDURE — 93005 ELECTROCARDIOGRAM TRACING: CPT

## 2022-07-31 PROCEDURE — 36415 COLL VENOUS BLD VENIPUNCTURE: CPT | Performed by: EMERGENCY MEDICINE

## 2022-07-31 PROCEDURE — 84484 ASSAY OF TROPONIN QUANT: CPT | Performed by: EMERGENCY MEDICINE

## 2022-07-31 PROCEDURE — 96374 THER/PROPH/DIAG INJ IV PUSH: CPT

## 2022-07-31 PROCEDURE — 99284 EMERGENCY DEPT VISIT MOD MDM: CPT

## 2022-07-31 RX ORDER — SODIUM CHLORIDE FOR INHALATION 0.9 %
3 VIAL, NEBULIZER (ML) INHALATION ONCE
Status: COMPLETED | OUTPATIENT
Start: 2022-07-31 | End: 2022-07-31

## 2022-07-31 RX ORDER — METHYLPREDNISOLONE SODIUM SUCCINATE 125 MG/2ML
80 INJECTION, POWDER, LYOPHILIZED, FOR SOLUTION INTRAMUSCULAR; INTRAVENOUS ONCE
Status: COMPLETED | OUTPATIENT
Start: 2022-07-31 | End: 2022-07-31

## 2022-07-31 RX ORDER — PREDNISONE 20 MG/1
40 TABLET ORAL DAILY
Qty: 5 TABLET | Refills: 0 | Status: SHIPPED | OUTPATIENT
Start: 2022-07-31 | End: 2022-09-07 | Stop reason: ALTCHOICE

## 2022-07-31 RX ADMIN — ALBUTEROL SULFATE 10 MG: 2.5 SOLUTION RESPIRATORY (INHALATION) at 10:56

## 2022-07-31 RX ADMIN — METHYLPREDNISOLONE SODIUM SUCCINATE 80 MG: 125 INJECTION, POWDER, FOR SOLUTION INTRAMUSCULAR; INTRAVENOUS at 11:13

## 2022-07-31 RX ADMIN — Medication 3 ML: at 10:56

## 2022-07-31 RX ADMIN — IPRATROPIUM BROMIDE 1 MG: 0.5 SOLUTION RESPIRATORY (INHALATION) at 10:56

## 2022-07-31 NOTE — ED PROVIDER NOTES
History  Chief Complaint   Patient presents with    Asthma     Reports asthma exacerbation starting this morning  Unable to administer nebulizer treatment due to spending the night somewhere and didn't have it with her  C/o SOB  82 yo F h/o COPD/asthma, CHF, chronic resp failure on 2L NC, pAfib, DM, HIV; presenting for evaluation of SOB  Sx started last night, states feels like her asthma  Slept out last night and did not have her meds/inhalers/neb  Reports SOB/chest tightness that feels like asthma  Slight cough last night  Denies fevers, chills, URI sx, abdominal pain, N/V/D/C, calf pain/swelling  Admitted 6/21- 6/26 for COPD exac    MDM: 82 yo F with SOB, likely COPD exac in setting of not having her home medications- will give willis neb, solumedrol, CXR, cardiac workup, reassess, dispo pending workup/re-evaluation        Wt Readings from Last 3 Encounters:   07/31/22 99 2 kg (218 lb 11 1 oz)   06/21/22 100 kg (220 lb 7 4 oz)   05/27/20 106 kg (233 lb 11 oz)         Prior to Admission Medications   Prescriptions Last Dose Informant Patient Reported? Taking? QUEtiapine (SEROquel) 100 mg tablet   Yes No   Sig: Take 200 mg by mouth daily at bedtime     albuterol (2 5 mg/3 mL) 0 083 % nebulizer solution   No No   Sig: Take 3 mL (2 5 mg total) by nebulization every 6 (six) hours as needed for wheezing or shortness of breath   apixaban (Eliquis) 5 mg   Yes No   Sig: Take 1 tablet by mouth 2 (two) times a day   aspirin 81 MG tablet   Yes No   Sig: Take 81 mg by mouth daily     atorvastatin (LIPITOR) 20 mg tablet   Yes No   Sig: Take 20 mg by mouth daily   bictegravir-emtricitab-tenofovir alafenamide (Biktarvy) -25 MG tablet   Yes No   Sig: Take 1 tablet by mouth daily   cholecalciferol (VITAMIN D3) 1,000 units tablet   Yes No   Sig: Take 1,000 Units by mouth daily   diltiazem (CARDIZEM CD) 180 mg 24 hr capsule   Yes No   Sig: Take 180 mg by mouth daily   fluticasone-salmeterol (ADVAIR) 250-50 mcg/dose Yes No   Sig: Inhale 1 puff every 12 (twelve) hours  gabapentin (NEURONTIN) 400 mg capsule   Yes No   Sig: Take 800 mg by mouth 3 (three) times a day     ibuprofen (MOTRIN) 400 mg tablet   No No   Sig: Take 1 tablet (400 mg total) by mouth every 6 (six) hours as needed for mild pain or moderate pain   levothyroxine 100 mcg tablet   Yes No   Sig: Take 100 mcg by mouth daily  lidocaine (LIDODERM) 5 %   No No   Sig: Apply 1 patch topically daily Remove & Discard patch within 12 hours or as directed by MD   metFORMIN (GLUCOPHAGE) 500 mg tablet   Yes No   Sig: Take 500 mg by mouth daily with breakfast     montelukast (SINGULAIR) 10 mg tablet   Yes No   Sig: Take 10 mg by mouth daily at bedtime   predniSONE 10 mg tablet   No No   Si tabs x 3 days, 3 tabs x 3tabs, 2 tabs x 3 days, 1 tab x 3 days, then stop   tiZANidine (ZANAFLEX) 2 mg tablet   No No   Sig: Take 1 tablet (2 mg total) by mouth every 8 (eight) hours as needed for muscle spasms   tiotropium (SPIRIVA) 18 mcg inhalation capsule   Yes No   Sig: Place 18 mcg into inhaler and inhale daily     topiramate (TOPAMAX) 50 MG tablet   Yes No   Sig: Take 50 mg by mouth every 12 (twelve) hours   torsemide (DEMADEX) 20 mg tablet   Yes No   Sig: Take 10 mg by mouth daily      Facility-Administered Medications: None       Past Medical History:   Diagnosis Date    Bipolar 1 disorder ([place] [address] )     Cardiac disease     COPD (chronic obstructive pulmonary disease) ([place] [address] )     Diabetes mellitus ([facility] )     Disease of thyroid gland     HIV (human immunodeficiency virus infection) ([facility] ) 3/24/2016    Hypertension     Osteoarthritis     Tobacco abuse        Past Surgical History:   Procedure Laterality Date    HERNIA REPAIR      LUMBAR FUSION N/A 4/10/2018    Procedure: T9/10 discectomy with T9-T11 fusion;  Surgeon: Jose Washington MD;  Location: [facility];  Service: Neurosurgery    THYROIDECTOMY         Family History   Problem Relation Age of Onset    No Known Problems Mother     Diabetes Father     Heart disease Neg Hx     Cancer Neg Hx      I have reviewed and agree with the history as documented  E-Cigarette/Vaping     E-Cigarette/Vaping Substances     Social History     Tobacco Use    Smoking status: Current Every Day Smoker     Packs/day: 0 20     Years: 45 00     Pack years: 9 00     Types: Cigarettes    Smokeless tobacco: Never Used    Tobacco comment: Currently smoking 1-2 cigarettes a day   Substance Use Topics    Alcohol use: Yes    Drug use: No     Comment: former IV drug user       Review of Systems   Constitutional: Negative for chills, fever and unexpected weight change  HENT: Negative for ear pain, rhinorrhea and sore throat  Eyes: Negative for pain and visual disturbance  Respiratory: Positive for cough and shortness of breath  Cardiovascular: Negative for chest pain and leg swelling  Gastrointestinal: Negative for abdominal pain, constipation, diarrhea, nausea and vomiting  Endocrine: Negative for polydipsia, polyphagia and polyuria  Genitourinary: Negative for dysuria, frequency, hematuria and urgency  Musculoskeletal: Negative for back pain, myalgias and neck pain  Skin: Negative for color change and rash  Allergic/Immunologic: Negative for environmental allergies and immunocompromised state  Neurological: Negative for dizziness, weakness, light-headedness, numbness and headaches  Hematological: Negative for adenopathy  Does not bruise/bleed easily  Psychiatric/Behavioral: Negative for agitation and confusion  All other systems reviewed and are negative  Physical Exam  Physical Exam  Vitals and nursing note reviewed  Constitutional:       Appearance: She is well-developed  She is not ill-appearing  HENT:      Head: Normocephalic and atraumatic  Nose: Nose normal    Eyes:      Conjunctiva/sclera: Conjunctivae normal    Cardiovascular:      Rate and Rhythm: Normal rate and regular rhythm        Heart sounds: Normal heart sounds  Pulmonary:      Effort: No respiratory distress  Breath sounds: No stridor  Wheezing and rhonchi present  No rales  Chest:      Chest wall: No tenderness  Abdominal:      General: There is no distension  Palpations: Abdomen is soft  Tenderness: There is no abdominal tenderness  There is no guarding or rebound  Musculoskeletal:         General: No swelling, tenderness or deformity  Cervical back: Normal range of motion and neck supple  Skin:     General: Skin is warm and dry  Findings: No rash  Neurological:      General: No focal deficit present  Mental Status: She is alert and oriented to person, place, and time  Motor: No abnormal muscle tone  Coordination: Coordination normal    Psychiatric:         Thought Content:  Thought content normal          Judgment: Judgment normal          Vital Signs  ED Triage Vitals   Temperature Pulse Respirations Blood Pressure SpO2   07/31/22 1002 07/31/22 1002 07/31/22 1002 07/31/22 1002 07/31/22 1002   98 4 °F (36 9 °C) (!) 108 (!) 26 142/87 98 %      Temp Source Heart Rate Source Patient Position - Orthostatic VS BP Location FiO2 (%)   07/31/22 1002 07/31/22 1002 07/31/22 1002 07/31/22 1002 --   Oral Monitor Sitting Right arm       Pain Score       07/31/22 1142       No Pain           Vitals:    07/31/22 1002 07/31/22 1217   BP: 142/87 117/88   Pulse: (!) 108 92   Patient Position - Orthostatic VS: Sitting Sitting         Visual Acuity  Visual Acuity    Flowsheet Row Most Recent Value   L Pupil Size (mm) 3   R Pupil Size (mm) 3          ED Medications  Medications   methylPREDNISolone sodium succinate (Solu-MEDROL) injection 80 mg (80 mg Intravenous Given 7/31/22 1113)   albuterol inhalation solution 10 mg (10 mg Nebulization Given 7/31/22 1056)     And   ipratropium (ATROVENT) 0 02 % inhalation solution 1 mg (1 mg Nebulization Given 7/31/22 1056)     And   sodium chloride 0 9 % inhalation solution 3 mL (3 mL Nebulization Given 7/31/22 1056)       Diagnostic Studies  Results Reviewed     Procedure Component Value Units Date/Time    COVID only [[id number]]  (Normal) Collected: 07/31/22 1056    Lab Status: Final result Specimen: Nares from Nose Updated: 07/31/22 1157     SARS-CoV-2 Negative    Narrative:      FOR PEDIATRIC PATIENTS - copy/paste COVID Guidelines URL to browser: [email or web address]  shopkickx    SARS-CoV-2 assay is a Nucleic Acid Amplification assay intended for the  qualitative detection of nucleic acid from SARS-CoV-2 in nasopharyngeal  swabs  Results are for the presumptive identification of SARS-CoV-2 RNA  Positive results are indicative of infection with SARS-CoV-2, the virus  causing COVID-19, but do not rule out bacterial infection or co-infection  with other viruses  Laboratories within the [place] and its  territories are required to report all positive results to the appropriate  public health authorities  Negative results do not preclude SARS-CoV-2  infection and should not be used as the sole basis for treatment or other  patient management decisions  Negative results must be combined with  clinical observations, patient history, and epidemiological information  This test has not been FDA cleared or approved  This test has been authorized by FDA under an Emergency Use Authorization  (EUA)  This test is only authorized for the duration of time the  declaration that circumstances exist justifying the authorization of the  emergency use of an in vitro diagnostic tests for detection of SARS-CoV-2  virus and/or diagnosis of COVID-19 infection under section 564(b)(1) of  the Act, 21 U  S C  822WWH-4(A)(7), unless the authorization is terminated  or revoked sooner  The test has been validated but independent review by FDA  and CLIA is pending  Test performed using ControlScan: This RT-PCR assay targets N2,  a region unique to SARS-CoV-2  A conserved region in the E-gene was chosen  for pan-Sarbecovirus detection which includes SARS-CoV-2      HS Troponin 0hr (reflex protocol) [[id number]]  (Normal) Collected: 07/31/22 1113    Lab Status: Final result Specimen: Blood from Arm, Left Updated: 07/31/22 1150     hs TnI 0hr 7 ng/L     Basic metabolic panel [[id number]]  (Abnormal) Collected: 07/31/22 1113    Lab Status: Final result Specimen: Blood from Arm, Left Updated: 07/31/22 1138     Sodium 142 mmol/L      Potassium 3 5 mmol/L      Chloride 105 mmol/L      CO2 28 mmol/L      ANION GAP 9 mmol/L      BUN 15 mg/dL      Creatinine 0 97 mg/dL      Glucose 96 mg/dL      Calcium 10 4 mg/dL      eGFR 59 ml/min/1 73sq m     Narrative:      [facility] guidelines for Chronic Kidney Disease (CKD):     Stage 1 with normal or high GFR (GFR > 90 mL/min/1 73 square meters)    Stage 2 Mild CKD (GFR = 60-89 mL/min/1 73 square meters)    Stage 3A Moderate CKD (GFR = 45-59 mL/min/1 73 square meters)    Stage 3B Moderate CKD (GFR = 30-44 mL/min/1 73 square meters)    Stage 4 Severe CKD (GFR = 15-29 mL/min/1 73 square meters)    Stage 5 End Stage CKD (GFR <15 mL/min/1 73 square meters)  Note: GFR calculation is accurate only with a steady state creatinine    CBC and differential [[id number]]  (Abnormal) Collected: 07/31/22 1113    Lab Status: Final result Specimen: Blood from Arm, Left Updated: 07/31/22 1124     WBC 5 93 Thousand/uL      RBC 3 85 Million/uL      Hemoglobin 11 0 g/dL      Hematocrit 35 3 %      MCV 92 fL      MCH 28 6 pg      MCHC 31 2 g/dL      RDW 15 1 %      MPV 8 9 fL      Platelets 848 Thousands/uL      nRBC 0 /100 WBCs      Neutrophils Relative 57 %      Immat GRANS % 0 %      Lymphocytes Relative 30 %      Monocytes Relative 10 %      Eosinophils Relative 2 %      Basophils Relative 1 %      Neutrophils Absolute 3 43 Thousands/µL      Immature Grans Absolute 0 02 Thousand/uL      Lymphocytes Absolute 1 76 Thousands/µL Monocytes Absolute 0 57 Thousand/µL      Eosinophils Absolute 0 11 Thousand/µL      Basophils Absolute 0 04 Thousands/µL                  XR chest 1 view portable    (Results Pending)              Procedures  CriticalCare Time  Performed by: Malia Spear DO  Authorized by: Malia Spear DO     Critical care provider statement:     Critical care time (minutes):  32    Critical care time was exclusive of:  Separately billable procedures and treating other patients and teaching time    Critical care was necessary to treat or prevent imminent or life-threatening deterioration of the following conditions:  Respiratory failure    Critical care was time spent personally by me on the following activities:  Obtaining history from patient or surrogate, development of treatment plan with patient or surrogate, evaluation of patient's response to treatment, examination of patient, interpretation of cardiac output measurements, ordering and performing treatments and interventions, ordering and review of laboratory studies, ordering and review of radiographic studies, re-evaluation of patient's condition and review of old charts  Comments:      Respiratory distress/COPD exacerbation, willis neb, frequent reassessments             ED Course  ED Course as of 07/31/22 1428   Sun Jul 31, 2022   1057 CXR interpreted by myself: no acute abn   1057 SIRS 2/2 COPD exac, no evidence of infection   1126 Hemoglobin(!): 11 0  12 6 1 month ago   1145 Pt reassessed after lazaro malik, reports feeling better, increased breath sounds now  Pt just walked to bathroom and back so a little tachypneic, but states normal for her  Reviewed results so far, pending troponin and COVID test results and will reassess   1154 hs TnI 0hr: 7   1213 SARS-COV-2: Negative   1213 EKG: sinus arrhythmia @ 92 bpm, LAD, normal intervals, nonspecific st changes, unchanged from previous from 6/21/22   1218 Pt reassessed again, feeling better, states back to baseline   Discussed all results, discussed delta troponin, pt would prefer to leave  States she has all meds/inhalers at home  Will write rx for prednisone and close return precautions reviewed, pt comfortable with plan                               SBIRT 22yo+    Flowsheet Row Most Recent Value   SBIRT (25 yo +)    In order to provide better care to our patients, we are screening all of our patients for alcohol and drug use  Would it be okay to ask you these screening questions? No Filed at: 07/31/2022 1115   Initial Alcohol Screen: US AUDIT-C     1  How often do you have a drink containing alcohol? 0 Filed at: 07/31/2022 1115   2  How many drinks containing alcohol do you have on a typical day you are drinking? 0 Filed at: 07/31/2022 1115   3a  Male UNDER 65: How often do you have five or more drinks on one occasion? 0 Filed at: 07/31/2022 1115   3b  FEMALE Any Age, or MALE 65+: How often do you have 4 or more drinks on one occassion? 0 Filed at: 07/31/2022 1115   Audit-C Score 0 Filed at: 07/31/2022 1115   DEMETRIS: How many times in the past year have you    Used an illegal drug or used a prescription medication for non-medical reasons?  Never Filed at: 07/31/2022 1115                    MDM  Number of Diagnoses or Management Options  COPD exacerbation ([facility] )  Diagnosis management comments: 80 yo F with SOB/COPD exacerbation, sx improved in ED with willis neb and Solumedrol  Discussed risks/benefits of delta troponin, pt would prefer to go home  States she has all her home meds/inhalers/neb, prescribed course of prednisone    Discussed PCP f/u and close return precautions, pt expressed comfort and understanding with plan       Amount and/or Complexity of Data Reviewed  Clinical lab tests: ordered and reviewed  Tests in the radiology section of CPT®: ordered and reviewed  Tests in the medicine section of CPT®: ordered and reviewed  Review and summarize past medical records: yes  Independent visualization of images, tracings, or specimens: yes        Disposition  Final diagnoses:   COPD exacerbation ([address] )     Time reflects when diagnosis was documented in both MDM as applicable and the Disposition within this note     Time User Action Codes Description Comment    7/31/2022 12:19 PM Mame SMITH Add [J44 1] COPD exacerbation [facility])       ED Disposition     ED Disposition   Discharge    Condition   Stable    Date/Time   Sun Jul 31, 2022 12:18 PM    Comment   Joyce Sales discharge to home/self care  Follow-up Information     Follow up With Specialties Details Why Contact Info    Shirley Amaya DO    [facility] [place] [id number] [phone]            Discharge Medication List as of 7/31/2022 12:21 PM      START taking these medications    Details   ! ! predniSONE 20 mg tablet Take 2 tablets (40 mg total) by mouth daily, Starting Sun 7/31/2022, Print       !! - Potential duplicate medications found  Please discuss with provider  CONTINUE these medications which have NOT CHANGED    Details   albuterol (2 5 mg/3 mL) 0 083 % nebulizer solution Take 3 mL (2 5 mg total) by nebulization every 6 (six) hours as needed for wheezing or shortness of breath, Starting Sun 6/26/2022, Normal      apixaban (Eliquis) 5 mg Take 1 tablet by mouth 2 (two) times a day, Starting Mon 5/9/2022, Historical Med      aspirin 81 MG tablet Take 81 mg by mouth daily  , Historical Med      atorvastatin (LIPITOR) 20 mg tablet Take 20 mg by mouth daily, Historical Med      bictegravir-emtricitab-tenofovir alafenamide (Biktarvy) -25 MG tablet Take 1 tablet by mouth daily, Starting Mon 5/9/2022, Historical Med      cholecalciferol (VITAMIN D3) 1,000 units tablet Take 1,000 Units by mouth daily, Historical Med      diltiazem (CARDIZEM CD) 180 mg 24 hr capsule Take 180 mg by mouth daily, Historical Med      fluticasone-salmeterol (ADVAIR) 250-50 mcg/dose Inhale 1 puff every 12 (twelve) hours  , Historical Med      gabapentin (NEURONTIN) 400 mg capsule Take 800 mg by mouth 3 (three) times a day  , Historical Med      ibuprofen (MOTRIN) 400 mg tablet Take 1 tablet (400 mg total) by mouth every 6 (six) hours as needed for mild pain or moderate pain, Starting Wed 5/27/2020, Print      levothyroxine 100 mcg tablet Take 100 mcg by mouth daily  , Historical Med      lidocaine (LIDODERM) 5 % Apply 1 patch topically daily Remove & Discard patch within 12 hours or as directed by MD, Starting Sat 5/30/2020, Print      metFORMIN (GLUCOPHAGE) 500 mg tablet Take 500 mg by mouth daily with breakfast  , Historical Med      montelukast (SINGULAIR) 10 mg tablet Take 10 mg by mouth daily at bedtime, Historical Med      !! predniSONE 10 mg tablet 4 tabs x 3 days, 3 tabs x 3tabs, 2 tabs x 3 days, 1 tab x 3 days, then stop, Normal      QUEtiapine (SEROquel) 100 mg tablet Take 200 mg by mouth daily at bedtime  , Historical Med      tiotropium (SPIRIVA) 18 mcg inhalation capsule Place 18 mcg into inhaler and inhale daily  , Historical Med      tiZANidine (ZANAFLEX) 2 mg tablet Take 1 tablet (2 mg total) by mouth every 8 (eight) hours as needed for muscle spasms, Starting Wed 5/27/2020, Print      topiramate (TOPAMAX) 50 MG tablet Take 50 mg by mouth every 12 (twelve) hours, Historical Med      torsemide (DEMADEX) 20 mg tablet Take 10 mg by mouth daily, Starting Tue 2/22/2022, Until Wed 2/22/2023, Historical Med       !! - Potential duplicate medications found  Please discuss with provider  No discharge procedures on file      PDMP Review       Value Time User    PDMP Reviewed  Yes 6/21/2022  7:54 PM Cindy Borges, [facility] Provider  Electronically Signed by           Mony Huizar,   07/31/22 1890

## 2022-07-31 NOTE — DISCHARGE INSTRUCTIONS
Take Prednisone as prescribed, next dose is tomorrow  Take all medications/inhalers at home as previously prescribed    Follow up with family doctor    Return to the ED if you have any new or worsening symptoms including but not limited to passing out, difficulty breathing, chest pain, etc

## 2022-08-01 LAB
ATRIAL RATE: 92 BPM
P AXIS: 46 DEGREES
PR INTERVAL: 184 MS
QRS AXIS: -60 DEGREES
QRSD INTERVAL: 112 MS
QT INTERVAL: 320 MS
QTC INTERVAL: 395 MS
T WAVE AXIS: 51 DEGREES
VENTRICULAR RATE: 92 BPM

## 2022-08-01 PROCEDURE — 93010 ELECTROCARDIOGRAM REPORT: CPT | Performed by: INTERNAL MEDICINE

## 2022-09-07 ENCOUNTER — HOSPITAL ENCOUNTER (INPATIENT)
Facility: HOSPITAL | Age: 72
LOS: 11 days | Discharge: HOME HEALTH | DRG: 291 | End: 2022-09-18
Attending: EMERGENCY MEDICINE | Admitting: HOSPITALIST
Payer: COMMERCIAL

## 2022-09-07 ENCOUNTER — APPOINTMENT (OUTPATIENT)
Dept: RADIOLOGY | Facility: HOSPITAL | Age: 72
DRG: 291 | End: 2022-09-07
Payer: COMMERCIAL

## 2022-09-07 DIAGNOSIS — Z72.0 TOBACCO USE: ICD-10-CM

## 2022-09-07 DIAGNOSIS — J44.1 ACUTE EXACERBATION OF CHRONIC OBSTRUCTIVE PULMONARY DISEASE (COPD) (HCC): ICD-10-CM

## 2022-09-07 DIAGNOSIS — J96.20 ACUTE ON CHRONIC RESPIRATORY FAILURE (HCC): ICD-10-CM

## 2022-09-07 DIAGNOSIS — I50.33 ACUTE ON CHRONIC DIASTOLIC HEART FAILURE (HCC): ICD-10-CM

## 2022-09-07 DIAGNOSIS — I50.32 CHRONIC DIASTOLIC (CONGESTIVE) HEART FAILURE (HCC): ICD-10-CM

## 2022-09-07 DIAGNOSIS — J44.1 COPD EXACERBATION (HCC): Primary | ICD-10-CM

## 2022-09-07 LAB
2HR DELTA HS TROPONIN: 1 NG/L
4HR DELTA HS TROPONIN: 0 NG/L
ALBUMIN SERPL BCP-MCNC: 3.8 G/DL (ref 3.5–5)
ALP SERPL-CCNC: 87 U/L (ref 46–116)
ALT SERPL W P-5'-P-CCNC: 20 U/L (ref 12–78)
ANION GAP SERPL CALCULATED.3IONS-SCNC: 11 MMOL/L (ref 4–13)
AST SERPL W P-5'-P-CCNC: 10 U/L (ref 5–45)
ATRIAL RATE: 88 BPM
BASOPHILS # BLD AUTO: 0.03 THOUSANDS/ΜL (ref 0–0.1)
BASOPHILS NFR BLD AUTO: 1 % (ref 0–1)
BILIRUB SERPL-MCNC: 0.3 MG/DL (ref 0.2–1)
BUN SERPL-MCNC: 16 MG/DL (ref 5–25)
CALCIUM SERPL-MCNC: 10.2 MG/DL (ref 8.3–10.1)
CARDIAC TROPONIN I PNL SERPL HS: 5 NG/L
CARDIAC TROPONIN I PNL SERPL HS: 5 NG/L
CARDIAC TROPONIN I PNL SERPL HS: 6 NG/L
CHLORIDE SERPL-SCNC: 108 MMOL/L (ref 96–108)
CO2 SERPL-SCNC: 28 MMOL/L (ref 21–32)
CREAT SERPL-MCNC: 1.04 MG/DL (ref 0.6–1.3)
EOSINOPHIL # BLD AUTO: 0.11 THOUSAND/ΜL (ref 0–0.61)
EOSINOPHIL NFR BLD AUTO: 2 % (ref 0–6)
ERYTHROCYTE [DISTWIDTH] IN BLOOD BY AUTOMATED COUNT: 14.6 % (ref 11.6–15.1)
GFR SERPL CREATININE-BSD FRML MDRD: 54 ML/MIN/1.73SQ M
GLUCOSE SERPL-MCNC: 106 MG/DL (ref 65–140)
HCT VFR BLD AUTO: 34.5 % (ref 34.8–46.1)
HGB BLD-MCNC: 10.8 G/DL (ref 11.5–15.4)
IMM GRANULOCYTES # BLD AUTO: 0.01 THOUSAND/UL (ref 0–0.2)
IMM GRANULOCYTES NFR BLD AUTO: 0 % (ref 0–2)
LYMPHOCYTES # BLD AUTO: 1.36 THOUSANDS/ΜL (ref 0.6–4.47)
LYMPHOCYTES NFR BLD AUTO: 28 % (ref 14–44)
MCH RBC QN AUTO: 28.6 PG (ref 26.8–34.3)
MCHC RBC AUTO-ENTMCNC: 31.3 G/DL (ref 31.4–37.4)
MCV RBC AUTO: 91 FL (ref 82–98)
MONOCYTES # BLD AUTO: 0.51 THOUSAND/ΜL (ref 0.17–1.22)
MONOCYTES NFR BLD AUTO: 10 % (ref 4–12)
NEUTROPHILS # BLD AUTO: 2.89 THOUSANDS/ΜL (ref 1.85–7.62)
NEUTS SEG NFR BLD AUTO: 59 % (ref 43–75)
NRBC BLD AUTO-RTO: 0 /100 WBCS
P AXIS: 43 DEGREES
PLATELET # BLD AUTO: 321 THOUSANDS/UL (ref 149–390)
PMV BLD AUTO: 9.1 FL (ref 8.9–12.7)
POTASSIUM SERPL-SCNC: 3.5 MMOL/L (ref 3.5–5.3)
PR INTERVAL: 192 MS
PROCALCITONIN SERPL-MCNC: <0.05 NG/ML
PROT SERPL-MCNC: 7.6 G/DL (ref 6.4–8.4)
QRS AXIS: -68 DEGREES
QRSD INTERVAL: 102 MS
QT INTERVAL: 362 MS
QTC INTERVAL: 438 MS
RBC # BLD AUTO: 3.78 MILLION/UL (ref 3.81–5.12)
SODIUM SERPL-SCNC: 147 MMOL/L (ref 135–147)
T WAVE AXIS: -3 DEGREES
VENTRICULAR RATE: 88 BPM
WBC # BLD AUTO: 4.91 THOUSAND/UL (ref 4.31–10.16)

## 2022-09-07 PROCEDURE — 99285 EMERGENCY DEPT VISIT HI MDM: CPT

## 2022-09-07 PROCEDURE — 96374 THER/PROPH/DIAG INJ IV PUSH: CPT

## 2022-09-07 PROCEDURE — 85025 COMPLETE CBC W/AUTO DIFF WBC: CPT | Performed by: EMERGENCY MEDICINE

## 2022-09-07 PROCEDURE — 36415 COLL VENOUS BLD VENIPUNCTURE: CPT | Performed by: EMERGENCY MEDICINE

## 2022-09-07 PROCEDURE — 93005 ELECTROCARDIOGRAM TRACING: CPT

## 2022-09-07 PROCEDURE — 84145 PROCALCITONIN (PCT): CPT | Performed by: HOSPITALIST

## 2022-09-07 PROCEDURE — 99291 CRITICAL CARE FIRST HOUR: CPT | Performed by: EMERGENCY MEDICINE

## 2022-09-07 PROCEDURE — 99223 1ST HOSP IP/OBS HIGH 75: CPT | Performed by: HOSPITALIST

## 2022-09-07 PROCEDURE — 80053 COMPREHEN METABOLIC PANEL: CPT | Performed by: EMERGENCY MEDICINE

## 2022-09-07 PROCEDURE — 84484 ASSAY OF TROPONIN QUANT: CPT | Performed by: EMERGENCY MEDICINE

## 2022-09-07 PROCEDURE — 71046 X-RAY EXAM CHEST 2 VIEWS: CPT

## 2022-09-07 PROCEDURE — 93010 ELECTROCARDIOGRAM REPORT: CPT | Performed by: STUDENT IN AN ORGANIZED HEALTH CARE EDUCATION/TRAINING PROGRAM

## 2022-09-07 PROCEDURE — 94640 AIRWAY INHALATION TREATMENT: CPT

## 2022-09-07 RX ORDER — ALBUTEROL SULFATE 2.5 MG/3ML
2.5 SOLUTION RESPIRATORY (INHALATION) EVERY 4 HOURS PRN
Status: DISCONTINUED | OUTPATIENT
Start: 2022-09-07 | End: 2022-09-18 | Stop reason: HOSPADM

## 2022-09-07 RX ORDER — DILTIAZEM HYDROCHLORIDE 180 MG/1
180 CAPSULE, COATED, EXTENDED RELEASE ORAL DAILY
Status: DISCONTINUED | OUTPATIENT
Start: 2022-09-08 | End: 2022-09-18 | Stop reason: HOSPADM

## 2022-09-07 RX ORDER — NICOTINE 21 MG/24HR
1 PATCH, TRANSDERMAL 24 HOURS TRANSDERMAL DAILY
Status: DISCONTINUED | OUTPATIENT
Start: 2022-09-08 | End: 2022-09-18 | Stop reason: HOSPADM

## 2022-09-07 RX ORDER — METHYLPREDNISOLONE SODIUM SUCCINATE 125 MG/2ML
80 INJECTION, POWDER, LYOPHILIZED, FOR SOLUTION INTRAMUSCULAR; INTRAVENOUS ONCE
Status: COMPLETED | OUTPATIENT
Start: 2022-09-07 | End: 2022-09-07

## 2022-09-07 RX ORDER — TRAMADOL HYDROCHLORIDE 50 MG/1
50 TABLET ORAL EVERY 6 HOURS PRN
Status: DISCONTINUED | OUTPATIENT
Start: 2022-09-07 | End: 2022-09-18 | Stop reason: HOSPADM

## 2022-09-07 RX ORDER — IPRATROPIUM BROMIDE AND ALBUTEROL SULFATE 2.5; .5 MG/3ML; MG/3ML
3 SOLUTION RESPIRATORY (INHALATION)
Status: DISCONTINUED | OUTPATIENT
Start: 2022-09-07 | End: 2022-09-08

## 2022-09-07 RX ORDER — SODIUM CHLORIDE FOR INHALATION 0.9 %
3 VIAL, NEBULIZER (ML) INHALATION ONCE
Status: COMPLETED | OUTPATIENT
Start: 2022-09-07 | End: 2022-09-07

## 2022-09-07 RX ORDER — MONTELUKAST SODIUM 10 MG/1
10 TABLET ORAL
Status: DISCONTINUED | OUTPATIENT
Start: 2022-09-07 | End: 2022-09-18 | Stop reason: HOSPADM

## 2022-09-07 RX ORDER — GABAPENTIN 400 MG/1
800 CAPSULE ORAL 3 TIMES DAILY
Status: DISCONTINUED | OUTPATIENT
Start: 2022-09-07 | End: 2022-09-18 | Stop reason: HOSPADM

## 2022-09-07 RX ORDER — METHYLPREDNISOLONE SODIUM SUCCINATE 125 MG/2ML
60 INJECTION, POWDER, LYOPHILIZED, FOR SOLUTION INTRAMUSCULAR; INTRAVENOUS EVERY 6 HOURS SCHEDULED
Status: DISCONTINUED | OUTPATIENT
Start: 2022-09-08 | End: 2022-09-08

## 2022-09-07 RX ORDER — IBUPROFEN 400 MG/1
400 TABLET ORAL EVERY 6 HOURS PRN
Status: DISCONTINUED | OUTPATIENT
Start: 2022-09-07 | End: 2022-09-07

## 2022-09-07 RX ORDER — LEVOTHYROXINE SODIUM 0.1 MG/1
100 TABLET ORAL
Status: DISCONTINUED | OUTPATIENT
Start: 2022-09-08 | End: 2022-09-18 | Stop reason: HOSPADM

## 2022-09-07 RX ORDER — ATORVASTATIN CALCIUM 20 MG/1
20 TABLET, FILM COATED ORAL DAILY
Status: DISCONTINUED | OUTPATIENT
Start: 2022-09-08 | End: 2022-09-18 | Stop reason: HOSPADM

## 2022-09-07 RX ORDER — LANOLIN ALCOHOL/MO/W.PET/CERES
6 CREAM (GRAM) TOPICAL
Status: DISCONTINUED | OUTPATIENT
Start: 2022-09-07 | End: 2022-09-18 | Stop reason: HOSPADM

## 2022-09-07 RX ADMIN — TRAMADOL HYDROCHLORIDE 50 MG: 50 TABLET, COATED ORAL at 20:13

## 2022-09-07 RX ADMIN — MONTELUKAST SODIUM 10 MG: 10 TABLET, COATED ORAL at 21:41

## 2022-09-07 RX ADMIN — APIXABAN 5 MG: 5 TABLET, FILM COATED ORAL at 17:44

## 2022-09-07 RX ADMIN — GABAPENTIN 800 MG: 400 CAPSULE ORAL at 23:48

## 2022-09-07 RX ADMIN — ISODIUM CHLORIDE 3 ML: 0.03 SOLUTION RESPIRATORY (INHALATION) at 14:24

## 2022-09-07 RX ADMIN — IPRATROPIUM BROMIDE AND ALBUTEROL SULFATE 3 ML: 2.5; .5 SOLUTION RESPIRATORY (INHALATION) at 19:15

## 2022-09-07 RX ADMIN — IPRATROPIUM BROMIDE 1 MG: 0.5 SOLUTION RESPIRATORY (INHALATION) at 14:21

## 2022-09-07 RX ADMIN — GABAPENTIN 800 MG: 400 CAPSULE ORAL at 17:44

## 2022-09-07 RX ADMIN — METHYLPREDNISOLONE SODIUM SUCCINATE 60 MG: 125 INJECTION, POWDER, FOR SOLUTION INTRAMUSCULAR; INTRAVENOUS at 23:48

## 2022-09-07 RX ADMIN — MELATONIN 6 MG: at 21:41

## 2022-09-07 RX ADMIN — METHYLPREDNISOLONE SODIUM SUCCINATE 80 MG: 125 INJECTION, POWDER, FOR SOLUTION INTRAMUSCULAR; INTRAVENOUS at 14:25

## 2022-09-07 RX ADMIN — ALBUTEROL SULFATE 10 MG: 2.5 SOLUTION RESPIRATORY (INHALATION) at 14:21

## 2022-09-07 RX ADMIN — DOXYCYCLINE 100 MG: 100 INJECTION, POWDER, LYOPHILIZED, FOR SOLUTION INTRAVENOUS at 17:44

## 2022-09-07 NOTE — ED PROVIDER NOTES
History  Chief Complaint   Patient presents with    Asthma     Pt  c/o  sob earlier this morning tried using her nebulizer and her inhaler with no relief      80 yo F h/o COPD/asthma, CHF, chronic resp failure on 2L NC, pAfib on Eliquis, DM, HIV; presenting for evaluation of SOB  Sx started yesterday after she lost her purse and became upset  She c/o SOB and that it feels like her typical asthma  Has been taking her meds/inhaler/neb without relief  She also reports chest pressure constant since yesterday  Denies fevers, chills, cough/URI sx, abdominal pain, calf pain, peripheral edema    Last exacerbation/steroids was 7/31 in ED                Wt Readings from Last 3 Encounters:   09/07/22 97 7 kg (215 lb 6 2 oz)   07/31/22 99 2 kg (218 lb 11 1 oz)   06/21/22 100 kg (220 lb 7 4 oz)       Prior to Admission Medications   Prescriptions Last Dose Informant Patient Reported? Taking? QUEtiapine (SEROquel) 100 mg tablet   Yes No   Sig: Take 200 mg by mouth daily at bedtime     albuterol (2 5 mg/3 mL) 0 083 % nebulizer solution   No No   Sig: Take 3 mL (2 5 mg total) by nebulization every 6 (six) hours as needed for wheezing or shortness of breath   apixaban (Eliquis) 5 mg   Yes No   Sig: Take 1 tablet by mouth 2 (two) times a day   aspirin 81 MG tablet   Yes No   Sig: Take 81 mg by mouth daily  atorvastatin (LIPITOR) 20 mg tablet   Yes No   Sig: Take 20 mg by mouth daily   bictegravir-emtricitab-tenofovir alafenamide (Biktarvy) -25 MG tablet   Yes No   Sig: Take 1 tablet by mouth daily   cholecalciferol (VITAMIN D3) 1,000 units tablet   Yes No   Sig: Take 1,000 Units by mouth daily   diltiazem (CARDIZEM CD) 180 mg 24 hr capsule   Yes No   Sig: Take 180 mg by mouth daily   fluticasone-salmeterol (ADVAIR) 250-50 mcg/dose   Yes No   Sig: Inhale 1 puff every 12 (twelve) hours     gabapentin (NEURONTIN) 400 mg capsule   Yes No   Sig: Take 800 mg by mouth 3 (three) times a day     ibuprofen (MOTRIN) 400 mg tablet No No   Sig: Take 1 tablet (400 mg total) by mouth every 6 (six) hours as needed for mild pain or moderate pain   levothyroxine 100 mcg tablet   Yes No   Sig: Take 100 mcg by mouth daily  lidocaine (LIDODERM) 5 %   No No   Sig: Apply 1 patch topically daily Remove & Discard patch within 12 hours or as directed by MD   metFORMIN (GLUCOPHAGE) 500 mg tablet   Yes No   Sig: Take 500 mg by mouth daily with breakfast     montelukast (SINGULAIR) 10 mg tablet   Yes No   Sig: Take 10 mg by mouth daily at bedtime   predniSONE 10 mg tablet   No No   Si tabs x 3 days, 3 tabs x 3tabs, 2 tabs x 3 days, 1 tab x 3 days, then stop   predniSONE 20 mg tablet   No No   Sig: Take 2 tablets (40 mg total) by mouth daily   tiZANidine (ZANAFLEX) 2 mg tablet   No No   Sig: Take 1 tablet (2 mg total) by mouth every 8 (eight) hours as needed for muscle spasms   tiotropium (SPIRIVA) 18 mcg inhalation capsule   Yes No   Sig: Place 18 mcg into inhaler and inhale daily     topiramate (TOPAMAX) 50 MG tablet   Yes No   Sig: Take 50 mg by mouth every 12 (twelve) hours   torsemide (DEMADEX) 20 mg tablet   Yes No   Sig: Take 10 mg by mouth daily      Facility-Administered Medications: None       Past Medical History:   Diagnosis Date    Asthma     Bipolar 1 disorder ([facility] )     Cardiac disease     COPD (chronic obstructive pulmonary disease) ([facility] )     Diabetes mellitus ([facility] )     Disease of thyroid gland     HIV (human immunodeficiency virus infection) ([place] [address] ) 2016    Hypertension     Osteoarthritis     Tobacco abuse        Past Surgical History:   Procedure Laterality Date    HERNIA REPAIR      LUMBAR FUSION N/A 4/10/2018    Procedure: T9/10 discectomy with T9-T11 fusion;  Surgeon: Perla Gonzalez MD;  [facility];  Service: Neurosurgery    THYROIDECTOMY         Family History   Problem Relation Age of Onset    No Known Problems Mother     Diabetes Father     Heart disease Neg Hx     Cancer Neg Hx      I have reviewed and agree with the history as documented  E-Cigarette/Vaping     E-Cigarette/Vaping Substances     Social History     Tobacco Use    Smoking status: Current Every Day Smoker     Packs/day: 0 20     Years: 45 00     Pack years: 9 00     Types: Cigarettes    Smokeless tobacco: Never Used    Tobacco comment: Currently smoking 1-2 cigarettes a day   Substance Use Topics    Alcohol use: Yes    Drug use: No     Comment: former IV drug user       Review of Systems   Constitutional: Negative for chills, fever and unexpected weight change  HENT: Negative for ear pain, rhinorrhea and sore throat  Eyes: Negative for pain and visual disturbance  Respiratory: Positive for cough, chest tightness and shortness of breath  Cardiovascular: Negative for chest pain and leg swelling  Gastrointestinal: Negative for abdominal pain, constipation, diarrhea, nausea and vomiting  Endocrine: Negative for polydipsia, polyphagia and polyuria  Genitourinary: Negative for dysuria, frequency, hematuria and urgency  Musculoskeletal: Negative for back pain, myalgias and neck pain  Skin: Negative for color change and rash  Allergic/Immunologic: Negative for environmental allergies and immunocompromised state  Neurological: Negative for dizziness, weakness, light-headedness, numbness and headaches  Hematological: Negative for adenopathy  Does not bruise/bleed easily  Psychiatric/Behavioral: Negative for agitation and confusion  All other systems reviewed and are negative  Physical Exam  Physical Exam  Vitals and nursing note reviewed  Constitutional:       General: She is not in acute distress  Appearance: She is well-developed  She is not diaphoretic  HENT:      Head: Normocephalic and atraumatic  Nose: Nose normal    Eyes:      Conjunctiva/sclera: Conjunctivae normal    Cardiovascular:      Rate and Rhythm: Normal rate and regular rhythm  Heart sounds: Normal heart sounds     Pulmonary: Effort: No respiratory distress  Breath sounds: No stridor  Wheezing present  No rales  Comments: tachypnea  Chest:      Chest wall: No tenderness  Abdominal:      General: There is no distension  Palpations: Abdomen is soft  Tenderness: There is no abdominal tenderness  There is no guarding or rebound  Musculoskeletal:         General: No swelling, tenderness or deformity  Cervical back: Normal range of motion and neck supple  Skin:     General: Skin is warm and dry  Findings: No rash  Neurological:      Mental Status: She is alert and oriented to person, place, and time  Motor: No abnormal muscle tone  Coordination: Coordination normal    Psychiatric:         Thought Content:  Thought content normal          Judgment: Judgment normal          Vital Signs  ED Triage Vitals   Temperature Pulse Respirations Blood Pressure SpO2   09/07/22 1307 09/07/22 1307 09/07/22 1307 09/07/22 1307 09/07/22 1307   98 2 °F (36 8 °C) 91 22 122/60 98 %      Temp src Heart Rate Source Patient Position - Orthostatic VS BP Location FiO2 (%)   -- 09/07/22 1513 09/07/22 1307 09/07/22 1307 --    Monitor Lying Left arm       Pain Score       --                  Vitals:    09/07/22 1307 09/07/22 1513   BP: 122/60 113/78   Pulse: 91 94   Patient Position - Orthostatic VS: Lying Sitting         Visual Acuity      ED Medications  Medications   albuterol inhalation solution 10 mg (10 mg Nebulization Given 9/7/22 1421)     And   ipratropium (ATROVENT) 0 02 % inhalation solution 1 mg (1 mg Nebulization Given 9/7/22 1421)     And   sodium chloride 0 9 % inhalation solution 3 mL (3 mL Nebulization Given 9/7/22 1424)   methylPREDNISolone sodium succinate (Solu-MEDROL) injection 80 mg (80 mg Intravenous Given 9/7/22 1425)       Diagnostic Studies  Results Reviewed     Procedure Component Value Units Date/Time    HS Troponin I 2hr [[id number]]     Lab Status: No result Specimen: Blood     HS Troponin 0hr (reflex protocol) [[id number]]  (Normal) Collected: 09/07/22 1419    Lab Status: Final result Specimen: Blood from Arm, Left Updated: 09/07/22 1449     hs TnI 0hr 5 ng/L     Comprehensive metabolic panel [[id number]]  (Abnormal) Collected: 09/07/22 1419    Lab Status: Final result Specimen: Blood from Arm, Left Updated: 09/07/22 1445     Sodium 147 mmol/L      Potassium 3 5 mmol/L      Chloride 108 mmol/L      CO2 28 mmol/L      ANION GAP 11 mmol/L      BUN 16 mg/dL      Creatinine 1 04 mg/dL      Glucose 106 mg/dL      Calcium 10 2 mg/dL      AST 10 U/L      ALT 20 U/L      Alkaline Phosphatase 87 U/L      Total Protein 7 6 g/dL      Albumin 3 8 g/dL      Total Bilirubin 0 30 mg/dL      eGFR 54 ml/min/1 73sq m     Narrative:      [place] guidelines for Chronic Kidney Disease (CKD):     Stage 1 with normal or high GFR (GFR > 90 mL/min/1 73 square meters)    Stage 2 Mild CKD (GFR = 60-89 mL/min/1 73 square meters)    Stage 3A Moderate CKD (GFR = 45-59 mL/min/1 73 square meters)    Stage 3B Moderate CKD (GFR = 30-44 mL/min/1 73 square meters)    Stage 4 Severe CKD (GFR = 15-29 mL/min/1 73 square meters)    Stage 5 End Stage CKD (GFR <15 mL/min/1 73 square meters)  Note: GFR calculation is accurate only with a steady state creatinine    CBC and differential [[id number]]  (Abnormal) Collected: 09/07/22 1419    Lab Status: Final result Specimen: Blood from Arm, Left Updated: 09/07/22 1423     WBC 4 91 Thousand/uL      RBC 3 78 Million/uL      Hemoglobin 10 8 g/dL      Hematocrit 34 5 %      MCV 91 fL      MCH 28 6 pg      MCHC 31 3 g/dL      RDW 14 6 %      MPV 9 1 fL      Platelets 140 Thousands/uL      nRBC 0 /100 WBCs      Neutrophils Relative 59 %      Immat GRANS % 0 %      Lymphocytes Relative 28 %      Monocytes Relative 10 %      Eosinophils Relative 2 %      Basophils Relative 1 %      Neutrophils Absolute 2 89 Thousands/µL      Immature Grans Absolute 0 01 Thousand/uL Lymphocytes Absolute 1 36 Thousands/µL      Monocytes Absolute 0 51 Thousand/µL      Eosinophils Absolute 0 11 Thousand/µL      Basophils Absolute 0 03 Thousands/µL                  XR chest 2 views   ED Interpretation by Jay Acevedo DO (09/07 1413)      FINDINGS:     Cardiomediastinal silhouette appears unremarkable      The lungs are clear  No pneumothorax or pleural effusion      Osseous structures appear within normal limits for patient age  Lower thoracic pedicle screw and madiha posterior fixation hardware     IMPRESSION:     No acute cardiopulmonary disease      Findings are stable      Final Result by Manpreet Bunn MD (09/07 1410)      No acute cardiopulmonary disease        Findings are stable            Workstation performed: [id number]                    Procedures  CriticalCare Time  Performed by: Jay Acevedo DO  Authorized by: Jay cAevedo DO     Critical care provider statement:     Critical care time (minutes):  33    Critical care time was exclusive of:  Separately billable procedures and treating other patients and teaching time    Critical care was necessary to treat or prevent imminent or life-threatening deterioration of the following conditions:  Respiratory failure    Critical care was time spent personally by me on the following activities:  Obtaining history from patient or surrogate, development of treatment plan with patient or surrogate, evaluation of patient's response to treatment, examination of patient, interpretation of cardiac output measurements, ordering and performing treatments and interventions, ordering and review of laboratory studies, ordering and review of radiographic studies, re-evaluation of patient's condition and review of old charts             ED Course  ED Course as of 09/07/22 1524   Wed Sep 07, 2022   1422 EKG: sinus @ 88 bpm, LAD, normal intervals, nonspecific st changes, poor R wave progression anterior leads, nonspecific st changes   1430 Hemoglobin(!): 10 8  11 0- 12 6 baseline   1456 hs TnI 0hr: 5  Will need 2 hr delta troponin per protocol   1509 Pt reassessed after willis neb, actually feeling worse, just came back from walking to the bathroom and tachypneic/resp distress, she is agreeable to admission             HEART Risk Score    Flowsheet Row Most Recent Value   Heart Score Risk Calculator    History 0 Filed at: 09/07/2022 1524   ECG 1 Filed at: 09/07/2022 1524   Age 2 Filed at: 09/07/2022 1524   Risk Factors 2 Filed at: 09/07/2022 1524   Troponin 0 Filed at: 09/07/2022 1524   HEART Score 5 Filed at: 09/07/2022 1524                                      MDM  Number of Diagnoses or Management Options  Acute on chronic respiratory failure (HCC)  COPD exacerbation (HCC)  Diagnosis management comments: 80 yo F presenting with SOB/chest tightness, COPD exacerbation, sx persistent despite willis neb/solumedrol, admitted for further management       Amount and/or Complexity of Data Reviewed  Clinical lab tests: ordered and reviewed  Tests in the radiology section of CPT®: ordered and reviewed  Tests in the medicine section of CPT®: ordered and reviewed  Review and summarize past medical records: yes  Independent visualization of images, tracings, or specimens: yes        Disposition  Final diagnoses:   COPD exacerbation ([facility] )   Acute on chronic respiratory failure ([facility] )     Time reflects when diagnosis was documented in both MDM as applicable and the Disposition within this note     Time User Action Codes Description Comment    9/7/2022  3:16 PM Jami Cortez A Add [J44 1] COPD exacerbation ([facility] )     9/7/2022  3:17 PM Sol Bis Add [J96 20] Acute on chronic respiratory failure [facility])       ED Disposition     ED Disposition   Admit    Condition   Stable    Date/Time   Wed Sep 7, 2022  3:16 PM    Comment   Case was discussed with BETTY and the patient's admission status was agreed to be Admission Status: inpatient status to the service of Dr Kylah Bran   Follow-up Information    None         Patient's Medications   Discharge Prescriptions    No medications on file       No discharge procedures on file      PDMP Review       Value Time User    PDMP Reviewed  Yes 6/21/2022  7:54 PM Teryl Morning, [address]          ED Provider  Electronically Signed by           Talya Ham DO  09/07/22 152

## 2022-09-07 NOTE — H&P
[facility]  H&P- Sana Childress 1950, 81 y o  female MRN: [id number]  Unit/Bed#: E5 -01 Encounter: [id number]  Primary Care Provider: Alberto Gongora DO   Date and time admitted to hospital: 9/7/2022  1:28 PM    * Acute exacerbation of chronic obstructive pulmonary disease (COPD) ([facility] )  Assessment & Plan  Patient has acute on chronic respiratory failure due to COPD exacerbation  Will place her on IV Solu-Medrol, around the clock nebulizer treatments and doxycycline        Paroxysmal atrial fibrillation ([facility] )  Assessment & Plan  On diltiazem and xarelto    Type 2 diabetes mellitus with hyperglycemia ([facility] )  Assessment & Plan  Lab Results   Component Value Date    HGBA1C 6 3 (H) 06/21/2022       No results for input(s): POCGLU in the last 72 hours  Blood Sugar Average: Last 72 hrs:   hold metformin  Place on HISS    HIV (human immunodeficiency virus infection)   Assessment & Plan  On HAART        Chief Complaint:   Shortness of breath      History of Present Illness:    Sana Childress is a 81 y o  female who presents with shortness of breath  Two days of worsening shortness of breath and wheezing  No cough  No fever or chills  She is having wear more oxygen that she chronically wears at home  Normally wears 2 L oxygen at home  No sick contacts  No recent antibiotics  No recent medication changes         Review of Systems:    Review of Systems   Constitutional: Negative for chills and fever  HENT: Negative for ear pain and sore throat  Eyes: Negative for pain and visual disturbance  Respiratory: Positive for shortness of breath and wheezing  Negative for cough  Cardiovascular: Negative for chest pain and palpitations  Gastrointestinal: Negative for abdominal pain and vomiting  Genitourinary: Negative for dysuria and hematuria  Musculoskeletal: Negative for arthralgias and back pain  Skin: Negative for color change and rash     Neurological: Negative for seizures and syncope  All other systems reviewed and are negative  Past Medical and Surgical History:     Past Medical History:   Diagnosis Date    Asthma     Bipolar 1 disorder ([place] [address] )     Cardiac disease     COPD (chronic obstructive pulmonary disease) ([place] [address] )     Diabetes mellitus ([place] [address] )     Disease of thyroid gland     HIV (human immunodeficiency virus infection) ([place] [address] ) 03/24/2016    Hypertension     Osteoarthritis     Tobacco abuse        Past Surgical History:   Procedure Laterality Date    HERNIA REPAIR      LUMBAR FUSION N/A 4/10/2018    Procedure: T9/10 discectomy with T9-T11 fusion;  Surgeon: Oswald Arzola MD;  [facility];  Service: Neurosurgery    THYROIDECTOMY           Home Medications:    Prior to Admission medications    Medication Sig Start Date End Date Taking? Authorizing Provider   albuterol (2 5 mg/3 mL) 0 083 % nebulizer solution Take 3 mL (2 5 mg total) by nebulization every 6 (six) hours as needed for wheezing or shortness of breath 6/26/22  Yes Jose Lucero, DO   apixaban (ELIQUIS) 5 mg Take 1 tablet by mouth 2 (two) times a day 5/9/22  Yes Historical Provider, MD   atorvastatin (LIPITOR) 20 mg tablet Take 20 mg by mouth daily   Yes Historical Provider, MD   bictegravir-emtricitab-tenofovir alafenamide (Biktarvy) -25 MG tablet Take 1 tablet by mouth daily 5/9/22  Yes Historical Provider, MD   cholecalciferol (VITAMIN D3) 1,000 units tablet Take 1,000 Units by mouth daily   Yes Historical Provider, MD   diltiazem (CARDIZEM CD) 180 mg 24 hr capsule Take 180 mg by mouth daily   Yes Historical Provider, MD   gabapentin (NEURONTIN) 400 mg capsule Take 800 mg by mouth 3 (three) times a day     Yes Historical Provider, MD   ibuprofen (MOTRIN) 400 mg tablet Take 1 tablet (400 mg total) by mouth every 6 (six) hours as needed for mild pain or moderate pain 5/27/20  Yes Memo Skelton, DO   levothyroxine 100 mcg tablet Take 100 mcg by mouth daily     Yes Historical Provider, MD metFORMIN (GLUCOPHAGE) 500 mg tablet Take 500 mg by mouth daily with breakfast     Yes Historical Provider, MD   montelukast (SINGULAIR) 10 mg tablet Take 10 mg by mouth daily at bedtime   Yes Historical Provider, MD   aspirin 81 MG tablet Take 81 mg by mouth daily  Patient not taking: Reported on 9/7/2022    Historical Provider, MD   fluticasone-salmeterol (ADVAIR) 250-50 mcg/dose Inhale 1 puff every 12 (twelve) hours  Patient not taking: Reported on 9/7/2022    Historical Provider, MD   QUEtiapine (SEROquel) 100 mg tablet Take 200 mg by mouth daily at bedtime    Patient not taking: Reported on 9/7/2022    Historical Provider, MD   lidocaine (LIDODERM) 5 % Apply 1 patch topically daily Remove & Discard patch within 12 hours or as directed by MD  Patient not taking: Reported on 9/7/2022 5/30/20 9/7/22  [facility], DO   predniSONE 10 mg tablet 4 tabs x 3 days, 3 tabs x 3tabs, 2 tabs x 3 days, 1 tab x 3 days, then stop 6/26/22 9/7/22  Thornton Player, DO   predniSONE 20 mg tablet Take 2 tablets (40 mg total) by mouth daily 7/31/22 9/7/22  Dedrick Thornton, DO   tiotropium (SPIRIVA) 18 mcg inhalation capsule Place 18 mcg into inhaler and inhale daily  9/7/22  Historical Provider, MD   tiZANidine (ZANAFLEX) 2 mg tablet Take 1 tablet (2 mg total) by mouth every 8 (eight) hours as needed for muscle spasms 5/27/20 9/7/22  Memo Skelton, DO   topiramate (TOPAMAX) 50 MG tablet Take 50 mg by mouth every 12 (twelve) hours  9/7/22  Historical Provider, MD   torsemide (DEMADEX) 20 mg tablet Take 10 mg by mouth daily 2/22/22 9/7/22  Historical Provider, MD SINGH have reviewed home medications with patient personally  Allergies:    Allergies   Allergen Reactions    Lisinopril Angioedema    Prozac [Fluoxetine Hcl] Rash    Sulfa Antibiotics Itching    Quinine          Social History:    Substance Use History:   Social History     Substance and Sexual Activity   Alcohol Use Yes     Social History     Tobacco Use Smoking Status Current Every Day Smoker    Packs/day: 0 20    Years: 45 00    Pack years: 9 00    Types: Cigarettes   Smokeless Tobacco Never Used   Tobacco Comment    Currently smoking 1-2 cigarettes a day     Social History     Substance and Sexual Activity   Drug Use No    Comment: former IV drug user         Family History:    non-contributory      Physical Exam:     Vitals:   Blood Pressure: 113/78 (09/07/22 1513)  Pulse: 94 (09/07/22 1513)  Temperature: 98 2 °F (36 8 °C) (09/07/22 1307)  Respirations: 22 (09/07/22 1513)  Weight - Scale: 97 7 kg (215 lb 6 2 oz) (09/07/22 1304)  SpO2: 97 % (09/07/22 1513)    Physical Exam  Vitals and nursing note reviewed  HENT:      Head: Normocephalic and atraumatic  Eyes:      Pupils: Pupils are equal, round, and reactive to light  Cardiovascular:      Rate and Rhythm: Normal rate and regular rhythm  Heart sounds: No murmur heard  No friction rub  No gallop  Pulmonary:      Effort: Respiratory distress present  Breath sounds: Wheezing (Bilateral expiratory wheezes all lung fields) present  No rales  Abdominal:      General: Bowel sounds are normal       Palpations: Abdomen is soft  Tenderness: There is no abdominal tenderness  Musculoskeletal:      Right lower leg: No edema  Left lower leg: No edema       Additional Data:     Lab Results: I have personally reviewed pertinent reports  Results from last 7 days   Lab Units 09/07/22  1419   WBC Thousand/uL 4 91   HEMOGLOBIN g/dL 10 8*   HEMATOCRIT % 34 5*   PLATELETS Thousands/uL 321   NEUTROS PCT % 59   LYMPHS PCT % 28   MONOS PCT % 10   EOS PCT % 2     Results from last 7 days   Lab Units 09/07/22  1419   POTASSIUM mmol/L 3 5   CHLORIDE mmol/L 108   CO2 mmol/L 28   BUN mg/dL 16   CREATININE mg/dL 1 04   CALCIUM mg/dL 10 2*   ALK PHOS U/L 87   ALT U/L 20   AST U/L 10                     Imaging: I have personally reviewed pertinent reports        XR chest 2 views   ED Interpretation by Aki Still DO (09/07 1413)      FINDINGS:     Cardiomediastinal silhouette appears unremarkable      The lungs are clear  No pneumothorax or pleural effusion      Osseous structures appear within normal limits for patient age  Lower thoracic pedicle screw and madiha posterior fixation hardware     IMPRESSION:     No acute cardiopulmonary disease      Findings are stable      Final Result by Sharita Gordon MD (09/07 1410)      No acute cardiopulmonary disease  Findings are stable            Workstation performed: [id number]               ·       VTE Prophylaxis: Rivaroxaban (Xarelto)        Anticipated Length of Stay:  Patient will be admitted on an Inpatient basis with an anticipated length of stay of  greater than 2 midnights  Justification for Hospital Stay:  Patient has acute COPD exacerbation  She needs IV Solu-Medrol  She will need a greater than 2 midnight stay      Total Time for Visit, including Counseling / Coordination of Care: 45 minutes  Greater than 50% of this total time spent on direct patient counseling and coordination of care  ** Please Note: This note has been constructed using a voice recognition system   **

## 2022-09-07 NOTE — PLAN OF CARE
Problem: PAIN - ADULT  Goal: Verbalizes/displays adequate comfort level or baseline comfort level  Description: Interventions:  - Encourage patient to monitor pain and request assistance  - Assess pain using appropriate pain scale  - Administer analgesics based on type and severity of pain and evaluate response  - Implement non-pharmacological measures as appropriate and evaluate response  - Consider cultural and social influences on pain and pain management  - Notify physician/advanced practitioner if interventions unsuccessful or patient reports new pain  Outcome: Progressing     Problem: INFECTION - ADULT  Goal: Absence or prevention of progression during hospitalization  Description: INTERVENTIONS:  - Assess and monitor for signs and symptoms of infection  - Monitor lab/diagnostic results  - Monitor all insertion sites, i e  indwelling lines, tubes, and drains  - Monitor endotracheal if appropriate and nasal secretions for changes in amount and color  - [place] appropriate cooling/warming therapies per order  - Administer medications as ordered  - Instruct and encourage patient and family to use good hand hygiene technique  - Identify and instruct in appropriate isolation precautions for identified infection/condition  Outcome: Progressing  Goal: Absence of fever/infection during neutropenic period  Description: INTERVENTIONS:  - Monitor WBC    Outcome: Progressing     Problem: SAFETY ADULT  Goal: Patient will remain free of falls  Description: INTERVENTIONS:  - Educate patient/family on patient safety including physical limitations  - Instruct patient to call for assistance with activity   - Consult OT/PT to assist with strengthening/mobility   - Keep Call bell within reach  - Keep bed low and locked with side rails adjusted as appropriate  - Keep care items and personal belongings within reach  - Initiate and maintain comfort rounds  - Make Fall Risk Sign visible to staff  - Offer Toileting every  Hours, in advance of need  - Initiate/Maintain alarm  - Obtain necessary fall risk management equipment   - Apply yellow socks and bracelet for high fall risk patients  - Consider moving patient to room near nurses station  Outcome: Progressing     Problem: CARDIOVASCULAR - ADULT  Goal: Maintains optimal cardiac output and hemodynamic stability  Description: INTERVENTIONS:  - Monitor I/O, vital signs and rhythm  - Monitor for S/S and trends of decreased cardiac output  - Administer and titrate ordered vasoactive medications to optimize hemodynamic stability  - Assess quality of pulses, skin color and temperature  - Assess for signs of decreased coronary artery perfusion  - Instruct patient to report change in severity of symptoms  Outcome: Progressing  Goal: Absence of cardiac dysrhythmias or at baseline rhythm  Description: INTERVENTIONS:  - Continuous cardiac monitoring, vital signs, obtain 12 lead EKG if ordered  - Administer antiarrhythmic and heart rate control medications as ordered  - Monitor electrolytes and administer replacement therapy as ordered  Outcome: Progressing     Problem: RESPIRATORY - ADULT  Goal: Achieves optimal ventilation and oxygenation  Description: INTERVENTIONS:  - Assess for changes in respiratory status  - Assess for changes in mentation and behavior  - Position to facilitate oxygenation and minimize respiratory effort  - Oxygen administered by appropriate delivery if ordered  - Initiate smoking cessation education as indicated  - Encourage broncho-pulmonary hygiene including cough, deep breathe, Incentive Spirometry  - Assess the need for suctioning and aspirate as needed  - Assess and instruct to report SOB or any respiratory difficulty  - Respiratory Therapy support as indicated  Outcome: Progressing     Problem: GASTROINTESTINAL - ADULT  Goal: Minimal or absence of nausea and/or vomiting  Description: INTERVENTIONS:  - Administer IV fluids if ordered to ensure adequate hydration  - Maintain NPO status until nausea and vomiting are resolved  - Nasogastric tube if ordered  - Administer ordered antiemetic medications as needed  - Provide nonpharmacologic comfort measures as appropriate  - Advance diet as tolerated, if ordered  - Consider nutrition services referral to assist patient with adequate nutrition and appropriate food choices  Outcome: Progressing  Goal: Maintains or returns to baseline bowel function  Description: INTERVENTIONS:  - Assess bowel function  - Encourage oral fluids to ensure adequate hydration  - Administer IV fluids if ordered to ensure adequate hydration  - Administer ordered medications as needed  - Encourage mobilization and activity  - Consider nutritional services referral to assist patient with adequate nutrition and appropriate food choices  Outcome: Progressing     Problem: METABOLIC, FLUID AND ELECTROLYTES - ADULT  Goal: Electrolytes maintained within normal limits  Description: INTERVENTIONS:  - Monitor labs and assess patient for signs and symptoms of electrolyte imbalances  - Administer electrolyte replacement as ordered  - Monitor response to electrolyte replacements, including repeat lab results as appropriate  - Instruct patient on fluid and nutrition as appropriate  Outcome: Progressing  Goal: Fluid balance maintained  Description: INTERVENTIONS:  - Monitor labs   - Monitor I/O and WT  - Instruct patient on fluid and nutrition as appropriate  - Assess for signs & symptoms of volume excess or deficit  Outcome: Progressing  Goal: Glucose maintained within target range  Description: INTERVENTIONS:  - Monitor Blood Glucose as ordered  - Assess for signs and symptoms of hyperglycemia and hypoglycemia  - Administer ordered medications to maintain glucose within target range  - Assess nutritional intake and initiate nutrition service referral as needed  Outcome: Progressing     Problem: HEMATOLOGIC - ADULT  Goal: Maintains hematologic stability  Description: INTERVENTIONS  - Assess for signs and symptoms of bleeding or hemorrhage  - Monitor labs  - Administer supportive blood products/factors as ordered and appropriate  Outcome: Progressing

## 2022-09-08 PROBLEM — I48.0 PAROXYSMAL ATRIAL FIBRILLATION (HCC): Status: ACTIVE | Noted: 2020-05-27

## 2022-09-08 LAB
ANION GAP SERPL CALCULATED.3IONS-SCNC: 12 MMOL/L (ref 4–13)
BASOPHILS # BLD AUTO: 0.01 THOUSANDS/ΜL (ref 0–0.1)
BASOPHILS NFR BLD AUTO: 0 % (ref 0–1)
BUN SERPL-MCNC: 21 MG/DL (ref 5–25)
CALCIUM SERPL-MCNC: 10.6 MG/DL (ref 8.3–10.1)
CHLORIDE SERPL-SCNC: 106 MMOL/L (ref 96–108)
CO2 SERPL-SCNC: 25 MMOL/L (ref 21–32)
CREAT SERPL-MCNC: 1.1 MG/DL (ref 0.6–1.3)
EOSINOPHIL # BLD AUTO: 0 THOUSAND/ΜL (ref 0–0.61)
EOSINOPHIL NFR BLD AUTO: 0 % (ref 0–6)
ERYTHROCYTE [DISTWIDTH] IN BLOOD BY AUTOMATED COUNT: 14.6 % (ref 11.6–15.1)
GFR SERPL CREATININE-BSD FRML MDRD: 50 ML/MIN/1.73SQ M
GLUCOSE SERPL-MCNC: 219 MG/DL (ref 65–140)
HCT VFR BLD AUTO: 33.8 % (ref 34.8–46.1)
HGB BLD-MCNC: 10.6 G/DL (ref 11.5–15.4)
IMM GRANULOCYTES # BLD AUTO: 0.05 THOUSAND/UL (ref 0–0.2)
IMM GRANULOCYTES NFR BLD AUTO: 1 % (ref 0–2)
LYMPHOCYTES # BLD AUTO: 0.81 THOUSANDS/ΜL (ref 0.6–4.47)
LYMPHOCYTES NFR BLD AUTO: 12 % (ref 14–44)
MAGNESIUM SERPL-MCNC: 1.8 MG/DL (ref 1.6–2.6)
MCH RBC QN AUTO: 28.9 PG (ref 26.8–34.3)
MCHC RBC AUTO-ENTMCNC: 31.4 G/DL (ref 31.4–37.4)
MCV RBC AUTO: 92 FL (ref 82–98)
MONOCYTES # BLD AUTO: 0.1 THOUSAND/ΜL (ref 0.17–1.22)
MONOCYTES NFR BLD AUTO: 1 % (ref 4–12)
NEUTROPHILS # BLD AUTO: 6.06 THOUSANDS/ΜL (ref 1.85–7.62)
NEUTS SEG NFR BLD AUTO: 86 % (ref 43–75)
NRBC BLD AUTO-RTO: 0 /100 WBCS
NT-PROBNP SERPL-MCNC: 144 PG/ML
PLATELET # BLD AUTO: 313 THOUSANDS/UL (ref 149–390)
PMV BLD AUTO: 9.1 FL (ref 8.9–12.7)
POTASSIUM SERPL-SCNC: 3.7 MMOL/L (ref 3.5–5.3)
PROCALCITONIN SERPL-MCNC: <0.05 NG/ML
RBC # BLD AUTO: 3.67 MILLION/UL (ref 3.81–5.12)
SODIUM SERPL-SCNC: 143 MMOL/L (ref 135–147)
WBC # BLD AUTO: 7.03 THOUSAND/UL (ref 4.31–10.16)

## 2022-09-08 PROCEDURE — 94640 AIRWAY INHALATION TREATMENT: CPT

## 2022-09-08 PROCEDURE — 85025 COMPLETE CBC W/AUTO DIFF WBC: CPT | Performed by: HOSPITALIST

## 2022-09-08 PROCEDURE — 80048 BASIC METABOLIC PNL TOTAL CA: CPT | Performed by: HOSPITALIST

## 2022-09-08 PROCEDURE — 99223 1ST HOSP IP/OBS HIGH 75: CPT | Performed by: INTERNAL MEDICINE

## 2022-09-08 PROCEDURE — 84145 PROCALCITONIN (PCT): CPT | Performed by: HOSPITALIST

## 2022-09-08 PROCEDURE — 99232 SBSQ HOSP IP/OBS MODERATE 35: CPT | Performed by: INTERNAL MEDICINE

## 2022-09-08 PROCEDURE — 83880 ASSAY OF NATRIURETIC PEPTIDE: CPT | Performed by: NURSE PRACTITIONER

## 2022-09-08 PROCEDURE — 83735 ASSAY OF MAGNESIUM: CPT | Performed by: HOSPITALIST

## 2022-09-08 RX ORDER — IPRATROPIUM BROMIDE AND ALBUTEROL SULFATE 2.5; .5 MG/3ML; MG/3ML
3 SOLUTION RESPIRATORY (INHALATION)
Status: DISCONTINUED | OUTPATIENT
Start: 2022-09-09 | End: 2022-09-18 | Stop reason: HOSPADM

## 2022-09-08 RX ORDER — METHYLPREDNISOLONE SODIUM SUCCINATE 40 MG/ML
40 INJECTION, POWDER, LYOPHILIZED, FOR SOLUTION INTRAMUSCULAR; INTRAVENOUS EVERY 8 HOURS SCHEDULED
Status: DISCONTINUED | OUTPATIENT
Start: 2022-09-08 | End: 2022-09-09

## 2022-09-08 RX ORDER — BUDESONIDE 0.5 MG/2ML
0.5 INHALANT ORAL
Status: DISCONTINUED | OUTPATIENT
Start: 2022-09-08 | End: 2022-09-18 | Stop reason: HOSPADM

## 2022-09-08 RX ORDER — FUROSEMIDE 10 MG/ML
40 INJECTION INTRAMUSCULAR; INTRAVENOUS ONCE
Status: DISCONTINUED | OUTPATIENT
Start: 2022-09-08 | End: 2022-09-09

## 2022-09-08 RX ADMIN — APIXABAN 5 MG: 5 TABLET, FILM COATED ORAL at 16:54

## 2022-09-08 RX ADMIN — METHYLPREDNISOLONE SODIUM SUCCINATE 40 MG: 40 INJECTION, POWDER, FOR SOLUTION INTRAMUSCULAR; INTRAVENOUS at 13:34

## 2022-09-08 RX ADMIN — METHYLPREDNISOLONE SODIUM SUCCINATE 60 MG: 125 INJECTION, POWDER, FOR SOLUTION INTRAMUSCULAR; INTRAVENOUS at 05:45

## 2022-09-08 RX ADMIN — DILTIAZEM HYDROCHLORIDE 180 MG: 180 CAPSULE, COATED, EXTENDED RELEASE ORAL at 07:57

## 2022-09-08 RX ADMIN — DOXYCYCLINE 100 MG: 100 INJECTION, POWDER, LYOPHILIZED, FOR SOLUTION INTRAVENOUS at 05:45

## 2022-09-08 RX ADMIN — IPRATROPIUM BROMIDE AND ALBUTEROL SULFATE 3 ML: 2.5; .5 SOLUTION RESPIRATORY (INHALATION) at 07:46

## 2022-09-08 RX ADMIN — MORPHINE SULFATE 2 MG: 2 INJECTION, SOLUTION INTRAMUSCULAR; INTRAVENOUS at 22:25

## 2022-09-08 RX ADMIN — BUDESONIDE 0.5 MG: 0.5 INHALANT ORAL at 19:42

## 2022-09-08 RX ADMIN — APIXABAN 5 MG: 5 TABLET, FILM COATED ORAL at 07:57

## 2022-09-08 RX ADMIN — LEVOTHYROXINE SODIUM 100 MCG: 100 TABLET ORAL at 05:45

## 2022-09-08 RX ADMIN — TRAMADOL HYDROCHLORIDE 50 MG: 50 TABLET, COATED ORAL at 20:17

## 2022-09-08 RX ADMIN — ATORVASTATIN CALCIUM 20 MG: 20 TABLET, FILM COATED ORAL at 07:57

## 2022-09-08 RX ADMIN — METHYLPREDNISOLONE SODIUM SUCCINATE 40 MG: 40 INJECTION, POWDER, FOR SOLUTION INTRAMUSCULAR; INTRAVENOUS at 22:25

## 2022-09-08 RX ADMIN — MELATONIN 6 MG: at 22:25

## 2022-09-08 RX ADMIN — GABAPENTIN 800 MG: 400 CAPSULE ORAL at 07:57

## 2022-09-08 RX ADMIN — BICTEGRAVIR SODIUM, EMTRICITABINE, AND TENOFOVIR ALAFENAMIDE FUMARATE 1 TABLET: 50; 200; 25 TABLET ORAL at 07:57

## 2022-09-08 RX ADMIN — GABAPENTIN 800 MG: 400 CAPSULE ORAL at 22:25

## 2022-09-08 RX ADMIN — IPRATROPIUM BROMIDE AND ALBUTEROL SULFATE 3 ML: 2.5; .5 SOLUTION RESPIRATORY (INHALATION) at 13:05

## 2022-09-08 RX ADMIN — GABAPENTIN 800 MG: 400 CAPSULE ORAL at 16:16

## 2022-09-08 RX ADMIN — TRAMADOL HYDROCHLORIDE 50 MG: 50 TABLET, COATED ORAL at 07:58

## 2022-09-08 RX ADMIN — MONTELUKAST SODIUM 10 MG: 10 TABLET, COATED ORAL at 22:25

## 2022-09-08 RX ADMIN — IPRATROPIUM BROMIDE AND ALBUTEROL SULFATE 3 ML: 2.5; .5 SOLUTION RESPIRATORY (INHALATION) at 19:42

## 2022-09-08 NOTE — CASE MANAGEMENT
Case Management Assessment & Discharge Planning Note    Patient name Summer Beatty  Location [address]-* MRN [id number]  : 1950 Date 2022       Current Admission Date: 2022  Current Admission Diagnosis:Acute exacerbation of chronic obstructive pulmonary disease (COPD) ([facility] )   Patient Active Problem List    Diagnosis Date Noted    Chronic diastolic [facility]congestive) heart failure [facility] ) 2022    PASHA on CPAP 2020    Acute exacerbation of chronic obstructive pulmonary disease (COPD) ([facility] ) 2020    Fall at home, initial encounter 2020    Paroxysmal atrial fibrillation ([place] [address] ) 2020    Tobacco dependence 2020    Hyperlipidemia 2018    Bipolar disorder, current episode depressed, moderate ([facility] ) 2018    Thoracic disc herniation 2018    Lumbar stenosis 2018    Urinary frequency 2018    Ambulatory dysfunction     Morbid obesity ([facility] ) 2018    Back pain 2018    Hip pain 2018    Shortness of breath 2017    Hypertension     Type 2 diabetes mellitus with hyperglycemia (HCC)     Chronic respiratory failure with hypoxia (HCC)     Bipolar 1 disorder ([facility] )     Tobacco use     Arthritis 2016    HIV (human immunodeficiency virus infection)  2016    Osteoarthritis 2016      LOS (days): 1  Geometric Mean LOS (GMLOS) (days): 3 60  Days to GMLOS:2 6     OBJECTIVE:    Risk of Unplanned Readmission Score: 14 45         Current admission status: Inpatient       Preferred Pharmacy:   [facility], [address] [facility]  [address]  [place] [address]  Phone: [phone] Fax: [phone]    Primary Care Provider: Tiago Vasquez DO    Primary Insurance: [facility]  Secondary Insurance: [address] [place] [address]    ASSESSMENT:  Active Health Care Proxies    There are no active Health Care Proxies on file         Readmission Root Cause  30 Day Readmission: No    Patient Information  Admitted from[de-identified] Home  Mental Status: Alert  During Assessment patient was accompanied by: Not accompanied during assessment  Assessment information provided by[de-identified] Patient  Primary Caregiver: Family (and WAIVER HHA)  Caregiver's Name[de-identified] Mahsa Rodas (Daughter) [phone] Luz Becerra  Caregiver's Relationship to Patient[de-identified] Family Member  Caregiver's Telephone Number[de-identified] Mahsa Rodas (Daughter) [phone] (I)  Support Systems: Children, Home care staff, Family members  [place] of Residence: [address] do you live in?: [place]  In the last 12 months, was there a time when you were not able to pay the mortgage or rent on time?: No  In the last 12 months, how many places have you lived?: 1  In the last 12 months, was there a time when you did not have a steady place to sleep or slept in a shelter (including now)?: No  Homeless/housing insecurity resource given?: N/A  Living Arrangements: Lives w/ Extended Family, Lives w/ Daughter    Activities of Daily Living Prior to Admission  Functional Status: Assistance  Completes ADLs independently?: No  Level of ADL dependence: Assistance  Ambulates independently?: No  Level of ambulatory dependence: Assistance  Does patient use assisted devices?: Yes  Assisted Devices (DME) used: Portable Oxygen tanks, Home Oxygen concentrator  DME Company Name (respiratory supplies): [employer]  O2 Rate(s): 2 L  Does patient currently own DME?: Yes  What DME does the patient currently own?: Walker, Portable Oxygen tanks, Home Oxygen concentrator  Does patient have a history of HHC?: Yes Gunner Carla)  Does patient currently have [facility]?: No    Current Home Health Care  Type of Current Home Care Services:  Attendant, Home health aide (Via Waiver)    Patient Information Continued  Income Source: Pension/residential  Does patient have prescription coverage?: Yes  Within the past 12 months, you worried that your food would run out before you got the money to buy more : Never true  Food insecurity resource given?: N/A  Does patient receive dialysis treatments?: No  Does patient have a history of substance abuse?: No  Does patient have a history of Mental Health Diagnosis?: No    Means of Transportation  Means of Transport to Appts[de-identified] Family transport  In the past 12 months, has lack of transportation kept you from medical appointments or from getting medications?: No  In the past 12 months, has lack of transportation kept you from meetings, work, or from getting things needed for daily living?: No  Was application for public transport provided?: N/A        DISCHARGE DETAILS:    Discharge planning discussed with[de-identified] Patient     Freedom of Choice: Yes  Comments - Freedom of Choice: Pt prefers to discharge to home -resume care from [facility] and restart [facility] (SN) with [employer]  DCP stll in process         CM contacted family/caregiver?: No- see comments  Were Treatment Team discharge recommendations reviewed with patient/caregiver?: Yes  Did patient/caregiver verbalize understanding of patient care needs?: Yes    Contacts  Patient Contacts: Ashley Perry (Daughter) [phone] (M)  Relationship to Patient[de-identified] Family  Reason/Outcome: Continuity of Care, Emergency Contact, Discharge [address]         Is the patient interested in [address] at discharge?: Yes  [address] requested[de-identified] Nursing, Occupational Therapy, Physical [address] Name[de-identified] Other  [facility][note 3] Zohaib Kenny Provider[de-identified] PCP  Home Health Services Needed[de-identified] COPD Management, Evaluate Functional Status and Safety, Gait/ADL Training, Oxygen Via Nasal Cannula, Strengthening/Theraputic Exercises to Improve Function  Oxygen LPM Ordered (if applicable based on home health services needed):: 2 LPM  Homebound Criteria Met[de-identified] Requires the Assistance of Another Person for Safe Ambulation or to Leave the Home  Supporting Clincal Findings[de-identified] Limited Endurance, Requires Oxygen, Fatigues Easliy in Short Distances    Treatment Team Recommendation: Home with [place] at Discharge : Automobile, Family

## 2022-09-08 NOTE — UTILIZATION REVIEW
Inpatient Admission Authorization Request   NOTIFICATION OF INPATIENT ADMISSION/INPATIENT AUTHORIZATION REQUEST   SERVICING FACILITY:   [address] [place] [address]  [address] [place] [address], [address]   Tax ID: [id number]  NPI: [id number]  Place of Service: Inpatient [facility]  [address]  Place of Service Code: 24     ATTENDING PROVIDER:  Attending Name and NPI#: Polly Esposito, [address] [[id number]]  Address: [address] [place] [address], [address]   Phone: [phone]     UTILIZATION REVIEW CONTACT:  Stas Robison, Utilization   Network Utilization Review Department  Phone: [phone]  Fax: [phone]  Email: [email or web address]     PHYSICIAN ADVISORY SERVICES:  FOR OCRX-JG-CMGB REVIEW - MEDICAL NECESSITY DENIAL  Phone: [phone]  Fax: [phone]  Email: [email or web address]     TYPE OF REQUEST:  Inpatient Status     ADMISSION INFORMATION:  ADMISSION DATE/TIME: 9/7/22  3:20 PM  PATIENT DIAGNOSIS CODE/DESCRIPTION:  Asthma [J45 909]  COPD exacerbation ([facility] ) [J44 1]  Acute on chronic respiratory failure ([facility] ) [J96 20]  DISCHARGE DATE/TIME: No discharge date for patient encounter  IMPORTANT INFORMATION:  Please contact Stas Robison directly with any questions or concerns regarding this request  Department voicemails are confidential     Send requests for admission clinical reviews, concurrent reviews, approvals, and administrative denials due to lack of clinical to fax [phone]
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
## 2022-09-08 NOTE — CONSULTS
Consultation - Pulmonary Medicine   Donald Hyde 81 y o  female MRN: [id number]  Unit/Bed#: E5 -01 Encounter: [id number]      Assessment/Plan:    1  Acute pulmonary insufficiency on chronic hypoxic respiratory failure       -  currently on home O2 requirement of 2 L-94%       -  maintain saturations greater than 88%       -  pulmonary toileting:  Deep breathing cough, OOB as tolerated, IS Q 1 hr      2  Moderate COPD with possible acute exacerbation      -  Inpatient:  40 mg t i d , budesonide b i d , Xopenex/Atrovent t i d       -  home regimen: Advair 250/50 mcg 1 puff b i d , Spiriva 18 mcg 1 puff daily, and albuterol as needed       -  recommend increasing Advair 500/50 mcg 1 puff b i d  Upon discharge      -  will need updated PFTs and pulmonary follow-up      -  will place on PALS referral    3   suspected acute Chronic grade 1 diastolic CHF w/ PAF       -  2/21/2022- EF 65%       -  order placed for pro-BNP       -  increased lower extremity edema       -  ordered 1 time IV Lasix 40 mg    4  PASHA       -  spoke with [facility]:  Patient was issues CPAP machine in 2017 that she no longer uses       -  pulmonary from [facility] ordered a new machine 5/2022 however the patient never contacted [facility] to receive the machine       -  discussed with patient to call her pulmonologist immediately upon discharge and to call [facility]       -  no indication to requalify her for CPAP/BiPAP upon this admission    5   Active tobacco abuse        -  NRT per primary team       -  patient reports smoking 1-2 cigarettes a day       -  encourage in educated on tobacco cessation       -  patient appears in pre contemplation stage      History of Present Illness   Physician Requesting Consult: Dianna Valladares Pe*  Reason for Consult / Principal Problem:  COPD exacerbation  Hx and PE limited by:  Nothing  Chief Complaint: "I am so tired"  HPI: Donald Hyde is a 81 y o   female who presented to [facility] [place]  [address] with complaints of shortness of breath  Patient has past medical history of COPD, PASHA, HIV, AFib on Eliquis, and diabetes  Patient reports that she was at home and she was feeling short of breath  Patient reports that she had attempted to use her nebulizer however she felt her shortness of breath was not relieved  Upon ED admission patient was administered 80 mg IV Solu-Medrol, and nebulizer treatment  Pulmonary was consulted for COPD exacerbation  Today upon examination patient was noted to be tripoding and sleeping on her tray table  Patient reports that she is still very short of breath and reports audible wheezing  Patient reports that she is extremely fatigued today  Patient currently denying any fevers, chills, hemoptysis, headaches, night sweats, pleuritic chest pain, or palpitations  From a pulmonary standpoint, patient follows with Dr Tonia Villegas for his moderate COPD  Patient reports she is still an active 1-2 cigarettes a day smoker  Patient reports she started smoking around the age of 14  Patient is currently maintained on Advair 250/50 mcg 1 puff b i d , Spiriva 18 mcg 1 puff daily, and albuterol as needed  Patient is currently maintained on 2 L oxygen therapy  Patient reports some occupational exposures as she worked in a [employer]  Patient denies any symptoms of GERD, dysphagia, or postnasal drip  Patient does report history of seasonal allergies in which she is maintained on [facility]  Patient's sleep study 3/2022 for moderate sleep apnea  Patient is currently maintained on CPAP 5-15 cm of H2O with 2 L  Patient denies any acute exposures to dust, mold, asbestos, or silica  Inpatient consult to Pulmonology  Consult performed by: LUIS M Clark  Consult ordered by: Gwendolyn Tyson DO          Review of Systems   Constitutional: Positive for activity change and fatigue  Negative for chills and fever     HENT: Negative for ear pain and sore throat  Eyes: Negative for pain and visual disturbance  Respiratory: Positive for cough  Negative for apnea, choking, chest tightness, shortness of breath, wheezing and stridor  Cardiovascular: Negative for chest pain and palpitations  Gastrointestinal: Negative for abdominal pain and vomiting  Genitourinary: Negative for dysuria and hematuria  Musculoskeletal: Negative for arthralgias and back pain  Skin: Negative for color change and rash  Neurological: Negative for dizziness, seizures and syncope  Psychiatric/Behavioral: Negative for behavioral problems and confusion  All other systems reviewed and are negative        Historical Information   Past Medical History:   Diagnosis Date    Asthma     Bipolar 1 disorder ([place] [address] )     Cardiac disease     COPD (chronic obstructive pulmonary disease) [place] [address] )     Diabetes mellitus ([place] [address] )     Disease of thyroid gland     HIV (human immunodeficiency virus infection) ([place] [address] ) 03/24/2016    Hypertension     Osteoarthritis     Tobacco abuse      Past Surgical History:   Procedure Laterality Date    HERNIA REPAIR      LUMBAR FUSION N/A 4/10/2018    Procedure: T9/10 discectomy with T9-T11 fusion;  Surgeon: Trudy Blue MD;  Location: [facility];  Service: Neurosurgery    THYROIDECTOMY       Social History   Social History     Substance and Sexual Activity   Alcohol Use Yes     Social History     Substance and Sexual Activity   Drug Use No    Comment: former IV drug user     Social History     Tobacco Use   Smoking Status Current Every Day Smoker    Packs/day: 0 20    Years: 45 00    Pack years: 9 00    Types: Cigarettes   Smokeless Tobacco Never Used   Tobacco Comment    Currently smoking 1-2 cigarettes a day     E-Cigarette/Vaping     E-Cigarette/Vaping Substances     Occupational History:  Noncontributory    Family History:   Family History   Problem Relation Age of Onset    No Known Problems Mother     Diabetes Father     Heart disease Neg Hx  Cancer Neg Hx        Meds/Allergies   pertinent pulmonary meds have been reviewed    Allergies   Allergen Reactions    Lisinopril Angioedema    Prozac [Fluoxetine Hcl] Rash    Sulfa Antibiotics Itching    Quinine        Objective   Vitals: Blood pressure 138/92, pulse 97, temperature 98 1 °F (36 7 °C), resp  rate 18, height 5' 2" (1 575 m), weight 97 7 kg (215 lb 6 2 oz), last menstrual period 04/01/2013, SpO2 96 %, not currently breastfeeding  2L,Body mass index is 39 4 kg/m²  No intake or output data in the 24 hours ending 09/08/22 1046  Invasive Devices  Report    Peripheral Intravenous Line  Duration           Peripheral IV 09/07/22 Left Arm <1 day                Physical Exam  Constitutional:       General: She is not in acute distress  Appearance: Normal appearance  She is normal weight  She is not ill-appearing  HENT:      Head: Normocephalic and atraumatic  Nose: Nose normal  No congestion or rhinorrhea  Mouth/Throat:      Mouth: Mucous membranes are dry  Pharynx: No oropharyngeal exudate or posterior oropharyngeal erythema  Cardiovascular:      Rate and Rhythm: Normal rate and regular rhythm  Pulses: Normal pulses  Heart sounds: Normal heart sounds  No murmur heard  No friction rub  No gallop  Pulmonary:      Effort: Pulmonary effort is normal  No tachypnea, bradypnea, accessory muscle usage or respiratory distress  Breath sounds: Decreased air movement present  No stridor  Decreased breath sounds and wheezing present  No rhonchi or rales  Comments: Significantly diminished aeration distant breath sounds scattered wheezing  Chest:      Chest wall: No tenderness  Abdominal:      General: Abdomen is flat  Bowel sounds are normal  There is no distension  Palpations: Abdomen is soft  There is no mass  Musculoskeletal:         General: No swelling or tenderness  Normal range of motion  Cervical back: Normal range of motion   No rigidity or tenderness  Skin:     General: Skin is warm and dry  Coloration: Skin is not jaundiced or pale  Neurological:      General: No focal deficit present  Mental Status: She is alert and oriented to person, place, and time  Mental status is at baseline  Psychiatric:         Mood and Affect: Mood normal          Behavior: Behavior normal          Lab Results:   I have personally reviewed pertinent lab results, CBC:   Lab Results   Component Value Date    WBC 7 03 09/08/2022    HGB 10 6 (L) 09/08/2022    HCT 33 8 (L) 09/08/2022    MCV 92 09/08/2022     09/08/2022    MCH 28 9 09/08/2022    MCHC 31 4 09/08/2022    RDW 14 6 09/08/2022    MPV 9 1 09/08/2022    NRBC 0 09/08/2022   , CMP:   Lab Results   Component Value Date    SODIUM 143 09/08/2022    K 3 7 09/08/2022     09/08/2022    CO2 25 09/08/2022    BUN 21 09/08/2022    CREATININE 1 10 09/08/2022    CALCIUM 10 6 (H) 09/08/2022    AST 10 09/07/2022    ALT 20 09/07/2022    ALKPHOS 87 09/07/2022    EGFR 50 09/08/2022   , PT/INR: No results found for: PT, INR, Troponin: No results found for: TROPONINI        Imaging Studies: I have personally reviewed pertinent films in PACS     Chest x-ray- 9/7/202- 2022- no acute cardiopulmonary process       EKG, Pathology, and Other Studies: I have personally reviewed pertinent films in PACS     Echo 2/21/2022- EF- 60 30% grade 1 diastolic dysfunction    Pulmonary Results (PFTs, PSG): I have personally reviewed pertinent films in PACS       Results: 11/16/2021  The FVC is reduced [1 43L (62%)]  The FEV1 is normal [1 30L (73%)]  The FEV1/FVC is normal [91]  No bronchodilator was administered  The TLC is not measured  The DLCO is not measured  Impression:   The patient has suggestion of possible mild restriction based on   spirometry and flow volume loop   However, exhalation time was less than 2   seconds and patient had significant difficulty with the spirometry so this   is a suboptimal test and thus not able to be definitively interpreted           VTE Prophylaxis: Sequential compression device (Venodyne)     Code Status: Level 1 - Full Code    None    Portions of the record may have been created with voice recognition software  Occasional wrong word or "sound a like" substitutions may have occurred due to the inherent limitations of voice recognition software  Read the chart carefully and recognize, using context, where substitutions have occurred

## 2022-09-08 NOTE — PLAN OF CARE
Problem: PAIN - ADULT  Goal: Verbalizes/displays adequate comfort level or baseline comfort level  Description: Interventions:  - Encourage patient to monitor pain and request assistance  - Assess pain using appropriate pain scale  - Administer analgesics based on type and severity of pain and evaluate response  - Implement non-pharmacological measures as appropriate and evaluate response  - Consider cultural and social influences on pain and pain management  - Notify physician/advanced practitioner if interventions unsuccessful or patient reports new pain  Outcome: Progressing     Problem: INFECTION - ADULT  Goal: Absence or prevention of progression during hospitalization  Description: INTERVENTIONS:  - Assess and monitor for signs and symptoms of infection  - Monitor lab/diagnostic results  - Monitor all insertion sites, i e  indwelling lines, tubes, and drains  - Monitor endotracheal if appropriate and nasal secretions for changes in amount and color  - [place] appropriate cooling/warming therapies per order  - Administer medications as ordered  - Instruct and encourage patient and family to use good hand hygiene technique  - Identify and instruct in appropriate isolation precautions for identified infection/condition  Outcome: Progressing  Goal: Absence of fever/infection during neutropenic period  Description: INTERVENTIONS:  - Monitor WBC    Outcome: Progressing     Problem: SAFETY ADULT  Goal: Patient will remain free of falls  Description: INTERVENTIONS:  - Educate patient/family on patient safety including physical limitations  - Instruct patient to call for assistance with activity   - Consult OT/PT to assist with strengthening/mobility   - Keep Call bell within reach  - Keep bed low and locked with side rails adjusted as appropriate  - Keep care items and personal belongings within reach  - Initiate and maintain comfort rounds  - Make Fall Risk Sign visible to staff  - Offer Toileting every  Hours, in advance of need  - Initiate/Maintain alarm  - Obtain necessary fall risk management equipment:   - Apply yellow socks and bracelet for high fall risk patients  - Consider moving patient to room near nurses station  Outcome: Progressing     Problem: CARDIOVASCULAR - ADULT  Goal: Maintains optimal cardiac output and hemodynamic stability  Description: INTERVENTIONS:  - Monitor I/O, vital signs and rhythm  - Monitor for S/S and trends of decreased cardiac output  - Administer and titrate ordered vasoactive medications to optimize hemodynamic stability  - Assess quality of pulses, skin color and temperature  - Assess for signs of decreased coronary artery perfusion  - Instruct patient to report change in severity of symptoms  Outcome: Progressing  Goal: Absence of cardiac dysrhythmias or at baseline rhythm  Description: INTERVENTIONS:  - Continuous cardiac monitoring, vital signs, obtain 12 lead EKG if ordered  - Administer antiarrhythmic and heart rate control medications as ordered  - Monitor electrolytes and administer replacement therapy as ordered  Outcome: Progressing     Problem: RESPIRATORY - ADULT  Goal: Achieves optimal ventilation and oxygenation  Description: INTERVENTIONS:  - Assess for changes in respiratory status  - Assess for changes in mentation and behavior  - Position to facilitate oxygenation and minimize respiratory effort  - Oxygen administered by appropriate delivery if ordered  - Initiate smoking cessation education as indicated  - Encourage broncho-pulmonary hygiene including cough, deep breathe, Incentive Spirometry  - Assess the need for suctioning and aspirate as needed  - Assess and instruct to report SOB or any respiratory difficulty  - Respiratory Therapy support as indicated  Outcome: Progressing     Problem: GASTROINTESTINAL - ADULT  Goal: Minimal or absence of nausea and/or vomiting  Description: INTERVENTIONS:  - Administer IV fluids if ordered to ensure adequate hydration  - Maintain NPO status until nausea and vomiting are resolved  - Nasogastric tube if ordered  - Administer ordered antiemetic medications as needed  - Provide nonpharmacologic comfort measures as appropriate  - Advance diet as tolerated, if ordered  - Consider nutrition services referral to assist patient with adequate nutrition and appropriate food choices  Outcome: Progressing  Goal: Maintains or returns to baseline bowel function  Description: INTERVENTIONS:  - Assess bowel function  - Encourage oral fluids to ensure adequate hydration  - Administer IV fluids if ordered to ensure adequate hydration  - Administer ordered medications as needed  - Encourage mobilization and activity  - Consider nutritional services referral to assist patient with adequate nutrition and appropriate food choices  Outcome: Progressing     Problem: METABOLIC, FLUID AND ELECTROLYTES - ADULT  Goal: Electrolytes maintained within normal limits  Description: INTERVENTIONS:  - Monitor labs and assess patient for signs and symptoms of electrolyte imbalances  - Administer electrolyte replacement as ordered  - Monitor response to electrolyte replacements, including repeat lab results as appropriate  - Instruct patient on fluid and nutrition as appropriate  Outcome: Progressing  Goal: Fluid balance maintained  Description: INTERVENTIONS:  - Monitor labs   - Monitor I/O and WT  - Instruct patient on fluid and nutrition as appropriate  - Assess for signs & symptoms of volume excess or deficit  Outcome: Progressing  Goal: Glucose maintained within target range  Description: INTERVENTIONS:  - Monitor Blood Glucose as ordered  - Assess for signs and symptoms of hyperglycemia and hypoglycemia  - Administer ordered medications to maintain glucose within target range  - Assess nutritional intake and initiate nutrition service referral as needed  Outcome: Progressing     Problem: HEMATOLOGIC - ADULT  Goal: Maintains hematologic stability  Description: INTERVENTIONS  - Assess for signs and symptoms of bleeding or hemorrhage  - Monitor labs  - Administer supportive blood products/factors as ordered and appropriate  Outcome: Progressing     Problem: Potential for Falls  Goal: Patient will remain free of falls  Description: INTERVENTIONS:  - Educate patient/family on patient safety including physical limitations  - Instruct patient to call for assistance with activity   - Consult OT/PT to assist with strengthening/mobility   - Keep Call bell within reach  - Keep bed low and locked with side rails adjusted as appropriate  - Keep care items and personal belongings within reach  - Initiate and maintain comfort rounds  - Make Fall Risk Sign visible to staff  - Offer Toileting every  Hours, in advance of need  - Initiate/Maintain alarm  - Obtain necessary fall risk management equipment  - Apply yellow socks and bracelet for high fall risk patients  - Consider moving patient to room near nurses station  Outcome: Progressing

## 2022-09-08 NOTE — UTILIZATION REVIEW
Initial Clinical Review    Admission: Date/Time/Statement:   Admission Orders (From admission, onward)     Ordered        09/07/22 1520  INPATIENT ADMISSION  Once                      Orders Placed This Encounter   Procedures    INPATIENT ADMISSION     Standing Status:   Standing     Number of Occurrences:   1     Order Specific Question:   Level of Care     Answer:   Med Surg [16]     Order Specific Question:   Estimated length of stay     Answer:   More than 2 Midnights     Order Specific Question:   Certification     Answer:   I certify that inpatient services are medically necessary for this patient for a duration of greater than two midnights  See H&P and MD Progress Notes for additional information about the patient's course of treatment  ED Arrival Information     Expected   -    Arrival   9/7/2022 13:02    Acuity   Urgent            Means of arrival   Wheelchair    Escorted by   Family Member    Service   Hospitalist    Admission type   Urgent            Arrival complaint   Asthma           Chief Complaint   Patient presents with    Asthma     Pt  c/o  sob earlier this morning tried using her nebulizer and her inhaler with no relief        Initial Presentation: 81 y o  female with hx asthma,COPD,  on O2 @ 2 L at home, last exacerbation 7/31/22, HIV,  DM, HTN , CHF, PAF on Xarelto and cardizem who presents to ED from home with worsening  SOB x 2 days and wheezing  Episode started aftre pt upset about stressful situation   Pt reports using more o2 than normal at home   No relief from home nebs/inhalers   On exam,respiratory distress, tachypneic, exp wheezing in all lung fields   O2 sat mid 90's on O2 @2 L   CXR shows nothing acute  Pt given neb tx and IV steroids in ED  Pt admitted as inpatient with acute on chronic resp failure 2/2 acute exac COPD  Plan - IV Solumedrol, ATC neb tx, IV Doxycycline   Pulmonology consult placed   Date: 9/8   Day 2:   Pulmonology consult-pt smokes 1-2 cigarettes /day  Smokes crack cocaine several times per month, last use last month   CXR per my review shows cardiomegaly with clear b/l lungs   Lungs -diffuse b/l rhonchi   PLan - Decrease frequency of IV steroids, continue nebulized BDA's,montelukast  CPAP for sleep as able   Keep sat >88 %  Pulm toileting   recommend increasing Advair 500/50 mcg 1 puff b i d  Upon discharge  Obtain pro BNP   Lasix IV x1 orderd for LE edema-was held for SBP <110 @1305   Medicine- procal neg x2   IV abx d/c'ed   Monitor glucose for steroid induced hyperglycemia   A1C is well controlled   Pt w/ conversational ddyspnea  +DOPE per nursing    Pt achieving 1000 ml on spirometry with goal 2000 ml  ED Triage Vitals   Temperature Pulse Respirations Blood Pressure SpO2   09/07/22 1307 09/07/22 1307 09/07/22 1307 09/07/22 1307 09/07/22 1307   98 2 °F (36 8 °C) 91 22 122/60 98 %      Temp src Heart Rate Source Patient Position - Orthostatic VS BP Location FiO2 (%)   -- 09/07/22 1513 09/07/22 1307 09/07/22 1307 --    Monitor Lying Left arm       Pain Score       09/07/22 1658       6          Wt Readings from Last 1 Encounters:   09/07/22 97 7 kg (215 lb 6 2 oz)     Additional Vital Signs:   Date/Time Temp Pulse Resp BP MAP (mmHg) SpO2 Calculated FIO2 (%) - Nasal Cannula Nasal Cannula O2 Flow Rate (L/min)   09/08/22 15:10:24 98 2 °F (36 8 °C) 98 -- 118/66 83 95 % -- --   09/08/22 1306 -- -- -- -- -- 94 % 24 1 L/min   09/08/22 0800 -- -- -- -- -- -- 28 2 L/min   09/08/22 0728 -- -- -- -- -- -- 28 2 L/min   09/08/22 06:58:31 98 1 °F (36 7 °C) 97 18 138/92 107 96 % -- --   09/07/22 1915 -- -- -- -- -- 94 % 28 2 L/min   09/07/22 17:18:09 98 2 °F (36 8 °C) 100 22 123/82 96 95 % 28 2 L/min   09/07/22 1534 -- -- -- -- -- -- -- --   O2 Device: 2l at 09/07/22 1534   09/07/22 1513 -- 94 22 113/78 -- 97 % -- --       Pertinent Labs/Diagnostic Test Results:   9/7 ECG-Sinus rhythm with sinus arrhythmia  Left axis deviation  Low voltage QRS  XR chest 2 views   ED Interpretation by Maddi Purcell DO (09/07 1413)      FINDINGS:     Cardiomediastinal silhouette appears unremarkable      The lungs are clear  No pneumothorax or pleural effusion      Osseous structures appear within normal limits for patient age  Lower thoracic pedicle screw and madiha posterior fixation hardware     IMPRESSION:     No acute cardiopulmonary disease      Findings are stable      Final Result by Kaela Contreras MD (09/07 1410)      No acute cardiopulmonary disease        Findings are stable            Workstation performed: [id number]               Results from last 7 days   Lab Units 09/08/22  0540 09/07/22  1419   WBC Thousand/uL 7 03 4 91   HEMOGLOBIN g/dL 10 6* 10 8*   HEMATOCRIT % 33 8* 34 5*   PLATELETS Thousands/uL 313 321   NEUTROS ABS Thousands/µL 6 06 2 89         Results from last 7 days   Lab Units 09/08/22  0540 09/07/22  1419   SODIUM mmol/L 143 147   POTASSIUM mmol/L 3 7 3 5   CHLORIDE mmol/L 106 108   CO2 mmol/L 25 28   ANION GAP mmol/L 12 11   BUN mg/dL 21 16   CREATININE mg/dL 1 10 1 04   EGFR ml/min/1 73sq m 50 54   CALCIUM mg/dL 10 6* 10 2*   MAGNESIUM mg/dL 1 8  --      Results from last 7 days   Lab Units 09/07/22  1419   AST U/L 10   ALT U/L 20   ALK PHOS U/L 87   TOTAL PROTEIN g/dL 7 6   ALBUMIN g/dL 3 8   TOTAL BILIRUBIN mg/dL 0 30         Results from last 7 days   Lab Units 09/08/22  0540 09/07/22  1419   GLUCOSE RANDOM mg/dL 219* 106           Results from last 7 days   Lab Units 09/07/22 2056 09/07/22  1821 09/07/22  1419   HS TNI 0HR ng/L  --   --  5   HS TNI 2HR ng/L  --  6  --    HSTNI D2 ng/L  --  1  --    HS TNI 4HR ng/L 5  --   --    HSTNI D4 ng/L 0  --   --                  Results from last 7 days   Lab Units 09/08/22  0540 09/07/22  1821   PROCALCITONIN ng/ml <0 05 <0 05                 Results from last 7 days   Lab Units 09/08/22  0540   NT-PRO BNP pg/mL 144*             ED Treatment:   Medication Administration from 09/07/2022 1302 to 09/07/2022 1655 Date/Time Order Dose Route Action     09/07/2022 1421 albuterol inhalation solution 10 mg 10 mg Nebulization Given     09/07/2022 1421 ipratropium (ATROVENT) 0 02 % inhalation solution 1 mg 1 mg Nebulization Given     09/07/2022 1424 sodium chloride 0 9 % inhalation solution 3 mL 3 mL Nebulization Given     09/07/2022 1425 methylPREDNISolone sodium succinate (Solu-MEDROL) injection 80 mg 80 mg Intravenous Given        Past Medical History:   Diagnosis Date    Asthma     Bipolar 1 disorder ([place] [address] )     Cardiac disease     COPD (chronic obstructive pulmonary disease) ([place] [address] )     Diabetes mellitus ([place] [address] )     Disease of thyroid gland     HIV (human immunodeficiency virus infection) ([place] [address] ) 03/24/2016    Hypertension     Osteoarthritis     Tobacco abuse      Present on Admission:   HIV (human immunodeficiency virus infection)    Type 2 diabetes mellitus with hyperglycemia ([facility])   Paroxysmal atrial fibrillation ([facility])   Chronic respiratory failure with hypoxia ([facility])   Tobacco use      Admitting Diagnosis: Asthma [J45 909]  COPD exacerbation ([facility]) [J44 1]  Acute on chronic respiratory failure ([place] [address] ) [J96 20]  Age/Sex: 81 y o  female  Admission Orders:  Scheduled Medications:  apixaban, 5 mg, Oral, BID  atorvastatin, 20 mg, Oral, Daily  bictegravir-emtricitab-tenofovir alafenamide, 1 tablet, Oral, Daily  budesonide, 0 5 mg, Nebulization, Q12H  diltiazem, 180 mg, Oral, Daily  furosemide, 40 mg, Intravenous, Once  gabapentin, 800 mg, Oral, TID  ipratropium-albuterol, 3 mL, Nebulization, Q6H  levothyroxine, 100 mcg, Oral, Early Morning  melatonin, 6 mg, Oral, HS  methylPREDNISolone sodium succinate, 40 mg, Intravenous, Q8H CARLOS  montelukast, 10 mg, Oral, HS  nicotine, 1 patch, Transdermal, Daily    methylPREDNISolone sodium succinate (Solu-MEDROL) injection 60 mg  Dose: 60 mg  Freq: Every 6 hours scheduled Route: IV  Start: 09/08/22 0000 End: 09/08/22 1053  doxycycline (VIBRAMYCIN) 100 mg in sodium chloride 0 9 % 100 mL IVPB  Dose: 100 mg  Freq: Every 12 hours Route: IV  Last Dose: Stopped (09/08/22 1143)  Start: 09/07/22 1800 End: 09/08/22 1050      Continuous IV Infusions:     PRN Meds:  albuterol, 2 5 mg, Nebulization, Q4H PRN  traMADol, 50 mg, Oral, Q6H PRN x1 9/7, x1 9/8    OOB as christine    IP CONSULT TO PULMONOLOGY  IP CONSULT TO CASE MANAGEMENT    Network Utilization Review Department  ATTENTION: Please call with any questions or concerns to [phone] and carefully listen to the prompts so that you are directed to the right person  All voicemails are confidential   Stephen Howell all requests for admission clinical reviews, approved or denied determinations and any other requests to dedicated fax number below belonging to the campus where the patient is receiving treatment   List of dedicated fax numbers for the Facilities:  [address] DENIALS (Administrative/Medical Necessity) [phone]   [address] (Maternity/NICU/Pediatrics) [phone]   [address] [place] [address]  [address] [place] [address] [address] [place] [address] Jenise Choe [address] [facility] [place] [id number] [phone]

## 2022-09-09 PROBLEM — E66.9 OBESITY (BMI 35.0-39.9 WITHOUT COMORBIDITY): Status: ACTIVE | Noted: 2022-09-09

## 2022-09-09 LAB
ANION GAP SERPL CALCULATED.3IONS-SCNC: 13 MMOL/L (ref 4–13)
BASE EX.OXY STD BLDV CALC-SCNC: 75.7 % (ref 60–80)
BASE EXCESS BLDV CALC-SCNC: -2 MMOL/L
BASOPHILS # BLD AUTO: 0.02 THOUSANDS/ΜL (ref 0–0.1)
BASOPHILS NFR BLD AUTO: 0 % (ref 0–1)
BUN SERPL-MCNC: 21 MG/DL (ref 5–25)
CALCIUM SERPL-MCNC: 10.5 MG/DL (ref 8.3–10.1)
CHLORIDE SERPL-SCNC: 107 MMOL/L (ref 96–108)
CO2 SERPL-SCNC: 24 MMOL/L (ref 21–32)
CREAT SERPL-MCNC: 0.9 MG/DL (ref 0.6–1.3)
EOSINOPHIL # BLD AUTO: 0 THOUSAND/ΜL (ref 0–0.61)
EOSINOPHIL NFR BLD AUTO: 0 % (ref 0–6)
ERYTHROCYTE [DISTWIDTH] IN BLOOD BY AUTOMATED COUNT: 15.2 % (ref 11.6–15.1)
GFR SERPL CREATININE-BSD FRML MDRD: 64 ML/MIN/1.73SQ M
GLUCOSE SERPL-MCNC: 125 MG/DL (ref 65–140)
GLUCOSE SERPL-MCNC: 188 MG/DL (ref 65–140)
GLUCOSE SERPL-MCNC: 207 MG/DL (ref 65–140)
GLUCOSE SERPL-MCNC: 238 MG/DL (ref 65–140)
HCO3 BLDV-SCNC: 23.1 MMOL/L (ref 24–30)
HCT VFR BLD AUTO: 33.6 % (ref 34.8–46.1)
HGB BLD-MCNC: 10.2 G/DL (ref 11.5–15.4)
IMM GRANULOCYTES # BLD AUTO: 0.15 THOUSAND/UL (ref 0–0.2)
IMM GRANULOCYTES NFR BLD AUTO: 1 % (ref 0–2)
LYMPHOCYTES # BLD AUTO: 1.06 THOUSANDS/ΜL (ref 0.6–4.47)
LYMPHOCYTES NFR BLD AUTO: 8 % (ref 14–44)
MCH RBC QN AUTO: 28.3 PG (ref 26.8–34.3)
MCHC RBC AUTO-ENTMCNC: 30.4 G/DL (ref 31.4–37.4)
MCV RBC AUTO: 93 FL (ref 82–98)
MONOCYTES # BLD AUTO: 0.67 THOUSAND/ΜL (ref 0.17–1.22)
MONOCYTES NFR BLD AUTO: 5 % (ref 4–12)
NEUTROPHILS # BLD AUTO: 11.46 THOUSANDS/ΜL (ref 1.85–7.62)
NEUTS SEG NFR BLD AUTO: 86 % (ref 43–75)
NRBC BLD AUTO-RTO: 0 /100 WBCS
O2 CT BLDV-SCNC: 12.3 ML/DL
PCO2 BLDV: 40.5 MM HG (ref 42–50)
PH BLDV: 7.37 [PH] (ref 7.3–7.4)
PLATELET # BLD AUTO: 338 THOUSANDS/UL (ref 149–390)
PMV BLD AUTO: 9.5 FL (ref 8.9–12.7)
PO2 BLDV: 40.8 MM HG (ref 35–45)
POTASSIUM SERPL-SCNC: 4.4 MMOL/L (ref 3.5–5.3)
RBC # BLD AUTO: 3.6 MILLION/UL (ref 3.81–5.12)
SODIUM SERPL-SCNC: 144 MMOL/L (ref 135–147)
WBC # BLD AUTO: 13.36 THOUSAND/UL (ref 4.31–10.16)

## 2022-09-09 PROCEDURE — 94640 AIRWAY INHALATION TREATMENT: CPT

## 2022-09-09 PROCEDURE — 82805 BLOOD GASES W/O2 SATURATION: CPT | Performed by: NURSE PRACTITIONER

## 2022-09-09 PROCEDURE — 80048 BASIC METABOLIC PNL TOTAL CA: CPT | Performed by: INTERNAL MEDICINE

## 2022-09-09 PROCEDURE — 99232 SBSQ HOSP IP/OBS MODERATE 35: CPT | Performed by: INTERNAL MEDICINE

## 2022-09-09 PROCEDURE — 82948 REAGENT STRIP/BLOOD GLUCOSE: CPT

## 2022-09-09 PROCEDURE — 85025 COMPLETE CBC W/AUTO DIFF WBC: CPT | Performed by: INTERNAL MEDICINE

## 2022-09-09 PROCEDURE — 99232 SBSQ HOSP IP/OBS MODERATE 35: CPT | Performed by: NURSE PRACTITIONER

## 2022-09-09 RX ORDER — PREDNISONE 20 MG/1
40 TABLET ORAL DAILY
Status: DISCONTINUED | OUTPATIENT
Start: 2022-09-10 | End: 2022-09-11

## 2022-09-09 RX ORDER — INSULIN LISPRO 100 [IU]/ML
1-5 INJECTION, SOLUTION INTRAVENOUS; SUBCUTANEOUS
Status: DISCONTINUED | OUTPATIENT
Start: 2022-09-09 | End: 2022-09-09

## 2022-09-09 RX ORDER — ACETAMINOPHEN 325 MG/1
650 TABLET ORAL EVERY 6 HOURS PRN
Status: DISCONTINUED | OUTPATIENT
Start: 2022-09-09 | End: 2022-09-10

## 2022-09-09 RX ORDER — FUROSEMIDE 10 MG/ML
40 INJECTION INTRAMUSCULAR; INTRAVENOUS ONCE
Status: COMPLETED | OUTPATIENT
Start: 2022-09-09 | End: 2022-09-09

## 2022-09-09 RX ORDER — INSULIN LISPRO 100 [IU]/ML
1-5 INJECTION, SOLUTION INTRAVENOUS; SUBCUTANEOUS
Status: DISCONTINUED | OUTPATIENT
Start: 2022-09-09 | End: 2022-09-18 | Stop reason: HOSPADM

## 2022-09-09 RX ORDER — LIDOCAINE 50 MG/G
1 PATCH TOPICAL DAILY
Status: DISCONTINUED | OUTPATIENT
Start: 2022-09-09 | End: 2022-09-18 | Stop reason: HOSPADM

## 2022-09-09 RX ADMIN — GABAPENTIN 800 MG: 400 CAPSULE ORAL at 23:28

## 2022-09-09 RX ADMIN — FUROSEMIDE 40 MG: 10 INJECTION, SOLUTION INTRAVENOUS at 10:00

## 2022-09-09 RX ADMIN — ACETAMINOPHEN 650 MG: 325 TABLET ORAL at 20:05

## 2022-09-09 RX ADMIN — ATORVASTATIN CALCIUM 20 MG: 20 TABLET, FILM COATED ORAL at 08:40

## 2022-09-09 RX ADMIN — IPRATROPIUM BROMIDE AND ALBUTEROL SULFATE 3 ML: 2.5; .5 SOLUTION RESPIRATORY (INHALATION) at 07:14

## 2022-09-09 RX ADMIN — APIXABAN 5 MG: 5 TABLET, FILM COATED ORAL at 08:40

## 2022-09-09 RX ADMIN — LIDOCAINE 1 PATCH: 50 PATCH CUTANEOUS at 10:00

## 2022-09-09 RX ADMIN — DILTIAZEM HYDROCHLORIDE 180 MG: 180 CAPSULE, COATED, EXTENDED RELEASE ORAL at 08:40

## 2022-09-09 RX ADMIN — MORPHINE SULFATE 2 MG: 2 INJECTION, SOLUTION INTRAMUSCULAR; INTRAVENOUS at 23:29

## 2022-09-09 RX ADMIN — IPRATROPIUM BROMIDE AND ALBUTEROL SULFATE 3 ML: 2.5; .5 SOLUTION RESPIRATORY (INHALATION) at 13:31

## 2022-09-09 RX ADMIN — METHYLPREDNISOLONE SODIUM SUCCINATE 40 MG: 40 INJECTION, POWDER, FOR SOLUTION INTRAMUSCULAR; INTRAVENOUS at 13:22

## 2022-09-09 RX ADMIN — MELATONIN 6 MG: at 23:28

## 2022-09-09 RX ADMIN — BUDESONIDE 0.5 MG: 0.5 INHALANT ORAL at 19:12

## 2022-09-09 RX ADMIN — BUDESONIDE 0.5 MG: 0.5 INHALANT ORAL at 07:14

## 2022-09-09 RX ADMIN — IPRATROPIUM BROMIDE AND ALBUTEROL SULFATE 3 ML: 2.5; .5 SOLUTION RESPIRATORY (INHALATION) at 19:13

## 2022-09-09 RX ADMIN — TRAMADOL HYDROCHLORIDE 50 MG: 50 TABLET, COATED ORAL at 02:35

## 2022-09-09 RX ADMIN — LEVOTHYROXINE SODIUM 100 MCG: 100 TABLET ORAL at 06:40

## 2022-09-09 RX ADMIN — TRAMADOL HYDROCHLORIDE 50 MG: 50 TABLET, COATED ORAL at 13:29

## 2022-09-09 RX ADMIN — MONTELUKAST SODIUM 10 MG: 10 TABLET, COATED ORAL at 23:28

## 2022-09-09 RX ADMIN — INSULIN LISPRO 1 UNITS: 100 INJECTION, SOLUTION INTRAVENOUS; SUBCUTANEOUS at 13:22

## 2022-09-09 RX ADMIN — TRAMADOL HYDROCHLORIDE 50 MG: 50 TABLET, COATED ORAL at 20:05

## 2022-09-09 RX ADMIN — BICTEGRAVIR SODIUM, EMTRICITABINE, AND TENOFOVIR ALAFENAMIDE FUMARATE 1 TABLET: 50; 200; 25 TABLET ORAL at 08:40

## 2022-09-09 RX ADMIN — MORPHINE SULFATE 2 MG: 2 INJECTION, SOLUTION INTRAMUSCULAR; INTRAVENOUS at 17:39

## 2022-09-09 RX ADMIN — GABAPENTIN 800 MG: 400 CAPSULE ORAL at 17:36

## 2022-09-09 RX ADMIN — GABAPENTIN 800 MG: 400 CAPSULE ORAL at 08:40

## 2022-09-09 RX ADMIN — APIXABAN 5 MG: 5 TABLET, FILM COATED ORAL at 17:36

## 2022-09-09 NOTE — PROGRESS NOTES
[address] [facility]  Progress Note - Juvenal Sandhoff 1950, 81 y o  female MRN: [id number]  Unit/Bed#: E5 -01 Encounter: [id number]  Primary Care Provider: Timmy Grady DO   Date and time admitted to hospital: 9/7/2022  1:28 PM    * Acute exacerbation of chronic obstructive pulmonary disease (COPD) ([facility] [address] )  Assessment & Plan  Presented to the hospital with reports of SOB for 2 days prior to admission  PMH of chronic respiratory failure on 2-3LNC, moderate persistent asthma, PASHA on CPAP, congestive heart failure with pEF, ongoing nicotine abuse  Patient has been admitted or seen in the ER 12 times over the past year  Typically follows with Dr Jessica Suazo at [address] [facility]  · Chest x-ray without infiltrate  · Procalcitonin x2 negative- abxs dcd   · C/w scheduled budesonide BID and duonebs TID  · Solu-Medrol 40mg IV q8h  · respiratory protocol  · Tobacco cessation   · Currently at baseline o2 needs   · Trial diuretics     Chronic diastolic (congestive) heart failure (HCC)  Assessment & Plan  Wt Readings from Last 3 Encounters:   09/07/22 97 7 kg (215 lb 6 2 oz)   07/31/22 99 2 kg (218 lb 11 1 oz)   06/21/22 100 kg (220 lb 7 4 oz)     · Last echo 2/22 showing EF 65% and G1DD   ·   · Trial dose of lasix 40mg IV today given ongoing respiratory complaints   · Monitor daily weights and I&O        Chronic respiratory failure with hypoxia (HCC)  Assessment & Plan  · chronic oxygen at 2-3 L  · Follows with Dr Jessica Suazo with [address] [facility] outpatient- poor outpatient f/u noted   · Monitor and watch for increasing oxygen requirement     Obesity (BMI 35 0-39 9 without comorbidity)  Assessment & Plan  · BMI 39 40  · Encourage diet and exercise     Paroxysmal atrial fibrillation ([facility] [address] )  Assessment & Plan  · Fully anticoagulated on elequis   · Rate controlled on Cardizem      PASHA on CPAP  Assessment & Plan  · Pt refused CPAP overnight   · C/w encouraging compliance     Lumbar stenosis  Assessment & Plan  S/p T9-11 fusion with T9-10 discectomy in 2018   · Pt reports ongoing discomfort and pain  · improvement with lidocaine patch in the past- will order this admission   · C/w PRN tramadol and morphine       Tobacco use  Assessment & Plan  · Continue nicotine patch daily  · Discussed cessation- pt reports she's not ready to quit completely but maybe wean to 1 pack per month     Type 2 diabetes mellitus with hyperglycemia [facility])  Assessment & Plan  Lab Results   Component Value Date    HGBA1C 6 3 (H) 2022     · A1c is well controlled at 6 3  · Add sliding scale insulin and accuchecks   · Watch for steroid induced hyperglycemia    HIV (human immunodeficiency virus infection)   Assessment & Plan  · Stable  · Continue HAART        VTE Pharmacologic Prophylaxis: VTE Score: 5 High Risk (Score >/= 5) - Pharmacological DVT Prophylaxis Ordered: apixaban (Eliquis)  Sequential Compression Devices Ordered  Patient Centered Rounds: I performed bedside rounds with nursing staff today  Discussions with Specialists or Other Care Team Provider: d/w RN     Education and Discussions with Family / Patient: Updated  (daughter) via phone  Time Spent for Care: 30 minutes  More than 50% of total time spent on counseling and coordination of care as described above  Current Length of Stay: 2 day(s)  Current Patient Status: Inpatient   Certification Statement: The patient will continue to require additional inpatient hospital stay due to iv steroids   Discharge Plan: Anticipate discharge in 48-72 hrs to home  Code Status: Level 1 - Full Code    Subjective:   pt reports breathing is unchanged, reports she is still sob with conversation  Denies any coughing  On [facility] which she is on at home  Reports back pain at previous surgical site and requesting lidoderm patch   Denies any other complaints    Objective:     Vitals:   Temp (24hrs), Av °F (36 7 °C), Min:97 7 °F (36 5 °C), Max:98 2 °F (36 8 °C)    Temp:  [97 7 °F (36 5 °C)-98 2 °F (36 8 °C)] 97 7 °F (36 5 °C)  HR:  [82-98] 90  Resp:  [18-20] 18  BP: (103-118)/(65-81) 108/68  SpO2:  [93 %-97 %] 96 %  Body mass index is 39 4 kg/m²  Input and Output Summary (last 24 hours): Intake/Output Summary (Last 24 hours) at 9/9/2022 1125  Last data filed at 9/8/2022 1143  Gross per 24 hour   Intake 240 ml   Output --   Net 240 ml       Physical Exam:   Physical Exam  Vitals and nursing note reviewed  Constitutional:       General: She is not in acute distress  Appearance: She is obese  She is not ill-appearing  HENT:      Head: Normocephalic and atraumatic  Eyes:      Conjunctiva/sclera: Conjunctivae normal    Cardiovascular:      Rate and Rhythm: Normal rate and regular rhythm  Heart sounds: Normal heart sounds  No murmur heard  Pulmonary:      Effort: Pulmonary effort is normal  No respiratory distress  Breath sounds: Wheezing and rales present  Comments: 2LNC  Tachypnea, dyspneic with conversation   Abdominal:      General: Bowel sounds are normal  There is no distension  Palpations: Abdomen is soft  Tenderness: There is no abdominal tenderness  Musculoskeletal:         General: Swelling present  Cervical back: Neck supple  Right lower leg: Edema (mild) present  Left lower leg: Edema (mild) present  Skin:     General: Skin is warm and dry  Neurological:      Mental Status: She is alert  Mental status is at baseline     Psychiatric:         Mood and Affect: Mood normal           Additional Data:     Labs:  Results from last 7 days   Lab Units 09/09/22  0954   WBC Thousand/uL 13 36*   HEMOGLOBIN g/dL 10 2*   HEMATOCRIT % 33 6*   PLATELETS Thousands/uL 338   NEUTROS PCT % 86*   LYMPHS PCT % 8*   MONOS PCT % 5   EOS PCT % 0     Results from last 7 days   Lab Units 09/09/22  0954 09/08/22  0540 09/07/22  1419   SODIUM mmol/L 144   < > 147   POTASSIUM mmol/L 4 4   < > 3 5   CHLORIDE mmol/L 107   < > 108   CO2 mmol/L 24   < > 28   BUN mg/dL 21 < > 16   CREATININE mg/dL 0 90   < > 1 04   ANION GAP mmol/L 13   < > 11   CALCIUM mg/dL 10 5*   < > 10 2*   ALBUMIN g/dL  --   --  3 8   TOTAL BILIRUBIN mg/dL  --   --  0 30   ALK PHOS U/L  --   --  87   ALT U/L  --   --  20   AST U/L  --   --  10   GLUCOSE RANDOM mg/dL 207*   < > 106    < > = values in this interval not displayed           Results from last 7 days   Lab Units 09/09/22  1042   POC GLUCOSE mg/dl 188*         Results from last 7 days   Lab Units 09/08/22  0540 09/07/22  1821   PROCALCITONIN ng/ml <0 05 <0 05       Lines/Drains:  Invasive Devices  Report    Peripheral Intravenous Line  Duration           Peripheral IV 09/07/22 Left Arm 1 day                      Imaging: Reviewed radiology reports from this admission including: chest xray    Recent Cultures (last 7 days):         Last 24 Hours Medication List:   Current Facility-Administered Medications   Medication Dose Route Frequency Provider Last Rate    acetaminophen  650 mg Oral Q6H PRN LUIS M Mathews      albuterol  2 5 mg Nebulization Q4H PRN Mildred Kaiserel, DO      apixaban  5 mg Oral BID Aaron S Prechtel, DO      atorvastatin  20 mg Oral Daily Aaron S Prechtel, DO      bictegravir-emtricitab-tenofovir alafenamide  1 tablet Oral Daily Aaron S Prechtel, DO      budesonide  0 5 mg Nebulization Q12H [facility]      diltiazem  180 mg Oral Daily Aaron S Prechtel, DO      gabapentin  800 mg Oral TID Aaron S Prechtel, DO      insulin lispro  1-5 Units Subcutaneous TID AC LUIS M Graves      ipratropium-albuterol  3 mL Nebulization TID Yandel Martinez MD      levothyroxine  100 mcg Oral Early Morning Mildred Carrillo, DO      lidocaine  1 patch Topical Daily LUIS M Mathews      melatonin  6 mg Oral HS Kathy Rawls PA-C      methylPREDNISolone sodium succinate  40 mg Intravenous [facility] Cali Campos MD      montelukast  10 mg Oral HS Mildred Carrillo, DO  morphine injection  2 mg Intravenous Q4H PRN Nicolette Schuler PA-C      nicotine  1 patch Transdermal Daily Aaron Kaiserel, DO      traMADol  50 mg Oral Q6H PRN Nicolette Schuler PA-C          Today, Patient Was Seen By: LUIS M Yuan    **Please Note: This note may have been constructed using a voice recognition system  **

## 2022-09-09 NOTE — PLAN OF CARE
Problem: PAIN - ADULT  Goal: Verbalizes/displays adequate comfort level or baseline comfort level  Description: Interventions:  - Encourage patient to monitor pain and request assistance  - Assess pain using appropriate pain scale  - Administer analgesics based on type and severity of pain and evaluate response  - Implement non-pharmacological measures as appropriate and evaluate response  - Consider cultural and social influences on pain and pain management  - Notify physician/advanced practitioner if interventions unsuccessful or patient reports new pain  Outcome: Progressing     Problem: INFECTION - ADULT  Goal: Absence or prevention of progression during hospitalization  Description: INTERVENTIONS:  - Assess and monitor for signs and symptoms of infection  - Monitor lab/diagnostic results  - Monitor all insertion sites, i e  indwelling lines, tubes, and drains  - Monitor endotracheal if appropriate and nasal secretions for changes in amount and color  - [place] appropriate cooling/warming therapies per order  - Administer medications as ordered  - Instruct and encourage patient and family to use good hand hygiene technique  - Identify and instruct in appropriate isolation precautions for identified infection/condition  Outcome: Progressing  Goal: Absence of fever/infection during neutropenic period  Description: INTERVENTIONS:  - Monitor WBC    Outcome: Progressing     Problem: SAFETY ADULT  Goal: Patient will remain free of falls  Description: INTERVENTIONS:  - Educate patient/family on patient safety including physical limitations  - Instruct patient to call for assistance with activity   - Consult OT/PT to assist with strengthening/mobility   - Keep Call bell within reach  - Keep bed low and locked with side rails adjusted as appropriate  - Keep care items and personal belongings within reach  - Initiate and maintain comfort rounds  - Make Fall Risk Sign visible to staff  - Offer Toileting every 2 Hours, in advance of need  - Initiate/Maintain bed alarm  - Obtain necessary fall risk management equipment: socks   - Apply yellow socks and bracelet for high fall risk patients  - Consider moving patient to room near nurses station  Outcome: Progressing     Problem: CARDIOVASCULAR - ADULT  Goal: Maintains optimal cardiac output and hemodynamic stability  Description: INTERVENTIONS:  - Monitor I/O, vital signs and rhythm  - Monitor for S/S and trends of decreased cardiac output  - Administer and titrate ordered vasoactive medications to optimize hemodynamic stability  - Assess quality of pulses, skin color and temperature  - Assess for signs of decreased coronary artery perfusion  - Instruct patient to report change in severity of symptoms  Outcome: Progressing  Goal: Absence of cardiac dysrhythmias or at baseline rhythm  Description: INTERVENTIONS:  - Continuous cardiac monitoring, vital signs, obtain 12 lead EKG if ordered  - Administer antiarrhythmic and heart rate control medications as ordered  - Monitor electrolytes and administer replacement therapy as ordered  Outcome: Progressing     Problem: RESPIRATORY - ADULT  Goal: Achieves optimal ventilation and oxygenation  Description: INTERVENTIONS:  - Assess for changes in respiratory status  - Assess for changes in mentation and behavior  - Position to facilitate oxygenation and minimize respiratory effort  - Oxygen administered by appropriate delivery if ordered  - Initiate smoking cessation education as indicated  - Encourage broncho-pulmonary hygiene including cough, deep breathe, Incentive Spirometry  - Assess the need for suctioning and aspirate as needed  - Assess and instruct to report SOB or any respiratory difficulty  - Respiratory Therapy support as indicated  Outcome: Progressing     Problem: GASTROINTESTINAL - ADULT  Goal: Minimal or absence of nausea and/or vomiting  Description: INTERVENTIONS:  - Administer IV fluids if ordered to ensure adequate hydration  - Maintain NPO status until nausea and vomiting are resolved  - Nasogastric tube if ordered  - Administer ordered antiemetic medications as needed  - Provide nonpharmacologic comfort measures as appropriate  - Advance diet as tolerated, if ordered  - Consider nutrition services referral to assist patient with adequate nutrition and appropriate food choices  Outcome: Progressing  Goal: Maintains or returns to baseline bowel function  Description: INTERVENTIONS:  - Assess bowel function  - Encourage oral fluids to ensure adequate hydration  - Administer IV fluids if ordered to ensure adequate hydration  - Administer ordered medications as needed  - Encourage mobilization and activity  - Consider nutritional services referral to assist patient with adequate nutrition and appropriate food choices  Outcome: Progressing     Problem: METABOLIC, FLUID AND ELECTROLYTES - ADULT  Goal: Electrolytes maintained within normal limits  Description: INTERVENTIONS:  - Monitor labs and assess patient for signs and symptoms of electrolyte imbalances  - Administer electrolyte replacement as ordered  - Monitor response to electrolyte replacements, including repeat lab results as appropriate  - Instruct patient on fluid and nutrition as appropriate  Outcome: Progressing  Goal: Fluid balance maintained  Description: INTERVENTIONS:  - Monitor labs   - Monitor I/O and WT  - Instruct patient on fluid and nutrition as appropriate  - Assess for signs & symptoms of volume excess or deficit  Outcome: Progressing  Goal: Glucose maintained within target range  Description: INTERVENTIONS:  - Monitor Blood Glucose as ordered  - Assess for signs and symptoms of hyperglycemia and hypoglycemia  - Administer ordered medications to maintain glucose within target range  - Assess nutritional intake and initiate nutrition service referral as needed  Outcome: Progressing     Problem: HEMATOLOGIC - ADULT  Goal: Maintains hematologic stability  Description: INTERVENTIONS  - Assess for signs and symptoms of bleeding or hemorrhage  - Monitor labs  - Administer supportive blood products/factors as ordered and appropriate  Outcome: Progressing     Problem: Potential for Falls  Goal: Patient will remain free of falls  Description: INTERVENTIONS:  - Educate patient/family on patient safety including physical limitations  - Instruct patient to call for assistance with activity   - Consult OT/PT to assist with strengthening/mobility   - Keep Call bell within reach  - Keep bed low and locked with side rails adjusted as appropriate  - Keep care items and personal belongings within reach  - Initiate and maintain comfort rounds  - Make Fall Risk Sign visible to staff  - Offer Toileting every 2 Hours, in advance of need  - Initiate/Maintain bed alarm  - Obtain necessary fall risk management equipment: socks   - Apply yellow socks and bracelet for high fall risk patients  - Consider moving patient to room near nurses station  Outcome: Progressing

## 2022-09-09 NOTE — PROGRESS NOTES
Progress Note - Pulmonary   Kate Salcido 81 y o  female MRN: [id number]  Unit/Bed#: E5 -01 Encounter: [id number]      Assessment and Plan:  Chronic hypoxic respiratory failure- on 2-3LPM home oxygen   Moderate persistent asthma   PASHA on CPAP   HTN   HIV   DM   VCD   CHF pEF   Paroxysmal atrial fibrillation   HLD   Morbid obesity due to excess calorie intake with serious comorbidity and BMI 39 40   Nicotine dependence, cigarettes, uncomplicated - smokes 4 cigarettes per day x 45 years   Cocaine use     The patient feels unchanged, breath sounds improved on exam today  - Change to oral steroids  Discharge with taper by 10mg every 3 days  - Cont  Nebulized BDAs  - cont  Montelukast  - Cont  Cardizem and Eliquis for afib  - CPAP with sleep  - would benefit from weight loss and pulmonary rehab and regular cardio aerobic exercise as I suspect obesity and deconditioning contribute towards breathlessness  We will sign off  Please recall as needed  [facility] up at [facility] pulmonary on 9/28 as scheduled  Chief complaint:  I feel OK    Subjective:   Found sitting at the side of the bed  States her breathing feels unchanged today  Complains of ongoing breathlessness  Just use nebulizer treatment and is unsure if it was helpful  Objective:   Afebrile  Vitals: Blood pressure 114/76, pulse 90, temperature 97 6 °F (36 4 °C), resp  rate 18, height 5' 2" (1 575 m), weight 101 kg (222 lb 0 1 oz), last menstrual period 04/01/2013, SpO2 97 %, not currently breastfeeding , 2L, Body mass index is 40 6 kg/m²  Intake/Output Summary (Last 24 hours) at 9/9/2022 1518  Last data filed at 9/9/2022 1101  Gross per 24 hour   Intake 320 ml   Output 250 ml   Net 70 ml       Physical Exam  GEN: WDWN, nad, comfortable  HEENT: NCAT, EOMI  CVS: Regular, no m/r/g  LUNGS: CTA b/l  ABD: soft  EXT: No c/c/e  NEURO: No focal deficits  MS: Moving all extremities  SKIN: warm, dry  PSYCH: calm, cooperative      Labs:  I have personally reviewed pertinent lab results  Results from last 7 days   Lab Units 09/09/22  0954 09/08/22  0540 09/07/22  1419   WBC Thousand/uL 13 36* 7 03 4 91   HEMOGLOBIN g/dL 10 2* 10 6* 10 8*   HEMATOCRIT % 33 6* 33 8* 34 5*   PLATELETS Thousands/uL 338 313 321   NEUTROS PCT % 86* 86* 59   MONOS PCT % 5 1* 10      Results from last 7 days   Lab Units 09/09/22  0954 09/08/22  0540 09/07/22  1419   POTASSIUM mmol/L 4 4 3 7 3 5   CHLORIDE mmol/L 107 106 108   CO2 mmol/L 24 25 28   BUN mg/dL 21 21 16   CREATININE mg/dL 0 90 1 10 1 04   CALCIUM mg/dL 10 5* 10 6* 10 2*   ALK PHOS U/L  --   --  87   ALT U/L  --   --  20   AST U/L  --   --  10     Results from last 7 days   Lab Units 09/08/22  0540   MAGNESIUM mg/dL 1 8                  0   Lab Value Date/Time    TROPONINI <0 02 05/27/2020 1840    TROPONINI <0 04 12/11/2014 0610    TROPONINI 0 04 12/11/2014 0230    TROPONINI 0 04 12/10/2014 1250       [facility]

## 2022-09-09 NOTE — APP STUDENT NOTE
REGINE STUDENT  Inpatient Progress Note for TRAINING ONLY  Not Part of Legal Medical Record     Progress Note - Jose Cooper 81 y o  female MRN: [id number]  Unit/Bed#: E5 -01 Encounter: [id number]        * Acute exacerbation of chronic obstructive pulmonary disease (COPD) ([facility] )  Assessment & Plan  · Patient feels breathing is worse today as she got short of breath while walking back and forth from bathroom  · Appreciate Pulmonary input  · Give 40 mg IV Furosemide  · Encourage incentive spirometer use  · Ongoing education on smoking cessation    PASHA on CPAP  Assessment & Plan  · Patient refused CPAP last night as she felt her breathing was too bad for it  · Encouraged CPAP use this evening    Type 2 diabetes mellitus with hyperglycemia ([place] [address] )  Assessment & Plan  Lab Results   Component Value Date    HGBA1C 6 3 (H) 06/21/2022       No results for input(s): POCGLU in the last 72 hours  · Blood glucose elevated in BMP  · Initiate glucose checks and insulin use while receiving steroids      Lumbar stenosis  Assessment & Plan  · Patient complaining of 8/10 lower back pain  · Continue PRN analgesic medications  · Start Lidocaine patch    Paroxysmal atrial fibrillation ([place] [address] )  Assessment & Plan  · EKG 9/7 read Sinus rhythm with sinus arrhythmia  · Continue Xarelto and Cardizem    Tobacco use  Assessment & Plan  · Patient says she "doesn't smoke much anymore" and Nicotine patch is working  · Continue Nicotine patch  · Encouraged smoking cessation           VTE Pharmacologic Prophylaxis:   Pharmacologic: Apixaban (Eliquis)  Mechanical VTE Prophylaxis in Place: No    Patient Centered Rounds: I have performed bedside rounds with nursing staff today  Discussions with Specialists or Other Care Team Provider: LUIS M Michel    Education and Discussions with Family / Patient: Smoking cessation, incentive spirometry, use of CPAP    Time Spent for Care: 1 hour    More than 50% of total time spent on counseling and coordination of care as described above  Current Length of Stay: 2 day(s)    Current Patient Status: Inpatient   Certification Statement: The patient will continue to require additional inpatient hospital stay due to ongoing respiratory distress    Discharge Plan: Home once respiratory status improved    Code Status: Level 1 - Full Code    Subjective:   Patient complains that her "breathing is worse today" and she got short of breath walking back and forth to the bathroom this morning  She felt she was not able to wear CPAP last night because of her breathing  Complains of 8/10 pain in her back for which she just received Tramadol  Objective:   Oxygen 97% on 2L NC, patient breathing heavily sitting on edge of bed after returning to bathroom  Incentive spirometry to 750  Vitals:   Temp (24hrs), Av °F (36 7 °C), Min:97 7 °F (36 5 °C), Max:98 2 °F (36 8 °C)    Temp:  [97 7 °F (36 5 °C)-98 2 °F (36 8 °C)] 97 7 °F (36 5 °C)  HR:  [82-98] 90  Resp:  [18-20] 18  BP: (103-118)/(65-81) 108/68  SpO2:  [93 %-97 %] 96 %  Body mass index is 39 4 kg/m²  Input and Output Summary (last 24 hours): Intake/Output Summary (Last 24 hours) at 2022 1043  Last data filed at 2022 1143  Gross per 24 hour   Intake 240 ml   Output --   Net 240 ml       Physical Exam:     Physical Exam  Constitutional:       General: She is not in acute distress  Appearance: She is obese  She is not ill-appearing  HENT:      Head: Normocephalic  Eyes:      Pupils: Pupils are equal, round, and reactive to light  Cardiovascular:      Rate and Rhythm: Normal rate and regular rhythm  Pulses: Normal pulses  Heart sounds: Normal heart sounds  Pulmonary:      Effort: Tachypnea and respiratory distress (dyspnea with exertion) present  Breath sounds: Wheezing and rales present  Abdominal:      General: Bowel sounds are normal    Musculoskeletal:      Right lower leg: Edema (trace) present        Left lower leg: Edema (trace) present  Skin:     General: Skin is warm and dry  Neurological:      Mental Status: She is alert and oriented to person, place, and time     Psychiatric:         Mood and Affect: Mood normal          Historical Information   Past Medical History:   Diagnosis Date    Asthma     Bipolar 1 disorder ([place] [address] )     Cardiac disease     COPD (chronic obstructive pulmonary disease) ([place] [address] )     Diabetes mellitus ([place] [address] )     Disease of thyroid gland     HIV (human immunodeficiency virus infection) ([place] [address] ) 03/24/2016    Hypertension     Osteoarthritis     Tobacco abuse      Past Surgical History:   Procedure Laterality Date    HERNIA REPAIR      LUMBAR FUSION N/A 4/10/2018    Procedure: T9/10 discectomy with T9-T11 fusion;  Surgeon: Blaire Wilkerson MD;  Location: [facility];  Service: Neurosurgery    THYROIDECTOMY       Social History   Social History     Substance and Sexual Activity   Alcohol Use Yes     Social History     Substance and Sexual Activity   Drug Use No    Comment: former IV drug user     Social History     Tobacco Use   Smoking Status Current Every Day Smoker    Packs/day: 0 20    Years: 45 00    Pack years: 9 00    Types: Cigarettes   Smokeless Tobacco Never Used   Tobacco Comment    Currently smoking 1-2 cigarettes a day     Family History: non-contributory    Meds/Allergies   all medications and allergies reviewed  Allergies   Allergen Reactions    Lisinopril Angioedema    Prozac [Fluoxetine Hcl] Rash    Sulfa Antibiotics Itching    Quinine        Additional Data:     Labs:    Results from last 7 days   Lab Units 09/09/22  0954   WBC Thousand/uL 13 36*   HEMOGLOBIN g/dL 10 2*   HEMATOCRIT % 33 6*   PLATELETS Thousands/uL 338   NEUTROS PCT % 86*   LYMPHS PCT % 8*   MONOS PCT % 5   EOS PCT % 0     Results from last 7 days   Lab Units 09/09/22  0954 09/08/22  0540 09/07/22  1419   SODIUM mmol/L 144   < > 147   POTASSIUM mmol/L 4 4   < > 3 5   CHLORIDE mmol/L 107   < > 108   CO2 mmol/L 24   < > 28   BUN mg/dL 21   < > 16   CREATININE mg/dL 0 90   < > 1 04   ANION GAP mmol/L 13   < > 11   CALCIUM mg/dL 10 5*   < > 10 2*   ALBUMIN g/dL  --   --  3 8   TOTAL BILIRUBIN mg/dL  --   --  0 30   ALK PHOS U/L  --   --  87   ALT U/L  --   --  20   AST U/L  --   --  10   GLUCOSE RANDOM mg/dL 207*   < > 106    < > = values in this interval not displayed  Results from last 7 days   Lab Units 09/08/22  0540 09/07/22  1821   PROCALCITONIN ng/ml <0 05 <0 05         * I Have Reviewed All Lab Data Listed Above  * Additional Pertinent Lab Tests Reviewed:  [facility] Admission Reviewed    Imaging:    Imaging Reports Reviewed Today Include: chest xray    Recent Cultures (last 7 days):           Last 24 Hours Medication List:   Current Facility-Administered Medications   Medication Dose Route Frequency Provider Last Rate    albuterol  2 5 mg Nebulization Q4H PRN Clerance [place] Prechtel, DO      apixaban  5 mg Oral BID Aaron S Prechtel, DO      atorvastatin  20 mg Oral Daily Aaron S Prechtel, DO      bictegravir-emtricitab-tenofovir alafenamide  1 tablet Oral Daily Aaron S Prechtel, DO      budesonide  0 5 mg Nebulization Q12H LUIS M Benavides      diltiazem  180 mg Oral Daily Aaron S Prechtel, DO      gabapentin  800 mg Oral TID Aaron S Prechtel, DO      insulin lispro  1-5 Units Subcutaneous TID AC LUIS M Graves      ipratropium-albuterol  3 mL Nebulization TID Iván Marquez MD      levothyroxine  100 mcg Oral Early Morning Clerance [place] Prechtel, DO      lidocaine  1 patch Topical Daily Colin DryLUIS M      melatonin  6 mg Oral HS Kathy Rawls PA-C      methylPREDNISolone sodium succinate  40 mg Intravenous [facility] Fer Padgett MD      montelukast  10 mg Oral HS Clerance [place] Prechtel, DO      morphine injection  2 mg Intravenous Q4H PRN Macho Otto PA-C      nicotine  1 patch Transdermal Daily Clerance [place] Prechtel, DO      traMADol  50 mg Oral Q6H PRN Manuel Perez PA-C          Today, Patient Was Seen By: Guera Ghosh RN    ** Please Note: Dictation voice to text software may have been used in the creation of this document   **

## 2022-09-09 NOTE — PLAN OF CARE
Problem: PAIN - ADULT  Goal: Verbalizes/displays adequate comfort level or baseline comfort level  Description: Interventions:  - Encourage patient to monitor pain and request assistance  - Assess pain using appropriate pain scale  - Administer analgesics based on type and severity of pain and evaluate response  - Implement non-pharmacological measures as appropriate and evaluate response  - Consider cultural and social influences on pain and pain management  - Notify physician/advanced practitioner if interventions unsuccessful or patient reports new pain  Outcome: Progressing     Problem: INFECTION - ADULT  Goal: Absence or prevention of progression during hospitalization  Description: INTERVENTIONS:  - Assess and monitor for signs and symptoms of infection  - Monitor lab/diagnostic results  - Monitor all insertion sites, i e  indwelling lines, tubes, and drains  - Monitor endotracheal if appropriate and nasal secretions for changes in amount and color  - Ben[place] appropriate cooling/warming therapies per order  - Administer medications as ordered  - Instruct and encourage patient and family to use good hand hygiene technique  - Identify and instruct in appropriate isolation precautions for identified infection/condition  Outcome: Progressing  Goal: Absence of fever/infection during neutropenic period  Description: INTERVENTIONS:  - Monitor WBC    Outcome: Progressing     Problem: SAFETY ADULT  Goal: Patient will remain free of falls  Description: INTERVENTIONS:  - Educate patient/family on patient safety including physical limitations  - Instruct patient to call for assistance with activity   - Consult OT/PT to assist with strengthening/mobility   - Keep Call bell within reach  - Keep bed low and locked with side rails adjusted as appropriate  - Keep care items and personal belongings within reach  - Initiate and maintain comfort rounds  - Make Fall Risk Sign visible to staff  - Offer Toileting every  Hours, in advance of need  - Initiate/Maintain alarm  - Obtain necessary fall risk management equipment:   - Apply yellow socks and bracelet for high fall risk patients  - Consider moving patient to room near nurses station  Outcome: Progressing     Problem: CARDIOVASCULAR - ADULT  Goal: Maintains optimal cardiac output and hemodynamic stability  Description: INTERVENTIONS:  - Monitor I/O, vital signs and rhythm  - Monitor for S/S and trends of decreased cardiac output  - Administer and titrate ordered vasoactive medications to optimize hemodynamic stability  - Assess quality of pulses, skin color and temperature  - Assess for signs of decreased coronary artery perfusion  - Instruct patient to report change in severity of symptoms  Outcome: Progressing  Goal: Absence of cardiac dysrhythmias or at baseline rhythm  Description: INTERVENTIONS:  - Continuous cardiac monitoring, vital signs, obtain 12 lead EKG if ordered  - Administer antiarrhythmic and heart rate control medications as ordered  - Monitor electrolytes and administer replacement therapy as ordered  Outcome: Progressing     Problem: RESPIRATORY - ADULT  Goal: Achieves optimal ventilation and oxygenation  Description: INTERVENTIONS:  - Assess for changes in respiratory status  - Assess for changes in mentation and behavior  - Position to facilitate oxygenation and minimize respiratory effort  - Oxygen administered by appropriate delivery if ordered  - Initiate smoking cessation education as indicated  - Encourage broncho-pulmonary hygiene including cough, deep breathe, Incentive Spirometry  - Assess the need for suctioning and aspirate as needed  - Assess and instruct to report SOB or any respiratory difficulty  - Respiratory Therapy support as indicated  Outcome: Progressing     Problem: GASTROINTESTINAL - ADULT  Goal: Minimal or absence of nausea and/or vomiting  Description: INTERVENTIONS:  - Administer IV fluids if ordered to ensure adequate hydration  - Maintain NPO status until nausea and vomiting are resolved  - Nasogastric tube if ordered  - Administer ordered antiemetic medications as needed  - Provide nonpharmacologic comfort measures as appropriate  - Advance diet as tolerated, if ordered  - Consider nutrition services referral to assist patient with adequate nutrition and appropriate food choices  Outcome: Progressing  Goal: Maintains or returns to baseline bowel function  Description: INTERVENTIONS:  - Assess bowel function  - Encourage oral fluids to ensure adequate hydration  - Administer IV fluids if ordered to ensure adequate hydration  - Administer ordered medications as needed  - Encourage mobilization and activity  - Consider nutritional services referral to assist patient with adequate nutrition and appropriate food choices  Outcome: Progressing     Problem: METABOLIC, FLUID AND ELECTROLYTES - ADULT  Goal: Electrolytes maintained within normal limits  Description: INTERVENTIONS:  - Monitor labs and assess patient for signs and symptoms of electrolyte imbalances  - Administer electrolyte replacement as ordered  - Monitor response to electrolyte replacements, including repeat lab results as appropriate  - Instruct patient on fluid and nutrition as appropriate  Outcome: Progressing  Goal: Fluid balance maintained  Description: INTERVENTIONS:  - Monitor labs   - Monitor I/O and WT  - Instruct patient on fluid and nutrition as appropriate  - Assess for signs & symptoms of volume excess or deficit  Outcome: Progressing  Goal: Glucose maintained within target range  Description: INTERVENTIONS:  - Monitor Blood Glucose as ordered  - Assess for signs and symptoms of hyperglycemia and hypoglycemia  - Administer ordered medications to maintain glucose within target range  - Assess nutritional intake and initiate nutrition service referral as needed  Outcome: Progressing     Problem: HEMATOLOGIC - ADULT  Goal: Maintains hematologic stability  Description: INTERVENTIONS  - Assess for signs and symptoms of bleeding or hemorrhage  - Monitor labs  - Administer supportive blood products/factors as ordered and appropriate  Outcome: Progressing     Problem: Potential for Falls  Goal: Patient will remain free of falls  Description: INTERVENTIONS:  - Educate patient/family on patient safety including physical limitations  - Instruct patient to call for assistance with activity   - Consult OT/PT to assist with strengthening/mobility   - Keep Call bell within reach  - Keep bed low and locked with side rails adjusted as appropriate  - Keep care items and personal belongings within reach  - Initiate and maintain comfort rounds  - Make Fall Risk Sign visible to staff  - Offer Toileting every  Hours, in advance of need  - Initiate/Maintain alarm  - Obtain necessary fall risk management equipment  - Apply yellow socks and bracelet for high fall risk patients  - Consider moving patient to room near nurses station  Outcome: Progressing

## 2022-09-10 LAB
ANION GAP SERPL CALCULATED.3IONS-SCNC: 8 MMOL/L (ref 4–13)
BUN SERPL-MCNC: 26 MG/DL (ref 5–25)
CALCIUM SERPL-MCNC: 10.2 MG/DL (ref 8.3–10.1)
CHLORIDE SERPL-SCNC: 107 MMOL/L (ref 96–108)
CO2 SERPL-SCNC: 25 MMOL/L (ref 21–32)
CREAT SERPL-MCNC: 0.83 MG/DL (ref 0.6–1.3)
ERYTHROCYTE [DISTWIDTH] IN BLOOD BY AUTOMATED COUNT: 15.1 % (ref 11.6–15.1)
GFR SERPL CREATININE-BSD FRML MDRD: 71 ML/MIN/1.73SQ M
GLUCOSE SERPL-MCNC: 104 MG/DL (ref 65–140)
GLUCOSE SERPL-MCNC: 105 MG/DL (ref 65–140)
GLUCOSE SERPL-MCNC: 121 MG/DL (ref 65–140)
GLUCOSE SERPL-MCNC: 152 MG/DL (ref 65–140)
GLUCOSE SERPL-MCNC: 155 MG/DL (ref 65–140)
HCT VFR BLD AUTO: 32.2 % (ref 34.8–46.1)
HGB BLD-MCNC: 10.2 G/DL (ref 11.5–15.4)
MCH RBC QN AUTO: 28.8 PG (ref 26.8–34.3)
MCHC RBC AUTO-ENTMCNC: 31.7 G/DL (ref 31.4–37.4)
MCV RBC AUTO: 91 FL (ref 82–98)
PLATELET # BLD AUTO: 328 THOUSANDS/UL (ref 149–390)
PMV BLD AUTO: 9.1 FL (ref 8.9–12.7)
POTASSIUM SERPL-SCNC: 4.6 MMOL/L (ref 3.5–5.3)
RBC # BLD AUTO: 3.54 MILLION/UL (ref 3.81–5.12)
SODIUM SERPL-SCNC: 140 MMOL/L (ref 135–147)
WBC # BLD AUTO: 10.87 THOUSAND/UL (ref 4.31–10.16)

## 2022-09-10 PROCEDURE — 82948 REAGENT STRIP/BLOOD GLUCOSE: CPT

## 2022-09-10 PROCEDURE — 85027 COMPLETE CBC AUTOMATED: CPT | Performed by: NURSE PRACTITIONER

## 2022-09-10 PROCEDURE — 94640 AIRWAY INHALATION TREATMENT: CPT

## 2022-09-10 PROCEDURE — 99232 SBSQ HOSP IP/OBS MODERATE 35: CPT | Performed by: NURSE PRACTITIONER

## 2022-09-10 PROCEDURE — 80048 BASIC METABOLIC PNL TOTAL CA: CPT | Performed by: NURSE PRACTITIONER

## 2022-09-10 RX ORDER — FUROSEMIDE 10 MG/ML
40 INJECTION INTRAMUSCULAR; INTRAVENOUS DAILY
Status: DISCONTINUED | OUTPATIENT
Start: 2022-09-10 | End: 2022-09-11

## 2022-09-10 RX ORDER — ACETAMINOPHEN 325 MG/1
975 TABLET ORAL EVERY 8 HOURS PRN
Status: DISCONTINUED | OUTPATIENT
Start: 2022-09-10 | End: 2022-09-18 | Stop reason: HOSPADM

## 2022-09-10 RX ADMIN — LIDOCAINE 1 PATCH: 50 PATCH CUTANEOUS at 09:30

## 2022-09-10 RX ADMIN — APIXABAN 5 MG: 5 TABLET, FILM COATED ORAL at 17:51

## 2022-09-10 RX ADMIN — IPRATROPIUM BROMIDE AND ALBUTEROL SULFATE 3 ML: 2.5; .5 SOLUTION RESPIRATORY (INHALATION) at 07:37

## 2022-09-10 RX ADMIN — ACETAMINOPHEN 650 MG: 325 TABLET ORAL at 05:41

## 2022-09-10 RX ADMIN — IPRATROPIUM BROMIDE AND ALBUTEROL SULFATE 3 ML: 2.5; .5 SOLUTION RESPIRATORY (INHALATION) at 19:32

## 2022-09-10 RX ADMIN — APIXABAN 5 MG: 5 TABLET, FILM COATED ORAL at 09:30

## 2022-09-10 RX ADMIN — TRAMADOL HYDROCHLORIDE 50 MG: 50 TABLET, COATED ORAL at 05:41

## 2022-09-10 RX ADMIN — GABAPENTIN 800 MG: 400 CAPSULE ORAL at 09:30

## 2022-09-10 RX ADMIN — MELATONIN 6 MG: at 21:47

## 2022-09-10 RX ADMIN — GABAPENTIN 800 MG: 400 CAPSULE ORAL at 15:32

## 2022-09-10 RX ADMIN — TRAMADOL HYDROCHLORIDE 50 MG: 50 TABLET, COATED ORAL at 21:47

## 2022-09-10 RX ADMIN — FUROSEMIDE 40 MG: 10 INJECTION, SOLUTION INTRAVENOUS at 11:06

## 2022-09-10 RX ADMIN — ATORVASTATIN CALCIUM 20 MG: 20 TABLET, FILM COATED ORAL at 09:30

## 2022-09-10 RX ADMIN — BUDESONIDE 0.5 MG: 0.5 INHALANT ORAL at 07:37

## 2022-09-10 RX ADMIN — MONTELUKAST SODIUM 10 MG: 10 TABLET, COATED ORAL at 21:47

## 2022-09-10 RX ADMIN — INSULIN LISPRO 1 UNITS: 100 INJECTION, SOLUTION INTRAVENOUS; SUBCUTANEOUS at 16:08

## 2022-09-10 RX ADMIN — BICTEGRAVIR SODIUM, EMTRICITABINE, AND TENOFOVIR ALAFENAMIDE FUMARATE 1 TABLET: 50; 200; 25 TABLET ORAL at 09:29

## 2022-09-10 RX ADMIN — BUDESONIDE 0.5 MG: 0.5 INHALANT ORAL at 19:32

## 2022-09-10 RX ADMIN — GABAPENTIN 800 MG: 400 CAPSULE ORAL at 21:47

## 2022-09-10 RX ADMIN — LEVOTHYROXINE SODIUM 100 MCG: 100 TABLET ORAL at 05:41

## 2022-09-10 RX ADMIN — IPRATROPIUM BROMIDE AND ALBUTEROL SULFATE 3 ML: 2.5; .5 SOLUTION RESPIRATORY (INHALATION) at 13:39

## 2022-09-10 RX ADMIN — MORPHINE SULFATE 2 MG: 2 INJECTION, SOLUTION INTRAMUSCULAR; INTRAVENOUS at 10:02

## 2022-09-10 RX ADMIN — DILTIAZEM HYDROCHLORIDE 180 MG: 180 CAPSULE, COATED, EXTENDED RELEASE ORAL at 09:30

## 2022-09-10 RX ADMIN — PREDNISONE 40 MG: 20 TABLET ORAL at 09:29

## 2022-09-10 NOTE — PROGRESS NOTES
[address] [facility]  Progress Note - Sana Childress 1950, 81 y o  female MRN: [id number]  Unit/Bed#: E5 -01 Encounter: [id number]  Primary Care Provider: Alberto Gongora DO   Date and time admitted to hospital: 9/7/2022  1:28 PM    * Acute exacerbation of chronic obstructive pulmonary disease (COPD) ([facility] [address] )  Assessment & Plan  Presented to the hospital with reports of SOB for 2 days prior to admission  PMH of chronic respiratory failure on 2-3LNC, moderate persistent asthma, PASHA on CPAP, congestive heart failure with pEF, ongoing nicotine abuse  Patient has been admitted or seen in the ER 12 times over the past year  Typically follows with Dr Fanny Keith at [address] [facility]  · Chest x-ray without infiltrate  · Procalcitonin x2 negative- abxs dcd   · C/w scheduled budesonide BID and duonebs TID  · Transitioned to PO prednisone 40mg daily taper   · respiratory protocol  · Tobacco cessation   · Currently at baseline o2 needs   · S/p lasix yesterday with mild improvement, repeat dose today      Chronic diastolic (congestive) heart failure (HCC)  Assessment & Plan  Wt Readings from Last 3 Encounters:   09/10/22 101 kg (223 lb 12 3 oz)   07/31/22 99 2 kg (218 lb 11 1 oz)   06/21/22 100 kg (220 lb 7 4 oz)     · Last echo 2/22 showing EF 65% and G1DD   ·   · Repeat IV dose of lasix   · Monitor daily weights and I&O        Chronic respiratory failure with hypoxia (HCC)  Assessment & Plan  · chronic oxygen at 2-3 L  · Follows with Dr Fanny Keith with [address] [facility] outpatient- poor outpatient f/u noted   · Monitor and watch for increasing oxygen requirement     Obesity (BMI 35 0-39 9 without comorbidity)  Assessment & Plan  · BMI 39 40  · Encourage diet and exercise     Paroxysmal atrial fibrillation ([facility] )  Assessment & Plan  · Fully anticoagulated on elequis   · Rate controlled on Cardizem      PASHA on CPAP  Assessment & Plan  · Pt refused CPAP overnight   · C/w encouraging compliance     Lumbar stenosis  Assessment & Plan  S/p T9-11 fusion with T9-10 discectomy in 2018   · Pt reports ongoing discomfort and pain  · improvement with lidocaine patch in the past- ordered this admission   · C/w PRN tramadol   · D/c IV morphine       Tobacco use  Assessment & Plan  · Continue nicotine patch daily  · Discussed cessation- pt reports she's not ready to quit completely but maybe wean to 1 pack per month     Type 2 diabetes mellitus with hyperglycemia [facility])  Assessment & Plan  Lab Results   Component Value Date    HGBA1C 6 3 (H) 2022     · A1c is well controlled at 6 3  · C/w sliding scale insulin and accuchecks   · Watch for steroid induced hyperglycemia    HIV (human immunodeficiency virus infection)   Assessment & Plan  · Stable  · Continue HAART        VTE Pharmacologic Prophylaxis: VTE Score: 5 High Risk (Score >/= 5) - Pharmacological DVT Prophylaxis Ordered: apixaban (Eliquis)  Sequential Compression Devices Ordered  Patient Centered Rounds: I performed bedside rounds with nursing staff today  Discussions with Specialists or Other Care Team Provider: d/w RN     Education and Discussions with Family / Patient: Patient declined call to   Time Spent for Care: 30 minutes  More than 50% of total time spent on counseling and coordination of care as described above  Current Length of Stay: 3 day(s)  Current Patient Status: Inpatient   Certification Statement: The patient will continue to require additional inpatient hospital stay due to repeat doses of IV lasix   Discharge Plan: Anticipate discharge tomorrow to home  Code Status: Level 1 - Full Code    Subjective:   pt reports feeling slightly better in regards to her breathing today  Notes swelling in legs  Denies any current pain in the back after receiving morphine   No other complaints     Objective:     Vitals:   Temp (24hrs), Av 7 °F (36 5 °C), Min:97 6 °F (36 4 °C), Max:97 7 °F (36 5 °C)    Temp:  [97 6 °F (36 4 °C)-97 7 °F (36 5 °C)] 97 7 °F (36 5 °C)  HR:  [83-92] 92  Resp:  [18] 18  BP: (112-137)/(76-84) 137/84  SpO2:  [95 %-97 %] 95 %  Body mass index is 40 93 kg/m²  Input and Output Summary (last 24 hours): Intake/Output Summary (Last 24 hours) at 9/10/2022 1207  Last data filed at 9/9/2022 1701  Gross per 24 hour   Intake 340 ml   Output 400 ml   Net -60 ml       Physical Exam:   Physical Exam  Vitals and nursing note reviewed  Constitutional:       General: She is not in acute distress  Appearance: She is morbidly obese  She is not ill-appearing  HENT:      Head: Normocephalic and atraumatic  Eyes:      Conjunctiva/sclera: Conjunctivae normal    Cardiovascular:      Rate and Rhythm: Normal rate and regular rhythm  Heart sounds: Normal heart sounds  No murmur heard  Pulmonary:      Effort: Pulmonary effort is normal  No respiratory distress  Breath sounds: Wheezing and rales present  Comments: 2LNC  Abdominal:      General: Bowel sounds are normal  There is no distension  Palpations: Abdomen is soft  Tenderness: There is no abdominal tenderness  Musculoskeletal:         General: Swelling present  Cervical back: Neck supple  Right lower leg: Edema (moderate) present  Left lower leg: Edema (moderate) present  Skin:     General: Skin is warm and dry  Neurological:      Mental Status: She is alert  Mental status is at baseline     Psychiatric:         Mood and Affect: Mood normal           Additional Data:     Labs:  Results from last 7 days   Lab Units 09/10/22  0744 09/09/22  0954   WBC Thousand/uL 10 87* 13 36*   HEMOGLOBIN g/dL 10 2* 10 2*   HEMATOCRIT % 32 2* 33 6*   PLATELETS Thousands/uL 328 338   NEUTROS PCT %  --  86*   LYMPHS PCT %  --  8*   MONOS PCT %  --  5   EOS PCT %  --  0     Results from last 7 days   Lab Units 09/10/22  0556 09/08/22  0540 09/07/22  1419   SODIUM mmol/L 140   < > 147   POTASSIUM mmol/L 4 6   < > 3 5   CHLORIDE mmol/L 107   < > 108   CO2 mmol/L 25   < > 28   BUN mg/dL 26*   < > 16   CREATININE mg/dL 0 83   < > 1 04   ANION GAP mmol/L 8   < > 11   CALCIUM mg/dL 10 2*   < > 10 2*   ALBUMIN g/dL  --   --  3 8   TOTAL BILIRUBIN mg/dL  --   --  0 30   ALK PHOS U/L  --   --  87   ALT U/L  --   --  20   AST U/L  --   --  10   GLUCOSE RANDOM mg/dL 121   < > 106    < > = values in this interval not displayed           Results from last 7 days   Lab Units 09/10/22  1100 09/10/22  0735 09/09/22  2120 09/09/22  1629 09/09/22  1042   POC GLUCOSE mg/dl 105 104 238* 125 188*         Results from last 7 days   Lab Units 09/08/22  0540 09/07/22  1821   PROCALCITONIN ng/ml <0 05 <0 05       Lines/Drains:  Invasive Devices  Report    Peripheral Intravenous Line  Duration           Peripheral IV 09/07/22 Left Arm 2 days                      Imaging: Reviewed radiology reports from this admission including: chest xray    Recent Cultures (last 7 days):         Last 24 Hours Medication List:   Current Facility-Administered Medications   Medication Dose Route Frequency Provider Last Rate    acetaminophen  975 mg Oral Q8H PRN Vielka ConquestLUIS M      albuterol  2 5 mg Nebulization Q4H PRN Susie Sweet Prechtel, DO      apixaban  5 mg Oral BID Aaron S Prechtel, DO      atorvastatin  20 mg Oral Daily Aaron S Prechtel, DO      bictegravir-emtricitab-tenofovir alafenamide  1 tablet Oral Daily Aaron S Prechtel, DO      budesonide  0 5 mg Nebulization Q12H LUIS M Crawford      diltiazem  180 mg Oral Daily Aaron S Prechtel, DO      furosemide  40 mg Intravenous Daily Vielka ConLUIS M conley      gabapentin  800 mg Oral TID Aaron S Prechtel, DO      insulin lispro  1-5 Units Subcutaneous TID LUIS M Rivera      ipratropium-albuterol  3 mL Nebulization TID Thom Snowden MD      levothyroxine  100 mcg Oral Early Morning Susie Sweet Prechtel, DO      lidocaine  1 patch Topical Daily Vielka ConquestLUIS M      melatonin  6 mg Oral HS Fabrizio Richard, LEA      montelukast  10 mg Oral HS  Nai Carrillo, DO      nicotine  1 patch Transdermal Daily Aaron CIFUENTES Precana, DO      predniSONE  40 mg Oral Daily Nancy Forbes, DO      traMADol  50 mg Oral Q6H PRN Constance Judd PA-C          Today, Patient Was Seen By: LUIS M Ma    **Please Note: This note may have been constructed using a voice recognition system  **

## 2022-09-10 NOTE — PLAN OF CARE
Problem: PAIN - ADULT  Goal: Verbalizes/displays adequate comfort level or baseline comfort level  Description: Interventions:  - Encourage patient to monitor pain and request assistance  - Assess pain using appropriate pain scale  - Administer analgesics based on type and severity of pain and evaluate response  - Implement non-pharmacological measures as appropriate and evaluate response  - Consider cultural and social influences on pain and pain management  - Notify physician/advanced practitioner if interventions unsuccessful or patient reports new pain  Outcome: Progressing     Problem: INFECTION - ADULT  Goal: Absence or prevention of progression during hospitalization  Description: INTERVENTIONS:  - Assess and monitor for signs and symptoms of infection  - Monitor lab/diagnostic results  - Monitor all insertion sites, i e  indwelling lines, tubes, and drains  - Monitor endotracheal if appropriate and nasal secretions for changes in amount and color  - [place] appropriate cooling/warming therapies per order  - Administer medications as ordered  - Instruct and encourage patient and family to use good hand hygiene technique  - Identify and instruct in appropriate isolation precautions for identified infection/condition  Outcome: Progressing  Goal: Absence of fever/infection during neutropenic period  Description: INTERVENTIONS:  - Monitor WBC    Outcome: Progressing     Problem: SAFETY ADULT  Goal: Patient will remain free of falls  Description: INTERVENTIONS:  - Educate patient/family on patient safety including physical limitations  - Instruct patient to call for assistance with activity   - Consult OT/PT to assist with strengthening/mobility   - Keep Call bell within reach  - Keep bed low and locked with side rails adjusted as appropriate  - Keep care items and personal belongings within reach  - Initiate and maintain comfort rounds  - Make Fall Risk Sign visible to staff  - Offer Toileting every 2  Hours, in advance of need  - Initiate/Maintain bed alarm  - Obtain necessary fall risk management equipment: socks  - Apply yellow socks and bracelet for high fall risk patients  - Consider moving patient to room near nurses station  Outcome: Progressing     Problem: CARDIOVASCULAR - ADULT  Goal: Maintains optimal cardiac output and hemodynamic stability  Description: INTERVENTIONS:  - Monitor I/O, vital signs and rhythm  - Monitor for S/S and trends of decreased cardiac output  - Administer and titrate ordered vasoactive medications to optimize hemodynamic stability  - Assess quality of pulses, skin color and temperature  - Assess for signs of decreased coronary artery perfusion  - Instruct patient to report change in severity of symptoms  Outcome: Progressing  Goal: Absence of cardiac dysrhythmias or at baseline rhythm  Description: INTERVENTIONS:  - Continuous cardiac monitoring, vital signs, obtain 12 lead EKG if ordered  - Administer antiarrhythmic and heart rate control medications as ordered  - Monitor electrolytes and administer replacement therapy as ordered  Outcome: Progressing     Problem: RESPIRATORY - ADULT  Goal: Achieves optimal ventilation and oxygenation  Description: INTERVENTIONS:  - Assess for changes in respiratory status  - Assess for changes in mentation and behavior  - Position to facilitate oxygenation and minimize respiratory effort  - Oxygen administered by appropriate delivery if ordered  - Initiate smoking cessation education as indicated  - Encourage broncho-pulmonary hygiene including cough, deep breathe, Incentive Spirometry  - Assess the need for suctioning and aspirate as needed  - Assess and instruct to report SOB or any respiratory difficulty  - Respiratory Therapy support as indicated  Outcome: Progressing     Problem: GASTROINTESTINAL - ADULT  Goal: Minimal or absence of nausea and/or vomiting  Description: INTERVENTIONS:  - Administer IV fluids if ordered to ensure adequate hydration  - Maintain NPO status until nausea and vomiting are resolved  - Nasogastric tube if ordered  - Administer ordered antiemetic medications as needed  - Provide nonpharmacologic comfort measures as appropriate  - Advance diet as tolerated, if ordered  - Consider nutrition services referral to assist patient with adequate nutrition and appropriate food choices  Outcome: Progressing  Goal: Maintains or returns to baseline bowel function  Description: INTERVENTIONS:  - Assess bowel function  - Encourage oral fluids to ensure adequate hydration  - Administer IV fluids if ordered to ensure adequate hydration  - Administer ordered medications as needed  - Encourage mobilization and activity  - Consider nutritional services referral to assist patient with adequate nutrition and appropriate food choices  Outcome: Progressing     Problem: METABOLIC, FLUID AND ELECTROLYTES - ADULT  Goal: Electrolytes maintained within normal limits  Description: INTERVENTIONS:  - Monitor labs and assess patient for signs and symptoms of electrolyte imbalances  - Administer electrolyte replacement as ordered  - Monitor response to electrolyte replacements, including repeat lab results as appropriate  - Instruct patient on fluid and nutrition as appropriate  Outcome: Progressing  Goal: Fluid balance maintained  Description: INTERVENTIONS:  - Monitor labs   - Monitor I/O and WT  - Instruct patient on fluid and nutrition as appropriate  - Assess for signs & symptoms of volume excess or deficit  Outcome: Progressing  Goal: Glucose maintained within target range  Description: INTERVENTIONS:  - Monitor Blood Glucose as ordered  - Assess for signs and symptoms of hyperglycemia and hypoglycemia  - Administer ordered medications to maintain glucose within target range  - Assess nutritional intake and initiate nutrition service referral as needed  Outcome: Progressing     Problem: HEMATOLOGIC - ADULT  Goal: Maintains hematologic stability  Description: INTERVENTIONS  - Assess for signs and symptoms of bleeding or hemorrhage  - Monitor labs  - Administer supportive blood products/factors as ordered and appropriate  Outcome: Progressing     Problem: Potential for Falls  Goal: Patient will remain free of falls  Description: INTERVENTIONS:  - Educate patient/family on patient safety including physical limitations  - Instruct patient to call for assistance with activity   - Consult OT/PT to assist with strengthening/mobility   - Keep Call bell within reach  - Keep bed low and locked with side rails adjusted as appropriate  - Keep care items and personal belongings within reach  - Initiate and maintain comfort rounds  - Make Fall Risk Sign visible to staff  - Offer Toileting every 2 Hours, in advance of need  - Initiate/Maintain bed alarm  - Obtain necessary fall risk management equipment: socks  - Apply yellow socks and bracelet for high fall risk patients  - Consider moving patient to room near nurses station  Outcome: Progressing

## 2022-09-10 NOTE — PLAN OF CARE
Problem: PAIN - ADULT  Goal: Verbalizes/displays adequate comfort level or baseline comfort level  Description: Interventions:  - Encourage patient to monitor pain and request assistance  - Assess pain using appropriate pain scale  - Administer analgesics based on type and severity of pain and evaluate response  - Implement non-pharmacological measures as appropriate and evaluate response  - Consider cultural and social influences on pain and pain management  - Notify physician/advanced practitioner if interventions unsuccessful or patient reports new pain  Outcome: Progressing     Problem: INFECTION - ADULT  Goal: Absence or prevention of progression during hospitalization  Description: INTERVENTIONS:  - Assess and monitor for signs and symptoms of infection  - Monitor lab/diagnostic results  - Monitor all insertion sites, i e  indwelling lines, tubes, and drains  - Monitor endotracheal if appropriate and nasal secretions for changes in amount and color  - [place] appropriate cooling/warming therapies per order  - Administer medications as ordered  - Instruct and encourage patient and family to use good hand hygiene technique  - Identify and instruct in appropriate isolation precautions for identified infection/condition  Outcome: Progressing  Goal: Absence of fever/infection during neutropenic period  Description: INTERVENTIONS:  - Monitor WBC    Outcome: Progressing     Problem: SAFETY ADULT  Goal: Patient will remain free of falls  Description: INTERVENTIONS:  - Educate patient/family on patient safety including physical limitations  - Instruct patient to call for assistance with activity   - Consult OT/PT to assist with strengthening/mobility   - Keep Call bell within reach  - Keep bed low and locked with side rails adjusted as appropriate  - Keep care items and personal belongings within reach  - Initiate and maintain comfort rounds  - Make Fall Risk Sign visible to staff  - Apply yellow socks and bracelet for high fall risk patients  - Consider moving patient to room near nurses station  Outcome: Progressing     Problem: CARDIOVASCULAR - ADULT  Goal: Maintains optimal cardiac output and hemodynamic stability  Description: INTERVENTIONS:  - Monitor I/O, vital signs and rhythm  - Monitor for S/S and trends of decreased cardiac output  - Administer and titrate ordered vasoactive medications to optimize hemodynamic stability  - Assess quality of pulses, skin color and temperature  - Assess for signs of decreased coronary artery perfusion  - Instruct patient to report change in severity of symptoms  Outcome: Progressing  Goal: Absence of cardiac dysrhythmias or at baseline rhythm  Description: INTERVENTIONS:  - Continuous cardiac monitoring, vital signs, obtain 12 lead EKG if ordered  - Administer antiarrhythmic and heart rate control medications as ordered  - Monitor electrolytes and administer replacement therapy as ordered  Outcome: Progressing     Problem: RESPIRATORY - ADULT  Goal: Achieves optimal ventilation and oxygenation  Description: INTERVENTIONS:  - Assess for changes in respiratory status  - Assess for changes in mentation and behavior  - Position to facilitate oxygenation and minimize respiratory effort  - Oxygen administered by appropriate delivery if ordered  - Initiate smoking cessation education as indicated  - Encourage broncho-pulmonary hygiene including cough, deep breathe, Incentive Spirometry  - Assess the need for suctioning and aspirate as needed  - Assess and instruct to report SOB or any respiratory difficulty  - Respiratory Therapy support as indicated  Outcome: Progressing     Problem: GASTROINTESTINAL - ADULT  Goal: Minimal or absence of nausea and/or vomiting  Description: INTERVENTIONS:  - Administer IV fluids if ordered to ensure adequate hydration  - Maintain NPO status until nausea and vomiting are resolved  - Nasogastric tube if ordered  - Administer ordered antiemetic medications as needed  - Provide nonpharmacologic comfort measures as appropriate  - Advance diet as tolerated, if ordered  - Consider nutrition services referral to assist patient with adequate nutrition and appropriate food choices  Outcome: Progressing  Goal: Maintains or returns to baseline bowel function  Description: INTERVENTIONS:  - Assess bowel function  - Encourage oral fluids to ensure adequate hydration  - Administer IV fluids if ordered to ensure adequate hydration  - Administer ordered medications as needed  - Encourage mobilization and activity  - Consider nutritional services referral to assist patient with adequate nutrition and appropriate food choices  Outcome: Progressing     Problem: METABOLIC, FLUID AND ELECTROLYTES - ADULT  Goal: Electrolytes maintained within normal limits  Description: INTERVENTIONS:  - Monitor labs and assess patient for signs and symptoms of electrolyte imbalances  - Administer electrolyte replacement as ordered  - Monitor response to electrolyte replacements, including repeat lab results as appropriate  - Instruct patient on fluid and nutrition as appropriate  Outcome: Progressing  Goal: Fluid balance maintained  Description: INTERVENTIONS:  - Monitor labs   - Monitor I/O and WT  - Instruct patient on fluid and nutrition as appropriate  - Assess for signs & symptoms of volume excess or deficit  Outcome: Progressing  Goal: Glucose maintained within target range  Description: INTERVENTIONS:  - Monitor Blood Glucose as ordered  - Assess for signs and symptoms of hyperglycemia and hypoglycemia  - Administer ordered medications to maintain glucose within target range  - Assess nutritional intake and initiate nutrition service referral as needed  Outcome: Progressing     Problem: HEMATOLOGIC - ADULT  Goal: Maintains hematologic stability  Description: INTERVENTIONS  - Assess for signs and symptoms of bleeding or hemorrhage  - Monitor labs  - Administer supportive blood products/factors as ordered and appropriate  Outcome: Progressing     Problem: Potential for Falls  Goal: Patient will remain free of falls  Description: INTERVENTIONS:  - Educate patient/family on patient safety including physical limitations  - Instruct patient to call for assistance with activity   - Consult OT/PT to assist with strengthening/mobility   - Keep Call bell within reach  - Keep bed low and locked with side rails adjusted as appropriate  - Keep care items and personal belongings within reach  - Initiate and maintain comfort rounds  - Make Fall Risk Sign visible to staff  - Apply yellow socks and bracelet for high fall risk patients  - Consider moving patient to room near nurses station  Outcome: Progressing

## 2022-09-11 PROBLEM — I50.33 ACUTE ON CHRONIC DIASTOLIC HEART FAILURE (HCC): Status: ACTIVE | Noted: 2022-06-24

## 2022-09-11 LAB
ANION GAP SERPL CALCULATED.3IONS-SCNC: 8 MMOL/L (ref 4–13)
BUN SERPL-MCNC: 25 MG/DL (ref 5–25)
CALCIUM SERPL-MCNC: 10.3 MG/DL (ref 8.3–10.1)
CHLORIDE SERPL-SCNC: 106 MMOL/L (ref 96–108)
CO2 SERPL-SCNC: 31 MMOL/L (ref 21–32)
CREAT SERPL-MCNC: 0.93 MG/DL (ref 0.6–1.3)
GFR SERPL CREATININE-BSD FRML MDRD: 62 ML/MIN/1.73SQ M
GLUCOSE SERPL-MCNC: 110 MG/DL (ref 65–140)
GLUCOSE SERPL-MCNC: 125 MG/DL (ref 65–140)
GLUCOSE SERPL-MCNC: 181 MG/DL (ref 65–140)
GLUCOSE SERPL-MCNC: 211 MG/DL (ref 65–140)
GLUCOSE SERPL-MCNC: 90 MG/DL (ref 65–140)
NT-PROBNP SERPL-MCNC: 154 PG/ML
POTASSIUM SERPL-SCNC: 4.1 MMOL/L (ref 3.5–5.3)
SODIUM SERPL-SCNC: 145 MMOL/L (ref 135–147)

## 2022-09-11 PROCEDURE — 99232 SBSQ HOSP IP/OBS MODERATE 35: CPT | Performed by: NURSE PRACTITIONER

## 2022-09-11 PROCEDURE — 94640 AIRWAY INHALATION TREATMENT: CPT

## 2022-09-11 PROCEDURE — 99232 SBSQ HOSP IP/OBS MODERATE 35: CPT | Performed by: INTERNAL MEDICINE

## 2022-09-11 PROCEDURE — 80048 BASIC METABOLIC PNL TOTAL CA: CPT | Performed by: NURSE PRACTITIONER

## 2022-09-11 PROCEDURE — 82948 REAGENT STRIP/BLOOD GLUCOSE: CPT

## 2022-09-11 PROCEDURE — 83880 ASSAY OF NATRIURETIC PEPTIDE: CPT | Performed by: NURSE PRACTITIONER

## 2022-09-11 RX ORDER — PREDNISONE 20 MG/1
20 TABLET ORAL DAILY
Status: DISCONTINUED | OUTPATIENT
Start: 2022-09-16 | End: 2022-09-14

## 2022-09-11 RX ORDER — PREDNISONE 20 MG/1
40 TABLET ORAL DAILY
Status: COMPLETED | OUTPATIENT
Start: 2022-09-12 | End: 2022-09-12

## 2022-09-11 RX ORDER — PREDNISONE 10 MG/1
10 TABLET ORAL DAILY
Status: DISCONTINUED | OUTPATIENT
Start: 2022-09-19 | End: 2022-09-14

## 2022-09-11 RX ORDER — FUROSEMIDE 10 MG/ML
40 INJECTION INTRAMUSCULAR; INTRAVENOUS
Status: DISCONTINUED | OUTPATIENT
Start: 2022-09-11 | End: 2022-09-12

## 2022-09-11 RX ADMIN — TRAMADOL HYDROCHLORIDE 50 MG: 50 TABLET, COATED ORAL at 21:57

## 2022-09-11 RX ADMIN — BUDESONIDE 0.5 MG: 0.5 INHALANT ORAL at 07:45

## 2022-09-11 RX ADMIN — BUDESONIDE 0.5 MG: 0.5 INHALANT ORAL at 19:36

## 2022-09-11 RX ADMIN — APIXABAN 5 MG: 5 TABLET, FILM COATED ORAL at 09:25

## 2022-09-11 RX ADMIN — FUROSEMIDE 40 MG: 10 INJECTION, SOLUTION INTRAVENOUS at 11:35

## 2022-09-11 RX ADMIN — MELATONIN 6 MG: at 22:02

## 2022-09-11 RX ADMIN — TRAMADOL HYDROCHLORIDE 50 MG: 50 TABLET, COATED ORAL at 16:52

## 2022-09-11 RX ADMIN — GABAPENTIN 800 MG: 400 CAPSULE ORAL at 21:57

## 2022-09-11 RX ADMIN — GABAPENTIN 800 MG: 400 CAPSULE ORAL at 16:45

## 2022-09-11 RX ADMIN — DILTIAZEM HYDROCHLORIDE 180 MG: 180 CAPSULE, COATED, EXTENDED RELEASE ORAL at 09:26

## 2022-09-11 RX ADMIN — LEVOTHYROXINE SODIUM 100 MCG: 100 TABLET ORAL at 05:28

## 2022-09-11 RX ADMIN — PREDNISONE 40 MG: 20 TABLET ORAL at 09:26

## 2022-09-11 RX ADMIN — GABAPENTIN 800 MG: 400 CAPSULE ORAL at 09:25

## 2022-09-11 RX ADMIN — LIDOCAINE 1 PATCH: 50 PATCH CUTANEOUS at 09:25

## 2022-09-11 RX ADMIN — MONTELUKAST SODIUM 10 MG: 10 TABLET, COATED ORAL at 21:57

## 2022-09-11 RX ADMIN — IPRATROPIUM BROMIDE AND ALBUTEROL SULFATE 3 ML: 2.5; .5 SOLUTION RESPIRATORY (INHALATION) at 19:36

## 2022-09-11 RX ADMIN — IPRATROPIUM BROMIDE AND ALBUTEROL SULFATE 3 ML: 2.5; .5 SOLUTION RESPIRATORY (INHALATION) at 13:18

## 2022-09-11 RX ADMIN — TRAMADOL HYDROCHLORIDE 50 MG: 50 TABLET, COATED ORAL at 09:24

## 2022-09-11 RX ADMIN — FUROSEMIDE 40 MG: 10 INJECTION, SOLUTION INTRAVENOUS at 16:46

## 2022-09-11 RX ADMIN — APIXABAN 5 MG: 5 TABLET, FILM COATED ORAL at 17:40

## 2022-09-11 RX ADMIN — IPRATROPIUM BROMIDE AND ALBUTEROL SULFATE 3 ML: 2.5; .5 SOLUTION RESPIRATORY (INHALATION) at 07:45

## 2022-09-11 RX ADMIN — ATORVASTATIN CALCIUM 20 MG: 20 TABLET, FILM COATED ORAL at 09:25

## 2022-09-11 RX ADMIN — BICTEGRAVIR SODIUM, EMTRICITABINE, AND TENOFOVIR ALAFENAMIDE FUMARATE 1 TABLET: 50; 200; 25 TABLET ORAL at 09:25

## 2022-09-11 RX ADMIN — INSULIN LISPRO 1 UNITS: 100 INJECTION, SOLUTION INTRAVENOUS; SUBCUTANEOUS at 16:46

## 2022-09-11 NOTE — PROGRESS NOTES
[facility]  Progress Note - Seferino Bruni 1950, 81 y o  female MRN: [id number]  Unit/Bed#: E5 -01 Encounter: [id number]  Primary Care Provider: Tegan Huggins DO   Date and time admitted to hospital: 9/7/2022  1:28 PM    * Acute exacerbation of chronic obstructive pulmonary disease (COPD) ([facility] )  Assessment & Plan  Presented to the hospital with reports of SOB for 2 days prior to admission  PMH of chronic respiratory failure on 2-3LNC, moderate persistent asthma, PASHA on CPAP, congestive heart failure with pEF, ongoing nicotine abuse  Patient has been admitted or seen in the ER 12 times over the past year  Typically follows with Dr Melba Quinones at [address]  · Suspect a/c heart failure contributing to worsening respiratory status   · Chest x-ray without infiltrate  · Procalcitonin x2 negative- abxs dcd   · C/w scheduled budesonide BID and duonebs TID  · Transitioned to PO prednisone 40mg daily day #2 taper by 10mg q3d  · respiratory protocol  · Tobacco cessation   · Currently at baseline o2 needs     Acute on chronic diastolic heart failure (HCC)  Assessment & Plan  Wt Readings from Last 3 Encounters:   09/11/22 102 kg (224 lb 6 9 oz)   07/31/22 99 2 kg (218 lb 11 1 oz)   06/21/22 100 kg (220 lb 7 4 oz)     · Last echo 2/22 showing EF 65% and G1DD   ·  on admission   · Lasix 40mg IV x2 days and weights are now increasing and lower extremity edema worsening  Will increase lasix to 40mg IV BID   · Modify diet to cardiac 1800ml FR  · Consult cardiology   · Monitor daily weights and I&O        Chronic respiratory failure with hypoxia (HCC)  Assessment & Plan  · chronic oxygen at 2-3 L    · [facility] [address] [facility] [address] [facility]- poor outpatient f/u noted   · Monitor and watch for increasing oxygen requirement     Obesity (BMI 35 0-39 9 without comorbidity)  Assessment & Plan  · BMI 39 40  · Encourage diet and exercise     Paroxysmal atrial fibrillation ([facility] )  Assessment & Plan  · Fully anticoagulated on elequis   · Rate controlled on Cardizem  PASHA on CPAP  Assessment & Plan  · C/w encouraging compliance     Lumbar stenosis  Assessment & Plan  S/p T9-11 fusion with T9-10 discectomy in 2018   · Pt reports ongoing discomfort and pain  · improvement with lidocaine patch in the past- ordered this admission   · C/w PRN tramadol   · D/cd IV morphine       Tobacco use  Assessment & Plan  · Continue nicotine patch daily  · Discussed cessation- pt reports she's not ready to quit completely but maybe wean to 1 pack per month     Type 2 diabetes mellitus with hyperglycemia [facility])  Assessment & Plan  Lab Results   Component Value Date    HGBA1C 6 3 (H) 06/21/2022     · A1c is well controlled at 6 3  · C/w sliding scale insulin and accuchecks   · Watch for steroid induced hyperglycemia    HIV (human immunodeficiency virus infection)   Assessment & Plan  · Stable  · Continue HAART        VTE Pharmacologic Prophylaxis: VTE Score: 5 High Risk (Score >/= 5) - Pharmacological DVT Prophylaxis Ordered: apixaban (Eliquis)  Sequential Compression Devices Ordered  Patient Centered Rounds: I performed bedside rounds with nursing staff today  Discussions with Specialists or Other Care Team Provider: d/w RN     Education and Discussions with Family / Patient: Patient declined call to   Time Spent for Care: 30 minutes  More than 50% of total time spent on counseling and coordination of care as described above  Current Length of Stay: 4 day(s)  Current Patient Status: Inpatient   Certification Statement: The patient will continue to require additional inpatient hospital stay due to iv diuretics '  Discharge Plan: Anticipate discharge in 24-48 hrs to discharge location to be determined pending rehab evaluations  Code Status: Level 1 - Full Code    Subjective:   Pt sitting on the edge of the bed  She reports she feels the same   Reports urinating all throughout the  Night after the lasix  Patient reports worsening lower extremity edema  Pt and RN also reporting weakness and unsteadiness in gait  +chronic back pain  Denies any other complaints  Objective:     Vitals:   Temp (24hrs), Av 2 °F (36 8 °C), Min:97 7 °F (36 5 °C), Max:99 °F (37 2 °C)    Temp:  [97 7 °F (36 5 °C)-99 °F (37 2 °C)] 97 9 °F (36 6 °C)  HR:  [] 97  Resp:  [18] 18  BP: (102-124)/(67-83) 116/67  SpO2:  [95 %-98 %] 96 %  Body mass index is 41 05 kg/m²  Input and Output Summary (last 24 hours):   No intake or output data in the 24 hours ending 22 1152    Physical Exam:   Physical Exam  Vitals and nursing note reviewed  Constitutional:       General: She is not in acute distress  Appearance: She is morbidly obese  Cardiovascular:      Rate and Rhythm: Normal rate and regular rhythm  Heart sounds: Normal heart sounds  No murmur heard  Pulmonary:      Effort: Pulmonary effort is normal  No respiratory distress  Breath sounds: Rales present  Comments: 2LNC  Abdominal:      General: Bowel sounds are normal  There is no distension  Palpations: Abdomen is soft  Tenderness: There is no abdominal tenderness  Musculoskeletal:         General: Swelling present  Right lower leg: Edema (moderate pitting) present  Left lower leg: Edema (moderate pitting ) present  Skin:     General: Skin is warm and dry  Neurological:      Mental Status: She is alert  Mental status is at baseline     Psychiatric:         Mood and Affect: Mood normal           Additional Data:     Labs:  Results from last 7 days   Lab Units 09/10/22  0744 22  0954   WBC Thousand/uL 10 87* 13 36*   HEMOGLOBIN g/dL 10 2* 10 2*   HEMATOCRIT % 32 2* 33 6*   PLATELETS Thousands/uL 328 338   NEUTROS PCT %  --  86*   LYMPHS PCT %  --  8*   MONOS PCT %  --  5   EOS PCT %  --  0     Results from last 7 days   Lab Units 09/10/22  0556 22  0540 22  1419   SODIUM mmol/L 140   < > 147   POTASSIUM mmol/L 4 6   < > 3 5   CHLORIDE mmol/L 107   < > 108   CO2 mmol/L 25   < > 28   BUN mg/dL 26*   < > 16   CREATININE mg/dL 0 83   < > 1 04   ANION GAP mmol/L 8   < > 11   CALCIUM mg/dL 10 2*   < > 10 2*   ALBUMIN g/dL  --   --  3 8   TOTAL BILIRUBIN mg/dL  --   --  0 30   ALK PHOS U/L  --   --  87   ALT U/L  --   --  20   AST U/L  --   --  10   GLUCOSE RANDOM mg/dL 121   < > 106    < > = values in this interval not displayed           Results from last 7 days   Lab Units 09/11/22  1101 09/11/22  0801 09/10/22  2142 09/10/22  1552 09/10/22  1100 09/10/22  0735 09/09/22  2120 09/09/22  1629 09/09/22  1042   POC GLUCOSE mg/dl 110 90 155* 152* 105 104 238* 125 188*         Results from last 7 days   Lab Units 09/08/22  0540 09/07/22  1821   PROCALCITONIN ng/ml <0 05 <0 05       Lines/Drains:  Invasive Devices  Report    Peripheral Intravenous Line  Duration           Peripheral IV 09/07/22 Left Arm 3 days                      Imaging: Reviewed radiology reports from this admission including: chest xray    Recent Cultures (last 7 days):         Last 24 Hours Medication List:   Current Facility-Administered Medications   Medication Dose Route Frequency Provider Last Rate    acetaminophen  975 mg Oral Q8H PRN LUIS M Aldana      albuterol  2 5 mg Nebulization Q4H PRN Beau Odor Prechtel, DO      apixaban  5 mg Oral BID Aaron S Prechtel, DO      atorvastatin  20 mg Oral Daily Aaron S Prechtel, DO      bictegravir-emtricitab-tenofovir alafenamide  1 tablet Oral Daily Aaron S Prechtel, DO      budesonide  0 5 mg Nebulization Q12H LUIS M Joseph      diltiazem  180 mg Oral Daily Aaron S Prechtel, DO      furosemide  40 mg Intravenous BID (diuretic) LUIS M Aldana      gabapentin  800 mg Oral TID Beau Odor Prechtel, DO      insulin lispro  1-5 Units Subcutaneous TID LUIS M Rivera      ipratropium-albuterol  3 mL Nebulization TID MD Nevaeh Yao levothyroxine  100 mcg Oral Early Morning Eleanor Salvage Prechtel, DO      lidocaine  1 patch Topical Daily LUIS M Lind      melatonin  6 mg Oral HS Kathy Rawls PA-C      montelukast  10 mg Oral HS Aaron S Prechtel, DO      nicotine  1 patch Transdermal Daily Aaron S Prechtel, DO      predniSONE  40 mg Oral Daily Nancy Forbes, DO      traMADol  50 mg Oral Q6H PRN Jorge Escobar PA-C          Today, Patient Was Seen By: LUIS M Lind    **Please Note: This note may have been constructed using a voice recognition system  **

## 2022-09-11 NOTE — PLAN OF CARE
Problem: PAIN - ADULT  Goal: Verbalizes/displays adequate comfort level or baseline comfort level  Description: Interventions:  - Encourage patient to monitor pain and request assistance  - Assess pain using appropriate pain scale  - Administer analgesics based on type and severity of pain and evaluate response  - Implement non-pharmacological measures as appropriate and evaluate response  - Consider cultural and social influences on pain and pain management  - Notify physician/advanced practitioner if interventions unsuccessful or patient reports new pain  Outcome: Progressing     Problem: INFECTION - ADULT  Goal: Absence or prevention of progression during hospitalization  Description: INTERVENTIONS:  - Assess and monitor for signs and symptoms of infection  - Monitor lab/diagnostic results  - Monitor all insertion sites, i e  indwelling lines, tubes, and drains  - Monitor endotracheal if appropriate and nasal secretions for changes in amount and color  - [place] appropriate cooling/warming therapies per order  - Administer medications as ordered  - Instruct and encourage patient and family to use good hand hygiene technique  - Identify and instruct in appropriate isolation precautions for identified infection/condition  Outcome: Progressing  Goal: Absence of fever/infection during neutropenic period  Description: INTERVENTIONS:  - Monitor WBC    Outcome: Progressing     Problem: SAFETY ADULT  Goal: Patient will remain free of falls  Description: INTERVENTIONS:  - Educate patient/family on patient safety including physical limitations  - Instruct patient to call for assistance with activity   - Consult OT/PT to assist with strengthening/mobility   - Keep Call bell within reach  - Keep bed low and locked with side rails adjusted as appropriate  - Keep care items and personal belongings within reach  - Initiate and maintain comfort rounds  - Make Fall Risk Sign visible to staff  - Apply yellow socks and bracelet for high fall risk patients  - Consider moving patient to room near nurses station  Outcome: Progressing     Problem: CARDIOVASCULAR - ADULT  Goal: Maintains optimal cardiac output and hemodynamic stability  Description: INTERVENTIONS:  - Monitor I/O, vital signs and rhythm  - Monitor for S/S and trends of decreased cardiac output  - Administer and titrate ordered vasoactive medications to optimize hemodynamic stability  - Assess quality of pulses, skin color and temperature  - Assess for signs of decreased coronary artery perfusion  - Instruct patient to report change in severity of symptoms  Outcome: Progressing  Goal: Absence of cardiac dysrhythmias or at baseline rhythm  Description: INTERVENTIONS:  - Continuous cardiac monitoring, vital signs, obtain 12 lead EKG if ordered  - Administer antiarrhythmic and heart rate control medications as ordered  - Monitor electrolytes and administer replacement therapy as ordered  Outcome: Progressing     Problem: RESPIRATORY - ADULT  Goal: Achieves optimal ventilation and oxygenation  Description: INTERVENTIONS:  - Assess for changes in respiratory status  - Assess for changes in mentation and behavior  - Position to facilitate oxygenation and minimize respiratory effort  - Oxygen administered by appropriate delivery if ordered  - Initiate smoking cessation education as indicated  - Encourage broncho-pulmonary hygiene including cough, deep breathe, Incentive Spirometry  - Assess the need for suctioning and aspirate as needed  - Assess and instruct to report SOB or any respiratory difficulty  - Respiratory Therapy support as indicated  Outcome: Progressing     Problem: GASTROINTESTINAL - ADULT  Goal: Minimal or absence of nausea and/or vomiting  Description: INTERVENTIONS:  - Administer IV fluids if ordered to ensure adequate hydration  - Maintain NPO status until nausea and vomiting are resolved  - Nasogastric tube if ordered  - Administer ordered antiemetic medications as needed  - Provide nonpharmacologic comfort measures as appropriate  - Advance diet as tolerated, if ordered  - Consider nutrition services referral to assist patient with adequate nutrition and appropriate food choices  Outcome: Progressing  Goal: Maintains or returns to baseline bowel function  Description: INTERVENTIONS:  - Assess bowel function  - Encourage oral fluids to ensure adequate hydration  - Administer IV fluids if ordered to ensure adequate hydration  - Administer ordered medications as needed  - Encourage mobilization and activity  - Consider nutritional services referral to assist patient with adequate nutrition and appropriate food choices  Outcome: Progressing     Problem: METABOLIC, FLUID AND ELECTROLYTES - ADULT  Goal: Electrolytes maintained within normal limits  Description: INTERVENTIONS:  - Monitor labs and assess patient for signs and symptoms of electrolyte imbalances  - Administer electrolyte replacement as ordered  - Monitor response to electrolyte replacements, including repeat lab results as appropriate  - Instruct patient on fluid and nutrition as appropriate  Outcome: Progressing  Goal: Fluid balance maintained  Description: INTERVENTIONS:  - Monitor labs   - Monitor I/O and WT  - Instruct patient on fluid and nutrition as appropriate  - Assess for signs & symptoms of volume excess or deficit  Outcome: Progressing  Goal: Glucose maintained within target range  Description: INTERVENTIONS:  - Monitor Blood Glucose as ordered  - Assess for signs and symptoms of hyperglycemia and hypoglycemia  - Administer ordered medications to maintain glucose within target range  - Assess nutritional intake and initiate nutrition service referral as needed  Outcome: Progressing     Problem: HEMATOLOGIC - ADULT  Goal: Maintains hematologic stability  Description: INTERVENTIONS  - Assess for signs and symptoms of bleeding or hemorrhage  - Monitor labs  - Administer supportive blood products/factors as ordered and appropriate  Outcome: Progressing     Problem: Potential for Falls  Goal: Patient will remain free of falls  Description: INTERVENTIONS:  - Educate patient/family on patient safety including physical limitations  - Instruct patient to call for assistance with activity   - Consult OT/PT to assist with strengthening/mobility   - Keep Call bell within reach  - Keep bed low and locked with side rails adjusted as appropriate  - Keep care items and personal belongings within reach  - Initiate and maintain comfort rounds  - Make Fall Risk Sign visible to staff  - Obtain necessary fall risk management equipment: non skid footwear on and call bell in reach  - Apply yellow socks and bracelet for high fall risk patients  - Consider moving patient to room near nurses station  Outcome: Progressing

## 2022-09-11 NOTE — CONSULTS
Consult - Cardiology   Daniel Flo 81 y o  female MRN: [id number]  Unit/Bed#: E5 -01 Encounter: [id number]        Reason For Consult:  Acute heart failure            ASSESSMENT:  Acute on chronic diastolic congestive heart failure   - NT proBNP 144 on arrival    - chest x-ray on arrival revealed clear lungs, no acute cardiopulmonary disease    - TTE 2/21/22: LVEF 63-07%, grade 1 diastolic dysfunction, trace MR/TR/AR  - stress test completed on 12/9/19: Uniform labeling throughout the LV myocardium with no fixed or reversible perfusion defects  Calculated LVEF 65%  Normal study  Acute COPD exacerbation  Paroxysmal atrial fibrillation   - AV blocker Rx, Cardizem  mg daily  - anticoagulation with Eliquis 5 mg twice daily  Dyslipidemia   - Rx, atorvastatin 20 mg daily  - lipid panel 12/1/21: Cholesterol 195, triglycerides 119, HDL 56,   Chronic hypoxic respiratory failure on home 2-3 L home O2  Moderate persistent asthma  Obstructive sleep apnea, on CPAP  Active tobacco use  Lumbar stenosis  Type 2 diabetes mellitus  Human immunodeficiency virus  Obesity    PLAN/ DISCUSSION:     · Currently on furosemide 40 mg IV twice daily, will continue same and assess response  · Currently on Cardizem  mg daily, atorvastatin 20 mg daily  · Will repeat fasting lipid panel in the morning  · Continue anticoagulation with Eliquis 5 mg twice daily  · Monitor volume status closely with strict intake/output, daily weight  · Monitor renal function and electrolytes; maintain potassium > 4, magnesium > 2  Repeat lab studies ordered from today  In addition, NT proBNP ordered as well  History Of Present Illness:  61-year-old female presented to [facility] with complaints of shortness of breath  Patient had noted 2 days of worsening shortness of breath and wheezing  Patient also required more oxygen at home than she typically requires      Upon assessment and evaluation, patient resting at the edge of the bed comfortably  Patient notes intermittent shortness of breath, especially with ambulation  She denies chest pain, dizziness, lightheadedness, palpitations, syncope, pre-syncope  Please see significant cardiac history and problems enumerated above  Patient has not followed with cardiology as an outpatient for years, will have patient follow-up with  Cardiology on discharge  Past Medical History:        Past Medical History:   Diagnosis Date    Asthma     Bipolar 1 disorder ([place] [address] )     Cardiac disease     COPD (chronic obstructive pulmonary disease) ([place] [address] )     Diabetes mellitus ([place] [address] )     Disease of thyroid gland     HIV (human immunodeficiency virus infection) ([place] [address] ) 03/24/2016    Hypertension     Osteoarthritis     Tobacco abuse       Past Surgical History:   Procedure Laterality Date    HERNIA REPAIR      LUMBAR FUSION N/A 4/10/2018    Procedure: T9/10 discectomy with T9-T11 fusion;  Surgeon: Zelda Vargas MD;  Location: [facility];  Service: Neurosurgery    THYROIDECTOMY          Allergy:        Allergies   Allergen Reactions    Lisinopril Angioedema    Prozac [Fluoxetine Hcl] Rash    Sulfa Antibiotics Itching    Quinine        Medications:       Prior to Admission medications    Medication Sig Start Date End Date Taking?  Authorizing Provider   albuterol (2 5 mg/3 mL) 0 083 % nebulizer solution Take 3 mL (2 5 mg total) by nebulization every 6 (six) hours as needed for wheezing or shortness of breath 6/26/22  Yes Ana Mcneil,    apixaban (ELIQUIS) 5 mg Take 1 tablet by mouth 2 (two) times a day 5/9/22  Yes Historical Provider, MD   atorvastatin (LIPITOR) 20 mg tablet Take 20 mg by mouth daily   Yes Historical Provider, MD   bictegravir-emtricitab-tenofovir alafenamide (Biktarvy) -25 MG tablet Take 1 tablet by mouth daily 5/9/22  Yes Historical Provider, MD   cholecalciferol (VITAMIN D3) 1,000 units tablet Take 1,000 Units by mouth daily   Yes Historical Provider, MD diltiazem (CARDIZEM CD) 180 mg 24 hr capsule Take 180 mg by mouth daily   Yes Historical Provider, MD   gabapentin (NEURONTIN) 400 mg capsule Take 800 mg by mouth 3 (three) times a day     Yes Historical Provider, MD   ibuprofen (MOTRIN) 400 mg tablet Take 1 tablet (400 mg total) by mouth every 6 (six) hours as needed for mild pain or moderate pain 5/27/20  Yes Memo Skelton,    levothyroxine 100 mcg tablet Take 100 mcg by mouth daily  Yes Historical Provider, MD   metFORMIN (GLUCOPHAGE) 500 mg tablet Take 500 mg by mouth daily with breakfast     Yes Historical Provider, MD   montelukast (SINGULAIR) 10 mg tablet Take 10 mg by mouth daily at bedtime   Yes Historical Provider, MD   aspirin 81 MG tablet Take 81 mg by mouth daily  Patient not taking: Reported on 9/7/2022    Historical Provider, MD   fluticasone-salmeterol (ADVAIR) 250-50 mcg/dose Inhale 1 puff every 12 (twelve) hours  Patient not taking: Reported on 9/7/2022    Historical Provider, MD   QUEtiapine (SEROquel) 100 mg tablet Take 200 mg by mouth daily at bedtime    Patient not taking: Reported on 9/7/2022    Historical Provider, MD       Family History:     Family History   Problem Relation Age of Onset    No Known Problems Mother     Diabetes Father     Heart disease Neg Hx     Cancer Neg Hx         Social History:       Social History     Socioeconomic History    Marital status:      Spouse name: None    Number of children: None    Years of education: None    Highest education level: None   Occupational History    None   Tobacco Use    Smoking status: Current Every Day Smoker     Packs/day: 0 20     Years: 45 00     Pack years: 9 00     Types: Cigarettes    Smokeless tobacco: Never Used    Tobacco comment: Currently smoking 1-2 cigarettes a day   Substance and Sexual Activity    Alcohol use:  Yes    Drug use: No     Comment: former IV drug user    Sexual activity: None   Other Topics Concern    None   Social History Narrative    None     Social Determinants of Health     Financial Resource Strain: Not on file   Food Insecurity: Unknown    Worried About Running Out of Food in the Last Year: Never true    Diana of Food in the Last Year: Not on file   Transportation Needs: No Transportation Needs    Lack of Transportation (Medical): No    Lack of Transportation (Non-Medical): No   Physical Activity: Not on file   Stress: Not on file   Social Connections: Not on file   Intimate Partner Violence: Not on file   Housing Stability: Low Risk     Unable to Pay for Housing in the Last Year: No    Number of Places Lived in the Last Year: 1    Unstable Housing in the Last Year: No       ROS:  Negative except otherwise aforementioned above  Remainder review of systems is negative    Exam:  General:  alert, oriented and in no distress, cooperative  Head: Normocephalic, atraumatic  Eyes:  EOMI  Pupils - equal, round, reactive to accomodation  No icterus  Normal Conjunctiva  Oropharynx: moist and normal-appearing mucosa  Neck: supple, symmetrical, trachea midline   Heart: regular rate, regular rhythm, S1, S2   Respiratory effort / Chest Inspection: unlabored  Lungs: rhonchi and rales present, inspiratory wheezing   Abdomen: obese, rounded, normoactive bowel sounds  Lower Limbs: + bilateral lower extremity edema       DATA:      ECG:                               Echocardiogram completed on 2/21/22:           Left Ventricle: Systolic function is normal with an ejection fraction   of 60-65%    No hemodynamically significant valvular abnormalities  Left Ventricle   Left ventricle is normal in size  There is mild hypertrophy  Systolic function is normal with an ejection fraction of 60-65%  Wall motion is within normal limits  There is grade I (mild) diastolic dysfunction       Right Ventricle   Right ventricle cavity is normal  Systolic function is normal      Left Atrium   Left atrium cavity size is normal      Right Atrium Right atrium cavity is normal      IVC/SVC   The inferior vena cava demonstrates a diameter of <=21 mm and collapses >50%; therefore, the right atrial pressure is estimated at 0-5 mmHg  Mitral Valve   The leaflets are moderately thickened  There is trace regurgitation  There is no evidence of mitral valve stenosis  Tricuspid Valve   Tricuspid valve structure is normal  There is trace regurgitation  There is no evidence of tricuspid valve stenosis  Aortic Valve   The aortic valve is trileaflet  There is trace regurgitation  There is no evidence of aortic valve stenosis  Pulmonic Valve   Pulmonic valve structure is normal  There is no regurgitation or stenosis  Ascending Aorta   The aorta appears normal in size  Pericardium   There is no pericardial effusion  Stress test completed on 12/9/19:  There is uniform labeling throughout the left ventricular myocardium with no   fixed or reversible perfusion defects demonstrated  There is normal left   ventricular wall motion and wall thickening  The calculated left ventricular   ejection fraction is 65% which is above the normal lower limit of 50%  Weights: Wt Readings from Last 3 Encounters:   09/11/22 102 kg (224 lb 6 9 oz)   07/31/22 99 2 kg (218 lb 11 1 oz)   06/21/22 100 kg (220 lb 7 4 oz)   , Body mass index is 41 05 kg/m²           Lab Studies:             Results from last 7 days   Lab Units 09/10/22  0744 09/09/22  0954 09/08/22  0540   WBC Thousand/uL 10 87* 13 36* 7 03   HEMOGLOBIN g/dL 10 2* 10 2* 10 6*   HEMATOCRIT % 32 2* 33 6* 33 8*   PLATELETS Thousands/uL 328 338 313   ,   Results from last 7 days   Lab Units 09/10/22  0556 09/09/22  0954 09/08/22  0540 09/07/22  1419   POTASSIUM mmol/L 4 6 4 4 3 7 3 5   CHLORIDE mmol/L 107 107 106 108   CO2 mmol/L 25 24 25 28   BUN mg/dL 26* 21 21 16   CREATININE mg/dL 0 83 0 90 1 10 1 04   CALCIUM mg/dL 10 2* 10 5* 10 6* 10 2*   ALK PHOS U/L  --   --   --  87   ALT U/L  --   --   --  20 AST U/L  --   --   --  10

## 2022-09-11 NOTE — PLAN OF CARE
Problem: PAIN - ADULT  Goal: Verbalizes/displays adequate comfort level or baseline comfort level  Description: Interventions:  - Encourage patient to monitor pain and request assistance  - Assess pain using appropriate pain scale  - Administer analgesics based on type and severity of pain and evaluate response  - Implement non-pharmacological measures as appropriate and evaluate response  - Consider cultural and social influences on pain and pain management  - Notify physician/advanced practitioner if interventions unsuccessful or patient reports new pain  Outcome: Progressing     Problem: INFECTION - ADULT  Goal: Absence or prevention of progression during hospitalization  Description: INTERVENTIONS:  - Assess and monitor for signs and symptoms of infection  - Monitor lab/diagnostic results  - Monitor all insertion sites, i e  indwelling lines, tubes, and drains  - Monitor endotracheal if appropriate and nasal secretions for changes in amount and color  - [place] appropriate cooling/warming therapies per order  - Administer medications as ordered  - Instruct and encourage patient and family to use good hand hygiene technique  - Identify and instruct in appropriate isolation precautions for identified infection/condition  Outcome: Progressing  Goal: Absence of fever/infection during neutropenic period  Description: INTERVENTIONS:  - Monitor WBC    Outcome: Progressing     Problem: SAFETY ADULT  Goal: Patient will remain free of falls  Description: INTERVENTIONS:  - Educate patient/family on patient safety including physical limitations  - Instruct patient to call for assistance with activity   - Consult OT/PT to assist with strengthening/mobility   - Keep Call bell within reach  - Keep bed low and locked with side rails adjusted as appropriate  - Keep care items and personal belongings within reach  - Initiate and maintain comfort rounds  - Make Fall Risk Sign visible to staff  - Apply yellow socks and bracelet for high fall risk patients  - Consider moving patient to room near nurses station  Outcome: Progressing     Problem: CARDIOVASCULAR - ADULT  Goal: Maintains optimal cardiac output and hemodynamic stability  Description: INTERVENTIONS:  - Monitor I/O, vital signs and rhythm  - Monitor for S/S and trends of decreased cardiac output  - Administer and titrate ordered vasoactive medications to optimize hemodynamic stability  - Assess quality of pulses, skin color and temperature  - Assess for signs of decreased coronary artery perfusion  - Instruct patient to report change in severity of symptoms  Outcome: Progressing  Goal: Absence of cardiac dysrhythmias or at baseline rhythm  Description: INTERVENTIONS:  - Continuous cardiac monitoring, vital signs, obtain 12 lead EKG if ordered  - Administer antiarrhythmic and heart rate control medications as ordered  - Monitor electrolytes and administer replacement therapy as ordered  Outcome: Progressing     Problem: RESPIRATORY - ADULT  Goal: Achieves optimal ventilation and oxygenation  Description: INTERVENTIONS:  - Assess for changes in respiratory status  - Assess for changes in mentation and behavior  - Position to facilitate oxygenation and minimize respiratory effort  - Oxygen administered by appropriate delivery if ordered  - Initiate smoking cessation education as indicated  - Encourage broncho-pulmonary hygiene including cough, deep breathe, Incentive Spirometry  - Assess the need for suctioning and aspirate as needed  - Assess and instruct to report SOB or any respiratory difficulty  - Respiratory Therapy support as indicated  Outcome: Progressing     Problem: GASTROINTESTINAL - ADULT  Goal: Minimal or absence of nausea and/or vomiting  Description: INTERVENTIONS:  - Administer IV fluids if ordered to ensure adequate hydration  - Maintain NPO status until nausea and vomiting are resolved  - Nasogastric tube if ordered  - Administer ordered antiemetic medications as needed  - Provide nonpharmacologic comfort measures as appropriate  - Advance diet as tolerated, if ordered  - Consider nutrition services referral to assist patient with adequate nutrition and appropriate food choices  Outcome: Progressing  Goal: Maintains or returns to baseline bowel function  Description: INTERVENTIONS:  - Assess bowel function  - Encourage oral fluids to ensure adequate hydration  - Administer IV fluids if ordered to ensure adequate hydration  - Administer ordered medications as needed  - Encourage mobilization and activity  - Consider nutritional services referral to assist patient with adequate nutrition and appropriate food choices  Outcome: Progressing     Problem: METABOLIC, FLUID AND ELECTROLYTES - ADULT  Goal: Electrolytes maintained within normal limits  Description: INTERVENTIONS:  - Monitor labs and assess patient for signs and symptoms of electrolyte imbalances  - Administer electrolyte replacement as ordered  - Monitor response to electrolyte replacements, including repeat lab results as appropriate  - Instruct patient on fluid and nutrition as appropriate  Outcome: Progressing  Goal: Fluid balance maintained  Description: INTERVENTIONS:  - Monitor labs   - Monitor I/O and WT  - Instruct patient on fluid and nutrition as appropriate  - Assess for signs & symptoms of volume excess or deficit  Outcome: Progressing  Goal: Glucose maintained within target range  Description: INTERVENTIONS:  - Monitor Blood Glucose as ordered  - Assess for signs and symptoms of hyperglycemia and hypoglycemia  - Administer ordered medications to maintain glucose within target range  - Assess nutritional intake and initiate nutrition service referral as needed  Outcome: Progressing     Problem: HEMATOLOGIC - ADULT  Goal: Maintains hematologic stability  Description: INTERVENTIONS  - Assess for signs and symptoms of bleeding or hemorrhage  - Monitor labs  - Administer supportive blood products/factors as ordered and appropriate  Outcome: Progressing     Problem: Potential for Falls  Goal: Patient will remain free of falls  Description: INTERVENTIONS:  - Educate patient/family on patient safety including physical limitations  - Instruct patient to call for assistance with activity   - Consult OT/PT to assist with strengthening/mobility   - Keep Call bell within reach  - Keep bed low and locked with side rails adjusted as appropriate  - Keep care items and personal belongings within reach  - Initiate and maintain comfort rounds  - Make Fall Risk Sign visible to staff  - Apply yellow socks and bracelet for high fall risk patients  - Consider moving patient to room near nurses station  Outcome: Progressing

## 2022-09-12 ENCOUNTER — APPOINTMENT (OUTPATIENT)
Dept: RADIOLOGY | Facility: HOSPITAL | Age: 72
DRG: 291 | End: 2022-09-12
Payer: COMMERCIAL

## 2022-09-12 LAB
ANION GAP SERPL CALCULATED.3IONS-SCNC: 6 MMOL/L (ref 4–13)
BUN SERPL-MCNC: 29 MG/DL (ref 5–25)
CALCIUM SERPL-MCNC: 9.9 MG/DL (ref 8.3–10.1)
CHLORIDE SERPL-SCNC: 103 MMOL/L (ref 96–108)
CHOLEST SERPL-MCNC: 130 MG/DL
CO2 SERPL-SCNC: 33 MMOL/L (ref 21–32)
CREAT SERPL-MCNC: 0.85 MG/DL (ref 0.6–1.3)
ERYTHROCYTE [DISTWIDTH] IN BLOOD BY AUTOMATED COUNT: 15.2 % (ref 11.6–15.1)
GFR SERPL CREATININE-BSD FRML MDRD: 69 ML/MIN/1.73SQ M
GLUCOSE SERPL-MCNC: 105 MG/DL (ref 65–140)
GLUCOSE SERPL-MCNC: 143 MG/DL (ref 65–140)
GLUCOSE SERPL-MCNC: 144 MG/DL (ref 65–140)
GLUCOSE SERPL-MCNC: 246 MG/DL (ref 65–140)
GLUCOSE SERPL-MCNC: 91 MG/DL (ref 65–140)
HCT VFR BLD AUTO: 32.9 % (ref 34.8–46.1)
HDLC SERPL-MCNC: 70 MG/DL
HGB BLD-MCNC: 10.4 G/DL (ref 11.5–15.4)
LDLC SERPL CALC-MCNC: 44 MG/DL (ref 0–100)
MCH RBC QN AUTO: 29.2 PG (ref 26.8–34.3)
MCHC RBC AUTO-ENTMCNC: 31.6 G/DL (ref 31.4–37.4)
MCV RBC AUTO: 92 FL (ref 82–98)
PLATELET # BLD AUTO: 322 THOUSANDS/UL (ref 149–390)
PMV BLD AUTO: 8.9 FL (ref 8.9–12.7)
POTASSIUM SERPL-SCNC: 4 MMOL/L (ref 3.5–5.3)
RBC # BLD AUTO: 3.56 MILLION/UL (ref 3.81–5.12)
SODIUM SERPL-SCNC: 142 MMOL/L (ref 135–147)
TRIGL SERPL-MCNC: 82 MG/DL
WBC # BLD AUTO: 11.13 THOUSAND/UL (ref 4.31–10.16)

## 2022-09-12 PROCEDURE — 82948 REAGENT STRIP/BLOOD GLUCOSE: CPT

## 2022-09-12 PROCEDURE — 97163 PT EVAL HIGH COMPLEX 45 MIN: CPT

## 2022-09-12 PROCEDURE — 99232 SBSQ HOSP IP/OBS MODERATE 35: CPT | Performed by: INTERNAL MEDICINE

## 2022-09-12 PROCEDURE — 36569 INSJ PICC 5 YR+ W/O IMAGING: CPT

## 2022-09-12 PROCEDURE — 85027 COMPLETE CBC AUTOMATED: CPT | Performed by: NURSE PRACTITIONER

## 2022-09-12 PROCEDURE — 94640 AIRWAY INHALATION TREATMENT: CPT

## 2022-09-12 PROCEDURE — 80048 BASIC METABOLIC PNL TOTAL CA: CPT | Performed by: NURSE PRACTITIONER

## 2022-09-12 PROCEDURE — 97166 OT EVAL MOD COMPLEX 45 MIN: CPT

## 2022-09-12 PROCEDURE — 71045 X-RAY EXAM CHEST 1 VIEW: CPT

## 2022-09-12 PROCEDURE — 80061 LIPID PANEL: CPT | Performed by: NURSE PRACTITIONER

## 2022-09-12 PROCEDURE — C1751 CATH, INF, PER/CENT/MIDLINE: HCPCS

## 2022-09-12 RX ORDER — LORAZEPAM 2 MG/ML
1 INJECTION INTRAMUSCULAR ONCE
Status: COMPLETED | OUTPATIENT
Start: 2022-09-12 | End: 2022-09-12

## 2022-09-12 RX ORDER — LORAZEPAM 2 MG/ML
1 INJECTION INTRAMUSCULAR ONCE
Status: DISCONTINUED | OUTPATIENT
Start: 2022-09-12 | End: 2022-09-12

## 2022-09-12 RX ORDER — FUROSEMIDE 10 MG/ML
15 SYRINGE (ML) INJECTION CONTINUOUS
Status: DISCONTINUED | OUTPATIENT
Start: 2022-09-12 | End: 2022-09-14

## 2022-09-12 RX ORDER — POTASSIUM CHLORIDE 20 MEQ/1
40 TABLET, EXTENDED RELEASE ORAL 2 TIMES DAILY
Status: DISCONTINUED | OUTPATIENT
Start: 2022-09-12 | End: 2022-09-16

## 2022-09-12 RX ADMIN — MORPHINE SULFATE 2 MG: 2 INJECTION, SOLUTION INTRAMUSCULAR; INTRAVENOUS at 19:10

## 2022-09-12 RX ADMIN — FUROSEMIDE 40 MG: 10 INJECTION, SOLUTION INTRAVENOUS at 16:00

## 2022-09-12 RX ADMIN — BICTEGRAVIR SODIUM, EMTRICITABINE, AND TENOFOVIR ALAFENAMIDE FUMARATE 1 TABLET: 50; 200; 25 TABLET ORAL at 08:21

## 2022-09-12 RX ADMIN — INSULIN LISPRO 2 UNITS: 100 INJECTION, SOLUTION INTRAVENOUS; SUBCUTANEOUS at 16:00

## 2022-09-12 RX ADMIN — Medication 10 MG/HR: at 17:34

## 2022-09-12 RX ADMIN — IPRATROPIUM BROMIDE AND ALBUTEROL SULFATE 3 ML: 2.5; .5 SOLUTION RESPIRATORY (INHALATION) at 13:27

## 2022-09-12 RX ADMIN — IPRATROPIUM BROMIDE AND ALBUTEROL SULFATE 3 ML: 2.5; .5 SOLUTION RESPIRATORY (INHALATION) at 07:23

## 2022-09-12 RX ADMIN — TRAMADOL HYDROCHLORIDE 50 MG: 50 TABLET, COATED ORAL at 05:03

## 2022-09-12 RX ADMIN — GABAPENTIN 800 MG: 400 CAPSULE ORAL at 16:00

## 2022-09-12 RX ADMIN — LIDOCAINE 1 PATCH: 50 PATCH CUTANEOUS at 08:22

## 2022-09-12 RX ADMIN — Medication 1 PATCH: at 08:21

## 2022-09-12 RX ADMIN — POTASSIUM CHLORIDE 40 MEQ: 1500 TABLET, EXTENDED RELEASE ORAL at 17:34

## 2022-09-12 RX ADMIN — GABAPENTIN 800 MG: 400 CAPSULE ORAL at 22:58

## 2022-09-12 RX ADMIN — IPRATROPIUM BROMIDE AND ALBUTEROL SULFATE 3 ML: 2.5; .5 SOLUTION RESPIRATORY (INHALATION) at 20:11

## 2022-09-12 RX ADMIN — ALBUTEROL SULFATE 2.5 MG: 2.5 SOLUTION RESPIRATORY (INHALATION) at 02:21

## 2022-09-12 RX ADMIN — ACETAMINOPHEN 975 MG: 325 TABLET ORAL at 22:58

## 2022-09-12 RX ADMIN — LEVOTHYROXINE SODIUM 100 MCG: 100 TABLET ORAL at 05:03

## 2022-09-12 RX ADMIN — GABAPENTIN 800 MG: 400 CAPSULE ORAL at 08:21

## 2022-09-12 RX ADMIN — TRAMADOL HYDROCHLORIDE 50 MG: 50 TABLET, COATED ORAL at 22:58

## 2022-09-12 RX ADMIN — TRAMADOL HYDROCHLORIDE 50 MG: 50 TABLET, COATED ORAL at 16:00

## 2022-09-12 RX ADMIN — APIXABAN 5 MG: 5 TABLET, FILM COATED ORAL at 17:34

## 2022-09-12 RX ADMIN — DILTIAZEM HYDROCHLORIDE 180 MG: 180 CAPSULE, COATED, EXTENDED RELEASE ORAL at 08:22

## 2022-09-12 RX ADMIN — APIXABAN 5 MG: 5 TABLET, FILM COATED ORAL at 08:22

## 2022-09-12 RX ADMIN — PREDNISONE 40 MG: 20 TABLET ORAL at 08:21

## 2022-09-12 RX ADMIN — MELATONIN 6 MG: at 22:58

## 2022-09-12 RX ADMIN — BUDESONIDE 0.5 MG: 0.5 INHALANT ORAL at 20:11

## 2022-09-12 RX ADMIN — ATORVASTATIN CALCIUM 20 MG: 20 TABLET, FILM COATED ORAL at 08:22

## 2022-09-12 RX ADMIN — LORAZEPAM 1 MG: 2 INJECTION, SOLUTION INTRAMUSCULAR; INTRAVENOUS at 11:36

## 2022-09-12 RX ADMIN — MONTELUKAST SODIUM 10 MG: 10 TABLET, COATED ORAL at 22:58

## 2022-09-12 RX ADMIN — ACETAMINOPHEN 975 MG: 325 TABLET ORAL at 02:37

## 2022-09-12 RX ADMIN — BUDESONIDE 0.5 MG: 0.5 INHALANT ORAL at 07:23

## 2022-09-12 NOTE — PLAN OF CARE
Problem: PAIN - ADULT  Goal: Verbalizes/displays adequate comfort level or baseline comfort level  Description: Interventions:  - Encourage patient to monitor pain and request assistance  - Assess pain using appropriate pain scale  - Administer analgesics based on type and severity of pain and evaluate response  - Implement non-pharmacological measures as appropriate and evaluate response  - Consider cultural and social influences on pain and pain management  - Notify physician/advanced practitioner if interventions unsuccessful or patient reports new pain  Outcome: Progressing     Problem: INFECTION - ADULT  Goal: Absence or prevention of progression during hospitalization  Description: INTERVENTIONS:  - Assess and monitor for signs and symptoms of infection  - Monitor lab/diagnostic results  - Monitor all insertion sites, i e  indwelling lines, tubes, and drains  - Monitor endotracheal if appropriate and nasal secretions for changes in amount and color  - [place] appropriate cooling/warming therapies per order  - Administer medications as ordered  - Instruct and encourage patient and family to use good hand hygiene technique  - Identify and instruct in appropriate isolation precautions for identified infection/condition  Outcome: Progressing  Goal: Absence of fever/infection during neutropenic period  Description: INTERVENTIONS:  - Monitor WBC    Outcome: Progressing     Problem: SAFETY ADULT  Goal: Patient will remain free of falls  Description: INTERVENTIONS:  - Educate patient/family on patient safety including physical limitations  - Instruct patient to call for assistance with activity   - Consult OT/PT to assist with strengthening/mobility   - Keep Call bell within reach  - Keep bed low and locked with side rails adjusted as appropriate  - Keep care items and personal belongings within reach  - Initiate and maintain comfort rounds  - Make Fall Risk Sign visible to staff  - Apply yellow socks and bracelet for high fall risk patients  - Consider moving patient to room near nurses station  Outcome: Progressing     Problem: CARDIOVASCULAR - ADULT  Goal: Maintains optimal cardiac output and hemodynamic stability  Description: INTERVENTIONS:  - Monitor I/O, vital signs and rhythm  - Monitor for S/S and trends of decreased cardiac output  - Administer and titrate ordered vasoactive medications to optimize hemodynamic stability  - Assess quality of pulses, skin color and temperature  - Assess for signs of decreased coronary artery perfusion  - Instruct patient to report change in severity of symptoms  Outcome: Progressing  Goal: Absence of cardiac dysrhythmias or at baseline rhythm  Description: INTERVENTIONS:  - Continuous cardiac monitoring, vital signs, obtain 12 lead EKG if ordered  - Administer antiarrhythmic and heart rate control medications as ordered  - Monitor electrolytes and administer replacement therapy as ordered  Outcome: Progressing     Problem: RESPIRATORY - ADULT  Goal: Achieves optimal ventilation and oxygenation  Description: INTERVENTIONS:  - Assess for changes in respiratory status  - Assess for changes in mentation and behavior  - Position to facilitate oxygenation and minimize respiratory effort  - Oxygen administered by appropriate delivery if ordered  - Initiate smoking cessation education as indicated  - Encourage broncho-pulmonary hygiene including cough, deep breathe, Incentive Spirometry  - Assess the need for suctioning and aspirate as needed  - Assess and instruct to report SOB or any respiratory difficulty  - Respiratory Therapy support as indicated  Outcome: Progressing     Problem: GASTROINTESTINAL - ADULT  Goal: Minimal or absence of nausea and/or vomiting  Description: INTERVENTIONS:  - Administer IV fluids if ordered to ensure adequate hydration  - Maintain NPO status until nausea and vomiting are resolved  - Nasogastric tube if ordered  - Administer ordered antiemetic medications as needed  - Provide nonpharmacologic comfort measures as appropriate  - Advance diet as tolerated, if ordered  - Consider nutrition services referral to assist patient with adequate nutrition and appropriate food choices  Outcome: Progressing  Goal: Maintains or returns to baseline bowel function  Description: INTERVENTIONS:  - Assess bowel function  - Encourage oral fluids to ensure adequate hydration  - Administer IV fluids if ordered to ensure adequate hydration  - Administer ordered medications as needed  - Encourage mobilization and activity  - Consider nutritional services referral to assist patient with adequate nutrition and appropriate food choices  Outcome: Progressing     Problem: METABOLIC, FLUID AND ELECTROLYTES - ADULT  Goal: Electrolytes maintained within normal limits  Description: INTERVENTIONS:  - Monitor labs and assess patient for signs and symptoms of electrolyte imbalances  - Administer electrolyte replacement as ordered  - Monitor response to electrolyte replacements, including repeat lab results as appropriate  - Instruct patient on fluid and nutrition as appropriate  Outcome: Progressing  Goal: Fluid balance maintained  Description: INTERVENTIONS:  - Monitor labs   - Monitor I/O and WT  - Instruct patient on fluid and nutrition as appropriate  - Assess for signs & symptoms of volume excess or deficit  Outcome: Progressing  Goal: Glucose maintained within target range  Description: INTERVENTIONS:  - Monitor Blood Glucose as ordered  - Assess for signs and symptoms of hyperglycemia and hypoglycemia  - Administer ordered medications to maintain glucose within target range  - Assess nutritional intake and initiate nutrition service referral as needed  Outcome: Progressing     Problem: HEMATOLOGIC - ADULT  Goal: Maintains hematologic stability  Description: INTERVENTIONS  - Assess for signs and symptoms of bleeding or hemorrhage  - Monitor labs  - Administer supportive blood products/factors as ordered and appropriate  Outcome: Progressing     Problem: Potential for Falls  Goal: Patient will remain free of falls  Description: INTERVENTIONS:  - Educate patient/family on patient safety including physical limitations  - Instruct patient to call for assistance with activity   - Consult OT/PT to assist with strengthening/mobility   - Keep Call bell within reach  - Keep bed low and locked with side rails adjusted as appropriate  - Keep care items and personal belongings within reach  - Initiate and maintain comfort rounds  - Make Fall Risk Sign visible to staff  - Offer Toileting every 2 Hours, in advance of need  - Initiate/Maintain bed alarm  - Obtain necessary fall risk management equipment: non skid footwear and call bell in reach  - Apply yellow socks and bracelet for high fall risk patients  - Consider moving patient to room near nurses station  Outcome: Progressing

## 2022-09-12 NOTE — OCCUPATIONAL THERAPY NOTE
Time entered in error: start time - 14:44  Occupational Therapy Evaluation     Patient Name: Gurpreet Amor  Today's Date: 9/12/2022  Problem List  Principal Problem:    Acute exacerbation of chronic obstructive pulmonary disease (COPD) ([facility] )  Active Problems:    HIV (human immunodeficiency virus infection)     Type 2 diabetes mellitus with hyperglycemia ([facility] )    Chronic respiratory failure with hypoxia (HCC)    Tobacco use    Lumbar stenosis    PASHA on CPAP    Paroxysmal atrial fibrillation (HCC)    Acute on chronic diastolic heart failure (HCC)    Obesity (BMI 35 0-39 9 without comorbidity)    Past Medical History  Past Medical History:   Diagnosis Date    Asthma     Bipolar 1 disorder ([facility] )     Cardiac disease     COPD (chronic obstructive pulmonary disease) ([facility] )     Diabetes mellitus ([facility] )     Disease of thyroid gland     HIV (human immunodeficiency virus infection) ([place] [address] ) 03/24/2016    Hypertension     Osteoarthritis     Tobacco abuse      Past Surgical History  Past Surgical History:   Procedure Laterality Date    HERNIA REPAIR      LUMBAR FUSION N/A 4/10/2018    Procedure: T9/10 discectomy with T9-T11 fusion;  Surgeon: Chad Marin MD;  Location: [facility];  Service: Neurosurgery    THYROIDECTOMY          09/12/22 1422   OT Last Visit   OT Visit Date 09/12/22   Note Type   Note type Evaluation   Additional Comments Pt presents seated in chair, agreeable to OT   Restrictions/Precautions   Weight Bearing Precautions Per Order No   Other Precautions Fall Risk;O2  (2 L)   Pain Assessment   Pain Assessment Tool 0-10   Pain Score No Pain   Home Living   Type of Home House   Home Layout Two level   Bathroom Shower/Tub Tub/shower unit   Bathroom Toilet Standard   Bathroom Equipment Grab bars in shower; Shower chair   Home Equipment Other (Comment);Grab bars  (Shower chair)   Prior Function   Level of [place] Independent with ADLs and functional mobility   Lives With Daughter   Receives Help From Family   ADL Assistance Independent   IADLs Needs assistance   Falls in the last 6 months 0   Lifestyle   Autonomy Pt lives with her daughter in a 2 story home  Reports she is (I) with ADLs, (A) with IADLs  (-) drives, daughter assists with transportation   Reciprocal Relationships Daughter   Subjective   Subjective "My walking feels different"   ADL   Where Assessed Edge of bed   Eating Assistance 5  Supervision/Setup   Grooming Assistance 5  Supervision/Setup   UB Bathing Assistance 5  Supervision/Setup   LB [address]  [address]  4  Minimal Assistance   Bed Mobility   Additional Comments Pt OOB upon OT arrival   Transfers   Sit to Stand 5  Supervision   Stand to Sit 5  Supervision   Functional Mobility   Functional Mobility 4  Minimal assistance   Additional Comments no AD   Balance   Static Sitting Good   Dynamic Sitting Fair +   Static Standing Fair   Dynamic Standing Poor +   Ambulatory Poor +   Activity Tolerance   Activity Tolerance Patient limited by fatigue   Medical Staff Made Aware Ren[facility] PT   RUE Assessment   RUE Assessment WFL  (4/5)   LUE Assessment   LUE Assessment WFL  (4/5)   Hand Function   Gross Motor Coordination Functional   Fine Motor Coordination Functional   Sensation   Light Touch No apparent deficits   Proprioception   Proprioception No apparent deficits   Vision-Basic Assessment   Current Vision Wears glasses all the time   Vision - Complex Assessment   Ocular Range of Motion WFL   Acuity Able to read employee name badge without difficulty   Perception   Inattention/Neglect Appears intact   Cognition   Overall Cognitive Status [facility]   Arousal/Participation Alert; Responsive   Attention Within functional limits   Orientation Level Oriented X4   Memory Decreased recall of precautions   Following Commands Follows one step commands without difficulty   Assessment   Limitation Decreased ADL status; Decreased UE strength;Decreased endurance   Prognosis Fair   Assessment Patient is a 81y o  year old female seen for OT eval s/p admit to [facility] on 9/7/2022 with acute exacerbation of COPD  Comorbidities affecting patients functional performance at time of assistance include: HIV, DM 2, chronic resp failure with hypoxia, tobacco use, lumbar stenosis, PASHA on CPAP, afib, CHF, obesity, bipolar 1 disorder, arthritis, thoracic disc hernation, HLD  Personal factors affecting pt at time of IE include: difficulty performing ADLs and IADLs, difficulty with functional mobility/transfers  Prior to admission, patient was (I) with ADLs/IADLs or pt requires assistance for ADLs/IADLs or patient was (I) with ADLs, requires assistance with IADLs  Upon evaluation, patients functional status as follows: eating: S, grooming: S, UB bathing: S, LB bathing: Estuardo, UB dressing: S, LB dressing: Estuardo, toileting: Estuardo; functional transfers: S, bed mobility: DNT, functional mobility: Estuardo without AD, leaning towards L, standing tolerance: 2 minutes without AD, but self-limiting - due to the following deficits impacting occupational performance: decreased B UE strength, decreased static/dynamic sitting/standing balance, decreased activity tolerance, decreased safety awareness, and increased pain  Patient would benefit from continued skilled OT therapy while in acute setting to address deficits as defined above and maximize (I) with ADLs and functional mobility  Occupational performance areas to address include: grooming, bathing, toileting, UB/LB dressing, functional mobility, household maintenance, and medication management  Co treatment with PT secondary to complex medical condition of pt, possible A of 2 required to achieve and maintain transitional movements, requiring the need of skilled therapeutic intervention of 2 therapists to achieve delivery of services       The patient's raw score on the AM-PAC Daily Activity inpatient short form is 20, standardized score is 42 03, greater than 39 4  Patients at this level are likely to benefit from discharge to home with [facility]  Please refer to the recommendation of the Occupational Therapist for safe discharge planning  Goals   Patient Goals to return home   LTG Time Frame 10-14   Plan   Treatment Interventions ADL retraining;Functional transfer training; Endurance training;Patient/family training;Equipment evaluation/education; Compensatory technique education;Continued evaluation; Energy conservation; Activityengagement   Goal Expiration Date 09/26/22   OT Treatment Day 0   OT Frequency 2-3x/wk   Recommendation   OT Discharge Recommendation Home with home health rehabilitation   AM-PAC Daily Activity Inpatient   Lower Body Dressing 3   Bathing 3   Toileting 3   Upper Body Dressing 3   Grooming 4   Eating 4   Daily Activity Raw Score 20   Daily Activity Standardized Score (Calc for Raw Score >=11) 42 03   AM-PAC Applied Cognition Inpatient   Following a Speech/Presentation 4   Understanding Ordinary Conversation 4   Taking Medications 3   Remembering Where Things Are Placed or Put Away 3   Remembering List of 4-5 Errands 3   Taking Care of Complicated Tasks 2   Applied Cognition Raw Score 19   Applied Cognition Standardized Score 39 77

## 2022-09-12 NOTE — PROGRESS NOTES
Progress Note - Tesha Way 81 y o  female MRN: [id number]    Unit/Bed#: E5 -01 Encounter: [id number]      Subjective: The patient still feels somewhat short of breath  She has not returned to her usual baseline  She denies any chest pain  She slept pretty well  She has no abdominal pain, nausea, or vomiting  Physical Exam:   Temp:  [98 °F (36 7 °C)-98 5 °F (36 9 °C)] 98 °F (36 7 °C)  HR:  [78-91] 78  Resp:  [19] 19  BP: (110-131)/(68-87) 131/87    Gen:  Well-developed, obese, in no acute distress  Neck:  Supple  No lymphadenopathy, goiter, or bruit  Heart:  Regular rhythm  I heard no murmur, gallop, or rub  Lungs:  Diminished breath sounds at the bases with bibasilar rales  I heard no wheezing or rhonchi  Abd:  Soft with active bowel sounds  No mass, tenderness, or organomegaly  Extremities:  +2 edema  Neuro:  Alert and oriented  No focal sign  Skin:  Warm and dry  LABS:   CBC:   Lab Results   Component Value Date    WBC 11 13 (H) 09/12/2022    HGB 10 4 (L) 09/12/2022    HCT 32 9 (L) 09/12/2022    MCV 92 09/12/2022     09/12/2022    MCH 29 2 09/12/2022    MCHC 31 6 09/12/2022    RDW 15 2 (H) 09/12/2022    MPV 8 9 09/12/2022   , CMP:   Lab Results   Component Value Date    SODIUM 142 09/12/2022    K 4 0 09/12/2022     09/12/2022    CO2 33 (H) 09/12/2022    BUN 29 (H) 09/12/2022    CREATININE 0 85 09/12/2022    CALCIUM 9 9 09/12/2022    EGFR 69 09/12/2022             Assessment/Plan:  1  Acute on chronic diastolic congestive heart failure  2  Acute exacerbation of COPD  3  Chronic respiratory failure with hypoxia  4  Obstructive sleep apnea  5  Obesity  6  Paroxysmal atrial fibrillation  7  Type 2 diabetes  8  Tobacco abuse  9  HIV  10  Lumbar spinal stenosis    Clinically, the patient still seems to be fluid overloaded  A PICC catheter has been placed to facilitate further IV diuretic therapy  The patient is on prednisone and intensive bronchodilator therapy for COPD  She is anticoagulated with apixaban for stroke prophylaxis in light of atrial fibrillation  Her diabetic control is adequate      VTE Pharmacologic Prophylaxis:  Apixaban  VTE Mechanical Prophylaxis: reason for no mechanical VTE prophylaxis Therapeutically anticoagulated

## 2022-09-12 NOTE — PROCEDURES
Insert PICC line    Date/Time: 9/12/2022 1:40 PM  Performed by: Julia Aguilar RN  Authorized by: Maciel Murrieta MD     Patient location:  Bedside  Consent:     Consent obtained:  Written    Consent given by:  Patient    Procedural risks discussed: consent obtained by physician  Hope protocol:     Procedure explained and questions answered to patient or proxy's satisfaction: yes      Relevant documents present and verified: yes      Test results available and properly labeled: yes      Radiology Images displayed and confirmed  If images not available, report reviewed: yes      Required blood products, implants, devices, and special equipment available: yes      Site/side marked: yes      Immediately prior to procedure, a time out was called: yes      Patient identity confirmed:  Verbally with patient, arm band, provided demographic data and hospital-assigned identification number  Pre-procedure details:     Hand hygiene: Hand hygiene performed prior to insertion      Sterile barrier technique: All elements of maximal sterile technique followed      Skin preparation:  ChloraPrep    Skin preparation agent: Skin preparation agent completely dried prior to procedure    Indications:     PICC line indications: no peripheral vascular access    Anesthesia (see MAR for exact dosages): Anesthesia method:  Local infiltration (3ml)    Local anesthetic:  Lidocaine 1% w/o epi  Procedure details:     Location:  Brachial    Vessel type: vein      Laterality:  Left    Site selection rationale:  RUE basilic and brachial unable to advance guidewire  LUE basilic unable to advance guidewire       Approach: percutaneous technique used      Patient position:  Flat    Procedural supplies:  Double lumen    Catheter size:  5 Fr    Landmarks identified: yes      Ultrasound guidance: yes      Ultrasound image availability:  Not saved    Sterile ultrasound techniques: Sterile gel and sterile probe covers were used      Number of attempts:  4    Successful placement: yes      Vessel of catheter tip end:  Chest Xray needed to confirm placement    Total catheter length (cm):  42    Catheter out on skin (cm):  0    Max flow rate:  999ml/hr    Arm circumference:  39cm  Post-procedure details:     Post-procedure:  Dressing applied and securement device placed    Assessment:  Blood return through all ports, free fluid flow and placement verified by x-ray (cxr confirmed picc terminates at CAJ)    Post-procedure complications: none      Patient tolerance of procedure: Tolerated with difficulty  Comments:      Pt hx of difficult PIVs, hx of picc placement for IV access  Difficult insertion, cope wire utilized to advance picc  CXR ordered    U#[id number] 2023-03-31

## 2022-09-12 NOTE — PROGRESS NOTES
Progress Note - Cardiology   Fareed Fairchild 81 y o  female MRN: [id number]  Unit/Bed#: E5 -01 Encounter: [id number]    Assessment:     Acute on chronic diastolic congestive heart failure with possibly a component of right heart failure/cor pulmonale   Obstructive sleep apnea/obesity hypoventilation syndrome on CPAP at night   Acute COPD exacerbation   Paroxysmal atrial fibrillation on Cardizem   5 mg b i d  chronic hypoxic respiratory failure on home O2 at 2-3 L a minute nasal cannula   Moderate persistent asthma   Active tobacco use   Dyslipidemia   Type 2 diabetes mellitus   Lumbar stenosis   HIV   Morbid obesity    Plan:     Will change furosemide 40 mg IV b i d  To furosemide infusion at 10 mg/hour   Will begin potassium supplementation   Monitor renal function and electrolytes      Interval history:  Weight has not gone down on 80 Lasix a day and patient states that her legs are not any less swollen  She does state that her feet may be a bit improved      PROBLEM LIST:    Patient Active Problem List    Diagnosis Date Noted    Obesity (BMI 35 0-39 9 without comorbidity) 09/09/2022    Acute on chronic diastolic heart failure ([facility] ) 06/24/2022    PASHA on CPAP 05/27/2020    Acute exacerbation of chronic obstructive pulmonary disease (COPD) ([facility] ) 05/27/2020    Fall at home, initial encounter 05/27/2020    Paroxysmal atrial fibrillation ([facility] ) 05/27/2020    Tobacco dependence 05/27/2020    Hyperlipidemia 04/07/2018    Bipolar disorder, current episode depressed, moderate ([facility] ) 04/05/2018    Thoracic disc herniation 04/04/2018    Lumbar stenosis 04/04/2018    Urinary frequency 04/04/2018    Ambulatory dysfunction     Morbid obesity ([facility] ) 04/02/2018    Back pain 03/30/2018    Hip pain 03/30/2018    Shortness of breath 01/31/2017    Hypertension     Type 2 diabetes mellitus with hyperglycemia (HCC)     Chronic respiratory failure with hypoxia (HCC)     Bipolar 1 disorder ([facility] )     Tobacco use     Arthritis 03/24/2016    HIV (human immunodeficiency virus infection)  03/24/2016    Osteoarthritis 03/24/2016       Vitals: /60   Pulse 90   Temp 97 9 °F (36 6 °C)   Resp 18   Ht 5' 2" (1 575 m)   Wt 102 kg (224 lb 6 9 oz)   LMP 04/01/2013 (Approximate)   SpO2 97%   BMI 41 05 kg/m²   PREVIOUS WEIGHTS:   Weight (last 2 days)     Date/Time Weight    09/12/22 0544 102 (224 43)    09/11/22 0600 102 (224 43)    09/10/22 0600 101 (223 77)          Wt Readings from Last 2 Encounters:   09/12/22 102 kg (224 lb 6 9 oz)   07/31/22 99 2 kg (218 lb 11 1 oz)       Labs/Data:        Results from last 7 days   Lab Units 09/12/22  0807 09/10/22  0744 09/09/22  0954   WBC Thousand/uL 11 13* 10 87* 13 36*   HEMOGLOBIN g/dL 10 4* 10 2* 10 2*   HEMATOCRIT % 32 9* 32 2* 33 6*   MCV fL 92 91 93   MCH pg 29 2 28 8 28 3   MCHC g/dL 31 6 31 7 30 4*   PLATELETS Thousands/uL 322 328 338     Results from last 7 days   Lab Units 09/12/22  0807 09/11/22  1228 09/10/22  0556   EGFR ml/min/1 73sq m 69 62 71   SODIUM mmol/L 142 145 140   POTASSIUM mmol/L 4 0 4 1 4 6   CHLORIDE mmol/L 103 106 107   CO2 mmol/L 33* 31 25   BUN mg/dL 29* 25 26*   CREATININE mg/dL 0 85 0 93 0 83     Results from last 7 days   Lab Units 09/12/22  0807 09/11/22  1228 09/10/22  0556 09/09/22  0954 09/08/22  0540 09/07/22  1419   CALCIUM mg/dL 9 9 10 3* 10 2*   < > 10 6* 10 2*   AST U/L  --   --   --   --   --  10   ALT U/L  --   --   --   --   --  20   ALK PHOS U/L  --   --   --   --   --  87   MAGNESIUM mg/dL  --   --   --   --  1 8  --     < > = values in this interval not displayed           Lab Results   Component Value Date    NTBNP 154 (H) 09/11/2022    NTBNP 144 (H) 09/08/2022    NTBNP 65 05/27/2020     Lab Results   Component Value Date    CHOLESTEROL 130 09/12/2022    TRIG 82 09/12/2022    HDL 70 09/12/2022    LDLCALC 44 09/12/2022    HGBA1C 6 3 (H) 06/21/2022         Current Facility-Administered Medications:     acetaminophen (TYLENOL) tablet 975 mg, 975 mg, Oral, Q8H PRN, LUIS M Graves, 975 mg at 09/12/22 0237    albuterol inhalation solution 2 5 mg, 2 5 mg, Nebulization, Q4H PRN, Tito Lerma Prechtel, DO, 2 5 mg at 09/12/22 0221    apixaban (ELIQUIS) tablet 5 mg, 5 mg, Oral, BID, Aaron S Prechtel, DO, 5 mg at 09/12/22 3651    atorvastatin (LIPITOR) tablet 20 mg, 20 mg, Oral, Daily, Aaron S Prechtel, DO, 20 mg at 09/12/22 1041    bictegravir-emtricitab-tenofovir alafenamide (BIKTARVY) -25 MG tablet 1 tablet, 1 tablet, Oral, Daily, Aaron S Prechtel, DO, 1 tablet at 09/12/22 0821    budesonide (PULMICORT) inhalation solution 0 5 mg, 0 5 mg, Nebulization, Q12H, LUIS M Padilla, 0 5 mg at 09/12/22 0723    diltiazem (CARDIZEM CD) 24 hr capsule 180 mg, 180 mg, Oral, Daily, Aaron S Prechtel, DO, 180 mg at 09/12/22 9413    furosemide (LASIX) injection 40 mg, 40 mg, Intravenous, BID (diuretic), LUIS M Graves, 40 mg at 09/12/22 1600    gabapentin (NEURONTIN) capsule 800 mg, 800 mg, Oral, TID, Aaron S Prechtel, DO, 800 mg at 09/12/22 1600    insulin lispro (HumaLOG) 100 units/mL subcutaneous injection 1-5 Units, 1-5 Units, Subcutaneous, TID AC, 2 Units at 09/12/22 1600 **AND** Fingerstick Glucose (POCT), , , TID ACKeyona CRNP    ipratropium-albuterol (DUO-NEB) 0 5-2 5 mg/3 mL inhalation solution 3 mL, 3 mL, Nebulization, TID, Abhijeet Mendoza MD, 3 mL at 09/12/22 1327    levothyroxine tablet 100 mcg, 100 mcg, Oral, Early Morning, Aaron S Prechtel, DO, 100 mcg at 09/12/22 0503    lidocaine (LIDODERM) 5 % patch 1 patch, 1 patch, Topical, Daily, LUIS M Graves, 1 patch at 09/12/22 5348    melatonin tablet 6 mg, 6 mg, Oral, HS, Kathymelody Rawls PA-C, 6 mg at 09/11/22 2202    montelukast (SINGULAIR) tablet 10 mg, 10 mg, Oral, HS, Aaron S Prechtel, DO, 10 mg at 09/11/22 2157    nicotine (NICODERM CQ) 21 mg/24 hr TD 24 hr patch 1 patch, 1 patch, Transdermal, Daily, Beau Odor DO Gerardo, 1 patch at 09/12/22 9469    [COMPLETED] predniSONE tablet 40 mg, 40 mg, Oral, Daily, 40 mg at 09/12/22 0821 **FOLLOWED BY** [START ON 9/13/2022] predniSONE tablet 30 mg, 30 mg, Oral, Daily **FOLLOWED BY** [START ON 9/16/2022] predniSONE tablet 20 mg, 20 mg, Oral, Daily **FOLLOWED BY** [START ON 9/19/2022] predniSONE tablet 10 mg, 10 mg, Oral, Daily, LUIS M Graves    traMADol (ULTRAM) tablet 50 mg, 50 mg, Oral, Q6H PRN, Kathy Rawls PA-C, 50 mg at 09/12/22 1600  Allergies   Allergen Reactions    Lisinopril Angioedema    Prozac [Fluoxetine Hcl] Rash    Sulfa Antibiotics Itching    Quinine        No intake or output data in the 24 hours ending 09/12/22 1620    Invasive Devices  Report    Peripherally Inserted Central Catheter Line  Duration           PICC Line 09/12/22 Left Brachial <1 day                Review of Systems   Respiratory: Positive for shortness of breath and wheezing  Negative for cough, choking and chest tightness  Cardiovascular: Positive for leg swelling  Negative for chest pain and palpitations  Musculoskeletal: Negative for gait problem  Skin: Negative for rash  Neurological: Negative for dizziness, tremors, syncope, weakness, light-headedness, numbness and headaches  Psychiatric/Behavioral: Negative for agitation and behavioral problems  The patient is not hyperactive  Physical Exam  Constitutional:       General: She is not in acute distress  Appearance: She is well-developed  HENT:      Head: Normocephalic and atraumatic  Neck:      Thyroid: No thyromegaly  Vascular: No carotid bruit or JVD  Trachea: No tracheal deviation  Cardiovascular:      Rate and Rhythm: Normal rate and regular rhythm  Pulses: Normal pulses  Heart sounds: Normal heart sounds  No murmur heard  No friction rub  No gallop  Pulmonary:      Effort: Pulmonary effort is normal  No respiratory distress        Breath sounds: Examination of the right-upper field reveals wheezing and rhonchi  Examination of the left-upper field reveals wheezing and rhonchi  Examination of the right-middle field reveals wheezing and rhonchi  Examination of the left-middle field reveals wheezing and rhonchi  Examination of the right-lower field reveals wheezing, rhonchi and rales  Examination of the left-lower field reveals wheezing, rhonchi and rales  Wheezing, rhonchi and rales present  Chest:      Chest wall: No tenderness  Musculoskeletal:         General: Normal range of motion  Cervical back: Normal range of motion and neck supple  Right lower le+ Pitting Edema present  Left lower le+ Pitting Edema present  Skin:     General: Skin is warm and dry  Neurological:      General: No focal deficit present  Mental Status: She is alert and oriented to person, place, and time  Psychiatric:         Mood and Affect: Mood normal          Behavior: Behavior normal          Thought Content: Thought content normal          Judgment: Judgment normal          ======================================================     Imaging:   I have personally reviewed pertinent reports  Portions of the record may have been created with voice recognition software  Occasional wrong word or "sound a like" substitutions may have occurred due to the inherent limitations of voice recognition software  Read the chart carefully and recognize, using context, where substitutions have occurred

## 2022-09-12 NOTE — PLAN OF CARE
Problem: PHYSICAL THERAPY ADULT  Goal: Performs mobility at highest level of function for planned discharge setting  See evaluation for individualized goals  Description: Treatment/Interventions: Functional transfer training, LE strengthening/ROM, Elevations, Therapeutic exercise, Endurance training, Patient/family training, Bed mobility, Gait training, Spoke to nursing, OT  Equipment Recommended: Other (Comment) (trial SPC or RW next tx session)       See flowsheet documentation for full assessment, interventions and recommendations  Note: Prognosis: Fair  Problem List: Decreased strength, Decreased endurance, Impaired balance, Decreased mobility  Assessment: Pt  81 y  o female admitted for Acute exacerbation of chronic obstructive pulmonary disease (COPD) ([facility]) w/ Asthma (J45 909)  & acute on chronic diastolic CHF  Pt referred to PT for mobility assessment & D/C planning  Please see above for information re: home set-up & PLOF as well as objective findings during PT assessment  PTA, pt reports being I w/o AD  On eval, pt functioning below baseline hence will continue skilled PT to improve function & safety  Pt require S for tranfers & minAx1 for amb w/o AD + cues for techniques & safety  Gait deviations as above, slow & unsteady but no gross LOB noted  The patient's AM-PAC Basic Mobility Inpatient Short Form Raw Score is 18  A Raw score of greater than 16 suggests the patient may benefit from discharge to home  Please also refer to the recommendation of the Physical Therapist for safe discharge planning  From PT standpoint, will anticipate good progress in PT for safe D/C to home  Will recommend HHPT or OPPT & family support at D/C pending progress  No SOB & dizziness reported t/o session  Pt on 2L O2 NC w/ SpO2 96-97% t/o session   Nsg staff most recent vital signs as follows: /60   Pulse 90   Temp 97 9 °F (36 6 °C)   Resp 18   Ht 5' 2" (1 575 m)   Wt 102 kg (224 lb 6 9 oz)   LMP 04/01/2013 (Approximate)   SpO2 97%   BMI 41 05 kg/m²   At end of session, pt OOB in chair in stable condition, call bell & phone in reach, all lines intact  Fall precautions reinforced w/ good understanding  CM to follow  Nsg staff to continue to mobilized pt (OOB in chair for all meals & ambulate in room/unit) as tolerated to prevent further decline in function  Nsg notified  Co-eval was necessary to complete this PT eval for the pt's best interest given pt's medical acuity & complexity  PT Discharge Recommendation: Home with home health rehabilitation (HHPT vs OPPT pending progress)    See flowsheet documentation for full assessment

## 2022-09-12 NOTE — PHYSICAL THERAPY NOTE
PT EVALUATION    Pt  Name: Elsa Uriostegui  Pt  Age: 81 y o  MRN: [id number]  LENGTH OF STAY: 5      Admitting Diagnoses:   Asthma [J45 909]  COPD exacerbation ([place] [address] ) [J44 1]  Acute on chronic respiratory failure (HCC) [J96 20]    Past Medical History:   Diagnosis Date    Asthma     Bipolar 1 disorder ([place] [address] )     Cardiac disease     COPD (chronic obstructive pulmonary disease) ([place] [address] )     Diabetes mellitus ([place] [address] )     Disease of thyroid gland     HIV (human immunodeficiency virus infection) ([place] [address] ) 03/24/2016    Hypertension     Osteoarthritis     Tobacco abuse        Past Surgical History:   Procedure Laterality Date    HERNIA REPAIR      LUMBAR FUSION N/A 4/10/2018    Procedure: T9/10 discectomy with T9-T11 fusion;  Surgeon: Jose Michael MD;  Location: [facility];  Service: Neurosurgery    THYROIDECTOMY         Imaging Studies:  XR chest PICC line portable   Final Result by Rupal Rodriguez MD (09/12 1402)   Satisfactory placement of left-sided PICC line with tip projecting at cavoatrial junction      No acute cardiopulmonary disease  Workstation performed: [id number]         XR chest 2 views   ED Interpretation by Kaushal Martino DO (09/07 1413)      FINDINGS:     Cardiomediastinal silhouette appears unremarkable  The lungs are clear  No pneumothorax or pleural effusion  Osseous structures appear within normal limits for patient age  Lower thoracic pedicle screw and madiha posterior fixation hardware     IMPRESSION:     No acute cardiopulmonary disease  Findings are stable      Final Result by Rupal Rodriguez MD (09/07 1410)      No acute cardiopulmonary disease        Findings are stable            Workstation performed: [id number]               09/12/22 1453   PT Last Visit   PT Visit Date 09/12/22   Note Type   Note type Evaluation   Pain Assessment   Pain Score No Pain   Restrictions/Precautions   Weight Bearing Precautions Per Order No   Other Precautions Fall Risk;O2  (2L O2 NC)   Home Living   Type of [address] [place] [address] Two level;Bed/bath upstairs;Stairs to enter with rails  (3STE; FOS to 2nd flr bed/bath)   Bathroom Shower/Tub Tub/shower unit   Bathroom Toilet Standard   Bathroom Equipment Grab bars in shower; Shower chair   Home Equipment Other (Comment);Grab bars  (no other DMEs)   Prior Function   Level of [place] Independent with ADLs and functional mobility  (w/o AD)   Lives With Daughter (works)   Receives Help From Family  (niece & nephew)   ADL Assistance Independent   Falls in the last 6 months 0   Comments (-) ; daughter provides transport; pt reports her niece & nephew assists when daughter is at work   General   Additional Pertinent History pt on 2L home O2 at baseline   Family/Caregiver Present No   Cognition   Overall Cognitive Status WFL   Arousal/Participation Arousable   Attention Within functional limits   Orientation Level Oriented to person;Oriented to place;Oriented to time   Following Commands Follows one step commands without difficulty   Comments Pt somewhat drowsy but arousable during session  Subjective   Subjective Pt agreeable to PT/OT evals  RUE Assessment   RUE Assessment   (refer to OT)   LUE Assessment   LUE Assessment   (refer to OT)   RLE Assessment   RLE Assessment WFL  (4-/5 grossly; nonpitting edema)   LLE Assessment   LLE Assessment WFL  (4-/5 grossly; nonpitting edema)   Coordination   Movements are Fluid and Coordinated 1   Sensation WFL   Bed Mobility   Supine to Sit Unable to assess   Sit to Supine Unable to assess   Additional Comments pt OOB in chair pre & post session  Transfers   Sit to Stand 5  Supervision   Additional items Armrests; Increased time required   Stand to Sit 5  Supervision   Additional items Armrests; Increased time required   Ambulation/Elevation   Gait pattern Wide SELENE; Decreased foot clearance;Shuffling;Excessively slow   Gait Assistance 4  Minimal assist   Additional items Assist x 1;Verbal cues;Tactile cues   Assistive Device None   Distance 20'x1   Ambulation/Elevation Additional Comments unsteady gait, veering to the right but no gross LOB noted; pt state "my balance is not good right now"   Balance   Static Sitting Good   Dynamic Sitting Fair +   Static Standing Fair -   Dynamic Standing Poor +   Ambulatory Poor +   Endurance Deficit   Endurance Deficit Yes   Endurance Deficit Description fatigue   Activity Tolerance   Activity Tolerance Patient limited by fatigue;Treatment limited secondary to medical complications (Comment)   Medical Staff Made Aware [address] [facility]   Nurse Made Aware LUZMA Nichole   Assessment   Prognosis Fair   Problem List Decreased strength;Decreased endurance; Impaired balance;Decreased mobility   Assessment Pt  72 y  o female admitted for Acute exacerbation of chronic obstructive pulmonary disease (COPD) ([facility]) w/ Asthma (J45 909)  & acute on chronic diastolic CHF  Pt referred to PT for mobility assessment & D/C planning  Please see above for information re: home set-up & PLOF as well as objective findings during PT assessment  PTA, pt reports being I w/o AD  On eval, pt functioning below baseline hence will continue skilled PT to improve function & safety  Pt require S for tranfers & minAx1 for amb w/o AD + cues for techniques & safety  Gait deviations as above, slow & unsteady but no gross LOB noted  The patient's AM-PAC Basic Mobility Inpatient Short Form Raw Score is 18  A Raw score of greater than 16 suggests the patient may benefit from discharge to home  Please also refer to the recommendation of the Physical Therapist for safe discharge planning  From PT standpoint, will anticipate good progress in PT for safe D/C to home  Will recommend HHPT or OPPT & family support at D/C pending progress  No SOB & dizziness reported t/o session  Pt on 2L O2 NC w/ SpO2 96-97% t/o session   Nsg staff most recent vital signs as follows: /60   Pulse 90   Temp 97 9 °F (36 6 °C)   Resp 18 Ht 5' 2" (1 575 m)   Wt 102 kg (224 lb 6 9 oz)   LMP 04/01/2013 (Approximate)   SpO2 97%   BMI 41 05 kg/m²   At end of session, pt OOB in chair in stable condition, call bell & phone in reach, all lines intact  Fall precautions reinforced w/ good understanding  CM to follow  Ns staff to continue to mobilized pt (OOB in chair for all meals & ambulate in room/unit) as tolerated to prevent further decline in function  Nsg notified  Co-eval was necessary to complete this PT eval for the pt's best interest given pt's medical acuity & complexity  Goals   Patient Goals to go home   STG Expiration Date 09/19/22   Short Term Goal #1 Goals to be met in 7 days; pt will be able to: 1) inc strength & balance by 1/2 grade to improve overall functional mobility & dec fall risk; 2) inc bed mobility to I for pt to be able to get in/OOB safely w/ proper techniques 100% of the time, to dec caregiver burden & safely function at home; 3) inc transfers to modified I for pt to transition safely from one surface to another w/o % of the time, to dec caregiver burden & safely function at home; 4) inc amb w/ appropriate AD approx  >80' w/ modified I for pt to ambulate household distances w/o any % of the time, to dec caregiver burden & safely function at home; 5) negotiate stairs w/ S for inc safety during stair mgt inside/outside of home & dec caregiver burden; 6) pt/caregiver ed   PT Treatment Day 0   Plan   Treatment/Interventions Functional transfer training;LE strengthening/ROM; Elevations; Therapeutic exercise; Endurance training;Patient/family training;Bed mobility;Gait training;Spoke to nursing;OT   PT Frequency 3-5x/wk   Recommendation   PT Discharge Recommendation Home with home health rehabilitation  (HHPT vs OPPT pending progress)   Equipment Recommended Other (Comment)  (trial SPC or RW next tx session)   AM-PAC Basic Mobility Inpatient   Turning in Bed Without Bedrails 4   Lying on Back to Sitting on Edge of Flat Bed 3   Moving Bed to Chair 3   Standing Up From Chair 3   Walk in Room 3   Climb 3-5 Stairs 2   Basic Mobility Inpatient Raw Score 18   Basic Mobility Standardized Score 41 05   Highest Level Of Mobility   -HLM Goal 6: Walk 10 steps or more   -HLM Achieved 6: Walk 10 steps or more   End of Consult   Patient Position at End of Consult Bedside chair; All needs within reach   End of Consult Comments Pt in stable condition     Hx/personal factors: co-morbidities, inaccessible home, dec caregiver support, advanced age, fall risk, and O2  Examination: dec mobility, dec balance, dec endurance, dec amb, risk for falls  Clinical: unpredictable (ongoing medical status, abnormal lab values, and risk for falls)  Complexity: high    Evelyne Spurling, PT

## 2022-09-12 NOTE — PLAN OF CARE
Problem: PAIN - ADULT  Goal: Verbalizes/displays adequate comfort level or baseline comfort level  Description: Interventions:  - Encourage patient to monitor pain and request assistance  - Assess pain using appropriate pain scale  - Administer analgesics based on type and severity of pain and evaluate response  - Implement non-pharmacological measures as appropriate and evaluate response  - Consider cultural and social influences on pain and pain management  - Notify physician/advanced practitioner if interventions unsuccessful or patient reports new pain  Outcome: Progressing     Problem: INFECTION - ADULT  Goal: Absence or prevention of progression during hospitalization  Description: INTERVENTIONS:  - Assess and monitor for signs and symptoms of infection  - Monitor lab/diagnostic results  - Monitor all insertion sites, i e  indwelling lines, tubes, and drains  - Monitor endotracheal if appropriate and nasal secretions for changes in amount and color  - [place] appropriate cooling/warming therapies per order  - Administer medications as ordered  - Instruct and encourage patient and family to use good hand hygiene technique  - Identify and instruct in appropriate isolation precautions for identified infection/condition  Outcome: Progressing     Problem: RESPIRATORY - ADULT  Goal: Achieves optimal ventilation and oxygenation  Description: INTERVENTIONS:  - Assess for changes in respiratory status  - Assess for changes in mentation and behavior  - Position to facilitate oxygenation and minimize respiratory effort  - Oxygen administered by appropriate delivery if ordered  - Initiate smoking cessation education as indicated  - Encourage broncho-pulmonary hygiene including cough, deep breathe, Incentive Spirometry  - Assess the need for suctioning and aspirate as needed  - Assess and instruct to report SOB or any respiratory difficulty  - Respiratory Therapy support as indicated  Outcome: Progressing

## 2022-09-12 NOTE — PLAN OF CARE
Problem: OCCUPATIONAL THERAPY ADULT  Goal: Performs self-care activities at highest level of function for planned discharge setting  See evaluation for individualized goals  Description: Treatment Interventions: ADL retraining, Functional transfer training, Endurance training, Patient/family training, Equipment evaluation/education, Compensatory technique education, Continued evaluation, Energy conservation, Activityengagement     See flowsheet documentation for full assessment, interventions and recommendations  Outcome: Progressing  Note: Limitation: Decreased ADL status, Decreased UE strength, Decreased endurance  Prognosis: Fair  Assessment: Patient is a 81y o  year old female seen for OT eval s/p admit to [facility] on 9/7/2022 with acute exacerbation of COPD  Comorbidities affecting patients functional performance at time of assistance include: HIV, DM 2, chronic resp failure with hypoxia, tobacco use, lumbar stenosis, PASHA on CPAP, afib, CHF, obesity, bipolar 1 disorder, arthritis, thoracic disc hernation, HLD  Personal factors affecting pt at time of IE include: difficulty performing ADLs and IADLs, difficulty with functional mobility/transfers  Prior to admission, patient was (I) with ADLs/IADLs or pt requires assistance for ADLs/IADLs or patient was (I) with ADLs, requires assistance with IADLs  Upon evaluation, patients functional status as follows: eating: S, grooming: S, UB bathing: S, LB bathing: Estuardo, UB dressing: S, LB dressing: Estuardo, toileting: Estuardo; functional transfers: S, bed mobility: DNT, functional mobility: Estuardo without AD, leaning towards L, standing tolerance: 2 minutes without AD, but self-limiting - due to the following deficits impacting occupational performance: decreased B UE strength, decreased static/dynamic sitting/standing balance, decreased activity tolerance, decreased safety awareness, and increased pain   Patient would benefit from continued skilled OT therapy while in acute setting to address deficits as defined above and maximize (I) with ADLs and functional mobility  Occupational performance areas to address include: grooming, bathing, toileting, UB/LB dressing, functional mobility, household maintenance, and medication management  Co treatment with PT secondary to complex medical condition of pt, possible A of 2 required to achieve and maintain transitional movements, requiring the need of skilled therapeutic intervention of 2 therapists to achieve delivery of services  The patient's raw score on the AM-PAC Daily Activity inpatient short form is 20, standardized score is 42 03, greater than 39 4  Patients at this level are likely to benefit from discharge to home with [facility] OT  Please refer to the recommendation of the Occupational Therapist for safe discharge planning       OT Discharge Recommendation: Home with home health rehabilitation

## 2022-09-13 ENCOUNTER — APPOINTMENT (INPATIENT)
Dept: NON INVASIVE DIAGNOSTICS | Facility: HOSPITAL | Age: 72
DRG: 291 | End: 2022-09-13
Payer: COMMERCIAL

## 2022-09-13 LAB
ANION GAP SERPL CALCULATED.3IONS-SCNC: 6 MMOL/L (ref 4–13)
BUN SERPL-MCNC: 28 MG/DL (ref 5–25)
CALCIUM SERPL-MCNC: 9.8 MG/DL (ref 8.3–10.1)
CHLORIDE SERPL-SCNC: 102 MMOL/L (ref 96–108)
CO2 SERPL-SCNC: 37 MMOL/L (ref 21–32)
CREAT SERPL-MCNC: 0.94 MG/DL (ref 0.6–1.3)
GFR SERPL CREATININE-BSD FRML MDRD: 61 ML/MIN/1.73SQ M
GLUCOSE SERPL-MCNC: 102 MG/DL (ref 65–140)
GLUCOSE SERPL-MCNC: 109 MG/DL (ref 65–140)
GLUCOSE SERPL-MCNC: 127 MG/DL (ref 65–140)
GLUCOSE SERPL-MCNC: 131 MG/DL (ref 65–140)
GLUCOSE SERPL-MCNC: 185 MG/DL (ref 65–140)
GLUCOSE SERPL-MCNC: 92 MG/DL (ref 65–140)
POTASSIUM SERPL-SCNC: 3.9 MMOL/L (ref 3.5–5.3)
SODIUM SERPL-SCNC: 145 MMOL/L (ref 135–147)

## 2022-09-13 PROCEDURE — 82948 REAGENT STRIP/BLOOD GLUCOSE: CPT

## 2022-09-13 PROCEDURE — 97110 THERAPEUTIC EXERCISES: CPT

## 2022-09-13 PROCEDURE — 99232 SBSQ HOSP IP/OBS MODERATE 35: CPT | Performed by: INTERNAL MEDICINE

## 2022-09-13 PROCEDURE — 97535 SELF CARE MNGMENT TRAINING: CPT

## 2022-09-13 PROCEDURE — 94640 AIRWAY INHALATION TREATMENT: CPT

## 2022-09-13 PROCEDURE — 93306 TTE W/DOPPLER COMPLETE: CPT

## 2022-09-13 PROCEDURE — 80048 BASIC METABOLIC PNL TOTAL CA: CPT | Performed by: INTERNAL MEDICINE

## 2022-09-13 RX ADMIN — APIXABAN 5 MG: 5 TABLET, FILM COATED ORAL at 16:49

## 2022-09-13 RX ADMIN — GABAPENTIN 800 MG: 400 CAPSULE ORAL at 16:49

## 2022-09-13 RX ADMIN — TRAMADOL HYDROCHLORIDE 50 MG: 50 TABLET, COATED ORAL at 06:16

## 2022-09-13 RX ADMIN — IPRATROPIUM BROMIDE AND ALBUTEROL SULFATE 3 ML: 2.5; .5 SOLUTION RESPIRATORY (INHALATION) at 07:06

## 2022-09-13 RX ADMIN — DILTIAZEM HYDROCHLORIDE 180 MG: 180 CAPSULE, COATED, EXTENDED RELEASE ORAL at 08:46

## 2022-09-13 RX ADMIN — POTASSIUM CHLORIDE 40 MEQ: 1500 TABLET, EXTENDED RELEASE ORAL at 08:45

## 2022-09-13 RX ADMIN — TRAMADOL HYDROCHLORIDE 50 MG: 50 TABLET, COATED ORAL at 12:09

## 2022-09-13 RX ADMIN — GABAPENTIN 800 MG: 400 CAPSULE ORAL at 08:45

## 2022-09-13 RX ADMIN — Medication 15 MG/HR: at 16:47

## 2022-09-13 RX ADMIN — GABAPENTIN 800 MG: 400 CAPSULE ORAL at 22:13

## 2022-09-13 RX ADMIN — ACETAMINOPHEN 975 MG: 325 TABLET ORAL at 09:51

## 2022-09-13 RX ADMIN — BUDESONIDE 0.5 MG: 0.5 INHALANT ORAL at 19:37

## 2022-09-13 RX ADMIN — POTASSIUM CHLORIDE 40 MEQ: 1500 TABLET, EXTENDED RELEASE ORAL at 16:49

## 2022-09-13 RX ADMIN — IPRATROPIUM BROMIDE AND ALBUTEROL SULFATE 3 ML: 2.5; .5 SOLUTION RESPIRATORY (INHALATION) at 19:37

## 2022-09-13 RX ADMIN — MONTELUKAST SODIUM 10 MG: 10 TABLET, COATED ORAL at 22:13

## 2022-09-13 RX ADMIN — BICTEGRAVIR SODIUM, EMTRICITABINE, AND TENOFOVIR ALAFENAMIDE FUMARATE 1 TABLET: 50; 200; 25 TABLET ORAL at 08:47

## 2022-09-13 RX ADMIN — PREDNISONE 30 MG: 20 TABLET ORAL at 08:46

## 2022-09-13 RX ADMIN — TRAMADOL HYDROCHLORIDE 50 MG: 50 TABLET, COATED ORAL at 20:38

## 2022-09-13 RX ADMIN — MELATONIN 6 MG: at 22:13

## 2022-09-13 RX ADMIN — LIDOCAINE 1 PATCH: 50 PATCH CUTANEOUS at 08:47

## 2022-09-13 RX ADMIN — BUDESONIDE 0.5 MG: 0.5 INHALANT ORAL at 07:06

## 2022-09-13 RX ADMIN — IPRATROPIUM BROMIDE AND ALBUTEROL SULFATE 3 ML: 2.5; .5 SOLUTION RESPIRATORY (INHALATION) at 13:33

## 2022-09-13 RX ADMIN — ATORVASTATIN CALCIUM 20 MG: 20 TABLET, FILM COATED ORAL at 08:46

## 2022-09-13 RX ADMIN — APIXABAN 5 MG: 5 TABLET, FILM COATED ORAL at 08:46

## 2022-09-13 RX ADMIN — LEVOTHYROXINE SODIUM 100 MCG: 100 TABLET ORAL at 06:16

## 2022-09-13 NOTE — PROGRESS NOTES
Progress Note - Cardiology   Donald Hyde 81 y o  female MRN: [id number]  Unit/Bed#: E5 -01 Encounter: [id number]          Assessment / Plan  COPD with exacerbation   -Active tobacco use:  Started age 15 w/ 2-3 cig/d, at least 8 years of 3 packs per day in her 20s/30s, then 1ppd until age 63, then 2-4cig/d, ongoing and lately approximately 2 cig/d  Chronic hypoxic respiratory failure   -Continues home oxygen at 2 liters/minute  Moderate persistent asthma, PAHSA (CPAP compliant)  Obesity:  BMI 40  HFpEF: acute decompensation POA   -Echo 2/21/2022 ([facility]):  EF 60-65% -NL LV size, mild hypertrophy, No RWMAs  Grad I DD  Normal RV size/& function  No hemodynamically significant valvular abnormalities   -Presenting NT proBNP 144 with chest x-ray reporting lungs as clear will signs of peripheral edema  Paroxysmal atrial fibrillation:  NSR since admission   -Av blocking Rx:  Cardizem 180 mg daily    -Anticoagulation:  Eliquis 5 mg b i d    Dyslipidemia    -Atorvastatin 20 mg   -lipid panel  9/12/2022:  Cholesterol 130, triglycerides 82, HDL 70, LDL 44   Hx angioedema due to ACE-inhibitor  HIV infection  Cocaine use    Potassium 3 9, BUN 28, creatinine 0 94  Standing weight 100 kg/220 lb    · Patient remains edematous with only 5 lb weight loss since admission though current weight does not seem very unlike historical values in her lungs seem to show only few rales with predominantly coarse upper airway breath sounds with rhonchi and few scattered wheezes  · Obesity an element of deconditioning likely contributing to modest functional abilities, respiratory failure ~~> weight loss and pulmonary rehabilitation advised by pulmonary consultants with agreement for same  · Continue infusion of torsemide following weights, exam, BP common renal functionOral steroid taper and bronchodilators are ongoing per primary service whom we defer  · Continue anticoagulant and Cardizem for her atrial fibrillation          Subjective:  Patient reports she is voiding well on torsemide infusion  She reports persistent lower extremity edema be on her baseline  She is comfortable at rest though she states that with little ambulation or extended talking she does become short of breath  Vitals:  Vitals:    09/13/22 0600 09/13/22 0706   Weight: 100 kg (220 lb 7 4 oz) 100 kg (220 lb 7 4 oz)   ,  Vitals:    09/12/22 2325 09/13/22 0600 09/13/22 0706 09/13/22 0706   BP: 110/74   145/93   Pulse:    86   Resp:       Temp: 98 1 °F (36 7 °C)      TempSrc:       SpO2:   98% 98%   Weight:  100 kg (220 lb 7 4 oz)  100 kg (220 lb 7 4 oz)   Height:         Wt Readings from Last 30 Encounters:   09/13/22 100 kg (220 lb 7 4 oz)   07/31/22 99 2 kg (218 lb 11 1 oz)   06/21/22 100 kg (220 lb 7 4 oz)   05/27/20 106 kg (233 lb 11 oz)   05/27/20 106 kg (233 lb 7 5 oz)   04/25/18 113 kg (250 lb)   04/04/18 90 7 kg (200 lb)   03/30/18 117 kg (257 lb 15 oz)   04/01/18 117 kg (257 lb)   03/30/18 117 kg (257 lb 15 oz)   01/31/17 111 kg (244 lb 0 8 oz)   01/08/17 113 kg (249 lb 1 9 oz)   03/30/16 110 kg (243 lb 6 2 oz)       Exam:  General:  Alert normally conversant and comfortable-appearing wearing low-flow nasal cannula oxygen  Heart:  Somewhat distant  Regular with controlled rate and no significant murmur  Lungs:  Coarse upper airway breath sounds and rhonchi with few scattered wheezes and scant rales    Lower Limbs:  2+ pitting edema               Medications:    Current Facility-Administered Medications:     acetaminophen (TYLENOL) tablet 975 mg, 975 mg, Oral, Q8H PRN, LUIS M Graves, 975 mg at 09/13/22 0951    albuterol inhalation solution 2 5 mg, 2 5 mg, Nebulization, Q4H PRN, Kiley Carrillo DO, 2 5 mg at 09/12/22 0221    apixaban (ELIQUIS) tablet 5 mg, 5 mg, Oral, BID, Aaron S Prechtel, DO, 5 mg at 09/13/22 0846    atorvastatin (LIPITOR) tablet 20 mg, 20 mg, Oral, Daily, Aaron S Prechtel, DO, 20 mg at 09/13/22 8426    bictegravir-emtricitab-tenofovir alafenamide (BIKTARVY) -25 MG tablet 1 tablet, 1 tablet, Oral, Daily, Aaron S Prechtel, DO, 1 tablet at 09/13/22 0847    budesonide (PULMICORT) inhalation solution 0 5 mg, 0 5 mg, Nebulization, Q12H, LUIS M Flynn, 0 5 mg at 09/13/22 0706    diltiazem (CARDIZEM CD) 24 hr capsule 180 mg, 180 mg, Oral, Daily, Aaron S Prechtel, DO, 180 mg at 09/13/22 0846    furosemide (LASIX) 500 mg infusion 50 mL, 10 mg/hr, Intravenous, Continuous, Fito Dangelo MD, Last Rate: 1 mL/hr at 09/12/22 2122, 10 mg/hr at 09/12/22 2122    gabapentin (NEURONTIN) capsule 800 mg, 800 mg, Oral, TID, Aaron S Prechtel, DO, 800 mg at 09/13/22 0845    insulin lispro (HumaLOG) 100 units/mL subcutaneous injection 1-5 Units, 1-5 Units, Subcutaneous, TID AC, 2 Units at 09/12/22 1600 **AND** Fingerstick Glucose (POCT), , , TID AC, LUIS M Graves    ipratropium-albuterol (DUO-NEB) 0 5-2 5 mg/3 mL inhalation solution 3 mL, 3 mL, Nebulization, TID, Beth Wilkerson MD, 3 mL at 09/13/22 0706    levothyroxine tablet 100 mcg, 100 mcg, Oral, Early Morning, Aaron S Prechtel, DO, 100 mcg at 09/13/22 0616    lidocaine (LIDODERM) 5 % patch 1 patch, 1 patch, Topical, Daily, LUIS M Graves, 1 patch at 09/13/22 0847    melatonin tablet 6 mg, 6 mg, Oral, HS, Kathy Rawls PA-C, 6 mg at 09/12/22 2258    montelukast (SINGULAIR) tablet 10 mg, 10 mg, Oral, HS, Aaron S Prechtel, DO, 10 mg at 09/12/22 2258    nicotine (NICODERM CQ) 21 mg/24 hr TD 24 hr patch 1 patch, 1 patch, Transdermal, Daily, Aaron Carrillo, , 1 patch at 09/12/22 0821    potassium chloride (K-DUR,KLOR-CON) CR tablet 40 mEq, 40 mEq, Oral, BID, Fito Dangelo MD, 40 mEq at 09/13/22 0845    [COMPLETED] predniSONE tablet 40 mg, 40 mg, Oral, Daily, 40 mg at 09/12/22 0821 **FOLLOWED BY** predniSONE tablet 30 mg, 30 mg, Oral, Daily, 30 mg at 09/13/22 0846 **FOLLOWED BY** [START ON 9/16/2022] predniSONE tablet 20 mg, 20 mg, Oral, Daily **FOLLOWED BY** [START ON 9/19/2022] predniSONE tablet 10 mg, 10 mg, Oral, Daily, LUIS M Graves    traMADol (ULTRAM) tablet 50 mg, 50 mg, Oral, Q6H PRN, Kathy Rawls PA-C, 50 mg at 09/13/22 0616      Labs/Data:        Results from last 7 days   Lab Units 09/12/22  0807 09/10/22  0744 09/09/22  0954   WBC Thousand/uL 11 13* 10 87* 13 36*   HEMOGLOBIN g/dL 10 4* 10 2* 10 2*   HEMATOCRIT % 32 9* 32 2* 33 6*   PLATELETS Thousands/uL 322 328 338     Results from last 7 days   Lab Units 09/13/22  0624 09/12/22  0807 09/11/22  1228   POTASSIUM mmol/L 3 9 4 0 4 1   CHLORIDE mmol/L 102 103 106   CO2 mmol/L 37* 33* 31   BUN mg/dL 28* 29* 25   CREATININE mg/dL 0 94 0 85 0 93

## 2022-09-13 NOTE — CASE MANAGEMENT
Case Management Progress Note    Patient name Efrain Story  Location [place] 5 /E5 [place] [phone]-* MRN [id number]  : 1950 Date 2022       LOS (days): 6  Geometric Mean LOS (GMLOS) (days): 3 80  Days to GMLOS:-2        OBJECTIVE:        Current admission status: Inpatient  Preferred Pharmacy:   [address] [place] [address], [address] [place], [place] [address]Th [facility]  5   [place] [address]  Phone: [phone] Fax: [phone]    Primary Care Provider: Jessica Gomez DO    Primary Insurance: Eleazar Linear REP  Secondary Insurance: [address] [place] [address]    PROGRESS NOTE:    CM will anticipate Pts discharge within 48 hrs  [place] update via Aidin  CM continues to follow

## 2022-09-13 NOTE — PLAN OF CARE
Problem: PAIN - ADULT  Goal: Verbalizes/displays adequate comfort level or baseline comfort level  Description: Interventions:  - Encourage patient to monitor pain and request assistance  - Assess pain using appropriate pain scale  - Administer analgesics based on type and severity of pain and evaluate response  - Implement non-pharmacological measures as appropriate and evaluate response  - Consider cultural and social influences on pain and pain management  - Notify physician/advanced practitioner if interventions unsuccessful or patient reports new pain  Outcome: Progressing     Problem: INFECTION - ADULT  Goal: Absence or prevention of progression during hospitalization  Description: INTERVENTIONS:  - Assess and monitor for signs and symptoms of infection  - Monitor lab/diagnostic results  - Monitor all insertion sites, i e  indwelling lines, tubes, and drains  - Monitor endotracheal if appropriate and nasal secretions for changes in amount and color  - [place] appropriate cooling/warming therapies per order  - Administer medications as ordered  - Instruct and encourage patient and family to use good hand hygiene technique  - Identify and instruct in appropriate isolation precautions for identified infection/condition  Outcome: Progressing  Goal: Absence of fever/infection during neutropenic period  Description: INTERVENTIONS:  - Monitor WBC    Outcome: Progressing     Problem: SAFETY ADULT  Goal: Patient will remain free of falls  Description: INTERVENTIONS:  - Educate patient/family on patient safety including physical limitations  - Instruct patient to call for assistance with activity   - Consult OT/PT to assist with strengthening/mobility   - Keep Call bell within reach  - Keep bed low and locked with side rails adjusted as appropriate  - Keep care items and personal belongings within reach  - Initiate and maintain comfort rounds  - Make Fall Risk Sign visible to staff  - Offer Toileting every 2 Hours, in advance of need  - Initiate/Maintain bed alarm  - Obtain necessary fall risk management equipment: socks  - Apply yellow socks and bracelet for high fall risk patients  - Consider moving patient to room near nurses station  Outcome: Progressing     Problem: CARDIOVASCULAR - ADULT  Goal: Maintains optimal cardiac output and hemodynamic stability  Description: INTERVENTIONS:  - Monitor I/O, vital signs and rhythm  - Monitor for S/S and trends of decreased cardiac output  - Administer and titrate ordered vasoactive medications to optimize hemodynamic stability  - Assess quality of pulses, skin color and temperature  - Assess for signs of decreased coronary artery perfusion  - Instruct patient to report change in severity of symptoms  Outcome: Progressing  Goal: Absence of cardiac dysrhythmias or at baseline rhythm  Description: INTERVENTIONS:  - Continuous cardiac monitoring, vital signs, obtain 12 lead EKG if ordered  - Administer antiarrhythmic and heart rate control medications as ordered  - Monitor electrolytes and administer replacement therapy as ordered  Outcome: Progressing     Problem: RESPIRATORY - ADULT  Goal: Achieves optimal ventilation and oxygenation  Description: INTERVENTIONS:  - Assess for changes in respiratory status  - Assess for changes in mentation and behavior  - Position to facilitate oxygenation and minimize respiratory effort  - Oxygen administered by appropriate delivery if ordered  - Initiate smoking cessation education as indicated  - Encourage broncho-pulmonary hygiene including cough, deep breathe, Incentive Spirometry  - Assess the need for suctioning and aspirate as needed  - Assess and instruct to report SOB or any respiratory difficulty  - Respiratory Therapy support as indicated  Outcome: Progressing     Problem: GASTROINTESTINAL - ADULT  Goal: Minimal or absence of nausea and/or vomiting  Description: INTERVENTIONS:  - Administer IV fluids if ordered to ensure adequate hydration  - Maintain NPO status until nausea and vomiting are resolved  - Nasogastric tube if ordered  - Administer ordered antiemetic medications as needed  - Provide nonpharmacologic comfort measures as appropriate  - Advance diet as tolerated, if ordered  - Consider nutrition services referral to assist patient with adequate nutrition and appropriate food choices  Outcome: Progressing  Goal: Maintains or returns to baseline bowel function  Description: INTERVENTIONS:  - Assess bowel function  - Encourage oral fluids to ensure adequate hydration  - Administer IV fluids if ordered to ensure adequate hydration  - Administer ordered medications as needed  - Encourage mobilization and activity  - Consider nutritional services referral to assist patient with adequate nutrition and appropriate food choices  Outcome: Progressing     Problem: METABOLIC, FLUID AND ELECTROLYTES - ADULT  Goal: Electrolytes maintained within normal limits  Description: INTERVENTIONS:  - Monitor labs and assess patient for signs and symptoms of electrolyte imbalances  - Administer electrolyte replacement as ordered  - Monitor response to electrolyte replacements, including repeat lab results as appropriate  - Instruct patient on fluid and nutrition as appropriate  Outcome: Progressing  Goal: Fluid balance maintained  Description: INTERVENTIONS:  - Monitor labs   - Monitor I/O and WT  - Instruct patient on fluid and nutrition as appropriate  - Assess for signs & symptoms of volume excess or deficit  Outcome: Progressing  Goal: Glucose maintained within target range  Description: INTERVENTIONS:  - Monitor Blood Glucose as ordered  - Assess for signs and symptoms of hyperglycemia and hypoglycemia  - Administer ordered medications to maintain glucose within target range  - Assess nutritional intake and initiate nutrition service referral as needed  Outcome: Progressing     Problem: HEMATOLOGIC - ADULT  Goal: Maintains hematologic stability  Description: INTERVENTIONS  - Assess for signs and symptoms of bleeding or hemorrhage  - Monitor labs  - Administer supportive blood products/factors as ordered and appropriate  Outcome: Progressing     Problem: Potential for Falls  Goal: Patient will remain free of falls  Description: INTERVENTIONS:  - Educate patient/family on patient safety including physical limitations  - Instruct patient to call for assistance with activity   - Consult OT/PT to assist with strengthening/mobility   - Keep Call bell within reach  - Keep bed low and locked with side rails adjusted as appropriate  - Keep care items and personal belongings within reach  - Initiate and maintain comfort rounds  - Make Fall Risk Sign visible to staff  - Offer Toileting every 2 Hours, in advance of need  - Initiate/Maintain bed alarm  - Obtain necessary fall risk management equipment: socks  - Apply yellow socks and bracelet for high fall risk patients  - Consider moving patient to room near nurses station  Outcome: Progressing

## 2022-09-13 NOTE — PLAN OF CARE
Problem: OCCUPATIONAL THERAPY ADULT  Goal: Performs self-care activities at highest level of function for planned discharge setting  See evaluation for individualized goals  Description: Treatment Interventions: ADL retraining, Functional transfer training, Endurance training, Patient/family training, Equipment evaluation/education, Compensatory technique education, Continued evaluation, Energy conservation, Activityengagement    See flowsheet documentation for full assessment, interventions and recommendations  Outcome: Progressing  Note: Limitation: Decreased ADL status, Decreased UE strength, Decreased endurance  Prognosis: Fair  Assessment: Patient participated in skilled OT session this date with interventions consisting of therapeutic activities, therapeutic exercises, and self-care/ADL training to increase B UE strength, functional endurance/activity tolerance, static/dynamic sitting/standing balance, and overall safety awareness to increase (I) with ADLs/IADLs to return to PLOF  Provided education on HEP, energy conservation techniques, deep breathing techniques, fall prevention strategies, and overall safety awareness  Patient agreeable to OT treatment session, upon arrival patient was found seated OOB to Recliner  Patient requiring verbal cues for safety and verbal cues for pacing through activity steps  Refer to flowsheet for functional performance  Pt progressing towads POC, noticeable improvement in overall mobility and activity tolerance noted  She does require some encouragement for participation  Patient continues to be functioning below baseline level, occupational performance remains limited secondary to factors listed above and increased risk for falls and injury  The patient's raw score on the AM-PAC Daily Activity inpatient short form is 20, standardized score is 42 03, greater than 39 4  Patients at this level are likely to benefit from discharge to home [facility] OT   Please refer to the recommendation of the Occupational Therapist for safe discharge planning       OT Discharge Recommendation: Home with home health rehabilitation

## 2022-09-13 NOTE — PROGRESS NOTES
Progress Note - Alvaro Lee 81 y o  female MRN: [id number]    Unit/Bed#: E5 -01 Encounter: [id number]      Subjective: The patient still feels short of breath with even minimal exertion  She has no chest pain  She has mild cough but no sputum production  She slept pretty well  She is able to eat  Her bowels are moving normally  Physical Exam:   Temp:  [98 1 °F (36 7 °C)-98 7 °F (37 1 °C)] 98 7 °F (37 1 °C)  HR:  [86-99] 99  BP: (110-145)/(60-93) 116/70    Gen:  Well-developed, obese, in no acute distress  Neck:  Supple  No lymphadenopathy, goiter, or bruit  Heart:  Regular rhythm  I heard no murmur, gallop, or rub  Lungs:  Diminished breath sounds bilaterally  Scattered rhonchi  Few rales are audible at the bases  Abd:  Soft with active bowel sounds  No mass, tenderness, or organomegaly  Extremities:  No clubbing, cyanosis, edema  No calf tenderness  Neuro:  Alert and oriented  No focal sign  Skin:  Warm and dry  LABS:   CMP:   Lab Results   Component Value Date    SODIUM 145 09/13/2022    K 3 9 09/13/2022     09/13/2022    CO2 37 (H) 09/13/2022    BUN 28 (H) 09/13/2022    CREATININE 0 94 09/13/2022    CALCIUM 9 8 09/13/2022    EGFR 61 09/13/2022             Assessment/Plan:  1  Acute on chronic diastolic congestive heart failure  2  Acute exacerbation of COPD  3  Chronic respiratory failure with hypoxia  4  Obstructive sleep apnea  5  Obesity  6  Paroxysmal atrial fibrillation  7  Type 2 diabetes  8  Tobacco abuse  9  HIV  10  Lumbar spinal stenosis    The patient remains fluid overloaded  She is on a furosemide infusion which is being adjusted  She remains on therapy for exacerbation of COPD  She is in sinus rhythm  She is on apixaban for stroke prophylaxis  She is on nicotine replacement  At times the patient has complained of back pain though she has said nothing to me about this over the last 2 days        VTE Pharmacologic Prophylaxis:  Apixaban  VTE Mechanical Prophylaxis: reason for no mechanical VTE prophylaxis Therapeutically anticoagulated

## 2022-09-13 NOTE — OCCUPATIONAL THERAPY NOTE
Occupational Therapy Progress Note     Patient Name: Marnie Hoyos  QDODS'O Date: 9/13/2022  Problem List  Principal Problem:    Acute exacerbation of chronic obstructive pulmonary disease (COPD) ([facility] )  Active Problems:    HIV (human immunodeficiency virus infection)     Type 2 diabetes mellitus with hyperglycemia ([facility] )    Chronic respiratory failure with hypoxia (HCC)    Tobacco use    Lumbar stenosis    PASHA on CPAP    Paroxysmal atrial fibrillation (HCC)    Acute on chronic diastolic heart failure (HCC)    Obesity (BMI 35 0-39 9 without comorbidity)       09/13/22 1042   OT Last Visit   OT Visit Date 09/13/22   Note Type   Note Type Treatment   Restrictions/Precautions   Weight Bearing Precautions Per Order No   Other Precautions Fall Risk;O2  (2 L)   Pain Assessment   Pain Assessment Tool 0-10   Pain Score 6   Pain Location/Orientation Location: Back   Hospital Pain Intervention(s) Ambulation/increased activity;Repositioned   ADL   Grooming Assistance 5  Supervision/Setup   Grooming Deficit Verbal cueing;Supervision/safety; Increased time to complete   Toileting Assistance  5  Supervision/Setup   Toileting Deficit Verbal cueing;Supervison/safety; Increased time to complete   Transfers   Sit to Stand 5  Supervision   Additional items Armrests; Increased time required   Stand to Sit 5  Supervision   Additional items Armrests; Increased time required   Toilet transfer 5  Supervision   Additional items Increased time required;Verbal cues   Functional Mobility   Functional Mobility 5  Supervision   Additional Comments no AD   Therapeutic Excerise-Strength   UE Strength Yes   Right Upper Extremity- Strength   R Shoulder Flexion;ABduction; Extension;Horizontal ABduction   R Elbow Elbow flexion;Elbow extension   Equipment Dumbbell   R Weight/Reps/Sets 1#, 10x2   RUE Strength Comment Therapeutic rest breaks between sets due to fatigue, O2 on 2 L stable between 93-96%     Left Upper Extremity-Strength   L Shoulder Flexion;ABduction; Extension;Horizontal ABduction   L Elbow Elbow flexion;Elbow extension   Equipment Dumbell   L Weights/Reps/Sets 1#, 10x2   Cognition   Overall Cognitive Status WFL   Arousal/Participation Alert; Responsive   Attention Within functional limits   Orientation Level Oriented to person;Oriented to place;Oriented to time   Memory Decreased recall of precautions   Following Commands Follows one step commands without difficulty   Activity Tolerance   Activity Tolerance Patient tolerated treatment well   Medical Staff Made Aware yes   Assessment   Assessment Patient participated in skilled OT session this date with interventions consisting of therapeutic activities, therapeutic exercises, and self-care/ADL training to increase B UE strength, functional endurance/activity tolerance, static/dynamic sitting/standing balance, and overall safety awareness to increase (I) with ADLs/IADLs to return to PLOF  Provided education on HEP, energy conservation techniques, deep breathing techniques, fall prevention strategies, and overall safety awareness  Patient agreeable to OT treatment session, upon arrival patient was found seated OOB to Recliner  Patient requiring verbal cues for safety and verbal cues for pacing through activity steps  Refer to flowsheet for functional performance  Pt progressing towads POC, noticeable improvement in overall mobility and activity tolerance noted  She does require some encouragement for participation  Patient continues to be functioning below baseline level, occupational performance remains limited secondary to factors listed above and increased risk for falls and injury  The patient's raw score on the AM-PAC Daily Activity inpatient short form is 20, standardized score is 42 03, greater than 39 4  Patients at this level are likely to benefit from discharge to home [facility] OT  Please refer to the recommendation of the Occupational Therapist for safe discharge planning     Plan Treatment Interventions ADL retraining;Functional transfer training; Endurance training;Patient/family training;Equipment evaluation/education; Compensatory technique education;Continued evaluation; Energy conservation; Activityengagement   Goal Expiration Date 09/26/22   OT Treatment Day 1   OT Frequency 2-3x/wk   Recommendation   OT Discharge Recommendation Home with home health rehabilitation   AM-PAC Daily Activity Inpatient   Lower Body Dressing 3   Bathing 3   Toileting 3   Upper Body Dressing 3   Grooming 4   Eating 4   Daily Activity Raw Score 20   Daily Activity Standardized Score (Calc for Raw Score >=11) 42 03   AM-PAC Applied Cognition Inpatient   Following a Speech/Presentation 4   Understanding Ordinary Conversation 4   Taking Medications 3   Remembering Where Things Are Placed or Put Away 3   Remembering List of 4-5 Errands 3   Taking Care of Complicated Tasks 2   Applied Cognition Raw Score 19   Applied Cognition Standardized Score 39 77     Chriss Keith, OT

## 2022-09-14 LAB
ANION GAP SERPL CALCULATED.3IONS-SCNC: 7 MMOL/L (ref 4–13)
ANION GAP SERPL CALCULATED.3IONS-SCNC: 7 MMOL/L (ref 4–13)
AORTIC ROOT: 3.5 CM
APICAL FOUR CHAMBER EJECTION FRACTION: 67 %
ASCENDING AORTA: 4.1 CM
BUN SERPL-MCNC: 41 MG/DL (ref 5–25)
BUN SERPL-MCNC: 45 MG/DL (ref 5–25)
CALCIUM SERPL-MCNC: 10.5 MG/DL (ref 8.3–10.1)
CALCIUM SERPL-MCNC: 10.8 MG/DL (ref 8.3–10.1)
CHLORIDE SERPL-SCNC: 96 MMOL/L (ref 96–108)
CHLORIDE SERPL-SCNC: 96 MMOL/L (ref 96–108)
CO2 SERPL-SCNC: 38 MMOL/L (ref 21–32)
CO2 SERPL-SCNC: 40 MMOL/L (ref 21–32)
CREAT SERPL-MCNC: 1.26 MG/DL (ref 0.6–1.3)
CREAT SERPL-MCNC: 1.61 MG/DL (ref 0.6–1.3)
E WAVE DECELERATION TIME: 141 MS
FRACTIONAL SHORTENING: 30 (ref 28–44)
GFR SERPL CREATININE-BSD FRML MDRD: 32 ML/MIN/1.73SQ M
GFR SERPL CREATININE-BSD FRML MDRD: 43 ML/MIN/1.73SQ M
GLUCOSE SERPL-MCNC: 112 MG/DL (ref 65–140)
GLUCOSE SERPL-MCNC: 131 MG/DL (ref 65–140)
GLUCOSE SERPL-MCNC: 176 MG/DL (ref 65–140)
GLUCOSE SERPL-MCNC: 181 MG/DL (ref 65–140)
GLUCOSE SERPL-MCNC: 198 MG/DL (ref 65–140)
GLUCOSE SERPL-MCNC: 98 MG/DL (ref 65–140)
INTERVENTRICULAR SEPTUM IN DIASTOLE (PARASTERNAL SHORT AXIS VIEW): 1.2 CM
INTERVENTRICULAR SEPTUM: 1.2 CM (ref 0.6–1.1)
LEFT ATRIUM AREA SYSTOLE SINGLE PLANE A4C: 11.6 CM2
LEFT ATRIUM SIZE: 2.5 CM
LEFT INTERNAL DIMENSION IN SYSTOLE: 3.3 CM (ref 2.1–4)
LEFT VENTRICULAR INTERNAL DIMENSION IN DIASTOLE: 4.7 CM (ref 3.5–6)
LEFT VENTRICULAR POSTERIOR WALL IN END DIASTOLE: 1.4 CM
LEFT VENTRICULAR STROKE VOLUME: 58 ML
LVSV (TEICH): 58 ML
MV E'TISSUE VEL-LAT: 6 CM/S
MV PEAK A VEL: 1.09 M/S
MV PEAK E VEL: 70 CM/S
MV STENOSIS PRESSURE HALF TIME: 41 MS
MV VALVE AREA P 1/2 METHOD: 5.37
POTASSIUM SERPL-SCNC: 4.2 MMOL/L (ref 3.5–5.3)
POTASSIUM SERPL-SCNC: 4.8 MMOL/L (ref 3.5–5.3)
RA PRESSURE ESTIMATED: 15 MMHG
RIGHT ATRIUM AREA SYSTOLE A4C: 13 CM2
RIGHT VENTRICLE ID DIMENSION: 3.4 CM
RV PSP: 37 MMHG
SL CV LV EF: 70
SL CV PED ECHO LEFT VENTRICLE DIASTOLIC VOLUME (MOD BIPLANE) 2D: 101 ML
SL CV PED ECHO LEFT VENTRICLE SYSTOLIC VOLUME (MOD BIPLANE) 2D: 43 ML
SODIUM SERPL-SCNC: 141 MMOL/L (ref 135–147)
SODIUM SERPL-SCNC: 143 MMOL/L (ref 135–147)
TR MAX PG: 22 MMHG
TR PEAK VELOCITY: 2.4 M/S
TRICUSPID VALVE PEAK REGURGITATION VELOCITY: 2.36 M/S

## 2022-09-14 PROCEDURE — 82948 REAGENT STRIP/BLOOD GLUCOSE: CPT

## 2022-09-14 PROCEDURE — 99232 SBSQ HOSP IP/OBS MODERATE 35: CPT | Performed by: INTERNAL MEDICINE

## 2022-09-14 PROCEDURE — 80048 BASIC METABOLIC PNL TOTAL CA: CPT | Performed by: STUDENT IN AN ORGANIZED HEALTH CARE EDUCATION/TRAINING PROGRAM

## 2022-09-14 PROCEDURE — 93306 TTE W/DOPPLER COMPLETE: CPT | Performed by: STUDENT IN AN ORGANIZED HEALTH CARE EDUCATION/TRAINING PROGRAM

## 2022-09-14 PROCEDURE — 80048 BASIC METABOLIC PNL TOTAL CA: CPT | Performed by: INTERNAL MEDICINE

## 2022-09-14 PROCEDURE — 99232 SBSQ HOSP IP/OBS MODERATE 35: CPT | Performed by: STUDENT IN AN ORGANIZED HEALTH CARE EDUCATION/TRAINING PROGRAM

## 2022-09-14 PROCEDURE — 97110 THERAPEUTIC EXERCISES: CPT

## 2022-09-14 PROCEDURE — 94640 AIRWAY INHALATION TREATMENT: CPT

## 2022-09-14 RX ORDER — METHYLPREDNISOLONE SODIUM SUCCINATE 40 MG/ML
40 INJECTION, POWDER, LYOPHILIZED, FOR SOLUTION INTRAMUSCULAR; INTRAVENOUS EVERY 8 HOURS SCHEDULED
Status: DISCONTINUED | OUTPATIENT
Start: 2022-09-14 | End: 2022-09-16

## 2022-09-14 RX ADMIN — POTASSIUM CHLORIDE 40 MEQ: 1500 TABLET, EXTENDED RELEASE ORAL at 08:23

## 2022-09-14 RX ADMIN — METHYLPREDNISOLONE SODIUM SUCCINATE 40 MG: 40 INJECTION, POWDER, FOR SOLUTION INTRAMUSCULAR; INTRAVENOUS at 23:08

## 2022-09-14 RX ADMIN — BUDESONIDE 0.5 MG: 0.5 INHALANT ORAL at 06:56

## 2022-09-14 RX ADMIN — ALBUTEROL SULFATE 2.5 MG: 2.5 SOLUTION RESPIRATORY (INHALATION) at 09:34

## 2022-09-14 RX ADMIN — GABAPENTIN 800 MG: 400 CAPSULE ORAL at 16:14

## 2022-09-14 RX ADMIN — BUDESONIDE 0.5 MG: 0.5 INHALANT ORAL at 19:15

## 2022-09-14 RX ADMIN — TRAMADOL HYDROCHLORIDE 50 MG: 50 TABLET, COATED ORAL at 13:39

## 2022-09-14 RX ADMIN — TRAMADOL HYDROCHLORIDE 50 MG: 50 TABLET, COATED ORAL at 05:38

## 2022-09-14 RX ADMIN — APIXABAN 5 MG: 5 TABLET, FILM COATED ORAL at 16:43

## 2022-09-14 RX ADMIN — ACETAMINOPHEN 975 MG: 325 TABLET ORAL at 16:14

## 2022-09-14 RX ADMIN — LEVOTHYROXINE SODIUM 100 MCG: 100 TABLET ORAL at 05:17

## 2022-09-14 RX ADMIN — PREDNISONE 30 MG: 20 TABLET ORAL at 08:24

## 2022-09-14 RX ADMIN — LIDOCAINE 1 PATCH: 50 PATCH CUTANEOUS at 08:23

## 2022-09-14 RX ADMIN — INSULIN LISPRO 1 UNITS: 100 INJECTION, SOLUTION INTRAVENOUS; SUBCUTANEOUS at 16:41

## 2022-09-14 RX ADMIN — BICTEGRAVIR SODIUM, EMTRICITABINE, AND TENOFOVIR ALAFENAMIDE FUMARATE 1 TABLET: 50; 200; 25 TABLET ORAL at 08:23

## 2022-09-14 RX ADMIN — IPRATROPIUM BROMIDE AND ALBUTEROL SULFATE 3 ML: 2.5; .5 SOLUTION RESPIRATORY (INHALATION) at 06:56

## 2022-09-14 RX ADMIN — GABAPENTIN 800 MG: 400 CAPSULE ORAL at 23:07

## 2022-09-14 RX ADMIN — DILTIAZEM HYDROCHLORIDE 180 MG: 180 CAPSULE, COATED, EXTENDED RELEASE ORAL at 08:24

## 2022-09-14 RX ADMIN — POTASSIUM CHLORIDE 40 MEQ: 1500 TABLET, EXTENDED RELEASE ORAL at 16:43

## 2022-09-14 RX ADMIN — ATORVASTATIN CALCIUM 20 MG: 20 TABLET, FILM COATED ORAL at 08:24

## 2022-09-14 RX ADMIN — MELATONIN 6 MG: at 23:08

## 2022-09-14 RX ADMIN — IPRATROPIUM BROMIDE AND ALBUTEROL SULFATE 3 ML: 2.5; .5 SOLUTION RESPIRATORY (INHALATION) at 19:15

## 2022-09-14 RX ADMIN — TRAMADOL HYDROCHLORIDE 50 MG: 50 TABLET, COATED ORAL at 23:07

## 2022-09-14 RX ADMIN — MONTELUKAST SODIUM 10 MG: 10 TABLET, COATED ORAL at 23:07

## 2022-09-14 RX ADMIN — METHYLPREDNISOLONE SODIUM SUCCINATE 40 MG: 40 INJECTION, POWDER, FOR SOLUTION INTRAMUSCULAR; INTRAVENOUS at 16:42

## 2022-09-14 RX ADMIN — GABAPENTIN 800 MG: 400 CAPSULE ORAL at 08:24

## 2022-09-14 RX ADMIN — IPRATROPIUM BROMIDE AND ALBUTEROL SULFATE 3 ML: 2.5; .5 SOLUTION RESPIRATORY (INHALATION) at 14:15

## 2022-09-14 RX ADMIN — APIXABAN 5 MG: 5 TABLET, FILM COATED ORAL at 08:24

## 2022-09-14 NOTE — PHYSICAL THERAPY NOTE
PHYSICAL THERAPY NOTE          Patient Name: Daniel Rossi  OAQGU'G Date: 9/14/2022  Time: 2502-1691       09/14/22 1532   PT Last Visit   PT Visit Date 09/14/22   Note Type   Note Type Treatment   Pain Assessment   Pain Assessment Tool 0-10   Pain Score No Pain   Restrictions/Precautions   Other Precautions Multiple lines;O2  (2L, PIV)   General   Chart Reviewed Yes   Additional Pertinent History baseline O2 2L   Response to Previous Treatment Patient reporting fatigue but able to participate  Cognition   Overall Cognitive Status WFL   Arousal/Participation Cooperative   Subjective   Subjective "I dont know why I am so SOB"   Transfers   Sit to Stand 6  Modified independent   Additional items Increased time required   Stand to Sit 6  Modified independent   Additional items Increased time required   Toilet transfer 6  Modified independent   Additional items Increased time required;Standard toilet   Ambulation/Elevation   Gait pattern Wide SELENE; Decreased foot clearance; Excessively slow   Gait Assistance 5  Supervision   Additional items Assist x 1   Assistive Device Other (Comment)  (IV pole)   Distance 35', 40'   Balance   Static Standing Fair   Dynamic Standing Fair -   Ambulatory Fair -   Endurance Deficit   Endurance Deficit Yes   Endurance Deficit Description limited household ambulator (baseline)  easily fatigues  noted degradation of gait sequencing w fatigue  O2 96% on 2 and 3L (increased for comfort, w no significant impact on vitals) and HR 90-110s   Activity Tolerance   Activity Tolerance Patient limited by fatigue   Nurse Ame 38 RN   Exercises   Balance training  30" sit to stand= 5 reps  Assessment   Prognosis Fair   Problem List Decreased endurance;Decreased mobility;Obesity; Impaired judgement   Assessment Tawana was seen for a f/u session  Continues to report significant SOB impacting tolerance to activity   I did increase her O2 from 2>3L during ambulation to see if this would improve pt comfort however ambulatory endurance and vitals relatively unchanged  Demonstrates some progress towards goals including improved am-pac score and decreased level of assist for transfers and ambulation  Continues to present w deficits of endurance which are somewhat baseline (limited household ambulatory)  Decline trial of RW despite education on benefits to improve endurance  Did not degradation of gait sequencing w fatigue and current 30" sit to stand test score indicate pt is at risk for falls (based on age related norm/ cutoff of <10)  Would continue to benefit from therapy to address prev mentioned deficits of endurance to reduce fall risk and optimize mobility  Barriers to Discharge None   Goals   Patient Goals improve breathing   STG Expiration Date 09/19/22   Short Term Goal #1 Goals to be met in 7 days; pt will be able to: 1) inc strength & balance by 1/2 grade to improve overall functional mobility & dec fall risk; 2) inc bed mobility to I for pt to be able to get in/OOB safely w/ proper techniques 100% of the time, to dec caregiver burden & safely function at home; 3) inc transfers to modified I for pt to transition safely from one surface to another w/o % of the time, to dec caregiver burden & safely function at home; 4) inc amb w/ appropriate AD approx  >80' w/ modified I for pt to ambulate household distances w/o any % of the time, to dec caregiver burden & safely function at home; 5) negotiate stairs w/ S for inc safety during stair mgt inside/outside of home & dec caregiver burden; 6) pt/caregiver ed   PT Treatment Day 1   Plan   Treatment/Interventions Functional transfer training;LE strengthening/ROM; Therapeutic exercise;Elevations; Endurance training; Compensatory technique education;Spoke to nursing;Gait training;Bed mobility; Equipment eval/education;Patient/family training   Progress Slow progress, decreased activity tolerance   PT Frequency 3-5x/wk   Recommendation   PT Discharge Recommendation Home with home health rehabilitation  (vs OP PT w cardiopulmonary focus)   Additional Comments The patient's AM-PAC Basic Mobility Inpatient Short Form Raw Score is 23  A Raw score of greater than 16 suggests the patient may benefit from discharge to home  Please also refer to the recommendation of the Physical Therapist for safe discharge planning  AM-PAC Basic Mobility Inpatient   Turning in Bed Without Bedrails 4   Lying on Back to Sitting on Edge of Flat Bed 4   Moving Bed to Chair 4   Standing Up From Chair 4   Walk in Room 4   Climb 3-5 Stairs 3   Basic Mobility Inpatient Raw Score 23   Basic Mobility Standardized Score 50 88   Highest Level Of Mobility   JH-HLM Goal 7: Walk 25 feet or more   JH-HLM Achieved 7: Walk 25 feet or more   Education   Education Provided Assistive device   Patient Explanation/teachback used   End of Consult   Patient Position at End of Consult Seated edge of bed; All needs within reach       Chari Nielson, PT

## 2022-09-14 NOTE — PROGRESS NOTES
Progress Note - Radha Tipton 81 y o  female MRN: [id number]    Unit/Bed#: E5 -01 Encounter: [id number]      Subjective: The patient is still feeling short of breath  She feels that she is only slightly better since yesterday if at all  She has no chest pain  She has cough but no sputum  She does hear wheezing at times  She has had no abdominal pain, nausea, or vomiting  She did not sleep very well  Physical Exam:   Temp:  [97 7 °F (36 5 °C)-97 8 °F (36 6 °C)] 97 8 °F (36 6 °C)  HR:  [83-94] 94  Resp:  [18] 18  BP: ()/(66-74) 99/74    Gen:  Well-developed, obese, in no acute distress  Neck:  Supple  No lymphadenopathy, goiter, or bruit  Heart:  Regular rhythm  I heard no murmur, gallop, or rub  Lungs:  Diminished breath sounds bilaterally  Scattered wheezes  I could hear no rales or rhonchi  Abd:  Soft with active bowel sounds  No mass, tenderness, or organomegaly  Extremities:  Edema has diminished somewhat  Neuro:  Alert and oriented  No focal sign  Skin:  Warm and dry  LABS:   CMP:   Lab Results   Component Value Date    SODIUM 141 09/14/2022    K 4 8 09/14/2022    CL 96 09/14/2022    CO2 38 (H) 09/14/2022    BUN 45 (H) 09/14/2022    CREATININE 1 61 (H) 09/14/2022    CALCIUM 10 8 (H) 09/14/2022    EGFR 32 09/14/2022             Assessment/Plan:  1  Acute on chronic diastolic congestive heart failure  2  Acute exacerbation of COPD  3  Chronic respiratory failure with hypoxia  4  Obstructive sleep apnea  5  Obesity  6  Paroxysmal atrial fibrillation  7  Type 2 diabetes  8  Tobacco abuse  9  HIV  10  Lumbar spinal stenosis    The patient's fluid status has improved somewhat though she remains somewhat fluid overloaded  Her lack of clinical improvement suggests that there is a significant role being played by COPD  She is on intensive bronchodilator therapy  Her steroid dose will be increased temporarily  She remains on intravenous furosemide    She is on apixaban for stroke prophylaxis in light of her paroxysmal atrial fibrillation  Careful monitoring of the patient's glucose will be needed with the increase in her steroid dose        VTE Pharmacologic Prophylaxis:  Apixaban  VTE Mechanical Prophylaxis: reason for no mechanical VTE prophylaxis Therapeutically anticoagulated

## 2022-09-14 NOTE — PLAN OF CARE
Problem: PAIN - ADULT  Goal: Verbalizes/displays adequate comfort level or baseline comfort level  Description: Interventions:  - Encourage patient to monitor pain and request assistance  - Assess pain using appropriate pain scale  - Administer analgesics based on type and severity of pain and evaluate response  - Implement non-pharmacological measures as appropriate and evaluate response  - Consider cultural and social influences on pain and pain management  - Notify physician/advanced practitioner if interventions unsuccessful or patient reports new pain  Outcome: Progressing     Problem: INFECTION - ADULT  Goal: Absence or prevention of progression during hospitalization  Description: INTERVENTIONS:  - Assess and monitor for signs and symptoms of infection  - Monitor lab/diagnostic results  - Monitor all insertion sites, i e  indwelling lines, tubes, and drains  - Monitor endotracheal if appropriate and nasal secretions for changes in amount and color  - [place] appropriate cooling/warming therapies per order  - Administer medications as ordered  - Instruct and encourage patient and family to use good hand hygiene technique  - Identify and instruct in appropriate isolation precautions for identified infection/condition  Outcome: Progressing     Problem: SAFETY ADULT  Goal: Patient will remain free of falls  Description: INTERVENTIONS:  - Educate patient/family on patient safety including physical limitations  - Instruct patient to call for assistance with activity   - Consult OT/PT to assist with strengthening/mobility   - Keep Call bell within reach  - Keep bed low and locked with side rails adjusted as appropriate  - Keep care items and personal belongings within reach  - Initiate and maintain comfort rounds  - Make Fall Risk Sign visible to staff  - Apply yellow socks and bracelet for high fall risk patients  - Consider moving patient to room near nurses station  Outcome: Progressing     Problem: CARDIOVASCULAR - ADULT  Goal: Maintains optimal cardiac output and hemodynamic stability  Description: INTERVENTIONS:  - Monitor I/O, vital signs and rhythm  - Monitor for S/S and trends of decreased cardiac output  - Administer and titrate ordered vasoactive medications to optimize hemodynamic stability  - Assess quality of pulses, skin color and temperature  - Assess for signs of decreased coronary artery perfusion  - Instruct patient to report change in severity of symptoms  Outcome: Progressing  Goal: Absence of cardiac dysrhythmias or at baseline rhythm  Description: INTERVENTIONS:  - Continuous cardiac monitoring, vital signs, obtain 12 lead EKG if ordered  - Administer antiarrhythmic and heart rate control medications as ordered  - Monitor electrolytes and administer replacement therapy as ordered  Outcome: Progressing     Problem: RESPIRATORY - ADULT  Goal: Achieves optimal ventilation and oxygenation  Description: INTERVENTIONS:  - Assess for changes in respiratory status  - Assess for changes in mentation and behavior  - Position to facilitate oxygenation and minimize respiratory effort  - Oxygen administered by appropriate delivery if ordered  - Initiate smoking cessation education as indicated  - Encourage broncho-pulmonary hygiene including cough, deep breathe, Incentive Spirometry  - Assess the need for suctioning and aspirate as needed  - Assess and instruct to report SOB or any respiratory difficulty  - Respiratory Therapy support as indicated  Outcome: Progressing     Problem: GASTROINTESTINAL - ADULT  Goal: Minimal or absence of nausea and/or vomiting  Description: INTERVENTIONS:  - Administer IV fluids if ordered to ensure adequate hydration  - Maintain NPO status until nausea and vomiting are resolved  - Nasogastric tube if ordered  - Administer ordered antiemetic medications as needed  - Provide nonpharmacologic comfort measures as appropriate  - Advance diet as tolerated, if ordered  - Consider nutrition services referral to assist patient with adequate nutrition and appropriate food choices  Outcome: Progressing  Goal: Maintains or returns to baseline bowel function  Description: INTERVENTIONS:  - Assess bowel function  - Encourage oral fluids to ensure adequate hydration  - Administer IV fluids if ordered to ensure adequate hydration  - Administer ordered medications as needed  - Encourage mobilization and activity  - Consider nutritional services referral to assist patient with adequate nutrition and appropriate food choices  Outcome: Progressing     Problem: METABOLIC, FLUID AND ELECTROLYTES - ADULT  Goal: Electrolytes maintained within normal limits  Description: INTERVENTIONS:  - Monitor labs and assess patient for signs and symptoms of electrolyte imbalances  - Administer electrolyte replacement as ordered  - Monitor response to electrolyte replacements, including repeat lab results as appropriate  - Instruct patient on fluid and nutrition as appropriate  Outcome: Progressing  Goal: Fluid balance maintained  Description: INTERVENTIONS:  - Monitor labs   - Monitor I/O and WT  - Instruct patient on fluid and nutrition as appropriate  - Assess for signs & symptoms of volume excess or deficit  Outcome: Progressing  Goal: Glucose maintained within target range  Description: INTERVENTIONS:  - Monitor Blood Glucose as ordered  - Assess for signs and symptoms of hyperglycemia and hypoglycemia  - Administer ordered medications to maintain glucose within target range  - Assess nutritional intake and initiate nutrition service referral as needed  Outcome: Progressing     Problem: Potential for Falls  Goal: Patient will remain free of falls  Description: INTERVENTIONS:  - Educate patient/family on patient safety including physical limitations  - Instruct patient to call for assistance with activity   - Consult OT/PT to assist with strengthening/mobility   - Keep Call bell within reach  - Keep bed low and locked with side rails adjusted as appropriate  - Keep care items and personal belongings within reach  - Initiate and maintain comfort rounds  - Make Fall Risk Sign visible to staff  - Apply yellow socks and bracelet for high fall risk patients  - Consider moving patient to room near nurses station  Outcome: Progressing

## 2022-09-14 NOTE — PLAN OF CARE
Problem: PAIN - ADULT  Goal: Verbalizes/displays adequate comfort level or baseline comfort level  Description: Interventions:  - Encourage patient to monitor pain and request assistance  - Assess pain using appropriate pain scale  - Administer analgesics based on type and severity of pain and evaluate response  - Implement non-pharmacological measures as appropriate and evaluate response  - Consider cultural and social influences on pain and pain management  - Notify physician/advanced practitioner if interventions unsuccessful or patient reports new pain  Outcome: Progressing     Problem: INFECTION - ADULT  Goal: Absence or prevention of progression during hospitalization  Description: INTERVENTIONS:  - Assess and monitor for signs and symptoms of infection  - Monitor lab/diagnostic results  - Monitor all insertion sites, i e  indwelling lines, tubes, and drains  - Monitor endotracheal if appropriate and nasal secretions for changes in amount and color  - [place] appropriate cooling/warming therapies per order  - Administer medications as ordered  - Instruct and encourage patient and family to use good hand hygiene technique  - Identify and instruct in appropriate isolation precautions for identified infection/condition  Outcome: Progressing  Goal: Absence of fever/infection during neutropenic period  Description: INTERVENTIONS:  - Monitor WBC    Outcome: Progressing     Problem: SAFETY ADULT  Goal: Patient will remain free of falls  Description: INTERVENTIONS:  - Educate patient/family on patient safety including physical limitations  - Instruct patient to call for assistance with activity   - Consult OT/PT to assist with strengthening/mobility   - Keep Call bell within reach  - Keep bed low and locked with side rails adjusted as appropriate  - Keep care items and personal belongings within reach  - Initiate and maintain comfort rounds  - Make Fall Risk Sign visible to staff  - Apply yellow socks and bracelet for high fall risk patients  - Consider moving patient to room near nurses station  Outcome: Progressing     Problem: CARDIOVASCULAR - ADULT  Goal: Maintains optimal cardiac output and hemodynamic stability  Description: INTERVENTIONS:  - Monitor I/O, vital signs and rhythm  - Monitor for S/S and trends of decreased cardiac output  - Administer and titrate ordered vasoactive medications to optimize hemodynamic stability  - Assess quality of pulses, skin color and temperature  - Assess for signs of decreased coronary artery perfusion  - Instruct patient to report change in severity of symptoms  Outcome: Progressing  Goal: Absence of cardiac dysrhythmias or at baseline rhythm  Description: INTERVENTIONS:  - Continuous cardiac monitoring, vital signs, obtain 12 lead EKG if ordered  - Administer antiarrhythmic and heart rate control medications as ordered  - Monitor electrolytes and administer replacement therapy as ordered  Outcome: Progressing     Problem: RESPIRATORY - ADULT  Goal: Achieves optimal ventilation and oxygenation  Description: INTERVENTIONS:  - Assess for changes in respiratory status  - Assess for changes in mentation and behavior  - Position to facilitate oxygenation and minimize respiratory effort  - Oxygen administered by appropriate delivery if ordered  - Initiate smoking cessation education as indicated  - Encourage broncho-pulmonary hygiene including cough, deep breathe, Incentive Spirometry  - Assess the need for suctioning and aspirate as needed  - Assess and instruct to report SOB or any respiratory difficulty  - Respiratory Therapy support as indicated  Outcome: Progressing     Problem: GASTROINTESTINAL - ADULT  Goal: Minimal or absence of nausea and/or vomiting  Description: INTERVENTIONS:  - Administer IV fluids if ordered to ensure adequate hydration  - Maintain NPO status until nausea and vomiting are resolved  - Nasogastric tube if ordered  - Administer ordered antiemetic medications as needed  - Provide nonpharmacologic comfort measures as appropriate  - Advance diet as tolerated, if ordered  - Consider nutrition services referral to assist patient with adequate nutrition and appropriate food choices  Outcome: Progressing  Goal: Maintains or returns to baseline bowel function  Description: INTERVENTIONS:  - Assess bowel function  - Encourage oral fluids to ensure adequate hydration  - Administer IV fluids if ordered to ensure adequate hydration  - Administer ordered medications as needed  - Encourage mobilization and activity  - Consider nutritional services referral to assist patient with adequate nutrition and appropriate food choices  Outcome: Progressing     Problem: METABOLIC, FLUID AND ELECTROLYTES - ADULT  Goal: Electrolytes maintained within normal limits  Description: INTERVENTIONS:  - Monitor labs and assess patient for signs and symptoms of electrolyte imbalances  - Administer electrolyte replacement as ordered  - Monitor response to electrolyte replacements, including repeat lab results as appropriate  - Instruct patient on fluid and nutrition as appropriate  Outcome: Progressing  Goal: Fluid balance maintained  Description: INTERVENTIONS:  - Monitor labs   - Monitor I/O and WT  - Instruct patient on fluid and nutrition as appropriate  - Assess for signs & symptoms of volume excess or deficit  Outcome: Progressing  Goal: Glucose maintained within target range  Description: INTERVENTIONS:  - Monitor Blood Glucose as ordered  - Assess for signs and symptoms of hyperglycemia and hypoglycemia  - Administer ordered medications to maintain glucose within target range  - Assess nutritional intake and initiate nutrition service referral as needed  Outcome: Progressing     Problem: HEMATOLOGIC - ADULT  Goal: Maintains hematologic stability  Description: INTERVENTIONS  - Assess for signs and symptoms of bleeding or hemorrhage  - Monitor labs  - Administer supportive blood products/factors as ordered and appropriate  Outcome: Progressing     Problem: Potential for Falls  Goal: Patient will remain free of falls  Description: INTERVENTIONS:  - Educate patient/family on patient safety including physical limitations  - Instruct patient to call for assistance with activity   - Consult OT/PT to assist with strengthening/mobility   - Keep Call bell within reach  - Keep bed low and locked with side rails adjusted as appropriate  - Keep care items and personal belongings within reach  - Initiate and maintain comfort rounds  - Make Fall Risk Sign visible to staff  - Apply yellow socks and bracelet for high fall risk patients  - Consider moving patient to room near nurses station  Outcome: Progressing

## 2022-09-14 NOTE — QUICK NOTE
Bump in BUN and creatinine on repeat labs this afternoon  Discontinued Lasix drip  Will reassess BUN and creatinine on morning labs

## 2022-09-14 NOTE — PLAN OF CARE
Problem: PHYSICAL THERAPY ADULT  Goal: Performs mobility at highest level of function for planned discharge setting  See evaluation for individualized goals  Description: Treatment/Interventions: Functional transfer training, LE strengthening/ROM, Elevations, Therapeutic exercise, Endurance training, Patient/family training, Bed mobility, Gait training, Spoke to nursing, OT  Equipment Recommended: Other (Comment) (trial SPC or RW next tx session)       See flowsheet documentation for full assessment, interventions and recommendations  Outcome: Progressing  Note: Prognosis: Fair  Problem List: Decreased endurance, Decreased mobility, Obesity, Impaired judgement  Assessment: Willem Bo was seen for a f/u session  Continues to report significant SOB impacting tolerance to activity  I did increase her O2 from 2>3L during ambulation to see if this would improve pt comfort however ambulatory endurance and vitals relatively unchanged  Demonstrates some progress towards goals including improved am-pac score and decreased level of assist for transfers and ambulation  Continues to present w deficits of endurance which are somewhat baseline (limited household ambulatory)  Decline trial of RW despite education on benefits to improve endurance  Did not degradation of gait sequencing w fatigue and current 30" sit to stand test score indicate pt is at risk for falls (based on age related norm/ cutoff of <10)  Would continue to benefit from therapy to address prev mentioned deficits of endurance to reduce fall risk and optimize mobility  Barriers to Discharge: None     PT Discharge Recommendation: Home with home health rehabilitation (vs OP PT w cardiopulmonary focus)    See flowsheet documentation for full assessment

## 2022-09-14 NOTE — PROGRESS NOTES
Cardiology Progress Note - Tesha Way 81 y o  female MRN: [id number]    Unit/Bed#: E5 -01 Encounter: [id number]      Assessment & Plan:    Acute on chronic diastolic heart failure (HCC)  -TTE 09/13/2022 EF 70%, grade 1 DD, mild TR, estimated PA pressure of 37 mmHg  -on Lasix drip at 15 mg/hr  -creatinine and BUN trended up slightly today    Acute exacerbation of chronic obstructive pulmonary disease (COPD) (HCC)  -uses 2 L nasal cannula at home  -on prednisone taper    Paroxysmal atrial fibrillation (HCC)  -anticoagulant Eliquis 5 mg b i d    -rate control with Cardizem 180 mg daily,    HIV (human immunodeficiency virus infection)     Type 2 diabetes mellitus with hyperglycemia (HCC)    Chronic respiratory failure with hypoxia (HCC)    Tobacco use    Lumbar stenosis    PASHA on CPAP    Obesity (BMI 35 0-39 9 without comorbidity)    Summary:   -patient still with persistent shortness of breath, however no significant change oxygen requirements  -volume status appears improved today and is diuresing well on Lasix drip  -will recheck BMP later this afternoon to reassess kidney function  -symptoms of shortness of breath likely also contributed to by COPD exacerbation    Subjective:   No significant events overnight  Patient reports persistent shortness of breath  Denies chest pain, abdominal pain, nausea, vomiting, fever, chills, headache, dizziness or palpitations  Objective:     Vitals: Blood pressure 112/73, pulse 89, temperature 97 7 °F (36 5 °C), resp   rate 18, height 5' 2" (1 575 m), weight 98 4 kg (216 lb 14 9 oz), last menstrual period 04/01/2013, SpO2 97 %, not currently breastfeeding , Body mass index is 39 68 kg/m² ,   Orthostatic Blood Pressures    Flowsheet Row Most Recent Value   Blood Pressure 112/73 filed at 09/14/2022 6418   Patient Position - Orthostatic VS Sitting filed at 09/13/2022 1506            Intake/Output Summary (Last 24 hours) at 9/14/2022 1112  Last data filed at 9/14/2022 0800  Gross per 24 hour   Intake 600 ml   Output --   Net 600 ml           Physical Exam:    GEN: Dave Sotelo appears well, alert and oriented x 3, pleasant and cooperative   HEENT: anicteric, mucous membranes moist  NECK: no jvd, carotid bruits   HEART: regular rhythm, normal S1 and S2, no murmurs, clicks, gallops or rubs   LUNGS:  Possible crackles at bases, otherwise diffuse rhonchi bilaterally  ABDOMEN: normal bowel sounds, soft, no tenderness, no distention  EXTREMITIES: peripheral pulses normal; 1-2+ lower extremity edema L>R  NEURO: no focal findings   SKIN: normal without suspicious lesions on exposed skin      Current Facility-Administered Medications:     acetaminophen (TYLENOL) tablet 975 mg, 975 mg, Oral, Q8H PRN, Keyonasuresh Hammni, CRNP, 975 mg at 09/13/22 0951    albuterol inhalation solution 2 5 mg, 2 5 mg, Nebulization, Q4H PRN, Aaron S Prechtel, DO, 2 5 mg at 09/14/22 0934    apixaban (ELIQUIS) tablet 5 mg, 5 mg, Oral, BID, Aaron S Prechtel, DO, 5 mg at 09/14/22 0824    atorvastatin (LIPITOR) tablet 20 mg, 20 mg, Oral, Daily, Aaron S Prechtel, DO, 20 mg at 09/14/22 0824    bictegravir-emtricitab-tenofovir alafenamide (BIKTARVY) -25 MG tablet 1 tablet, 1 tablet, Oral, Daily, Aaron S Prechtel, DO, 1 tablet at 09/14/22 0823    budesonide (PULMICORT) inhalation solution 0 5 mg, 0 5 mg, Nebulization, Q12H, Susy Patches, CRNP, 0 5 mg at 09/14/22 0656    diltiazem (CARDIZEM CD) 24 hr capsule 180 mg, 180 mg, Oral, Daily, Aaron S Prechtel, DO, 180 mg at 09/14/22 0824    furosemide (LASIX) 500 mg infusion 50 mL, 15 mg/hr, Intravenous, Continuous, Raymond Delong MD, Last Rate: 1 5 mL/hr at 09/13/22 1647, 15 mg/hr at 09/13/22 1647    gabapentin (NEURONTIN) capsule 800 mg, 800 mg, Oral, TID, Aaron S Prechtel, DO, 800 mg at 09/14/22 0824    insulin lispro (HumaLOG) 100 units/mL subcutaneous injection 1-5 Units, 1-5 Units, Subcutaneous, TID AC, 2 Units at 09/12/22 1600 **AND** Fingerstick Glucose (POCT), , , TID AC, LUIS M Graves    ipratropium-albuterol (DUO-NEB) 0 5-2 5 mg/3 mL inhalation solution 3 mL, 3 mL, Nebulization, TID, Thom Snowden MD, 3 mL at 09/14/22 0656    levothyroxine tablet 100 mcg, 100 mcg, Oral, Early Morning, Aaron S Prechtel, DO, 100 mcg at 09/14/22 0517    lidocaine (LIDODERM) 5 % patch 1 patch, 1 patch, Topical, Daily, LUIS M Graves, 1 patch at 09/14/22 0823    melatonin tablet 6 mg, 6 mg, Oral, HS, Kathy Rawls PA-C, 6 mg at 09/13/22 2213    montelukast (SINGULAIR) tablet 10 mg, 10 mg, Oral, HS, Aaron S Prechtel, DO, 10 mg at 09/13/22 2213    nicotine (NICODERM CQ) 21 mg/24 hr TD 24 hr patch 1 patch, 1 patch, Transdermal, Daily, Aaron S Prechtel, DO, 1 patch at 09/12/22 0821    potassium chloride (K-DUR,KLOR-CON) CR tablet 40 mEq, 40 mEq, Oral, BID, Anibal Paul MD, 40 mEq at 09/14/22 6982    [COMPLETED] predniSONE tablet 40 mg, 40 mg, Oral, Daily, 40 mg at 09/12/22 0821 **FOLLOWED BY** predniSONE tablet 30 mg, 30 mg, Oral, Daily, 30 mg at 09/14/22 0824 **FOLLOWED BY** [START ON 9/16/2022] predniSONE tablet 20 mg, 20 mg, Oral, Daily **FOLLOWED BY** [START ON 9/19/2022] predniSONE tablet 10 mg, 10 mg, Oral, Daily, LUIS M Graves    traMADol (ULTRAM) tablet 50 mg, 50 mg, Oral, Q6H PRN, Kathy Rawls PA-C, 50 mg at 09/14/22 0538    Labs & Results:    Lab Results   Component Value Date    TROPONINI <0 02 05/27/2020    TROPONINI <0 04 12/11/2014    TROPONINI 0 04 12/11/2014       Lab Results   Component Value Date    GLUCOSE 104 04/10/2018    CALCIUM 10 5 (H) 09/14/2022     (L) 12/26/2014    K 4 2 09/14/2022    CO2 40 (H) 09/14/2022    CL 96 09/14/2022    BUN 41 (H) 09/14/2022    CREATININE 1 26 09/14/2022       Lab Results   Component Value Date    WBC 11 13 (H) 09/12/2022    HGB 10 4 (L) 09/12/2022    HCT 32 9 (L) 09/12/2022    MCV 92 09/12/2022     09/12/2022           No results found for: CHOL  Lab Results   Component Value Date    HDL 70 09/12/2022     Lab Results   Component Value Date    LDLCALC 44 09/12/2022     Lab Results   Component Value Date    TRIG 82 09/12/2022       Lab Results   Component Value Date    ALT 20 09/07/2022    AST 10 09/07/2022         EKG personally reviewed by )Adam Gale MD  No acute changes   TELE: No significant arrhythmias seen on telemetry review

## 2022-09-15 ENCOUNTER — APPOINTMENT (INPATIENT)
Dept: CT IMAGING | Facility: HOSPITAL | Age: 72
DRG: 291 | End: 2022-09-15
Payer: COMMERCIAL

## 2022-09-15 LAB
ANION GAP SERPL CALCULATED.3IONS-SCNC: 8 MMOL/L (ref 4–13)
BUN SERPL-MCNC: 51 MG/DL (ref 5–25)
CALCIUM SERPL-MCNC: 10.5 MG/DL (ref 8.3–10.1)
CHLORIDE SERPL-SCNC: 99 MMOL/L (ref 96–108)
CO2 SERPL-SCNC: 34 MMOL/L (ref 21–32)
CREAT SERPL-MCNC: 1.21 MG/DL (ref 0.6–1.3)
GFR SERPL CREATININE-BSD FRML MDRD: 45 ML/MIN/1.73SQ M
GLUCOSE SERPL-MCNC: 143 MG/DL (ref 65–140)
GLUCOSE SERPL-MCNC: 145 MG/DL (ref 65–140)
GLUCOSE SERPL-MCNC: 155 MG/DL (ref 65–140)
GLUCOSE SERPL-MCNC: 164 MG/DL (ref 65–140)
GLUCOSE SERPL-MCNC: 248 MG/DL (ref 65–140)
POTASSIUM SERPL-SCNC: 4.7 MMOL/L (ref 3.5–5.3)
SODIUM SERPL-SCNC: 141 MMOL/L (ref 135–147)

## 2022-09-15 PROCEDURE — 82948 REAGENT STRIP/BLOOD GLUCOSE: CPT

## 2022-09-15 PROCEDURE — 97535 SELF CARE MNGMENT TRAINING: CPT

## 2022-09-15 PROCEDURE — 99232 SBSQ HOSP IP/OBS MODERATE 35: CPT | Performed by: INTERNAL MEDICINE

## 2022-09-15 PROCEDURE — G1004 CDSM NDSC: HCPCS

## 2022-09-15 PROCEDURE — 80048 BASIC METABOLIC PNL TOTAL CA: CPT | Performed by: INTERNAL MEDICINE

## 2022-09-15 PROCEDURE — 71250 CT THORAX DX C-: CPT

## 2022-09-15 PROCEDURE — 97530 THERAPEUTIC ACTIVITIES: CPT

## 2022-09-15 PROCEDURE — 94640 AIRWAY INHALATION TREATMENT: CPT

## 2022-09-15 PROCEDURE — 97116 GAIT TRAINING THERAPY: CPT

## 2022-09-15 PROCEDURE — 99232 SBSQ HOSP IP/OBS MODERATE 35: CPT | Performed by: STUDENT IN AN ORGANIZED HEALTH CARE EDUCATION/TRAINING PROGRAM

## 2022-09-15 RX ADMIN — DILTIAZEM HYDROCHLORIDE 180 MG: 180 CAPSULE, COATED, EXTENDED RELEASE ORAL at 08:42

## 2022-09-15 RX ADMIN — METHYLPREDNISOLONE SODIUM SUCCINATE 40 MG: 40 INJECTION, POWDER, FOR SOLUTION INTRAMUSCULAR; INTRAVENOUS at 22:58

## 2022-09-15 RX ADMIN — ACETAMINOPHEN 975 MG: 325 TABLET ORAL at 22:58

## 2022-09-15 RX ADMIN — GABAPENTIN 800 MG: 400 CAPSULE ORAL at 16:41

## 2022-09-15 RX ADMIN — MONTELUKAST SODIUM 10 MG: 10 TABLET, COATED ORAL at 22:58

## 2022-09-15 RX ADMIN — METHYLPREDNISOLONE SODIUM SUCCINATE 40 MG: 40 INJECTION, POWDER, FOR SOLUTION INTRAMUSCULAR; INTRAVENOUS at 06:08

## 2022-09-15 RX ADMIN — BUDESONIDE 0.5 MG: 0.5 INHALANT ORAL at 19:06

## 2022-09-15 RX ADMIN — METHYLPREDNISOLONE SODIUM SUCCINATE 40 MG: 40 INJECTION, POWDER, FOR SOLUTION INTRAMUSCULAR; INTRAVENOUS at 13:29

## 2022-09-15 RX ADMIN — IPRATROPIUM BROMIDE AND ALBUTEROL SULFATE 3 ML: 2.5; .5 SOLUTION RESPIRATORY (INHALATION) at 19:06

## 2022-09-15 RX ADMIN — LEVOTHYROXINE SODIUM 100 MCG: 100 TABLET ORAL at 06:08

## 2022-09-15 RX ADMIN — IPRATROPIUM BROMIDE AND ALBUTEROL SULFATE 3 ML: 2.5; .5 SOLUTION RESPIRATORY (INHALATION) at 13:42

## 2022-09-15 RX ADMIN — POTASSIUM CHLORIDE 40 MEQ: 1500 TABLET, EXTENDED RELEASE ORAL at 08:42

## 2022-09-15 RX ADMIN — GABAPENTIN 800 MG: 400 CAPSULE ORAL at 22:58

## 2022-09-15 RX ADMIN — MELATONIN 6 MG: at 22:58

## 2022-09-15 RX ADMIN — BICTEGRAVIR SODIUM, EMTRICITABINE, AND TENOFOVIR ALAFENAMIDE FUMARATE 1 TABLET: 50; 200; 25 TABLET ORAL at 08:42

## 2022-09-15 RX ADMIN — BUDESONIDE 0.5 MG: 0.5 INHALANT ORAL at 07:18

## 2022-09-15 RX ADMIN — INSULIN LISPRO 1 UNITS: 100 INJECTION, SOLUTION INTRAVENOUS; SUBCUTANEOUS at 16:41

## 2022-09-15 RX ADMIN — APIXABAN 5 MG: 5 TABLET, FILM COATED ORAL at 08:42

## 2022-09-15 RX ADMIN — TRAMADOL HYDROCHLORIDE 50 MG: 50 TABLET, COATED ORAL at 19:45

## 2022-09-15 RX ADMIN — TRAMADOL HYDROCHLORIDE 50 MG: 50 TABLET, COATED ORAL at 08:44

## 2022-09-15 RX ADMIN — APIXABAN 5 MG: 5 TABLET, FILM COATED ORAL at 16:41

## 2022-09-15 RX ADMIN — LIDOCAINE 1 PATCH: 50 PATCH CUTANEOUS at 08:42

## 2022-09-15 RX ADMIN — GABAPENTIN 800 MG: 400 CAPSULE ORAL at 08:43

## 2022-09-15 RX ADMIN — IPRATROPIUM BROMIDE AND ALBUTEROL SULFATE 3 ML: 2.5; .5 SOLUTION RESPIRATORY (INHALATION) at 07:18

## 2022-09-15 RX ADMIN — ATORVASTATIN CALCIUM 20 MG: 20 TABLET, FILM COATED ORAL at 08:42

## 2022-09-15 NOTE — PHYSICAL THERAPY NOTE
Physical Therapy Treatment Note     09/15/22 1437   PT Last Visit   PT Visit Date 09/15/22   Note Type   Note Type Treatment   Pain Assessment   Pain Assessment Tool 0-10   Pain Score No Pain   Restrictions/Precautions   Weight Bearing Precautions Per Order No   Other Precautions O2;Fall Risk;Telemetry   General   Chart Reviewed Yes   Subjective   Subjective pt  seated on recliner upon entry  Pt  agreeable to PT  no pain reported when asked reported her breathing is not any better  Transfers   Sit to Stand 6  Modified independent   Additional items Armrests   Stand to Sit 6  Modified independent   Additional items Armrests   Stand pivot 7  Independent   Toilet transfer 6  Modified independent   Additional items Armrests   Ambulation/Elevation   Gait pattern Wide SELENE; Decreased foot clearance; Excessively slow;Decreased toe off;Decreased heel strike   Gait Assistance 5  Supervision   Assistive Device None   Distance 560ft x 2   Stair Management Assistance 5  Supervision   Additional items Assist x 1; Increased time required   Stair Management Technique Two rails; Alternating pattern; Step to pattern;Reciprocal;Nonreciprocal;Foreward   Number of Stairs   (10 + 15)   Balance   Static Sitting Normal   Dynamic Sitting Good   Static Standing Fair +   Dynamic Standing Fair   Ambulatory Fair   Endurance Deficit   Endurance Deficit Yes   Endurance Deficit Description NGUYEN   Activity Tolerance   Activity Tolerance Patient tolerated treatment well   Nurse Made Aware Yes   Exercises   Knee AROM Long Arc Quad Sitting;Bilateral;AROM;20 reps  (with 3 sec hold)   Assessment   Prognosis Fair   Problem List Decreased endurance; Impaired balance;Decreased mobility;Obesity   Assessment Pt  progressing well with mobility  Recommended patient ambulate with staff on unit or in the room by herself  pt  agreeable  Pt  on 2L o2 and Spo2 remained 92-98%  Higher stats noted with ambulation   Pt  performed toileting and pericare in sitting independently  Used grab bars for toilet transfer  Pt  noted with NGUYEN with ambualtion and stair negotiation however SpO2 stats remained 96-98%  Negotiated 10 steps and after seated rest another 15 steps with BHRs and S  Decreased endurance is a limiting factor at this time  Will continue to progress as appropriate and per PT POC  Barriers to Discharge None   Goals   Patient Goals None reporting   STG Expiration Date 09/19/22   PT Treatment Day 2   Plan   Treatment/Interventions Functional transfer training;LE strengthening/ROM; Elevations; Therapeutic exercise;Gait training;Equipment eval/education;Patient/family training;Spoke to nursing   Progress Progressing toward goals   PT Frequency 3-5x/wk   Recommendation   PT Discharge Recommendation   (OPPT Pulmonary rehab)   AM-PAC Basic Mobility Inpatient   Turning in Bed Without Bedrails 4   Lying on Back to Sitting on Edge of Flat Bed 4   Moving Bed to Chair 4   Standing Up From Chair 4   Walk in Room 4   Climb 3-5 Stairs 4   Basic Mobility Inpatient Raw Score 24   Basic Mobility Standardized Score 57 68   Highest Level Of Mobility   JH-HLM Goal 8: Walk 250 feet or more   JH-HLM Achieved 7: Walk 25 feet or more   End of Consult   Patient Position at End of Consult Bedside chair; All needs within reach         Aliza Lentz PTA    An AM-PAC basic mobility standardized score less than 42 9 suggest the patient may benefit from discharge to post-acute rehab services

## 2022-09-15 NOTE — PROGRESS NOTES
Progress Note - Edward Gordon 81 y o  female MRN: [id number]    Unit/Bed#: E5 -01 Encounter: [id number]      Subjective: The patient still has significant dyspnea on exertion  She says that even walking to the bathroom exhaust her and causes her significant shortness of breath  She is mildly short of breath at rest   She has much less edema  She has minimal cough and no sputum  She denies abdominal pain, nausea, vomiting, etcetera  Physical Exam:   Temp:  [97 8 °F (36 6 °C)-98 4 °F (36 9 °C)] 98 4 °F (36 9 °C)  HR:  [84-87] 87  Resp:  [18] 18  BP: (106-125)/(75-85) 125/85    Gen:  Well-developed, obese, in no distress  Neck:  Supple  No lymphadenopathy, goiter, or bruit  Heart:  Regular rhythm  I heard no murmur, gallop, or rub  Lungs:  Diminished breath sounds bilaterally  I could hear no wheezing, rales, or rhonchi  Abd:  Soft with active bowel sounds  No mass, tenderness, or organomegaly  Extremities:  No clubbing, cyanosis, or edema  No calf tenderness  Neuro:  Alert and oriented  No focal sign  Skin:  Warm and dry  LABS:   CMP:   Lab Results   Component Value Date    SODIUM 141 09/15/2022    K 4 7 09/15/2022    CL 99 09/15/2022    CO2 34 (H) 09/15/2022    BUN 51 (H) 09/15/2022    CREATININE 1 21 09/15/2022    CALCIUM 10 5 (H) 09/15/2022    EGFR 45 09/15/2022             Assessment/Plan:  1  Acute on chronic diastolic congestive heart failure  2  Acute exacerbation of COPD  3  Chronic respiratory failure with hypoxia  4  Obstructive sleep apnea  5  Obesity  6  Paroxysmal atrial fibrillation  7  Type 2 diabetes  8  Tobacco abuse  9  HIV  10  Lumbar spinal stenosis  Despite improvement of the patient's fluid status her respiratory status has not improved so well  Her diuretics are currently on hold  She remains on intravenous steroids and intensive bronchodilator therapy  The case was discussed with Cardiology    CT scan of the chest will be obtained to exclude possible confounding variables  Her current therapy will be continued for the moment        VTE Pharmacologic Prophylaxis:  Apixaban  VTE Mechanical Prophylaxis: reason for no mechanical VTE prophylaxis Therapeutically anticoagulated

## 2022-09-15 NOTE — PLAN OF CARE
Problem: OCCUPATIONAL THERAPY ADULT  Goal: Performs self-care activities at highest level of function for planned discharge setting  See evaluation for individualized goals  Description: Treatment Interventions: ADL retraining, Functional transfer training, Endurance training, Patient/family training, Equipment evaluation/education, Compensatory technique education, Continued evaluation, Energy conservation, Activityengagement    See flowsheet documentation for full assessment, interventions and recommendations  Outcome: Progressing  Note: Limitation: Decreased ADL status, Decreased UE strength, Decreased endurance  Prognosis: Fair  Assessment: Pt participated in skilled OT session this date with interventions consisting of therapeutic activities and self-care/ADL training to increase B UE strength, functional endurance/activity tolerance, static/dynamic sitting/standing balance, and overall safety awareness to increase (I) with ADLs/IADLs to return to PLOF  Provided education on HEP, energy conservation techniques, deep breathing techniques, fall prevention strategies, and overall safety awareness  Patient agreeable to OT treatment session, upon arrival patient was found seated OOB to Recliner  Patient requiring verbal cues for safety and verbal cues for pacing through activity steps  Refer to flowsheet for functional performance  Pt states she feels SOB with increased mobility, O2 on 2L ranging from 93-98%  Declined further mobility or there ex today due to fatigue  She does require some encouragement for participation  Patient continues to be functioning below baseline level, occupational performance remains limited secondary to factors listed above and increased risk for falls and injury  The patient's raw score on the AM-PAC Daily Activity inpatient short form is 20, standardized score is 42 03, greater than 39 4  Patients at this level are likely to benefit from discharge to home [facility] OT   Please refer to the recommendation of the Occupational Therapist for safe discharge planning     OT Discharge Recommendation: Home with home health rehabilitation

## 2022-09-15 NOTE — OCCUPATIONAL THERAPY NOTE
Occupational Therapy Progress Note     Patient Name: Andrez Gallegos  KCBAH'Z Date: 9/15/2022  Problem List  Principal Problem:    Acute exacerbation of chronic obstructive pulmonary disease (COPD) ([facility] )  Active Problems:    HIV (human immunodeficiency virus infection)     Type 2 diabetes mellitus with hyperglycemia ([facility] )    Chronic respiratory failure with hypoxia (HCC)    Tobacco use    Lumbar stenosis    PASHA on CPAP    Paroxysmal atrial fibrillation (HCC)    Acute on chronic diastolic heart failure (HCC)    Obesity (BMI 35 0-39 9 without comorbidity)        09/15/22 1040   OT Last Visit   OT Visit Date 09/15/22   Note Type   Note Type Treatment   Restrictions/Precautions   Weight Bearing Precautions Per Order No   Other Precautions Multiple lines;O2  (2L)   Pain Assessment   Pain Assessment Tool 0-10   Pain Score 8   Pain Location/Orientation Location: Back   Hospital Pain Intervention(s) Repositioned; Ambulation/increased activity; Emotional support   ADL   Grooming Assistance 5  Supervision/Setup   Grooming Deficit Verbal cueing;Supervision/safety; Increased time to complete   Toileting Assistance  5  Supervision/Setup   Toileting Deficit Verbal cueing;Supervison/safety; Increased time to complete   Transfers   Sit to Stand 3  Moderate assistance   Stand to Sit 6  Modified independent   Toilet transfer 6  Modified independent   Functional Mobility   Functional Mobility 5  Supervision   Additional Comments no AD   Cognition   Overall Cognitive Status WFL   Arousal/Participation Cooperative   Attention Within functional limits   Orientation Level Oriented to person;Oriented to place;Oriented to time   Memory Decreased recall of precautions   Following Commands Follows one step commands without difficulty   Activity Tolerance   Activity Tolerance Treatment limited secondary to medical complications (Comment)   Medical Staff Made Aware yes   Assessment   Assessment Pt participated in skilled OT session this date with interventions consisting of therapeutic activities and self-care/ADL training to increase B UE strength, functional endurance/activity tolerance, static/dynamic sitting/standing balance, and overall safety awareness to increase (I) with ADLs/IADLs to return to PLOF  Provided education on HEP, energy conservation techniques, deep breathing techniques, fall prevention strategies, and overall safety awareness  Patient agreeable to OT treatment session, upon arrival patient was found seated OOB to Recliner  Patient requiring verbal cues for safety and verbal cues for pacing through activity steps  Refer to flowsheet for functional performance  Pt states she feels SOB with increased mobility, O2 on 2L ranging from 93-98%  Declined further mobility or there ex today due to fatigue  She does require some encouragement for participation  Patient continues to be functioning below baseline level, occupational performance remains limited secondary to factors listed above and increased risk for falls and injury  The patient's raw score on the AM-PAC Daily Activity inpatient short form is 20, standardized score is 42 03, greater than 39 4  Patients at this level are likely to benefit from discharge to home [facility] OT  Please refer to the recommendation of the Occupational Therapist for safe discharge planning   Plan   Treatment Interventions ADL retraining;Functional transfer training; Endurance training;Patient/family training;Equipment evaluation/education; Compensatory technique education;Continued evaluation; Energy conservation; Activityengagement   Goal Expiration Date 09/26/22   OT Treatment Day 2   OT Frequency 2-3x/wk   Recommendation   OT Discharge Recommendation Home with home health rehabilitation   [facility] Daily Activity Inpatient   Lower Body Dressing 3   Bathing 3   Toileting 3   Upper Body Dressing 3   Grooming 4   Eating 4   Daily Activity Raw Score 20   Daily Activity Standardized Score (Calc for Raw Score >=11) 42 03   AM-PAC Applied Cognition Inpatient   Following a Speech/Presentation 4   Understanding Ordinary Conversation 4   Taking Medications 3   Remembering Where Things Are Placed or Put Away 3   Remembering List of 4-5 Errands 3   Taking Care of Complicated Tasks 2   Applied Cognition Raw Score 19   Applied Cognition Standardized Score 39 77     Rod Zamorano, OT

## 2022-09-15 NOTE — PROGRESS NOTES
Cardiology Progress Note - Gurpreet Amor 81 y o  female MRN: [id number]    Unit/Bed#: E5 -01 Encounter: [id number]      Assessment & Plan:    Acute on chronic diastolic heart failure (HCC)  -TTE 09/13/2022 EF 70%, grade 1 DD, mild TR, estimated PA pressure of 37 mmHg  -not on Lasix at home  -discontinued Lasix drip yesterday afternoon due to bump in creatinine and BUN  -recommend monitor off diuretics today, will likely start p o  diuretics tomorrow    Acute exacerbation of chronic obstructive pulmonary disease (COPD) ([facility])  -uses 2 L nasal cannula at home  -steroids were increased yesterday to Solu-Medrol due to increased wheezing on exam    Paroxysmal atrial fibrillation ([facility])  -anticoagulant Eliquis 5 mg b i d    -rate control with Cardizem 180 mg daily    HIV (human immunodeficiency virus infection)     Type 2 diabetes mellitus with hyperglycemia ([facility])    Chronic respiratory failure with hypoxia ([facility])    Tobacco use    Lumbar stenosis    PASHA on CPAP    Obesity (BMI 35 0-39 9 without comorbidity)    Summary:   -monitor off diuretics today  -pulmonary exam significantly improved today, however patient still very symptomatic  -consider chest imaging today with either x-ray or CT to better evaluate her shortness of breath    Subjective:   No significant events overnight  She still reports persistent shortness of breath similar to yesterday  Denies chest pain, abdominal pain, nausea, vomiting, fever, chills, headache, dizziness or palpitations  Objective:     Vitals: Blood pressure 111/76, pulse 84, temperature 97 8 °F (36 6 °C), resp   rate 18, height 5' 2" (1 575 m), weight 98 2 kg (216 lb 7 9 oz), last menstrual period 04/01/2013, SpO2 96 %, not currently breastfeeding , Body mass index is 39 6 kg/m² ,   Orthostatic Blood Pressures    Flowsheet Row Most Recent Value   Blood Pressure 111/76 filed at 09/15/2022 2038   Patient Position - Orthostatic VS Sitting filed at 09/15/2022 0723            Intake/Output Summary (Last 24 hours) at 9/15/2022 1004  Last data filed at 9/15/2022 0901  Gross per 24 hour   Intake 941 ml   Output 900 ml   Net 41 ml           Physical Exam:    GEN: Andrez Dub appears well, alert and oriented x 3, pleasant and cooperative   HEENT: anicteric, mucous membranes moist  NECK: no jvd, carotid bruits   HEART: regular rhythm, normal S1 and S2, no murmurs, clicks, gallops or rubs   LUNGS:  Clear to auscultation bilaterally  ABDOMEN: normal bowel sounds, soft, no tenderness, no distention  EXTREMITIES: peripheral pulses normal; 1-2+ lower extremity edema L>R  NEURO: no focal findings   SKIN: normal without suspicious lesions on exposed skin      Current Facility-Administered Medications:     acetaminophen (TYLENOL) tablet 975 mg, 975 mg, Oral, Q8H PRN, Keyonasuresh oL, CRNP, 975 mg at 09/14/22 1614    albuterol inhalation solution 2 5 mg, 2 5 mg, Nebulization, Q4H PRN, Aaron S Prechtel, DO, 2 5 mg at 09/14/22 0934    apixaban (ELIQUIS) tablet 5 mg, 5 mg, Oral, BID, Aaron S Prechtel, DO, 5 mg at 09/15/22 0842    atorvastatin (LIPITOR) tablet 20 mg, 20 mg, Oral, Daily, Aaron S Prechtel, DO, 20 mg at 09/15/22 0842    bictegravir-emtricitab-tenofovir alafenamide (BIKTARVY) -25 MG tablet 1 tablet, 1 tablet, Oral, Daily, Aaron S Prechtel, DO, 1 tablet at 09/15/22 0842    budesonide (PULMICORT) inhalation solution 0 5 mg, 0 5 mg, Nebulization, Q12H, Louis Velez, CRNP, 0 5 mg at 09/15/22 0718    diltiazem (CARDIZEM CD) 24 hr capsule 180 mg, 180 mg, Oral, Daily, Aaron S Prechtel, DO, 180 mg at 09/15/22 0842    gabapentin (NEURONTIN) capsule 800 mg, 800 mg, Oral, TID, Aaron S Prechtel, DO, 800 mg at 09/15/22 0843    insulin lispro (HumaLOG) 100 units/mL subcutaneous injection 1-5 Units, 1-5 Units, Subcutaneous, TID AC, 1 Units at 09/14/22 1641 **AND** Fingerstick Glucose (POCT), , , TID AC, LUIS M Graves    ipratropium-albuterol (DUO-NEB) 0 5-2 5 mg/3 mL inhalation solution 3 mL, 3 mL, Nebulization, TID, Mervin Lee MD, 3 mL at 09/15/22 0718    levothyroxine tablet 100 mcg, 100 mcg, Oral, Early Morning, Aaron S Prechtel, DO, 100 mcg at 09/15/22 0608    lidocaine (LIDODERM) 5 % patch 1 patch, 1 patch, Topical, Daily, LUIS M Graves, 1 patch at 09/15/22 0842    melatonin tablet 6 mg, 6 mg, Oral, HS, Kathy Rawls PA-C, 6 mg at 09/14/22 2308    methylPREDNISolone sodium succinate (Solu-MEDROL) injection 40 mg, 40 mg, Intravenous, Q8H [facility], Cynthia Beltran MD, 40 mg at 09/15/22 0608    montelukast (SINGULAIR) tablet 10 mg, 10 mg, Oral, HS, Aaron S Prechtel, DO, 10 mg at 09/14/22 2307    nicotine (NICODERM CQ) 21 mg/24 hr TD 24 hr patch 1 patch, 1 patch, Transdermal, Daily, Aaron S Prechtel, DO, 1 patch at 09/12/22 0821    potassium chloride (K-DUR,KLOR-CON) CR tablet 40 mEq, 40 mEq, Oral, BID, Alfred Goddard MD, 40 mEq at 09/15/22 0842    traMADol (ULTRAM) tablet 50 mg, 50 mg, Oral, Q6H PRN, Roney Mccall PA-C, 50 mg at 09/15/22 0844    Labs & Results:    Lab Results   Component Value Date    TROPONINI <0 02 05/27/2020    TROPONINI <0 04 12/11/2014    TROPONINI 0 04 12/11/2014       Lab Results   Component Value Date    GLUCOSE 104 04/10/2018    CALCIUM 10 5 (H) 09/15/2022     (L) 12/26/2014    K 4 7 09/15/2022    CO2 34 (H) 09/15/2022    CL 99 09/15/2022    BUN 51 (H) 09/15/2022    CREATININE 1 21 09/15/2022       Lab Results   Component Value Date    WBC 11 13 (H) 09/12/2022    HGB 10 4 (L) 09/12/2022    HCT 32 9 (L) 09/12/2022    MCV 92 09/12/2022     09/12/2022           No results found for: CHOL  Lab Results   Component Value Date    HDL 70 09/12/2022     Lab Results   Component Value Date    LDLCALC 44 09/12/2022     Lab Results   Component Value Date    TRIG 82 09/12/2022       Lab Results   Component Value Date    ALT 20 09/07/2022    AST 10 09/07/2022         EKG personally reviewed by )Vira Sauceda MD  No acute changes   TELE: No significant arrhythmias seen on telemetry review

## 2022-09-15 NOTE — PLAN OF CARE
Problem: PAIN - ADULT  Goal: Verbalizes/displays adequate comfort level or baseline comfort level  Description: Interventions:  - Encourage patient to monitor pain and request assistance  - Assess pain using appropriate pain scale  - Administer analgesics based on type and severity of pain and evaluate response  - Implement non-pharmacological measures as appropriate and evaluate response  - Consider cultural and social influences on pain and pain management  - Notify physician/advanced practitioner if interventions unsuccessful or patient reports new pain  Outcome: Progressing     Problem: INFECTION - ADULT  Goal: Absence or prevention of progression during hospitalization  Description: INTERVENTIONS:  - Assess and monitor for signs and symptoms of infection  - Monitor lab/diagnostic results  - Monitor all insertion sites, i e  indwelling lines, tubes, and drains  - Monitor endotracheal if appropriate and nasal secretions for changes in amount and color  - [place] appropriate cooling/warming therapies per order  - Administer medications as ordered  - Instruct and encourage patient and family to use good hand hygiene technique  - Identify and instruct in appropriate isolation precautions for identified infection/condition  Outcome: Progressing     Problem: SAFETY ADULT  Goal: Patient will remain free of falls  Description: INTERVENTIONS:  - Educate patient/family on patient safety including physical limitations  - Instruct patient to call for assistance with activity   - Consult OT/PT to assist with strengthening/mobility   - Keep Call bell within reach  - Keep bed low and locked with side rails adjusted as appropriate  - Keep care items and personal belongings within reach  - Initiate and maintain comfort rounds  - Make Fall Risk Sign visible to staff  - Apply yellow socks and bracelet for high fall risk patients  - Consider moving patient to room near nurses station  Outcome: Progressing     Problem: CARDIOVASCULAR - ADULT  Goal: Maintains optimal cardiac output and hemodynamic stability  Description: INTERVENTIONS:  - Monitor I/O, vital signs and rhythm  - Monitor for S/S and trends of decreased cardiac output  - Administer and titrate ordered vasoactive medications to optimize hemodynamic stability  - Assess quality of pulses, skin color and temperature  - Assess for signs of decreased coronary artery perfusion  - Instruct patient to report change in severity of symptoms  Outcome: Progressing  Goal: Absence of cardiac dysrhythmias or at baseline rhythm  Description: INTERVENTIONS:  - Continuous cardiac monitoring, vital signs, obtain 12 lead EKG if ordered  - Administer antiarrhythmic and heart rate control medications as ordered  - Monitor electrolytes and administer replacement therapy as ordered  Outcome: Progressing     Problem: RESPIRATORY - ADULT  Goal: Achieves optimal ventilation and oxygenation  Description: INTERVENTIONS:  - Assess for changes in respiratory status  - Assess for changes in mentation and behavior  - Position to facilitate oxygenation and minimize respiratory effort  - Oxygen administered by appropriate delivery if ordered  - Initiate smoking cessation education as indicated  - Encourage broncho-pulmonary hygiene including cough, deep breathe, Incentive Spirometry  - Assess the need for suctioning and aspirate as needed  - Assess and instruct to report SOB or any respiratory difficulty  - Respiratory Therapy support as indicated  Outcome: Progressing     Problem: GASTROINTESTINAL - ADULT  Goal: Minimal or absence of nausea and/or vomiting  Description: INTERVENTIONS:  - Administer IV fluids if ordered to ensure adequate hydration  - Maintain NPO status until nausea and vomiting are resolved  - Nasogastric tube if ordered  - Administer ordered antiemetic medications as needed  - Provide nonpharmacologic comfort measures as appropriate  - Advance diet as tolerated, if ordered  - Consider nutrition services referral to assist patient with adequate nutrition and appropriate food choices  Outcome: Progressing  Goal: Maintains or returns to baseline bowel function  Description: INTERVENTIONS:  - Assess bowel function  - Encourage oral fluids to ensure adequate hydration  - Administer IV fluids if ordered to ensure adequate hydration  - Administer ordered medications as needed  - Encourage mobilization and activity  - Consider nutritional services referral to assist patient with adequate nutrition and appropriate food choices  Outcome: Progressing     Problem: METABOLIC, FLUID AND ELECTROLYTES - ADULT  Goal: Electrolytes maintained within normal limits  Description: INTERVENTIONS:  - Monitor labs and assess patient for signs and symptoms of electrolyte imbalances  - Administer electrolyte replacement as ordered  - Monitor response to electrolyte replacements, including repeat lab results as appropriate  - Instruct patient on fluid and nutrition as appropriate  Outcome: Progressing  Goal: Fluid balance maintained  Description: INTERVENTIONS:  - Monitor labs   - Monitor I/O and WT  - Instruct patient on fluid and nutrition as appropriate  - Assess for signs & symptoms of volume excess or deficit  Outcome: Progressing  Goal: Glucose maintained within target range  Description: INTERVENTIONS:  - Monitor Blood Glucose as ordered  - Assess for signs and symptoms of hyperglycemia and hypoglycemia  - Administer ordered medications to maintain glucose within target range  - Assess nutritional intake and initiate nutrition service referral as needed  Outcome: Progressing     Problem: Potential for Falls  Goal: Patient will remain free of falls  Description: INTERVENTIONS:  - Educate patient/family on patient safety including physical limitations  - Instruct patient to call for assistance with activity   - Consult OT/PT to assist with strengthening/mobility   - Keep Call bell within reach  - Keep bed low and locked with side rails adjusted as appropriate  - Keep care items and personal belongings within reach  - Initiate and maintain comfort rounds  - Make Fall Risk Sign visible to staff  - Apply yellow socks and bracelet for high fall risk patients  - Consider moving patient to room near nurses station  Outcome: Progressing

## 2022-09-16 LAB
GLUCOSE SERPL-MCNC: 137 MG/DL (ref 65–140)
GLUCOSE SERPL-MCNC: 158 MG/DL (ref 65–140)
GLUCOSE SERPL-MCNC: 166 MG/DL (ref 65–140)
GLUCOSE SERPL-MCNC: 255 MG/DL (ref 65–140)

## 2022-09-16 PROCEDURE — 99232 SBSQ HOSP IP/OBS MODERATE 35: CPT | Performed by: INTERNAL MEDICINE

## 2022-09-16 PROCEDURE — 82948 REAGENT STRIP/BLOOD GLUCOSE: CPT

## 2022-09-16 PROCEDURE — 99232 SBSQ HOSP IP/OBS MODERATE 35: CPT | Performed by: STUDENT IN AN ORGANIZED HEALTH CARE EDUCATION/TRAINING PROGRAM

## 2022-09-16 PROCEDURE — 94640 AIRWAY INHALATION TREATMENT: CPT

## 2022-09-16 RX ORDER — POTASSIUM CHLORIDE 20 MEQ/1
20 TABLET, EXTENDED RELEASE ORAL DAILY
Status: DISCONTINUED | OUTPATIENT
Start: 2022-09-17 | End: 2022-09-18 | Stop reason: HOSPADM

## 2022-09-16 RX ORDER — PREDNISONE 20 MG/1
40 TABLET ORAL DAILY
Status: DISCONTINUED | OUTPATIENT
Start: 2022-09-17 | End: 2022-09-18 | Stop reason: HOSPADM

## 2022-09-16 RX ORDER — FUROSEMIDE 20 MG/1
20 TABLET ORAL DAILY
Status: DISCONTINUED | OUTPATIENT
Start: 2022-09-16 | End: 2022-09-18

## 2022-09-16 RX ADMIN — ACETAMINOPHEN 975 MG: 325 TABLET ORAL at 19:42

## 2022-09-16 RX ADMIN — IPRATROPIUM BROMIDE AND ALBUTEROL SULFATE 3 ML: 2.5; .5 SOLUTION RESPIRATORY (INHALATION) at 19:22

## 2022-09-16 RX ADMIN — ATORVASTATIN CALCIUM 20 MG: 20 TABLET, FILM COATED ORAL at 08:22

## 2022-09-16 RX ADMIN — INSULIN LISPRO 1 UNITS: 100 INJECTION, SOLUTION INTRAVENOUS; SUBCUTANEOUS at 07:40

## 2022-09-16 RX ADMIN — MONTELUKAST SODIUM 10 MG: 10 TABLET, COATED ORAL at 21:33

## 2022-09-16 RX ADMIN — DILTIAZEM HYDROCHLORIDE 180 MG: 180 CAPSULE, COATED, EXTENDED RELEASE ORAL at 08:21

## 2022-09-16 RX ADMIN — APIXABAN 5 MG: 5 TABLET, FILM COATED ORAL at 17:08

## 2022-09-16 RX ADMIN — TRAMADOL HYDROCHLORIDE 50 MG: 50 TABLET, COATED ORAL at 16:25

## 2022-09-16 RX ADMIN — APIXABAN 5 MG: 5 TABLET, FILM COATED ORAL at 08:22

## 2022-09-16 RX ADMIN — LEVOTHYROXINE SODIUM 100 MCG: 100 TABLET ORAL at 05:50

## 2022-09-16 RX ADMIN — LIDOCAINE 1 PATCH: 50 PATCH CUTANEOUS at 08:22

## 2022-09-16 RX ADMIN — TRAMADOL HYDROCHLORIDE 50 MG: 50 TABLET, COATED ORAL at 08:22

## 2022-09-16 RX ADMIN — GABAPENTIN 800 MG: 400 CAPSULE ORAL at 08:21

## 2022-09-16 RX ADMIN — MELATONIN 6 MG: at 21:33

## 2022-09-16 RX ADMIN — IPRATROPIUM BROMIDE AND ALBUTEROL SULFATE 3 ML: 2.5; .5 SOLUTION RESPIRATORY (INHALATION) at 08:02

## 2022-09-16 RX ADMIN — BICTEGRAVIR SODIUM, EMTRICITABINE, AND TENOFOVIR ALAFENAMIDE FUMARATE 1 TABLET: 50; 200; 25 TABLET ORAL at 08:21

## 2022-09-16 RX ADMIN — BUDESONIDE 0.5 MG: 0.5 INHALANT ORAL at 19:22

## 2022-09-16 RX ADMIN — FUROSEMIDE 20 MG: 20 TABLET ORAL at 10:11

## 2022-09-16 RX ADMIN — INSULIN LISPRO 1 UNITS: 100 INJECTION, SOLUTION INTRAVENOUS; SUBCUTANEOUS at 16:20

## 2022-09-16 RX ADMIN — GABAPENTIN 800 MG: 400 CAPSULE ORAL at 22:21

## 2022-09-16 RX ADMIN — POTASSIUM CHLORIDE 40 MEQ: 1500 TABLET, EXTENDED RELEASE ORAL at 08:21

## 2022-09-16 RX ADMIN — BUDESONIDE 0.5 MG: 0.5 INHALANT ORAL at 08:02

## 2022-09-16 RX ADMIN — GABAPENTIN 800 MG: 400 CAPSULE ORAL at 16:20

## 2022-09-16 RX ADMIN — IPRATROPIUM BROMIDE AND ALBUTEROL SULFATE 3 ML: 2.5; .5 SOLUTION RESPIRATORY (INHALATION) at 13:22

## 2022-09-16 RX ADMIN — METHYLPREDNISOLONE SODIUM SUCCINATE 40 MG: 40 INJECTION, POWDER, FOR SOLUTION INTRAMUSCULAR; INTRAVENOUS at 05:50

## 2022-09-16 RX ADMIN — TRAMADOL HYDROCHLORIDE 50 MG: 50 TABLET, COATED ORAL at 02:21

## 2022-09-16 NOTE — PLAN OF CARE
Problem: PAIN - ADULT  Goal: Verbalizes/displays adequate comfort level or baseline comfort level  Description: Interventions:  - Encourage patient to monitor pain and request assistance  - Assess pain using appropriate pain scale  - Administer analgesics based on type and severity of pain and evaluate response  - Implement non-pharmacological measures as appropriate and evaluate response  - Consider cultural and social influences on pain and pain management  - Notify physician/advanced practitioner if interventions unsuccessful or patient reports new pain  Outcome: Progressing     Problem: INFECTION - ADULT  Goal: Absence or prevention of progression during hospitalization  Description: INTERVENTIONS:  - Assess and monitor for signs and symptoms of infection  - Monitor lab/diagnostic results  - Monitor all insertion sites, i e  indwelling lines, tubes, and drains  - Monitor endotracheal if appropriate and nasal secretions for changes in amount and color  - [place] appropriate cooling/warming therapies per order  - Administer medications as ordered  - Instruct and encourage patient and family to use good hand hygiene technique  - Identify and instruct in appropriate isolation precautions for identified infection/condition  Outcome: Progressing     Problem: SAFETY ADULT  Goal: Patient will remain free of falls  Description: INTERVENTIONS:  - Educate patient/family on patient safety including physical limitations  - Instruct patient to call for assistance with activity   - Consult OT/PT to assist with strengthening/mobility   - Keep Call bell within reach  - Keep bed low and locked with side rails adjusted as appropriate  - Keep care items and personal belongings within reach  - Initiate and maintain comfort rounds  - Make Fall Risk Sign visible to staff  - Apply yellow socks and bracelet for high fall risk patients  - Consider moving patient to room near nurses station  Outcome: Progressing     Problem: CARDIOVASCULAR - ADULT  Goal: Maintains optimal cardiac output and hemodynamic stability  Description: INTERVENTIONS:  - Monitor I/O, vital signs and rhythm  - Monitor for S/S and trends of decreased cardiac output  - Administer and titrate ordered vasoactive medications to optimize hemodynamic stability  - Assess quality of pulses, skin color and temperature  - Assess for signs of decreased coronary artery perfusion  - Instruct patient to report change in severity of symptoms  Outcome: Progressing  Goal: Absence of cardiac dysrhythmias or at baseline rhythm  Description: INTERVENTIONS:  - Continuous cardiac monitoring, vital signs, obtain 12 lead EKG if ordered  - Administer antiarrhythmic and heart rate control medications as ordered  - Monitor electrolytes and administer replacement therapy as ordered  Outcome: Progressing     Problem: RESPIRATORY - ADULT  Goal: Achieves optimal ventilation and oxygenation  Description: INTERVENTIONS:  - Assess for changes in respiratory status  - Assess for changes in mentation and behavior  - Position to facilitate oxygenation and minimize respiratory effort  - Oxygen administered by appropriate delivery if ordered  - Initiate smoking cessation education as indicated  - Encourage broncho-pulmonary hygiene including cough, deep breathe, Incentive Spirometry  - Assess the need for suctioning and aspirate as needed  - Assess and instruct to report SOB or any respiratory difficulty  - Respiratory Therapy support as indicated  Outcome: Progressing     Problem: GASTROINTESTINAL - ADULT  Goal: Minimal or absence of nausea and/or vomiting  Description: INTERVENTIONS:  - Administer IV fluids if ordered to ensure adequate hydration  - Maintain NPO status until nausea and vomiting are resolved  - Nasogastric tube if ordered  - Administer ordered antiemetic medications as needed  - Provide nonpharmacologic comfort measures as appropriate  - Advance diet as tolerated, if ordered  - Consider nutrition services referral to assist patient with adequate nutrition and appropriate food choices  Outcome: Progressing  Goal: Maintains or returns to baseline bowel function  Description: INTERVENTIONS:  - Assess bowel function  - Encourage oral fluids to ensure adequate hydration  - Administer IV fluids if ordered to ensure adequate hydration  - Administer ordered medications as needed  - Encourage mobilization and activity  - Consider nutritional services referral to assist patient with adequate nutrition and appropriate food choices  Outcome: Progressing     Problem: METABOLIC, FLUID AND ELECTROLYTES - ADULT  Goal: Electrolytes maintained within normal limits  Description: INTERVENTIONS:  - Monitor labs and assess patient for signs and symptoms of electrolyte imbalances  - Administer electrolyte replacement as ordered  - Monitor response to electrolyte replacements, including repeat lab results as appropriate  - Instruct patient on fluid and nutrition as appropriate  Outcome: Progressing  Goal: Fluid balance maintained  Description: INTERVENTIONS:  - Monitor labs   - Monitor I/O and WT  - Instruct patient on fluid and nutrition as appropriate  - Assess for signs & symptoms of volume excess or deficit  Outcome: Progressing  Goal: Glucose maintained within target range  Description: INTERVENTIONS:  - Monitor Blood Glucose as ordered  - Assess for signs and symptoms of hyperglycemia and hypoglycemia  - Administer ordered medications to maintain glucose within target range  - Assess nutritional intake and initiate nutrition service referral as needed  Outcome: Progressing     Problem: Potential for Falls  Goal: Patient will remain free of falls  Description: INTERVENTIONS:  - Educate patient/family on patient safety including physical limitations  - Instruct patient to call for assistance with activity   - Consult OT/PT to assist with strengthening/mobility   - Keep Call bell within reach  - Keep bed low and locked with side rails adjusted as appropriate  - Keep care items and personal belongings within reach  - Initiate and maintain comfort rounds  - Make Fall Risk Sign visible to staff  - Apply yellow socks and bracelet for high fall risk patients  - Consider moving patient to room near nurses station  Outcome: Progressing

## 2022-09-16 NOTE — PLAN OF CARE
Problem: PAIN - ADULT  Goal: Verbalizes/displays adequate comfort level or baseline comfort level  Description: Interventions:  - Encourage patient to monitor pain and request assistance  - Assess pain using appropriate pain scale  - Administer analgesics based on type and severity of pain and evaluate response  - Implement non-pharmacological measures as appropriate and evaluate response  - Consider cultural and social influences on pain and pain management  - Notify physician/advanced practitioner if interventions unsuccessful or patient reports new pain  Outcome: Progressing     Problem: INFECTION - ADULT  Goal: Absence or prevention of progression during hospitalization  Description: INTERVENTIONS:  - Assess and monitor for signs and symptoms of infection  - Monitor lab/diagnostic results  - Monitor all insertion sites, i e  indwelling lines, tubes, and drains  - Monitor endotracheal if appropriate and nasal secretions for changes in amount and color  - [place] appropriate cooling/warming therapies per order  - Administer medications as ordered  - Instruct and encourage patient and family to use good hand hygiene technique  - Identify and instruct in appropriate isolation precautions for identified infection/condition  Outcome: Progressing     Problem: SAFETY ADULT  Goal: Patient will remain free of falls  Description: INTERVENTIONS:  - Educate patient/family on patient safety including physical limitations  - Instruct patient to call for assistance with activity   - Consult OT/PT to assist with strengthening/mobility   - Keep Call bell within reach  - Keep bed low and locked with side rails adjusted as appropriate  - Keep care items and personal belongings within reach  - Initiate and maintain comfort rounds  - Make Fall Risk Sign visible to staff  - Apply yellow socks and bracelet for high fall risk patients  - Consider moving patient to room near nurses station  Outcome: Progressing     Problem: CARDIOVASCULAR - ADULT  Goal: Maintains optimal cardiac output and hemodynamic stability  Description: INTERVENTIONS:  - Monitor I/O, vital signs and rhythm  - Monitor for S/S and trends of decreased cardiac output  - Administer and titrate ordered vasoactive medications to optimize hemodynamic stability  - Assess quality of pulses, skin color and temperature  - Assess for signs of decreased coronary artery perfusion  - Instruct patient to report change in severity of symptoms  Outcome: Progressing  Goal: Absence of cardiac dysrhythmias or at baseline rhythm  Description: INTERVENTIONS:  - Continuous cardiac monitoring, vital signs, obtain 12 lead EKG if ordered  - Administer antiarrhythmic and heart rate control medications as ordered  - Monitor electrolytes and administer replacement therapy as ordered  Outcome: Progressing     Problem: RESPIRATORY - ADULT  Goal: Achieves optimal ventilation and oxygenation  Description: INTERVENTIONS:  - Assess for changes in respiratory status  - Assess for changes in mentation and behavior  - Position to facilitate oxygenation and minimize respiratory effort  - Oxygen administered by appropriate delivery if ordered  - Initiate smoking cessation education as indicated  - Encourage broncho-pulmonary hygiene including cough, deep breathe, Incentive Spirometry  - Assess the need for suctioning and aspirate as needed  - Assess and instruct to report SOB or any respiratory difficulty  - Respiratory Therapy support as indicated  Outcome: Progressing     Problem: GASTROINTESTINAL - ADULT  Goal: Minimal or absence of nausea and/or vomiting  Description: INTERVENTIONS:  - Administer IV fluids if ordered to ensure adequate hydration  - Maintain NPO status until nausea and vomiting are resolved  - Nasogastric tube if ordered  - Administer ordered antiemetic medications as needed  - Provide nonpharmacologic comfort measures as appropriate  - Advance diet as tolerated, if ordered  - Consider nutrition services referral to assist patient with adequate nutrition and appropriate food choices  Outcome: Progressing  Goal: Maintains or returns to baseline bowel function  Description: INTERVENTIONS:  - Assess bowel function  - Encourage oral fluids to ensure adequate hydration  - Administer IV fluids if ordered to ensure adequate hydration  - Administer ordered medications as needed  - Encourage mobilization and activity  - Consider nutritional services referral to assist patient with adequate nutrition and appropriate food choices  Outcome: Progressing     Problem: METABOLIC, FLUID AND ELECTROLYTES - ADULT  Goal: Electrolytes maintained within normal limits  Description: INTERVENTIONS:  - Monitor labs and assess patient for signs and symptoms of electrolyte imbalances  - Administer electrolyte replacement as ordered  - Monitor response to electrolyte replacements, including repeat lab results as appropriate  - Instruct patient on fluid and nutrition as appropriate  Outcome: Progressing  Goal: Fluid balance maintained  Description: INTERVENTIONS:  - Monitor labs   - Monitor I/O and WT  - Instruct patient on fluid and nutrition as appropriate  - Assess for signs & symptoms of volume excess or deficit  Outcome: Progressing  Goal: Glucose maintained within target range  Description: INTERVENTIONS:  - Monitor Blood Glucose as ordered  - Assess for signs and symptoms of hyperglycemia and hypoglycemia  - Administer ordered medications to maintain glucose within target range  - Assess nutritional intake and initiate nutrition service referral as needed  Outcome: Progressing     Problem: Potential for Falls  Goal: Patient will remain free of falls  Description: INTERVENTIONS:  - Educate patient/family on patient safety including physical limitations  - Instruct patient to call for assistance with activity   - Consult OT/PT to assist with strengthening/mobility   - Keep Call bell within reach  - Keep bed low and locked with side rails adjusted as appropriate  - Keep care items and personal belongings within reach  - Initiate and maintain comfort rounds  - Make Fall Risk Sign visible to staff  - Apply yellow socks and bracelet for high fall risk patients  - Consider moving patient to room near nurses station  Outcome: Progressing

## 2022-09-16 NOTE — PROGRESS NOTES
Progress Note - Pulmonary   Alvaro Lee 81 y o  female MRN: [id number]  Unit/Bed#: E5 -01 Encounter: [id number]      Assessment/Plan:    1  Chronic hypoxic respiratory failure  1  Currently on baseline requirements of 2L NC  2  Maintain SpO2>/=88%  3  Needs aggressive pulmonary toilet and PT  2  Moderate persistent asthma with recent exacerbation-resolved  1  Discontinue solumedrol  2  Prednisone taper at 40 mg daily with reduction by 10 mg every 3 days  3  Budesonide BID  4  DuoNeb TID  5  singulair  6  Outpatient pulmonary follow up at discharge at [facility]  3  Acute on chronic dyspnea  1  Suspect prolonged dyspnea secondary to both anxiety and deconditioning  2  Recommend outpatient pulmonary rehab-patient may schedule with pulmonary [facility]  4  PASHA on CPAP  1  Follows at [facility] and will contact their office at discharge to obtain new machine  5  HTN-management cardiology and IM  6  DM-management IM  7  Acute on chronic CHF  1  On PO lasix per cardiology  2  Daily weights  3  Strict I &O  4  Low NA diet  8  PAF  1  eliquis and cardizen per IM  2    9  Morbid obesity  10  Tobacco abuse  1  NRT  2  Cessation encouraged  11  Cocaine use  1  Cessation encouraged    *we were asked to reevaluate patient due to persistent dyspnea despite adequate diuresis and resolved asthma exacerbation  *no further inpatient pulmonary recommendations-we will sign off      Subjective:     Chance Guerra was seen in the chair upon entering the room  Reports dyspnea has significantly improved today and admits anxiety is a major problem  Denies: chest pain, fevers, chills, cough, chest tightness or hemoptysis    Objective:         Vitals: Blood pressure 138/86, pulse 96, temperature (!) 100 9 °F (38 3 °C), temperature source Oral, resp  rate 17, height 5' 2" (1 575 m), weight 98 2 kg (216 lb 7 9 oz), last menstrual period 04/01/2013, SpO2 94 %, not currently breastfeeding  , 2L NC, Body mass index is 39 6 kg/m²        Intake/Output Summary (Last 24 hours) at 9/16/2022 1232  Last data filed at 9/16/2022 1001  Gross per 24 hour   Intake 240 ml   Output 600 ml   Net -360 ml         Physical Exam  Gen: Awake, alert, oriented x 3, no acute distress  HEENT: Mucous membranes moist, no oral lesions, no thrush  NECK: no accessory muscle use, JVP not elevated  Cardiac: Regular, single S1, single S2, no murmurs, no rubs, no gallops  Lungs: clear breath sounds bilaterally  Abdomen: obese, normoactive bowel sounds, soft nontender, nondistended, no rebound or rigidity, no guarding  Extremities: no cyanosis, no clubbing, no edema    Labs: I have personally reviewed pertinent lab results  , CBC: No results found for: WBC, HGB, HCT, MCV, PLT, ADJUSTEDWBC, MCH, MCHC, RDW, MPV, NRBC, CMP: No results found for: SODIUM, K, CL, CO2, ANIONGAP, BUN, CREATININE, GLUCOSE, CALCIUM, AST, ALT, ALKPHOS, PROT, BILITOT, EGFR  Imaging and other studies: I have personally reviewed pertinent reports   , I have personally reviewed pertinent films in PACS and Chest CT 9/15/2022  IMPRESSION:     Mild bilateral atelectasis with no acute disease           LUIS M Decker

## 2022-09-16 NOTE — PROGRESS NOTES
Cardiology Progress Note - Alvaro Lee 81 y o  female MRN: [id number]    Unit/Bed#: E5 -01 Encounter: [id number]      Assessment & Plan:    Acute on chronic diastolic heart failure (HCC)  -TTE 09/13/2022 EF 70%, grade 1 DD, mild TR, estimated PA pressure of 37 mmHg  -not on Lasix at home  -discontinued Lasix drip yesterday afternoon due to bump in creatinine and BUN  -held diuretics yesterday, started on Lasix 20 mg p o  daily today    Acute exacerbation of chronic obstructive pulmonary disease (COPD) ([facility])  -uses 2 L nasal cannula at home  -steroids were increased 9-14 to Solu-Medrol due to increased wheezing on exam    Paroxysmal atrial fibrillation ([facility])  -anticoagulant Eliquis 5 mg b i d    -rate control with Cardizem 180 mg daily    HIV (human immunodeficiency virus infection)     Type 2 diabetes mellitus with hyperglycemia ([facility])    Chronic respiratory failure with hypoxia ([facility])    Tobacco use    Lumbar stenosis    PASHA on CPAP    Obesity (BMI 35 0-39 9 without comorbidity)    Summary:   -start Lasix 20 mg p o  daily  -lowered potassium supplementation to 20 mEq daily  -CT non con chest yesterday and no acute findings  -subjectively, patient reports breathing better today  -stable from cardiac standpoint for discharge    Subjective:   No significant events overnight  Reports her breathing is significantly better today  Denies chest pain, abdominal pain, nausea, vomiting, fever, chills, headache, dizziness or palpitations  Objective:     Vitals: Blood pressure 116/59, pulse 77, temperature (!) 100 9 °F (38 3 °C), temperature source Oral, resp   rate 17, height 5' 2" (1 575 m), weight 98 2 kg (216 lb 7 9 oz), last menstrual period 04/01/2013, SpO2 97 %, not currently breastfeeding , Body mass index is 39 6 kg/m² ,   Orthostatic Blood Pressures    Flowsheet Row Most Recent Value   Blood Pressure 116/59 filed at 09/16/2022 0749   Patient Position - Orthostatic VS Sitting filed at 09/16/2022 4490 Intake/Output Summary (Last 24 hours) at 9/16/2022 0952  Last data filed at 9/16/2022 0741  Gross per 24 hour   Intake 240 ml   Output 300 ml   Net -60 ml           Physical Exam:    GEN: Tara De La Rosa appears well, alert and oriented x 3, pleasant and cooperative   HEENT: anicteric, mucous membranes moist  NECK: no jvd, carotid bruits   HEART: regular rhythm, normal S1 and S2, no murmurs, clicks, gallops or rubs   LUNGS:  Trace scattered bilateral wheezes  ABDOMEN: normal bowel sounds, soft, no tenderness, no distention  EXTREMITIES: peripheral pulses normal; 1+ lower extremity edema L>R  NEURO: no focal findings   SKIN: normal without suspicious lesions on exposed skin      Current Facility-Administered Medications:     acetaminophen (TYLENOL) tablet 975 mg, 975 mg, Oral, Q8H PRN, LUIS M Graves, 975 mg at 09/15/22 2258    albuterol inhalation solution 2 5 mg, 2 5 mg, Nebulization, Q4H PRN, Stefano CIFUENTES Prechtel, DO, 2 5 mg at 09/14/22 0934    apixaban (ELIQUIS) tablet 5 mg, 5 mg, Oral, BID, Aaron S Prechtel, DO, 5 mg at 09/16/22 2668    atorvastatin (LIPITOR) tablet 20 mg, 20 mg, Oral, Daily, Aaron S Prechtel, DO, 20 mg at 09/16/22 0822    bictegravir-emtricitab-tenofovir alafenamide (BIKTARVY) -25 MG tablet 1 tablet, 1 tablet, Oral, Daily, Aaron S Prechtel, DO, 1 tablet at 09/16/22 0821    budesonide (PULMICORT) inhalation solution 0 5 mg, 0 5 mg, Nebulization, Q12H, LUIS M Merino, 0 5 mg at 09/16/22 0802    diltiazem (CARDIZEM CD) 24 hr capsule 180 mg, 180 mg, Oral, Daily, Aaron S Prechtel, DO, 180 mg at 09/16/22 5947    gabapentin (NEURONTIN) capsule 800 mg, 800 mg, Oral, TID, Aaron S Prechtel, DO, 800 mg at 09/16/22 0821    insulin lispro (HumaLOG) 100 units/mL subcutaneous injection 1-5 Units, 1-5 Units, Subcutaneous, TID AC, 1 Units at 09/16/22 0740 **AND** Fingerstick Glucose (POCT), , , TID AC, LUIS M Graves    ipratropium-albuterol (DUO-NEB) 0 5-2 5 mg/3 mL inhalation solution 3 mL, 3 mL, Nebulization, TID, Jennifer Stevenson MD, 3 mL at 09/16/22 0802    levothyroxine tablet 100 mcg, 100 mcg, Oral, Early Morning, Aaron S Prechtel, DO, 100 mcg at 09/16/22 0550    lidocaine (LIDODERM) 5 % patch 1 patch, 1 patch, Topical, Daily, LUIS M Graves, 1 patch at 09/16/22 3261    melatonin tablet 6 mg, 6 mg, Oral, HS, Kathy Rawls PA-C, 6 mg at 09/15/22 2258    methylPREDNISolone sodium succinate (Solu-MEDROL) injection 40 mg, 40 mg, Intravenous, Q8H [address], Jonathan Champagne MD, 40 mg at 09/16/22 0550    montelukast (SINGULAIR) tablet 10 mg, 10 mg, Oral, HS, Aaron S Prechtel, DO, 10 mg at 09/15/22 2258    nicotine (NICODERM CQ) 21 mg/24 hr TD 24 hr patch 1 patch, 1 patch, Transdermal, Daily, Aaron S Prechtel, DO, 1 patch at 09/12/22 0821    potassium chloride (K-DUR,KLOR-CON) CR tablet 40 mEq, 40 mEq, Oral, BID, Sulma Sanchez MD, 40 mEq at 09/16/22 5601    traMADol (ULTRAM) tablet 50 mg, 50 mg, Oral, Q6H PRN, Marco A Rizvi PA-C, 50 mg at 09/16/22 0623    Labs & Results:    Lab Results   Component Value Date    TROPONINI <0 02 05/27/2020    TROPONINI <0 04 12/11/2014    TROPONINI 0 04 12/11/2014       Lab Results   Component Value Date    GLUCOSE 104 04/10/2018    CALCIUM 10 5 (H) 09/15/2022     (L) 12/26/2014    K 4 7 09/15/2022    CO2 34 (H) 09/15/2022    CL 99 09/15/2022    BUN 51 (H) 09/15/2022    CREATININE 1 21 09/15/2022       Lab Results   Component Value Date    WBC 11 13 (H) 09/12/2022    HGB 10 4 (L) 09/12/2022    HCT 32 9 (L) 09/12/2022    MCV 92 09/12/2022     09/12/2022           No results found for: CHOL  Lab Results   Component Value Date    HDL 70 09/12/2022     Lab Results   Component Value Date    LDLCALC 44 09/12/2022     Lab Results   Component Value Date    TRIG 82 09/12/2022       Lab Results   Component Value Date    ALT 20 09/07/2022    AST 10 09/07/2022         EKG personally reviewed by )Beth Adrian MD [place]  No acute changes

## 2022-09-16 NOTE — PROGRESS NOTES
Progress Note - Jose Cooper 81 y o  female MRN: [id number]    Unit/Bed#: E5 -01 Encounter: [id number]      Subjective: The patient actually feels somewhat better today  She slept well  Her dyspnea on exertion has improved  She has no chest pain  She has slight cough but no sputum  She hears no wheezing  She was able to eat pretty well  She has no abdominal pain, nausea, or vomiting  Physical Exam:   Temp:  [98 4 °F (36 9 °C)-100 9 °F (38 3 °C)] 100 9 °F (38 3 °C)  HR:  [77-96] 96  Resp:  [17] 17  BP: (116-138)/(59-86) 138/86    Gen:  Well-developed, obese, in no distress  Neck:  Supple  No lymphadenopathy, goiter, or bruit  Heart:  Regular rhythm  I heard no murmur, gallop, or rub  Lungs:  Diminished breath sounds bilaterally  I heard no wheezing, rales, or rhonchi  Abd:  Soft with active bowel sounds  No mass, tenderness, organomegaly  Extremities:  Minimal edema  No clubbing or cyanosis  No calf tenderness  Neuro:  Alert and oriented  No focal sign  Skin:  Warm and dry  LABS:  No new labs    Assessment/Plan:  1  Acute on chronic diastolic congestive heart failure  2  Acute exacerbation of COPD  3  Chronic respiratory failure with hypoxia  4  Obstructive sleep apnea  5  Obesity  6  Paroxysmal atrial fibrillation  7  Type 2 diabetes  8  Tobacco abuse  9  HIV  10  Lumbar spinal stenosis    The patient's respiratory status has improved  She appears to be roughly euvolemic  Pulmonary and cardiology assistance is greatly appreciated  The patient has been transition to oral steroids  In light of her clinical improvement, I am hopeful that she will be ready for discharge tomorrow on a tapering dose of prednisone  The patient remains on apixaban for stroke prophylaxis in the setting of paroxysmal atrial fibrillation  She has been counseled about the need to stop smoking        VTE Pharmacologic Prophylaxis:  Apixaban  VTE Mechanical Prophylaxis: reason for no mechanical VTE prophylaxis Therapeutically anticoagulated

## 2022-09-17 LAB
GLUCOSE SERPL-MCNC: 117 MG/DL (ref 65–140)
GLUCOSE SERPL-MCNC: 119 MG/DL (ref 65–140)
GLUCOSE SERPL-MCNC: 156 MG/DL (ref 65–140)
GLUCOSE SERPL-MCNC: 209 MG/DL (ref 65–140)

## 2022-09-17 PROCEDURE — 94640 AIRWAY INHALATION TREATMENT: CPT

## 2022-09-17 PROCEDURE — 82948 REAGENT STRIP/BLOOD GLUCOSE: CPT

## 2022-09-17 PROCEDURE — 99232 SBSQ HOSP IP/OBS MODERATE 35: CPT | Performed by: INTERNAL MEDICINE

## 2022-09-17 RX ADMIN — MELATONIN 6 MG: at 22:00

## 2022-09-17 RX ADMIN — APIXABAN 5 MG: 5 TABLET, FILM COATED ORAL at 17:10

## 2022-09-17 RX ADMIN — BUDESONIDE 0.5 MG: 0.5 INHALANT ORAL at 07:21

## 2022-09-17 RX ADMIN — IPRATROPIUM BROMIDE AND ALBUTEROL SULFATE 3 ML: 2.5; .5 SOLUTION RESPIRATORY (INHALATION) at 19:06

## 2022-09-17 RX ADMIN — INSULIN LISPRO 1 UNITS: 100 INJECTION, SOLUTION INTRAVENOUS; SUBCUTANEOUS at 17:10

## 2022-09-17 RX ADMIN — FUROSEMIDE 20 MG: 20 TABLET ORAL at 08:58

## 2022-09-17 RX ADMIN — GABAPENTIN 800 MG: 400 CAPSULE ORAL at 08:59

## 2022-09-17 RX ADMIN — IPRATROPIUM BROMIDE AND ALBUTEROL SULFATE 3 ML: 2.5; .5 SOLUTION RESPIRATORY (INHALATION) at 13:10

## 2022-09-17 RX ADMIN — APIXABAN 5 MG: 5 TABLET, FILM COATED ORAL at 08:57

## 2022-09-17 RX ADMIN — GABAPENTIN 800 MG: 400 CAPSULE ORAL at 15:01

## 2022-09-17 RX ADMIN — IPRATROPIUM BROMIDE AND ALBUTEROL SULFATE 3 ML: 2.5; .5 SOLUTION RESPIRATORY (INHALATION) at 07:21

## 2022-09-17 RX ADMIN — TRAMADOL HYDROCHLORIDE 50 MG: 50 TABLET, COATED ORAL at 15:01

## 2022-09-17 RX ADMIN — LIDOCAINE 1 PATCH: 50 PATCH CUTANEOUS at 08:57

## 2022-09-17 RX ADMIN — ATORVASTATIN CALCIUM 20 MG: 20 TABLET, FILM COATED ORAL at 08:57

## 2022-09-17 RX ADMIN — POTASSIUM CHLORIDE 20 MEQ: 1500 TABLET, EXTENDED RELEASE ORAL at 08:57

## 2022-09-17 RX ADMIN — DILTIAZEM HYDROCHLORIDE 180 MG: 180 CAPSULE, COATED, EXTENDED RELEASE ORAL at 08:58

## 2022-09-17 RX ADMIN — BICTEGRAVIR SODIUM, EMTRICITABINE, AND TENOFOVIR ALAFENAMIDE FUMARATE 1 TABLET: 50; 200; 25 TABLET ORAL at 08:58

## 2022-09-17 RX ADMIN — PREDNISONE 40 MG: 20 TABLET ORAL at 08:57

## 2022-09-17 RX ADMIN — MONTELUKAST SODIUM 10 MG: 10 TABLET, COATED ORAL at 22:00

## 2022-09-17 RX ADMIN — BUDESONIDE 0.5 MG: 0.5 INHALANT ORAL at 19:06

## 2022-09-17 RX ADMIN — LEVOTHYROXINE SODIUM 100 MCG: 100 TABLET ORAL at 06:18

## 2022-09-17 RX ADMIN — TRAMADOL HYDROCHLORIDE 50 MG: 50 TABLET, COATED ORAL at 09:01

## 2022-09-17 RX ADMIN — GABAPENTIN 800 MG: 400 CAPSULE ORAL at 22:00

## 2022-09-17 NOTE — PROGRESS NOTES
Progress Note - Radha Tipton 81 y o  female MRN: [id number]    Unit/Bed#: E5 -01 Encounter: [id number]      Subjective: The patient is short breath at times though overall she has improved slowly but steadily  She is waiting for breathing treatment time of my visit  She slept pretty well  She has no chest pain, abdominal pain, nausea, or vomiting  Physical Exam:   Temp:  [97 3 °F (36 3 °C)-97 5 °F (36 4 °C)] 97 3 °F (36 3 °C)  HR:  [75-88] 83  Resp:  [18-20] 20  BP: (105-111)/(58-73) 111/58    Gen:  Well-developed, obese, in no distress  Neck:  Supple  No lymphadenopathy, goiter, or bruit  Heart:  Regular rhythm  No murmur, gallop, or rub  Lungs:  Clear to auscultation and percussion  No wheezing, rales, or rhonchi  Abd:  Soft with active bowel sounds  No mass, tenderness, or organomegaly  Extremities:  No clubbing, cyanosis, or edema  No calf tenderness  Neuro:  Alert and oriented  No focal sign  Skin:  Warm and dry  LABS:  No new labs  Assessment/Plan:  1  Acute on chronic diastolic congestive heart failure  2  Acute exacerbation of COPD  3  Chronic respiratory failure with hypoxia  4  Obstructive sleep apnea  5  Obesity  6  Paroxysmal atrial fibrillation   7  Type 2 diabetes  8  Tobacco abuse   9  HIV   10  Lumbar spinal stenosis    The patient has improved slowly but steadily  Will maintain her in the hospital for at least 1 more day because of waning and waxing shortness of breath  She is on oral prednisone at present  We will continue her current dose of diuretics  I am hopeful that she will be ready for discharge tomorrow      VTE Pharmacologic Prophylaxis:  Apixaban  VTE Mechanical Prophylaxis: reason for no mechanical VTE prophylaxis Therapeutically anticoagulated

## 2022-09-17 NOTE — PLAN OF CARE
Problem: PAIN - ADULT  Goal: Verbalizes/displays adequate comfort level or baseline comfort level  Description: Interventions:  - Encourage patient to monitor pain and request assistance  - Assess pain using appropriate pain scale  - Administer analgesics based on type and severity of pain and evaluate response  - Implement non-pharmacological measures as appropriate and evaluate response  - Consider cultural and social influences on pain and pain management  - Notify physician/advanced practitioner if interventions unsuccessful or patient reports new pain  Outcome: Progressing     Problem: INFECTION - ADULT  Goal: Absence or prevention of progression during hospitalization  Description: INTERVENTIONS:  - Assess and monitor for signs and symptoms of infection  - Monitor lab/diagnostic results  - Monitor all insertion sites, i e  indwelling lines, tubes, and drains  - Monitor endotracheal if appropriate and nasal secretions for changes in amount and color  - [place] appropriate cooling/warming therapies per order  - Administer medications as ordered  - Instruct and encourage patient and family to use good hand hygiene technique  - Identify and instruct in appropriate isolation precautions for identified infection/condition  Outcome: Progressing     Problem: SAFETY ADULT  Goal: Patient will remain free of falls  Description: INTERVENTIONS:  - Educate patient/family on patient safety including physical limitations  - Instruct patient to call for assistance with activity   - Consult OT/PT to assist with strengthening/mobility   - Keep Call bell within reach  - Keep bed low and locked with side rails adjusted as appropriate  - Keep care items and personal belongings within reach  - Initiate and maintain comfort rounds  - Make Fall Risk Sign visible to staff  - Apply yellow socks and bracelet for high fall risk patients  - Consider moving patient to room near nurses station  Outcome: Progressing     Problem: CARDIOVASCULAR - ADULT  Goal: Maintains optimal cardiac output and hemodynamic stability  Description: INTERVENTIONS:  - Monitor I/O, vital signs and rhythm  - Monitor for S/S and trends of decreased cardiac output  - Administer and titrate ordered vasoactive medications to optimize hemodynamic stability  - Assess quality of pulses, skin color and temperature  - Assess for signs of decreased coronary artery perfusion  - Instruct patient to report change in severity of symptoms  Outcome: Progressing  Goal: Absence of cardiac dysrhythmias or at baseline rhythm  Description: INTERVENTIONS:  - Continuous cardiac monitoring, vital signs, obtain 12 lead EKG if ordered  - Administer antiarrhythmic and heart rate control medications as ordered  - Monitor electrolytes and administer replacement therapy as ordered  Outcome: Progressing     Problem: RESPIRATORY - ADULT  Goal: Achieves optimal ventilation and oxygenation  Description: INTERVENTIONS:  - Assess for changes in respiratory status  - Assess for changes in mentation and behavior  - Position to facilitate oxygenation and minimize respiratory effort  - Oxygen administered by appropriate delivery if ordered  - Initiate smoking cessation education as indicated  - Encourage broncho-pulmonary hygiene including cough, deep breathe, Incentive Spirometry  - Assess the need for suctioning and aspirate as needed  - Assess and instruct to report SOB or any respiratory difficulty  - Respiratory Therapy support as indicated  Outcome: Progressing     Problem: GASTROINTESTINAL - ADULT  Goal: Minimal or absence of nausea and/or vomiting  Description: INTERVENTIONS:  - Administer IV fluids if ordered to ensure adequate hydration  - Maintain NPO status until nausea and vomiting are resolved  - Nasogastric tube if ordered  - Administer ordered antiemetic medications as needed  - Provide nonpharmacologic comfort measures as appropriate  - Advance diet as tolerated, if ordered  - Consider nutrition services referral to assist patient with adequate nutrition and appropriate food choices  Outcome: Progressing  Goal: Maintains or returns to baseline bowel function  Description: INTERVENTIONS:  - Assess bowel function  - Encourage oral fluids to ensure adequate hydration  - Administer IV fluids if ordered to ensure adequate hydration  - Administer ordered medications as needed  - Encourage mobilization and activity  - Consider nutritional services referral to assist patient with adequate nutrition and appropriate food choices  Outcome: Progressing     Problem: METABOLIC, FLUID AND ELECTROLYTES - ADULT  Goal: Electrolytes maintained within normal limits  Description: INTERVENTIONS:  - Monitor labs and assess patient for signs and symptoms of electrolyte imbalances  - Administer electrolyte replacement as ordered  - Monitor response to electrolyte replacements, including repeat lab results as appropriate  - Instruct patient on fluid and nutrition as appropriate  Outcome: Progressing  Goal: Fluid balance maintained  Description: INTERVENTIONS:  - Monitor labs   - Monitor I/O and WT  - Instruct patient on fluid and nutrition as appropriate  - Assess for signs & symptoms of volume excess or deficit  Outcome: Progressing  Goal: Glucose maintained within target range  Description: INTERVENTIONS:  - Monitor Blood Glucose as ordered  - Assess for signs and symptoms of hyperglycemia and hypoglycemia  - Administer ordered medications to maintain glucose within target range  - Assess nutritional intake and initiate nutrition service referral as needed  Outcome: Progressing     Problem: Potential for Falls  Goal: Patient will remain free of falls  Description: INTERVENTIONS:  - Educate patient/family on patient safety including physical limitations  - Instruct patient to call for assistance with activity   - Consult OT/PT to assist with strengthening/mobility   - Keep Call bell within reach  - Keep bed low and locked with side rails adjusted as appropriate  - Keep care items and personal belongings within reach  - Initiate and maintain comfort rounds  - Make Fall Risk Sign visible to staff  - Apply yellow socks and bracelet for high fall risk patients  - Consider moving patient to room near nurses station  Outcome: Progressing

## 2022-09-17 NOTE — PLAN OF CARE
Problem: PAIN - ADULT  Goal: Verbalizes/displays adequate comfort level or baseline comfort level  Description: Interventions:  - Encourage patient to monitor pain and request assistance  - Assess pain using appropriate pain scale  - Administer analgesics based on type and severity of pain and evaluate response  - Implement non-pharmacological measures as appropriate and evaluate response  - Consider cultural and social influences on pain and pain management  - Notify physician/advanced practitioner if interventions unsuccessful or patient reports new pain  Outcome: Progressing     Problem: INFECTION - ADULT  Goal: Absence or prevention of progression during hospitalization  Description: INTERVENTIONS:  - Assess and monitor for signs and symptoms of infection  - Monitor lab/diagnostic results  - Monitor all insertion sites, i e  indwelling lines, tubes, and drains  - Monitor endotracheal if appropriate and nasal secretions for changes in amount and color  - [place] appropriate cooling/warming therapies per order  - Administer medications as ordered  - Instruct and encourage patient and family to use good hand hygiene technique  - Identify and instruct in appropriate isolation precautions for identified infection/condition  Outcome: Progressing     Problem: SAFETY ADULT  Goal: Patient will remain free of falls  Description: INTERVENTIONS:  - Educate patient/family on patient safety including physical limitations  - Instruct patient to call for assistance with activity   - Consult OT/PT to assist with strengthening/mobility   - Keep Call bell within reach  - Keep bed low and locked with side rails adjusted as appropriate  - Keep care items and personal belongings within reach  - Initiate and maintain comfort rounds  - Make Fall Risk Sign visible to staff  - Apply yellow socks and bracelet for high fall risk patients  - Consider moving patient to room near nurses station  Outcome: Progressing     Problem: CARDIOVASCULAR - ADULT  Goal: Maintains optimal cardiac output and hemodynamic stability  Description: INTERVENTIONS:  - Monitor I/O, vital signs and rhythm  - Monitor for S/S and trends of decreased cardiac output  - Administer and titrate ordered vasoactive medications to optimize hemodynamic stability  - Assess quality of pulses, skin color and temperature  - Assess for signs of decreased coronary artery perfusion  - Instruct patient to report change in severity of symptoms  Outcome: Progressing  Goal: Absence of cardiac dysrhythmias or at baseline rhythm  Description: INTERVENTIONS:  - Continuous cardiac monitoring, vital signs, obtain 12 lead EKG if ordered  - Administer antiarrhythmic and heart rate control medications as ordered  - Monitor electrolytes and administer replacement therapy as ordered  Outcome: Progressing     Problem: RESPIRATORY - ADULT  Goal: Achieves optimal ventilation and oxygenation  Description: INTERVENTIONS:  - Assess for changes in respiratory status  - Assess for changes in mentation and behavior  - Position to facilitate oxygenation and minimize respiratory effort  - Oxygen administered by appropriate delivery if ordered  - Initiate smoking cessation education as indicated  - Encourage broncho-pulmonary hygiene including cough, deep breathe, Incentive Spirometry  - Assess the need for suctioning and aspirate as needed  - Assess and instruct to report SOB or any respiratory difficulty  - Respiratory Therapy support as indicated  Outcome: Progressing     Problem: GASTROINTESTINAL - ADULT  Goal: Minimal or absence of nausea and/or vomiting  Description: INTERVENTIONS:  - Administer IV fluids if ordered to ensure adequate hydration  - Maintain NPO status until nausea and vomiting are resolved  - Nasogastric tube if ordered  - Administer ordered antiemetic medications as needed  - Provide nonpharmacologic comfort measures as appropriate  - Advance diet as tolerated, if ordered  - Consider nutrition services referral to assist patient with adequate nutrition and appropriate food choices  Outcome: Progressing  Goal: Maintains or returns to baseline bowel function  Description: INTERVENTIONS:  - Assess bowel function  - Encourage oral fluids to ensure adequate hydration  - Administer IV fluids if ordered to ensure adequate hydration  - Administer ordered medications as needed  - Encourage mobilization and activity  - Consider nutritional services referral to assist patient with adequate nutrition and appropriate food choices  Outcome: Progressing     Problem: METABOLIC, FLUID AND ELECTROLYTES - ADULT  Goal: Electrolytes maintained within normal limits  Description: INTERVENTIONS:  - Monitor labs and assess patient for signs and symptoms of electrolyte imbalances  - Administer electrolyte replacement as ordered  - Monitor response to electrolyte replacements, including repeat lab results as appropriate  - Instruct patient on fluid and nutrition as appropriate  Outcome: Progressing  Goal: Fluid balance maintained  Description: INTERVENTIONS:  - Monitor labs   - Monitor I/O and WT  - Instruct patient on fluid and nutrition as appropriate  - Assess for signs & symptoms of volume excess or deficit  Outcome: Progressing  Goal: Glucose maintained within target range  Description: INTERVENTIONS:  - Monitor Blood Glucose as ordered  - Assess for signs and symptoms of hyperglycemia and hypoglycemia  - Administer ordered medications to maintain glucose within target range  - Assess nutritional intake and initiate nutrition service referral as needed  Outcome: Progressing     Problem: Potential for Falls  Goal: Patient will remain free of falls  Description: INTERVENTIONS:  - Educate patient/family on patient safety including physical limitations  - Instruct patient to call for assistance with activity   - Consult OT/PT to assist with strengthening/mobility   - Keep Call bell within reach  - Keep bed low and locked with side rails adjusted as appropriate  - Keep care items and personal belongings within reach  - Initiate and maintain comfort rounds  - Make Fall Risk Sign visible to staff  - Apply yellow socks and bracelet for high fall risk patients  - Consider moving patient to room near nurses station  Outcome: Progressing

## 2022-09-18 VITALS
RESPIRATION RATE: 18 BRPM | HEART RATE: 82 BPM | SYSTOLIC BLOOD PRESSURE: 111 MMHG | WEIGHT: 223.33 LBS | HEIGHT: 62 IN | OXYGEN SATURATION: 98 % | BODY MASS INDEX: 41.1 KG/M2 | DIASTOLIC BLOOD PRESSURE: 75 MMHG | TEMPERATURE: 98.3 F

## 2022-09-18 LAB
ANION GAP SERPL CALCULATED.3IONS-SCNC: 6 MMOL/L (ref 4–13)
BUN SERPL-MCNC: 33 MG/DL (ref 5–25)
CALCIUM SERPL-MCNC: 9.8 MG/DL (ref 8.3–10.1)
CHLORIDE SERPL-SCNC: 102 MMOL/L (ref 96–108)
CO2 SERPL-SCNC: 33 MMOL/L (ref 21–32)
CREAT SERPL-MCNC: 1.03 MG/DL (ref 0.6–1.3)
GFR SERPL CREATININE-BSD FRML MDRD: 55 ML/MIN/1.73SQ M
GLUCOSE SERPL-MCNC: 114 MG/DL (ref 65–140)
GLUCOSE SERPL-MCNC: 115 MG/DL (ref 65–140)
GLUCOSE SERPL-MCNC: 143 MG/DL (ref 65–140)
GLUCOSE SERPL-MCNC: 179 MG/DL (ref 65–140)
POTASSIUM SERPL-SCNC: 4.6 MMOL/L (ref 3.5–5.3)
SODIUM SERPL-SCNC: 141 MMOL/L (ref 135–147)

## 2022-09-18 PROCEDURE — 94760 N-INVAS EAR/PLS OXIMETRY 1: CPT

## 2022-09-18 PROCEDURE — 94640 AIRWAY INHALATION TREATMENT: CPT

## 2022-09-18 PROCEDURE — 80048 BASIC METABOLIC PNL TOTAL CA: CPT | Performed by: STUDENT IN AN ORGANIZED HEALTH CARE EDUCATION/TRAINING PROGRAM

## 2022-09-18 PROCEDURE — 99239 HOSP IP/OBS DSCHRG MGMT >30: CPT | Performed by: INTERNAL MEDICINE

## 2022-09-18 PROCEDURE — 82948 REAGENT STRIP/BLOOD GLUCOSE: CPT

## 2022-09-18 PROCEDURE — 99232 SBSQ HOSP IP/OBS MODERATE 35: CPT | Performed by: STUDENT IN AN ORGANIZED HEALTH CARE EDUCATION/TRAINING PROGRAM

## 2022-09-18 RX ORDER — PREDNISONE 10 MG/1
TABLET ORAL
Qty: 40 TABLET | Refills: 0 | Status: ON HOLD | OUTPATIENT
Start: 2022-09-18 | End: 2022-09-29 | Stop reason: SDUPTHER

## 2022-09-18 RX ORDER — BUDESONIDE 0.5 MG/2ML
0.5 INHALANT ORAL 2 TIMES DAILY
Qty: 120 ML | Refills: 0 | Status: SHIPPED | OUTPATIENT
Start: 2022-09-18

## 2022-09-18 RX ORDER — FUROSEMIDE 20 MG/1
20 TABLET ORAL
Status: DISCONTINUED | OUTPATIENT
Start: 2022-09-18 | End: 2022-09-18 | Stop reason: HOSPADM

## 2022-09-18 RX ORDER — FUROSEMIDE 20 MG/1
20 TABLET ORAL 2 TIMES DAILY
Qty: 60 TABLET | Refills: 0 | Status: ON HOLD | OUTPATIENT
Start: 2022-09-18 | End: 2022-09-29 | Stop reason: SDUPTHER

## 2022-09-18 RX ORDER — NICOTINE 21 MG/24HR
1 PATCH, TRANSDERMAL 24 HOURS TRANSDERMAL DAILY
Qty: 28 PATCH | Refills: 0 | Status: SHIPPED | OUTPATIENT
Start: 2022-09-19

## 2022-09-18 RX ORDER — POTASSIUM CHLORIDE 20 MEQ/1
20 TABLET, EXTENDED RELEASE ORAL DAILY
Qty: 30 TABLET | Refills: 0 | Status: SHIPPED | OUTPATIENT
Start: 2022-09-19

## 2022-09-18 RX ORDER — ACETAMINOPHEN 325 MG/1
975 TABLET ORAL EVERY 8 HOURS PRN
Refills: 0
Start: 2022-09-18

## 2022-09-18 RX ADMIN — ATORVASTATIN CALCIUM 20 MG: 20 TABLET, FILM COATED ORAL at 08:25

## 2022-09-18 RX ADMIN — BICTEGRAVIR SODIUM, EMTRICITABINE, AND TENOFOVIR ALAFENAMIDE FUMARATE 1 TABLET: 50; 200; 25 TABLET ORAL at 08:25

## 2022-09-18 RX ADMIN — LIDOCAINE 1 PATCH: 50 PATCH CUTANEOUS at 08:25

## 2022-09-18 RX ADMIN — BUDESONIDE 0.5 MG: 0.5 INHALANT ORAL at 07:11

## 2022-09-18 RX ADMIN — DILTIAZEM HYDROCHLORIDE 180 MG: 180 CAPSULE, COATED, EXTENDED RELEASE ORAL at 08:25

## 2022-09-18 RX ADMIN — LEVOTHYROXINE SODIUM 100 MCG: 100 TABLET ORAL at 05:51

## 2022-09-18 RX ADMIN — APIXABAN 5 MG: 5 TABLET, FILM COATED ORAL at 08:25

## 2022-09-18 RX ADMIN — FUROSEMIDE 20 MG: 20 TABLET ORAL at 15:41

## 2022-09-18 RX ADMIN — IPRATROPIUM BROMIDE AND ALBUTEROL SULFATE 3 ML: 2.5; .5 SOLUTION RESPIRATORY (INHALATION) at 12:51

## 2022-09-18 RX ADMIN — GABAPENTIN 800 MG: 400 CAPSULE ORAL at 15:41

## 2022-09-18 RX ADMIN — FUROSEMIDE 20 MG: 20 TABLET ORAL at 08:25

## 2022-09-18 RX ADMIN — TRAMADOL HYDROCHLORIDE 50 MG: 50 TABLET, COATED ORAL at 14:28

## 2022-09-18 RX ADMIN — IPRATROPIUM BROMIDE AND ALBUTEROL SULFATE 3 ML: 2.5; .5 SOLUTION RESPIRATORY (INHALATION) at 07:11

## 2022-09-18 RX ADMIN — GABAPENTIN 800 MG: 400 CAPSULE ORAL at 08:25

## 2022-09-18 RX ADMIN — POTASSIUM CHLORIDE 20 MEQ: 1500 TABLET, EXTENDED RELEASE ORAL at 08:25

## 2022-09-18 RX ADMIN — TRAMADOL HYDROCHLORIDE 50 MG: 50 TABLET, COATED ORAL at 08:40

## 2022-09-18 RX ADMIN — PREDNISONE 40 MG: 20 TABLET ORAL at 08:25

## 2022-09-18 NOTE — PROGRESS NOTES
Cardiology Progress Note - Jerica Michel 81 y o  female MRN: [id number]    Unit/Bed#: E5 -01 Encounter: [id number]      Assessment & Plan:    Acute on chronic diastolic heart failure ([facility] [address] )  -TTE 09/13/2022 EF 70%, grade 1 DD, mild TR, estimated PA pressure of 37 mmHg  -not on Lasix at home  -on Lasix 20 mg p o  Daily    Acute exacerbation of chronic obstructive pulmonary disease (COPD) ([facility])  -uses 2 L nasal cannula at home  -steroids were increased 9-14 to Solu-Medrol due to increased wheezing on exam, now back on oral prednisone    Paroxysmal atrial fibrillation ([facility])  -anticoagulant on Eliquis 5 mg b i d    -rate control with Cardizem 180 mg daily    HIV (human immunodeficiency virus infection)     Type 2 diabetes mellitus with hyperglycemia ([facility])    Chronic respiratory failure with hypoxia ([facility])    Tobacco use    Lumbar stenosis    PASHA on CPAP    Obesity (BMI 35 0-39 9 without comorbidity)    Summary:   -weights have been slowly trending up  -will increase Lasix to 20 mg p o  B i d   -will probably be able to decrease Lasix dose as she is weaned off steroids  -check BMP, if stable, then she is stable from a cardiac standpoint for discharge    Subjective:   No significant events overnight  Reports feeling less short of breath this morning, but overall feels okay  Denies chest pain, abdominal pain, nausea, vomiting, fever, chills, headache, dizziness or palpitations  Objective:     Vitals: Blood pressure 119/82, pulse 86, temperature 97 8 °F (36 6 °C), resp   rate 18, height 5' 2" (1 575 m), weight 101 kg (223 lb 5 2 oz), last menstrual period 04/01/2013, SpO2 100 %, not currently breastfeeding , Body mass index is 40 85 kg/m² ,   Orthostatic Blood Pressures    Flowsheet Row Most Recent Value   Blood Pressure 119/82 filed at 09/18/2022 8455   Patient Position - Orthostatic VS Sitting filed at 09/16/2022 1541            Intake/Output Summary (Last 24 hours) at 9/18/2022 1104  Last data filed at 9/18/2022 0801  Gross per 24 hour   Intake 700 ml   Output --   Net 700 ml           Physical Exam:    GEN: Efrain Story appears well, alert and oriented x 3, pleasant and cooperative   HEENT: anicteric, mucous membranes moist  NECK:  Trace JVD  HEART: regular rhythm, normal S1 and S2, no murmurs, clicks, gallops or rubs   LUNGS:  Trace scattered bilateral wheezes  ABDOMEN: normal bowel sounds, soft, no tenderness, no distention  EXTREMITIES: peripheral pulses normal; 1+ lower extremity edema L>R  NEURO: no focal findings   SKIN: normal without suspicious lesions on exposed skin      Current Facility-Administered Medications:     acetaminophen (TYLENOL) tablet 975 mg, 975 mg, Oral, Q8H PRN, LUIS M Graves, 975 mg at 09/16/22 1942    albuterol inhalation solution 2 5 mg, 2 5 mg, Nebulization, Q4H PRN, Aaron S Prechtel, DO, 2 5 mg at 09/14/22 0934    apixaban (ELIQUIS) tablet 5 mg, 5 mg, Oral, BID, Aaron S Prechtel, DO, 5 mg at 09/18/22 0825    atorvastatin (LIPITOR) tablet 20 mg, 20 mg, Oral, Daily, Aaron S Prechtel, DO, 20 mg at 09/18/22 0825    bictegravir-emtricitab-tenofovir alafenamide (BIKTARVY) -25 MG tablet 1 tablet, 1 tablet, Oral, Daily, Aaron S Prechtel, DO, 1 tablet at 09/18/22 0825    budesonide (PULMICORT) inhalation solution 0 5 mg, 0 5 mg, Nebulization, Q12H, LUIS M Paez, 0 5 mg at 09/18/22 0711    diltiazem (CARDIZEM CD) 24 hr capsule 180 mg, 180 mg, Oral, Daily, Aaron S Prechtel, DO, 180 mg at 09/18/22 0825    furosemide (LASIX) tablet 20 mg, 20 mg, Oral, Daily, Lencho Villeda MD, 20 mg at 09/18/22 0825    gabapentin (NEURONTIN) capsule 800 mg, 800 mg, Oral, TID, Aaron S Prechtel, DO, 800 mg at 09/18/22 0825    insulin lispro (HumaLOG) 100 units/mL subcutaneous injection 1-5 Units, 1-5 Units, Subcutaneous, TID AC, 1 Units at 09/17/22 1710 **AND** Fingerstick Glucose (POCT), , , TID AC, LUIS M Graves    ipratropium-albuterol (DUO-NEB) 0 5-2 5 mg/3 mL inhalation solution 3 mL, 3 mL, Nebulization, TID, Mike Manuel MD, 3 mL at 09/18/22 0711    levothyroxine tablet 100 mcg, 100 mcg, Oral, Early Morning, Aaron S Prechtel, DO, 100 mcg at 09/18/22 0551    lidocaine (LIDODERM) 5 % patch 1 patch, 1 patch, Topical, Daily, Keyona Lo, CRNP, 1 patch at 09/18/22 0825    melatonin tablet 6 mg, 6 mg, Oral, HS, Kathy Rawls PA-C, 6 mg at 09/17/22 2200    montelukast (SINGULAIR) tablet 10 mg, 10 mg, Oral, HS, Aaron S Prechtel, DO, 10 mg at 09/17/22 2200    nicotine (NICODERM CQ) 21 mg/24 hr TD 24 hr patch 1 patch, 1 patch, Transdermal, Daily, Aaron S Prechtel, DO, 1 patch at 09/12/22 0821    potassium chloride (K-DUR,KLOR-CON) CR tablet 20 mEq, 20 mEq, Oral, Daily, Kaur Elaine MD, 20 mEq at 09/18/22 0825    predniSONE tablet 40 mg, 40 mg, Oral, Daily, Ling Coma, CRNP, 40 mg at 09/18/22 0825    traMADol (ULTRAM) tablet 50 mg, 50 mg, Oral, Q6H PRN, Napoleon Jarrett PA-C, 50 mg at 09/18/22 0840    Labs & Results:    Lab Results   Component Value Date    TROPONINI <0 02 05/27/2020    TROPONINI <0 04 12/11/2014    TROPONINI 0 04 12/11/2014       Lab Results   Component Value Date    GLUCOSE 104 04/10/2018    CALCIUM 10 5 (H) 09/15/2022     (L) 12/26/2014    K 4 7 09/15/2022    CO2 34 (H) 09/15/2022    CL 99 09/15/2022    BUN 51 (H) 09/15/2022    CREATININE 1 21 09/15/2022       Lab Results   Component Value Date    WBC 11 13 (H) 09/12/2022    HGB 10 4 (L) 09/12/2022    HCT 32 9 (L) 09/12/2022    MCV 92 09/12/2022     09/12/2022           No results found for: CHOL  Lab Results   Component Value Date    HDL 70 09/12/2022     Lab Results   Component Value Date    LDLCALC 44 09/12/2022     Lab Results   Component Value Date    TRIG 82 09/12/2022       Lab Results   Component Value Date    ALT 20 09/07/2022    AST 10 09/07/2022         EKG personally reviewed by )Kaur Elaine MD  No acute changes

## 2022-09-18 NOTE — PLAN OF CARE
Problem: PAIN - ADULT  Goal: Verbalizes/displays adequate comfort level or baseline comfort level  Description: Interventions:  - Encourage patient to monitor pain and request assistance  - Assess pain using appropriate pain scale  - Administer analgesics based on type and severity of pain and evaluate response  - Implement non-pharmacological measures as appropriate and evaluate response  - Consider cultural and social influences on pain and pain management  - Notify physician/advanced practitioner if interventions unsuccessful or patient reports new pain  Outcome: Progressing     Problem: INFECTION - ADULT  Goal: Absence or prevention of progression during hospitalization  Description: INTERVENTIONS:  - Assess and monitor for signs and symptoms of infection  - Monitor lab/diagnostic results  - Monitor all insertion sites, i e  indwelling lines, tubes, and drains  - Monitor endotracheal if appropriate and nasal secretions for changes in amount and color  - [place] appropriate cooling/warming therapies per order  - Administer medications as ordered  - Instruct and encourage patient and family to use good hand hygiene technique  - Identify and instruct in appropriate isolation precautions for identified infection/condition  Outcome: Progressing     Problem: SAFETY ADULT  Goal: Patient will remain free of falls  Description: INTERVENTIONS:  - Educate patient/family on patient safety including physical limitations  - Instruct patient to call for assistance with activity   - Consult OT/PT to assist with strengthening/mobility   - Keep Call bell within reach  - Keep bed low and locked with side rails adjusted as appropriate  - Keep care items and personal belongings within reach  - Initiate and maintain comfort rounds  - Make Fall Risk Sign visible to staff  - Apply yellow socks and bracelet for high fall risk patients  - Consider moving patient to room near nurses station  Outcome: Progressing     Problem: CARDIOVASCULAR - ADULT  Goal: Maintains optimal cardiac output and hemodynamic stability  Description: INTERVENTIONS:  - Monitor I/O, vital signs and rhythm  - Monitor for S/S and trends of decreased cardiac output  - Administer and titrate ordered vasoactive medications to optimize hemodynamic stability  - Assess quality of pulses, skin color and temperature  - Assess for signs of decreased coronary artery perfusion  - Instruct patient to report change in severity of symptoms  Outcome: Progressing  Goal: Absence of cardiac dysrhythmias or at baseline rhythm  Description: INTERVENTIONS:  - Continuous cardiac monitoring, vital signs, obtain 12 lead EKG if ordered  - Administer antiarrhythmic and heart rate control medications as ordered  - Monitor electrolytes and administer replacement therapy as ordered  Outcome: Progressing     Problem: RESPIRATORY - ADULT  Goal: Achieves optimal ventilation and oxygenation  Description: INTERVENTIONS:  - Assess for changes in respiratory status  - Assess for changes in mentation and behavior  - Position to facilitate oxygenation and minimize respiratory effort  - Oxygen administered by appropriate delivery if ordered  - Initiate smoking cessation education as indicated  - Encourage broncho-pulmonary hygiene including cough, deep breathe, Incentive Spirometry  - Assess the need for suctioning and aspirate as needed  - Assess and instruct to report SOB or any respiratory difficulty  - Respiratory Therapy support as indicated  Outcome: Progressing     Problem: GASTROINTESTINAL - ADULT  Goal: Minimal or absence of nausea and/or vomiting  Description: INTERVENTIONS:  - Administer IV fluids if ordered to ensure adequate hydration  - Maintain NPO status until nausea and vomiting are resolved  - Nasogastric tube if ordered  - Administer ordered antiemetic medications as needed  - Provide nonpharmacologic comfort measures as appropriate  - Advance diet as tolerated, if ordered  - Consider nutrition services referral to assist patient with adequate nutrition and appropriate food choices  Outcome: Progressing  Goal: Maintains or returns to baseline bowel function  Description: INTERVENTIONS:  - Assess bowel function  - Encourage oral fluids to ensure adequate hydration  - Administer IV fluids if ordered to ensure adequate hydration  - Administer ordered medications as needed  - Encourage mobilization and activity  - Consider nutritional services referral to assist patient with adequate nutrition and appropriate food choices  Outcome: Progressing     Problem: METABOLIC, FLUID AND ELECTROLYTES - ADULT  Goal: Electrolytes maintained within normal limits  Description: INTERVENTIONS:  - Monitor labs and assess patient for signs and symptoms of electrolyte imbalances  - Administer electrolyte replacement as ordered  - Monitor response to electrolyte replacements, including repeat lab results as appropriate  - Instruct patient on fluid and nutrition as appropriate  Outcome: Progressing  Goal: Fluid balance maintained  Description: INTERVENTIONS:  - Monitor labs   - Monitor I/O and WT  - Instruct patient on fluid and nutrition as appropriate  - Assess for signs & symptoms of volume excess or deficit  Outcome: Progressing  Goal: Glucose maintained within target range  Description: INTERVENTIONS:  - Monitor Blood Glucose as ordered  - Assess for signs and symptoms of hyperglycemia and hypoglycemia  - Administer ordered medications to maintain glucose within target range  - Assess nutritional intake and initiate nutrition service referral as needed  Outcome: Progressing     Problem: Potential for Falls  Goal: Patient will remain free of falls  Description: INTERVENTIONS:  - Educate patient/family on patient safety including physical limitations  - Instruct patient to call for assistance with activity   - Consult OT/PT to assist with strengthening/mobility   - Keep Call bell within reach  - Keep bed low and locked with side rails adjusted as appropriate  - Keep care items and personal belongings within reach  - Initiate and maintain comfort rounds  - Make Fall Risk Sign visible to staff  - Apply yellow socks and bracelet for high fall risk patients  - Consider moving patient to room near nurses station  Outcome: Progressing

## 2022-09-18 NOTE — PLAN OF CARE
Problem: PAIN - ADULT  Goal: Verbalizes/displays adequate comfort level or baseline comfort level  Description: Interventions:  - Encourage patient to monitor pain and request assistance  - Assess pain using appropriate pain scale  - Administer analgesics based on type and severity of pain and evaluate response  - Implement non-pharmacological measures as appropriate and evaluate response  - Consider cultural and social influences on pain and pain management  - Notify physician/advanced practitioner if interventions unsuccessful or patient reports new pain  9/18/2022 1143 by Anibal Aguilar RN  Outcome: Completed  9/18/2022 0946 by Anibal Aguilar RN  Outcome: Progressing     Problem: INFECTION - ADULT  Goal: Absence or prevention of progression during hospitalization  Description: INTERVENTIONS:  - Assess and monitor for signs and symptoms of infection  - Monitor lab/diagnostic results  - Monitor all insertion sites, i e  indwelling lines, tubes, and drains  - Monitor endotracheal if appropriate and nasal secretions for changes in amount and color  - [place] appropriate cooling/warming therapies per order  - Administer medications as ordered  - Instruct and encourage patient and family to use good hand hygiene technique  - Identify and instruct in appropriate isolation precautions for identified infection/condition  9/18/2022 1143 by Anibal Aguilar RN  Outcome: Completed  9/18/2022 0946 by Anibal Aguilar RN  Outcome: Progressing     Problem: SAFETY ADULT  Goal: Patient will remain free of falls  Description: INTERVENTIONS:  - Educate patient/family on patient safety including physical limitations  - Instruct patient to call for assistance with activity   - Consult OT/PT to assist with strengthening/mobility   - Keep Call bell within reach  - Keep bed low and locked with side rails adjusted as appropriate  - Keep care items and personal belongings within reach  - Initiate and maintain comfort rounds  - Make Fall Risk Sign visible to staff  - Apply yellow socks and bracelet for high fall risk patients  - Consider moving patient to room near nurses station  9/18/2022 1143 by Ruddy Keller RN  Outcome: Completed  9/18/2022 0946 by Ruddy Keller RN  Outcome: Progressing     Problem: CARDIOVASCULAR - ADULT  Goal: Maintains optimal cardiac output and hemodynamic stability  Description: INTERVENTIONS:  - Monitor I/O, vital signs and rhythm  - Monitor for S/S and trends of decreased cardiac output  - Administer and titrate ordered vasoactive medications to optimize hemodynamic stability  - Assess quality of pulses, skin color and temperature  - Assess for signs of decreased coronary artery perfusion  - Instruct patient to report change in severity of symptoms  9/18/2022 1143 by Ruddy Keller RN  Outcome: Completed  9/18/2022 0946 by Ruddy Keller RN  Outcome: Progressing  Goal: Absence of cardiac dysrhythmias or at baseline rhythm  Description: INTERVENTIONS:  - Continuous cardiac monitoring, vital signs, obtain 12 lead EKG if ordered  - Administer antiarrhythmic and heart rate control medications as ordered  - Monitor electrolytes and administer replacement therapy as ordered  9/18/2022 1143 by Ruddy Keller RN  Outcome: Completed  9/18/2022 0946 by Ruddy Keller RN  Outcome: Progressing     Problem: RESPIRATORY - ADULT  Goal: Achieves optimal ventilation and oxygenation  Description: INTERVENTIONS:  - Assess for changes in respiratory status  - Assess for changes in mentation and behavior  - Position to facilitate oxygenation and minimize respiratory effort  - Oxygen administered by appropriate delivery if ordered  - Initiate smoking cessation education as indicated  - Encourage broncho-pulmonary hygiene including cough, deep breathe, Incentive Spirometry  - Assess the need for suctioning and aspirate as needed  - Assess and instruct to report SOB or any respiratory difficulty  - Respiratory Therapy support as indicated  9/18/2022 [phone] by Marielle Power RN  Outcome: Completed  9/18/2022 0946 by Marielle Power RN  Outcome: Progressing     Problem: GASTROINTESTINAL - ADULT  Goal: Minimal or absence of nausea and/or vomiting  Description: INTERVENTIONS:  - Administer IV fluids if ordered to ensure adequate hydration  - Maintain NPO status until nausea and vomiting are resolved  - Nasogastric tube if ordered  - Administer ordered antiemetic medications as needed  - Provide nonpharmacologic comfort measures as appropriate  - Advance diet as tolerated, if ordered  - Consider nutrition services referral to assist patient with adequate nutrition and appropriate food choices  9/18/2022 1143 by Marielle Power RN  Outcome: Completed  9/18/2022 0946 by Marielle Power RN  Outcome: Progressing  Goal: Maintains or returns to baseline bowel function  Description: INTERVENTIONS:  - Assess bowel function  - Encourage oral fluids to ensure adequate hydration  - Administer IV fluids if ordered to ensure adequate hydration  - Administer ordered medications as needed  - Encourage mobilization and activity  - Consider nutritional services referral to assist patient with adequate nutrition and appropriate food choices  9/18/2022 1143 by Marielle Power RN  Outcome: Completed  9/18/2022 0946 by Marielle Power RN  Outcome: Progressing     Problem: METABOLIC, FLUID AND ELECTROLYTES - ADULT  Goal: Electrolytes maintained within normal limits  Description: INTERVENTIONS:  - Monitor labs and assess patient for signs and symptoms of electrolyte imbalances  - Administer electrolyte replacement as ordered  - Monitor response to electrolyte replacements, including repeat lab results as appropriate  - Instruct patient on fluid and nutrition as appropriate  9/18/2022 1143 by Marielle Power RN  Outcome: Completed  9/18/2022 0946 by Marielle Power RN  Outcome: Progressing  Goal: Fluid balance maintained  Description: INTERVENTIONS:  - Monitor labs   - Monitor I/O and WT  - Instruct patient on fluid and nutrition as appropriate  - Assess for signs & symptoms of volume excess or deficit  9/18/2022 1143 by Thu Degroot RN  Outcome: Completed  9/18/2022 0946 by Thu Degroot RN  Outcome: Progressing  Goal: Glucose maintained within target range  Description: INTERVENTIONS:  - Monitor Blood Glucose as ordered  - Assess for signs and symptoms of hyperglycemia and hypoglycemia  - Administer ordered medications to maintain glucose within target range  - Assess nutritional intake and initiate nutrition service referral as needed  9/18/2022 1143 by Thu Degroot RN  Outcome: Completed  9/18/2022 0946 by Thu Degroot RN  Outcome: Progressing     Problem: Potential for Falls  Goal: Patient will remain free of falls  Description: INTERVENTIONS:  - Educate patient/family on patient safety including physical limitations  - Instruct patient to call for assistance with activity   - Consult OT/PT to assist with strengthening/mobility   - Keep Call bell within reach  - Keep bed low and locked with side rails adjusted as appropriate  - Keep care items and personal belongings within reach  - Initiate and maintain comfort rounds  - Make Fall Risk Sign visible to staff  - Apply yellow socks and bracelet for high fall risk patients  - Consider moving patient to room near nurses station  9/18/2022 1143 by Thu Degroot RN  Outcome: Completed  9/18/2022 0946 by Thu Degroot RN  Outcome: Progressing

## 2022-09-18 NOTE — CASE MANAGEMENT
Case Management Discharge Planning Note    Patient name Jerica Michel  Location [facility] /E5 [place] [phone]-* MRN [id number]  : 1950 Date 2022       Current Admission Date: 2022  Current Admission Diagnosis:Acute exacerbation of chronic obstructive pulmonary disease (COPD) ([facility] )   Patient Active Problem List    Diagnosis Date Noted    Obesity (BMI 35 0-39 9 without comorbidity) 2022    Acute on chronic diastolic heart failure ([facility] ) 2022    PASHA on CPAP 2020    Acute exacerbation of chronic obstructive pulmonary disease (COPD) ([facility] ) 2020    Fall at home, initial encounter 2020    Paroxysmal atrial fibrillation ([facility] ) 2020    Hyperlipidemia 2018    Thoracic disc herniation 2018    Lumbar stenosis 2018    Urinary frequency 2018    Ambulatory dysfunction     Back pain 2018    Hip pain 2018    Shortness of breath 2017    Hypertension     Type 2 diabetes mellitus with hyperglycemia (HCC)     Chronic respiratory failure with hypoxia (HCC)     Bipolar 1 disorder ([facility] )     Tobacco use     Arthritis 2016    HIV (human immunodeficiency virus infection)  2016    Osteoarthritis 2016      LOS (days): 11  Geometric Mean LOS (GMLOS) (days): 3 80  Days to GMLOS:-7 1     OBJECTIVE:  Risk of Unplanned Readmission Score: 15 91         Current admission status: Inpatient   Preferred Pharmacy:   [address] [place] [address], [address] [place] [id number]  Phone: [phone] Fax: [phone]    Primary Care Provider: Yoli Chavarria DO    Primary Insurance: [facility]  Secondary Insurance: [facility]    DISCHARGE DETAILS:    Discharge planning discussed with[de-identified] Patient    [address]         Is the patient interested in [address] at discharge?: Yes  [address] requested[de-identified] Nursing, Occupational Therapy, Physical Therapy  Home Health Agency Name[de-identified] [address] Provider[de-identified] PCP  Home Health Services Needed[de-identified] COPD Management, Oxygen Via Nasal Cannula, Strengthening/Theraputic Exercises to Improve Function, Evaluate Functional Status and Safety  Oxygen LPM Ordered (if applicable based on home health services needed):: 2 LPM  Homebound Criteria Met[de-identified] Requires the Assistance of Another Person for Safe Ambulation or to Leave the Home, Uses an Assist Device (i e  cane, walker, etc)  Supporting Clincal Findings[de-identified] Fatigues Easliy in [employer], Limited Endurance    DME Referral Provided  Referral made for DME?: No    Other Referral/Resources/Interventions Provided:  Interventions: [facility]    Treatment Team Recommendation: Home with [facility]  Discharge Destination Plan[de-identified] Home with [place] at Discharge : [facility]  Dispatcher Contacted: Yes  Number/Name of Dispatcher: Seferino Ivey [id number]     ETA of Transport (Date): 09/18/22  ETA of Transport (Time):  (PENDING)

## 2022-09-18 NOTE — DISCHARGE SUMMARY
Discharge Summary - Nunu Meyers, 1950, [id number]        Admission Date: 9/7/2022  Discharge Date:  9/18/2022      Discharge Diagnosis:   1  Acute on chronic diastolic congestive heart failure  2  Acute exacerbation of COPD  3  Chronic respiratory failure with hypoxia  4  Obstructive sleep apnea  5  Obesity  6  Paroxysmal atrial fibrillation  7  Type 2 diabetes  8  Tobacco abuse  9  HIV  10  Lumbar spinal stenosis     Consulting Physicians:  1  Dr Nita Kirk, cardiology  2  Dr Rubi Mansfield, Pulmonary Medicine     Procedures Performed:   None    HPI:  The patient is a 63-year-old woman with a history of COPD and chronic hypoxic respiratory failure  She came to the emergency room with 2 days of worsening shortness of breath  She denied cough, chills, sputum production, etcetera  She did have peripheral edema  She was admitted for further care  Hospital Course: The patient was admitted to the hospital   She was thought to have both an acute exacerbation of COPD and acute on chronic diastolic congestive heart failure  She was treated with intensive bronchodilator therapy  She was given intravenous steroids  She was given intravenous furosemide  She was slow to diurese and therefore was converted to a furosemide infusion  This proved to be more effective in her edema gradually diminished  Despite resolution of her edema, she continued to feel short of breath  Therefore, her steroid therapy was intensified  With this, she did thereafter improved and was started on oral prednisone  The patient was evaluated by both Cardiology and Pulmonary during her stay  She had an echocardiogram which revealed mild to moderate left ventricular hypertrophy with an ejection fraction of 70%  The patient has obstructive sleep apnea was maintained on CPAP during her stay  The patient has a history of paroxysmal atrial fibrillation  She remained in sinus rhythm    She is therapeutically anticoagulated with apixaban  The patient has type 2 diabetes  This remains stable despite steroid therapy  The    The patient continues to smoke  She was counseled on multiple occasions regarding the need to stop  On the day of discharge the patient was feeling well  Vital signs were stable  Lungs were clear  Cardiac exam revealed regular rhythm  I heard no murmur gallop  The abdomen was soft and nontender  There was no edema  Disposition:  The patient was discharged home on September 18th  She remains on 2 L of oxygen which is her baseline  She will remain on a diabetic diet  Her activity is as tolerated  She was asked to arrange follow-up with her primary care provider Dr Marianne Moran, within 1 week  She will also follow-up with Dr Desirae Judd of [facility] in with [facility] as well  Discharge instructions/Information to patient and family:   See after visit summary for information provided to patient and family  Provisions for Follow-Up Care:  See after visit summary for information related to follow-up care and any pertinent home health orders  Planned Readmission: No    Discharge Statement   I spent 40 minutes discharging the patient  This time was spent on the day of discharge  I had direct contact with the patient on the day of discharge  Discharge Medications:  See after visit summary for reconciled discharge medications provided to patient and family

## 2022-09-18 NOTE — PLAN OF CARE
Problem: PAIN - ADULT  Goal: Verbalizes/displays adequate comfort level or baseline comfort level  Description: Interventions:  - Encourage patient to monitor pain and request assistance  - Assess pain using appropriate pain scale  - Administer analgesics based on type and severity of pain and evaluate response  - Implement non-pharmacological measures as appropriate and evaluate response  - Consider cultural and social influences on pain and pain management  - Notify physician/advanced practitioner if interventions unsuccessful or patient reports new pain  Outcome: Progressing     Problem: INFECTION - ADULT  Goal: Absence or prevention of progression during hospitalization  Description: INTERVENTIONS:  - Assess and monitor for signs and symptoms of infection  - Monitor lab/diagnostic results  - Monitor all insertion sites, i e  indwelling lines, tubes, and drains  - Monitor endotracheal if appropriate and nasal secretions for changes in amount and color  - [place] appropriate cooling/warming therapies per order  - Administer medications as ordered  - Instruct and encourage patient and family to use good hand hygiene technique  - Identify and instruct in appropriate isolation precautions for identified infection/condition  Outcome: Progressing     Problem: SAFETY ADULT  Goal: Patient will remain free of falls  Description: INTERVENTIONS:  - Educate patient/family on patient safety including physical limitations  - Instruct patient to call for assistance with activity   - Consult OT/PT to assist with strengthening/mobility   - Keep Call bell within reach  - Keep bed low and locked with side rails adjusted as appropriate  - Keep care items and personal belongings within reach  - Initiate and maintain comfort rounds  - Make Fall Risk Sign visible to staff  - Apply yellow socks and bracelet for high fall risk patients  - Consider moving patient to room near nurses station  Outcome: Progressing     Problem: CARDIOVASCULAR - ADULT  Goal: Maintains optimal cardiac output and hemodynamic stability  Description: INTERVENTIONS:  - Monitor I/O, vital signs and rhythm  - Monitor for S/S and trends of decreased cardiac output  - Administer and titrate ordered vasoactive medications to optimize hemodynamic stability  - Assess quality of pulses, skin color and temperature  - Assess for signs of decreased coronary artery perfusion  - Instruct patient to report change in severity of symptoms  Outcome: Progressing  Goal: Absence of cardiac dysrhythmias or at baseline rhythm  Description: INTERVENTIONS:  - Continuous cardiac monitoring, vital signs, obtain 12 lead EKG if ordered  - Administer antiarrhythmic and heart rate control medications as ordered  - Monitor electrolytes and administer replacement therapy as ordered  Outcome: Progressing     Problem: RESPIRATORY - ADULT  Goal: Achieves optimal ventilation and oxygenation  Description: INTERVENTIONS:  - Assess for changes in respiratory status  - Assess for changes in mentation and behavior  - Position to facilitate oxygenation and minimize respiratory effort  - Oxygen administered by appropriate delivery if ordered  - Initiate smoking cessation education as indicated  - Encourage broncho-pulmonary hygiene including cough, deep breathe, Incentive Spirometry  - Assess the need for suctioning and aspirate as needed  - Assess and instruct to report SOB or any respiratory difficulty  - Respiratory Therapy support as indicated  Outcome: Progressing     Problem: GASTROINTESTINAL - ADULT  Goal: Minimal or absence of nausea and/or vomiting  Description: INTERVENTIONS:  - Administer IV fluids if ordered to ensure adequate hydration  - Maintain NPO status until nausea and vomiting are resolved  - Nasogastric tube if ordered  - Administer ordered antiemetic medications as needed  - Provide nonpharmacologic comfort measures as appropriate  - Advance diet as tolerated, if ordered  - Consider nutrition services referral to assist patient with adequate nutrition and appropriate food choices  Outcome: Progressing  Goal: Maintains or returns to baseline bowel function  Description: INTERVENTIONS:  - Assess bowel function  - Encourage oral fluids to ensure adequate hydration  - Administer IV fluids if ordered to ensure adequate hydration  - Administer ordered medications as needed  - Encourage mobilization and activity  - Consider nutritional services referral to assist patient with adequate nutrition and appropriate food choices  Outcome: Progressing     Problem: METABOLIC, FLUID AND ELECTROLYTES - ADULT  Goal: Electrolytes maintained within normal limits  Description: INTERVENTIONS:  - Monitor labs and assess patient for signs and symptoms of electrolyte imbalances  - Administer electrolyte replacement as ordered  - Monitor response to electrolyte replacements, including repeat lab results as appropriate  - Instruct patient on fluid and nutrition as appropriate  Outcome: Progressing  Goal: Fluid balance maintained  Description: INTERVENTIONS:  - Monitor labs   - Monitor I/O and WT  - Instruct patient on fluid and nutrition as appropriate  - Assess for signs & symptoms of volume excess or deficit  Outcome: Progressing  Goal: Glucose maintained within target range  Description: INTERVENTIONS:  - Monitor Blood Glucose as ordered  - Assess for signs and symptoms of hyperglycemia and hypoglycemia  - Administer ordered medications to maintain glucose within target range  - Assess nutritional intake and initiate nutrition service referral as needed  Outcome: Progressing     Problem: Potential for Falls  Goal: Patient will remain free of falls  Description: INTERVENTIONS:  - Educate patient/family on patient safety including physical limitations  - Instruct patient to call for assistance with activity   - Consult OT/PT to assist with strengthening/mobility   - Keep Call bell within reach  - Keep bed low and locked with side rails adjusted as appropriate  - Keep care items and personal belongings within reach  - Initiate and maintain comfort rounds  - Make Fall Risk Sign visible to staff  - Apply yellow socks and bracelet for high fall risk patients  - Consider moving patient to room near nurses station  Outcome: Progressing

## 2022-09-19 ENCOUNTER — TELEPHONE (OUTPATIENT)
Dept: CARDIOLOGY CLINIC | Facility: CLINIC | Age: 72
End: 2022-09-19

## 2022-09-19 NOTE — TELEPHONE ENCOUNTER
Per in [facility] hospital follow up appointment, primary ph# voice mail full, called emergency contact dtr lvm

## 2022-09-20 ENCOUNTER — HOSPITAL ENCOUNTER (INPATIENT)
Facility: HOSPITAL | Age: 72
LOS: 9 days | Discharge: HOME HEALTH | DRG: 202 | End: 2022-09-29
Attending: EMERGENCY MEDICINE | Admitting: INTERNAL MEDICINE
Payer: COMMERCIAL

## 2022-09-20 ENCOUNTER — APPOINTMENT (OUTPATIENT)
Dept: RADIOLOGY | Facility: HOSPITAL | Age: 72
DRG: 202 | End: 2022-09-20
Payer: COMMERCIAL

## 2022-09-20 DIAGNOSIS — Z99.89 OSA ON CPAP: Chronic | ICD-10-CM

## 2022-09-20 DIAGNOSIS — J44.1 COPD EXACERBATION (HCC): Primary | ICD-10-CM

## 2022-09-20 DIAGNOSIS — E87.70 VOLUME OVERLOAD: ICD-10-CM

## 2022-09-20 DIAGNOSIS — J44.1 ACUTE EXACERBATION OF CHRONIC OBSTRUCTIVE PULMONARY DISEASE (COPD) (HCC): ICD-10-CM

## 2022-09-20 DIAGNOSIS — G47.33 OSA ON CPAP: Chronic | ICD-10-CM

## 2022-09-20 DIAGNOSIS — I48.0 PAROXYSMAL ATRIAL FIBRILLATION (HCC): ICD-10-CM

## 2022-09-20 DIAGNOSIS — I50.33 ACUTE ON CHRONIC DIASTOLIC HEART FAILURE (HCC): ICD-10-CM

## 2022-09-20 DIAGNOSIS — J45.901 ASTHMA EXACERBATION: ICD-10-CM

## 2022-09-20 LAB
2HR DELTA HS TROPONIN: 1 NG/L
4HR DELTA HS TROPONIN: 3 NG/L
ALBUMIN SERPL BCP-MCNC: 3 G/DL (ref 3.5–5)
ALP SERPL-CCNC: 107 U/L (ref 46–116)
ALT SERPL W P-5'-P-CCNC: 33 U/L (ref 12–78)
ANION GAP SERPL CALCULATED.3IONS-SCNC: 8 MMOL/L (ref 4–13)
APTT PPP: 22 SECONDS (ref 23–37)
AST SERPL W P-5'-P-CCNC: 26 U/L (ref 5–45)
ATRIAL RATE: 97 BPM
ATRIAL RATE: 97 BPM
BASOPHILS # BLD AUTO: 0.04 THOUSANDS/ΜL (ref 0–0.1)
BASOPHILS NFR BLD AUTO: 0 % (ref 0–1)
BILIRUB SERPL-MCNC: 0.45 MG/DL (ref 0.2–1)
BUN SERPL-MCNC: 28 MG/DL (ref 5–25)
CALCIUM ALBUM COR SERPL-MCNC: 10.9 MG/DL (ref 8.3–10.1)
CALCIUM SERPL-MCNC: 10.1 MG/DL (ref 8.3–10.1)
CARDIAC TROPONIN I PNL SERPL HS: 10 NG/L
CARDIAC TROPONIN I PNL SERPL HS: 7 NG/L
CARDIAC TROPONIN I PNL SERPL HS: 8 NG/L
CHLORIDE SERPL-SCNC: 104 MMOL/L (ref 96–108)
CO2 SERPL-SCNC: 29 MMOL/L (ref 21–32)
CREAT SERPL-MCNC: 0.84 MG/DL (ref 0.6–1.3)
EOSINOPHIL # BLD AUTO: 0.22 THOUSAND/ΜL (ref 0–0.61)
EOSINOPHIL NFR BLD AUTO: 2 % (ref 0–6)
ERYTHROCYTE [DISTWIDTH] IN BLOOD BY AUTOMATED COUNT: 15.2 % (ref 11.6–15.1)
FLUAV RNA RESP QL NAA+PROBE: NEGATIVE
FLUBV RNA RESP QL NAA+PROBE: NEGATIVE
GFR SERPL CREATININE-BSD FRML MDRD: 70 ML/MIN/1.73SQ M
GLUCOSE SERPL-MCNC: 254 MG/DL (ref 65–140)
GLUCOSE SERPL-MCNC: 301 MG/DL (ref 65–140)
GLUCOSE SERPL-MCNC: 97 MG/DL (ref 65–140)
HCT VFR BLD AUTO: 34.8 % (ref 34.8–46.1)
HGB BLD-MCNC: 10.5 G/DL (ref 11.5–15.4)
IMM GRANULOCYTES # BLD AUTO: 0.27 THOUSAND/UL (ref 0–0.2)
IMM GRANULOCYTES NFR BLD AUTO: 2 % (ref 0–2)
INR PPP: 1.07 (ref 0.84–1.19)
LIPASE SERPL-CCNC: 227 U/L (ref 73–393)
LYMPHOCYTES # BLD AUTO: 2.27 THOUSANDS/ΜL (ref 0.6–4.47)
LYMPHOCYTES NFR BLD AUTO: 19 % (ref 14–44)
MAGNESIUM SERPL-MCNC: 1.9 MG/DL (ref 1.6–2.6)
MCH RBC QN AUTO: 28.2 PG (ref 26.8–34.3)
MCHC RBC AUTO-ENTMCNC: 30.2 G/DL (ref 31.4–37.4)
MCV RBC AUTO: 93 FL (ref 82–98)
MONOCYTES # BLD AUTO: 0.9 THOUSAND/ΜL (ref 0.17–1.22)
MONOCYTES NFR BLD AUTO: 8 % (ref 4–12)
NEUTROPHILS # BLD AUTO: 8.01 THOUSANDS/ΜL (ref 1.85–7.62)
NEUTS SEG NFR BLD AUTO: 69 % (ref 43–75)
NRBC BLD AUTO-RTO: 0 /100 WBCS
NT-PROBNP SERPL-MCNC: 55 PG/ML
P AXIS: 61 DEGREES
P AXIS: 67 DEGREES
PLATELET # BLD AUTO: 233 THOUSANDS/UL (ref 149–390)
PMV BLD AUTO: 8.9 FL (ref 8.9–12.7)
POTASSIUM SERPL-SCNC: 4.4 MMOL/L (ref 3.5–5.3)
PR INTERVAL: 180 MS
PR INTERVAL: 194 MS
PROCALCITONIN SERPL-MCNC: 0.06 NG/ML
PROT SERPL-MCNC: 6.6 G/DL (ref 6.4–8.4)
PROTHROMBIN TIME: 13.9 SECONDS (ref 11.6–14.5)
QRS AXIS: -67 DEGREES
QRS AXIS: -72 DEGREES
QRSD INTERVAL: 108 MS
QRSD INTERVAL: 98 MS
QT INTERVAL: 346 MS
QT INTERVAL: 364 MS
QTC INTERVAL: 439 MS
QTC INTERVAL: 462 MS
RBC # BLD AUTO: 3.73 MILLION/UL (ref 3.81–5.12)
RSV RNA RESP QL NAA+PROBE: NEGATIVE
SARS-COV-2 RNA RESP QL NAA+PROBE: NEGATIVE
SODIUM SERPL-SCNC: 141 MMOL/L (ref 135–147)
T WAVE AXIS: 61 DEGREES
T WAVE AXIS: 66 DEGREES
VENTRICULAR RATE: 97 BPM
VENTRICULAR RATE: 97 BPM
WBC # BLD AUTO: 11.71 THOUSAND/UL (ref 4.31–10.16)

## 2022-09-20 PROCEDURE — 94640 AIRWAY INHALATION TREATMENT: CPT

## 2022-09-20 PROCEDURE — 85025 COMPLETE CBC W/AUTO DIFF WBC: CPT | Performed by: EMERGENCY MEDICINE

## 2022-09-20 PROCEDURE — 85730 THROMBOPLASTIN TIME PARTIAL: CPT | Performed by: EMERGENCY MEDICINE

## 2022-09-20 PROCEDURE — 99291 CRITICAL CARE FIRST HOUR: CPT | Performed by: EMERGENCY MEDICINE

## 2022-09-20 PROCEDURE — 80053 COMPREHEN METABOLIC PANEL: CPT | Performed by: EMERGENCY MEDICINE

## 2022-09-20 PROCEDURE — 99223 1ST HOSP IP/OBS HIGH 75: CPT | Performed by: STUDENT IN AN ORGANIZED HEALTH CARE EDUCATION/TRAINING PROGRAM

## 2022-09-20 PROCEDURE — 99285 EMERGENCY DEPT VISIT HI MDM: CPT

## 2022-09-20 PROCEDURE — 83880 ASSAY OF NATRIURETIC PEPTIDE: CPT | Performed by: EMERGENCY MEDICINE

## 2022-09-20 PROCEDURE — 71045 X-RAY EXAM CHEST 1 VIEW: CPT

## 2022-09-20 PROCEDURE — 84145 PROCALCITONIN (PCT): CPT | Performed by: STUDENT IN AN ORGANIZED HEALTH CARE EDUCATION/TRAINING PROGRAM

## 2022-09-20 PROCEDURE — 36415 COLL VENOUS BLD VENIPUNCTURE: CPT | Performed by: EMERGENCY MEDICINE

## 2022-09-20 PROCEDURE — 96365 THER/PROPH/DIAG IV INF INIT: CPT

## 2022-09-20 PROCEDURE — 96375 TX/PRO/DX INJ NEW DRUG ADDON: CPT

## 2022-09-20 PROCEDURE — 93010 ELECTROCARDIOGRAM REPORT: CPT | Performed by: INTERNAL MEDICINE

## 2022-09-20 PROCEDURE — 85610 PROTHROMBIN TIME: CPT | Performed by: EMERGENCY MEDICINE

## 2022-09-20 PROCEDURE — 83735 ASSAY OF MAGNESIUM: CPT | Performed by: EMERGENCY MEDICINE

## 2022-09-20 PROCEDURE — 84484 ASSAY OF TROPONIN QUANT: CPT | Performed by: EMERGENCY MEDICINE

## 2022-09-20 PROCEDURE — 87636 SARSCOV2 & INF A&B AMP PRB: CPT | Performed by: EMERGENCY MEDICINE

## 2022-09-20 PROCEDURE — 83690 ASSAY OF LIPASE: CPT | Performed by: EMERGENCY MEDICINE

## 2022-09-20 PROCEDURE — 93005 ELECTROCARDIOGRAM TRACING: CPT

## 2022-09-20 PROCEDURE — 0241U HB NFCT DS VIR RESP RNA 4 TRGT: CPT | Performed by: STUDENT IN AN ORGANIZED HEALTH CARE EDUCATION/TRAINING PROGRAM

## 2022-09-20 PROCEDURE — 82948 REAGENT STRIP/BLOOD GLUCOSE: CPT

## 2022-09-20 RX ORDER — METHYLPREDNISOLONE SODIUM SUCCINATE 40 MG/ML
40 INJECTION, POWDER, LYOPHILIZED, FOR SOLUTION INTRAMUSCULAR; INTRAVENOUS EVERY 8 HOURS
Status: DISCONTINUED | OUTPATIENT
Start: 2022-09-20 | End: 2022-09-25

## 2022-09-20 RX ORDER — LEVALBUTEROL 1.25 MG/.5ML
1.25 SOLUTION, CONCENTRATE RESPIRATORY (INHALATION)
Status: DISCONTINUED | OUTPATIENT
Start: 2022-09-20 | End: 2022-09-29 | Stop reason: HOSPADM

## 2022-09-20 RX ORDER — METHYLPREDNISOLONE SODIUM SUCCINATE 125 MG/2ML
80 INJECTION, POWDER, LYOPHILIZED, FOR SOLUTION INTRAMUSCULAR; INTRAVENOUS ONCE
Status: COMPLETED | OUTPATIENT
Start: 2022-09-20 | End: 2022-09-20

## 2022-09-20 RX ORDER — DILTIAZEM HYDROCHLORIDE 180 MG/1
180 CAPSULE, COATED, EXTENDED RELEASE ORAL DAILY
Status: DISCONTINUED | OUTPATIENT
Start: 2022-09-20 | End: 2022-09-20

## 2022-09-20 RX ORDER — FUROSEMIDE 20 MG/1
20 TABLET ORAL 2 TIMES DAILY
Status: DISCONTINUED | OUTPATIENT
Start: 2022-09-20 | End: 2022-09-24

## 2022-09-20 RX ORDER — AZITHROMYCIN 250 MG/1
500 TABLET, FILM COATED ORAL EVERY 24 HOURS
Status: COMPLETED | OUTPATIENT
Start: 2022-09-20 | End: 2022-09-22

## 2022-09-20 RX ORDER — IPRATROPIUM BROMIDE AND ALBUTEROL SULFATE .5; 3 MG/3ML; MG/3ML
1 SOLUTION RESPIRATORY (INHALATION) ONCE
Status: COMPLETED | OUTPATIENT
Start: 2022-09-20 | End: 2022-09-20

## 2022-09-20 RX ORDER — LEVOTHYROXINE SODIUM 0.1 MG/1
100 TABLET ORAL
Status: DISCONTINUED | OUTPATIENT
Start: 2022-09-21 | End: 2022-09-29 | Stop reason: HOSPADM

## 2022-09-20 RX ORDER — ALBUTEROL SULFATE 2.5 MG/3ML
5 SOLUTION RESPIRATORY (INHALATION) ONCE
Status: COMPLETED | OUTPATIENT
Start: 2022-09-20 | End: 2022-09-20

## 2022-09-20 RX ORDER — NICOTINE 21 MG/24HR
1 PATCH, TRANSDERMAL 24 HOURS TRANSDERMAL DAILY
Status: DISCONTINUED | OUTPATIENT
Start: 2022-09-20 | End: 2022-09-29 | Stop reason: HOSPADM

## 2022-09-20 RX ORDER — GABAPENTIN 400 MG/1
800 CAPSULE ORAL 3 TIMES DAILY
Status: DISCONTINUED | OUTPATIENT
Start: 2022-09-20 | End: 2022-09-29 | Stop reason: HOSPADM

## 2022-09-20 RX ORDER — MONTELUKAST SODIUM 10 MG/1
10 TABLET ORAL
Status: DISCONTINUED | OUTPATIENT
Start: 2022-09-20 | End: 2022-09-29 | Stop reason: HOSPADM

## 2022-09-20 RX ORDER — ATORVASTATIN CALCIUM 20 MG/1
20 TABLET, FILM COATED ORAL
Status: DISCONTINUED | OUTPATIENT
Start: 2022-09-20 | End: 2022-09-29 | Stop reason: HOSPADM

## 2022-09-20 RX ORDER — MAGNESIUM SULFATE HEPTAHYDRATE 40 MG/ML
2 INJECTION, SOLUTION INTRAVENOUS ONCE
Status: COMPLETED | OUTPATIENT
Start: 2022-09-20 | End: 2022-09-20

## 2022-09-20 RX ORDER — SODIUM CHLORIDE FOR INHALATION 0.9 %
3 VIAL, NEBULIZER (ML) INHALATION
Status: DISCONTINUED | OUTPATIENT
Start: 2022-09-20 | End: 2022-09-29 | Stop reason: HOSPADM

## 2022-09-20 RX ORDER — ACETAMINOPHEN 325 MG/1
975 TABLET ORAL EVERY 6 HOURS PRN
Status: DISCONTINUED | OUTPATIENT
Start: 2022-09-20 | End: 2022-09-29 | Stop reason: HOSPADM

## 2022-09-20 RX ORDER — BUDESONIDE 0.5 MG/2ML
0.5 INHALANT ORAL 2 TIMES DAILY
Status: DISCONTINUED | OUTPATIENT
Start: 2022-09-20 | End: 2022-09-27

## 2022-09-20 RX ORDER — GUAIFENESIN 600 MG/1
600 TABLET, EXTENDED RELEASE ORAL 2 TIMES DAILY
Status: DISCONTINUED | OUTPATIENT
Start: 2022-09-20 | End: 2022-09-29 | Stop reason: HOSPADM

## 2022-09-20 RX ORDER — INSULIN LISPRO 100 [IU]/ML
1-5 INJECTION, SOLUTION INTRAVENOUS; SUBCUTANEOUS
Status: DISCONTINUED | OUTPATIENT
Start: 2022-09-20 | End: 2022-09-29 | Stop reason: HOSPADM

## 2022-09-20 RX ORDER — POTASSIUM CHLORIDE 20 MEQ/1
20 TABLET, EXTENDED RELEASE ORAL DAILY
Status: DISCONTINUED | OUTPATIENT
Start: 2022-09-20 | End: 2022-09-29 | Stop reason: HOSPADM

## 2022-09-20 RX ORDER — SODIUM CHLORIDE FOR INHALATION 0.9 %
12 VIAL, NEBULIZER (ML) INHALATION ONCE
Status: COMPLETED | OUTPATIENT
Start: 2022-09-20 | End: 2022-09-20

## 2022-09-20 RX ORDER — ACETAMINOPHEN 325 MG/1
650 TABLET ORAL EVERY 6 HOURS PRN
Status: DISCONTINUED | OUTPATIENT
Start: 2022-09-20 | End: 2022-09-20

## 2022-09-20 RX ORDER — FUROSEMIDE 10 MG/ML
50 INJECTION INTRAMUSCULAR; INTRAVENOUS ONCE
Status: COMPLETED | OUTPATIENT
Start: 2022-09-20 | End: 2022-09-20

## 2022-09-20 RX ADMIN — ISODIUM CHLORIDE 12 ML: 0.03 SOLUTION RESPIRATORY (INHALATION) at 12:10

## 2022-09-20 RX ADMIN — AZITHROMYCIN MONOHYDRATE 500 MG: 250 TABLET ORAL at 18:48

## 2022-09-20 RX ADMIN — ALBUTEROL SULFATE 5 MG: 2.5 SOLUTION RESPIRATORY (INHALATION) at 14:51

## 2022-09-20 RX ADMIN — LEVALBUTEROL 1.25 MG: 1.25 SOLUTION, CONCENTRATE RESPIRATORY (INHALATION) at 19:36

## 2022-09-20 RX ADMIN — POTASSIUM CHLORIDE 20 MEQ: 1500 TABLET, EXTENDED RELEASE ORAL at 18:50

## 2022-09-20 RX ADMIN — ACETAMINOPHEN 975 MG: 325 TABLET ORAL at 22:43

## 2022-09-20 RX ADMIN — GUAIFENESIN 600 MG: 600 TABLET, EXTENDED RELEASE ORAL at 18:48

## 2022-09-20 RX ADMIN — BUDESONIDE 0.5 MG: 0.5 INHALANT ORAL at 19:36

## 2022-09-20 RX ADMIN — IPRATROPIUM BROMIDE 1 MG: 0.5 SOLUTION RESPIRATORY (INHALATION) at 12:11

## 2022-09-20 RX ADMIN — ALBUTEROL SULFATE 10 MG: 2.5 SOLUTION RESPIRATORY (INHALATION) at 12:10

## 2022-09-20 RX ADMIN — MONTELUKAST SODIUM 10 MG: 10 TABLET, COATED ORAL at 22:43

## 2022-09-20 RX ADMIN — ATORVASTATIN CALCIUM 20 MG: 20 TABLET, FILM COATED ORAL at 18:48

## 2022-09-20 RX ADMIN — INSULIN LISPRO 3 UNITS: 100 INJECTION, SOLUTION INTRAVENOUS; SUBCUTANEOUS at 18:48

## 2022-09-20 RX ADMIN — GABAPENTIN 800 MG: 400 CAPSULE ORAL at 22:43

## 2022-09-20 RX ADMIN — APIXABAN 5 MG: 5 TABLET, FILM COATED ORAL at 18:48

## 2022-09-20 RX ADMIN — SODIUM CHLORIDE 250 ML: 0.9 INJECTION, SOLUTION INTRAVENOUS at 23:29

## 2022-09-20 RX ADMIN — MAGNESIUM SULFATE HEPTAHYDRATE 2 G: 40 INJECTION, SOLUTION INTRAVENOUS at 12:46

## 2022-09-20 RX ADMIN — METHYLPREDNISOLONE SODIUM SUCCINATE 80 MG: 125 INJECTION, POWDER, FOR SOLUTION INTRAMUSCULAR; INTRAVENOUS at 12:41

## 2022-09-20 RX ADMIN — FUROSEMIDE 50 MG: 10 INJECTION, SOLUTION INTRAVENOUS at 12:43

## 2022-09-20 RX ADMIN — METHYLPREDNISOLONE SODIUM SUCCINATE 40 MG: 40 INJECTION, POWDER, FOR SOLUTION INTRAMUSCULAR; INTRAVENOUS at 22:43

## 2022-09-20 NOTE — ASSESSMENT & PLAN NOTE
Wt Readings from Last 3 Encounters:   09/20/22 103 kg (227 lb 8 2 oz)   09/18/22 101 kg (223 lb 5 2 oz)   07/31/22 99 2 kg (218 lb 11 1 oz)     Euvolemic, CXR with effusion  Continue diuretics po

## 2022-09-20 NOTE — ED PROVIDER NOTES
History  Chief Complaint   Patient presents with    Asthma     Pt arrived via ems with asthma exacerbation  D/c'd from hospital 2 days ago for same  Given 1 duo neb PTA  +Smoker  History provided by:  Patient   used: No    Medical Problem  Location:  Dyspnea worse over the last 2 days  Normally on between 2-3 L NC O2  H/O CHF and COPD  D/C hospital 2 days ago after 11 day stay for same  Discharged on steroids and lasix  Continues to smoke  No CP or fever  Intermitent epigastric pain without n/v/d  Severity:  Moderate  Onset quality:  Gradual  Duration:  2 days  Timing:  Constant  Progression:  Worsening  Chronicity:  Recurrent  Relieved by:  Nothing  Worsened by:  Exertion and coughing  Ineffective treatments:  Usual meds  Associated symptoms: abdominal pain, cough, shortness of breath and wheezing    Associated symptoms: no chest pain, no congestion, no diarrhea, no fever, no nausea, no rhinorrhea and no vomiting              Wt Readings from Last 3 Encounters:   09/20/22 103 kg (227 lb 8 2 oz)   09/18/22 101 kg (223 lb 5 2 oz)   07/31/22 99 2 kg (218 lb 11 1 oz)     Prior to Admission Medications   Prescriptions Last Dose Informant Patient Reported?  Taking?   acetaminophen (TYLENOL) 325 mg tablet   No No   Sig: Take 3 tablets (975 mg total) by mouth every 8 (eight) hours as needed for mild pain   albuterol (2 5 mg/3 mL) 0 083 % nebulizer solution   No No   Sig: Take 3 mL (2 5 mg total) by nebulization every 6 (six) hours as needed for wheezing or shortness of breath   apixaban (ELIQUIS) 5 mg   Yes No   Sig: Take 1 tablet by mouth 2 (two) times a day   atorvastatin (LIPITOR) 20 mg tablet   Yes No   Sig: Take 20 mg by mouth daily   bictegravir-emtricitab-tenofovir alafenamide (Biktarvy) -25 MG tablet   Yes No   Sig: Take 1 tablet by mouth daily   budesonide (PULMICORT) 0 5 mg/2 mL nebulizer solution   No No   Sig: Take 2 mL (0 5 mg total) by nebulization 2 (two) times a day Rinse mouth after use  cholecalciferol (VITAMIN D3) 1,000 units tablet   Yes No   Sig: Take 1,000 Units by mouth daily   diltiazem (CARDIZEM CD) 180 mg 24 hr capsule   Yes No   Sig: Take 180 mg by mouth daily   furosemide (LASIX) 20 mg tablet   No No   Sig: Take 1 tablet (20 mg total) by mouth 2 (two) times a day   gabapentin (NEURONTIN) 400 mg capsule   Yes No   Sig: Take 800 mg by mouth 3 (three) times a day     levothyroxine 100 mcg tablet   Yes No   Sig: Take 100 mcg by mouth daily  montelukast (SINGULAIR) 10 mg tablet   Yes No   Sig: Take 10 mg by mouth daily at bedtime   nicotine (NICODERM CQ) 21 mg/24 hr TD 24 hr patch   No No   Sig: Place 1 patch on the skin daily   potassium chloride (K-DUR,KLOR-CON) 20 mEq tablet   No No   Sig: Take 1 tablet (20 mEq total) by mouth daily   predniSONE 10 mg tablet   No No   Si for 4 days, 3 for 4 days, 2 for 4 days, 1 for 4 days      Facility-Administered Medications: None       Past Medical History:   Diagnosis Date    Asthma     Bipolar 1 disorder ([place] [address] )     Cardiac disease     COPD (chronic obstructive pulmonary disease) ([place] [address] )     Diabetes mellitus ([place] [address] )     Disease of thyroid gland     HIV (human immunodeficiency virus infection) ([place] [address] ) 2016    Hypertension     Osteoarthritis     Tobacco abuse        Past Surgical History:   Procedure Laterality Date    HERNIA REPAIR      LUMBAR FUSION N/A 4/10/2018    Procedure: T9/10 discectomy with T9-T11 fusion;  Surgeon: Lina Duarte MD;  Location:  MAIN OR;  Service: Neurosurgery    THYROIDECTOMY         Family History   Problem Relation Age of Onset    No Known Problems Mother     Diabetes Father     Heart disease Neg Hx     Cancer Neg Hx      I have reviewed and agree with the history as documented      E-Cigarette/Vaping     E-Cigarette/Vaping Substances     Social History     Tobacco Use    Smoking status: Current Every Day Smoker     Packs/day: 0 20     Years: 45 00     Pack years: 9 00     Types: Cigarettes    Smokeless tobacco: Never Used    Tobacco comment: Currently smoking 1-2 cigarettes a day   Substance Use Topics    Alcohol use: Yes    Drug use: No     Comment: former IV drug user       Review of Systems   Constitutional: Negative for appetite change, chills and fever  HENT: Negative for congestion and rhinorrhea  Respiratory: Positive for cough, shortness of breath and wheezing  Negative for chest tightness  Cardiovascular: Positive for leg swelling  Negative for chest pain  Gastrointestinal: Positive for abdominal pain  Negative for diarrhea, nausea and vomiting  Genitourinary: Negative for difficulty urinating and dysuria  All other systems reviewed and are negative  Physical Exam  Physical Exam  Vitals and nursing note reviewed  Constitutional:       General: She is not in acute distress  Appearance: Normal appearance  She is well-developed  She is ill-appearing  She is not toxic-appearing or diaphoretic  HENT:      Head: Normocephalic and atraumatic  Right Ear: Hearing normal  No drainage or swelling  Left Ear: Hearing normal  No drainage or swelling  Eyes:      General: Lids are normal          Right eye: No discharge  Left eye: No discharge  Conjunctiva/sclera: Conjunctivae normal    Neck:      Vascular: No JVD  Trachea: Trachea normal    Cardiovascular:      Rate and Rhythm: Regular rhythm  Tachycardia present  Pulses: Normal pulses  Heart sounds: Normal heart sounds  No murmur heard  No friction rub  No gallop  Pulmonary:      Effort: Pulmonary effort is normal  Tachypnea present  No accessory muscle usage, prolonged expiration, respiratory distress or retractions  Breath sounds: No stridor  Wheezing and rales present  Chest:      Chest wall: No tenderness  Abdominal:      General: Abdomen is protuberant  Palpations: Abdomen is soft  Tenderness: There is no abdominal tenderness   There is no guarding or rebound  Musculoskeletal:         General: No tenderness  Normal range of motion  Cervical back: Normal range of motion  Right lower leg: Edema present  Left lower leg: Edema present  Skin:     General: Skin is warm and dry  Coloration: Skin is not pale  Findings: No rash  Neurological:      General: No focal deficit present  Mental Status: She is alert  GCS: GCS eye subscore is 4  GCS verbal subscore is 5  GCS motor subscore is 6  Sensory: No sensory deficit  Motor: No weakness or abnormal muscle tone  Psychiatric:         Mood and Affect: Mood normal          Speech: Speech normal          Behavior: Behavior is cooperative           Vital Signs  ED Triage Vitals   Temperature Pulse Respirations Blood Pressure SpO2   09/20/22 1133 09/20/22 1135 09/20/22 1135 09/20/22 1139 09/20/22 1135   98 3 °F (36 8 °C) 101 (!) 24 114/80 96 %      Temp Source Heart Rate Source Patient Position - Orthostatic VS BP Location FiO2 (%)   09/20/22 1133 09/20/22 1135 09/20/22 1135 09/20/22 1135 --   Oral Monitor Sitting Right arm       Pain Score       --                  Vitals:    09/20/22 1135 09/20/22 1139 09/20/22 1307 09/20/22 1445   BP:  114/80     Pulse: 101  97 100   Patient Position - Orthostatic VS: Sitting            Visual Acuity      ED Medications  Medications   albuterol inhalation solution 5 mg (has no administration in time range)   ipratropium-albuterol (FOR EMS ONLY) (DUO-NEB) 0 5-2 5 mg/3 mL inhalation solution 3 mL (0 mL Does not apply Given to EMS 9/20/22 1141)   albuterol inhalation solution 10 mg (10 mg Nebulization Given 9/20/22 1210)   ipratropium (ATROVENT) 0 02 % inhalation solution 1 mg (1 mg Nebulization Given 9/20/22 1211)   sodium chloride 0 9 % inhalation solution 12 mL (12 mL Nebulization Given 9/20/22 1210)   methylPREDNISolone sodium succinate (Solu-MEDROL) injection 80 mg (80 mg Intravenous Given 9/20/22 1241)   magnesium sulfate 2 g/50 mL IVPB (premix) 2 g (0 g Intravenous Stopped 9/20/22 1306)   furosemide (LASIX) injection 50 mg (50 mg Intravenous Given 9/20/22 1243)       Diagnostic Studies  Results Reviewed     Procedure Component Value Units Date/Time    HS Troponin I 4hr [[id number]]     Lab Status: No result Specimen: Blood     Comprehensive metabolic panel [[id number]]  (Abnormal) Collected: 09/20/22 1214    Lab Status: Final result Specimen: Blood from Arm, Right Updated: 09/20/22 1312     Sodium 141 mmol/L      Potassium 4 4 mmol/L      Chloride 104 mmol/L      CO2 29 mmol/L      ANION GAP 8 mmol/L      BUN 28 mg/dL      Creatinine 0 84 mg/dL      Glucose 97 mg/dL      Calcium 10 1 mg/dL      Corrected Calcium 10 9 mg/dL      AST 26 U/L      ALT 33 U/L      Alkaline Phosphatase 107 U/L      Total Protein 6 6 g/dL      Albumin 3 0 g/dL      Total Bilirubin 0 45 mg/dL      eGFR 70 ml/min/1 73sq m     Narrative:      [place] guidelines for Chronic Kidney Disease (CKD):     Stage 1 with normal or high GFR (GFR > 90 mL/min/1 73 square meters)    Stage 2 Mild CKD (GFR = 60-89 mL/min/1 73 square meters)    Stage 3A Moderate CKD (GFR = 45-59 mL/min/1 73 square meters)    Stage 3B Moderate CKD (GFR = 30-44 mL/min/1 73 square meters)    Stage 4 Severe CKD (GFR = 15-29 mL/min/1 73 square meters)    Stage 5 End Stage CKD (GFR <15 mL/min/1 73 square meters)  Note: GFR calculation is accurate only with a steady state creatinine    Magnesium [[id number]]  (Normal) Collected: 09/20/22 1214    Lab Status: Final result Specimen: Blood from Arm, Right Updated: 09/20/22 1312     Magnesium 1 9 mg/dL     NT-BNP PRO [[id number]]  (Normal) Collected: 09/20/22 1214    Lab Status: Final result Specimen: Blood from Arm, Right Updated: 09/20/22 1312     NT-proBNP 55 pg/mL     Lipase [[id number]]  (Normal) Collected: 09/20/22 1214    Lab Status: Final result Specimen: Blood from Arm, Right Updated: 09/20/22 1312     Lipase 227 u/L     HS Troponin 0hr (reflex protocol) [[id number]]  (Normal) Collected: 09/20/22 1214    Lab Status: Final result Specimen: Blood from Arm, Right Updated: 09/20/22 1311     hs TnI 0hr 7 ng/L     HS Troponin I 2hr [[id number]]     Lab Status: No result Specimen: Blood     Protime-INR [[id number]]  (Normal) Collected: 09/20/22 1214    Lab Status: Final result Specimen: Blood from Arm, Right Updated: 09/20/22 1256     Protime 13 9 seconds      INR 1 07    APTT [[id number]]  (Abnormal) Collected: 09/20/22 1214    Lab Status: Final result Specimen: Blood from Arm, Right Updated: 09/20/22 1256     PTT 22 seconds     CBC and differential [[id number]]  (Abnormal) Collected: 09/20/22 1214    Lab Status: Final result Specimen: Blood from Arm, Right Updated: 09/20/22 1226     WBC 11 71 Thousand/uL      RBC 3 73 Million/uL      Hemoglobin 10 5 g/dL      Hematocrit 34 8 %      MCV 93 fL      MCH 28 2 pg      MCHC 30 2 g/dL      RDW 15 2 %      MPV 8 9 fL      Platelets 068 Thousands/uL      nRBC 0 /100 WBCs      Neutrophils Relative 69 %      Immat GRANS % 2 %      Lymphocytes Relative 19 %      Monocytes Relative 8 %      Eosinophils Relative 2 %      Basophils Relative 0 %      Neutrophils Absolute 8 01 Thousands/µL      Immature Grans Absolute 0 27 Thousand/uL      Lymphocytes Absolute 2 27 Thousands/µL      Monocytes Absolute 0 90 Thousand/µL      Eosinophils Absolute 0 22 Thousand/µL      Basophils Absolute 0 04 Thousands/µL     FLU/COVID - if FLU clinically relevant [[id number]] Collected: 09/20/22 1214    Lab Status: In process Specimen: Nares from Nose Updated: 09/20/22 1220                 XR chest 1 view portable   ED Interpretation by Tiburcio Mccarthy MD (09/20 1328)   I have personally reviewed the x-ray and my findings are: no acute disease  Final Result by Jah Moeller MD (09/20 1348)      No acute cardiopulmonary disease                    Workstation performed: [id number]                    Procedures  ECG 12 Lead Documentation Only    Date/Time: 9/20/2022 12:41 PM  Performed by: Rahul Dubois MD  Authorized by: Rahul Dubois MD     Indications / Diagnosis:  Dyspnea  ECG reviewed by me, the ED Provider: yes    Patient location:  ED  Interpretation:     Interpretation: non-specific    Rate:     ECG rate:  97    ECG rate assessment: normal    Rhythm:     Rhythm: sinus rhythm    Ectopy:     Ectopy: none    QRS:     QRS axis:  Left    QRS intervals:  Normal  Conduction:     Conduction: abnormal      Abnormal conduction: LAFB    ST segments:     ST segments:  Non-specific  T waves:     T waves: normal      CriticalCare Time  Performed by: Rahul Dubois MD  Authorized by: Rahul Dubois MD     Critical care provider statement:     Critical care time (minutes):  35    Critical care time was exclusive of:  Separately billable procedures and treating other patients and teaching time    Critical care was necessary to treat or prevent imminent or life-threatening deterioration of the following conditions:  Respiratory failure (COPD exacerbation, SARMIENTO neb, IV Solu-Medrol, diuresis with IV Lasix, IV magnesium, admission to Internal Medicine)    Critical care was time spent personally by me on the following activities:  Obtaining history from patient or surrogate, development of treatment plan with patient or surrogate, discussions with consultants, evaluation of patient's response to treatment, examination of patient, review of old charts, re-evaluation of patient's condition, ordering and review of radiographic studies and ordering and review of laboratory studies    I assumed direction of critical care for this patient from another provider in my specialty: no               ED Course  ED Course as of 09/20/22 1453   Tue Sep 20, 2022   1436 Patient after SARMIENTO neb in breathing treatment does not feel she is breathing any better and she is actually still working quite hard to breathe    Will discuss with Internal Medicine regarding admission  Does not appear she is in heart failure this may just be COPD  Will order another breathing treatment  MDM  Number of Diagnoses or Management Options  COPD exacerbation ([facility] 75 )  Volume overload  Diagnosis management comments: After an hour long breathing treatment, magnesium and steroids the patient still is quite tachypneic  Another breathing treatment ordered  Does not appear that she has congestive heart failure but does have an element of volume overload as her weight is up and she has peripheral edema  Could also be right-sided heart failure  She was given IV Lasix  There is no evidence of pneumonia  COVID test is still presently pending however seems unlikely  Will discuss with Internal Medicine regarding admission  Amount and/or Complexity of Data Reviewed  Clinical lab tests: ordered and reviewed  Tests in the radiology section of CPT®: ordered and reviewed  Tests in the medicine section of CPT®: ordered and reviewed  Review and summarize past medical records: yes  Discuss the patient with other providers: yes  Independent visualization of images, tracings, or specimens: yes        Disposition  Final diagnoses:   COPD exacerbation ([facility] 75 )   Volume overload     Time reflects when diagnosis was documented in both MDM as applicable and the Disposition within this note     Time User Action Codes Description Comment    9/20/2022  2:42 PM [place]  Add [J44 1] COPD exacerbation ([facility] 75 )     9/20/2022  2:42 PM [place]  Add [E87 70] Volume overload       ED Disposition     ED Disposition   Admit    Condition   Stable    Date/Time   Tue Sep 20, 2022  2:51 PM    Comment   Case was discussed with BETTY and the patient's admission status was agreed to be Admission Status: inpatient status to the service of Dr Harriett Kanner              Follow-up Information    None         Patient's Medications   Discharge Prescriptions    No medications on file       No discharge procedures on file      PDMP Review       Value Time User    PDMP Reviewed  Yes 6/21/2022  7:54 PM Verito [place], [place]          ED Provider  Electronically Signed by           Maureen Cazares MD  09/20/22 2637

## 2022-09-20 NOTE — UTILIZATION REVIEW
Inpatient Admission Authorization Request   NOTIFICATION OF INPATIENT ADMISSION/INPATIENT AUTHORIZATION REQUEST   SERVICING FACILITY:   [address] [place] [address], [facility], [facility]  Tax ID: [id number]  NPI: [id number]  Place of Service: Inpatient [address] [facility]  [address]  Place of Service Code: 24     ATTENDING PROVIDER:  Attending Name and NPI#: Blanca Johnson [place] [[id number]]  Address: [address] [place] [address], [facility], [facility]  Phone: [phone]     UTILIZATION REVIEW CONTACT:  Jarrett Garces, Utilization   Network Utilization Review Department  Phone: [phone]  Fax: [phone]  Email: [email or web address]     PHYSICIAN ADVISORY SERVICES:  FOR NGOH-[place]-GJTD REVIEW - MEDICAL NECESSITY DENIAL  Phone: [phone]  Fax: [phone]  Email: [email or web address]     TYPE OF REQUEST:  Inpatient Status     ADMISSION INFORMATION:  ADMISSION DATE/TIME: 9/20/22  2:52 PM  PATIENT DIAGNOSIS CODE/DESCRIPTION:  Asthma [J45 909]  DISCHARGE DATE/TIME: No discharge date for patient encounter  IMPORTANT INFORMATION:  Please contact Jarrett Garces directly with any questions or concerns regarding this request  Department voicemails are confidential     Send requests for admission clinical reviews, concurrent reviews, approvals, and administrative denials due to lack of clinical to fax [phone]

## 2022-09-20 NOTE — ASSESSMENT & PLAN NOTE
72-year-old female presented with increasing shortness of breath and wheezing  Recently admitted and discharged on 09/18 for COPD and CHF exacerbation  Reports compliance with diuretics, proBNP was within normal limits, chest x-ray without effusion or congestion  Patient does have significant wheezing, reports continued tobacco use upon discharge  Will schedule Xopenex, IV Solu-Medrol, budesonide  Currently requiring baseline oxygen of 2-3 L  Mucinex p r n  for cough  P o  with azithromycin

## 2022-09-20 NOTE — H&P
[facility]  H&P- [facility] 1950, 81 y o  female MRN: [id number]  Unit/Bed#: ED 28 Encounter: [id number]  Primary Care Provider: Yogesh Argueta DO   Date and time admitted to hospital: 9/20/2022 11:29 AM    Acute exacerbation of chronic obstructive pulmonary disease (COPD) ([facility] [address] )  Assessment & Plan  81-year-old female presented with increasing shortness of breath and wheezing  Recently admitted and discharged on 09/18 for COPD and CHF exacerbation  Reports compliance with diuretics, proBNP was within normal limits, chest x-ray without effusion or congestion  Patient does have significant wheezing, reports continued tobacco use upon discharge  Will schedule Xopenex, IV Solu-Medrol, budesonide  Currently requiring baseline oxygen of 2-3 L  Mucinex p r n  for cough  P o  with azithromycin    Chronic diastolic heart failure (HCC)  Assessment & Plan  Wt Readings from Last 3 Encounters:   09/20/22 103 kg (227 lb 8 2 oz)   09/18/22 101 kg (223 lb 5 2 oz)   07/31/22 99 2 kg (218 lb 11 1 oz)     Euvolemic, CXR with effusion  Continue diuretics po        Paroxysmal atrial fibrillation (HCC)  Assessment & Plan  Continue diltiazem and eliquis    Tobacco use  Assessment & Plan  Continues to smoke 1-2 cigarettes daily   on cessation    Chronic respiratory failure with hypoxia ([facility] )  Assessment & Plan  On 2-3L chronically    HIV (human immunodeficiency virus infection)   Assessment & Plan  Continue biktarvy    Type 2 diabetes mellitus ([facility] )  Assessment & Plan  Lab Results   Component Value Date    HGBA1C 6 3 (H) 06/21/2022     Monitor while patient on IV steroids  SSI, Accuchecks  Not on po meds or insulin PTA  A1c previously was at goal    VTE Prophylaxis: Apixaban (Eliquis)  / sequential compression device   Code Status: FC  POLST: There is no POLST form on file for this patient (pre-hospital)  Discussion with family: patient    Anticipated Length of Stay:  Patient will be admitted on an Inpatient basis with an anticipated length of stay of 2 midnights  Justification for Hospital Stay: IV steroids, bronchodilators    Total Time for Visit, including Counseling / Coordination of Care: 45 minutes  Greater than 50% of this total time spent on direct patient counseling and coordination of care  Chief Complaint:   SOB     History of Present Illness:     Meeta Hernandez is a 81 y o  female who presents with cough and shortness of breath  Patient is a readmission, previously discharged 2 days prior for CHF and COPD exacerbation  Patient reports compliance with her steroids and diuretics  Chest x-ray done on admission shows no congestion, pulmonary edema or effusions  Patient states that upon discharge, she went home and in she continues to smoke cigarettes  She denies any sputum production, fevers, chills, chest pain would  Review of Systems:    Review of Systems   Constitutional: Negative for activity change, appetite change, chills and fever  HENT: Negative for congestion, facial swelling, sinus pain and sore throat  Respiratory: Positive for cough, shortness of breath and wheezing  Cardiovascular: Negative for chest pain, palpitations and leg swelling  Gastrointestinal: Negative for abdominal distention, abdominal pain, constipation, diarrhea, nausea and vomiting  Endocrine: Negative for cold intolerance, heat intolerance, polydipsia, polyphagia and polyuria  Genitourinary: Negative for dysuria, flank pain and urgency  Skin: Negative for color change and pallor  Neurological: Negative for dizziness, syncope, weakness, light-headedness and headaches  Psychiatric/Behavioral: Negative for agitation, confusion and dysphoric mood         Past Medical and Surgical History:     Past Medical History:   Diagnosis Date    Asthma     Bipolar 1 disorder ([facility] )     Cardiac disease     COPD (chronic obstructive pulmonary disease) ([facility] )     Diabetes mellitus ([facility] )     Disease of thyroid gland     HIV (human immunodeficiency virus infection) ([facility] [address] ) 03/24/2016    Hypertension     Osteoarthritis     Tobacco abuse        Past Surgical History:   Procedure Laterality Date    HERNIA REPAIR      LUMBAR FUSION N/A 4/10/2018    Procedure: T9/10 discectomy with T9-T11 fusion;  Surgeon: Ryley Bull MD;  Location: [facility];  Service: Neurosurgery    THYROIDECTOMY         Meds/Allergies:    Prior to Admission medications    Medication Sig Start Date End Date Taking? Authorizing Provider   acetaminophen (TYLENOL) 325 mg tablet Take 3 tablets (975 mg total) by mouth every 8 (eight) hours as needed for mild pain 9/18/22   Carmela Naranjo MD   albuterol (2 5 mg/3 mL) 0 083 % nebulizer solution Take 3 mL (2 5 mg total) by nebulization every 6 (six) hours as needed for wheezing or shortness of breath 6/26/22   Jaren Ladd DO   apixaban (ELIQUIS) 5 mg Take 1 tablet by mouth 2 (two) times a day 5/9/22   Historical Provider, MD   atorvastatin (LIPITOR) 20 mg tablet Take 20 mg by mouth daily    Historical Provider, MD   bictegravir-emtricitab-tenofovir alafenamide (Biktarvy) -25 MG tablet Take 1 tablet by mouth daily 5/9/22   Historical Provider, MD   budesonide (PULMICORT) 0 5 mg/2 mL nebulizer solution Take 2 mL (0 5 mg total) by nebulization 2 (two) times a day Rinse mouth after use  9/18/22   Carmela Naranjo MD   cholecalciferol (VITAMIN D3) 1,000 units tablet Take 1,000 Units by mouth daily    Historical Provider, MD   diltiazem (CARDIZEM CD) 180 mg 24 hr capsule Take 180 mg by mouth daily    Historical Provider, MD   furosemide (LASIX) 20 mg tablet Take 1 tablet (20 mg total) by mouth 2 (two) times a day 9/18/22   Carmela Naranjo MD   gabapentin (NEURONTIN) 400 mg capsule Take 800 mg by mouth 3 (three) times a day      Historical Provider, MD   levothyroxine 100 mcg tablet Take 100 mcg by mouth daily      Historical Provider, MD   montelukast (SINGULAIR) 10 mg tablet Take 10 mg by mouth daily at bedtime    Historical Provider, MD   nicotine (NICODERM CQ) 21 mg/24 hr TD 24 hr patch Place 1 patch on the skin daily 9/19/22   Olman Mireles MD   potassium chloride (K-DUR,KLOR-CON) 20 mEq tablet Take 1 tablet (20 mEq total) by mouth daily 9/19/22   Olman Mireles MD   predniSONE 10 mg tablet 4 for 4 days, 3 for 4 days, 2 for 4 days, 1 for 4 days 9/18/22   Olman Mireles MD     I have reviewed home medications with patient personally  Allergies: Allergies   Allergen Reactions    Lisinopril Angioedema    Prozac [Fluoxetine Hcl] Rash    Sulfa Antibiotics Itching    Quinine        Social History:     Marital Status:    Occupation: retired  Patient Pre-hospital Living Situation: home  Patient Pre-hospital Level of Mobility: ambulatory  Patient Pre-hospital Diet Restrictions: none  Substance Use History:   Social History     Substance and Sexual Activity   Alcohol Use Yes     Social History     Tobacco Use   Smoking Status Current Every Day Smoker    Packs/day: 0 20    Years: 45 00    Pack years: 9 00    Types: Cigarettes   Smokeless Tobacco Never Used   Tobacco Comment    Currently smoking 1-2 cigarettes a day     Social History     Substance and Sexual Activity   Drug Use No    Comment: former IV drug user       Family History:    Family History   Problem Relation Age of Onset    No Known Problems Mother     Diabetes Father     Heart disease Neg Hx     Cancer Neg Hx        Physical Exam:     Vitals:   Blood Pressure: 114/80 (09/20/22 1139)  Pulse: 100 (09/20/22 1445)  Temperature: 98 3 °F (36 8 °C) (09/20/22 1133)  Temp Source: Oral (09/20/22 1133)  Respirations: (!) 24 (09/20/22 1445)  Height: 5' 2" (157 5 cm) (09/20/22 1141)  Weight - Scale: 103 kg (227 lb 8 2 oz) (09/20/22 1141)  SpO2: 98 % (09/20/22 1445)    Physical Exam  Vitals and nursing note reviewed  Constitutional:       Appearance: Normal appearance  She is normal weight     HENT:      Head: Normocephalic and atraumatic  Eyes:      General: No scleral icterus  Conjunctiva/sclera: Conjunctivae normal    Cardiovascular:      Rate and Rhythm: Normal rate and regular rhythm  Heart sounds: Normal heart sounds  Pulmonary:      Effort: Respiratory distress present  Breath sounds: Wheezing present  No rhonchi  Abdominal:      General: Bowel sounds are normal  There is no distension  Palpations: Abdomen is soft  Musculoskeletal:         General: No swelling  Right lower leg: No edema  Left lower leg: No edema  Skin:     General: Skin is warm and dry  Neurological:      General: No focal deficit present  Mental Status: She is alert  Mental status is at baseline  Additional Data:     Lab Results: I have personally reviewed pertinent reports  Results from last 7 days   Lab Units 09/20/22  1214   WBC Thousand/uL 11 71*   HEMOGLOBIN g/dL 10 5*   HEMATOCRIT % 34 8   PLATELETS Thousands/uL 233   NEUTROS PCT % 69   LYMPHS PCT % 19   MONOS PCT % 8   EOS PCT % 2     Results from last 7 days   Lab Units 09/20/22  1214   SODIUM mmol/L 141   POTASSIUM mmol/L 4 4   CHLORIDE mmol/L 104   CO2 mmol/L 29   BUN mg/dL 28*   CREATININE mg/dL 0 84   ANION GAP mmol/L 8   CALCIUM mg/dL 10 1   ALBUMIN g/dL 3 0*   TOTAL BILIRUBIN mg/dL 0 45   ALK PHOS U/L 107   ALT U/L 33   AST U/L 26   GLUCOSE RANDOM mg/dL 97     Results from last 7 days   Lab Units 09/20/22  1214   INR  1 07     Results from last 7 days   Lab Units 09/18/22  1615 09/18/22  1115 09/18/22  0725 09/17/22  2105 09/17/22  1528 09/17/22  1108 09/17/22  0720 09/16/22  2100 09/16/22  1543 09/16/22  1055 09/16/22  0737 09/15/22  2032   POC GLUCOSE mg/dl 179* 143* 114 209* 156* 119 117 255* 166* 137 158* 248*               Imaging: I have personally reviewed pertinent reports        XR chest 1 view portable   ED Interpretation by Kishore Mulligan MD (09/20 1328)   I have personally reviewed the x-ray and my findings are: no acute disease  Final Result by Miranda Ulloa MD (09/20 4018)      No acute cardiopulmonary disease  Workstation performed: [id number]             EKG, Pathology, and Other Studies Reviewed on Admission:   · EKG: NSR    Allscripts / Epic Records Reviewed: Yes     ** Please Note: This note has been constructed using a voice recognition system   **

## 2022-09-20 NOTE — PLAN OF CARE
Problem: PAIN - ADULT  Goal: Verbalizes/displays adequate comfort level or baseline comfort level  Description: Interventions:  - Encourage patient to monitor pain and request assistance  - Assess pain using appropriate pain scale  - Administer analgesics based on type and severity of pain and evaluate response  - Implement non-pharmacological measures as appropriate and evaluate response  - Consider cultural and social influences on pain and pain management  - Notify physician/advanced practitioner if interventions unsuccessful or patient reports new pain  Outcome: Progressing     Problem: SAFETY ADULT  Goal: Patient will remain free of falls  Description: INTERVENTIONS:  - Educate patient/family on patient safety including physical limitations  - Instruct patient to call for assistance with activity   - Consult OT/PT to assist with strengthening/mobility   - Keep Call bell within reach  - Keep bed low and locked with side rails adjusted as appropriate  - Keep care items and personal belongings within reach  - Initiate and maintain comfort rounds  - Make Fall Risk Sign visible to staff  - Offer Toileting every 2 Hours, in advance of need  - Initiate/Maintain bed alarm     - Apply yellow socks and bracelet for high fall risk patients  - Consider moving patient to room near nurses station  Outcome: Progressing     Problem: DISCHARGE PLANNING  Goal: Discharge to home or other facility with appropriate resources  Description: INTERVENTIONS:  - Identify barriers to discharge w/patient and caregiver  - Arrange for needed discharge resources and transportation as appropriate  - Identify discharge learning needs (meds, wound care, etc )  - Arrange for interpretive services to assist at discharge as needed  - Refer to Case Management Department for coordinating discharge planning if the patient needs post-hospital services based on physician/advanced practitioner order or complex needs related to functional status, cognitive ability, or social support system  Outcome: Progressing     Problem: RESPIRATORY - ADULT  Goal: Achieves optimal ventilation and oxygenation  Description: INTERVENTIONS:  - Assess for changes in respiratory status  - Assess for changes in mentation and behavior  - Position to facilitate oxygenation and minimize respiratory effort  - Oxygen administered by appropriate delivery if ordered  - Initiate smoking cessation education as indicated  - Encourage broncho-pulmonary hygiene including cough, deep breathe, Incentive Spirometry  - Assess the need for suctioning and aspirate as needed  - Assess and instruct to report SOB or any respiratory difficulty  - Respiratory Therapy support as indicated  Outcome: Progressing     Problem: METABOLIC, FLUID AND ELECTROLYTES - ADULT  Goal: Fluid balance maintained  Description: INTERVENTIONS:  - Monitor labs   - Monitor I/O and WT  - Instruct patient on fluid and nutrition as appropriate  - Assess for signs & symptoms of volume excess or deficit  Outcome: Progressing

## 2022-09-20 NOTE — ASSESSMENT & PLAN NOTE
Lab Results   Component Value Date    HGBA1C 6 3 (H) 06/21/2022     Monitor while patient on IV steroids  SSI, Accuchecks  Not on po meds or insulin PTA  A1c previously was at goal

## 2022-09-21 LAB
ANION GAP SERPL CALCULATED.3IONS-SCNC: 11 MMOL/L (ref 4–13)
ATRIAL RATE: 99 BPM
BUN SERPL-MCNC: 34 MG/DL (ref 5–25)
CALCIUM SERPL-MCNC: 9.9 MG/DL (ref 8.3–10.1)
CHLORIDE SERPL-SCNC: 103 MMOL/L (ref 96–108)
CO2 SERPL-SCNC: 27 MMOL/L (ref 21–32)
CREAT SERPL-MCNC: 1.36 MG/DL (ref 0.6–1.3)
ERYTHROCYTE [DISTWIDTH] IN BLOOD BY AUTOMATED COUNT: 15.3 % (ref 11.6–15.1)
FLUAV RNA RESP QL NAA+PROBE: NEGATIVE
FLUBV RNA RESP QL NAA+PROBE: NEGATIVE
GFR SERPL CREATININE-BSD FRML MDRD: 39 ML/MIN/1.73SQ M
GLUCOSE SERPL-MCNC: 151 MG/DL (ref 65–140)
GLUCOSE SERPL-MCNC: 158 MG/DL (ref 65–140)
GLUCOSE SERPL-MCNC: 172 MG/DL (ref 65–140)
GLUCOSE SERPL-MCNC: 201 MG/DL (ref 65–140)
GLUCOSE SERPL-MCNC: 208 MG/DL (ref 65–140)
HCT VFR BLD AUTO: 33.6 % (ref 34.8–46.1)
HGB BLD-MCNC: 10.3 G/DL (ref 11.5–15.4)
MCH RBC QN AUTO: 28.6 PG (ref 26.8–34.3)
MCHC RBC AUTO-ENTMCNC: 30.7 G/DL (ref 31.4–37.4)
MCV RBC AUTO: 93 FL (ref 82–98)
P AXIS: 55 DEGREES
PLATELET # BLD AUTO: 232 THOUSANDS/UL (ref 149–390)
PMV BLD AUTO: 9.2 FL (ref 8.9–12.7)
POTASSIUM SERPL-SCNC: 4.4 MMOL/L (ref 3.5–5.3)
PR INTERVAL: 180 MS
PROCALCITONIN SERPL-MCNC: 0.05 NG/ML
QRS AXIS: -70 DEGREES
QRSD INTERVAL: 102 MS
QT INTERVAL: 314 MS
QTC INTERVAL: 402 MS
RBC # BLD AUTO: 3.6 MILLION/UL (ref 3.81–5.12)
SARS-COV-2 RNA RESP QL NAA+PROBE: NEGATIVE
SODIUM SERPL-SCNC: 141 MMOL/L (ref 135–147)
T WAVE AXIS: 44 DEGREES
VENTRICULAR RATE: 99 BPM
WBC # BLD AUTO: 12.83 THOUSAND/UL (ref 4.31–10.16)

## 2022-09-21 PROCEDURE — 80048 BASIC METABOLIC PNL TOTAL CA: CPT | Performed by: STUDENT IN AN ORGANIZED HEALTH CARE EDUCATION/TRAINING PROGRAM

## 2022-09-21 PROCEDURE — 82948 REAGENT STRIP/BLOOD GLUCOSE: CPT

## 2022-09-21 PROCEDURE — 85027 COMPLETE CBC AUTOMATED: CPT | Performed by: STUDENT IN AN ORGANIZED HEALTH CARE EDUCATION/TRAINING PROGRAM

## 2022-09-21 PROCEDURE — 99232 SBSQ HOSP IP/OBS MODERATE 35: CPT | Performed by: INTERNAL MEDICINE

## 2022-09-21 PROCEDURE — 93010 ELECTROCARDIOGRAM REPORT: CPT | Performed by: INTERNAL MEDICINE

## 2022-09-21 PROCEDURE — 84145 PROCALCITONIN (PCT): CPT | Performed by: STUDENT IN AN ORGANIZED HEALTH CARE EDUCATION/TRAINING PROGRAM

## 2022-09-21 PROCEDURE — 94640 AIRWAY INHALATION TREATMENT: CPT

## 2022-09-21 RX ORDER — TRAMADOL HYDROCHLORIDE 50 MG/1
50 TABLET ORAL EVERY 6 HOURS PRN
Status: DISCONTINUED | OUTPATIENT
Start: 2022-09-21 | End: 2022-09-29 | Stop reason: HOSPADM

## 2022-09-21 RX ADMIN — APIXABAN 5 MG: 5 TABLET, FILM COATED ORAL at 17:02

## 2022-09-21 RX ADMIN — LEVALBUTEROL 1.25 MG: 1.25 SOLUTION, CONCENTRATE RESPIRATORY (INHALATION) at 13:10

## 2022-09-21 RX ADMIN — APIXABAN 5 MG: 5 TABLET, FILM COATED ORAL at 08:24

## 2022-09-21 RX ADMIN — GABAPENTIN 800 MG: 400 CAPSULE ORAL at 17:02

## 2022-09-21 RX ADMIN — METHYLPREDNISOLONE SODIUM SUCCINATE 40 MG: 40 INJECTION, POWDER, FOR SOLUTION INTRAMUSCULAR; INTRAVENOUS at 21:27

## 2022-09-21 RX ADMIN — INSULIN LISPRO 1 UNITS: 100 INJECTION, SOLUTION INTRAVENOUS; SUBCUTANEOUS at 11:32

## 2022-09-21 RX ADMIN — METHYLPREDNISOLONE SODIUM SUCCINATE 40 MG: 40 INJECTION, POWDER, FOR SOLUTION INTRAMUSCULAR; INTRAVENOUS at 13:58

## 2022-09-21 RX ADMIN — BUDESONIDE 0.5 MG: 0.5 INHALANT ORAL at 19:39

## 2022-09-21 RX ADMIN — AZITHROMYCIN MONOHYDRATE 500 MG: 250 TABLET ORAL at 14:53

## 2022-09-21 RX ADMIN — GABAPENTIN 800 MG: 400 CAPSULE ORAL at 21:27

## 2022-09-21 RX ADMIN — ACETAMINOPHEN 975 MG: 325 TABLET ORAL at 17:04

## 2022-09-21 RX ADMIN — INSULIN LISPRO 1 UNITS: 100 INJECTION, SOLUTION INTRAVENOUS; SUBCUTANEOUS at 07:47

## 2022-09-21 RX ADMIN — LEVALBUTEROL 1.25 MG: 1.25 SOLUTION, CONCENTRATE RESPIRATORY (INHALATION) at 07:23

## 2022-09-21 RX ADMIN — TRAMADOL HYDROCHLORIDE 50 MG: 50 TABLET, COATED ORAL at 17:44

## 2022-09-21 RX ADMIN — BICTEGRAVIR SODIUM, EMTRICITABINE, AND TENOFOVIR ALAFENAMIDE FUMARATE 1 TABLET: 50; 200; 25 TABLET ORAL at 08:24

## 2022-09-21 RX ADMIN — POTASSIUM CHLORIDE 20 MEQ: 1500 TABLET, EXTENDED RELEASE ORAL at 08:23

## 2022-09-21 RX ADMIN — LEVOTHYROXINE SODIUM 100 MCG: 100 TABLET ORAL at 05:09

## 2022-09-21 RX ADMIN — ACETAMINOPHEN 975 MG: 325 TABLET ORAL at 05:09

## 2022-09-21 RX ADMIN — LEVALBUTEROL 1.25 MG: 1.25 SOLUTION, CONCENTRATE RESPIRATORY (INHALATION) at 19:39

## 2022-09-21 RX ADMIN — Medication 3 ML: at 13:10

## 2022-09-21 RX ADMIN — BUDESONIDE 0.5 MG: 0.5 INHALANT ORAL at 07:20

## 2022-09-21 RX ADMIN — GUAIFENESIN 600 MG: 600 TABLET, EXTENDED RELEASE ORAL at 17:02

## 2022-09-21 RX ADMIN — GUAIFENESIN 600 MG: 600 TABLET, EXTENDED RELEASE ORAL at 08:23

## 2022-09-21 RX ADMIN — INSULIN LISPRO 1 UNITS: 100 INJECTION, SOLUTION INTRAVENOUS; SUBCUTANEOUS at 17:02

## 2022-09-21 RX ADMIN — MONTELUKAST SODIUM 10 MG: 10 TABLET, COATED ORAL at 21:27

## 2022-09-21 RX ADMIN — METHYLPREDNISOLONE SODIUM SUCCINATE 40 MG: 40 INJECTION, POWDER, FOR SOLUTION INTRAMUSCULAR; INTRAVENOUS at 05:09

## 2022-09-21 RX ADMIN — Medication 3 ML: at 19:39

## 2022-09-21 RX ADMIN — GABAPENTIN 800 MG: 400 CAPSULE ORAL at 08:24

## 2022-09-21 RX ADMIN — ATORVASTATIN CALCIUM 20 MG: 20 TABLET, FILM COATED ORAL at 17:02

## 2022-09-21 NOTE — PROGRESS NOTES
[facility]  Progress Note - Dave Sotelo 1950, 81 y o  female MRN: [id number]  Unit/Bed#: E5 -01 Encounter: [id number]  Primary Care Provider: Avtar Winkler DO   Date and time admitted to hospital: 9/20/2022 11:29 AM    * Acute exacerbation of chronic obstructive pulmonary disease (COPD) ([place] [address] )  Assessment & Plan  27-year-old female presented with increasing shortness of breath and wheezing  Recently admitted and discharged on 09/18 for COPD and CHF exacerbation    · Reports compliance with diuretics, proBNP was within normal limits, chest x-ray without effusion or congestion  · Patient does have significant wheezing, reports continued tobacco use upon discharge  · Continue methylprednisolone 40 mg q 8 hours  · Continue budesonide, Xopenex  · Mucinex p r n  for cough  · P o  with azithromycin    Chronic diastolic heart failure (HCC)  Assessment & Plan  Wt Readings from Last 3 Encounters:   09/21/22 102 kg (225 lb 8 5 oz)   09/18/22 101 kg (223 lb 5 2 oz)   07/31/22 99 2 kg (218 lb 11 1 oz)     · 2D echo 09/13/2022 revealed ejection fraction 84%, diastolic dysfunction type 1  · Euvolemic, chest x-ray without any acute cardiopulmonary disease  · Continue home dose of furosemide 20 mg b i d      Paroxysmal atrial fibrillation (HCC)  Assessment & Plan  · Continue diltiazem and eliquis    Tobacco use  Assessment & Plan  · Continues to smoke 1-2 cigarettes daily   on cessation    Chronic respiratory failure with hypoxia (HCC)  Assessment & Plan  · On 2-3L chronically    HIV (human immunodeficiency virus infection)   Assessment & Plan  · Continue biktarvy    Type 2 diabetes mellitus ([place] [address] )  Assessment & Plan  Lab Results   Component Value Date    HGBA1C 6 3 (H) 06/21/2022     · Monitor while patient on IV steroids  · SSI, Accuchecks  · Not on po meds or insulin PTA          VTE Pharmacologic Prophylaxis:   Pharmacologic:  Eliquis    Patient Centered Rounds: I have performed bedside rounds with nursing staff today  Education and Discussions with Family / Patient:  Updated patient's daughter    Time Spent for Care: 20 minutes  More than 50% of total time spent on counseling and coordination of care as described above  Current Length of Stay: 1 day(s)    Current Patient Status: Inpatient   Certification Statement: The patient will continue to require additional inpatient hospital stay due to COPD exacerbation    Discharge Plan / Estimated Discharge Date: 2-3 days    Code Status: Level 1 - Full Code      Subjective:   Patient seen and examined at bedside, sitting in bed upright, chronic denies any complaints    Objective:     Vitals:   Temp (24hrs), Av 4 °F (36 3 °C), Min:97 2 °F (36 2 °C), Max:97 8 °F (36 6 °C)    Temp:  [97 2 °F (36 2 °C)-97 8 °F (36 6 °C)] 97 8 °F (36 6 °C)  HR:  [] 105  Resp:  [18] 18  BP: ()/(50-66) 95/64  SpO2:  [94 %-98 %] 97 %  Body mass index is 41 25 kg/m²  Input and Output Summary (last 24 hours): Intake/Output Summary (Last 24 hours) at 2022 1718  Last data filed at 2022 6333  Gross per 24 hour   Intake 720 ml   Output 200 ml   Net 520 ml       Physical Exam:    Constitutional: Patient is oriented to person, place and time, no acute distress  HEENT:  Normocephalic, atraumatic  Cardiovascular: Normal S1S2, RRR, No murmurs/rubs/gallops appreciated  Pulmonary:  Scattered rhonchi and wheezing  Abdominal: Soft, Bowel sounds present, Non-tender, Non-distended  Extremities:  No cyanosis, clubbing or edema  Neurological: Cranial nerves II-XII grossly intact, sensation intact, otherwise no focal neurological symptoms     Skin:  Warm, dry    Additional Data:     Labs:    Results from last 7 days   Lab Units 22  0602 22  1214   WBC Thousand/uL 12 83* 11 71*   HEMOGLOBIN g/dL 10 3* 10 5*   HEMATOCRIT % 33 6* 34 8   PLATELETS Thousands/uL 232 233   NEUTROS PCT %  --  69   LYMPHS PCT %  --  19   MONOS PCT %  --  8   EOS PCT % --  2     Results from last 7 days   Lab Units 09/21/22  0602 09/20/22  1214   POTASSIUM mmol/L 4 4 4 4   CHLORIDE mmol/L 103 104   CO2 mmol/L 27 29   BUN mg/dL 34* 28*   CREATININE mg/dL 1 36* 0 84   CALCIUM mg/dL 9 9 10 1   ALK PHOS U/L  --  107   ALT U/L  --  33   AST U/L  --  26     Results from last 7 days   Lab Units 09/20/22  1214   INR  1 07        I Have Reviewed All Lab Data Listed Above      Invasive Devices  Report    Peripheral Intravenous Line  Duration           Peripheral IV 09/20/22 Left Forearm <1 day                     Recent Cultures (last 7 days):           Last 24 Hours Medication List:   Current Facility-Administered Medications   Medication Dose Route Frequency Provider Last Rate    acetaminophen  975 mg Oral Q6H PRN Jade Stanton MD      apixaban  5 mg Oral BID Jade Stanton MD      atorvastatin  20 mg Oral Daily With Trang Alarcon MD      azithromycin  500 mg Oral Q24H Jade Stanton MD      bictegravir-emtricitab-tenofovir alafenamide  1 tablet Oral Daily Jade Stanton MD      budesonide  0 5 mg Nebulization BID Jade Stanton MD      furosemide  20 mg Oral BID Jade Stanton MD      gabapentin  800 mg Oral TID Jade Stanton MD      guaiFENesin  600 mg Oral BID Jade Stanton MD      insulin lispro  1-5 Units Subcutaneous TID AC Jade Stanton MD      levalbuterol  1 25 mg Nebulization TID Jade Stanton MD      And    sodium chloride  3 mL Nebulization TID Jade Stanton MD      levothyroxine  100 mcg Oral Early Morning Jade Stanton MD      methylPREDNISolone sodium succinate  40 mg Intravenous Q8H Jade Stanton MD      montelukast  10 mg Oral HS Jade Stanton MD      nicotine  1 patch Transdermal Daily Jade Stanton MD      potassium chloride  20 mEq Oral Daily Jade Stanton MD      traMADol  50 mg Oral Q6H PRN Regis Urbano MD          Today, Patient Was Seen By: Regis Urbano MD

## 2022-09-21 NOTE — PLAN OF CARE
Problem: PAIN - ADULT  Goal: Verbalizes/displays adequate comfort level or baseline comfort level  Description: Interventions:  - Encourage patient to monitor pain and request assistance  - Assess pain using appropriate pain scale  - Administer analgesics based on type and severity of pain and evaluate response  - Implement non-pharmacological measures as appropriate and evaluate response  - Consider cultural and social influences on pain and pain management  - Notify physician/advanced practitioner if interventions unsuccessful or patient reports new pain  Outcome: Progressing     Problem: SAFETY ADULT  Goal: Patient will remain free of falls  Description: INTERVENTIONS:  - Educate patient/family on patient safety including physical limitations  - Instruct patient to call for assistance with activity   - Consult OT/PT to assist with strengthening/mobility   - Keep Call bell within reach  - Keep bed low and locked with side rails adjusted as appropriate  - Keep care items and personal belongings within reach  - Initiate and maintain comfort rounds  - Make Fall Risk Sign visible to staff  - Offer Toileting every  Hours, in advance of need  - Initiate/Maintain alarm  - Obtain necessary fall risk management equipment:   - Apply yellow socks and bracelet for high fall risk patients  - Consider moving patient to room near nurses station  Outcome: Progressing     Problem: DISCHARGE PLANNING  Goal: Discharge to home or other facility with appropriate resources  Description: INTERVENTIONS:  - Identify barriers to discharge w/patient and caregiver  - Arrange for needed discharge resources and transportation as appropriate  - Identify discharge learning needs (meds, wound care, etc )  - Arrange for interpretive services to assist at discharge as needed  - Refer to Case Management Department for coordinating discharge planning if the patient needs post-hospital services based on physician/advanced practitioner order or complex needs related to functional status, cognitive ability, or social support system  Outcome: Progressing     Problem: RESPIRATORY - ADULT  Goal: Achieves optimal ventilation and oxygenation  Description: INTERVENTIONS:  - Assess for changes in respiratory status  - Assess for changes in mentation and behavior  - Position to facilitate oxygenation and minimize respiratory effort  - Oxygen administered by appropriate delivery if ordered  - Initiate smoking cessation education as indicated  - Encourage broncho-pulmonary hygiene including cough, deep breathe, Incentive Spirometry  - Assess the need for suctioning and aspirate as needed  - Assess and instruct to report SOB or any respiratory difficulty  - Respiratory Therapy support as indicated  Outcome: Progressing     Problem: METABOLIC, FLUID AND ELECTROLYTES - ADULT  Goal: Fluid balance maintained  Description: INTERVENTIONS:  - Monitor labs   - Monitor I/O and WT  - Instruct patient on fluid and nutrition as appropriate  - Assess for signs & symptoms of volume excess or deficit  Outcome: Progressing     Problem: Potential for Falls  Goal: Patient will remain free of falls  Description: INTERVENTIONS:  - Educate patient/family on patient safety including physical limitations  - Instruct patient to call for assistance with activity   - Consult OT/PT to assist with strengthening/mobility   - Keep Call bell within reach  - Keep bed low and locked with side rails adjusted as appropriate  - Keep care items and personal belongings within reach  - Initiate and maintain comfort rounds  - Make Fall Risk Sign visible to staff  - Offer Toileting every  Hours, in advance of need  - Initiate/Maintain alarm  - Obtain necessary fall risk management equipment:   - Apply yellow socks and bracelet for high fall risk patients  - Consider moving patient to room near nurses station  Outcome: Progressing

## 2022-09-21 NOTE — PLAN OF CARE
Problem: PAIN - ADULT  Goal: Verbalizes/displays adequate comfort level or baseline comfort level  Description: Interventions:  - Encourage patient to monitor pain and request assistance  - Assess pain using appropriate pain scale  - Administer analgesics based on type and severity of pain and evaluate response  - Implement non-pharmacological measures as appropriate and evaluate response  - Consider cultural and social influences on pain and pain management  - Notify physician/advanced practitioner if interventions unsuccessful or patient reports new pain  Outcome: Progressing     Problem: SAFETY ADULT  Goal: Patient will remain free of falls  Description: INTERVENTIONS:  - Educate patient/family on patient safety including physical limitations  - Instruct patient to call for assistance with activity   - Consult OT/PT to assist with strengthening/mobility   - Keep Call bell within reach  - Keep bed low and locked with side rails adjusted as appropriate  - Keep care items and personal belongings within reach  - Initiate and maintain comfort rounds  - Make Fall Risk Sign visible to staff  - Offer Toileting every 2 Hours, in advance of need  - Initiate/Maintain bed alarm  - Obtain necessary fall risk management equipment: socks   - Apply yellow socks and bracelet for high fall risk patients  - Consider moving patient to room near nurses station  Outcome: Progressing     Problem: DISCHARGE PLANNING  Goal: Discharge to home or other facility with appropriate resources  Description: INTERVENTIONS:  - Identify barriers to discharge w/patient and caregiver  - Arrange for needed discharge resources and transportation as appropriate  - Identify discharge learning needs (meds, wound care, etc )  - Arrange for interpretive services to assist at discharge as needed  - Refer to Case Management Department for coordinating discharge planning if the patient needs post-hospital services based on physician/advanced practitioner order or complex needs related to functional status, cognitive ability, or social support system  Outcome: Progressing     Problem: RESPIRATORY - ADULT  Goal: Achieves optimal ventilation and oxygenation  Description: INTERVENTIONS:  - Assess for changes in respiratory status  - Assess for changes in mentation and behavior  - Position to facilitate oxygenation and minimize respiratory effort  - Oxygen administered by appropriate delivery if ordered  - Initiate smoking cessation education as indicated  - Encourage broncho-pulmonary hygiene including cough, deep breathe, Incentive Spirometry  - Assess the need for suctioning and aspirate as needed  - Assess and instruct to report SOB or any respiratory difficulty  - Respiratory Therapy support as indicated  Outcome: Progressing     Problem: METABOLIC, FLUID AND ELECTROLYTES - ADULT  Goal: Fluid balance maintained  Description: INTERVENTIONS:  - Monitor labs   - Monitor I/O and WT  - Instruct patient on fluid and nutrition as appropriate  - Assess for signs & symptoms of volume excess or deficit  Outcome: Progressing     Problem: Potential for Falls  Goal: Patient will remain free of falls  Description: INTERVENTIONS:  - Educate patient/family on patient safety including physical limitations  - Instruct patient to call for assistance with activity   - Consult OT/PT to assist with strengthening/mobility   - Keep Call bell within reach  - Keep bed low and locked with side rails adjusted as appropriate  - Keep care items and personal belongings within reach  - Initiate and maintain comfort rounds  - Make Fall Risk Sign visible to staff  - Offer Toileting every 2 Hours, in advance of need  - Initiate/Maintain bed alarm  - Obtain necessary fall risk management equipment: socks   - Apply yellow socks and bracelet for high fall risk patients  - Consider moving patient to room near nurses station  Outcome: Progressing

## 2022-09-21 NOTE — ASSESSMENT & PLAN NOTE
68-year-old female presented with increasing shortness of breath and wheezing  Recently admitted and discharged on 09/18 for COPD and CHF exacerbation    · Reports compliance with diuretics, proBNP was within normal limits, chest x-ray without effusion or congestion  · Patient does have significant wheezing, reports continued tobacco use upon discharge  · Continue methylprednisolone 40 mg q 8 hours  · Continue budesonide, Xopenex  · Mucinex p r n  for cough  · P o  with azithromycin

## 2022-09-21 NOTE — ASSESSMENT & PLAN NOTE
Lab Results   Component Value Date    HGBA1C 6 3 (H) 06/21/2022     · Monitor while patient on IV steroids  · SSI, Accuchecks  · Not on po meds or insulin PTA

## 2022-09-21 NOTE — ASSESSMENT & PLAN NOTE
Wt Readings from Last 3 Encounters:   09/21/22 102 kg (225 lb 8 5 oz)   09/18/22 101 kg (223 lb 5 2 oz)   07/31/22 99 2 kg (218 lb 11 1 oz)     · 2D echo 09/13/2022 revealed ejection fraction 45%, diastolic dysfunction type 1  · Euvolemic, chest x-ray without any acute cardiopulmonary disease  · Continue home dose of furosemide 20 mg b i d

## 2022-09-21 NOTE — UTILIZATION REVIEW
Initial Clinical Review    Admission: Date/Time/Statement:   Admission Orders (From admission, onward)     Ordered        09/20/22 1452  INPATIENT ADMISSION  Once                      Orders Placed This Encounter   Procedures    INPATIENT ADMISSION     Standing Status:   Standing     Number of Occurrences:   1     Order Specific Question:   Level of Care     Answer:   Med Surg [16]     Order Specific Question:   Estimated length of stay     Answer:   More than 2 Midnights     Order Specific Question:   Certification     Answer:   I certify that inpatient services are medically necessary for this patient for a duration of greater than two midnights  See H&P and MD Progress Notes for additional information about the patient's course of treatment  ED Arrival Information     Expected   -    Arrival   9/20/2022 11:29    Acuity   Urgent            Means of arrival   Ambulance    Escorted by   [facility] ([address])    Service   Hospitalist    Admission type   Urgent            Arrival complaint   Shortness of Breath           Chief Complaint   Patient presents with    Asthma     Pt arrived via ems with asthma exacerbation  D/c'd from hospital 2 days ago for same  Given 1 duo neb PTA  +Smoker  Initial Presentation: 81 y o  female with PMH of COPD, HTN, DM, Asthma, HIV presents with c/o cough and SOB  Recent dc on 9/18 for CHF and COPD exacerbation  Pt reports compliance w/ steroids and diuretics, continues to smoke cigarettes  In ED, CXR nad  Pt tachypneic, significant wheezing and rales noted and BL LE edema present  Pt given a duoneb, IV steroid, IV Lasix and mag repleted  Admit Inpatient med surg telemetry, continuous pulse ox d/t COPD, CHF:  Continue neb txs, supplemental O2 currently requiring 2-3 L NC (baseline), mucinex for cough, po abx, continue po diuretic and home meds  Start accuchecks w/ SSI  Date: 9/21   Day 2:   No new complaints today    Scattered rhonchi and wheezing noted on exam   Continue IV steroid q8h, continue neb txs and mucinex prn, continue po azithromycin  Continue home dose of lasix and other home meds  Continue 2-3 L O2, accuchecks w/ SSI      ED Triage Vitals   Temperature Pulse Respirations Blood Pressure SpO2   09/20/22 1133 09/20/22 1135 09/20/22 1135 09/20/22 1139 09/20/22 1135   98 3 °F (36 8 °C) 101 (!) 24 114/80 96 %      Temp Source Heart Rate Source Patient Position - Orthostatic VS BP Location FiO2 (%)   09/20/22 1133 09/20/22 1135 09/20/22 1135 09/20/22 1135 --   Oral Monitor Sitting Right arm       Pain Score       09/20/22 1825       No Pain          Wt Readings from Last 1 Encounters:   09/22/22 102 kg (225 lb 12 oz)     Additional Vital Signs:   Date/Time Temp Pulse Resp BP MAP (mmHg) SpO2 Calculated FIO2 (%) - Nasal Cannula Nasal Cannula O2 Flow Rate (L/min) O2 Device   09/22/22 0825 -- -- -- -- -- 97 % 28 2 L/min Nasal cannula   09/22/22 07:09:49 97 6 °F (36 4 °C) 91 -- 102/63 76 98 % -- -- --   09/21/22 22:59:55 97 3 °F (36 3 °C) Abnormal  102 -- 100/69 79 95 % -- -- --   09/21/22 2000 -- -- -- -- -- -- 28 2 L/min Nasal cannula   09/21/22 1939 -- -- -- -- -- 93 % -- -- --   09/21/22 19:32:16 97 8 °F (36 6 °C) 108 Abnormal  -- 124/85 98 95 % -- -- --   09/21/22 16:52:20 -- 105 -- 95/64 74 97 % -- -- --   09/21/22 1309 -- -- -- -- -- 98 % 32 3 L/min Nasal cannula   09/21/22 0720 -- -- -- -- -- 97 % 32 3 L/min Nasal cannula   09/21/22 07:14:08 -- 94 -- 100/66 77 96 % -- -- --   09/21/22 01:53:58 -- 98 -- 91/62 72 96 % -- -- --   09/20/22 23:06:21 97 8 °F (36 6 °C) 106 Abnormal  18 80/50 Abnormal  60 Abnormal  95 % -- -- --   09/20/22 1936 -- -- -- -- -- 96 % -- -- --   09/20/22 18:27:08 97 2 °F (36 2 °C) Abnormal  104 -- 94/59 71 94 % -- -- None (Room air)   09/20/22 18:26:50 97 2 °F (36 2 °C) Abnormal  102 -- 94/59 71 95 % -- -- --   09/20/22 1445 -- 100 24 Abnormal  -- -- 98 % 28 2 L/min  Nasal cannula   09/20/22 1307 -- 97 23 Abnormal  -- -- 97 % -- -- OFF 09/20/22 1200 -- -- -- -- -- -- -- -- Nasal cannula   09/20/22 1139 -- -- -- 114/80 -- -- -- -- --   09/20/22 1135 -- 101 24 Abnormal  -- -- 96 % -- -- None (Room air)   09/20/22 1133 98 3 °F (36 8 °C) -- -- -- -- -- -- -- --       Pertinent Labs/Diagnostic Test Results:   9/20 EKG:  Normal sinus rhythm  Left anterior fascicular block  Cannot rule out Anterior infarct (cited on or before 20-SEP-2022)  Abnormal ECG    XR chest 1 view portable   ED Interpretation by Ramana Jon MD (09/20 1328)   I have personally reviewed the x-ray and my findings are: no acute disease  Final Result by Miranda Ulloa MD (09/20 1348)      No acute cardiopulmonary disease                    Workstation performed: [id number]           Results from last 7 days   Lab Units 09/20/22  1637 09/20/22  1214   SARS-COV-2  Negative Negative     Results from last 7 days   Lab Units 09/21/22  0602 09/20/22  1214   WBC Thousand/uL 12 83* 11 71*   HEMOGLOBIN g/dL 10 3* 10 5*   HEMATOCRIT % 33 6* 34 8   PLATELETS Thousands/uL 232 233   NEUTROS ABS Thousands/µL  --  8 01*     Results from last 7 days   Lab Units 09/21/22  0602 09/20/22  1214 09/18/22  1150   SODIUM mmol/L 141 141 141   POTASSIUM mmol/L 4 4 4 4 4 6   CHLORIDE mmol/L 103 104 102   CO2 mmol/L 27 29 33*   ANION GAP mmol/L 11 8 6   BUN mg/dL 34* 28* 33*   CREATININE mg/dL 1 36* 0 84 1 03   EGFR ml/min/1 73sq m 39 70 55   CALCIUM mg/dL 9 9 10 1 9 8   MAGNESIUM mg/dL  --  1 9  --      Results from last 7 days   Lab Units 09/20/22  1214   AST U/L 26   ALT U/L 33   ALK PHOS U/L 107   TOTAL PROTEIN g/dL 6 6   ALBUMIN g/dL 3 0*   TOTAL BILIRUBIN mg/dL 0 45     Results from last 7 days   Lab Units 09/22/22  0709 09/21/22 2050 09/21/22  1547 09/21/22  1056 09/21/22  0712 09/20/22  2047 09/20/22  1843 09/18/22  1615 09/18/22  1115 09/18/22  0725 09/17/22  2105 09/17/22  1528   POC GLUCOSE mg/dl 183* 201* 208* 172* 151* 254* 301* 179* 143* 114 209* 156*     Results from last 7 days   Lab Units 09/21/22  0602 09/20/22  1214 09/18/22  1150   GLUCOSE RANDOM mg/dL 158* 97 115     Results from last 7 days   Lab Units 09/20/22  1740 09/20/22  1453 09/20/22  1214   HS TNI 0HR ng/L  --   --  7   HS TNI 2HR ng/L  --  8  --    HSTNI D2 ng/L  --  1  --    HS TNI 4HR ng/L 10  --   --    HSTNI D4 ng/L 3  --   --      Results from last 7 days   Lab Units 09/20/22  1214   PROTIME seconds 13 9   INR  1 07   PTT seconds 22*     Results from last 7 days   Lab Units 09/21/22  0602 09/20/22  1740   PROCALCITONIN ng/ml 0 05 0 06     Results from last 7 days   Lab Units 09/20/22  1214   NT-PRO BNP pg/mL 55     Results from last 7 days   Lab Units 09/20/22  1214   LIPASE u/L 227     Results from last 7 days   Lab Units 09/20/22  1637 09/20/22  1214   INFLUENZA A PCR  Negative Negative   INFLUENZA B PCR  Negative Negative   RSV PCR  Negative  --      ED Treatment:   Medication Administration from 09/20/2022 1129 to 09/20/2022 1815       Date/Time Order Dose Route Action     09/20/2022 1141 ipratropium-albuterol (FOR EMS ONLY) (DUO-NEB) 0 5-2 5 mg/3 mL inhalation solution 3 mL 0 mL Does not apply Given to EMS     09/20/2022 1210 albuterol inhalation solution 10 mg 10 mg Nebulization Given     09/20/2022 1211 ipratropium (ATROVENT) 0 02 % inhalation solution 1 mg 1 mg Nebulization Given     09/20/2022 1210 sodium chloride 0 9 % inhalation solution 12 mL 12 mL Nebulization Given     09/20/2022 1241 methylPREDNISolone sodium succinate (Solu-MEDROL) injection 80 mg 80 mg Intravenous Given     09/20/2022 1246 magnesium sulfate 2 g/50 mL IVPB (premix) 2 g 2 g Intravenous New Bag     09/20/2022 1243 furosemide (LASIX) injection 50 mg 50 mg Intravenous Given     09/20/2022 1451 albuterol inhalation solution 5 mg 5 mg Nebulization Given        Past Medical History:   Diagnosis Date    Asthma     Bipolar 1 disorder [facility] [address] [facility]     Cardiac disease     COPD [facility]chronic obstructive pulmonary disease) ([facility] [address] [facility]     Diabetes mellitus ([facility] )     Disease of thyroid gland     HIV (human immunodeficiency virus infection) ([facility] ) 03/24/2016    Hypertension     Osteoarthritis     Tobacco abuse      Present on Admission:   Acute exacerbation of chronic obstructive pulmonary disease (COPD) (HCC)   Type 2 diabetes mellitus (HCC)   HIV (human immunodeficiency virus infection)    Tobacco use   Chronic respiratory failure with hypoxia (HCC)   Paroxysmal atrial fibrillation (HCC)   Chronic diastolic heart failure (HCC)      Admitting Diagnosis: Asthma [J45 909]  COPD exacerbation (HCC) [J44 1]  Volume overload [E87 70]  Age/Sex: 81 y o  female  Admission Orders:  Scheduled Medications:  apixaban, 5 mg, Oral, BID  atorvastatin, 20 mg, Oral, Daily With Dinner  azithromycin, 500 mg, Oral, Q24H  bictegravir-emtricitab-tenofovir alafenamide, 1 tablet, Oral, Daily  budesonide, 0 5 mg, Nebulization, BID  furosemide, 20 mg, Oral, BID  gabapentin, 800 mg, Oral, TID  guaiFENesin, 600 mg, Oral, BID  insulin lispro, 1-5 Units, Subcutaneous, TID AC  levalbuterol, 1 25 mg, Nebulization, TID   And  sodium chloride, 3 mL, Nebulization, TID  levothyroxine, 100 mcg, Oral, Early Morning  methylPREDNISolone sodium succinate, 40 mg, Intravenous, Q8H  montelukast, 10 mg, Oral, HS  nicotine, 1 patch, Transdermal, Daily  potassium chloride, 20 mEq, Oral, Daily      Continuous IV Infusions: none     PRN Meds:  acetaminophen, 975 mg, Oral, Q6H PRN x2 thus far    [facility]  Daily wts    Network Utilization Review Department  ATTENTION: Please call with any questions or concerns to [phone] and carefully listen to the prompts so that you are directed to the right person  All voicemails are confidential   [place] all requests for admission clinical reviews, approved or denied determinations and any other requests to dedicated fax number below belonging to the campus where the patient is receiving treatment   List of dedicated fax numbers for the Facilities:  [address] FAX NUMBER   ADMISSION DENIALS (Administrative/Medical Necessity) [phone]   [address] (Maternity/NICU/Pediatrics) [address] [facility],[address] [facility] [address] [place] [address]  [phone]   Shauna Vargas [address] [id number] [address]akov Choe [address] [facility] [address] [phone]

## 2022-09-22 LAB
GLUCOSE SERPL-MCNC: 146 MG/DL (ref 65–140)
GLUCOSE SERPL-MCNC: 183 MG/DL (ref 65–140)
GLUCOSE SERPL-MCNC: 190 MG/DL (ref 65–140)
GLUCOSE SERPL-MCNC: 222 MG/DL (ref 65–140)

## 2022-09-22 PROCEDURE — 82948 REAGENT STRIP/BLOOD GLUCOSE: CPT

## 2022-09-22 PROCEDURE — 99232 SBSQ HOSP IP/OBS MODERATE 35: CPT | Performed by: INTERNAL MEDICINE

## 2022-09-22 PROCEDURE — 94640 AIRWAY INHALATION TREATMENT: CPT

## 2022-09-22 RX ORDER — LANOLIN ALCOHOL/MO/W.PET/CERES
6 CREAM (GRAM) TOPICAL
Status: DISCONTINUED | OUTPATIENT
Start: 2022-09-22 | End: 2022-09-29 | Stop reason: HOSPADM

## 2022-09-22 RX ADMIN — LEVALBUTEROL 1.25 MG: 1.25 SOLUTION, CONCENTRATE RESPIRATORY (INHALATION) at 13:57

## 2022-09-22 RX ADMIN — MONTELUKAST SODIUM 10 MG: 10 TABLET, COATED ORAL at 22:27

## 2022-09-22 RX ADMIN — GUAIFENESIN 600 MG: 600 TABLET, EXTENDED RELEASE ORAL at 08:00

## 2022-09-22 RX ADMIN — METHYLPREDNISOLONE SODIUM SUCCINATE 40 MG: 40 INJECTION, POWDER, FOR SOLUTION INTRAMUSCULAR; INTRAVENOUS at 14:49

## 2022-09-22 RX ADMIN — GUAIFENESIN 600 MG: 600 TABLET, EXTENDED RELEASE ORAL at 17:09

## 2022-09-22 RX ADMIN — METHYLPREDNISOLONE SODIUM SUCCINATE 40 MG: 40 INJECTION, POWDER, FOR SOLUTION INTRAMUSCULAR; INTRAVENOUS at 22:27

## 2022-09-22 RX ADMIN — LEVALBUTEROL 1.25 MG: 1.25 SOLUTION, CONCENTRATE RESPIRATORY (INHALATION) at 19:18

## 2022-09-22 RX ADMIN — ATORVASTATIN CALCIUM 20 MG: 20 TABLET, FILM COATED ORAL at 17:09

## 2022-09-22 RX ADMIN — METHYLPREDNISOLONE SODIUM SUCCINATE 40 MG: 40 INJECTION, POWDER, FOR SOLUTION INTRAMUSCULAR; INTRAVENOUS at 05:43

## 2022-09-22 RX ADMIN — BICTEGRAVIR SODIUM, EMTRICITABINE, AND TENOFOVIR ALAFENAMIDE FUMARATE 1 TABLET: 50; 200; 25 TABLET ORAL at 08:00

## 2022-09-22 RX ADMIN — Medication 3 ML: at 19:18

## 2022-09-22 RX ADMIN — GABAPENTIN 800 MG: 400 CAPSULE ORAL at 17:09

## 2022-09-22 RX ADMIN — GABAPENTIN 800 MG: 400 CAPSULE ORAL at 22:35

## 2022-09-22 RX ADMIN — AZITHROMYCIN MONOHYDRATE 500 MG: 250 TABLET ORAL at 14:49

## 2022-09-22 RX ADMIN — BUDESONIDE 0.5 MG: 0.5 INHALANT ORAL at 08:25

## 2022-09-22 RX ADMIN — BUDESONIDE 0.5 MG: 0.5 INHALANT ORAL at 19:18

## 2022-09-22 RX ADMIN — APIXABAN 5 MG: 5 TABLET, FILM COATED ORAL at 17:09

## 2022-09-22 RX ADMIN — LEVALBUTEROL 1.25 MG: 1.25 SOLUTION, CONCENTRATE RESPIRATORY (INHALATION) at 08:25

## 2022-09-22 RX ADMIN — Medication 3 ML: at 13:57

## 2022-09-22 RX ADMIN — LEVOTHYROXINE SODIUM 100 MCG: 100 TABLET ORAL at 05:43

## 2022-09-22 RX ADMIN — Medication 6 MG: at 22:49

## 2022-09-22 RX ADMIN — TRAMADOL HYDROCHLORIDE 50 MG: 50 TABLET, COATED ORAL at 07:59

## 2022-09-22 RX ADMIN — TRAMADOL HYDROCHLORIDE 50 MG: 50 TABLET, COATED ORAL at 22:27

## 2022-09-22 RX ADMIN — GABAPENTIN 800 MG: 400 CAPSULE ORAL at 08:00

## 2022-09-22 RX ADMIN — APIXABAN 5 MG: 5 TABLET, FILM COATED ORAL at 08:00

## 2022-09-22 RX ADMIN — INSULIN LISPRO 2 UNITS: 100 INJECTION, SOLUTION INTRAVENOUS; SUBCUTANEOUS at 17:09

## 2022-09-22 RX ADMIN — INSULIN LISPRO 1 UNITS: 100 INJECTION, SOLUTION INTRAVENOUS; SUBCUTANEOUS at 08:00

## 2022-09-22 RX ADMIN — POTASSIUM CHLORIDE 20 MEQ: 1500 TABLET, EXTENDED RELEASE ORAL at 08:00

## 2022-09-22 NOTE — ASSESSMENT & PLAN NOTE
63-year-old female presented with increasing shortness of breath and wheezing  Recently admitted and discharged on 09/18 for COPD and CHF exacerbation    · Reports compliance with diuretics, proBNP was within normal limits, chest x-ray without effusion or congestion  · Patient does have significant wheezing, reports continued tobacco use upon discharge  · Continue methylprednisolone 40 mg q 8 hours  · Continue budesonide, Xopenex  · Mucinex p r n  for cough  · P o  with azithromycin

## 2022-09-22 NOTE — ASSESSMENT & PLAN NOTE
Wt Readings from Last 3 Encounters:   09/22/22 102 kg (225 lb 12 oz)   09/18/22 101 kg (223 lb 5 2 oz)   07/31/22 99 2 kg (218 lb 11 1 oz)     · 2D echo 09/13/2022 revealed ejection fraction 18%, diastolic dysfunction type 1  · Euvolemic, chest x-ray without any acute cardiopulmonary disease  · Continue home dose of furosemide 20 mg b i d

## 2022-09-22 NOTE — PROGRESS NOTES
[facility]  Progress Note - Daniel Rossi 1950, 81 y o  female MRN: [id number]  Unit/Bed#: E5 -01 Encounter: [id number]  Primary Care Provider: Gracy Mcneill DO   Date and time admitted to hospital: 9/20/2022 11:29 AM    * Acute exacerbation of chronic obstructive pulmonary disease (COPD) ([place] [address] )  Assessment & Plan  74-year-old female presented with increasing shortness of breath and wheezing  Recently admitted and discharged on 09/18 for COPD and CHF exacerbation    · Reports compliance with diuretics, proBNP was within normal limits, chest x-ray without effusion or congestion  · Patient does have significant wheezing, reports continued tobacco use upon discharge  · Continue methylprednisolone 40 mg q 8 hours  · Continue budesonide, Xopenex  · Mucinex p r n  for cough  · P o  with azithromycin    Chronic diastolic heart failure (HCC)  Assessment & Plan  Wt Readings from Last 3 Encounters:   09/22/22 102 kg (225 lb 12 oz)   09/18/22 101 kg (223 lb 5 2 oz)   07/31/22 99 2 kg (218 lb 11 1 oz)     · 2D echo 09/13/2022 revealed ejection fraction 55%, diastolic dysfunction type 1  · Euvolemic, chest x-ray without any acute cardiopulmonary disease  · Continue home dose of furosemide 20 mg b i d      Paroxysmal atrial fibrillation (HCC)  Assessment & Plan  · Continue diltiazem and eliquis    Tobacco use  Assessment & Plan  · Continues to smoke 1-2 cigarettes daily   on cessation    Chronic respiratory failure with hypoxia (HCC)  Assessment & Plan  · On 2-3L chronically    HIV (human immunodeficiency virus infection)   Assessment & Plan  · Continue biktarvy    Type 2 diabetes mellitus ([place] [address] )  Assessment & Plan  Lab Results   Component Value Date    HGBA1C 6 3 (H) 06/21/2022     · Monitor while patient on IV steroids  · SSI, Accuchecks  · Not on po meds or insulin PTA          VTE Pharmacologic Prophylaxis:   Pharmacologic:  Eliquis    Patient Centered Rounds: I have performed bedside rounds with nursing staff today  Education and Discussions with Family / Patient:  Patient    Time Spent for Care: 20 minutes  More than 50% of total time spent on counseling and coordination of care as described above  Current Length of Stay: 2 day(s)    Current Patient Status: Inpatient   Certification Statement: The patient will continue to require additional inpatient hospital stay due to COPD exacerbation    Discharge Plan / Estimated Discharge Date: 24-48H    Code Status: Level 1 - Full Code      Subjective:   Patient seen and examined at bedside, sitting upright comfortable    Objective:     Vitals:   Temp (24hrs), Av 8 °F (36 6 °C), Min:97 3 °F (36 3 °C), Max:98 5 °F (36 9 °C)    Temp:  [97 3 °F (36 3 °C)-98 5 °F (36 9 °C)] 98 5 °F (36 9 °C)  HR:  [] 98  Resp:  [18] 18  BP: (100-124)/(63-85) 114/69  SpO2:  [93 %-98 %] 97 %  Body mass index is 41 29 kg/m²  Input and Output Summary (last 24 hours): Intake/Output Summary (Last 24 hours) at 2022 1715  Last data filed at 2022  Gross per 24 hour   Intake --   Output 600 ml   Net -600 ml       Physical Exam:    Constitutional: Patient is oriented to person, place and time, no acute distress  HEENT:  Normocephalic, atraumatic  Cardiovascular: Normal S1S2, RRR, No murmurs/rubs/gallops appreciated  Pulmonary:  Bilateral air entry, scattered rhonchi and wheezing  Abdominal: Soft, Bowel sounds present, Non-tender, Non-distended  Extremities:  No cyanosis, clubbing or edema  Neurological: Cranial nerves II-XII grossly intact, sensation intact, otherwise no focal neurological symptoms     Skin:  Warm, dry    Additional Data:     Labs:    Results from last 7 days   Lab Units 22  0602 22  1214   WBC Thousand/uL 12 83* 11 71*   HEMOGLOBIN g/dL 10 3* 10 5*   HEMATOCRIT % 33 6* 34 8   PLATELETS Thousands/uL 232 233   NEUTROS PCT %  --  69   LYMPHS PCT %  --  19   MONOS PCT %  --  8   EOS PCT %  --  2     Results from last 7 days   Lab Units 09/21/22  0602 09/20/22  1214   POTASSIUM mmol/L 4 4 4 4   CHLORIDE mmol/L 103 104   CO2 mmol/L 27 29   BUN mg/dL 34* 28*   CREATININE mg/dL 1 36* 0 84   CALCIUM mg/dL 9 9 10 1   ALK PHOS U/L  --  107   ALT U/L  --  33   AST U/L  --  26     Results from last 7 days   Lab Units 09/20/22  1214   INR  1 07        I Have Reviewed All Lab Data Listed Above      Invasive Devices  Report    Peripheral Intravenous Line  Duration           Peripheral IV 09/20/22 Left Forearm 1 day                     Recent Cultures (last 7 days):           Last 24 Hours Medication List:   Current Facility-Administered Medications   Medication Dose Route Frequency Provider Last Rate    acetaminophen  975 mg Oral Q6H PRN Jade Stanton MD      apixaban  5 mg Oral BID Jade Stanton MD      atorvastatin  20 mg Oral Daily With Saumya Lee MD      bictegravir-emtricitab-tenofovir alafenamide  1 tablet Oral Daily Jade Stanton MD      budesonide  0 5 mg Nebulization BID Jade Stanton MD      furosemide  20 mg Oral BID aJde Stanton MD      gabapentin  800 mg Oral TID Jade Stanton MD      guaiFENesin  600 mg Oral BID Jade Stanton MD      insulin lispro  1-5 Units Subcutaneous TID AC Jade Stanton MD      levalbuterol  1 25 mg Nebulization TID Jade Stanton MD      And    sodium chloride  3 mL Nebulization TID Jade Stanton MD      levothyroxine  100 mcg Oral Early Morning Jade Stanton MD      methylPREDNISolone sodium succinate  40 mg Intravenous Q8H Jade Stanton MD      montelukast  10 mg Oral HS Jade Stanton MD      nicotine  1 patch Transdermal Daily Jade Stanton MD      potassium chloride  20 mEq Oral Daily Jade Stanton MD      traMADol  50 mg Oral Q6H PRN Regis Urbano MD          Today, Patient Was Seen By: Regis Urbano MD

## 2022-09-22 NOTE — ASSESSMENT & PLAN NOTE
Lab Results   Component Value Date    HGBA1C 6 3 (H) 06/21/2022     · Monitor while patient on IV steroids  · SSI, Accuchecks  · Not on po meds or insulin PTA Left message for Anoop to call back and schedule fasting lab appointment. Waiting to hear back

## 2022-09-22 NOTE — PLAN OF CARE
Problem: PAIN - ADULT  Goal: Verbalizes/displays adequate comfort level or baseline comfort level  Description: Interventions:  - Encourage patient to monitor pain and request assistance  - Assess pain using appropriate pain scale  - Administer analgesics based on type and severity of pain and evaluate response  - Implement non-pharmacological measures as appropriate and evaluate response  - Consider cultural and social influences on pain and pain management  - Notify physician/advanced practitioner if interventions unsuccessful or patient reports new pain  Outcome: Progressing     Problem: SAFETY ADULT  Goal: Patient will remain free of falls  Description: INTERVENTIONS:  - Educate patient/family on patient safety including physical limitations  - Instruct patient to call for assistance with activity   - Consult OT/PT to assist with strengthening/mobility   - Keep Call bell within reach  - Keep bed low and locked with side rails adjusted as appropriate  - Keep care items and personal belongings within reach  - Initiate and maintain comfort rounds  - Make Fall Risk Sign visible to staff  - Offer Toileting every  Hours, in advance of need  - Initiate/Maintain alarm  - Obtain necessary fall risk management equipment  - Apply yellow socks and bracelet for high fall risk patients  - Consider moving patient to room near nurses station  Outcome: Progressing     Problem: DISCHARGE PLANNING  Goal: Discharge to home or other facility with appropriate resources  Description: INTERVENTIONS:  - Identify barriers to discharge w/patient and caregiver  - Arrange for needed discharge resources and transportation as appropriate  - Identify discharge learning needs (meds, wound care, etc )  - Arrange for interpretive services to assist at discharge as needed  - Refer to Case Management Department for coordinating discharge planning if the patient needs post-hospital services based on physician/advanced practitioner order or complex needs related to functional status, cognitive ability, or social support system  Outcome: Progressing     Problem: RESPIRATORY - ADULT  Goal: Achieves optimal ventilation and oxygenation  Description: INTERVENTIONS:  - Assess for changes in respiratory status  - Assess for changes in mentation and behavior  - Position to facilitate oxygenation and minimize respiratory effort  - Oxygen administered by appropriate delivery if ordered  - Initiate smoking cessation education as indicated  - Encourage broncho-pulmonary hygiene including cough, deep breathe, Incentive Spirometry  - Assess the need for suctioning and aspirate as needed  - Assess and instruct to report SOB or any respiratory difficulty  - Respiratory Therapy support as indicated  Outcome: Progressing     Problem: METABOLIC, FLUID AND ELECTROLYTES - ADULT  Goal: Fluid balance maintained  Description: INTERVENTIONS:  - Monitor labs   - Monitor I/O and WT  - Instruct patient on fluid and nutrition as appropriate  - Assess for signs & symptoms of volume excess or deficit  Outcome: Progressing     Problem: Potential for Falls  Goal: Patient will remain free of falls  Description: INTERVENTIONS:  - Educate patient/family on patient safety including physical limitations  - Instruct patient to call for assistance with activity   - Consult OT/PT to assist with strengthening/mobility   - Keep Call bell within reach  - Keep bed low and locked with side rails adjusted as appropriate  - Keep care items and personal belongings within reach  - Initiate and maintain comfort rounds  - Make Fall Risk Sign visible to staff  - Offer Toileting every  Hours, in advance of need  - Initiate/Maintain alarm  - Obtain necessary fall risk management equipment:   - Apply yellow socks and bracelet for high fall risk patients  - Consider moving patient to room near nurses station  Outcome: Progressing     Problem: Nutrition/Hydration-ADULT  Goal: Nutrient/Hydration intake appropriate for improving, restoring or maintaining nutritional needs  Description: Monitor and assess patient's nutrition/hydration status for malnutrition  Collaborate with interdisciplinary team and initiate plan and interventions as ordered  Monitor patient's weight and dietary intake as ordered or per policy  Utilize nutrition screening tool and intervene as necessary  Determine patient's food preferences and provide high-protein, high-caloric foods as appropriate       INTERVENTIONS:  - Monitor oral intake, urinary output, labs, and treatment plans  - Assess nutrition and hydration status and recommend course of action  - Evaluate amount of meals eaten  - Assist patient with eating if necessary   - Allow adequate time for meals  - Recommend/ encourage appropriate diets, oral nutritional supplements, and vitamin/mineral supplements  - Order, calculate, and assess calorie counts as needed  - Recommend, monitor, and adjust tube feedings and TPN/PPN based on assessed needs  - Assess need for intravenous fluids  - Provide specific nutrition/hydration education as appropriate  - Include patient/family/caregiver in decisions related to nutrition  Outcome: Progressing

## 2022-09-22 NOTE — UTILIZATION REVIEW
Inpatient Admission Authorization Request   NOTIFICATION OF INPATIENT ADMISSION/INPATIENT AUTHORIZATION REQUEST   SERVICING FACILITY:   [address] [place] [address]  [address] [place] [address], [address]   Tax ID: [id number]  NPI: [id number]  Place of Service: Inpatient [address] [facility]  [address]  Place of Service Code: 24     ATTENDING PROVIDER:  Attending Name and NPI#: [facility]on [facility] [[id number]]  Address: [address] [place] [address], [address]   Phone: [phone]     UTILIZATION REVIEW CONTACT:  Logan Sanz, Utilization   Network Utilization Review Department  Phone: [phone]  Fax: [phone]  Email: [email or web address]     PHYSICIAN ADVISORY SERVICES:  FOR [id number] REVIEW - MEDICAL NECESSITY DENIAL  Phone: [phone]  Fax: [phone]  Email: [email or web address]     TYPE OF REQUEST:  Inpatient Status     ADMISSION INFORMATION:  ADMISSION DATE/TIME: 9/20/22  2:52 PM  PATIENT DIAGNOSIS CODE/DESCRIPTION:  Asthma [J45 909]  COPD exacerbation ([address] ) [J44 1]  Volume overload [E87 70]  DISCHARGE DATE/TIME: No discharge date for patient encounter  IMPORTANT INFORMATION:  Please contact Logan Sanz directly with any questions or concerns regarding this request  Department voicemails are confidential     Send requests for admission clinical reviews, concurrent reviews, approvals, and administrative denials due to lack of clinical to fax [phone]

## 2022-09-22 NOTE — PLAN OF CARE
Problem: PAIN - ADULT  Goal: Verbalizes/displays adequate comfort level or baseline comfort level  Description: Interventions:  - Encourage patient to monitor pain and request assistance  - Assess pain using appropriate pain scale  - Administer analgesics based on type and severity of pain and evaluate response  - Implement non-pharmacological measures as appropriate and evaluate response  - Consider cultural and social influences on pain and pain management  - Notify physician/advanced practitioner if interventions unsuccessful or patient reports new pain  Outcome: Progressing     Problem: SAFETY ADULT  Goal: Patient will remain free of falls  Description: INTERVENTIONS:  - Educate patient/family on patient safety including physical limitations  - Instruct patient to call for assistance with activity   - Consult OT/PT to assist with strengthening/mobility   - Keep Call bell within reach  - Keep bed low and locked with side rails adjusted as appropriate  - Keep care items and personal belongings within reach  - Initiate and maintain comfort rounds  - Make Fall Risk Sign visible to staff  - Apply yellow socks and bracelet for high fall risk patients  - Consider moving patient to room near nurses station  Outcome: Progressing     Problem: DISCHARGE PLANNING  Goal: Discharge to home or other facility with appropriate resources  Description: INTERVENTIONS:  - Identify barriers to discharge w/patient and caregiver  - Arrange for needed discharge resources and transportation as appropriate  - Identify discharge learning needs (meds, wound care, etc )  - Arrange for interpretive services to assist at discharge as needed  - Refer to Case Management Department for coordinating discharge planning if the patient needs post-hospital services based on physician/advanced practitioner order or complex needs related to functional status, cognitive ability, or social support system  Outcome: Progressing     Problem: RESPIRATORY - ADULT  Goal: Achieves optimal ventilation and oxygenation  Description: INTERVENTIONS:  - Assess for changes in respiratory status  - Assess for changes in mentation and behavior  - Position to facilitate oxygenation and minimize respiratory effort  - Oxygen administered by appropriate delivery if ordered  - Initiate smoking cessation education as indicated  - Encourage broncho-pulmonary hygiene including cough, deep breathe, Incentive Spirometry  - Assess the need for suctioning and aspirate as needed  - Assess and instruct to report SOB or any respiratory difficulty  - Respiratory Therapy support as indicated  Outcome: Progressing     Problem: METABOLIC, FLUID AND ELECTROLYTES - ADULT  Goal: Fluid balance maintained  Description: INTERVENTIONS:  - Monitor labs   - Monitor I/O and WT  - Instruct patient on fluid and nutrition as appropriate  - Assess for signs & symptoms of volume excess or deficit  Outcome: Progressing     Problem: Potential for Falls  Goal: Patient will remain free of falls  Description: INTERVENTIONS:  - Educate patient/family on patient safety including physical limitations  - Instruct patient to call for assistance with activity   - Consult OT/PT to assist with strengthening/mobility   - Keep Call bell within reach  - Keep bed low and locked with side rails adjusted as appropriate  - Keep care items and personal belongings within reach  - Initiate and maintain comfort rounds  - Make Fall Risk Sign visible to staff  - Apply yellow socks and bracelet for high fall risk patients  - Consider moving patient to room near nurses station  Outcome: Progressing     Problem: Nutrition/Hydration-ADULT  Goal: Nutrient/Hydration intake appropriate for improving, restoring or maintaining nutritional needs  Description: Monitor and assess patient's nutrition/hydration status for malnutrition  Collaborate with interdisciplinary team and initiate plan and interventions as ordered    Monitor patient's weight and dietary intake as ordered or per policy  Utilize nutrition screening tool and intervene as necessary  Determine patient's food preferences and provide high-protein, high-caloric foods as appropriate       INTERVENTIONS:  - Monitor oral intake, urinary output, labs, and treatment plans  - Assess nutrition and hydration status and recommend course of action  - Evaluate amount of meals eaten  - Assist patient with eating if necessary   - Allow adequate time for meals  - Recommend/ encourage appropriate diets, oral nutritional supplements, and vitamin/mineral supplements  - Order, calculate, and assess calorie counts as needed  - Recommend, monitor, and adjust tube feedings and TPN/PPN based on assessed needs  - Assess need for intravenous fluids  - Provide specific nutrition/hydration education as appropriate  - Include patient/family/caregiver in decisions related to nutrition  Outcome: Progressing

## 2022-09-23 LAB
ANION GAP SERPL CALCULATED.3IONS-SCNC: 10 MMOL/L (ref 4–13)
ANISOCYTOSIS BLD QL SMEAR: PRESENT
BASOPHILS # BLD MANUAL: 0 THOUSAND/UL (ref 0–0.1)
BASOPHILS NFR MAR MANUAL: 0 % (ref 0–1)
BUN SERPL-MCNC: 39 MG/DL (ref 5–25)
CALCIUM SERPL-MCNC: 10 MG/DL (ref 8.3–10.1)
CHLORIDE SERPL-SCNC: 105 MMOL/L (ref 96–108)
CO2 SERPL-SCNC: 26 MMOL/L (ref 21–32)
CREAT SERPL-MCNC: 1.07 MG/DL (ref 0.6–1.3)
EOSINOPHIL # BLD MANUAL: 0 THOUSAND/UL (ref 0–0.4)
EOSINOPHIL NFR BLD MANUAL: 0 % (ref 0–6)
ERYTHROCYTE [DISTWIDTH] IN BLOOD BY AUTOMATED COUNT: 15.9 % (ref 11.6–15.1)
GFR SERPL CREATININE-BSD FRML MDRD: 52 ML/MIN/1.73SQ M
GLUCOSE SERPL-MCNC: 156 MG/DL (ref 65–140)
GLUCOSE SERPL-MCNC: 167 MG/DL (ref 65–140)
GLUCOSE SERPL-MCNC: 205 MG/DL (ref 65–140)
GLUCOSE SERPL-MCNC: 213 MG/DL (ref 65–140)
GLUCOSE SERPL-MCNC: 217 MG/DL (ref 65–140)
HCT VFR BLD AUTO: 32.1 % (ref 34.8–46.1)
HELMET CELLS BLD QL SMEAR: PRESENT
HGB BLD-MCNC: 9.9 G/DL (ref 11.5–15.4)
LYMPHOCYTES # BLD AUTO: 0.59 THOUSAND/UL (ref 0.6–4.47)
LYMPHOCYTES # BLD AUTO: 5 % (ref 14–44)
MCH RBC QN AUTO: 28.8 PG (ref 26.8–34.3)
MCHC RBC AUTO-ENTMCNC: 30.8 G/DL (ref 31.4–37.4)
MCV RBC AUTO: 93 FL (ref 82–98)
MONOCYTES # BLD AUTO: 0.83 THOUSAND/UL (ref 0–1.22)
MONOCYTES NFR BLD: 7 % (ref 4–12)
MYELOCYTES NFR BLD MANUAL: 1 % (ref 0–1)
NEUTROPHILS # BLD MANUAL: 10.32 THOUSAND/UL (ref 1.85–7.62)
NEUTS SEG NFR BLD AUTO: 87 % (ref 43–75)
PLATELET # BLD AUTO: 251 THOUSANDS/UL (ref 149–390)
PLATELET BLD QL SMEAR: ADEQUATE
PMV BLD AUTO: 8.7 FL (ref 8.9–12.7)
POTASSIUM SERPL-SCNC: 4.7 MMOL/L (ref 3.5–5.3)
RBC # BLD AUTO: 3.44 MILLION/UL (ref 3.81–5.12)
SODIUM SERPL-SCNC: 141 MMOL/L (ref 135–147)
WBC # BLD AUTO: 11.86 THOUSAND/UL (ref 4.31–10.16)

## 2022-09-23 PROCEDURE — 85007 BL SMEAR W/DIFF WBC COUNT: CPT | Performed by: INTERNAL MEDICINE

## 2022-09-23 PROCEDURE — 85027 COMPLETE CBC AUTOMATED: CPT | Performed by: INTERNAL MEDICINE

## 2022-09-23 PROCEDURE — 80048 BASIC METABOLIC PNL TOTAL CA: CPT | Performed by: INTERNAL MEDICINE

## 2022-09-23 PROCEDURE — 85025 COMPLETE CBC W/AUTO DIFF WBC: CPT | Performed by: INTERNAL MEDICINE

## 2022-09-23 PROCEDURE — 94640 AIRWAY INHALATION TREATMENT: CPT

## 2022-09-23 PROCEDURE — 82948 REAGENT STRIP/BLOOD GLUCOSE: CPT

## 2022-09-23 PROCEDURE — 99232 SBSQ HOSP IP/OBS MODERATE 35: CPT | Performed by: INTERNAL MEDICINE

## 2022-09-23 RX ADMIN — MONTELUKAST SODIUM 10 MG: 10 TABLET, COATED ORAL at 22:02

## 2022-09-23 RX ADMIN — LEVALBUTEROL 1.25 MG: 1.25 SOLUTION, CONCENTRATE RESPIRATORY (INHALATION) at 07:29

## 2022-09-23 RX ADMIN — BUDESONIDE 0.5 MG: 0.5 INHALANT ORAL at 21:12

## 2022-09-23 RX ADMIN — ACETAMINOPHEN 975 MG: 325 TABLET ORAL at 08:52

## 2022-09-23 RX ADMIN — POTASSIUM CHLORIDE 20 MEQ: 1500 TABLET, EXTENDED RELEASE ORAL at 08:49

## 2022-09-23 RX ADMIN — ATORVASTATIN CALCIUM 20 MG: 20 TABLET, FILM COATED ORAL at 16:06

## 2022-09-23 RX ADMIN — INSULIN LISPRO 1 UNITS: 100 INJECTION, SOLUTION INTRAVENOUS; SUBCUTANEOUS at 08:48

## 2022-09-23 RX ADMIN — Medication 3 ML: at 07:29

## 2022-09-23 RX ADMIN — METHYLPREDNISOLONE SODIUM SUCCINATE 40 MG: 40 INJECTION, POWDER, FOR SOLUTION INTRAMUSCULAR; INTRAVENOUS at 22:02

## 2022-09-23 RX ADMIN — GABAPENTIN 800 MG: 400 CAPSULE ORAL at 22:02

## 2022-09-23 RX ADMIN — INSULIN LISPRO 2 UNITS: 100 INJECTION, SOLUTION INTRAVENOUS; SUBCUTANEOUS at 12:06

## 2022-09-23 RX ADMIN — GABAPENTIN 800 MG: 400 CAPSULE ORAL at 16:06

## 2022-09-23 RX ADMIN — TRAMADOL HYDROCHLORIDE 50 MG: 50 TABLET, COATED ORAL at 06:45

## 2022-09-23 RX ADMIN — GABAPENTIN 800 MG: 400 CAPSULE ORAL at 08:49

## 2022-09-23 RX ADMIN — FUROSEMIDE 20 MG: 20 TABLET ORAL at 17:04

## 2022-09-23 RX ADMIN — TRAMADOL HYDROCHLORIDE 50 MG: 50 TABLET, COATED ORAL at 16:08

## 2022-09-23 RX ADMIN — METHYLPREDNISOLONE SODIUM SUCCINATE 40 MG: 40 INJECTION, POWDER, FOR SOLUTION INTRAMUSCULAR; INTRAVENOUS at 06:45

## 2022-09-23 RX ADMIN — LEVALBUTEROL 1.25 MG: 1.25 SOLUTION, CONCENTRATE RESPIRATORY (INHALATION) at 14:04

## 2022-09-23 RX ADMIN — BICTEGRAVIR SODIUM, EMTRICITABINE, AND TENOFOVIR ALAFENAMIDE FUMARATE 1 TABLET: 50; 200; 25 TABLET ORAL at 08:48

## 2022-09-23 RX ADMIN — FUROSEMIDE 20 MG: 20 TABLET ORAL at 08:49

## 2022-09-23 RX ADMIN — APIXABAN 5 MG: 5 TABLET, FILM COATED ORAL at 17:04

## 2022-09-23 RX ADMIN — APIXABAN 5 MG: 5 TABLET, FILM COATED ORAL at 08:49

## 2022-09-23 RX ADMIN — LEVOTHYROXINE SODIUM 100 MCG: 100 TABLET ORAL at 06:45

## 2022-09-23 RX ADMIN — GUAIFENESIN 600 MG: 600 TABLET, EXTENDED RELEASE ORAL at 17:04

## 2022-09-23 RX ADMIN — Medication 3 ML: at 14:04

## 2022-09-23 RX ADMIN — Medication 3 ML: at 21:12

## 2022-09-23 RX ADMIN — BUDESONIDE 0.5 MG: 0.5 INHALANT ORAL at 07:29

## 2022-09-23 RX ADMIN — GUAIFENESIN 600 MG: 600 TABLET, EXTENDED RELEASE ORAL at 08:49

## 2022-09-23 RX ADMIN — METHYLPREDNISOLONE SODIUM SUCCINATE 40 MG: 40 INJECTION, POWDER, FOR SOLUTION INTRAMUSCULAR; INTRAVENOUS at 14:20

## 2022-09-23 RX ADMIN — Medication 6 MG: at 22:02

## 2022-09-23 RX ADMIN — LEVALBUTEROL 1.25 MG: 1.25 SOLUTION, CONCENTRATE RESPIRATORY (INHALATION) at 21:12

## 2022-09-23 RX ADMIN — INSULIN LISPRO 2 UNITS: 100 INJECTION, SOLUTION INTRAVENOUS; SUBCUTANEOUS at 17:05

## 2022-09-23 NOTE — CASE MANAGEMENT
Case Management Assessment & Discharge Planning Note    Patient name Edward Gordon  Location East 5 /E5 [address] MRN [id number]  : 1950 Date 2022       Current Admission Date: 2022  Current Admission Diagnosis:Acute exacerbation of chronic obstructive pulmonary disease (COPD) ([facility] )   Patient Active Problem List    Diagnosis Date Noted    Obesity (BMI 35 0-39 9 without comorbidity) 2022    Chronic diastolic heart failure ([facility] ) 2022    PASHA on CPAP 2020    Acute exacerbation of chronic obstructive pulmonary disease (COPD) ([place] [address] ) 2020    Fall at home, initial encounter 2020    Paroxysmal atrial fibrillation ([place] [address] ) 2020    Hyperlipidemia 2018    Thoracic disc herniation 2018    Lumbar stenosis 2018    Urinary frequency 2018    Ambulatory dysfunction     Back pain 2018    Hip pain 2018    Shortness of breath 2017    Hypertension     Type 2 diabetes mellitus with hyperglycemia (HCC)     Chronic respiratory failure with hypoxia (HCC)     Bipolar 1 disorder ([place] [address] )     Tobacco use     Arthritis 2016    Type 2 diabetes mellitus ([place] [address] ) 2016    HIV (human immunodeficiency virus infection)  2016    Osteoarthritis 2016      LOS (days): 3  Geometric Mean LOS (GMLOS) (days): 2 90  Days to GMLOS:0     OBJECTIVE:  PATIENT READMITTED TO HOSPITAL  Risk of Unplanned Readmission Score: 30 5         Current admission status: Inpatient       Preferred Pharmacy:   [address] [place] [address], [address] [place] [address]  Angelica greer [place] [address]  Phone: [phone] Fax: [phone]    Primary Care Provider: Ish Garcia DO    Primary Insurance: Nora VILLANUEVA  Secondary Insurance: [address] [place] [address]    ASSESSMENT:  Active Health Care Proxies    There are no active Health Care Proxies on file           Readmission Root Cause  30 Day Readmission: Yes  Who directed you to return to the hospital?: Self  Did you understand whom to contact if you had questions or problems?: Yes  Did you get your prescriptions before you left the hospital?: Yes  Were you able to get your prescriptions filled when you left the hospital?: Yes  Did you take your medications as prescribed?: Yes  Were you able to get to your follow-up appointments?: No  Reason[de-identified] Readmitted prior to appointment  During previous admission, was a post-acute recommendation made?: Yes  What post-acute resources were offered?: [facility]  Patient was readmitted due to: CHF Exacerbation  Action Plan: Cards/Nephro? Slim working together to resolve exacerbation and manage chronic Sx    Patient Information  Admitted from[de-identified] Home  Mental Status: Alert  During Assessment patient was accompanied by: Not accompanied during assessment  Assessment information provided by[de-identified] Patient  Primary Caregiver: Family (& [facility])  Caregiver's Relationship to Patient[de-identified] Family Member  Caregiver's Telephone Number[de-identified] Helene Byers (Daughter) [phone] (M)  Support Systems: Children, Family members, Home care staff  [place] of Residence: [address] do you live in?: [place]  In the last 12 months, was there a time when you were not able to pay the mortgage or rent on time?: No  In the last 12 months, how many places have you lived?: 1  In the last 12 months, was there a time when you did not have a steady place to sleep or slept in a shelter (including now)?: No  Homeless/housing insecurity resource given?: N/A  Living Arrangements: Lives w/ Daughter  Is patient a ?: No    Activities of Daily Living Prior to Admission  Functional Status: Assistance  Completes ADLs independently?: No  Level of ADL dependence: Assistance  Ambulates independently?: No  Level of ambulatory dependence: Assistance  Does patient use assisted devices?: Yes  Assisted Devices (DME) used: Portable Oxygen tanks, Home Oxygen concentrator  DME Company Name (respiratory supplies): [employer]  O2 Rate(s): 2L  Does patient currently own DME?: Yes  What DME does the patient currently own?: Walker, Home Oxygen concentrator, Portable Oxygen tanks  Does patient have a history of HHC?: Yes  Does patient currently have [place] [address]?: Yes    Current Home Health Care  Type of Current Home Care Services: Attendant, Home health aide, Nurse visit (Attendant provided by [facility])  [address] [place][de-identified] [address] [place] [address] Provider[de-identified] PCP    Patient Information Continued  Income Source: Pension/care home  Does patient have prescription coverage?: Yes  Within the past 12 months, you worried that your food would run out before you got the money to buy more : Never true  Within the past 12 months, the food you bought just didn't last and you didn't have money to get more : Never true  Food insecurity resource given?: N/A  Does patient receive dialysis treatments?: No  Does patient have a history of substance abuse?: No  Does patient have a history of Mental Health Diagnosis?: No    Means of Transportation  Means of Transport to Appts[de-identified] Family transport  In the past 12 months, has lack of transportation kept you from medical appointments or from getting medications?: No  In the past 12 months, has lack of transportation kept you from meetings, work, or from getting things needed for daily living?: No  Was application for public transport provided?: N/A        DISCHARGE DETAILS:    Discharge planning discussed with[de-identified] Patient     Freedom of Choice: Yes  Comments - Freedom of Choice: Pt states her preference to discharge bacl home and resume services with CHERYL DEMARCO contacted family/caregiver?: No- see comments (declined)  Were Treatment Team discharge recommendations reviewed with patient/caregiver?: Yes  Did patient/caregiver verbalize understanding of patient care needs?: Yes  Were patient/caregiver advised of the risks associated with not following Treatment Team discharge recommendations?: Yes    Contacts  Patient Contacts: Ismael Savage (Daughter) [phone] (M)  Relationship to Patient[de-identified] Family  Phone Number: Ismael Savage (Daughter) [phone] Damari Walter  Reason/Outcome: Continuity of Care, Emergency Contact, Discharge [address]         Is the patient interested in [facility] at discharge?: Yes  Via Renny Sierra requested[de-identified] Nursing, Occupational Therapy, Physical [address] Name[de-identified] [facility] Provider[de-identified] PCP  Home Health Services Needed[de-identified] Evaluate Functional Status and Safety, Gait/ADL Training, Oxygen Via Nasal Cannula, Strengthening/Theraputic Exercises to Improve Function, COPD Management  Oxygen LPM Ordered (if applicable based on home health services needed):: 2 LPM  Homebound Criteria Met[de-identified] Uses an Assist Device (i e  cane, walker, etc), Requires Medical Transportation, Requires the Assistance of Another Person for Safe Ambulation or to Leave the Home  Supporting Clincal Findings[de-identified] Requires Oxygen, Fatigues Easliy in [employer], Limited Endurance    DME Referral Provided  Referral made for DME?: No

## 2022-09-23 NOTE — ASSESSMENT & PLAN NOTE
27-year-old female presented with increasing shortness of breath and wheezing  Recently admitted and discharged on 09/18 for COPD and CHF exacerbation    · Reports compliance with diuretics, proBNP was within normal limits, chest x-ray without effusion or congestion  · Patient does have significant wheezing, reports continued tobacco use upon discharge  · Continue methylprednisolone 40 mg q 8 hours  · Continue budesonide, Xopenex  · Mucinex p r n  for cough  · P o  with azithromycin

## 2022-09-23 NOTE — ASSESSMENT & PLAN NOTE
Wt Readings from Last 3 Encounters:   09/23/22 103 kg (227 lb 4 7 oz)   09/18/22 101 kg (223 lb 5 2 oz)   07/31/22 99 2 kg (218 lb 11 1 oz)     · 2D echo 09/13/2022 revealed ejection fraction 55%, diastolic dysfunction type 1  · Euvolemic, chest x-ray without any acute cardiopulmonary disease  · Continue home dose of furosemide 20 mg b i d

## 2022-09-23 NOTE — PROGRESS NOTES
[facility]  Progress Note - Summer Beatty 1950, 81 y o  female MRN: [id number]  Unit/Bed#: E5 -01 Encounter: [id number]  Primary Care Provider: Tiago Vasquez DO   Date and time admitted to hospital: 9/20/2022 11:29 AM    * Acute exacerbation of chronic obstructive pulmonary disease (COPD) ([place] [address] )  Assessment & Plan  53-year-old female presented with increasing shortness of breath and wheezing  Recently admitted and discharged on 09/18 for COPD and CHF exacerbation    · Reports compliance with diuretics, proBNP was within normal limits, chest x-ray without effusion or congestion  · Patient does have significant wheezing, reports continued tobacco use upon discharge  · Continue methylprednisolone 40 mg q 8 hours  · Continue budesonide, Xopenex  · Mucinex p r n  for cough  · P o  with azithromycin    Chronic diastolic heart failure (HCC)  Assessment & Plan  Wt Readings from Last 3 Encounters:   09/23/22 103 kg (227 lb 4 7 oz)   09/18/22 101 kg (223 lb 5 2 oz)   07/31/22 99 2 kg (218 lb 11 1 oz)     · 2D echo 09/13/2022 revealed ejection fraction 21%, diastolic dysfunction type 1  · Euvolemic, chest x-ray without any acute cardiopulmonary disease  · Continue home dose of furosemide 20 mg b i d      Paroxysmal atrial fibrillation (HCC)  Assessment & Plan  · Continue diltiazem and eliquis    Tobacco use  Assessment & Plan  · Continues to smoke 1-2 cigarettes daily   on cessation    Chronic respiratory failure with hypoxia (HCC)  Assessment & Plan  · On 2-3L chronically    HIV (human immunodeficiency virus infection)   Assessment & Plan  · Continue biktarvy    Type 2 diabetes mellitus ([place] [address] )  Assessment & Plan  Lab Results   Component Value Date    HGBA1C 6 3 (H) 06/21/2022     · Monitor while patient on IV steroids  · SSI, Accuchecks  · Not on po meds or insulin PTA        VTE Pharmacologic Prophylaxis:   Pharmacologic:  Eliquis    Patient Centered Rounds: I have performed bedside rounds with nursing staff today  Education and Discussions with Family / Patient:  Patient    Time Spent for Care: 20 minutes  More than 50% of total time spent on counseling and coordination of care as described above  Current Length of Stay: 3 day(s)    Current Patient Status: Inpatient   Certification Statement: The patient will continue to require additional inpatient hospital stay due to COPD exacerbation    Discharge Plan / Estimated Discharge Date: 24h     Code Status: Level 1 - Full Code      Subjective:   Patient seen and examined at bedside, sitting upright, denies any complaints    Objective:     Vitals:   Temp (24hrs), Av 2 °F (36 8 °C), Min:97 8 °F (36 6 °C), Max:98 5 °F (36 9 °C)    Temp:  [97 8 °F (36 6 °C)-98 5 °F (36 9 °C)] 97 8 °F (36 6 °C)  HR:  [] 100  Resp:  [18] 18  BP: (114-134)/(69-95) 134/95  SpO2:  [96 %-100 %] 96 %  Body mass index is 41 57 kg/m²  Input and Output Summary (last 24 hours): Intake/Output Summary (Last 24 hours) at 2022 0830  Last data filed at 2022 2233  Gross per 24 hour   Intake 240 ml   Output --   Net 240 ml       Physical Exam:    Constitutional: Patient is oriented to person, place and time, no acute distress  HEENT:  Normocephalic, atraumatic  Cardiovascular: Normal S1S2, RRR, No murmurs/rubs/gallops appreciated  Pulmonary:  Bilateral air entry, mild scattered rhonchi  Abdominal: Soft, Bowel sounds present, Non-tender, Non-distended  Extremities:  No cyanosis, clubbing or edema  Neurological: Cranial nerves II-XII grossly intact, sensation intact, otherwise no focal neurological symptoms     Skin:  Warm, dry    Additional Data:     Labs:    Results from last 7 days   Lab Units 22  0602 22  1214   WBC Thousand/uL 12 83* 11 71*   HEMOGLOBIN g/dL 10 3* 10 5*   HEMATOCRIT % 33 6* 34 8   PLATELETS Thousands/uL 232 233   NEUTROS PCT %  --  69   LYMPHS PCT %  --  19   MONOS PCT %  --  8   EOS PCT %  --  2     Results from last 7 days   Lab Units 09/21/22  0602 09/20/22  1214   POTASSIUM mmol/L 4 4 4 4   CHLORIDE mmol/L 103 104   CO2 mmol/L 27 29   BUN mg/dL 34* 28*   CREATININE mg/dL 1 36* 0 84   CALCIUM mg/dL 9 9 10 1   ALK PHOS U/L  --  107   ALT U/L  --  33   AST U/L  --  26     Results from last 7 days   Lab Units 09/20/22  1214   INR  1 07        I Have Reviewed All Lab Data Listed Above      Invasive Devices  Report    Peripheral Intravenous Line  Duration           Peripheral IV 09/20/22 Left Forearm 2 days                     Recent Cultures (last 7 days):           Last 24 Hours Medication List:   Current Facility-Administered Medications   Medication Dose Route Frequency Provider Last Rate    acetaminophen  975 mg Oral Q6H PRN Imelda Castillo MD      apixaban  5 mg Oral BID Imelda Castillo MD      atorvastatin  20 mg Oral Daily With Dimas Hyde MD      bictegravir-emtricitab-tenofovir alafenamide  1 tablet Oral Daily Imelda Castillo MD      budesonide  0 5 mg Nebulization BID Imelda Castillo MD      furosemide  20 mg Oral BID Imelda Castillo MD      gabapentin  800 mg Oral TID Imelda Castillo MD      guaiFENesin  600 mg Oral BID Imelda Castillo MD      insulin lispro  1-5 Units Subcutaneous TID AC Imelda Castillo MD      levalbuterol  1 25 mg Nebulization TID Imelda Castillo MD      And    sodium chloride  3 mL Nebulization TID Imelda Castillo MD      levothyroxine  100 mcg Oral Early Morning Imelda Castillo MD      melatonin  6 mg Oral HS LEA Small      methylPREDNISolone sodium succinate  40 mg Intravenous Q8H Shad Conley MD      montelukast  10 mg Oral HS Imelda Castillo MD      nicotine  1 patch Transdermal Daily Imelda Castillo MD      potassium chloride  20 mEq Oral Daily Imelda Castillo MD      traMADol  50 mg Oral Q6H PRN Luis Ferrell MD          Today, Patient Was Seen By: Luis Ferrell MD

## 2022-09-24 ENCOUNTER — APPOINTMENT (OUTPATIENT)
Dept: RADIOLOGY | Facility: HOSPITAL | Age: 72
DRG: 202 | End: 2022-09-24
Payer: COMMERCIAL

## 2022-09-24 LAB
GLUCOSE SERPL-MCNC: 157 MG/DL (ref 65–140)
GLUCOSE SERPL-MCNC: 166 MG/DL (ref 65–140)
GLUCOSE SERPL-MCNC: 176 MG/DL (ref 65–140)
GLUCOSE SERPL-MCNC: 190 MG/DL (ref 65–140)

## 2022-09-24 PROCEDURE — 99232 SBSQ HOSP IP/OBS MODERATE 35: CPT | Performed by: INTERNAL MEDICINE

## 2022-09-24 PROCEDURE — 94640 AIRWAY INHALATION TREATMENT: CPT

## 2022-09-24 PROCEDURE — 82948 REAGENT STRIP/BLOOD GLUCOSE: CPT

## 2022-09-24 PROCEDURE — 71045 X-RAY EXAM CHEST 1 VIEW: CPT

## 2022-09-24 RX ORDER — FUROSEMIDE 10 MG/ML
40 INJECTION INTRAMUSCULAR; INTRAVENOUS ONCE
Status: COMPLETED | OUTPATIENT
Start: 2022-09-24 | End: 2022-09-24

## 2022-09-24 RX ORDER — HYDROXYZINE HYDROCHLORIDE 25 MG/1
25 TABLET, FILM COATED ORAL EVERY 6 HOURS PRN
Status: DISCONTINUED | OUTPATIENT
Start: 2022-09-24 | End: 2022-09-29 | Stop reason: HOSPADM

## 2022-09-24 RX ADMIN — GUAIFENESIN 600 MG: 600 TABLET, EXTENDED RELEASE ORAL at 17:54

## 2022-09-24 RX ADMIN — Medication 3 ML: at 08:04

## 2022-09-24 RX ADMIN — GABAPENTIN 800 MG: 400 CAPSULE ORAL at 21:19

## 2022-09-24 RX ADMIN — INSULIN LISPRO 1 UNITS: 100 INJECTION, SOLUTION INTRAVENOUS; SUBCUTANEOUS at 16:20

## 2022-09-24 RX ADMIN — GABAPENTIN 800 MG: 400 CAPSULE ORAL at 09:29

## 2022-09-24 RX ADMIN — METHYLPREDNISOLONE SODIUM SUCCINATE 40 MG: 40 INJECTION, POWDER, FOR SOLUTION INTRAMUSCULAR; INTRAVENOUS at 05:20

## 2022-09-24 RX ADMIN — POTASSIUM CHLORIDE 20 MEQ: 1500 TABLET, EXTENDED RELEASE ORAL at 09:29

## 2022-09-24 RX ADMIN — HYDROXYZINE HYDROCHLORIDE 25 MG: 25 TABLET ORAL at 14:02

## 2022-09-24 RX ADMIN — LEVALBUTEROL 1.25 MG: 1.25 SOLUTION, CONCENTRATE RESPIRATORY (INHALATION) at 15:54

## 2022-09-24 RX ADMIN — BUDESONIDE 0.5 MG: 0.5 INHALANT ORAL at 20:06

## 2022-09-24 RX ADMIN — BUDESONIDE 0.5 MG: 0.5 INHALANT ORAL at 08:02

## 2022-09-24 RX ADMIN — TRAMADOL HYDROCHLORIDE 50 MG: 50 TABLET, COATED ORAL at 05:26

## 2022-09-24 RX ADMIN — BICTEGRAVIR SODIUM, EMTRICITABINE, AND TENOFOVIR ALAFENAMIDE FUMARATE 1 TABLET: 50; 200; 25 TABLET ORAL at 09:29

## 2022-09-24 RX ADMIN — ATORVASTATIN CALCIUM 20 MG: 20 TABLET, FILM COATED ORAL at 16:20

## 2022-09-24 RX ADMIN — Medication 3 ML: at 20:06

## 2022-09-24 RX ADMIN — INSULIN LISPRO 1 UNITS: 100 INJECTION, SOLUTION INTRAVENOUS; SUBCUTANEOUS at 09:30

## 2022-09-24 RX ADMIN — APIXABAN 5 MG: 5 TABLET, FILM COATED ORAL at 17:54

## 2022-09-24 RX ADMIN — LEVALBUTEROL 1.25 MG: 1.25 SOLUTION, CONCENTRATE RESPIRATORY (INHALATION) at 08:02

## 2022-09-24 RX ADMIN — LEVOTHYROXINE SODIUM 100 MCG: 100 TABLET ORAL at 05:20

## 2022-09-24 RX ADMIN — LEVALBUTEROL 1.25 MG: 1.25 SOLUTION, CONCENTRATE RESPIRATORY (INHALATION) at 20:06

## 2022-09-24 RX ADMIN — ACETAMINOPHEN 975 MG: 325 TABLET ORAL at 16:25

## 2022-09-24 RX ADMIN — MONTELUKAST SODIUM 10 MG: 10 TABLET, COATED ORAL at 21:19

## 2022-09-24 RX ADMIN — TRAMADOL HYDROCHLORIDE 50 MG: 50 TABLET, COATED ORAL at 14:02

## 2022-09-24 RX ADMIN — FUROSEMIDE 20 MG: 20 TABLET ORAL at 09:29

## 2022-09-24 RX ADMIN — METHYLPREDNISOLONE SODIUM SUCCINATE 40 MG: 40 INJECTION, POWDER, FOR SOLUTION INTRAMUSCULAR; INTRAVENOUS at 21:21

## 2022-09-24 RX ADMIN — Medication 6 MG: at 21:19

## 2022-09-24 RX ADMIN — GUAIFENESIN 600 MG: 600 TABLET, EXTENDED RELEASE ORAL at 09:30

## 2022-09-24 RX ADMIN — GABAPENTIN 800 MG: 400 CAPSULE ORAL at 16:20

## 2022-09-24 RX ADMIN — TRAMADOL HYDROCHLORIDE 50 MG: 50 TABLET, COATED ORAL at 21:19

## 2022-09-24 RX ADMIN — METHYLPREDNISOLONE SODIUM SUCCINATE 40 MG: 40 INJECTION, POWDER, FOR SOLUTION INTRAMUSCULAR; INTRAVENOUS at 14:02

## 2022-09-24 RX ADMIN — INSULIN LISPRO 1 UNITS: 100 INJECTION, SOLUTION INTRAVENOUS; SUBCUTANEOUS at 12:23

## 2022-09-24 RX ADMIN — APIXABAN 5 MG: 5 TABLET, FILM COATED ORAL at 09:29

## 2022-09-24 RX ADMIN — ACETAMINOPHEN 975 MG: 325 TABLET ORAL at 09:29

## 2022-09-24 RX ADMIN — Medication 3 ML: at 15:55

## 2022-09-24 RX ADMIN — FUROSEMIDE 40 MG: 10 INJECTION, SOLUTION INTRAVENOUS at 14:02

## 2022-09-24 NOTE — ASSESSMENT & PLAN NOTE
Wt Readings from Last 3 Encounters:   09/24/22 109 kg (239 lb 3 2 oz)   09/18/22 101 kg (223 lb 5 2 oz)   07/31/22 99 2 kg (218 lb 11 1 oz)     · 2D echo 09/13/2022 revealed ejection fraction 81%, diastolic dysfunction type 1  · Euvolemic, chest x-ray without any acute cardiopulmonary disease  · Patient more dyspneic today, lower-extremity edema, chest x-ray with questionable bilateral pulmonary edema  · Will trial furosemide 40 mg IV x1 today  · Monitor I&O, daily weight, fluid restriction

## 2022-09-24 NOTE — ASSESSMENT & PLAN NOTE
57-year-old female presented with increasing shortness of breath and wheezing  Recently admitted and discharged on 09/18 for COPD and CHF exacerbation    · Reports compliance with diuretics, proBNP was within normal limits, chest x-ray without effusion or congestion  · Patient does have significant wheezing, reports continued tobacco use upon discharge  · Continue methylprednisolone 40 mg q 8 hours  · Continue budesonide, Xopenex  · Mucinex p r n  for cough  · P o  with azithromycin

## 2022-09-24 NOTE — NURSING NOTE
Pt is refusing her blood work this morning, she states it hurts too much and refuses even after explaining the need for them

## 2022-09-24 NOTE — PROGRESS NOTES
[facility]  Progress Note - Nicole Jimenez 1950, 81 y o  female MRN: [id number]  Unit/Bed#: E5 -01 Encounter: [id number]  Primary Care Provider: Maribeth Ovalle DO   Date and time admitted to hospital: 9/20/2022 11:29 AM    * Acute exacerbation of chronic obstructive pulmonary disease (COPD) ([place] [address] )  Assessment & Plan  62-year-old female presented with increasing shortness of breath and wheezing  Recently admitted and discharged on 09/18 for COPD and CHF exacerbation    · Reports compliance with diuretics, proBNP was within normal limits, chest x-ray without effusion or congestion  · Patient does have significant wheezing, reports continued tobacco use upon discharge  · Continue methylprednisolone 40 mg q 8 hours  · Continue budesonide, Xopenex  · Mucinex p r n  for cough  · P o  with azithromycin    Acute on chronic diastolic heart failure ([place] [address] )  Assessment & Plan  Wt Readings from Last 3 Encounters:   09/24/22 109 kg (239 lb 3 2 oz)   09/18/22 101 kg (223 lb 5 2 oz)   07/31/22 99 2 kg (218 lb 11 1 oz)     · 2D echo 09/13/2022 revealed ejection fraction 04%, diastolic dysfunction type 1  · Euvolemic, chest x-ray without any acute cardiopulmonary disease  · Patient more dyspneic today, lower-extremity edema, chest x-ray with questionable bilateral pulmonary edema  · Will trial furosemide 40 mg IV x1 today  · Monitor I&O, daily weight, fluid restriction    Paroxysmal atrial fibrillation (HCC)  Assessment & Plan  · Continue diltiazem and eliquis    Tobacco use  Assessment & Plan  · Continues to smoke 1-2 cigarettes daily   on cessation    Chronic respiratory failure with hypoxia (HCC)  Assessment & Plan  · On 2-3L chronically    HIV (human immunodeficiency virus infection)   Assessment & Plan  · Continue biktarvy    Type 2 diabetes mellitus ([place] [address] )  Assessment & Plan  Lab Results   Component Value Date    HGBA1C 6 3 (H) 06/21/2022     · Monitor while patient on IV steroids  · SSI, Accuchecks  · Not on po meds or insulin PTA          VTE Pharmacologic Prophylaxis:   Pharmacologic:  Eliquis    Patient Centered Rounds: I have performed bedside rounds with nursing staff today  Education and Discussions with Family / Patient:  Patient    Time Spent for Care: 20 minutes  More than 50% of total time spent on counseling and coordination of care as described above  Current Length of Stay: 4 day(s)    Current Patient Status: Inpatient   Certification Statement: The patient will continue to require additional inpatient hospital stay due to COPD and possible CHF exacerbation    Discharge Plan / Estimated Discharge Date: TBD    Code Status: Level 1 - Full Code      Subjective:   Patient seen and examined at bedside, was feeling better early in the morning however in the afternoon went to the restroom and got very winded and short of breath  Denies any chest pain    Objective:     Vitals:   Temp (24hrs), Av 7 °F (36 5 °C), Min:97 1 °F (36 2 °C), Max:98 7 °F (37 1 °C)    Temp:  [97 1 °F (36 2 °C)-98 7 °F (37 1 °C)] 97 9 °F (36 6 °C)  HR:  [] 87  Resp:  [18-20] 18  BP: (120-134)/(73-89) 134/89  SpO2:  [96 %-98 %] 96 %  Body mass index is 43 75 kg/m²  Input and Output Summary (last 24 hours): Intake/Output Summary (Last 24 hours) at 2022 1242  Last data filed at 2022 1242  Gross per 24 hour   Intake 1672 ml   Output --   Net 1672 ml       Physical Exam:    Constitutional: Patient is oriented to person, place and time, conversational dyspnea  HEENT:  Normocephalic, atraumatic  Cardiovascular: Normal S1S2, RRR, No murmurs/rubs/gallops appreciated  Pulmonary:  Bilateral air entry, scattered rhonchi  Abdominal: Soft, Bowel sounds present, Non-tender, Non-distended  Extremities:  No cyanosis, clubbing or edema  Neurological: Cranial nerves II-XII grossly intact, sensation intact, otherwise no focal neurological symptoms     Skin:  Warm, dry    Additional Data: Labs:    Results from last 7 days   Lab Units 09/23/22  1655 09/21/22  0602 09/20/22  1214   WBC Thousand/uL 11 86*   < > 11 71*   HEMOGLOBIN g/dL 9 9*   < > 10 5*   HEMATOCRIT % 32 1*   < > 34 8   PLATELETS Thousands/uL 251   < > 233   NEUTROS PCT %  --   --  69   LYMPHS PCT %  --   --  19   LYMPHO PCT % 5*  --   --    MONOS PCT %  --   --  8   MONO PCT % 7  --   --    EOS PCT % 0  --  2    < > = values in this interval not displayed  Results from last 7 days   Lab Units 09/23/22  1655 09/21/22  0602 09/20/22  1214   POTASSIUM mmol/L 4 7   < > 4 4   CHLORIDE mmol/L 105   < > 104   CO2 mmol/L 26   < > 29   BUN mg/dL 39*   < > 28*   CREATININE mg/dL 1 07   < > 0 84   CALCIUM mg/dL 10 0   < > 10 1   ALK PHOS U/L  --   --  107   ALT U/L  --   --  33   AST U/L  --   --  26    < > = values in this interval not displayed  Results from last 7 days   Lab Units 09/20/22  1214   INR  1 07        I Have Reviewed All Lab Data Listed Above      Invasive Devices  Report    Peripheral Intravenous Line  Duration           Peripheral IV 09/20/22 Left Forearm 3 days                     Recent Cultures (last 7 days):           Last 24 Hours Medication List:   Current Facility-Administered Medications   Medication Dose Route Frequency Provider Last Rate    acetaminophen  975 mg Oral Q6H PRN Gael Davis MD      apixaban  5 mg Oral BID Gael Davis MD      atorvastatin  20 mg Oral Daily With Marjorie Haile MD      bictegravir-emtricitab-tenofovir alafenamide  1 tablet Oral Daily Gael Davis MD      budesonide  0 5 mg Nebulization BID Gael Davis MD      furosemide  40 mg Intravenous Once Roxie Miller MD      gabapentin  800 mg Oral TID Geal Davis MD      guaiFENesin  600 mg Oral BID Gael Davis MD      insulin lispro  1-5 Units Subcutaneous TID AC Gael Davis MD      levalbuterol  1 25 mg Nebulization TID Gael Davis MD      And    sodium chloride  3 mL Nebulization TID Alex Plascencia MD      levothyroxine  100 mcg Oral Early Morning Alex Plascencia MD      melatonin  6 mg Oral HS [facility], PA-C      methylPREDNISolone sodium succinate  40 mg Intravenous Q8H Shad Noonan MD      montelukast  10 mg Oral HS Alex Plascencia MD      nicotine  1 patch Transdermal Daily Alex Plascencia MD      potassium chloride  20 mEq Oral Daily Alex Plascencia MD      traMADol  50 mg Oral Q6H PRN Niurka Babb MD          Today, Patient Was Seen By: Niurka Babb MD

## 2022-09-25 LAB
ANION GAP SERPL CALCULATED.3IONS-SCNC: 7 MMOL/L (ref 4–13)
BUN SERPL-MCNC: 47 MG/DL (ref 5–25)
CALCIUM SERPL-MCNC: 10 MG/DL (ref 8.3–10.1)
CHLORIDE SERPL-SCNC: 108 MMOL/L (ref 96–108)
CO2 SERPL-SCNC: 29 MMOL/L (ref 21–32)
CREAT SERPL-MCNC: 1.12 MG/DL (ref 0.6–1.3)
ERYTHROCYTE [DISTWIDTH] IN BLOOD BY AUTOMATED COUNT: 16.2 % (ref 11.6–15.1)
GFR SERPL CREATININE-BSD FRML MDRD: 49 ML/MIN/1.73SQ M
GLUCOSE SERPL-MCNC: 146 MG/DL (ref 65–140)
GLUCOSE SERPL-MCNC: 154 MG/DL (ref 65–140)
GLUCOSE SERPL-MCNC: 185 MG/DL (ref 65–140)
GLUCOSE SERPL-MCNC: 191 MG/DL (ref 65–140)
GLUCOSE SERPL-MCNC: 195 MG/DL (ref 65–140)
HCT VFR BLD AUTO: 31.3 % (ref 34.8–46.1)
HGB BLD-MCNC: 9.5 G/DL (ref 11.5–15.4)
MCH RBC QN AUTO: 28.6 PG (ref 26.8–34.3)
MCHC RBC AUTO-ENTMCNC: 30.4 G/DL (ref 31.4–37.4)
MCV RBC AUTO: 94 FL (ref 82–98)
PLATELET # BLD AUTO: 253 THOUSANDS/UL (ref 149–390)
PMV BLD AUTO: 9.2 FL (ref 8.9–12.7)
POTASSIUM SERPL-SCNC: 4.7 MMOL/L (ref 3.5–5.3)
RBC # BLD AUTO: 3.32 MILLION/UL (ref 3.81–5.12)
SODIUM SERPL-SCNC: 144 MMOL/L (ref 135–147)
WBC # BLD AUTO: 11.72 THOUSAND/UL (ref 4.31–10.16)

## 2022-09-25 PROCEDURE — 85027 COMPLETE CBC AUTOMATED: CPT | Performed by: INTERNAL MEDICINE

## 2022-09-25 PROCEDURE — 80048 BASIC METABOLIC PNL TOTAL CA: CPT | Performed by: INTERNAL MEDICINE

## 2022-09-25 PROCEDURE — 82948 REAGENT STRIP/BLOOD GLUCOSE: CPT

## 2022-09-25 PROCEDURE — 94640 AIRWAY INHALATION TREATMENT: CPT

## 2022-09-25 PROCEDURE — 99232 SBSQ HOSP IP/OBS MODERATE 35: CPT | Performed by: INTERNAL MEDICINE

## 2022-09-25 RX ORDER — FUROSEMIDE 10 MG/ML
40 INJECTION INTRAMUSCULAR; INTRAVENOUS ONCE
Status: COMPLETED | OUTPATIENT
Start: 2022-09-25 | End: 2022-09-25

## 2022-09-25 RX ORDER — METHYLPREDNISOLONE SODIUM SUCCINATE 40 MG/ML
40 INJECTION, POWDER, LYOPHILIZED, FOR SOLUTION INTRAMUSCULAR; INTRAVENOUS EVERY 12 HOURS SCHEDULED
Status: DISCONTINUED | OUTPATIENT
Start: 2022-09-25 | End: 2022-09-27

## 2022-09-25 RX ORDER — FUROSEMIDE 10 MG/ML
40 INJECTION INTRAMUSCULAR; INTRAVENOUS
Status: DISCONTINUED | OUTPATIENT
Start: 2022-09-25 | End: 2022-09-26

## 2022-09-25 RX ADMIN — POTASSIUM CHLORIDE 20 MEQ: 1500 TABLET, EXTENDED RELEASE ORAL at 08:02

## 2022-09-25 RX ADMIN — TRAMADOL HYDROCHLORIDE 50 MG: 50 TABLET, COATED ORAL at 05:04

## 2022-09-25 RX ADMIN — BUDESONIDE 0.5 MG: 0.5 INHALANT ORAL at 19:29

## 2022-09-25 RX ADMIN — MONTELUKAST SODIUM 10 MG: 10 TABLET, COATED ORAL at 21:32

## 2022-09-25 RX ADMIN — LEVALBUTEROL 1.25 MG: 1.25 SOLUTION, CONCENTRATE RESPIRATORY (INHALATION) at 07:48

## 2022-09-25 RX ADMIN — INSULIN LISPRO 1 UNITS: 100 INJECTION, SOLUTION INTRAVENOUS; SUBCUTANEOUS at 16:09

## 2022-09-25 RX ADMIN — METHYLPREDNISOLONE SODIUM SUCCINATE 40 MG: 40 INJECTION, POWDER, FOR SOLUTION INTRAMUSCULAR; INTRAVENOUS at 05:05

## 2022-09-25 RX ADMIN — METHYLPREDNISOLONE SODIUM SUCCINATE 40 MG: 40 INJECTION, POWDER, FOR SOLUTION INTRAMUSCULAR; INTRAVENOUS at 21:34

## 2022-09-25 RX ADMIN — LEVOTHYROXINE SODIUM 100 MCG: 100 TABLET ORAL at 05:05

## 2022-09-25 RX ADMIN — ACETAMINOPHEN 975 MG: 325 TABLET ORAL at 21:32

## 2022-09-25 RX ADMIN — BICTEGRAVIR SODIUM, EMTRICITABINE, AND TENOFOVIR ALAFENAMIDE FUMARATE 1 TABLET: 50; 200; 25 TABLET ORAL at 08:02

## 2022-09-25 RX ADMIN — TRAMADOL HYDROCHLORIDE 50 MG: 50 TABLET, COATED ORAL at 17:18

## 2022-09-25 RX ADMIN — ATORVASTATIN CALCIUM 20 MG: 20 TABLET, FILM COATED ORAL at 15:45

## 2022-09-25 RX ADMIN — LEVALBUTEROL 1.25 MG: 1.25 SOLUTION, CONCENTRATE RESPIRATORY (INHALATION) at 19:29

## 2022-09-25 RX ADMIN — BUDESONIDE 0.5 MG: 0.5 INHALANT ORAL at 07:48

## 2022-09-25 RX ADMIN — GABAPENTIN 800 MG: 400 CAPSULE ORAL at 15:45

## 2022-09-25 RX ADMIN — HYDROXYZINE HYDROCHLORIDE 25 MG: 25 TABLET ORAL at 15:48

## 2022-09-25 RX ADMIN — Medication 3 ML: at 19:29

## 2022-09-25 RX ADMIN — LEVALBUTEROL 1.25 MG: 1.25 SOLUTION, CONCENTRATE RESPIRATORY (INHALATION) at 13:55

## 2022-09-25 RX ADMIN — FUROSEMIDE 40 MG: 10 INJECTION, SOLUTION INTRAVENOUS at 15:45

## 2022-09-25 RX ADMIN — GUAIFENESIN 600 MG: 600 TABLET, EXTENDED RELEASE ORAL at 17:17

## 2022-09-25 RX ADMIN — GABAPENTIN 800 MG: 400 CAPSULE ORAL at 21:32

## 2022-09-25 RX ADMIN — ACETAMINOPHEN 975 MG: 325 TABLET ORAL at 08:04

## 2022-09-25 RX ADMIN — Medication 3 ML: at 13:55

## 2022-09-25 RX ADMIN — FUROSEMIDE 40 MG: 10 INJECTION, SOLUTION INTRAVENOUS at 09:27

## 2022-09-25 RX ADMIN — GUAIFENESIN 600 MG: 600 TABLET, EXTENDED RELEASE ORAL at 08:02

## 2022-09-25 RX ADMIN — Medication 3 ML: at 07:48

## 2022-09-25 RX ADMIN — INSULIN LISPRO 1 UNITS: 100 INJECTION, SOLUTION INTRAVENOUS; SUBCUTANEOUS at 07:26

## 2022-09-25 RX ADMIN — GABAPENTIN 800 MG: 400 CAPSULE ORAL at 08:02

## 2022-09-25 RX ADMIN — Medication 6 MG: at 21:33

## 2022-09-25 RX ADMIN — APIXABAN 5 MG: 5 TABLET, FILM COATED ORAL at 17:17

## 2022-09-25 RX ADMIN — APIXABAN 5 MG: 5 TABLET, FILM COATED ORAL at 08:02

## 2022-09-25 NOTE — PROGRESS NOTES
[facility]  Progress Note - Efrain Abo 1950, 81 y o  female MRN: [id number]  Unit/Bed#: E5 -01 Encounter: [id number]  Primary Care Provider: Jessica Gomez DO   Date and time admitted to hospital: 9/20/2022 11:29 AM    * Acute exacerbation of chronic obstructive pulmonary disease (COPD) ([facility] )  Assessment & Plan  27-year-old female presented with increasing shortness of breath and wheezing  Recently admitted and discharged on 09/18 for COPD and CHF exacerbation    · Reports compliance with diuretics, proBNP was within normal limits, chest x-ray without effusion or congestion  · Patient does have significant wheezing, reports continued tobacco use upon discharge  · Table Solu-Medrol to 40 mg IV q 12 hours can likely transition to prednisone in the next 24-48 hours  · Continue budesonide, Xopenex  · Mucinex p r n  for cough  · P o  with azithromycin    Acute on chronic diastolic heart failure (HCC)  Assessment & Plan  Wt Readings from Last 3 Encounters:   09/25/22 108 kg (238 lb 12 1 oz)   09/18/22 101 kg (223 lb 5 2 oz)   07/31/22 99 2 kg (218 lb 11 1 oz)     · 2D echo 09/13/2022 revealed ejection fraction 27%, diastolic dysfunction type 1  · Patient with lower extremity edema, dyspnea with exertion  · Baseline weight is closer to 220 lb, weight today is 238 lb  · Continue furosemide increased to 40 mg IV b i d   · Monitor I&O, daily weight, fluid restriction    Paroxysmal atrial fibrillation (HCC)  Assessment & Plan  · Continue diltiazem and eliquis    Tobacco use  Assessment & Plan  · Continues to smoke 1-2 cigarettes daily   on cessation    Chronic respiratory failure with hypoxia (HCC)  Assessment & Plan  · On 2-3L chronically    HIV (human immunodeficiency virus infection)   Assessment & Plan  · Continue biktarvy    Type 2 diabetes mellitus ([facility] )  Assessment & Plan  Lab Results   Component Value Date    HGBA1C 6 3 (H) 06/21/2022     · Monitor while patient on IV steroids  · SSI, Accuchecks  · Not on po meds or insulin PTA          VTE Pharmacologic Prophylaxis:   Pharmacologic:  Eliquis    Patient Centered Rounds: I have performed bedside rounds with nursing staff today  Education and Discussions with Family / Patient:  Updated patient's daughter    Time Spent for Care: 20 minutes  More than 50% of total time spent on counseling and coordination of care as described above  Current Length of Stay: 5 day(s)    Current Patient Status: Inpatient   Certification Statement: The patient will continue to require additional inpatient hospital stay due to IV steroids, IV diuretics    Discharge Plan / Estimated Discharge Date: TBD    Code Status: Level 1 - Full Code      Subjective:   Patient seen and examined at bedside, states she feels better than yesterday still gets winded when walking around   Objective:     Vitals:   Temp (24hrs), Av 9 °F (36 6 °C), Min:97 7 °F (36 5 °C), Max:98 2 °F (36 8 °C)    Temp:  [97 7 °F (36 5 °C)-98 2 °F (36 8 °C)] 97 7 °F (36 5 °C)  HR:  [] 98  Resp:  [16] 16  BP: (120-133)/(68-83) 133/78  SpO2:  [97 %-99 %] 97 %  Body mass index is 43 67 kg/m²  Input and Output Summary (last 24 hours): Intake/Output Summary (Last 24 hours) at 2022 1242  Last data filed at 2022 1758  Gross per 24 hour   Intake 120 ml   Output 1250 ml   Net -1130 ml       Physical Exam:    Constitutional: Patient is oriented to person, place and time, no acute distress  HEENT:  Normocephalic, atraumatic  Cardiovascular: Normal S1S2, RRR, No murmurs/rubs/gallops appreciated  Pulmonary:  Bilateral air entry, scattered rhonchi and rales  Abdominal: Soft, Bowel sounds present, Non-tender, Non-distended  Extremities:  Lower extremity pitting edema   Neurological: Cranial nerves II-XII grossly intact, sensation intact, otherwise no focal neurological symptoms     Skin:  Warm, dry    Additional Data:     Labs:    Results from last 7 days   Lab Units 09/25/22  0548 09/23/22  1655 09/21/22  0602 09/20/22  1214   WBC Thousand/uL 11 72* 11 86*   < > 11 71*   HEMOGLOBIN g/dL 9 5* 9 9*   < > 10 5*   HEMATOCRIT % 31 3* 32 1*   < > 34 8   PLATELETS Thousands/uL 253 251   < > 233   NEUTROS PCT %  --   --   --  69   LYMPHS PCT %  --   --   --  19   LYMPHO PCT %  --  5*  --   --    MONOS PCT %  --   --   --  8   MONO PCT %  --  7  --   --    EOS PCT %  --  0  --  2    < > = values in this interval not displayed  Results from last 7 days   Lab Units 09/25/22  0548 09/21/22  0602 09/20/22  1214   POTASSIUM mmol/L 4 7   < > 4 4   CHLORIDE mmol/L 108   < > 104   CO2 mmol/L 29   < > 29   BUN mg/dL 47*   < > 28*   CREATININE mg/dL 1 12   < > 0 84   CALCIUM mg/dL 10 0   < > 10 1   ALK PHOS U/L  --   --  107   ALT U/L  --   --  33   AST U/L  --   --  26    < > = values in this interval not displayed  Results from last 7 days   Lab Units 09/20/22  1214   INR  1 07        I Have Reviewed All Lab Data Listed Above  Invasive Devices  Report    Peripheral Intravenous Line  Duration           Peripheral IV 09/25/22 Dorsal (posterior); Right Hand <1 day                     Recent Cultures (last 7 days):           Last 24 Hours Medication List:   Current Facility-Administered Medications   Medication Dose Route Frequency Provider Last Rate    acetaminophen  975 mg Oral Q6H PRN Artem Dyer MD      apixaban  5 mg Oral BID Artem Dyer MD      atorvastatin  20 mg Oral Daily With Dewayne Gutierrez MD      bictegravir-emtricitab-tenofovir alafenamide  1 tablet Oral Daily Artem Dyer MD      budesonide  0 5 mg Nebulization BID Artem Dyer MD      furosemide  40 mg Intravenous BID (diuretic) Heidi Johnson MD      gabapentin  800 mg Oral TID Artem Dyer MD      guaiFENesin  600 mg Oral BID Artem Dyer MD      hydrOXYzine HCL  25 mg Oral Q6H PRN Heidi Johnson MD      insulin lispro  1-5 Units Subcutaneous TID AC Artem Dyer MD      levalbuterol 1 25 mg Nebulization TID Loreto Santamaria MD      And    sodium chloride  3 mL Nebulization TID Loreto Santamaria MD      levothyroxine  100 mcg Oral Early Morning Loreto Santamaria MD      melatonin  6 mg Oral HS [facility], PA-C      methylPREDNISolone sodium succinate  40 mg Intravenous Q12H [address] Lena Doran MD      montelukast  10 mg Oral HS Loreto Santamaria MD      nicotine  1 patch Transdermal Daily Loreto Santamaria MD      potassium chloride  20 mEq Oral Daily Loreto Santamaria MD      traMADol  50 mg Oral Q6H PRN Lena Doran MD          Today, Patient Was Seen By: Lena Doran MD

## 2022-09-25 NOTE — ASSESSMENT & PLAN NOTE
Wt Readings from Last 3 Encounters:   09/25/22 108 kg (238 lb 12 1 oz)   09/18/22 101 kg (223 lb 5 2 oz)   07/31/22 99 2 kg (218 lb 11 1 oz)     · 2D echo 09/13/2022 revealed ejection fraction 22%, diastolic dysfunction type 1  · Patient with lower extremity edema, dyspnea with exertion  · Baseline weight is closer to 220 lb, weight today is 238 lb  · Continue furosemide increased to 40 mg IV b i d   · Monitor I&O, daily weight, fluid restriction

## 2022-09-25 NOTE — ASSESSMENT & PLAN NOTE
68-year-old female presented with increasing shortness of breath and wheezing  Recently admitted and discharged on 09/18 for COPD and CHF exacerbation    · Reports compliance with diuretics, proBNP was within normal limits, chest x-ray without effusion or congestion  · Patient does have significant wheezing, reports continued tobacco use upon discharge  · Table Solu-Medrol to 40 mg IV q 12 hours can likely transition to prednisone in the next 24-48 hours  · Continue budesonide, Xopenex  · Mucinex p r n  for cough  · P o  with azithromycin

## 2022-09-26 PROBLEM — D64.9 ANEMIA: Status: ACTIVE | Noted: 2022-09-26

## 2022-09-26 LAB
ALBUMIN SERPL BCP-MCNC: 3.5 G/DL (ref 3.5–5)
ANION GAP SERPL CALCULATED.3IONS-SCNC: 12 MMOL/L (ref 4–13)
BUN SERPL-MCNC: 47 MG/DL (ref 5–25)
CALCIUM SERPL-MCNC: 10.4 MG/DL (ref 8.3–10.1)
CHLORIDE SERPL-SCNC: 102 MMOL/L (ref 96–108)
CO2 SERPL-SCNC: 30 MMOL/L (ref 21–32)
CREAT SERPL-MCNC: 1.17 MG/DL (ref 0.6–1.3)
ERYTHROCYTE [DISTWIDTH] IN BLOOD BY AUTOMATED COUNT: 16.1 % (ref 11.6–15.1)
GFR SERPL CREATININE-BSD FRML MDRD: 47 ML/MIN/1.73SQ M
GLUCOSE SERPL-MCNC: 115 MG/DL (ref 65–140)
GLUCOSE SERPL-MCNC: 133 MG/DL (ref 65–140)
GLUCOSE SERPL-MCNC: 143 MG/DL (ref 65–140)
GLUCOSE SERPL-MCNC: 166 MG/DL (ref 65–140)
GLUCOSE SERPL-MCNC: 203 MG/DL (ref 65–140)
HCT VFR BLD AUTO: 33.3 % (ref 34.8–46.1)
HGB BLD-MCNC: 10.3 G/DL (ref 11.5–15.4)
MCH RBC QN AUTO: 28.9 PG (ref 26.8–34.3)
MCHC RBC AUTO-ENTMCNC: 30.9 G/DL (ref 31.4–37.4)
MCV RBC AUTO: 94 FL (ref 82–98)
NRBC BLD AUTO-RTO: 0 /100 WBCS
PLATELET # BLD AUTO: 314 THOUSANDS/UL (ref 149–390)
PMV BLD AUTO: 8.9 FL (ref 8.9–12.7)
POTASSIUM SERPL-SCNC: 4.8 MMOL/L (ref 3.5–5.3)
PROT SERPL-MCNC: 7.2 G/DL (ref 6.4–8.4)
RBC # BLD AUTO: 3.56 MILLION/UL (ref 3.81–5.12)
SODIUM SERPL-SCNC: 144 MMOL/L (ref 135–147)
WBC # BLD AUTO: 12.96 THOUSAND/UL (ref 4.31–10.16)

## 2022-09-26 PROCEDURE — 82948 REAGENT STRIP/BLOOD GLUCOSE: CPT

## 2022-09-26 PROCEDURE — 82040 ASSAY OF SERUM ALBUMIN: CPT | Performed by: INTERNAL MEDICINE

## 2022-09-26 PROCEDURE — 84155 ASSAY OF PROTEIN SERUM: CPT | Performed by: INTERNAL MEDICINE

## 2022-09-26 PROCEDURE — 99222 1ST HOSP IP/OBS MODERATE 55: CPT | Performed by: INTERNAL MEDICINE

## 2022-09-26 PROCEDURE — 94640 AIRWAY INHALATION TREATMENT: CPT

## 2022-09-26 PROCEDURE — 99232 SBSQ HOSP IP/OBS MODERATE 35: CPT | Performed by: INTERNAL MEDICINE

## 2022-09-26 PROCEDURE — 94760 N-INVAS EAR/PLS OXIMETRY 1: CPT

## 2022-09-26 PROCEDURE — 80048 BASIC METABOLIC PNL TOTAL CA: CPT | Performed by: INTERNAL MEDICINE

## 2022-09-26 PROCEDURE — 85027 COMPLETE CBC AUTOMATED: CPT | Performed by: INTERNAL MEDICINE

## 2022-09-26 RX ORDER — LIDOCAINE 50 MG/G
2 PATCH TOPICAL DAILY
Status: DISCONTINUED | OUTPATIENT
Start: 2022-09-26 | End: 2022-09-29 | Stop reason: HOSPADM

## 2022-09-26 RX ORDER — FUROSEMIDE 10 MG/ML
10 SYRINGE (ML) INJECTION CONTINUOUS
Status: DISCONTINUED | OUTPATIENT
Start: 2022-09-26 | End: 2022-09-27

## 2022-09-26 RX ORDER — DILTIAZEM HYDROCHLORIDE 120 MG/1
120 CAPSULE, COATED, EXTENDED RELEASE ORAL DAILY
Status: DISCONTINUED | OUTPATIENT
Start: 2022-09-26 | End: 2022-09-29 | Stop reason: HOSPADM

## 2022-09-26 RX ORDER — FUROSEMIDE 10 MG/ML
60 INJECTION INTRAMUSCULAR; INTRAVENOUS
Status: DISCONTINUED | OUTPATIENT
Start: 2022-09-26 | End: 2022-09-26

## 2022-09-26 RX ADMIN — TRAMADOL HYDROCHLORIDE 50 MG: 50 TABLET, COATED ORAL at 05:48

## 2022-09-26 RX ADMIN — Medication 3 ML: at 13:51

## 2022-09-26 RX ADMIN — INSULIN LISPRO 1 UNITS: 100 INJECTION, SOLUTION INTRAVENOUS; SUBCUTANEOUS at 08:36

## 2022-09-26 RX ADMIN — GUAIFENESIN 600 MG: 600 TABLET, EXTENDED RELEASE ORAL at 08:32

## 2022-09-26 RX ADMIN — HYDROXYZINE HYDROCHLORIDE 25 MG: 25 TABLET ORAL at 11:49

## 2022-09-26 RX ADMIN — POTASSIUM CHLORIDE 20 MEQ: 1500 TABLET, EXTENDED RELEASE ORAL at 08:32

## 2022-09-26 RX ADMIN — TRAMADOL HYDROCHLORIDE 50 MG: 50 TABLET, COATED ORAL at 11:49

## 2022-09-26 RX ADMIN — LEVOTHYROXINE SODIUM 100 MCG: 100 TABLET ORAL at 05:18

## 2022-09-26 RX ADMIN — Medication 6 MG: at 22:48

## 2022-09-26 RX ADMIN — APIXABAN 5 MG: 5 TABLET, FILM COATED ORAL at 17:14

## 2022-09-26 RX ADMIN — LEVALBUTEROL 1.25 MG: 1.25 SOLUTION, CONCENTRATE RESPIRATORY (INHALATION) at 08:13

## 2022-09-26 RX ADMIN — METHYLPREDNISOLONE SODIUM SUCCINATE 40 MG: 40 INJECTION, POWDER, FOR SOLUTION INTRAMUSCULAR; INTRAVENOUS at 23:06

## 2022-09-26 RX ADMIN — BUDESONIDE 0.5 MG: 0.5 INHALANT ORAL at 19:32

## 2022-09-26 RX ADMIN — APIXABAN 5 MG: 5 TABLET, FILM COATED ORAL at 08:32

## 2022-09-26 RX ADMIN — GABAPENTIN 800 MG: 400 CAPSULE ORAL at 08:32

## 2022-09-26 RX ADMIN — ATORVASTATIN CALCIUM 20 MG: 20 TABLET, FILM COATED ORAL at 17:14

## 2022-09-26 RX ADMIN — LIDOCAINE 2 PATCH: 50 PATCH CUTANEOUS at 13:20

## 2022-09-26 RX ADMIN — FUROSEMIDE 60 MG: 10 INJECTION, SOLUTION INTRAVENOUS at 17:14

## 2022-09-26 RX ADMIN — GUAIFENESIN 600 MG: 600 TABLET, EXTENDED RELEASE ORAL at 17:14

## 2022-09-26 RX ADMIN — LEVALBUTEROL 1.25 MG: 1.25 SOLUTION, CONCENTRATE RESPIRATORY (INHALATION) at 19:32

## 2022-09-26 RX ADMIN — BICTEGRAVIR SODIUM, EMTRICITABINE, AND TENOFOVIR ALAFENAMIDE FUMARATE 1 TABLET: 50; 200; 25 TABLET ORAL at 08:36

## 2022-09-26 RX ADMIN — FUROSEMIDE 40 MG: 10 INJECTION, SOLUTION INTRAVENOUS at 08:32

## 2022-09-26 RX ADMIN — MONTELUKAST SODIUM 10 MG: 10 TABLET, COATED ORAL at 22:48

## 2022-09-26 RX ADMIN — GABAPENTIN 800 MG: 400 CAPSULE ORAL at 22:48

## 2022-09-26 RX ADMIN — DILTIAZEM HYDROCHLORIDE 120 MG: 120 CAPSULE, COATED, EXTENDED RELEASE ORAL at 13:20

## 2022-09-26 RX ADMIN — GABAPENTIN 800 MG: 400 CAPSULE ORAL at 17:14

## 2022-09-26 RX ADMIN — LEVALBUTEROL 1.25 MG: 1.25 SOLUTION, CONCENTRATE RESPIRATORY (INHALATION) at 13:51

## 2022-09-26 RX ADMIN — HYDROXYZINE HYDROCHLORIDE 25 MG: 25 TABLET ORAL at 05:45

## 2022-09-26 RX ADMIN — Medication 10 MG/HR: at 18:30

## 2022-09-26 RX ADMIN — BUDESONIDE 0.5 MG: 0.5 INHALANT ORAL at 08:13

## 2022-09-26 RX ADMIN — ACETAMINOPHEN 975 MG: 325 TABLET ORAL at 10:09

## 2022-09-26 RX ADMIN — Medication 3 ML: at 19:32

## 2022-09-26 RX ADMIN — METHYLPREDNISOLONE SODIUM SUCCINATE 40 MG: 40 INJECTION, POWDER, FOR SOLUTION INTRAMUSCULAR; INTRAVENOUS at 08:32

## 2022-09-26 NOTE — ASSESSMENT & PLAN NOTE
· Patient appears to have a chronic anemia with a hemoglobin baseline of 9-10  · Most likely secondary to anemia of chronic disease  · Hemoglobin at her baseline

## 2022-09-26 NOTE — ASSESSMENT & PLAN NOTE
77-year-old female presented with increasing shortness of breath and wheezing  Recently admitted and discharged on 09/18 for COPD and CHF exacerbation, re-presented with dyspnea    · Reports compliance with diuretics, proBNP was within normal limits, chest x-ray without effusion or congestion  · There is no evidence of acute congestive heart failure on admission, and patient was diagnosed with a COPD exacerbation  · Patient was noted to have significant wheezing and dyspnea  · Was started on Solu-Medrol IV, and budesonide, Xopenex  · Mucinex p r n  for cough  · Procalcitonin negative x2, no evidence of bacterial infection  · Chest x-ray without focal infiltrate  · P o  azithromycin for acute COPD exacerbation

## 2022-09-26 NOTE — ASSESSMENT & PLAN NOTE
Wt Readings from Last 3 Encounters:   09/25/22 108 kg (238 lb 12 1 oz)   09/18/22 101 kg (223 lb 5 2 oz)   07/31/22 99 2 kg (218 lb 11 1 oz)     · 2D echo 09/13/2022 revealed ejection fraction 70%, mild-moderate concentric left ventricular hypertrophy, diastolic dysfunction type 1  · Patient is noted to have evidence of volume overload, lower extremity edema, dyspnea with exertion, and wake  · Baseline weight is closer to 220 lb, inpatient weight up to 238 lb  · Home dose of Lasix was 20 mg b i d   · furosemide increased to 40 mg IV b i d  recently- however no significant diuresis thus far  · Net +1 6L  · acute exacerbation of chronic diastolic congestive heart failure likely due to IV steroids  Staff also noted noncompliance with low salt diet    · During her previous admission earlier this month patient required a Lasix infusion  · Will increase IV Lasix to 60mg iv bid and confer with Cardiology

## 2022-09-26 NOTE — ASSESSMENT & PLAN NOTE
Lab Results   Component Value Date    HGBA1C 6 3 (H) 06/21/2022     · Patient has a history of diabetes type 2, without long-term use of insulin, without complication  · Is on diet therapy at  · Monitor closely while patient on IV steroids  · Continue SSI, Accuchecks

## 2022-09-26 NOTE — CONSULTS
Consultation - Cardiology   Brendan Gilmore 81 y o  female MRN: [id number]  Unit/Bed#: E5 -01 Encounter: [id number]    Physician Requesting Consult: Hammad Jorgensen MD  Reason for Consult / Principal Problem:     Shortness of breath  Consulting physician:  Jose R Hdz MD      Assessment/Plan     Assessment:     Patient's predominant problem is most likely asthma/COPD   Mild lower extremity edema could be secondary to right heart failure from a combination of asthma/COPD and hypoxia causing pulmonary hypertension   Low albumin may also be contributing to the lower extremity edema   It is hard to believe the patient has significant left heart failure with a normal NT-BNP    Plan:     Will place patient on a furosemide infusion for 2 days to see if it is of any help to her  I am not optimistic that it will make much difference   Recommend pulmonary consultation   Repeat albumin and total protein ordered   Monitor electrolytes and renal function daily    History of Present Illness   HPI: Brendan Gilmore is a 81y o  year old female who presents with cough and shortness of breath on 09/20/2022 readmission from prior discharge 2 days before  She is mildly tachypneic at rest   On examination she has wheezes and rhonchi in all lung fields without rales  She has 1+ pretibial edema  She has a lifelong history of asthma and has a 45 year history of cigarette smoking  Her echocardiogram demonstrates normal left ventricular systolic function, EF 24% with mild grade 1 diastolic dysfunction  There is mild pulmonary hypertension  Her chest x-ray demonstrates no congestive heart failure  CT of the chest demonstrates no congestive heart failure but does suggest cardiomegaly  However, echocardiogram demonstrates all chamber size to be normal   Wbc's are mildly elevated  NT BNP is normal at 55  Serum albumin from several weeks ago was mildly decreased at 3 0      Patient Active Problem List    Diagnosis Date Noted    Anemia 09/26/2022    Obesity (BMI 35 0-39 9 without comorbidity) 09/09/2022    Acute on chronic diastolic heart failure ([place] [address] ) 06/24/2022    PASHA on CPAP 05/27/2020    Acute exacerbation of chronic obstructive pulmonary disease (COPD) ([place] [address] ) 05/27/2020    Fall at home, initial encounter 05/27/2020    Paroxysmal atrial fibrillation ([place] [address] ) 05/27/2020    Hyperlipidemia 04/07/2018    Thoracic disc herniation 04/04/2018    Lumbar stenosis 04/04/2018    Urinary frequency 04/04/2018    Ambulatory dysfunction     Back pain 03/30/2018    Hip pain 03/30/2018    Shortness of breath 01/31/2017    Hypertension     Type 2 diabetes mellitus with hyperglycemia (HCC)     Chronic respiratory failure with hypoxia (HCC)     Bipolar 1 disorder ([place] [address] )     Tobacco use     Arthritis 03/24/2016    Type 2 diabetes mellitus without complication, without long-term current use of insulin ([place] [address] ) 03/24/2016    HIV (human immunodeficiency virus infection)  03/24/2016    Osteoarthritis 03/24/2016       Vitals: /74   Pulse 100   Temp 98 6 °F (37 °C)   Resp 15   Ht 5' 2" (1 575 m)   Wt 108 kg (238 lb 12 1 oz)   LMP 04/01/2013 (Approximate)   SpO2 97%   BMI 43 67 kg/m²   PREVIOUS WEIGHTS:   Wt Readings from Last 5 Encounters:   09/25/22 108 kg (238 lb 12 1 oz)   09/18/22 101 kg (223 lb 5 2 oz)   07/31/22 99 2 kg (218 lb 11 1 oz)   06/21/22 100 kg (220 lb 7 4 oz)   05/27/20 106 kg (233 lb 11 oz)       Labs/Data:        Results from last 7 days   Lab Units 09/26/22  1039 09/25/22  0548 09/23/22  1655   WBC Thousand/uL 12 96* 11 72* 11 86*   HEMOGLOBIN g/dL 10 3* 9 5* 9 9*   HEMATOCRIT % 33 3* 31 3* 32 1*   MCV fL 94 94 93   MCH pg 28 9 28 6 28 8   MCHC g/dL 30 9* 30 4* 30 8*   PLATELETS Thousands/uL 314 253 251     Results from last 7 days   Lab Units 09/26/22  1039 09/25/22  0548 09/23/22  1655   EGFR ml/min/1 73sq m 47 49 52   SODIUM mmol/L 144 144 141   POTASSIUM mmol/L 4 8 4 7 4 7   CHLORIDE mmol/L 102 108 105   CO2 mmol/L 30 29 26   BUN mg/dL 47* 47* 39*   CREATININE mg/dL 1 17 1 12 1 07     Results from last 7 days   Lab Units 09/26/22  1039 09/25/22  0548 09/23/22  1655 09/21/22  0602 09/20/22  1214   CALCIUM mg/dL 10 4* 10 0 10 0   < > 10 1   AST U/L  --   --   --   --  26   ALT U/L  --   --   --   --  33   ALK PHOS U/L  --   --   --   --  107   MAGNESIUM mg/dL  --   --   --   --  1 9    < > = values in this interval not displayed           Lab Results   Component Value Date    NTBNP 55 09/20/2022    NTBNP 154 (H) 09/11/2022    NTBNP 144 (H) 09/08/2022     Lab Results   Component Value Date    CHOLESTEROL 130 09/12/2022    TRIG 82 09/12/2022    HDL 70 09/12/2022    LDLCALC 44 09/12/2022    HGBA1C 6 3 (H) 06/21/2022     Results from last 7 days   Lab Units 09/20/22  1214   INR  1 07   PROTIME seconds 13 9          Current Facility-Administered Medications:     acetaminophen (TYLENOL) tablet 975 mg, 975 mg, Oral, Q6H PRN, Gabrielle Benton MD, 975 mg at 09/26/22 1009    apixaban (ELIQUIS) tablet 5 mg, 5 mg, Oral, BID, Gabrielle Benton MD, 5 mg at 09/26/22 1714    atorvastatin (LIPITOR) tablet 20 mg, 20 mg, Oral, Daily With Glenis Walton MD, 20 mg at 09/26/22 1714    bictegravir-emtricitab-tenofovir alafenamide (BIKTARVY) -25 MG tablet 1 tablet, 1 tablet, Oral, Daily, Gabrielle Benton MD, 1 tablet at 09/26/22 0836    budesonide (PULMICORT) inhalation solution 0 5 mg, 0 5 mg, Nebulization, BID, Gabrielle Benton MD, 0 5 mg at 09/26/22 0813    diltiazem (CARDIZEM CD) 24 hr capsule 120 mg, 120 mg, Oral, Daily, Constantine Ferguson MD, 120 mg at 09/26/22 1320    furosemide (LASIX) injection 60 mg, 60 mg, Intravenous, BID (diuretic), Constantine Ferguson MD, 60 mg at 09/26/22 1714    gabapentin (NEURONTIN) capsule 800 mg, 800 mg, Oral, TID, Gabrielle Benton MD, 800 mg at 09/26/22 1714    guaiFENesin (MUCINEX) 12 hr tablet 600 mg, 600 mg, Oral, BID, Gabrielle Benton MD, 600 mg at 09/26/22 1714    hydrOXYzine HCL (ATARAX) tablet 25 mg, 25 mg, Oral, Q6H PRN, Shilpi Hay MD, 25 mg at 09/26/22 1149    insulin lispro (HumaLOG) 100 units/mL subcutaneous injection 1-5 Units, 1-5 Units, Subcutaneous, TID AC, 1 Units at 09/26/22 0836 **AND** Fingerstick Glucose (POCT), , , TID AC, Shad Valdes MD    [facility]) inhalation solution 1 25 mg, 1 25 mg, Nebulization, TID, 1 25 mg at 09/26/22 1351 **AND** sodium chloride 0 9 % inhalation solution 3 mL, 3 mL, Nebulization, TID, Rosaura Blevins MD, 3 mL at 09/26/22 1351    levothyroxine tablet 100 mcg, 100 mcg, Oral, Early Morning, Rosaura Blevins MD, 100 mcg at 09/26/22 0518    lidocaine (LIDODERM) 5 % patch 2 patch, 2 patch, Topical, Daily, Sharif Logan MD, 2 patch at 09/26/22 1320    melatonin tablet 6 mg, 6 mg, Oral, HS, Kathy Rawls PA-C, 6 mg at 09/25/22 2133    methylPREDNISolone sodium succinate (Solu-MEDROL) injection 40 mg, 40 mg, Intravenous, Q12H [facility], Shilpi Hay MD, 40 mg at 09/26/22 3939    montelukast (SINGULAIR) tablet 10 mg, 10 mg, Oral, HS, Rosaura Blevins MD, 10 mg at 09/25/22 2132    nicotine (NICODERM CQ) 21 mg/24 hr TD 24 hr patch 1 patch, 1 patch, Transdermal, Daily, Rosaura Blevins MD    potassium chloride (K-DUR,KLOR-CON) CR tablet 20 mEq, 20 mEq, Oral, Daily, Rosaura Blevins MD, 20 mEq at 09/26/22 7897    traMADol (ULTRAM) tablet 50 mg, 50 mg, Oral, Q6H PRN, Shilpi Hay MD, 50 mg at 09/26/22 1149  Allergies   Allergen Reactions    Lisinopril Angioedema    Prozac [Fluoxetine Hcl] Rash    Sulfa Antibiotics Itching    Quinine        Historical Information   Past Medical History:   Diagnosis Date    Asthma     Bipolar 1 disorder ([address] )     Cardiac disease     COPD (chronic obstructive pulmonary disease) ([place] [address] )     Diabetes mellitus ([place] [address] )     Disease of thyroid gland     HIV (human immunodeficiency virus infection) ([place] [address] ) 03/24/2016    Hypertension     Osteoarthritis     Tobacco abuse      Past Surgical History:   Procedure Laterality Date    HERNIA REPAIR      LUMBAR FUSION N/A 4/10/2018    Procedure: T9/10 discectomy with T9-T11 fusion;  Surgeon: Maureen Steward MD;  [facility];  Service: Neurosurgery    THYROIDECTOMY       Past Surgical History:   Procedure Laterality Date    HERNIA REPAIR      LUMBAR FUSION N/A 4/10/2018    Procedure: T9/10 discectomy with T9-T11 fusion;  Surgeon: Maureen Steward MD;  [facility];  Service: Neurosurgery    THYROIDECTOMY       Social History:  Social History     Substance and Sexual Activity   Alcohol Use Yes     Social History     Substance and Sexual Activity   Drug Use No    Comment: former IV drug user     Social History     Tobacco Use   Smoking Status Current Every Day Smoker    Packs/day: 0 20    Years: 45 00    Pack years: 9 00    Types: Cigarettes   Smokeless Tobacco Never Used   Tobacco Comment    Currently smoking 1-2 cigarettes a day     Social History     Socioeconomic History    Marital status:      Spouse name: Not on file    Number of children: Not on file    Years of education: Not on file    Highest education level: Not on file   Occupational History    Not on file   Tobacco Use    Smoking status: Current Every Day Smoker     Packs/day: 0 20     Years: 45 00     Pack years: 9 00     Types: Cigarettes    Smokeless tobacco: Never Used    Tobacco comment: Currently smoking 1-2 cigarettes a day   Substance and Sexual Activity    Alcohol use: Yes    Drug use: No     Comment: former IV drug user    Sexual activity: Not on file   Other Topics Concern    Not on file   Social History Narrative    Not on file     Social Determinants of Health     Financial Resource Strain: Not on file   Food Insecurity: No Food Insecurity    Worried About Running Out of Food in the Last Year: Never true    Diana of Food in the Last Year: Never true   Transportation Needs: No Transportation Needs    Lack of Transportation (Medical):  No    Lack of Transportation (Non-Medical): No   Physical Activity: Not on file   Stress: Not on file   Social Connections: Not on file   Intimate Partner Violence: Not on file   Housing Stability: Low Risk     Unable to Pay for Housing in the Last Year: No    Number of Places Lived in the Last Year: 1    Unstable Housing in the Last Year: No      Family History:   family history includes Diabetes in her father; No Known Problems in her mother  Intake/Output Summary (Last 24 hours) at 9/26/2022 1714  Last data filed at 9/26/2022 0537  Gross per 24 hour   Intake 600 ml   Output --   Net 600 ml       Invasive Devices  Report    Peripheral Intravenous Line  Duration           Peripheral IV 09/25/22 Dorsal (posterior); Right Hand 1 day                Review of Systems   Constitutional: Negative  HENT: Negative  Respiratory: Positive for shortness of breath and wheezing  Negative for cough, choking and chest tightness  Cardiovascular: Positive for leg swelling  Negative for chest pain  Gastrointestinal: Negative  Endocrine: Negative  Genitourinary: Negative  Musculoskeletal: Negative  Skin: Negative  Allergic/Immunologic: Negative  Neurological: Negative  Hematological: Negative  Psychiatric/Behavioral: Negative  Physical Exam  Constitutional:       General: She is not in acute distress  Appearance: She is well-developed  HENT:      Head: Normocephalic and atraumatic  Neck:      Thyroid: No thyromegaly  Vascular: No carotid bruit or JVD  Trachea: No tracheal deviation  Cardiovascular:      Rate and Rhythm: Normal rate and regular rhythm  Pulses: Normal pulses  Heart sounds: Normal heart sounds  No murmur heard  No friction rub  No gallop  Pulmonary:      Effort: Pulmonary effort is normal  No respiratory distress  Breath sounds: Examination of the right-upper field reveals wheezing and rhonchi  Examination of the left-upper field reveals wheezing and rhonchi  Examination of the right-middle field reveals wheezing and rhonchi  Examination of the left-middle field reveals wheezing and rhonchi  Examination of the right-lower field reveals wheezing and rhonchi  Examination of the left-lower field reveals wheezing and rhonchi  Wheezing and rhonchi present  No rales  Chest:      Chest wall: No tenderness  Abdominal:      General: Bowel sounds are normal  There is no distension  Palpations: Abdomen is soft  Tenderness: There is no abdominal tenderness  Musculoskeletal:         General: Normal range of motion  Cervical back: Normal range of motion and neck supple  Right lower le+ Pitting Edema present  Left lower le+ Pitting Edema present  Skin:     General: Skin is warm and dry  Neurological:      General: No focal deficit present  Mental Status: She is alert and oriented to person, place, and time  Gait: Gait normal    Psychiatric:         Mood and Affect: Mood normal          Behavior: Behavior normal          Thought Content: Thought content normal          Judgment: Judgment normal            ======================================================     Imaging:   I have personally reviewed pertinent reports  EKG:  Normal sinus rhythm, left anterior fascicular block, nonspecific T-wave abnormality  Abnormal EKG  Portions of the record may have been created with voice recognition software  Occasional wrong word or "sound a like" substitutions may have occurred due to the inherent limitations of voice recognition software  Read the chart carefully and recognize, using context, where substitutions have occurred      Ignacia Coats MD

## 2022-09-26 NOTE — PROGRESS NOTES
[address] [facility]  Progress Note - Abebe Persaud 1950, 81 y o  female MRN: [id number]  Unit/Bed#: E5 -01 Encounter: [id number]  Primary Care Provider: Zach Machuca DO   Date and time admitted to hospital: 9/20/2022 11:29 AM    * Acute exacerbation of chronic obstructive pulmonary disease (COPD) ([facility] [address] )  Assessment & Plan  31-year-old female presented with increasing shortness of breath and wheezing  Recently admitted and discharged on 09/18 for COPD and CHF exacerbation, re-presented with dyspnea    · Reports compliance with diuretics, proBNP was within normal limits, chest x-ray without effusion or congestion  · There is no evidence of acute congestive heart failure on admission, and patient was diagnosed with a COPD exacerbation  · Patient was noted to have significant wheezing and dyspnea  · Was started on Solu-Medrol IV, and budesonide, Xopenex  · Mucinex p r n  for cough  · Procalcitonin negative x2, no evidence of bacterial infection  · Chest x-ray without focal infiltrate  · P o  azithromycin for acute COPD exacerbation    Acute on chronic diastolic heart failure (HCC)  Assessment & Plan  Wt Readings from Last 3 Encounters:   09/25/22 108 kg (238 lb 12 1 oz)   09/18/22 101 kg (223 lb 5 2 oz)   07/31/22 99 2 kg (218 lb 11 1 oz)     · 2D echo 09/13/2022 revealed ejection fraction 70%, mild-moderate concentric left ventricular hypertrophy, diastolic dysfunction type 1  · Patient is noted to have evidence of volume overload, lower extremity edema, dyspnea with exertion, and wake  · Baseline weight is closer to 220 lb, inpatient weight up to 238 lb  · Home dose of Lasix was 20 mg b i d   · furosemide increased to 40 mg IV b i d  recently- however no significant diuresis thus far  · Net +1 6L  · acute exacerbation of chronic diastolic congestive heart failure likely due to IV steroids  Staff also noted noncompliance with low salt diet    · During her previous admission earlier this month patient required a Lasix infusion  · Will increase IV Lasix to 60mg iv bid and confer with Cardiology    Paroxysmal atrial fibrillation [facility])  Assessment & Plan  · Patient has paroxysmal atrial fibrillation  · Rate controlled home diltiazem:  Home dose was 180 mg daily, decreased to 120 mg daily due to borderline blood pressure   · On chronic anticoagulation with eliquis    Chronic respiratory failure with hypoxia (HCC)  Assessment & Plan  · Patient has a history of chronic respiratory failure with hypoxia secondary to underlying COPD  · On 2-3L oxygen chronically  · Currently at her baseline    Anemia  Assessment & Plan  · Patient appears to have a chronic anemia with a hemoglobin baseline of 9-10  · Most likely secondary to anemia of chronic disease  · Hemoglobin at her baseline    PASHA on CPAP  Assessment & Plan  Continue CPAP q h s  Tobacco use  Assessment & Plan  · Continues to smoke 1-2 cigarettes daily  · Nicotine patch offered  · Counseled on cessation    HIV (human immunodeficiency virus infection)   Assessment & Plan  · Continue biktarvy, and outpatient follow-up    Type 2 diabetes mellitus without complication, without long-term current use of insulin ([facility] [address] )  Assessment & Plan  Lab Results   Component Value Date    HGBA1C 6 3 (H) 06/21/2022     · Patient has a history of diabetes type 2, without long-term use of insulin, without complication  · Is on diet therapy at  · Monitor closely while patient on IV steroids  · Continue SSI, Accuchecks            Family:  Called daughter Blanca Daniels x2-  VM not set up  Unable to leave message    No other contacts listed    VTE Pharmacologic Prophylaxis: RX contraindicated due to: Eliquis  VTE Mechanical Prophylaxis: sequential compression device    Discussed with patient's nurse  Discussed with   Communicated consult to cardiology team    Certification Statement: The patient will continue to require additional inpatient hospital stay due to need for further acute intervention for congestive heart failure on IV Lasix    Status: inpatient     ===================================================================    Subjective:  Patient notes she still has shortness of breath, and dyspnea with exertion  Notes lower extremity edema increased over her baseline  She notes she is trying to keep her legs elevated as much as possible  She states she is passing copious amounts of urine    Patient relates she is not certain if she was taking any Lasix at home  She notes her dose changes and she was recently told to stop her Lasix  She notes pain in her back at the site of her previous vertebral compression fracture  She notes in the past Lidoderm patches had improved her pain and she requests a dose now  She denies any pain anywhere else  No chest pain  She denies any abdominal pain  No nausea, vomiting, diarrhea constipation  Denies any dizziness or lightheadedness  She is tolerating p o  Physical Exam:   Temp:  [97 3 °F (36 3 °C)-98 3 °F (36 8 °C)] 98 2 °F (36 8 °C)  HR:  [85-93] 92  Resp:  [15] 15  BP: (120-135)/(78-85) 135/85    Gen:  Pleasant, non-tachypnic, non-dyspnic at rest   Sitting up in her recliner with legs dangling down  Becomes dyspneic after speaking in multiple sentences  Heart:  Irregularly irregular  Not tachycardic , S1S2 present, 1/6 systolic ejection murmur  No rub or gallop  Lungs:  Bibasilar rhonchi and crackles  No wheezing    No accessory muscle use or respiratory distress  Abd: soft, non-tender, non-distended  NABS, no guarding, rebound or peritoneal signs  Extremities: no clubbing, cyanosis  1+ bilateral lower extremity and bilateral pedal pitting edema  2+pedal pulses bilaterally  Neuro: awake, alert  Interactive  Fluent speech  Moving all 4 extremities  Cooperates with exam   Skin: warm and dry: no petechiae, purpura and rash      LABS:   Results from last 7 days   Lab Units 09/26/22  1039 09/25/22  0566 09/23/22  1655   WBC Thousand/uL 12 96* 11 72* 11 86*   HEMOGLOBIN g/dL 10 3* 9 5* 9 9*   HEMATOCRIT % 33 3* 31 3* 32 1*   PLATELETS Thousands/uL 314 253 251     Results from last 7 days   Lab Units 09/26/22  1039 09/25/22  0548 09/23/22  1655   POTASSIUM mmol/L 4 8 4 7 4 7   CHLORIDE mmol/L 102 108 105   CO2 mmol/L 30 29 26   BUN mg/dL 47* 47* 39*   CREATININE mg/dL 1 17 1 12 1 07   CALCIUM mg/dL 10 4* 10 0 10 0       Hospital Data:    9/24 chest x-ray:  No acute cardiopulmonary disease    9/20:  Chest x-ray  No acute cardiopulmonary disease    Microbiology  9/20:  Negative COVID, influenza, RSV    ---------------------------------------------------------------------------------------------------------------  This note has been constructed using a voice recognition system

## 2022-09-26 NOTE — ASSESSMENT & PLAN NOTE
· Patient has a history of chronic respiratory failure with hypoxia secondary to underlying COPD  · On 2-3L oxygen chronically  · Currently at her baseline

## 2022-09-26 NOTE — PLAN OF CARE
Problem: PAIN - ADULT  Goal: Verbalizes/displays adequate comfort level or baseline comfort level  Description: Interventions:  - Encourage patient to monitor pain and request assistance  - Assess pain using appropriate pain scale  - Administer analgesics based on type and severity of pain and evaluate response  - Implement non-pharmacological measures as appropriate and evaluate response  - Consider cultural and social influences on pain and pain management  - Notify physician/advanced practitioner if interventions unsuccessful or patient reports new pain  Outcome: Progressing     Problem: DISCHARGE PLANNING  Goal: Discharge to home or other facility with appropriate resources  Description: INTERVENTIONS:  - Identify barriers to discharge w/patient and caregiver  - Arrange for needed discharge resources and transportation as appropriate  - Identify discharge learning needs (meds, wound care, etc )  - Arrange for interpretive services to assist at discharge as needed  - Refer to Case Management Department for coordinating discharge planning if the patient needs post-hospital services based on physician/advanced practitioner order or complex needs related to functional status, cognitive ability, or social support system  Outcome: Progressing     Problem: RESPIRATORY - ADULT  Goal: Achieves optimal ventilation and oxygenation  Description: INTERVENTIONS:  - Assess for changes in respiratory status  - Assess for changes in mentation and behavior  - Position to facilitate oxygenation and minimize respiratory effort  - Oxygen administered by appropriate delivery if ordered  - Initiate smoking cessation education as indicated  - Encourage broncho-pulmonary hygiene including cough, deep breathe, Incentive Spirometry  - Assess the need for suctioning and aspirate as needed  - Assess and instruct to report SOB or any respiratory difficulty  - Respiratory Therapy support as indicated  Outcome: Progressing     Problem: METABOLIC, FLUID AND ELECTROLYTES - ADULT  Goal: Fluid balance maintained  Description: INTERVENTIONS:  - Monitor labs   - Monitor I/O and WT  - Instruct patient on fluid and nutrition as appropriate  - Assess for signs & symptoms of volume excess or deficit  Outcome: Progressing     Problem: Nutrition/Hydration-ADULT  Goal: Nutrient/Hydration intake appropriate for improving, restoring or maintaining nutritional needs  Description: Monitor and assess patient's nutrition/hydration status for malnutrition  Collaborate with interdisciplinary team and initiate plan and interventions as ordered  Monitor patient's weight and dietary intake as ordered or per policy  Utilize nutrition screening tool and intervene as necessary  Determine patient's food preferences and provide high-protein, high-caloric foods as appropriate       INTERVENTIONS:  - Monitor oral intake, urinary output, labs, and treatment plans  - Assess nutrition and hydration status and recommend course of action  - Evaluate amount of meals eaten  - Assist patient with eating if necessary   - Allow adequate time for meals  - Recommend/ encourage appropriate diets, oral nutritional supplements, and vitamin/mineral supplements  - Order, calculate, and assess calorie counts as needed  - Recommend, monitor, and adjust tube feedings and TPN/PPN based on assessed needs  - Assess need for intravenous fluids  - Provide specific nutrition/hydration education as appropriate  - Include patient/family/caregiver in decisions related to nutrition  Outcome: Progressing

## 2022-09-26 NOTE — ASSESSMENT & PLAN NOTE
· Patient has paroxysmal atrial fibrillation  · Rate controlled home diltiazem:  Home dose was 180 mg daily, decreased to 120 mg daily due to borderline blood pressure   · On chronic anticoagulation with eliquis

## 2022-09-27 PROBLEM — J45.41 ACUTE EXACERBATION OF MODERATE PERSISTENT EXTRINSIC ASTHMA: Status: ACTIVE | Noted: 2020-05-27

## 2022-09-27 LAB
ANION GAP SERPL CALCULATED.3IONS-SCNC: 11 MMOL/L (ref 4–13)
BUN SERPL-MCNC: 56 MG/DL (ref 5–25)
CALCIUM SERPL-MCNC: 10.6 MG/DL (ref 8.3–10.1)
CHLORIDE SERPL-SCNC: 96 MMOL/L (ref 96–108)
CO2 SERPL-SCNC: 34 MMOL/L (ref 21–32)
CREAT SERPL-MCNC: 1.36 MG/DL (ref 0.6–1.3)
GFR SERPL CREATININE-BSD FRML MDRD: 39 ML/MIN/1.73SQ M
GLUCOSE SERPL-MCNC: 148 MG/DL (ref 65–140)
GLUCOSE SERPL-MCNC: 177 MG/DL (ref 65–140)
GLUCOSE SERPL-MCNC: 179 MG/DL (ref 65–140)
GLUCOSE SERPL-MCNC: 209 MG/DL (ref 65–140)
GLUCOSE SERPL-MCNC: 273 MG/DL (ref 65–140)
MAGNESIUM SERPL-MCNC: 2.6 MG/DL (ref 1.6–2.6)
POTASSIUM SERPL-SCNC: 4.9 MMOL/L (ref 3.5–5.3)
SARS-COV-2 RNA RESP QL NAA+PROBE: NEGATIVE
SODIUM SERPL-SCNC: 141 MMOL/L (ref 135–147)

## 2022-09-27 PROCEDURE — 99232 SBSQ HOSP IP/OBS MODERATE 35: CPT | Performed by: INTERNAL MEDICINE

## 2022-09-27 PROCEDURE — 80048 BASIC METABOLIC PNL TOTAL CA: CPT | Performed by: INTERNAL MEDICINE

## 2022-09-27 PROCEDURE — U0005 INFEC AGEN DETEC AMPLI PROBE: HCPCS | Performed by: PHYSICIAN ASSISTANT

## 2022-09-27 PROCEDURE — 94002 VENT MGMT INPAT INIT DAY: CPT

## 2022-09-27 PROCEDURE — 94640 AIRWAY INHALATION TREATMENT: CPT

## 2022-09-27 PROCEDURE — 99223 1ST HOSP IP/OBS HIGH 75: CPT | Performed by: INTERNAL MEDICINE

## 2022-09-27 PROCEDURE — 83735 ASSAY OF MAGNESIUM: CPT | Performed by: STUDENT IN AN ORGANIZED HEALTH CARE EDUCATION/TRAINING PROGRAM

## 2022-09-27 PROCEDURE — 99232 SBSQ HOSP IP/OBS MODERATE 35: CPT | Performed by: STUDENT IN AN ORGANIZED HEALTH CARE EDUCATION/TRAINING PROGRAM

## 2022-09-27 PROCEDURE — U0003 INFECTIOUS AGENT DETECTION BY NUCLEIC ACID (DNA OR RNA); SEVERE ACUTE RESPIRATORY SYNDROME CORONAVIRUS 2 (SARS-COV-2) (CORONAVIRUS DISEASE [COVID-19]), AMPLIFIED PROBE TECHNIQUE, MAKING USE OF HIGH THROUGHPUT TECHNOLOGIES AS DESCRIBED BY CMS-2020-01-R: HCPCS | Performed by: PHYSICIAN ASSISTANT

## 2022-09-27 PROCEDURE — 82948 REAGENT STRIP/BLOOD GLUCOSE: CPT

## 2022-09-27 PROCEDURE — 94760 N-INVAS EAR/PLS OXIMETRY 1: CPT

## 2022-09-27 RX ORDER — PREDNISONE 20 MG/1
20 TABLET ORAL DAILY
Status: DISCONTINUED | OUTPATIENT
Start: 2022-09-30 | End: 2022-09-29 | Stop reason: HOSPADM

## 2022-09-27 RX ORDER — PREDNISONE 20 MG/1
40 TABLET ORAL DAILY
Status: COMPLETED | OUTPATIENT
Start: 2022-09-28 | End: 2022-09-28

## 2022-09-27 RX ORDER — PREDNISONE 10 MG/1
10 TABLET ORAL DAILY
Status: DISCONTINUED | OUTPATIENT
Start: 2022-10-01 | End: 2022-09-29 | Stop reason: HOSPADM

## 2022-09-27 RX ORDER — FUROSEMIDE 20 MG/1
20 TABLET ORAL
Status: DISCONTINUED | OUTPATIENT
Start: 2022-09-28 | End: 2022-09-28

## 2022-09-27 RX ADMIN — Medication 3 ML: at 20:11

## 2022-09-27 RX ADMIN — ATORVASTATIN CALCIUM 20 MG: 20 TABLET, FILM COATED ORAL at 17:32

## 2022-09-27 RX ADMIN — GUAIFENESIN 600 MG: 600 TABLET, EXTENDED RELEASE ORAL at 08:00

## 2022-09-27 RX ADMIN — DILTIAZEM HYDROCHLORIDE 120 MG: 120 CAPSULE, COATED, EXTENDED RELEASE ORAL at 08:00

## 2022-09-27 RX ADMIN — BUDESONIDE 0.5 MG: 0.5 INHALANT ORAL at 06:55

## 2022-09-27 RX ADMIN — GABAPENTIN 800 MG: 400 CAPSULE ORAL at 21:44

## 2022-09-27 RX ADMIN — APIXABAN 5 MG: 5 TABLET, FILM COATED ORAL at 17:32

## 2022-09-27 RX ADMIN — FLUTICASONE FUROATE AND VILANTEROL TRIFENATATE 1 PUFF: 200; 25 POWDER RESPIRATORY (INHALATION) at 14:31

## 2022-09-27 RX ADMIN — LEVALBUTEROL 1.25 MG: 1.25 SOLUTION, CONCENTRATE RESPIRATORY (INHALATION) at 06:55

## 2022-09-27 RX ADMIN — LEVOTHYROXINE SODIUM 100 MCG: 100 TABLET ORAL at 05:36

## 2022-09-27 RX ADMIN — METHYLPREDNISOLONE SODIUM SUCCINATE 40 MG: 40 INJECTION, POWDER, FOR SOLUTION INTRAMUSCULAR; INTRAVENOUS at 08:00

## 2022-09-27 RX ADMIN — APIXABAN 5 MG: 5 TABLET, FILM COATED ORAL at 08:00

## 2022-09-27 RX ADMIN — LIDOCAINE 2 PATCH: 50 PATCH CUTANEOUS at 08:00

## 2022-09-27 RX ADMIN — INSULIN LISPRO 1 UNITS: 100 INJECTION, SOLUTION INTRAVENOUS; SUBCUTANEOUS at 17:32

## 2022-09-27 RX ADMIN — LEVALBUTEROL 1.25 MG: 1.25 SOLUTION, CONCENTRATE RESPIRATORY (INHALATION) at 20:12

## 2022-09-27 RX ADMIN — UMECLIDINIUM 1 PUFF: 62.5 AEROSOL, POWDER ORAL at 17:32

## 2022-09-27 RX ADMIN — GUAIFENESIN 600 MG: 600 TABLET, EXTENDED RELEASE ORAL at 17:32

## 2022-09-27 RX ADMIN — POTASSIUM CHLORIDE 20 MEQ: 1500 TABLET, EXTENDED RELEASE ORAL at 08:00

## 2022-09-27 RX ADMIN — TRAMADOL HYDROCHLORIDE 50 MG: 50 TABLET, COATED ORAL at 17:33

## 2022-09-27 RX ADMIN — Medication 3 ML: at 13:10

## 2022-09-27 RX ADMIN — MONTELUKAST SODIUM 10 MG: 10 TABLET, COATED ORAL at 21:44

## 2022-09-27 RX ADMIN — GABAPENTIN 800 MG: 400 CAPSULE ORAL at 08:00

## 2022-09-27 RX ADMIN — LEVALBUTEROL 1.25 MG: 1.25 SOLUTION, CONCENTRATE RESPIRATORY (INHALATION) at 13:10

## 2022-09-27 RX ADMIN — INSULIN LISPRO 1 UNITS: 100 INJECTION, SOLUTION INTRAVENOUS; SUBCUTANEOUS at 10:07

## 2022-09-27 RX ADMIN — GABAPENTIN 800 MG: 400 CAPSULE ORAL at 17:32

## 2022-09-27 RX ADMIN — Medication 6 MG: at 21:44

## 2022-09-27 RX ADMIN — BICTEGRAVIR SODIUM, EMTRICITABINE, AND TENOFOVIR ALAFENAMIDE FUMARATE 1 TABLET: 50; 200; 25 TABLET ORAL at 08:00

## 2022-09-27 NOTE — ASSESSMENT & PLAN NOTE
Wt Readings from Last 3 Encounters:   09/27/22 102 kg (225 lb 8 5 oz)   09/18/22 101 kg (223 lb 5 2 oz)   07/31/22 99 2 kg (218 lb 11 1 oz)     · 2D echo 09/13/2022 revealed ejection fraction 70%, mild-moderate concentric left ventricular hypertrophy, diastolic dysfunction type 1  · Patient was noted to have evidence of volume overload, lower extremity edema, dyspnea with exertion, and weight gain  · Baseline weight is closer to 220 lb, inpatient weight was up to 238 lb  · Home dose of Lasix was 20 mg b i d   · furosemide increased to 40 mg IV b i d  recently- however no significant diuresis so was seen by cardiology and changed to lasix infusion  · Weight decreased by 6kg on lasix drip  · acute exacerbation of chronic diastolic congestive heart failure likely due to IV steroids    Staff also noted noncompliance with low salt diet

## 2022-09-27 NOTE — PROGRESS NOTES
[facility]  Progress Note - Juvenal Sandhoff 1950, 81 y o  female MRN: [id number]  Unit/Bed#: E5 -01 Encounter: [id number]  Primary Care Provider: Timmy Grady DO   Date and time admitted to hospital: 9/20/2022 11:29 AM    * Acute exacerbation of moderate persistent extrinsic asthma  Assessment & Plan  72-year-old female presented with increasing shortness of breath and wheezing  Recently admitted and discharged on 09/18 for COPD and CHF exacerbation, re-presented with dyspnea  During that admission she was evaluated by pulmonology team who notes that she does not have COPD based on her spirometry  She has moderate persistent asthma with acute exacerbations    · Pt was readmitted with dyspnea, wheezing    There was no evidence of acute congestive heart failure on admission, and patient was diagnosed with an asthma exacerbation  · Was started on Solu-Medrol IV, and budesonide, Xopenex  · Cont singulair  · Mucinex p r n  for cough  · Procalcitonin negative x2, no evidence of bacterial infection  · Chest x-ray without focal infiltrate  · Will be dc home on steroid taper  · Cardiologist recommends Pulmonary consultation    Acute on chronic diastolic heart failure (HCC)  Assessment & Plan  Wt Readings from Last 3 Encounters:   09/27/22 102 kg (225 lb 8 5 oz)   09/18/22 101 kg (223 lb 5 2 oz)   07/31/22 99 2 kg (218 lb 11 1 oz)     · 2D echo 09/13/2022 revealed ejection fraction 70%, mild-moderate concentric left ventricular hypertrophy, diastolic dysfunction type 1  · Patient was noted to have evidence of volume overload, lower extremity edema, dyspnea with exertion, and weight gain  · Baseline weight is closer to 220 lb, inpatient weight was up to 238 lb  · Home dose of Lasix was 20 mg b i d   · furosemide increased to 40 mg IV b i d  recently- however no significant diuresis so was seen by cardiology and changed to lasix infusion  · Weight decreased by 6kg on lasix drip  · acute exacerbation of chronic diastolic congestive heart failure likely due to IV steroids  Staff also noted noncompliance with low salt diet      Paroxysmal atrial fibrillation ([address] )  Assessment & Plan  · Patient has paroxysmal atrial fibrillation  · Rate controlled home diltiazem:  Home dose was 180 mg daily, decreased to 120 mg daily due to borderline blood pressure   · On chronic anticoagulation with eliquis    Chronic respiratory failure with hypoxia (HCC)  Assessment & Plan  · Patient has a history of chronic respiratory failure with hypoxia secondary   · On 2-3L oxygen chronically  · Currently at her baseline    Anemia  Assessment & Plan  · Patient appears to have a chronic anemia with a hemoglobin baseline of 9-10  · Most likely secondary to anemia of chronic disease  · Hemoglobin at her baseline    PASHA on CPAP  Assessment & Plan  Continue CPAP q h s  Tobacco use  Assessment & Plan  · Continues to smoke 1-2 cigarettes daily  · Nicotine patch offered  · Counseled on cessation    HIV (human immunodeficiency virus infection)   Assessment & Plan  · Continue biktarvy, and outpatient follow-up    Type 2 diabetes mellitus without complication, without long-term current use of insulin ([facility])  Assessment & Plan  Lab Results   Component Value Date    HGBA1C 6 3 (H) 06/21/2022     · Patient has a history of diabetes type 2, without long-term use of insulin, without complication  · Is on diet therapy at  · Monitor closely while patient on IV steroids  · Continue SSI, Accuchecks            Family:  Called daughter Renee Lux:  Unable to leave message-  Mailbox full    VTE Pharmacologic Prophylaxis: RX contraindicated due to: Eliquis  VTE Mechanical Prophylaxis: sequential compression device    Discussed with patient's nurse  Discussed with   Discussed with cardiology team: Dr Martir Holman:   Will change to oral Lasix  Medicated consult to Pulmonary team    Certification Statement: The patient will continue to require additional inpatient hospital stay due to need for further acute intervention for dyspnea    Status: inpatient     ===================================================================    Subjective:  Patient notes that she continues to feel out of breath, with sitting at rest and eating  She notes dyspnea with exertion  She notes lower extremity edema without significant improvement  She notes pain in both of her legs  She denies any pain anywhere else  No dizziness or lightheadedness  She is tolerating p o  With excellent appetite  No nausea, vomiting, diarrhea      Physical Exam:   Temp:  [97 9 °F (36 6 °C)-98 6 °F (37 °C)] 98 °F (36 7 °C)  HR:  [] 80  Resp:  [16-20] 16  BP: (111-140)/(74-82) 111/79    Gen:  Pleasant, non-tachypnic, non-dyspnic  Conversant  Sitting up in a chair at the bedside eating lunch  Heart: regular rate and rhythm, S1S2 present, no murmur, rub or gallop  Lungs:  Decreased breath sounds bilaterally  Faint right lower lower crackles  No wheezing or rhonchi  No accessory muscle use or respiratory distress  Abd: soft, non-tender, non-distended  NABS, no guarding, rebound or peritoneal signs  Extremities: no clubbing, cyanosis  1+ bilateral lower extremity and pedal edema  2+pedal pulses bilaterally  Neuro: awake, alert  Fluent speech  Interactive  Skin: warm and dry: no petechiae, purpura and rash      LABS:   Results from last 7 days   Lab Units 09/26/22  1039 09/25/22  0548 09/23/22  1655   WBC Thousand/uL 12 96* 11 72* 11 86*   HEMOGLOBIN g/dL 10 3* 9 5* 9 9*   HEMATOCRIT % 33 3* 31 3* 32 1*   PLATELETS Thousands/uL 314 253 251     Results from last 7 days   Lab Units 09/27/22  0859 09/26/22  1039 09/25/22  0548   POTASSIUM mmol/L 4 9 4 8 4 7   CHLORIDE mmol/L 96 102 108   CO2 mmol/L 34* 30 29   BUN mg/dL 56* 47* 47*   CREATININE mg/dL 1 36* 1 17 1 12   CALCIUM mg/dL 10 6* 10 4* 10 0       Hospital Data:    9/24 chest x-ray:  No acute cardiopulmonary disease     9/20:  Chest x-ray  No acute cardiopulmonary disease     Microbiology  9/20:  Negative COVID, influenza, RSV        ---------------------------------------------------------------------------------------------------------------  This note has been constructed using a voice recognition system

## 2022-09-27 NOTE — PROGRESS NOTES
Cardiology Progress Note - Bradly Haque 81 y o  female MRN: [id number]    Unit/Bed#: E5 -01 Encounter: [id number]      Assessment & Plan:    Acute exacerbation of moderate persistent extrinsic asthma  -reports chronic shortness of breath, similar to last hospitalization  -started on Solu-Medrol 40 mg IV b i d   -pulmonary consulted, pending recommendations    Acute on chronic diastolic heart failure (HCC)  -TTE 09/13/2022 EF 70%, grade 1 DD, mild TR, estimated PA pressure of 37 mmHg  -on Lasix 20 mg p o  b i d  at home  -started on Lasix drip yesterday, discontinue this morning due to IV infiltration and bump in creatinine/bun  -NT proBNP 55 on 09/20/2022    Paroxysmal atrial fibrillation (HCC)  -anticoagulant on Eliquis 5 mg b i d    -rate control with Cardizem 180 mg daily    Type 2 diabetes mellitus without complication, without long-term current use of insulin ([facility])    HIV (human immunodeficiency virus infection)     Chronic respiratory failure with hypoxia ([facility])    Tobacco use    PASHA on CPAP    Anemia    Summary:   -no significant change in shortness of breath  -IV infiltrated, so discontinued Lasix drip and will start p o  Lasix 20 mg b i d  tomorrow  -weights appear down today and creatinine/bun trended up, so likely no further benefit from more aggressive diuresis  -reassess BMP tomorrow    Subjective:   No significant events overnight  The patient has IV infiltrated this morning, so her Lasix drip was held at that time  She still reports feeling short of breath similar to yesterday evening with significant diuresis overnight and start of Solu-Medrol  Denies chest pain, orthopnea, abdominal pain, nausea, vomiting, fever, chills, headache, dizziness or palpitations  Objective:     Vitals: Blood pressure 111/79, pulse 80, temperature 98 °F (36 7 °C), resp   rate 16, height 5' 2" (1 575 m), weight 102 kg (225 lb 8 5 oz), last menstrual period 04/01/2013, SpO2 96 %, not currently breastfeeding , Body mass index is 41 25 kg/m² ,   Orthostatic Blood Pressures    Flowsheet Row Most Recent Value   Blood Pressure 111/79 filed at 09/27/2022 0704   Patient Position - Orthostatic VS Sitting filed at 09/24/2022 2300            Intake/Output Summary (Last 24 hours) at 9/27/2022 1223  Last data filed at 9/27/2022 0801  Gross per 24 hour   Intake 420 52 ml   Output 1000 ml   Net -579 48 ml           Physical Exam:    GEN: Verito Hu appears well, alert and oriented x 3, pleasant and cooperative   HEENT: anicteric, mucous membranes moist  NECK: no jvd, carotid bruits   HEART: regular rhythm, normal S1 and S2, no murmurs, clicks, gallops or rubs   LUNGS:  Diffuse bilateral rhonchi  ABDOMEN: normal bowel sounds, soft, no tenderness, no distention  EXTREMITIES: peripheral pulses normal; no clubbing, cyanosis, or edema  NEURO: no focal findings   SKIN: normal without suspicious lesions on exposed skin      Current Facility-Administered Medications:     acetaminophen (TYLENOL) tablet 975 mg, 975 mg, Oral, Q6H PRN, Tanya Lentz MD, 975 mg at 09/26/22 1009    apixaban (ELIQUIS) tablet 5 mg, 5 mg, Oral, BID, Tanya Lentz MD, 5 mg at 09/27/22 0800    atorvastatin (LIPITOR) tablet 20 mg, 20 mg, Oral, Daily With Annabel Contreras MD, 20 mg at 09/26/22 1714    bictegravir-emtricitab-tenofovir alafenamide (BIKTARVY) -25 MG tablet 1 tablet, 1 tablet, Oral, Daily, Tanya Lentz MD, 1 tablet at 09/27/22 0800    budesonide (PULMICORT) inhalation solution 0 5 mg, 0 5 mg, Nebulization, BID, Tanya Lentz MD, 0 5 mg at 09/27/22 0655    diltiazem (CARDIZEM CD) 24 hr capsule 120 mg, 120 mg, Oral, Daily, Remy Berkowitz MD, 120 mg at 09/27/22 0800    [START ON 9/28/2022] furosemide (LASIX) tablet 20 mg, 20 mg, Oral, BID (diuretic), Terri Leong MD    gabapentin (NEURONTIN) capsule 800 mg, 800 mg, Oral, TID, Tanya Lentz MD, 800 mg at 09/27/22 0800    guaiFENesin (MUCINEX) 12 hr tablet 600 mg, 600 mg, Oral, BID, Heidi Oliveros MD, 600 mg at 09/27/22 0800    hydrOXYzine HCL (ATARAX) tablet 25 mg, 25 mg, Oral, Q6H PRN, Song Dale MD, 25 mg at 09/26/22 1149    insulin lispro (HumaLOG) 100 units/mL subcutaneous injection 1-5 Units, 1-5 Units, Subcutaneous, TID AC, 1 Units at 09/27/22 1007 **AND** Fingerstick Glucose (POCT), , , TID AC, Shad Hernandez MD    [facility]) inhalation solution 1 25 mg, 1 25 mg, Nebulization, TID, 1 25 mg at 09/27/22 0655 **AND** sodium chloride 0 9 % inhalation solution 3 mL, 3 mL, Nebulization, TID, Heidi Oliveros MD, 3 mL at 09/26/22 1932    levothyroxine tablet 100 mcg, 100 mcg, Oral, Early Morning, Heidi Oliveros MD, 100 mcg at 09/27/22 0536    lidocaine (LIDODERM) 5 % patch 2 patch, 2 patch, Topical, Daily, Tessy Quiles MD, 2 patch at 09/27/22 0800    melatonin tablet 6 mg, 6 mg, Oral, HS, Kathy Rawls PA-C, 6 mg at 09/26/22 2248    methylPREDNISolone sodium succinate (Solu-MEDROL) injection 40 mg, 40 mg, Intravenous, Q12H [facility], Song Dale MD, 40 mg at 09/27/22 0800    montelukast (SINGULAIR) tablet 10 mg, 10 mg, Oral, HS, Heidi Oliveros MD, 10 mg at 09/26/22 2248    nicotine (NICODERM CQ) 21 mg/24 hr TD 24 hr patch 1 patch, 1 patch, Transdermal, Daily, Heidi Oliveros MD    potassium chloride (K-DUR,KLOR-CON) CR tablet 20 mEq, 20 mEq, Oral, Daily, Heidi Oliveros MD, 20 mEq at 09/27/22 0800    traMADol (ULTRAM) tablet 50 mg, 50 mg, Oral, Q6H PRN, Song Dale MD, 50 mg at 09/26/22 1149    Labs & Results:    Lab Results   Component Value Date    TROPONINI <0 02 05/27/2020    TROPONINI <0 04 12/11/2014    TROPONINI 0 04 12/11/2014       Lab Results   Component Value Date    GLUCOSE 104 04/10/2018    CALCIUM 10 6 (H) 09/27/2022     (L) 12/26/2014    K 4 9 09/27/2022    CO2 34 (H) 09/27/2022    CL 96 09/27/2022    BUN 56 (H) 09/27/2022    CREATININE 1 36 (H) 09/27/2022       Lab Results   Component Value Date    WBC 12 96 (H) 09/26/2022    HGB 10 3 (L) 09/26/2022    HCT 33 3 (L) 09/26/2022    MCV 94 09/26/2022     09/26/2022           No results found for: CHOL  Lab Results   Component Value Date    HDL 70 09/12/2022     Lab Results   Component Value Date    LDLCALC 44 09/12/2022     Lab Results   Component Value Date    TRIG 82 09/12/2022       Lab Results   Component Value Date    ALT 33 09/20/2022    AST 26 09/20/2022         EKG personally reviewed by )Susana Peña MD  No acute changes

## 2022-09-27 NOTE — ASSESSMENT & PLAN NOTE
· Patient has a history of chronic respiratory failure with hypoxia secondary   · On 2-3L oxygen chronically  · Currently at her baseline

## 2022-09-27 NOTE — CASE MANAGEMENT
Case Management Progress Note    Patient name Donald Hyde  Location [facility] /E5 -* MRN [id number]  : 1950 Date 2022       LOS (days): 7  Geometric Mean LOS (GMLOS) (days): 3 60  Days to GMLOS:-3 3        OBJECTIVE:        Current admission status: Inpatient  Preferred Pharmacy:   [address] [place], [place] [address] [address]  [place] [address]  Phone: [phone] Fax: [phone]    Primary Care Provider: Yamil Hudson DO    Primary Insurance: [employer] RE[employer]  Secondary Insurance: [address] [place] [address]    PROGRESS NOTE:    Pt may discharge home w/ PICC and IV Abx  CM continues to follow for evolving elements of Pts discharge plan

## 2022-09-27 NOTE — PLAN OF CARE
Problem: PAIN - ADULT  Goal: Verbalizes/displays adequate comfort level or baseline comfort level  Description: Interventions:  - Encourage patient to monitor pain and request assistance  - Assess pain using appropriate pain scale  - Administer analgesics based on type and severity of pain and evaluate response  - Implement non-pharmacological measures as appropriate and evaluate response  - Consider cultural and social influences on pain and pain management  - Notify physician/advanced practitioner if interventions unsuccessful or patient reports new pain  Outcome: Progressing     Problem: SAFETY ADULT  Goal: Patient will remain free of falls  Description: INTERVENTIONS:  - Educate patient/family on patient safety including physical limitations  - Instruct patient to call for assistance with activity   - Consult OT/PT to assist with strengthening/mobility   - Keep Call bell within reach  - Keep bed low and locked with side rails adjusted as appropriate  - Keep care items and personal belongings within reach  - Initiate and maintain comfort rounds  - Make Fall Risk Sign visible to staff  - Apply yellow socks and bracelet for high fall risk patients  - Consider moving patient to room near nurses station  Outcome: Progressing     Problem: DISCHARGE PLANNING  Goal: Discharge to home or other facility with appropriate resources  Description: INTERVENTIONS:  - Identify barriers to discharge w/patient and caregiver  - Arrange for needed discharge resources and transportation as appropriate  - Identify discharge learning needs (meds, wound care, etc )  - Arrange for interpretive services to assist at discharge as needed  - Refer to Case Management Department for coordinating discharge planning if the patient needs post-hospital services based on physician/advanced practitioner order or complex needs related to functional status, cognitive ability, or social support system  Outcome: Progressing     Problem: RESPIRATORY - ADULT  Goal: Achieves optimal ventilation and oxygenation  Description: INTERVENTIONS:  - Assess for changes in respiratory status  - Assess for changes in mentation and behavior  - Position to facilitate oxygenation and minimize respiratory effort  - Oxygen administered by appropriate delivery if ordered  - Initiate smoking cessation education as indicated  - Encourage broncho-pulmonary hygiene including cough, deep breathe, Incentive Spirometry  - Assess the need for suctioning and aspirate as needed  - Assess and instruct to report SOB or any respiratory difficulty  - Respiratory Therapy support as indicated  Outcome: Progressing     Problem: METABOLIC, FLUID AND ELECTROLYTES - ADULT  Goal: Fluid balance maintained  Description: INTERVENTIONS:  - Monitor labs   - Monitor I/O and WT  - Instruct patient on fluid and nutrition as appropriate  - Assess for signs & symptoms of volume excess or deficit  Outcome: Progressing     Problem: Potential for Falls  Goal: Patient will remain free of falls  Description: INTERVENTIONS:  - Educate patient/family on patient safety including physical limitations  - Instruct patient to call for assistance with activity   - Consult OT/PT to assist with strengthening/mobility   - Keep Call bell within reach  - Keep bed low and locked with side rails adjusted as appropriate  - Keep care items and personal belongings within reach  - Initiate and maintain comfort rounds  - Make Fall Risk Sign visible to staff  - Apply yellow socks and bracelet for high fall risk patients  - Consider moving patient to room near nurses station  Outcome: Progressing     Problem: Nutrition/Hydration-ADULT  Goal: Nutrient/Hydration intake appropriate for improving, restoring or maintainin nutritional needs  Description: Monitor and assess patient's nutrition/hydration status for malnutrition  Collaborate with interdisciplinary team and initiate plan and interventions as ordered    Monitor patient's weight and dietary intake as ordered or per policy  Utilize nutrition screening tool and intervene as necessary  Determine patient's food preferences and provide high-protein, high-caloric foods as appropriate       INTERVENTIONS:  - Monitor oral intake, urinary output, labs, and treatment plans  - Assess nutrition and hydration status and recommend course of action  - Evaluate amount of meals eaten  - Assist patient with eating if necessary   - Allow adequate time for meals  - Recommend/ encourage appropriate diets, oral nutritional supplements, and vitamin/mineral supplements  - Order, calculate, and assess calorie counts as needed  - Recommend, monitor, and adjust tube feedings and TPN/PPN based on assessed needs  - Assess need for intravenous fluids  - Provide specific nutrition/hydration education as appropriate  - Include patient/family/caregiver in decisions related to nutrition  Outcome: Progressing

## 2022-09-27 NOTE — ASSESSMENT & PLAN NOTE
45-year-old female presented with increasing shortness of breath and wheezing  Recently admitted and discharged on 09/18 for COPD and CHF exacerbation, re-presented with dyspnea  During that admission she was evaluated by pulmonology team who notes that she does not have COPD based on her spirometry  She has moderate persistent asthma with acute exacerbations    · Pt was readmitted with dyspnea, wheezing    There was no evidence of acute congestive heart failure on admission, and patient was diagnosed with an asthma exacerbation  · Was started on Solu-Medrol IV, and budesonide, Xopenex  · Cont singulair  · Mucinex p r n  for cough  · Procalcitonin negative x2, no evidence of bacterial infection  · Chest x-ray without focal infiltrate  · Will be dc home on steroid taper  · Cardiologist recommends Pulmonary consultation

## 2022-09-27 NOTE — CONSULTS
Pulmonary Consultation   Aimee Sinha 81 y o  female MRN: [id number]  Unit/Bed#: E5 -01 Encounter: [id number]      Reason for consultation: asthma exacerbation    Requesting physician: Dr Waddell     Impressions/Recommendations:  1  Chronic hypoxic respiratory failure  1  Baseline 2 L NC  2  Currently at baseline  3  Titrate oxygen to maintain SpO2 >/= 88%  4  Pulmonary toilet: IS-add flutter valve and increase activity  2  Acute on chronic dyspnea  1  At this time suspect dyspnea is secondary to: anxiety obesity, deconditioning and poor diet compliance with acute on chronic CHF  2  Do not feel she is currently in asthma exacerbation -and is not  contributing to her dyspnea  3  Would benefit from close outpatient pulmonary follow up at [facility]  3  Moderate persistent asthma without acute exacerbation  1  Would discontinue solumedrol-but as she has been on solumedrol for 7 days will initiate rapid prednisone taper  2  mucinex BID  3  Singulair, breo daily  4  Discontinue budesonide  5  Continue xopenex  6  Add incruse in place of spiriva-home regimen  7  Needs close pulmonary follow up at [facility]  4  Anxiety  1  Would recommend further evaluation of anxiety as this is likely greatly contributing to her symptoms  5  khoa on CPAP  1  Will order as this is her baseline  6  Morbid obesity  1  Needs weight loss  7  Acute on chronic diastolic heart failure with diet noncompliance  1  Cardiology following  2  Persistent lower extremity pitting edema noted  3  Lasix drip stopped due to lack of IV access  4  On home regimen 20 mg BID   5  Strict I &O   6  Daily weights  7  Would recommend heart failure RN education  8  HIV  1  management per IM  9  DM2  1  Management per IM  10  PAF  1  Management per IM    History of Present Illness   HPI:  Aimee Sinha is a 81 y o  female seen in consult for asthma exacerbation    Medical history significant for: moderate persistent asthma, PAF, diastolic heart failure with chronic edema and diet noncompliance  DM2, HIV, chronic hypoxic respiraotry failure on 2 L NC, chronic dyspnea, PASHA on CPAP, HTN, morbid obesity, tobacco and cocaine abuse  Repeat admissions due to dyspnea and acute heart failure with mild asthma exacerbations  Pulmonary asked to evaluate again due to recurrent admission    At the time of evaluation she reports dyspnea most notable with exertion and talking  She does not follow a low sodium diet at home and has very little understanding of diet recommendations  Has been asking the RN for food and fluids all day long despite completing full trays  Denies : chest pain, fevers, chills, cough, sputum production or hemoptysis    From a pulmonary standpoint she follows at [facility] pulmonary  Has a history of tobacco abuse with asthma  Tobacco abuse-up to 1/4 ppd  Current pulmonary medication regimen consists of: Advair 250/50 BID, spiriva daily and albuterol MDI PRN  History of prednisone use-frequent  no history of intubations  + history of hospitalizations due to breathing  Baseline supplemental oxygen needs are -2L NC  Current exercise tolerance-poor    -GERD, +PASHA on CPAP 5-*15cm H20 with 2 L O2, -Dysphagia  Denies: recent sick contacts, exposures or travel  Denies any benefit in breathing with steroids since admission    Review of systems:  12 point review of systems was completed and was otherwise negative except as listed in HPI        Historical Information   Past Medical History:   Diagnosis Date    Asthma     [facility]     Cardiac disease     COPD [facility]ronic obstructive [facility] disease) [facility]     Diabetes mellitus ([facility] )     Disease of thyroid gland     HIV (human immunodeficiency virus infection) ([facility] ) 03/24/2016    Hypertension     Osteoarthritis     Tobacco abuse      Past Surgical History:   Procedure Laterality Date    HERNIA REPAIR      LUMBAR FUSION N/A 4/10/2018    Procedure: T9/10 discectomy with T9-T11 fusion;  Surgeon: Maureen Steward MD;  Location: [facility];  Service: Neurosurgery    THYROIDECTOMY       Family History   Problem Relation Age of Onset    No Known Problems Mother     Diabetes Father     Heart disease Neg Hx     Cancer Neg Hx        Occupational history: 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    Tobacco history: 54 year smoking history-unclear prior use, currently about 1/4 ppd    Meds/Allergies   Current Facility-Administered Medications   Medication Dose Route Frequency    acetaminophen (TYLENOL) tablet 975 mg  975 mg Oral Q6H PRN    apixaban (ELIQUIS) tablet 5 mg  5 mg Oral BID    atorvastatin (LIPITOR) tablet 20 mg  20 mg Oral Daily With Dinner    bictegravir-emtricitab-tenofovir alafenamide (BIKTARVY) -25 MG tablet 1 tablet  1 tablet Oral Daily    budesonide (PULMICORT) inhalation solution 0 5 mg  0 5 mg Nebulization BID    diltiazem (CARDIZEM CD) 24 hr capsule 120 mg  120 mg Oral Daily    fluticasone-vilanterol (BREO ELLIPTA) 200-25 MCG/INH inhaler 1 puff  1 puff Inhalation Daily    [START ON 9/28/2022] furosemide (LASIX) tablet 20 mg  20 mg Oral BID (diuretic)    gabapentin (NEURONTIN) capsule 800 mg  800 mg Oral TID    guaiFENesin (MUCINEX) 12 hr tablet 600 mg  600 mg Oral BID    hydrOXYzine HCL (ATARAX) tablet 25 mg  25 mg Oral Q6H PRN    insulin lispro (HumaLOG) 100 units/mL subcutaneous injection 1-5 Units  1-5 Units Subcutaneous TID AC    levalbuterol (XOPENEX) inhalation solution 1 25 mg  1 25 mg Nebulization TID    And    sodium chloride 0 9 % inhalation solution 3 mL  3 mL Nebulization TID    levothyroxine tablet 100 mcg  100 mcg Oral Early Morning    lidocaine (LIDODERM) 5 % patch 2 patch  2 patch Topical Daily    melatonin tablet 6 mg  6 mg Oral HS    methylPREDNISolone sodium succinate (Solu-MEDROL) injection 40 mg  40 mg Intravenous Q12H [address]    montelukast (SINGULAIR) tablet 10 mg  10 mg Oral HS    nicotine (NICODERM CQ) 21 mg/24 hr TD 24 hr patch 1 patch 1 patch Transdermal Daily    potassium chloride (K-DUR,KLOR-CON) CR tablet 20 mEq  20 mEq Oral Daily    traMADol (ULTRAM) tablet 50 mg  50 mg Oral Q6H PRN     Medications Prior to Admission   Medication    albuterol (2 5 mg/3 mL) 0 083 % nebulizer solution    apixaban (ELIQUIS) 5 mg    atorvastatin (LIPITOR) 20 mg tablet    bictegravir-emtricitab-tenofovir alafenamide (Biktarvy) -25 MG tablet    acetaminophen (TYLENOL) 325 mg tablet    budesonide (PULMICORT) 0 5 mg/2 mL nebulizer solution    cholecalciferol (VITAMIN D3) 1,000 units tablet    diltiazem (CARDIZEM CD) 180 mg 24 hr capsule    furosemide (LASIX) 20 mg tablet    gabapentin (NEURONTIN) 400 mg capsule    levothyroxine 100 mcg tablet    montelukast (SINGULAIR) 10 mg tablet    nicotine (NICODERM CQ) 21 mg/24 hr TD 24 hr patch    potassium chloride (K-DUR,KLOR-CON) 20 mEq tablet    predniSONE 10 mg tablet     Allergies   Allergen Reactions    Lisinopril Angioedema    Prozac [Fluoxetine Hcl] Rash    Sulfa Antibiotics Itching    Quinine        Vitals: Blood pressure 138/90, pulse 90, temperature 98 4 °F (36 9 °C), resp  rate 16, height 5' 2" (1 575 m), weight 102 kg (225 lb 8 5 oz), last menstrual period 04/01/2013, SpO2 97 %, not currently breastfeeding  , 2L NC, Body mass index is 41 25 kg/m²        Intake/Output Summary (Last 24 hours) at 9/27/2022 1526  Last data filed at 9/27/2022 1201  Gross per 24 hour   Intake 1140 52 ml   Output 1000 ml   Net 140 52 ml       Physical exam:    General Appearance:    Alert, cooperative, mild conversational dyspnea no accessory     muscle use       Head/eyes:    Normocephalic, without obvious abnormality, atraumatic,         PERRL, extraocular muscles intact, no scleral icterus    Nose:   Nares normal, septum midline, mucosa normal, no drainage    or sinus tenderness   Throat:   Moist mucous membranes, no thrush   Neck:   Supple, trachea midline, no adenopathy; no carotid    bruit or JVD   Lungs: Scattered rhonchi-cleared with cough   Chest Wall:    No tenderness or deformity    Heart:    Regular rate and rhythm, S1 and S2 normal, no murmur, rub   or gallop   Abdomen:     Obese, Soft, non-tender, bowel sounds active all four quadrants,     no masses, no organomegaly   Extremities:   Extremities normal, atraumatic, no cyanosis + lower extremity pitting edema   Skin:   Warm, dry, turgor normal, no rashes or lesions   Lymph nodes:   Cervical and supraclavicular nodes normal   Neurologic:   CNII-XII intact, normal strength, non-focal         Labs: I have personally reviewed pertinent lab results  , CBC: No results found for: WBC, HGB, HCT, MCV, PLT, ADJUSTEDWBC, MCH, MCHC, RDW, MPV, NRBC, CMP:   Lab Results   Component Value Date    SODIUM 141 09/27/2022    K 4 9 09/27/2022    CL 96 09/27/2022    CO2 34 (H) 09/27/2022    BUN 56 (H) 09/27/2022    CREATININE 1 36 (H) 09/27/2022    CALCIUM 10 6 (H) 09/27/2022    EGFR 39 09/27/2022       Imaging and other studies: I have personally reviewed pertinent reports     and I have personally reviewed pertinent films in PACS     CXR 9/24/22-clear lungs    Pulmonary function testing: none    EKG, Pathology, and Other Studies: I have personally reviewed pertinent reports   , I have personally reviewed pertinent films in PACS and ECG 9/22/22-NSR    Code Status: Level 1 - Full Code      LUIS M Wasserman

## 2022-09-28 LAB
ANION GAP SERPL CALCULATED.3IONS-SCNC: 7 MMOL/L (ref 5–14)
ARTERIAL PATENCY WRIST A: YES
BASE EXCESS BLDA CALC-SCNC: 7.3 MMOL/L
BASOPHILS # BLD AUTO: 0.02 THOUSANDS/ΜL (ref 0–0.1)
BASOPHILS NFR BLD AUTO: 0 % (ref 0–1)
BUN SERPL-MCNC: 59 MG/DL (ref 5–25)
CALCIUM SERPL-MCNC: 9.9 MG/DL (ref 8.4–10.2)
CHLORIDE SERPL-SCNC: 99 MMOL/L (ref 96–108)
CO2 SERPL-SCNC: 32 MMOL/L (ref 21–32)
CREAT SERPL-MCNC: 1 MG/DL (ref 0.6–1.2)
EOSINOPHIL # BLD AUTO: 0 THOUSAND/ΜL (ref 0–0.61)
EOSINOPHIL NFR BLD AUTO: 0 % (ref 0–6)
ERYTHROCYTE [DISTWIDTH] IN BLOOD BY AUTOMATED COUNT: 16 % (ref 11.6–15.1)
GFR SERPL CREATININE-BSD FRML MDRD: 57 ML/MIN/1.73SQ M
GLUCOSE SERPL-MCNC: 110 MG/DL (ref 65–140)
GLUCOSE SERPL-MCNC: 119 MG/DL (ref 65–140)
GLUCOSE SERPL-MCNC: 127 MG/DL (ref 70–99)
GLUCOSE SERPL-MCNC: 138 MG/DL (ref 65–140)
GLUCOSE SERPL-MCNC: 193 MG/DL (ref 65–140)
HCO3 BLDA-SCNC: 32.2 MMOL/L (ref 22–28)
HCT VFR BLD AUTO: 33.3 % (ref 34.8–46.1)
HGB BLD-MCNC: 10.1 G/DL (ref 11.5–15.4)
IMM GRANULOCYTES # BLD AUTO: 0.28 THOUSAND/UL (ref 0–0.2)
IMM GRANULOCYTES NFR BLD AUTO: 2 % (ref 0–2)
LYMPHOCYTES # BLD AUTO: 1.5 THOUSANDS/ΜL (ref 0.6–4.47)
LYMPHOCYTES NFR BLD AUTO: 12 % (ref 14–44)
MCH RBC QN AUTO: 28.5 PG (ref 26.8–34.3)
MCHC RBC AUTO-ENTMCNC: 30.3 G/DL (ref 31.4–37.4)
MCV RBC AUTO: 94 FL (ref 82–98)
MONOCYTES # BLD AUTO: 0.98 THOUSAND/ΜL (ref 0.17–1.22)
MONOCYTES NFR BLD AUTO: 8 % (ref 4–12)
NASAL CANNULA: 3
NEUTROPHILS # BLD AUTO: 9.32 THOUSANDS/ΜL (ref 1.85–7.62)
NEUTS SEG NFR BLD AUTO: 78 % (ref 43–75)
NRBC BLD AUTO-RTO: 0 /100 WBCS
O2 CT BLDA-SCNC: 13 ML/DL (ref 16–23)
OXYHGB MFR BLDA: 87.7 % (ref 94–97)
PCO2 BLDA: 47.2 MM HG (ref 36–44)
PH BLDA: 7.45 [PH] (ref 7.35–7.45)
PLATELET # BLD AUTO: 290 THOUSANDS/UL (ref 149–390)
PMV BLD AUTO: 8.8 FL (ref 8.9–12.7)
PO2 BLDA: 56.7 MM HG (ref 75–129)
POTASSIUM SERPL-SCNC: 4.8 MMOL/L (ref 3.5–5.3)
RBC # BLD AUTO: 3.54 MILLION/UL (ref 3.81–5.12)
SODIUM SERPL-SCNC: 138 MMOL/L (ref 135–147)
SPECIMEN SOURCE: ABNORMAL
WBC # BLD AUTO: 12.1 THOUSAND/UL (ref 4.31–10.16)

## 2022-09-28 PROCEDURE — 99232 SBSQ HOSP IP/OBS MODERATE 35: CPT | Performed by: INTERNAL MEDICINE

## 2022-09-28 PROCEDURE — 94003 VENT MGMT INPAT SUBQ DAY: CPT

## 2022-09-28 PROCEDURE — 94640 AIRWAY INHALATION TREATMENT: CPT

## 2022-09-28 PROCEDURE — 85025 COMPLETE CBC W/AUTO DIFF WBC: CPT | Performed by: INTERNAL MEDICINE

## 2022-09-28 PROCEDURE — 94760 N-INVAS EAR/PLS OXIMETRY 1: CPT

## 2022-09-28 PROCEDURE — 82948 REAGENT STRIP/BLOOD GLUCOSE: CPT

## 2022-09-28 PROCEDURE — 80048 BASIC METABOLIC PNL TOTAL CA: CPT | Performed by: INTERNAL MEDICINE

## 2022-09-28 PROCEDURE — 82805 BLOOD GASES W/O2 SATURATION: CPT | Performed by: INTERNAL MEDICINE

## 2022-09-28 PROCEDURE — 99232 SBSQ HOSP IP/OBS MODERATE 35: CPT | Performed by: STUDENT IN AN ORGANIZED HEALTH CARE EDUCATION/TRAINING PROGRAM

## 2022-09-28 RX ORDER — FUROSEMIDE 40 MG/1
40 TABLET ORAL
Status: DISCONTINUED | OUTPATIENT
Start: 2022-09-28 | End: 2022-09-29 | Stop reason: HOSPADM

## 2022-09-28 RX ADMIN — INSULIN LISPRO 1 UNITS: 100 INJECTION, SOLUTION INTRAVENOUS; SUBCUTANEOUS at 16:29

## 2022-09-28 RX ADMIN — POTASSIUM CHLORIDE 20 MEQ: 1500 TABLET, EXTENDED RELEASE ORAL at 08:13

## 2022-09-28 RX ADMIN — Medication 3 ML: at 07:00

## 2022-09-28 RX ADMIN — APIXABAN 5 MG: 5 TABLET, FILM COATED ORAL at 08:13

## 2022-09-28 RX ADMIN — Medication 3 ML: at 13:20

## 2022-09-28 RX ADMIN — FUROSEMIDE 40 MG: 40 TABLET ORAL at 16:29

## 2022-09-28 RX ADMIN — FUROSEMIDE 20 MG: 20 TABLET ORAL at 08:13

## 2022-09-28 RX ADMIN — LIDOCAINE 2 PATCH: 50 PATCH CUTANEOUS at 08:13

## 2022-09-28 RX ADMIN — APIXABAN 5 MG: 5 TABLET, FILM COATED ORAL at 16:45

## 2022-09-28 RX ADMIN — Medication 3 ML: at 19:39

## 2022-09-28 RX ADMIN — DILTIAZEM HYDROCHLORIDE 120 MG: 120 CAPSULE, COATED, EXTENDED RELEASE ORAL at 08:13

## 2022-09-28 RX ADMIN — GABAPENTIN 800 MG: 400 CAPSULE ORAL at 08:13

## 2022-09-28 RX ADMIN — LEVOTHYROXINE SODIUM 100 MCG: 100 TABLET ORAL at 06:12

## 2022-09-28 RX ADMIN — TRAMADOL HYDROCHLORIDE 50 MG: 50 TABLET, COATED ORAL at 06:14

## 2022-09-28 RX ADMIN — PREDNISONE 40 MG: 20 TABLET ORAL at 08:13

## 2022-09-28 RX ADMIN — FLUTICASONE FUROATE AND VILANTEROL TRIFENATATE 1 PUFF: 200; 25 POWDER RESPIRATORY (INHALATION) at 08:18

## 2022-09-28 RX ADMIN — LEVALBUTEROL 1.25 MG: 1.25 SOLUTION, CONCENTRATE RESPIRATORY (INHALATION) at 19:39

## 2022-09-28 RX ADMIN — LEVALBUTEROL 1.25 MG: 1.25 SOLUTION, CONCENTRATE RESPIRATORY (INHALATION) at 07:00

## 2022-09-28 RX ADMIN — GABAPENTIN 800 MG: 400 CAPSULE ORAL at 16:29

## 2022-09-28 RX ADMIN — GUAIFENESIN 600 MG: 600 TABLET, EXTENDED RELEASE ORAL at 16:45

## 2022-09-28 RX ADMIN — BICTEGRAVIR SODIUM, EMTRICITABINE, AND TENOFOVIR ALAFENAMIDE FUMARATE 1 TABLET: 50; 200; 25 TABLET ORAL at 08:14

## 2022-09-28 RX ADMIN — TRAMADOL HYDROCHLORIDE 50 MG: 50 TABLET, COATED ORAL at 22:03

## 2022-09-28 RX ADMIN — MONTELUKAST SODIUM 10 MG: 10 TABLET, COATED ORAL at 22:03

## 2022-09-28 RX ADMIN — ATORVASTATIN CALCIUM 20 MG: 20 TABLET, FILM COATED ORAL at 16:29

## 2022-09-28 RX ADMIN — UMECLIDINIUM 1 PUFF: 62.5 AEROSOL, POWDER ORAL at 10:32

## 2022-09-28 RX ADMIN — LEVALBUTEROL 1.25 MG: 1.25 SOLUTION, CONCENTRATE RESPIRATORY (INHALATION) at 13:20

## 2022-09-28 RX ADMIN — GABAPENTIN 800 MG: 400 CAPSULE ORAL at 22:02

## 2022-09-28 RX ADMIN — GUAIFENESIN 600 MG: 600 TABLET, EXTENDED RELEASE ORAL at 08:13

## 2022-09-28 RX ADMIN — Medication 6 MG: at 22:02

## 2022-09-28 NOTE — NURSING NOTE
Pt's IV infiltrated, pt very hard stick, AP on call made aware, steroids and diuretic changed to PO today 9/27/22  Per AP can "tentatively" leave IV out

## 2022-09-28 NOTE — PROGRESS NOTES
[facility]  Progress Note - Everett Tavares 1950, 81 y o  female MRN: [id number]  Unit/Bed#: E5 -01 Encounter: [id number]  Primary Care Provider: Jody Gordon DO   Date and time admitted to hospital: 9/20/2022 11:29 AM    * Acute exacerbation of moderate persistent extrinsic asthma  Assessment & Plan  53-year-old female presented with increasing shortness of breath and wheezing  Recently admitted and discharged on 09/18 for COPD and CHF exacerbation, re-presented with dyspnea  During that admission she was evaluated by pulmonology team who notes that she does not have COPD based on her spirometry  She has moderate persistent asthma with acute exacerbations    · Pt was readmitted with dyspnea, wheezing    There was no evidence of acute congestive heart failure on admission, and patient was diagnosed with an asthma exacerbation  · Was started on Solu-Medrol IV, and budesonide, Xopenex  · Cont singulair  · Mucinex p r n  for cough  · Procalcitonin negative x2, no evidence of bacterial infection  · Chest x-ray without focal infiltrate  · Will be dc home on steroid taper  · Patient was evaluated by the pulmonary team:  She is currently on a prednisone taper    Acute on chronic diastolic heart failure (HCC)  Assessment & Plan  Wt Readings from Last 3 Encounters:   09/28/22 102 kg (225 lb 8 5 oz)   09/18/22 101 kg (223 lb 5 2 oz)   07/31/22 99 2 kg (218 lb 11 1 oz)     · 2D echo 09/13/2022 revealed ejection fraction 70%, mild-moderate concentric left ventricular hypertrophy, diastolic dysfunction type 1  · Patient was noted to have evidence of volume overload, lower extremity edema, dyspnea with exertion, and weight gain  · Baseline weight is closer to 220 lb, inpatient weight was up to 238 lb  · Home dose of Lasix was 20 mg b i d   · furosemide increased to 40 mg IV b i d  recently- however no significant diuresis so was seen by cardiology and changed to lasix infusion  · Weight decreased by 6kg on lasix drip  · acute exacerbation of chronic diastolic congestive heart failure likely due to IV steroids  Staff also noted noncompliance with low salt diet, and fluid restriction  · Patient has currently been changed to oral diuretics, and weight stable      Paroxysmal atrial fibrillation ([facility] )  Assessment & Plan  · Patient has paroxysmal atrial fibrillation  · Rate controlled home diltiazem:  Home dose was 180 mg daily, decreased to 120 mg daily due to borderline blood pressure   · On chronic anticoagulation with eliquis    Chronic respiratory failure with hypoxia (HCC)  Assessment & Plan  · Patient has a history of chronic respiratory failure with hypoxia   · On 2-3L oxygen chronically  · Currently at her baseline    Anemia  Assessment & Plan  · Patient appears to have a chronic anemia with a hemoglobin baseline of 9-10  · Most likely secondary to anemia of chronic disease  · Hemoglobin at her baseline    PASHA on CPAP  Assessment & Plan  Continue CPAP q h s    Pt has CPAP machine already ordered by her [facility] team:  She is aware she needs to contact [facility] to receive it    Tobacco use  Assessment & Plan  · Continues to smoke 1-2 cigarettes daily  · Nicotine patch offered  · Counseled on cessation    HIV (human immunodeficiency virus infection)   Assessment & Plan  · Continue biktarvy, and outpatient follow-up    Type 2 diabetes mellitus without complication, without long-term current use of insulin ([facility] )  Assessment & Plan  Lab Results   Component Value Date    HGBA1C 6 3 (H) 06/21/2022     · Patient has a history of diabetes type 2, without long-term use of insulin, without complication  · Is on diet therapy at home  · Monitor closely while patient on IV steroids  · Continue SSI, Accuchecks            Family:  Called daughter Jazmine Malik and gave update    dw pts nurse  dw     VTE Pharmacologic Prophylaxis: RX contraindicated due to: eliquis  VTE Mechanical Prophylaxis: sequential compression device        Certification Statement: The patient will continue to require additional inpatient hospital stay due to need for further acute intervention for dyspnea    Status: inpatient     ===================================================================    Subjective:  Patient she feels more dyspneic than her baseline  She notes shortness of breath at rest with speaking in multiple sentences  She notes dyspnea worsens with exertion  She denies any chest congestion or phlegm  Denies any cough  She denies any pain anywhere  Denies any nausea, vomiting, diarrhea  Tolerating p o  Denies any dizziness or lightheadedness  Notes lower extremity edema unchanged  Physical Exam:   Temp:  [97 8 °F (36 6 °C)] 97 8 °F (36 6 °C)  HR:  [71-87] 71  Resp:  [18] 18  BP: (121-124)/(81-86) 124/81    Gen:  Pleasant, non-tachypnic, non-dyspnic  Conversant  Sitting up in a chair at the bedside  Heart: regular rate and rhythm, S1S2 present, no murmur, rub or gallop  Lungs: clear to ausculatation bilaterally  No wheezing, crackles, or rhonchi  No accessory muscle use or respiratory distress  Good air movement  Abd: soft, non-tender, non-distended  NABS, no guarding, rebound or peritoneal signs  Extremities: no clubbing, cyanosis 1+ bilateral lower extremity and bilateral pedal edema  2+pedal pulses bilaterally  Full range of motion  Neuro: awake, alert  Fluent speech  Interactive  Skin: warm and dry: no petechiae, purpura and rash      LABS:   Results from last 7 days   Lab Units 09/28/22  1008 09/26/22  1039 09/25/22  0548   WBC Thousand/uL 12 10* 12 96* 11 72*   HEMOGLOBIN g/dL 10 1* 10 3* 9 5*   HEMATOCRIT % 33 3* 33 3* 31 3*   PLATELETS Thousands/uL 290 314 253     Results from last 7 days   Lab Units 09/28/22  1008 09/27/22  0859 09/26/22  1039   POTASSIUM mmol/L 4 8 4 9 4 8   CHLORIDE mmol/L 99 96 102   CO2 mmol/L 32 34* 30   BUN mg/dL 59* 56* 47*   CREATININE mg/dL 1 00 1 36* 1 17   CALCIUM mg/dL 9 9 10 6* 10 [address] [place] [address] AND [facility] Data:    9/24 chest x-ray:  No acute cardiopulmonary disease     9/20:  Chest x-ray  No acute cardiopulmonary disease     Microbiology  9/20:  Negative COVID, influenza, RSV        ---------------------------------------------------------------------------------------------------------------  This note has been constructed using a voice recognition system

## 2022-09-28 NOTE — PROGRESS NOTES
Progress Note - Pulmonary   Tesha Way 81 y o  female MRN: [id number]  Unit/Bed#: E5 -01 Encounter: [id number]    Assessment/Plan:    1  Acute pulmonary insufficiency on chronic hypoxic respiratory failure      -   Currently on home O2 requirement of 2 L      -   Maintain saturations greater than 88%      -   Pulmonary toileting:  Deep breathing cough, OOB as tolerated, IS Q 1 hr     2  Chronic NGUYEN       -   Multifaceted:  Obesity,  Deconditioning,  Anxiety,  Untreated PASHA,  Tobacco abuse       -   Recommend high utilizer plan        -   May consider psych consult    3  Moderate COPD without acute exacerbation       -  Inpatient:  40 mg prednisone decreased by 10 mg q day,  Breo,  Incruse       -  home regimen: Advair 500/50 mcg 1 puff b i d , Spiriva 18 mcg 1 puff daily, and albuterol as needed        -   Recommend updated PFTs pulmonary follow-up       -  PALS referral has been placed    4  Acute on Chronic grade 1 diastolic CHF w/ PAF and Mild PHTN likely WHO group II & III       -  2/21/2022- EF 65%       -  9/13/2022-  EF 70    PA pressure 37 mmhg       -  IV  Lasix GTT discontinued due to lack of IV access       -   Cardiology following       -   I&Os and daily weights    5  PASHA       -  Per [facility]:  Issued CPAP in 2017 which she no longer uses       -  [facility] however patient is still not contacted [facility] to receive machine       -   No indication to requalify for CPAP/ BiPAP    6  Active tobacco abuse        -   Continues to smoke 1-2 cigarettes a day        -   Pre contemplation stage        -   Encourage in educated on tobacco cessation        -   NRT  Her primary team        -  Outpatient per [facility]  -   Pulmonary will sign off    Chief Complaint:    "I feel short of breath when walking"    Subjective:    Tawana was sitting in her bed  She reports that she is feeling somewhat short of breath upon ambulation    No significant overnight events reported  Patient currently denying any fevers, chills hemoptysis, headaches night sweats, pleuritic chest pain, or palpitations  Objective:    Vitals: Blood pressure 121/86, pulse 87, temperature 97 8 °F (36 6 °C), resp  rate 18, height 5' 2" (1 575 m), weight 104 kg (230 lb 6 1 oz), last menstrual period 04/01/2013, SpO2 95 %, not currently breastfeeding  2,Body mass index is 42 14 kg/m²  Intake/Output Summary (Last 24 hours) at 9/28/2022 9095  Last data filed at 9/28/2022 0200  Gross per 24 hour   Intake 1680 ml   Output --   Net 1680 ml       Invasive Devices  Report    None                 Physical Exam:   Physical Exam  Constitutional:       General: She is not in acute distress  Appearance: Normal appearance  She is obese  She is not ill-appearing  HENT:      Head: Normocephalic and atraumatic  Nose: Nose normal  No congestion or rhinorrhea  Mouth/Throat:      Mouth: Mucous membranes are dry  Pharynx: No oropharyngeal exudate or posterior oropharyngeal erythema  Cardiovascular:      Rate and Rhythm: Normal rate and regular rhythm  Pulses: Normal pulses  Heart sounds: Normal heart sounds  No murmur heard  No friction rub  No gallop  Pulmonary:      Effort: Pulmonary effort is normal  No tachypnea, bradypnea, accessory muscle usage or respiratory distress  Breath sounds: Decreased air movement present  No stridor  Decreased breath sounds and wheezing present  No rhonchi or rales  Comments: Some scattered wheezing distant breath sounds  Chest:      Chest wall: No tenderness  Abdominal:      General: Abdomen is flat  Bowel sounds are normal  There is no distension  Palpations: Abdomen is soft  There is no mass  Musculoskeletal:         General: No swelling or tenderness  Normal range of motion  Cervical back: Normal range of motion  No rigidity or tenderness  Skin:     General: Skin is warm and dry  Coloration: Skin is not jaundiced or pale  Neurological:      General: No focal deficit present  Mental Status: She is alert and oriented to person, place, and time  Mental status is at baseline  Psychiatric:         Mood and Affect: Mood normal          Behavior: Behavior normal          Labs:    I have personally reviewed pertinent lab result CMP:   Lab Results   Component Value Date    SODIUM 141 09/27/2022    K 4 9 09/27/2022    CL 96 09/27/2022    CO2 34 (H) 09/27/2022    BUN 56 (H) 09/27/2022    CREATININE 1 36 (H) 09/27/2022    CALCIUM 10 6 (H) 09/27/2022    EGFR 39 09/27/2022       Imaging and other studies: I have personally reviewed pertinent films in PACS      chest x-ray- 9/20/2022-  No acute cardiopulmonary disease

## 2022-09-28 NOTE — PLAN OF CARE
Problem: PAIN - ADULT  Goal: Verbalizes/displays adequate comfort level or baseline comfort level  Description: Interventions:  - Encourage patient to monitor pain and request assistance  - Assess pain using appropriate pain scale  - Administer analgesics based on type and severity of pain and evaluate response  - Implement non-pharmacological measures as appropriate and evaluate response  - Consider cultural and social influences on pain and pain management  - Notify physician/advanced practitioner if interventions unsuccessful or patient reports new pain  Outcome: Progressing     Problem: SAFETY ADULT  Goal: Patient will remain free of falls  Description: INTERVENTIONS:  - Educate patient/family on patient safety including physical limitations  - Instruct patient to call for assistance with activity   - Consult OT/PT to assist with strengthening/mobility   - Keep Call bell within reach  - Keep bed low and locked with side rails adjusted as appropriate  - Keep care items and personal belongings within reach  - Initiate and maintain comfort rounds  - Make Fall Risk Sign visible to staff  - Apply yellow socks and bracelet for high fall risk patients  - Consider moving patient to room near nurses station  Outcome: Progressing     Problem: DISCHARGE PLANNING  Goal: Discharge to home or other facility with appropriate resources  Description: INTERVENTIONS:  - Identify barriers to discharge w/patient and caregiver  - Arrange for needed discharge resources and transportation as appropriate  - Identify discharge learning needs (meds, wound care, etc )  - Arrange for interpretive services to assist at discharge as needed  - Refer to Case Management Department for coordinating discharge planning if the patient needs post-hospital services based on physician/advanced practitioner order or complex needs related to functional status, cognitive ability, or social support system  Outcome: Progressing     Problem: RESPIRATORY - ADULT  Goal: Achieves optimal ventilation and oxygenation  Description: INTERVENTIONS:  - Assess for changes in respiratory status  - Assess for changes in mentation and behavior  - Position to facilitate oxygenation and minimize respiratory effort  - Oxygen administered by appropriate delivery if ordered  - Initiate smoking cessation education as indicated  - Encourage broncho-pulmonary hygiene including cough, deep breathe, Incentive Spirometry  - Assess the need for suctioning and aspirate as needed  - Assess and instruct to report SOB or any respiratory difficulty  - Respiratory Therapy support as indicated  Outcome: Progressing     Problem: METABOLIC, FLUID AND ELECTROLYTES - ADULT  Goal: Fluid balance maintained  Description: INTERVENTIONS:  - Monitor labs   - Monitor I/O and WT  - Instruct patient on fluid and nutrition as appropriate  - Assess for signs & symptoms of volume excess or deficit  Outcome: Progressing     Problem: Potential for Falls  Goal: Patient will remain free of falls  Description: INTERVENTIONS:  - Educate patient/family on patient safety including physical limitations  - Instruct patient to call for assistance with activity   - Consult OT/PT to assist with strengthening/mobility   - Keep Call bell within reach  - Keep bed low and locked with side rails adjusted as appropriate  - Keep care items and personal belongings within reach  - Initiate and maintain comfort rounds  - Make Fall Risk Sign visible to staff  - Apply yellow socks and bracelet for high fall risk patients  - Consider moving patient to room near nurses station  Outcome: Progressing     Problem: Nutrition/Hydration-ADULT  Goal: Nutrient/Hydration intake appropriate for improving, restoring or maintaining nuweight and dietary intake as ordered or per policy  Utilize nutrition screening tool and intervene as necessary  Determine patient's food preferences and provide high-protein, high-caloric foods as appropriate  INTERVENTIONS:  - Monitor oral intake, urinary output, labs, and treatment plans  - Assess nutrition and hydration status and recommend course of action  - Evaluate amount of meals eaten  - Assist patient with eating if necessary   - Allow adequate time for meals  - Recommend/ encourage appropriate diets, oral nutritional supplements, and vitamin/mineral supplements  - Order, calculate, and assess calorie counts as needed  - Recommend, monitor, and adjust tube feedings and TPN/PPN based on assessed needs  - Assess need for intravenous fluids  - Provide specific nutrition/hydration education as appropriate  - Include patient/family/caregiver in decisions related to nutrition  Outcome: Progressing

## 2022-09-28 NOTE — PROGRESS NOTES
Cardiology Progress Note - Ravin Oropeza 81 y o  female MRN: [id number]    Unit/Bed#: E5 -01 Encounter: [id number]      Assessment & Plan:    Acute exacerbation of moderate persistent extrinsic asthma  -reports chronic shortness of breath, similar to last hospitalization  -initially put on Solu-Medrol 40 mg IV b i d , now transition to prednisone taper  -pulmonary consulted    Acute on chronic diastolic heart failure (HCC)  -TTE 09/13/2022 EF 70%, grade 1 DD, mild TR, estimated PA pressure of 37 mmHg  -resume home Lasix regiment of 20 mg p o  b i d   -NT proBNP 55 on 09/20/2022    Paroxysmal atrial fibrillation (HCC)  -anticoagulant on Eliquis 5 mg b i d    -rate control with Cardizem 180 mg daily    Type 2 diabetes mellitus without complication, without long-term current use of insulin ([facility])    HIV (human immunodeficiency virus infection)     Chronic respiratory failure with hypoxia ([facility])    Tobacco use    PASHA on CPAP    Anemia    Summary:   -no significant change in shortness of breath  -concerns weights have trended up again, but repeat standing weight appears stable  -does have some trace lower extremity edema so increased oral Lasix to 40 mg p o  b i d   -her asthma and CHF appear stable currently, unclear cause for her persistent shortness of breath  Subjective:   No significant events overnight  Continues to have shortness of breath and significant dyspnea on minimal exertion  No significant change in her breathing per patient  Denies chest pain, orthopnea, abdominal pain, nausea, vomiting, fever, chills, headache, dizziness or palpitations  Objective:     Vitals: Blood pressure 124/81, pulse 71, temperature 97 8 °F (36 6 °C), resp   rate 18, height 5' 2" (1 575 m), weight 102 kg (225 lb 8 5 oz), last menstrual period 04/01/2013, SpO2 97 %, not currently breastfeeding , Body mass index is 41 25 kg/m² ,   Orthostatic Blood Pressures    Flowsheet Row Most Recent Value   Blood Pressure 124/81 filed at 09/28/2022 0725   Patient Position - Orthostatic VS Lying filed at 09/27/2022 2243            Intake/Output Summary (Last 24 hours) at 9/28/2022 1144  Last data filed at 9/28/2022 0221  Gross per 24 hour   Intake 1320 ml   Output --   Net 1320 ml           Physical Exam:    GEN: Jania Stout appears well, alert and oriented x 3, pleasant and cooperative   HEENT: anicteric, mucous membranes moist  NECK: no jvd, carotid bruits   HEART: regular rhythm, normal S1 and S2, no murmurs, clicks, gallops or rubs   LUNGS:  Mild diffuse bilateral rhonchi  ABDOMEN: normal bowel sounds, soft, no tenderness, no distention  EXTREMITIES: peripheral pulses normal; 1-2+ lower extremity edema, left greater than right  NEURO: no focal findings   SKIN: normal without suspicious lesions on exposed skin      Current Facility-Administered Medications:     acetaminophen (TYLENOL) tablet 975 mg, 975 mg, Oral, Q6H PRN, Moretza Abarca MD, 975 mg at 09/26/22 1009    apixaban (ELIQUIS) tablet 5 mg, 5 mg, Oral, BID, Morteza Abarca MD, 5 mg at 09/28/22 0813    atorvastatin (LIPITOR) tablet 20 mg, 20 mg, Oral, Daily With Mansfield Boeck, MD, 20 mg at 09/27/22 1732    bictegravir-emtricitab-tenofovir alafenamide (BIKTARVY) -25 MG tablet 1 tablet, 1 tablet, Oral, Daily, Morteza Abarca MD, 1 tablet at 09/28/22 0814    diltiazem (CARDIZEM CD) 24 hr capsule 120 mg, 120 mg, Oral, Daily, Dima Rodriguez MD, 120 mg at 09/28/22 0813    fluticasone-vilanterol (BREO ELLIPTA) 200-25 MCG/INH inhaler 1 puff, 1 puff, Inhalation, Daily, Dima Rodriguez MD, 1 puff at 09/28/22 0818    furosemide (LASIX) tablet 40 mg, 40 mg, Oral, BID (diuretic), Carla Cortez MD    gabapentin (NEURONTIN) capsule 800 mg, 800 mg, Oral, TID, Morteza Abarca MD, 800 mg at 09/28/22 0813    guaiFENesin (MUCINEX) 12 hr tablet 600 mg, 600 mg, Oral, BID, Morteza Abarca MD, 600 mg at 09/28/22 0813    hydrOXYzine HCL (ATARAX) tablet 25 mg, 25 mg, Oral, Q6H PRN, Eliana Marin MD, 25 mg at 09/26/22 1149    insulin lispro (HumaLOG) 100 units/mL subcutaneous injection 1-5 Units, 1-5 Units, Subcutaneous, TID AC, 1 Units at 09/27/22 1732 **AND** Fingerstick Glucose (POCT), , , TID AC, Shad Bowen MD    levalbuterol Krystal Emmanuel) inhalation solution 1 25 mg, 1 25 mg, Nebulization, TID, 1 25 mg at 09/28/22 0700 **AND** sodium chloride 0 9 % inhalation solution 3 mL, 3 mL, Nebulization, TID, Alysia Win MD, 3 mL at 09/28/22 0700    levothyroxine tablet 100 mcg, 100 mcg, Oral, Early Morning, Alysia Win MD, 100 mcg at 09/28/22 0612    lidocaine (LIDODERM) 5 % patch 2 patch, 2 patch, Topical, Daily, Shahla Hernandez MD, 2 patch at 09/28/22 0813    melatonin tablet 6 mg, 6 mg, Oral, HS, Kathy Rawls PA-C, 6 mg at 09/27/22 2144    montelukast (SINGULAIR) tablet 10 mg, 10 mg, Oral, HS, Alysia Win MD, 10 mg at 09/27/22 2144    nicotine (NICODERM CQ) 21 mg/24 hr TD 24 hr patch 1 patch, 1 patch, Transdermal, Daily, Alysia Win MD    potassium chloride (K-DUR,KLOR-CON) CR tablet 20 mEq, 20 mEq, Oral, Daily, Alysia Win MD, 20 mEq at 09/28/22 0813    [COMPLETED] predniSONE tablet 40 mg, 40 mg, Oral, Daily, 40 mg at 09/28/22 0813 **FOLLOWED BY** [START ON 9/29/2022] predniSONE tablet 30 mg, 30 mg, Oral, Daily **FOLLOWED BY** [START ON 9/30/2022] predniSONE tablet 20 mg, 20 mg, Oral, Daily **FOLLOWED BY** [START ON 10/1/2022] predniSONE tablet 10 mg, 10 mg, Oral, Daily, LUIS M Gavin    traMADol Artist Zeyad) tablet 50 mg, 50 mg, Oral, Q6H PRN, Eliana Marin MD, 50 mg at 09/28/22 3528    umeclidinium bromide (INCRUSE ELLIPTA) 62 5 mcg/inh inhaler AEPB 1 puff, 1 puff, Inhalation, Daily, Jonnathan [place], CRNP, 1 puff at 09/28/22 1032    Labs & Results:    Lab Results   Component Value Date    TROPONINI <0 02 05/27/2020    TROPONINI <0 04 12/11/2014    TROPONINI 0 04 12/11/2014       Lab Results   Component Value Date    GLUCOSE 104 04/10/2018 CALCIUM 10 6 (H) 09/27/2022     (L) 12/26/2014    K 4 9 09/27/2022    CO2 34 (H) 09/27/2022    CL 96 09/27/2022    BUN 56 (H) 09/27/2022    CREATININE 1 36 (H) 09/27/2022       Lab Results   Component Value Date    WBC 12 10 (H) 09/28/2022    HGB 10 1 (L) 09/28/2022    HCT 33 3 (L) 09/28/2022    MCV 94 09/28/2022     09/28/2022           No results found for: CHOL  Lab Results   Component Value Date    HDL 70 09/12/2022     Lab Results   Component Value Date    LDLCALC 44 09/12/2022     Lab Results   Component Value Date    TRIG 82 09/12/2022       Lab Results   Component Value Date    ALT 33 09/20/2022    AST 26 09/20/2022         EKG personally reviewed by )Del Roe   No acute changes

## 2022-09-28 NOTE — ASSESSMENT & PLAN NOTE
Continue CPAP q h s    Pt has CPAP machine already ordered by her [facility] team:  She is aware she needs to contact [facility] to receive it

## 2022-09-28 NOTE — ASSESSMENT & PLAN NOTE
45-year-old female presented with increasing shortness of breath and wheezing  Recently admitted and discharged on 09/18 for COPD and CHF exacerbation, re-presented with dyspnea  During that admission she was evaluated by pulmonology team who notes that she does not have COPD based on her spirometry  She has moderate persistent asthma with acute exacerbations    · Pt was readmitted with dyspnea, wheezing    There was no evidence of acute congestive heart failure on admission, and patient was diagnosed with an asthma exacerbation  · Was started on Solu-Medrol IV, and budesonide, Xopenex  · Cont singulair  · Mucinex p r n  for cough  · Procalcitonin negative x2, no evidence of bacterial infection  · Chest x-ray without focal infiltrate  · Will be dc home on steroid taper  · Patient was evaluated by the pulmonary team:  She is currently on a prednisone taper

## 2022-09-28 NOTE — ASSESSMENT & PLAN NOTE
Wt Readings from Last 3 Encounters:   09/28/22 102 kg (225 lb 8 5 oz)   09/18/22 101 kg (223 lb 5 2 oz)   07/31/22 99 2 kg (218 lb 11 1 oz)     · 2D echo 09/13/2022 revealed ejection fraction 70%, mild-moderate concentric left ventricular hypertrophy, diastolic dysfunction type 1  · Patient was noted to have evidence of volume overload, lower extremity edema, dyspnea with exertion, and weight gain  · Baseline weight is closer to 220 lb, inpatient weight was up to 238 lb  · Home dose of Lasix was 20 mg b i d   · furosemide increased to 40 mg IV b i d  recently- however no significant diuresis so was seen by cardiology and changed to lasix infusion  · Weight decreased by 6kg on lasix drip  · acute exacerbation of chronic diastolic congestive heart failure likely due to IV steroids    Staff also noted noncompliance with low salt diet, and fluid restriction  · Patient has currently been changed to oral diuretics, and weight stable

## 2022-09-28 NOTE — ASSESSMENT & PLAN NOTE
· Patient has a history of chronic respiratory failure with hypoxia   · On 2-3L oxygen chronically  · Currently at her baseline

## 2022-09-28 NOTE — ASSESSMENT & PLAN NOTE
Lab Results   Component Value Date    HGBA1C 6 3 (H) 06/21/2022     · Patient has a history of diabetes type 2, without long-term use of insulin, without complication  · Is on diet therapy at home  · Monitor closely while patient on IV steroids  · Continue SSI, Accuchecks

## 2022-09-28 NOTE — PLAN OF CARE
Problem: PAIN - ADULT  Goal: Verbalizes/displays adequate comfort level or baseline comfort level  Description: Interventions:  - Encourage patient to monitor pain and request assistance  - Assess pain using appropriate pain scale  - Administer analgesics based on type and severity of pain and evaluate response  - Implement non-pharmacological measures as appropriate and evaluate response  - Consider cultural and social influences on pain and pain management  - Notify physician/advanced practitioner if interventions unsuccessful or patient reports new pain  Outcome: Progressing     Problem: SAFETY ADULT  Goal: Patient will remain free of falls  Description: INTERVENTIONS:  - Educate patient/family on patient safety including physical limitations  - Instruct patient to call for assistance with activity   - Consult OT/PT to assist with strengthening/mobility   - Keep Call bell within reach  - Keep bed low and locked with side rails adjusted as appropriate  - Keep care items and personal belongings within reach  - Initiate and maintain comfort rounds  - Make Fall Risk Sign visible to staff  - Apply yellow socks and bracelet for high fall risk patients  - Consider moving patient to room near nurses station  Outcome: Progressing     Problem: DISCHARGE PLANNING  Goal: Discharge to home or other facility with appropriate resources  Description: INTERVENTIONS:  - Identify barriers to discharge w/patient and caregiver  - Arrange for needed discharge resources and transportation as appropriate  - Identify discharge learning needs (meds, wound care, etc )  - Arrange for interpretive services to assist at discharge as needed  - Refer to Case Management Department for coordinating discharge planning if the patient needs post-hospital services based on physician/advanced practitioner order or complex needs related to functional status, cognitive ability, or social support system  Outcome: Progressing     Problem: RESPIRATORY - ADULT  Goal: Achieves optimal ventilation and oxygenation  Description: INTERVENTIONS:  - Assess for changes in respiratory status  - Assess for changes in mentation and behavior  - Position to facilitate oxygenation and minimize respiratory effort  - Oxygen administered by appropriate delivery if ordered  - Initiate smoking cessation education as indicated  - Encourage broncho-pulmonary hygiene including cough, deep breathe, Incentive Spirometry  - Assess the need for suctioning and aspirate as needed  - Assess and instruct to report SOB or any respiratory difficulty  - Respiratory Therapy support as indicated  Outcome: Progressing     Problem: METABOLIC, FLUID AND ELECTROLYTES - ADULT  Goal: Fluid balance maintained  Description: INTERVENTIONS:  - Monitor labs   - Monitor I/O and WT  - Instruct patient on fluid and nutrition as appropriate  - Assess for signs & symptoms of volume excess or deficit  Outcome: Progressing     Problem: Potential for Falls  Goal: Patient will remain free of falls  Description: INTERVENTIONS:  - Educate patient/family on patient safety including physical limitations  - Instruct patient to call for assistance with activity   - Consult OT/PT to assist with strengthening/mobility   - Keep Call bell within reach  - Keep bed low and locked with side rails adjusted as appropriate  - Keep care items and personal belongings within reach  - Initiate and maintain comfort rounds  - Make Fall Risk Sign visible to staff  - Apply yellow socks and bracelet for high fall risk patients  - Consider moving patient to room near nurses station  Outcome: Progressing

## 2022-09-29 VITALS
BODY MASS INDEX: 41.5 KG/M2 | OXYGEN SATURATION: 97 % | HEART RATE: 89 BPM | RESPIRATION RATE: 18 BRPM | DIASTOLIC BLOOD PRESSURE: 80 MMHG | HEIGHT: 62 IN | SYSTOLIC BLOOD PRESSURE: 133 MMHG | WEIGHT: 225.53 LBS | TEMPERATURE: 98.3 F

## 2022-09-29 LAB
GLUCOSE SERPL-MCNC: 116 MG/DL (ref 65–140)
GLUCOSE SERPL-MCNC: 204 MG/DL (ref 65–140)
GLUCOSE SERPL-MCNC: 90 MG/DL (ref 65–140)

## 2022-09-29 PROCEDURE — 82948 REAGENT STRIP/BLOOD GLUCOSE: CPT

## 2022-09-29 PROCEDURE — 99232 SBSQ HOSP IP/OBS MODERATE 35: CPT | Performed by: INTERNAL MEDICINE

## 2022-09-29 PROCEDURE — 94640 AIRWAY INHALATION TREATMENT: CPT

## 2022-09-29 PROCEDURE — 99239 HOSP IP/OBS DSCHRG MGMT >30: CPT | Performed by: INTERNAL MEDICINE

## 2022-09-29 RX ORDER — DILTIAZEM HYDROCHLORIDE 120 MG/1
120 CAPSULE, COATED, EXTENDED RELEASE ORAL DAILY
Qty: 30 CAPSULE | Refills: 0 | Status: SHIPPED | OUTPATIENT
Start: 2022-09-29

## 2022-09-29 RX ORDER — FLUTICASONE PROPIONATE AND SALMETEROL 100; 50 UG/1; UG/1
1 POWDER RESPIRATORY (INHALATION) 2 TIMES DAILY
COMMUNITY

## 2022-09-29 RX ORDER — PREDNISONE 10 MG/1
TABLET ORAL
Qty: 6 TABLET | Refills: 0 | Status: SHIPPED | OUTPATIENT
Start: 2022-09-29 | End: 2022-10-13 | Stop reason: SDUPTHER

## 2022-09-29 RX ORDER — FUROSEMIDE 40 MG/1
40 TABLET ORAL 2 TIMES DAILY
Qty: 60 TABLET | Refills: 0 | Status: SHIPPED | OUTPATIENT
Start: 2022-09-29

## 2022-09-29 RX ADMIN — GABAPENTIN 800 MG: 400 CAPSULE ORAL at 16:26

## 2022-09-29 RX ADMIN — ATORVASTATIN CALCIUM 20 MG: 20 TABLET, FILM COATED ORAL at 16:26

## 2022-09-29 RX ADMIN — FUROSEMIDE 40 MG: 40 TABLET ORAL at 16:26

## 2022-09-29 RX ADMIN — LEVOTHYROXINE SODIUM 100 MCG: 100 TABLET ORAL at 05:30

## 2022-09-29 RX ADMIN — GUAIFENESIN 600 MG: 600 TABLET, EXTENDED RELEASE ORAL at 17:37

## 2022-09-29 RX ADMIN — PREDNISONE 30 MG: 20 TABLET ORAL at 08:16

## 2022-09-29 RX ADMIN — APIXABAN 5 MG: 5 TABLET, FILM COATED ORAL at 08:16

## 2022-09-29 RX ADMIN — FUROSEMIDE 40 MG: 40 TABLET ORAL at 07:47

## 2022-09-29 RX ADMIN — ACETAMINOPHEN 975 MG: 325 TABLET ORAL at 08:16

## 2022-09-29 RX ADMIN — Medication 3 ML: at 13:45

## 2022-09-29 RX ADMIN — TRAMADOL HYDROCHLORIDE 50 MG: 50 TABLET, COATED ORAL at 06:20

## 2022-09-29 RX ADMIN — POTASSIUM CHLORIDE 20 MEQ: 1500 TABLET, EXTENDED RELEASE ORAL at 08:16

## 2022-09-29 RX ADMIN — LEVALBUTEROL 1.25 MG: 1.25 SOLUTION, CONCENTRATE RESPIRATORY (INHALATION) at 07:53

## 2022-09-29 RX ADMIN — FLUTICASONE FUROATE AND VILANTEROL TRIFENATATE 1 PUFF: 200; 25 POWDER RESPIRATORY (INHALATION) at 08:20

## 2022-09-29 RX ADMIN — UMECLIDINIUM 1 PUFF: 62.5 AEROSOL, POWDER ORAL at 08:15

## 2022-09-29 RX ADMIN — Medication 3 ML: at 07:53

## 2022-09-29 RX ADMIN — GABAPENTIN 800 MG: 400 CAPSULE ORAL at 08:16

## 2022-09-29 RX ADMIN — INSULIN LISPRO 1 UNITS: 100 INJECTION, SOLUTION INTRAVENOUS; SUBCUTANEOUS at 16:27

## 2022-09-29 RX ADMIN — BICTEGRAVIR SODIUM, EMTRICITABINE, AND TENOFOVIR ALAFENAMIDE FUMARATE 1 TABLET: 50; 200; 25 TABLET ORAL at 08:16

## 2022-09-29 RX ADMIN — GUAIFENESIN 600 MG: 600 TABLET, EXTENDED RELEASE ORAL at 08:16

## 2022-09-29 RX ADMIN — APIXABAN 5 MG: 5 TABLET, FILM COATED ORAL at 17:37

## 2022-09-29 RX ADMIN — LIDOCAINE 2 PATCH: 50 PATCH CUTANEOUS at 08:16

## 2022-09-29 RX ADMIN — LEVALBUTEROL 1.25 MG: 1.25 SOLUTION, CONCENTRATE RESPIRATORY (INHALATION) at 13:45

## 2022-09-29 NOTE — DISCHARGE SUMMARY
[address]  Progress Note - Fareed Lax 1950, 81 y o  female MRN: [id number]  Unit/Bed#: E5 -01 Encounter: [id number]  Primary Care Provider: Anjel Greenwood DO   Date and time admitted to hospital: 9/20/2022 11:29 AM           Admission Date: 9/20/2022       Discharge Date:  09/29/2022        Primary Diagnoses  Principal Problem:    Acute exacerbation of moderate persistent extrinsic asthma  Active Problems:    Chronic respiratory failure with hypoxia (HCC)    Paroxysmal atrial fibrillation (HCC)    Acute on chronic diastolic heart failure ([address] )    Type 2 diabetes mellitus without complication, without long-term current use of insulin (HCC)    HIV (human immunodeficiency virus infection)     Tobacco use    PASHA on CPAP    Anemia  Resolved Problems:    * No resolved hospital problems  [facility] Course by problem:  * Acute exacerbation of moderate persistent extrinsic asthma  Assessment & Plan  81-year-old female presented with increasing shortness of breath and wheezing  Recently admitted and discharged on 09/18 for COPD and CHF exacerbation, re-presented with dyspnea  During that admission she was evaluated by pulmonology team who notes that she does not have COPD based on her spirometry  She has moderate persistent asthma with acute exacerbations    · Pt was readmitted with dyspnea, wheezing    There was no evidence of acute congestive heart failure on admission, and patient was diagnosed with an asthma exacerbation  · Was started on Solu-Medrol IV, and budesonide, Xopenex  · Cont singulair  · Mucinex p r n  for cough  · Procalcitonin negative x2, no evidence of bacterial infection  · Chest x-ray without focal infiltrate  · Will be dc home on steroid taper  · Patient was evaluated by the pulmonary team:  She is currently on a prednisone taper  · She will follow-up with Pulmonary team at [facility]    Acute on chronic diastolic heart failure [facility])  Assessment & Plan  Wt Readings from Last 3 Encounters:   09/29/22 102 kg (225 lb 8 5 oz)   09/18/22 101 kg (223 lb 5 2 oz)   07/31/22 99 2 kg (218 lb 11 1 oz)     · 2D echo 09/13/2022 revealed ejection fraction 70%, mild-moderate concentric left ventricular hypertrophy, diastolic dysfunction type 1  · Patient was noted to have evidence of volume overload, lower extremity edema, dyspnea with exertion, and weight gain  · Baseline weight is closer to 220 lb, inpatient weight was up to 238 lb  · Home dose of Lasix was 20 mg b i d   · furosemide increased to 40 mg IV b i d  recently- however no significant diuresis so was seen by cardiology and changed to lasix infusion  · Weight decreased by 6kg on lasix drip  · acute exacerbation of chronic diastolic congestive heart failure likely due to IV steroids  Staff also noted noncompliance with low salt diet, and fluid restriction  · Patient has currently been changed to oral diuretics, and weight stable at 225 lb prior to discharge  · Lungs are currently clear to auscultation  · Cardiology will discharge on Lasix 40 mg b i d  Paroxysmal atrial fibrillation (HCC)  Assessment & Plan  · Patient has paroxysmal atrial fibrillation  · Rate controlled home diltiazem:  Home dose was 180 mg daily, decreased to 120 mg daily due to borderline blood pressure   · On chronic anticoagulation with eliquis  · Will be discharged home on this lower dose    Chronic respiratory failure with hypoxia ([facility] )  Assessment & Plan  · Patient has a history of chronic respiratory failure with hypoxia   · On 2-3L oxygen chronically  · Currently at her baseline    Anemia  Assessment & Plan  · Patient appears to have a chronic anemia with a hemoglobin baseline of 9-10  · Most likely secondary to anemia of chronic disease  · Hemoglobin at her baseline    PASHA on CPAP  Assessment & Plan  Continue CPAP q h s    Pt has CPAP machine already ordered by her LVH team from 5/22   She is aware she needs to contact [facility] to receive it    Tobacco use  Assessment & Plan  · Continues to smoke 1-2 cigarettes daily  · Nicotine patch offered  · Counseled on cessation    HIV (human immunodeficiency virus infection)   Assessment & Plan  · Continue biktarvy, and outpatient follow-up    Type 2 diabetes mellitus without complication, without long-term current use of insulin ([address] )  Assessment & Plan  Lab Results   Component Value Date    HGBA1C 6 3 (H) 06/21/2022     · Patient has a history of diabetes type 2, without long-term use of insulin, without complication  · Is on diet therapy at home  · Monitor closely while patient on IV steroids  · Continue SSI, Accuchecks      Called patient's [facility] Pulmonary office, Dr Melendez Mode:  Left a message that patient does not currently have a portable tank and needsone, when she leaves how she does not use her oxygen  Also left a message that she never picked up her CPAP machine and asked them to coordinate this    Service:  [facility], [facility]      Consulting Providers   Pulmonary: Dr Dean Riedel  Cardiology: Dr Howard Engel    Procedures Performed   none    Hospital Studies:  9/24 chest x-ray:  No acute cardiopulmonary disease     9/20:  Chest x-ray  No acute cardiopulmonary disease     Microbiology  9/20:  Negative COVID, influenza, RSV     Results from last 7 days   Lab Units 09/28/22  1008 09/26/22  1039 09/25/22  0548   WBC Thousand/uL 12 10* 12 96* 11 72*   HEMOGLOBIN g/dL 10 1* 10 3* 9 5*   HEMATOCRIT % 33 3* 33 3* 31 3*   PLATELETS Thousands/uL 290 314 253     Results from last 7 days   Lab Units 09/28/22  1008 09/27/22  0859 09/26/22  1039   POTASSIUM mmol/L 4 8 4 9 4 8   CHLORIDE mmol/L 99 96 102   CO2 mmol/L 32 34* 30   BUN mg/dL 59* 56* 47*   CREATININE mg/dL 1 00 1 36* 1 17   CALCIUM mg/dL 9 9 10 6* 10 4*       History and Physical Exam:  Please refer to the Admission H&P note    Discharge Condition: Improved  Discharge Disposition: Home with Home Health Care    Discharge Note and Physical Exam:   Pt notes she feels back to her baseline  Denies any sob  Notes NGUYEN, improved but not to baseline  Denies any pain  Denies any pain anywhere  Denies any fever, chills, n/v/d  Denies any cough  Denies any dizziness or lightheadedness  Patient's daughter relates that patient does not have a portable tag  When she leaves the house she does not use her oxygen therefore  She has a concentrator at home  Vitals:    09/29/22 1616   BP: 133/80   Pulse: 89   Resp:    Temp:    SpO2: 97%     General:  Pleasant, non-tachypnic, non-dyspnic  Conversant  Heart: Regular rate and rhythm, S1S2 present  No murmur, rub or gallop  Lungs: Clear to auscultation bilaterally, no wheezing, rhonchi, or crackles  Good air movement  No accessory muscle use or respiratory distress  Abdomen: soft, non-tender, non-distended, NABS  No rebound or guarding  No mass or peritoneal signs  Extremities: no clubbing, cyanosis  Trace BLE edema  2+ pedal pulses bilaterally  Neurologic: awake  Alert  Fluent speech  Moving all extremities  Skin: warm and dry  No petechiae, purpura or rash  Discharge Medications   Please see Medical Reconciliation Discharge Form    Discharge Follow Up Appointments:   Efra Arriola DO: 1 week  [facility] pulmonology: Dr Chinyere Dukes: 1 week    Discharge  Statement   Total Time Spent today including physical exam, discussion with patient and discharge arrangements/care = 40 minutes  Discussed with patient's nurse  Discussed with : Will arrive arrange home with portable oxygen for the ride home  Discussed with Cardiology Dr Edger Kussmaul:  Dakota Montes De Oca to discharge home with current dose of Lasix    Family:  Called patient's daughter  Given update  Reviewed discharge plan      This note has been constructed using a voice recognition system

## 2022-09-29 NOTE — ASSESSMENT & PLAN NOTE
82-year-old female presented with increasing shortness of breath and wheezing  Recently admitted and discharged on 09/18 for COPD and CHF exacerbation, re-presented with dyspnea  During that admission she was evaluated by pulmonology team who notes that she does not have COPD based on her spirometry  She has moderate persistent asthma with acute exacerbations    · Pt was readmitted with dyspnea, wheezing    There was no evidence of acute congestive heart failure on admission, and patient was diagnosed with an asthma exacerbation  · Was started on Solu-Medrol IV, and budesonide, Xopenex  · Cont singulair  · Mucinex p r n  for cough  · Procalcitonin negative x2, no evidence of bacterial infection  · Chest x-ray without focal infiltrate  · Will be dc home on steroid taper  · Patient was evaluated by the pulmonary team:  She is currently on a prednisone taper  · She will follow-up with Pulmonary team at [facility]

## 2022-09-29 NOTE — DISCHARGE INSTRUCTIONS
======================================================  Discharge instructions      Please take your medications as directed on your discharge papers as some of your home medications have been changed      Your water pill/diuretic dose has been increased:  Lasix/furosemide 40 mg twice a day  Please follow a low-salt diet, less than 2000 mg daily  Do not drink more than 1 5 L of fluid in 24 hours  Please weigh yourself daily and call your physician if you gain or lose more than 2 lb in a week's time    Please continue to use your home oxygen at all times    I have called your pulmonologist office at [facility] in let them know that you need a small portable oxygen tank    I also let them know that you still need to  your CPAP machine so they can help arrange that    Please be sure to continue using her nebulizers and inhalers    Return to the ER with any problems at all, especially any chest pain, difficulty breathing, dizziness, lightheadedness, or any other problems at all  =================================================================================

## 2022-09-29 NOTE — ASSESSMENT & PLAN NOTE
Wt Readings from Last 3 Encounters:   09/29/22 102 kg (225 lb 8 5 oz)   09/18/22 101 kg (223 lb 5 2 oz)   07/31/22 99 2 kg (218 lb 11 1 oz)     · 2D echo 09/13/2022 revealed ejection fraction 70%, mild-moderate concentric left ventricular hypertrophy, diastolic dysfunction type 1  · Patient was noted to have evidence of volume overload, lower extremity edema, dyspnea with exertion, and weight gain  · Baseline weight is closer to 220 lb, inpatient weight was up to 238 lb  · Home dose of Lasix was 20 mg b i d   · furosemide increased to 40 mg IV b i d  recently- however no significant diuresis so was seen by cardiology and changed to lasix infusion  · Weight decreased by 6kg on lasix drip  · acute exacerbation of chronic diastolic congestive heart failure likely due to IV steroids  Staff also noted noncompliance with low salt diet, and fluid restriction  · Patient has currently been changed to oral diuretics, and weight stable at 225 lb prior to discharge  · Lungs are currently clear to auscultation  · Cardiology will discharge on Lasix 40 mg b i d

## 2022-09-29 NOTE — CASE MANAGEMENT
Case Management Discharge Planning Note    Patient name Elsa Uriostegui  Location [place]  /E5 [address] MRN [id number]  : 1950 Date 2022       Current Admission Date: 2022  Current Admission Diagnosis:Acute exacerbation of moderate persistent extrinsic asthma   Patient Active Problem List    Diagnosis Date Noted    Anemia 2022    Obesity (BMI 35 0-39 9 without comorbidity) 2022    Acute on chronic diastolic heart failure ([facility] ) 2022    PASHA on CPAP 2020    Acute exacerbation of moderate persistent extrinsic asthma 2020    Fall at home, initial encounter 2020    Paroxysmal atrial fibrillation ([facility] ) 2020    Hyperlipidemia 2018    Thoracic disc herniation 2018    Lumbar stenosis 2018    Urinary frequency 2018    Ambulatory dysfunction     Back pain 2018    Hip pain 2018    Shortness of breath 2017    Hypertension     Type 2 diabetes mellitus with hyperglycemia (HCC)     Chronic respiratory failure with hypoxia (HCC)     Bipolar 1 disorder ([facility] )     Tobacco use     Arthritis 2016    Type 2 diabetes mellitus without complication, without long-term current use of insulin ([facility] ) 2016    HIV (human immunodeficiency virus infection)  2016    Osteoarthritis 2016      LOS (days): 9  Geometric Mean LOS (GMLOS) (days): 3 00  Days to GMLOS:-6 1     OBJECTIVE:  Risk of Unplanned Readmission Score: 34 32         Current admission status: Inpatient   Preferred Pharmacy:   [address] [place] [address], [address] [place] [id number]  Phone: [phone] Fax: [phone]    Primary Care Provider: Edilia Mackey DO    Primary Insurance: [employer]   Secondary Insurance: Lindsay Connor    DISCHARGE DETAILS:    Discharge planning discussed with[de-identified] Patient     Freedom of Choice: Yes  Comments - Freedom of Choice: Pt will discharge home today  Pt states she prefers to go by uber- Pt only lives a few blocks away  Pt will require O2 for the transport  Because Pt is an [employer] recipient, CM will send Pt with a small E-Tank as she is an [employer] client  CM contacted family/caregiver?: Yes (Attempted to call- LVM for John Ngo (Daughter) [phone]))  Were Treatment Team discharge recommendations reviewed with patient/caregiver?: Yes  Did patient/caregiver verbalize understanding of patient care needs?: Yes  Were patient/caregiver advised of the risks associated with not following Treatment Team discharge recommendations?: Yes    Discharge Destination Plan[de-identified] Home with [place] at Discharge : Auto with designated  ([address])  Dispatcher Contacted: Yes  Number/Name of Dispatcher: GERALD/[phone]  [id number]  ETA of Transport (Date): 09/29/22  ETA of Transport (Time): 1800    Transfer Mode: Walker  Accompanied by:  Other (Comment) ()    IMM Given (Date):: 09/29/22  IMM Given to[de-identified] Patient

## 2022-09-29 NOTE — PROGRESS NOTES
Cardiology Progress Note   MD Hector Blake MD Cleatus Shook, DO, Sharene Amass Mansoor, MD Willie Fellows, , Gerardo Nicholas DO, [facility]  ----------------------------------------------------------------  [facility]  [address] [place] [address], [address] [place] [address] 81 y o  female MRN: [id number]  Unit/Bed#: E5 -01 Encounter: [id number]      ASSESSMENT:    Acute on chronic diastolic heart failure  o LVEF 70%, mild-to-moderate LVH, grade 1 diastolic dysfunction, mild TR w/ PASP 37 mmHg, mild ascending aortic dilatation 4 1 cm, September 2022   Acute exacerbation of persistent extrinsic asthma   Paroxysmal atrial fibrillation on Eliquis   Chronic hypoxic respiratory failure   Type 2 diabetes mellitus   HIV   PASHA on CPAP   Anemia   Active tobacco use    PLAN:  Dyspnea has resolved and weights have been stable  Patient feels back to baseline and wishes to leave the hospital  Continue Lasix 40 mg p o  b i d  Stricts I/Os and daily weights  Keep potassium greater than 4 and magnesium greater than 2  Continue diltiazem and statin  Continue Eliquis for thromboembolic prophylaxis patient appears close to euvolemic and she is on her baseline level of O2  Should she gain greater than 3 lb in a day or 5 lb in 1-2 weeks, recommend calling the office for further titration of diuretic medication  If blood tests show renal function is stable and NT proBNP is stable, likely reasonable for outpatient follow-up for additional CV testing as clinically indicated    Signed: Christian Jerome DO, RADHA, MAHAD BELCHER      History of Present Illness:  Patient seen and examined  Patient states that she feels back to her baseline  Denies chest pain, pressure, tightness or squeezing  Denies lightheadedness, dizziness or palpitations  Denies orthopnea or paroxysmal nocturnal dyspnea  Admits to minimal lower extremity swelling        Review of Systems:  Review of Systems   Constitutional: Negative for decreased appetite, fever, weight gain and weight loss  HENT: Negative for congestion and sore throat  Eyes: Negative for visual disturbance  Cardiovascular: Negative for chest pain, dyspnea on exertion, leg swelling, near-syncope and palpitations  Respiratory: Negative for cough and shortness of breath  Hematologic/Lymphatic: Negative for bleeding problem  Skin: Negative for rash  Musculoskeletal: Negative for myalgias and neck pain  Gastrointestinal: Negative for abdominal pain and nausea  Neurological: Negative for light-headedness and weakness  Psychiatric/Behavioral: Negative for depression  Allergies   Allergen Reactions    Lisinopril Angioedema    Prozac [Fluoxetine Hcl] Rash    Sulfa Antibiotics Itching    Quinine        No current facility-administered medications on file prior to encounter  Current Outpatient Medications on File Prior to Encounter   Medication Sig    albuterol (2 5 mg/3 mL) 0 083 % nebulizer solution Take 3 mL (2 5 mg total) by nebulization every 6 (six) hours as needed for wheezing or shortness of breath    apixaban (ELIQUIS) 5 mg Take 1 tablet by mouth 2 (two) times a day    atorvastatin (LIPITOR) 20 mg tablet Take 20 mg by mouth daily    bictegravir-emtricitab-tenofovir alafenamide (Biktarvy) -25 MG tablet Take 1 tablet by mouth daily    acetaminophen (TYLENOL) 325 mg tablet Take 3 tablets (975 mg total) by mouth every 8 (eight) hours as needed for mild pain    budesonide (PULMICORT) 0 5 mg/2 mL nebulizer solution Take 2 mL (0 5 mg total) by nebulization 2 (two) times a day Rinse mouth after use      cholecalciferol (VITAMIN D3) 1,000 units tablet Take 1,000 Units by mouth daily    diltiazem (CARDIZEM CD) 180 mg 24 hr capsule Take 180 mg by mouth daily    furosemide (LASIX) 20 mg tablet Take 1 tablet (20 mg total) by mouth 2 (two) times a day    gabapentin (NEURONTIN) 400 mg capsule Take 800 mg by mouth 3 (three) times a day      levothyroxine 100 mcg tablet Take 100 mcg by mouth daily      montelukast (SINGULAIR) 10 mg tablet Take 10 mg by mouth daily at bedtime    nicotine (NICODERM CQ) 21 mg/24 hr TD 24 hr patch Place 1 patch on the skin daily    potassium chloride (K-DUR,KLOR-CON) 20 mEq tablet Take 1 tablet (20 mEq total) by mouth daily    predniSONE 10 mg tablet 4 for 4 days, 3 for 4 days, 2 for 4 days, 1 for 4 days        Current Facility-Administered Medications   Medication Dose Route Frequency Provider Last Rate    acetaminophen  975 mg Oral Q6H PRN Alex Plascencia MD      apixaban  5 mg Oral BID Alex Plascencia MD      atorvastatin  20 mg Oral Daily With Royce Talley MD      bictegravir-emtricitab-tenofovir alafenamide  1 tablet Oral Daily Alex Plascencia MD      diltiazem  120 mg Oral Daily Saba Osorio MD      fluticasone-vilanterol  1 puff Inhalation Daily Saba Osorio MD      furosemide  40 mg Oral BID (diuretic) Donell Fermin MD      gabapentin  800 mg Oral TID Alex Plascencia MD      guaiFENesin  600 mg Oral BID Alex Plascencia MD      hydrOXYzine HCL  25 mg Oral Q6H PRN Niurka Babb MD      insulin lispro  1-5 Units Subcutaneous TID AC Shad Noonan MD      levalbuterol  1 25 mg Nebulization TID Alex Plascencia MD      And    sodium chloride  3 mL Nebulization TID Alex Plascencia MD      levothyroxine  100 mcg Oral Early Morning Alex Plascencia MD      lidocaine  2 patch Topical Daily Saba Osorio MD      melatonin  6 mg Oral HS Kathy Rawls PA-C      montelukast  10 mg Oral HS Alex Plascencia MD      nicotine  1 patch Transdermal Daily Alex Plascencia MD      potassium chloride  20 mEq Oral Daily MD [place] ON 9/30/2022] predniSONE  20 mg Oral Daily LUIS M Gar      Followed by   Ingris [place] ON 10/1/2022] predniSONE  10 mg Oral Daily LUIS M Gar      traMADol  50 mg Oral Q6H PRN MD Adrian Epps umeclidinium bromide  1 puff Inhalation Daily [address] [place], [place]              Vitals:    09/29/22 5955 09/29/22 0753 09/29/22 0814 09/29/22 1345   BP: 122/79  107/65    BP Location:       Pulse: 73  88    Resp: 18      Temp: 97 9 °F (36 6 °C)      TempSrc:       SpO2: 95% 98% 100% 94%   Weight:       Height:         Body mass index is 41 25 kg/m²  Intake/Output Summary (Last 24 hours) at 9/29/2022 1457  Last data filed at 9/29/2022 0531  Gross per 24 hour   Intake 300 ml   Output --   Net 300 ml       Weight change: -2 2 kg (-4 lb 13 6 oz)    PHYSICAL EXAMINATION:  Gen: Awake, Alert, NAD  Head/eyes: AT/NC, pupils equal and round, Anicteric  ENT: mmm  Neck: Supple, No elevated JVP, trachea midline  Resp: CTA bilaterally no w/r/r  CV: RRR +S1, S2, No m/r/g  Abd: Soft, NT/ND + BS  Ext: trace LE edema bilaterally  Neuro: Follows commands, moves all extermities  Psych: Appropriate affect, normal mood, pleasant attitude, non-combative  Skin: warm; no rash, erythema or venous stasis changes on exposed skin    Lab Results:  Results from last 7 days   Lab Units 09/28/22  1008   WBC Thousand/uL 12 10*   HEMOGLOBIN g/dL 10 1*   HEMATOCRIT % 33 3*   PLATELETS Thousands/uL 290     Results from last 7 days   Lab Units 09/28/22  1008   POTASSIUM mmol/L 4 8   CHLORIDE mmol/L 99   CO2 mmol/L 32   BUN mg/dL 59*   CREATININE mg/dL 1 00   CALCIUM mg/dL 9 9     No results found for: TROPONINT                This note was completed in part utilizing [employer] Software  Grammatical errors, random word insertions, spelling mistakes, and incomplete sentences may be an occasional consequence of this system secondary to software limitations, ambient noise, and hardware issues  If you have any questions or concerns about the content, text, or information contained within the body of this dictation, please contact the provider for clarification

## 2022-09-29 NOTE — ASSESSMENT & PLAN NOTE
Continue CPAP q h s    Pt has CPAP machine already ordered by her LVH team from 5/22   She is aware she needs to contact [facility] to receive it

## 2022-09-29 NOTE — NURSING NOTE
Discharge instructions reviewed with patient  Emphasized diet compliance and Prednisone taper, patient verbalized understanding  Masimo removed and discharged  Patient discharged via wheelchair with PCA at side to [facility]  Oxygen tank at [facility], [place] in place

## 2022-09-29 NOTE — ASSESSMENT & PLAN NOTE
· Patient has paroxysmal atrial fibrillation  · Rate controlled home diltiazem:  Home dose was 180 mg daily, decreased to 120 mg daily due to borderline blood pressure   · On chronic anticoagulation with eliquis  · Will be discharged home on this lower dose

## 2022-09-30 NOTE — UTILIZATION REVIEW
Notification of Discharge   This is a Notification of Discharge from our facility [address]  Please be advised that this patient has been discharge from our facility  Below you will find the admission and discharge date and time including the patients disposition  UTILIZATION REVIEW CONTACT:  Anna Woodward  Utilization   Network Utilization Review Department  Phone: [phone] carefully listen to the prompts  All voicemails are confidential   Email: [email or web address]     PHYSICIAN ADVISORY SERVICES:  FOR [id number] REVIEW - MEDICAL NECESSITY DENIAL  Phone: [phone]  Fax: [phone]  Email: [email or web address]     PRESENTATION DATE: 9/20/2022 11:29 AM  OBERVATION ADMISSION DATE:   INPATIENT ADMISSION DATE: 9/20/22  2:52 PM   DISCHARGE DATE: 9/29/2022  6:01 PM  DISPOSITION: Home with [place] with [address] INFORMATION:  Send all requests for admission clinical reviews, approved or denied determinations and any other requests to dedicated fax number below belonging to the campus where the patient is receiving treatment   List of dedicated fax numbers:  [address] DENIALS (Administrative/Medical Necessity) [phone]   [address] (Maternity/NICU/Pediatrics) [phone]   [facility] [phone]   [address] [place] [address] [phone]   [address] [place] [address] [phone]   [address] [phone]   [facility]  [phone]   2205 [facility], [facility]  2401 [facility] 1000 [facility] [phone]

## 2022-10-11 ENCOUNTER — TELEPHONE (OUTPATIENT)
Dept: CARDIOLOGY CLINIC | Facility: CLINIC | Age: 72
End: 2022-10-11

## 2022-10-11 NOTE — TELEPHONE ENCOUNTER
----- Message from Fabrizio Doll sent at 10/3/2022  8:54 AM EDT -----  Regarding: FW: Follow-up  Not in for appt yet  ----- Message -----  From: Misty Pandey PA-C  Sent: 9/16/2022   3:50 PM EDT  To: Cardiology [facility]  Subject: Follow-up                                        This patient was hospitalized and seen by Dr Isaac Arzola    Can you please contact her to arrange outpatient follow-up   Thank you

## 2022-10-11 NOTE — TELEPHONE ENCOUNTER
Called pt to get her in with Dr Lindsey López this week no response to first call called pt today voicemail was full mailing appt letter to pt for post hospital f u

## 2022-10-13 ENCOUNTER — APPOINTMENT (OUTPATIENT)
Dept: RADIOLOGY | Facility: HOSPITAL | Age: 72
End: 2022-10-13
Payer: COMMERCIAL

## 2022-10-13 ENCOUNTER — HOSPITAL ENCOUNTER (EMERGENCY)
Facility: HOSPITAL | Age: 72
Discharge: HOME | End: 2022-10-13
Attending: EMERGENCY MEDICINE
Payer: COMMERCIAL

## 2022-10-13 VITALS
DIASTOLIC BLOOD PRESSURE: 73 MMHG | RESPIRATION RATE: 20 BRPM | HEART RATE: 97 BPM | OXYGEN SATURATION: 97 % | TEMPERATURE: 98.1 F | SYSTOLIC BLOOD PRESSURE: 118 MMHG

## 2022-10-13 DIAGNOSIS — J44.1 ACUTE EXACERBATION OF CHRONIC OBSTRUCTIVE PULMONARY DISEASE (COPD) (HCC): ICD-10-CM

## 2022-10-13 DIAGNOSIS — J44.1 COPD WITH ACUTE EXACERBATION (HCC): Primary | ICD-10-CM

## 2022-10-13 LAB
ANION GAP SERPL CALCULATED.3IONS-SCNC: 11 MMOL/L (ref 4–13)
BASOPHILS # BLD AUTO: 0.03 THOUSANDS/ΜL (ref 0–0.1)
BASOPHILS NFR BLD AUTO: 1 % (ref 0–1)
BUN SERPL-MCNC: 12 MG/DL (ref 5–25)
CALCIUM SERPL-MCNC: 10.5 MG/DL (ref 8.3–10.1)
CARDIAC TROPONIN I PNL SERPL HS: 14 NG/L
CHLORIDE SERPL-SCNC: 106 MMOL/L (ref 96–108)
CO2 SERPL-SCNC: 27 MMOL/L (ref 21–32)
CREAT SERPL-MCNC: 0.74 MG/DL (ref 0.6–1.3)
EOSINOPHIL # BLD AUTO: 0.26 THOUSAND/ΜL (ref 0–0.61)
EOSINOPHIL NFR BLD AUTO: 5 % (ref 0–6)
ERYTHROCYTE [DISTWIDTH] IN BLOOD BY AUTOMATED COUNT: 14.7 % (ref 11.6–15.1)
GFR SERPL CREATININE-BSD FRML MDRD: 82 ML/MIN/1.73SQ M
GLUCOSE SERPL-MCNC: 90 MG/DL (ref 65–140)
HCT VFR BLD AUTO: 33.1 % (ref 34.8–46.1)
HGB BLD-MCNC: 10.2 G/DL (ref 11.5–15.4)
IMM GRANULOCYTES # BLD AUTO: 0.04 THOUSAND/UL (ref 0–0.2)
IMM GRANULOCYTES NFR BLD AUTO: 1 % (ref 0–2)
LYMPHOCYTES # BLD AUTO: 1.61 THOUSANDS/ΜL (ref 0.6–4.47)
LYMPHOCYTES NFR BLD AUTO: 30 % (ref 14–44)
MCH RBC QN AUTO: 29.4 PG (ref 26.8–34.3)
MCHC RBC AUTO-ENTMCNC: 30.8 G/DL (ref 31.4–37.4)
MCV RBC AUTO: 95 FL (ref 82–98)
MONOCYTES # BLD AUTO: 0.42 THOUSAND/ΜL (ref 0.17–1.22)
MONOCYTES NFR BLD AUTO: 8 % (ref 4–12)
NEUTROPHILS # BLD AUTO: 2.95 THOUSANDS/ΜL (ref 1.85–7.62)
NEUTS SEG NFR BLD AUTO: 55 % (ref 43–75)
NRBC BLD AUTO-RTO: 1 /100 WBCS
NT-PROBNP SERPL-MCNC: 77 PG/ML
PLATELET # BLD AUTO: 301 THOUSANDS/UL (ref 149–390)
PMV BLD AUTO: 9.1 FL (ref 8.9–12.7)
POTASSIUM SERPL-SCNC: 3.4 MMOL/L (ref 3.5–5.3)
RBC # BLD AUTO: 3.47 MILLION/UL (ref 3.81–5.12)
SODIUM SERPL-SCNC: 144 MMOL/L (ref 135–147)
WBC # BLD AUTO: 5.31 THOUSAND/UL (ref 4.31–10.16)

## 2022-10-13 PROCEDURE — 36415 COLL VENOUS BLD VENIPUNCTURE: CPT | Performed by: EMERGENCY MEDICINE

## 2022-10-13 PROCEDURE — 80048 BASIC METABOLIC PNL TOTAL CA: CPT | Performed by: EMERGENCY MEDICINE

## 2022-10-13 PROCEDURE — 93005 ELECTROCARDIOGRAM TRACING: CPT

## 2022-10-13 PROCEDURE — 71045 X-RAY EXAM CHEST 1 VIEW: CPT

## 2022-10-13 PROCEDURE — 84484 ASSAY OF TROPONIN QUANT: CPT | Performed by: EMERGENCY MEDICINE

## 2022-10-13 PROCEDURE — 83880 ASSAY OF NATRIURETIC PEPTIDE: CPT | Performed by: EMERGENCY MEDICINE

## 2022-10-13 PROCEDURE — 94640 AIRWAY INHALATION TREATMENT: CPT

## 2022-10-13 PROCEDURE — 99285 EMERGENCY DEPT VISIT HI MDM: CPT | Performed by: EMERGENCY MEDICINE

## 2022-10-13 PROCEDURE — 85025 COMPLETE CBC W/AUTO DIFF WBC: CPT | Performed by: EMERGENCY MEDICINE

## 2022-10-13 PROCEDURE — 99285 EMERGENCY DEPT VISIT HI MDM: CPT

## 2022-10-13 RX ORDER — ALBUTEROL SULFATE 2.5 MG/3ML
2.5 SOLUTION RESPIRATORY (INHALATION) EVERY 6 HOURS PRN
Qty: 75 ML | Refills: 0 | Status: SHIPPED | OUTPATIENT
Start: 2022-10-13

## 2022-10-13 RX ORDER — METHYLPREDNISOLONE SODIUM SUCCINATE 125 MG/2ML
60 INJECTION, POWDER, LYOPHILIZED, FOR SOLUTION INTRAMUSCULAR; INTRAVENOUS ONCE
Status: DISCONTINUED | OUTPATIENT
Start: 2022-10-13 | End: 2022-10-13

## 2022-10-13 RX ORDER — ALBUTEROL SULFATE 2.5 MG/3ML
5 SOLUTION RESPIRATORY (INHALATION) ONCE
Status: COMPLETED | OUTPATIENT
Start: 2022-10-13 | End: 2022-10-13

## 2022-10-13 RX ORDER — TRAMADOL HYDROCHLORIDE 50 MG/1
50 TABLET ORAL AS NEEDED
COMMUNITY
Start: 2022-09-19

## 2022-10-13 RX ORDER — PREDNISONE 10 MG/1
TABLET ORAL
Qty: 6 TABLET | Refills: 0 | Status: SHIPPED | OUTPATIENT
Start: 2022-10-13 | End: 2022-10-26

## 2022-10-13 RX ORDER — PREDNISONE 20 MG/1
60 TABLET ORAL ONCE
Status: COMPLETED | OUTPATIENT
Start: 2022-10-13 | End: 2022-10-13

## 2022-10-13 RX ADMIN — ALBUTEROL SULFATE 5 MG: 2.5 SOLUTION RESPIRATORY (INHALATION) at 12:45

## 2022-10-13 RX ADMIN — PREDNISONE 60 MG: 20 TABLET ORAL at 16:04

## 2022-10-13 NOTE — ED PROVIDER NOTES
History  Chief Complaint   Patient presents with   • Shortness of Breath     Pt arrived via ems from home with copd exacerbation  Hx CHF  Given duo neb in ems without relief  Recent admission for same  74-year-old female with history of COPD on 2 liters of O2, CHF, bipolar depression, hypertension, diabetes presents to the ED with shortness of breath over the last 2 days similar to her prior COPD exacerbations  She denies any coughing or chest pain  No fevers  She does complain of lower extremity swelling which seems to be chronic for her  She denies smoking  She did receive a DuoNeb pre-hospital with little relief  History provided by:  Patient   used: No    Shortness of Breath  Severity:  Moderate  Onset quality:  Gradual  Duration:  2 days  Timing:  Constant  Progression:  Unchanged  Chronicity:  Recurrent  Context: not activity and not URI    Relieved by:  Nothing  Worsened by:  Nothing  Ineffective treatments: Nebulizer treatment x2  Associated symptoms: wheezing    Associated symptoms: no abdominal pain, no chest pain, no claudication, no cough, no diaphoresis, no ear pain, no fever, no headaches, no hemoptysis, no neck pain, no PND, no rash, no sore throat, no sputum production, no syncope, no swollen glands and no vomiting    Risk factors: no tobacco use        Prior to Admission Medications   Prescriptions Last Dose Informant Patient Reported? Taking? Fluticasone-Salmeterol (Advair) 100-50 mcg/dose inhaler   Yes No   Sig: Inhale 1 puff 2 (two) times a day Rinse mouth after use     acetaminophen (TYLENOL) 325 mg tablet   No No   Sig: Take 3 tablets (975 mg total) by mouth every 8 (eight) hours as needed for mild pain   albuterol (2 5 mg/3 mL) 0 083 % nebulizer solution   No No   Sig: Take 3 mL (2 5 mg total) by nebulization every 6 (six) hours as needed for wheezing or shortness of breath   apixaban (ELIQUIS) 5 mg   Yes No   Sig: Take 1 tablet by mouth 2 (two) times a day   atorvastatin (LIPITOR) 20 mg tablet   Yes No   Sig: Take 20 mg by mouth daily   bictegravir-emtricitab-tenofovir alafenamide (Biktarvy) -25 MG tablet   Yes No   Sig: Take 1 tablet by mouth daily   budesonide (PULMICORT) 0 5 mg/2 mL nebulizer solution   No No   Sig: Take 2 mL (0 5 mg total) by nebulization 2 (two) times a day Rinse mouth after use  cholecalciferol (VITAMIN D3) 1,000 units tablet   Yes No   Sig: Take 1,000 Units by mouth daily   diltiazem (CARDIZEM CD) 120 mg 24 hr capsule   No No   Sig: Take 1 capsule (120 mg total) by mouth daily   furosemide (LASIX) 40 mg tablet   No No   Sig: Take 1 tablet (40 mg total) by mouth 2 (two) times a day   gabapentin (NEURONTIN) 400 mg capsule   Yes No   Sig: Take 800 mg by mouth 3 (three) times a day     levothyroxine 100 mcg tablet   Yes No   Sig: Take 100 mcg by mouth daily     montelukast (SINGULAIR) 10 mg tablet   Yes No   Sig: Take 10 mg by mouth daily at bedtime   nicotine (NICODERM CQ) 21 mg/24 hr TD 24 hr patch   No No   Sig: Place 1 patch on the skin daily   potassium chloride (K-DUR,KLOR-CON) 20 mEq tablet   No No   Sig: Take 1 tablet (20 mEq total) by mouth daily   predniSONE 10 mg tablet   No No   Si tabs po daily x 2d, then 1 tab po daily x 2d, then stop      Facility-Administered Medications: None       Past Medical History:   Diagnosis Date   • Asthma    • Bipolar 1 disorder (HCC)    • Cardiac disease    • COPD (chronic obstructive pulmonary disease) (HCC)    • Diabetes mellitus (HCC)    • Disease of thyroid gland    • HIV (human immunodeficiency virus infection) ([facility] [place] [address] ) 2016   • Hypertension    • Osteoarthritis    • Tobacco abuse        Past Surgical History:   Procedure Laterality Date   • HERNIA REPAIR     • LUMBAR FUSION N/A 4/10/2018    Procedure: T9/10 discectomy with T9-T11 fusion;  Surgeon: Marielle Trinh MD;  [facility]: [facility];  Service: Neurosurgery   • THYROIDECTOMY         Family History   Problem Relation Age of Onset • No Known Problems Mother    • Diabetes Father    • Heart disease Neg Hx    • Cancer Neg Hx      I have reviewed and agree with the history as documented  E-Cigarette/Vaping     E-Cigarette/Vaping Substances     Social History     Tobacco Use   • Smoking status: Current Every Day Smoker     Packs/day: 0 20     Years: 45 00     Pack years: 9 00     Types: Cigarettes   • Smokeless tobacco: Never Used   • Tobacco comment: Currently smoking 1-2 cigarettes a day   Substance Use Topics   • Alcohol use: Yes   • Drug use: No     Comment: former IV drug user       Review of Systems   Constitutional: Negative  Negative for chills, diaphoresis, fatigue and fever  HENT: Negative  Negative for congestion, ear pain, rhinorrhea and sore throat  Eyes: Negative  Negative for discharge, redness and itching  Respiratory: Positive for shortness of breath and wheezing  Negative for apnea, cough, hemoptysis, sputum production and chest tightness  Cardiovascular: Negative for chest pain, palpitations, claudication, leg swelling, syncope and PND  Gastrointestinal: Negative  Negative for abdominal pain and vomiting  Endocrine: Negative  Genitourinary: Negative  Negative for flank pain, frequency and urgency  Musculoskeletal: Negative  Negative for back pain and neck pain  Skin: Negative  Negative for rash  Allergic/Immunologic: Negative  Neurological: Negative  Negative for dizziness, syncope, weakness, light-headedness, numbness and headaches  Hematological: Negative  All other systems reviewed and are negative  Physical Exam  Physical Exam  Vitals and nursing note reviewed  Constitutional:       General: She is awake  She is not in acute distress  Appearance: Normal appearance  She is well-developed and overweight  She is not ill-appearing, toxic-appearing or diaphoretic  HENT:      Head: Normocephalic and atraumatic        Right Ear: External ear normal       Left Ear: External ear normal       Nose: Nose normal       Mouth/Throat:      Mouth: Mucous membranes are moist       Pharynx: No oropharyngeal exudate  Eyes:      General: No scleral icterus  Right eye: No discharge  Left eye: No discharge  Conjunctiva/sclera: Conjunctivae normal       Pupils: Pupils are equal, round, and reactive to light  Neck:      Thyroid: No thyromegaly  Vascular: No JVD  Trachea: No tracheal deviation  Cardiovascular:      Rate and Rhythm: Normal rate and regular rhythm  Heart sounds: Normal heart sounds  No murmur heard  No friction rub  No gallop  Pulmonary:      Effort: Pulmonary effort is normal  No tachypnea, accessory muscle usage or respiratory distress  Breath sounds: No stridor  Examination of the right-upper field reveals wheezing  Examination of the left-upper field reveals wheezing  Examination of the right-middle field reveals wheezing  Examination of the left-middle field reveals wheezing  Examination of the right-lower field reveals wheezing  Examination of the left-lower field reveals wheezing  Wheezing present  No rhonchi or rales  Chest:      Chest wall: No tenderness  Abdominal:      General: Bowel sounds are normal  There is no distension  Palpations: Abdomen is soft  There is no mass  Tenderness: There is no abdominal tenderness  Hernia: No hernia is present  Musculoskeletal:         General: No swelling, tenderness, deformity or signs of injury  Normal range of motion  Cervical back: Normal range of motion and neck supple  Right lower leg: No edema  Left lower leg: Edema (Chronic) present  Lymphadenopathy:      Cervical: No cervical adenopathy  Skin:     General: Skin is warm and dry  Coloration: Skin is not jaundiced or pale  Findings: No bruising, erythema, lesion or rash  Neurological:      General: No focal deficit present        Mental Status: She is alert and oriented to person, place, and time  Motor: No weakness or abnormal muscle tone  Deep Tendon Reflexes: Reflexes are normal and symmetric  Psychiatric:         Mood and Affect: Mood normal          Behavior: Behavior is cooperative           Vital Signs  ED Triage Vitals [10/13/22 1144]   Temp Pulse Respirations Blood Pressure SpO2   -- (!) 107 (!) 26 116/70 97 %      Temp src Heart Rate Source Patient Position - Orthostatic VS BP Location FiO2 (%)   -- Monitor Sitting Right arm --      Pain Score       --           Vitals:    10/13/22 1144   BP: 116/70   Pulse: (!) 107   Patient Position - Orthostatic VS: Sitting         Visual Acuity      ED Medications  Medications   albuterol inhalation solution 5 mg (has no administration in time range)   methylPREDNISolone sodium succinate (Solu-MEDROL) injection 60 mg (has no administration in time range)       Diagnostic Studies  Results Reviewed     Procedure Component Value Units Date/Time    CBC and differential [[id number]]     Lab Status: No result Specimen: Blood     Basic metabolic panel [[id number]]     Lab Status: No result Specimen: Blood     HS Troponin 0hr (reflex protocol) [[id number]]     Lab Status: No result Specimen: Blood     NT-BNP PRO [[id number]]     Lab Status: No result Specimen: Blood                  XR chest 1 view portable    (Results Pending)              Procedures  ECG 12 Lead Documentation Only    Date/Time: 10/13/2022 12:44 PM  Performed by: Darcy Proctor DO  Authorized by: Darcy Proctor DO     Indications / Diagnosis:  Sob  ECG reviewed by me, the ED Provider: yes    Patient location:  ED  Interpretation:     Interpretation: non-specific    Rate:     ECG rate:  94    ECG rate assessment: normal    Rhythm:     Rhythm: sinus rhythm    Ectopy:     Ectopy: none    Conduction:     Conduction: abnormal      Abnormal conduction: non-specific intraventricular conduction delay    ST segments:     ST segments:  Normal  T waves:     T waves: normal ED Course  ED Course as of 10/13/22 1535   Thu Oct 13, 2022   1533 Pt feeling much better  Much less wheezing on exam  Asking to eat  Stable for d/c                                             MDM  Number of Diagnoses or Management Options  Diagnosis management comments: 79-year-old female with history of COPD chronically on 2 liters of O2 presents to the ED with worsening shortness of breath over the last 2 days similar to previous COPD exacerbations  No productive cough, chest pain or fevers  She used her nebulizer at home and received a DuoNeb pre-hospital without relief  On exam she appears very comfortable in no distress  She does have scattered wheezes on exam   She has +2 left lower extremity edema which is chronic for her  Will do cardiac workup as she has a history of CHF  Will give additional albuterol and Solu-Medrol  Will re-evaluate       Amount and/or Complexity of Data Reviewed  Clinical lab tests: ordered and reviewed  Tests in the radiology section of CPT®: ordered and reviewed  Review and summarize past medical records: yes  Independent visualization of images, tracings, or specimens: yes        Disposition  Final diagnoses:   None     ED Disposition     None      Follow-up Information    None         Patient's Medications   Discharge Prescriptions    No medications on file       No discharge procedures on file      PDMP Review       Value Time User    PDMP Reviewed  Yes 6/21/2022  7:54 PM Albertina Barrera, [address]           ED Provider  Electronically Signed by           Boni Powell DO  10/13/22 62[address] [place] Heidi Mendoza DO  10/13/22 5707

## 2022-10-13 NOTE — ED NOTES
Pt given ham sandwich and diet pepsi per permission from provider        Billy Castillo  10/13/22 1532

## 2022-10-13 NOTE — CASE MANAGEMENT
Case Management ED Assessment    Patient name Bradly Haque  Location ED 28/ED 28 MRN [id number]  : 1950 Date 10/13/2022        OBJECTIVE:  Predictive Model Details         94% Factor Value    Risk of Hospital Admission or ED Visit Model Number of ED Visits 2     Number of Hospitalizations 3     Has Medicaid Yes     Is in Relationship No     Has Atrial Fibrillation Yes     Has COPD Yes     Has Anemia Yes     Has CVD Yes     Has Diabetes Yes     Has Asthma Yes     Has CHF Yes     Has PCP Yes            Chief Complaint: SOB (shortness of breath)   Patient Class: Emergency  Preferred Pharmacy:   [address] [place] [address], [facility],4Th Floor [facility]  [address] [place] [address]  Phone: [phone] Fax: [phone]    Primary Care Provider: Cleophus Baumgarten, DO    Primary Insurance: Sarah VILLANUEVA  Secondary Insurance: [address] [place] [address]    ED ASSESSMENT:  Active Health Care Proxies    There are no active Health Care Proxies on file              Readmission Root Cause  30 Day Readmission: Yes  Who directed you to return to the hospital?: Self  Did you understand whom to contact if you had questions or problems?: Yes  Did you get your prescriptions before you left the hospital?: Yes  Were you able to get your prescriptions filled when you left the hospital?: Yes  Did you take your medications as prescribed?: Yes  Were you able to get to your follow-up appointments?: Yes  Patient was readmitted due to: SOB         Prescribed Medications Prior to Admission/ED Visit  Has patient been prescribed medications (prior to this ED visit/admission)?: Yes  Any difficulty obtaining medications?: No  Has the patient been taking all prescribed medication(s)?: Yes  Does the patient have difficulty affording medication(s)?: No    Patient Information  Admitted from[de-identified] Home  Mental Status: Alert  During Assessment patient was accompanied by: Not accompanied during assessment  Assessment information provided by[de-identified] Patient  Primary Caregiver: Family  Caregiver's Relationship to Patient[de-identified] Family Member  Support Systems: Home care staff, Daughter, Family members  [place] of Residence: [address] [place] [address] do you live in?: [place] [address]onal entry access options  Select all that apply : Stairs  Number of steps to enter home : 6  Do the steps have railings?: Yes  Type of Current Residence: [address]  Upon entering residence, is there a bedroom on the main floor (no further steps)?: Yes  Upon entering residence, is there a bathroom on the main floor (no further steps)?: Yes  In the last 12 months, was there a time when you were not able to pay the mortgage or rent on time?: No  In the last 12 months, how many places have you lived?: 1  In the last 12 months, was there a time when you did not have a steady place to sleep or slept in a shelter (including now)?: No  Living Arrangements: Lives w/ Daughter  Is patient a ?: No    Patient Information Continued  Does patient have prescription coverage?: Yes  Within the past 12 months, you worried that your food would run out before you got the money to buy more : Never true  Within the past 12 months, the food you bought just didn't last and you didn't have money to get more : Never true  Does patient receive dialysis treatments?: No  Does patient have a history of substance abuse?: No  Does patient have a history of Mental Health Diagnosis?: Yes  Is patient receiving treatment for mental health?: Yes  Has patient received inpatient treatment related to mental health in the last 2 years?: No    Food and Transportation  What is patient's usual means of transportation?: Family Transport  Ask patient:  How would you describe your eating habits?: Good

## 2022-10-13 NOTE — CASE MANAGEMENT
Case Management ED Progress Note    Patient name Ravin Oropeza  [facility] ED 28/ED 28 MRN [id number]  : 1950 Date 10/13/2022        OBJECTIVE:  Predictive Model Details         94% Factor Value    Risk of Hospital Admission or ED Visit Model Number of ED Visits 2     Number of Hospitalizations 3     Has Medicaid Yes     Is in Relationship No     Has Atrial Fibrillation Yes     Has COPD Yes     Has Anemia Yes     Has CVD Yes     Has Diabetes Yes     Has Asthma Yes     Has CHF Yes     Has PCP Yes            Chief Complaint: SOB (shortness of breath)   Patient Class: Emergency  Preferred Pharmacy:   [address] [place] [address], [facility],4Th Floor [facility]  [address] [place] [address]  Phone: [phone] Fax: [phone]    Primary Care Provider: Shauna Bess DO    Primary Insurance: [facility]  Secondary Insurance: Sary Fair    ED Progress Note:      Patient identify as a CODE R  Patient complains of shortness of breath, is on 2 liters oxygen, claims has been abstinent from smoking for the last 3 days  Patient lab work, EKG pending  Patient was alert and oriented   Patient lives with her daughter, has a non medical home attendant providing 6 hours a day 7 days a week, and claims has [facility] weekly  Patient is currentlyb under medical care at [facility] and medical    Patient may need assistance with transport at d/c  This worker to follow

## 2022-10-14 LAB
ATRIAL RATE: 94 BPM
P AXIS: 42 DEGREES
PR INTERVAL: 180 MS
QRS AXIS: -69 DEGREES
QRSD INTERVAL: 112 MS
QT INTERVAL: 322 MS
QTC INTERVAL: 402 MS
T WAVE AXIS: 25 DEGREES
VENTRICULAR RATE: 94 BPM

## 2022-10-14 PROCEDURE — 93010 ELECTROCARDIOGRAM REPORT: CPT

## 2022-10-23 ENCOUNTER — HOSPITAL ENCOUNTER (INPATIENT)
Facility: HOSPITAL | Age: 72
LOS: 3 days | Discharge: HOME HEALTH | End: 2022-10-26
Attending: EMERGENCY MEDICINE | Admitting: INTERNAL MEDICINE

## 2022-10-23 ENCOUNTER — APPOINTMENT (EMERGENCY)
Dept: RADIOLOGY | Facility: HOSPITAL | Age: 72
End: 2022-10-23

## 2022-10-23 DIAGNOSIS — J44.1 COPD EXACERBATION (HCC): Primary | ICD-10-CM

## 2022-10-23 DIAGNOSIS — U07.1 COVID: ICD-10-CM

## 2022-10-23 DIAGNOSIS — R06.00 DYSPNEA: ICD-10-CM

## 2022-10-23 PROBLEM — A41.9 SEPSIS (HCC): Status: ACTIVE | Noted: 2022-10-23

## 2022-10-23 LAB
2HR DELTA HS TROPONIN: -4 NG/L
ALBUMIN SERPL BCP-MCNC: 3.9 G/DL (ref 3.5–5)
ALP SERPL-CCNC: 81 U/L (ref 46–116)
ALT SERPL W P-5'-P-CCNC: 20 U/L (ref 12–78)
ANION GAP SERPL CALCULATED.3IONS-SCNC: 10 MMOL/L (ref 4–13)
APTT PPP: 25 SECONDS (ref 23–37)
AST SERPL W P-5'-P-CCNC: 8 U/L (ref 5–45)
ATRIAL RATE: 103 BPM
ATRIAL RATE: 99 BPM
BACTERIA UR QL AUTO: ABNORMAL /HPF
BASOPHILS # BLD AUTO: 0.01 THOUSANDS/ÂΜL (ref 0–0.1)
BASOPHILS NFR BLD AUTO: 0 % (ref 0–1)
BILIRUB SERPL-MCNC: 0.53 MG/DL (ref 0.2–1)
BILIRUB UR QL STRIP: NEGATIVE
BUN SERPL-MCNC: 27 MG/DL (ref 5–25)
CALCIUM SERPL-MCNC: 10.6 MG/DL (ref 8.3–10.1)
CARDIAC TROPONIN I PNL SERPL HS: 11 NG/L
CARDIAC TROPONIN I PNL SERPL HS: 7 NG/L
CHLORIDE SERPL-SCNC: 103 MMOL/L (ref 96–108)
CLARITY UR: CLEAR
CO2 SERPL-SCNC: 30 MMOL/L (ref 21–32)
COLOR UR: YELLOW
CREAT SERPL-MCNC: 1 MG/DL (ref 0.6–1.3)
CRP SERPL QL: 13.3 MG/L
EOSINOPHIL # BLD AUTO: 0.01 THOUSAND/ÂΜL (ref 0–0.61)
EOSINOPHIL NFR BLD AUTO: 0 % (ref 0–6)
ERYTHROCYTE [DISTWIDTH] IN BLOOD BY AUTOMATED COUNT: 15.8 % (ref 11.6–15.1)
FLUAV RNA RESP QL NAA+PROBE: NEGATIVE
FLUBV RNA RESP QL NAA+PROBE: NEGATIVE
GFR SERPL CREATININE-BSD FRML MDRD: 57 ML/MIN/1.73SQ M
GLUCOSE SERPL-MCNC: 130 MG/DL (ref 65–140)
GLUCOSE SERPL-MCNC: 195 MG/DL (ref 65–140)
GLUCOSE SERPL-MCNC: 272 MG/DL (ref 65–140)
GLUCOSE UR STRIP-MCNC: NEGATIVE MG/DL
HCT VFR BLD AUTO: 36.6 % (ref 34.8–46.1)
HGB BLD-MCNC: 11.4 G/DL (ref 11.5–15.4)
HGB UR QL STRIP.AUTO: NEGATIVE
IMM GRANULOCYTES # BLD AUTO: 0.15 THOUSAND/UL (ref 0–0.2)
IMM GRANULOCYTES NFR BLD AUTO: 1 % (ref 0–2)
INR PPP: 1.1 (ref 0.84–1.19)
KETONES UR STRIP-MCNC: NEGATIVE MG/DL
LEUKOCYTE ESTERASE UR QL STRIP: ABNORMAL
LYMPHOCYTES # BLD AUTO: 1.09 THOUSANDS/ÂΜL (ref 0.6–4.47)
LYMPHOCYTES NFR BLD AUTO: 9 % (ref 14–44)
MCH RBC QN AUTO: 29.2 PG (ref 26.8–34.3)
MCHC RBC AUTO-ENTMCNC: 31.1 G/DL (ref 31.4–37.4)
MCV RBC AUTO: 94 FL (ref 82–98)
MONOCYTES # BLD AUTO: 0.67 THOUSAND/ÂΜL (ref 0.17–1.22)
MONOCYTES NFR BLD AUTO: 6 % (ref 4–12)
NEUTROPHILS # BLD AUTO: 9.96 THOUSANDS/ÂΜL (ref 1.85–7.62)
NEUTS SEG NFR BLD AUTO: 84 % (ref 43–75)
NITRITE UR QL STRIP: NEGATIVE
NON-SQ EPI CELLS URNS QL MICRO: ABNORMAL /HPF
NRBC BLD AUTO-RTO: 0 /100 WBCS
NT-PROBNP SERPL-MCNC: 111 PG/ML
P AXIS: 40 DEGREES
P AXIS: 56 DEGREES
PH UR STRIP.AUTO: 5.5 [PH] (ref 4.5–8)
PLATELET # BLD AUTO: 352 THOUSANDS/UL (ref 149–390)
PMV BLD AUTO: 8.8 FL (ref 8.9–12.7)
POTASSIUM SERPL-SCNC: 3.5 MMOL/L (ref 3.5–5.3)
PR INTERVAL: 166 MS
PR INTERVAL: 174 MS
PROCALCITONIN SERPL-MCNC: <0.05 NG/ML
PROT SERPL-MCNC: 8 G/DL (ref 6.4–8.4)
PROT UR STRIP-MCNC: NEGATIVE MG/DL
PROTHROMBIN TIME: 14.2 SECONDS (ref 11.6–14.5)
QRS AXIS: -63 DEGREES
QRS AXIS: -69 DEGREES
QRSD INTERVAL: 100 MS
QRSD INTERVAL: 96 MS
QT INTERVAL: 308 MS
QT INTERVAL: 314 MS
QTC INTERVAL: 402 MS
QTC INTERVAL: 403 MS
RBC # BLD AUTO: 3.91 MILLION/UL (ref 3.81–5.12)
RBC #/AREA URNS AUTO: ABNORMAL /HPF
RSV RNA RESP QL NAA+PROBE: NEGATIVE
SARS-COV-2 RNA RESP QL NAA+PROBE: POSITIVE
SODIUM SERPL-SCNC: 143 MMOL/L (ref 135–147)
SP GR UR STRIP.AUTO: 1.01 (ref 1–1.03)
T WAVE AXIS: 51 DEGREES
T WAVE AXIS: 55 DEGREES
UROBILINOGEN UR QL STRIP.AUTO: 0.2 E.U./DL
VENTRICULAR RATE: 103 BPM
VENTRICULAR RATE: 99 BPM
WBC # BLD AUTO: 11.89 THOUSAND/UL (ref 4.31–10.16)
WBC #/AREA URNS AUTO: ABNORMAL /HPF

## 2022-10-23 PROCEDURE — XW033E5 INTRODUCTION OF REMDESIVIR ANTI-INFECTIVE INTO PERIPHERAL VEIN, PERCUTANEOUS APPROACH, NEW TECHNOLOGY GROUP 5: ICD-10-PCS | Performed by: STUDENT IN AN ORGANIZED HEALTH CARE EDUCATION/TRAINING PROGRAM

## 2022-10-23 RX ORDER — GUAIFENESIN 600 MG/1
600 TABLET, EXTENDED RELEASE ORAL EVERY 12 HOURS SCHEDULED
Status: DISCONTINUED | OUTPATIENT
Start: 2022-10-23 | End: 2022-10-26 | Stop reason: HOSPADM

## 2022-10-23 RX ORDER — ATORVASTATIN CALCIUM 20 MG/1
20 TABLET, FILM COATED ORAL
Status: DISCONTINUED | OUTPATIENT
Start: 2022-10-23 | End: 2022-10-26 | Stop reason: HOSPADM

## 2022-10-23 RX ORDER — POTASSIUM CHLORIDE 20 MEQ/1
20 TABLET, EXTENDED RELEASE ORAL DAILY
Status: DISCONTINUED | OUTPATIENT
Start: 2022-10-23 | End: 2022-10-26 | Stop reason: HOSPADM

## 2022-10-23 RX ORDER — ALBUTEROL SULFATE 2.5 MG/3ML
2.5 SOLUTION RESPIRATORY (INHALATION) ONCE
Status: COMPLETED | OUTPATIENT
Start: 2022-10-23 | End: 2022-10-23

## 2022-10-23 RX ORDER — INSULIN LISPRO 100 [IU]/ML
1-6 INJECTION, SOLUTION INTRAVENOUS; SUBCUTANEOUS
Status: DISCONTINUED | OUTPATIENT
Start: 2022-10-23 | End: 2022-10-26 | Stop reason: HOSPADM

## 2022-10-23 RX ORDER — METHYLPREDNISOLONE SODIUM SUCCINATE 40 MG/ML
40 INJECTION, POWDER, LYOPHILIZED, FOR SOLUTION INTRAMUSCULAR; INTRAVENOUS EVERY 24 HOURS
Status: DISCONTINUED | OUTPATIENT
Start: 2022-10-23 | End: 2022-10-24

## 2022-10-23 RX ORDER — ALBUTEROL SULFATE 2.5 MG/3ML
2.5 SOLUTION RESPIRATORY (INHALATION) EVERY 6 HOURS PRN
Status: DISCONTINUED | OUTPATIENT
Start: 2022-10-23 | End: 2022-10-26 | Stop reason: HOSPADM

## 2022-10-23 RX ORDER — ONDANSETRON 2 MG/ML
4 INJECTION INTRAMUSCULAR; INTRAVENOUS EVERY 6 HOURS PRN
Status: DISCONTINUED | OUTPATIENT
Start: 2022-10-23 | End: 2022-10-26 | Stop reason: HOSPADM

## 2022-10-23 RX ORDER — BUDESONIDE 0.5 MG/2ML
0.5 INHALANT ORAL 2 TIMES DAILY
Status: DISCONTINUED | OUTPATIENT
Start: 2022-10-23 | End: 2022-10-24

## 2022-10-23 RX ORDER — DILTIAZEM HYDROCHLORIDE 120 MG/1
120 CAPSULE, COATED, EXTENDED RELEASE ORAL DAILY
Status: DISCONTINUED | OUTPATIENT
Start: 2022-10-23 | End: 2022-10-26 | Stop reason: HOSPADM

## 2022-10-23 RX ORDER — PREDNISONE 20 MG/1
60 TABLET ORAL ONCE
Status: COMPLETED | OUTPATIENT
Start: 2022-10-23 | End: 2022-10-23

## 2022-10-23 RX ORDER — BENZONATATE 100 MG/1
100 CAPSULE ORAL 3 TIMES DAILY PRN
Status: DISCONTINUED | OUTPATIENT
Start: 2022-10-23 | End: 2022-10-26 | Stop reason: HOSPADM

## 2022-10-23 RX ORDER — NICOTINE 21 MG/24HR
1 PATCH, TRANSDERMAL 24 HOURS TRANSDERMAL DAILY
Status: DISCONTINUED | OUTPATIENT
Start: 2022-10-23 | End: 2022-10-26 | Stop reason: HOSPADM

## 2022-10-23 RX ORDER — MELATONIN
1000 DAILY
Status: DISCONTINUED | OUTPATIENT
Start: 2022-10-23 | End: 2022-10-26 | Stop reason: HOSPADM

## 2022-10-23 RX ORDER — TRAMADOL HYDROCHLORIDE 50 MG/1
50 TABLET ORAL EVERY 6 HOURS PRN
Status: DISCONTINUED | OUTPATIENT
Start: 2022-10-23 | End: 2022-10-26 | Stop reason: HOSPADM

## 2022-10-23 RX ORDER — POLYETHYLENE GLYCOL 3350 17 G/17G
17 POWDER, FOR SOLUTION ORAL DAILY PRN
Status: DISCONTINUED | OUTPATIENT
Start: 2022-10-23 | End: 2022-10-26 | Stop reason: HOSPADM

## 2022-10-23 RX ORDER — ACETAMINOPHEN 325 MG/1
975 TABLET ORAL EVERY 8 HOURS PRN
Status: DISCONTINUED | OUTPATIENT
Start: 2022-10-23 | End: 2022-10-26 | Stop reason: HOSPADM

## 2022-10-23 RX ORDER — ALBUTEROL SULFATE 2.5 MG/3ML
5 SOLUTION RESPIRATORY (INHALATION) ONCE
Status: COMPLETED | OUTPATIENT
Start: 2022-10-23 | End: 2022-10-23

## 2022-10-23 RX ORDER — FUROSEMIDE 40 MG/1
40 TABLET ORAL
Status: DISCONTINUED | OUTPATIENT
Start: 2022-10-23 | End: 2022-10-26 | Stop reason: HOSPADM

## 2022-10-23 RX ORDER — LEVOTHYROXINE SODIUM 0.1 MG/1
100 TABLET ORAL
Status: DISCONTINUED | OUTPATIENT
Start: 2022-10-23 | End: 2022-10-26 | Stop reason: HOSPADM

## 2022-10-23 RX ORDER — MAGNESIUM HYDROXIDE/ALUMINUM HYDROXICE/SIMETHICONE 120; 1200; 1200 MG/30ML; MG/30ML; MG/30ML
30 SUSPENSION ORAL EVERY 6 HOURS PRN
Status: DISCONTINUED | OUTPATIENT
Start: 2022-10-23 | End: 2022-10-26 | Stop reason: HOSPADM

## 2022-10-23 RX ORDER — MONTELUKAST SODIUM 10 MG/1
10 TABLET ORAL
Status: DISCONTINUED | OUTPATIENT
Start: 2022-10-23 | End: 2022-10-26 | Stop reason: HOSPADM

## 2022-10-23 RX ORDER — GABAPENTIN 400 MG/1
800 CAPSULE ORAL 3 TIMES DAILY
Status: DISCONTINUED | OUTPATIENT
Start: 2022-10-23 | End: 2022-10-26 | Stop reason: HOSPADM

## 2022-10-23 RX ADMIN — GUAIFENESIN 600 MG: 600 TABLET, EXTENDED RELEASE ORAL at 21:00

## 2022-10-23 RX ADMIN — PREDNISONE 60 MG: 20 TABLET ORAL at 12:12

## 2022-10-23 RX ADMIN — MONTELUKAST 10 MG: 10 TABLET, FILM COATED ORAL at 21:00

## 2022-10-23 RX ADMIN — ATORVASTATIN CALCIUM 20 MG: 20 TABLET, FILM COATED ORAL at 16:29

## 2022-10-23 RX ADMIN — GABAPENTIN 800 MG: 400 CAPSULE ORAL at 16:29

## 2022-10-23 RX ADMIN — METHYLPREDNISOLONE SODIUM SUCCINATE 40 MG: 40 INJECTION, POWDER, FOR SOLUTION INTRAMUSCULAR; INTRAVENOUS at 16:29

## 2022-10-23 RX ADMIN — POTASSIUM CHLORIDE 20 MEQ: 1500 TABLET, EXTENDED RELEASE ORAL at 16:29

## 2022-10-23 RX ADMIN — IPRATROPIUM BROMIDE 0.5 MG: 0.5 SOLUTION RESPIRATORY (INHALATION) at 11:29

## 2022-10-23 RX ADMIN — ALBUTEROL SULFATE 2.5 MG: 2.5 SOLUTION RESPIRATORY (INHALATION) at 13:42

## 2022-10-23 RX ADMIN — REMDESIVIR 200 MG: 100 INJECTION, POWDER, LYOPHILIZED, FOR SOLUTION INTRAVENOUS at 16:52

## 2022-10-23 RX ADMIN — GABAPENTIN 800 MG: 400 CAPSULE ORAL at 21:00

## 2022-10-23 RX ADMIN — BUDESONIDE 0.5 MG: 0.5 INHALANT ORAL at 20:16

## 2022-10-23 RX ADMIN — FUROSEMIDE 40 MG: 40 TABLET ORAL at 16:29

## 2022-10-23 RX ADMIN — ALBUTEROL SULFATE 5 MG: 2.5 SOLUTION RESPIRATORY (INHALATION) at 11:29

## 2022-10-23 RX ADMIN — APIXABAN 5 MG: 5 TABLET, FILM COATED ORAL at 21:00

## 2022-10-23 RX ADMIN — INSULIN LISPRO 2 UNITS: 100 INJECTION, SOLUTION INTRAVENOUS; SUBCUTANEOUS at 16:44

## 2022-10-23 NOTE — ASSESSMENT & PLAN NOTE
· At baseline requires 2 L of oxygen  · Will continue to monitor for any oxygen needs  · Maintain oxygen greater than 88%

## 2022-10-23 NOTE — ASSESSMENT & PLAN NOTE
Lab Results   Component Value Date    HGBA1C 6 8 (H) 10/16/2022       No results for input(s): POCGLU in the last 72 hours      Blood Sugar Average: Last 72 hrs:   good control per last A1c  Sliding-scale insulin while inpatient  ADA diet  Hypoglycemia protocol  Fingersticks Accu-Chek

## 2022-10-23 NOTE — ED PROVIDER NOTES
History  Chief Complaint   Patient presents with   • Shortness of Breath     Pt reports started with shortness of breath last night  Used inhaler at home with no relief  Normally uses 2 L nasal cannula at home  Sob since yestdrday  Using her inhallers  - feels they arent working  Feels heavy weight on her chest  Continuing to take her blood thinners  2L nasal canula at home  Used albuerol this am - - used it 2 x this am    Pt was admitted at [facility] - 10/15 - dc on 50mg of prednisone taper by 10 mg/week - pt states she is taking the meds, didn't take it this am  Pt doesn't know how many mg she is taking - based on dc instructions should be on 40mg - however pt cant confirm if that's what she is now taking  No fevers, no sick contacts  No GI upset  Pt states she feels really SOB  Pt states "When I feel like this I have to be admitted"  Has some leg swelling - states its at her baseline  Prior to Admission Medications   Prescriptions Last Dose Informant Patient Reported? Taking? Fluticasone-Salmeterol (Advair) 100-50 mcg/dose inhaler 10/23/2022 at Unknown time  Yes Yes   Sig: Inhale 1 puff 2 (two) times a day Rinse mouth after use     acetaminophen (TYLENOL) 325 mg tablet 10/23/2022 at Unknown time  No Yes   Sig: Take 3 tablets (975 mg total) by mouth every 8 (eight) hours as needed for mild pain   albuterol (2 5 mg/3 mL) 0 083 % nebulizer solution 10/23/2022 at Unknown time  No Yes   Sig: Take 3 mL (2 5 mg total) by nebulization every 6 (six) hours as needed for wheezing or shortness of breath   apixaban (ELIQUIS) 5 mg 10/23/2022 at Unknown time  Yes Yes   Sig: Take 1 tablet by mouth 2 (two) times a day   atorvastatin (LIPITOR) 20 mg tablet 10/23/2022 at Unknown time  Yes Yes   Sig: Take 20 mg by mouth daily   bictegravir-emtricitab-tenofovir alafenamide (Biktarvy) -25 MG tablet 10/23/2022 at Unknown time  Yes Yes   Sig: Take 1 tablet by mouth daily   budesonide (PULMICORT) 0 5 mg/2 mL nebulizer solution 10/23/2022 at Unknown time  No Yes   Sig: Take 2 mL (0 5 mg total) by nebulization 2 (two) times a day Rinse mouth after use  cholecalciferol (VITAMIN D3) 1,000 units tablet 10/23/2022 at Unknown time  Yes Yes   Sig: Take 1,000 Units by mouth daily   diltiazem (CARDIZEM CD) 120 mg 24 hr capsule 10/23/2022 at Unknown time  No Yes   Sig: Take 1 capsule (120 mg total) by mouth daily   furosemide (LASIX) 40 mg tablet 10/23/2022 at Unknown time  No Yes   Sig: Take 1 tablet (40 mg total) by mouth 2 (two) times a day   gabapentin (NEURONTIN) 400 mg capsule 10/23/2022 at Unknown time  Yes Yes   Sig: Take 800 mg by mouth 3 (three) times a day     levothyroxine 100 mcg tablet 10/23/2022 at Unknown time  Yes Yes   Sig: Take 100 mcg by mouth daily     montelukast (SINGULAIR) 10 mg tablet 10/23/2022 at Unknown time  Yes Yes   Sig: Take 10 mg by mouth daily at bedtime   nicotine (NICODERM CQ) 21 mg/24 hr TD 24 hr patch 10/23/2022 at Unknown time  No Yes   Sig: Place 1 patch on the skin daily   potassium chloride (K-DUR,KLOR-CON) 20 mEq tablet 10/23/2022 at Unknown time  No Yes   Sig: Take 1 tablet (20 mEq total) by mouth daily   predniSONE 10 mg tablet 10/23/2022 at Unknown time  No Yes   Si tabs po daily x 2d, then 1 tab po daily x 2d, then stop   traMADol (ULTRAM) 50 mg tablet 10/23/2022 at Unknown time  Yes Yes   Sig: Take 50 mg by mouth as needed      Facility-Administered Medications: None       Past Medical History:   Diagnosis Date   • Asthma    • Bipolar 1 disorder ([facility] )    • Cardiac disease    • COPD (chronic obstructive pulmonary disease) ([facility] )    • Diabetes mellitus ([facility] )    • Disease of thyroid gland    • [facility]   • Hypertension    • Osteoarthritis    • Tobacco abuse        Past Surgical History:   Procedure Laterality Date   • HERNIA REPAIR     • LUMBAR FUSION N/A 4/10/2018    Procedure: T9/10 discectomy with T9-T11 fusion;  Surgeon: Delonte Schreiber MD;  Location: [facility];  Service: Neurosurgery   • THYROIDECTOMY         Family History   Problem Relation Age of Onset   • No Known Problems Mother    • Diabetes Father    • Heart disease Neg Hx    • Cancer Neg Hx      I have reviewed and agree with the history as documented  E-Cigarette/Vaping     E-Cigarette/Vaping Substances     Social History     Tobacco Use   • Smoking status: Current Every Day Smoker     Packs/day: 0 20     Years: 45 00     Pack years: 9 00     Types: Cigarettes   • Smokeless tobacco: Never Used   • Tobacco comment: Currently smoking 1-2 cigarettes a day   Substance Use Topics   • Alcohol use: Not Currently   • Drug use: No     Comment: former IV drug user       Review of Systems   Constitutional: Negative for fever  HENT: Negative for congestion  Respiratory: Positive for cough, chest tightness, shortness of breath and wheezing  Cardiovascular: Positive for chest pain and leg swelling  Negative for palpitations  Gastrointestinal: Negative for abdominal pain, diarrhea, nausea and vomiting  Genitourinary: Negative for dysuria  Neurological: Positive for weakness  All other systems reviewed and are negative  Physical Exam  Physical Exam  Vitals and nursing note reviewed  Constitutional:       Appearance: She is well-developed  HENT:      Head: Normocephalic and atraumatic  Right Ear: Tympanic membrane and external ear normal       Left Ear: Tympanic membrane and external ear normal    Eyes:      Conjunctiva/sclera: Conjunctivae normal    Cardiovascular:      Rate and Rhythm: Normal rate and regular rhythm  Heart sounds: Normal heart sounds  Pulmonary:      Effort: Pulmonary effort is normal       Breath sounds: Wheezing present  Comments: Increased work of breathing  Chest:      Chest wall: No tenderness  Abdominal:      General: Bowel sounds are normal       Palpations: Abdomen is soft     Musculoskeletal:         General: Normal range of motion  Cervical back: Neck supple  Comments: + 1 peripheral edema, no calf tenderness   Lymphadenopathy:      Cervical: No cervical adenopathy  Skin:     General: Skin is warm  Findings: No rash  Neurological:      Mental Status: She is alert and oriented to person, place, and time        Coordination: Coordination normal    Psychiatric:         Mood and Affect: Mood normal          Behavior: Behavior normal          Vital Signs  ED Triage Vitals [10/23/22 1100]   Temperature Pulse Respirations Blood Pressure SpO2   98 8 °F (37 1 °C) 104 20 127/81 94 %      Temp Source Heart Rate Source Patient Position - Orthostatic VS BP Location FiO2 (%)   Oral Monitor Lying Left arm --      Pain Score       No Pain           Vitals:    10/23/22 1142 10/23/22 1345 10/23/22 1523 10/23/22 1544   BP:  100/61 125/57 116/60   Pulse: 98 (!) 111 (!) 106 105   Patient Position - Orthostatic VS:  Lying Lying          Visual Acuity      ED Medications  Medications   acetaminophen (TYLENOL) tablet 975 mg (has no administration in time range)   albuterol inhalation solution 2 5 mg (has no administration in time range)   apixaban (ELIQUIS) tablet 5 mg (has no administration in time range)   atorvastatin (LIPITOR) tablet 20 mg (20 mg Oral Given 10/23/22 1629)   bictegravir-emtricitab-tenofovir alafenamide (BIKTARVY) -25 MG tablet 1 tablet (1 tablet Oral Not Given 10/23/22 1652)   budesonide (PULMICORT) inhalation solution 0 5 mg (has no administration in time range)   cholecalciferol (VITAMIN D3) tablet 1,000 Units (1,000 Units Oral Not Given 10/23/22 1629)   diltiazem (CARDIZEM CD) 24 hr capsule 120 mg (120 mg Oral Not Given 10/23/22 1629)   fluticasone-vilanterol (BREO ELLIPTA) 100-25 mcg/inh inhaler 1 puff (has no administration in time range)   furosemide (LASIX) tablet 40 mg (40 mg Oral Given 10/23/22 1629)   gabapentin (NEURONTIN) capsule 800 mg (800 mg Oral Given 10/23/22 1629)   levothyroxine tablet 100 mcg (100 mcg Oral Not Given 10/23/22 1629)   montelukast (SINGULAIR) tablet 10 mg (has no administration in time range)   potassium chloride (K-DUR,KLOR-CON) CR tablet 20 mEq (20 mEq Oral Given 10/23/22 1629)   traMADol (ULTRAM) tablet 50 mg (has no administration in time range)   ondansetron (ZOFRAN) injection 4 mg (has no administration in time range)   polyethylene glycol (MIRALAX) packet 17 g (has no administration in time range)   aluminum-magnesium hydroxide-simethicone (MYLANTA) oral suspension 30 mL (has no administration in time range)   nicotine (NICODERM CQ) 21 mg/24 hr TD 24 hr patch 1 patch (1 patch Transdermal Not Given 10/23/22 1630)   methylPREDNISolone sodium succinate (Solu-MEDROL) injection 40 mg (40 mg Intravenous Given 10/23/22 1629)   remdesivir (Veklury) 200 mg in sodium chloride 0 9 % 290 mL IVPB (200 mg Intravenous Given 10/23/22 1652)     Followed by   remdesivir Vicenta Gabi) 100 mg in sodium chloride 0 9 % 270 mL IVPB (has no administration in time range)   benzonatate (TESSALON PERLES) capsule 100 mg (has no administration in time range)   guaiFENesin (MUCINEX) 12 hr tablet 600 mg (has no administration in time range)   insulin lispro (HumaLOG) 100 units/mL subcutaneous injection 1-6 Units (2 Units Subcutaneous Given 10/23/22 1644)   albuterol inhalation solution 5 mg (5 mg Nebulization Given 10/23/22 1129)   ipratropium (ATROVENT) 0 02 % inhalation solution 0 5 mg (0 5 mg Nebulization Given 10/23/22 1129)   predniSONE tablet 60 mg (60 mg Oral Given 10/23/22 1212)   albuterol inhalation solution 2 5 mg (2 5 mg Nebulization Given 10/23/22 1342)       Diagnostic Studies  Results Reviewed     Procedure Component Value Units Date/Time    Procalcitonin [[id number]]  (Normal) Collected: 10/23/22 1136    Lab Status: Final result Specimen: Blood from Arm, Left Updated: 10/23/22 1608     Procalcitonin <0 05 ng/ml     C-reactive protein [[id number]]  (Abnormal) Collected: 10/23/22 1136    Lab Status: Final result Specimen: Blood from Arm, Left Updated: 10/23/22 1607     CRP 13 3 mg/L     HS Troponin I 2hr [[id number]]  (Normal) Collected: 10/23/22 1344    Lab Status: Final result Specimen: Blood from Arm, Left Updated: 10/23/22 1417     hs TnI 2hr 7 ng/L      Delta 2hr hsTnI -4 ng/L     HS Troponin I 4hr [[id number]]     Lab Status: No result Specimen: Blood     HS Troponin 0hr (reflex protocol) [[id number]]  (Normal) Collected: 10/23/22 1136    Lab Status: Final result Specimen: Blood from Arm, Left Updated: 10/23/22 1317     hs TnI 0hr 11 ng/L     Urine Microscopic [[id number]]  (Abnormal) Collected: 10/23/22 1127    Lab Status: Final result Specimen: Urine, Clean Catch Updated: 10/23/22 1301     RBC, UA None Seen /hpf      WBC, UA 4-10 /hpf      Epithelial Cells Occasional /hpf      Bacteria, UA Occasional /hpf     FLU/RSV/COVID - if FLU/RSV clinically relevant [[id number]]  (Abnormal) Collected: 10/23/22 1125    Lab Status: Final result Specimen: Nares from Nasopharyngeal Swab Updated: 10/23/22 1222     SARS-CoV-2 Positive     INFLUENZA A PCR Negative     INFLUENZA B PCR Negative     RSV PCR Negative    Narrative:      FOR PEDIATRIC PATIENTS - copy/paste COVID Guidelines URL to browser: [email or web address]    SARS-CoV-2 assay is a Nucleic Acid Amplification assay intended for the  qualitative detection of nucleic acid from SARS-CoV-2 in nasopharyngeal  swabs  Results are for the presumptive identification of SARS-CoV-2 RNA  Positive results are indicative of infection with SARS-CoV-2, the virus  causing COVID-19, but do not rule out bacterial infection or co-infection  with other viruses  Laboratories within the [place] and its  territories are required to report all positive results to the appropriate  public health authorities   Negative results do not preclude SARS-CoV-2  infection and should not be used as the sole basis for treatment or other  patient management decisions  Negative results must be combined with  clinical observations, patient history, and epidemiological information  This test has not been FDA cleared or approved  This test has been authorized by FDA under an Emergency Use Authorization  (EUA)  This test is only authorized for the duration of time the  declaration that circumstances exist justifying the authorization of the  emergency use of an in vitro diagnostic tests for detection of SARS-CoV-2  virus and/or diagnosis of COVID-19 infection under section 564(b)(1) of  the Act, 21 U  S C  034HXH-6(Z)(1), unless the authorization is terminated  or revoked sooner  The test has been validated but independent review by FDA  and CLIA is pending  Test performed using [employer] GeneXpert: This RT-PCR assay targets N2,  a region unique to SARS-CoV-2  A conserved region in the E-gene was chosen  for pan-Sarbecovirus detection which includes SARS-CoV-2  According to CMS-2020-01-R, this platform meets the definition of high-throughput technology      NT-BNP PRO [[id number]]  (Normal) Collected: 10/23/22 1136    Lab Status: Final result Specimen: Blood from Arm, Left Updated: 10/23/22 1210     NT-proBNP 111 pg/mL     Comprehensive metabolic panel [[id number]]  (Abnormal) Collected: 10/23/22 1136    Lab Status: Final result Specimen: Blood from Arm, Left Updated: 10/23/22 1203     Sodium 143 mmol/L      Potassium 3 5 mmol/L      Chloride 103 mmol/L      CO2 30 mmol/L      ANION GAP 10 mmol/L      BUN 27 mg/dL      Creatinine 1 00 mg/dL      Glucose 130 mg/dL      Calcium 10 6 mg/dL      AST 8 U/L      ALT 20 U/L      Alkaline Phosphatase 81 U/L      Total Protein 8 0 g/dL      Albumin 3 9 g/dL      Total Bilirubin 0 53 mg/dL      eGFR 57 ml/min/1 73sq m     Narrative:      [place] guidelines for Chronic Kidney Disease (CKD):   •  Stage 1 with normal or high GFR (GFR > 90 mL/min/1 73 square meters)  •  Stage 2 Mild CKD (GFR = 60-89 mL/min/1 73 square meters)  •  Stage 3A Moderate CKD (GFR = 45-59 mL/min/1 73 square meters)  •  Stage 3B Moderate CKD (GFR = 30-44 mL/min/1 73 square meters)  •  Stage 4 Severe CKD (GFR = 15-29 mL/min/1 73 square meters)  •  Stage 5 End Stage CKD (GFR <15 mL/min/1 73 square meters)  Note: GFR calculation is accurate only with a steady state creatinine    Protime-INR [[id number]]  (Normal) Collected: 10/23/22 1136    Lab Status: Final result Specimen: Blood from Arm, Left Updated: 10/23/22 1154     Protime 14 2 seconds      INR 1 10    APTT [[id number]]  (Normal) Collected: 10/23/22 1136    Lab Status: Final result Specimen: Blood from Arm, Left Updated: 10/23/22 1154     PTT 25 seconds     CBC and differential [[id number]]  (Abnormal) Collected: 10/23/22 1136    Lab Status: Final result Specimen: Blood from Arm, Left Updated: 10/23/22 1143     WBC 11 89 Thousand/uL      RBC 3 91 Million/uL      Hemoglobin 11 4 g/dL      Hematocrit 36 6 %      MCV 94 fL      MCH 29 2 pg      MCHC 31 1 g/dL      RDW 15 8 %      MPV 8 8 fL      Platelets 763 Thousands/uL      nRBC 0 /100 WBCs      Neutrophils Relative 84 %      Immat GRANS % 1 %      Lymphocytes Relative 9 %      Monocytes Relative 6 %      Eosinophils Relative 0 %      Basophils Relative 0 %      Neutrophils Absolute 9 96 Thousands/µL      Immature Grans Absolute 0 15 Thousand/uL      Lymphocytes Absolute 1 09 Thousands/µL      Monocytes Absolute 0 67 Thousand/µL      Eosinophils Absolute 0 01 Thousand/µL      Basophils Absolute 0 01 Thousands/µL     Urine Macroscopic, POC [[id number]]  (Abnormal) Collected: 10/23/22 1127    Lab Status: Final result Specimen: Urine Updated: 10/23/22 1129     Color, UA Yellow     Clarity, UA Clear     pH, UA 5 5     Leukocytes, UA Small     Nitrite, UA Negative     Protein, UA Negative mg/dl      Glucose, UA Negative mg/dl      Ketones, UA Negative mg/dl      Urobilinogen, UA 0 2 E U /dl      Bilirubin, UA Negative     Occult Blood, UA Negative     Specific Gravity, UA 1 015    Narrative:      CLINITEK RESULT                 XR chest 1 view portable    (Results Pending)              Procedures  ECG 12 Lead Documentation Only    Date/Time: 10/23/2022 11:54 AM  Performed by: Jennifer Fajardo PA-C  Authorized by: Jennifer Fajardo PA-C     Indications / Diagnosis:  Cp  Patient location:  ED  Previous ECG:     Previous ECG:  Compared to current    Comparison ECG info: Oct 13, 22    Similarity:  No change  Rate:     ECG rate:  99    ECG rate assessment: normal    Rhythm:     Rhythm: sinus rhythm    Ectopy:     Ectopy: none    QRS:     QRS axis:  Normal  Conduction:     Conduction: abnormal      Abnormal conduction: LAFB    ST segments:     ST segments:  Normal  T waves:     T waves: non-specific               ED Course  ED Course as of 10/23/22 1754   Sun Oct 23, 2022   1212 Marked improved breath sounds  Pt states she still feels like she has a weight on her chest  and feels SOB  Oxygen 97% - will give her oral prednisone, and monitor   1335 Mild wheezing, will give albuterol again  HEART Risk Score    Flowsheet Row Most Recent Value   Heart Score Risk Calculator    History 1 Filed at: 10/23/2022 1426   ECG 1 Filed at: 10/23/2022 1426   Age 2 Filed at: 10/23/2022 1426   Risk Factors 1 Filed at: 10/23/2022 1426   Troponin 0 Filed at: 10/23/2022 1426   HEART Score 5 Filed at: 10/23/2022 1426                     Initial Sepsis Screening     Row Name 10/23/22 1436                Is the patient's history suggestive of a new or worsening infection? Yes (Proceed)  -OK        Suspected source of infection pneumonia  -OK        Are two or more of the following signs & symptoms of infection both present and new to the patient? Yes (Proceed)  -OK        Indicate SIRS criteria Tachycardia > 90 bpm;Leukocytosis (WBC > 86300 IJL); Tachypnea > 20 resp per min  -OK        If the answer is yes to both questions, suspicion of sepsis is present -- If severe sepsis is present AND tissue hypoperfusion perists in the hour after fluid resuscitation or lactate > 4, the patient meets criteria for SEPTIC SHOCK --        Are any of the following organ dysfunction criteria present within 6 hours of suspected infection and SIRS criteria that are NOT considered to be chronic conditions? No  -OK        Organ dysfunction --        Date of presentation of severe sepsis --        Time of presentation of severe sepsis --        Tissue hypoperfusion persists in the hour after crystalloid fluid administration, evidenced, by either: --        Was hypotension present within one hour of the conclusion of crystalloid fluid administration? No  -OK        Date of presentation of septic shock --        Time of presentation of septic shock --              User Key  (r) = Recorded By, (t) = Taken By, (c) = Cosigned By    [address] Name Provider Type    OK Tiffany Haney PA-C Physician Assistant                SBIRT 22yo+    Flowsheet Row Most Recent Value   SBIRT (23 yo +)    In order to provide better care to our patients, we are screening all of our patients for alcohol and drug use  Would it be okay to ask you these screening questions? No Filed at: 10/23/2022 1108                    MDM  Number of Diagnoses or Management Options  COPD exacerbation ([facility] ): new and requires workup  COVID: new and requires workup  Dyspnea: new and requires workup  Diagnosis management comments: Pt breath sounds start to improve with neb - however rapidly deteriorate again, continues with increased work of breathing  Pt doesn't feel safe going home with the way her breathing is  Discussed admission and eval for skilled facility placement - pt is in agreement - discussed with case management          Amount and/or Complexity of Data Reviewed  Clinical lab tests: reviewed  Tests in the radiology section of CPT®: reviewed  Decide to obtain previous medical records or to obtain history from someone other than the patient: yes  Review and summarize past medical records: yes  Discuss the patient with other providers: yes  Independent visualization of images, tracings, or specimens: yes    Risk of Complications, Morbidity, and/or Mortality  General comments: Pt doesn't feel safe going home  Pt has frequent visits  Taking prednisone at home - pt unable to confirm what dose she is taking - and having difficulty breathing     Discussed nursing home with case management - Jessica Chapman -   Pt symptoms improve with albuterol but return quickly after nebs,     Patient Progress  Patient progress: stable      Disposition  Final diagnoses:   [facility]   Dyspnea   COVID     Time reflects when diagnosis was documented in both MDM as applicable and the Disposition within this note     Time User Action Codes Description Comment    10/23/2022  2:02 PM Sally Hood [J44 1] COPD exacerbation ([facility] )     10/23/2022  2:02 PM Sally Hood [R06 00] Dyspnea     10/23/2022  2:02 PM Sally Hood [U07 1] COVID       ED Disposition     ED Disposition   Admit    Condition   Stable    Date/Time   Sun Oct 23, 2022  2:02 PM    Comment   Case was discussed with prechtel  and the patient's admission status was agreed to be in pt to the service of Dr Ethel Bran    None         Current Discharge Medication List      CONTINUE these medications which have NOT CHANGED    Details   acetaminophen (TYLENOL) 325 mg tablet Take 3 tablets (975 mg total) by mouth every 8 (eight) hours as needed for mild pain  Refills: 0    Associated Diagnoses: Acute exacerbation of chronic obstructive pulmonary disease (COPD) (HCC)      albuterol (2 5 mg/3 mL) 0 083 % nebulizer solution Take 3 mL (2 5 mg total) by nebulization every 6 (six) hours as needed for wheezing or shortness of breath  Qty: 75 mL, Refills: 0    Associated Diagnoses: COPD with acute exacerbation (HCC)      apixaban (ELIQUIS) 5 mg Take 1 tablet by mouth 2 (two) times a day      atorvastatin (LIPITOR) 20 mg tablet Take 20 mg by mouth daily      bictegravir-emtricitab-tenofovir alafenamide (Biktarvy) -25 MG tablet Take 1 tablet by mouth daily      budesonide (PULMICORT) 0 5 mg/2 mL nebulizer solution Take 2 mL (0 5 mg total) by nebulization 2 (two) times a day Rinse mouth after use  Qty: 120 mL, Refills: 0    Associated Diagnoses: Acute exacerbation of chronic obstructive pulmonary disease (COPD) ([facility])      cholecalciferol (VITAMIN D3) 1,000 units tablet Take 1,000 Units by mouth daily      diltiazem (CARDIZEM CD) 120 mg 24 hr capsule Take 1 capsule (120 mg total) by mouth daily  Qty: 30 capsule, Refills: 0    Associated Diagnoses: Paroxysmal atrial fibrillation ([facility])      Fluticasone-Salmeterol (Advair) 100-50 mcg/dose inhaler Inhale 1 puff 2 (two) times a day Rinse mouth after use  furosemide (LASIX) 40 mg tablet Take 1 tablet (40 mg total) by mouth 2 (two) times a day  Qty: 60 tablet, Refills: 0    Associated Diagnoses: Acute on chronic diastolic heart failure ([facility])      gabapentin (NEURONTIN) 400 mg capsule Take 800 mg by mouth 3 (three) times a day        levothyroxine 100 mcg tablet Take 100 mcg by mouth daily        montelukast (SINGULAIR) 10 mg tablet Take 10 mg by mouth daily at bedtime      nicotine (NICODERM CQ) 21 mg/24 hr TD 24 hr patch Place 1 patch on the skin daily  Qty: 28 patch, Refills: 0    Associated Diagnoses: Tobacco use      potassium chloride (K-DUR,KLOR-CON) 20 mEq tablet Take 1 tablet (20 mEq total) by mouth daily  Qty: 30 tablet, Refills: 0    Associated Diagnoses: Acute on chronic diastolic heart failure ([facility])      predniSONE 10 mg tablet 2 tabs po daily x 2d, then 1 tab po daily x 2d, then stop  Qty: 6 tablet, Refills: 0    Associated Diagnoses: Acute exacerbation of chronic obstructive pulmonary disease (COPD) ([facility])      traMADol (ULTRAM) 50 mg tablet Take 50 mg by mouth as needed             No discharge procedures on file      PDMP Review       Value Time User    PDMP Reviewed  Yes 6/21/2022  7:54 PM Enrrique Bustillo, [address]          ED Provider  Electronically Signed by           Alethea Jung PA-C  10/23/22 5410

## 2022-10-23 NOTE — ASSESSMENT & PLAN NOTE
Patient just discharged from [facility] on 10/15 for COPD exacerbation and was being maintained on a steroid taper  Despite this patient states that she did feel improved since she came here for evaluation    · Found to have COVID infection, on review of [facility] notes she did not have this during her admission  · Chest x-ray appears stable compared to chest x-ray from 10/13, no consolidations appreciated  · Provided with albuterol treatment x2 in prednisone 60 mg in the ED  · Will switch to methylprednisolone 40 mg IV daily  · Respiratory protocol  · Continue home inhalers  · Mucinex  · Given that this is the patient's 3rd COPD exacerbation in the last month, consult pulmonology

## 2022-10-23 NOTE — ASSESSMENT & PLAN NOTE
· POA with tachycardia, leukocytosis, tachypnea  · Secondary to COVID infection with COPD exacerbation  · UA without infection  · Check profile  · Order blood cultures x2  · Fluids not given in the ED likely secondary to patient's history of CHF  · No need for antibiotics at this time  · Plan as above

## 2022-10-23 NOTE — ASSESSMENT & PLAN NOTE
· Maintained on Cardizem and Eliquis  · Continue with this regimen for now  · Patient is having mild tachycardia but suspect this is more related to her COPD exacerbation  · If tachycardia persists, will treat accordingly

## 2022-10-23 NOTE — H&P
[address] [facility]  H&P- Obdulia [facility] 1950, 81 y o  female MRN: [id number]  Unit/Bed#: ED 29 Encounter: [id number]  Primary Care Provider: Alondra Jerome DO   Date and time admitted to hospital: 10/23/2022 10:57 AM    * Acute exacerbation of moderate persistent extrinsic asthma  Assessment & Plan  Patient just discharged from [facility] on 10/15 for COPD exacerbation and was being maintained on a steroid taper  Despite this patient states that she did feel improved since she came here for evaluation    · Found to have COVID infection, [facility] during her [facility]  · Chest x-ray appears stable compared to chest x-ray from 10/13, no consolidations appreciated  · Provided with albuterol treatment x2 in prednisone 60 mg in the ED  · Will switch to methylprednisolone 40 mg IV daily  · Respiratory protocol  · Continue home inhalers  · Mucinex  · Given that this is the patient's 3rd COPD exacerbation in the last month, consult pulmonology    Sepsis [facility])  Assessment & Plan  · POA with tachycardia, leukocytosis, tachypnea  · Secondary to COVID infection with COPD exacerbation  · UA without infection  · Check profile  · Order blood cultures x2  · Fluids not given in the ED likely secondary to patient's history of CHF  · No need for antibiotics at this time  · Plan as above    COVID-19  Assessment & Plan  · Found to be COVID positive today with complaints of shortness of breath  · Not needing any further oxygen than her baseline 2 L  · Can maintain with mild COVID pathway  · Admission remdesivir with loading dose and four additional doses given her significant risk factors  · Already anticoagulated with Eliquis, given no further needs for oxygen no need for change to heparin drip or Lovenox therapeutic dosing  · Respiratory protocol  · Incentive spirometry  · No need for antibiotics at this time  · Being provided steroids for her COPD exacerbation as above  · Supportive care with Tessalon Perles and Mucinex  · Escalate as needed for any increase in oxygen needs    Chronic respiratory failure with hypoxia (HCC)  Assessment & Plan  · At baseline requires 2 L of oxygen  · Will continue to monitor for any oxygen needs  · Maintain oxygen greater than 88%    Paroxysmal atrial fibrillation (HCC)  Assessment & Plan  · Maintained on Cardizem and Eliquis  · Continue with this regimen for now  · Patient is having mild tachycardia but suspect this is more related to her COPD exacerbation  · If tachycardia persists, will treat accordingly    HIV (human immunodeficiency virus infection)   Assessment & Plan  · Maintain on Biktarvy while inpatient    PASHA on CPAP  Assessment & Plan  · CPAP q h s  Tobacco use  Assessment & Plan  · Nicotine replacement while inpatient  · Smoking cessation counseling    Type 2 diabetes mellitus without complication, without long-term current use of insulin (HCC)  Assessment & Plan  Lab Results   Component Value Date    HGBA1C 6 8 (H) 10/16/2022       No results for input(s): POCGLU in the last 72 hours  Blood Sugar Average: Last 72 hrs:   good control per last A1c  Sliding-scale insulin while inpatient  ADA diet  Hypoglycemia protocol  Fingersticks Accu-Chek      VTE Pharmacologic Prophylaxis: VTE Score: 5 High Risk (Score >/= 5) - Pharmacological DVT Prophylaxis Ordered: apixaban (Eliquis)  Sequential Compression Devices Ordered  Code Status: Prior level 1  Discussion with family: Patient declined call to   Anticipated Length of Stay: Patient will be admitted on an inpatient basis with an anticipated length of stay of greater than 2 midnights secondary to COPD exacerbation, IV steroids  Total Time for Visit, including Counseling / Coordination of Care: 45 minutes Greater than 50% of this total time spent on direct patient counseling and coordination of care      Chief Complaint:  Shortness of breath    History of Present Illness:  Obdulia Del Real is a 81 y o  female with a PMH of COPD, atrial fibrillation, CHF, PASHA, HIV, DM who presents with shortness of breath  Of note the patient wears 2 L oxygen chronically at baseline  The patient was seen at [facility] on 10/13/2022 for COPD exacerbation was sent home on a steroid taper  Patient never took the steroid taper and presented to [facility] on 10/15 for similar symptoms and was admitted for her COPD exacerbation  She was seen by pulmonology and treated with IV antibiotics for pneumonia and was discharged again on a steroid taper  Patient states that she has been taking the steroid taper but has not felt improved  She also attempted several albuterol treatments at home but again did not feel improved so she returned to the hospital again  Was found to be COVID positive on presentation  Patient did not know that she had COVID, did not have any sick contacts recently  Denies fevers, chills, rhinorrhea, sore throat, diarrhea, constipation, nausea, vomiting  She does state that she feels as if she she has congestion in her chest but does not feel that she has cough anything up  Chest x-ray looks similar to prior from 10/13  Does meet sepsis criteria tachycardia, tachypnea, leukocytosis  CRP elevated at 14  Procalcitonin negative  Urine does not appear infected  No signs of ACS  Patient does have a history of smoking but states that she has not smoked for the last month  Review of Systems:  Review of Systems   Constitutional: Positive for activity change  Negative for chills, fatigue and fever  HENT: Negative for congestion, postnasal drip, rhinorrhea and sinus pain  Eyes: Negative for photophobia and visual disturbance  Respiratory: Positive for cough, shortness of breath and wheezing  Cardiovascular: Negative for chest pain, palpitations and leg swelling     Gastrointestinal: Negative for abdominal pain, constipation, diarrhea, nausea and vomiting  Endocrine: Negative for polydipsia, polyphagia and polyuria  Genitourinary: Negative for dysuria, frequency and hematuria  Musculoskeletal: Negative for arthralgias, back pain, gait problem, joint swelling and myalgias  Skin: Negative for color change, pallor, rash and wound  Allergic/Immunologic: Negative for environmental allergies, food allergies and immunocompromised state  Neurological: Negative for dizziness, tremors, syncope, weakness and light-headedness  Hematological: Negative for adenopathy  Does not bruise/bleed easily  Psychiatric/Behavioral: Negative for confusion  The patient is not nervous/anxious  Past Medical and Surgical History:   Past Medical History:   Diagnosis Date   • Asthma    • Bipolar 1 disorder ([facility] )    • Cardiac disease    • COPD (chronic obstructive pulmonary disease) ([place] [address] )    • Diabetes mellitus ([place] [address] )    • Disease of thyroid gland    • HIV (human immunodeficiency virus infection) ([place] [address] ) 03/24/2016   • Hypertension    • Osteoarthritis    • Tobacco abuse        Past Surgical History:   Procedure Laterality Date   • HERNIA REPAIR     • LUMBAR FUSION N/A 4/10/2018    Procedure: T9/10 discectomy with T9-T11 fusion;  Surgeon: Delvin Chapa MD;  Location: [facility];  Service: Neurosurgery   • THYROIDECTOMY         Meds/Allergies:  Prior to Admission medications    Medication Sig Start Date End Date Taking?  Authorizing Provider   acetaminophen (TYLENOL) 325 mg tablet Take 3 tablets (975 mg total) by mouth every 8 (eight) hours as needed for mild pain 9/18/22  Yes Sury Lu MD   albuterol (2 5 mg/3 mL) 0 083 % nebulizer solution Take 3 mL (2 5 mg total) by nebulization every 6 (six) hours as needed for wheezing or shortness of breath 10/13/22  Yes Jim Brown DO   apixaban (ELIQUIS) 5 mg Take 1 tablet by mouth 2 (two) times a day 5/9/22  Yes Historical Provider, MD   atorvastatin (LIPITOR) 20 mg tablet Take 20 mg by mouth daily   Yes Historical Provider, MD   bictegravir-emtricitab-tenofovir alafenamide (Biktarvy) -25 MG tablet Take 1 tablet by mouth daily 5/9/22  Yes Historical Provider, MD   budesonide (PULMICORT) 0 5 mg/2 mL nebulizer solution Take 2 mL (0 5 mg total) by nebulization 2 (two) times a day Rinse mouth after use  9/18/22  Yes Prabha Payan MD   cholecalciferol (VITAMIN D3) 1,000 units tablet Take 1,000 Units by mouth daily   Yes Historical Provider, MD   diltiazem (CARDIZEM CD) 120 mg 24 hr capsule Take 1 capsule (120 mg total) by mouth daily 9/29/22  Yes Derick Felix MD   Fluticasone-Salmeterol (Advair) 100-50 mcg/dose inhaler Inhale 1 puff 2 (two) times a day Rinse mouth after use  Yes Historical Provider, MD   furosemide (LASIX) 40 mg tablet Take 1 tablet (40 mg total) by mouth 2 (two) times a day 9/29/22  Yes Derick Felix MD   gabapentin (NEURONTIN) 400 mg capsule Take 800 mg by mouth 3 (three) times a day     Yes Historical Provider, MD   levothyroxine 100 mcg tablet Take 100 mcg by mouth daily  Yes Historical Provider, MD   montelukast (SINGULAIR) 10 mg tablet Take 10 mg by mouth daily at bedtime   Yes Historical Provider, MD   nicotine (NICODERM CQ) 21 mg/24 hr TD 24 hr patch Place 1 patch on the skin daily 9/19/22  Yes Prabha Payan MD   potassium chloride (K-DUR,KLOR-CON) 20 mEq tablet Take 1 tablet (20 mEq total) by mouth daily 9/19/22  Yes Prabha Payan MD   predniSONE 10 mg tablet 2 tabs po daily x 2d, then 1 tab po daily x 2d, then stop 10/13/22  Yes Lisa Benitez, DO   traMADol Temi Mary) 50 mg tablet Take 50 mg by mouth as needed 9/19/22  Yes Historical Provider, MD     I have reviewed home medications using recent Epic encounter  Allergies:    Allergies   Allergen Reactions   • Lisinopril Angioedema   • Prozac [Fluoxetine Hcl] Rash   • Sulfa Antibiotics Itching   • Quinine        Social History:  Marital Status:    Occupation: none  Patient Pre-hospital Living Situation: Home  Patient Pre-hospital Level of Mobility: walks  Patient Pre-hospital Diet Restrictions: none  Substance Use History:   Social History     Substance and Sexual Activity   Alcohol Use Yes     Social History     Tobacco Use   Smoking Status Current Every Day Smoker   • Packs/day: 0 20   • Years: 45 00   • Pack years: 9 00   • Types: Cigarettes   Smokeless Tobacco Never Used   Tobacco Comment    Currently smoking 1-2 cigarettes a day     Social History     Substance and Sexual Activity   Drug Use No    Comment: former IV drug user       Family History:  Family History   Problem Relation Age of Onset   • No Known Problems Mother    • Diabetes Father    • Heart disease Neg Hx    • Cancer Neg Hx        Physical Exam:     Vitals:   Blood Pressure: 100/61 (10/23/22 1345)  Pulse: (!) 111 (10/23/22 1345)  Temperature: 98 8 °F (37 1 °C) (10/23/22 1100)  Temp Source: Oral (10/23/22 1100)  Respirations: 20 (10/23/22 1142)  SpO2: 98 % (10/23/22 1345)    Physical Exam  Vitals and nursing note reviewed  Constitutional:       General: She is not in acute distress  Appearance: She is obese  She is not ill-appearing  HENT:      Head: Normocephalic and atraumatic  Cardiovascular:      Rate and Rhythm: Regular rhythm  Tachycardia present  Pulses: Normal pulses  Heart sounds: Normal heart sounds  No murmur heard  No gallop  Pulmonary:      Effort: Pulmonary effort is normal       Breath sounds: Wheezing and rhonchi present  No rales  Abdominal:      General: Bowel sounds are normal       Palpations: Abdomen is soft  Tenderness: There is no abdominal tenderness  There is no guarding or rebound  Musculoskeletal:         General: Normal range of motion  Cervical back: Normal range of motion and neck supple  Right lower leg: Edema present  Left lower leg: No edema  Skin:     General: Skin is warm and dry  Neurological:      General: No focal deficit present        Mental Status: She is alert and oriented to person, place, and time  Psychiatric:         Mood and Affect: Mood normal          Behavior: Behavior normal           Additional Data:     Lab Results:  Results from last 7 days   Lab Units 10/23/22  1136   WBC Thousand/uL 11 89*   HEMOGLOBIN g/dL 11 4*   HEMATOCRIT % 36 6   PLATELETS Thousands/uL 352   NEUTROS PCT % 84*   LYMPHS PCT % 9*   MONOS PCT % 6   EOS PCT % 0     Results from last 7 days   Lab Units 10/23/22  1136   SODIUM mmol/L 143   POTASSIUM mmol/L 3 5   CHLORIDE mmol/L 103   CO2 mmol/L 30   BUN mg/dL 27*   CREATININE mg/dL 1 00   ANION GAP mmol/L 10   CALCIUM mg/dL 10 6*   ALBUMIN g/dL 3 9   TOTAL BILIRUBIN mg/dL 0 53   ALK PHOS U/L 81   ALT U/L 20   AST U/L 8   GLUCOSE RANDOM mg/dL 130     Results from last 7 days   Lab Units 10/23/22  1136   INR  1 10                   Imaging: Personally reviewed the following imaging: chest xray  XR chest 1 view portable    (Results Pending)       EKG and Other Studies Reviewed on Admission:   · EKG: Sinus Tachycardia    ** Please Note: This note has been constructed using a voice recognition system   **

## 2022-10-23 NOTE — ASSESSMENT & PLAN NOTE
· Found to be COVID positive today with complaints of shortness of breath  · Not needing any further oxygen than her baseline 2 L  · Can maintain with mild COVID pathway  · Admission remdesivir with loading dose and four additional doses given her significant risk factors  · Already anticoagulated with Eliquis, given no further needs for oxygen no need for change to heparin drip or Lovenox therapeutic dosing  · Respiratory protocol  · Incentive spirometry  · No need for antibiotics at this time  · Being provided steroids for her COPD exacerbation as above  · Supportive care with Tessalon Perles and Mucinex  · Escalate as needed for any increase in oxygen needs

## 2022-10-24 PROBLEM — G47.33 OSA ON CPAP: Chronic | Status: RESOLVED | Noted: 2020-05-27 | Resolved: 2022-10-24

## 2022-10-24 PROBLEM — Z99.89 OSA ON CPAP: Chronic | Status: RESOLVED | Noted: 2020-05-27 | Resolved: 2022-10-24

## 2022-10-24 LAB
4HR DELTA HS TROPONIN: -4 NG/L
ALBUMIN SERPL BCP-MCNC: 3.4 G/DL (ref 3.5–5)
ALP SERPL-CCNC: 78 U/L (ref 46–116)
ALT SERPL W P-5'-P-CCNC: 17 U/L (ref 12–78)
ANION GAP SERPL CALCULATED.3IONS-SCNC: 7 MMOL/L (ref 4–13)
AST SERPL W P-5'-P-CCNC: <5 U/L (ref 5–45)
BASOPHILS # BLD AUTO: 0.01 THOUSANDS/ÂΜL (ref 0–0.1)
BASOPHILS NFR BLD AUTO: 0 % (ref 0–1)
BILIRUB SERPL-MCNC: 0.28 MG/DL (ref 0.2–1)
BUN SERPL-MCNC: 34 MG/DL (ref 5–25)
CALCIUM ALBUM COR SERPL-MCNC: 10.8 MG/DL (ref 8.3–10.1)
CALCIUM SERPL-MCNC: 10.3 MG/DL (ref 8.3–10.1)
CARDIAC TROPONIN I PNL SERPL HS: 7 NG/L
CHLORIDE SERPL-SCNC: 104 MMOL/L (ref 96–108)
CO2 SERPL-SCNC: 33 MMOL/L (ref 21–32)
CREAT SERPL-MCNC: 1.2 MG/DL (ref 0.6–1.3)
EOSINOPHIL # BLD AUTO: 0 THOUSAND/ÂΜL (ref 0–0.61)
EOSINOPHIL NFR BLD AUTO: 0 % (ref 0–6)
ERYTHROCYTE [DISTWIDTH] IN BLOOD BY AUTOMATED COUNT: 15.9 % (ref 11.6–15.1)
GFR SERPL CREATININE-BSD FRML MDRD: 45 ML/MIN/1.73SQ M
GLUCOSE SERPL-MCNC: 101 MG/DL (ref 65–140)
GLUCOSE SERPL-MCNC: 129 MG/DL (ref 65–140)
GLUCOSE SERPL-MCNC: 130 MG/DL (ref 65–140)
GLUCOSE SERPL-MCNC: 148 MG/DL (ref 65–140)
GLUCOSE SERPL-MCNC: 159 MG/DL (ref 65–140)
HCT VFR BLD AUTO: 33.7 % (ref 34.8–46.1)
HGB BLD-MCNC: 10.5 G/DL (ref 11.5–15.4)
IMM GRANULOCYTES # BLD AUTO: 0.13 THOUSAND/UL (ref 0–0.2)
IMM GRANULOCYTES NFR BLD AUTO: 1 % (ref 0–2)
LYMPHOCYTES # BLD AUTO: 0.79 THOUSANDS/ÂΜL (ref 0.6–4.47)
LYMPHOCYTES NFR BLD AUTO: 8 % (ref 14–44)
MAGNESIUM SERPL-MCNC: 2.3 MG/DL (ref 1.6–2.6)
MCH RBC QN AUTO: 29.7 PG (ref 26.8–34.3)
MCHC RBC AUTO-ENTMCNC: 31.2 G/DL (ref 31.4–37.4)
MCV RBC AUTO: 96 FL (ref 82–98)
MONOCYTES # BLD AUTO: 0.74 THOUSAND/ÂΜL (ref 0.17–1.22)
MONOCYTES NFR BLD AUTO: 8 % (ref 4–12)
NEUTROPHILS # BLD AUTO: 7.82 THOUSANDS/ÂΜL (ref 1.85–7.62)
NEUTS SEG NFR BLD AUTO: 83 % (ref 43–75)
NRBC BLD AUTO-RTO: 0 /100 WBCS
PHOSPHATE SERPL-MCNC: 4.2 MG/DL (ref 2.3–4.1)
PLATELET # BLD AUTO: 312 THOUSANDS/UL (ref 149–390)
PMV BLD AUTO: 9.2 FL (ref 8.9–12.7)
POTASSIUM SERPL-SCNC: 4.2 MMOL/L (ref 3.5–5.3)
PROT SERPL-MCNC: 7.1 G/DL (ref 6.4–8.4)
RBC # BLD AUTO: 3.53 MILLION/UL (ref 3.81–5.12)
SODIUM SERPL-SCNC: 144 MMOL/L (ref 135–147)
WBC # BLD AUTO: 9.49 THOUSAND/UL (ref 4.31–10.16)

## 2022-10-24 RX ORDER — METHYLPREDNISOLONE SODIUM SUCCINATE 40 MG/ML
40 INJECTION, POWDER, LYOPHILIZED, FOR SOLUTION INTRAMUSCULAR; INTRAVENOUS EVERY 12 HOURS SCHEDULED
Status: DISCONTINUED | OUTPATIENT
Start: 2022-10-24 | End: 2022-10-24

## 2022-10-24 RX ORDER — BUDESONIDE 0.5 MG/2ML
0.5 INHALANT ORAL
Status: DISCONTINUED | OUTPATIENT
Start: 2022-10-24 | End: 2022-10-24

## 2022-10-24 RX ORDER — LANOLIN ALCOHOL/MO/W.PET/CERES
6 CREAM (GRAM) TOPICAL
Status: DISCONTINUED | OUTPATIENT
Start: 2022-10-24 | End: 2022-10-26 | Stop reason: HOSPADM

## 2022-10-24 RX ADMIN — FLUTICASONE FUROATE AND VILANTEROL TRIFENATATE 1 PUFF: 100; 25 POWDER RESPIRATORY (INHALATION) at 08:29

## 2022-10-24 RX ADMIN — ALBUTEROL SULFATE 2.5 MG: 2.5 SOLUTION RESPIRATORY (INHALATION) at 20:09

## 2022-10-24 RX ADMIN — POTASSIUM CHLORIDE 20 MEQ: 1500 TABLET, EXTENDED RELEASE ORAL at 08:29

## 2022-10-24 RX ADMIN — GUAIFENESIN 600 MG: 600 TABLET, EXTENDED RELEASE ORAL at 22:36

## 2022-10-24 RX ADMIN — LEVOTHYROXINE SODIUM 100 MCG: 100 TABLET ORAL at 06:11

## 2022-10-24 RX ADMIN — INSULIN LISPRO 1 UNITS: 100 INJECTION, SOLUTION INTRAVENOUS; SUBCUTANEOUS at 08:29

## 2022-10-24 RX ADMIN — ALBUTEROL SULFATE 2.5 MG: 2.5 SOLUTION RESPIRATORY (INHALATION) at 07:50

## 2022-10-24 RX ADMIN — Medication 6 MG: at 22:36

## 2022-10-24 RX ADMIN — GABAPENTIN 800 MG: 400 CAPSULE ORAL at 08:30

## 2022-10-24 RX ADMIN — GABAPENTIN 800 MG: 400 CAPSULE ORAL at 15:36

## 2022-10-24 RX ADMIN — APIXABAN 5 MG: 5 TABLET, FILM COATED ORAL at 22:36

## 2022-10-24 RX ADMIN — Medication 6 MG: at 00:59

## 2022-10-24 RX ADMIN — GUAIFENESIN 600 MG: 600 TABLET, EXTENDED RELEASE ORAL at 08:30

## 2022-10-24 RX ADMIN — GABAPENTIN 800 MG: 400 CAPSULE ORAL at 22:36

## 2022-10-24 RX ADMIN — BUDESONIDE 0.5 MG: 0.5 INHALANT ORAL at 07:50

## 2022-10-24 RX ADMIN — FUROSEMIDE 40 MG: 40 TABLET ORAL at 15:36

## 2022-10-24 RX ADMIN — ATORVASTATIN CALCIUM 20 MG: 20 TABLET, FILM COATED ORAL at 15:36

## 2022-10-24 RX ADMIN — MONTELUKAST 10 MG: 10 TABLET, FILM COATED ORAL at 22:36

## 2022-10-24 RX ADMIN — TRAMADOL HYDROCHLORIDE 50 MG: 50 TABLET, COATED ORAL at 00:53

## 2022-10-24 RX ADMIN — BICTEGRAVIR SODIUM, EMTRICITABINE, AND TENOFOVIR ALAFENAMIDE FUMARATE 1 TABLET: 50; 200; 25 TABLET ORAL at 08:29

## 2022-10-24 RX ADMIN — APIXABAN 5 MG: 5 TABLET, FILM COATED ORAL at 08:30

## 2022-10-24 RX ADMIN — REMDESIVIR 100 MG: 100 INJECTION, POWDER, LYOPHILIZED, FOR SOLUTION INTRAVENOUS at 15:36

## 2022-10-24 RX ADMIN — Medication 1000 UNITS: at 08:30

## 2022-10-24 NOTE — ASSESSMENT & PLAN NOTE
Patient presenting with mild COVID-19 infection  Vaccination status :Vaccinated  Vaccination Date :  Vaccinated x3  Oxygen requirements :  3 L which is patient's baseline  Remdesvir Day #1/5 mild  Trend CMP,CBC daily

## 2022-10-24 NOTE — NURSING NOTE
Patient had overdue blood work, that was ordered prior to patient arrival   Noted that patient's IV site was ultrasound guided  2 PCA's attempted to draw blood cultures and troponin, and were unsuccessful  Charge nurse notified, and she reached out to hospital supervisor in order to have someone attempt blood draw with the ultrasound

## 2022-10-24 NOTE — PROGRESS NOTES
[facility]  Progress Note - Kieran Rosario 1950, 81 y o  female MRN: [id number]  Unit/Bed#: [address] 68 2 ite [address] 87 228-01 Encounter: [id number]  Primary Care Provider: Rebecca Regalado DO   Date and time admitted to hospital: 10/23/2022 10:57 AM      Addendum:    Patient will need a portable concentrator  She is home alone during the day and unable to carry tanks up and down steps independently  She moves oxygen because of the large size of the tank and often gets readmitted for COPD exacerbation  * Acute exacerbation of moderate persistent extrinsic asthma  Assessment & Plan  Patient just discharged from [facility] on 10/15 for COPD exacerbation and was being maintained on a steroid taper  Despite this patient states that she did feel improved since she came here for evaluation  · Found to have COVID infection, [facility]  · Chest x-ray appears stable compared to chest x-ray from 10/13, no consolidations appreciated  · Continue Solu-Medrol 40 mg twice a day    COVID-19  Assessment & Plan  Patient presenting with mild COVID-19 infection  Vaccination status :Vaccinated  Vaccination Date :  Vaccinated x3  Oxygen requirements :  3 L which is patient's baseline  Remdesvir Day #1/5 mild  Trend CMP,CBC daily      Paroxysmal atrial fibrillation (HCC)  Assessment & Plan  · Maintained on Cardizem and Eliquis  · Continue with this regimen for now    Tobacco use  Assessment & Plan  · Nicotine replacement while inpatient  · Smoking cessation counseling    Chronic respiratory failure with hypoxia (HCC)  Assessment & Plan  · At baseline requires 2 L of oxygen  · Will continue to monitor for any oxygen needs  · Maintain oxygen greater than 88%    HIV (human immunodeficiency virus infection)   Assessment & Plan  · Maintain on Biktarvy while inpatient    Type 2 diabetes mellitus without complication, without long-term current use of insulin [facility])  Assessment & Plan  Lab Results   Component Value Date    HGBA1C 6 8 (H) 10/16/2022       Recent Labs     10/23/22  1635 10/23/22  2129 10/24/22  0742 10/24/22  1146   POCGLU 195* 272* 159* 101       Blood Sugar Average: Last 72 hrs:  (P) 181 75 good control per last A1c  Home regimen reviewed  Hold Metformin if applicable  Start SSI and Basal bolus protocol  Monitor for steroid induced hyperglycemia      [facility] Internal Medicine Progress Note  Patient: Myla Closs 81 y o  female   MRN: [id number]  PCP: Chas Mercado DO  Unit/Bed#: [place] 2 -01 Encounter: [id number]  Date Of Visit: 10/24/22    Assessment:    Principal Problem:    Acute exacerbation of moderate persistent extrinsic asthma  Active Problems:    Type 2 diabetes mellitus without complication, without long-term current use of insulin (HCC)    HIV (human immunodeficiency virus infection)     Chronic respiratory failure with hypoxia (HCC)    Tobacco use    Paroxysmal atrial fibrillation ([facility] )    COVID-19    Sepsis ([facility] )      Plan:    · Continue current medical managed  · Discharge planning  · Continue remdesivir, day 2   · Solu-Medrol 40 mg twice a day - Likely can be discharged on steroid taper for short burst        VTE Pharmacologic Prophylaxis:   Pharmacologic: Apixaban (Eliquis)  Mechanical VTE Prophylaxis in Place: Yes    Patient Centered Rounds: I have performed bedside rounds with nursing staff today  Discussions with Specialists or Other Care Team Provider:  Discussed with     Education and Discussions with Family / Patient:  Discussed with patient daughter at 3:24 p m  Time Spent for Care: 45 minutes  More than 50% of total time spent on counseling and coordination of care as described above      Current Length of Stay: 1 day(s)    Current Patient Status: Inpatient   Certification Statement: The patient will continue to require additional inpatient hospital stay due to COVID-19    Discharge Plan / Estimated Discharge Date:  24-48 hours    Code Status: Level 1 - Full Code      Subjective:   Seen and examined, breathing remains stable  She is anxious for discharge home  Denies any nausea vomiting diarrhea  No abdominal pain    A complete and comprehensive 14 point organ system review has been performed and all other systems are negative other than stated above  Objective:     Vitals:   Temp (24hrs), Av 6 °F (37 °C), Min:97 8 °F (36 6 °C), Max:99 6 °F (37 6 °C)    Temp:  [97 8 °F (36 6 °C)-99 6 °F (37 6 °C)] 99 3 °F (37 4 °C)  HR:  [] 98  Resp:  [16-19] 16  BP: ()/(56-91) 114/89  SpO2:  [94 %-100 %] 98 %  Body mass index is 39 87 kg/m²  Input and Output Summary (last 24 hours):        Intake/Output Summary (Last 24 hours) at 10/24/2022 1521  Last data filed at 10/24/2022 0128  Gross per 24 hour   Intake 1350 ml   Output --   Net 1350 ml       Physical Exam:     General: well appearing, no acute distress  HEENT: atraumatic, PERRLA, moist mucosa, normal pharynx, normal tonsils and adenoids, normal tongue, no fluid in sinuses  Neck: Trachea midline, no carotid bruit, no masses  Respiratory: normal chest wall expansion, mild expiratory wheeze, no r/r/w, no rubs  Cardiovascular: RRR, no m/r/g, Normal S1 and S2  Abdomen: Soft, non-tender, non-distended, normal bowel sounds in all quadrants, no hepatosplenomegaly, no tympany  Rectal: deferred  Musculoskeletal: normal ROM in upper and lower extremities  Integumentary: warm, dry, and pink, with no rash, purpura, or petechia  Heme/Lymph: no lymphadenopathy, no bruises  Neurological: Cranial Nerves II-XII grossly intact, no tics, normal sensation to pressure and light touch  Psychiatric: cooperative with normal mood, affect, and cognition      Additional Data:     Labs:    Results from last 7 days   Lab Units 10/24/22  0321   WBC Thousand/uL 9 49   HEMOGLOBIN g/dL 10 5*   HEMATOCRIT % 33 7*   PLATELETS Thousands/uL 312   NEUTROS PCT % 83*   LYMPHS PCT % 8* MONOS PCT % 8   EOS PCT % 0     Results from last 7 days   Lab Units 10/24/22  0322   POTASSIUM mmol/L 4 2   CHLORIDE mmol/L 104   CO2 mmol/L 33*   BUN mg/dL 34*   CREATININE mg/dL 1 20   CALCIUM mg/dL 10 3*   ALK PHOS U/L 78   ALT U/L 17   AST U/L <5*     Results from last 7 days   Lab Units 10/23/22  1136   INR  1 10       * I Have Reviewed All Lab Data Listed Above  * Additional Pertinent Lab Tests Reviewed: [address] Admission Reviewed    Imaging:    Imaging Reports Reviewed Today Include:  No new imaging  Imaging Personally Reviewed by Myself Includes:  No new imaging    Recent Cultures (last 7 days):     Results from last 7 days   Lab Units 10/24/22  0309   BLOOD CULTURE  Received in Microbiology Lab  Culture in Progress  Received in Microbiology Lab  Culture in Progress         Last 24 Hours Medication List:   Current Facility-Administered Medications   Medication Dose Route Frequency Provider Last Rate   • acetaminophen  975 mg Oral Q8H PRN Foreign Galdamez PA-C     • albuterol  2 5 mg Nebulization Q6H PRN Foreign Galdamez PA-C     • aluminum-magnesium hydroxide-simethicone  30 mL Oral Q6H PRN Foreign Galdamez PA-C     • apixaban  5 mg Oral BID Foreign Galdamez PA-C     • atorvastatin  20 mg Oral Daily With Yahir Silver PA-C     • benzonatate  100 mg Oral TID PRN Foreign Galdamez PA-C     • bictegravir-emtricitab-tenofovir alafenamide  1 tablet Oral Daily Foreign Galdamez PA-C     • budesonide  0 5 mg Nebulization BID Katy Saravia DO     • cholecalciferol  1,000 Units Oral Daily Foreign Galdamez PA-C     • diltiazem  120 mg Oral Daily Foreign Galdamez PA-C     • fluticasone-vilanterol  1 puff Inhalation Daily Foreign Galdamez PA-C     • furosemide  40 mg Oral BID (diuretic) Foreign Galdamez PA-C     • gabapentin  800 mg Oral TID Foreign Galdamez PA-C     • guaiFENesin  600 mg Oral Q12H [facility]     • insulin lispro  1-6 Units Subcutaneous TID AC Foreign Galdamez PA-C     • levothyroxine  100 mcg Oral Early Morning Lucia Díaz PA-C     • melatonin  6 mg Oral HS Lucretia Serrato PA-C     • methylPREDNISolone sodium succinate  40 mg Intravenous Q12H [address] Romel Tien Gauthier DO     • montelukast  10 mg Oral HS Lucia Díaz PA-C     • nicotine  1 patch Transdermal Daily Lucia Díaz PA-C     • ondansetron  4 mg Intravenous Q6H PRN Lucia Díaz PA-C     • polyethylene glycol  17 g Oral Daily PRN Lucia Díaz PA-C     • potassium chloride  20 mEq Oral Daily Lucia Díaz PA-C     • remdesivir  100 mg Intravenous Q24H Lucia Díaz PA-C     • traMADol  50 mg Oral Q6H PRN Lucia Díaz PA-C          Today, Patient Was Seen By: Renato Story    ** Please Note: This note was completed in part utilizing [employer] Direct Software  Grammatical errors, random word insertions, spelling mistakes, and incomplete sentences may be an occasional consequence of this system secondary to software limitations, ambient noise, and hardware issues  If you have any questions or concerns about the content, text, or information contained within the body of this dictation, please contact the provider for clarification   **

## 2022-10-24 NOTE — PLAN OF CARE
Problem: Potential for Falls  Goal: Patient will remain free of falls  Description: INTERVENTIONS:  - Educate patient/family on patient safety including physical limitations  - Instruct patient to call for assistance with activity   - Consult OT/PT to assist with strengthening/mobility   - Keep Call bell within reach  - Keep bed low and locked with side rails adjusted as appropriate  - Keep care items and personal belongings within reach  - Initiate and maintain comfort rounds  - Make Fall Risk Sign visible to staff  - Apply yellow socks and bracelet for high fall risk patients  - Consider moving patient to room near nurses station  Outcome: Progressing     Problem: PAIN - ADULT  Goal: Verbalizes/displays adequate comfort level or baseline comfort level  Description: Interventions:  - Encourage patient to monitor pain and request assistance  - Assess pain using appropriate pain scale  - Administer analgesics based on type and severity of pain and evaluate response  - Implement non-pharmacological measures as appropriate and evaluate response  - Consider cultural and social influences on pain and pain management  - Notify physician/advanced practitioner if interventions unsuccessful or patient reports new pain  Outcome: Progressing     Problem: INFECTION - ADULT  Goal: Absence or prevention of progression during hospitalization  Description: INTERVENTIONS:  - Assess and monitor for signs and symptoms of infection  - Monitor lab/diagnostic results  - Monitor all insertion sites, i e  indwelling lines, tubes, and drains  - Monitor endotracheal if appropriate and nasal secretions for changes in amount and color  - [place] appropriate cooling/warming therapies per order  - Administer medications as ordered  - Instruct and encourage patient and family to use good hand hygiene technique  - Identify and instruct in appropriate isolation precautions for identified infection/condition  Outcome: Progressing  Goal: Absence of fever/infection during neutropenic period  Description: INTERVENTIONS:  - Monitor WBC    Outcome: Progressing     Problem: SAFETY ADULT  Goal: Patient will remain free of falls  Description: INTERVENTIONS:  - Educate patient/family on patient safety including physical limitations  - Instruct patient to call for assistance with activity   - Consult OT/PT to assist with strengthening/mobility   - Keep Call bell within reach  - Keep bed low and locked with side rails adjusted as appropriate  - Keep care items and personal belongings within reach  - Initiate and maintain comfort rounds  - Make Fall Risk Sign visible to staff  - Apply yellow socks and bracelet for high fall risk patients  - Consider moving patient to room near nurses station  Outcome: Progressing  Goal: Maintain or return to baseline ADL function  Description: INTERVENTIONS:  -  Assess patient's ability to carry out ADLs; assess patient's baseline for ADL function and identify physical deficits which impact ability to perform ADLs (bathing, care of mouth/teeth, toileting, grooming, dressing, etc )  - Assess/evaluate cause of self-care deficits   - Assess range of motion  - Assess patient's mobility; develop plan if impaired  - Assess patient's need for assistive devices and provide as appropriate  - Encourage maximum independence but intervene and supervise when necessary  - Involve family in performance of ADLs  - Assess for home care needs following discharge   - Consider OT consult to assist with ADL evaluation and planning for discharge  - Provide patient education as appropriate  Outcome: Progressing  Goal: Maintains/Returns to pre admission functional level  Description: INTERVENTIONS:  - Perform BMAT or MOVE assessment daily    - Set and communicate daily mobility goal to care team and patient/family/caregiver     - Collaborate with rehabilitation services on mobility goals if consulted  - Out of bed for toileting  - Record patient progress and toleration of activity level   Outcome: Progressing     Problem: DISCHARGE PLANNING  Goal: Discharge to home or other facility with appropriate resources  Description: INTERVENTIONS:  - Identify barriers to discharge w/patient and caregiver  - Arrange for needed discharge resources and transportation as appropriate  - Identify discharge learning needs (meds, wound care, etc )  - Arrange for interpretive services to assist at discharge as needed  - Refer to Case Management Department for coordinating discharge planning if the patient needs post-hospital services based on physician/advanced practitioner order or complex needs related to functional status, cognitive ability, or social support system  Outcome: Progressing     Problem: Knowledge Deficit  Goal: Patient/family/caregiver demonstrates understanding of disease process, treatment plan, medications, and discharge instructions  Description: Complete learning assessment and assess knowledge base    Interventions:  - Provide teaching at level of understanding  - Provide teaching via preferred learning methods  Outcome: Progressing

## 2022-10-24 NOTE — CONSULTS
Pulmonary Consultation   Titus Bobby 81 y o  female MRN: [id number]  Unit/Bed#: [address] 68 2 [address] 87 228-01 Encounter: [id number]      Reason for consultation: COVID, COPD exacerbation    Requesting physician: Kay Do    Impressions/Recommendations:    1  Chronic hypoxic respiratory failure  1  Baseline 2 L NC  2  Current needs at baseline 2 L NC  3  Titrate oxygen to maintain SpO2 >/= 88%  4  Pulmonary toilet: IS-add flutter valve and increase activity  2  COVID 19   1  Mild protocol-on home O2  2  May continue remdesirvir as ordered  3  No further covid 19 directed therapy indicated  4  CXR without acute process  3  Chronic dyspnea  1  Would benefit from close outpatient pulmonary follow up at [facility]  4  Moderate persistent asthma without acute exacerbation  1  Discontinue steroids and budesonide   2  Singulair, breo daily  3  Continue xopenex  4  Home regimen: spiriva  5  Needs close pulmonary follow up at [facility]  5  Tobacco abuse  1  NRT  6  Anxiety  1  Management per IM  7  pasha on CPAP  1  Baseline 5-15 cmH20 autoPAP  8  Morbid obesity  1  Needs weight loss  9   chronic diastolic heart failure with diet noncompliance  1  On home regimen    2  Strict I &O   3  Daily weights  10  HIV  1  management per IM  11  DM2  1  Management per IM  12  PAF  1  Management per IM       *no further inpatient pulmonary recommendations-we will sign off, call with concerns    History of Present Illness   HPI:  Titus Bobby is a 81 y o  female seen in consult for COVID 19, COPD exacerbation  Medical history significant for moderate persistent asthma, PAF, diastolic heart failure with chronic anemia and diet noncompliance, diabetes type 2, HIV, chronic hypoxic respiratory failure on 2 L nasal cannula, chronic dyspnea, PASHA on CPAP, hypertension, morbid obesity, tobacco and cocaine abuse  She has been admitted with repeatedly due to dyspnea, acute heart failure and mild asthma exacerbations    During this admission she has been admitted due to COVID-19 infection  At the time of admission and R evaluation her primary complaint was shortness of breath, which is only mildly above her baseline  Denies:  Chest pain compensation, fevers, chills or hemoptysis  She was very concerned with how she contracted COVID, as well as isolation with her family at home    From pulmonary standpoint she follows at [facility]  She has a history of tobacco abuse with asthma tobacco abuse up to a quarter pack per day  Her current pulmonary regimen consists of Advair 250/50 b i d , Spiriva daily and albuterol MDI p r n  Elisa Yu She does use prednisone frequently  No history of intubations  Baseline O2 needs are 2 L nasal cannula  Positive history of PASHA she is on CPAP auto settings of 5-15 with 2 L O2  Denies GERD or dysphagia  Denies any recent sick contact exposures or travels  Review of systems:  12 point review of systems was completed and was otherwise negative except as listed in HPI        Historical Information   Past Medical History:   Diagnosis Date   • Asthma    • Bipolar 1 disorder ([facility] )    • Cardiac disease    • COPD (chronic obstructive pulmonary disease) ([facility] )    • Diabetes mellitus ([facility] )    • Disease of thyroid gland    • HIV (human immunodeficiency virus infection) ([facility] ) 03/24/2016   • Hypertension    • Osteoarthritis    • Tobacco abuse      Past Surgical History:   Procedure Laterality Date   • HERNIA REPAIR     • LUMBAR FUSION N/A 4/10/2018    Procedure: T9/10 discectomy with T9-T11 fusion;  Surgeon: Rafa Healy MD;  Location: [facility];  Service: Neurosurgery   • THYROIDECTOMY       Family History   Problem Relation Age of Onset   • No Known Problems Mother    • Diabetes Father    • Heart disease Neg Hx    • Cancer Neg Hx        Occupational history:  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    Tobacco history:  54 pack-year smoking history, currently smoking a quarter pack per day    Meds/Allergies Current Facility-Administered Medications   Medication Dose Route Frequency   • acetaminophen (TYLENOL) tablet 975 mg  975 mg Oral Q8H PRN   • albuterol inhalation solution 2 5 mg  2 5 mg Nebulization Q6H PRN   • aluminum-magnesium hydroxide-simethicone (MYLANTA) oral suspension 30 mL  30 mL Oral Q6H PRN   • apixaban (ELIQUIS) tablet 5 mg  5 mg Oral BID   • atorvastatin (LIPITOR) tablet 20 mg  20 mg Oral Daily With Dinner   • benzonatate (TESSALON PERLES) capsule 100 mg  100 mg Oral TID PRN   • bictegravir-emtricitab-tenofovir alafenamide (BIKTARVY) -25 MG tablet 1 tablet  1 tablet Oral Daily   • budesonide (PULMICORT) inhalation solution 0 5 mg  0 5 mg Nebulization BID   • cholecalciferol (VITAMIN D3) tablet 1,000 Units  1,000 Units Oral Daily   • diltiazem (CARDIZEM CD) 24 hr capsule 120 mg  120 mg Oral Daily   • fluticasone-vilanterol (BREO ELLIPTA) 100-25 mcg/inh inhaler 1 puff  1 puff Inhalation Daily   • furosemide (LASIX) tablet 40 mg  40 mg Oral BID (diuretic)   • gabapentin (NEURONTIN) capsule 800 mg  800 mg Oral TID   • guaiFENesin (MUCINEX) 12 hr tablet 600 mg  600 mg Oral Q12H CAROLS   • insulin lispro (HumaLOG) 100 units/mL subcutaneous injection 1-6 Units  1-6 Units Subcutaneous TID AC   • levothyroxine tablet 100 mcg  100 mcg Oral Early Morning   • melatonin tablet 6 mg  6 mg Oral HS   • methylPREDNISolone sodium succinate (Solu-MEDROL) injection 40 mg  40 mg Intravenous Q24H   • montelukast (SINGULAIR) tablet 10 mg  10 mg Oral HS   • nicotine (NICODERM CQ) 21 mg/24 hr TD 24 hr patch 1 patch  1 patch Transdermal Daily   • ondansetron (ZOFRAN) injection 4 mg  4 mg Intravenous Q6H PRN   • polyethylene glycol (MIRALAX) packet 17 g  17 g Oral Daily PRN   • potassium chloride (K-DUR,KLOR-CON) CR tablet 20 mEq  20 mEq Oral Daily   • remdesivir (Veklury) 100 mg in sodium chloride 0 9 % 270 mL IVPB  100 mg Intravenous Q24H   • traMADol (ULTRAM) tablet 50 mg  50 mg Oral Q6H PRN     Medications Prior to Admission   Medication   • acetaminophen (TYLENOL) 325 mg tablet   • albuterol (2 5 mg/3 mL) 0 083 % nebulizer solution   • apixaban (ELIQUIS) 5 mg   • atorvastatin (LIPITOR) 20 mg tablet   • bictegravir-emtricitab-tenofovir alafenamide (Biktarvy) -25 MG tablet   • budesonide (PULMICORT) 0 5 mg/2 mL nebulizer solution   • cholecalciferol (VITAMIN D3) 1,000 units tablet   • diltiazem (CARDIZEM CD) 120 mg 24 hr capsule   • Fluticasone-Salmeterol (Advair) 100-50 mcg/dose inhaler   • furosemide (LASIX) 40 mg tablet   • gabapentin (NEURONTIN) 400 mg capsule   • levothyroxine 100 mcg tablet   • montelukast (SINGULAIR) 10 mg tablet   • nicotine (NICODERM CQ) 21 mg/24 hr TD 24 hr patch   • potassium chloride (K-DUR,KLOR-CON) 20 mEq tablet   • predniSONE 10 mg tablet   • traMADol (ULTRAM) 50 mg tablet     Allergies   Allergen Reactions   • Lisinopril Angioedema   • Prozac [Fluoxetine Hcl] Rash   • Sulfa Antibiotics Itching   • Quinine        Vitals: Blood pressure 114/89, pulse 98, temperature 99 3 °F (37 4 °C), resp  rate 16, height 5' 2" (1 575 m), weight 98 9 kg (218 lb), last menstrual period 04/01/2013, SpO2 98 %, not currently breastfeeding  , 2L NC, Body mass index is 39 87 kg/m²        Intake/Output Summary (Last 24 hours) at 10/24/2022 1512  Last data filed at 10/24/2022 0128  Gross per 24 hour   Intake 1350 ml   Output --   Net 1350 ml       Physical exam:    General Appearance:    Alert, cooperative, no conversational dyspnea no accessory     muscle use       Head/eyes:    Normocephalic, without obvious abnormality, atraumatic,         PERRL, extraocular muscles intact, no scleral icterus    Nose:   Nares normal, septum midline, mucosa normal, no drainage    or sinus tenderness   Throat:   Moist mucous membranes, no thrush   Neck:   Supple, trachea midline, no adenopathy; no carotid    bruit or JVD   Lungs:     Coarse breath sounds with bilateral rales   Chest Wall:    No tenderness or deformity    Heart: Regular rate and rhythm, S1 and S2 normal, no murmur, rub   or gallop   Abdomen:     Obse, Soft, non-tender, bowel sounds active all four quadrants,     no masses, no organomegaly   Extremities:   Extremities normal, atraumatic, no cyanosis no edema   Skin:   Warm, dry, turgor normal, no rashes or lesions   Lymph nodes:   Cervical and supraclavicular nodes normal   Neurologic:   CNII-XII intact, normal strength, non-focal         Labs: I have personally reviewed pertinent lab results  , CBC:   Lab Results   Component Value Date    WBC 9 49 10/24/2022    HGB 10 5 (L) 10/24/2022    HCT 33 7 (L) 10/24/2022    MCV 96 10/24/2022     10/24/2022    MCH 29 7 10/24/2022    MCHC 31 2 (L) 10/24/2022    RDW 15 9 (H) 10/24/2022    MPV 9 2 10/24/2022    NRBC 0 10/24/2022   , CMP:   Lab Results   Component Value Date    SODIUM 144 10/24/2022    K 4 2 10/24/2022     10/24/2022    CO2 33 (H) 10/24/2022    BUN 34 (H) 10/24/2022    CREATININE 1 20 10/24/2022    CALCIUM 10 3 (H) 10/24/2022    AST <5 (L) 10/24/2022    ALT 17 10/24/2022    ALKPHOS 78 10/24/2022    EGFR 45 10/24/2022       Imaging and other studies: I have personally reviewed pertinent reports  and I have personally reviewed pertinent films in PACS  CXR 10/23/22       FINDINGS:     Cardiomediastinal silhouette appears unremarkable      The lungs are clear  No pneumothorax or pleural effusion      Orthopedic hardware from lower thoracic spinal fusion  Otherwise osseous structures are within normal limits for patient age      IMPRESSION:     No acute cardiopulmonary disease  Pulmonary function testing: none    EKG, Pathology, and Other Studies: I have personally reviewed pertinent reports     and ECG 10/23/22-ST    Code Status: Level 1 - Full Code      Ender Blood Ask

## 2022-10-24 NOTE — ASSESSMENT & PLAN NOTE
Patient just discharged from [facility] on 10/15 for COPD exacerbation and was being maintained on a steroid taper  Despite this patient states that she did feel improved since she came here for evaluation    · Found to have COVID infection, on review of [facility] notes she did not have this during her admission  · Chest x-ray appears stable compared to chest x-ray from 10/13, no consolidations appreciated  · Continue Solu-Medrol 40 mg twice a day

## 2022-10-24 NOTE — UTILIZATION REVIEW
Initial Clinical Review    Admission: Date/Time/Statement:   Admission Orders (From admission, onward)     Ordered        10/23/22 1404  INPATIENT ADMISSION  Once                      Orders Placed This Encounter   Procedures   • INPATIENT ADMISSION     Standing Status:   Standing     Number of Occurrences:   1     Order Specific Question:   Level of Care     Answer:   Med Surg [16]     Order Specific Question:   Estimated length of stay     Answer:   More than 2 Midnights     Order Specific Question:   Certification     Answer:   I certify that inpatient services are medically necessary for this patient for a duration of greater than two midnights  See H&P and MD Progress Notes for additional information about the patient's course of treatment  ED Arrival Information     Expected   -    Arrival   10/23/2022 10:57    Acuity   Urgent            Means of arrival   Ambulance    Escorted by   [facility] ([address])    Service   Hospitalist    Admission type   Emergency            Arrival complaint   sob           Chief Complaint   Patient presents with   • Shortness of Breath     Pt reports started with shortness of breath last night  Used inhaler at home with no relief  Normally uses 2 L nasal cannula at home  Initial Presentation:  81 yof to ER from home via EMS c/o SOB, heavy weight on chest  Using O2 @ 2ltr, inhalers without relief  Pt was admitted at [address] [place] [address] [address] - 10/15 - dc on 50mg of prednisone taper by 10 mg/week - pt states she is taking the meds, didn't take it this am  Pt doesn't know how many mg she is taking - based on dc instructions should be on 40mg - however pt cant confirm if that's what she is now taking  Hx COPD (O2 2ltr baseline), atrial fibrillation, CHF, PASHA, HIV, DM  Presents tachycardic, lungs with wheezing & rhonchi  Admission work-up showing COVID+, leukocytosis, elevated CRP  Admitted to inpatient status for acute exacerbation moderate/persistent asthma   Started on IV steroids & IV Remdesivir for COVID 19  Date: 10/24/22   Day 2:   IV steroids in progress for exacerbation asthma  Lungs with mild wheezing noted, O2 requirements @ 3ltr nc  IV Remdesivir continued for COVID+  Trend CMP,CBC daily  ED Triage Vitals [10/23/22 1100]   Temperature Pulse Respirations Blood Pressure SpO2   98 8 °F (37 1 °C) 104 20 127/81 94 %      Temp Source Heart Rate Source Patient Position - Orthostatic VS BP Location FiO2 (%)   Oral Monitor Lying Left arm --      Pain Score       No Pain          Wt Readings from Last 1 Encounters:   10/23/22 98 9 kg (218 lb)     Additional Vital Signs:   10/24/22 0752 -- -- -- -- -- 98 % 28 2 L/min Nasal cannula --   10/24/22 07:44:34 97 8 °F (36 6 °C) 99 16 106/70 82 99 % -- -- -- --   10/24/22 0729 -- -- -- -- -- -- 28 2 L/min Nasal cannula --   10/24/22 03:47:56 98 4 °F (36 9 °C) 88 17 90/56 67 98 % 28 2 L/min Nasal cannula --   10/23/22 23:04:48 98 2 °F (36 8 °C) 72 -- 129/91 104 100 % 28 2 L/min Nasal cannula --   10/23/22 23:04:23 98 2 °F (36 8 °C) 102 -- 129/91 104 97 % -- -- -- --   10/23/22 21:10:46 98 6 °F (37 °C) 104 -- 114/67 83 95 % -- -- -- --   10/23/22 2016 -- -- -- -- -- 94 % 28 2 L/min Nasal cannula --   10/23/22 1603 -- -- -- -- -- -- 28 2 L/min  Nasal cannula --   Nasal Cannula O2 Flow Rate (L/min): beatris at 10/23/22 1603   10/23/22 15:44:59 99 6 °F (37 6 °C) 105 -- 116/60 79 96 % -- -- -- --   10/23/22 1523 -- 106 Abnormal  19 125/57 -- 96 % 28 2 L/min Nasal cannula Lying     Pertinent Labs/Diagnostic Test Results:   XR chest 1 view portable   Final Result  (10/23 2037)      No acute cardiopulmonary disease          Results from last 7 days   Lab Units 10/23/22  1125   SARS-COV-2  Positive*     Results from last 7 days   Lab Units 10/24/22  0321 10/23/22  1136   WBC Thousand/uL 9 49 11 89*   HEMOGLOBIN g/dL 10 5* 11 4*   HEMATOCRIT % 33 7* 36 6   PLATELETS Thousands/uL 312 352   NEUTROS ABS Thousands/µL 7 82* 9 96*     Results from last 7 days Lab Units 10/24/22  0322 10/23/22  1136   SODIUM mmol/L 144 143   POTASSIUM mmol/L 4 2 3 5   CHLORIDE mmol/L 104 103   CO2 mmol/L 33* 30   ANION GAP mmol/L 7 10   BUN mg/dL 34* 27*   CREATININE mg/dL 1 20 1 00   EGFR ml/min/1 73sq m 45 57   CALCIUM mg/dL 10 3* 10 6*   MAGNESIUM mg/dL 2 3  --    PHOSPHORUS mg/dL 4 2*  --      Results from last 7 days   Lab Units 10/24/22  0322 10/23/22  1136   AST U/L <5* 8   ALT U/L 17 20   ALK PHOS U/L 78 81   TOTAL PROTEIN g/dL 7 1 8 0   ALBUMIN g/dL 3 4* 3 9   TOTAL BILIRUBIN mg/dL 0 28 0 53     Results from last 7 days   Lab Units 10/24/22  0742 10/23/22  2129 10/23/22  1635   POC GLUCOSE mg/dl 159* 272* 195*     Results from last 7 days   Lab Units 10/24/22  0322 10/23/22  1136   GLUCOSE RANDOM mg/dL 130 130     Results from last 7 days   Lab Units 10/24/22  0308 10/23/22  1344 10/23/22  1136   HS TNI 0HR ng/L  --   --  11   HS TNI 2HR ng/L  --  7  --    HSTNI D2 ng/L  --  -4  --    HS TNI 4HR ng/L 7  --   --    HSTNI D4 ng/L -4  --   --      Results from last 7 days   Lab Units 10/23/22  1136   PROTIME seconds 14 2   INR  1 10   PTT seconds 25     Results from last 7 days   Lab Units 10/23/22  1136   PROCALCITONIN ng/ml <0 05     Results from last 7 days   Lab Units 10/23/22  1136   NT-PRO BNP pg/mL 111     Results from last 7 days   Lab Units 10/23/22  1136   CRP mg/L 13 3*     Results from last 7 days   Lab Units 10/23/22  1127   CLARITY UA  Clear   COLOR UA  Yellow   SPEC GRAV UA  1 015   PH UA  5 5   GLUCOSE UA mg/dl Negative   KETONES UA mg/dl Negative   BLOOD UA  Negative   PROTEIN UA mg/dl Negative   NITRITE UA  Negative   BILIRUBIN UA  Negative   UROBILINOGEN UA E U /dl 0 2   LEUKOCYTES UA  Small*   WBC UA /hpf 4-10*   RBC UA /hpf None Seen   BACTERIA UA /hpf Occasional   EPITHELIAL CELLS WET PREP /hpf Occasional     Results from last 7 days   Lab Units 10/23/22  1125   INFLUENZA A PCR  Negative   INFLUENZA B PCR  Negative   RSV PCR  Negative     Results from last 7 days   Lab Units 10/24/22  0309   BLOOD CULTURE  Received in Microbiology Lab  Culture in Progress  Received in Microbiology Lab  Culture in Progress       ED Treatment:   Medication Administration from 10/23/2022 1057 to 10/23/2022 1535       Date/Time Order Dose Route Action     10/23/2022 1129 albuterol inhalation solution 5 mg 5 mg Nebulization Given     10/23/2022 1129 ipratropium (ATROVENT) 0 02 % inhalation solution 0 5 mg 0 5 mg Nebulization Given     10/23/2022 1212 predniSONE tablet 60 mg 60 mg Oral Given     10/23/2022 1342 albuterol inhalation solution 2 5 mg 2 5 mg Nebulization Given        Past Medical History:   Diagnosis Date   • Asthma    • Bipolar 1 disorder ([place] [address] )    • Cardiac disease    • COPD (chronic obstructive pulmonary disease) ([place] [address] )    • Diabetes mellitus ([place] [address] )    • Disease of thyroid gland    • HIV (human immunodeficiency virus infection) ([place] [address] ) 03/24/2016   • Hypertension    • Osteoarthritis    • Tobacco abuse      Present on Admission:  • Type 2 diabetes mellitus without complication, without long-term current use of insulin ([facility])  • Tobacco use  • Paroxysmal atrial fibrillation (HCC)  • Acute exacerbation of moderate persistent extrinsic asthma  • HIV (human immunodeficiency virus infection)   • Chronic respiratory failure with hypoxia ([facility])    Admitting Diagnosis: Dyspnea [R06 00]  SOB (shortness of breath) [R06 02]  COPD exacerbation (HCC) [J44 1]  COVID [U07 1]  Age/Sex: 81 y o  female  Admission Orders:  accuchecks  Consult pulmonary  Pt/ot eval & tx  Scd/foot pumps  O2 to keep simin>88%  Telemetry  1800cc fluid restriction    Scheduled Medications:  apixaban, 5 mg, Oral, BID  atorvastatin, 20 mg, Oral, Daily With Dinner  bictegravir-emtricitab-tenofovir alafenamide, 1 tablet, Oral, Daily  budesonide, 0 5 mg, Nebulization, BID  cholecalciferol, 1,000 Units, Oral, Daily  diltiazem, 120 mg, Oral, Daily  fluticasone-vilanterol, 1 puff, Inhalation, Daily  furosemide, 40 mg, Oral, BID (diuretic)  gabapentin, 800 mg, Oral, TID  guaiFENesin, 600 mg, Oral, Q12H CARLOS  insulin lispro, 1-6 Units, Subcutaneous, TID AC  levothyroxine, 100 mcg, Oral, Early Morning  melatonin, 6 mg, Oral, HS  methylPREDNISolone sodium succinate, 40 mg, Intravenous, Q24H  montelukast, 10 mg, Oral, HS  nicotine, 1 patch, Transdermal, Daily  potassium chloride, 20 mEq, Oral, Daily  remdesivir, 100 mg, Intravenous, Q24H    PRN Meds:  acetaminophen, 975 mg, Oral, Q8H PRN  albuterol, 2 5 mg, Nebulization, Q6H PRN  aluminum-magnesium hydroxide-simethicone, 30 mL, Oral, Q6H PRN  benzonatate, 100 mg, Oral, TID PRN  ondansetron, 4 mg, Intravenous, Q6H PRN  polyethylene glycol, 17 g, Oral, Daily PRN  traMADol, 50 mg, Oral, Q6H PRN    Network Utilization Review Department  ATTENTION: Please call with any questions or concerns to [phone] and carefully listen to the prompts so that you are directed to the right person  All voicemails are confidential   Raphael Lank all requests for admission clinical reviews, approved or denied determinations and any other requests to dedicated fax number below belonging to the campus where the patient is receiving treatment   List of dedicated fax numbers for the Facilities:  [address] DENIALS (Administrative/Medical Necessity) [phone]   [address] [place] [address] (Maternity/NICU/Pediatrics) [phone]   [address] [phone]   [place] [address] [phone]   [facility] [address] [place] [address] [place] [address] [facility] [phone]   [facility]9 [facility] [phone]   [address] [phone]

## 2022-10-24 NOTE — PLAN OF CARE
Problem: Potential for Falls  Goal: Patient will remain free of falls  Description: INTERVENTIONS:  - Educate patient/family on patient safety including physical limitations  - Instruct patient to call for assistance with activity   - Consult OT/PT to assist with strengthening/mobility   - Keep Call bell within reach  - Keep bed low and locked with side rails adjusted as appropriate  - Keep care items and personal belongings within reach  - Initiate and maintain comfort rounds  - Make Fall Risk Sign visible to staff  - Offer Toileting every 2 Hours, in advance of need  - Initiate/Maintain   alarm  - Obtain necessary fall risk management equipment:     - Apply yellow socks and bracelet for high fall risk patients  - Consider moving patient to room near nurses station  Outcome: Progressing     Problem: INFECTION - ADULT  Goal: Absence or prevention of progression during hospitalization  Description: INTERVENTIONS:  - Assess and monitor for signs and symptoms of infection  - Monitor lab/diagnostic results  - Monitor all insertion sites, i e  indwelling lines, tubes, and drains  - Monitor endotracheal if appropriate and nasal secretions for changes in amount and color  - [place] appropriate cooling/warming therapies per order  - Administer medications as ordered  - Instruct and encourage patient and family to use good hand hygiene technique  - Identify and instruct in appropriate isolation precautions for identified infection/condition  Outcome: Progressing  Goal: Absence of fever/infection during neutropenic period  Description: INTERVENTIONS:  - Monitor WBC    Outcome: Progressing

## 2022-10-24 NOTE — OCCUPATIONAL THERAPY NOTE
Occupational Therapy Evaluation     Patient Name: Paolo Paez  WXWWL'[id number] Date: 10/24/2022  Problem List  Principal Problem:    Acute exacerbation of moderate persistent extrinsic asthma  Active Problems:    Type 2 diabetes mellitus without complication, without long-term current use of insulin (HCC)    HIV (human immunodeficiency virus infection)     Chronic respiratory failure with hypoxia (HCC)    Tobacco use    [facility]    COVID-19    Sepsis ([place] [address] )    Past Medical History  Past Medical History:   Diagnosis Date    Asthma     Bipolar 1 disorder ([place] [address] )     Cardiac disease     COPD (chronic obstructive pulmonary disease) ([place] [address] )     Diabetes mellitus ([place] [address] )     Disease of thyroid gland     HIV (human immunodeficiency virus infection) ([place] [address] ) 03/24/2016    Hypertension     Osteoarthritis     Tobacco abuse      Past Surgical History  Past Surgical History:   Procedure Laterality Date    HERNIA REPAIR      LUMBAR FUSION N/A 4/10/2018    Procedure: T9/10 discectomy with T9-T11 fusion;  Surgeon: Fly Mckeon MD;  Location: [facility];  Service: Neurosurgery    THYROIDECTOMY          10/24/22 1413   OT Last Visit   OT Visit Date 10/24/22   Note Type   Note type Evaluation   Additional Comments Pt presents seated in chair, agreeable to OT  Pain Assessment   Pain Assessment Tool 0-10   Pain Score No Pain   Restrictions/Precautions   Weight Bearing Precautions Per Order No   Other Precautions O2;Fall Risk  (2L)   Home Living   Type of [address] [place] [address] Two level;Stairs to enter with rails;Bed/bath upstairs  (3STE)   Bathroom Shower/Tub Tub/shower unit   Bathroom Toilet Standard   Bathroom Equipment Grab bars in shower; Shower chair   Bathroom Accessibility Accessible   Prior Function   Level of [place] Independent with functional mobility; Needs assistance with IADLS;Needs assistance with ADLs   Lives With Spouse; Family;Son;Daughter   Receives Help From Family;Home health   IADLs Family/Friend/Other provides transportation; Family/Friend/Other provides meals; Family/Friend/Other provides medication management   Falls in the last 6 months 0   Vocational Retired   Lifestyle   Autonomy Pt lives with her daughter and grandchildren in a 2 story home  (A) with ADLs/IADLs, (I) without AD for functional mobility, per pt  She has an aide that comes daily for 6 hours, she also states an RN from [facility] the [facility] was coming 1x/week   Reciprocal Relationships Family   Intrinsic Gratification Watching TV   Subjective   Subjective "I'm just out of breath lately"   ADL   Where Assessed Standing at [facility] 354 5  1000 [facility]  5  Phillip  66  5  1001 [facility] 5  [facility] 894 to chair at beginning/end of session   Transfers   Sit to [address]   Additional items Armrests; Increased time required;Verbal cues   Stand to Sit 5  Supervision   Additional items Armrests; Increased time required;Verbal cues   Toilet transfer 5  Supervision   Additional items Armrests; Increased time required;Standard toilet;Verbal cues   Functional Mobility   Functional Mobility 5  Supervision   Additional Comments no AD, VCs for safety with O2 cord; pt SOB with mobility, O2 91-94%, -132 BPM with exertion, at rest 104   Balance   Static Sitting Good   Dynamic Sitting Fair +   Static Standing Fair -   Dynamic Standing Poor +   Ambulatory Poor +   Activity Tolerance   Activity Tolerance Patient limited by fatigue   Medical Staff Made Aware PT, RN   Nurse Made Aware LUZMA mata pt for evaluation   RUE Assessment   RUE Assessment WFL  (4/5)   LUE Assessment   LUE Assessment WFL  (4/5)   Hand Function   Gross Motor Coordination Functional   Fine Motor Coordination Functional   Sensation   Light Touch No apparent deficits   Proprioception   Proprioception No apparent deficits   Vision-Basic Assessment   Current Vision Wears glasses all the time   Vision - Complex Assessment   Ocular Range of Motion Intact   Acuity Able to read clock/calendar on wall without difficulty   Perception   Inattention/Neglect Appears intact   Cognition   Overall Cognitive Status [facility]   Arousal/Participation Alert; Responsive   Attention Within functional limits   Orientation Level Oriented X4   Memory Within functional limits   Following Commands Follows multistep commands without difficulty   Assessment   Limitation Decreased ADL status; Decreased endurance;Decreased high-level ADLs   Prognosis Fair   Assessment Patient is a 81y o  year old female seen for OT eval s/p admit to [facility] on 10/23/2022 with acute exacerbation of moderate persistent extrinsic asthma, sepsis, and COVID-19  Comorbidities affecting pt’s functional performance include a significant PMH of: HIV, DM2, chronic resp failure with hypoxia, HTN, OA, bipolar I, SOB, back pain/hip pain, amb dysfunction, lumbar stenosis, urinary frequency, HLD, obesity, anemia  Personal factors affecting pt at time of IE include: difficulty performing ADLs and IADLs, difficulty with functional mobility/transfers  Prior to admission, patient was (I) with ADLs, requires assistance with IADLs  Upon evaluation, patient’s functional status as follows: eating: (I), grooming: S, UB bathing: S, LB bathing: S, UB dressing: S, LB dressing: Estuardo, toileting: S; functional transfers: S no AD, bed mobility: DNT, functional mobility: S no AD, standing tolerance: ~3 min without UE support - due to the following deficits impacting occupational performance: decreased B UE strength, decreased static/dynamic sitting/standing balance, decreased activity tolerance, decreased safety awareness, and increased pain   Patient would benefit from continued skilled OT therapy while in acute setting to address deficits as defined above and maximize (I) with ADLs and functional mobility  Occupational performance areas to address include: grooming, bathing, toileting, UB/LB dressing, functional mobility, household maintenance, and medication management  The patient's raw score on the AM-PAC Daily Activity inpatient short form is 19, standardized score is 40 22, greater than 39 4  Patients at this level are likely to benefit from discharge to home with [facility] OT  Please refer to the recommendation of the Occupational Therapist for safe discharge planning  Goals   Patient Goals to return home   LTG Time Frame 10-14   Plan   Treatment Interventions ADL retraining;Functional transfer training;UE strengthening/ROM; Endurance training;Patient/family training;Equipment evaluation/education; Neuromuscular reeducation; Compensatory technique education;Continued evaluation; Energy conservation; Activityengagement;Cardiac education   Goal Expiration Date 11/07/22   OT Treatment Day 0   OT Frequency 2-3x/wk   Recommendation   OT Discharge Recommendation Home with home health rehabilitation   AM-PAC Daily Activity Inpatient   Lower Body Dressing 3   Bathing 3   Toileting 3   Upper Body Dressing 3   Grooming 3   Eating 4   Daily Activity Raw Score 19   Daily Activity Standardized Score (Calc for Raw Score >=11) 40 22   AM-PAC Applied Cognition Inpatient   Following a Speech/Presentation 4   Understanding Ordinary Conversation 4   Taking Medications 3   Remembering Where Things Are Placed or Put Away 3   Remembering List of 4-5 Errands 3   Taking Care of Complicated Tasks 2   Applied Cognition Raw Score 19   Applied Cognition Standardized Score 39 77     Occupational Therapy goals:  In 7-14 days:     1- Patient will verbalize and demonstrate use of energy conservation/deep breathing technique and work simplification skills during functional activity with no verbal cues      2- Patient will verbalize and demonstrate good body mechanics and joint protection techniques during ADLs/IADLs with no verbal cues     3- Pt will complete bed mobility at a Mod I level w/ G balance/safety demonstrated to decrease caregiver assistance required     4- Patient will increase OOB/ sitting tolerance to 2-4 hours per day for increased participation in self care and leisure tasks with no s/s of exertion  5-Patient will increase standing tolerance time to 5 minutes with unilateral UE support to complete sink level ADLs@ mod I level      5- Patient will increase sitting tolerance at edge of bed to 20 minutes to complete UB ADLs @ set up assist level       6- Pt will improve functional transfers to Mod I on/off all surfaces using DME as needed w/ G balance/safety     7- Patient will complete UB ADLs with Landon utilizing appropriate DME/AE PRN     8- Patient will complete LB ADLs with L[place] utilizing appropriate DME/AE PRN     9- Patient will complete toileting hygiene with Landon with G hygiene/thoroughness utilizing appropriate DME/AE PRN     10- Pt will improve functional mobility during ADL/IADL/leisure tasks to Mod I using DME as needed w/ G balance/safety      11- Pt will be attentive 100% of the time during ongoing cognitive assessment w/ G participation to assist w/ safe d/c planning/recommendations     12- Pt will participate in simulated IADL management task to increase independence to Mod I w/ G safety and endurance     13- Pt will increase BUE strength by 1MM grade via AROM/AAROM/PROM exercises to increase independence in ADLs and transfers     14- Pt will be able to state 6/6 CHF management techniques and demonstrate ability to weigh oneself at a Mod I level after education from therapist to increase self management of CHF in home environment and reduce risk for readmission       David Aguilar OTR/L

## 2022-10-24 NOTE — UTILIZATION REVIEW
NOTIFICATION OF INPATIENT ADMISSION   AUTHORIZATION REQUEST   SERVICING FACILITY:   [address] [place] [address] E [facility]  Tax ID: [id number]  NPI: [id number] ATTENDING PROVIDER:  Attending Name and NPI#: Guillermina Baumgarten [[id number]]  Address: [address] [place] [address]  Phone: [phone]     ADMISSION INFORMATION:  Place of Service: [place] [address]  Place of Service Code: 21  Inpatient Admission Date/Time: 10/23/22  2:04 PM  Discharge Date/Time: No discharge date for patient encounter  Admitting Diagnosis Code/Description:  Dyspnea [R06 00]  SOB (shortness of breath) [R06 02]  COPD exacerbation ([facility] [facility] ) [J44 1]  COVID [U07 1]     UTILIZATION REVIEW CONTACT:  Kristy Hudson Utilization   Network Utilization Review Department  Phone: [phone]  Fax: [phone]  Email: [email or web address]  Contact for approvals/pending authorizations, clinical reviews, and discharge  PHYSICIAN ADVISORY SERVICES:  Medical Necessity Denial & [employer] Review  Phone: [phone]  Fax: [phone]  Email: [email or web address]

## 2022-10-24 NOTE — CASE MANAGEMENT
Case Management Discharge Planning Note    Patient name Obdulia Del Real  [facility] Julius Litten* MRN [id number]  : 1950 Date 10/24/2022       Current Admission Date: 10/23/2022  Current Admission Diagnosis:Acute exacerbation of moderate persistent extrinsic asthma   Patient Active Problem List    Diagnosis Date Noted   • COVID-19 10/23/2022   • Sepsis ([facility] ) 10/23/2022   • Anemia 2022   • Obesity (BMI 35 0-39 9 without comorbidity) 2022   • Acute on chronic diastolic heart failure ([facility] ) 2022   • PASHA on CPAP 2020   • Acute exacerbation of moderate persistent extrinsic asthma 2020   • Fall at home, initial encounter 2020   • Paroxysmal atrial fibrillation ([facility] ) 2020   • Hyperlipidemia 2018   • Thoracic disc herniation 2018   • Lumbar stenosis 2018   • Urinary frequency 2018   • Ambulatory dysfunction    • Back pain 2018   • Hip pain 2018   • Shortness of breath 2017   • Hypertension    • Type 2 diabetes mellitus with hyperglycemia (HCC)    • Chronic respiratory failure with hypoxia (HCC)    • Bipolar 1 disorder ([facility] )    • Tobacco use    • Arthritis 2016   • Type 2 diabetes mellitus without complication, without long-term current use of insulin ([facility] ) 2016   • HIV (human immunodeficiency virus infection)  2016   • Osteoarthritis 2016      LOS (days): 1  Geometric Mean LOS (GMLOS) (days):   Days to GMLOS:     OBJECTIVE:  Risk of Unplanned Readmission Score: 38 07         Current admission status: Inpatient   Preferred Pharmacy:   [address] [place] [address]  [facility] [address]  Phone: [phone] Fax: [phone]    Primary Care Provider: Alondra Jerome DO    Primary Insurance: Bernice QuVIS   Secondary Insurance: [address] [place] [address]    DISCHARGE DETAILS:                                          Other Referral/Resources/Interventions Provided:  Interventions: High Utilizer  Referral Comments: Pt has utilized the ED both at [address] [place] [address] as well as been hospitalized at both hospitals many times in the past 2 months for COPD exac  Pt is noted to have [address] services through her [facility] PCP  Referral to [facility] team made this day to better support care of pt  Yes

## 2022-10-24 NOTE — PLAN OF CARE
Problem: OCCUPATIONAL THERAPY ADULT  Goal: Performs self-care activities at highest level of function for planned discharge setting  See evaluation for individualized goals  Description: Treatment Interventions: ADL retraining, Functional transfer training, UE strengthening/ROM, Endurance training, Patient/family training, Equipment evaluation/education, Neuromuscular reeducation, Compensatory technique education, Continued evaluation, Energy conservation, Activityengagement, Cardiac education          See flowsheet documentation for full assessment, interventions and recommendations  Note: Limitation: Decreased ADL status, Decreased endurance, Decreased high-level ADLs  Prognosis: Fair  Assessment: Patient is a 81y o  year old female seen for OT eval s/p admit to [facility] on 10/23/2022 with acute exacerbation of moderate persistent extrinsic asthma, sepsis, and COVID-19  Comorbidities affecting pt’s functional performance include a significant PMH of: HIV, DM2, chronic resp failure with hypoxia, HTN, OA, bipolar I, SOB, back pain/hip pain, amb dysfunction, lumbar stenosis, urinary frequency, HLD, obesity, anemia  Personal factors affecting pt at time of IE include: difficulty performing ADLs and IADLs, difficulty with functional mobility/transfers  Prior to admission, patient was (I) with ADLs, requires assistance with IADLs  Upon evaluation, patient’s functional status as follows: eating: (I), grooming: S, UB bathing: S, LB bathing: S, UB dressing: S, LB dressing: Estuardo, toileting: S; functional transfers: S no AD, bed mobility: DNT, functional mobility: S no AD, standing tolerance: ~3 min without UE support - due to the following deficits impacting occupational performance: decreased B UE strength, decreased static/dynamic sitting/standing balance, decreased activity tolerance, decreased safety awareness, and increased pain   Patient would benefit from continued skilled OT therapy while in acute setting to address deficits as defined above and maximize (I) with ADLs and functional mobility  Occupational performance areas to address include: grooming, bathing, toileting, UB/LB dressing, functional mobility, household maintenance, and medication management  The patient's raw score on the AM-PAC Daily Activity inpatient short form is 19, standardized score is 40 22, greater than 39 4  Patients at this level are likely to benefit from discharge to home with [facility] OT  Please refer to the recommendation of the Occupational Therapist for safe discharge planning       OT Discharge Recommendation: Home with home health rehabilitation

## 2022-10-24 NOTE — ASSESSMENT & PLAN NOTE
Lab Results   Component Value Date    HGBA1C 6 8 (H) 10/16/2022       Recent Labs     10/23/22  1635 10/23/22  2129 10/24/22  0742 10/24/22  1146   POCGLU 195* 272* 159* 101       Blood Sugar Average: Last 72 hrs:  (P) 181 75 good control per last A1c  Home regimen reviewed  Hold Metformin if applicable    Start SSI and Basal bolus protocol  Monitor for steroid induced hyperglycemia

## 2022-10-25 PROBLEM — A41.9 SEPSIS (HCC): Status: RESOLVED | Noted: 2022-10-23 | Resolved: 2022-10-25

## 2022-10-25 LAB
ALBUMIN SERPL BCP-MCNC: 3.3 G/DL (ref 3.5–5)
ALP SERPL-CCNC: 83 U/L (ref 46–116)
ALT SERPL W P-5'-P-CCNC: 18 U/L (ref 12–78)
ANION GAP SERPL CALCULATED.3IONS-SCNC: 5 MMOL/L (ref 4–13)
AST SERPL W P-5'-P-CCNC: 14 U/L (ref 5–45)
BASOPHILS # BLD AUTO: 0.02 THOUSANDS/ÂΜL (ref 0–0.1)
BASOPHILS NFR BLD AUTO: 0 % (ref 0–1)
BILIRUB SERPL-MCNC: 0.33 MG/DL (ref 0.2–1)
BUN SERPL-MCNC: 31 MG/DL (ref 5–25)
CALCIUM ALBUM COR SERPL-MCNC: 10.8 MG/DL (ref 8.3–10.1)
CALCIUM SERPL-MCNC: 10.2 MG/DL (ref 8.3–10.1)
CHLORIDE SERPL-SCNC: 106 MMOL/L (ref 96–108)
CO2 SERPL-SCNC: 33 MMOL/L (ref 21–32)
CREAT SERPL-MCNC: 0.95 MG/DL (ref 0.6–1.3)
EOSINOPHIL # BLD AUTO: 0.01 THOUSAND/ÂΜL (ref 0–0.61)
EOSINOPHIL NFR BLD AUTO: 0 % (ref 0–6)
ERYTHROCYTE [DISTWIDTH] IN BLOOD BY AUTOMATED COUNT: 16.1 % (ref 11.6–15.1)
GFR SERPL CREATININE-BSD FRML MDRD: 60 ML/MIN/1.73SQ M
GLUCOSE SERPL-MCNC: 106 MG/DL (ref 65–140)
GLUCOSE SERPL-MCNC: 125 MG/DL (ref 65–140)
GLUCOSE SERPL-MCNC: 148 MG/DL (ref 65–140)
GLUCOSE SERPL-MCNC: 157 MG/DL (ref 65–140)
GLUCOSE SERPL-MCNC: 80 MG/DL (ref 65–140)
HCT VFR BLD AUTO: 34.6 % (ref 34.8–46.1)
HGB BLD-MCNC: 10.2 G/DL (ref 11.5–15.4)
IMM GRANULOCYTES # BLD AUTO: 0.1 THOUSAND/UL (ref 0–0.2)
IMM GRANULOCYTES NFR BLD AUTO: 1 % (ref 0–2)
LYMPHOCYTES # BLD AUTO: 1.71 THOUSANDS/ÂΜL (ref 0.6–4.47)
LYMPHOCYTES NFR BLD AUTO: 22 % (ref 14–44)
MCH RBC QN AUTO: 28.7 PG (ref 26.8–34.3)
MCHC RBC AUTO-ENTMCNC: 29.5 G/DL (ref 31.4–37.4)
MCV RBC AUTO: 98 FL (ref 82–98)
MONOCYTES # BLD AUTO: 1 THOUSAND/ÂΜL (ref 0.17–1.22)
MONOCYTES NFR BLD AUTO: 13 % (ref 4–12)
NEUTROPHILS # BLD AUTO: 5.04 THOUSANDS/ÂΜL (ref 1.85–7.62)
NEUTS SEG NFR BLD AUTO: 64 % (ref 43–75)
NRBC BLD AUTO-RTO: 0 /100 WBCS
PLATELET # BLD AUTO: 322 THOUSANDS/UL (ref 149–390)
PMV BLD AUTO: 9.3 FL (ref 8.9–12.7)
POTASSIUM SERPL-SCNC: 4.1 MMOL/L (ref 3.5–5.3)
PROT SERPL-MCNC: 7 G/DL (ref 6.4–8.4)
RBC # BLD AUTO: 3.55 MILLION/UL (ref 3.81–5.12)
SODIUM SERPL-SCNC: 144 MMOL/L (ref 135–147)
WBC # BLD AUTO: 7.88 THOUSAND/UL (ref 4.31–10.16)

## 2022-10-25 RX ADMIN — DILTIAZEM HYDROCHLORIDE 120 MG: 120 CAPSULE, COATED, EXTENDED RELEASE ORAL at 08:40

## 2022-10-25 RX ADMIN — FLUTICASONE FUROATE AND VILANTEROL TRIFENATATE 1 PUFF: 100; 25 POWDER RESPIRATORY (INHALATION) at 08:41

## 2022-10-25 RX ADMIN — APIXABAN 5 MG: 5 TABLET, FILM COATED ORAL at 08:41

## 2022-10-25 RX ADMIN — POTASSIUM CHLORIDE 20 MEQ: 1500 TABLET, EXTENDED RELEASE ORAL at 08:41

## 2022-10-25 RX ADMIN — ATORVASTATIN CALCIUM 20 MG: 20 TABLET, FILM COATED ORAL at 15:34

## 2022-10-25 RX ADMIN — Medication 6 MG: at 20:46

## 2022-10-25 RX ADMIN — LEVOTHYROXINE SODIUM 100 MCG: 100 TABLET ORAL at 06:26

## 2022-10-25 RX ADMIN — GABAPENTIN 800 MG: 400 CAPSULE ORAL at 15:34

## 2022-10-25 RX ADMIN — GUAIFENESIN 600 MG: 600 TABLET, EXTENDED RELEASE ORAL at 20:46

## 2022-10-25 RX ADMIN — GABAPENTIN 800 MG: 400 CAPSULE ORAL at 20:46

## 2022-10-25 RX ADMIN — MONTELUKAST 10 MG: 10 TABLET, FILM COATED ORAL at 20:46

## 2022-10-25 RX ADMIN — APIXABAN 5 MG: 5 TABLET, FILM COATED ORAL at 20:46

## 2022-10-25 RX ADMIN — INSULIN LISPRO 1 UNITS: 100 INJECTION, SOLUTION INTRAVENOUS; SUBCUTANEOUS at 12:15

## 2022-10-25 RX ADMIN — FUROSEMIDE 40 MG: 40 TABLET ORAL at 08:41

## 2022-10-25 RX ADMIN — GUAIFENESIN 600 MG: 600 TABLET, EXTENDED RELEASE ORAL at 08:41

## 2022-10-25 RX ADMIN — GABAPENTIN 800 MG: 400 CAPSULE ORAL at 08:41

## 2022-10-25 RX ADMIN — REMDESIVIR 100 MG: 100 INJECTION, POWDER, LYOPHILIZED, FOR SOLUTION INTRAVENOUS at 15:34

## 2022-10-25 RX ADMIN — Medication 1000 UNITS: at 08:41

## 2022-10-25 RX ADMIN — BICTEGRAVIR SODIUM, EMTRICITABINE, AND TENOFOVIR ALAFENAMIDE FUMARATE 1 TABLET: 50; 200; 25 TABLET ORAL at 08:40

## 2022-10-25 NOTE — PHYSICAL THERAPY NOTE
PT EVALUATION 10:10-10:25  Treat: 10:25-10:40    81 y o     [id number]    Dyspnea [R06 00]  SOB (shortness of breath) [R06 02]  COPD exacerbation (HCC) [J44 1]  COVID [U07 1]    Past Medical History:   Diagnosis Date    Asthma     Bipolar 1 disorder ([place] [address] )     Cardiac disease     COPD (chronic obstructive pulmonary disease) ([place] [address] )     Diabetes mellitus ([place] [address] )     Disease of thyroid gland     HIV (human immunodeficiency virus infection) ([place] [address] ) 03/24/2016    Hypertension     Osteoarthritis     Tobacco abuse          Past Surgical History:   Procedure Laterality Date    HERNIA REPAIR      LUMBAR FUSION N/A 4/10/2018    Procedure: T9/10 discectomy with T9-T11 fusion;  Surgeon: Emile Gerber MD;  Location: [facility];  Service: Neurosurgery    THYROIDECTOMY          10/25/22 1040   PT Last Visit   PT Visit Date 10/25/22   Note Type   Note type Evaluation  (and treatment)   Pain Assessment   Pain Score No Pain   Restrictions/Precautions   Weight Bearing Precautions Per Order No   Other Precautions Contact/isolation; Airborne/isolation;Multiple lines;Telemetry; Fall Risk;O2  (2L O2)   Home Living   Type of [address] [place] [address] Two level;Stairs to enter with rails;Bed/bath upstairs  (3 [address])   Bathroom Shower/Tub Tub/shower unit   Bathroom Toilet Standard   Bathroom Equipment Grab bars in shower; Shower chair   [address] Walker;Cane;Electric scooter   Additional Comments resides with family in 2 story home  Bed and bath on 2nd floor  Bathroom located close to her bedroom  Prior Function   Level of [place] Independent with functional mobility; Needs assistance with IADLS;Needs assistance with ADLs   Lives With Daughter;Family   Receives Help From Family;Home health;Personal care attendant   Falls in the last 6 months 0   Vocational Retired   Comments Ambulates without AD prior to admission  Home O2 at 2L baseline  Ambulation distances limited to household    Reports home health aide 6 hoursx 7 days per week  Last attendant quit and reports working on new attendant at home  General   Additional Pertinent History Pt is 82 y/o female admitted with SOB, COVID, sepsis  PT consulted  Family/Caregiver Present No   Cognition   Overall Cognitive Status WFL   Orientation Level Oriented X4   Comments Pleasant  Subjective   Subjective Tolerance is not to baseline  RUE Assessment   RUE Assessment WFL  (grossly 4-/5)   LUE Assessment   LUE Assessment WFL  (groslsy 4-/5)   RLE Assessment   RLE Assessment WFL  (groslsy 4-/5)   LLE Assessment   LLE Assessment WFL  (grossly 4-/5)   Bed Mobility   Additional Comments Seated EOB upon arrival    Transfers   Sit to Stand 6  Modified independent   Additional items Increased time required   Stand to Sit 6  Modified independent   Additional items Increased time required   Additional Comments Slowed transitions  Resting O2 sat on 2L 98%   Ambulation/Elevation   Gait pattern Improper Weight shift;Decreased foot clearance; Inconsistent zara   Gait Assistance 5  Supervision   Additional items Verbal cues   Assistive Device None   Distance Amb without AD 40'x1  Seated rest required related to SOB  O2 sats 94%,   Tolerance to ambulation approximately 30-40 seconds prior to need for seated rest    Ambulation/Elevation Additional Comments Gait slowed, cues for proper breathing technique  Balance   Static Sitting Good   Dynamic Sitting Fair +   Static Standing Fair   Dynamic Standing Fair -   Ambulatory Fair -   Endurance Deficit   Endurance Deficit Yes   Endurance Deficit Description fatigue, SOB   Activity Tolerance   Activity Tolerance Patient limited by fatigue;Treatment limited secondary to medical complications (Comment)   Medical Staff Made Aware NurseArely   Nurse Made Aware yes   Assessment   Prognosis Fair   Problem List Decreased strength;Decreased endurance;Decreased mobility; Impaired balance;Obesity   Assessment Paolo Paez is a 81 y o  female with a PMH of COPD, atrial fibrillation, CHF, PASHA, HIV, DM who presents with shortness of breath  Of note the patient wears 2 L oxygen chronically at baseline  COVID + upon presentation, sepsis  Asthma exacerbation  PT consulted  Up with assist orders  Prior to admission resides in 2 story home with family  Chronic use of 2L  Reports home care aide 6 hours per day 7 days per week  Ambulates without AD in home primarily household distances  Currently presents with functional limitations related to deconditioning, decreased activity tolerance, functional balance, functional mobility, general strength, pulmonary status and ambulation  Phill for transfers  S for ambulation short distances  Fatigues easily and limited by SOB  O2 sats stable on 2L with mobility 94% activity 98% at rest  Gait slowed, decreased foot clearance and step length  Cues for pacing, proper breathing  Tolerance to ambulation approximately 30-40 seconds prior to need for seated rest   Given impairments will benefit from skilled PT in order to progress and optimize functional outcomes  The patient's AM-PAC Basic Mobility Inpatient Short Form Raw Score is 22  A Raw score of greater than 16 suggests the patient may benefit from discharge to home  Please also refer to the recommendation of the Physical Therapist for safe discharge planning  Anticipate ability to return home with HHPT, family support and with home care aides in place  Will continue to follow per POC  Barriers to Discharge Inaccessible home environment   Barriers to Discharge Comments flight steps to bed and bath   Goals   Patient Goals to be able to get home  STG Expiration Date 11/04/22   Short Term Goal #1 10 days: 1)  Pt will perform bed mobility with Phill demonstrating appropriate technique 100% of the time in order to improve function  2)  Perform all transfers with Phill demonstrating safe and appropriate technique 100% of the time in order to improve ability to negotiate safely in home environment  3) Amb with least restrictive AD prn > 80'x1 with mod I in order to demonstrate ability to negotiate in home environment  4)  Improve overall strength and balance 1/2 grade in order to optimize ability to perform functional tasks and reduce fall risk  5) Increase activity tolerance to 30 minutes in order to improve endurance to functional tasks  6)  Negotiate stairs using most appropriate technique and S in order to be able to negotiate safely in home environment  7) PT for ongoing patient and family/caregiver education, DME needs and d/c planning in order to promote highest level of function in least restrictive environment  PT Treatment Day 1   Plan   Treatment/Interventions Functional transfer training;LE strengthening/ROM; Elevations; Therapeutic exercise; Endurance training;Patient/family training;Equipment eval/education; Bed mobility;Gait training; Compensatory technique education;Continued evaluation;Spoke to nursing;Spoke to case management   PT Frequency 3-5x/wk   Recommendation   PT Discharge Recommendation Home with home health rehabilitation   [address] Mobility Inpatient   Turning in Bed Without Bedrails 4   Lying on Back to Sitting on Edge of Flat Bed 4   Moving Bed to Chair 4   Standing Up From Chair 4   Walk in Room 3   Climb 3-5 Stairs 3   Basic Mobility Inpatient Raw Score 22   Basic Mobility Standardized Score 47 4   Highest Level Of Mobility   JH-HLM Goal 7: Walk 25 feet or more   JH-HLM Achieved 7: Walk 25 feet or more   Additional Treatment Session   Start Time 1025   End Time 1040   Treatment Assessment Seen for treatment session following for progressive ambulation, patient education, energy conservation education and technique on stairs  Transfers from chair/bed iwth [place]  Performed ambulation 35'x1, standing rest, then additional 20'x1   + SOB limiting  O2 sat on 2L 94%, -117  Cues for proper breathing technique given   Gait slowed, decreased foot clearance, step length  Occasional lateral displacement, but no overt LOB  Education provided on pacing and energy conservation with activity  Reviewed stair management and use of step to pattern to assist with energy conservation  Unable to progress to stairs at this time due to precautions and tolerance to activity  Verbalizes understanding of technique  Plan is home PT and family support  Has home aides  Will continue to progress towards goals in order to optimize functional mobility, activity tolerance, balance  Equipment Use O2 at 2L   End of Consult   Patient Position at End of Consult Seated edge of bed; All needs within reach     Hx/personal factors: co-morbidities, inaccessible home, mutliple lines, fall risk, assist w/ ADL's, obesity, and O2, multiple hospitalizations     Examination: dec mobility, dec balance, dec endurance, dec amb, risk for falls, assessed body system, balance, endurance, amb, D/C disposition & fall risk  Clinical: unpredictable (ongoing medical status and risk for falls), continuous monitoring  Complexity: high           Arsh Montalvo , PT

## 2022-10-25 NOTE — CASE MANAGEMENT
Case Management Assessment & Discharge Planning Note    Patient name Rj Leonard  Location [place] [address] 2 /South 2 Francis Lopez* MRN [id number]  : 1950 Date 10/25/2022       Current Admission Date: 10/23/2022  Current Admission Diagnosis:Acute exacerbation of moderate persistent extrinsic asthma   Patient Active Problem List    Diagnosis Date Noted   • COVID-19 10/23/2022   • Sepsis ([facility] ) 10/23/2022   • Anemia 2022   • Obesity (BMI 35 0-39 9 without comorbidity) 2022   • Acute on chronic diastolic heart failure ([address] ) 2022   • Acute exacerbation of moderate persistent extrinsic asthma 2020   • Fall at home, initial encounter 2020   • Paroxysmal atrial fibrillation ([facility] ) 2020   • Hyperlipidemia 2018   • Thoracic disc herniation 2018   • Lumbar stenosis 2018   • Urinary frequency 2018   • Ambulatory dysfunction    • Back pain 2018   • Hip pain 2018   • Shortness of breath 2017   • Hypertension    • Type 2 diabetes mellitus with hyperglycemia (HCC)    • Chronic respiratory failure with hypoxia (HCC)    • Bipolar 1 disorder ([facility] )    • Tobacco use    • Arthritis 2016   • Type 2 diabetes mellitus without complication, without long-term current use of insulin ([facility] ) 2016   • HIV (human immunodeficiency virus infection)  2016   • Osteoarthritis 2016      LOS (days): 2  Geometric Mean LOS (GMLOS) (days): 4 20  Days to GMLOS:2 2     OBJECTIVE:  PATIENT READMITTED TO HOSPITAL  Risk of Unplanned Readmission Score: 38 03         Current admission status: Inpatient       Preferred Pharmacy:   [facility], [address] [facility]  [address]   [facility] [place] [address]  Phone: [phone] Fax: [phone]    Primary Care Provider: Ileana May DO    Primary Insurance: Lesia VILLANUEVA  Secondary Insurance: [employer] [facility]    ASSESSMENT:  Active Health Care Proxies     K[address]  - Daughter   Primary Phone: [phone] (Mobile)                         Readmission Root Cause  30 Day Readmission: Yes  Who directed you to return to the hospital?: Family  Did you understand whom to contact if you had questions or problems?: Yes  Did you get your prescriptions before you left the hospital?: No  Reason[de-identified] Preference for own pharmacy  Were you able to get your prescriptions filled when you left the hospital?: Yes  Did you take your medications as prescribed?: Yes (Of note, she did not take the steroids when d/c from [facility] prompting her to be admitted on 10/15 to [facility])  Were you able to get to your follow-up appointments?: No  Reason[de-identified] Readmitted prior to appointment (Pt has a h/o of lack of follow up when speaking to [address] [place] [address] Pulmonology)  During previous admission, was a post-acute recommendation made?: Yes (d/c home from [facility] as well as [facility] with continued [facility] (SN/PT/OT))  What post-acute resources were offered?: [facility]  Patient was readmitted due to: Despite taking tapering dose of steroids and use of nebulizers, pt continued with SOB slightly worsening- she admits with COPD as well as new COVID (was negative while at [address] [place] [address])  Action Plan: Will return home with [facility] (SN/PT/OT)- asked for a medical social worker due to 4 admissions in 1 5 months with 2 ED visits as well as referred to [facility] Team   CM called [facility] as per Dtr pt needs a portable concentrator due to weight of portable tanks and pt being alone and unable to carry these prompting her to remove her O2  Dtr also states she was to receive a CPAP machine and was informed scripts for both CPAP and POC were never received by [facility]" [facility])  [facility] faxed once again these scripts with testing to [facility] this day with confirmation of it being received    CM call to Liaison for Respiratory for [facility] so to look further into these items as both play a role into readmissions for pt  Patient Information  Admitted from[de-identified] Home  Mental Status: Alert  During Assessment patient was accompanied by: Not accompanied during assessment  Assessment information provided by[de-identified] Daughter  Primary Caregiver: Other (Comment)  Caregiver's Name[de-identified] Waiver: Every [address]- previous caregiver recently resigned with new person to start Monday 10/31 for 830-630 M-F timeframe  Company still seeking a weekend coverage for those times  Pt otherwise alone with daughter working days in a row and not being home  Granddaughter also lives int he home (unable to ascertain her level of care providing to the pt)  Support Systems: Daughter, Family members, Other (Comment) (waiver service staff)  [place] of Residence: [address] do you live in?: [address] [facility] access options   Select all that apply : Stairs  Number of steps to enter home : 4  Do the steps have railings?: No  Type of Current Residence: 2 story home  Upon entering residence, is there a bedroom on the main floor (no further steps)?: No  A bedroom is located on the following floor levels of residence (select all that apply):: 2nd Floor  Upon entering residence, is there a bathroom on the main floor (no further steps)?: No  Indicate which floors of current residence have a bathroom (select all the apply):: 2nd Floor  Number of steps to 2nd floor from main floor: One Flight  In the last 12 months, was there a time when you were not able to pay the mortgage or rent on time?: No  In the last 12 months, how many places have you lived?: 1  In the last 12 months, was there a time when you did not have a steady place to sleep or slept in a shelter (including now)?: No  Homeless/housing insecurity resource given?: N/A  Living Arrangements: Lives w/ Daughter, Other (Comment) (lives w/ granddaughter)  Is patient a ?: No    Activities of Daily Living Prior to Admission  Functional Status: Assistance (Needs asst with med management, transportation, cooking laundry and occassional asst with her ADL's)  Completes ADLs independently?: Yes (per daughter however currently needs some asst and receiving PT/OT for same)  Ambulates independently?: Yes (per amy she uses no AD however it's documented in past of using a SPC)  Does patient use assisted devices?: Yes  Assisted Devices (DME) used: Home Oxygen concentrator, Portable Oxygen tanks, Nebulizer, CPAP (CPAP recalled)  DME Company Name (respiratory supplies):  [employer]  O2 Rate(s): 2  Does patient currently own DME?: Yes  What DME does the patient currently own?: Straight Cane, Nebulizer, Home Oxygen concentrator, Portable Oxygen tanks  Does the patient have a history of Short-Term Rehab?: Yes  Does patient have a history of HHC?: Yes  Does patient currently have [facility]?: Yes    Current Home Health Care  Type of Current Home Care Services: Home OT, Home PT, Nurse visit  [address][de-identified] [address] [place] [address] Provider[de-identified] PCP    Patient Information Continued  Income Source: SSI/SSD  Does patient have prescription coverage?: Yes  Within the past 12 months, you worried that your food would run out before you got the money to buy more : Never true  Within the past 12 months, the food you bought just didn't last and you didn't have money to get more : Never true  Food insecurity resource given?: N/A  Does patient receive dialysis treatments?: No  Does patient have a history of substance abuse?: No  Does patient have a history of Mental Health Diagnosis?: Yes (h/o bipolar)  Is patient receiving treatment for mental health?: Yes  Has patient received inpatient treatment related to mental health in the last 2 years?: No         Means of Transportation  Means of Transport to [facility][de-identified] Family transport (or caregiver does so)  In the past 12 months, has lack of transportation kept you from medical appointments or from getting medications?: No  In the past 12 months, has lack of transportation kept you from meetings, work, or from getting things needed for daily living?: No  Was application for public transport provided?: N/A        DISCHARGE DETAILS:    Discharge planning discussed with[de-identified] pt's daughter Patricia Babin and [address] of Choice: Yes  Comments - Freedom of Choice: Would like to continue with services through [facility] (RN/PT/OT)- receptive to medical social worker as well- referral back made  CM contacted family/caregiver?: Yes  Were Treatment Team discharge recommendations reviewed with patient/caregiver?: Yes  Did patient/caregiver verbalize understanding of patient care needs?: Yes       Contacts  Patient Contacts: Lala Fee (Daughter)  Relationship to Patient[de-identified] Family  Phone Number: [phone]-  Reason/Outcome: Emergency Contact, Continuity of Care, Referral, Discharge Planning, Other (Comment) (Issues re: O2/CPAP)    [address]         Is the patient interested in [facility] at discharge?: Yes  [facility] [address] requested[de-identified] Medical Social Work, Nursing, Occupational Therapy, Physical [address] [place] [address] Ave Name[de-identified] [address] [place] [address] Needed[de-identified] COPD Management, Evaluate Functional Status and Safety, Gait/ADL Training, Oxygen Via Nasal Cannula, Strengthening/Theraputic Exercises to Improve Function  Oxygen LPM Ordered (if applicable based on home health services needed):: 3 LPM  Homebound Criteria Met[de-identified] Communicable Disease, Requires the Assistance of Another Person for Safe Ambulation or to Leave the Home  Supporting Clincal Findings[de-identified] Fatigues [place] in [employer], Limited Endurance, Requires Oxygen         Other Referral/Resources/Interventions Provided:  Programs[de-identified] COPD    Would you like to participate in our [address] service program?  : Yes (Due to multiple admissions for COPD and known noncompliance with obtaining meds in past would be better fitted to have needed medications upon d/c)    Treatment Team Recommendation: Home with Home Health Care  Discharge Destination Plan[de-identified] Home with [place] at Discharge :  (TBD- possibly family however awaiting final decision from daughter- may need BLS)                             IMM Given (Date):: 10/25/22  IMM Given to[de-identified] Family (via phone 2' covid)

## 2022-10-25 NOTE — PLAN OF CARE
Problem: Potential for Falls  Goal: Patient will remain free of falls  Description: INTERVENTIONS:  - Educate patient/family on patient safety including physical limitations  - Instruct patient to call for assistance with activity   - Consult OT/PT to assist with strengthening/mobility   - Keep Call bell within reach  - Keep bed low and locked with side rails adjusted as appropriate  - Keep care items and personal belongings within reach  - Initiate and maintain comfort rounds  - Make Fall Risk Sign visible to staff  - Apply yellow socks and bracelet for high fall risk patients  - Consider moving patient to room near nurses station  Outcome: Progressing     Problem: PAIN - ADULT  Goal: Verbalizes/displays adequate comfort level or baseline comfort level  Description: Interventions:  - Encourage patient to monitor pain and request assistance  - Assess pain using appropriate pain scale  - Administer analgesics based on type and severity of pain and evaluate response  - Implement non-pharmacological measures as appropriate and evaluate response  - Consider cultural and social influences on pain and pain management  - Notify physician/advanced practitioner if interventions unsuccessful or patient reports new pain  Outcome: Progressing     Problem: INFECTION - ADULT  Goal: Absence or prevention of progression during hospitalization  Description: INTERVENTIONS:  - Assess and monitor for signs and symptoms of infection  - Monitor lab/diagnostic results  - Monitor all insertion sites, i e  indwelling lines, tubes, and drains  - Monitor endotracheal if appropriate and nasal secretions for changes in amount and color  - [place] appropriate cooling/warming therapies per order  - Administer medications as ordered  - Instruct and encourage patient and family to use good hand hygiene technique  - Identify and instruct in appropriate isolation precautions for identified infection/condition  Outcome: Progressing  Goal: Absence of fever/infection during neutropenic period  Description: INTERVENTIONS:  - Monitor WBC    Outcome: Progressing     Problem: SAFETY ADULT  Goal: Patient will remain free of falls  Description: INTERVENTIONS:  - Educate patient/family on patient safety including physical limitations  - Instruct patient to call for assistance with activity   - Consult OT/PT to assist with strengthening/mobility   - Keep Call bell within reach  - Keep bed low and locked with side rails adjusted as appropriate  - Keep care items and personal belongings within reach  - Initiate and maintain comfort rounds  - Make Fall Risk Sign visible to staff  - Apply yellow socks and bracelet for high fall risk patients  - Consider moving patient to room near nurses station  Outcome: Progressing  Goal: Maintain or return to baseline ADL function  Description: INTERVENTIONS:  -  Assess patient's ability to carry out ADLs; assess patient's baseline for ADL function and identify physical deficits which impact ability to perform ADLs (bathing, care of mouth/teeth, toileting, grooming, dressing, etc )  - Assess/evaluate cause of self-care deficits   - Assess range of motion  - Assess patient's mobility; develop plan if impaired  - Assess patient's need for assistive devices and provide as appropriate  - Encourage maximum independence but intervene and supervise when necessary  - Involve family in performance of ADLs  - Assess for home care needs following discharge   - Consider OT consult to assist with ADL evaluation and planning for discharge  - Provide patient education as appropriate  Outcome: Progressing  Goal: Maintains/Returns to pre admission functional level  Description: INTERVENTIONS:  - Perform BMAT or MOVE assessment daily    - Set and communicate daily mobility goal to care team and patient/family/caregiver     - Collaborate with rehabilitation services on mobility goals if consulted  - Out of bed for toileting  - Record patient progress and toleration of activity level   Outcome: Progressing     Problem: DISCHARGE PLANNING  Goal: Discharge to home or other facility with appropriate resources  Description: INTERVENTIONS:  - Identify barriers to discharge w/patient and caregiver  - Arrange for needed discharge resources and transportation as appropriate  - Identify discharge learning needs (meds, wound care, etc )  - Arrange for interpretive services to assist at discharge as needed  - Refer to Case Management Department for coordinating discharge planning if the patient needs post-hospital services based on physician/advanced practitioner order or complex needs related to functional status, cognitive ability, or social support system  Outcome: Progressing     Problem: Knowledge Deficit  Goal: Patient/family/caregiver demonstrates understanding of disease process, treatment plan, medications, and discharge instructions  Description: Complete learning assessment and assess knowledge base    Interventions:  - Provide teaching at level of understanding  - Provide teaching via preferred learning methods  Outcome: Progressing

## 2022-10-25 NOTE — PLAN OF CARE
Problem: PHYSICAL THERAPY ADULT  Goal: Performs mobility at highest level of function for planned discharge setting  See evaluation for individualized goals  Description: Treatment/Interventions: Functional transfer training, LE strengthening/ROM, Elevations, Therapeutic exercise, Endurance training, Patient/family training, Equipment eval/education, Bed mobility, Gait training, Compensatory technique education, Continued evaluation, Spoke to nursing, Spoke to case management          See flowsheet documentation for full assessment, interventions and recommendations  Outcome: Progressing  Note: Prognosis: Fair  Problem List: Decreased strength, Decreased endurance, Decreased mobility, Impaired balance, Obesity  Assessment: Rj Leonard is a 81 y o  female with a PMH of COPD, atrial fibrillation, CHF, PASHA, HIV, DM who presents with shortness of breath  Of note the patient wears 2 L oxygen chronically at baseline  COVID + upon presentation, sepsis  Asthma exacerbation  PT consulted  Up with assist orders  Prior to admission resides in 2 story home with family  Chronic use of 2L  Reports home care aide 6 hours per day 7 days per week  Ambulates without AD in home primarily household distances  Currently presents with functional limitations related to deconditioning, decreased activity tolerance, functional balance, functional mobility, general strength, pulmonary status and ambulation  [place] for transfers  S for ambulation short distances  Fatigues easily and limited by SOB  O2 sats stable on 2L with mobility 94% activity 98% at rest  Gait slowed, decreased foot clearance and step length  Cues for pacing, proper breathing  Tolerance to ambulation approximately 30-40 seconds prior to need for seated rest   Given impairments will benefit from skilled PT in order to progress and optimize functional outcomes  The patient's AM-PAC Basic Mobility Inpatient Short Form Raw Score is 22   A Raw score of greater than 16 suggests the patient may benefit from discharge to home  Please also refer to the recommendation of the Physical Therapist for safe discharge planning  Anticipate ability to return home with HHPT, family support and with home care aides in place  Will continue to follow per POC  Barriers to Discharge: Inaccessible home environment  Barriers to Discharge Comments: flight steps to bed and bath  PT Discharge Recommendation: Home with home health rehabilitation    See flowsheet documentation for full assessment

## 2022-10-25 NOTE — DISCHARGE INSTR - AVS FIRST PAGE
Dear Stefania Angry,     It was our pleasure to care for you here at [facility]  It is our hope that we were always able to exceed the expected standards for your care during your stay  You were hospitalized due to COPD exacerbation likely in the setting of COVID-19 infection  You were cared for on the 2nd floor by Jarret Rodney with the [facility] who covers for your primary care physician (PCP), Yadira Gonzalez DO, while you were hospitalized  If you have any questions or concerns related to this hospitalization, you may contact us at [phone]  For follow up as well as any medication refills, we recommend that you follow up with your primary care physician  A registered nurse will reach out to you by phone within a few days after your discharge to answer any additional questions that you may have after going home  However, at this time we provide for you here, the most important instructions / recommendations at discharge:     Notable Medication Adjustments -   No changes to chronic home medication  Testing Required after Discharge -   Please contact your  PCP to determine when you will receive your new CPAP machine  Important follow up information -   See discharge instructions below for COVID 19 precaution  These were taken from the [facility] website  Other Instructions -   If you test positive for COVID-19, stay home for at least 5 days and isolate from others in your home  You are likely most infectious during these first 5 days  Wear a high-quality mask if you must be around others at home and in public  Do not go places where you are unable to wear a mask  For travel guidance, see CDC’s Travel webpage  Do not travel  Stay home and separate from others as much as possible  Use a separate bathroom, if possible  Take steps to improve ventilation at home, if possible    Don’t share personal household items, like cups, towels, and utensils  Monitor your symptoms  If you have an emergency warning sign (like trouble breathing), seek emergency medical care immediately  Learn more about what to do if you have COVID-19  Please review this entire after visit summary as additional general instructions including medication list, appointments, activity, diet, any pertinent wound care, and other additional recommendations from your care team that may be provided for you      Sincerely,     Cynthia Patino and Nurse Earl Schreiber

## 2022-10-25 NOTE — PLAN OF CARE
Problem: SAFETY ADULT  Goal: Patient will remain free of falls  Description: INTERVENTIONS:  - Educate patient/family on patient safety including physical limitations  - Instruct patient to call for assistance with activity   - Consult OT/PT to assist with strengthening/mobility   - Keep Call bell within reach  - Keep bed low and locked with side rails adjusted as appropriate  - Keep care items and personal belongings within reach  - Initiate and maintain comfort rounds  - Make Fall Risk Sign visible to staff  - Offer Toileting every 2 Hours, in advance of need  - Initiate/Maintain   alarm  - Obtain necessary fall risk management equipment:   - Apply yellow socks and bracelet for high fall risk patients  - Consider moving patient to room near nurses station  Outcome: Progressing  Goal: Maintain or return to baseline ADL function  Description: INTERVENTIONS:  -  Assess patient's ability to carry out ADLs; assess patient's baseline for ADL function and identify physical deficits which impact ability to perform ADLs (bathing, care of mouth/teeth, toileting, grooming, dressing, etc )  - Assess/evaluate cause of self-care deficits   - Assess range of motion  - Assess patient's mobility; develop plan if impaired  - Assess patient's need for assistive devices and provide as appropriate  - Encourage maximum independence but intervene and supervise when necessary  - Involve family in performance of ADLs  - Assess for home care needs following discharge   - Consider OT consult to assist with ADL evaluation and planning for discharge  - Provide patient education as appropriate  Outcome: Progressing  Goal: Maintains/Returns to pre admission functional level  Description: INTERVENTIONS:  - Perform BMAT or MOVE assessment daily    - Set and communicate daily mobility goal to care team and patient/family/caregiver     - Collaborate with rehabilitation services on mobility goals if consulted  - Perform Range of Motion 3 times a day   - Reposition patient every 2 hours    - Dangle patient 3 times a day  - Stand patient 3 times a day  - Ambulate patient 3 times a day  - Out of bed to chair 3 times a day   - Out of bed for meals 3 times a day  - Out of bed for toileting  - Record patient progress and toleration of activity level   Outcome: Progressing

## 2022-10-26 VITALS
RESPIRATION RATE: 20 BRPM | HEART RATE: 93 BPM | BODY MASS INDEX: 40.12 KG/M2 | OXYGEN SATURATION: 99 % | HEIGHT: 62 IN | TEMPERATURE: 98.3 F | WEIGHT: 218 LBS | SYSTOLIC BLOOD PRESSURE: 131 MMHG | DIASTOLIC BLOOD PRESSURE: 88 MMHG

## 2022-10-26 LAB
ARTERIAL PATENCY WRIST A: YES
BASE EXCESS BLDA CALC-SCNC: 3.2 MMOL/L
GLUCOSE SERPL-MCNC: 106 MG/DL (ref 65–140)
GLUCOSE SERPL-MCNC: 108 MG/DL (ref 65–140)
HCO3 BLDA-SCNC: 26.3 MMOL/L (ref 22–28)
NON VENT ROOM AIR: ABNORMAL %
O2 CT BLDA-SCNC: 15.6 ML/DL (ref 16–23)
OXYHGB MFR BLDA: 95.1 % (ref 94–97)
PCO2 BLDA: 35.1 MM HG (ref 36–44)
PH BLDA: 7.49 [PH] (ref 7.35–7.45)
PO2 BLDA: 76.8 MM HG (ref 75–129)
SPECIMEN SOURCE: ABNORMAL

## 2022-10-26 RX ORDER — PREDNISONE 10 MG/1
TABLET ORAL
Qty: 30 TABLET | Refills: 0 | Status: ON HOLD | OUTPATIENT
Start: 2022-10-26 | End: 2022-11-06 | Stop reason: SDUPTHER

## 2022-10-26 RX ADMIN — FUROSEMIDE 40 MG: 40 TABLET ORAL at 08:52

## 2022-10-26 RX ADMIN — GUAIFENESIN 600 MG: 600 TABLET, EXTENDED RELEASE ORAL at 08:52

## 2022-10-26 RX ADMIN — BICTEGRAVIR SODIUM, EMTRICITABINE, AND TENOFOVIR ALAFENAMIDE FUMARATE 1 TABLET: 50; 200; 25 TABLET ORAL at 08:52

## 2022-10-26 RX ADMIN — TRAMADOL HYDROCHLORIDE 50 MG: 50 TABLET, COATED ORAL at 02:59

## 2022-10-26 RX ADMIN — REMDESIVIR 100 MG: 100 INJECTION, POWDER, LYOPHILIZED, FOR SOLUTION INTRAVENOUS at 15:19

## 2022-10-26 RX ADMIN — APIXABAN 5 MG: 5 TABLET, FILM COATED ORAL at 08:52

## 2022-10-26 RX ADMIN — POTASSIUM CHLORIDE 20 MEQ: 1500 TABLET, EXTENDED RELEASE ORAL at 08:52

## 2022-10-26 RX ADMIN — DILTIAZEM HYDROCHLORIDE 120 MG: 120 CAPSULE, COATED, EXTENDED RELEASE ORAL at 08:52

## 2022-10-26 RX ADMIN — Medication 1000 UNITS: at 08:52

## 2022-10-26 RX ADMIN — FLUTICASONE FUROATE AND VILANTEROL TRIFENATATE 1 PUFF: 100; 25 POWDER RESPIRATORY (INHALATION) at 08:54

## 2022-10-26 RX ADMIN — LEVOTHYROXINE SODIUM 100 MCG: 100 TABLET ORAL at 06:05

## 2022-10-26 RX ADMIN — GABAPENTIN 800 MG: 400 CAPSULE ORAL at 08:52

## 2022-10-26 NOTE — ASSESSMENT & PLAN NOTE
Lab Results   Component Value Date    HGBA1C 6 8 (H) 10/16/2022       Recent Labs     10/24/22  2104 10/25/22  0815 10/25/22  1117 10/25/22  1557   POCGLU 129 106 157* 125       Blood Sugar Average: Last 72 hrs:  (P) 628 [id number] good control per last A1c  Home regimen reviewed  Hold Metformin if applicable    Start SSI and Basal bolus protocol  Monitor for steroid induced hyperglycemia

## 2022-10-26 NOTE — ASSESSMENT & PLAN NOTE
Patient presenting with mild COVID-19 infection  Vaccination status :Vaccinated  Vaccination Date :  Vaccinated x3  Oxygen requirements :  3 L which is patient's baseline  Remdesvir Day #1/5 mild  Trend CMP,CBC daily      Sepsis ruled out We are going to do a stress echo

## 2022-10-26 NOTE — ASSESSMENT & PLAN NOTE
Patient just discharged from [facility] on 10/15 for COPD exacerbation and was being maintained on a steroid taper  Despite this patient states that she did feel improved since she came here for evaluation    · Found to have COVID infection, on review of [facility] notes she did not have this during her admission  · Chest x-ray appears stable compared to chest x-ray from 10/13, no consolidations appreciated  · Continue Solu-Medrol 40 mg twice a day, transition to oral prednisone tomorrow with plan for 5 day course

## 2022-10-26 NOTE — ASSESSMENT & PLAN NOTE
Patient just discharged from [facility] on 10/15 for COPD exacerbation and was being maintained on a steroid taper  Despite this patient states that she did feel improved since she came here for evaluation    · Found to have COVID infection, on review of [facility] notes she did not have this during her admission  · Chest x-ray appears stable compared to chest x-ray from 10/13, no consolidations appreciated  · Doing well, will discharge with prednisone taper

## 2022-10-26 NOTE — PLAN OF CARE
Problem: Potential for Falls  Goal: Patient will remain free of falls  Description: INTERVENTIONS:  - Educate patient/family on patient safety including physical limitations  - Instruct patient to call for assistance with activity   - Consult OT/PT to assist with strengthening/mobility   - Keep Call bell within reach  - Keep bed low and locked with side rails adjusted as appropriate  - Keep care items and personal belongings within reach  - Initiate and maintain comfort rounds  - Make Fall Risk Sign visible to staff  - Offer Toileting every  Hours, in advance of need  - Initiate/Maintain alarm  - Obtain necessary fall risk management equipment:   - Apply yellow socks and bracelet for high fall risk patients  - Consider moving patient to room near nurses station  10/26/2022 0728 by Volodymyr Quintana RN  Outcome: Progressing  10/26/2022 0727 by Volodymyr Quintana RN  Outcome: Progressing     Problem: PAIN - ADULT  Goal: Verbalizes/displays adequate comfort level or baseline comfort level  Description: Interventions:  - Encourage patient to monitor pain and request assistance  - Assess pain using appropriate pain scale  - Administer analgesics based on type and severity of pain and evaluate response  - Implement non-pharmacological measures as appropriate and evaluate response  - Consider cultural and social influences on pain and pain management  - Notify physician/advanced practitioner if interventions unsuccessful or patient reports new pain  10/26/2022 0728 by Volodymyr Quintana RN  Outcome: Progressing  10/26/2022 0727 by Volodymyr Quintana RN  Outcome: Progressing     Problem: INFECTION - ADULT  Goal: Absence or prevention of progression during hospitalization  Description: INTERVENTIONS:  - Assess and monitor for signs and symptoms of infection  - Monitor lab/diagnostic results  - Monitor all insertion sites, i e  indwelling lines, tubes, and drains  - Monitor endotracheal if appropriate and nasal secretions for changes in amount and color  - [place] appropriate cooling/warming therapies per order  - Administer medications as ordered  - Instruct and encourage patient and family to use good hand hygiene technique  - Identify and instruct in appropriate isolation precautions for identified infection/condition  10/26/2022 0728 by Radha Yoder RN  Outcome: Progressing  10/26/2022 0727 by Radha Yoder RN  Outcome: Progressing  Goal: Absence of fever/infection during neutropenic period  Description: INTERVENTIONS:  - Monitor WBC    10/26/2022 0728 by Radha Yoder RN  Outcome: Progressing  10/26/2022 0727 by Radha Yoder RN  Outcome: Progressing     Problem: SAFETY ADULT  Goal: Patient will remain free of falls  Description: INTERVENTIONS:  - Educate patient/family on patient safety including physical limitations  - Instruct patient to call for assistance with activity   - Consult OT/PT to assist with strengthening/mobility   - Keep Call bell within reach  - Keep bed low and locked with side rails adjusted as appropriate  - Keep care items and personal belongings within reach  - Initiate and maintain comfort rounds  - Make Fall Risk Sign visible to staff  - Offer Toileting every  Hours, in advance of need  - Initiate/Maintainalarm  - Obtain necessary fall risk management equipment:   - Apply yellow socks and bracelet for high fall risk patients  - Consider moving patient to room near nurses station  10/26/2022 0728 by Radha Yoder RN  Outcome: Progressing  10/26/2022 0727 by Radha Yoder RN  Outcome: Progressing  Goal: Maintain or return to baseline ADL function  Description: INTERVENTIONS:  -  Assess patient's ability to carry out ADLs; assess patient's baseline for ADL function and identify physical deficits which impact ability to perform ADLs (bathing, care of mouth/teeth, toileting, grooming, dressing, etc )  - Assess/evaluate cause of self-care deficits   - Assess range of motion  - Assess patient's mobility; develop plan if impaired  - Assess patient's need for assistive devices and provide as appropriate  - Encourage maximum independence but intervene and supervise when necessary  - Involve family in performance of ADLs  - Assess for home care needs following discharge   - Consider OT consult to assist with ADL evaluation and planning for discharge  - Provide patient education as appropriate  10/26/2022 0728 by Radha Yoder RN  Outcome: Progressing  10/26/2022 0727 by Radha Yoder RN  Outcome: Progressing  Goal: Maintains/Returns to pre admission functional level  Description: INTERVENTIONS:  - Perform BMAT or MOVE assessment daily    - Set and communicate daily mobility goal to care team and patient/family/caregiver  - Collaborate with rehabilitation services on mobility goals if consulted  - Perform Range of Motion  times a day  - Reposition patient every  hours    - Dangle patient  times a day  - Stand patient  times a day  - Ambulate patient  times a day  - Out of bed to chair  times a day   - Out of bed for m times a day  - Out of bed for toileting  - Record patient progress and toleration of activity level   10/26/2022 0728 by Radha Yoder RN  Outcome: Progressing  10/26/2022 0727 by Radha Yoder RN  Outcome: Progressing     Problem: DISCHARGE PLANNING  Goal: Discharge to home or other facility with appropriate resources  Description: INTERVENTIONS:  - Identify barriers to discharge w/patient and caregiver  - Arrange for needed discharge resources and transportation as appropriate  - Identify discharge learning needs (meds, wound care, etc )  - Arrange for interpretive services to assist at discharge as needed  - Refer to Case Management Department for coordinating discharge planning if the patient needs post-hospital services based on physician/advanced practitioner order or complex needs related to functional status, cognitive ability, or social support system  10/26/2022 0728 by Dewayne Harmon RN  Outcome: Progressing  10/26/2022 0727 by Dewayne Harmon RN  Outcome: Progressing     Problem: Knowledge Deficit  Goal: Patient/family/caregiver demonstrates understanding of disease process, treatment plan, medications, and discharge instructions  Description: Complete learning assessment and assess knowledge base    Interventions:  - Provide teaching at level of understanding  - Provide teaching via preferred learning methods  10/26/2022 0728 by Dewayne Harmon RN  Outcome: Progressing  10/26/2022 0727 by Dewayne Harmon RN  Outcome: Progressing

## 2022-10-26 NOTE — ASSESSMENT & PLAN NOTE
Lab Results   Component Value Date    HGBA1C 6 8 (H) 10/16/2022       Recent Labs     10/25/22  1557 10/25/22  2112 10/26/22  0722 10/26/22  1132   POCGLU 125 148* 108 106       Blood Sugar Average: Last 72 hrs:  (P) 200 [id number] good control per last A1c  Home regimen reviewed  Hold Metformin if applicable    Start SSI and Basal bolus protocol  Monitor for steroid induced hyperglycemia

## 2022-10-26 NOTE — PLAN OF CARE
Problem: Potential for Falls  Goal: Patient will remain free of falls  Description: INTERVENTIONS:  - Educate patient/family on patient safety including physical limitations  - Instruct patient to call for assistance with activity   - Consult OT/PT to assist with strengthening/mobility   - Keep Call bell within reach  - Keep bed low and locked with side rails adjusted as appropriate  - Keep care items and personal belongings within reach  - Initiate and maintain comfort rounds  - Make Fall Risk Sign visible to staff  - Apply yellow socks and bracelet for high fall risk patients  - Consider moving patient to room near nurses station  Outcome: Progressing     Problem: PAIN - ADULT  Goal: Verbalizes/displays adequate comfort level or baseline comfort level  Description: Interventions:  - Encourage patient to monitor pain and request assistance  - Assess pain using appropriate pain scale  - Administer analgesics based on type and severity of pain and evaluate response  - Implement non-pharmacological measures as appropriate and evaluate response  - Consider cultural and social influences on pain and pain management  - Notify physician/advanced practitioner if interventions unsuccessful or patient reports new pain  Outcome: Progressing     Problem: INFECTION - ADULT  Goal: Absence or prevention of progression during hospitalization  Description: INTERVENTIONS:  - Assess and monitor for signs and symptoms of infection  - Monitor lab/diagnostic results  - Monitor all insertion sites, i e  indwelling lines, tubes, and drains  - Monitor endotracheal if appropriate and nasal secretions for changes in amount and color  - [place] appropriate cooling/warming therapies per order  - Administer medications as ordered  - Instruct and encourage patient and family to use good hand hygiene technique  - Identify and instruct in appropriate isolation precautions for identified infection/condition  Outcome: Progressing  Goal: Absence of fever/infection during neutropenic period  Description: INTERVENTIONS:  - Monitor WBC    Outcome: Progressing     Problem: SAFETY ADULT  Goal: Patient will remain free of falls  Description: INTERVENTIONS:  - Educate patient/family on patient safety including physical limitations  - Instruct patient to call for assistance with activity   - Consult OT/PT to assist with strengthening/mobility   - Keep Call bell within reach  - Keep bed low and locked with side rails adjusted as appropriate  - Keep care items and personal belongings within reach  - Initiate and maintain comfort rounds  - Make Fall Risk Sign visible to staff  - Apply yellow socks and bracelet for high fall risk patients  - Consider moving patient to room near nurses station  Outcome: Progressing  Goal: Maintain or return to baseline ADL function  Description: INTERVENTIONS:  -  Assess patient's ability to carry out ADLs; assess patient's baseline for ADL function and identify physical deficits which impact ability to perform ADLs (bathing, care of mouth/teeth, toileting, grooming, dressing, etc )  - Assess/evaluate cause of self-care deficits   - Assess range of motion  - Assess patient's mobility; develop plan if impaired  - Assess patient's need for assistive devices and provide as appropriate  - Encourage maximum independence but intervene and supervise when necessary  - Involve family in performance of ADLs  - Assess for home care needs following discharge   - Consider OT consult to assist with ADL evaluation and planning for discharge  - Provide patient education as appropriate  Outcome: Progressing  Goal: Maintains/Returns to pre admission functional level  Description: INTERVENTIONS:  - Perform BMAT or MOVE assessment daily    - Set and communicate daily mobility goal to care team and patient/family/caregiver     - Collaborate with rehabilitation services on mobility goals if consulted  - Out of bed for toileting  - Record patient progress and toleration of activity level   Outcome: Progressing     Problem: DISCHARGE PLANNING  Goal: Discharge to home or other facility with appropriate resources  Description: INTERVENTIONS:  - Identify barriers to discharge w/patient and caregiver  - Arrange for needed discharge resources and transportation as appropriate  - Identify discharge learning needs (meds, wound care, etc )  - Arrange for interpretive services to assist at discharge as needed  - Refer to Case Management Department for coordinating discharge planning if the patient needs post-hospital services based on physician/advanced practitioner order or complex needs related to functional status, cognitive ability, or social support system  Outcome: Progressing     Problem: Knowledge Deficit  Goal: Patient/family/caregiver demonstrates understanding of disease process, treatment plan, medications, and discharge instructions  Description: Complete learning assessment and assess knowledge base    Interventions:  - Provide teaching at level of understanding  - Provide teaching via preferred learning methods  Outcome: Progressing

## 2022-10-26 NOTE — PROGRESS NOTES
[facility]  Progress Note - Kate Jeff 1950, 81 y o  female MRN: [id number]  Unit/Bed#: [address] 228-01 Encounter: [id number]  Primary Care Provider: Jamse Curling, DO   Date and time admitted to hospital: 10/23/2022 10:57 AM    * Acute exacerbation of moderate persistent extrinsic asthma  Assessment & Plan  Patient just discharged from [facility] on 10/15 for COPD exacerbation and was being maintained on a steroid taper  Despite this patient states that she did feel improved since she came here for evaluation  · Found to have COVID infection, [facility] her [facility]  · Chest x-ray appears stable compared to chest x-ray from 10/13, no consolidations appreciated  · Continue Solu-Medrol 40 mg twice a day, transition to oral prednisone tomorrow with plan for 5 day course    COVID-19  Assessment & Plan  Patient presenting with mild COVID-19 infection  Vaccination status :Vaccinated  Vaccination Date :  Vaccinated x3  Oxygen requirements :  3 L which is patient's baseline  Remdesvir Day #1/5 mild  Trend CMP,CBC daily      Sepsis ruled out    Paroxysmal atrial fibrillation (HCC)  Assessment & Plan  · Maintained on Cardizem and Eliquis  · Continue with this regimen for now    Tobacco use  Assessment & Plan  · Nicotine replacement while inpatient  · Smoking cessation counseling    Chronic respiratory failure with hypoxia ([address] )  Assessment & Plan  · At baseline requires 2 L of oxygen  · Will continue to monitor for any oxygen needs  · Maintain oxygen greater than 88%    HIV (human immunodeficiency virus infection)   Assessment & Plan  · Maintain on Biktarvy while inpatient    Type 2 diabetes mellitus without complication, without long-term current use of insulin [facility])  Assessment & Plan  Lab Results   Component Value Date    HGBA1C 6 8 (H) 10/16/2022       Recent Labs     10/24/22  2104 10/25/22  0815 10/25/22  1117 10/25/22  1557 POCGLU 129 106 157* 125       Blood Sugar Average: Last 72 hrs:  (P) 799 [id number] good control per last A1c  Home regimen reviewed  Hold Metformin if applicable  Start SSI and Basal bolus protocol  Monitor for steroid induced hyperglycemia      [facility] Internal Medicine Progress Note  Patient: Olivier Huertas 81 y o  female   MRN: [id number]  PCP: Timoteo Orantes DO  [facility] Encounter: [id number]  Date Of Visit: 10/25/22    Assessment:    Principal Problem:    Acute exacerbation of moderate persistent extrinsic asthma  Active Problems:    Type 2 diabetes mellitus without complication, without long-term current use of insulin (HCC)    HIV (human immunodeficiency virus infection)     Chronic respiratory failure with hypoxia (HCC)    Tobacco use    Paroxysmal atrial fibrillation ([facility] [address] )    COVID-19      Plan:    · Patient was plan for discharge today however her CPAP as recall  · Will order overnight BiPAP study with plans for ABG in the morning to determine if patient qualifies  · Overall doing well  · Home tomorrow       VTE Pharmacologic Prophylaxis:   Pharmacologic: Apixaban (Eliquis)  Mechanical VTE Prophylaxis in Place: Yes    Patient Centered Rounds: I have performed bedside rounds with nursing staff today  Discussions with Specialists or Other Care Team Provider:  Discussed with case management    Education and Discussions with Family / Patient:  Patient daughter    Time Spent for Care: 45 minutes  More than 50% of total time spent on counseling and coordination of care as described above  Current Length of Stay: 2 day(s)    Current Patient Status: Inpatient   Certification Statement: The patient will continue to require additional inpatient hospital stay due to BiPAP evaluation    Discharge Plan / Estimated Discharge Date:  Tomorrow    Code Status: Level 1 - Full Code      Subjective:   Seen examined, no acute complaints  Tolerating oral diet  No nausea no vomiting    No abdominal  Feels well    A complete and comprehensive 14 point organ system review has been performed and all other systems are negative other than stated above  Objective:     Vitals:   Temp (24hrs), Av 4 °F (36 9 °C), Min:97 7 °F (36 5 °C), Max:99 5 °F (37 5 °C)    Temp:  [97 7 °F (36 5 °C)-99 5 °F (37 5 °C)] 98 8 °F (37 1 °C)  HR:  [] 100  Resp:  [18-20] 18  BP: (109-118)/(71-76) 111/76  SpO2:  [97 %-100 %] 100 %  Body mass index is 39 87 kg/m²  Input and Output Summary (last 24 hours):        Intake/Output Summary (Last 24 hours) at 10/25/2022 2023  Last data filed at 10/25/2022 1300  Gross per 24 hour   Intake 640 ml   Output 800 ml   Net -160 ml       Physical Exam:     General: well appearing, no acute distress  HEENT: atraumatic, PERRLA, moist mucosa, normal pharynx, normal tonsils and adenoids, normal tongue, no fluid in sinuses  Neck: Trachea midline, no carotid bruit, no masses  Respiratory: normal chest wall expansion, CTA B, no r/r/w, no rubs  Cardiovascular: RRR, no m/r/g, Normal S1 and S2  Abdomen: Soft, non-tender, non-distended, normal bowel sounds in all quadrants, no hepatosplenomegaly, no tympany  Rectal: deferred  Musculoskeletal: normal ROM in upper and lower extremities  Integumentary: warm, dry, and pink, with no rash, purpura, or petechia  Heme/Lymph: no lymphadenopathy, no bruises  Neurological: Cranial Nerves II-XII grossly intact, no tics, normal sensation to pressure and light touch  Psychiatric: cooperative with normal mood, affect, and cognition      Additional Data:     Labs:    Results from last 7 days   Lab Units 10/25/22  0428   WBC Thousand/uL 7 88   HEMOGLOBIN g/dL 10 2*   HEMATOCRIT % 34 6*   PLATELETS Thousands/uL 322   NEUTROS PCT % 64   LYMPHS PCT % 22   MONOS PCT % 13*   EOS PCT % 0     Results from last 7 days   Lab Units 10/25/22  0428   POTASSIUM mmol/L 4 1   CHLORIDE mmol/L 106   CO2 mmol/L 33*   BUN mg/dL 31*   CREATININE mg/dL 0 95   CALCIUM mg/dL 10 2* ALK PHOS U/L 83   ALT U/L 18   AST U/L 14     Results from last 7 days   Lab Units 10/23/22  1136   INR  1 10       * I Have Reviewed All Lab Data Listed Above  * Additional Pertinent Lab Tests Reviewed: [address] Admission Reviewed    Imaging:    Imaging Reports Reviewed Today Include:  No new imaging  Imaging Personally Reviewed by Myself Includes:  No new imaging    Recent Cultures (last 7 days):     Results from last 7 days   Lab Units 10/24/22  0309   BLOOD CULTURE  No Growth at 24 hrs  No Growth at 24 hrs         Last 24 Hours Medication List:   Current Facility-Administered Medications   Medication Dose Route Frequency Provider Last Rate   • acetaminophen  975 mg Oral Q8H PRN Leata Level, PA-C     • albuterol  2 5 mg Nebulization Q6H PRN Leata Level, PA-C     • aluminum-magnesium hydroxide-simethicone  30 mL Oral Q6H PRN Leata Level, PA-C     • apixaban  5 mg Oral BID Leata Level, PA-C     • atorvastatin  20 mg Oral Daily With Umm Franco, PA-C     • benzonatate  100 mg Oral TID PRN Leata Level, PA-C     • bictegravir-emtricitab-tenofovir alafenamide  1 tablet Oral Daily Leata Level, PA-C     • cholecalciferol  1,000 Units Oral Daily Leata Level, PA-C     • diltiazem  120 mg Oral Daily Leata Level, PA-C     • fluticasone-vilanterol  1 puff Inhalation Daily Leata Level, PA-C     • furosemide  40 mg Oral BID (diuretic) Leata Level, PA-C     • gabapentin  800 mg Oral TID Leata Level, PA-C     • guaiFENesin  600 mg Oral Q12H [address] Leata Level, PA-C     • insulin lispro  1-6 Units Subcutaneous TID AC Leata Level, PA-C     • levothyroxine  100 mcg Oral Early Morning Leata Level, PA-C     • melatonin  6 mg Oral HS Lucretia Gomezrampapo, PA-C     • montelukast  10 mg Oral HS Leata Level, PA-C     • nicotine  1 patch Transdermal Daily Leata Level, PA-C     • ondansetron  4 mg Intravenous Q6H PRN Leata Level, PA-C     • polyethylene glycol  17 g Oral Daily PRN Leata Level, PA-C     • potassium chloride  20 mEq Oral Daily Susannah Lopez PA-C     • remdesivir  100 mg Intravenous Q24H Susannah Lopez PA-C     • traMADol  50 mg Oral Q6H PRN Susannah Lopez PA-C          Today, Patient Was Seen By: Cynthia Patino    ** Please Note: This note was completed in part utilizing [employer] Direct Software  Grammatical errors, random word insertions, spelling mistakes, and incomplete sentences may be an occasional consequence of this system secondary to software limitations, ambient noise, and hardware issues  If you have any questions or concerns about the content, text, or information contained within the body of this dictation, please contact the provider for clarification   **

## 2022-10-26 NOTE — ASSESSMENT & PLAN NOTE
Patient presenting with mild COVID-19 infection  Vaccination status :Vaccinated  Vaccination Date :  Vaccinated x3  Oxygen requirements :  3 L which is patient's baseline  Remdesvir Day #3/5 mild  Trend CMP,CBC daily      Sepsis ruled out

## 2022-10-26 NOTE — DISCHARGE SUMMARY
[facility]  Discharge- Lenny Tucker 1950, 81 y o  female MRN: [id number]  Unit/Bed#: Yogesh Verduzco [facility] 87 228-01 Encounter: [id number]  Primary Care Provider: Rosita Marx DO   Date and time admitted to hospital: 10/23/2022 10:57 AM    Admitting Provider:  Radha Robbins DO  Discharge Provider:  Radha Robbins  Admission Date: 10/23/2022       Discharge Date: 10/26/22   LOS: 3  Primary Care Physician at Discharge: Rosita Marx, [facility]:  Lenny Tucker is a 81 y o  female who presented with shortness of breath and difficulty breathing  The patient has had for admission and/or emergency room visits in the last month for COPD exacerbation  These have been multifactorial, she recently had a CPAP machine which was recalled  All attempts were made to qualify the patient for BiPAP during admission however this was unable to be performed she did not qualify  The patient was found incidentally of COVID-19 infection despite being vaccinated x3  She remained relatively stable but given history of COPD as well as HIV and diabetes mellitus she received 3 days of remdesivir  Her breathing remained stable, she was subsequently discharged home on aspirin his own taper and will follow-up with her PCP upon discharge  At the time of discharge patient was saturating well on her baseline oxygen, she denied any acute complaints and was medically optimized for discharge  All questions were answered to the patient's satisfaction and she was in agreement with the discharge plan    DISCHARGE DIAGNOSES  * Acute exacerbation of moderate persistent extrinsic asthma  Assessment & Plan  Patient just discharged from [facility] on 10/15 for COPD exacerbation and was being maintained on a steroid taper  Despite this patient states that she did feel improved since she came here for evaluation    · Found to have COVID infection, [facility] during her [facility]  · Chest x-ray appears stable compared to chest x-ray from 10/13, no consolidations appreciated  · Doing well, will discharge with prednisone taper    COVID-19  Assessment & Plan  Patient presenting with mild COVID-19 infection  Vaccination status :Vaccinated  Vaccination Date :  Vaccinated x3  Oxygen requirements :  3 L which is patient's baseline  Remdesvir Day #3/5 mild  Trend CMP,CBC daily  Sepsis ruled out    Paroxysmal atrial fibrillation (HCC)  Assessment & Plan  · Maintained on Cardizem and Eliquis  · Continue with this regimen for now    Tobacco use  Assessment & Plan  · Nicotine replacement while inpatient  · Smoking cessation counseling    Chronic respiratory failure with hypoxia ([facility] [address] )  Assessment & Plan  · At baseline requires 2 L of oxygen  · Will continue to monitor for any oxygen needs  · Maintain oxygen greater than 88%    HIV (human immunodeficiency virus infection)   Assessment & Plan  · Maintain on Biktarvy while inpatient    Type 2 diabetes mellitus without complication, without long-term current use of insulin [facility])  Assessment & Plan  Lab Results   Component Value Date    HGBA1C 6 8 (H) 10/16/2022       Recent Labs     10/25/22  1557 10/25/22  2112 10/26/22  0722 10/26/22  1132   POCGLU 125 148* 108 106       Blood Sugar Average: Last 72 hrs:  (P) 180 [id number] good control per last A1c  Home regimen reviewed  Hold Metformin if applicable  Start SSI and Basal bolus protocol  Monitor for steroid induced hyperglycemia      CONSULTING PROVIDERS   IP CONSULT TO PULMONOLOGY  IP CONSULT TO CASE MANAGEMENT    PROCEDURES PERFORMED  * No surgery found *    RADIOLOGY RESULTS  XR chest 1 view portable    Result Date: 10/23/2022  Narrative: CHEST INDICATION:   wheezing  COMPARISON:  Chest radiograph October 13, 2022  CT chest September 15, 2022  EXAM PERFORMED/VIEWS:  XR CHEST PORTABLE FINDINGS: Cardiomediastinal silhouette appears unremarkable  The lungs are clear  No pneumothorax or pleural effusion  Orthopedic hardware from lower thoracic spinal fusion  Otherwise osseous structures are within normal limits for patient age  Impression: No acute cardiopulmonary disease  Workstation performed: [id number]     XR chest 1 view portable    Result Date: 10/14/2022  Narrative: CHEST INDICATION:   sob  COMPARISON:  9/24/2022 EXAM PERFORMED/VIEWS:  XR CHEST PORTABLE FINDINGS: Cardiomediastinal silhouette appears unremarkable  The lungs are clear  Slight right base atelectasis  No pneumothorax or pleural effusion  Lower thoracic posterior hardware fusion again noted  Impression: No acute cardiopulmonary disease   Workstation performed: [id number]       LABS  Results from last 7 days   Lab Units 10/25/22  0428 10/24/22  0321 10/23/22  1136   WBC Thousand/uL 7 88 9 49 11 89*   HEMOGLOBIN g/dL 10 2* 10 5* 11 4*   HEMATOCRIT % 34 6* 33 7* 36 6   MCV fL 98 96 94   PLATELETS Thousands/uL 322 312 352   INR   --   --  1 10     Results from last 7 days   Lab Units 10/25/22  0428 10/24/22  0322 10/23/22  1136   SODIUM mmol/L 144 144 143   POTASSIUM mmol/L 4 1 4 2 3 5   CHLORIDE mmol/L 106 104 103   CO2 mmol/L 33* 33* 30   BUN mg/dL 31* 34* 27*   CREATININE mg/dL 0 95 1 20 1 00   CALCIUM mg/dL 10 2* 10 3* 10 6*   ALBUMIN g/dL 3 3* 3 4* 3 9   TOTAL BILIRUBIN mg/dL 0 33 0 28 0 53   ALK PHOS U/L 83 78 81   ALT U/L 18 17 20   AST U/L 14 <5* 8   EGFR ml/min/1 73sq m 60 45 57   GLUCOSE RANDOM mg/dL 80 130 130     Results from last 7 days   Lab Units 10/24/22  0308 10/23/22  1344 10/23/22  1136   HS TNI 0HR ng/L  --   --  11   HS TNI 2HR ng/L  --  7  --    HS TNI 4HR ng/L 7  --   --      Results from last 7 days   Lab Units 10/23/22  1136   NT-PRO BNP pg/mL 111          Results from last 7 days   Lab Units 10/26/22  1132 10/26/22  0722 10/25/22  2112 10/25/22  1557 10/25/22  1117 10/25/22  0815 10/24/22  2104 10/24/22  1553 10/24/22  1146 10/24/22  0742   POC GLUCOSE mg/dl 106 108 148* 125 157* 106 129 148* 101 159*             Results from last 7 days   Lab Units 10/23/22  1136   PROCALCITONIN ng/ml <0 05           Cultures:   Results from last 7 days   Lab Units 10/23/22  1127   COLOR UA  Yellow   CLARITY UA  Clear   SPEC GRAV UA  1 015   PH UA  5 5   LEUKOCYTES UA  Small*   NITRITE UA  Negative   GLUCOSE UA mg/dl Negative   KETONES UA mg/dl Negative   BILIRUBIN UA  Negative   BLOOD UA  Negative      Results from last 7 days   Lab Units 10/23/22  1127   RBC UA /hpf None Seen   WBC UA /hpf 4-10*   EPITHELIAL CELLS WET PREP /hpf Occasional   BACTERIA UA /hpf Occasional      Results from last 7 days   Lab Units 10/24/22  0309 10/23/22  1125   BLOOD CULTURE  No Growth at 48 hrs    No Growth at 48 hrs   --    INFLUENZA A PCR   --  Negative       PHYSICAL EXAM:  Vitals:   Blood Pressure: 131/88 (10/26/22 1540)  Pulse: 93 (10/26/22 1540)  Temperature: 98 3 °F (36 8 °C) (10/26/22 1540)  Temp Source: Oral (10/26/22 3202)  Respirations: 20 (10/26/22 1540)  Height: 5' 2" (157 5 cm) (10/23/22 1714)  Weight - Scale: 98 9 kg (218 lb) (10/23/22 1714)  SpO2: 99 % (10/26/22 1540)      General: well appearing, no acute distress  HEENT: atraumatic, PERRLA, moist mucosa, normal pharynx, normal tonsils and adenoids, normal tongue, no fluid in sinuses  Neck: Trachea midline, no carotid bruit, no masses  Respiratory: normal chest wall expansion, CTA B, no r/r/w, no rubs  Cardiovascular: RRR, no m/r/g, Normal S1 and S2  Abdomen: Soft, non-tender, non-distended, normal bowel sounds in all quadrants, no hepatosplenomegaly, no tympany  Rectal: deferred  Musculoskeletal: normal ROM in upper and lower extremities  Integumentary: warm, dry, and pink, with no rash, purpura, or petechia  Heme/Lymph: no lymphadenopathy, no bruises  Neurological: Cranial Nerves II-XII grossly intact, no tics, normal sensation to pressure and light touch  Psychiatric: cooperative with normal mood, affect, and cognition      Discharge Disposition: Home/Self Care      Test Results Pending at Discharge:   Pending Labs     Order Current Status    Blood culture Preliminary result    Blood culture Preliminary result              Medications   · Summary of Medication Adjustments made as a result of this hospitalization:  See discharge list  · Medication Dosing Tapers - Please refer to Discharge Medication List for details on any medication dosing tapers (if applicable to patient)  · Discharge Medication List: See after visit summary for reconciled discharge medications  Diet restrictions:         Diet Orders   (From admission, onward)             Start     Ordered    10/24/22 0908  Diet Joe/CHO Controlled; Consistent Carbohydrate Diet Level 2 (5 carb servings/75 grams CHO/meal); Fluid Restriction 1800 ML  (ED Bridging Orders Panel)  Diet effective now        References:    Nutrtion Support Algorithm Enteral vs  Parenteral   Question Answer Comment   Diet Type Joe/CHO Controlled    Joe/CHO Controlled Consistent Carbohydrate Diet Level 2 (5 carb servings/75 grams CHO/meal)    Other Restriction(s): Fluid Restriction 1800 ML    RD to adjust diet per protocol? Yes        10/24/22 0908              Activity restrictions: No strenuous activity  Discharge Condition: good    Outpatient Follow-Up and Discharge Instructions  See after visit summary section titled Discharge Instructions for information provided to patient and family  Code Status: Level 1 - Full Code  Discharge Statement   I spent 35 minutes discharging the patient  This time was spent on the day of discharge  Greater than 50% of total time was spent with the patient and / or family counseling and / or coordination of care  ** Please Note: This note was completed in part utilizing [employer] Software    Grammatical errors, random word insertions, spelling mistakes, and incomplete sentences may be an occasional consequence of this system secondary to software limitations, ambient noise, and hardware issues  If you have any questions or concerns about the content, text, or information contained within the body of this dictation, please contact the provider for clarification  **

## 2022-10-26 NOTE — CASE MANAGEMENT
Case Management Discharge Planning Note    Patient name Emile Marshall  Location [place] [address] [address] Andino* MRN [id number]  : 1950 Date 10/26/2022       Current Admission Date: 10/23/2022  Current Admission Diagnosis:Acute exacerbation of moderate persistent extrinsic asthma   Patient Active Problem List    Diagnosis Date Noted   • COVID-19 10/23/2022   • Anemia 2022   • Obesity (BMI 35 0-39 9 without comorbidity) 2022   • Acute on chronic diastolic heart failure ([facility] [address] ) 2022   • Acute exacerbation of moderate persistent extrinsic asthma 2020   • Fall at home, initial encounter 2020   • Paroxysmal atrial fibrillation ([facility] [address] ) 2020   • Hyperlipidemia 2018   • Thoracic disc herniation 2018   • Lumbar stenosis 2018   • Urinary frequency 2018   • Ambulatory dysfunction    • Back pain 2018   • Hip pain 2018   • Shortness of breath 2017   • Hypertension    • Type 2 diabetes mellitus with hyperglycemia (HCC)    • Chronic respiratory failure with hypoxia (HCC)    • Bipolar 1 disorder ([facility] [address] )    • Tobacco use    • Arthritis 2016   • Type 2 diabetes mellitus without complication, without long-term current use of insulin ([facility] [address] ) 2016   • HIV (human immunodeficiency virus infection)  2016   • Osteoarthritis 2016      LOS (days): 3  Geometric Mean LOS (GMLOS) (days): 4 20  Days to GMLOS:1 1     OBJECTIVE:  Risk of Unplanned Readmission Score: 38 45         Current admission status: Inpatient   Preferred Pharmacy:   [address] [place] [address], [address] [place] [address]  5    [address] [place] [id number]  Phone: [phone] Fax: [phone]    Primary Care Provider: Tl Stewart DO    Primary Insurance: [facility]  Secondary Insurance: [facility]    DISCHARGE DETAILS:                           Contacts  Patient Contacts: Eli Herron (Daughter)  Relationship to Patient[de-identified] Family  Contact Method: Phone  Phone Number: [phone]-  Reason/Outcome: Discharge Planning                        Treatment Team Recommendation: Home with [address] [employer]  Discharge Destination Plan[de-identified] Home with [address] [employer]                                         Additional Comments: CM called and left VM with daughters Debbie Garcia and Lobo informing of pt being medically cleared for d/c and ready for transport home  Informed both daughters of  Pulmonology sending [facility] to [facility] CPAP and portable concentrator [facility] aware of need for same and to follow up- pt does not qualify for a BIPAP and to continue utilizing the O2 2Lnc at all times  Requested for them to call RN on case this day for transport time, to bring home O2 for transport home  RN aware of same

## 2022-10-28 ENCOUNTER — PATIENT OUTREACH (OUTPATIENT)
Dept: CASE MANAGEMENT | Facility: OTHER | Age: 72
End: 2022-10-28

## 2022-10-28 NOTE — PROGRESS NOTES
OP RT CM called and left VM requesting a return call  I will reach out again next week if I do not hear back from the patient prior

## 2022-10-29 ENCOUNTER — HOSPITAL ENCOUNTER (EMERGENCY)
Facility: HOSPITAL | Age: 72
Discharge: HOME | End: 2022-10-29
Attending: EMERGENCY MEDICINE

## 2022-10-29 ENCOUNTER — APPOINTMENT (EMERGENCY)
Dept: RADIOLOGY | Facility: HOSPITAL | Age: 72
End: 2022-10-29

## 2022-10-29 VITALS
DIASTOLIC BLOOD PRESSURE: 83 MMHG | SYSTOLIC BLOOD PRESSURE: 132 MMHG | TEMPERATURE: 98.1 F | RESPIRATION RATE: 20 BRPM | HEART RATE: 90 BPM | OXYGEN SATURATION: 96 %

## 2022-10-29 DIAGNOSIS — R06.00 DYSPNEA: Primary | ICD-10-CM

## 2022-10-29 DIAGNOSIS — H57.89 IRRITATION OF LEFT EYE: ICD-10-CM

## 2022-10-29 DIAGNOSIS — R06.2 WHEEZING: ICD-10-CM

## 2022-10-29 LAB
ANION GAP SERPL CALCULATED.3IONS-SCNC: 9 MMOL/L (ref 4–13)
BACTERIA BLD CULT: NORMAL
BACTERIA BLD CULT: NORMAL
BASOPHILS # BLD AUTO: 0.02 THOUSANDS/ÂΜL (ref 0–0.1)
BASOPHILS NFR BLD AUTO: 0 % (ref 0–1)
BUN SERPL-MCNC: 23 MG/DL (ref 5–25)
CALCIUM SERPL-MCNC: 10.7 MG/DL (ref 8.3–10.1)
CARDIAC TROPONIN I PNL SERPL HS: 6 NG/L
CHLORIDE SERPL-SCNC: 103 MMOL/L (ref 96–108)
CO2 SERPL-SCNC: 30 MMOL/L (ref 21–32)
CREAT SERPL-MCNC: 0.98 MG/DL (ref 0.6–1.3)
D DIMER PPP FEU-MCNC: <0.27 UG/ML FEU
EOSINOPHIL # BLD AUTO: 0.04 THOUSAND/ÂΜL (ref 0–0.61)
EOSINOPHIL NFR BLD AUTO: 1 % (ref 0–6)
ERYTHROCYTE [DISTWIDTH] IN BLOOD BY AUTOMATED COUNT: 15.5 % (ref 11.6–15.1)
GFR SERPL CREATININE-BSD FRML MDRD: 58 ML/MIN/1.73SQ M
GLUCOSE SERPL-MCNC: 110 MG/DL (ref 65–140)
HCT VFR BLD AUTO: 37.8 % (ref 34.8–46.1)
HGB BLD-MCNC: 11.7 G/DL (ref 11.5–15.4)
IMM GRANULOCYTES # BLD AUTO: 0.07 THOUSAND/UL (ref 0–0.2)
IMM GRANULOCYTES NFR BLD AUTO: 1 % (ref 0–2)
LYMPHOCYTES # BLD AUTO: 2.21 THOUSANDS/ÂΜL (ref 0.6–4.47)
LYMPHOCYTES NFR BLD AUTO: 31 % (ref 14–44)
MCH RBC QN AUTO: 29.3 PG (ref 26.8–34.3)
MCHC RBC AUTO-ENTMCNC: 31 G/DL (ref 31.4–37.4)
MCV RBC AUTO: 95 FL (ref 82–98)
MONOCYTES # BLD AUTO: 0.67 THOUSAND/ÂΜL (ref 0.17–1.22)
MONOCYTES NFR BLD AUTO: 9 % (ref 4–12)
NEUTROPHILS # BLD AUTO: 4.1 THOUSANDS/ÂΜL (ref 1.85–7.62)
NEUTS SEG NFR BLD AUTO: 58 % (ref 43–75)
NRBC BLD AUTO-RTO: 0 /100 WBCS
PLATELET # BLD AUTO: 332 THOUSANDS/UL (ref 149–390)
PMV BLD AUTO: 9.3 FL (ref 8.9–12.7)
POTASSIUM SERPL-SCNC: 3.6 MMOL/L (ref 3.5–5.3)
PROCALCITONIN SERPL-MCNC: <0.05 NG/ML
RBC # BLD AUTO: 3.99 MILLION/UL (ref 3.81–5.12)
SODIUM SERPL-SCNC: 142 MMOL/L (ref 135–147)
WBC # BLD AUTO: 7.11 THOUSAND/UL (ref 4.31–10.16)

## 2022-10-29 RX ORDER — SODIUM CHLORIDE FOR INHALATION 0.9 %
3 VIAL, NEBULIZER (ML) INHALATION ONCE
Status: DISCONTINUED | OUTPATIENT
Start: 2022-10-29 | End: 2022-10-29

## 2022-10-29 RX ORDER — IPRATROPIUM BROMIDE AND ALBUTEROL SULFATE .5; 3 MG/3ML; MG/3ML
2 SOLUTION RESPIRATORY (INHALATION) ONCE
Status: COMPLETED | OUTPATIENT
Start: 2022-10-29 | End: 2022-10-29

## 2022-10-29 RX ORDER — ALBUTEROL SULFATE 2.5 MG/3ML
10 SOLUTION RESPIRATORY (INHALATION) ONCE
Status: COMPLETED | OUTPATIENT
Start: 2022-10-29 | End: 2022-10-29

## 2022-10-29 RX ORDER — CARBOXYMETHYLCELLULOSE SODIUM 5 MG/ML
1 SOLUTION/ DROPS OPHTHALMIC 3 TIMES DAILY PRN
Qty: 30 EACH | Refills: 0 | Status: SHIPPED | OUTPATIENT
Start: 2022-10-29

## 2022-10-29 RX ADMIN — ALBUTEROL SULFATE 10 MG: 2.5 SOLUTION RESPIRATORY (INHALATION) at 16:11

## 2022-10-29 RX ADMIN — GLYCERIN, HYPROMELLOSE, POLYETHYLENE GLYCOL 2 DROP: .2; .2; 1 LIQUID OPHTHALMIC at 17:04

## 2022-10-29 RX ADMIN — IPRATROPIUM BROMIDE 1 MG: 0.5 SOLUTION RESPIRATORY (INHALATION) at 16:11

## 2022-10-29 NOTE — ED ATTENDING ATTESTATION
10/29/2022  IMaverick MD, saw and evaluated the patient  I have discussed the patient with the resident/non-physician practitioner and agree with the resident's/non-physician practitioner's findings, Plan of Care, and MDM as documented in the resident's/non-physician practitioner's note, except where noted  All available labs and Radiology studies were reviewed  I was present for key portions of any procedure(s) performed by the resident/non-physician practitioner and I was immediately available to provide assistance  At this point I agree with the current assessment done in the Emergency Department  I have conducted an independent evaluation of this patient a history and physical is as follows:  Patient is a 80 yo female, hx of COPD, recent COVID, COPD exacerbation, discharged from hospital, comes back with SOB, congestion, no chest pain, EMS gave Duoneb, no significant relief  On exam, appears congested, Lung exam with ronchi, CVS RRR, Abd soft, NTND, no peripheral edema, no calf swelling or tenderness  Differential diagnosis: COVID pneumonia, COPD exacerbation, will check labs, EKG, chest x-ray, give [facility]  ED Course     ER workup results reviewed, labs, EKG, chest x-ray, nonacute findings  Patient felt better, requesting to be discharged      Critical Care Time  Procedures

## 2022-10-29 NOTE — DISCHARGE INSTRUCTIONS
Please follow up with your PCP if symptoms do not improve  Continue to take prescribed medications and inhalers/nebulizers as needed  Return to the emergency department for any new or worsening symptoms including difficulty breathing, chest pain, worsening eye pain or difficulty with your vision, or other concerning symptoms

## 2022-10-29 NOTE — ED PROVIDER NOTES
History  Chief Complaint   Patient presents with   • Shortness of Breath     Pt reports feeling short of breath this afternoon, tried home inhaler without relief  Was d/c'd 2 days ago, covid positive  Given duoneb 2x pta, EMS  79-year-old female with a history of diabetes, hypertension, HIV, asthma, and COPD presents via EMS for evaluation of shortness of breath  Patient was just recently discharged from hospital after a COPD exacerbation 3 days ago  Patient was found to be COVID positive at that time as well  Per chart review, patient received remdesivir  Patient states that at the time of discharge, she was feeling better, however, since discharge she has been feeling progressively worse  Patient is still currently on steroids, but states that she has finished with her course of antibiotics  Patient sources shortness of breath, cough, and wheezing  She states that she has been using her albuterol inhaler and nebulizers at home with some relief of symptoms  Patient was given DuoNebs by EMS, states that this did not help with symptoms  Patient also endorsing nasal congestion and left eye irritation  Patient states that she does suffer from allergies  She denies any fevers or chills, chest pain, abdominal pain, nausea, or vomiting  Prior to Admission Medications   Prescriptions Last Dose Informant Patient Reported? Taking? Fluticasone-Salmeterol (Advair) 100-50 mcg/dose inhaler   Yes No   Sig: Inhale 1 puff 2 (two) times a day Rinse mouth after use     acetaminophen (TYLENOL) 325 mg tablet   No No   Sig: Take 3 tablets (975 mg total) by mouth every 8 (eight) hours as needed for mild pain   albuterol (2 5 mg/3 mL) 0 083 % nebulizer solution   No No   Sig: Take 3 mL (2 5 mg total) by nebulization every 6 (six) hours as needed for wheezing or shortness of breath   apixaban (ELIQUIS) 5 mg   Yes No   Sig: Take 1 tablet by mouth 2 (two) times a day   atorvastatin (LIPITOR) 20 mg tablet   Yes No Sig: Take 20 mg by mouth daily   bictegravir-emtricitab-tenofovir alafenamide (Biktarvy) -25 MG tablet   Yes No   Sig: Take 1 tablet by mouth daily   budesonide (PULMICORT) 0 5 mg/2 mL nebulizer solution   No No   Sig: Take 2 mL (0 5 mg total) by nebulization 2 (two) times a day Rinse mouth after use  cholecalciferol (VITAMIN D3) 1,000 units tablet   Yes No   Sig: Take 1,000 Units by mouth daily   diltiazem (CARDIZEM CD) 120 mg 24 hr capsule   No No   Sig: Take 1 capsule (120 mg total) by mouth daily   furosemide (LASIX) 40 mg tablet   No No   Sig: Take 1 tablet (40 mg total) by mouth 2 (two) times a day   gabapentin (NEURONTIN) 400 mg capsule   Yes No   Sig: Take 800 mg by mouth 3 (three) times a day     levothyroxine 100 mcg tablet   Yes No   Sig: Take 100 mcg by mouth daily     montelukast (SINGULAIR) 10 mg tablet   Yes No   Sig: Take 10 mg by mouth daily at bedtime   nicotine (NICODERM CQ) 21 mg/24 hr TD 24 hr patch   No No   Sig: Place 1 patch on the skin daily   potassium chloride (K-DUR,KLOR-CON) 20 mEq tablet   No No   Sig: Take 1 tablet (20 mEq total) by mouth daily   predniSONE 10 mg tablet   No No   Si tabs x 3 days, 3 tabs x 3tabs, 2 tabs x 3 days, 1 tab x 3 days, then stop   traMADol (ULTRAM) 50 mg tablet   Yes No   Sig: Take 50 mg by mouth as needed      Facility-Administered Medications: None       Past Medical History:   Diagnosis Date   • Asthma    • Bipolar 1 disorder ([facility] )    • Cardiac disease    • COPD (chronic obstructive pulmonary disease) (HCC)    • Diabetes mellitus ([facility] )    • Disease of thyroid gland    • HIV (human immunodeficiency virus infection) ([facility] ) 2016   • Hypertension    • Osteoarthritis    • Tobacco abuse        Past Surgical History:   Procedure Laterality Date   • HERNIA REPAIR     • LUMBAR FUSION N/A 4/10/2018    Procedure: T9/10 discectomy with T9-T11 fusion;  Surgeon: Raudel Jamison MD;  Location: [facility];  Service: Neurosurgery   • THYROIDECTOMY Family History   Problem Relation Age of Onset   • No Known Problems Mother    • Diabetes Father    • Heart disease Neg Hx    • Cancer Neg Hx      I have reviewed and agree with the history as documented  E-Cigarette/Vaping     E-Cigarette/Vaping Substances     Social History     Tobacco Use   • Smoking status: Current Every Day Smoker     Packs/day: 0 20     Years: 45 00     Pack years: 9 00     Types: Cigarettes   • Smokeless tobacco: Never Used   • Tobacco comment: Currently smoking 1-2 cigarettes a day   Substance Use Topics   • Alcohol use: Not Currently     Alcohol/week: 2 0 standard drinks     Types: 1 Cans of beer, 1 Shots of liquor per week     Comment: only on holidays   • Drug use: Not Currently     Comment: former IV drug user        Review of Systems   Constitutional: Negative for chills and fever  HENT: Positive for congestion  Eyes: Positive for itching  Respiratory: Positive for cough and shortness of breath  Cardiovascular: Negative for chest pain  Gastrointestinal: Negative for abdominal pain, nausea and vomiting  Genitourinary: Negative  Musculoskeletal: Negative  Skin: Negative  Neurological: Negative  All other systems reviewed and are negative  Physical Exam  ED Triage Vitals   Temperature Pulse Respirations Blood Pressure SpO2   10/29/22 1527 10/29/22 1526 10/29/22 1526 10/29/22 1526 10/29/22 1526   98 1 °F (36 7 °C) 86 20 122/90 98 %      Temp Source Heart Rate Source Patient Position - Orthostatic VS BP Location FiO2 (%)   10/29/22 1527 10/29/22 1526 10/29/22 1526 10/29/22 1526 --   Oral Monitor Lying Right arm       Pain Score       10/29/22 1526       No Pain             Orthostatic Vital Signs  Vitals:    10/29/22 1526 10/29/22 1745   BP: 122/90 132/83   Pulse: 86 90   Patient Position - Orthostatic VS: Lying Lying       Physical Exam  Vitals and nursing note reviewed  Constitutional:       General: She is awake  She is not in acute distress  Appearance: She is not toxic-appearing  HENT:      Head: Normocephalic and atraumatic  Nose: Congestion present  Mouth/Throat:      Lips: Pink  Mouth: Mucous membranes are moist    Eyes:      General: Vision grossly intact  Gaze aligned appropriately  Extraocular Movements: Extraocular movements intact  Conjunctiva/sclera:      Left eye: Left conjunctiva is injected  No exudate  Pupils: Pupils are equal, round, and reactive to light  Cardiovascular:      Rate and Rhythm: Normal rate and regular rhythm  Heart sounds: Normal heart sounds  Pulmonary:      Effort: Pulmonary effort is normal  No respiratory distress  Comments: Diminished breath sounds throughout  Rhonchi noted bilaterally  Minimal end expiratory wheezing  Abdominal:      Palpations: Abdomen is soft  Tenderness: There is no abdominal tenderness  Musculoskeletal:      Cervical back: Full passive range of motion without pain and neck supple  Skin:     General: Skin is warm and dry  Neurological:      General: No focal deficit present  Mental Status: She is alert and oriented to person, place, and time  ED Medications  Medications   ipratropium-albuterol (FOR EMS ONLY) (DUO-NEB) 0 5-2 5 mg/3 mL inhalation solution 6 mL (0 mL Does not apply Given to EMS 10/29/22 1528)   ipratropium (ATROVENT) 0 02 % inhalation solution 1 mg (1 mg Nebulization Given 10/29/22 1611)   albuterol inhalation solution 10 mg (10 mg Nebulization Given 10/29/22 1611)   glycerin-hypromellose- (ARTIFICIAL TEARS) ophthalmic solution 2 drop (2 drops Left Eye Given 10/29/22 1704)       Diagnostic Studies  Results Reviewed     Procedure Component Value Units Date/Time    D-dimer, quantitative [[id number]]  (Normal) Collected: 10/29/22 1555    Lab Status: Final result Specimen: Blood from Arm, Left Updated: 10/29/22 1636     D-Dimer, Quant <0 27 ug/ml FEU     Narrative:       In the evaluation for possible pulmonary embolism, in the appropriate (Well's Score of 4 or less) patient, the age adjusted d-dimer cutoff for this patient can be calculated as:    Age x 0 01 (in ug/mL) for Age-adjusted D-dimer exclusion threshold for a patient over 50 years      Procalcitonin [[id number]]  (Normal) Collected: 10/29/22 1555    Lab Status: Final result Specimen: Blood from Arm, Left Updated: 10/29/22 1634     Procalcitonin <0 05 ng/ml     HS Troponin 0hr (reflex protocol) [[id number]]  (Normal) Collected: 10/29/22 1555    Lab Status: Final result Specimen: Blood from Arm, Left Updated: 10/29/22 1631     hs TnI 0hr 6 ng/L     Basic metabolic panel [[id number]]  (Abnormal) Collected: 10/29/22 1555    Lab Status: Final result Specimen: Blood from Arm, Left Updated: 10/29/22 1625     Sodium 142 mmol/L      Potassium 3 6 mmol/L      Chloride 103 mmol/L      CO2 30 mmol/L      ANION GAP 9 mmol/L      BUN 23 mg/dL      Creatinine 0 98 mg/dL      Glucose 110 mg/dL      Calcium 10 7 mg/dL      eGFR 58 ml/min/1 73sq m     Narrative:      [place] guidelines for Chronic Kidney Disease (CKD):   •  Stage 1 with normal or high GFR (GFR > 90 mL/min/1 73 square meters)  •  Stage 2 Mild CKD (GFR = 60-89 mL/min/1 73 square meters)  •  Stage 3A Moderate CKD (GFR = 45-59 mL/min/1 73 square meters)  •  Stage 3B Moderate CKD (GFR = 30-44 mL/min/1 73 square meters)  •  Stage 4 Severe CKD (GFR = 15-29 mL/min/1 73 square meters)  •  Stage 5 End Stage CKD (GFR <15 mL/min/1 73 square meters)  Note: GFR calculation is accurate only with a steady state creatinine    CBC and differential [[id number]]  (Abnormal) Collected: 10/29/22 1555    Lab Status: Final result Specimen: Blood from Arm, Left Updated: 10/29/22 1609     WBC 7 11 Thousand/uL      RBC 3 99 Million/uL      Hemoglobin 11 7 g/dL      Hematocrit 37 8 %      MCV 95 fL      MCH 29 3 pg      MCHC 31 0 g/dL      RDW 15 5 %      MPV 9 3 fL      Platelets 867 Thousands/uL      nRBC 0 /100 WBCs Neutrophils Relative 58 %      Immat GRANS % 1 %      Lymphocytes Relative 31 %      Monocytes Relative 9 %      Eosinophils Relative 1 %      Basophils Relative 0 %      Neutrophils Absolute 4 10 Thousands/µL      Immature Grans Absolute 0 07 Thousand/uL      Lymphocytes Absolute 2 21 Thousands/µL      Monocytes Absolute 0 67 Thousand/µL      Eosinophils Absolute 0 04 Thousand/µL      Basophils Absolute 0 02 Thousands/µL                  XR chest 1 view portable   Final Result by Adwoa Funes MD (10/30 1102)      No acute cardiopulmonary disease  Workstation performed: [id number]               Procedures  ECG 12 Lead Documentation Only    Date/Time: 10/29/2022 3:45 PM  Performed by: Bernadine Garcia DO  Authorized by: Bernadine Garcia DO     Indications / Diagnosis:  Shortness of breath  ECG reviewed by me, the ED Provider: yes    Patient location:  ED  Previous ECG:     Previous ECG:  Compared to current    Similarity:  No change    Comparison to cardiac monitor: Yes    Interpretation:     Interpretation: abnormal    Quality:     Tracing quality:  Limited by artifact  Rate:     ECG rate:  109    ECG rate assessment: tachycardic    Rhythm:     Rhythm: sinus tachycardia    Ectopy:     Ectopy: none    QRS:     QRS axis:  Left    QRS intervals:  Normal  Conduction:     Conduction: normal    ST segments:     ST segments:  Normal  T waves:     T waves: normal            ED Course  ED Course as of 10/31/22 1549   Sat Oct 29, 2022   1635 WBC: 7 11   1635 Procalcitonin: <0 05   1636 hs TnI 0hr: 6   1636 D-Dimer, Quant: <0 27   1652 Patient's breathing improved after Leverne Brian neb treatment  Will give artificial tears for left eye irritation  SBIRT 20yo+    Flowsheet Row Most Recent Value   SBIRT (23 yo +)    In order to provide better care to our patients, we are screening all of our patients for alcohol and drug use   Would it be okay to ask you these screening questions? No Filed at: 10/29/2022 1530                [facility]  Number of Diagnoses or Management Options  Dyspnea: new and requires workup  Irritation of left eye: new and does not require workup  Wheezing: new and requires workup  Diagnosis management comments: 29-year-old female presents for evaluation worsening shortness of breath  Patient recently admitted for COPD exacerbation, found to be COVID positive, discharge 3 days ago  On exam, patient noted to have sinus congestion, rhonchi, and wheezing throughout  Patient also endorsing left eye irritation, left eye with conjunctival injection, otherwise no obvious abnormalities  Patient given artificial tears for the eye irritation  Patient had been given DuoNeb by EMS, will treat with albuterol nebulizer  Will evaluate patient with basic metabolic and cardiac workup, D-dimer given recent COVID diagnosis, and procalcitonin to rule out coexisting pneumonia  Patient's EKG unchanged from previous  Troponin negative  D-dimer negative, can rule out PE  Other labs within normal limits  Chest x-ray negative for acute cardiopulmonary abnormalities  On re-evaluation after nebulizer treatment, patient states that she is feeling better, would like to go home at this time  Patient given prescription for artificial tears for eye irritation, told to continue to take her steroids as prescribed  Discharged home in stable condition with symptomatic care instructions and strict ED return precautions         Amount and/or Complexity of Data Reviewed  Clinical lab tests: ordered and reviewed  Tests in the radiology section of CPT®: ordered and reviewed  Independent visualization of images, tracings, or specimens: yes    Patient Progress  Patient progress: improved      Disposition  Final diagnoses:   Dyspnea   Wheezing   Irritation of left eye     Time reflects when diagnosis was documented in both MDM as applicable and the Disposition within this note     Time User Action Codes Description Comment    10/29/2022  5:23 PM Diane To Add [R06 00] Dyspnea     10/29/2022  5:23 PM Diane To Add [R06 2] Wheezing     10/29/2022  5:25 PM Diane To Add [H57 89] Irritation of left eye       ED Disposition     ED Disposition   Discharge    Condition   Stable    Date/Time   Sat Oct 29, 2022  5:23 PM    Comment   Lenny Tucker discharge to home/self care                 Follow-up Information     Follow up With Specialties Details Why Contact Info Additional Information    Rosita Marx DO  Schedule an appointment as soon as possible for a visit in 1 week  [address] [facility] [address]  [place] [address] Emergency Department Emergency Medicine Go to  If symptoms worsen [address] [facility] [id number]  [address] [facility] Emergency Department, [address] [place] [address], [id number]          Discharge Medication List as of 10/29/2022  5:27 PM      START taking these medications    Details   carboxymethylcellulose 0 5 % SOLN Administer 1 drop into the left eye 3 (three) times a day as needed for dry eyes, Starting Sat 10/29/2022, Normal         CONTINUE these medications which have NOT CHANGED    Details   acetaminophen (TYLENOL) 325 mg tablet Take 3 tablets (975 mg total) by mouth every 8 (eight) hours as needed for mild pain, Starting Sun 9/18/2022, No Print      albuterol (2 5 mg/3 mL) 0 083 % nebulizer solution Take 3 mL (2 5 mg total) by nebulization every 6 (six) hours as needed for wheezing or shortness of breath, Starting u 10/13/2022, Normal      apixaban (ELIQUIS) 5 mg Take 1 tablet by mouth 2 (two) times a day, Starting Mon 5/9/2022, Historical Med      atorvastatin (LIPITOR) 20 mg tablet Take 20 mg by mouth daily, Historical Med      bictegravir-emtricitab-tenofovir alafenamide (Biktarvy) -25 MG tablet Take 1 tablet by mouth daily, Starting Mon 5/9/2022, Historical Med      budesonide (PULMICORT) 0 5 mg/2 mL nebulizer solution Take 2 mL (0 5 mg total) by nebulization 2 (two) times a day Rinse mouth after use , Starting Sun 9/18/2022, Normal      cholecalciferol (VITAMIN D3) 1,000 units tablet Take 1,000 Units by mouth daily, Historical Med      diltiazem (CARDIZEM CD) 120 mg 24 hr capsule Take 1 capsule (120 mg total) by mouth daily, Starting Thu 9/29/2022, Normal      Fluticasone-Salmeterol (Advair) 100-50 mcg/dose inhaler Inhale 1 puff 2 (two) times a day Rinse mouth after use , Historical Med      furosemide (LASIX) 40 mg tablet Take 1 tablet (40 mg total) by mouth 2 (two) times a day, Starting Thu 9/29/2022, Normal      gabapentin (NEURONTIN) 400 mg capsule Take 800 mg by mouth 3 (three) times a day  , Historical Med      levothyroxine 100 mcg tablet Take 100 mcg by mouth daily  , Historical Med      montelukast (SINGULAIR) 10 mg tablet Take 10 mg by mouth daily at bedtime, Historical Med      nicotine (NICODERM CQ) 21 mg/24 hr TD 24 hr patch Place 1 patch on the skin daily, Starting Mon 9/19/2022, Normal      potassium chloride (K-DUR,KLOR-CON) 20 mEq tablet Take 1 tablet (20 mEq total) by mouth daily, Starting Mon 9/19/2022, Normal      predniSONE 10 mg tablet 4 tabs x 3 days, 3 tabs x 3tabs, 2 tabs x 3 days, 1 tab x 3 days, then stop, Normal      traMADol (ULTRAM) 50 mg tablet Take 50 mg by mouth as needed, Starting Mon 9/19/2022, Historical Med           No discharge procedures on file  PDMP Review       Value Time User    PDMP Reviewed  Yes 6/21/2022  7:54 PM Evon Orlando           ED Provider  Attending physically available and evaluated Kristy Barr I managed the patient along with the ED Attending      Electronically Signed by         Estefania Bernstein DO  10/31/22 3950

## 2022-10-30 LAB
ATRIAL RATE: 109 BPM
P AXIS: 45 DEGREES
PR INTERVAL: 164 MS
QRS AXIS: -80 DEGREES
QRSD INTERVAL: 96 MS
QT INTERVAL: 330 MS
QTC INTERVAL: 444 MS
T WAVE AXIS: 49 DEGREES
VENTRICULAR RATE: 109 BPM

## 2022-11-03 ENCOUNTER — HOSPITAL ENCOUNTER (INPATIENT)
Facility: HOSPITAL | Age: 72
LOS: 2 days | Discharge: HOME HEALTH | End: 2022-11-06
Attending: EMERGENCY MEDICINE | Admitting: INTERNAL MEDICINE

## 2022-11-03 ENCOUNTER — PATIENT OUTREACH (OUTPATIENT)
Dept: CASE MANAGEMENT | Facility: OTHER | Age: 72
End: 2022-11-03

## 2022-11-03 ENCOUNTER — APPOINTMENT (EMERGENCY)
Dept: RADIOLOGY | Facility: HOSPITAL | Age: 72
End: 2022-11-03

## 2022-11-03 DIAGNOSIS — E87.6 ACUTE HYPOKALEMIA: ICD-10-CM

## 2022-11-03 DIAGNOSIS — B20 HISTORY OF HIV INFECTION (HCC): ICD-10-CM

## 2022-11-03 DIAGNOSIS — J44.1 COPD EXACERBATION (HCC): Primary | ICD-10-CM

## 2022-11-03 DIAGNOSIS — Z87.09 HISTORY OF COPD: ICD-10-CM

## 2022-11-03 LAB
ANION GAP SERPL CALCULATED.3IONS-SCNC: 12 MMOL/L (ref 4–13)
BASOPHILS # BLD AUTO: 0.02 THOUSANDS/ÂΜL (ref 0–0.1)
BASOPHILS NFR BLD AUTO: 0 % (ref 0–1)
BUN SERPL-MCNC: 14 MG/DL (ref 5–25)
CALCIUM SERPL-MCNC: 10.9 MG/DL (ref 8.3–10.1)
CARDIAC TROPONIN I PNL SERPL HS: 9 NG/L
CHLORIDE SERPL-SCNC: 100 MMOL/L (ref 96–108)
CO2 SERPL-SCNC: 30 MMOL/L (ref 21–32)
CREAT SERPL-MCNC: 1.07 MG/DL (ref 0.6–1.3)
D DIMER PPP FEU-MCNC: <0.27 UG/ML FEU
EOSINOPHIL # BLD AUTO: 0 THOUSAND/ÂΜL (ref 0–0.61)
EOSINOPHIL NFR BLD AUTO: 0 % (ref 0–6)
ERYTHROCYTE [DISTWIDTH] IN BLOOD BY AUTOMATED COUNT: 15.3 % (ref 11.6–15.1)
GFR SERPL CREATININE-BSD FRML MDRD: 52 ML/MIN/1.73SQ M
GLUCOSE SERPL-MCNC: 133 MG/DL (ref 65–140)
GLUCOSE SERPL-MCNC: 230 MG/DL (ref 65–140)
HCT VFR BLD AUTO: 35.5 % (ref 34.8–46.1)
HGB BLD-MCNC: 11.2 G/DL (ref 11.5–15.4)
IMM GRANULOCYTES # BLD AUTO: 0.07 THOUSAND/UL (ref 0–0.2)
IMM GRANULOCYTES NFR BLD AUTO: 1 % (ref 0–2)
LYMPHOCYTES # BLD AUTO: 1.77 THOUSANDS/ÂΜL (ref 0.6–4.47)
LYMPHOCYTES NFR BLD AUTO: 19 % (ref 14–44)
MCH RBC QN AUTO: 29.2 PG (ref 26.8–34.3)
MCHC RBC AUTO-ENTMCNC: 31.5 G/DL (ref 31.4–37.4)
MCV RBC AUTO: 93 FL (ref 82–98)
MONOCYTES # BLD AUTO: 0.58 THOUSAND/ÂΜL (ref 0.17–1.22)
MONOCYTES NFR BLD AUTO: 6 % (ref 4–12)
NEUTROPHILS # BLD AUTO: 7.02 THOUSANDS/ÂΜL (ref 1.85–7.62)
NEUTS SEG NFR BLD AUTO: 74 % (ref 43–75)
NRBC BLD AUTO-RTO: 0 /100 WBCS
PLATELET # BLD AUTO: 338 THOUSANDS/UL (ref 149–390)
PMV BLD AUTO: 9.2 FL (ref 8.9–12.7)
POTASSIUM SERPL-SCNC: 2.9 MMOL/L (ref 3.5–5.3)
RBC # BLD AUTO: 3.83 MILLION/UL (ref 3.81–5.12)
SODIUM SERPL-SCNC: 142 MMOL/L (ref 135–147)
WBC # BLD AUTO: 9.46 THOUSAND/UL (ref 4.31–10.16)

## 2022-11-03 RX ORDER — INSULIN LISPRO 100 [IU]/ML
1-6 INJECTION, SOLUTION INTRAVENOUS; SUBCUTANEOUS
Status: DISCONTINUED | OUTPATIENT
Start: 2022-11-03 | End: 2022-11-06 | Stop reason: HOSPADM

## 2022-11-03 RX ORDER — TOPIRAMATE 50 MG/1
50 TABLET, FILM COATED ORAL 2 TIMES DAILY
COMMUNITY
Start: 2022-09-30 | End: 2023-09-30

## 2022-11-03 RX ORDER — DILTIAZEM HYDROCHLORIDE 120 MG/1
120 CAPSULE, COATED, EXTENDED RELEASE ORAL DAILY
Status: DISCONTINUED | OUTPATIENT
Start: 2022-11-04 | End: 2022-11-06 | Stop reason: HOSPADM

## 2022-11-03 RX ORDER — MONTELUKAST SODIUM 10 MG/1
10 TABLET ORAL
Status: DISCONTINUED | OUTPATIENT
Start: 2022-11-03 | End: 2022-11-06 | Stop reason: HOSPADM

## 2022-11-03 RX ORDER — IPRATROPIUM BROMIDE AND ALBUTEROL SULFATE .5; 3 MG/3ML; MG/3ML
1 SOLUTION RESPIRATORY (INHALATION) ONCE
Status: COMPLETED | OUTPATIENT
Start: 2022-11-03 | End: 2022-11-03

## 2022-11-03 RX ORDER — ATORVASTATIN CALCIUM 20 MG/1
20 TABLET, FILM COATED ORAL
Status: DISCONTINUED | OUTPATIENT
Start: 2022-11-04 | End: 2022-11-06 | Stop reason: HOSPADM

## 2022-11-03 RX ORDER — FUROSEMIDE 40 MG/1
40 TABLET ORAL 2 TIMES DAILY
Status: DISCONTINUED | OUTPATIENT
Start: 2022-11-03 | End: 2022-11-06 | Stop reason: HOSPADM

## 2022-11-03 RX ORDER — ACETAMINOPHEN 325 MG/1
650 TABLET ORAL EVERY 6 HOURS PRN
Status: DISCONTINUED | OUTPATIENT
Start: 2022-11-03 | End: 2022-11-06 | Stop reason: HOSPADM

## 2022-11-03 RX ORDER — LEVOTHYROXINE SODIUM 0.12 MG/1
125 TABLET ORAL
Status: DISCONTINUED | OUTPATIENT
Start: 2022-11-04 | End: 2022-11-06 | Stop reason: HOSPADM

## 2022-11-03 RX ORDER — POTASSIUM CHLORIDE 20 MEQ/1
40 TABLET, EXTENDED RELEASE ORAL ONCE
Status: COMPLETED | OUTPATIENT
Start: 2022-11-03 | End: 2022-11-03

## 2022-11-03 RX ORDER — LANOLIN ALCOHOL/MO/W.PET/CERES
6 CREAM (GRAM) TOPICAL
Status: DISCONTINUED | OUTPATIENT
Start: 2022-11-03 | End: 2022-11-06 | Stop reason: HOSPADM

## 2022-11-03 RX ORDER — SODIUM CHLORIDE FOR INHALATION 0.9 %
3 VIAL, NEBULIZER (ML) INHALATION
Status: DISCONTINUED | OUTPATIENT
Start: 2022-11-03 | End: 2022-11-04

## 2022-11-03 RX ORDER — SODIUM CHLORIDE FOR INHALATION 0.9 %
3 VIAL, NEBULIZER (ML) INHALATION ONCE
Status: COMPLETED | OUTPATIENT
Start: 2022-11-03 | End: 2022-11-03

## 2022-11-03 RX ORDER — FLUTICASONE FUROATE, UMECLIDINIUM BROMIDE AND VILANTEROL TRIFENATATE 200; 62.5; 25 UG/1; UG/1; UG/1
1 POWDER RESPIRATORY (INHALATION) DAILY
COMMUNITY

## 2022-11-03 RX ORDER — METHYLPREDNISOLONE SODIUM SUCCINATE 125 MG/2ML
125 INJECTION, POWDER, LYOPHILIZED, FOR SOLUTION INTRAMUSCULAR; INTRAVENOUS ONCE
Status: COMPLETED | OUTPATIENT
Start: 2022-11-03 | End: 2022-11-03

## 2022-11-03 RX ORDER — POTASSIUM CHLORIDE 20 MEQ/1
20 TABLET, EXTENDED RELEASE ORAL DAILY
Status: DISCONTINUED | OUTPATIENT
Start: 2022-11-04 | End: 2022-11-06 | Stop reason: HOSPADM

## 2022-11-03 RX ORDER — GABAPENTIN 400 MG/1
800 CAPSULE ORAL 3 TIMES DAILY
Status: DISCONTINUED | OUTPATIENT
Start: 2022-11-03 | End: 2022-11-06 | Stop reason: HOSPADM

## 2022-11-03 RX ORDER — METHYLPREDNISOLONE SODIUM SUCCINATE 40 MG/ML
40 INJECTION, POWDER, LYOPHILIZED, FOR SOLUTION INTRAMUSCULAR; INTRAVENOUS EVERY 8 HOURS
Status: DISCONTINUED | OUTPATIENT
Start: 2022-11-03 | End: 2022-11-05

## 2022-11-03 RX ORDER — TRAMADOL HYDROCHLORIDE 50 MG/1
50 TABLET ORAL EVERY 6 HOURS PRN
Status: DISCONTINUED | OUTPATIENT
Start: 2022-11-03 | End: 2022-11-06 | Stop reason: HOSPADM

## 2022-11-03 RX ORDER — LEVALBUTEROL 1.25 MG/.5ML
1.25 SOLUTION, CONCENTRATE RESPIRATORY (INHALATION)
Status: DISCONTINUED | OUTPATIENT
Start: 2022-11-03 | End: 2022-11-06 | Stop reason: HOSPADM

## 2022-11-03 RX ORDER — TOPIRAMATE 25 MG/1
50 TABLET ORAL 2 TIMES DAILY
Status: DISCONTINUED | OUTPATIENT
Start: 2022-11-03 | End: 2022-11-06 | Stop reason: HOSPADM

## 2022-11-03 RX ORDER — MAGNESIUM SULFATE HEPTAHYDRATE 40 MG/ML
2 INJECTION, SOLUTION INTRAVENOUS ONCE
Status: COMPLETED | OUTPATIENT
Start: 2022-11-03 | End: 2022-11-03

## 2022-11-03 RX ORDER — MELATONIN
1000 DAILY
Status: DISCONTINUED | OUTPATIENT
Start: 2022-11-04 | End: 2022-11-06 | Stop reason: HOSPADM

## 2022-11-03 RX ORDER — DOCUSATE SODIUM 100 MG/1
100 CAPSULE, LIQUID FILLED ORAL 2 TIMES DAILY PRN
Status: DISCONTINUED | OUTPATIENT
Start: 2022-11-03 | End: 2022-11-06 | Stop reason: HOSPADM

## 2022-11-03 RX ORDER — LOSARTAN POTASSIUM 100 MG/1
100 TABLET ORAL DAILY
COMMUNITY
Start: 2022-11-02

## 2022-11-03 RX ORDER — LEVOTHYROXINE SODIUM 0.1 MG/1
100 TABLET ORAL
Status: DISCONTINUED | OUTPATIENT
Start: 2022-11-04 | End: 2022-11-03

## 2022-11-03 RX ORDER — ONDANSETRON 2 MG/ML
4 INJECTION INTRAMUSCULAR; INTRAVENOUS EVERY 4 HOURS PRN
Status: DISCONTINUED | OUTPATIENT
Start: 2022-11-03 | End: 2022-11-06 | Stop reason: HOSPADM

## 2022-11-03 RX ORDER — BUDESONIDE 0.5 MG/2ML
0.5 INHALANT ORAL 2 TIMES DAILY
Status: DISCONTINUED | OUTPATIENT
Start: 2022-11-03 | End: 2022-11-06 | Stop reason: HOSPADM

## 2022-11-03 RX ADMIN — ACETAMINOPHEN 650 MG: 325 TABLET, FILM COATED ORAL at 22:46

## 2022-11-03 RX ADMIN — POTASSIUM CHLORIDE 40 MEQ: 1500 TABLET, EXTENDED RELEASE ORAL at 18:27

## 2022-11-03 RX ADMIN — TOPIRAMATE 50 MG: 25 TABLET, FILM COATED ORAL at 22:39

## 2022-11-03 RX ADMIN — GABAPENTIN 800 MG: 400 CAPSULE ORAL at 22:39

## 2022-11-03 RX ADMIN — ALBUTEROL SULFATE 10 MG: 2.5 SOLUTION RESPIRATORY (INHALATION) at 15:50

## 2022-11-03 RX ADMIN — IPRATROPIUM BROMIDE 1 MG: 0.5 SOLUTION RESPIRATORY (INHALATION) at 15:50

## 2022-11-03 RX ADMIN — POTASSIUM CHLORIDE 40 MEQ: 1500 TABLET, EXTENDED RELEASE ORAL at 22:39

## 2022-11-03 RX ADMIN — INSULIN LISPRO 3 UNITS: 100 INJECTION, SOLUTION INTRAVENOUS; SUBCUTANEOUS at 22:55

## 2022-11-03 RX ADMIN — MONTELUKAST 10 MG: 10 TABLET, FILM COATED ORAL at 22:38

## 2022-11-03 RX ADMIN — MAGNESIUM SULFATE HEPTAHYDRATE 2 G: 40 INJECTION, SOLUTION INTRAVENOUS at 17:22

## 2022-11-03 RX ADMIN — METHYLPREDNISOLONE SODIUM SUCCINATE 125 MG: 125 INJECTION, POWDER, FOR SOLUTION INTRAMUSCULAR; INTRAVENOUS at 17:21

## 2022-11-03 RX ADMIN — ISODIUM CHLORIDE 3 ML: 0.03 SOLUTION RESPIRATORY (INHALATION) at 15:50

## 2022-11-03 RX ADMIN — APIXABAN 5 MG: 5 TABLET, FILM COATED ORAL at 22:39

## 2022-11-03 NOTE — PROGRESS NOTES
Patient was reviewed by the [facility] committee  A care plan is not appropriate at this time  Recommendation is for the patient to follow up with pulmonary  Noted that she has an upcoming appointment with them next week  Call placed to [facility] Remote monitoring to see if they might be able to assist respiratory therapist, Abdelrahman Dow, connect with the patient  Per [facility], the patient's enrollment in remote monitoring was cancelled due to a history of the patient not participating in the program or following up with their nurses  Update sent to Abdelrahman Dow via email

## 2022-11-03 NOTE — H&P
[employer]  H&P- Angelica Gallardo 1950, 81 y o  female MRN: [id number]  Unit/Bed#: ED 17 Encounter: [id number]  Primary Care Provider: Ashlee Whittington DO   Date and time admitted to hospital: 11/3/2022  3:17 PM    Assessment and Plan  * COPD exacerbation ([facility] )  Assessment & Plan  · Recurrent admission for COPD exacerbation  Patient given smoking approximately two weeks ago  · Continue IV methylprednisolone  Will schedule Xopenex/Atrovent  · Follows with  pulmonology as outpatient    Hypokalemia  Assessment & Plan  · Secondary to diuretic use  Replete again tonight and recheck in a m  Results from last 7 days   Lab Units 11/03/22  1733 10/29/22  1555   POTASSIUM mmol/L 2 9* 3 6       Paroxysmal atrial fibrillation (HCC)  Assessment & Plan  · Continue diltiazem and eliquis    Hyperlipidemia  Assessment & Plan  · Continue atorvastatin    Bipolar disorder (HCC)  Assessment & Plan  · Mood stable continue latuda and topiramate    Chronic respiratory failure with hypoxia (HCC)  Assessment & Plan  · Currently on 2 L secondary to COPD and CHF    Type 2 diabetes mellitus with hyperglycemia ([facility] )  Assessment & Plan  Lab Results   Component Value Date    HGBA1C 6 8 (H) 10/16/2022     Recent Labs     11/03/22  2019   POCGLU 230*     · Diabetes mellitus prior to admission on metformin  · Having steroid induced hyperglycemia and will place on sliding scale insulin    HTN (hypertension)  Assessment & Plan  · Continue diltiazem for atrial fibrillation  Holding losartan given lower blood pressures and restart when appropriate    HIV (human immunodeficiency virus infection)   Assessment & Plan  · Continue biktarvy      VTE Prophylaxis: Apixaban (Eliquis)  Code Status: Level 1 - Full Code  Anticipated Length of Stay:  Patient will be admitted on an Observation basis with an anticipated length of stay of  less than 2 midnights       Justification for Hospital Stay: COPD exacerbation [facility])  Total Time for Visit, including Counseling / Coordination of Care: xx mins  Greater than 50% of this total time spent on direct patient counseling and coordination of care  Chief Complaint:     Shortness of Breath (Pt with hx of COPD and asthma  Pt used inhaler at home without relief  Pt given duo neb in route  Pt reports to triage RN no relief at this time  Pt on 2L nasal cannula at home)    History of Present Illness:    Manny Bernal is a 81 y o  female with a past medical history of HIV COPD CHF paroxysmal atrial fibrillation and bipolar disorder who presents with worsening shortness of breath  The patient has had several ED visits and two admissions within the month of October  She re-presented to the hospital with shortness of breath again  She lives with daughter but denies any sick contacts  Symptoms began today and despite using her 2 L she continued to be symptomatic  She came to the emergency department where given persistent wheezing and symptoms admission was requested  She denies any fevers chills nausea vomiting or diarrhea  Review of Systems:  Review of Systems   Constitutional: Negative for chills, diaphoresis and fever  HENT: Negative for dental problem and facial swelling  Eyes: Negative for photophobia, pain and visual disturbance  Respiratory: Positive for cough and shortness of breath  Cardiovascular: Negative for chest pain and palpitations  Gastrointestinal: Negative for abdominal distention, abdominal pain, diarrhea, nausea and vomiting  Genitourinary: Negative for dysuria, hematuria and urgency  Musculoskeletal: Negative for back pain and myalgias  Skin: Negative for rash  Neurological: Negative for seizures, speech difficulty, numbness and headaches  Psychiatric/Behavioral: The patient is not nervous/anxious  All other systems reviewed and are negative          Past Medical and Surgical History:   Past Medical History:   Diagnosis Date   • Asthma    • Bipolar 1 disorder [facility])    • Cardiac disease    • COPD (chronic obstructive pulmonary disease) ([facility])    • Diabetes mellitus ([facility] )    • Disease of thyroid gland    • HIV (human immunodeficiency virus infection) ([facility] ) 03/24/2016   • Hypertension    • Osteoarthritis    • Tobacco abuse      Past Surgical History:   Procedure Laterality Date   • HERNIA REPAIR     • LUMBAR FUSION N/A 4/10/2018    Procedure: T9/10 discectomy with T9-T11 fusion;  Surgeon: Radha Deluna MD;  Location: [facility];  Service: Neurosurgery   • THYROIDECTOMY       Meds/Allergies: Allergies: Allergies   Allergen Reactions   • Lisinopril Angioedema   • Prozac [Fluoxetine Hcl] Rash   • Sulfa Antibiotics Itching   • Quinine      Prior to Admission Medications   Prescriptions Last Dose Informant Patient Reported?  Taking?   acetaminophen (TYLENOL) 325 mg tablet   No No   Sig: Take 3 tablets (975 mg total) by mouth every 8 (eight) hours as needed for mild pain   albuterol (2 5 mg/3 mL) 0 083 % nebulizer solution   No No   Sig: Take 3 mL (2 5 mg total) by nebulization every 6 (six) hours as needed for wheezing or shortness of breath   apixaban (ELIQUIS) 5 mg   Yes No   Sig: Take 1 tablet by mouth 2 (two) times a day   atorvastatin (LIPITOR) 20 mg tablet   Yes No   Sig: Take 20 mg by mouth daily   bictegravir-emtricitab-tenofovir alafenamide (Biktarvy) -25 MG tablet   Yes No   Sig: Take 1 tablet by mouth daily   budesonide (PULMICORT) 0 5 mg/2 mL nebulizer solution   No No   Sig: Take 2 mL (0 5 mg total) by nebulization 2 (two) times a day Rinse mouth after use    carboxymethylcellulose 0 5 % SOLN   No No   Sig: Administer 1 drop into the left eye 3 (three) times a day as needed for dry eyes   cholecalciferol (VITAMIN D3) 1,000 units tablet   Yes No   Sig: Take 1,000 Units by mouth daily   diltiazem (CARDIZEM CD) 120 mg 24 hr capsule   No No   Sig: Take 1 capsule (120 mg total) by mouth daily   fluticasone-umeclidinium-vilanterol (Trelegy Ellipta) 643-65 0-00 mcg/actuation AEPB inhaler   Yes Yes   Sig: Inhale 1 puff daily Rinse mouth after use     furosemide (LASIX) 40 mg tablet   No No   Sig: Take 1 tablet (40 mg total) by mouth 2 (two) times a day   gabapentin (NEURONTIN) 400 mg capsule   Yes No   Sig: Take 800 mg by mouth 3 (three) times a day     levothyroxine 125 mcg tablet   Yes No   Sig: Take 125 mcg by mouth daily   losartan (COZAAR) 100 MG tablet   Yes Yes   Sig: Take 100 mg by mouth daily   lurasidone (LATUDA) 40 mg tablet   Yes Yes   Sig: Take 40 mg by mouth   metFORMIN (GLUCOPHAGE) 500 mg tablet   Yes Yes   Sig: Take 500 mg by mouth daily   montelukast (SINGULAIR) 10 mg tablet   Yes No   Sig: Take 10 mg by mouth daily at bedtime   nicotine (NICODERM CQ) 21 mg/24 hr TD 24 hr patch   No No   Sig: Place 1 patch on the skin daily   potassium chloride (K-DUR,KLOR-CON) 20 mEq tablet   No No   Sig: Take 1 tablet (20 mEq total) by mouth daily   predniSONE 10 mg tablet   No No   Si tabs x 3 days, 3 tabs x 3tabs, 2 tabs x 3 days, 1 tab x 3 days, then stop   topiramate (TOPAMAX) 50 MG tablet   Yes Yes   Sig: Take 50 mg by mouth 2 (two) times a day   traMADol (ULTRAM) 50 mg tablet   Yes No   Sig: Take 50 mg by mouth as needed      Facility-Administered Medications: None     Social History:     Social History     Socioeconomic History   • Marital status:      Spouse name: Not on file   • Number of children: Not on file   • Years of education: Not on file   • Highest education level: Not on file   Occupational History   • Not on file   Tobacco Use   • Smoking status: Former Smoker     Packs/day: 0 20     Years: 45 00     Pack years: 9 00     Types: Cigarettes   • Smokeless tobacco: Never Used   • Tobacco comment: Currently smoking 1-2 cigarettes a day   Substance and Sexual Activity   • Alcohol use: Not Currently     Alcohol/week: 2 0 standard drinks     Types: 1 Cans of beer, 1 Shots of liquor per week     Comment: only on holidays   • Drug use: Not Currently     Comment: former IV drug user   • Sexual activity: Not Currently     Partners: Male   Other Topics Concern   • Not on file   Social History Narrative   • Not on file     Social Determinants of Health     Financial Resource Strain: Not on file   Food Insecurity: No Food Insecurity   • Worried About Running Out of Food in the Last Year: Never true   • Ran Out of Food in the Last Year: Never true   Transportation Needs: No Transportation Needs   • Lack of Transportation (Medical): No   • Lack of Transportation (Non-Medical): No   Physical Activity: Not on file   Stress: Not on file   Social Connections: Not on file   Intimate Partner Violence: Not on file   Housing Stability: Low Risk    • Unable to Pay for Housing in the Last Year: No   • Number of Places Lived in the Last Year: 1   • Unstable Housing in the Last Year: No     Patient Pre-hospital Living Situation:   Patient Pre-hospital Level of Mobility:   Patient Pre-hospital Diet Restrictions:     Family History:  Family History   Problem Relation Age of Onset   • No Known Problems Mother    • Diabetes Father    • Heart disease Neg Hx    • Cancer Neg Hx      Physical Exam:   Vitals:   Blood Pressure: 120/77 (11/03/22 1944)  Pulse: 103 (11/03/22 1944)  Temperature: 97 9 °F (36 6 °C) (11/03/22 1944)  Temp Source: Oral (11/03/22 1525)  Respirations: 19 (11/03/22 1944)  SpO2: 98 % (11/03/22 1944)    Physical Exam  Vitals reviewed  Constitutional:       General: She is not in acute distress  Appearance: Normal appearance  HENT:      Head: Atraumatic  Eyes:      General: No scleral icterus  Extraocular Movements: Extraocular movements intact  Cardiovascular:      Rate and Rhythm: Regular rhythm  Heart sounds: Normal heart sounds  Pulmonary:      Breath sounds: Decreased breath sounds and wheezing present  Abdominal:      General: Bowel sounds are normal       Palpations: Abdomen is soft  Tenderness:  There is no guarding or rebound  Musculoskeletal:         General: No swelling  Right lower leg: Edema present  Left lower leg: Edema present  Skin:     General: Skin is warm  Neurological:      Mental Status: She is alert  Motor: No weakness  Psychiatric:         Mood and Affect: Mood normal        Lab Results: I have personally reviewed pertinent reports  Results from last 7 days   Lab Units 11/03/22  1733   WBC Thousand/uL 9 46   HEMOGLOBIN g/dL 11 2*   HEMATOCRIT % 35 5   PLATELETS Thousands/uL 338   NEUTROS PCT % 74   LYMPHS PCT % 19   MONOS PCT % 6   EOS PCT % 0     Results from last 7 days   Lab Units 11/03/22  1733 10/29/22  1555   SODIUM mmol/L 142 142   POTASSIUM mmol/L 2 9* 3 6   CHLORIDE mmol/L 100 103   CO2 mmol/L 30 30   ANION GAP mmol/L 12 9   BUN mg/dL 14 23   CREATININE mg/dL 1 07 0 98   CALCIUM mg/dL 10 9* 10 7*   EGFR ml/min/1 73sq m 52 58   GLUCOSE RANDOM mg/dL 133 110             Results from last 7 days   Lab Units 11/03/22  1733   HS TNI 0HR ng/L 9         Results from last 7 days   Lab Units 11/03/22  1733 10/29/22  1555   D-DIMER QUANTITATIVE ug/ml FEU <0 27 <0 27       Imaging: I have personally reviewed pertinent films in PACS  XR chest    Date:  11/3/2022  Personal impression:  No infiltrates; poor inspiratory effort    EKG, Pathology, and Other Studies Reviewed on Admission:   EKG  Result Date: 11/03/22  Personally reviewed strips with impression of:  Normal sinus rhythm 100 bpm    Allscripts/ Epic Records Reviewed: Yes    ** Please Note: This note has been constructed using a voice recognition system   **

## 2022-11-03 NOTE — ED PROVIDER NOTES
History  Chief Complaint   Patient presents with   • Shortness of Breath     Pt with hx of COPD and asthma  Pt used inhaler at home without relief  Pt given duo neb in route  Pt reports to triage RN no relief at this time  Pt on 2L nasal cannula at home     81 y o  F w/h/o asthma/COPD on 2L NC, bipolar disorder, DM, HIV, HTN p/w SOB x today  Pt states her asthma is acting up  Having wheezing  Denies F/C, cough, CP, abd pain, N/V, LE edema, calf pain  Pt was given duoneb with little relief  History provided by:  Patient   used: No    Shortness of Breath  Timing:  Constant  Progression:  Unchanged  Chronicity:  New  Relieved by:  Nothing  Worsened by:  Nothing  Ineffective treatments:  Inhaler  Associated symptoms: wheezing    Associated symptoms: no abdominal pain, no chest pain, no cough, no fever and no vomiting        Prior to Admission Medications   Prescriptions Last Dose Informant Patient Reported? Taking? Fluticasone-Salmeterol (Advair) 100-50 mcg/dose inhaler   Yes No   Sig: Inhale 1 puff 2 (two) times a day Rinse mouth after use     acetaminophen (TYLENOL) 325 mg tablet   No No   Sig: Take 3 tablets (975 mg total) by mouth every 8 (eight) hours as needed for mild pain   albuterol (2 5 mg/3 mL) 0 083 % nebulizer solution   No No   Sig: Take 3 mL (2 5 mg total) by nebulization every 6 (six) hours as needed for wheezing or shortness of breath   apixaban (ELIQUIS) 5 mg   Yes No   Sig: Take 1 tablet by mouth 2 (two) times a day   atorvastatin (LIPITOR) 20 mg tablet   Yes No   Sig: Take 20 mg by mouth daily   bictegravir-emtricitab-tenofovir alafenamide (Biktarvy) -25 MG tablet   Yes No   Sig: Take 1 tablet by mouth daily   budesonide (PULMICORT) 0 5 mg/2 mL nebulizer solution   No No   Sig: Take 2 mL (0 5 mg total) by nebulization 2 (two) times a day Rinse mouth after use    carboxymethylcellulose 0 5 % SOLN   No No   Sig: Administer 1 drop into the left eye 3 (three) times a day as needed for dry eyes   cholecalciferol (VITAMIN D3) 1,000 units tablet   Yes No   Sig: Take 1,000 Units by mouth daily   diltiazem (CARDIZEM CD) 120 mg 24 hr capsule   No No   Sig: Take 1 capsule (120 mg total) by mouth daily   furosemide (LASIX) 40 mg tablet   No No   Sig: Take 1 tablet (40 mg total) by mouth 2 (two) times a day   gabapentin (NEURONTIN) 400 mg capsule   Yes No   Sig: Take 800 mg by mouth 3 (three) times a day     levothyroxine 100 mcg tablet   Yes No   Sig: Take 100 mcg by mouth daily  montelukast (SINGULAIR) 10 mg tablet   Yes No   Sig: Take 10 mg by mouth daily at bedtime   nicotine (NICODERM CQ) 21 mg/24 hr TD 24 hr patch   No No   Sig: Place 1 patch on the skin daily   potassium chloride (K-DUR,KLOR-CON) 20 mEq tablet   No No   Sig: Take 1 tablet (20 mEq total) by mouth daily   predniSONE 10 mg tablet   No No   Si tabs x 3 days, 3 tabs x 3tabs, 2 tabs x 3 days, 1 tab x 3 days, then stop   traMADol (ULTRAM) 50 mg tablet   Yes No   Sig: Take 50 mg by mouth as needed      Facility-Administered Medications: None       Past Medical History:   Diagnosis Date   • Asthma    • Bipolar 1 disorder (HCC)    • Cardiac disease    • COPD (chronic obstructive pulmonary disease) (HCC)    • Diabetes mellitus (HCC)    • Disease of thyroid gland    • HIV (human immunodeficiency virus infection) ([facility] [address] ) 2016   • Hypertension    • Osteoarthritis    • Tobacco abuse        Past Surgical History:   Procedure Laterality Date   • HERNIA REPAIR     • LUMBAR FUSION N/A 4/10/2018    Procedure: T9/10 discectomy with T9-T11 fusion;  Surgeon: Daniel Byrne MD;  Location: [facility];  Service: Neurosurgery   • THYROIDECTOMY         Family History   Problem Relation Age of Onset   • No Known Problems Mother    • Diabetes Father    • Heart disease Neg Hx    • Cancer Neg Hx      I have reviewed and agree with the history as documented      E-Cigarette/Vaping     E-Cigarette/Vaping Substances     Social History Tobacco Use   • Smoking status: Current Every Day Smoker     Packs/day: 0 20     Years: 45 00     Pack years: 9 00     Types: Cigarettes   • Smokeless tobacco: Never Used   • Tobacco comment: Currently smoking 1-2 cigarettes a day   Substance Use Topics   • Alcohol use: Not Currently     Alcohol/week: 2 0 standard drinks     Types: 1 Cans of beer, 1 Shots of liquor per week     Comment: only on holidays   • Drug use: Not Currently     Comment: former IV drug user       Review of Systems   Constitutional: Negative for fever  Respiratory: Positive for shortness of breath and wheezing  Negative for cough  Cardiovascular: Negative for chest pain and leg swelling  Gastrointestinal: Negative for abdominal pain, diarrhea, nausea and vomiting  All other systems reviewed and are negative  Physical Exam  Physical Exam  Vitals and nursing note reviewed  Constitutional:       General: She is not in acute distress  Appearance: She is well-developed  She is not ill-appearing, toxic-appearing or diaphoretic  HENT:      Head: Normocephalic and atraumatic  Eyes:      General: No scleral icterus  Conjunctiva/sclera:      Right eye: Right conjunctiva is not injected  Left eye: Left conjunctiva is not injected  Neck:      Vascular: No JVD  Trachea: Trachea normal    Cardiovascular:      Rate and Rhythm: Normal rate and regular rhythm  Pulses: Normal pulses  Heart sounds: Normal heart sounds  No murmur heard  No friction rub  Pulmonary:      Effort: Pulmonary effort is normal  No accessory muscle usage or respiratory distress  Breath sounds: Normal breath sounds  No stridor  No wheezing, rhonchi or rales  Chest:      Chest wall: No tenderness  Abdominal:      General: There is no distension  Palpations: Abdomen is soft  Tenderness: There is no abdominal tenderness  There is no guarding or rebound  Musculoskeletal:      Cervical back: Normal range of motion  Skin:     General: Skin is warm and dry  Coloration: Skin is not pale  Findings: No rash  Neurological:      Mental Status: She is alert  GCS: GCS eye subscore is 4  GCS verbal subscore is 5  GCS motor subscore is 6     Psychiatric:         Behavior: Behavior normal          Vital Signs  ED Triage Vitals [11/03/22 1525]   Temperature Pulse Respirations Blood Pressure SpO2   98 7 °F (37 1 °C) 99 (!) 24 120/72 97 %      Temp Source Heart Rate Source Patient Position - Orthostatic VS BP Location FiO2 (%)   Oral Monitor Sitting Right arm --      Pain Score       --           Vitals:    11/03/22 1525 11/03/22 1736   BP: 120/72 98/58   Pulse: 99 95   Patient Position - Orthostatic VS: Sitting Lying         Visual Acuity      ED Medications  Medications   ipratropium-albuterol (FOR EMS ONLY) (DUO-NEB) 0 5-2 5 mg/3 mL inhalation solution 3 mL (0 mL Does not apply Given to EMS 11/3/22 1526)   albuterol inhalation solution 10 mg (10 mg Nebulization Given 11/3/22 1550)     And   ipratropium (ATROVENT) 0 02 % inhalation solution 1 mg (1 mg Nebulization Given 11/3/22 1550)     And   sodium chloride 0 9 % inhalation solution 3 mL (3 mL Nebulization Given 11/3/22 1550)   methylPREDNISolone sodium succinate (Solu-MEDROL) injection 125 mg (125 mg Intravenous Given 11/3/22 1721)   magnesium sulfate 2 g/50 mL IVPB (premix) 2 g (0 g Intravenous Stopped 11/3/22 1750)   potassium chloride (K-DUR,KLOR-CON) CR tablet 40 mEq (40 mEq Oral Given 11/3/22 1827)       Diagnostic Studies  Results Reviewed     Procedure Component Value Units Date/Time    Basic metabolic panel [[id number]]  (Abnormal) Collected: 11/03/22 1733    Lab Status: Final result Specimen: Blood from Arm, Left Updated: 11/03/22 1821     Sodium 142 mmol/L      Potassium 2 9 mmol/L      Chloride 100 mmol/L      CO2 30 mmol/L      ANION GAP 12 mmol/L      BUN 14 mg/dL      Creatinine 1 07 mg/dL      Glucose 133 mg/dL      Calcium 10 9 mg/dL      eGFR 52 ml/min/1 73sq m     Narrative:      [place] guidelines for Chronic Kidney Disease (CKD):   •  Stage 1 with normal or high GFR (GFR > 90 mL/min/1 73 square meters)  •  Stage 2 Mild CKD (GFR = 60-89 mL/min/1 73 square meters)  •  Stage 3A Moderate CKD (GFR = 45-59 mL/min/1 73 square meters)  •  Stage 3B Moderate CKD (GFR = 30-44 mL/min/1 73 square meters)  •  Stage 4 Severe CKD (GFR = 15-29 mL/min/1 73 square meters)  •  Stage 5 End Stage CKD (GFR <15 mL/min/1 73 square meters)  Note: GFR calculation is accurate only with a steady state creatinine    HS Troponin I 2hr [[id number]]     Lab Status: No result Specimen: Blood     HS Troponin 0hr (reflex protocol) [[id number]]  (Normal) Collected: 11/03/22 1733    Lab Status: Final result Specimen: Blood from Arm, Left Updated: 11/03/22 1819     hs TnI 0hr 9 ng/L     D-Dimer [[id number]]  (Normal) Collected: 11/03/22 1733    Lab Status: Final result Specimen: Blood from Arm, Left Updated: 11/03/22 1802     D-Dimer, Quant <0 27 ug/ml FEU     Narrative: In the evaluation for possible pulmonary embolism, in the appropriate (Well's Score of 4 or less) patient, the age adjusted d-dimer cutoff for this patient can be calculated as:    Age x 0 01 (in ug/mL) for Age-adjusted D-dimer exclusion threshold for a patient over 50 years      CBC and differential [[id number]]  (Abnormal) Collected: 11/03/22 1733    Lab Status: Final result Specimen: Blood from Arm, Left Updated: 11/03/22 1743     WBC 9 46 Thousand/uL      RBC 3 83 Million/uL      Hemoglobin 11 2 g/dL      Hematocrit 35 5 %      MCV 93 fL      MCH 29 2 pg      MCHC 31 5 g/dL      RDW 15 3 %      MPV 9 2 fL      Platelets 247 Thousands/uL      nRBC 0 /100 WBCs      Neutrophils Relative 74 %      Immat GRANS % 1 %      Lymphocytes Relative 19 %      Monocytes Relative 6 %      Eosinophils Relative 0 %      Basophils Relative 0 %      Neutrophils Absolute 7 02 Thousands/µL      Immature Grans Absolute 0 07 Thousand/uL      Lymphocytes Absolute 1 77 Thousands/µL      Monocytes Absolute 0 58 Thousand/µL      Eosinophils Absolute 0 00 Thousand/µL      Basophils Absolute 0 02 Thousands/µL                  XR chest 2 views   ED Interpretation by 2000 Caroline Cox DO (11/03 1805)   No acute abnormalities                 Procedures  ECG 12 Lead Documentation Only    Date/Time: 11/3/2022 5:30 PM  Performed by: 2000 Caroline Cox DO  Authorized by: 2000 Caroline Cox DO     Indications / Diagnosis:  Chest pain  ECG reviewed by me, the ED Provider: yes    Patient location:  Bedside  Previous ECG:     Previous ECG:  Compared to current    Comparison ECG info:  10/29/22    Similarity:  No change  Rate:     ECG rate:  100    ECG rate assessment: tachycardic    Rhythm:     Rhythm: sinus tachycardia    Ectopy:     Ectopy: none    QRS:     QRS axis:  Left    QRS intervals:  Normal  ST segments:     ST segments:  Normal  T waves:     T waves: normal      CriticalCare Time  Performed by: 2000 Caroline Cox DO  Authorized by: 2000 Caroline Cox DO     Critical care provider statement:     Critical care time (minutes):  45    Critical care time was exclusive of:  Separately billable procedures and treating other patients and teaching time    Critical care was necessary to treat or prevent imminent or life-threatening deterioration of the following conditions:  Respiratory failure    Critical care was time spent personally by me on the following activities:  Blood draw for specimens, obtaining history from patient or surrogate, development of treatment plan with patient or surrogate, discussions with consultants, evaluation of patient's response to treatment, examination of patient, ordering and performing treatments and interventions, ordering and review of laboratory studies, ordering and review of radiographic studies, re-evaluation of patient's condition and review of old charts    I assumed direction of critical care for this patient from another provider in my specialty: no    Comments:      Pt requiring willis neb             ED Course  ED Course as of 11/03/22 1834   Thu Nov 03, 2022   1550 Pt given willis neb    1708 Pt now reports pressure in her chest   Labs/EKG/CXR ordered  1805 D-Dimer, Quant: <0 27   1819 hs TnI 0hr: 9  Will need delta   1823 Potassium(!): 2 9  Will replete             HEART Risk Score    Flowsheet Row Most Recent Value   Heart Score Risk Calculator    History 0 Filed at: 11/03/2022 1820   ECG 0 Filed at: 11/03/2022 1820   Age 2 Filed at: 11/03/2022 1820   Risk Factors 2 Filed at: 11/03/2022 1820   Troponin 0 Filed at: 11/03/2022 1820   HEART Score 4 Filed at: 11/03/2022 1820                        SBIRT 22yo+    Flowsheet Row Most Recent Value   SBIRT (23 yo +)    In order to provide better care to our patients, we are screening all of our patients for alcohol and drug use  Would it be okay to ask you these screening questions? Unable to answer at this time Filed at: 11/03/2022 1527                    MDM    Disposition  Final diagnoses:   COPD exacerbation ([facility] [address] )   Acute hypokalemia   History of COPD   History of HIV infection ([facility] [address] )     Time reflects when diagnosis was documented in both MDM as applicable and the Disposition within this note     Time User Action Codes Description Comment    11/3/2022  6:27 PM Shavonne Bull Add [J44 1] COPD exacerbation ([facility] [address] )     11/3/2022  6:27 PM Isha Bull 48 [E87 6] Acute hypokalemia     11/3/2022  6:27 PM Kota Carrillo Add [Z87 09] History of COPD     11/3/2022  6:28 PM Shavonne Bull Add [B20] History of HIV infection [facility])       ED Disposition     ED Disposition   Admit    Condition   Stable    Date/Time   Thu Nov 3, 2022  6:29 PM    Comment   Case was discussed with Dr Earnestine Bee and the patient's admission status was agreed to be Admission Status: observation status to the service of Dr Earnestine Bee              Follow-up Information    None         Patient's Medications   Discharge Prescriptions    No medications on file       No discharge procedures on file      PDMP Review       Value Time User    PDMP Reviewed  Yes 6/21/2022  7:54 PM Mingo Moura [address]          ED Provider  Electronically Signed by           [address],   11/03/22 4341

## 2022-11-03 NOTE — DISCHARGE INSTRUCTIONS
It is imperative you continue smoking cessation  I would like you to follow-up with your primary pulmonologist within 2 weeks

## 2022-11-03 NOTE — PROGRESS NOTES
OP RT CM called and spoke with her 'caregiver'  She stated that patient was sleeping and asked to call back  Will do later today   ___________________________________  OP RT CM was going to outreach patient once she awoke from her nap  Pt  Is currently in the ED  I will attempt once home

## 2022-11-04 LAB
4HR DELTA HS TROPONIN: -3 NG/L
ALBUMIN SERPL BCP-MCNC: 3.6 G/DL (ref 3.5–5)
ALP SERPL-CCNC: 75 U/L (ref 46–116)
ALT SERPL W P-5'-P-CCNC: 17 U/L (ref 12–78)
ANION GAP SERPL CALCULATED.3IONS-SCNC: 9 MMOL/L (ref 4–13)
ATRIAL RATE: 100 BPM
BILIRUB SERPL-MCNC: 0.3 MG/DL (ref 0.2–1)
BUN SERPL-MCNC: 19 MG/DL (ref 5–25)
CALCIUM SERPL-MCNC: 10.6 MG/DL (ref 8.3–10.1)
CARDIAC TROPONIN I PNL SERPL HS: 6 NG/L
CHLORIDE SERPL-SCNC: 103 MMOL/L (ref 96–108)
CO2 SERPL-SCNC: 28 MMOL/L (ref 21–32)
CREAT SERPL-MCNC: 1.3 MG/DL (ref 0.6–1.3)
ERYTHROCYTE [DISTWIDTH] IN BLOOD BY AUTOMATED COUNT: 15.4 % (ref 11.6–15.1)
GFR SERPL CREATININE-BSD FRML MDRD: 41 ML/MIN/1.73SQ M
GLUCOSE SERPL-MCNC: 129 MG/DL (ref 65–140)
GLUCOSE SERPL-MCNC: 130 MG/DL (ref 65–140)
GLUCOSE SERPL-MCNC: 170 MG/DL (ref 65–140)
GLUCOSE SERPL-MCNC: 270 MG/DL (ref 65–140)
GLUCOSE SERPL-MCNC: 397 MG/DL (ref 65–140)
HCT VFR BLD AUTO: 33 % (ref 34.8–46.1)
HGB BLD-MCNC: 10.3 G/DL (ref 11.5–15.4)
MCH RBC QN AUTO: 29.3 PG (ref 26.8–34.3)
MCHC RBC AUTO-ENTMCNC: 31.2 G/DL (ref 31.4–37.4)
MCV RBC AUTO: 94 FL (ref 82–98)
P AXIS: 49 DEGREES
PLATELET # BLD AUTO: 301 THOUSANDS/UL (ref 149–390)
PMV BLD AUTO: 8.9 FL (ref 8.9–12.7)
POTASSIUM SERPL-SCNC: 3.6 MMOL/L (ref 3.5–5.3)
PR INTERVAL: 180 MS
PROT SERPL-MCNC: 7.4 G/DL (ref 6.4–8.4)
QRS AXIS: -65 DEGREES
QRSD INTERVAL: 110 MS
QT INTERVAL: 312 MS
QTC INTERVAL: 402 MS
RBC # BLD AUTO: 3.52 MILLION/UL (ref 3.81–5.12)
SODIUM SERPL-SCNC: 140 MMOL/L (ref 135–147)
T WAVE AXIS: 50 DEGREES
VENTRICULAR RATE: 100 BPM
WBC # BLD AUTO: 12.77 THOUSAND/UL (ref 4.31–10.16)

## 2022-11-04 RX ADMIN — APIXABAN 5 MG: 5 TABLET, FILM COATED ORAL at 08:08

## 2022-11-04 RX ADMIN — TOPIRAMATE 50 MG: 25 TABLET, FILM COATED ORAL at 17:20

## 2022-11-04 RX ADMIN — IPRATROPIUM BROMIDE 0.5 MG: 0.5 SOLUTION RESPIRATORY (INHALATION) at 07:24

## 2022-11-04 RX ADMIN — IPRATROPIUM BROMIDE 0.5 MG: 0.5 SOLUTION RESPIRATORY (INHALATION) at 13:06

## 2022-11-04 RX ADMIN — BICTEGRAVIR SODIUM, EMTRICITABINE, AND TENOFOVIR ALAFENAMIDE FUMARATE 1 TABLET: 50; 200; 25 TABLET ORAL at 08:08

## 2022-11-04 RX ADMIN — ACETAMINOPHEN 650 MG: 325 TABLET, FILM COATED ORAL at 21:37

## 2022-11-04 RX ADMIN — METHYLPREDNISOLONE SODIUM SUCCINATE 40 MG: 40 INJECTION, POWDER, FOR SOLUTION INTRAMUSCULAR; INTRAVENOUS at 21:37

## 2022-11-04 RX ADMIN — DILTIAZEM HYDROCHLORIDE 120 MG: 120 CAPSULE, COATED, EXTENDED RELEASE ORAL at 08:08

## 2022-11-04 RX ADMIN — LURASIDONE HYDROCHLORIDE 40 MG: 40 TABLET, FILM COATED ORAL at 08:08

## 2022-11-04 RX ADMIN — METHYLPREDNISOLONE SODIUM SUCCINATE 40 MG: 40 INJECTION, POWDER, FOR SOLUTION INTRAMUSCULAR; INTRAVENOUS at 14:16

## 2022-11-04 RX ADMIN — FUROSEMIDE 40 MG: 40 TABLET ORAL at 17:20

## 2022-11-04 RX ADMIN — LEVALBUTEROL HYDROCHLORIDE 1.25 MG: 1.25 SOLUTION, CONCENTRATE RESPIRATORY (INHALATION) at 07:24

## 2022-11-04 RX ADMIN — POTASSIUM CHLORIDE 20 MEQ: 1500 TABLET, EXTENDED RELEASE ORAL at 08:08

## 2022-11-04 RX ADMIN — INSULIN LISPRO 6 UNITS: 100 INJECTION, SOLUTION INTRAVENOUS; SUBCUTANEOUS at 21:37

## 2022-11-04 RX ADMIN — INSULIN LISPRO 4 UNITS: 100 INJECTION, SOLUTION INTRAVENOUS; SUBCUTANEOUS at 11:47

## 2022-11-04 RX ADMIN — GABAPENTIN 800 MG: 400 CAPSULE ORAL at 08:08

## 2022-11-04 RX ADMIN — LEVALBUTEROL HYDROCHLORIDE 1.25 MG: 1.25 SOLUTION, CONCENTRATE RESPIRATORY (INHALATION) at 13:06

## 2022-11-04 RX ADMIN — Medication 1000 UNITS: at 08:08

## 2022-11-04 RX ADMIN — TRAMADOL HYDROCHLORIDE 50 MG: 50 TABLET, COATED ORAL at 17:54

## 2022-11-04 RX ADMIN — MONTELUKAST 10 MG: 10 TABLET, FILM COATED ORAL at 21:37

## 2022-11-04 RX ADMIN — INFLUENZA A VIRUS A/VICTORIA/2570/2019 IVR-215 (H1N1) ANTIGEN (FORMALDEHYDE INACTIVATED), INFLUENZA A VIRUS A/DARWIN/9/2021 SAN-010 (H3N2) ANTIGEN (FORMALDEHYDE INACTIVATED), INFLUENZA B VIRUS B/PHUKET/3073/2013 ANTIGEN (FORMALDEHYDE INACTIVATED), AND INFLUENZA B VIRUS B/MICHIGAN/01/2021 ANTIGEN (FORMALDEHYDE INACTIVATED) 0.7 ML: 60; 60; 60; 60 INJECTION, SUSPENSION INTRAMUSCULAR at 11:42

## 2022-11-04 RX ADMIN — GABAPENTIN 800 MG: 400 CAPSULE ORAL at 21:37

## 2022-11-04 RX ADMIN — ATORVASTATIN CALCIUM 20 MG: 20 TABLET, FILM COATED ORAL at 17:20

## 2022-11-04 RX ADMIN — LEVALBUTEROL HYDROCHLORIDE 1.25 MG: 1.25 SOLUTION, CONCENTRATE RESPIRATORY (INHALATION) at 19:36

## 2022-11-04 RX ADMIN — GABAPENTIN 800 MG: 400 CAPSULE ORAL at 17:20

## 2022-11-04 RX ADMIN — BUDESONIDE 0.5 MG: 0.5 INHALANT ORAL at 07:26

## 2022-11-04 RX ADMIN — IPRATROPIUM BROMIDE 0.5 MG: 0.5 SOLUTION RESPIRATORY (INHALATION) at 19:36

## 2022-11-04 RX ADMIN — BUDESONIDE 0.5 MG: 0.5 INHALANT ORAL at 19:38

## 2022-11-04 RX ADMIN — LEVOTHYROXINE SODIUM 125 MCG: 125 TABLET ORAL at 04:36

## 2022-11-04 RX ADMIN — Medication 6 MG: at 21:37

## 2022-11-04 RX ADMIN — FUROSEMIDE 40 MG: 40 TABLET ORAL at 08:08

## 2022-11-04 RX ADMIN — TOPIRAMATE 50 MG: 25 TABLET, FILM COATED ORAL at 08:08

## 2022-11-04 RX ADMIN — APIXABAN 5 MG: 5 TABLET, FILM COATED ORAL at 21:37

## 2022-11-04 NOTE — UTILIZATION REVIEW
Initial Clinical Review    OBS 11/3 @ 8676 UPGRADED TO INPATIENT ADMISSION 11/4 @ 85934 FOR CONTINUE TX OF EXAC OF COPD WITH NEED FOR IV STEROIDS    11/04/22 1630  Inpatient Admission  Once        Transfer Service: Hospitalist       Question Answer Comment   Level of Care Med Surg        11/04/22 1629     ED Arrival Information     Expected   -    Arrival   11/3/2022 15:17    Acuity   Urgent            Means of arrival   Ambulance    Escorted by   [facility] ([address])    Service   Hospitalist    Admission type   Emergency            Arrival complaint   sob           Chief Complaint   Patient presents with   • Shortness of Breath     Pt with hx of COPD and asthma  Pt used inhaler at home without relief  Pt given duo neb in route  Pt reports to triage RN no relief at this time  Pt on 2L nasal cannula at home       Initial Presentation: 81 y o  female with PMhx of COPD, A-fib, HLD, T2 DM, HTN, HIV, bipolar d/o presents to ED presents to ED by ems with worsening sob that started yesterday despite using 2L O2  In ED noted with decreased BSs and wheezing  (+) b/l LE edema  Tachypnea  Hgb 11 2, K 3 2, Ca 10 7  with borderline vascular congestion  IV steroids and nebs given in the ed  Admit under observation to M/S/Tele unit with exac of COPD -- Continue IV methylprednisolone  Xopenex/Atrovent  Continues on 2L O2  Replete potassium and recheck in AM  Continue pta home po meds  Fingerstick glucose checks w/ ssi  Holding home losartan d/c lower bps  Date: 11/4 -- Upgrade to inpatient admission --  Intern med note: no acute events overnight  Initially refused am labs, then agreeable after discussion  K improved to 3 6  WBC 12 77  Continues on O2 2L nc, baseline  Still with expir wheezing on exam  Trace b/l LE edema  Continue IV steroids and scheduled nebs  SCDs  Fingerstick glucose checks w/ ssi  Po meds   SCDs      ED Triage Vitals   Temperature Pulse Respirations Blood Pressure SpO2   11/03/22 1525 11/03/22 1525 11/03/22 1525 11/03/22 1525 11/03/22 1525   98 7 °F (37 1 °C) 99 (!) 24 120/72 97 %      Temp Source Heart Rate Source Patient Position - Orthostatic VS BP Location FiO2 (%)   11/03/22 1525 11/03/22 1525 11/03/22 1525 11/03/22 1525 --   Oral Monitor Sitting Right arm       Pain Score       11/03/22 2246       8          Wt Readings from Last 1 Encounters:   11/04/22 103 kg (227 lb 15 3 oz)     Additional Vital Signs:   Date/Time Temp Pulse Resp BP MAP (mmHg) SpO2 Calculated FIO2 (%) - Nasal Cannula Nasal Cannula O2 Flow Rate (L/min) O2 Device Patient Position - Orthostatic VS   11/04/22 14:47:38 98 8 °F (37 1 °C) 104 18 114/78 90 99 % -- -- Nasal cannula Sitting   11/04/22 1305 -- -- -- -- -- 98 % -- -- Nasal cannula --   11/04/22 0815 -- -- -- -- -- -- 28 2 L/min Nasal cannula --   11/04/22 07:52:41 98 6 °F (37 °C) 99 18 126/79 95 98 % -- -- -- --   11/04/22 0725 -- -- -- -- -- 98 % -- -- Nasal cannula --   11/03/22 22:42:45 98 2 °F (36 8 °C) 94 -- 107/71 83 96 % -- -- -- --   11/03/22 19:44:46 97 9 °F (36 6 °C) 103 19 120/77 91 98 % -- -- -- --   11/03/22 1912 -- 95 24 Abnormal  92/56 -- 99 % 32 3 L/min Nasal cannula Lying   11/03/22 1736 -- 95 24 Abnormal  98/58 -- 96 % -- -- None (Room air) Lying   11/03/22 1525 98 7 °F (37 1 °C) 99 24 Abnormal  120/72 -- 97 % -- -- None (Room air) Sitting       Pertinent Labs/Diagnostic Test Results:   XR chest 2 views   ED Interpretation by Shay Parry DO (11/03 1805)   No acute abnormalities      Final Result by Jace Millard MD (11/04 0687)      Development of borderline vascular congestion            Results from last 7 days   Lab Units 11/04/22  1225 11/03/22  1733 10/29/22  1555   WBC Thousand/uL 12 77* 9 46 7 11   HEMOGLOBIN g/dL 10 3* 11 2* 11 7   HEMATOCRIT % 33 0* 35 5 37 8   PLATELETS Thousands/uL 301 338 332   NEUTROS ABS Thousands/µL  --  7 02 4 10     Results from last 7 days   Lab Units 11/04/22  1225 11/03/22  1733 10/29/22  1555 SODIUM mmol/L 140 142 142   POTASSIUM mmol/L 3 6 2 9* 3 6   CHLORIDE mmol/L 103 100 103   CO2 mmol/L 28 30 30   ANION GAP mmol/L 9 12 9   BUN mg/dL 19 14 23   CREATININE mg/dL 1 30 1 07 0 98   EGFR ml/min/1 73sq m 41 52 58   CALCIUM mg/dL 10 6* 10 9* 10 7*     Results from last 7 days   Lab Units 11/04/22  1225   ALT U/L 17   ALK PHOS U/L 75   TOTAL PROTEIN g/dL 7 4   ALBUMIN g/dL 3 6   TOTAL BILIRUBIN mg/dL 0 30     Results from last 7 days   Lab Units 11/04/22  1548 11/04/22  1142 11/04/22  0759 11/03/22  2019   POC GLUCOSE mg/dl 129 270* 130 230*     Results from last 7 days   Lab Units 11/04/22  1225 11/03/22  1733 10/29/22  1555   GLUCOSE RANDOM mg/dL 170* 133 110     Results from last 7 days   Lab Units 11/04/22  1225 11/03/22  1733 10/29/22  1555   HS TNI 0HR ng/L  --  9 6   HS TNI 4HR ng/L 6  --   --    HSTNI D4 ng/L -3  --   --      Results from last 7 days   Lab Units 11/03/22  1733 10/29/22  1555   D-DIMER QUANTITATIVE ug/ml FEU <0 27 <0 27       Results from last 7 days   Lab Units 10/29/22  1555   PROCALCITONIN ng/ml <0 05       ED Treatment:   Medication Administration from 11/03/2022 1517 to 11/03/2022 1926       Date/Time Order Dose Route Action     11/03/2022 1550 albuterol inhalation solution 10 mg 10 mg Nebulization Given     11/03/2022 1550 ipratropium (ATROVENT) 0 02 % inhalation solution 1 mg 1 mg Nebulization Given     11/03/2022 1550 sodium chloride 0 9 % inhalation solution 3 mL 3 mL Nebulization Given     11/03/2022 1721 methylPREDNISolone sodium succinate (Solu-MEDROL) injection 125 mg 125 mg Intravenous Given     11/03/2022 1722 magnesium sulfate 2 g/50 mL IVPB (premix) 2 g 2 g Intravenous New Bag     11/03/2022 1827 potassium chloride (K-DUR,KLOR-CON) CR tablet 40 mEq 40 mEq Oral Given     Past Medical History:   Diagnosis Date   • Asthma    • Bipolar 1 disorder ([facility] )    • Cardiac disease    • COPD (chronic obstructive pulmonary disease) ([facility] )    • Diabetes mellitus ([facility] )    • Disease of thyroid gland    • HIV (human immunodeficiency virus infection) ([place] [address] ) 03/24/2016   • Hypertension    • Osteoarthritis    • Tobacco abuse      Present on Admission:  • Chronic respiratory failure with hypoxia (HCC)  • HTN (hypertension)  • Paroxysmal atrial fibrillation (HCC)  • HIV (human immunodeficiency virus infection)   • Type 2 diabetes mellitus with hyperglycemia ([place] [address] )  • COPD exacerbation (HCC)  • Hyperlipidemia  • Hypokalemia  • Bipolar disorder (HCC)      Admitting Diagnosis: Acute hypokalemia [E87 6]  COPD exacerbation (HCC) [J44 1]  History of COPD [Z87 09]  History of HIV infection ([place] [address] ) [B20]  Age/Sex: 81 y o  female  Admission Orders:  Scheduled Medications:  apixaban, 5 mg, Oral, BID  atorvastatin, 20 mg, Oral, Daily With Dinner  bictegravir-emtricitab-tenofovir alafenamide, 1 tablet, Oral, Daily  budesonide, 0 5 mg, Nebulization, BID  cholecalciferol, 1,000 Units, Oral, Daily  diltiazem, 120 mg, Oral, Daily  furosemide, 40 mg, Oral, BID  gabapentin, 800 mg, Oral, TID  insulin lispro, 1-6 Units, Subcutaneous, 4x Daily (AC & HS)  ipratropium, 0 5 mg, Nebulization, TID  levalbuterol, 1 25 mg, Nebulization, TID  levothyroxine, 125 mcg, Oral, Early Morning  lurasidone, 40 mg, Oral, Daily With Breakfast  melatonin, 6 mg, Oral, HS  methylPREDNISolone sodium succinate, 40 mg, Intravenous, Q8H  montelukast, 10 mg, Oral, HS  potassium chloride, 20 mEq, Oral, Daily  topiramate, 50 mg, Oral, BID    PRN Meds:  acetaminophen, 650 mg, Oral, Q6H PRN 11/3 x1  docusate sodium, 100 mg, Oral, BID PRN  ondansetron, 4 mg, Intravenous, Q4H PRN  traMADol, 50 mg, Oral, Q6H PRN        Network Utilization Review Department  ATTENTION: Please call with any questions or concerns to [phone] and carefully listen to the prompts so that you are directed to the right person   All voicemails are confidential   Jessica Kamara all requests for admission clinical reviews, approved or denied determinations and any other requests to dedicated fax number below belonging to the campus where the patient is receiving treatment   List of dedicated fax numbers for the Facilities:  [address] [place] [address] DENIALS (Administrative/Medical Necessity) [phone]   [address]  (Maternity/NICU/Pediatrics) [phone]   [address] [phone]   [address] [phone]   [address] [place] [address] [facility] [phone]   [address] [facility] [address]15 [facility] [phone]

## 2022-11-04 NOTE — ASSESSMENT & PLAN NOTE
· Secondary to diuretic use  Replete again tonight and recheck in a m      Results from last 7 days   Lab Units 11/03/22  1733 10/29/22  1555   POTASSIUM mmol/L 2 9* 3 6

## 2022-11-04 NOTE — PROGRESS NOTES
[address] [facility]  Progress Note - Abdias Ashley 1950, 81 y o  female MRN: [id number]  Unit/Bed#: [facility] [address] [id number] Encounter: [id number]  Primary Care Provider: Chica Delacruz DO   Date and time admitted to hospital: 11/3/2022  3:17 PM    * COPD exacerbation [facility])  Assessment & Plan  · Recurrent admission for COPD exacerbation  Patient given smoking approximately two weeks ago  · Continue IV methylprednisolone  Will schedule Xopenex/Atrovent  · Pt interested in flu and pneumococcal vaccination - high dose flu vaccine ordered, does not appear to be due for pneumococcal vaccine at this time  · 11/04: will keep on IV steroids, anticipated discharge tomorrow  · Follows with  pulmonology as outpatient    Chronic respiratory failure with hypoxia ([facility] [address] )  Assessment & Plan  · Currently on 2 L secondary to COPD and CHF  · Also on 2 L O2 at home  Hypokalemia  Assessment & Plan    Results from last 7 days   Lab Units 11/04/22  1225 11/03/22  1733 10/29/22  1555   POTASSIUM mmol/L 3 6 2 9* 3 6     · Secondary to diuretic use  Repleted  · 11/04: Patient refusing labs  Had a discussion with her that her potassium was low and she is agreeable to labs at this time  HTN (hypertension)  Assessment & Plan  · Continue diltiazem for atrial fibrillation    Holding losartan given lower blood pressures and restart when appropriate    HIV (human immunodeficiency virus infection)   Assessment & Plan  · Continue biktarvy    Type 2 diabetes mellitus with hyperglycemia [facility])  Assessment & Plan  Lab Results   Component Value Date    HGBA1C 6 8 (H) 10/16/2022     Recent Labs     11/03/22  2019 11/04/22  0759   POCGLU 230* 130     · Diabetes mellitus prior to admission on metformin  · Having steroid induced hyperglycemia and will place on sliding scale insulin    Bipolar disorder ([facility] [address] )  Assessment & Plan  · Mood stable continue latuda and topiramate    Hyperlipidemia  Assessment & Plan  · Continue atorvastatin    Paroxysmal atrial fibrillation (HCC)  Assessment & Plan  · Continue diltiazem and eliquis      VTE Pharmacologic Prophylaxis: VTE Score: 2 Moderate Risk (Score 3-4) - Pharmacological DVT Prophylaxis Ordered: apixaban (Eliquis)  Patient Centered Rounds: I performed bedside rounds with nursing staff today  Discussions with Specialists or Other Care Team Provider: Case management    Education and Discussions with Family / Patient: Attempted to update  (daughter) via phone  Unable to contact  Time Spent for Care: 30 minutes  More than 50% of total time spent on counseling and coordination of care as described above  Current Length of Stay: 0 day(s)  Current Patient Status: Observation   Certification Statement: The patient will continue to require additional inpatient hospital stay due to IV steroids  Discharge Plan: Anticipate discharge tomorrow to home  Code Status: Level 1 - Full Code  3  Subjective:   Ms Marysol Manzo is sitting comfortably on her bed this morning eating her breakfast  She had no acute events overnight, but patient's nurse reports that she has been refusing labs  After a discussion, patient is agreeable to labs and is not a fan of them because she is a hard stick  Patient states that she feels a little better today, but breathing is still hard for her  She admits to an occasional cough and chest heaviness  Denies any fever, chills, nausea, vomiting, diarrhea, constipation  Also admits that the breathing treatments make her feel better after having them initially and then she goes back to having difficulty breathing  Objective:     Vitals:   Temp (24hrs), Av 4 °F (36 9 °C), Min:97 9 °F (36 6 °C), Max:98 7 °F (37 1 °C)    Temp:  [97 9 °F (36 6 °C)-98 7 °F (37 1 °C)] 98 6 °F (37 °C)  HR:  [] 104  Resp:  [18-24] 18  BP: ()/(56-79) 114/78  SpO2:  [96 %-99 %] 99 %  Body mass index is 41 69 kg/m²       Input and Output Summary (last 24 hours): Intake/Output Summary (Last 24 hours) at 11/4/2022 1520  Last data filed at 11/4/2022 0455  Gross per 24 hour   Intake 1500 ml   Output --   Net 1500 ml       Physical Exam:   Physical Exam  Vitals reviewed  Constitutional:       General: She is not in acute distress  Appearance: She is obese  HENT:      Head: Normocephalic and atraumatic  Nose: Nose normal       Mouth/Throat:      Mouth: Mucous membranes are moist    Eyes:      Pupils: Pupils are equal, round, and reactive to light  Cardiovascular:      Rate and Rhythm: Normal rate and regular rhythm  Heart sounds: Normal heart sounds  Pulmonary:      Effort: Pulmonary effort is normal       Breath sounds: Wheezing (expiratory) present  Comments: On 2 L NC, this is the same she is on at home  Abdominal:      Palpations: Abdomen is soft  Musculoskeletal:         General: Normal range of motion  Cervical back: Normal range of motion and neck supple  Right lower leg: Edema present  Left lower leg: Edema present  Comments: Trace edema bilaterally, patient says that her ankles look normal to her  Skin:     General: Skin is warm and dry  Neurological:      General: No focal deficit present  Mental Status: She is alert and oriented to person, place, and time     Psychiatric:         Mood and Affect: Mood normal          Behavior: Behavior normal           Additional Data:     Labs:  Results from last 7 days   Lab Units 11/04/22  1225 11/03/22  1733   WBC Thousand/uL 12 77* 9 46   HEMOGLOBIN g/dL 10 3* 11 2*   HEMATOCRIT % 33 0* 35 5   PLATELETS Thousands/uL 301 338   NEUTROS PCT %  --  74   LYMPHS PCT %  --  19   MONOS PCT %  --  6   EOS PCT %  --  0     Results from last 7 days   Lab Units 11/04/22  1225   SODIUM mmol/L 140   POTASSIUM mmol/L 3 6   CHLORIDE mmol/L 103   CO2 mmol/L 28   BUN mg/dL 19   CREATININE mg/dL 1 30   ANION GAP mmol/L 9   CALCIUM mg/dL 10 6*   ALBUMIN g/dL 3 6   TOTAL BILIRUBIN mg/dL 0  30   ALK PHOS U/L 75   ALT U/L 17   GLUCOSE RANDOM mg/dL 170*         Results from last 7 days   Lab Units 11/04/22  1142 11/04/22  0759 11/03/22  2019   POC GLUCOSE mg/dl 270* 130 230*         Results from last 7 days   Lab Units 10/29/22  1555   PROCALCITONIN ng/ml <0 05       Lines/Drains:  Invasive Devices  Report    Peripheral Intravenous Line  Duration           Peripheral IV 11/03/22 Right Antecubital <1 day                      Imaging: Reviewed radiology reports from this admission including: chest xray: development of borderline vascular congestion       Recent Cultures (last 7 days):         Last 24 Hours Medication List:   Current Facility-Administered Medications   Medication Dose Route Frequency Provider Last Rate   • acetaminophen  650 mg Oral Q6H PRN Nicolas Hercules DO     • apixaban  5 mg Oral BID Nicolas Hercules, DO     • atorvastatin  20 mg Oral Daily With Travel Desiya, DO     • bictegravir-emtricitab-tenofovir alafenamide  1 tablet Oral Daily Isidro Seaman, DO     • budesonide  0 5 mg Nebulization BID Nicolas Hercules, DO     • cholecalciferol  1,000 Units Oral Daily Isidro Seaman, DO     • diltiazem  120 mg Oral Daily Isidro Seaman, DO     • docusate sodium  100 mg Oral BID PRN Nicolas Hercules DO     • furosemide  40 mg Oral BID Nicolas Hercules, DO     • gabapentin  800 mg Oral TID Nicolas Hercules, DO     • insulin lispro  1-6 Units Subcutaneous 4x Daily (AC & HS) Nicolas Hercules, DO     • ipratropium  0 5 mg Nebulization TID Nicolas Hercules DO     • levalbuterol  1 25 mg Nebulization TID Nicolas Hercules DO     • levothyroxine  125 mcg Oral Early Morning Isidro Seaman, DO     • lurasidone  40 mg Oral Daily With Breakfast Isidro Seaman, DO     • melatonin  6 mg Oral HS Kathy Rawls PA-C     • methylPREDNISolone sodium succinate  40 mg Intravenous Q8H Isidro Seaman, DO     • montelukast  10 mg Oral HS Isidro Seaman, DO     • ondansetron  4 mg Intravenous Q4H PRN Nicolas Hercules, DO     • potassium chloride  20 mEq Oral Daily Sherol Courtney, DO     • topiramate  50 mg Oral BID Sherol Courtney, DO     • traMADol  50 mg Oral Q6H PRN Tito Mcgeee, DO          Today, Patient Was Seen By: Davis Smallwood PA-C    **Please Note: This note may have been constructed using a voice recognition system  **

## 2022-11-04 NOTE — ASSESSMENT & PLAN NOTE
Lab Results   Component Value Date    HGBA1C 6 8 (H) 10/16/2022     Recent Labs     11/03/22 2019   POCGLU 230*     · Diabetes mellitus prior to admission on metformin  · Having steroid induced hyperglycemia and will place on sliding scale insulin

## 2022-11-04 NOTE — PLAN OF CARE
Problem: Potential for Falls  Goal: Patient will remain free of falls  Description: INTERVENTIONS:  - Educate patient/family on patient safety including physical limitations  - Instruct patient to call for assistance with activity   - Consult OT/PT to assist with strengthening/mobility   - Keep Call bell within reach  - Keep bed low and locked with side rails adjusted as appropriate  - Keep care items and personal belongings within reach  - Initiate and maintain comfort rounds  - Make Fall Risk Sign visible to staff  - Offer Toileting every  Hours, in advance of need  - Initiate/Maintain alarm  - Obtain necessary fall risk management equipment:   - Apply yellow socks and bracelet for high fall risk patients  - Consider moving patient to room near nurses station  Outcome: Progressing     Problem: PAIN - ADULT  Goal: Verbalizes/displays adequate comfort level or baseline comfort level  Description: Interventions:  - Encourage patient to monitor pain and request assistance  - Assess pain using appropriate pain scale  - Administer analgesics based on type and severity of pain and evaluate response  - Implement non-pharmacological measures as appropriate and evaluate response  - Consider cultural and social influences on pain and pain management  - Notify physician/advanced practitioner if interventions unsuccessful or patient reports new pain  Outcome: Progressing     Problem: SAFETY ADULT  Goal: Patient will remain free of falls  Description: INTERVENTIONS:  - Educate patient/family on patient safety including physical limitations  - Instruct patient to call for assistance with activity   - Consult OT/PT to assist with strengthening/mobility   - Keep Call bell within reach  - Keep bed low and locked with side rails adjusted as appropriate  - Keep care items and personal belongings within reach  - Initiate and maintain comfort rounds  - Make Fall Risk Sign visible to staff  - Offer Toileting every  Hours, in advance of need  - Initiate/Maintain alarm  - Obtain necessary fall risk management equipm  - Apply yellow socks and bracelet for high fall risk patients  - Consider moving patient to room near nurses station  Outcome: Progressing     Problem: DISCHARGE PLANNING  Goal: Discharge to home or other facility with appropriate resources  Description: INTERVENTIONS:  - Identify barriers to discharge w/patient and caregiver  - Arrange for needed discharge resources and transportation as appropriate  - Identify discharge learning needs (meds, wound care, etc )  - Arrange for interpretive services to assist at discharge as needed  - Refer to Case Management Department for coordinating discharge planning if the patient needs post-hospital services based on physician/advanced practitioner order or complex needs related to functional status, cognitive ability, or social support system  Outcome: Progressing     Problem: Knowledge Deficit  Goal: Patient/family/caregiver demonstrates understanding of disease process, treatment plan, medications, and discharge instructions  Description: Complete learning assessment and assess knowledge base    Interventions:  - Provide teaching at level of understanding  - Provide teaching via preferred learning methods  Outcome: Progressing     Problem: RESPIRATORY - ADULT  Goal: Achieves optimal ventilation and oxygenation  Description: INTERVENTIONS:  - Assess for changes in respiratory status  - Assess for changes in mentation and behavior  - Position to facilitate oxygenation and minimize respiratory effort  - Oxygen administered by appropriate delivery if ordered  - Initiate smoking cessation education as indicated  - Encourage broncho-pulmonary hygiene including cough, deep breathe, Incentive Spirometry  - Assess the need for suctioning and aspirate as needed  - Assess and instruct to report SOB or any respiratory difficulty  - Respiratory Therapy support as indicated  Outcome: Progressing Problem: METABOLIC, FLUID AND ELECTROLYTES - ADULT  Goal: Glucose maintained within target range  Description: INTERVENTIONS:  - Monitor Blood Glucose as ordered  - Assess for signs and symptoms of hyperglycemia and hypoglycemia  - Administer ordered medications to maintain glucose within target range  - Assess nutritional intake and initiate nutrition service referral as needed  Outcome: Progressing     Problem: HEMATOLOGIC - ADULT  Goal: Maintains hematologic stability  Description: INTERVENTIONS  - Assess for signs and symptoms of bleeding or hemorrhage  - Monitor labs  - Administer supportive blood products/factors as ordered and appropriate  Outcome: Progressing

## 2022-11-04 NOTE — ASSESSMENT & PLAN NOTE
Lab Results   Component Value Date    HGBA1C 6 8 (H) 10/16/2022     Recent Labs     11/03/22 2019 11/04/22  0759   POCGLU 230* 130     · Diabetes mellitus prior to admission on metformin  · Having steroid induced hyperglycemia and will place on sliding scale insulin

## 2022-11-04 NOTE — ASSESSMENT & PLAN NOTE
Results from last 7 days   Lab Units 11/04/22  1225 11/03/22  1733 10/29/22  1555   POTASSIUM mmol/L 3 6 2 9* 3 6     · Secondary to diuretic use  Repleted  · 11/04: Patient refusing labs  Had a discussion with her that her potassium was low and she is agreeable to labs at this time

## 2022-11-04 NOTE — NURSING NOTE
Charge RN went in to get pt blood work, toponin via ultrasound  While needle was inserted into pts hand pt yanked hand back and started screaming profanities at nurse  Due to safety issues with needle inserted nurse closed needle  Pt is being vulgar and rude to staff at this time  Was not able to get blood work at this time due to pt refusing  SLIM notified, Ferny Sumner  Primary RN made aware

## 2022-11-04 NOTE — ASSESSMENT & PLAN NOTE
· Continue diltiazem for atrial fibrillation    Holding losartan given lower blood pressures and restart when appropriate

## 2022-11-04 NOTE — ASSESSMENT & PLAN NOTE
· Recurrent admission for COPD exacerbation  Patient given smoking approximately two weeks ago  · Continue IV methylprednisolone    Will schedule Xopenex/Atrovent  · Follows with  pulmonology as outpatient

## 2022-11-04 NOTE — PLAN OF CARE
Problem: Potential for Falls  Goal: Patient will remain free of falls  Description: INTERVENTIONS:  - Educate patient/family on patient safety including physical limitations  - Instruct patient to call for assistance with activity   - Consult OT/PT to assist with strengthening/mobility   - Keep Call bell within reach  - Keep bed low and locked with side rails adjusted as appropriate  - Keep care items and personal belongings within reach  - Initiate and maintain comfort rounds  - Make Fall Risk Sign visible to staff  - Offer Toileting every 2 Hours, in advance of need  - Apply yellow socks and bracelet for high fall risk patients  - Consider moving patient to room near nurses station  Outcome: Progressing     Problem: PAIN - ADULT  Goal: Verbalizes/displays adequate comfort level or baseline comfort level  Description: Interventions:  - Encourage patient to monitor pain and request assistance  - Assess pain using appropriate pain scale  - Administer analgesics based on type and severity of pain and evaluate response  - Implement non-pharmacological measures as appropriate and evaluate response  - Consider cultural and social influences on pain and pain management  - Notify physician/advanced practitioner if interventions unsuccessful or patient reports new pain  Outcome: Progressing     Problem: SAFETY ADULT  Goal: Patient will remain free of falls  Description: INTERVENTIONS:  - Educate patient/family on patient safety including physical limitations  - Instruct patient to call for assistance with activity   - Consult OT/PT to assist with strengthening/mobility   - Keep Call bell within reach  - Keep bed low and locked with side rails adjusted as appropriate  - Keep care items and personal belongings within reach  - Initiate and maintain comfort rounds  - Make Fall Risk Sign visible to staff  - Offer Toileting every 2 Hours, in advance of need  - Apply yellow socks and bracelet for high fall risk patients  - Consider moving patient to room near nurses station  Outcome: Progressing     Problem: DISCHARGE PLANNING  Goal: Discharge to home or other facility with appropriate resources  Description: INTERVENTIONS:  - Identify barriers to discharge w/patient and caregiver  - Arrange for needed discharge resources and transportation as appropriate  - Identify discharge learning needs (meds, wound care, etc )  - Arrange for interpretive services to assist at discharge as needed  - Refer to Case Management Department for coordinating discharge planning if the patient needs post-hospital services based on physician/advanced practitioner order or complex needs related to functional status, cognitive ability, or social support system  Outcome: Progressing     Problem: Knowledge Deficit  Goal: Patient/family/caregiver demonstrates understanding of disease process, treatment plan, medications, and discharge instructions  Description: Complete learning assessment and assess knowledge base    Interventions:  - Provide teaching at level of understanding  - Provide teaching via preferred learning methods  Outcome: Progressing     Problem: RESPIRATORY - ADULT  Goal: Achieves optimal ventilation and oxygenation  Description: INTERVENTIONS:  - Assess for changes in respiratory status  - Assess for changes in mentation and behavior  - Position to facilitate oxygenation and minimize respiratory effort  - Oxygen administered by appropriate delivery if ordered  - Initiate smoking cessation education as indicated  - Encourage broncho-pulmonary hygiene including cough, deep breathe, Incentive Spirometry  - Assess the need for suctioning and aspirate as needed  - Assess and instruct to report SOB or any respiratory difficulty  - Respiratory Therapy support as indicated  Outcome: Progressing     Problem: METABOLIC, FLUID AND ELECTROLYTES - ADULT  Goal: Glucose maintained within target range  Description: INTERVENTIONS:  - Monitor Blood Glucose as ordered  - Assess for signs and symptoms of hyperglycemia and hypoglycemia  - Administer ordered medications to maintain glucose within target range  - Assess nutritional intake and initiate nutrition service referral as needed  Outcome: Progressing     Problem: HEMATOLOGIC - ADULT  Goal: Maintains hematologic stability  Description: INTERVENTIONS  - Assess for signs and symptoms of bleeding or hemorrhage  - Monitor labs  - Administer supportive blood products/factors as ordered and appropriate  Outcome: Progressing

## 2022-11-04 NOTE — CASE MANAGEMENT
Case Management Assessment & Discharge Planning Note    Patient name Marcell Chamberlain  Location [facility] /[facility] 2 Carolann Martinez* MRN [id number]  : 1950 Date 2022       Current Admission Date: 11/3/2022  Current Admission Diagnosis:COPD exacerbation [facility])   Patient Active Problem List    Diagnosis Date Noted   • Hypokalemia 2022   • COVID-19 10/23/2022   • Anemia 2022   • Obesity (BMI 35 0-39 9 without comorbidity) 2022   • Acute on chronic diastolic heart failure ([facility] ) 2022   • Acute exacerbation of moderate persistent extrinsic asthma 2020   • Fall at home, initial encounter 2020   • Paroxysmal atrial fibrillation ([facility] ) 2020   • Hyperlipidemia 2018   • Bipolar disorder ([facility] ) 2018   • Thoracic disc herniation 2018   • Lumbar stenosis 2018   • Urinary frequency 2018   • Ambulatory dysfunction    • Back pain 2018   • Hip pain 2018   • Shortness of breath 2017   • Hypertension    • Type 2 diabetes mellitus with hyperglycemia (HCC)    • Chronic respiratory failure with hypoxia (HCC)    • Bipolar 1 disorder ([facility] )    • Tobacco use    • COPD exacerbation ([facility] ) 2016   • Arthritis 2016   • Type 2 diabetes mellitus without complication, without long-term current use of insulin ([facility] ) 2016   • HIV (human immunodeficiency virus infection)  2016   • HTN (hypertension) 2016   • Osteoarthritis 2016      LOS (days): 0  Geometric Mean LOS (GMLOS) (days):   Days to GMLOS:     OBJECTIVE:              Current admission status: Observation       Preferred Pharmacy:   [address] [place] [address]  [address]   [address]ermaine [place]T [address]  Phone: [phone] Fax: [phone]    Primary Care Provider: Isabell Matamoros DO    Primary Insurance: [facility]  Secondary Insurance: [address] [place] [address]    ASSESSMENT:  Active Health Care Proxies     Juan Melgoza Health Care Representative - Daughter   Primary Phone: [phone] (Mobile)               Advance Directives  Does patient have a [address] [facility] [address]?: Yes  Does patient have Advance Directives?: Yes  Advance Directives: Power of  for health care  Primary Contact: Da John/Daughter ([phone])    Readmission Root Cause  30 Day Readmission: Yes  Who directed you to return to the hospital?: Self  Did you understand whom to contact if you had questions or problems?: Yes  Did you get your prescriptions before you left the hospital?: Yes  Were you able to get your prescriptions filled when you left the hospital?: Yes  Did you take your medications as prescribed?: Yes  Were you able to get to your follow-up appointments?: No  Reason[de-identified] Readmitted prior to appointment  During previous admission, was a post-acute recommendation made?: Yes  What post-acute resources were offered?: Bernadette Johnson  Patient was readmitted due to: COPD exacerbation    Patient Information  Admitted from[de-identified] Home  Mental Status: Alert  During Assessment patient was accompanied by: Not accompanied during assessment  Assessment information provided by[de-identified] Patient  Primary Caregiver: Self  Support Systems: Daughter, Family members  [place] of Residence: [address] [place] [address] do you live in?: [address] [place] [address] entry access options   Select all that apply : Stairs  Number of steps to enter home : 3  Do the steps have railings?: Yes  Type of Current Residence: [address] [place] home  Upon entering residence, is there a bedroom on the main floor (no further steps)?: No  A bedroom is located on the following floor levels of residence (select all that apply):: 2nd Floor  Upon entering residence, is there a bathroom on the main floor (no further steps)?: Yes  Number of steps to 2nd floor from main floor: One Flight  In the last 12 months, was there a time when you were not able to pay the mortgage or rent on time?: No  In the last 12 months, how many places have you lived?: 1  In the last 12 months, was there a time when you did not have a steady place to sleep or slept in a shelter (including now)?: No  Homeless/housing insecurity resource given?: N/A  Living Arrangements: Lives w/ Daughter    Activities of Daily Living Prior to Admission  Functional Status: Independent  Completes ADLs independently?: Yes  Ambulates independently?: Yes  Does patient use assisted devices?: Yes  Assisted Devices (DME) used: William Olson, Shower Chair, Portable Oxygen concentrator  DME Company Name (respiratory supplies): [employer]  O2 Rate(s): 2L  Does patient currently own DME?: Yes  What DME does the patient currently own?: Portable Oxygen concentrator, Shower Chair, Walker  Does patient have a history of Outpatient Therapy (PT/OT)?: No  Does the patient have a history of Short-Term Rehab?: No  Does patient have a history of HHC?: Yes  Does patient currently have [facility]?: Yes    Current Home Health Care  Type of Current Home Care Services: Nurse visit, Home OT, Home PT  [address] [place] [address][de-identified] [address] [place] [address] Provider[de-identified] PCP    Patient Information Continued  Income Source: Pension/FCI  Does patient have prescription coverage?: Yes  Within the past 12 months, you worried that your food would run out before you got the money to buy more : Never true  Within the past 12 months, the food you bought just didn't last and you didn't have money to get more : Never true  Food insecurity resource given?: N/A  Does patient receive dialysis treatments?: No  Does patient have a history of substance abuse?: No  Does patient have a history of Mental Health Diagnosis?: Yes (Bipolar disorder)  Is patient receiving treatment for mental health?: No  Patient declined treatment information    Has patient received inpatient treatment related to mental health in the last 2 years?: No    Means of Transportation  [facility] Family transport  In the past 12 months, has lack of transportation kept you from medical appointments or from getting medications?: No  In the past 12 months, has lack of transportation kept you from meetings, work, or from getting things needed for daily living?: No  Was application for public transport provided?: N/A    DISCHARGE DETAILS:    Discharge planning discussed with[de-identified] Patient  Freedom of Choice: Yes  Comments - Freedom of Choice: Pt would like to resume services with [facility] at time of discharge  CM contacted family/caregiver?:  (Left VM for daughter, Luis Shen)  Were Treatment Team discharge recommendations reviewed with patient/caregiver?: Yes  Did patient/caregiver verbalize understanding of patient care needs?: Yes  Were patient/caregiver advised of the risks associated with not following Treatment Team discharge recommendations?: Yes    Contacts  Patient Contacts: Patient  Contact Method: In Person  Reason/Outcome: Continuity of Care, Emergency Contact, Referral, Discharge [address]         Is the patient interested in [facility] at discharge?: Yes  [address] requested[de-identified] [address] Work, Nursing, Occupational Therapy, Physical [address] Agency Name[de-identified] [employer] Provider[de-identified] PCP  Home Health Services Needed[de-identified] Strengthening/Theraputic Exercises to Improve Function, Evaluate Functional Status and Safety, Gait/ADL Training  Homebound Criteria Met[de-identified] Requires the Assistance of Another Person for Safe Ambulation or to Leave the Home, Uses an Assist Device (i e  cane, walker, etc)  Supporting Clincal Findings[de-identified] Fatigues Easliy in [employer], Limited Endurance    DME Referral Provided  Referral made for DME?: No    Other Referral/Resources/Interventions Provided:  Interventions: [facility]  Referral Comments: Referral for [facility] sent to [facility] via [employer]      Treatment Team Recommendation: Home with [id number]  Discharge Destination Plan[de-identified] Home with [place] at Discharge : Family

## 2022-11-04 NOTE — ASSESSMENT & PLAN NOTE
· Recurrent admission for COPD exacerbation  Patient given smoking approximately two weeks ago  · Continue IV methylprednisolone  Will schedule Xopenex/Atrovent  · Pt interested in flu and pneumococcal vaccination - high dose flu vaccine ordered, does not appear to be due for pneumococcal vaccine at this time  · 11/04: will keep on IV steroids, anticipated discharge tomorrow    · Follows with  pulmonology as outpatient

## 2022-11-05 PROBLEM — E87.6 HYPOKALEMIA: Status: RESOLVED | Noted: 2022-11-03 | Resolved: 2022-11-05

## 2022-11-05 LAB
ANION GAP SERPL CALCULATED.3IONS-SCNC: 9 MMOL/L (ref 4–13)
BUN SERPL-MCNC: 26 MG/DL (ref 5–25)
CALCIUM SERPL-MCNC: 9.7 MG/DL (ref 8.3–10.1)
CHLORIDE SERPL-SCNC: 103 MMOL/L (ref 96–108)
CO2 SERPL-SCNC: 29 MMOL/L (ref 21–32)
CREAT SERPL-MCNC: 1.22 MG/DL (ref 0.6–1.3)
GFR SERPL CREATININE-BSD FRML MDRD: 44 ML/MIN/1.73SQ M
GLUCOSE SERPL-MCNC: 164 MG/DL (ref 65–140)
GLUCOSE SERPL-MCNC: 167 MG/DL (ref 65–140)
GLUCOSE SERPL-MCNC: 168 MG/DL (ref 65–140)
GLUCOSE SERPL-MCNC: 173 MG/DL (ref 65–140)
GLUCOSE SERPL-MCNC: 204 MG/DL (ref 65–140)
NT-PROBNP SERPL-MCNC: 86 PG/ML
POTASSIUM SERPL-SCNC: 4.3 MMOL/L (ref 3.5–5.3)
SODIUM SERPL-SCNC: 141 MMOL/L (ref 135–147)

## 2022-11-05 RX ORDER — INSULIN GLARGINE 100 [IU]/ML
8 INJECTION, SOLUTION SUBCUTANEOUS EVERY MORNING
Status: DISCONTINUED | OUTPATIENT
Start: 2022-11-05 | End: 2022-11-06 | Stop reason: HOSPADM

## 2022-11-05 RX ORDER — METHYLPREDNISOLONE SODIUM SUCCINATE 40 MG/ML
40 INJECTION, POWDER, LYOPHILIZED, FOR SOLUTION INTRAMUSCULAR; INTRAVENOUS EVERY 12 HOURS SCHEDULED
Status: COMPLETED | OUTPATIENT
Start: 2022-11-05 | End: 2022-11-05

## 2022-11-05 RX ORDER — PREDNISONE 20 MG/1
40 TABLET ORAL DAILY
Status: DISCONTINUED | OUTPATIENT
Start: 2022-11-06 | End: 2022-11-06 | Stop reason: HOSPADM

## 2022-11-05 RX ADMIN — LEVALBUTEROL HYDROCHLORIDE 1.25 MG: 1.25 SOLUTION, CONCENTRATE RESPIRATORY (INHALATION) at 07:32

## 2022-11-05 RX ADMIN — TOPIRAMATE 50 MG: 25 TABLET, FILM COATED ORAL at 08:56

## 2022-11-05 RX ADMIN — TRAMADOL HYDROCHLORIDE 50 MG: 50 TABLET, COATED ORAL at 17:30

## 2022-11-05 RX ADMIN — LEVALBUTEROL HYDROCHLORIDE 1.25 MG: 1.25 SOLUTION, CONCENTRATE RESPIRATORY (INHALATION) at 13:23

## 2022-11-05 RX ADMIN — IPRATROPIUM BROMIDE 0.5 MG: 0.5 SOLUTION RESPIRATORY (INHALATION) at 07:32

## 2022-11-05 RX ADMIN — INSULIN LISPRO 1 UNITS: 100 INJECTION, SOLUTION INTRAVENOUS; SUBCUTANEOUS at 17:25

## 2022-11-05 RX ADMIN — MONTELUKAST 10 MG: 10 TABLET, FILM COATED ORAL at 21:59

## 2022-11-05 RX ADMIN — APIXABAN 5 MG: 5 TABLET, FILM COATED ORAL at 21:59

## 2022-11-05 RX ADMIN — BUDESONIDE 0.5 MG: 0.5 INHALANT ORAL at 07:33

## 2022-11-05 RX ADMIN — Medication 1000 UNITS: at 08:56

## 2022-11-05 RX ADMIN — IPRATROPIUM BROMIDE 0.5 MG: 0.5 SOLUTION RESPIRATORY (INHALATION) at 13:23

## 2022-11-05 RX ADMIN — ACETAMINOPHEN 650 MG: 325 TABLET, FILM COATED ORAL at 17:27

## 2022-11-05 RX ADMIN — BUDESONIDE 0.5 MG: 0.5 INHALANT ORAL at 19:00

## 2022-11-05 RX ADMIN — METHYLPREDNISOLONE SODIUM SUCCINATE 40 MG: 40 INJECTION, POWDER, FOR SOLUTION INTRAMUSCULAR; INTRAVENOUS at 08:56

## 2022-11-05 RX ADMIN — APIXABAN 5 MG: 5 TABLET, FILM COATED ORAL at 08:56

## 2022-11-05 RX ADMIN — INSULIN LISPRO 2 UNITS: 100 INJECTION, SOLUTION INTRAVENOUS; SUBCUTANEOUS at 08:56

## 2022-11-05 RX ADMIN — IPRATROPIUM BROMIDE 0.5 MG: 0.5 SOLUTION RESPIRATORY (INHALATION) at 19:41

## 2022-11-05 RX ADMIN — INSULIN GLARGINE 8 UNITS: 100 INJECTION, SOLUTION SUBCUTANEOUS at 08:56

## 2022-11-05 RX ADMIN — METHYLPREDNISOLONE SODIUM SUCCINATE 40 MG: 40 INJECTION, POWDER, FOR SOLUTION INTRAMUSCULAR; INTRAVENOUS at 21:59

## 2022-11-05 RX ADMIN — FUROSEMIDE 40 MG: 40 TABLET ORAL at 17:25

## 2022-11-05 RX ADMIN — TRAMADOL HYDROCHLORIDE 50 MG: 50 TABLET, COATED ORAL at 09:02

## 2022-11-05 RX ADMIN — TOPIRAMATE 50 MG: 25 TABLET, FILM COATED ORAL at 17:25

## 2022-11-05 RX ADMIN — GABAPENTIN 800 MG: 400 CAPSULE ORAL at 21:59

## 2022-11-05 RX ADMIN — Medication 6 MG: at 21:59

## 2022-11-05 RX ADMIN — GABAPENTIN 800 MG: 400 CAPSULE ORAL at 17:25

## 2022-11-05 RX ADMIN — GABAPENTIN 800 MG: 400 CAPSULE ORAL at 08:56

## 2022-11-05 RX ADMIN — FUROSEMIDE 40 MG: 40 TABLET ORAL at 08:56

## 2022-11-05 RX ADMIN — LURASIDONE HYDROCHLORIDE 40 MG: 40 TABLET, FILM COATED ORAL at 08:57

## 2022-11-05 RX ADMIN — BICTEGRAVIR SODIUM, EMTRICITABINE, AND TENOFOVIR ALAFENAMIDE FUMARATE 1 TABLET: 50; 200; 25 TABLET ORAL at 08:57

## 2022-11-05 RX ADMIN — LEVOTHYROXINE SODIUM 125 MCG: 125 TABLET ORAL at 05:51

## 2022-11-05 RX ADMIN — ATORVASTATIN CALCIUM 20 MG: 20 TABLET, FILM COATED ORAL at 17:25

## 2022-11-05 RX ADMIN — POTASSIUM CHLORIDE 20 MEQ: 1500 TABLET, EXTENDED RELEASE ORAL at 08:56

## 2022-11-05 RX ADMIN — INSULIN LISPRO 1 UNITS: 100 INJECTION, SOLUTION INTRAVENOUS; SUBCUTANEOUS at 22:00

## 2022-11-05 RX ADMIN — LEVALBUTEROL HYDROCHLORIDE 1.25 MG: 1.25 SOLUTION, CONCENTRATE RESPIRATORY (INHALATION) at 19:41

## 2022-11-05 RX ADMIN — INSULIN LISPRO 1 UNITS: 100 INJECTION, SOLUTION INTRAVENOUS; SUBCUTANEOUS at 11:57

## 2022-11-05 RX ADMIN — DILTIAZEM HYDROCHLORIDE 120 MG: 120 CAPSULE, COATED, EXTENDED RELEASE ORAL at 08:56

## 2022-11-05 NOTE — ASSESSMENT & PLAN NOTE
Lab Results   Component Value Date    HGBA1C 6 8 (H) 10/16/2022     Recent Labs     11/04/22  1548 11/04/22  2102 11/05/22  0723 11/05/22  1116   POCGLU 129 397* 204* 168*     · Diabetes mellitus on metformin

## 2022-11-05 NOTE — ASSESSMENT & PLAN NOTE
· Recurrent admission for COPD exacerbation    Patient quit smoking approximately two weeks ago  · Appears she has had two recent admission and evaluated by Pulm and cards at that time   · Improved with steroids and neb treatment   · Will discharge on oral prednisone 40 mg to complete taper   · She should schedule follow up appointment with her primary pulmonologist at [facility] within 2 weeks

## 2022-11-05 NOTE — ASSESSMENT & PLAN NOTE
Wt Readings from Last 3 Encounters:   11/04/22 103 kg (227 lb 15 3 oz)   10/23/22 98 9 kg (218 lb)   09/29/22 102 kg (225 lb 8 5 oz)       · Reviewed echo from Sept   · Was seen by cardiology during admission in sept   · Will add on BNP today   · weight appears similar to discharge in Sept   · maintained on lasix 40 mg bid

## 2022-11-05 NOTE — ASSESSMENT & PLAN NOTE
· Continue 120 mg diltiazem for atrial fibrillation  · Resume 100 mg losartan at discharge   · Continue diuretic management with 40 mg Lasix b i d

## 2022-11-05 NOTE — ASSESSMENT & PLAN NOTE
Wt Readings from Last 3 Encounters:   11/04/22 103 kg (227 lb 15 3 oz)   10/23/22 98 9 kg (218 lb)   09/29/22 102 kg (225 lb 8 5 oz)       · Euvolemic   · Last ECHO 9/13/22 demonstrated EF 70%  · Was seen by cardiology during admission in sept   · BNP 86   · Weight appears similar to discharge in Sept   · Maintained on lasix 40 mg bid

## 2022-11-05 NOTE — PLAN OF CARE
Problem: Potential for Falls  Goal: Patient will remain free of falls  Description: INTERVENTIONS:  - Educate patient/family on patient safety including physical limitations  - Instruct patient to call for assistance with activity   - Consult OT/PT to assist with strengthening/mobility   - Keep Call bell within reach  - Keep bed low and locked with side rails adjusted as appropriate  - Keep care items and personal belongings within reach  - Initiate and maintain comfort rounds  - Make Fall Risk Sign visible to staff  - Offer Toileting every 2 Hours, in advance of need  - Initiate/Maintain bed alarm  - Obtain necessary fall risk management equipment:   - Apply yellow socks and bracelet for high fall risk patients  - Consider moving patient to room near nurses station  Outcome: Progressing     Problem: PAIN - ADULT  Goal: Verbalizes/displays adequate comfort level or baseline comfort level  Description: Interventions:  - Encourage patient to monitor pain and request assistance  - Assess pain using appropriate pain scale  - Administer analgesics based on type and severity of pain and evaluate response  - Implement non-pharmacological measures as appropriate and evaluate response  - Consider cultural and social influences on pain and pain management  - Notify physician/advanced practitioner if interventions unsuccessful or patient reports new pain  Outcome: Progressing     Problem: SAFETY ADULT  Goal: Patient will remain free of falls  Description: INTERVENTIONS:  - Educate patient/family on patient safety including physical limitations  - Instruct patient to call for assistance with activity   - Consult OT/PT to assist with strengthening/mobility   - Keep Call bell within reach  - Keep bed low and locked with side rails adjusted as appropriate  - Keep care items and personal belongings within reach  - Initiate and maintain comfort rounds  - Make Fall Risk Sign visible to staff  - Offer Toileting every 2 Hours, in advance of need  - Initiate/Maintain bed alarm  - Obtain necessary fall risk management equipment:   - Apply yellow socks and bracelet for high fall risk patients  - Consider moving patient to room near nurses station  Outcome: Progressing     Problem: DISCHARGE PLANNING  Goal: Discharge to home or other facility with appropriate resources  Description: INTERVENTIONS:  - Identify barriers to discharge w/patient and caregiver  - Arrange for needed discharge resources and transportation as appropriate  - Identify discharge learning needs (meds, wound care, etc )  - Arrange for interpretive services to assist at discharge as needed  - Refer to Case Management Department for coordinating discharge planning if the patient needs post-hospital services based on physician/advanced practitioner order or complex needs related to functional status, cognitive ability, or social support system  Outcome: Progressing     Problem: Knowledge Deficit  Goal: Patient/family/caregiver demonstrates understanding of disease process, treatment plan, medications, and discharge instructions  Description: Complete learning assessment and assess knowledge base    Interventions:  - Provide teaching at level of understanding  - Provide teaching via preferred learning methods  Outcome: Progressing     Problem: RESPIRATORY - ADULT  Goal: Achieves optimal ventilation and oxygenation  Description: INTERVENTIONS:  - Assess for changes in respiratory status  - Assess for changes in mentation and behavior  - Position to facilitate oxygenation and minimize respiratory effort  - Oxygen administered by appropriate delivery if ordered  - Initiate smoking cessation education as indicated  - Encourage broncho-pulmonary hygiene including cough, deep breathe, Incentive Spirometry  - Assess the need for suctioning and aspirate as needed  - Assess and instruct to report SOB or any respiratory difficulty  - Respiratory Therapy support as indicated  Outcome: Progressing     Problem: METABOLIC, FLUID AND ELECTROLYTES - ADULT  Goal: Glucose maintained within target range  Description: INTERVENTIONS:  - Monitor Blood Glucose as ordered  - Assess for signs and symptoms of hyperglycemia and hypoglycemia  - Administer ordered medications to maintain glucose within target range  - Assess nutritional intake and initiate nutrition service referral as needed  Outcome: Progressing     Problem: HEMATOLOGIC - ADULT  Goal: Maintains hematologic stability  Description: INTERVENTIONS  - Assess for signs and symptoms of bleeding or hemorrhage  - Monitor labs  - Administer supportive blood products/factors as ordered and appropriate  Outcome: Progressing

## 2022-11-05 NOTE — ASSESSMENT & PLAN NOTE
Lab Results   Component Value Date    HGBA1C 6 8 (H) 10/16/2022     Recent Labs     11/04/22  1142 11/04/22  1548 11/04/22  2102 11/05/22  0723   POCGLU 270* 129 397* 204*     · Diabetes mellitus prior to admission on metformin  · Having steroid induced hyperglycemia   · SSI, accuchecks   · Will add long acting insulin this morning

## 2022-11-05 NOTE — PROGRESS NOTES
[facility]  Progress Note - Marcell Dash 1950, 81 y o  female MRN: [id number]  Unit/Bed#: [address] 2 [address] [id number] Encounter: [id number]  Primary Care Provider: Isabell Matamoros DO   Date and time admitted to hospital: 11/3/2022  3:17 PM    * COPD exacerbation [facility])  Assessment & Plan  · Recurrent admission for COPD exacerbation    Patient quit smoking approximately two weeks ago  · Appears she has had two recent admission and evaluated by Pulm and cards at that time   · Significant improving in wheezing today but patient does not feel ready to go home   · Will decrease IV Solumedrol 40 mg q 12 hours and plan to transition to oral prednisone 40 mg tomorrow to complete taper   · Will add on BNP to labs today although appears at baseline weight   · Follows with  pulmonology as outpatient  · Plan for discharge tomorrow     Chronic diastolic heart failure ([facility] )  Assessment & Plan  Wt Readings from Last 3 Encounters:   11/04/22 103 kg (227 lb 15 3 oz)   10/23/22 98 9 kg (218 lb)   09/29/22 102 kg (225 lb 8 5 oz)       · Reviewed echo from Sept   · Was seen by cardiology during admission in sept   · Will add on BNP today   · weight appears similar to discharge in Sept   · maintained on lasix 40 mg bid       Paroxysmal atrial fibrillation (HCC)  Assessment & Plan  · Continue diltiazem and eliquis  · D-dimer negative     Hyperlipidemia  Assessment & Plan  · Continue atorvastatin    Bipolar disorder ([facility] )  Assessment & Plan  · Mood stable continue latuda and topiramate    [facility] )  Assessment & Plan  · Currently on 2 L secondary to COPD and CHF  · On her baseline oxygen requirements     Type 2 diabetes mellitus with hyperglycemia [facility])  Assessment & Plan  Lab Results   Component Value Date    HGBA1C 6 8 (H) 10/16/2022     Recent Labs     11/04/22  1142 11/04/22  1548 11/04/22  2102 11/05/22  0723   POCGLU 270* 129 397* 204*     · Diabetes mellitus prior to admission on metformin  · Having steroid induced hyperglycemia   · SSI, accuchecks   · Will add long acting insulin this morning     HTN (hypertension)  Assessment & Plan  · Continue diltiazem for atrial fibrillation  Holding losartan given lower blood pressures     HIV (human immunodeficiency virus infection)   Assessment & Plan  · Continue biktarvy    Hypokalemia-resolved as of 2022  Assessment & Plan    Results from last 7 days   Lab Units 22  0504 22  1225 22  1733 10/29/22  1555   POTASSIUM mmol/L 4 3 3 6 2 9* 3 6     · Secondary to diuretic use  Repleted  · Resolved and stable       VTE Pharmacologic Prophylaxis: VTE Score: 2 Moderate Risk (Score 3-4) - Pharmacological DVT Prophylaxis Ordered: apixaban (Eliquis)  Patient Centered Rounds: I performed bedside rounds with nursing staff today  Discussions with Specialists or Other Care Team Provider:     Education and Discussions with Family / Patient: Patient declined call to   Time Spent for Care: 20 minutes  More than 50% of total time spent on counseling and coordination of care as described above  Current Length of Stay: 1 day(s)  Current Patient Status: Inpatient   Certification Statement: The patient will continue to require additional inpatient hospital stay due to COPD exacerbation   Discharge Plan: Anticipate discharge tomorrow to home  Code Status: Level 1 - Full Code    Subjective:   Still feels SOB and with wheezing  Denies chest pain  No leg swelling  She is agreeable to getting IV replaced  No pain anywhere  Objective:     Vitals:   Temp (24hrs), Av 5 °F (36 9 °C), Min:98 2 °F (36 8 °C), Max:98 8 °F (37 1 °C)    Temp:  [98 2 °F (36 8 °C)-98 8 °F (37 1 °C)] 98 2 °F (36 8 °C)  HR:  [] 90  Resp:  [15-18] 18  BP: ()/(60-78) 103/70  SpO2:  [97 %-99 %] 98 %  Body mass index is 41 69 kg/m²       Input and Output Summary (last 24 hours):   No intake or output data in the 24 hours ending 11/05/22 0802    Physical Exam:   Physical Exam  Vitals and nursing note reviewed  Constitutional:       General: She is not in acute distress  Appearance: She is obese  She is not toxic-appearing or diaphoretic  Cardiovascular:      Rate and Rhythm: Normal rate and regular rhythm  Pulmonary:      Effort: Pulmonary effort is normal  No respiratory distress  Comments: 2 lpm, faint wheezing, decreased throughout   Abdominal:      General: Bowel sounds are normal       Palpations: Abdomen is soft  Musculoskeletal:      Right lower leg: No edema  Left lower leg: No edema  Skin:     General: Skin is warm  Neurological:      Mental Status: She is alert  Mental status is at baseline     Psychiatric:         Mood and Affect: Mood normal           Additional Data:     Labs:  Results from last 7 days   Lab Units 11/04/22  1225 11/03/22  1733   WBC Thousand/uL 12 77* 9 46   HEMOGLOBIN g/dL 10 3* 11 2*   HEMATOCRIT % 33 0* 35 5   PLATELETS Thousands/uL 301 338   NEUTROS PCT %  --  74   LYMPHS PCT %  --  19   MONOS PCT %  --  6   EOS PCT %  --  0     Results from last 7 days   Lab Units 11/05/22  0504 11/04/22  1225   SODIUM mmol/L 141 140   POTASSIUM mmol/L 4 3 3 6   CHLORIDE mmol/L 103 103   CO2 mmol/L 29 28   BUN mg/dL 26* 19   CREATININE mg/dL 1 22 1 30   ANION GAP mmol/L 9 9   CALCIUM mg/dL 9 7 10 6*   ALBUMIN g/dL  --  3 6   TOTAL BILIRUBIN mg/dL  --  0 30   ALK PHOS U/L  --  75   ALT U/L  --  17   GLUCOSE RANDOM mg/dL 167* 170*         Results from last 7 days   Lab Units 11/05/22  0723 11/04/22  2102 11/04/22  1548 11/04/22  1142 11/04/22  0759 11/03/22  2019   POC GLUCOSE mg/dl 204* 397* 129 270* 130 230*         Results from last 7 days   Lab Units 10/29/22  1555   PROCALCITONIN ng/ml <0 05       Lines/Drains:  Invasive Devices  Report    Peripheral Intravenous Line  Duration           Peripheral IV 11/03/22 Right Antecubital 1 day                      Imaging: Reviewed radiology reports from this admission including: chest xray    Recent Cultures (last 7 days):         Last 24 Hours Medication List:   Current Facility-Administered Medications   Medication Dose Route Frequency Provider Last Rate   • acetaminophen  650 mg Oral Q6H PRN Menon Pals, DO     • apixaban  5 mg Oral BID Menon Pals, DO     • atorvastatin  20 mg Oral Daily With [employer], DO     • bictegravir-emtricitab-tenofovir alafenamide  1 tablet Oral Daily Isidro Seaman, DO     • budesonide  0 5 mg Nebulization BID Menon Pals, DO     • cholecalciferol  1,000 Units Oral Daily Isidro Diazng, DO     • diltiazem  120 mg Oral Daily Isidro Seaman, DO     • docusate sodium  100 mg Oral BID PRN Menon Pals, DO     • furosemide  40 mg Oral BID Menon Pals, DO     • gabapentin  800 mg Oral TID Menon Pals, DO     • insulin glargine  8 Units Subcutaneous QAM Cindy Sorto PA-C     • insulin lispro  1-6 Units Subcutaneous 4x Daily (AC & HS) Menon Pals, DO     • ipratropium  0 5 mg Nebulization TID Menon Pals, DO     • levalbuterol  1 25 mg Nebulization TID Menon Pals, DO     • levothyroxine  125 mcg Oral Early Morning Isidro Seaman, DO     • lurasidone  40 mg Oral Daily With Breakfast Isidro Seaman, DO     • melatonin  6 mg Oral HS Kathy Rawls PA-C     • methylPREDNISolone sodium succinate  40 mg Intravenous Q12H [facility] & intermediate Cindy Sorto PA-C     • montelukast  10 mg Oral HS Isidro Seaman, DO     • ondansetron  4 mg Intravenous Q4H PRN Menon Pals, DO     • potassium chloride  20 mEq Oral Daily Isidro Seaman, DO     • [START ON 11/6/2022] predniSONE  40 mg Oral Daily Cindy Sorto PA-C     • topiramate  50 mg Oral BID Menon Pals, DO     • traMADol  50 mg Oral Q6H PRN Menon Pals, DO          Today, Patient Was Seen By: Yolanda Olivera    **Please Note: This note may have been constructed using a voice recognition system  **

## 2022-11-05 NOTE — ASSESSMENT & PLAN NOTE
· Recurrent admission for COPD exacerbation    Patient quit smoking approximately two weeks ago  · Appears she has had two recent admission and evaluated by Pulm and cards at that time   · Significant improving in wheezing today but patient does not feel ready to go home   · Will decrease IV Solumedrol 40 mg q 12 hours and plan to transition to oral prednisone 40 mg tomorrow to complete taper   · Will add on BNP to labs today although appears at baseline weight   · Follows with [facility]ology as outpatient  · Plan for discharge tomorrow

## 2022-11-06 VITALS
HEART RATE: 85 BPM | TEMPERATURE: 97.2 F | BODY MASS INDEX: 41.95 KG/M2 | OXYGEN SATURATION: 97 % | HEIGHT: 62 IN | WEIGHT: 227.96 LBS | DIASTOLIC BLOOD PRESSURE: 86 MMHG | RESPIRATION RATE: 20 BRPM | SYSTOLIC BLOOD PRESSURE: 117 MMHG

## 2022-11-06 LAB — GLUCOSE SERPL-MCNC: 160 MG/DL (ref 65–140)

## 2022-11-06 RX ORDER — PREDNISONE 10 MG/1
TABLET ORAL
Qty: 30 TABLET | Refills: 0 | Status: ON HOLD | OUTPATIENT
Start: 2022-11-06 | End: 2022-11-19 | Stop reason: SDUPTHER

## 2022-11-06 RX ADMIN — GABAPENTIN 800 MG: 400 CAPSULE ORAL at 09:07

## 2022-11-06 RX ADMIN — LURASIDONE HYDROCHLORIDE 40 MG: 40 TABLET, FILM COATED ORAL at 09:00

## 2022-11-06 RX ADMIN — FUROSEMIDE 40 MG: 40 TABLET ORAL at 09:07

## 2022-11-06 RX ADMIN — PREDNISONE 40 MG: 20 TABLET ORAL at 09:06

## 2022-11-06 RX ADMIN — LEVOTHYROXINE SODIUM 125 MCG: 125 TABLET ORAL at 06:56

## 2022-11-06 RX ADMIN — LEVALBUTEROL HYDROCHLORIDE 1.25 MG: 1.25 SOLUTION, CONCENTRATE RESPIRATORY (INHALATION) at 07:19

## 2022-11-06 RX ADMIN — INSULIN GLARGINE 8 UNITS: 100 INJECTION, SOLUTION SUBCUTANEOUS at 09:09

## 2022-11-06 RX ADMIN — POTASSIUM CHLORIDE 20 MEQ: 1500 TABLET, EXTENDED RELEASE ORAL at 09:06

## 2022-11-06 RX ADMIN — Medication 1000 UNITS: at 09:06

## 2022-11-06 RX ADMIN — APIXABAN 5 MG: 5 TABLET, FILM COATED ORAL at 09:06

## 2022-11-06 RX ADMIN — IPRATROPIUM BROMIDE 0.5 MG: 0.5 SOLUTION RESPIRATORY (INHALATION) at 07:19

## 2022-11-06 RX ADMIN — BUDESONIDE 0.5 MG: 0.5 INHALANT ORAL at 07:19

## 2022-11-06 RX ADMIN — TOPIRAMATE 50 MG: 25 TABLET, FILM COATED ORAL at 09:06

## 2022-11-06 RX ADMIN — DILTIAZEM HYDROCHLORIDE 120 MG: 120 CAPSULE, COATED, EXTENDED RELEASE ORAL at 09:06

## 2022-11-06 RX ADMIN — INSULIN LISPRO 1 UNITS: 100 INJECTION, SOLUTION INTRAVENOUS; SUBCUTANEOUS at 09:09

## 2022-11-06 RX ADMIN — BICTEGRAVIR SODIUM, EMTRICITABINE, AND TENOFOVIR ALAFENAMIDE FUMARATE 1 TABLET: 50; 200; 25 TABLET ORAL at 09:09

## 2022-11-06 NOTE — CASE MANAGEMENT
Case Management Discharge Planning Note    Patient name Jaxon Wade  Location [place] [address] 2 /South 2 [address]* MRN [id number]  : 1950 Date 2022       Current Admission Date: 11/3/2022  Current Admission Diagnosis:COPD exacerbation [facility])   Patient Active Problem List    Diagnosis Date Noted   • COVID-19 10/23/2022   • Anemia 2022   • Obesity (BMI 35 0-39 9 without comorbidity) 2022   • Chronic diastolic heart failure ([facility] ) 2022   • Acute exacerbation of moderate persistent extrinsic asthma 2020   • Fall at home, initial encounter 2020   • Paroxysmal atrial fibrillation ([facility] ) 2020   • Hyperlipidemia 2018   • Bipolar disorder ([facility] ) 2018   • Thoracic disc herniation 2018   • Lumbar stenosis 2018   • Urinary frequency 2018   • Ambulatory dysfunction    • Back pain 2018   • Hip pain 2018   • Shortness of breath 2017   • Hypertension    • Type 2 diabetes mellitus with hyperglycemia (HCC)    • Chronic respiratory failure with hypoxia (HCC)    • Bipolar 1 disorder ([facility] )    • Tobacco use    • COPD exacerbation ([facility] ) 2016   • Arthritis 2016   • Type 2 diabetes mellitus without complication, without long-term current use of insulin ([facility] ) 2016   • HIV (human immunodeficiency virus infection)  2016   • HTN (hypertension) 2016   • Osteoarthritis 2016      LOS (days): 2  Geometric Mean LOS (GMLOS) (days):   Days to GMLOS:     OBJECTIVE:  Risk of Unplanned Readmission Score: 51 54         Current admission status: Inpatient   Preferred Pharmacy:   [address] [place] [address] [facility]  Obed Canh G[place] [address]  Phone: [phone] Fax: [phone]    Primary Care Provider: Valente Harper DO    Primary Insurance: Cornelia VILLANUEVA  Secondary Insurance: August Spore    DISCHARGE DETAILS:    Discharge planning discussed with[de-identified] Patient  Freedom of Choice: Yes  Comments - Freedom of Choice: Patient will need Portable O2 tank to go home with from the hospital  Patient uses [place] and agreeable for CM to provide tank from consignment room for her to go home with  She is agreeable to a Lyft to transport home  CM contacted family/caregiver?: No- see comments (patient declines as she does not feel the need to contact anyone at this time)  Were Treatment Team discharge recommendations reviewed with patient/caregiver?: Yes  Did patient/caregiver verbalize understanding of patient care needs?: Yes  Were patient/caregiver advised of the risks associated with not following Treatment Team discharge recommendations?: Yes         Requested [address]         Is the patient interested in [facility] at discharge?: Yes  [address] Name[de-identified] [address] [place] [address] Drive Provider[de-identified] PCP    DME Referral Provided  Referral made for DME?: Yes  DME referral completed for the following items[de-identified] Portable Oxygen tanks  DME Supplier Name[de-identified] [employer]    Other Referral/Resources/Interventions Provided:  Interventions: DME, [facility]         Treatment Team Recommendation: Home with [address]  Discharge Destination Plan[de-identified] Home with [place] at Discharge : Free Local Transportation  Dispatcher Contacted: Yes  Number/Name of Dispatcher: Johnny Dennis with [facility]  [facility] [place]  ETA of Transport (Date): 11/06/22     Transport Service Arrived: Yes     Accompanied by: Alone         CM notified by [facility] and RN that patient is discharged  Patient will need assistance with transportation and is chronically on O2 but does not have a portable O2 tank with her  CM met with patient to discuss  She uses [place] for her O2  Patient is agreeable to a Lyft if able to get an O2 tank for her for discharge home  CM spoke with Subhash Brown from [facility] and was informed that CM can provide the portable O2 tank to patient from consignment   CM provided patient with the tank and she signed the delivery ticket  NILAM provided her with a copy of the delivery ticket  CM uploaded the delivery ticket to [facility] via [place] sent [facility] a message in [address] that patient is being discharged today and sent summary of care via [facility]  NILAM scheduled the Lyft via [facility] with Ruddy SEGAL aware

## 2022-11-06 NOTE — PROGRESS NOTES
Charlotte Lance, approves patient to not have an IV as patient is a very difficult stick with multiple failed IVs

## 2022-11-06 NOTE — PLAN OF CARE
Problem: Potential for Falls  Goal: Patient will remain free of falls  Description: INTERVENTIONS:  - Educate patient/family on patient safety including physical limitations  - Instruct patient to call for assistance with activity   - Consult OT/PT to assist with strengthening/mobility   - Keep Call bell within reach  - Keep bed low and locked with side rails adjusted as appropriate  - Keep care items and personal belongings within reach  - Initiate and maintain comfort rounds  - Make Fall Risk Sign visible to staff  - Apply yellow socks and bracelet for high fall risk patients  - Consider moving patient to room near nurses station  Outcome: Progressing     Problem: PAIN - ADULT  Goal: Verbalizes/displays adequate comfort level or baseline comfort level  Description: Interventions:  - Encourage patient to monitor pain and request assistance  - Assess pain using appropriate pain scale  - Administer analgesics based on type and severity of pain and evaluate response  - Implement non-pharmacological measures as appropriate and evaluate response  - Consider cultural and social influences on pain and pain management  - Notify physician/advanced practitioner if interventions unsuccessful or patient reports new pain  Outcome: Progressing     Problem: SAFETY ADULT  Goal: Patient will remain free of falls  Description: INTERVENTIONS:  - Educate patient/family on patient safety including physical limitations  - Instruct patient to call for assistance with activity   - Consult OT/PT to assist with strengthening/mobility   - Keep Call bell within reach  - Keep bed low and locked with side rails adjusted as appropriate  - Keep care items and personal belongings within reach  - Initiate and maintain comfort rounds  - Make Fall Risk Sign visible to staff  - Apply yellow socks and bracelet for high fall risk patients  - Consider moving patient to room near nurses station  Outcome: Progressing     Problem: DISCHARGE PLANNING  Goal: Discharge to home or other facility with appropriate resources  Description: INTERVENTIONS:  - Identify barriers to discharge w/patient and caregiver  - Arrange for needed discharge resources and transportation as appropriate  - Identify discharge learning needs (meds, wound care, etc )  - Arrange for interpretive services to assist at discharge as needed  - Refer to Case Management Department for coordinating discharge planning if the patient needs post-hospital services based on physician/advanced practitioner order or complex needs related to functional status, cognitive ability, or social support system  Outcome: Progressing     Problem: Knowledge Deficit  Goal: Patient/family/caregiver demonstrates understanding of disease process, treatment plan, medications, and discharge instructions  Description: Complete learning assessment and assess knowledge base    Interventions:  - Provide teaching at level of understanding  - Provide teaching via preferred learning methods  Outcome: Progressing     Problem: RESPIRATORY - ADULT  Goal: Achieves optimal ventilation and oxygenation  Description: INTERVENTIONS:  - Assess for changes in respiratory status  - Assess for changes in mentation and behavior  - Position to facilitate oxygenation and minimize respiratory effort  - Oxygen administered by appropriate delivery if ordered  - Initiate smoking cessation education as indicated  - Encourage broncho-pulmonary hygiene including cough, deep breathe, Incentive Spirometry  - Assess the need for suctioning and aspirate as needed  - Assess and instruct to report SOB or any respiratory difficulty  - Respiratory Therapy support as indicated  Outcome: Progressing     Problem: METABOLIC, FLUID AND ELECTROLYTES - ADULT  Goal: Glucose maintained within target range  Description: INTERVENTIONS:  - Monitor Blood Glucose as ordered  - Assess for signs and symptoms of hyperglycemia and hypoglycemia  - Administer ordered medications to maintain glucose within target range  - Assess nutritional intake and initiate nutrition service referral as needed  Outcome: Progressing     Problem: HEMATOLOGIC - ADULT  Goal: Maintains hematologic stability  Description: INTERVENTIONS  - Assess for signs and symptoms of bleeding or hemorrhage  - Monitor labs  - Administer supportive blood products/factors as ordered and appropriate  Outcome: Progressing

## 2022-11-06 NOTE — ASSESSMENT & PLAN NOTE
Results from last 7 days   Lab Units 11/05/22  0504 11/04/22  1225 11/03/22  1733   POTASSIUM mmol/L 4 3 3 6 2 9*     · Secondary to diuretic use  Repleted     · Resolved and stable

## 2022-11-06 NOTE — NURSING NOTE
Reviewed discharge instructions and upcoming medications with patient  Discussed the importance of follow up and bringing discharge instructions to appointment to discuss medication changes

## 2022-11-06 NOTE — DISCHARGE SUMMARY
[address] [facility]  Discharge- Alice Doll 1950, 81 y o  female MRN: [id number]  Unit/Bed#: [place] 2 [address] 216-01 Encounter: [id number]  Primary Care Provider: Juliane Arzate DO   Date and time admitted to hospital: 11/3/2022  3:17 PM    * COPD exacerbation [facility])  Assessment & Plan  · Recurrent admission for COPD exacerbation    Patient quit smoking approximately two weeks ago  · Appears she has had two recent admission and evaluated by Pulm and cards at that time   · Improved with steroids and neb treatment   · Will discharge on oral prednisone 40 mg to complete taper   · She should schedule follow up appointment with her primary pulmonologist at [facility] within 2 weeks        Chronic respiratory failure with hypoxia ([facility] )  Assessment & Plan  · Currently on 2 L secondary to COPD and CHF  · On her baseline oxygen requirements at discharge    Chronic diastolic heart failure (HCC)  Assessment & Plan  Wt Readings from Last 3 Encounters:   11/04/22 103 kg (227 lb 15 3 oz)   10/23/22 98 9 kg (218 lb)   09/29/22 102 kg (225 lb 8 5 oz)       · Euvolemic   · Last ECHO 9/13/22 demonstrated EF 70%  · Was seen by cardiology during admission in sept   · BNP 86   · Weight appears similar to discharge in Sept   · Maintained on lasix 40 mg bid       HTN (hypertension)  Assessment & Plan  · Continue 120 mg diltiazem for atrial fibrillation  · Resume 100 mg losartan at discharge   · Continue diuretic management with 40 mg Lasix b i d    HIV (human immunodeficiency virus infection)   Assessment & Plan  · Continue biktarvy  · Infectious Disease follow up outpatient     Type 2 diabetes mellitus with hyperglycemia [facility])  Assessment & Plan  Lab Results   Component Value Date    HGBA1C 6 8 (H) 10/16/2022     Recent Labs     11/04/22  1548 11/04/22  2102 11/05/22  0723 11/05/22  1116   POCGLU 129 397* 204* 168*     · Diabetes mellitus on metformin      Bipolar disorder ([facility] )  Assessment & Plan  · Mood stable continue latuda and topiramate    Hyperlipidemia  Assessment & Plan  · Continue 20 mg atorvastatin    Paroxysmal atrial fibrillation (HCC)  Assessment & Plan  · Continue 120 mg diltiazem and 5 mg eliquis      Hypokalemia-resolved as of 11/5/2022  Assessment & Plan    Results from last 7 days   Lab Units 11/05/22  0504 11/04/22  1225 11/03/22  1733   POTASSIUM mmol/L 4 3 3 6 2 9*     · Secondary to diuretic use  Repleted  · Resolved and stable     Medical Problems             Resolved Problems  Date Reviewed: 11/5/2022          Resolved    Hypokalemia 11/5/2022     Resolved by  Krissy Sawant PA-C              Discharging Physician / Practitioner: Ami Lentz  PCP: Shaniqua Lao DO  Admission Date:   Admission Orders (From admission, onward)     Ordered        11/04/22 1629  Inpatient Admission  Once            11/03/22 1832  Place in Observation  Once                      Discharge Date: 11/06/22    Consultations During Hospital Stay:  · None at current admission, seen by cardiology and pulm in 3 prior admission since September    Procedures Performed:   · None    Significant Findings / Test Results:   · CXR 11/3 demonstrated borderline vascular congestion    Incidental Findings:   · None    Test Results Pending at Discharge (will require follow up): · None     Outpatient Tests Requested:  · None    Complications:  None    Reason for Admission: COPD exacerbation    Hospital Course:   Erma Hernandez is a 81 y o  female patient who originally presented to the hospital on 11/3/2022 due to shortness of breath without inhaler relief x2 days  She was discharged two days prior to this admission and was COVID positive at that time  This has been her 4th hospitalization since the beginning of September for COPD exacerbations  She was started on IV methylprednisone with xopenx/atrovent x 3 days before transitioning to an oral prednisone taper which she was discharged on   Patient's breathing and SpO2 improved over the course of admission until she was back to her 2 L maintenance O2, and deemed medically stable for discharge  Please see above list of diagnoses and related plan for additional information  Condition at Discharge: fair    Discharge Day Visit / Exam:   Subjective:  Patient reports that her breathing is better this morning  She is maintained on 2 L oxygen at home and reports doing well on that currently  She feels as though her symptoms have improved  She reports not feeling as wheezy  She reports that she now is only getting short of breath with exertion  She denies any other complaints at this time  Patient denies chest pain, palpitations, shortness of breath, fevers, chills, abdominal pain, headache, or pedal edema  Vitals: Blood Pressure: 117/86 (11/06/22 0703)  Pulse: 85 (11/06/22 0703)  Temperature: (!) 97 2 °F (36 2 °C) (11/06/22 0703)  Temp Source: Oral (11/06/22 0703)  Respirations: 20 (11/06/22 0703)  Height: 5' 2" (157 5 cm) (11/04/22 0423)  Weight - Scale: 103 kg (227 lb 15 3 oz) (11/04/22 0423)  SpO2: 97 % (11/06/22 0703)  Exam:   Physical Exam  Vitals and nursing note reviewed  Constitutional:       General: She is not in acute distress  Appearance: She is well-developed  HENT:      Head: Normocephalic and atraumatic  Eyes:      Conjunctiva/sclera: Conjunctivae normal    Cardiovascular:      Rate and Rhythm: Normal rate and regular rhythm  Heart sounds: No murmur heard  Pulmonary:      Effort: Pulmonary effort is normal  No respiratory distress  Breath sounds: Rhonchi (B/l lower lung fields) present  No wheezing or rales  Comments: Maintained on 2L O2  Abdominal:      General: There is no distension  Palpations: Abdomen is soft  Tenderness: There is no abdominal tenderness  Musculoskeletal:      Cervical back: Neck supple  Skin:     General: Skin is warm and dry  Neurological:      Mental Status: She is alert and oriented to person, place, and time   Mental status is at baseline  Psychiatric:         Mood and Affect: Mood normal           Discussion with Family: Patient declined call to   Discharge instructions/Information to patient and family:   See after visit summary for information provided to patient and family  Provisions for Follow-Up Care:  See after visit summary for information related to follow-up care and any pertinent home health orders  Disposition:   Home with [facility] (Reminder: Complete face to face encounter)    Planned Readmission: None     Discharge Statement:  I spent 45 minutes discharging the patient  This time was spent on the day of discharge  I had direct contact with the patient on the day of discharge  Greater than 50% of the total time was spent examining patient, answering all patient questions, arranging and discussing plan of care with patient as well as directly providing post-discharge instructions  Additional time then spent on discharge activities  Discharge Medications:  See after visit summary for reconciled discharge medications provided to patient and/or family        **Please Note: This note may have been constructed using a voice recognition system**

## 2022-11-07 NOTE — UTILIZATION REVIEW
Continued Stay Review    Date: 11/6/22    Current Patient Class: IP  Current Level of Care: MS    HPI:72 y o  female initially admitted on 11/3 as OBS and 11/4 as IP    Assessment/Plan:   11/5 - Decreasing IV SoluMedrol to q 12 hr with plan to change to PO Prednisone on 11/6  Weight same as d/c in [facility] on Lasix 40 mg BID  Remains on oxygen at 2L NC       11/6 pt is d/c to home on oxygen with [facility] f/u        Vital Signs:   11/06/22 3581 -- -- -- -- -- -- 28 2 L/min Nasal cannula --   11/06/22 07:03:26 97 2 °F (36 2 °C) Abnormal  85 20 117/86 96 97 % 28 2 L/min Nasal cannula --   11/05/22 2323 -- -- -- -- -- -- 28 2 L/min Nasal cannula --   11/05/22 21:15:11 98 5 °F (36 9 °C) 89 22 97/61 73 92 % 28 2 L/min Nasal cannula Lying   11/05/22 1942 -- -- -- -- -- 96 % 28 2 L/min Nasal cannula --   11/05/22 15:22:42 97 8 °F (36 6 °C) 91 18 103/71 82 96 % -- -- -- --   11/05/22 1326 -- -- -- -- -- -- 28 2 L/min Nasal cannula --   11/05/22 0945 -- -- -- -- -- -- 28 2 L/min Nasal cannula --   11/05/22 07:23:59 98 2 °F (36 8 °C) 90 18 103/70 81 98 % -- -- Nasal cannula Lying           Pertinent Labs/Diagnostic Results:     Results from last 7 days   Lab Units 11/04/22  1225 11/03/22  1733   WBC Thousand/uL 12 77* 9 46   HEMOGLOBIN g/dL 10 3* 11 2*   HEMATOCRIT % 33 0* 35 5   PLATELETS Thousands/uL 301 338   NEUTROS ABS Thousands/µL  --  7 02         Results from last 7 days   Lab Units 11/05/22  0504 11/04/22  1225 11/03/22  1733   SODIUM mmol/L 141 140 142   POTASSIUM mmol/L 4 3 3 6 2 9*   CHLORIDE mmol/L 103 103 100   CO2 mmol/L 29 28 30   ANION GAP mmol/L 9 9 12   BUN mg/dL 26* 19 14   CREATININE mg/dL 1 22 1 30 1 07   EGFR ml/min/1 73sq m 44 41 52   CALCIUM mg/dL 9 7 10 6* 10 9*     Results from last 7 days   Lab Units 11/04/22  1225   ALT U/L 17   ALK PHOS U/L 75   TOTAL PROTEIN g/dL 7 4   ALBUMIN g/dL 3 6   TOTAL BILIRUBIN mg/dL 0 30     Results from last 7 days   Lab Units 11/06/22  0700 11/05/22  2113 11/05/22  1537 11/05/22  1116 11/05/22  0723 11/04/22  2102 11/04/22  1548 11/04/22  1142 11/04/22  0759 11/03/22  2019   POC GLUCOSE mg/dl 160* 164* 173* 168* 204* 397* 129 270* 130 230*     Results from last 7 days   Lab Units 11/05/22  0504 11/04/22  1225 11/03/22  1733   GLUCOSE RANDOM mg/dL 167* 170* 133     Results from last 7 days   Lab Units 11/04/22  1225 11/03/22  1733   HS TNI 0HR ng/L  --  9   HS TNI 4HR ng/L 6  --    HSTNI D4 ng/L -3  --      Results from last 7 days   Lab Units 11/03/22  1733   D-DIMER QUANTITATIVE ug/ml FEU <0 27     Results from last 7 days   Lab Units 11/05/22  0504   NT-PRO BNP pg/mL 86     SCHEDULED MEDS:   eliquis  lipitor   biktarvy  pulmicort  Vit D3  Cardizem  Lasix 40 mg   neurontin  Insulin Humalog  xopenex  Synthroid  latuda  Singulair  SoluMedrol IV til 11/5  Prednisone 40 mg PO on 11/6  topamax    IV MEDS    PRN MEDS  Tylenol x 1 11/5  Tramadol x 2 11/5    Discharge Plan: home with home oxygen and [facility] AT [facility] services  Network Utilization Review Department  ATTENTION: Please call with any questions or concerns to [phone] and carefully listen to the prompts so that you are directed to the right person  All voicemails are confidential   Gissel Colon all requests for admission clinical reviews, approved or denied determinations and any other requests to dedicated fax number below belonging to the campus where the patient is receiving treatment   List of dedicated fax numbers for the Facilities:  [address] DENIALS (Administrative/Medical Necessity) [phone]   [address] (Maternity/NICU/Pediatrics) [phone]   912 Brenda Hilton [phone]   [facility] [address] [phone]   [facility] [address] [place] 28 [facility] 310 [facility] 134 211 [facility] [phone]

## 2022-11-08 NOTE — UTILIZATION REVIEW
NOTIFICATION OF INPATIENT ADMISSION   AUTHORIZATION REQUEST   SERVICING FACILITY:   [address] [place] [address] [facility]  Tax ID: [id number]  NPI: [id number] ATTENDING PROVIDER:  Attending Name and NPI#: Sherren Ro, [place] [[id number]]  Address: [address] [place] [address] [facility]  Phone: [phone]     ADMISSION INFORMATION:  Place of Service: Inpatient [facility] Code: 21  Inpatient Admission Date/Time: 11/4/22  4:30 PM  Discharge Date/Time: 11/6/2022 12:14 PM  Admitting Diagnosis Code/Description:  Acute hypokalemia [E87 6]  COPD exacerbation ([facility] ) [J44 1]  History of COPD [Z87 09]  History of HIV infection ([facility] ) [B20]     UTILIZATION REVIEW CONTACT:  [employer], Utilization   Network Utilization Review Department  Phone: [phone]  Fax: [phone]  Email: [email or web address]  Contact for approvals/pending authorizations, clinical reviews, and discharge  PHYSICIAN ADVISORY SERVICES:  Medical Necessity Denial & [employer] Review  Phone: [phone]  Fax: [phone]  Email: [email or web address]

## 2022-11-09 ENCOUNTER — PATIENT OUTREACH (OUTPATIENT)
Dept: CASE MANAGEMENT | Facility: OTHER | Age: 72
End: 2022-11-09

## 2022-11-09 NOTE — PROGRESS NOTES
OP RT CM called home number which is listed as daughter Dagoberto Yeung' number and VM is full and cannot leave message  I called the mobile number listed and it was disconnected each time X three attempts  I will attempt another outreach next week

## 2022-11-10 LAB
DME PARACHUTE DELIVERY DATE ACTUAL: NORMAL
DME PARACHUTE DELIVERY DATE REQUESTED: NORMAL
DME PARACHUTE DELIVERY NOTE: NORMAL
DME PARACHUTE ITEM DESCRIPTION: NORMAL
DME PARACHUTE ITEM DESCRIPTION: NORMAL
DME PARACHUTE ORDER STATUS: NORMAL
DME PARACHUTE SUPPLIER NAME: NORMAL
DME PARACHUTE SUPPLIER PHONE: NORMAL

## 2022-11-15 ENCOUNTER — HOSPITAL ENCOUNTER (INPATIENT)
Facility: HOSPITAL | Age: 72
LOS: 4 days | Discharge: HOME HEALTH | End: 2022-11-19
Attending: EMERGENCY MEDICINE | Admitting: INTERNAL MEDICINE

## 2022-11-15 ENCOUNTER — APPOINTMENT (EMERGENCY)
Dept: NON INVASIVE DIAGNOSTICS | Facility: HOSPITAL | Age: 72
End: 2022-11-15

## 2022-11-15 ENCOUNTER — APPOINTMENT (EMERGENCY)
Dept: RADIOLOGY | Facility: HOSPITAL | Age: 72
End: 2022-11-15

## 2022-11-15 ENCOUNTER — APPOINTMENT (EMERGENCY)
Dept: CT IMAGING | Facility: HOSPITAL | Age: 72
End: 2022-11-15

## 2022-11-15 DIAGNOSIS — E87.6 HYPOKALEMIA: ICD-10-CM

## 2022-11-15 DIAGNOSIS — R06.02 SHORTNESS OF BREATH: Primary | ICD-10-CM

## 2022-11-15 DIAGNOSIS — U07.1 COVID-19: ICD-10-CM

## 2022-11-15 DIAGNOSIS — J45.41 ACUTE EXACERBATION OF MODERATE PERSISTENT EXTRINSIC ASTHMA: ICD-10-CM

## 2022-11-15 DIAGNOSIS — J44.1 COPD EXACERBATION (HCC): ICD-10-CM

## 2022-11-15 DIAGNOSIS — I50.33 ACUTE ON CHRONIC DIASTOLIC HEART FAILURE (HCC): ICD-10-CM

## 2022-11-15 DIAGNOSIS — J96.01 ACUTE RESPIRATORY FAILURE WITH HYPOXIA (HCC): ICD-10-CM

## 2022-11-15 LAB
2HR DELTA HS TROPONIN: 1 NG/L
4HR DELTA HS TROPONIN: 1 NG/L
ANION GAP SERPL CALCULATED.3IONS-SCNC: 8 MMOL/L (ref 4–13)
ATRIAL RATE: 92 BPM
BASOPHILS # BLD AUTO: 0.03 THOUSANDS/ÂΜL (ref 0–0.1)
BASOPHILS NFR BLD AUTO: 0 % (ref 0–1)
BUN SERPL-MCNC: 8 MG/DL (ref 5–25)
CALCIUM SERPL-MCNC: 9.8 MG/DL (ref 8.3–10.1)
CARDIAC TROPONIN I PNL SERPL HS: 13 NG/L
CARDIAC TROPONIN I PNL SERPL HS: 14 NG/L
CARDIAC TROPONIN I PNL SERPL HS: 14 NG/L
CHLORIDE SERPL-SCNC: 107 MMOL/L (ref 96–108)
CO2 SERPL-SCNC: 30 MMOL/L (ref 21–32)
CREAT SERPL-MCNC: 0.88 MG/DL (ref 0.6–1.3)
EOSINOPHIL # BLD AUTO: 0.06 THOUSAND/ÂΜL (ref 0–0.61)
EOSINOPHIL NFR BLD AUTO: 1 % (ref 0–6)
ERYTHROCYTE [DISTWIDTH] IN BLOOD BY AUTOMATED COUNT: 15.4 % (ref 11.6–15.1)
GFR SERPL CREATININE-BSD FRML MDRD: 66 ML/MIN/1.73SQ M
GLUCOSE SERPL-MCNC: 102 MG/DL (ref 65–140)
GLUCOSE SERPL-MCNC: 215 MG/DL (ref 65–140)
HCT VFR BLD AUTO: 32.7 % (ref 34.8–46.1)
HGB BLD-MCNC: 10.1 G/DL (ref 11.5–15.4)
IMM GRANULOCYTES # BLD AUTO: 0.04 THOUSAND/UL (ref 0–0.2)
IMM GRANULOCYTES NFR BLD AUTO: 1 % (ref 0–2)
LYMPHOCYTES # BLD AUTO: 2.5 THOUSANDS/ÂΜL (ref 0.6–4.47)
LYMPHOCYTES NFR BLD AUTO: 34 % (ref 14–44)
MCH RBC QN AUTO: 29.3 PG (ref 26.8–34.3)
MCHC RBC AUTO-ENTMCNC: 30.9 G/DL (ref 31.4–37.4)
MCV RBC AUTO: 95 FL (ref 82–98)
MONOCYTES # BLD AUTO: 0.44 THOUSAND/ÂΜL (ref 0.17–1.22)
MONOCYTES NFR BLD AUTO: 6 % (ref 4–12)
NEUTROPHILS # BLD AUTO: 4.25 THOUSANDS/ÂΜL (ref 1.85–7.62)
NEUTS SEG NFR BLD AUTO: 58 % (ref 43–75)
NRBC BLD AUTO-RTO: 0 /100 WBCS
NT-PROBNP SERPL-MCNC: 159 PG/ML
P AXIS: 41 DEGREES
PLATELET # BLD AUTO: 269 THOUSANDS/UL (ref 149–390)
PMV BLD AUTO: 8.9 FL (ref 8.9–12.7)
POTASSIUM SERPL-SCNC: 2.6 MMOL/L (ref 3.5–5.3)
PR INTERVAL: 190 MS
QRS AXIS: -73 DEGREES
QRSD INTERVAL: 110 MS
QT INTERVAL: 324 MS
QTC INTERVAL: 400 MS
RBC # BLD AUTO: 3.45 MILLION/UL (ref 3.81–5.12)
SODIUM SERPL-SCNC: 145 MMOL/L (ref 135–147)
T WAVE AXIS: 36 DEGREES
VENTRICULAR RATE: 92 BPM
WBC # BLD AUTO: 7.32 THOUSAND/UL (ref 4.31–10.16)

## 2022-11-15 PROCEDURE — XW033E5 INTRODUCTION OF REMDESIVIR ANTI-INFECTIVE INTO PERIPHERAL VEIN, PERCUTANEOUS APPROACH, NEW TECHNOLOGY GROUP 5: ICD-10-PCS | Performed by: INTERNAL MEDICINE

## 2022-11-15 RX ORDER — ACETAMINOPHEN 325 MG/1
650 TABLET ORAL EVERY 6 HOURS PRN
Status: DISCONTINUED | OUTPATIENT
Start: 2022-11-15 | End: 2022-11-19 | Stop reason: HOSPADM

## 2022-11-15 RX ORDER — INSULIN LISPRO 100 [IU]/ML
1-5 INJECTION, SOLUTION INTRAVENOUS; SUBCUTANEOUS
Status: DISCONTINUED | OUTPATIENT
Start: 2022-11-16 | End: 2022-11-19 | Stop reason: HOSPADM

## 2022-11-15 RX ORDER — POTASSIUM CHLORIDE 14.9 MG/ML
20 INJECTION INTRAVENOUS ONCE
Status: COMPLETED | OUTPATIENT
Start: 2022-11-15 | End: 2022-11-15

## 2022-11-15 RX ORDER — LOSARTAN POTASSIUM 50 MG/1
100 TABLET ORAL DAILY
Status: DISCONTINUED | OUTPATIENT
Start: 2022-11-16 | End: 2022-11-19 | Stop reason: HOSPADM

## 2022-11-15 RX ORDER — GABAPENTIN 400 MG/1
800 CAPSULE ORAL 3 TIMES DAILY
Status: DISCONTINUED | OUTPATIENT
Start: 2022-11-15 | End: 2022-11-19 | Stop reason: HOSPADM

## 2022-11-15 RX ORDER — FUROSEMIDE 10 MG/ML
40 INJECTION INTRAMUSCULAR; INTRAVENOUS ONCE
Status: COMPLETED | OUTPATIENT
Start: 2022-11-15 | End: 2022-11-15

## 2022-11-15 RX ORDER — METHYLPREDNISOLONE SODIUM SUCCINATE 40 MG/ML
40 INJECTION, POWDER, LYOPHILIZED, FOR SOLUTION INTRAMUSCULAR; INTRAVENOUS EVERY 8 HOURS SCHEDULED
Status: DISCONTINUED | OUTPATIENT
Start: 2022-11-15 | End: 2022-11-17

## 2022-11-15 RX ORDER — NICOTINE 21 MG/24HR
1 PATCH, TRANSDERMAL 24 HOURS TRANSDERMAL DAILY
Status: DISCONTINUED | OUTPATIENT
Start: 2022-11-16 | End: 2022-11-19 | Stop reason: HOSPADM

## 2022-11-15 RX ORDER — POTASSIUM CHLORIDE 20 MEQ/1
40 TABLET, EXTENDED RELEASE ORAL ONCE
Status: COMPLETED | OUTPATIENT
Start: 2022-11-15 | End: 2022-11-15

## 2022-11-15 RX ORDER — TRAMADOL HYDROCHLORIDE 50 MG/1
50 TABLET ORAL EVERY 6 HOURS PRN
Status: DISCONTINUED | OUTPATIENT
Start: 2022-11-15 | End: 2022-11-19 | Stop reason: HOSPADM

## 2022-11-15 RX ORDER — POTASSIUM CHLORIDE 20 MEQ/1
20 TABLET, EXTENDED RELEASE ORAL DAILY
Status: DISCONTINUED | OUTPATIENT
Start: 2022-11-16 | End: 2022-11-19 | Stop reason: HOSPADM

## 2022-11-15 RX ORDER — LEVOTHYROXINE SODIUM 0.12 MG/1
125 TABLET ORAL
Status: DISCONTINUED | OUTPATIENT
Start: 2022-11-16 | End: 2022-11-19 | Stop reason: HOSPADM

## 2022-11-15 RX ORDER — DILTIAZEM HYDROCHLORIDE 120 MG/1
120 CAPSULE, COATED, EXTENDED RELEASE ORAL DAILY
Status: DISCONTINUED | OUTPATIENT
Start: 2022-11-16 | End: 2022-11-19 | Stop reason: HOSPADM

## 2022-11-15 RX ORDER — FUROSEMIDE 10 MG/ML
40 INJECTION INTRAMUSCULAR; INTRAVENOUS
Status: DISCONTINUED | OUTPATIENT
Start: 2022-11-16 | End: 2022-11-16

## 2022-11-15 RX ORDER — MONTELUKAST SODIUM 10 MG/1
10 TABLET ORAL
Status: DISCONTINUED | OUTPATIENT
Start: 2022-11-15 | End: 2022-11-19 | Stop reason: HOSPADM

## 2022-11-15 RX ORDER — IPRATROPIUM BROMIDE AND ALBUTEROL SULFATE .5; 3 MG/3ML; MG/3ML
1 SOLUTION RESPIRATORY (INHALATION) ONCE
Status: COMPLETED | OUTPATIENT
Start: 2022-11-15 | End: 2022-11-15

## 2022-11-15 RX ORDER — LEVALBUTEROL 1.25 MG/.5ML
1.25 SOLUTION, CONCENTRATE RESPIRATORY (INHALATION)
Status: DISCONTINUED | OUTPATIENT
Start: 2022-11-16 | End: 2022-11-19 | Stop reason: HOSPADM

## 2022-11-15 RX ORDER — SODIUM CHLORIDE FOR INHALATION 0.9 %
3 VIAL, NEBULIZER (ML) INHALATION ONCE
Status: COMPLETED | OUTPATIENT
Start: 2022-11-15 | End: 2022-11-15

## 2022-11-15 RX ORDER — TOPIRAMATE 25 MG/1
50 TABLET ORAL 2 TIMES DAILY
Status: DISCONTINUED | OUTPATIENT
Start: 2022-11-16 | End: 2022-11-19 | Stop reason: HOSPADM

## 2022-11-15 RX ORDER — SODIUM CHLORIDE 9 MG/ML
3 INJECTION INTRAVENOUS
Status: DISCONTINUED | OUTPATIENT
Start: 2022-11-15 | End: 2022-11-19 | Stop reason: HOSPADM

## 2022-11-15 RX ORDER — BUDESONIDE 0.5 MG/2ML
0.5 INHALANT ORAL 2 TIMES DAILY
Status: DISCONTINUED | OUTPATIENT
Start: 2022-11-16 | End: 2022-11-17

## 2022-11-15 RX ORDER — LANOLIN ALCOHOL/MO/W.PET/CERES
3 CREAM (GRAM) TOPICAL ONCE
Status: COMPLETED | OUTPATIENT
Start: 2022-11-15 | End: 2022-11-16

## 2022-11-15 RX ORDER — ATORVASTATIN CALCIUM 20 MG/1
20 TABLET, FILM COATED ORAL
Status: DISCONTINUED | OUTPATIENT
Start: 2022-11-16 | End: 2022-11-19 | Stop reason: HOSPADM

## 2022-11-15 RX ORDER — METHYLPREDNISOLONE SODIUM SUCCINATE 125 MG/2ML
125 INJECTION, POWDER, LYOPHILIZED, FOR SOLUTION INTRAMUSCULAR; INTRAVENOUS ONCE
Status: COMPLETED | OUTPATIENT
Start: 2022-11-15 | End: 2022-11-15

## 2022-11-15 RX ADMIN — IPRATROPIUM BROMIDE 1 MG: 0.5 SOLUTION RESPIRATORY (INHALATION) at 16:38

## 2022-11-15 RX ADMIN — IOHEXOL 85 ML: 350 INJECTION, SOLUTION INTRAVENOUS at 19:15

## 2022-11-15 RX ADMIN — ALBUTEROL SULFATE 10 MG: 2.5 SOLUTION RESPIRATORY (INHALATION) at 16:38

## 2022-11-15 RX ADMIN — POTASSIUM CHLORIDE 20 MEQ: 14.9 INJECTION, SOLUTION INTRAVENOUS at 19:28

## 2022-11-15 RX ADMIN — POTASSIUM CHLORIDE 40 MEQ: 1500 TABLET, EXTENDED RELEASE ORAL at 19:05

## 2022-11-15 RX ADMIN — METHYLPREDNISOLONE SODIUM SUCCINATE 125 MG: 125 INJECTION, POWDER, FOR SOLUTION INTRAMUSCULAR; INTRAVENOUS at 18:51

## 2022-11-15 RX ADMIN — ISODIUM CHLORIDE 3 ML: 0.03 SOLUTION RESPIRATORY (INHALATION) at 16:38

## 2022-11-15 RX ADMIN — FUROSEMIDE 40 MG: 10 INJECTION, SOLUTION INTRAVENOUS at 18:53

## 2022-11-15 NOTE — ED PROVIDER NOTES
Chief Complaint   Patient presents with   • Shortness of Breath     Pt with hx of COPD, hx of same with recent hospitalizations  Ongoing SOB, wears 3L NC at baseline  Duoneb pre-hospital with little improvement  History of Present Illness   This is a 81 y o  female PMH significant for HIV, COPD on 3L Baseline, CHF, AFibb on Eliquis, recent hospitalization for COVID coming in today with complaint of shortness of breath  She reports that this occurred earlier today  Describes that was sudden onset, feelings of shortness of breath  No alleviating factors  She was sitting on the couch when this occurred  She does report a feeling of chest pressure as well  EMS was called and she was given 1 DuoNeb on route  She otherwise denies any fevers, cough, hemoptysis, nausea, vomiting, diaphoresis, abdominal pain  She does report left lower extremity swelling currently  Denies any weight gain over the last week or 2  She reports she has been compliant with her medication therapy  Chart review remarkable for recent hospitalization for CHF, COPD, and COVID positive  No other complaints at this time     - No language barrier    - History obtained from patient and chart   - Reviewed and documented relevant past medical/family/social history  - There are no limitations to the history obtained  Past Medical, Past Surgical History:    has a past medical history of Asthma, Bipolar 1 disorder ([address] ), Cardiac disease, COPD (chronic obstructive pulmonary disease) ([facility] [address] Diabetes mellitus ([facility] [address] ), Disease of thyroid gland, HIV (human immunodeficiency virus infection) ([facility] [address] ) (03/24/2016), Hypertension, Osteoarthritis, and Tobacco abuse    has a past surgical history that includes Hernia repair; Thyroidectomy; and Lumbar fusion (N/A, 4/10/2018)  Allergies:      Allergies   Allergen Reactions   • Lisinopril Angioedema   • Prozac [Fluoxetine Hcl] Rash   • Sulfa Antibiotics Itching   • Quinine        Social and Family MYKE called [facility] p: [phone] and confirmed with Steve Mustafa that pt is current with [facility] (skilled nursing). [address] [facility] is not in aidin. MYKE hand faxed clinicals and resumption of care order to [phone]. @3PM   PT rec'd HH. New F2F entered & faxed to [employer]. MYKE called to notify [facility] [address]. Per RN, pt needs ambulance transport. MYKE called [facility] for next available. Pcs done. PLAN: home with [address] [facility] & family today @ 5:15PM via [employer] ([phone])    SW remains available for support and/or discharge planning. Please do not hesitate to call/chat SW if further DC needs arise.      Negin DAMON, [place], [place]   Ext [phone] History:     Social History     Substance and Sexual Activity   Alcohol Use Not Currently   • Alcohol/week: 2 0 standard drinks   • Types: 1 Cans of beer, 1 Shots of liquor per week    Comment: only on holidays     Social History     Tobacco Use   Smoking Status Former Smoker   • Packs/day: 0 20   • Years: 45 00   • Pack years: 9 00   • Types: Cigarettes   Smokeless Tobacco Never Used   Tobacco Comment    Currently smoking 1-2 cigarettes a day     Social History     Substance and Sexual Activity   Drug Use Not Currently    Comment: former IV drug user       Review of Systems:   Review of Systems   Constitutional: Negative for weight gain  HENT: Negative for sore throat  Cardiovascular: Positive for chest pain  Respiratory: Positive for cough and shortness of breath  Skin: Negative for rash  Musculoskeletal: Negative for back pain  Gastrointestinal: Negative for abdominal pain  Genitourinary: Negative for hematuria  Neurological: Negative for headaches  Psychiatric/Behavioral: Negative for altered mental status  All other systems reviewed and are negative  Physical Examination     Vitals:    11/15/22 1603   BP: 112/80   Pulse: 97   Resp: 20   Temp: 98 1 °F (36 7 °C)   TempSrc: Oral   SpO2: 100%     Vitals reviewed by me  Physical Exam  Vitals and nursing note reviewed  Constitutional:       General: She is not in acute distress  Appearance: She is well-developed  She is ill-appearing  She is not diaphoretic  HENT:      Head: Normocephalic and atraumatic  Eyes:      Conjunctiva/sclera: Conjunctivae normal    Cardiovascular:      Rate and Rhythm: Normal rate and regular rhythm  Heart sounds: No murmur heard  Pulmonary:      Effort: Pulmonary effort is normal  Tachypnea present  No respiratory distress  Breath sounds: Examination of the right-upper field reveals wheezing  Examination of the left-upper field reveals wheezing   Examination of the right-middle field reveals wheezing  Examination of the left-middle field reveals wheezing  Wheezing present  Abdominal:      Palpations: Abdomen is soft  Tenderness: There is no abdominal tenderness  Musculoskeletal:      Cervical back: Neck supple  Right lower leg: Edema present  Left lower leg: Edema present  Comments: Left lower extremity edema greater than right, 1+ pitting, no evidence of weeping or crepitus   Skin:     General: Skin is warm and dry  Neurological:      General: No focal deficit present  Mental Status: She is alert  Risk Stratification Tools                X-ray chest 1 view portable    (Results Pending)   CTA ED chest PE study    (Results Pending)   VAS lower limb venous duplex study, unilateral/limited    (Results Pending)     Orders Placed This Encounter   Procedures   • X-ray chest 1 view portable   • CTA ED chest PE study   • CBC and differential   • Basic metabolic panel   • HS Troponin 0hr (reflex protocol)   • Cardiac monitoring   • ED Pulse Oximetry, Continuous   • Insert peripheral IV   • ECG 12 lead   • ECG 12 lead repeat Q30 minutes PRN persistent chest pain x 2   • ECG 12 lead repeat in 2hrs   • ECG 12 lead repeat in 4hrs       Labs:   Labs Reviewed - No data to display    Imaging:     X-ray chest 1 view portable    (Results Pending)   CTA ED chest PE study    (Results Pending)   VAS lower limb venous duplex study, unilateral/limited    (Results Pending)            Procedures   Procedures      MDM:   Ping Sunshine is a 81 y o  who presents with complaints of shortness of breath    Vital signs are remarkable for tachypnea, physical exam shows the patient is ill-appearing, evident wheezing bilaterally, left lower extremity edema greater than right, no cardiac auscultation abnormalities, no focal deficits    Ddx:  ACS versus PE versus COPD exacerbation versus CHF versus other  Likely a mixed picture   Given her high risk history, will require aggressive resuscitation and workup  She is also unclear whether she has been compliant with her Eliquis therapy therefore will proceed with PE study    Plan: Cardiac labs, CT PE study, Duplex LLE, Lasix, SARMIENTO Nebs, Solumedrol  Will monitor closely  ED Course as of 11/15/22 2345   e Nov 15, 2022   1653 EKG shows sinus rhythm, rate of 92, RBBB present, no ST or T wave abnormalities, normal intervals, normal axis, no acute changes from prior      1734 Unable to get access at this time, failed x2 with US  Another resident will attempt US IV Access   1814 Access obtained   1816 Awaiting labs/imaging, currently stable     1823 Hemoglobin(!): 10 1   1823 Similar to prior   1844 Potassium(!!): 2 6   1844 hs TnI 0hr: 13   1845 Adding on K+ supplementation  DVT study negative per tech  Awaiting CT studies,    1845 Remains on 4L NC   1957 Awaiting PE study, will admit once resulted   2059 Will get the pt admitted for respiratory failre   2100 PE study negative   2131 Reaching out to [facility]       Summary:  Overall the patient's workup was remarkable for acute hypoxic respiratory failure, hyperkalemia, however no other evidence of acute abnormality including cardiac or respiratory abnormality  Given her new hypoxic respiratory failure however recommended admission for further care  Likely multifactorial, either COPD or CHF exacerbation in conjunction with recent COVID infection  Discussed the patient's case with the [facility] service who agreed to admit the patient for further care and evaluation  The patient was also stable throughout their ED course and suffered from no acute changes and had no further questions or concerns from the ED team's standpoint  MDM  Number of Diagnoses or Management Options    Final Dispo   Admit    Final Diagnosis:  No diagnosis found        Medications   ipratropium-albuterol (FOR EMS ONLY) (DUO-NEB) 0 5-2 5 mg/3 mL inhalation solution 3 mL (has no administration in time range)   albuterol inhalation solution 10 mg (has no administration in time range)     And   ipratropium (ATROVENT) 0 02 % inhalation solution 1 mg (has no administration in time range)     And   sodium chloride 0 9 % inhalation solution 3 mL (has no administration in time range)   methylPREDNISolone sodium succinate (Solu-MEDROL) injection 125 mg (has no administration in time range)   furosemide (LASIX) injection 40 mg (has no administration in time range)   sodium chloride (PF) 0 9 % injection 3 mL (has no administration in time range)     ED Disposition     None      Follow-up Information    None       Patient's Medications   Discharge Prescriptions    No medications on file     No discharge procedures on file  Prior to Admission Medications   Prescriptions Last Dose Informant Patient Reported?  Taking?   acetaminophen (TYLENOL) 325 mg tablet   No No   Sig: Take 3 tablets (975 mg total) by mouth every 8 (eight) hours as needed for mild pain   albuterol (2 5 mg/3 mL) 0 083 % nebulizer solution   No No   Sig: Take 3 mL (2 5 mg total) by nebulization every 6 (six) hours as needed for wheezing or shortness of breath   apixaban (ELIQUIS) 5 mg   Yes No   Sig: Take 1 tablet by mouth 2 (two) times a day   atorvastatin (LIPITOR) 20 mg tablet   Yes No   Sig: Take 20 mg by mouth daily   bictegravir-emtricitab-tenofovir alafenamide (Biktarvy) -25 MG tablet   Yes No   Sig: Take 1 tablet by mouth daily   budesonide (PULMICORT) 0 5 mg/2 mL nebulizer solution   No No   Sig: Take 2 mL (0 5 mg total) by nebulization 2 (two) times a day Rinse mouth after use    carboxymethylcellulose 0 5 % SOLN   No No   Sig: Administer 1 drop into the left eye 3 (three) times a day as needed for dry eyes   cholecalciferol (VITAMIN D3) 1,000 units tablet   Yes No   Sig: Take 1,000 Units by mouth daily   diltiazem (CARDIZEM CD) 120 mg 24 hr capsule   No No   Sig: Take 1 capsule (120 mg total) by mouth daily   fluticasone-umeclidinium-vilanterol (Trelegy Ellipta) 669-28 5-92 mcg/actuation AEPB inhaler   Yes No   Sig: Inhale 1 puff daily Rinse mouth after use  furosemide (LASIX) 40 mg tablet   No No   Sig: Take 1 tablet (40 mg total) by mouth 2 (two) times a day   gabapentin (NEURONTIN) 400 mg capsule   Yes No   Sig: Take 800 mg by mouth 3 (three) times a day     levothyroxine 125 mcg tablet   Yes No   Sig: Take 125 mcg by mouth daily   losartan (COZAAR) 100 MG tablet   Yes No   Sig: Take 100 mg by mouth daily   lurasidone (LATUDA) 40 mg tablet   Yes No   Sig: Take 40 mg by mouth   metFORMIN (GLUCOPHAGE) 500 mg tablet   Yes No   Sig: Take 500 mg by mouth daily   montelukast (SINGULAIR) 10 mg tablet   Yes No   Sig: Take 10 mg by mouth daily at bedtime   nicotine (NICODERM CQ) 21 mg/24 hr TD 24 hr patch   No No   Sig: Place 1 patch on the skin daily   potassium chloride (K-DUR,KLOR-CON) 20 mEq tablet   No No   Sig: Take 1 tablet (20 mEq total) by mouth daily   predniSONE 10 mg tablet   No No   Si tabs x 3 days, 3 tabs x 3tabs, 2 tabs x 3 days, 1 tab x 3 days, then stop   topiramate (TOPAMAX) 50 MG tablet   Yes No   Sig: Take 50 mg by mouth 2 (two) times a day   traMADol (ULTRAM) 50 mg tablet   Yes No   Sig: Take 50 mg by mouth as needed      Facility-Administered Medications: None       I discussed all pertinent results, imaging findings, and aspects of the care plan with the patient and/or present family members  Answered all questions and addressed all concerns  They expressed agreement and had no further concerns  Portions of the record may have been created with voice recognition software  Occasional wrong word or "sound a like" substitutions may have occurred due to the inherent limitations of voice recognition software  Read the chart carefully and recognize, using context, where substitutions have occurred  The attending physician physically available and evaluated the above patient alongside myself       Ronel Mak, MD  11/15/22 0025

## 2022-11-15 NOTE — ED ATTENDING ATTESTATION
11/15/2022  I, Isabel Hartman MD, saw and evaluated the patient  I have discussed the patient with the resident/non-physician practitioner and agree with the resident's/non-physician practitioner's findings, Plan of Care, and MDM as documented in the resident's/non-physician practitioner's note, except where noted  All available labs and Radiology studies were reviewed  I was present for key portions of any procedure(s) performed by the resident/non-physician practitioner and I was immediately available to provide assistance  At this point I agree with the current assessment done in the Emergency Department  I have conducted an independent evaluation of this patient a history and physical is as follows:  Patient is a 82-year-old female  She has a history of COPD  She is on home oxygen  She was recently admitted for his COPD exacerbation/COVID  She presents to the emergency room today because she became short of breath  No fever  No cough  No chest pain  She does have lower extremity edema  Physical exam:  There is an occasional wheeze  Regular rate and rhythm  Benign abdominal exam   Bilateral pitting edema to the lower extremities  Assessment/plan:  Dyspnea workup  Assessing for CHF versus COPD  Rule out pneumonia  Rule out pulmonary embolism    ED Course         Critical Care Time  Procedures

## 2022-11-16 ENCOUNTER — PATIENT OUTREACH (OUTPATIENT)
Dept: CASE MANAGEMENT | Facility: OTHER | Age: 72
End: 2022-11-16

## 2022-11-16 LAB
ALBUMIN SERPL BCP-MCNC: 3.6 G/DL (ref 3.5–5)
ALP SERPL-CCNC: 62 U/L (ref 46–116)
ALT SERPL W P-5'-P-CCNC: 17 U/L (ref 12–78)
ANION GAP SERPL CALCULATED.3IONS-SCNC: 10 MMOL/L (ref 4–13)
AST SERPL W P-5'-P-CCNC: 10 U/L (ref 5–45)
ATRIAL RATE: 101 BPM
ATRIAL RATE: 441 BPM
BASOPHILS # BLD AUTO: 0.02 THOUSANDS/ÂΜL (ref 0–0.1)
BASOPHILS NFR BLD AUTO: 0 % (ref 0–1)
BILIRUB SERPL-MCNC: 0.25 MG/DL (ref 0.2–1)
BUN SERPL-MCNC: 15 MG/DL (ref 5–25)
CALCIUM SERPL-MCNC: 9.9 MG/DL (ref 8.3–10.1)
CHLORIDE SERPL-SCNC: 106 MMOL/L (ref 96–108)
CO2 SERPL-SCNC: 29 MMOL/L (ref 21–32)
CREAT SERPL-MCNC: 1.09 MG/DL (ref 0.6–1.3)
EOSINOPHIL # BLD AUTO: 0 THOUSAND/ÂΜL (ref 0–0.61)
EOSINOPHIL NFR BLD AUTO: 0 % (ref 0–6)
ERYTHROCYTE [DISTWIDTH] IN BLOOD BY AUTOMATED COUNT: 15.8 % (ref 11.6–15.1)
GFR SERPL CREATININE-BSD FRML MDRD: 51 ML/MIN/1.73SQ M
GLUCOSE SERPL-MCNC: 141 MG/DL (ref 65–140)
GLUCOSE SERPL-MCNC: 159 MG/DL (ref 65–140)
GLUCOSE SERPL-MCNC: 186 MG/DL (ref 65–140)
GLUCOSE SERPL-MCNC: 240 MG/DL (ref 65–140)
GLUCOSE SERPL-MCNC: 248 MG/DL (ref 65–140)
HCT VFR BLD AUTO: 35.3 % (ref 34.8–46.1)
HGB BLD-MCNC: 10.3 G/DL (ref 11.5–15.4)
IMM GRANULOCYTES # BLD AUTO: 0.06 THOUSAND/UL (ref 0–0.2)
IMM GRANULOCYTES NFR BLD AUTO: 1 % (ref 0–2)
LYMPHOCYTES # BLD AUTO: 0.79 THOUSANDS/ÂΜL (ref 0.6–4.47)
LYMPHOCYTES NFR BLD AUTO: 10 % (ref 14–44)
MCH RBC QN AUTO: 29.3 PG (ref 26.8–34.3)
MCHC RBC AUTO-ENTMCNC: 29.2 G/DL (ref 31.4–37.4)
MCV RBC AUTO: 100 FL (ref 82–98)
MONOCYTES # BLD AUTO: 0.08 THOUSAND/ÂΜL (ref 0.17–1.22)
MONOCYTES NFR BLD AUTO: 1 % (ref 4–12)
NEUTROPHILS # BLD AUTO: 6.85 THOUSANDS/ÂΜL (ref 1.85–7.62)
NEUTS SEG NFR BLD AUTO: 88 % (ref 43–75)
NRBC BLD AUTO-RTO: 0 /100 WBCS
P AXIS: 20 DEGREES
PLATELET # BLD AUTO: 257 THOUSANDS/UL (ref 149–390)
PMV BLD AUTO: 9.6 FL (ref 8.9–12.7)
POTASSIUM SERPL-SCNC: 4.2 MMOL/L (ref 3.5–5.3)
PR INTERVAL: 180 MS
PROT SERPL-MCNC: 7 G/DL (ref 6.4–8.4)
QRS AXIS: -76 DEGREES
QRS AXIS: 255 DEGREES
QRSD INTERVAL: 110 MS
QRSD INTERVAL: 112 MS
QT INTERVAL: 306 MS
QT INTERVAL: 312 MS
QTC INTERVAL: 404 MS
QTC INTERVAL: 404 MS
RBC # BLD AUTO: 3.52 MILLION/UL (ref 3.81–5.12)
SODIUM SERPL-SCNC: 145 MMOL/L (ref 135–147)
T WAVE AXIS: 47 DEGREES
T WAVE AXIS: 48 DEGREES
VENTRICULAR RATE: 101 BPM
VENTRICULAR RATE: 105 BPM
WBC # BLD AUTO: 7.8 THOUSAND/UL (ref 4.31–10.16)

## 2022-11-16 RX ORDER — FUROSEMIDE 40 MG/1
40 TABLET ORAL 2 TIMES DAILY
Status: DISCONTINUED | OUTPATIENT
Start: 2022-11-16 | End: 2022-11-19 | Stop reason: HOSPADM

## 2022-11-16 RX ORDER — LANOLIN ALCOHOL/MO/W.PET/CERES
6 CREAM (GRAM) TOPICAL
Status: DISCONTINUED | OUTPATIENT
Start: 2022-11-16 | End: 2022-11-19 | Stop reason: HOSPADM

## 2022-11-16 RX ADMIN — LEVALBUTEROL 1.25 MG: 1.25 SOLUTION, CONCENTRATE RESPIRATORY (INHALATION) at 07:21

## 2022-11-16 RX ADMIN — ACETAMINOPHEN 650 MG: 325 TABLET ORAL at 22:28

## 2022-11-16 RX ADMIN — GABAPENTIN 800 MG: 400 CAPSULE ORAL at 17:07

## 2022-11-16 RX ADMIN — LEVOTHYROXINE SODIUM 125 MCG: 125 TABLET ORAL at 06:04

## 2022-11-16 RX ADMIN — IPRATROPIUM BROMIDE 0.5 MG: 0.5 SOLUTION RESPIRATORY (INHALATION) at 13:55

## 2022-11-16 RX ADMIN — LOSARTAN POTASSIUM 100 MG: 50 TABLET, FILM COATED ORAL at 08:31

## 2022-11-16 RX ADMIN — INSULIN LISPRO 1 UNITS: 100 INJECTION, SOLUTION INTRAVENOUS; SUBCUTANEOUS at 08:34

## 2022-11-16 RX ADMIN — INSULIN LISPRO 2 UNITS: 100 INJECTION, SOLUTION INTRAVENOUS; SUBCUTANEOUS at 12:02

## 2022-11-16 RX ADMIN — LEVALBUTEROL 1.25 MG: 1.25 SOLUTION, CONCENTRATE RESPIRATORY (INHALATION) at 19:30

## 2022-11-16 RX ADMIN — Medication 6 MG: at 22:47

## 2022-11-16 RX ADMIN — BICTEGRAVIR SODIUM, EMTRICITABINE, AND TENOFOVIR ALAFENAMIDE FUMARATE 1 TABLET: 50; 200; 25 TABLET ORAL at 08:34

## 2022-11-16 RX ADMIN — LEVALBUTEROL 1.25 MG: 1.25 SOLUTION, CONCENTRATE RESPIRATORY (INHALATION) at 13:55

## 2022-11-16 RX ADMIN — MELATONIN 3 MG: at 02:34

## 2022-11-16 RX ADMIN — TOPIRAMATE 50 MG: 25 TABLET, FILM COATED ORAL at 08:31

## 2022-11-16 RX ADMIN — METHYLPREDNISOLONE SODIUM SUCCINATE 40 MG: 40 INJECTION, POWDER, FOR SOLUTION INTRAMUSCULAR; INTRAVENOUS at 22:07

## 2022-11-16 RX ADMIN — GABAPENTIN 800 MG: 400 CAPSULE ORAL at 08:31

## 2022-11-16 RX ADMIN — GABAPENTIN 800 MG: 400 CAPSULE ORAL at 02:34

## 2022-11-16 RX ADMIN — TRAMADOL HYDROCHLORIDE 50 MG: 50 TABLET, COATED ORAL at 17:47

## 2022-11-16 RX ADMIN — BUDESONIDE 0.5 MG: 0.5 INHALANT ORAL at 07:21

## 2022-11-16 RX ADMIN — IPRATROPIUM BROMIDE 0.5 MG: 0.5 SOLUTION RESPIRATORY (INHALATION) at 19:30

## 2022-11-16 RX ADMIN — TOPIRAMATE 50 MG: 25 TABLET, FILM COATED ORAL at 17:07

## 2022-11-16 RX ADMIN — IPRATROPIUM BROMIDE 0.5 MG: 0.5 SOLUTION RESPIRATORY (INHALATION) at 07:21

## 2022-11-16 RX ADMIN — BUDESONIDE 0.5 MG: 0.5 INHALANT ORAL at 19:30

## 2022-11-16 RX ADMIN — POTASSIUM CHLORIDE 20 MEQ: 1500 TABLET, EXTENDED RELEASE ORAL at 08:31

## 2022-11-16 RX ADMIN — METHYLPREDNISOLONE SODIUM SUCCINATE 40 MG: 40 INJECTION, POWDER, FOR SOLUTION INTRAMUSCULAR; INTRAVENOUS at 13:49

## 2022-11-16 RX ADMIN — APIXABAN 5 MG: 5 TABLET, FILM COATED ORAL at 08:31

## 2022-11-16 RX ADMIN — FUROSEMIDE 40 MG: 40 TABLET ORAL at 17:07

## 2022-11-16 RX ADMIN — APIXABAN 5 MG: 5 TABLET, FILM COATED ORAL at 17:07

## 2022-11-16 RX ADMIN — ATORVASTATIN CALCIUM 20 MG: 20 TABLET, FILM COATED ORAL at 17:07

## 2022-11-16 RX ADMIN — MONTELUKAST 10 MG: 10 TABLET, FILM COATED ORAL at 22:10

## 2022-11-16 RX ADMIN — GABAPENTIN 800 MG: 400 CAPSULE ORAL at 22:09

## 2022-11-16 RX ADMIN — REMDESIVIR 200 MG: 100 INJECTION, POWDER, LYOPHILIZED, FOR SOLUTION INTRAVENOUS at 02:35

## 2022-11-16 RX ADMIN — MONTELUKAST 10 MG: 10 TABLET, FILM COATED ORAL at 02:35

## 2022-11-16 RX ADMIN — DILTIAZEM HYDROCHLORIDE 120 MG: 120 CAPSULE, COATED, EXTENDED RELEASE ORAL at 08:31

## 2022-11-16 RX ADMIN — METHYLPREDNISOLONE SODIUM SUCCINATE 40 MG: 40 INJECTION, POWDER, FOR SOLUTION INTRAMUSCULAR; INTRAVENOUS at 02:34

## 2022-11-16 RX ADMIN — FUROSEMIDE 40 MG: 10 INJECTION, SOLUTION INTRAVENOUS at 08:31

## 2022-11-16 NOTE — PROGRESS NOTES
[facility]  Progress Note - Laya Cuff 1950, 81 y o  female MRN: [id number]  Unit/Bed#: [address] 226-01 Encounter: [id number]  Primary Care Provider: Mariah Cote DO   Date and time admitted to hospital: 11/15/2022  3:59 PM    * Acute exacerbation of moderate persistent extrinsic asthma  Assessment & Plan  This is a 72-year-old female with history of COPD on chronic oxygen 3 L, recent COVID infection on 11/08/2022, hypertension, hyperlipidemia, chronic diastolic CHF, paroxysmal atrial fibrillation on Eliquis, sleep apnea, HIV on medication, diabetes mellitus presenting with dyspnea  Patient was seen at [address] [place] [address] and the ED on 11/08/2022 for dyspnea at which point she was found to be positive for COVID-19  Patient states she has continued to have dyspnea  Denies any nausea, vomiting, cough, diarrhea  · Continue with Solu-Medrol 40 mg IV q 8  · Scheduled Xopenex and Atrovent  · Budesonide, Singulair  · Patient is back to baseline oxygen requirements    Acute on chronic diastolic heart failure (HCC)  Assessment & Plan  Wt Readings from Last 3 Encounters:   11/15/22 98 7 kg (217 lb 9 5 oz)   11/04/22 103 kg (227 lb 15 3 oz)   10/23/22 98 9 kg (218 lb)     · 2D echo revealed ejection fraction 35%, grade 1 diastolic dysfunction  · Maintained on furosemide 20 mg b i d   At home  · Baseline weight is closer to 220 lb  · ProBNP 159  · Continue torsemide 40 mg IV b i d   · Monitor I and O, daily weight, fluid restriction    Paroxysmal atrial fibrillation (HCC)  Assessment & Plan  · Rate controlled on diltiazem  · Eliquis for anticoagulation    Acute on chronic respiratory failure with hypoxia (HCC)  Assessment & Plan  · Secondary to COVID-19 and Asthma/COPD exacerbation  · On chronic oxygen 3 L at home currently back to baseline      Tobacco use  Assessment & Plan    · Nicotine patch  · Counseled on tobacco cessation    Type 2 diabetes mellitus with hyperglycemia ([facility] [address] )  Assessment & Plan  Lab Results   Component Value Date    HGBA1C 6 8 (H) 10/16/2022       Recent Labs     11/15/22  2311 22  0727 22  1103   POCGLU 215* 159* 248*       Blood Sugar Average: Last 72 hrs:  · (P) 942 [id number] hold oral hypoglycemics  · Monitor Accu-Cheks, sliding scale for coverage    COVID-19  Assessment & Plan  COVID 19 Confirmed in October  • Oxygen requirements:  Was on 4 L currently back to baseline 3 L  • Discontinue remdesivir at this time  • isolation precaution can be discontinued  • OOB TID     HIV (human immunodeficiency virus infection)   Assessment & Plan  · Continue Biktarvy        VTE Pharmacologic Prophylaxis: VTE Score: 7 High Risk (Score >/= 5) - Pharmacological DVT Prophylaxis Ordered: apixaban (Eliquis)  Sequential Compression Devices Ordered  Patient Centered Rounds: I performed bedside rounds with nursing staff today  Discussions with Specialists or Other Care Team Provider:  None    Education and Discussions with Family / Patient: Updated  (father and friend) at bedside  Time Spent for Care: 30 minutes  More than 50% of total time spent on counseling and coordination of care as described above  Current Length of Stay: 1 day(s)  Current Patient Status: Inpatient   Certification Statement: The patient will continue to require additional inpatient hospital stay due to Acute COPD exacerbation requiring inpatient management  Discharge Plan: Anticipate discharge in 24-48 hrs to home  Code Status: Level 1 - Full Code    Subjective:   Patient was seen sitting up in bed today  She reports continued shortness of breath and chest heaviness when walking around  She states that she is not currently back at her baseline, but that her breathing has improved since admission    Objective:     Vitals:   Temp (24hrs), Av 1 °F (36 7 °C), Min:98 1 °F (36 7 °C), Max:98 1 °F (36 7 °C)    Temp:  [98 1 °F (36 7 °C)] 98 1 °F (36 7 °C)  HR:  [] 97  Resp:  [16-20] 16  BP: (102-142)/(61-80) 121/76  SpO2:  [95 %-100 %] 95 %  Body mass index is 39 8 kg/m²  Input and Output Summary (last 24 hours): Intake/Output Summary (Last 24 hours) at 11/16/2022 1508  Last data filed at 11/16/2022 1301  Gross per 24 hour   Intake 1170 ml   Output 650 ml   Net 520 ml       Physical Exam:   Physical Exam  Vitals and nursing note reviewed  Constitutional:       Appearance: Normal appearance  HENT:      Head: Normocephalic and atraumatic  Eyes:      General: No scleral icterus  Cardiovascular:      Rate and Rhythm: Normal rate and regular rhythm  Pulmonary:      Effort: Pulmonary effort is normal       Breath sounds: Wheezing present  Abdominal:      General: Abdomen is flat  Bowel sounds are normal       Palpations: Abdomen is soft  Tenderness: There is no abdominal tenderness  Musculoskeletal:      Right lower leg: Edema present  Left lower leg: Edema present  Comments: Left leg edema greater than right   Skin:     General: Skin is warm and dry  Neurological:      Mental Status: She is alert  Mental status is at baseline     Psychiatric:         Mood and Affect: Mood normal          Behavior: Behavior normal           Additional Data:     Labs:  Results from last 7 days   Lab Units 11/16/22  0556   WBC Thousand/uL 7 80   HEMOGLOBIN g/dL 10 3*   HEMATOCRIT % 35 3   PLATELETS Thousands/uL 257   NEUTROS PCT % 88*   LYMPHS PCT % 10*   MONOS PCT % 1*   EOS PCT % 0     Results from last 7 days   Lab Units 11/16/22  0556   SODIUM mmol/L 145   POTASSIUM mmol/L 4 2   CHLORIDE mmol/L 106   CO2 mmol/L 29   BUN mg/dL 15   CREATININE mg/dL 1 09   ANION GAP mmol/L 10   CALCIUM mg/dL 9 9   ALBUMIN g/dL 3 6   TOTAL BILIRUBIN mg/dL 0 25   ALK PHOS U/L 62   ALT U/L 17   AST U/L 10   GLUCOSE RANDOM mg/dL 186*         Results from last 7 days   Lab Units 11/16/22  1103 11/16/22  0727 11/15/22  2311   POC GLUCOSE mg/dl 248* 159* 215*               Lines/Drains:  Invasive Devices Peripheral Intravenous Line  Duration           Peripheral IV 11/15/22 Left Antecubital <1 day                      Imaging: No pertinent imaging reviewed  Recent Cultures (last 7 days):         Last 24 Hours Medication List:   Current Facility-Administered Medications   Medication Dose Route Frequency Provider Last Rate   • acetaminophen  650 mg Oral Q6H PRN Kay Larsen MD     • apixaban  5 mg Oral BID Kay Larsen MD     • atorvastatin  20 mg Oral After Easton Gann MD     • bictegravir-emtricitab-tenofovir alafenamide  1 tablet Oral Daily Kay Larsen MD     • budesonide  0 5 mg Nebulization BID Kay Larsen MD     • diltiazem  120 mg Oral Daily Kay Larsen MD     • furosemide  40 mg Oral BID Kayla Latif PA-C     • gabapentin  800 mg Oral TID Kay Larsen MD     • insulin lispro  1-5 Units Subcutaneous TID AC Kay Larsen MD     • ipratropium  0 5 mg Nebulization TID Kay Larsen MD     • levalbuterol  1 25 mg Nebulization TID Kay Larsen MD     • levothyroxine  125 mcg Oral Early Morning Kay Larsen MD     • losartan  100 mg Oral Daily Kay Larsen MD     • methylPREDNISolone sodium succinate  40 mg Intravenous Q8H Zak Martinez MD     • montelukast  10 mg Oral HS Kay Larsen MD     • nicotine  1 patch Transdermal Daily Kay Larsen MD     • potassium chloride  20 mEq Oral Daily Kay Larsen MD     • remdesivir  100 mg Intravenous Q24H Kay Larsen MD     • sodium chloride (PF)  3 mL Intravenous Q1H PRN Kay Larsen MD     • topiramate  50 mg Oral BID Kay Larsen MD     • traMADol  50 mg Oral Q6H PRN Kay Larsen MD          Today, Patient Was Seen By: Kayla Latif PA-C    **Please Note: This note may have been constructed using a voice recognition system  **

## 2022-11-16 NOTE — ASSESSMENT & PLAN NOTE
COVID 19 Confirmed in October  • Oxygen requirements:  Was on 4 L currently back to baseline 3 L  • Discontinue remdesivir at this time  • isolation precaution can be discontinued  • OOB TID

## 2022-11-16 NOTE — PLAN OF CARE
Problem: Potential for Falls  Goal: Patient will remain free of falls  Description: INTERVENTIONS:  - Educate patient/family on patient safety including physical limitations  - Instruct patient to call for assistance with activity   - Consult OT/PT to assist with strengthening/mobility   - Keep Call bell within reach  - Keep bed low and locked with side rails adjusted as appropriate  - Keep care items and personal belongings within reach  - Initiate and maintain comfort rounds  - Make Fall Risk Sign visible to staff  - Offer Toileting every  Hours, in advance of need  - Initiate/Maintain alarm  - Obtain necessary fall risk management equipment:   - Apply yellow socks and bracelet for high fall risk patients  - Consider moving patient to room near nurses station  Outcome: Progressing     Problem: PAIN - ADULT  Goal: Verbalizes/displays adequate comfort level or baseline comfort level  Description: Interventions:  - Encourage patient to monitor pain and request assistance  - Assess pain using appropriate pain scale  - Administer analgesics based on type and severity of pain and evaluate response  - Implement non-pharmacological measures as appropriate and evaluate response  - Consider cultural and social influences on pain and pain management  - Notify physician/advanced practitioner if interventions unsuccessful or patient reports new pain  Outcome: Progressing     Problem: INFECTION - ADULT  Goal: Absence or prevention of progression during hospitalization  Description: INTERVENTIONS:  - Assess and monitor for signs and symptoms of infection  - Monitor lab/diagnostic results  - Monitor all insertion sites, i e  indwelling lines, tubes, and drains  - Monitor endotracheal if appropriate and nasal secretions for changes in amount and color  - Gays appropriate cooling/warming therapies per order  - Administer medications as ordered  - Instruct and encourage patient and family to use good hand hygiene technique  - Identify and instruct in appropriate isolation precautions for identified infection/condition  Outcome: Progressing     Problem: SAFETY ADULT  Goal: Patient will remain free of falls  Description: INTERVENTIONS:  - Educate patient/family on patient safety including physical limitations  - Instruct patient to call for assistance with activity   - Consult OT/PT to assist with strengthening/mobility   - Keep Call bell within reach  - Keep bed low and locked with side rails adjusted as appropriate  - Keep care items and personal belongings within reach  - Initiate and maintain comfort rounds  - Make Fall Risk Sign visible to staff  - Offer Toileting every  Hours, in advance of need  - Initiate/Maintain alarm  - Obtain necessary fall risk management equipment:   - Apply yellow socks and bracelet for high fall risk patients  - Consider moving patient to room near nurses station  Outcome: Progressing  Goal: Maintain or return to baseline ADL function  Description: INTERVENTIONS:  -  Assess patient's ability to carry out ADLs; assess patient's baseline for ADL function and identify physical deficits which impact ability to perform ADLs (bathing, care of mouth/teeth, toileting, grooming, dressing, etc )  - Assess/evaluate cause of self-care deficits   - Assess range of motion  - Assess patient's mobility; develop plan if impaired  - Assess patient's need for assistive devices and provide as appropriate  - Encourage maximum independence but intervene and supervise when necessary  - Involve family in performance of ADLs  - Assess for home care needs following discharge   - Consider OT consult to assist with ADL evaluation and planning for discharge  - Provide patient education as appropriate  Outcome: Progressing  Goal: Maintains/Returns to pre admission functional level  Description: INTERVENTIONS:  - Perform BMAT or MOVE assessment daily    - Set and communicate daily mobility goal to care team and patient/family/caregiver  - Collaborate with rehabilitation services on mobility goals if consulted  - Perform Range of Motion  times a day  - Reposition patient every  hours  - Dangle patient  times a day  - Stand patient  times a day  - Ambulate patient  times a day  - Out of bed to chair  times a day   - Out of bed for meals  times a day  - Out of bed for toileting  - Record patient progress and toleration of activity level   Outcome: Progressing     Problem: DISCHARGE PLANNING  Goal: Discharge to home or other facility with appropriate resources  Description: INTERVENTIONS:  - Identify barriers to discharge w/patient and caregiver  - Arrange for needed discharge resources and transportation as appropriate  - Identify discharge learning needs (meds, wound care, etc )  - Arrange for interpretive services to assist at discharge as needed  - Refer to Case Management Department for coordinating discharge planning if the patient needs post-hospital services based on physician/advanced practitioner order or complex needs related to functional status, cognitive ability, or social support system  Outcome: Progressing     Problem: Knowledge Deficit  Goal: Patient/family/caregiver demonstrates understanding of disease process, treatment plan, medications, and discharge instructions  Description: Complete learning assessment and assess knowledge base    Interventions:  - Provide teaching at level of understanding  - Provide teaching via preferred learning methods  Outcome: Progressing     Problem: RESPIRATORY - ADULT  Goal: Achieves optimal ventilation and oxygenation  Description: INTERVENTIONS:  - Assess for changes in respiratory status  - Assess for changes in mentation and behavior  - Position to facilitate oxygenation and minimize respiratory effort  - Oxygen administered by appropriate delivery if ordered  - Initiate smoking cessation education as indicated  - Encourage broncho-pulmonary hygiene including cough, deep breathe, Incentive Spirometry  - Assess the need for suctioning and aspirate as needed  - Assess and instruct to report SOB or any respiratory difficulty  - Respiratory Therapy support as indicated  Outcome: Progressing     Problem: METABOLIC, FLUID AND ELECTROLYTES - ADULT  Goal: Electrolytes maintained within normal limits  Description: INTERVENTIONS:  - Monitor labs and assess patient for signs and symptoms of electrolyte imbalances  - Administer electrolyte replacement as ordered  - Monitor response to electrolyte replacements, including repeat lab results as appropriate  - Instruct patient on fluid and nutrition as appropriate  Outcome: Progressing  Goal: Fluid balance maintained  Description: INTERVENTIONS:  - Monitor labs   - Monitor I/O and WT  - Instruct patient on fluid and nutrition as appropriate  - Assess for signs & symptoms of volume excess or deficit  Outcome: Progressing  Goal: Glucose maintained within target range  Description: INTERVENTIONS:  - Monitor Blood Glucose as ordered  - Assess for signs and symptoms of hyperglycemia and hypoglycemia  - Administer ordered medications to maintain glucose within target range  - Assess nutritional intake and initiate nutrition service referral as needed  Outcome: Progressing     Problem: HEMATOLOGIC - ADULT  Goal: Maintains hematologic stability  Description: INTERVENTIONS  - Assess for signs and symptoms of bleeding or hemorrhage  - Monitor labs  - Administer supportive blood products/factors as ordered and appropriate  Outcome: Progressing

## 2022-11-16 NOTE — ASSESSMENT & PLAN NOTE
Wt Readings from Last 3 Encounters:   11/15/22 98 7 kg (217 lb 9 5 oz)   11/04/22 103 kg (227 lb 15 3 oz)   10/23/22 98 9 kg (218 lb)     · 2D echo revealed ejection fraction 37%, grade 1 diastolic dysfunction  · Continue on furosemide 20 mg b i d   At home  · Patient is at baseline weight  · ProBNP 159  · Discontinue torsemide 40 mg IV b i d   · Monitor I and O, daily weight, fluid restriction

## 2022-11-16 NOTE — UTILIZATION REVIEW
Initial Clinical Review    Admission: Date/Time/Statement:   Admission Orders (From admission, onward)     Ordered        11/15/22 2150  INPATIENT ADMISSION  Once                      Orders Placed This Encounter   Procedures   • INPATIENT ADMISSION     Standing Status:   Standing     Number of Occurrences:   1     Order Specific Question:   Level of Care     Answer:   Med Surg [16]     Order Specific Question:   Estimated length of stay     Answer:   More than 2 Midnights     Order Specific Question:   Certification     Answer:   I certify that inpatient services are medically necessary for this patient for a duration of greater than two midnights  See H&P and MD Progress Notes for additional information about the patient's course of treatment  ED Arrival Information     Expected   -    Arrival   11/15/2022 15:59    Acuity   Urgent            Means of arrival   Ambulance    Escorted by   [place] ([address])    Service   Hospitalist    Admission type   Emergency            Arrival complaint   SOB           Chief Complaint   Patient presents with   • Shortness of Breath     Pt with hx of COPD, hx of same with recent hospitalizations  Ongoing SOB, wears 3L NC at baseline  Duoneb pre-hospital with little improvement  Initial Presentation: 81 y o  female presents to the ED via EMS from home with c/o ongoing dyspnea, fatigue, cough and SOB since being dx with Covid + on 11/8 seen at [facility] in ED  PMH:  COPD on chronic oxygen 3 L, recent COVID infection on 11/08/2022, hypertension, hyperlipidemia, chronic diastolic CHF, paroxysmal atrial fibrillation on Eliquis, sleep apnea, HIV on medication, NIDDM  Imaging shows no acute disease  On exam she has scattered wheezing, oriented x 3  Labs show low K 2 6, mildly elevated proBNP, hyperglycemia  She is treated with Nebs, IV Lasix, IV SoluMedrol, IV and PO K repletion    She is admitted to INPATIENT status withn acute exacerbation mod persistent asthma - IV Steroids q 8 hr, scheduled nebs, Budesonide, Singulair, wean oxygen  Acute on chronic dCHF - IV Torsemide BID  Covid - oxygen, IV Remedesivir, OOB tid  Acute on chronic hypoxic respiratory failure - 4 L wean down to 3L home dosing  PASHA on HS CPAP - HS CPAP  Date: 11/16   Day 2:   Pt is afebrile and down to 3L NC oxygen today which is her home dose  Hypokalemia resolved  Remains on IV Remdesivir  Pt reports continued SOB and chest heaviness with walking today  Is not back to baseline but hypoxia has improved since admission  Continues on Torsemide 40 IV BID for continues wheezing on exam and BLE edema L>R        ED Triage Vitals   Temperature Pulse Respirations Blood Pressure SpO2   11/15/22 1603 11/15/22 1603 11/15/22 1603 11/15/22 1603 11/15/22 1603   98 1 °F (36 7 °C) 97 20 112/80 100 %      Temp Source Heart Rate Source Patient Position - Orthostatic VS BP Location FiO2 (%)   11/15/22 1603 11/15/22 1946 11/15/22 1900 11/15/22 1900 --   Oral Monitor Lying Right arm       Pain Score       11/15/22 1603       No Pain          Wt Readings from Last 1 Encounters:   11/15/22 98 7 kg (217 lb 9 5 oz)     Additional Vital Signs:   11/16/22 15:16:59 98 3 °F (36 8 °C) 99 16 121/79 93 99 % -- -- -- --   11/16/22 1355 -- -- -- -- -- 95 % 32 3 L/min Nasal cannula --     11/16/22 07:30:07 98 1 °F (36 7 °C) 97 16 121/76 91 99 % -- -- -- --   11/16/22 0721 -- -- -- -- -- 96 % 32 3 L/min Nasal cannula --   11/15/22 23:15:23 98 1 °F (36 7 °C) 105 20 123/75 91 98 % -- -- -- Sitting   11/15/22 2234 -- 102 18 102/61 -- 97 % -- -- None (Room air) Lying   11/15/22 1946 -- 105 20 142/78 -- 99 % 36 4 L/min Nasal cannula Lying   11/15/22 1900 -- -- -- 115/78 -- -- -- -- -- Lying     Pertinent Labs/Diagnostic Test Results:     11/15 ECG - Normal sinus rhythm    Incomplete right bundle-branch block  Left anterior fascicular block  Possible Anterolateral infarct , age undetermined  Abnormal ECG    11/15 ECG - Sinus tachycardia  Left axis deviation  Right bundle branch block  Possible Anterolateral infarct (cited on or before 15-NOV-2022)  Abnormal ECG    VAS lower limb venous duplex study, unilateral/limited   No thrombus bilat      CTA ED chest PE study   No evidence of acute pulmonary embolus, thoracic aortic aneurysm or dissection  No acute cardiopulmonary process       X-ray chest 1 view portable   Final Result by Ivan Dominguez MD (16/64 [id number])      No acute cardiopulmonary disease           Results from last 7 days   Lab Units 11/16/22  0556 11/15/22  1808   WBC Thousand/uL 7 80 7 32   HEMOGLOBIN g/dL 10 3* 10 1*   HEMATOCRIT % 35 3 32 7*   PLATELETS Thousands/uL 257 269   NEUTROS ABS Thousands/µL 6 85 4 25         Results from last 7 days   Lab Units 11/16/22  0556 11/15/22  1808   SODIUM mmol/L 145 145   POTASSIUM mmol/L 4 2 2 6*   CHLORIDE mmol/L 106 107   CO2 mmol/L 29 30   ANION GAP mmol/L 10 8   BUN mg/dL 15 8   CREATININE mg/dL 1 09 0 88   EGFR ml/min/1 73sq m 51 66   CALCIUM mg/dL 9 9 9 8     Results from last 7 days   Lab Units 11/16/22  0556   AST U/L 10   ALT U/L 17   ALK PHOS U/L 62   TOTAL PROTEIN g/dL 7 0   ALBUMIN g/dL 3 6   TOTAL BILIRUBIN mg/dL 0 25     Results from last 7 days   Lab Units 11/16/22  1103 11/16/22  0727 11/15/22  2311   POC GLUCOSE mg/dl 248* 159* 215*     Results from last 7 days   Lab Units 11/16/22  0556 11/15/22  1808   GLUCOSE RANDOM mg/dL 186* 102     Results from last 7 days   Lab Units 11/15/22  2233 11/15/22  2102 11/15/22  1808   HS TNI 0HR ng/L  --   --  13   HS TNI 2HR ng/L  --  14  --    HSTNI D2 ng/L  --  1  --    HS TNI 4HR ng/L 14  --   --    HSTNI D4 ng/L 1  --   --      Results from last 7 days   Lab Units 11/15/22  2233   NT-PRO BNP pg/mL 159*     ED Treatment:   Medication Administration from 11/15/2022 1559 to 11/15/2022 2259       Date/Time Order Dose Route Action     11/15/2022 1638 EST albuterol inhalation solution 10 mg 10 mg Nebulization Given     11/15/2022 1638 EST ipratropium (ATROVENT) 0 02 % inhalation solution 1 mg 1 mg Nebulization Given     11/15/2022 1638 EST sodium chloride 0 9 % inhalation solution 3 mL 3 mL Nebulization Given     11/15/2022 1851 EST methylPREDNISolone sodium succinate (Solu-MEDROL) injection 125 mg 125 mg Intravenous Given     11/15/2022 1853 EST furosemide (LASIX) injection 40 mg 40 mg Intravenous Given     11/15/2022 1928 EST potassium chloride 20 mEq IVPB (premix) 20 mEq Intravenous New Bag     11/15/2022 1905 EST potassium chloride (K-DUR,KLOR-CON) CR tablet 40 mEq 40 mEq Oral Given     11/15/2022 1915 EST iohexol (OMNIPAQUE) 350 MG/ML injection (SINGLE-DOSE) 85 mL 85 mL Intravenous Given        Past Medical History:   Diagnosis Date   • Asthma    • Bipolar 1 disorder ([facility])    • Cardiac disease    • COPD (chronic obstructive pulmonary disease) ([facility])    • Diabetes mellitus ([place] [address] )    • Disease of thyroid gland    • HIV (human immunodeficiency virus infection) ([place] [address] ) 03/24/2016   • Hypertension    • Osteoarthritis    • Tobacco abuse      Present on Admission:  • Bipolar 1 disorder ([place] [address] )  • Acute exacerbation of moderate persistent extrinsic asthma  • Anemia  • Acute on chronic diastolic heart failure ([facility])  • COVID-19  • HIV (human immunodeficiency virus infection)   • HTN (hypertension)  • Hypertension  • Paroxysmal atrial fibrillation ([facility])  • Type 2 diabetes mellitus with hyperglycemia ([facility])  • Hyperlipidemia  • Tobacco use  • Acute on chronic respiratory failure with hypoxia ([facility])      Admitting Diagnosis: Shortness of breath [R06 02]  Hypokalemia [E87 6]  Acute respiratory failure with hypoxia ([facility] 75 ) [J96 01]  Age/Sex: 81 y o  female  Admission Orders:  Scheduled Medications:  apixaban, 5 mg, Oral, BID  atorvastatin, 20 mg, Oral, After Dinner  bictegravir-emtricitab-tenofovir alafenamide, 1 tablet, Oral, Daily  budesonide, 0 5 mg, Nebulization, BID  diltiazem, 120 mg, Oral, Daily  furosemide, 40 mg, Intravenous, BID (diuretic)  gabapentin, 800 mg, Oral, TID  insulin lispro, 1-5 Units, Subcutaneous, TID AC  ipratropium, 0 5 mg, Nebulization, TID  levalbuterol, 1 25 mg, Nebulization, TID  levothyroxine, 125 mcg, Oral, Early Morning  losartan, 100 mg, Oral, Daily  methylPREDNISolone sodium succinate, 40 mg, Intravenous, Q8H CARLOS  montelukast, 10 mg, Oral, HS  nicotine, 1 patch, Transdermal, Daily  potassium chloride, 20 mEq, Oral, Daily  remdesivir, 100 mg, Intravenous, Q24H  topiramate, 50 mg, Oral, BID      Continuous IV Infusions:     PRN Meds:  acetaminophen, 650 mg, Oral, Q6H PRN  traMADol, 50 mg, Oral, Q6H PRN    Isolation  Incentive spirometry  OOB as christine   POC GLUCOSE AC/HS WITH SSI COVERAGE   Fluid restriction      Network Utilization Review Department  ATTENTION: Please call with any questions or concerns to [phone] and carefully listen to the prompts so that you are directed to the right person  All voicemails are confidential   Esteban Giron all requests for admission clinical reviews, approved or denied determinations and any other requests to dedicated fax number below belonging to the campus where the patient is receiving treatment   List of dedicated fax numbers for the Facilities:  [address] DENIALS (Administrative/Medical Necessity) [phone]   [address] (Maternity/NICU/Pediatrics) [phone]   913 Brenda Hilton [phone]   [facility] [place] [address] [phone]   [address] [place] [address] Fatmata Delarosa 28 [phone]   1559 [facility] [place] [facility] 134 815 [facility] [phone]

## 2022-11-16 NOTE — PLAN OF CARE
Problem: Potential for Falls  Goal: Patient will remain free of falls  Description: INTERVENTIONS:  - Educate patient/family on patient safety including physical limitations  - Instruct patient to call for assistance with activity   - Consult OT/PT to assist with strengthening/mobility   - Keep Call bell within reach  - Keep bed low and locked with side rails adjusted as appropriate  - Keep care items and personal belongings within reach  - Initiate and maintain comfort rounds  - Make Fall Risk Sign visible to staff  - Offer Toileting every  Hours, in advance of need  - Initiate/Maintain alarm  - Obtain necessary fall risk management equipment:   - Apply yellow socks and bracelet for high fall risk patients  - Consider moving patient to room near nurses station  Outcome: Progressing     Problem: PAIN - ADULT  Goal: Verbalizes/displays adequate comfort level or baseline comfort level  Description: Interventions:  - Encourage patient to monitor pain and request assistance  - Assess pain using appropriate pain scale  - Administer analgesics based on type and severity of pain and evaluate response  - Implement non-pharmacological measures as appropriate and evaluate response  - Consider cultural and social influences on pain and pain management  - Notify physician/advanced practitioner if interventions unsuccessful or patient reports new pain  Outcome: Progressing     Problem: INFECTION - ADULT  Goal: Absence or prevention of progression during hospitalization  Description: INTERVENTIONS:  - Assess and monitor for signs and symptoms of infection  - Monitor lab/diagnostic results  - Monitor all insertion sites, i e  indwelling lines, tubes, and drains  - Monitor endotracheal if appropriate and nasal secretions for changes in amount and color  - [place] appropriate cooling/warming therapies per order  - Administer medications as ordered  - Instruct and encourage patient and family to use good hand hygiene technique  - Identify and instruct in appropriate isolation precautions for identified infection/condition  Outcome: Progressing     Problem: SAFETY ADULT  Goal: Patient will remain free of falls  Description: INTERVENTIONS:  - Educate patient/family on patient safety including physical limitations  - Instruct patient to call for assistance with activity   - Consult OT/PT to assist with strengthening/mobility   - Keep Call bell within reach  - Keep bed low and locked with side rails adjusted as appropriate  - Keep care items and personal belongings within reach  - Initiate and maintain comfort rounds  - Make Fall Risk Sign visible to staff  - Offer Toileting every  Hours, in advance of need  - Initiate/Maintain alarm  - Obtain necessary fall risk management equipment:   - Apply yellow socks and bracelet for high fall risk patients  - Consider moving patient to room near nurses station  Outcome: Progressing  Goal: Maintain or return to baseline ADL function  Description: INTERVENTIONS:  -  Assess patient's ability to carry out ADLs; assess patient's baseline for ADL function and identify physical deficits which impact ability to perform ADLs (bathing, care of mouth/teeth, toileting, grooming, dressing, etc )  - Assess/evaluate cause of self-care deficits   - Assess range of motion  - Assess patient's mobility; develop plan if impaired  - Assess patient's need for assistive devices and provide as appropriate  - Encourage maximum independence but intervene and supervise when necessary  - Involve family in performance of ADLs  - Assess for home care needs following discharge   - Consider OT consult to assist with ADL evaluation and planning for discharge  - Provide patient education as appropriate  Outcome: Progressing  Goal: Maintains/Returns to pre admission functional level  Description: INTERVENTIONS:  - Perform BMAT or MOVE assessment daily    - Set and communicate daily mobility goal to care team and patient/family/caregiver  - Collaborate with rehabilitation services on mobility goals if consulted  - Perform Range of Motion  times a day  - Reposition patient every  hours  - Dangle patient  times a day  - Stand patient  times a day  - Ambulate patient  times a day  - Out of bed to chair  times a day   - Out of bed for saw times a day  - Out of bed for toileting  - Record patient progress and toleration of activity level   Outcome: Progressing     Problem: DISCHARGE PLANNING  Goal: Discharge to home or other facility with appropriate resources  Description: INTERVENTIONS:  - Identify barriers to discharge w/patient and caregiver  - Arrange for needed discharge resources and transportation as appropriate  - Identify discharge learning needs (meds, wound care, etc )  - Arrange for interpretive services to assist at discharge as needed  - Refer to Case Management Department for coordinating discharge planning if the patient needs post-hospital services based on physician/advanced practitioner order or complex needs related to functional status, cognitive ability, or social support system  Outcome: Progressing     Problem: Knowledge Deficit  Goal: Patient/family/caregiver demonstrates understanding of disease process, treatment plan, medications, and discharge instructions  Description: Complete learning assessment and assess knowledge base    Interventions:  - Provide teaching at level of understanding  - Provide teaching via preferred learning methods  Outcome: Progressing     Problem: RESPIRATORY - ADULT  Goal: Achieves optimal ventilation and oxygenation  Description: INTERVENTIONS:  - Assess for changes in respiratory status  - Assess for changes in mentation and behavior  - Position to facilitate oxygenation and minimize respiratory effort  - Oxygen administered by appropriate delivery if ordered  - Initiate smoking cessation education as indicated  - Encourage broncho-pulmonary hygiene including cough, deep breathe, Incentive Spirometry  - Assess the need for suctioning and aspirate as needed  - Assess and instruct to report SOB or any respiratory difficulty  - Respiratory Therapy support as indicated  Outcome: Progressing     Problem: METABOLIC, FLUID AND ELECTROLYTES - ADULT  Goal: Electrolytes maintained within normal limits  Description: INTERVENTIONS:  - Monitor labs and assess patient for signs and symptoms of electrolyte imbalances  - Administer electrolyte replacement as ordered  - Monitor response to electrolyte replacements, including repeat lab results as appropriate  - Instruct patient on fluid and nutrition as appropriate  Outcome: Progressing  Goal: Fluid balance maintained  Description: INTERVENTIONS:  - Monitor labs   - Monitor I/O and WT  - Instruct patient on fluid and nutrition as appropriate  - Assess for signs & symptoms of volume excess or deficit  Outcome: Progressing  Goal: Glucose maintained within target range  Description: INTERVENTIONS:  - Monitor Blood Glucose as ordered  - Assess for signs and symptoms of hyperglycemia and hypoglycemia  - Administer ordered medications to maintain glucose within target range  - Assess nutritional intake and initiate nutrition service referral as needed  Outcome: Progressing     Problem: HEMATOLOGIC - ADULT  Goal: Maintains hematologic stability  Description: INTERVENTIONS  - Assess for signs and symptoms of bleeding or hemorrhage  - Monitor labs  - Administer supportive blood products/factors as ordered and appropriate  Outcome: Progressing

## 2022-11-16 NOTE — CONSULTS
Visited with patient and friend, provided listening support and pastoral care presnce   Sister Kika Sheppard, [address] [place] [address] Road

## 2022-11-16 NOTE — H&P
[facility]  H&P- Alysha Payor 1950, 81 y o  female MRN: [id number]  Unit/Bed#: ED 32 Encounter: [id number]  Primary Care Provider: Jose Jacobsen DO   Date and time admitted to hospital: 11/15/2022  3:59 PM    * Acute exacerbation of moderate persistent extrinsic asthma  Assessment & Plan  This is a 22-year-old female with history of COPD on chronic oxygen 3 L, recent COVID infection on 11/08/2022, hypertension, hyperlipidemia, chronic diastolic CHF, paroxysmal atrial fibrillation on Eliquis, sleep apnea, HIV on medication, diabetes mellitus presenting with dyspnea  Patient was seen at [address] [place] [address] and the ED on 11/08/2022 for dyspnea at which point she was found to be positive for COVID-19  Patient states she has continued to have dyspnea  Denies any nausea, vomiting, cough, diarrhea  · Given Solu-Medrol 125 mg IV in the ED will continue with Solu-Medrol 40 mg IV q 8  · Scheduled Xopenex and Atrovent  · Budesonide, Singulair  · Wean oxygen as tolerated    Acute on chronic diastolic heart failure (HCC)  Assessment & Plan  Wt Readings from Last 3 Encounters:   11/04/22 103 kg (227 lb 15 3 oz)   10/23/22 98 9 kg (218 lb)   09/29/22 102 kg (225 lb 8 5 oz)     · 2D echo revealed ejection fraction 07%, grade 1 diastolic dysfunction  · Maintained on furosemide 20 mg b i d   At home  · Baseline weight is closer to 220 lb  · ProBNP pending  · Given lower extremity edema will start on torsemide 40 mg IV b i d   · Monitor I and O, daily weight, fluid restriction    COVID-19  Assessment & Plan  COVID 19 Confirmed 11/08/2022  • Oxygen requirements: 4L (chronically on 3 L)  • Will start on remdesivir  • Continue isolation precaution  • OOB TID     Acute on chronic respiratory failure with hypoxia (HCC)  Assessment & Plan  · Secondary to COVID-19 and Asthma/COPD exacerbation  · On chronic oxygen 3 L at home, currently on 4 L  · Wean as tolerated    Paroxysmal atrial fibrillation (HCC)  Assessment & Plan  · Rate controlled on diltiazem  · Eliquis for anticoagulation    PASHA on CPAP  Assessment & Plan  · CPAP q h s  Tobacco use  Assessment & Plan    · Nicotine patch  · Counseled on tobacco cessation    Type 2 diabetes mellitus with hyperglycemia (HCC)  Assessment & Plan  Lab Results   Component Value Date    HGBA1C 6 8 (H) 10/16/2022       No results for input(s): POCGLU in the last 72 hours  Blood Sugar Average: Last 72 hrs:  ·  hold oral hypoglycemics  · Monitor Accu-Cheks, sliding scale for coverage    HIV (human immunodeficiency virus infection)   Assessment & Plan  · Continue Biktarvy        VTE Prophylaxis: Apixaban (Eliquis)  / sequential compression device   Code Status: Full  POLST: There is no POLST form on file for this patient (pre-hospital)    Anticipated Length of Stay:  Patient will be admitted on an Inpatient basis with an anticipated length of stay of  greater than 2 midnights  Justification for Hospital Stay:  Asthma exacerbation, acute on chronic hypoxia    Total Time for Visit, including Counseling / Coordination of Care: 45 minutes  Greater than 50% of this total time spent on direct patient counseling and coordination of care  Chief Complaint:   Dyspnea    History of Present Illness:    Fabiana Anders is a 81 y o  female who presents with dyspnea  Patient has history of COPD on chronic oxygen 3 L, recent COVID infection on 11/08/2022, hypertension, hyperlipidemia, chronic diastolic CHF, paroxysmal atrial fibrillation on Eliquis, sleep apnea, HIV on medication, diabetes mellitus presenting with dyspnea  Patient was seen at [address] [place] [address] and the ED on 11/08/2022 for dyspnea at which point she was found to be positive for COVID-19  Patient states she has continued to have dyspnea  Denies any nausea, vomiting, cough, diarrhea  Review of Systems:    Review of Systems   Constitutional: Positive for fatigue  HENT: Negative  Eyes: Negative      Respiratory: Positive for cough and shortness of breath  Cardiovascular: Negative  Gastrointestinal: Negative  Endocrine: Negative  Genitourinary: Negative  Musculoskeletal: Negative  Skin: Negative  Allergic/Immunologic: Negative  Neurological: Negative  Hematological: Negative  Psychiatric/Behavioral: Negative  Past Medical and Surgical History:     Past Medical History:   Diagnosis Date   • Asthma    • Bipolar 1 disorder ([place] [address] )    • Cardiac disease    • COPD (chronic obstructive pulmonary disease) ([place] [address] )    • Diabetes mellitus ([place] [address] )    • Disease of thyroid gland    • HIV (human immunodeficiency virus infection) ([place] [address] ) 03/24/2016   • Hypertension    • Osteoarthritis    • Tobacco abuse        Past Surgical History:   Procedure Laterality Date   • HERNIA REPAIR     • LUMBAR FUSION N/A 4/10/2018    Procedure: T9/10 discectomy with T9-T11 fusion;  Surgeon: Hilario Mead MD;  Location: [facility];  Service: Neurosurgery   • THYROIDECTOMY         Meds/Allergies:    Prior to Admission medications    Medication Sig Start Date End Date Taking? Authorizing Provider   acetaminophen (TYLENOL) 325 mg tablet Take 3 tablets (975 mg total) by mouth every 8 (eight) hours as needed for mild pain 9/18/22   Rajinder Cortés MD   albuterol (2 5 mg/3 mL) 0 083 % nebulizer solution Take 3 mL (2 5 mg total) by nebulization every 6 (six) hours as needed for wheezing or shortness of breath 10/13/22   Hannah Thorpe DO   apixaban (ELIQUIS) 5 mg Take 1 tablet by mouth 2 (two) times a day 5/9/22   Historical Provider, MD   atorvastatin (LIPITOR) 20 mg tablet Take 20 mg by mouth daily    Historical Provider, MD   bictegravir-emtricitab-tenofovir alafenamide (Biktarvy) -25 MG tablet Take 1 tablet by mouth daily 5/9/22   Historical Provider, MD   budesonide (PULMICORT) 0 5 mg/2 mL nebulizer solution Take 2 mL (0 5 mg total) by nebulization 2 (two) times a day Rinse mouth after use   9/18/22   Rajinder Cortés MD   carboxymethylcellulose 0 5 % SOLN Administer 1 drop into the left eye 3 (three) times a day as needed for dry eyes 10/29/22   Nick Luz,    cholecalciferol (VITAMIN D3) 1,000 units tablet Take 1,000 Units by mouth daily    Historical Provider, MD   diltiazem (CARDIZEM CD) 120 mg 24 hr capsule Take 1 capsule (120 mg total) by mouth daily 9/29/22   Alfonso Araujo MD   fluticasone-umeclidinium-vilanterol (Trelegy Ellipta) 200-62 5-25 mcg/actuation AEPB inhaler Inhale 1 puff daily Rinse mouth after use  Historical Provider, MD   furosemide (LASIX) 40 mg tablet Take 1 tablet (40 mg total) by mouth 2 (two) times a day 9/29/22   Aflonso Araujo MD   gabapentin (NEURONTIN) 400 mg capsule Take 800 mg by mouth 3 (three) times a day      Historical Provider, MD   levothyroxine 125 mcg tablet Take 125 mcg by mouth daily    Historical Provider, MD   losartan (COZAAR) 100 MG tablet Take 100 mg by mouth daily 11/2/22   Historical Provider, MD   lurasidone (LATUDA) 40 mg tablet Take 40 mg by mouth 9/30/22 9/30/23  Historical Provider, MD   metFORMIN (GLUCOPHAGE) 500 mg tablet Take 500 mg by mouth daily 11/2/22   Historical Provider, MD   montelukast (SINGULAIR) 10 mg tablet Take 10 mg by mouth daily at bedtime    Historical Provider, MD   nicotine (NICODERM CQ) 21 mg/24 hr TD 24 hr patch Place 1 patch on the skin daily 9/19/22   Heather Ayon MD   potassium chloride (K-DUR,KLOR-CON) 20 mEq tablet Take 1 tablet (20 mEq total) by mouth daily 9/19/22   Heather Ayon MD   predniSONE 10 mg tablet 4 tabs x 3 days, 3 tabs x 3tabs, 2 tabs x 3 days, 1 tab x 3 days, then stop 11/6/22   Loida Rea PA-C   topiramate (TOPAMAX) 50 MG tablet Take 50 mg by mouth 2 (two) times a day 9/30/22 9/30/23  Historical Provider, MD   traMADol Georgiana López) 50 mg tablet Take 50 mg by mouth as needed 9/19/22   Historical Provider, MD     I have reviewed home medications with patient personally  Allergies:    Allergies   Allergen Reactions   • Lisinopril Angioedema   • Prozac [Fluoxetine Hcl] Rash   • Sulfa Antibiotics Itching   • Quinine        Social History:     Social History     Substance and Sexual Activity   Alcohol Use Not Currently   • Alcohol/week: 2 0 standard drinks   • Types: 1 Cans of beer, 1 Shots of liquor per week    Comment: only on holidays     Social History     Tobacco Use   Smoking Status Former Smoker   • Packs/day: 0 20   • Years: 45 00   • Pack years: 9 00   • Types: Cigarettes   Smokeless Tobacco Never Used   Tobacco Comment    Currently smoking 1-2 cigarettes a day     Social History     Substance and Sexual Activity   Drug Use Not Currently    Comment: former IV drug user       Family History:    Family History   Problem Relation Age of Onset   • No Known Problems Mother    • Diabetes Father    • Heart disease Neg Hx    • Cancer Neg Hx        Physical Exam:     Vitals:   Blood Pressure: 142/78 (11/15/22 1946)  Pulse: 105 (11/15/22 1946)  Temperature: 98 1 °F (36 7 °C) (11/15/22 1603)  Temp Source: Oral (11/15/22 1603)  Respirations: 20 (11/15/22 1946)  SpO2: 99 % (11/15/22 1946)    Constitutional: Patient is oriented to person, place and time, no acute distress  HEENT:  Normocephalic, atraumatic  Cardiovascular: Normal S1S2, No murmurs/rubs/gallops appreciated  Pulmonary:  Bilateral air entry, scattered wheezing  Abdominal: Soft, Bowel sounds present, Non-tender, Non-distended  Extremities:  Bilateral lower extremity pitting edema left greater than right  Neurological: Cranial nerves II-XII grossly intact, sensation intact, otherwise no focal neurological symptoms  Additional Data:     Lab Results: I have personally reviewed pertinent reports        Results from last 7 days   Lab Units 11/15/22  1808   WBC Thousand/uL 7 32   HEMOGLOBIN g/dL 10 1*   HEMATOCRIT % 32 7*   PLATELETS Thousands/uL 269   NEUTROS PCT % 58   LYMPHS PCT % 34   MONOS PCT % 6   EOS PCT % 1     Results from last 7 days   Lab Units 11/15/22  1808   POTASSIUM mmol/L 2 6*   CHLORIDE mmol/L 107   CO2 mmol/L 30   BUN mg/dL 8   CREATININE mg/dL 0 88   CALCIUM mg/dL 9 8           Imaging: I have personally reviewed pertinent reports  XR chest 1 view portable    Result Date: 10/30/2022  Narrative: CHEST INDICATION:   cough, SOB  Patient has confirmed COVID-19  Positive 10/23/2022  COMPARISON:  CXR 10/23/2022 and chest CT 09/15/2022  EXAM PERFORMED/VIEWS:  XR CHEST PORTABLE FINDINGS: Cardiomediastinal silhouette appears unremarkable  Low lung volumes with mild bibasilar atelectasis  No effusion or pneumothorax  Skeleton normal for age with spinal fixation rods at the thoracolumbar junction  Impression: No acute cardiopulmonary disease  Workstation performed: [id number]     XR chest 1 view portable    Result Date: 10/23/2022  Narrative: CHEST INDICATION:   wheezing  COMPARISON:  Chest radiograph October 13, 2022  CT chest September 15, 2022  EXAM PERFORMED/VIEWS:  XR CHEST PORTABLE FINDINGS: Cardiomediastinal silhouette appears unremarkable  The lungs are clear  No pneumothorax or pleural effusion  Orthopedic hardware from lower thoracic spinal fusion  Otherwise osseous structures are within normal limits for patient age  Impression: No acute cardiopulmonary disease  Workstation performed: [id number]     XR chest 2 views    Result Date: 11/4/2022  Narrative: CHEST INDICATION:   sob  COMPARISON:  10/29/2022 EXAM PERFORMED/VIEWS:  XR CHEST PA & LATERAL Images: 2 FINDINGS: Development of borderline vascular congestion Cardiomediastinal silhouette remains mildly enlarged The lungs are otherwise clear  No pneumothorax or pleural effusion  Osseous structures appear within normal limits for patient age  Impression: Development of borderline vascular congestion Workstation performed: [id number]     CTA ED chest PE study    Result Date: 11/15/2022  Narrative: CTA - CHEST WITH IV CONTRAST - PULMONARY ANGIOGRAM INDICATION:   Shortness of breath, tachypnea and left lower extremity swelling  COMPARISON: 9/18/2022  TECHNIQUE: CTA examination of the chest was performed using angiographic technique according to a protocol specifically tailored to evaluate for pulmonary embolism  Axial, sagittal, and coronal 2D reformatted images were created from the source data and  submitted for interpretation  In addition, coronal 3D MIP postprocessing was performed on the acquisition scanner  Radiation dose length product (DLP) for this visit:  469 59 mGy-cm   This examination, like all CT scans performed in the [facility], was performed utilizing techniques to minimize radiation dose exposure, including the use of iterative  reconstruction and automated exposure control  IV Contrast:  85 mL of iohexol (OMNIPAQUE)  FINDINGS: PULMONARY ARTERIAL TREE:  No filling defect in the visualized pulmonary arteries to suggest an acute embolus  LUNGS:  Minimal subsegmental atelectasis at the lung bases  There is no tracheal or endobronchial lesion  PLEURA:  No effusion  HEART/GREAT VESSELS:  Stable cardiomegaly  No thoracic aortic aneurysm or dissection    MEDIASTINUM AND KRISTYN:  No lymphadenopathy    CHEST WALL AND LOWER NECK:   Unremarkable  VISUALIZED STRUCTURES IN THE UPPER ABDOMEN:  Unremarkable  OSSEOUS STRUCTURES:  Posterior fusion hardware in the lower thoracic spine  Normal alignment  Impression: No evidence of acute pulmonary embolus, thoracic aortic aneurysm or dissection  No acute cardiopulmonary process  Workstation performed: [id number]     VAS lower limb venous duplex study, unilateral/limited    Result Date: 11/15/2022  Narrative:  [facility] REPORT CLINICAL: Indications: Patient presents with left lower extremity edema x 1 day  Operative History: thyroidectomy Risk Factors The patient has history of DVT  CONCLUSION: Impression: RIGHT LOWER LIMB LIMITED: Evaluation shows no evidence of thrombus in the common femoral vein   Doppler evaluation shows a normal response to augmentation maneuvers  LEFT LOWER LIMB: No evidence of acute or chronic deep vein thrombosis No evidence of superficial thrombophlebitis noted  Doppler evaluation shows a normal response to augmentation maneuvers  Popliteal, posterior tibial and anterior tibial arterial Doppler waveforms are triphasic  Technical findings were given to MD Carlo Franks: Electronically Signed by: Kristan Carter MD on 2022-11-15 08:33:17 PM    Allscripts / Epic Records Reviewed: Yes     ** Please Note: This note has been constructed using a voice recognition system   **

## 2022-11-16 NOTE — ASSESSMENT & PLAN NOTE
COVID 19 Confirmed 11/08/2022  • Oxygen requirements: 4L (chronically on 3 L)  • Will start on remdesivir  • Continue isolation precaution  • OOB TID

## 2022-11-16 NOTE — PROGRESS NOTES
Pastoral Care Progress Note    2022  Patient: Harry Carcamo : 1950  Admission Date & Time: 11/15/2022 1559  MRN: [id number] [facility]: [id number]                     Chaplaincy Interventions Utilized:   Empowerment: Clarified, confirmed, or reviewed information from treatment team     Exploration: Explored emotional needs & resources and Explored spiritual needs & resources    Collaboration: Encouraged adherence to treatment plan    Relationship Building: Cultivated a relationship of care and support and Listened empathically    Chaplaincy Outcomes Achieved:  Expressed gratitude    Spiritual Coping Strategies Utilized:   Spiritual gratitude

## 2022-11-16 NOTE — ASSESSMENT & PLAN NOTE
This is a 31-year-old female with history of COPD on chronic oxygen 3 L, recent COVID infection on 11/08/2022, hypertension, hyperlipidemia, chronic diastolic CHF, paroxysmal atrial fibrillation on Eliquis, sleep apnea, HIV on medication, diabetes mellitus presenting with dyspnea  Patient was seen at [address] [place] [address] and the ED on 11/08/2022 for dyspnea at which point she was found to be positive for COVID-19  Patient states she has continued to have dyspnea  Denies any nausea, vomiting, cough, diarrhea      · Given Solu-Medrol 125 mg IV in the ED will continue with Solu-Medrol 40 mg IV q 8  · Scheduled Xopenex and Atrovent  · Budesonide, Singulair  · Wean oxygen as tolerated

## 2022-11-16 NOTE — ED NOTES
Lidia-Caregiver [phone] would like a call with update on pt        Diego Vargas RN  11/15/22 2025

## 2022-11-16 NOTE — ASSESSMENT & PLAN NOTE
· Secondary to COVID-19 and Asthma/COPD exacerbation  · On chronic oxygen 3 L at home, currently on 4 L  · Wean as tolerated

## 2022-11-16 NOTE — ASSESSMENT & PLAN NOTE
This is a 29-year-old female with history of COPD on chronic oxygen 3 L, recent COVID infection on 11/08/2022, hypertension, hyperlipidemia, chronic diastolic CHF, paroxysmal atrial fibrillation on Eliquis, sleep apnea, HIV on medication, diabetes mellitus presenting with dyspnea  Patient was seen at [facility] AT THE [facility] and the ED on 11/08/2022 for dyspnea at which point she was found to be positive for COVID-19  Patient states she has continued to have dyspnea  Denies any nausea, vomiting, cough, diarrhea      · Continue with Solu-Medrol 40 mg IV q 8  · Scheduled Xopenex and Atrovent  · Budesonide, Singulair  · Patient is back to baseline oxygen requirements

## 2022-11-16 NOTE — ASSESSMENT & PLAN NOTE
Wt Readings from Last 3 Encounters:   11/04/22 103 kg (227 lb 15 3 oz)   10/23/22 98 9 kg (218 lb)   09/29/22 102 kg (225 lb 8 5 oz)     · 2D echo revealed ejection fraction 38%, grade 1 diastolic dysfunction  · Maintained on furosemide 20 mg b i d   At home  · Baseline weight is closer to 220 lb  · ProBNP pending  · Given lower extremity edema will start on torsemide 40 mg IV b i d   · Monitor I and O, daily weight, fluid restriction

## 2022-11-16 NOTE — ASSESSMENT & PLAN NOTE
Lab Results   Component Value Date    HGBA1C 6 8 (H) 10/16/2022       Recent Labs     11/15/22  2311 11/16/22  0727 11/16/22  1103   POCGLU 215* 159* 248*       Blood Sugar Average: Last 72 hrs:  · (P) 882 [id number] hold oral hypoglycemics  · Monitor Accu-Cheks, sliding scale for coverage

## 2022-11-16 NOTE — ASSESSMENT & PLAN NOTE
Lab Results   Component Value Date    HGBA1C 6 8 (H) 10/16/2022       No results for input(s): POCGLU in the last 72 hours      Blood Sugar Average: Last 72 hrs:  ·  hold oral hypoglycemics  · Monitor Accu-Cheks, sliding scale for coverage

## 2022-11-16 NOTE — ASSESSMENT & PLAN NOTE
· Secondary to COVID-19 and Asthma/COPD exacerbation  · On chronic oxygen 3 L at home currently back to baseline

## 2022-11-16 NOTE — CASE MANAGEMENT
Case Management Assessment & Discharge Planning Note    Patient name Ky Exon  Location [place] [address] 2 /[address]* MRN [id number]  : 1950 Date 2022       Current Admission Date: 11/15/2022  Current Admission Diagnosis:Acute exacerbation of moderate persistent extrinsic asthma   Patient Active Problem List    Diagnosis Date Noted   • COVID-19 10/23/2022   • Anemia 2022   • Obesity (BMI 35 0-39 9 without comorbidity) 2022   • Acute on chronic diastolic heart failure ([address] ) 2022   • PASHA on CPAP 2020   • Acute exacerbation of moderate persistent extrinsic asthma 2020   • Fall at home, initial encounter 2020   • Paroxysmal atrial fibrillation ([facility] [address] ) 2020   • Hyperlipidemia 2018   • Bipolar disorder ([facility] ) 2018   • Thoracic disc herniation 2018   • Lumbar stenosis 2018   • Urinary frequency 2018   • Ambulatory dysfunction    • Back pain 2018   • Hip pain 2018   • Shortness of breath 2017   • Hypertension    • Type 2 diabetes mellitus with hyperglycemia ([facility] [address] )    • Acute on chronic respiratory failure with hypoxia (HCC)    • Bipolar 1 disorder ([facility] [facility] )    • Tobacco use    • COPD exacerbation ([facility] [facility] ) 2016   • Arthritis 2016   • Type 2 diabetes mellitus without complication, without long-term current use of insulin ([facility] ) 2016   • HIV (human immunodeficiency virus infection)  2016   • HTN (hypertension) 2016   • Osteoarthritis 2016      LOS (days): 1  Geometric Mean LOS (GMLOS) (days): 6 90  Days to GMLOS:6 2     OBJECTIVE:  PATIENT READMITTED TO HOSPITAL  Risk of Unplanned Readmission Score: 51 52         Current admission status: Inpatient       Preferred Pharmacy:   [address] [place] [address], [address] [facility]  [address]   [place] [place] [address]  Phone: [phone] Fax: [phone]    Primary Care Provider: Hoda Rodriguez DO    Primary Insurance: Francoise Mujica REP  Secondary Insurance: [facility]    ASSESSMENT:  Active Health Care Proxies     Susie [address]  - Daughter   Primary Phone: [phone] (Mobile)                 Readmission Root Cause  30 Day Readmission: Yes  Who directed you to return to the hospital?: Self  Did you understand whom to contact if you had questions or problems?: Yes  Did you get your prescriptions before you left the hospital?: Yes  Were you able to get your prescriptions filled when you left the hospital?: Yes  Did you take your medications as prescribed?: Yes  During previous admission, was a post-acute recommendation made?: Yes  What post-acute resources were offered?: STR, Offered, but declined  Patient was readmitted due to: Acute exacerbation of moderate persistant extrinsic asthma  Action Plan: In Progress- Pt will likely choose to discahrge back to her home w/ waiver caregivers  Patient Information  Admitted from[de-identified] Home  Mental Status: Alert  During Assessment patient was accompanied by: Not accompanied during assessment  Assessment information provided by[de-identified] Patient  Primary Caregiver: Other (Comment) ()  Caregiver's Name[de-identified] Maral Delarosa Relationship to Patient[de-identified] Other (Specify) ([facility] )  Caregiver's Telephone Number[de-identified] [phone]   Support Systems: Other (Comment), Daughter, Home care staff ()  [place] of Residence: [address] [facility] do you live in?: [address] [place] [address] entry access options   Select all that apply : Stairs  Number of steps to enter home : 5  Do the steps have railings?: Yes  Type of Current Residence: 2 story home  Upon entering residence, is there a bedroom on the main floor (no further steps)?: No  A bedroom is located on the following floor levels of residence (select all that apply):: 2nd Floor  Upon entering residence, is there a bathroom on the main floor (no further steps)?: No  Indicate which floors of current residence have a bathroom (select all the apply):: 2nd Floor  Number of steps to 2nd floor from main floor:  (15)  In the last 12 months, was there a time when you were not able to pay the mortgage or rent on time?: No  In the last 12 months, how many places have you lived?: 1  In the last 12 months, was there a time when you did not have a steady place to sleep or slept in a shelter (including now)?: No  Homeless/housing insecurity resource given?: N/A  Living Arrangements: Lives w/ Daughter  Is patient a ?: No    Activities of Daily Living Prior to Admission  Functional Status: Assistance  Completes ADLs independently?: No  Level of ADL dependence: Assistance  Ambulates independently?: No  Level of ambulatory dependence: Assistance  Does patient use assisted devices?: Yes  Assisted Devices (DME) used: Morgan Nicol, Portable Oxygen tanks, Home Oxygen concentrator  DME Company Name (respiratory supplies):  [employer]  O2 Rate(s): 3  Does patient currently own DME?: Yes  What DME does the patient currently own?: Walker, Portable Oxygen tanks, Home Oxygen concentrator  Does patient have a history of Outpatient Therapy (PT/OT)?: Yes  Does patient have a history of HHC?: Yes Asuncion Goltz)  [facility] No    Patient Information Continued  Income Source: Pension/[facility]  Does patient have prescription coverage?: Yes  Within the past 12 months, you worried that your food would run out before you got the money to buy more : Never true  Within the past 12 months, the food you bought just didn't last and you didn't have money to get more : Never true  Food insecurity resource given?: N/A  Does patient receive dialysis treatments?: No  Does patient have a history of substance abuse?: No  Does patient have a history of Mental Health Diagnosis?: No    Means of Transportation  [facility] to [facility][de-identified] Family transport  In the past 12 months, has lack of transportation kept you from medical appointments or from getting medications?: No  In the past 12 months, has lack of transportation kept you from meetings, work, or from getting things needed for daily living?: No  Was application for public transport provided?: N/A        DISCHARGE DETAILS:    Discharge planning discussed with[de-identified] Patient  Freedom of Choice: Yes  Comments - Freedom of Choice: Pt will prefer to discharge home and resume care from waiver As  CM contacted family/caregiver?: No- see comments (declines)  Were Treatment Team discharge recommendations reviewed with patient/caregiver?: Yes  Did patient/caregiver verbalize understanding of patient care needs?: Yes  Were patient/caregiver advised of the risks associated with not following Treatment Team discharge recommendations?: Yes    Contacts  Patient Contacts: Zaina Xiao (Daughter)   [place]   [phone] (M)  Relationship to Patient[de-identified] Family  Contact Method: Phone  Phone Number: Zaina Xiao (Daughter)   [place]   [phone] Daniela Talavera  Reason/Outcome: Continuity of Care, Emergency Contact, Referral, Discharge [address]         Is the patient interested in [facility] at discharge?: Yes  [address] requested[de-identified] [address], Nursing, Physical Therapy, Occupational [address] [place] [address] Name[de-identified] [facility] Provider[de-identified] PCP  Home Health Services Needed[de-identified] Oxygen Via Nasal Cannula, Evaluate Functional Status and Safety, Strengthening/Theraputic Exercises to Improve Function, Gait/ADL Training  Oxygen LPM Ordered (if applicable based on home health services needed):: 3 LPM  Homebound Criteria Met[de-identified] Immunosuppressed, Requires the Assistance of Another Person for Safe Ambulation or to Leave the Home, Uses an Assist Device (i e  cane, walker, etc)  Supporting Clincal Findings[de-identified] Limited Endurance, Requires Oxygen, Fatigues Easliy in [employer]    Treatment Team Recommendation: Short Term Rehab  Discharge Destination Plan[de-identified] Home with [address] [facility]

## 2022-11-17 LAB
ANION GAP SERPL CALCULATED.3IONS-SCNC: 9 MMOL/L (ref 4–13)
BUN SERPL-MCNC: 29 MG/DL (ref 5–25)
CALCIUM SERPL-MCNC: 10.8 MG/DL (ref 8.3–10.1)
CHLORIDE SERPL-SCNC: 106 MMOL/L (ref 96–108)
CO2 SERPL-SCNC: 29 MMOL/L (ref 21–32)
CREAT SERPL-MCNC: 1.34 MG/DL (ref 0.6–1.3)
ERYTHROCYTE [DISTWIDTH] IN BLOOD BY AUTOMATED COUNT: 15.7 % (ref 11.6–15.1)
GFR SERPL CREATININE-BSD FRML MDRD: 40 ML/MIN/1.73SQ M
GLUCOSE SERPL-MCNC: 156 MG/DL (ref 65–140)
GLUCOSE SERPL-MCNC: 171 MG/DL (ref 65–140)
GLUCOSE SERPL-MCNC: 214 MG/DL (ref 65–140)
GLUCOSE SERPL-MCNC: 216 MG/DL (ref 65–140)
GLUCOSE SERPL-MCNC: 217 MG/DL (ref 65–140)
HCT VFR BLD AUTO: 32.5 % (ref 34.8–46.1)
HGB BLD-MCNC: 10.1 G/DL (ref 11.5–15.4)
MCH RBC QN AUTO: 29.7 PG (ref 26.8–34.3)
MCHC RBC AUTO-ENTMCNC: 31.1 G/DL (ref 31.4–37.4)
MCV RBC AUTO: 96 FL (ref 82–98)
PLATELET # BLD AUTO: 285 THOUSANDS/UL (ref 149–390)
PMV BLD AUTO: 9.1 FL (ref 8.9–12.7)
POTASSIUM SERPL-SCNC: 4.2 MMOL/L (ref 3.5–5.3)
RBC # BLD AUTO: 3.4 MILLION/UL (ref 3.81–5.12)
SODIUM SERPL-SCNC: 144 MMOL/L (ref 135–147)
WBC # BLD AUTO: 13.52 THOUSAND/UL (ref 4.31–10.16)

## 2022-11-17 RX ORDER — BUDESONIDE 0.5 MG/2ML
0.5 INHALANT ORAL
Status: DISCONTINUED | OUTPATIENT
Start: 2022-11-17 | End: 2022-11-19 | Stop reason: HOSPADM

## 2022-11-17 RX ORDER — METHYLPREDNISOLONE SODIUM SUCCINATE 40 MG/ML
40 INJECTION, POWDER, LYOPHILIZED, FOR SOLUTION INTRAMUSCULAR; INTRAVENOUS EVERY 8 HOURS SCHEDULED
Status: COMPLETED | OUTPATIENT
Start: 2022-11-17 | End: 2022-11-17

## 2022-11-17 RX ORDER — PREDNISONE 20 MG/1
40 TABLET ORAL DAILY
Status: DISCONTINUED | OUTPATIENT
Start: 2022-11-18 | End: 2022-11-19 | Stop reason: HOSPADM

## 2022-11-17 RX ADMIN — APIXABAN 5 MG: 5 TABLET, FILM COATED ORAL at 17:24

## 2022-11-17 RX ADMIN — ATORVASTATIN CALCIUM 20 MG: 20 TABLET, FILM COATED ORAL at 17:25

## 2022-11-17 RX ADMIN — LOSARTAN POTASSIUM 100 MG: 50 TABLET, FILM COATED ORAL at 09:21

## 2022-11-17 RX ADMIN — LEVALBUTEROL 1.25 MG: 1.25 SOLUTION, CONCENTRATE RESPIRATORY (INHALATION) at 19:51

## 2022-11-17 RX ADMIN — IPRATROPIUM BROMIDE 0.5 MG: 0.5 SOLUTION RESPIRATORY (INHALATION) at 07:40

## 2022-11-17 RX ADMIN — APIXABAN 5 MG: 5 TABLET, FILM COATED ORAL at 09:18

## 2022-11-17 RX ADMIN — BUDESONIDE 0.5 MG: 0.5 INHALANT ORAL at 19:51

## 2022-11-17 RX ADMIN — FUROSEMIDE 40 MG: 40 TABLET ORAL at 17:25

## 2022-11-17 RX ADMIN — IPRATROPIUM BROMIDE 0.5 MG: 0.5 SOLUTION RESPIRATORY (INHALATION) at 13:32

## 2022-11-17 RX ADMIN — METHYLPREDNISOLONE SODIUM SUCCINATE 40 MG: 40 INJECTION, POWDER, FOR SOLUTION INTRAMUSCULAR; INTRAVENOUS at 06:06

## 2022-11-17 RX ADMIN — GABAPENTIN 800 MG: 400 CAPSULE ORAL at 17:26

## 2022-11-17 RX ADMIN — TOPIRAMATE 50 MG: 25 TABLET, FILM COATED ORAL at 09:18

## 2022-11-17 RX ADMIN — METHYLPREDNISOLONE SODIUM SUCCINATE 40 MG: 40 INJECTION, POWDER, FOR SOLUTION INTRAMUSCULAR; INTRAVENOUS at 21:53

## 2022-11-17 RX ADMIN — BICTEGRAVIR SODIUM, EMTRICITABINE, AND TENOFOVIR ALAFENAMIDE FUMARATE 1 TABLET: 50; 200; 25 TABLET ORAL at 09:18

## 2022-11-17 RX ADMIN — IPRATROPIUM BROMIDE 0.5 MG: 0.5 SOLUTION RESPIRATORY (INHALATION) at 19:51

## 2022-11-17 RX ADMIN — GABAPENTIN 800 MG: 400 CAPSULE ORAL at 21:53

## 2022-11-17 RX ADMIN — POTASSIUM CHLORIDE 20 MEQ: 1500 TABLET, EXTENDED RELEASE ORAL at 09:18

## 2022-11-17 RX ADMIN — GABAPENTIN 800 MG: 400 CAPSULE ORAL at 09:18

## 2022-11-17 RX ADMIN — TRAMADOL HYDROCHLORIDE 50 MG: 50 TABLET, COATED ORAL at 21:56

## 2022-11-17 RX ADMIN — LEVALBUTEROL 1.25 MG: 1.25 SOLUTION, CONCENTRATE RESPIRATORY (INHALATION) at 13:32

## 2022-11-17 RX ADMIN — INSULIN LISPRO 1 UNITS: 100 INJECTION, SOLUTION INTRAVENOUS; SUBCUTANEOUS at 17:25

## 2022-11-17 RX ADMIN — METHYLPREDNISOLONE SODIUM SUCCINATE 40 MG: 40 INJECTION, POWDER, FOR SOLUTION INTRAMUSCULAR; INTRAVENOUS at 13:35

## 2022-11-17 RX ADMIN — MONTELUKAST 10 MG: 10 TABLET, FILM COATED ORAL at 21:53

## 2022-11-17 RX ADMIN — LEVOTHYROXINE SODIUM 125 MCG: 125 TABLET ORAL at 06:07

## 2022-11-17 RX ADMIN — LEVALBUTEROL 1.25 MG: 1.25 SOLUTION, CONCENTRATE RESPIRATORY (INHALATION) at 07:40

## 2022-11-17 RX ADMIN — DILTIAZEM HYDROCHLORIDE 120 MG: 120 CAPSULE, COATED, EXTENDED RELEASE ORAL at 09:18

## 2022-11-17 RX ADMIN — INSULIN LISPRO 1 UNITS: 100 INJECTION, SOLUTION INTRAVENOUS; SUBCUTANEOUS at 13:36

## 2022-11-17 RX ADMIN — Medication 6 MG: at 21:53

## 2022-11-17 RX ADMIN — BUDESONIDE 0.5 MG: 0.5 INHALANT ORAL at 07:41

## 2022-11-17 RX ADMIN — TOPIRAMATE 50 MG: 25 TABLET, FILM COATED ORAL at 17:25

## 2022-11-17 RX ADMIN — INSULIN LISPRO 2 UNITS: 100 INJECTION, SOLUTION INTRAVENOUS; SUBCUTANEOUS at 08:00

## 2022-11-17 NOTE — PROGRESS NOTES
[address] [place] [address]  Progress Note - Aida Stout 1950, 81 y o  female MRN: [id number]  Unit/Bed#: [facility] 87 226-01 Encounter: [id number]  Primary Care Provider: Flaco Amor DO   Date and time admitted to hospital: 11/15/2022  3:59 PM    * Acute exacerbation of moderate persistent extrinsic asthma  Assessment & Plan  This is a 51-year-old female with history of COPD on chronic oxygen 3 L, recent COVID infection on 11/08/2022, hypertension, hyperlipidemia, chronic diastolic CHF, paroxysmal atrial fibrillation on Eliquis, sleep apnea, HIV on medication, diabetes mellitus presenting with dyspnea  Patient was seen at [facility] and the ED on 11/08/2022 for dyspnea at which point she was found to be positive for COVID-19  Patient states she has continued to have dyspnea  Denies any nausea, vomiting, cough, diarrhea  · Patient was started on Solu-Medrol 40 mg IV q 8, transition to p o   Prednisone tomorrow  · Scheduled Xopenex and Atrovent  · Budesonide, Singulair  · Patient is back to baseline oxygen requirements  · Appreciate pulmonology recommendations, recommending ambulatory pulse ox prior to discharge, continuing home regimen of Trelegy with albuterol as needed at discharge    Acute on chronic diastolic heart failure (HCC)  Assessment & Plan  Wt Readings from Last 3 Encounters:   11/17/22 101 kg (221 lb 9 oz)   11/04/22 103 kg (227 lb 15 3 oz)   10/23/22 98 9 kg (218 lb)     · 2D echo revealed ejection fraction 17%, grade 1 diastolic dysfunction  · Continue on furosemide 40 mg b i d  , home dose  · Patient is at baseline weight  · ProBNP 159  · Monitor I and O, daily weight, fluid restriction    Paroxysmal atrial fibrillation (HCC)  Assessment & Plan  · Rate controlled on diltiazem  · Eliquis for anticoagulation    Acute on chronic respiratory failure with hypoxia (HCC)  Assessment & Plan  · Secondary to COVID-19 and Asthma/COPD exacerbation  · On chronic oxygen 3 L at home currently back to baseline      Tobacco use  Assessment & Plan  · Nicotine patch  · Counseled on tobacco cessation    Type 2 diabetes mellitus with hyperglycemia [facility])  Assessment & Plan  Lab Results   Component Value Date    HGBA1C 6 8 (H) 10/16/2022       Recent Labs     22  2113 22  0754 22  1117 22  1612   POCGLU 240* 214* 217* 156*       Blood Sugar Average: Last 72 hrs:  · (P) 198 75 hold oral hypoglycemics  · Monitor Accu-Cheks, sliding scale for coverage    COVID-19  Assessment & Plan  COVID 19 Confirmed in October  • Oxygen requirements:  Was on 4 L currently back to baseline 3 L  • Discontinued remdesivir   • isolation precaution can be discontinued    HIV (human immunodeficiency virus infection)   Assessment & Plan  · Continue Biktarvy      VTE Pharmacologic Prophylaxis: VTE Score: 7 High Risk (Score >/= 5) - Pharmacological DVT Prophylaxis Ordered: apixaban (Eliquis)  Sequential Compression Devices Ordered  Patient Centered Rounds: I performed bedside rounds with nursing staff today  Discussions with Specialists or Other Care Team Provider:  Pulmonology    Education and Discussions with Family / Patient: Patient declined call to   Time Spent for Care: 30 minutes  More than 50% of total time spent on counseling and coordination of care as described above  Current Length of Stay: 2 day(s)  Current Patient Status: Inpatient   Certification Statement: The patient will continue to require additional inpatient hospital stay due to COPD exacerbation requiring inpatient management  Discharge Plan: Anticipate discharge in 24-48 hrs to home  Code Status: Level 1 - Full Code    Subjective:   Patient was seen sitting in bed today  She reports continued improvement in her breathing today  She states the chest pressure has improved  She denies any fever, chills, nausea, vomiting, abdominal pain        Objective:     Vitals:   Temp (24hrs), Av 2 °F (36 8 °C), Min:97 9 °F (36 6 °C), Max:98 7 °F (37 1 °C)    Temp:  [97 9 °F (36 6 °C)-98 7 °F (37 1 °C)] 98 7 °F (37 1 °C)  HR:  [87-95] 95  Resp:  [16-22] 16  BP: (120-122)/(78-79) 120/78  SpO2:  [95 %-99 %] 97 %  Body mass index is 40 52 kg/m²  Input and Output Summary (last 24 hours): Intake/Output Summary (Last 24 hours) at 11/17/2022 1742  Last data filed at 11/17/2022 0744  Gross per 24 hour   Intake 780 ml   Output 425 ml   Net 355 ml       Physical Exam:   Physical Exam  Vitals and nursing note reviewed  Constitutional:       Appearance: Normal appearance  She is obese  HENT:      Head: Normocephalic and atraumatic  Eyes:      General: No scleral icterus  Cardiovascular:      Rate and Rhythm: Normal rate and regular rhythm  Pulmonary:      Effort: Pulmonary effort is normal       Breath sounds: Wheezing present  Abdominal:      General: Abdomen is flat  Bowel sounds are normal       Palpations: Abdomen is soft  Tenderness: There is no abdominal tenderness  Musculoskeletal:      Right lower leg: Edema present  Left lower leg: Edema present  Comments: Left leg edema greater than right  Skin:     General: Skin is warm and dry  Neurological:      Mental Status: She is alert  Mental status is at baseline     Psychiatric:         Mood and Affect: Mood normal          Behavior: Behavior normal         Additional Data:     Labs:  Results from last 7 days   Lab Units 11/17/22  0557 11/16/22  0556   WBC Thousand/uL 13 52* 7 80   HEMOGLOBIN g/dL 10 1* 10 3*   HEMATOCRIT % 32 5* 35 3   PLATELETS Thousands/uL 285 257   NEUTROS PCT %  --  88*   LYMPHS PCT %  --  10*   MONOS PCT %  --  1*   EOS PCT %  --  0     Results from last 7 days   Lab Units 11/17/22  0557 11/16/22  0556   SODIUM mmol/L 144 145   POTASSIUM mmol/L 4 2 4 2   CHLORIDE mmol/L 106 106   CO2 mmol/L 29 29   BUN mg/dL 29* 15   CREATININE mg/dL 1 34* 1 09   ANION GAP mmol/L 9 10   CALCIUM mg/dL 10 8* 9 9   ALBUMIN g/dL  --  3 6   TOTAL BILIRUBIN mg/dL  --  0 25   ALK PHOS U/L  --  62   ALT U/L  --  17   AST U/L  --  10   GLUCOSE RANDOM mg/dL 171* 186*         Results from last 7 days   Lab Units 11/17/22  1612 11/17/22  1117 11/17/22  0754 11/16/22  2113 11/16/22  1549 11/16/22  1103 11/16/22  0727 11/15/22  2311   POC GLUCOSE mg/dl 156* 217* 214* 240* 141* 248* 159* 215*               Lines/Drains:  Invasive Devices     Peripheral Intravenous Line  Duration           Peripheral IV 11/17/22 Proximal;Right;Ventral (anterior) Forearm <1 day                      Imaging: No pertinent imaging reviewed      Recent Cultures (last 7 days):         Last 24 Hours Medication List:   Current Facility-Administered Medications   Medication Dose Route Frequency Provider Last Rate   • acetaminophen  650 mg Oral Q6H PRN Nirav Cristina MD     • apixaban  5 mg Oral BID Nirav Cristina MD     • atorvastatin  20 mg Oral After Thong Girard MD     • bictegravir-emtricitab-tenofovir alafenamide  1 tablet Oral Daily Nirav Cristina MD     • budesonide  0 5 mg Nebulization BID Aaron S Prechtel, DO     • diltiazem  120 mg Oral Daily Nirav Cristina MD     • furosemide  40 mg Oral BID Jigna Dukes PA-C     • gabapentin  800 mg Oral TID Nirav Cristina MD     • insulin lispro  1-5 Units Subcutaneous TID AC Nirav Cristina MD     • ipratropium  0 5 mg Nebulization TID Nirav Cristina MD     • levalbuterol  1 25 mg Nebulization TID Nirav Cristina MD     • levothyroxine  125 mcg Oral Early Morning Nirav Cristina MD     • losartan  100 mg Oral Daily Nirav Cristina MD     • melatonin  6 mg Oral HS Kathy Rawls PA-C     • methylPREDNISolone sodium succinate  40 mg Intravenous Q8H [address] LUIS M Farmer     • montelukast  10 mg Oral HS Nirav Cristina MD     • nicotine  1 patch Transdermal Daily Nirav Cristina MD     • potassium chloride  20 mEq Oral Daily Nirav Cristina MD     • [START ON 11/18/2022] predniSONE  40 mg Oral Daily LUIS M Farmer     • sodium chloride (PF)  3 mL Intravenous Q1H PRN Angel Flow, MD     • topiramate  50 mg Oral BID Angel Flow, MD     • traMADol  50 mg Oral Q6H PRN Angel Flow, MD          Today, Patient Was Seen By: Maeve Sierra PA-C    **Please Note: This note may have been constructed using a voice recognition system  **

## 2022-11-17 NOTE — UTILIZATION REVIEW
NOTIFICATION OF INPATIENT ADMISSION   AUTHORIZATION REQUEST   SERVICING FACILITY:   [address] [place] [address] [address] [facility]  Tax ID: [id number]  NPI: [id number] ATTENDING PROVIDER:  Attending Name and NPI#: Lino Lobo [[id number]]  Address: [address] [place] [address]  Phone: [phone]     ADMISSION INFORMATION:  Place of Service: Inpatient [address] [place] [address]  [address]  Place of Service Code: 21  Inpatient Admission Date/Time: 11/15/22  9:50 PM  Discharge Date/Time: No discharge date for patient encounter  Admitting Diagnosis Code/Description:  Shortness of breath [R06 02]  Hypokalemia [E87 6]  Acute respiratory failure with hypoxia ([facility] ) [J96 01]     UTILIZATION REVIEW CONTACT:  [facility] Utilization   Network Utilization Review Department  Phone: [phone]  Fax: [phone]  Email: [email or web address]  Contact for approvals/pending authorizations, clinical reviews, and discharge  PHYSICIAN ADVISORY SERVICES:  Medical Necessity Denial & Gpld-np-Trnm Review  Phone: [phone]  Fax: [phone]  Email: [email or web address]

## 2022-11-17 NOTE — ASSESSMENT & PLAN NOTE
Lab Results   Component Value Date    HGBA1C 6 8 (H) 10/16/2022       Recent Labs     11/16/22  2113 11/17/22  0754 11/17/22  1117 11/17/22  1612   POCGLU 240* 214* 217* 156*       Blood Sugar Average: Last 72 hrs:  · (P) 198 75 hold oral hypoglycemics  · Monitor Accu-Cheks, sliding scale for coverage

## 2022-11-17 NOTE — CONSULTS
Consultation - Pulmonary Medicine   Manny Bernal 81 y o  female MRN: [id number]  Unit/Bed#: [place] 2 [address] 87 226-01 Encounter: [id number]      Assessment/Plan:    Acute pulmonary insufficiency on chronic hypoxic respiratory failure, multifactorial due to below   Baseline oxygen requirement is 2 liters via nasal cannula  Titrate oxygen to maintain saturations greater than or equal to 89%  Activity as tolerated  Ambulatory pulse ox prior to discharge  Moderate persistent asthma with acute exacerbation   Reports home regimen as Trelegy with albuterol as needed  This should continue at discharge  Continue Solu-Medrol today and transition to prednisone 40 milligrams daily tomorrow  Continue budesonide nebulized twice daily with Xopenex Atrovent 3 times daily  Consider Daliresp at discharge given frequency of exacerbations  Recent COVID-19 infection   No specific intervention at this time  Nicotine dependence   Discussed complete cessation  Nicotine replacement therapy  Morbid obesity with underlying obstructive sleep apnea   Wears auto CPAP ranging from 5 to 15 centimeters water at HS at home  Continue while inpatient  Order was placed  Lifestyle modifications and weight loss as able  Acute on chronic diastolic CHF   Diuresis and management per primary team     Needs outpatient pulmonary follow-up with [facility] pulmonology  Discussed with nursing and primary team     History of Present Illness   Physician Requesting Consult: Andrew Kellogg DO  Reason for Consult / Principal Problem:  Shortness of breath, acute respiratory failure with hypoxia, COPD exacerbation  Hx and PE limited by:  None  Chief Complaint:  “I was short of breath ”  HPI: Manny Bernal is a 81 y o  female who presented to [facility]  with complaints of shortness of breath  She reports that she has had problems with her breathing for the last few days to a week or more    She reports that she was recently in the emergency department shortness of breath and has recently also had COVID infection  She reports her shortness of breath has not really been improving  She has some wheezing and some loose cough, but no significant sputum production  She denies fevers or chills  She has some chest tightness, but no chest pain  She has chronic swelling of her left lower extremity which is unchanged  She uses Trelegy Ellipta at home and has albuterol via a nebulizer but does not use it consistently  She follows with [facility] pulmonology, but has not seen them in quite some time  Inpatient consult to Pulmonology  Consult performed by: LUIS M Hill  Consult ordered by: Maeve Sierra PA-C        Review of Systems   All other systems reviewed and are negative  A full 12-point review of systems was completed and is negative except for those outlined in the HPI      Historical Information   Past Medical History:   Diagnosis Date   • Asthma    • Bipolar 1 disorder ([facility] )    • Cardiac disease    • COPD (chronic obstructive pulmonary disease) ([facility] )    • Diabetes mellitus ([facility] )    • Disease of thyroid gland    • HIV (human immunodeficiency virus infection) ([facility] ) 03/24/2016   • Hypertension    • Osteoarthritis    • Tobacco abuse      Past Surgical History:   Procedure Laterality Date   • HERNIA REPAIR     • LUMBAR FUSION N/A 4/10/2018    Procedure: T9/10 discectomy with T9-T11 fusion;  Surgeon: Mikala Bustos MD;  Location: [facility];  Service: Neurosurgery   • THYROIDECTOMY       Social History   Social History     Substance and Sexual Activity   Alcohol Use Not Currently   • Alcohol/week: 2 0 standard drinks   • Types: 1 Cans of beer, 1 Shots of liquor per week    Comment: only on holidays     Social History     Substance and Sexual Activity   Drug Use Not Currently    Comment: former IV drug user     Social History     Tobacco Use   Smoking Status Former   • Packs/day: 0 20   • Years: 45 00 • Pack years: 9 00   • Types: Cigarettes   Smokeless Tobacco Never   Tobacco Comments    Currently smoking 1-2 cigarettes a day     E-Cigarette/Vaping     E-Cigarette/Vaping Substances     Family History:   Family History   Problem Relation Age of Onset   • No Known Problems Mother    • Diabetes Father    • Heart disease Neg Hx    • Cancer Neg Hx        Meds/Allergies   all current active meds have been reviewed, pertinent pulmonary meds have been reviewed, current meds:   Current Facility-Administered Medications   Medication Dose Route Frequency   • acetaminophen (TYLENOL) tablet 650 mg  650 mg Oral Q6H PRN   • apixaban (ELIQUIS) tablet 5 mg  5 mg Oral BID   • atorvastatin (LIPITOR) tablet 20 mg  20 mg Oral After Dinner   • bictegravir-emtricitab-tenofovir alafenamide (BIKTARVY) -25 MG tablet 1 tablet  1 tablet Oral Daily   • budesonide (PULMICORT) inhalation solution 0 5 mg  0 5 mg Nebulization BID   • diltiazem (CARDIZEM CD) 24 hr capsule 120 mg  120 mg Oral Daily   • furosemide (LASIX) tablet 40 mg  40 mg Oral BID   • gabapentin (NEURONTIN) capsule 800 mg  800 mg Oral TID   • insulin lispro (HumaLOG) 100 units/mL subcutaneous injection 1-5 Units  1-5 Units Subcutaneous TID AC   • ipratropium (ATROVENT) 0 02 % inhalation solution 0 5 mg  0 5 mg Nebulization TID   • levalbuterol (XOPENEX) inhalation solution 1 25 mg  1 25 mg Nebulization TID   • levothyroxine tablet 125 mcg  125 mcg Oral Early Morning   • losartan (COZAAR) tablet 100 mg  100 mg Oral Daily   • melatonin tablet 6 mg  6 mg Oral HS   • methylPREDNISolone sodium succinate (Solu-MEDROL) injection 40 mg  40 mg Intravenous Q8H CARLOS   • montelukast (SINGULAIR) tablet 10 mg  10 mg Oral HS   • nicotine (NICODERM CQ) 21 mg/24 hr TD 24 hr patch 1 patch  1 patch Transdermal Daily   • potassium chloride (K-DUR,KLOR-CON) CR tablet 20 mEq  20 mEq Oral Daily   • sodium chloride (PF) 0 9 % injection 3 mL  3 mL Intravenous Q1H PRN   • topiramate (TOPAMAX) tablet 50 mg  50 mg Oral BID   • traMADol (ULTRAM) tablet 50 mg  50 mg Oral Q6H PRN    and PTA meds:   Prior to Admission Medications   Prescriptions Last Dose Informant Patient Reported? Taking?   acetaminophen (TYLENOL) 325 mg tablet   No Yes   Sig: Take 3 tablets (975 mg total) by mouth every 8 (eight) hours as needed for mild pain   albuterol (2 5 mg/3 mL) 0 083 % nebulizer solution   No Yes   Sig: Take 3 mL (2 5 mg total) by nebulization every 6 (six) hours as needed for wheezing or shortness of breath   apixaban (ELIQUIS) 5 mg   Yes Yes   Sig: Take 1 tablet by mouth 2 (two) times a day   atorvastatin (LIPITOR) 20 mg tablet   Yes Yes   Sig: Take 20 mg by mouth daily   bictegravir-emtricitab-tenofovir alafenamide (Biktarvy) -25 MG tablet   Yes No   Sig: Take 1 tablet by mouth daily   budesonide (PULMICORT) 0 5 mg/2 mL nebulizer solution   No Yes   Sig: Take 2 mL (0 5 mg total) by nebulization 2 (two) times a day Rinse mouth after use    carboxymethylcellulose 0 5 % SOLN   No No   Sig: Administer 1 drop into the left eye 3 (three) times a day as needed for dry eyes   cholecalciferol (VITAMIN D3) 1,000 units tablet   Yes Yes   Sig: Take 1,000 Units by mouth daily   diltiazem (CARDIZEM CD) 120 mg 24 hr capsule   No Yes   Sig: Take 1 capsule (120 mg total) by mouth daily   fluticasone-umeclidinium-vilanterol (Trelegy Ellipta) 200-62 5-25 mcg/actuation AEPB inhaler   Yes Yes   Sig: Inhale 1 puff daily Rinse mouth after use     furosemide (LASIX) 40 mg tablet   No Yes   Sig: Take 1 tablet (40 mg total) by mouth 2 (two) times a day   gabapentin (NEURONTIN) 400 mg capsule   Yes Yes   Sig: Take 800 mg by mouth 3 (three) times a day     levothyroxine 125 mcg tablet   Yes Yes   Sig: Take 125 mcg by mouth daily   losartan (COZAAR) 100 MG tablet   Yes Yes   Sig: Take 100 mg by mouth daily   lurasidone (LATUDA) 40 mg tablet   Yes Yes   Sig: Take 40 mg by mouth   metFORMIN (GLUCOPHAGE) 500 mg tablet   Yes Yes   Sig: Take 500 mg by mouth daily   montelukast (SINGULAIR) 10 mg tablet   Yes Yes   Sig: Take 10 mg by mouth daily at bedtime   nicotine (NICODERM CQ) 21 mg/24 hr TD 24 hr patch   No No   Sig: Place 1 patch on the skin daily   potassium chloride (K-DUR,KLOR-CON) 20 mEq tablet   No No   Sig: Take 1 tablet (20 mEq total) by mouth daily   predniSONE 10 mg tablet   No No   Si tabs x 3 days, 3 tabs x 3tabs, 2 tabs x 3 days, 1 tab x 3 days, then stop   topiramate (TOPAMAX) 50 MG tablet   Yes Yes   Sig: Take 50 mg by mouth 2 (two) times a day   traMADol (ULTRAM) 50 mg tablet   Yes Yes   Sig: Take 50 mg by mouth as needed      Facility-Administered Medications: None       Allergies   Allergen Reactions   • Lisinopril Angioedema   • Prozac [Fluoxetine Hcl] Rash   • Sulfa Antibiotics Itching   • Quinine        Objective   Vitals: Blood pressure 122/79, pulse 87, temperature 97 9 °F (36 6 °C), temperature source Oral, resp  rate 22, height 5' 2" (1 575 m), weight 101 kg (221 lb 9 oz), last menstrual period 2013, SpO2 96 %, not currently breastfeeding  3 liters nasal cannula,Body mass index is 40 52 kg/m²  Intake/Output Summary (Last 24 hours) at 2022 1255  Last data filed at 2022 0744  Gross per 24 hour   Intake 1260 ml   Output 925 ml   Net 335 ml     Invasive Devices     Peripheral Intravenous Line  Duration           Peripheral IV 22 Proximal;Right;Ventral (anterior) Forearm <1 day                Physical Exam  Vitals reviewed  Constitutional:       General: She is not in acute distress  Appearance: She is well-developed and well-nourished  She is morbidly obese  She is not toxic-appearing or diaphoretic  Interventions: Nasal cannula in place  HENT:      Head: Normocephalic and atraumatic  Eyes:      General: No scleral icterus  Extraocular Movements: EOM normal    Neck:      Trachea: No tracheal deviation  Cardiovascular:      Rate and Rhythm: Normal rate and regular rhythm  Heart sounds: S1 normal and S2 normal  No murmur heard  No friction rub  No gallop  Pulmonary:      Effort: Pulmonary effort is normal  No tachypnea, accessory muscle usage or respiratory distress  Breath sounds: Normal breath sounds  No stridor  No decreased breath sounds, wheezing, rhonchi or rales  Chest:      Chest wall: No tenderness  Abdominal:      General: Bowel sounds are normal  There is no distension  Palpations: Abdomen is soft  Tenderness: There is no abdominal tenderness  Musculoskeletal:      Cervical back: Neck supple  Right lower leg: No edema  Left lower leg: Edema present  Skin:     General: Skin is warm and dry  Findings: No rash  Nails: There is no cyanosis  Neurological:      Mental Status: She is alert and oriented to person, place, and time  GCS: GCS eye subscore is 4  GCS verbal subscore is 5  GCS motor subscore is 6  Motor: Motor strength is normal    Psychiatric:         Mood and Affect: Mood and affect normal          Speech: Speech normal          Behavior: Behavior normal  Behavior is cooperative  Lab Results:   CBC:   Lab Results   Component Value Date    WBC 13 52 (H) 11/17/2022    HGB 10 1 (L) 11/17/2022    HCT 32 5 (L) 11/17/2022    MCV 96 11/17/2022     11/17/2022    MCH 29 7 11/17/2022    MCHC 31 1 (L) 11/17/2022    RDW 15 7 (H) 11/17/2022    MPV 9 1 11/17/2022   , CMP:   Lab Results   Component Value Date    SODIUM 144 11/17/2022    K 4 2 11/17/2022     11/17/2022    CO2 29 11/17/2022    BUN 29 (H) 11/17/2022    CREATININE 1 34 (H) 11/17/2022    CALCIUM 10 8 (H) 11/17/2022    EGFR 40 11/17/2022     BNP:  159    Imaging Studies: I have personally reviewed pertinent reports  and I have personally reviewed pertinent films in PACS   Chest x-ray shows no acute pulmonary disease  Lower extremity venous duplex shows no evidence of DVT  CTA of the chest shows no evidence of acute pulmonary embolism  There are minimal atelectatic changes in the lung bases  EKG, Pathology, and Other Studies: None    Pulmonary Results (PFTs, PSG): None    VTE Prophylaxis: Sequential compression device (Venodyne)  and RX contraindicated due to: On Eliquis    Code Status: Level 1 - Full Code    None    Portions of the record may have been created with voice recognition software  Occasional wrong word or "sound a like" substitutions may have occurred due to the inherent limitations of voice recognition software  Read the chart carefully and recognize, using context, where substitutions have occurred

## 2022-11-17 NOTE — CASE MANAGEMENT
Case Management Discharge Planning Note    Patient name Anju Giordano  Location [place] [address] 2 /[address] 2 Susy Kurtz* MRN [id number]  : 1950 Date 2022       Current Admission Date: 11/15/2022  Current Admission Diagnosis:Acute exacerbation of moderate persistent extrinsic asthma   Patient Active Problem List    Diagnosis Date Noted   • COVID-19 10/23/2022   • Anemia 2022   • Obesity (BMI 35 0-39 9 without comorbidity) 2022   • Acute on chronic diastolic heart failure ([facility] ) 2022   • PASHA on CPAP 2020   • Acute exacerbation of moderate persistent extrinsic asthma 2020   • Fall at home, initial encounter 2020   • Paroxysmal atrial fibrillation ([facility] ) 2020   • Hyperlipidemia 2018   • Bipolar disorder ([facility] ) 2018   • Thoracic disc herniation 2018   • Lumbar stenosis 2018   • Urinary frequency 2018   • Ambulatory dysfunction    • Back pain 2018   • Hip pain 2018   • Shortness of breath 2017   • Hypertension    • Type 2 diabetes mellitus with hyperglycemia ([facility] )    • Acute on chronic respiratory failure with hypoxia (HCC)    • Bipolar 1 disorder ([facility] )    • Tobacco use    • COPD exacerbation ([facility] ) 2016   • Arthritis 2016   • Type 2 diabetes mellitus without complication, without long-term current use of insulin ([facility] ) 2016   • HIV (human immunodeficiency virus infection)  2016   • HTN (hypertension) 2016   • Osteoarthritis 2016      LOS (days): 2  Geometric Mean LOS (GMLOS) (days): 6 90  Days to GMLOS:5 3     OBJECTIVE:  Risk of Unplanned Readmission Score: 57 43         Current admission status: Inpatient   Preferred Pharmacy:   [address] [place] [address]  [address] [place] [address]  Phone: [phone] Fax: [phone]    Primary Care Provider: Andrey Alexer,     Primary Insurance: [address] [place] [address],[address] REP  Secondary Insurance: [address] [place] [address] [address] [facility]    DISCHARGE DETAILS:     Per MD rounds, patient not medically stable for discharge- pulmonary to see patient  Referral placed for [facility] in [address]  CM will continue to follow patient for discharge needs  [facility] reserved in [place] reached out to CM and states they cannot accept patient back at this time  CM sent out other referrals for [address] in [address] has accepted patient- reserved in [facility]

## 2022-11-17 NOTE — ASSESSMENT & PLAN NOTE
Wt Readings from Last 3 Encounters:   11/17/22 101 kg (221 lb 9 oz)   11/04/22 103 kg (227 lb 15 3 oz)   10/23/22 98 9 kg (218 lb)     · 2D echo revealed ejection fraction 49%, grade 1 diastolic dysfunction  · Continue on furosemide 40 mg b i d  , home dose  · Patient is at baseline weight  · ProBNP 159  · Monitor I and O, daily weight, fluid restriction

## 2022-11-17 NOTE — ASSESSMENT & PLAN NOTE
COVID 19 Confirmed in October  • Oxygen requirements:  Was on 4 L currently back to baseline 3 L  • Discontinued remdesivir   • isolation precaution can be discontinued

## 2022-11-17 NOTE — ASSESSMENT & PLAN NOTE
This is a 29-year-old female with history of COPD on chronic oxygen 3 L, recent COVID infection on 11/08/2022, hypertension, hyperlipidemia, chronic diastolic CHF, paroxysmal atrial fibrillation on Eliquis, sleep apnea, HIV on medication, diabetes mellitus presenting with dyspnea  Patient was seen at [address] [place] [address] and the ED on 11/08/2022 for dyspnea at which point she was found to be positive for COVID-19  Patient states she has continued to have dyspnea  Denies any nausea, vomiting, cough, diarrhea  · Patient was started on Solu-Medrol 40 mg IV q 8, transition to p o   Prednisone tomorrow  · Scheduled Xopenex and Atrovent  · Budesonide, Singulair  · Patient is back to baseline oxygen requirements  · Appreciate pulmonology recommendations, recommending ambulatory pulse ox prior to discharge, continuing home regimen of Trelegy with albuterol as needed at discharge

## 2022-11-18 ENCOUNTER — PATIENT OUTREACH (OUTPATIENT)
Dept: CASE MANAGEMENT | Facility: OTHER | Age: 72
End: 2022-11-18

## 2022-11-18 LAB
ANION GAP SERPL CALCULATED.3IONS-SCNC: 9 MMOL/L (ref 4–13)
BUN SERPL-MCNC: 33 MG/DL (ref 5–25)
CALCIUM SERPL-MCNC: 10.6 MG/DL (ref 8.3–10.1)
CHLORIDE SERPL-SCNC: 107 MMOL/L (ref 96–108)
CO2 SERPL-SCNC: 29 MMOL/L (ref 21–32)
CREAT SERPL-MCNC: 1.2 MG/DL (ref 0.6–1.3)
ERYTHROCYTE [DISTWIDTH] IN BLOOD BY AUTOMATED COUNT: 15.8 % (ref 11.6–15.1)
GFR SERPL CREATININE-BSD FRML MDRD: 45 ML/MIN/1.73SQ M
GLUCOSE SERPL-MCNC: 158 MG/DL (ref 65–140)
GLUCOSE SERPL-MCNC: 172 MG/DL (ref 65–140)
GLUCOSE SERPL-MCNC: 185 MG/DL (ref 65–140)
GLUCOSE SERPL-MCNC: 189 MG/DL (ref 65–140)
GLUCOSE SERPL-MCNC: 191 MG/DL (ref 65–140)
HCT VFR BLD AUTO: 34.5 % (ref 34.8–46.1)
HGB BLD-MCNC: 10.5 G/DL (ref 11.5–15.4)
MCH RBC QN AUTO: 29.3 PG (ref 26.8–34.3)
MCHC RBC AUTO-ENTMCNC: 30.4 G/DL (ref 31.4–37.4)
MCV RBC AUTO: 96 FL (ref 82–98)
PLATELET # BLD AUTO: 287 THOUSANDS/UL (ref 149–390)
PMV BLD AUTO: 9.2 FL (ref 8.9–12.7)
POTASSIUM SERPL-SCNC: 4.2 MMOL/L (ref 3.5–5.3)
RBC # BLD AUTO: 3.58 MILLION/UL (ref 3.81–5.12)
SODIUM SERPL-SCNC: 145 MMOL/L (ref 135–147)
WBC # BLD AUTO: 15.48 THOUSAND/UL (ref 4.31–10.16)

## 2022-11-18 RX ADMIN — SODIUM CHLORIDE 500 ML: 0.9 INJECTION, SOLUTION INTRAVENOUS at 00:52

## 2022-11-18 RX ADMIN — INSULIN LISPRO 1 UNITS: 100 INJECTION, SOLUTION INTRAVENOUS; SUBCUTANEOUS at 08:25

## 2022-11-18 RX ADMIN — BUDESONIDE 0.5 MG: 0.5 INHALANT ORAL at 07:12

## 2022-11-18 RX ADMIN — BICTEGRAVIR SODIUM, EMTRICITABINE, AND TENOFOVIR ALAFENAMIDE FUMARATE 1 TABLET: 50; 200; 25 TABLET ORAL at 08:25

## 2022-11-18 RX ADMIN — INSULIN LISPRO 1 UNITS: 100 INJECTION, SOLUTION INTRAVENOUS; SUBCUTANEOUS at 17:02

## 2022-11-18 RX ADMIN — IPRATROPIUM BROMIDE 0.5 MG: 0.5 SOLUTION RESPIRATORY (INHALATION) at 07:12

## 2022-11-18 RX ADMIN — INSULIN LISPRO 1 UNITS: 100 INJECTION, SOLUTION INTRAVENOUS; SUBCUTANEOUS at 11:09

## 2022-11-18 RX ADMIN — ATORVASTATIN CALCIUM 20 MG: 20 TABLET, FILM COATED ORAL at 17:01

## 2022-11-18 RX ADMIN — APIXABAN 5 MG: 5 TABLET, FILM COATED ORAL at 17:01

## 2022-11-18 RX ADMIN — FUROSEMIDE 40 MG: 40 TABLET ORAL at 17:01

## 2022-11-18 RX ADMIN — DILTIAZEM HYDROCHLORIDE 120 MG: 120 CAPSULE, COATED, EXTENDED RELEASE ORAL at 08:23

## 2022-11-18 RX ADMIN — BUDESONIDE 0.5 MG: 0.5 INHALANT ORAL at 19:34

## 2022-11-18 RX ADMIN — MONTELUKAST 10 MG: 10 TABLET, FILM COATED ORAL at 22:12

## 2022-11-18 RX ADMIN — GABAPENTIN 800 MG: 400 CAPSULE ORAL at 08:23

## 2022-11-18 RX ADMIN — TOPIRAMATE 50 MG: 25 TABLET, FILM COATED ORAL at 17:01

## 2022-11-18 RX ADMIN — LEVALBUTEROL 1.25 MG: 1.25 SOLUTION, CONCENTRATE RESPIRATORY (INHALATION) at 13:57

## 2022-11-18 RX ADMIN — IPRATROPIUM BROMIDE 0.5 MG: 0.5 SOLUTION RESPIRATORY (INHALATION) at 19:34

## 2022-11-18 RX ADMIN — GABAPENTIN 800 MG: 400 CAPSULE ORAL at 16:05

## 2022-11-18 RX ADMIN — LEVOTHYROXINE SODIUM 125 MCG: 125 TABLET ORAL at 05:06

## 2022-11-18 RX ADMIN — LEVALBUTEROL 1.25 MG: 1.25 SOLUTION, CONCENTRATE RESPIRATORY (INHALATION) at 19:34

## 2022-11-18 RX ADMIN — Medication 6 MG: at 22:12

## 2022-11-18 RX ADMIN — LEVALBUTEROL 1.25 MG: 1.25 SOLUTION, CONCENTRATE RESPIRATORY (INHALATION) at 07:12

## 2022-11-18 RX ADMIN — LOSARTAN POTASSIUM 100 MG: 50 TABLET, FILM COATED ORAL at 08:23

## 2022-11-18 RX ADMIN — FUROSEMIDE 40 MG: 40 TABLET ORAL at 08:23

## 2022-11-18 RX ADMIN — GABAPENTIN 800 MG: 400 CAPSULE ORAL at 22:12

## 2022-11-18 RX ADMIN — POTASSIUM CHLORIDE 20 MEQ: 1500 TABLET, EXTENDED RELEASE ORAL at 08:23

## 2022-11-18 RX ADMIN — APIXABAN 5 MG: 5 TABLET, FILM COATED ORAL at 08:23

## 2022-11-18 RX ADMIN — PREDNISONE 40 MG: 20 TABLET ORAL at 08:23

## 2022-11-18 RX ADMIN — IPRATROPIUM BROMIDE 0.5 MG: 0.5 SOLUTION RESPIRATORY (INHALATION) at 13:57

## 2022-11-18 RX ADMIN — TOPIRAMATE 50 MG: 25 TABLET, FILM COATED ORAL at 08:23

## 2022-11-18 NOTE — CASE MANAGEMENT
Case Management Discharge Planning Note    Patient name Wilber Bañuelos [facility] [address] /[address] Chandrika Seals* MRN [id number]  : 1950 Date 2022       Current Admission Date: 11/15/2022  Current Admission Diagnosis:Acute exacerbation of moderate persistent extrinsic asthma   Patient Active Problem List    Diagnosis Date Noted   • COVID-19 10/23/2022   • Anemia 2022   • Obesity (BMI 35 0-39 9 without comorbidity) 2022   • Acute on chronic diastolic heart failure ([facility] ) 2022   • PASHA on CPAP 2020   • Acute exacerbation of moderate persistent extrinsic asthma 2020   • Fall at home, initial encounter 2020   • Paroxysmal atrial fibrillation ([facility] ) 2020   • Hyperlipidemia 2018   • Bipolar disorder ([facility] ) 2018   • Thoracic disc herniation 2018   • Lumbar stenosis 2018   • Urinary frequency 2018   • Ambulatory dysfunction    • Back pain 2018   • Hip pain 2018   • Shortness of breath 2017   • Hypertension    • Type 2 diabetes mellitus with hyperglycemia ([facility] )    • Acute on chronic respiratory failure with hypoxia (HCC)    • Bipolar 1 disorder ([facility] )    • Tobacco use    • COPD exacerbation ([facility] ) 2016   • Arthritis 2016   • Type 2 diabetes mellitus without complication, without long-term current use of insulin ([facility] 2016   • HIV (human immunodeficiency virus infection)  2016   • HTN (hypertension) 2016   • Osteoarthritis 2016      LOS (days): 3  Geometric Mean LOS (GMLOS) (days): 6 90  Days to GMLOS:4 2     OBJECTIVE:  Risk of Unplanned Readmission Score: 55 95         Current admission status: Inpatient   Preferred Pharmacy:   [address] [address]  [address] [place] [address]  Phone: [phone] Fax: [phone]    Primary Care Provider: German Jo DO    Primary Insurance: [facility]  Secondary Insurance: [address] [place] [address] [address] [employer]    DISCHARGE DETAILS:    Discharge planning discussed with[de-identified] Patient  Freedom of Choice: Yes  Comments - Freedom of Choice: Pt interested in STR if recommended by therapy but is agreeable to resume services with [facility] if no STR recommendation  Other Referral/Resources/Interventions Provided:  Interventions: Short Term Rehab  Referral Comments: STR referrals placed via [facility] to assess who may be able to accept pt  Additional Comments: Patient reviewed during care coordination rounds with Devante Salmeron who informed that pt is not yet stable for discharge  PT/OT recommendations pending, pt expressed interest in STR if recommended and requested a blanket referral to assess her options

## 2022-11-18 NOTE — ASSESSMENT & PLAN NOTE
Lab Results   Component Value Date    HGBA1C 6 8 (H) 10/16/2022       Recent Labs     11/17/22  1612 11/17/22  2048 11/18/22  0709 11/18/22  1102   POCGLU 156* 216* 185* 172*       Blood Sugar Average: Last 72 hrs:  · (P) 90 [id number] hold oral hypoglycemics  · Monitor Accu-Cheks, sliding scale for coverage

## 2022-11-18 NOTE — PROGRESS NOTES
[facility]  Progress Note - Larry Flies 1950, 81 y o  female MRN: [id number]  [facility] [address] [facility] [id number] Encounter: [id number]  Primary Care Provider: Juwan Barker DO   Date and time admitted to hospital: 11/15/2022  3:59 PM    * Acute exacerbation of moderate persistent extrinsic asthma  Assessment & Plan   Patient was seen at [address] [place] [address] and the ED on 11/08/2022 for dyspnea at which point she was found to be positive for COVID-19  Patient states she has continued to have dyspnea which prompted this hospitalization    · Patient was started on Solu-Medrol 40 mg IV q 8, transitioned to p o  Prednisone   · Scheduled Xopenex and Atrovent  · Budesonide, Singulair  · Patient is back to baseline oxygen requirements  · Appreciate pulmonology recommendations, recommending ambulatory pulse ox prior to discharge, continuing home regimen of Trelegy with albuterol as needed at discharge  · Patient is now agreeable to PT OT    Acute on chronic diastolic heart failure (HCC)  Assessment & Plan  Wt Readings from Last 3 Encounters:   11/18/22 103 kg (227 lb 4 7 oz)   11/04/22 103 kg (227 lb 15 3 oz)   10/23/22 98 9 kg (218 lb)     · 2D echo revealed ejection fraction 91%, grade 1 diastolic dysfunction  · Continue on furosemide 40 mg b i d  , home dose  · Patient weight increased today dose of Lasix was held yesterday due to increased creatinine and patient received bolus of at night    Monitor for volume overload  · ProBNP 159  · Monitor I/O, daily weight, fluid restriction    Paroxysmal atrial fibrillation (HCC)  Assessment & Plan  · Rate controlled on diltiazem  · Eliquis for anticoagulation    Acute on chronic respiratory failure with hypoxia (HCC)  Assessment & Plan  · Secondary to COVID-19 and Asthma/COPD exacerbation  · On chronic oxygen 2 L at home currently back to baseline      Tobacco use  Assessment & Plan  · Nicotine patch  · Counseled on tobacco cessation    Type 2 diabetes mellitus with hyperglycemia [facility])  Assessment & Plan  Lab Results   Component Value Date    HGBA1C 6 8 (H) 10/16/2022       Recent Labs     11/17/22  1612 11/17/22  2048 11/18/22  0709 11/18/22  1102   POCGLU 156* 216* 185* 172*       Blood Sugar Average: Last 72 hrs:  · (P) 237 [id number] hold oral hypoglycemics  · Monitor Accu-Cheks, sliding scale for coverage    COVID-19  Assessment & Plan  COVID 19 Confirmed in October  • Oxygen requirements:  Was on 4 L currently back to baseline 3 L  • Discontinued remdesivir   • isolation precaution can be discontinued    HIV (human immunodeficiency virus infection)   Assessment & Plan  · Continue Biktarvy      VTE Pharmacologic Prophylaxis: VTE Score: 7 High Risk (Score >/= 5) - Pharmacological DVT Prophylaxis Ordered: apixaban (Eliquis)  Sequential Compression Devices Ordered  Patient Centered Rounds: I performed bedside rounds with nursing staff today  Discussions with Specialists or Other Care Team Provider: none    Education and Discussions with Family / Patient: Patient declined call to   Time Spent for Care: 30 minutes  More than 50% of total time spent on counseling and coordination of care as described above  Current Length of Stay: 3 day(s)  Current Patient Status: Inpatient   Certification Statement: The patient will continue to require additional inpatient hospital stay due to COPD exacerbation requiring PT OT evaluation for possible discharge to rehab  Discharge Plan: Anticipate discharge in 24-48 hrs to discharge location to be determined pending rehab evaluations  Code Status: Level 1 - Full Code    Subjective:   Patient was seen lying in bed today  She reports continued shortness of breath  She states that she has shortness of breath on exertion when walking around home and feeling of chest pressure, but that this is very scary and has prompted her multiple hospital visits  Patient denies any other acute complaints at this time    We discussed tobacco cessation  Patient expressed that she wished to be evaluated by PT and OT for possible placement in rehab  Objective:     Vitals:   Temp (24hrs), Av 2 °F (36 8 °C), Min:97 8 °F (36 6 °C), Max:98 7 °F (37 1 °C)    Temp:  [97 8 °F (36 6 °C)-98 7 °F (37 1 °C)] 98 2 °F (36 8 °C)  HR:  [89-95] 94  Resp:  [16-20] 20  BP: (116-133)/(77-81) 133/81  SpO2:  [95 %-100 %] 98 %  Body mass index is 41 57 kg/m²  Input and Output Summary (last 24 hours): Intake/Output Summary (Last 24 hours) at 2022 1459  Last data filed at 2022 1431  Gross per 24 hour   Intake 1460 ml   Output 565 ml   Net 895 ml       Physical Exam:   Physical Exam  Vitals and nursing note reviewed  Constitutional:       Appearance: Normal appearance  She is obese  HENT:      Head: Normocephalic and atraumatic  Eyes:      General: No scleral icterus  Cardiovascular:      Rate and Rhythm: Normal rate and regular rhythm  Pulmonary:      Effort: Pulmonary effort is normal       Breath sounds: Wheezing present  Abdominal:      General: Abdomen is flat  Bowel sounds are normal       Palpations: Abdomen is soft  Tenderness: There is no abdominal tenderness  Musculoskeletal:      Right lower leg: Edema present  Left lower leg: Edema present  Skin:     General: Skin is warm and dry  Neurological:      Mental Status: She is alert  Mental status is at baseline  Psychiatric:         Mood and Affect: Mood normal          Behavior: Behavior normal           Additional Data:     Labs:  Results from last 7 days   Lab Units 22  0559 22  0557 22  0556   WBC Thousand/uL 15 48*   < > 7 80   HEMOGLOBIN g/dL 10 5*   < > 10 3*   HEMATOCRIT % 34 5*   < > 35 3   PLATELETS Thousands/uL 287   < > 257   NEUTROS PCT %  --   --  88*   LYMPHS PCT %  --   --  10*   MONOS PCT %  --   --  1*   EOS PCT %  --   --  0    < > = values in this interval not displayed       Results from last 7 days   Lab Units 11/18/22  0559 11/17/22  0557 11/16/22  0556   SODIUM mmol/L 145   < > 145   POTASSIUM mmol/L 4 2   < > 4 2   CHLORIDE mmol/L 107   < > 106   CO2 mmol/L 29   < > 29   BUN mg/dL 33*   < > 15   CREATININE mg/dL 1 20   < > 1 09   ANION GAP mmol/L 9   < > 10   CALCIUM mg/dL 10 6*   < > 9 9   ALBUMIN g/dL  --   --  3 6   TOTAL BILIRUBIN mg/dL  --   --  0 25   ALK PHOS U/L  --   --  62   ALT U/L  --   --  17   AST U/L  --   --  10   GLUCOSE RANDOM mg/dL 189*   < > 186*    < > = values in this interval not displayed  Results from last 7 days   Lab Units 11/18/22  1102 11/18/22  0709 11/17/22  2048 11/17/22  1612 11/17/22  1117 11/17/22  0754 11/16/22  2113 11/16/22  1549 11/16/22  1103 11/16/22  0727 11/15/22  2311   POC GLUCOSE mg/dl 172* 185* 216* 156* 217* 214* 240* 141* 248* 159* 215*               Lines/Drains:  Invasive Devices     Peripheral Intravenous Line  Duration           Peripheral IV 11/17/22 Proximal;Right;Ventral (anterior) Forearm 1 day                      Imaging: No pertinent imaging reviewed      Recent Cultures (last 7 days):         Last 24 Hours Medication List:   Current Facility-Administered Medications   Medication Dose Route Frequency Provider Last Rate   • acetaminophen  650 mg Oral Q6H PRN Zaina Leyva MD     • apixaban  5 mg Oral BID Zaina Leyva MD     • atorvastatin  20 mg Oral After Zach Davidson MD     • bictegravir-emtricitab-tenofovir alafenamide  1 tablet Oral Daily Zaina MD Gordon     • budesonide  0 5 mg Nebulization BID Aaron Carrillo DO     • diltiazem  120 mg Oral Daily Zaina Leyva MD     • furosemide  40 mg Oral BID Nurys Maya PA-C     • gabapentin  800 mg Oral TID Zaina MD Gordon     • insulin lispro  1-5 Units Subcutaneous TID AC Zaina Leyva MD     • ipratropium  0 5 mg Nebulization TID Zaina MD Gordon     • levalbuterol  1 25 mg Nebulization TID Zaina Fails, MD     • levothyroxine  125 mcg Oral Early Morning Zaina Fails, MD     • losartan  100 mg Oral Daily Eliseo Johnson MD     • melatonin  6 mg Oral HS Kathy Rawls PA-C     • montelukast  10 mg Oral HS Eliseo Johnson MD     • nicotine  1 patch Transdermal Daily Eliseo Johnson MD     • potassium chloride  20 mEq Oral Daily Eliseo Johnson MD     • predniSONE  40 mg Oral Daily LUIS M Meza     • sodium chloride (PF)  3 mL Intravenous Q1H PRN Eliseo Johnson MD     • topiramate  50 mg Oral BID Eliseo Johnson MD     • traMADol  50 mg Oral Q6H PRN Eliseo Johnson MD          Today, Patient Was Seen By: Delvis Beltre PA-C    **Please Note: This note may have been constructed using a voice recognition system  **

## 2022-11-18 NOTE — DISCHARGE INSTRUCTIONS
Schedule follow up appointment with Pulmonologist at [facility]     Continue on prednisone taper at discharge

## 2022-11-18 NOTE — ASSESSMENT & PLAN NOTE
Patient was seen at [facility] and the ED on 11/08/2022 for dyspnea at which point she was found to be positive for COVID-19  Patient states she has continued to have dyspnea which prompted this hospitalization    · Patient was started on Solu-Medrol 40 mg IV q 8, transitioned to p o   Prednisone   · Scheduled Xopenex and Atrovent  · Budesonide, Singulair  · Patient is back to baseline oxygen requirements  · Appreciate pulmonology recommendations, recommending ambulatory pulse ox prior to discharge, continuing home regimen of Trelegy with albuterol as needed at discharge  · Patient is now agreeable to PT OT

## 2022-11-18 NOTE — PLAN OF CARE
Problem: Potential for Falls  Goal: Patient will remain free of falls  Description: INTERVENTIONS:  - Educate patient/family on patient safety including physical limitations  - Instruct patient to call for assistance with activity   - Consult OT/PT to assist with strengthening/mobility   - Keep Call bell within reach  - Keep bed low and locked with side rails adjusted as appropriate  - Keep care items and personal belongings within reach  - Initiate and maintain comfort rounds  - Make Fall Risk Sign visible to staff  - Offer Toileting every  Hours, in advance of need  - Initiate/Maintain alarm  - Obtain necessary fall risk management equipment:   - Apply yellow socks and bracelet for high fall risk patients  - Consider moving patient to room near nurses station  Outcome: Progressing     Problem: PAIN - ADULT  Goal: Verbalizes/displays adequate comfort level or baseline comfort level  Description: Interventions:  - Encourage patient to monitor pain and request assistance  - Assess pain using appropriate pain scale  - Administer analgesics based on type and severity of pain and evaluate response  - Implement non-pharmacological measures as appropriate and evaluate response  - Consider cultural and social influences on pain and pain management  - Notify physician/advanced practitioner if interventions unsuccessful or patient reports new pain  Outcome: Progressing     Problem: INFECTION - ADULT  Goal: Absence or prevention of progression during hospitalization  Description: INTERVENTIONS:  - Assess and monitor for signs and symptoms of infection  - Monitor lab/diagnostic results  - Monitor all insertion sites, i e  indwelling lines, tubes, and drains  - Monitor endotracheal if appropriate and nasal secretions for changes in amount and color  - [place] appropriate cooling/warming therapies per order  - Administer medications as ordered  - Instruct and encourage patient and family to use good hand hygiene technique  - Identify and instruct in appropriate isolation precautions for identified infection/condition  Outcome: Progressing     Problem: SAFETY ADULT  Goal: Patient will remain free of falls  Description: INTERVENTIONS:  - Educate patient/family on patient safety including physical limitations  - Instruct patient to call for assistance with activity   - Consult OT/PT to assist with strengthening/mobility   - Keep Call bell within reach  - Keep bed low and locked with side rails adjusted as appropriate  - Keep care items and personal belongings within reach  - Initiate and maintain comfort rounds  - Make Fall Risk Sign visible to staff  - Offer Toileting every  Hours, in advance of need  - Initiate/Maintain alarm  - Obtain necessary fall risk management equipment:   - Apply yellow socks and bracelet for high fall risk patients  - Consider moving patient to room near nurses station  Outcome: Progressing  Goal: Maintain or return to baseline ADL function  Description: INTERVENTIONS:  -  Assess patient's ability to carry out ADLs; assess patient's baseline for ADL function and identify physical deficits which impact ability to perform ADLs (bathing, care of mouth/teeth, toileting, grooming, dressing, etc )  - Assess/evaluate cause of self-care deficits   - Assess range of motion  - Assess patient's mobility; develop plan if impaired  - Assess patient's need for assistive devices and provide as appropriate  - Encourage maximum independence but intervene and supervise when necessary  - Involve family in performance of ADLs  - Assess for home care needs following discharge   - Consider OT consult to assist with ADL evaluation and planning for discharge  - Provide patient education as appropriate  Outcome: Progressing  Goal: Maintains/Returns to pre admission functional level  Description: INTERVENTIONS:  - Perform BMAT or MOVE assessment daily    - Set and communicate daily mobility goal to care team and patient/family/caregiver  - Collaborate with rehabilitation services on mobility goals if consulted  - Perform Range of Motion  times a day  - Reposition patient every  hours  - Dangle patient  times a day  - Stand patient  times a day  - Ambulate patient  times a day  - Out of bed to chair  times a day   - Out of bed for meals  times a day  - Out of bed for toileting  - Record patient progress and toleration of activity level   Outcome: Progressing     Problem: DISCHARGE PLANNING  Goal: Discharge to home or other facility with appropriate resources  Description: INTERVENTIONS:  - Identify barriers to discharge w/patient and caregiver  - Arrange for needed discharge resources and transportation as appropriate  - Identify discharge learning needs (meds, wound care, etc )  - Arrange for interpretive services to assist at discharge as needed  - Refer to Case Management Department for coordinating discharge planning if the patient needs post-hospital services based on physician/advanced practitioner order or complex needs related to functional status, cognitive ability, or social support system  Outcome: Progressing     Problem: Knowledge Deficit  Goal: Patient/family/caregiver demonstrates understanding of disease process, treatment plan, medications, and discharge instructions  Description: Complete learning assessment and assess knowledge base    Interventions:  - Provide teaching at level of understanding  - Provide teaching via preferred learning methods  Outcome: Progressing     Problem: RESPIRATORY - ADULT  Goal: Achieves optimal ventilation and oxygenation  Description: INTERVENTIONS:  - Assess for changes in respiratory status  - Assess for changes in mentation and behavior  - Position to facilitate oxygenation and minimize respiratory effort  - Oxygen administered by appropriate delivery if ordered  - Initiate smoking cessation education as indicated  - Encourage broncho-pulmonary hygiene including cough, deep breathe, Incentive Spirometry  - Assess the need for suctioning and aspirate as needed  - Assess and instruct to report SOB or any respiratory difficulty  - Respiratory Therapy support as indicated  Outcome: Progressing     Problem: METABOLIC, FLUID AND ELECTROLYTES - ADULT  Goal: Electrolytes maintained within normal limits  Description: INTERVENTIONS:  - Monitor labs and assess patient for signs and symptoms of electrolyte imbalances  - Administer electrolyte replacement as ordered  - Monitor response to electrolyte replacements, including repeat lab results as appropriate  - Instruct patient on fluid and nutrition as appropriate  Outcome: Progressing  Goal: Fluid balance maintained  Description: INTERVENTIONS:  - Monitor labs   - Monitor I/O and WT  - Instruct patient on fluid and nutrition as appropriate  - Assess for signs & symptoms of volume excess or deficit  Outcome: Progressing  Goal: Glucose maintained within target range  Description: INTERVENTIONS:  - Monitor Blood Glucose as ordered  - Assess for signs and symptoms of hyperglycemia and hypoglycemia  - Administer ordered medications to maintain glucose within target range  - Assess nutritional intake and initiate nutrition service referral as needed  Outcome: Progressing     Problem: HEMATOLOGIC - ADULT  Goal: Maintains hematologic stability  Description: INTERVENTIONS  - Assess for signs and symptoms of bleeding or hemorrhage  - Monitor labs  - Administer supportive blood products/factors as ordered and appropriate  Outcome: Progressing

## 2022-11-18 NOTE — PROGRESS NOTES
Progress Note - Pulmonary   Denise Fonseca 81 y o  female MRN: [id number]  Unit/Bed#: [facility] 68 2 -01 Encounter: [id number]      Assessment:  1  Asthma/COPD-W/exacerbation  2  Acute diastolic CHF  3  Active tobacco abuse  4  Morbid obesity/OHS  5  HIV    Plan/discussion:  · Multiple exacerbation/presentation with dyspnea chest tightness likely multifactorial CHF/obstructive lung disease related  · Background morbid obesity/active tobacco abuse   · Seems to be improving acute bronchitis/exacerbation of COPD  · Continue prednisone 40 mg until 11/21 then stop   · Continue nebulized Atrovent/Xopenex  · Consider Daliresp 250 for 2 weeks on discharge then increase to 500 mg if no GI intolerance  · Needs to follow-up with her primary Pulmonary at [facility]    Pulmonary will follow peripherally over the weekend, please do not hesitate to call with any questions    Subjective:   No overnight acute events  Still with intermittent cough/dyspnea on exertion  Vitals: Blood pressure 133/81, pulse 94, temperature 98 2 °F (36 8 °C), resp  rate 20, height 5' 2" (1 575 m), weight 103 kg (227 lb 4 7 oz), last menstrual period 04/01/2013, SpO2 98 %, not currently breastfeeding , Body mass index is 41 57 kg/m²  Intake/Output Summary (Last 24 hours) at 11/18/2022 1820  Last data filed at 11/18/2022 1431  Gross per 24 hour   Intake 1460 ml   Output 565 ml   Net 895 ml       Physical Exam  Gen: not in acute distress, obese, Body mass index is 41 57 kg/m²  HEENT:supple, no JVD appreciated  Cardiac: RRR, no murmurs appreciated  Lungs: normal respiratory effort, mild expiratory wheeze on the right base/mid lung field, otherwise moderate air movement/clear soundsy  Abdomen: soft, nontender, normoactive bowel sounds  Extremities: no cyanosis, no clubbing, no edema  Neuro: AAO X3, no focal motor deficit    Labs: I have personally reviewed pertinent lab results          Darius Orantes MD

## 2022-11-18 NOTE — ASSESSMENT & PLAN NOTE
Wt Readings from Last 3 Encounters:   11/18/22 103 kg (227 lb 4 7 oz)   11/04/22 103 kg (227 lb 15 3 oz)   10/23/22 98 9 kg (218 lb)     · 2D echo revealed ejection fraction 99%, grade 1 diastolic dysfunction  · Continue on furosemide 40 mg b i d  , home dose  · Patient weight increased today dose of Lasix was held yesterday due to increased creatinine and patient received bolus of at night    Monitor for volume overload  · ProBNP 159  · Monitor I/O, daily weight, fluid restriction

## 2022-11-18 NOTE — ASSESSMENT & PLAN NOTE
COVID 19 Confirmed in October  • Oxygen requirements:  Was on 4 L currently back to baseline 2 L  • Discontinued remdesivir   • isolation precaution can be discontinued

## 2022-11-18 NOTE — ASSESSMENT & PLAN NOTE
· Secondary to COVID-19 and Asthma/COPD exacerbation  · On chronic oxygen 2 L at home currently back to baseline

## 2022-11-18 NOTE — PLAN OF CARE
Problem: Potential for Falls  Goal: Patient will remain free of falls  Description: INTERVENTIONS:  - Educate patient/family on patient safety including physical limitations  - Instruct patient to call for assistance with activity   - Consult OT/PT to assist with strengthening/mobility   - Keep Call bell within reach  - Keep bed low and locked with side rails adjusted as appropriate  - Keep care items and personal belongings within reach  - Initiate and maintain comfort rounds  - Make Fall Risk Sign visible to staff  - Offer Toileting every 2 Hours, in advance of need  - Initiate/Maintain bed alarm  - Obtain necessary fall risk management equipment    - Apply yellow socks and bracelet for high fall risk patients  - Consider moving patient to room near nurses station  Outcome: Progressing     Problem: PAIN - ADULT  Goal: Verbalizes/displays adequate comfort level or baseline comfort level  Description: Interventions:  - Encourage patient to monitor pain and request assistance  - Assess pain using appropriate pain scale  - Administer analgesics based on type and severity of pain and evaluate response  - Implement non-pharmacological measures as appropriate and evaluate response  - Consider cultural and social influences on pain and pain management  - Notify physician/advanced practitioner if interventions unsuccessful or patient reports new pain  Outcome: Progressing     Problem: INFECTION - ADULT  Goal: Absence or prevention of progression during hospitalization  Description: INTERVENTIONS:  - Assess and monitor for signs and symptoms of infection  - Monitor lab/diagnostic results  - Monitor all insertion sites, i e  indwelling lines, tubes, and drains  - Monitor endotracheal if appropriate and nasal secretions for changes in amount and color  - Marion appropriate cooling/warming therapies per order  - Administer medications as ordered  - Instruct and encourage patient and family to use good hand hygiene technique  - Identify and instruct in appropriate isolation precautions for identified infection/condition  Outcome: Progressing     Problem: SAFETY ADULT  Goal: Patient will remain free of falls  Description: INTERVENTIONS:  - Educate patient/family on patient safety including physical limitations  - Instruct patient to call for assistance with activity   - Consult OT/PT to assist with strengthening/mobility   - Keep Call bell within reach  - Keep bed low and locked with side rails adjusted as appropriate  - Keep care items and personal belongings within reach  - Initiate and maintain comfort rounds  - Make Fall Risk Sign visible to staff  - Offer Toileting every 2 Hours, in advance of need  - Initiate/Maintain bed alarm  - Obtain necessary fall risk management equipment    - Apply yellow socks and bracelet for high fall risk patients  - Consider moving patient to room near nurses station  Outcome: Progressing  Goal: Maintain or return to baseline ADL function  Description: INTERVENTIONS:  -  Assess patient's ability to carry out ADLs; assess patient's baseline for ADL function and identify physical deficits which impact ability to perform ADLs (bathing, care of mouth/teeth, toileting, grooming, dressing, etc )  - Assess/evaluate cause of self-care deficits   - Assess range of motion  - Assess patient's mobility; develop plan if impaired  - Assess patient's need for assistive devices and provide as appropriate  - Encourage maximum independence but intervene and supervise when necessary  - Involve family in performance of ADLs  - Assess for home care needs following discharge   - Consider OT consult to assist with ADL evaluation and planning for discharge  - Provide patient education as appropriate  Outcome: Progressing  Goal: Maintains/Returns to pre admission functional level  Description: INTERVENTIONS:  - Perform BMAT or MOVE assessment daily    - Set and communicate daily mobility goal to care team and patient/family/caregiver  - Collaborate with rehabilitation services on mobility goals if consulted  - Perform Range of Motion 2 times a day  - Reposition patient every 2 hours  - Dangle patient 2 times a day  - Stand patient 2 times a day  - Ambulate patient 2 times a day  - Out of bed to chair 2 times a day   - Out of bed for meals 2 times a day  - Out of bed for toileting  - Record patient progress and toleration of activity level   Outcome: Progressing     Problem: DISCHARGE PLANNING  Goal: Discharge to home or other facility with appropriate resources  Description: INTERVENTIONS:  - Identify barriers to discharge w/patient and caregiver  - Arrange for needed discharge resources and transportation as appropriate  - Identify discharge learning needs (meds, wound care, etc )  - Arrange for interpretive services to assist at discharge as needed  - Refer to Case Management Department for coordinating discharge planning if the patient needs post-hospital services based on physician/advanced practitioner order or complex needs related to functional status, cognitive ability, or social support system  Outcome: Progressing     Problem: Knowledge Deficit  Goal: Patient/family/caregiver demonstrates understanding of disease process, treatment plan, medications, and discharge instructions  Description: Complete learning assessment and assess knowledge base    Interventions:  - Provide teaching at level of understanding  - Provide teaching via preferred learning methods  Outcome: Progressing     Problem: RESPIRATORY - ADULT  Goal: Achieves optimal ventilation and oxygenation  Description: INTERVENTIONS:  - Assess for changes in respiratory status  - Assess for changes in mentation and behavior  - Position to facilitate oxygenation and minimize respiratory effort  - Oxygen administered by appropriate delivery if ordered  - Initiate smoking cessation education as indicated  - Encourage broncho-pulmonary hygiene including cough, deep breathe, Incentive Spirometry  - Assess the need for suctioning and aspirate as needed  - Assess and instruct to report SOB or any respiratory difficulty  - Respiratory Therapy support as indicated  Outcome: Progressing     Problem: METABOLIC, FLUID AND ELECTROLYTES - ADULT  Goal: Electrolytes maintained within normal limits  Description: INTERVENTIONS:  - Monitor labs and assess patient for signs and symptoms of electrolyte imbalances  - Administer electrolyte replacement as ordered  - Monitor response to electrolyte replacements, including repeat lab results as appropriate  - Instruct patient on fluid and nutrition as appropriate  Outcome: Progressing  Goal: Fluid balance maintained  Description: INTERVENTIONS:  - Monitor labs   - Monitor I/O and WT  - Instruct patient on fluid and nutrition as appropriate  - Assess for signs & symptoms of volume excess or deficit  Outcome: Progressing  Goal: Glucose maintained within target range  Description: INTERVENTIONS:  - Monitor Blood Glucose as ordered  - Assess for signs and symptoms of hyperglycemia and hypoglycemia  - Administer ordered medications to maintain glucose within target range  - Assess nutritional intake and initiate nutrition service referral as needed  Outcome: Progressing     Problem: HEMATOLOGIC - ADULT  Goal: Maintains hematologic stability  Description: INTERVENTIONS  - Assess for signs and symptoms of bleeding or hemorrhage  - Monitor labs  - Administer supportive blood products/factors as ordered and appropriate  Outcome: Progressing

## 2022-11-19 VITALS
DIASTOLIC BLOOD PRESSURE: 84 MMHG | OXYGEN SATURATION: 97 % | TEMPERATURE: 97.5 F | SYSTOLIC BLOOD PRESSURE: 133 MMHG | WEIGHT: 229.5 LBS | BODY MASS INDEX: 42.23 KG/M2 | HEIGHT: 62 IN | RESPIRATION RATE: 20 BRPM | HEART RATE: 82 BPM

## 2022-11-19 LAB
GLUCOSE SERPL-MCNC: 110 MG/DL (ref 65–140)
GLUCOSE SERPL-MCNC: 114 MG/DL (ref 65–140)

## 2022-11-19 RX ORDER — PREDNISONE 10 MG/1
40 TABLET ORAL DAILY
Qty: 8 TABLET | Refills: 0 | Status: SHIPPED | OUTPATIENT
Start: 2022-11-20 | End: 2022-11-22

## 2022-11-19 RX ORDER — ROFLUMILAST 250 UG/1
250 TABLET ORAL DAILY
Qty: 12 TABLET | Refills: 0 | Status: ON HOLD | OUTPATIENT
Start: 2022-11-19 | End: 2022-12-13 | Stop reason: SDUPTHER

## 2022-11-19 RX ADMIN — FUROSEMIDE 40 MG: 40 TABLET ORAL at 09:42

## 2022-11-19 RX ADMIN — LEVALBUTEROL 1.25 MG: 1.25 SOLUTION, CONCENTRATE RESPIRATORY (INHALATION) at 08:03

## 2022-11-19 RX ADMIN — LEVOTHYROXINE SODIUM 125 MCG: 125 TABLET ORAL at 05:01

## 2022-11-19 RX ADMIN — DILTIAZEM HYDROCHLORIDE 120 MG: 120 CAPSULE, COATED, EXTENDED RELEASE ORAL at 09:40

## 2022-11-19 RX ADMIN — TRAMADOL HYDROCHLORIDE 50 MG: 50 TABLET, COATED ORAL at 09:40

## 2022-11-19 RX ADMIN — POTASSIUM CHLORIDE 20 MEQ: 1500 TABLET, EXTENDED RELEASE ORAL at 09:40

## 2022-11-19 RX ADMIN — IPRATROPIUM BROMIDE 0.5 MG: 0.5 SOLUTION RESPIRATORY (INHALATION) at 08:03

## 2022-11-19 RX ADMIN — BUDESONIDE 0.5 MG: 0.5 INHALANT ORAL at 08:03

## 2022-11-19 RX ADMIN — TOPIRAMATE 50 MG: 25 TABLET, FILM COATED ORAL at 09:40

## 2022-11-19 RX ADMIN — GABAPENTIN 800 MG: 400 CAPSULE ORAL at 09:40

## 2022-11-19 RX ADMIN — APIXABAN 5 MG: 5 TABLET, FILM COATED ORAL at 09:40

## 2022-11-19 RX ADMIN — LOSARTAN POTASSIUM 100 MG: 50 TABLET, FILM COATED ORAL at 09:40

## 2022-11-19 RX ADMIN — BICTEGRAVIR SODIUM, EMTRICITABINE, AND TENOFOVIR ALAFENAMIDE FUMARATE 1 TABLET: 50; 200; 25 TABLET ORAL at 09:44

## 2022-11-19 RX ADMIN — PREDNISONE 40 MG: 20 TABLET ORAL at 09:40

## 2022-11-19 NOTE — ASSESSMENT & PLAN NOTE
Patient was seen at [address] [place] [address] and the ED on 11/08/2022 for dyspnea at which point she was found to be positive for COVID-19  Patient states she has continued to have dyspnea which prompted this hospitalization    · Patient was started on Solu-Medrol 40 mg IV q 8, transitioned to p o   Prednisone   · Scheduled Xopenex and Atrovent  · Budesonide, Singulair  · Patient is back to baseline oxygen requirements  · Seen in consult by pulmonology, continue prednisone through 11/21/22  · Prescription sent for Daliresp- no covered by insurance, will need pulm follow up regarding this medication  · Patient is now agreeable to PT OT- follow up evals- home with home health today

## 2022-11-19 NOTE — CASE MANAGEMENT
Case Management Discharge Planning Note    Patient name Lev Handler  Location [place] [address] [address] /[address] Daquan Rooney* MRN [id number]  : 1950 Date 2022       Current Admission Date: 11/15/2022  Current Admission Diagnosis:Acute exacerbation of moderate persistent extrinsic asthma   Patient Active Problem List    Diagnosis Date Noted   • COVID-19 10/23/2022   • Anemia 2022   • Obesity (BMI 35 0-39 9 without comorbidity) 2022   • Acute on chronic diastolic heart failure ([facility] ) 2022   • PASHA on CPAP 2020   • Acute exacerbation of moderate persistent extrinsic asthma 2020   • Fall at home, initial encounter 2020   • Paroxysmal atrial fibrillation ([facility] ) 2020   • Hyperlipidemia 2018   • Bipolar disorder ([facility] ) 2018   • Thoracic disc herniation 2018   • Lumbar stenosis 2018   • Urinary frequency 2018   • Ambulatory dysfunction    • Back pain 2018   • Hip pain 2018   • Shortness of breath 2017   • Hypertension    • Type 2 diabetes mellitus with hyperglycemia ([facility] )    • Acute on chronic respiratory failure with hypoxia (HCC)    • Bipolar 1 disorder ([facility] )    • Tobacco use    • COPD exacerbation ([facility] ) 2016   • Arthritis 2016   • Type 2 diabetes mellitus without complication, without long-term current use of insulin ([facility] ) 2016   • HIV (human immunodeficiency virus infection)  2016   • HTN (hypertension) 2016   • Osteoarthritis 2016      LOS (days): 4  Geometric Mean LOS (GMLOS) (days): 6 90  Days to GMLOS:3 4     OBJECTIVE:  Risk of Unplanned Readmission Score: 56 31         Current admission status: Inpatient   Preferred Pharmacy:   [address] [place] [address]  Phone: [phone] Fax: [phone]    Primary Care Provider: Rosaura Benz DO    Primary Insurance: Yue VILLANUEVA  Secondary Insurance: [address] [place] [address] [address] [employer]    DISCHARGE DETAILS:    Discharge planning discussed with[de-identified] Patient  Freedom of Choice: Yes  Comments - Freedom of Choice: Patient would like to go home today with [address] as recommended by PT/OT  CM contacted family/caregiver?: Yes  Were Treatment Team discharge recommendations reviewed with patient/caregiver?: Yes  Did patient/caregiver verbalize understanding of patient care needs?: Yes  Were patient/caregiver advised of the risks associated with not following Treatment Team discharge recommendations?: Yes    Contacts  Patient Contacts: Betty Aguilar (Daughter)  Relationship to Patient[de-identified] Family  Contact Method: Phone  Phone Number: [phone] (M)  Reason/Outcome: Continuity of Care, Emergency Contact, Referral, Discharge [address]         Is the patient interested in [facility] at discharge?: Yes  [address] requested[de-identified] [address], Nursing, Physical Therapy, Occupational [address] Agency Name[de-identified] Other ([address] [place] [address])  [address] Provider[de-identified] PCP  Home Health Services Needed[de-identified] Evaluate Functional Status and Safety, Gait/ADL Training, Oxygen Via Nasal Cannula, COPD Management, Strengthening/Theraputic Exercises to Improve Function  Oxygen LPM Ordered (if applicable based on home health services needed):: 3 LPM  Homebound Criteria Met[de-identified] Immunosuppressed, Requires the Assistance of Another Person for Safe Ambulation or to Leave the Home, Uses an Assist Device (i e  cane, walker, etc)  Supporting Clincal Findings[de-identified] Fatigues Easliy in [employer], Limited Endurance, Requires Oxygen    DME Referral Provided  Referral made for DME?: No    Other Referral/Resources/Interventions Provided:  Interventions: HHC, DME  Referral Comments: CM notified [facility] that patient is to dc home today   Patient requires O2 tank from DME closet to dc in [facility] -  called [employer] who confirmed that  is able to provide a tank from consignment as patient is a pre-existing client with them for O2 supplies           Treatment Team Recommendation: Home with [facility]  Discharge Destination Plan[de-identified] Home with [place] at Discharge : Free Local Transportation ([facility] (provided O2 by CM from [place] closet))                    Transfer Mode: Self        IMM Given (Date):: 11/19/22  IMM Given to[de-identified] Patient

## 2022-11-19 NOTE — PROGRESS NOTES
[facility]  Progress Note - Shaq Luna 1950, 81 y o  female MRN: [id number]  Unit/Bed#: [facility] 87 226-01 Encounter: [id number]  Primary Care Provider: John Cui DO   Date and time admitted to hospital: 11/15/2022  3:59 PM    * Acute exacerbation of moderate persistent extrinsic asthma  Assessment & Plan   Patient was seen at [address] [place] [address] and the ED on 11/08/2022 for dyspnea at which point she was found to be positive for COVID-19  Patient states she has continued to have dyspnea which prompted this hospitalization    · Patient was started on Solu-Medrol 40 mg IV q 8, transitioned to p o   Prednisone   · Scheduled Xopenex and Atrovent  · Budesonide, Singulair  · Patient is back to baseline oxygen requirements  · Seen in consult by pulmonology, continue prednisone through 11/21/22  · Patient is now agreeable to PT OT- follow up evals    COVID-19  Assessment & Plan  COVID 19 Confirmed in October  • Oxygen requirements:  Was on 4 L currently back to baseline 2 L  • Discontinued remdesivir   • isolation precaution can be discontinued    Anemia  Assessment & Plan  · Hemoglobin stable at 10 5    Acute on chronic diastolic heart failure (HCC)  Assessment & Plan  Wt Readings from Last 3 Encounters:   11/19/22 104 kg (229 lb 8 oz)   11/04/22 103 kg (227 lb 15 3 oz)   10/23/22 98 9 kg (218 lb)     · 2D echo revealed ejection fraction 44%, grade 1 diastolic dysfunction  · Continue on furosemide 40 mg b i d  , home dose  · ProBNP 159  · Monitor I/O, daily weight, fluid restriction    Paroxysmal atrial fibrillation (HCC)  Assessment & Plan  · Rate controlled on diltiazem  · Eliquis for anticoagulation    Tobacco use  Assessment & Plan  · Nicotine patch  · Counseled on tobacco cessation    Acute on chronic respiratory failure with hypoxia (HCC)  Assessment & Plan  · Secondary to COVID-19 and Asthma/COPD exacerbation  · On chronic oxygen 2 L at home currently back to baseline      Type 2 diabetes mellitus with hyperglycemia [facility])  Assessment & Plan  Lab Results   Component Value Date    HGBA1C 6 8 (H) 10/16/2022       Recent Labs     22  1102 22  1559 228 22  0719   POCGLU 172* 158* 191* 114       Blood Sugar Average: Last 72 hrs:  · (P) 913 [id number] hold oral hypoglycemics  · Monitor Accu-Cheks, sliding scale for coverage  · Well controlled during admission    HTN (hypertension)  Assessment & Plan  · BP controlled during hospitalization   · Continue home regimen: Cardizem, Lasix and Losartan    HIV (human immunodeficiency virus infection)   Assessment & Plan  · Continue Biktarvy        VTE Pharmacologic Prophylaxis: VTE Score: 7 High Risk (Score >/= 5) - Pharmacological DVT Prophylaxis Ordered: apixaban (Eliquis)  Sequential Compression Devices Ordered  Patient Centered Rounds: I performed bedside rounds with nursing staff today  Discussions with Specialists or Other Care Team Provider: none    Education and Discussions with Family / Patient: Attempted to update  (daughter) via phone  Left voicemail  [place]    Time Spent for Care: 30 minutes  More than 50% of total time spent on counseling and coordination of care as described above  Current Length of Stay: 4 day(s)  Current Patient Status: Inpatient   Certification Statement: The patient will continue to require additional inpatient hospital stay due to asthma exacerbation pending PT/OT consults for discharge planning   Discharge Plan: Anticipate discharge later today or tomorrow to discharge location to be determined pending rehab evaluations  Code Status: Level 1 - Full Code    Subjective:   Patient notes she is doing okay today  Still feeling somewhat short of breath but stable on her home oxygen requirements  Eating and drinking well       Objective:     Vitals:   Temp (24hrs), Av 8 °F (36 6 °C), Min:97 5 °F (36 4 °C), Max:98 2 °F (36 8 °C)    Temp:  [97 5 °F (36 4 °C)-98 2 °F (36 8 °C)] 97 5 °F (36 4 °C)  HR:  [] 82  Resp:  [18-20] 20  BP: (108-133)/(64-84) 133/84  SpO2:  [96 %-100 %] 97 %  Body mass index is 41 98 kg/m²  Input and Output Summary (last 24 hours): Intake/Output Summary (Last 24 hours) at 11/19/2022 0852  Last data filed at 11/18/2022 2030  Gross per 24 hour   Intake 1200 ml   Output 900 ml   Net 300 ml       Physical Exam:   Physical Exam  Vitals reviewed  Constitutional:       General: She is not in acute distress  HENT:      Head: Normocephalic and atraumatic  Eyes:      General: No scleral icterus  Conjunctiva/sclera: Conjunctivae normal    Cardiovascular:      Rate and Rhythm: Normal rate and regular rhythm  Heart sounds: No murmur heard  Pulmonary:      Effort: Pulmonary effort is normal  No respiratory distress  Breath sounds: No wheezing  Abdominal:      General: Bowel sounds are normal  There is no distension  Palpations: Abdomen is soft  Tenderness: There is no abdominal tenderness  Musculoskeletal:      Cervical back: Neck supple  Right lower leg: No edema  Left lower leg: No edema  Skin:     General: Skin is warm and dry  Neurological:      Mental Status: She is alert and oriented to person, place, and time  Psychiatric:         Mood and Affect: Mood normal          Behavior: Behavior normal           Additional Data:     Labs:  Results from last 7 days   Lab Units 11/18/22 0559 11/17/22 0557 11/16/22  0556   WBC Thousand/uL 15 48*   < > 7 80   HEMOGLOBIN g/dL 10 5*   < > 10 3*   HEMATOCRIT % 34 5*   < > 35 3   PLATELETS Thousands/uL 287   < > 257   NEUTROS PCT %  --   --  88*   LYMPHS PCT %  --   --  10*   MONOS PCT %  --   --  1*   EOS PCT %  --   --  0    < > = values in this interval not displayed       Results from last 7 days   Lab Units 11/18/22 0559 11/17/22 0557 11/16/22  0556   SODIUM mmol/L 145   < > 145   POTASSIUM mmol/L 4 2   < > 4 2   CHLORIDE mmol/L 107   < > 106   CO2 mmol/L 29   < > 29 BUN mg/dL 33*   < > 15   CREATININE mg/dL 1 20   < > 1 09   ANION GAP mmol/L 9   < > 10   CALCIUM mg/dL 10 6*   < > 9 9   ALBUMIN g/dL  --   --  3 6   TOTAL BILIRUBIN mg/dL  --   --  0 25   ALK PHOS U/L  --   --  62   ALT U/L  --   --  17   AST U/L  --   --  10   GLUCOSE RANDOM mg/dL 189*   < > 186*    < > = values in this interval not displayed  Results from last 7 days   Lab Units 11/19/22  0719 11/18/22  2058 11/18/22  1559 11/18/22  1102 11/18/22  0709 11/17/22  2048 11/17/22  1612 11/17/22  1117 11/17/22  0754 11/16/22  2113 11/16/22  1549 11/16/22  1103   POC GLUCOSE mg/dl 114 191* 158* 172* 185* 216* 156* 217* 214* 240* 141* 248*               Lines/Drains:  Invasive Devices     Peripheral Intravenous Line  Duration           Peripheral IV 11/17/22 Proximal;Right;Ventral (anterior) Forearm 2 days                      Imaging: No pertinent imaging reviewed      Recent Cultures (last 7 days):         Last 24 Hours Medication List:   Current Facility-Administered Medications   Medication Dose Route Frequency Provider Last Rate   • acetaminophen  650 mg Oral Q6H PRN Zulma Hand MD     • apixaban  5 mg Oral BID Zulma Hand MD     • atorvastatin  20 mg Oral After Mae Blanco MD     • bictegravir-emtricitab-tenofovir alafenamide  1 tablet Oral Daily Zulma Hand MD     • budesonide  0 5 mg Nebulization BID Aaron Carrillo DO     • diltiazem  120 mg Oral Daily Zulma Hand MD     • furosemide  40 mg Oral BID Charanjit Amaral PA-C     • gabapentin  800 mg Oral TID Zulma Hand MD     • insulin lispro  1-5 Units Subcutaneous TID AC Zulma Hand MD     • ipratropium  0 5 mg Nebulization TID Zulma Hand MD     • levalbuterol  1 25 mg Nebulization TID Zulma Hand MD     • levothyroxine  125 mcg Oral Early Morning Zulma Hand MD     • losartan  100 mg Oral Daily Zulma aHnd MD     • melatonin  6 mg Oral HS Kathy Rawls PA-C     • montelukast  10 mg Oral HS Zulma Hand MD • nicotine  1 patch Transdermal Daily Peyman Eckert MD     • potassium chloride  20 mEq Oral Daily Peyman Eckert MD     • predniSONE  40 mg Oral Daily LUIS M Nelson     • sodium chloride (PF)  3 mL Intravenous Q1H PRN Peyman Eckert MD     • topiramate  50 mg Oral BID Peyman Eckert MD     • traMADol  50 mg Oral Q6H PRN Peyman Eckert MD          Today, Patient Was Seen By: Roe Ford PA-C    **Please Note: This note may have been constructed using a voice recognition system  **

## 2022-11-19 NOTE — PLAN OF CARE
Problem: PHYSICAL THERAPY ADULT  Goal: Performs mobility at highest level of function for planned discharge setting  See evaluation for individualized goals  Description: Treatment/Interventions: Functional transfer training, LE strengthening/ROM, Elevations, Therapeutic exercise, Endurance training, Patient/family training, Gait training, Bed mobility, Equipment eval/education, Compensatory technique education, Continued evaluation, Spoke to nursing, OT, Spoke to advanced practitioner, Spoke to case management          See flowsheet documentation for full assessment, interventions and recommendations  Note: Prognosis: Fair  Problem List: Decreased strength, Decreased endurance, Impaired balance, Decreased mobility, Decreased cognition, Impaired judgement, Decreased safety awareness, Obesity  Assessment: Jahaira Hurst is a 81 y o  female who presents with dyspnea  Patient has history of COPD on chronic oxygen 3 L, recent COVID infection on 11/08/2022, hypertension, hyperlipidemia, chronic diastolic CHF, paroxysmal atrial fibrillation on Eliquis, sleep apnea, HIV on medication, diabetes mellitus presenting with dyspnea  Admitted with acute asthma exacerbation  Recent COVID infection  Prior to admission resides with daughter in 2 story home  Bed and bath on 2nd floor  Baseline primarily household ambulator without AD independently  Chronic O2 use of 2-3L at home  Home aides to provide assist with Morgan Bearded  Pt notes home aides 7 days per week for 8 hours, CM indicate 5 days per week, sister with her on weekends as daughter works  Currently pt demonstrates impairments in activity tolerance, balance, general strength, functional mobility and locomotion  Despite is S for transfers and ambulation short distances without AD use  SOB limits distances, however O2 sats on 3L 95% and   Gait with some inconsistencies, but quality improved over time spent upright  No LOB    Will benefit from continued skilled PT in order to optimize functional outcomes  The patient's AM-PAC Basic Mobility Inpatient Short Form Raw Score is 23  A Raw score of greater than 16 suggests the patient may benefit from discharge to home  Please also refer to the recommendation of the Physical Therapist for safe discharge planning  Anticipate ability to return home with family support, continued support from home aides and [facility]   Will follow and progress per POC  Barriers to Discharge: Inaccessible home environment  Barriers to Discharge Comments: stairs  PT Discharge Recommendation: Home with home health rehabilitation (has home aides 7 days per week for 8 hours per patient report )    See flowsheet documentation for full assessment

## 2022-11-19 NOTE — PLAN OF CARE
Problem: OCCUPATIONAL THERAPY ADULT  Goal: Performs self-care activities at highest level of function for planned discharge setting  See evaluation for individualized goals  Description: Treatment Interventions: ADL retraining, Functional transfer training, Endurance training, Patient/family training, Equipment evaluation/education, Neuromuscular reeducation, Compensatory technique education, Energy conservation, Activityengagement          See flowsheet documentation for full assessment, interventions and recommendations  Note: Limitation: Decreased ADL status, Decreased UE strength, Decreased Safe judgement during ADL, Decreased endurance, Decreased self-care trans  Prognosis: Good  Assessment: Pati Buchanan is a 81 y o  female who presents with dyspnea  Patient has history of COPD on chronic oxygen 3 L, recent COVID infection on 11/08/2022, hypertension, hyperlipidemia, chronic diastolic CHF, paroxysmal atrial fibrillation on Eliquis, sleep apnea, HIV on medication, diabetes mellitus presenting with dyspnea  Pt with active orders for OT and up OOB as tolerated  Prior to admission, pt lives with DTR in a 2 story home with a tub shower with seat and grab bars  Standard toilet and adjustable bed  Bed/ bath on the second floor  Pt was I with mobility, A for ADLs- bathing, and A for IADLs- cooking  Pt ambulates without device  Pt reports she has HHA 8 hrs a day 7 days a wk to assist with bathing, cooking and transportation  DTR works FT daily  PT on 2 L O2 at baseline  Pt reports alone 1-2 hr daily  Pt reports no falls  Upon evaluation, pt presenting below functional baseline with deficits noted in strength, activity tolerance, balance, safety awareness which is limiting pts functional ability to complete ADLs/ IADLs, functional transfers and functional mobility   Pt current level of function: bed mobility - supervision; transfers- supervision; functional mobility-Estuardo with no AD; UB dressing / bathing - supervision; LB dressing/ bathing- supervision; toileting - supervision  Pt tolerated session well, on 2L o2 during session  Pt O2 sats remained 93% and above with activity  No c/o pain  Reports mild SOB with activity Pt would benefit from skilled OT 2-3 x/wk to maximize functional independence  OT DC recommendation: home with [facility] services       OT Discharge Recommendation: Home with home health rehabilitation

## 2022-11-19 NOTE — NURSING NOTE
Discharge instructions for [facility], follow up appointments, and medications reviewed with patient who verbalized understanding  All questions and concerns addressed  IV and rosalinda removed prior to discharge  Patient left with portable O2 tank provided by [facility]  PCA escorted patient to [facility]

## 2022-11-19 NOTE — ASSESSMENT & PLAN NOTE
Lab Results   Component Value Date    HGBA1C 6 8 (H) 10/16/2022       Recent Labs     11/18/22  1102 11/18/22  1559 11/18/22 2058 11/19/22  0719   POCGLU 172* 158* 191* 114       Blood Sugar Average: Last 72 hrs:  · (P) 042 [id number] hold oral hypoglycemics  · Monitor Accu-Cheks, sliding scale for coverage  · Well controlled during admission

## 2022-11-19 NOTE — PHYSICAL THERAPY NOTE
PT EVALUATION 07:55-08:10    81 y o     [id number]    Shortness of breath [R06 02]  Hypokalemia [E87 6]  Acute respiratory failure with hypoxia ([facility]) [J96 01]    Past Medical History:   Diagnosis Date    Asthma     Bipolar 1 disorder (HCC)     Cardiac disease     COPD (chronic obstructive pulmonary disease) ([facility])     Diabetes mellitus ([facility] [facility] )     Disease of thyroid gland     HIV (human immunodeficiency virus infection) ([facility] ) 03/24/2016    Hypertension     Osteoarthritis     Tobacco abuse          Past Surgical History:   Procedure Laterality Date    HERNIA REPAIR      LUMBAR FUSION N/A 4/10/2018    Procedure: T9/10 discectomy with T9-T11 fusion;  Surgeon: Sarah Camacho MD;  [facility];  Service: Neurosurgery    THYROIDECTOMY          11/19/22 0755   PT Last Visit   PT Visit Date 11/19/22   Note Type   Note type Evaluation   Pain Assessment   Pain Score No Pain   Restrictions/Precautions   Weight Bearing Precautions Per Order No   Other Precautions Cognitive; Chair Alarm; Bed Alarm; Fall Risk  (2L O2)   Home Living   Type of [address] [place] [address] Two level;Bed/bath upstairs  (only bath on 2nd floor)   Bathroom Shower/Tub Tub/shower unit   Bathroom Toilet Standard   Bathroom Equipment Grab bars in shower   [address] Other (Comment)  (adjustable bed  2-3 L home O2)   Additional Comments resides with daughter who works  Bed and bath on 2nd floor   Prior Function   Level of [place] Independent with functional mobility; Independent with IADLS   Lives With Daughter   Receives Help From Family;Personal care attendant  (aide 7 days per week x 8 hours  Alone 1-2 hours until daughter home from work)   IADLs Family/Friend/Other provides transportation; Family/Friend/Other provides meals; Family/Friend/Other provides medication management   Falls in the last 6 months 0   Comments Primarily household distances  Use of cab for transportation     General   Additional Pertinent History Pt is 82 y/o female admitted with COPD exacerbation  PT consulted  Family/Caregiver Present No   Cognition   Overall Cognitive Status Impaired   Arousal/Participation Alert   Orientation Level Oriented to person;Oriented to place;Oriented to situation  (+ person, place  + year)   Following Commands Follows one step commands with increased time or repetition   Comments initially sleeping, increased time to improve level of alertness  Subjective   Subjective Tired  Agreeable to therapy eval    RUE Assessment   RUE Assessment WFL   LUE Assessment   LUE Assessment WFL   RLE Assessment   RLE Assessment WFL  (grossly 4-/5)   LLE Assessment   LLE Assessment WFL  (grossly 4-/5)   Bed Mobility   Supine to Sit 6  Modified independent   Additional items HOB elevated; Bedrails   Transfers   Sit to Stand 5  Supervision   Additional items Increased time required   Stand to Sit 5  Supervision   Additional items Increased time required;Verbal cues   Toilet transfer 5  Supervision   Additional items Increased time required;Verbal cues;Standard toilet   Ambulation/Elevation   Gait pattern Improper Weight shift; Inconsistent zara; Short stride   Gait Assistance 5  Supervision   Additional items Verbal cues   Assistive Device None   Distance Amb without AD 25'x2, 5'x1  O2 sats on 2L 95%  No LOB  Gait quality improved over time upright  Declines need for RW  Balance   Static Sitting Good   Dynamic Sitting Good   Static Standing Fair +   Dynamic Standing Fair -   Ambulatory Fair -   Endurance Deficit   Endurance Deficit Yes   Endurance Deficit Description SOB  O2 sat on 2L 95% with ambulation     Activity Tolerance   Activity Tolerance Patient limited by fatigue;Treatment limited secondary to medical complications (Comment)   Medical Staff Made Aware NurseNurys:Pt seen for co-evaluation/treatment with skilled Occupational Therapist 2* clinically unstable/unpredictable presentation, medical complexity, fall risk, cognitive impairments, functional/physical limitations, impaired functional balance, decreased safety awareness, limited activity tolerance which is decline from PLOF and may impact overall functional mobility/mobility safety  Nurse Made Aware yes   Assessment   Prognosis Fair   Problem List Decreased strength;Decreased endurance; Impaired balance;Decreased mobility; Decreased cognition; Impaired judgement;Decreased safety awareness; Obesity   Assessment Jaqui Kay is a 81 y o  female who presents with dyspnea  Patient has history of COPD on chronic oxygen 3 L, recent COVID infection on 11/08/2022, hypertension, hyperlipidemia, chronic diastolic CHF, paroxysmal atrial fibrillation on Eliquis, sleep apnea, HIV on medication, diabetes mellitus presenting with dyspnea  Admitted with acute asthma exacerbation  Recent COVID infection  Prior to admission resides with daughter in 2 story home  Bed and bath on 2nd floor  Baseline primarily household ambulator without AD independently  Chronic O2 use of 2-3L at home  Home aides to provide assist with [employer] Pilling  Pt notes home aides 7 days per week for 8 hours, CM indicate 5 days per week, sister with her on weekends as daughter works  Currently pt demonstrates impairments in activity tolerance, balance, general strength, functional mobility and locomotion  Despite is S for transfers and ambulation short distances without AD use  SOB limits distances, however O2 sats on 3L 95% and   Gait with some inconsistencies, but quality improved over time spent upright  No LOB  Will benefit from continued skilled PT in order to optimize functional outcomes  The patient's AM-PAC Basic Mobility Inpatient Short Form Raw Score is 23  A Raw score of greater than 16 suggests the patient may benefit from discharge to home  Please also refer to the recommendation of the Physical Therapist for safe discharge planning   Anticipate ability to return home with family support, continued support from home aides and [address]  Will follow and progress per POC  Barriers to Discharge Inaccessible home environment   Barriers to Discharge Comments stairs   Goals   Patient Goals none stated   STG Expiration Date 11/29/22   Short Term Goal #1 10 days: 1)  Pt will perform bed mobility with Landon demonstrating appropriate technique 100% of the time in order to improve function  2)  Perform all transfers with Landon demonstrating safe and appropriate technique 100% of the time in order to improve ability to negotiate safely in home environment  3) Amb with least restrictive AD > 80'x1 with mod I in order to demonstrate ability to negotiate in home environment  4)  Improve overall strength and balance 1/2 grade in order to optimize ability to perform functional tasks and reduce fall risk  5) Increase activity tolerance to 30 minutes in order to improve endurance to functional tasks  6)  Negotiate stairs using most appropriate technique and S in order to be able to negotiate safely in home environment  7) PT for ongoing patient and family/caregiver education, DME needs and d/c planning in order to promote highest level of function in least restrictive environment  Plan   Treatment/Interventions Functional transfer training;LE strengthening/ROM; Elevations; Therapeutic exercise; Endurance training;Patient/family training;Gait training;Bed mobility; Equipment eval/education; Compensatory technique education;Continued evaluation;Spoke to nursing;OT;Spoke to advanced practitioner;Spoke to case management   PT Frequency 3-5x/wk   Recommendation   PT Discharge Recommendation Home with home health rehabilitation  (has home aides 7 days per week for 8 hours per patient report )   AM-PAC Basic Mobility Inpatient   Turning in Bed Without Bedrails 4   Lying on Back to Sitting on Edge of Flat Bed 4   Moving Bed to Chair 4   Standing Up From Chair 4   Walk in Room 4   Climb 3-5 Stairs 3   Basic Mobility Inpatient Raw Score 23   Basic Mobility Standardized Score 50 88   Highest Level Of Mobility   -HL Goal 7: Walk 25 feet or more   -HLM Achieved 7: Walk 25 feet or more   End of Consult   Patient Position at End of Consult Bed/Chair alarm activated;Seated edge of bed; All needs within reach   End of Consult Comments Sitting EOB to eat meal      Hx/personal factors: co-morbidities, inaccessible home, dec cognition, fall risk, assist w/ ADL's, obesity, and O2  Examination: dec mobility, dec balance, dec endurance, dec amb, risk for falls, dec cognition, assessed body system, balance, endurance, amb, D/C disposition & fall risk  Clinical: unpredictable (ongoing medical status and risk for falls), continuous monitoring  Complexity: high           Kyle Vargas, PT

## 2022-11-19 NOTE — OCCUPATIONAL THERAPY NOTE
Occupational Therapy Evaluation     Patient Name: Alice Tran  AYKBC'K Date: 11/19/2022  Problem List  Principal Problem:    Acute exacerbation of moderate persistent extrinsic asthma  Active Problems:    HIV (human immunodeficiency virus infection)     HTN (hypertension)    Hypertension    Type 2 diabetes mellitus with hyperglycemia (HCC)    Acute on chronic respiratory failure with hypoxia (HCC)    Bipolar 1 disorder (HCC)    Tobacco use    Hyperlipidemia    Paroxysmal atrial fibrillation (HCC)    Acute on chronic diastolic heart failure (HCC)    Anemia    COVID-19    Past Medical History  Past Medical History:   Diagnosis Date    Asthma     Bipolar 1 disorder ([facility] )     Cardiac disease     COPD (chronic obstructive pulmonary disease) ([facility] )     Diabetes mellitus ([facility] )     Disease of thyroid gland     HIV (human immunodeficiency virus infection) ([place] [address] ) 03/24/2016    Hypertension     Osteoarthritis     Tobacco abuse      Past Surgical History  Past Surgical History:   Procedure Laterality Date    HERNIA REPAIR      LUMBAR FUSION N/A 4/10/2018    Procedure: T9/10 discectomy with T9-T11 fusion;  Surgeon: Haris Morales MD;  [facility];  Service: Neurosurgery    THYROIDECTOMY             11/19/22 0810   OT Last Visit   OT Visit Date 11/19/22   Note Type   Note type Evaluation   Pain Assessment   Pain Score No Pain   Restrictions/Precautions   Weight Bearing Precautions Per Order No   Other Precautions Cognitive; Chair Alarm; Bed Alarm;O2;Fall Risk  (2L O2)   Home Living   Type of Home House   Home Layout Two level;Bed/bath upstairs   Bathroom Shower/Tub Tub/shower unit   Bathroom Toilet Standard   Bathroom Equipment Grab bars in shower; Shower chair   Bathroom Accessibility Accessible   Prior Function   Level of [place] Independent with ADLs; Independent with functional mobility; Needs assistance with IADLS   Lives With Daughter   Receives Help From Family;Personal care attendant  (HHA 7 days/wk, 8 hrs a day)   IADLs Family/Friend/Other provides transportation; Family/Friend/Other provides meals; Family/Friend/Other provides medication management   Falls in the last 6 months 0   Comments Prior to admission, pt lives with DTR in a 2 story home with a tub shower with seat and grab bars  Standard toilet and adjustable bed  Bed/ bath on the second floor  Pt was I with mobility, A for ADLs- bathing, and A for IADLs- cooking  Pt ambulates without device  Pt reports she has HHA 8 hrs a day 7 days a wk to assist with bathing, cooking and transportation  DTR works FT daily  PT on 2 L O2 at baseline  Pt reports alone 1-2 hr daily  Pt reports no falls  Lifestyle   Autonomy I with ADLs and mobility  Reciprocal Relationships DTR, HHAs   ADL   Where Assessed Edge of bed   Eating Assistance 7  Independent   Grooming Assistance 6  Modified Independent   UB Bathing Assistance 5  Supervision/Setup   LB Bathing Assistance 5  Supervision/Setup   UB Dressing Assistance 5  Supervision/Setup   LB Dressing Assistance 5  Supervision/Setup   Toileting Assistance  5  Supervision/Setup   Bed Mobility   Supine to Sit 5  Supervision   Additional items HOB elevated; Bedrails   Transfers   Sit to Stand 5  Supervision   Additional items Assist x 1;Bedrails; Increased time required   Stand to Sit 5  Supervision   Additional items Assist x 1;Bedrails; Increased time required   Toilet transfer 5  Supervision   Additional items Assist x 1;Standard toilet; Increased time required   Functional Mobility   Functional Mobility 4  Minimal assistance   Additional Comments assist x 1 with noAD   Balance   Static Sitting Good   Dynamic Sitting Good   Static Standing Fair   Dynamic Standing Fair -   Ambulatory Fair -   Activity Tolerance   Activity Tolerance Patient limited by fatigue   Medical Staff Made Aware PT Margarita   Nurse Made Aware yes   RUE Assessment   RUE Assessment WFL   LUE Assessment   LUE Assessment WFL   Hand Function   Gross Motor Coordination Functional   Fine Motor Coordination Functional   Vision-Basic Assessment   Current Vision No visual deficits   Vision - Complex Assessment   Acuity Able to read clock/calendar on wall without difficulty   Psychosocial   Psychosocial (WDL) WDL   Cognition   Overall Cognitive Status WFL   Arousal/Participation Cooperative   Attention Within functional limits   Orientation Level Oriented X4   Following Commands Follows one step commands without difficulty   Assessment   Limitation Decreased ADL status; Decreased UE strength;Decreased Safe judgement during ADL;Decreased endurance;Decreased self-care trans   Prognosis Good   Assessment Joselosara Gloria Aguirre is a 81 y o  female who presents with dyspnea  Patient has history of COPD on chronic oxygen 3 L, recent COVID infection on 11/08/2022, hypertension, hyperlipidemia, chronic diastolic CHF, paroxysmal atrial fibrillation on Eliquis, sleep apnea, HIV on medication, diabetes mellitus presenting with dyspnea  Pt with active orders for OT and up OOB as tolerated  Prior to admission, pt lives with DTR in a 2 story home with a tub shower with seat and grab bars  Standard toilet and adjustable bed  Bed/ bath on the second floor  Pt was I with mobility, A for ADLs- bathing, and A for IADLs- cooking  Pt ambulates without device  Pt reports she has HHA 8 hrs a day 7 days a wk to assist with bathing, cooking and transportation  DTR works FT daily  PT on 2 L O2 at baseline  Pt reports alone 1-2 hr daily  Pt reports no falls  Upon evaluation, pt presenting below functional baseline with deficits noted in strength, activity tolerance, balance, safety awareness which is limiting pts functional ability to complete ADLs/ IADLs, functional transfers and functional mobility  Pt current level of function: bed mobility - supervision; transfers- supervision; functional mobility-Estuardo with no AD; UB dressing / bathing - supervision; LB dressing/ bathing- supervision; toileting - supervision   Pt tolerated session well, on 2L o2 during session  Pt O2 sats remained 93% and above with activity  No c/o pain  Reports mild SOB with activity Pt would benefit from skilled OT 2-3 x/wk to maximize functional independence  OT DC recommendation: home with [facility]  Goals   STG Time Frame 3-5   Short Term Goal #1 Pt will improve activity tolerance to G for min 30 min txment sessions for increase engagement in functional tasks   Short Term Goal #2 Pt will complete LB dressing/self care w/ mod I using adaptive device and DME as needed   Short Term Goal  Pt will complete toileting w/ mod I w/ G hygiene/thoroughness using DME as needed   LTG Time Frame 10-14   Long Term Goal #1 Pt will improve functional mobility during ADL/IADL/leisure tasks to Mod I using DME as needed w/ G balance/safety   Long Term Goal #2 Pt will improve functional transfers to Mod I on/off all surfaces using DME as needed w/ G balance/safety   Long Term Goal Pt will demonstrate 100% carryover of energy conservation techniques t/o functional I/ADL/leisure tasks w/o cues s/p skilled education to increase endurance during functional tasks   Plan   Treatment Interventions ADL retraining;Functional transfer training; Endurance training;Patient/family training;Equipment evaluation/education; Neuromuscular reeducation; Compensatory technique education; Energy conservation; Activityengagement   Goal Expiration Date 12/03/22   OT Treatment Day 0   OT Frequency 2-3x/wk   Recommendation   OT Discharge Recommendation Home with home health rehabilitation   Additional Comments  The patient's raw score on the -PAC Daily Activity inpatient short form is 20, standardized score is 42 03, greater than 39 4  Patients at this level are likely to benefit from discharge to home  Please refer to the recommendation of the Occupational Therapist for safe discharge planning     AM-PAC Daily Activity Inpatient   Lower Body Dressing 3   Bathing 3   Toileting 3   Upper Body Dressing 3   Grooming 4   Eating 4   Daily Activity Raw Score 20   Daily Activity Standardized Score (Calc for Raw Score >=11) 42 03   AM-PAC Applied Cognition Inpatient   Following a Speech/Presentation 4   Understanding Ordinary Conversation 4   Taking Medications 3   Remembering Where Things Are Placed or Put Away 3   Remembering List of 4-5 Errands 3   Taking Care of Complicated Tasks 3   Applied Cognition Raw Score 20   Applied Cognition Standardized Score 41 76   Lesli Han, OT

## 2022-11-19 NOTE — ASSESSMENT & PLAN NOTE
Wt Readings from Last 3 Encounters:   11/19/22 104 kg (229 lb 8 oz)   11/04/22 103 kg (227 lb 15 3 oz)   10/23/22 98 9 kg (218 lb)     · 2D echo revealed ejection fraction 61%, grade 1 diastolic dysfunction  · Continue on furosemide 40 mg b i d  , home dose  · ProBNP 159  · Monitor I/O, daily weight, fluid restriction

## 2022-11-19 NOTE — ASSESSMENT & PLAN NOTE
Wt Readings from Last 3 Encounters:   11/19/22 104 kg (229 lb 8 oz)   11/04/22 103 kg (227 lb 15 3 oz)   10/23/22 98 9 kg (218 lb)     · 2D echo revealed ejection fraction 29%, grade 1 diastolic dysfunction  · Continue on furosemide 40 mg b i d  , home dose  · ProBNP 159  · Monitor I/O, daily weight, fluid restriction

## 2022-11-19 NOTE — DISCHARGE SUMMARY
[facility]  Discharge- Harry Stands 1950, 81 y o  female MRN: [id number]  [facility] [address] 226-01 Encounter: [id number]  Primary Care Provider: Franklin Antony DO   Date and time admitted to hospital: 11/15/2022  3:59 PM    * Acute exacerbation of moderate persistent extrinsic asthma  Assessment & Plan   Patient was seen at [facility] AT THE [facility] and the ED on 11/08/2022 for dyspnea at which point she was found to be positive for COVID-19  Patient states she has continued to have dyspnea which prompted this hospitalization    · Patient was started on Solu-Medrol 40 mg IV q 8, transitioned to p o   Prednisone   · Scheduled Xopenex and Atrovent  · Budesonide, Singulair  · Patient is back to baseline oxygen requirements  · Seen in consult by pulmonology, continue prednisone through 11/21/22  · Prescription sent for Daliresp- no covered by insurance, will need pulm follow up regarding this medication  · Patient is now agreeable to PT OT- follow up evals- home with home health today     COVID-19  Assessment & Plan  COVID 19 Confirmed in October  • Oxygen requirements:  Was on 4 L currently back to baseline 2 L  • Discontinued remdesivir   • isolation precaution can be discontinued    Anemia  Assessment & Plan  · Hemoglobin stable at 10 5    Acute on chronic diastolic heart failure (HCC)  Assessment & Plan  Wt Readings from Last 3 Encounters:   11/19/22 104 kg (229 lb 8 oz)   11/04/22 103 kg (227 lb 15 3 oz)   10/23/22 98 9 kg (218 lb)     · 2D echo revealed ejection fraction 92%, grade 1 diastolic dysfunction  · Continue on furosemide 40 mg b i d  , home dose  · ProBNP 159  · Monitor I/O, daily weight, fluid restriction    Acute on chronic respiratory failure with hypoxia (HCC)  Assessment & Plan  · Secondary to COVID-19 and Asthma/COPD exacerbation  · On chronic oxygen 2 L at home currently back to baseline      HTN (hypertension)  Assessment & Plan  · BP controlled during hospitalization   · Continue home regimen: Cardizem, Lasix and Losartan    HIV (human immunodeficiency virus infection)   Assessment & Plan  · Continue Biktarvy    Medical Problems     Resolved Problems  Date Reviewed: 11/19/2022   None       Discharging Physician / Practitioner: Kostas Rivers PA-C  PCP: Suellen Eason DO  Admission Date:   Admission Orders (From admission, onward)     Ordered        11/15/22 2150  INPATIENT ADMISSION  Once                      Discharge Date: 11/19/22    Consultations During Hospital Stay:  · Pulmonology     Procedures Performed:   X-ray chest 1 view portable    Result Date: 11/16/2022  Impression: No acute cardiopulmonary disease  Workstation performed: [id number]     CTA ED chest PE study    Result Date: 11/15/2022  Impression: No evidence of acute pulmonary embolus, thoracic aortic aneurysm or dissection  No acute cardiopulmonary process  Workstation performed: [id number]       Significant Findings / Test Results:   · See above    Incidental Findings:   · None       Test Results Pending at Discharge (will require follow up): · None     Outpatient Tests Requested:  · Needs close PCP and pulmonology follow-up at discharge    Complications:  None    Reason for Admission:  Acute respiratory failure    Hospital Course:   Chico Castaneda is a 81 y o  female patient who originally presented to the hospital on 11/15/2022 due to shortness of breath  She was diagnosed with asthma exacerbation and started on IV steroids  Was seen in consult by pulmonology  She was able to be switched to p o  Steroids and oxygen was able to be weaned back down to her home 2 L nasal cannula on day of discharge  She worked with Physical and Occupational therapy who cleared her for home with home health  Please see above list of diagnoses and related plan for additional information       Condition at Discharge: stable    Discharge Day Visit / Exam:   * Please refer to separate progress note for these details *    Discussion with Family: Attempted to update  (daughter) via phone  Left voicemail  Discharge instructions/Information to patient and family:   See after visit summary for information provided to patient and family  Provisions for Follow-Up Care:  See after visit summary for information related to follow-up care and any pertinent home health orders  Disposition:   Home with [facility] Services (Reminder: Complete face to face encounter)    Planned Readmission:  Very high risk for readmission given multiple medical comorbidities      Discharge Statement:  I spent 35 minutes discharging the patient  This time was spent on the day of discharge  I had direct contact with the patient on the day of discharge  Greater than 50% of the total time was spent examining patient, answering all patient questions, arranging and discussing plan of care with patient as well as directly providing post-discharge instructions  Additional time then spent on discharge activities  Discharge Medications:  See after visit summary for reconciled discharge medications provided to patient and/or family        **Please Note: This note may have been constructed using a voice recognition system**

## 2022-11-19 NOTE — ASSESSMENT & PLAN NOTE
Patient was seen at [facility] AT THE [facility] and the ED on 11/08/2022 for dyspnea at which point she was found to be positive for COVID-19  Patient states she has continued to have dyspnea which prompted this hospitalization    · Patient was started on Solu-Medrol 40 mg IV q 8, transitioned to p o   Prednisone   · Scheduled Xopenex and Atrovent  · Budesonide, Singulair  · Patient is back to baseline oxygen requirements  · Seen in consult by pulmonology, continue prednisone through 11/21/22  · Patient is now agreeable to PT OT- follow up stef

## 2022-11-19 NOTE — CASE MANAGEMENT
Case Management Discharge Planning Note    Patient name [facility]phonse [facility]  Location [place] [address] 2 /[address] Olivia Munoz* MRN [id number]  : 1950 Date 2022       Current Admission Date: 11/15/2022  Current Admission Diagnosis:Acute exacerbation of moderate persistent extrinsic asthma   Patient Active Problem List    Diagnosis Date Noted   • COVID-19 10/23/2022   • Anemia 2022   • Obesity (BMI 35 0-39 9 without comorbidity) 2022   • Acute on chronic diastolic heart failure ([facility] ) 2022   • PASHA on CPAP 2020   • Acute exacerbation of moderate persistent extrinsic asthma 2020   • Fall at home, initial encounter 2020   • Paroxysmal atrial fibrillation ([facility] ) 2020   • Hyperlipidemia 2018   • Bipolar disorder ([facility] [facility] ) 2018   • Thoracic disc herniation 2018   • Lumbar stenosis 2018   • Urinary frequency 2018   • Ambulatory dysfunction    • Back pain 2018   • Hip pain 2018   • Shortness of breath 2017   • Hypertension    • Type 2 diabetes mellitus with hyperglycemia ([facility] [facility] )    • Acute on chronic respiratory failure with hypoxia (HCC)    • Bipolar 1 disorder ([facility] [facility] )    • Tobacco use    • COPD exacerbation ([facility] [facility] ) 2016   • Arthritis 2016   • Type 2 diabetes mellitus without complication, without long-term current use of insulin ([facility] [facility] ) 2016   • HIV (human immunodeficiency virus infection)  2016   • HTN (hypertension) 2016   • Osteoarthritis 2016      LOS (days): 4  Geometric Mean LOS (GMLOS) (days): 6 90  Days to GMLOS:3 3     OBJECTIVE:  Risk of Unplanned Readmission Score: 56 69         Current admission status: Inpatient   Preferred Pharmacy:   [address] [place] [address]  [address] [place] [address]  Phone: [phone] Fax: [phone]    Primary Care Provider: Charmaine Bennett DO    Primary Insurance: [place] [facility]  Secondary Insurance: [address] [place] [address] [address] [employer]    DISCHARGE DETAILS:                                          Other Referral/Resources/Interventions Provided:  Interventions: DME  Referral Comments: O2 delivered from consignment closet - form signed and placed in bin for [employer] DME liaison  Treatment Team Recommendation: Home with [address]  Discharge Destination Plan[de-identified] Home with [place] at Discharge : Free Local Transportation ([place] waiver form signed by patient)  Dispatcher Contacted: Yes  Number/Name of Dispatcher: [facility]  Transported by [employer] and Unit #):  [place]elia  ETA of Transport (Date): 11/19/22  ETA of Transport (Time): 0243        Accompanied by: Alone

## 2022-11-25 ENCOUNTER — PATIENT OUTREACH (OUTPATIENT)
Dept: CASE MANAGEMENT | Facility: OTHER | Age: 72
End: 2022-11-25

## 2022-11-25 NOTE — PROGRESS NOTES
OP RT CM Called and phone rang several times and disconnected  I will attempt again next week to reach patient or family member  One more outreach attempt

## 2022-12-02 ENCOUNTER — PATIENT OUTREACH (OUTPATIENT)
Dept: CASE MANAGEMENT | Facility: OTHER | Age: 72
End: 2022-12-02

## 2022-12-02 NOTE — PROGRESS NOTES
OP RT CM called and VM mailbox is full and cannot except any messages    I will discontinue outreach since I have not been able to get in touch with patient after several attempts

## 2022-12-04 ENCOUNTER — HOSPITAL ENCOUNTER (EMERGENCY)
Facility: HOSPITAL | Age: 72
Discharge: HOME | End: 2022-12-05
Attending: EMERGENCY MEDICINE

## 2022-12-04 ENCOUNTER — APPOINTMENT (EMERGENCY)
Dept: RADIOLOGY | Facility: HOSPITAL | Age: 72
End: 2022-12-04

## 2022-12-04 DIAGNOSIS — R53.81 PHYSICAL DECONDITIONING: Primary | ICD-10-CM

## 2022-12-04 DIAGNOSIS — R06.02 SHORTNESS OF BREATH: ICD-10-CM

## 2022-12-04 LAB
ANION GAP SERPL CALCULATED.3IONS-SCNC: 8 MMOL/L (ref 4–13)
BASE EX.OXY STD BLDV CALC-SCNC: 80.9 % (ref 60–80)
BASE EXCESS BLDV CALC-SCNC: 2.5 MMOL/L
BASOPHILS # BLD AUTO: 0.04 THOUSANDS/ÂΜL (ref 0–0.1)
BASOPHILS NFR BLD AUTO: 1 % (ref 0–1)
BUN SERPL-MCNC: 10 MG/DL (ref 5–25)
CALCIUM SERPL-MCNC: 10.1 MG/DL (ref 8.3–10.1)
CHLORIDE SERPL-SCNC: 105 MMOL/L (ref 96–108)
CO2 SERPL-SCNC: 31 MMOL/L (ref 21–32)
CREAT SERPL-MCNC: 1.02 MG/DL (ref 0.6–1.3)
EOSINOPHIL # BLD AUTO: 0.07 THOUSAND/ÂΜL (ref 0–0.61)
EOSINOPHIL NFR BLD AUTO: 1 % (ref 0–6)
ERYTHROCYTE [DISTWIDTH] IN BLOOD BY AUTOMATED COUNT: 15 % (ref 11.6–15.1)
GFR SERPL CREATININE-BSD FRML MDRD: 55 ML/MIN/1.73SQ M
GLUCOSE SERPL-MCNC: 128 MG/DL (ref 65–140)
HCO3 BLDV-SCNC: 28.3 MMOL/L (ref 24–30)
HCT VFR BLD AUTO: 38.2 % (ref 34.8–46.1)
HGB BLD-MCNC: 11.3 G/DL (ref 11.5–15.4)
IMM GRANULOCYTES # BLD AUTO: 0.02 THOUSAND/UL (ref 0–0.2)
IMM GRANULOCYTES NFR BLD AUTO: 0 % (ref 0–2)
LYMPHOCYTES # BLD AUTO: 1.77 THOUSANDS/ÂΜL (ref 0.6–4.47)
LYMPHOCYTES NFR BLD AUTO: 35 % (ref 14–44)
MCH RBC QN AUTO: 29.1 PG (ref 26.8–34.3)
MCHC RBC AUTO-ENTMCNC: 29.6 G/DL (ref 31.4–37.4)
MCV RBC AUTO: 99 FL (ref 82–98)
MONOCYTES # BLD AUTO: 0.54 THOUSAND/ÂΜL (ref 0.17–1.22)
MONOCYTES NFR BLD AUTO: 11 % (ref 4–12)
NEUTROPHILS # BLD AUTO: 2.62 THOUSANDS/ÂΜL (ref 1.85–7.62)
NEUTS SEG NFR BLD AUTO: 52 % (ref 43–75)
NRBC BLD AUTO-RTO: 0 /100 WBCS
O2 CT BLDV-SCNC: 13.2 ML/DL
PCO2 BLDV: 49.2 MM HG (ref 42–50)
PH BLDV: 7.38 [PH] (ref 7.3–7.4)
PLATELET # BLD AUTO: 277 THOUSANDS/UL (ref 149–390)
PMV BLD AUTO: 9.1 FL (ref 8.9–12.7)
PO2 BLDV: 46.9 MM HG (ref 35–45)
POTASSIUM SERPL-SCNC: 2.7 MMOL/L (ref 3.5–5.3)
RBC # BLD AUTO: 3.88 MILLION/UL (ref 3.81–5.12)
SODIUM SERPL-SCNC: 144 MMOL/L (ref 135–147)
WBC # BLD AUTO: 5.06 THOUSAND/UL (ref 4.31–10.16)

## 2022-12-04 RX ORDER — POTASSIUM CHLORIDE 20MEQ/15ML
60 LIQUID (ML) ORAL ONCE
Status: COMPLETED | OUTPATIENT
Start: 2022-12-04 | End: 2022-12-04

## 2022-12-04 RX ORDER — GABAPENTIN 400 MG/1
800 CAPSULE ORAL 3 TIMES DAILY
Status: DISCONTINUED | OUTPATIENT
Start: 2022-12-05 | End: 2022-12-05 | Stop reason: HOSPADM

## 2022-12-04 RX ORDER — MONTELUKAST SODIUM 10 MG/1
10 TABLET ORAL
Status: DISCONTINUED | OUTPATIENT
Start: 2022-12-05 | End: 2022-12-05 | Stop reason: HOSPADM

## 2022-12-04 RX ORDER — TOPIRAMATE 25 MG/1
50 TABLET ORAL 2 TIMES DAILY
Status: DISCONTINUED | OUTPATIENT
Start: 2022-12-05 | End: 2022-12-05 | Stop reason: HOSPADM

## 2022-12-04 RX ORDER — POTASSIUM CHLORIDE 20 MEQ/1
20 TABLET, EXTENDED RELEASE ORAL DAILY
Status: DISCONTINUED | OUTPATIENT
Start: 2022-12-05 | End: 2022-12-05 | Stop reason: HOSPADM

## 2022-12-04 RX ORDER — ACETAMINOPHEN 325 MG/1
975 TABLET ORAL ONCE
Status: COMPLETED | OUTPATIENT
Start: 2022-12-05 | End: 2022-12-04

## 2022-12-04 RX ORDER — LOSARTAN POTASSIUM 50 MG/1
100 TABLET ORAL DAILY
Status: DISCONTINUED | OUTPATIENT
Start: 2022-12-05 | End: 2022-12-05 | Stop reason: HOSPADM

## 2022-12-04 RX ORDER — DILTIAZEM HYDROCHLORIDE 120 MG/1
120 CAPSULE, COATED, EXTENDED RELEASE ORAL DAILY
Status: DISCONTINUED | OUTPATIENT
Start: 2022-12-05 | End: 2022-12-05 | Stop reason: HOSPADM

## 2022-12-04 RX ORDER — BUDESONIDE 0.5 MG/2ML
0.5 INHALANT ORAL 2 TIMES DAILY
Status: DISCONTINUED | OUTPATIENT
Start: 2022-12-05 | End: 2022-12-05 | Stop reason: HOSPADM

## 2022-12-04 RX ORDER — MELATONIN
1000 DAILY
Status: DISCONTINUED | OUTPATIENT
Start: 2022-12-05 | End: 2022-12-05 | Stop reason: HOSPADM

## 2022-12-04 RX ORDER — ROFLUMILAST 250 UG/1
250 TABLET ORAL DAILY
Status: DISCONTINUED | OUTPATIENT
Start: 2022-12-05 | End: 2022-12-05 | Stop reason: HOSPADM

## 2022-12-04 RX ORDER — ATORVASTATIN CALCIUM 20 MG/1
20 TABLET, FILM COATED ORAL DAILY
Status: DISCONTINUED | OUTPATIENT
Start: 2022-12-05 | End: 2022-12-05 | Stop reason: HOSPADM

## 2022-12-04 RX ADMIN — ALBUTEROL SULFATE 5 MG: 2.5 SOLUTION RESPIRATORY (INHALATION) at 17:28

## 2022-12-04 RX ADMIN — MONTELUKAST 10 MG: 10 TABLET, FILM COATED ORAL at 23:59

## 2022-12-04 RX ADMIN — ACETAMINOPHEN 975 MG: 325 TABLET, FILM COATED ORAL at 23:59

## 2022-12-04 RX ADMIN — IPRATROPIUM BROMIDE 0.5 MG: 0.5 SOLUTION RESPIRATORY (INHALATION) at 17:28

## 2022-12-04 RX ADMIN — POTASSIUM CHLORIDE 60 MEQ: 20 SOLUTION ORAL at 22:40

## 2022-12-04 NOTE — ED PROVIDER NOTES
History  Chief Complaint   Patient presents with   • Shortness of Breath     Pt has been feeling SOB since earlier today  Pt is usually on 3L at home  Pt says her chest feels heavy but not having chest pain  80 YO female with history of COPD presents with shortness of breath  Initially states this has been present since this morning but admits it has been an ongoing issue for weeks, since she had COVID  Patient uses 3L of oxygen at baseline, she is taking her home medications without alleviation  Pt does take steroids at baseline, states her doctors will occasionally change her doses  Patient has worsening shortness of breath with exertion  Pt denies CP/F/C/N/V/D/C, no dysuria, burning on urination or blood in urine  Patient has had roughly 3 admissions monthly for her shortness of breath, she does note her breathing does not seem to improve despite treatments she has been getting  She does smoke occasional cigarettes  History provided by:  Patient   used: No    Shortness of Breath  Severity:  Moderate  Onset quality:  Gradual  Timing:  Constant  Progression:  Unchanged  Chronicity:  New  Relieved by:  Nothing  Worsened by:  Exertion  Ineffective treatments: Home medications  Associated symptoms: no abdominal pain, no chest pain, no fever, no rash and no vomiting        Prior to Admission Medications   Prescriptions Last Dose Informant Patient Reported?  Taking?   acetaminophen (TYLENOL) 325 mg tablet   No No   Sig: Take 3 tablets (975 mg total) by mouth every 8 (eight) hours as needed for mild pain   albuterol (2 5 mg/3 mL) 0 083 % nebulizer solution   No No   Sig: Take 3 mL (2 5 mg total) by nebulization every 6 (six) hours as needed for wheezing or shortness of breath   apixaban (ELIQUIS) 5 mg   Yes No   Sig: Take 1 tablet by mouth 2 (two) times a day   atorvastatin (LIPITOR) 20 mg tablet   Yes No   Sig: Take 20 mg by mouth daily   bictegravir-emtricitab-tenofovir alafenamide (Biktarvy) -25 MG tablet   Yes No   Sig: Take 1 tablet by mouth daily   budesonide (PULMICORT) 0 5 mg/2 mL nebulizer solution   No No   Sig: Take 2 mL (0 5 mg total) by nebulization 2 (two) times a day Rinse mouth after use    carboxymethylcellulose 0 5 % SOLN   No No   Sig: Administer 1 drop into the left eye 3 (three) times a day as needed for dry eyes   cholecalciferol (VITAMIN D3) 1,000 units tablet   Yes No   Sig: Take 1,000 Units by mouth daily   diltiazem (CARDIZEM CD) 120 mg 24 hr capsule   No No   Sig: Take 1 capsule (120 mg total) by mouth daily   fluticasone-umeclidinium-vilanterol (Trelegy Ellipta) 200-62 5-25 mcg/actuation AEPB inhaler   Yes No   Sig: Inhale 1 puff daily Rinse mouth after use     furosemide (LASIX) 40 mg tablet   No No   Sig: Take 1 tablet (40 mg total) by mouth 2 (two) times a day   gabapentin (NEURONTIN) 400 mg capsule   Yes No   Sig: Take 800 mg by mouth 3 (three) times a day     levothyroxine 125 mcg tablet   Yes No   Sig: Take 125 mcg by mouth daily   losartan (COZAAR) 100 MG tablet   Yes No   Sig: Take 100 mg by mouth daily   lurasidone (LATUDA) 40 mg tablet   Yes No   Sig: Take 40 mg by mouth   metFORMIN (GLUCOPHAGE) 500 mg tablet   Yes No   Sig: Take 500 mg by mouth daily   montelukast (SINGULAIR) 10 mg tablet   Yes No   Sig: Take 10 mg by mouth daily at bedtime   nicotine (NICODERM CQ) 21 mg/24 hr TD 24 hr patch   No No   Sig: Place 1 patch on the skin daily   potassium chloride (K-DUR,KLOR-CON) 20 mEq tablet   No No   Sig: Take 1 tablet (20 mEq total) by mouth daily   roflumilast (DALIRESP) 250 MCG tablet   No No   Sig: Take 1 tablet (250 mcg total) by mouth daily   topiramate (TOPAMAX) 50 MG tablet   Yes No   Sig: Take 50 mg by mouth 2 (two) times a day   traMADol (ULTRAM) 50 mg tablet   Yes No   Sig: Take 50 mg by mouth as needed      Facility-Administered Medications: None       Past Medical History:   Diagnosis Date   • Asthma    • Bipolar 1 disorder (HCC)    • Cardiac disease    • COPD (chronic obstructive pulmonary disease) (HCC)    • Diabetes mellitus (HCC)    • Disease of thyroid gland    • HIV (human immunodeficiency virus infection) ([facility] [address] ) 03/24/2016   • Hypertension    • Osteoarthritis    • Tobacco abuse        Past Surgical History:   Procedure Laterality Date   • HERNIA REPAIR     • LUMBAR FUSION N/A 4/10/2018    Procedure: T9/10 discectomy with T9-T11 fusion;  Surgeon: Joe Ribeiro MD;  Location: [facility];  Service: Neurosurgery   • THYROIDECTOMY         Family History   Problem Relation Age of Onset   • No Known Problems Mother    • Diabetes Father    • Heart disease Neg Hx    • Cancer Neg Hx      I have reviewed and agree with the history as documented  E-Cigarette/Vaping     E-Cigarette/Vaping Substances     Social History     Tobacco Use   • Smoking status: Former     Packs/day: 0 20     Years: 45 00     Pack years: 9 00     Types: Cigarettes   • Smokeless tobacco: Never   • Tobacco comments:     Currently smoking 1-2 cigarettes a day   Substance Use Topics   • Alcohol use: Not Currently     Alcohol/week: 2 0 standard drinks     Types: 1 Cans of beer, 1 Shots of liquor per week     Comment: only on holidays   • Drug use: Not Currently     Comment: former IV drug user       Review of Systems   Constitutional: Negative for chills, fatigue and fever  HENT: Negative for dental problem  Eyes: Negative for visual disturbance  Respiratory: Positive for shortness of breath  Cardiovascular: Negative for chest pain  Gastrointestinal: Negative for abdominal pain, diarrhea and vomiting  Genitourinary: Negative for dysuria and frequency  Musculoskeletal: Negative for arthralgias  Skin: Negative for rash  Neurological: Negative for dizziness, weakness and light-headedness  Psychiatric/Behavioral: Negative for agitation, behavioral problems and confusion  All other systems reviewed and are negative        Physical Exam  Physical Exam  Vitals and nursing note reviewed  Constitutional:       Appearance: Normal appearance  HENT:      Head: Normocephalic and atraumatic  Eyes:      Extraocular Movements: Extraocular movements intact  Conjunctiva/sclera: Conjunctivae normal    Cardiovascular:      Rate and Rhythm: Normal rate and regular rhythm  Pulses: Normal pulses  Heart sounds: Normal heart sounds  Pulmonary:      Effort: Pulmonary effort is normal  Tachypnea present  No respiratory distress  Breath sounds: Normal breath sounds  Abdominal:      General: There is no distension  Musculoskeletal:         General: Normal range of motion  Cervical back: Normal range of motion  Skin:     Findings: No rash  Neurological:      General: No focal deficit present  Mental Status: She is alert  Cranial Nerves: No cranial nerve deficit     Psychiatric:         Mood and Affect: Mood normal          Vital Signs  ED Triage Vitals [12/04/22 1652]   Temperature Pulse Respirations Blood Pressure SpO2   99 °F (37 2 °C) 98 18 113/75 97 %      Temp Source Heart Rate Source Patient Position - Orthostatic VS BP Location FiO2 (%)   Oral Monitor Lying Left arm --      Pain Score       No Pain           Vitals:    12/05/22 0909 12/05/22 1330 12/05/22 1624 12/05/22 2019   BP: 141/93 124/64 99/52 109/75   Pulse: 93 86 88 95   Patient Position - Orthostatic VS: Lying Sitting Sitting Sitting         Visual Acuity      ED Medications  Medications   ipratropium (ATROVENT) 0 02 % inhalation solution 0 5 mg (0 5 mg Nebulization Given 12/4/22 1728)   albuterol inhalation solution 5 mg (5 mg Nebulization Given 12/4/22 1728)   potassium chloride oral solution 60 mEq (60 mEq Oral Given 12/4/22 2240)   acetaminophen (TYLENOL) tablet 975 mg (975 mg Oral Given 12/4/22 2359)   acetaminophen (TYLENOL) tablet 650 mg (650 mg Oral Given 12/5/22 0913)       Diagnostic Studies  Results Reviewed     Procedure Component Value Units Date/Time    Basic metabolic panel [[id number]]  (Abnormal) Collected: 12/05/22 1002    Lab Status: Final result Specimen: Blood from Foot, Left Updated: 12/05/22 1027     Sodium 148 mmol/L      Potassium 3 6 mmol/L      Chloride 107 mmol/L      CO2 31 mmol/L      ANION GAP 10 mmol/L      BUN 10 mg/dL      Creatinine 0 86 mg/dL      Glucose 133 mg/dL      Calcium 9 6 mg/dL      eGFR 68 ml/min/1 73sq m     Narrative:      [place] guidelines for Chronic Kidney Disease (CKD):   •  Stage 1 with normal or high GFR (GFR > 90 mL/min/1 73 square meters)  •  Stage 2 Mild CKD (GFR = 60-89 mL/min/1 73 square meters)  •  Stage 3A Moderate CKD (GFR = 45-59 mL/min/1 73 square meters)  •  Stage 3B Moderate CKD (GFR = 30-44 mL/min/1 73 square meters)  •  Stage 4 Severe CKD (GFR = 15-29 mL/min/1 73 square meters)  •  Stage 5 End Stage CKD (GFR <15 mL/min/1 73 square meters)  Note: GFR calculation is accurate only with a steady state creatinine    Basic metabolic panel [[id number]]  (Abnormal) Collected: 12/04/22 2046    Lab Status: Final result Specimen: Blood from Foot, Left Updated: 12/04/22 2130     Sodium 144 mmol/L      Potassium 2 7 mmol/L      Chloride 105 mmol/L      CO2 31 mmol/L      ANION GAP 8 mmol/L      BUN 10 mg/dL      Creatinine 1 02 mg/dL      Glucose 128 mg/dL      Calcium 10 1 mg/dL      eGFR 55 ml/min/1 73sq m     Narrative:      [place] guidelines for Chronic Kidney Disease (CKD):   •  Stage 1 with normal or high GFR (GFR > 90 mL/min/1 73 square meters)  •  Stage 2 Mild CKD (GFR = 60-89 mL/min/1 73 square meters)  •  Stage 3A Moderate CKD (GFR = 45-59 mL/min/1 73 square meters)  •  Stage 3B Moderate CKD (GFR = 30-44 mL/min/1 73 square meters)  •  Stage 4 Severe CKD (GFR = 15-29 mL/min/1 73 square meters)  •  Stage 5 End Stage CKD (GFR <15 mL/min/1 73 square meters)  Note: GFR calculation is accurate only with a steady state creatinine    Blood gas, venous [[id number]]  (Abnormal) Collected: 12/04/22 2046    Lab Status: Final result Specimen: Blood from Foot, Left Updated: 12/04/22 2056     pH, Kishan 7 378     pCO2, Kishan 49 2 mm Hg      pO2, Kishan 46 9 mm Hg      HCO3, Kishan 28 3 mmol/L      Base Excess, Kishan 2 5 mmol/L      O2 Content, Kishan 13 2 ml/dL      O2 HGB, VENOUS 80 9 %     CBC and differential [[id number]]  (Abnormal) Collected: 12/04/22 1829    Lab Status: Final result Specimen: Blood from Hand, Right Updated: 12/04/22 1835     WBC 5 06 Thousand/uL      RBC 3 88 Million/uL      Hemoglobin 11 3 g/dL      Hematocrit 38 2 %      MCV 99 fL      MCH 29 1 pg      MCHC 29 6 g/dL      RDW 15 0 %      MPV 9 1 fL      Platelets 051 Thousands/uL      nRBC 0 /100 WBCs      Neutrophils Relative 52 %      Immat GRANS % 0 %      Lymphocytes Relative 35 %      Monocytes Relative 11 %      Eosinophils Relative 1 %      Basophils Relative 1 %      Neutrophils Absolute 2 62 Thousands/µL      Immature Grans Absolute 0 02 Thousand/uL      Lymphocytes Absolute 1 77 Thousands/µL      Monocytes Absolute 0 54 Thousand/µL      Eosinophils Absolute 0 07 Thousand/µL      Basophils Absolute 0 04 Thousands/µL                  XR lumbar spine 2 or 3 views   Final Result by Maegan Mai MD (12/05 2504)      Mild superior compression deformity of the T12 vertebral body of indeterminate age  Recommend clinical correlation with point tenderness  The study was marked in [facility] for immediate notification  Workstation performed: [id number]         XR hip/pelv 2-3 vws right   Final Result by Maegan Mai MD (12/05 0073)      1  No acute osseous abnormality  2   Degenerative changes, as described above  Workstation performed: [id number]         XR chest 1 view portable   Final Result by Maegan Mai MD (12/05 2670)      A 3 cm rounded opacity in the medial right upper lung zone    Recommend further evaluation with CT chest       The study was marked in [employer] for immediate notification  Workstation performed: [id number]                    Procedures  ECG 12 Lead Documentation Only    Date/Time: 12/4/2022 5:16 PM  Performed by: Otilio Byrne MD  Authorized by: Otilio Byrne MD     ECG reviewed by me, the ED Provider: yes    Patient location:  ED  Interpretation:     Interpretation: normal    Rate:     ECG rate:  88    ECG rate assessment: normal    Rhythm:     Rhythm: sinus rhythm    QRS:     QRS axis:  Normal    QRS intervals:  Normal  Conduction:     Conduction: normal    ST segments:     ST segments:  Normal  T waves:     T waves: normal               ED Course  ED Course as of 12/07/22 0952   Sun Dec 04, 2022   1916 Patient has been able to ambulate, she does have increased work of breathing but saturations have been appropriate  This has been an ongoing issue and appears to be due to deconditioning  Will place order for home PT, give information to   36 Spoke with patient again, she does have increased work of breathing but maintains an appropriate oxygen saturation, states she is uncomfortable going home as she has difficulty sleeping at night due to concern for her breathing  Explained she could stay the night to be evaluated by physical therapy which she is agreeable to     2138 Potassium(!!): 2 7  Patient takes Lasix twice daily, potassium once daily, she has no fluid overload, will hold tonight's dose of lasix, give 60Meq potassium tonight  ECG did not show concerning pathology  SBIRT 20yo+    Flowsheet Row Most Recent Value   SBIRT (25 yo +)    In order to provide better care to our patients, we are screening all of our patients for alcohol and drug use  Would it be okay to ask you these screening questions?  Unable to answer at this time Filed at: 12/04/2022 1657                    [facility]  Number of Diagnoses or Management Options  Physical deconditioning: new and requires workup  Shortness of breath: new and requires workup  Diagnosis management comments: 1  Shortness of breath - Pt with frequent admissions for shortness of breath, states her symptoms have not changed  She does appear to have increased work of breathing but her saturations have been % on her home oxygen, 3L  Feel patient's breathing is likely at baseline and she will require additional help with building strength to help with her breathing  Will check CXR to rule out PNA, give breathing Tx  Amount and/or Complexity of Data Reviewed  Clinical lab tests: ordered and reviewed  Tests in the radiology section of CPT®: ordered and reviewed  Review and summarize past medical records: yes  Discuss the patient with other providers: yes  Independent visualization of images, tracings, or specimens: yes    Patient Progress  Patient progress: stable      Disposition  Final diagnoses:   Physical deconditioning   Shortness of breath     Time reflects when diagnosis was documented in both MDM as applicable and the Disposition within this note     Time User Action Codes Description Comment    12/4/2022  7:30 PM Andrzej Oliveira Add [R53 81] Physical deconditioning     12/4/2022  7:30 PM Adán VILLARREAL Add [R06 02] Shortness of breath       ED Disposition     ED Disposition   Discharge    Condition   Good    Date/Time   Mon Dec 5, 2022  2:29 PM    Comment   Helayne Dec discharge to home/self care                 Follow-up Information     Follow up With Specialties Details Why Contact Info    Elisabeth Reeder DO    [address]  [address]  [place] [id number] [phone]            Discharge Medication List as of 12/5/2022  2:29 PM      CONTINUE these medications which have NOT CHANGED    Details   acetaminophen (TYLENOL) 325 mg tablet Take 3 tablets (975 mg total) by mouth every 8 (eight) hours as needed for mild pain, Starting Sun 9/18/2022, No Print      albuterol (2 5 mg/3 mL) 0 083 % nebulizer solution Take 3 mL (2 5 mg total) by nebulization every 6 (six) hours as needed for wheezing or shortness of breath, Starting Thu 10/13/2022, Normal      apixaban (ELIQUIS) 5 mg Take 1 tablet by mouth 2 (two) times a day, Starting Mon 5/9/2022, Historical Med      atorvastatin (LIPITOR) 20 mg tablet Take 20 mg by mouth daily, Historical Med      bictegravir-emtricitab-tenofovir alafenamide (Biktarvy) -25 MG tablet Take 1 tablet by mouth daily, Starting Mon 5/9/2022, Historical Med      budesonide (PULMICORT) 0 5 mg/2 mL nebulizer solution Take 2 mL (0 5 mg total) by nebulization 2 (two) times a day Rinse mouth after use , Starting Sun 9/18/2022, Normal      carboxymethylcellulose 0 5 % SOLN Administer 1 drop into the left eye 3 (three) times a day as needed for dry eyes, Starting Sat 10/29/2022, Normal      cholecalciferol (VITAMIN D3) 1,000 units tablet Take 1,000 Units by mouth daily, Historical Med      diltiazem (CARDIZEM CD) 120 mg 24 hr capsule Take 1 capsule (120 mg total) by mouth daily, Starting Thu 9/29/2022, Normal      fluticasone-umeclidinium-vilanterol (Trelegy Ellipta) 200-62 5-25 mcg/actuation AEPB inhaler Inhale 1 puff daily Rinse mouth after use , Historical Med      furosemide (LASIX) 40 mg tablet Take 1 tablet (40 mg total) by mouth 2 (two) times a day, Starting u 9/29/2022, Normal      gabapentin (NEURONTIN) 400 mg capsule Take 800 mg by mouth 3 (three) times a day  , Historical Med      levothyroxine 125 mcg tablet Take 125 mcg by mouth daily, Historical Med      losartan (COZAAR) 100 MG tablet Take 100 mg by mouth daily, Starting Wed 11/2/2022, Historical Med      lurasidone (LATUDA) 40 mg tablet Take 40 mg by mouth, Starting Fri 9/30/2022, Until Sat 9/30/2023 at 2359, Historical Med      metFORMIN (GLUCOPHAGE) 500 mg tablet Take 500 mg by mouth daily, Starting Wed 11/2/2022, Historical Med      montelukast (SINGULAIR) 10 mg tablet Take 10 mg by mouth daily at bedtime, Historical Med      nicotine (NICODERM CQ) 21 mg/24 hr TD 24 hr patch Place 1 patch on the skin daily, Starting Mon 9/19/2022, Normal      potassium chloride (K-DUR,KLOR-CON) 20 mEq tablet Take 1 tablet (20 mEq total) by mouth daily, Starting Mon 9/19/2022, Normal      roflumilast (DALIRESP) 250 MCG tablet Take 1 tablet (250 mcg total) by mouth daily, Starting Sat 11/19/2022, Normal      topiramate (TOPAMAX) 50 MG tablet Take 50 mg by mouth 2 (two) times a day, Starting Fri 9/30/2022, Until Sat 9/30/2023, Historical Med      traMADol (ULTRAM) 50 mg tablet Take 50 mg by mouth as needed, Starting Mon 9/19/2022, Historical Med                 PDMP Review       Value Time User    PDMP Reviewed  Yes 11/3/2022  8:54 PM Jaren Ladd DO          ED Provider  Electronically Signed by           Luis Davis MD  12/07/22 0211

## 2022-12-05 ENCOUNTER — APPOINTMENT (EMERGENCY)
Dept: RADIOLOGY | Facility: HOSPITAL | Age: 72
End: 2022-12-05

## 2022-12-05 VITALS
HEART RATE: 95 BPM | OXYGEN SATURATION: 100 % | RESPIRATION RATE: 18 BRPM | TEMPERATURE: 99 F | SYSTOLIC BLOOD PRESSURE: 109 MMHG | DIASTOLIC BLOOD PRESSURE: 75 MMHG

## 2022-12-05 LAB
ANION GAP SERPL CALCULATED.3IONS-SCNC: 10 MMOL/L (ref 4–13)
ATRIAL RATE: 88 BPM
BUN SERPL-MCNC: 10 MG/DL (ref 5–25)
CALCIUM SERPL-MCNC: 9.6 MG/DL (ref 8.3–10.1)
CHLORIDE SERPL-SCNC: 107 MMOL/L (ref 96–108)
CO2 SERPL-SCNC: 31 MMOL/L (ref 21–32)
CREAT SERPL-MCNC: 0.86 MG/DL (ref 0.6–1.3)
GFR SERPL CREATININE-BSD FRML MDRD: 68 ML/MIN/1.73SQ M
GLUCOSE SERPL-MCNC: 133 MG/DL (ref 65–140)
P AXIS: 42 DEGREES
POTASSIUM SERPL-SCNC: 3.6 MMOL/L (ref 3.5–5.3)
PR INTERVAL: 182 MS
QRS AXIS: -64 DEGREES
QRSD INTERVAL: 108 MS
QT INTERVAL: 324 MS
QTC INTERVAL: 392 MS
SODIUM SERPL-SCNC: 148 MMOL/L (ref 135–147)
T WAVE AXIS: 31 DEGREES
VENTRICULAR RATE: 88 BPM

## 2022-12-05 RX ORDER — LANOLIN ALCOHOL/MO/W.PET/CERES
6 CREAM (GRAM) TOPICAL
Status: DISCONTINUED | OUTPATIENT
Start: 2022-12-05 | End: 2022-12-05 | Stop reason: HOSPADM

## 2022-12-05 RX ORDER — ACETAMINOPHEN 325 MG/1
650 TABLET ORAL ONCE
Status: COMPLETED | OUTPATIENT
Start: 2022-12-05 | End: 2022-12-05

## 2022-12-05 RX ADMIN — DILTIAZEM HYDROCHLORIDE 120 MG: 120 CAPSULE, COATED, EXTENDED RELEASE ORAL at 09:12

## 2022-12-05 RX ADMIN — METFORMIN HYDROCHLORIDE 500 MG: 500 TABLET ORAL at 09:12

## 2022-12-05 RX ADMIN — BUDESONIDE 0.5 MG: 0.5 INHALANT ORAL at 18:15

## 2022-12-05 RX ADMIN — ACETAMINOPHEN 650 MG: 325 TABLET, FILM COATED ORAL at 09:13

## 2022-12-05 RX ADMIN — LEVOTHYROXINE SODIUM 125 MCG: 25 TABLET ORAL at 09:12

## 2022-12-05 RX ADMIN — ATORVASTATIN CALCIUM 20 MG: 20 TABLET, FILM COATED ORAL at 09:13

## 2022-12-05 RX ADMIN — APIXABAN 5 MG: 5 TABLET, FILM COATED ORAL at 09:13

## 2022-12-05 RX ADMIN — BUDESONIDE 0.5 MG: 0.5 INHALANT ORAL at 09:14

## 2022-12-05 RX ADMIN — LOSARTAN POTASSIUM 100 MG: 50 TABLET, FILM COATED ORAL at 09:12

## 2022-12-05 RX ADMIN — LURASIDONE HYDROCHLORIDE 40 MG: 40 TABLET, FILM COATED ORAL at 09:19

## 2022-12-05 RX ADMIN — Medication 1000 UNITS: at 09:13

## 2022-12-05 RX ADMIN — MELATONIN 6 MG: at 01:35

## 2022-12-05 RX ADMIN — TOPIRAMATE 50 MG: 25 TABLET, FILM COATED ORAL at 09:12

## 2022-12-05 RX ADMIN — POTASSIUM CHLORIDE 20 MEQ: 1500 TABLET, EXTENDED RELEASE ORAL at 09:13

## 2022-12-05 RX ADMIN — GABAPENTIN 800 MG: 400 CAPSULE ORAL at 20:22

## 2022-12-05 RX ADMIN — BICTEGRAVIR SODIUM, EMTRICITABINE, AND TENOFOVIR ALAFENAMIDE FUMARATE 1 TABLET: 50; 200; 25 TABLET ORAL at 09:20

## 2022-12-05 RX ADMIN — TOPIRAMATE 50 MG: 25 TABLET, FILM COATED ORAL at 18:14

## 2022-12-05 RX ADMIN — GABAPENTIN 800 MG: 400 CAPSULE ORAL at 16:23

## 2022-12-05 RX ADMIN — APIXABAN 5 MG: 5 TABLET, FILM COATED ORAL at 18:14

## 2022-12-05 RX ADMIN — GABAPENTIN 800 MG: 400 CAPSULE ORAL at 00:00

## 2022-12-05 RX ADMIN — GABAPENTIN 800 MG: 400 CAPSULE ORAL at 09:12

## 2022-12-05 NOTE — ED NOTES
Patient eating dinner  Changed back into clothing  Ambulatory to and from commode with no assistance        Lucas Gutierrez RN  12/05/22 0052

## 2022-12-05 NOTE — PLAN OF CARE
Problem: PHYSICAL THERAPY ADULT  Goal: Performs mobility at highest level of function for planned discharge setting  See evaluation for individualized goals  Description: Treatment/Interventions: Functional transfer training, LE strengthening/ROM, Elevations, Therapeutic exercise, Endurance training, Patient/family training, Bed mobility, Gait training, Spoke to nursing, OT  Equipment Recommended: Kyree Jacobo (pt has)       See flowsheet documentation for full assessment, interventions and recommendations  Note: Prognosis: Fair  Problem List: Decreased strength, Decreased endurance, Impaired balance, Decreased mobility  Assessment: Pt  81 y  o female presented w/ SOB  CXR showed 3cm rounded opacity in the medial R upper lung zone, CT recommended  XR spine showed Mild superior compression deformity of the T12 vertebral body of indeterminate age  Pt referred to PT for mobility assessment & D/C planning w/ orders of activity as tolerated  Please see above for information re: home set-up & PLOF as well as objective findings during PT assessment  PTA, pt reports being fairly I w/ functional mobility however require assistance w/ ADL's  Pt on home O2 3L NC  On eval, pt functioning below baseline hence will continue skilled PT to improve function & safety  Pt require S for bed mobility & transfers + cues for techniques & safety  Initially require minAx1 for amb w/ RW but progressed to S w/ further amb w/ RW + cues for techniques & safety  Gait deviations as above, slow w/ dec foot clearance but no gross LOB noted  (+) SOB but relieved w/ rest  Spo2 at 91-94% w/ 2L O2 NC  The patient's AM-PAC Basic Mobility Inpatient Short Form Raw Score is 18  A Raw score of greater than 16 suggests the patient may benefit from discharge to home  Please also refer to the recommendation of the Physical Therapist for safe discharge planning  From PT standpoint, will anticipate safe return to home w/ family support when medically cleared   Will recommend HHPT & previous home care services at D/C  No dizziness reported t/o session  Nsg staff most recent vital signs as follows: /64 (BP Location: Right arm)   Pulse 86   Temp 99 °F (37 2 °C) (Oral)   Resp 16   LMP 04/01/2013 (Approximate)   SpO2 100%   At end of session, pt back in bed in stable condition, call bell & phone in reach, all lines intact  Fall precautions reinforced w/ good understanding  CM to follow  Nsg staff to continue to mobilized pt (OOB in chair for all meals & ambulate in room/unit) as tolerated to prevent further decline in function  Nsg notified  Co-eval was necessary to complete this PT eval for the pt's best interest given pt's medical acuity & complexity  Barriers to Discharge: Inaccessible home environment  Barriers to Discharge Comments: (+) stairs  PT Discharge Recommendation: Home with home health rehabilitation    See flowsheet documentation for full assessment

## 2022-12-05 NOTE — ED NOTES
Pt sleepy during conversations  Pt reports she should be on her Bipap at [facility] but has not had it at some time  MD notified, case management called for help facilitating bipap        Lebron Vargas RN  12/05/22 9675

## 2022-12-05 NOTE — DISCHARGE INSTRUCTIONS
A referral for physical therapy has been placed and the  should be in touch to make sure you are getting the appropriate services  Your breathing was ok on your home dose of oxygen, however you seem to be tiring very easily; this is the reason you should have physical therapy, to help improve you strength  Return to the ER if your shortness of breath continues to worsen despite your home medications

## 2022-12-05 NOTE — ED NOTES
Pt up to bedside commode  Nonskid footwear on  Patient steady on her feet       Gilbert Martinez RN  12/05/22 0026

## 2022-12-05 NOTE — OCCUPATIONAL THERAPY NOTE
Occupational Therapy Evaluation     Patient Name: Arnulfo Schneider  SQPKG'[id number] Date: 12/5/2022  Problem List  Active Problems:    * No active hospital problems  *    Past Medical History  Past Medical History:   Diagnosis Date    Asthma     Bipolar 1 disorder ([place] [address] )     Cardiac disease     COPD (chronic obstructive pulmonary disease) ([place] [address] )     Diabetes mellitus ([place] [address] )     Disease of thyroid gland     HIV (human immunodeficiency virus infection) ([place] [address] ) 03/24/2016    Hypertension     Osteoarthritis     Tobacco abuse      Past Surgical History  Past Surgical History:   Procedure Laterality Date    HERNIA REPAIR      LUMBAR FUSION N/A 4/10/2018    Procedure: T9/10 discectomy with T9-T11 fusion;  Surgeon: Earl Vega MD;  [facility];  Service: Neurosurgery    THYROIDECTOMY             12/05/22 1411   OT Last Visit   OT Visit Date 12/05/22   Note Type   Note type Evaluation   Pain Assessment   Pain Assessment Tool 0-10   Pain Score 7   Pain Location/Orientation Location: Back; Location: Hip;Orientation: Right   Hospital Pain Intervention(s) Repositioned; Ambulation/increased activity; Emotional support; Rest  (RN aware)   Multiple Pain Sites No   Restrictions/Precautions   Weight Bearing Precautions Per Order No   Other Precautions O2;Multiple lines; Fall Risk;Pain  (3L O2)   Home Living   Type of [address] [place] [address] Two level;Bed/bath upstairs;Stairs to enter with rails  (3 SARAVANAN, FOS to 2nd floor bed/bath)   Bathroom Shower/Tub Tub/shower unit   Bathroom Toilet Standard   Bathroom Equipment Grab bars in shower; Shower chair   Bathroom Accessibility Accessible   Home Equipment Walker;Cane;Other (Comment)  (3L O2)   Additional Comments Pt lives with dtr and grandchildren in a two level house with 3 SARAVANAN and FOS to 2nd floor bed/bath  Dtr works [employer]  Pt has HHA 7 days/wk for 8 hrs/day  Prior Function   Level of [place] Independent with functional mobility; Needs assistance with ADLs; Needs assistance with IADLS Lives With Daughter; Other (Comment)  (Grandchildren)   Receives Help From Family;Home health  (pt has 8hr caregiver daily)   IADLs Family/Friend/Other provides transportation; Family/Friend/Other provides meals; Family/Friend/Other provides medication management   Falls in the last 6 months 1 to 4  (1 fall this AM from [facility] in ED)   Vocational Retired   Comments At baseline, pt required assist w/ ADLs and IADLs  Reports I w/ functional transfers/mobility w/o use of AD  (-)   Denies falls PTA  Lifestyle   Autonomy At baseline, pt required assist w/ ADLs and IADLs  Reports I w/ functional transfers/mobility w/o use of AD  (-)   Denies falls PTA  Reciprocal Relationships Dtr   Service to Others Retired   Intrinsic Gratification Watching TV   ADL   Where Assessed Edge of bed   Eating Assistance [phone]  [phone]  Supervision/Setup   Bed Mobility   Supine to Sit 5  Supervision   Additional items HOB elevated; Bedrails; Increased time required   Sit to Supine 5  Supervision   Additional items Increased time required;Verbal cues   Additional Comments Pt lying supine at end of session  Call bell and phone within reach  All needs met and pt reports no further questions for OT at this time  Transfers   Sit to Stand 5  Supervision   Additional items Increased time required;Verbal cues   Stand to Sit 5  Supervision   Additional items Increased time required;Verbal cues   Toilet transfer 5  Supervision   Additional items Increased time required;Verbal cues; Commode;Armrests   Additional Comments Cues for safe technique and hand placement   Functional Mobility   Functional Mobility 5  Supervision   Additional Comments Assist x1; Initially requiring CGA, progressing to Supervision on 2nd trial  Pt c/o SOB  SpO2: % on 3L O2   Additional items Rolling walker   Balance   Static Sitting Fair +   Dynamic Sitting Fair   Static Standing Fair   Dynamic Standing Fair -   Ambulatory Poor +   Activity Tolerance   Activity Tolerance Patient limited by fatigue;Treatment limited secondary to medical complications (Comment)   Medical Staff Made Lisa Stanley, PT   Nurse Made Aware yes; Gail, RN   RUE Assessment   RUE Assessment WFL  (4/5 throughout)   LUE Assessment   LUE Assessment WFL  (4/5 throughout)   Hand Function   Gross Motor Coordination Functional   Fine Motor Coordination Functional   Sensation   Light Touch No apparent deficits   Proprioception   Proprioception No apparent deficits   Vision-Basic Assessment   Current Vision Wears glasses all the time  (not present during eval)   Vision - Complex Assessment   Ocular Range of Motion Intact   Acuity Able to read clock/calendar on wall without difficulty; Able to read employee name badge without difficulty   Psychosocial   Psychosocial (WDL) WDL   Perception   Inattention/Neglect Appears intact   Cognition   Overall Cognitive Status WFL   Arousal/Participation Alert; Cooperative   Attention Attends with cues to redirect   Orientation Level Oriented to person;Oriented to place; Disoriented to time  ("November  I don't know the year")   Memory Decreased recall of precautions   Following Commands Follows one step commands without difficulty   Comments Increased encouragement required for participation in eval   Assessment   Limitation Decreased ADL status; Decreased UE strength;Decreased Safe judgement during ADL;Decreased endurance;Decreased self-care trans;Decreased high-level ADLs   Prognosis Good   Assessment Pt is a 81 y o  female seen for OT evaluation s/p adm to [facility] on 12/4/2022 w/ SOB  CXR showed 3cm rounded opacity in the medial R upper lung zone, CT recommended   XR spine showed Mild superior compression deformity of the T12 vertebral body of indeterminate age  Comorbidities affecting pt’s functional performance include a significant PMH of Asthma, Bipolar 1 disorder, Cardiac disease, COPD, DM, HIV, HTN, OA  Pt with active OT orders and activity orders for Activity as tolerated  Pt lives with dtr and grandchildren in a two level house with 3 SARAVANAN and FOS to 2nd floor bed/bath  Dtr works [employer]  Pt has HHA 7 days/wk for 8 hrs/day  At baseline, pt required assist w/ ADLs and IADLs  Reports I w/ functional transfers/mobility w/o use of AD  (-)   Denies falls PTA  Upon evaluation, pt currently requires Supervision for UB ADLs, Min A for LB ADLs, Supervision for toileting, Supervision for bed mobility, and Supervision for functional mobility/transfers 2* the following deficits impacting occupational performance: SOB, decreased strength, decreased balance, decreased tolerance and increased pain  These impairments, as well at pt’s fall risk, steps to enter environment and difficulty performing ADLS limit pt’s ability to safely engage in all baseline areas of occupation  Based on the aforementioned OT evaluation, functional performance deficits, and assessments, pt has been identified as a Moderate complexity evaluation  Pt to continue to benefit from continued acute OT services during hospital stay to address defined deficits and to maximize level of functional independence in the following Occupational Performance areas: bathing/shower, toilet hygiene, dressing, medication management, health maintenance, functional mobility, community mobility and clothing management  From OT standpoint, recommend Home OT upon D/C   OT will continue to follow pt 2-3x/wk to address the following goals to  w/in 10-14 days:   Goals   Patient Goals To get better   LTG Time Frame 10-14   Long Term Goal Please refer to LTGs listed below   Plan   Treatment Interventions ADL retraining;Functional transfer training;UE strengthening/ROM; Endurance training;Patient/family training;Equipment evaluation/education; Compensatory technique education;Continued evaluation; Activityengagement   Goal Expiration Date 12/19/22   OT Treatment Day 0   OT Frequency 2-3x/wk   Recommendation   OT Discharge Recommendation Home with home health rehabilitation   Additional Comments  The patient's raw score on the AM-PAC Daily Activity inpatient short form is 20, standardized score is 42 03, greater than 39 4  Patients at this level are likely to benefit from discharge to home  Please refer to the recommendation of the Occupational Therapist for safe discharge planning     AM-PAC Daily Activity Inpatient   Lower Body Dressing 3   Bathing 3   Toileting 3   Upper Body Dressing 3   Grooming 4   Eating 4   Daily Activity Raw Score 20   Daily Activity Standardized Score (Calc for Raw Score >=11) 42 03   AM-[facility] Applied Cognition Inpatient   Following a Speech/Presentation 4   Understanding Ordinary Conversation 4   Taking Medications 3   Remembering Where Things Are Placed or Put Away 4   Remembering List of 4-5 Errands 3   Taking Care of Complicated Tasks 3   Applied Cognition Raw Score 21   Applied Cognition Standardized Score 44 3       GOALS    Pt will improve activity tolerance to G for min 30 min txment sessions for increase engagement in functional tasks    Pt will complete bed mobility at a Mod I level w/ G balance/safety demonstrated to decrease caregiver assistance required     Pt will complete UB/LB dressing/self care w/ Supervisionusing adaptive device and DME as needed     Pt will complete toileting w/ mod I w/ G hygiene/thoroughness using DME as needed    Pt will improve functional transfers to Mod I on/off all surfaces using DME as needed w/ G balance/safety     Pt will improve functional mobility during ADL/IADL/leisure tasks to Mod I using DME as needed w/ G balance/safety     Pt will be attentive 100% of the time during ongoing cognitive assessment w/ G participation to assist w/ safe d/c planning/recommendations    Pt will demonstrate G carryover of pt/caregiver education and training as appropriate w/o cues w/ good tolerance to increase safety during functional tasks    Pt will demonstrate 100% carryover of energy conservation techniques t/o functional I/ADL/leisure tasks w/o cues s/p skilled education to increase endurance during functional tasks    Pt will increase BUE strength by 1MM grade via AROM exercises to increase independence in ADLs and transfers    Pt will verbalize 3 potential fall hazards and identify appropriate compensatory techniques to decrease fall risk in home environment     Pt will increase standing tolerance to 8-10 mins with Fair+ dynamic standing balance to increase safety during participation in ADLs       Kelle Schreiber, OTR/L

## 2022-12-05 NOTE — PHYSICAL THERAPY NOTE
PT EVALUATION    Pt  Name: Tesha Way  Pt  Age: 81 y o  MRN: [id number]  LENGTH OF STAY: 0      Admitting Diagnoses:   Shortness of breath [R06 02]    Past Medical History:   Diagnosis Date    Asthma     Bipolar 1 disorder ([place] [address] )     Cardiac disease     COPD (chronic obstructive pulmonary disease) ([place] [address] )     Diabetes mellitus ([place] [address] )     Disease of thyroid gland     HIV (human immunodeficiency virus infection) ([place] [address] ) 03/24/2016    Hypertension     Osteoarthritis     Tobacco abuse        Past Surgical History:   Procedure Laterality Date    HERNIA REPAIR      LUMBAR FUSION N/A 4/10/2018    Procedure: T9/10 discectomy with T9-T11 fusion;  Surgeon: Susanna Younger MD;  [facility];  Service: Neurosurgery    THYROIDECTOMY         Imaging Studies:  XR lumbar spine 2 or 3 views   Final Result by Pipo Rousseau MD (12/05 5425)      Mild superior compression deformity of the T12 vertebral body of indeterminate age  Recommend clinical correlation with point tenderness  The study was marked in [facility] for immediate notification  Workstation performed: [id number]         XR hip/pelv 2-3 vws right   Final Result by Pipo Rousseau MD (12/05 7798)      1  No acute osseous abnormality  2   Degenerative changes, as described above  Workstation performed: [id number]         XR chest 1 view portable   Final Result by Pipo Rousseau MD (12/05 5786)      A 3 cm rounded opacity in the medial right upper lung zone  Recommend further evaluation with CT chest       The study was marked in [facility] for immediate notification  Workstation performed: [id number]               12/05/22 1412   PT Last Visit   PT Visit Date 12/05/22   Note Type   Note type Evaluation   Pain Assessment   Pain Score 7   Pain Location/Orientation Location: Back   Hospital Pain Intervention(s) Repositioned; Ambulation/increased activity; Emotional support; Rest  (RN made aware)   Restrictions/Precautions Weight Bearing Precautions Per Order No   Other Precautions Fall Risk;O2;Pain  (2L O2 [place])   Home Living   Type of [address] [place] [address] Two level;Bed/bath upstairs;Stairs to enter with rails  (3STE; FOS to 2nd flr bed/bath; bathroom located near her bedroom)   Bathroom Shower/Tub Tub/shower unit   Bathroom Toilet Standard   Bathroom Equipment Grab bars in shower; Shower chair   Home Equipment Walker;Cane;Electric scooter   Prior Function   Level of Stout Independent with functional mobility; Needs assistance with ADLs   Lives With Daughter   Receives Help From Home health; Family  (pt has 8hr caregiver daily)   Falls in the last 6 months 1 to 4  (1x, pt reports s/p fall from [facility] this morning in ED room-> confirmed by RN)   Comments (-)    General   Additional Pertinent History pt w/ h/o multiple hospital admissions, w/ most recent on 11/15/22-11/19/22 [facility]) for acute exacerbation of persistent asthma then D/C home w/ VNA; 11/3/22-11/16/22 [facility]) for COPD exacerbation then D/C home w/ VNA; 10/23/22-10/26/22 [facility]) for COVID then D/C home w/ VNA   Family/Caregiver Present No   Cognition   Arousal/Participation Alert   Orientation Level Oriented to person;Oriented to place;Oriented to time   Following Commands Follows one step commands without difficulty   Comments pt require encouragement to participate in PT/OT evals   Subjective   Subjective "My back hurts "   RUE Assessment   RUE Assessment   (refer to OT)   LUE Assessment   LUE Assessment   (refer to OT)   RLE Assessment   RLE Assessment WFL  (3+/5 grossly; dec effort noted)   LLE Assessment   LLE Assessment WFL  (3+/5 grossly; dec effort noted)   Bed Mobility   Supine to Sit 5  Supervision   Additional items HOB elevated; Increased time required;Verbal cues   Sit to Supine 5  Supervision   Additional items Increased time required;Verbal cues   Transfers   Sit to Stand 5  Supervision   Additional items Increased time required;Verbal cues   Stand to Sit 5  Supervision   Additional items Increased time required;Verbal cues   Toilet transfer 5  Supervision   Additional items Armrests; Increased time required;Verbal cues; Commode   Additional Comments cues for techniques & safety   Ambulation/Elevation   Gait pattern Decreased foot clearance; Forward Flexion; Wide SELENE; Excessively slow   Gait Assistance 5  Supervision  (initially minAx1 then progressed to S w/ further amb)   Additional items Verbal cues   Assistive Device Rolling walker   Distance 10'x1 (5' forward & 5' back) + 3'x1 bed<>BSC   Ambulation/Elevation Additional Comments no gross LOB noted; dec amb tolerance 2* inc back pain & SOB + limited to O2 line   Balance   Static Sitting Fair +   Dynamic Sitting Fair   Static Standing Fair   Dynamic Standing Fair -   Ambulatory Poor +   Endurance Deficit   Endurance Deficit Yes   Endurance Deficit Description SOB; pain; fatigue   Activity Tolerance   Activity Tolerance Patient limited by fatigue;Patient limited by pain;Treatment limited secondary to medical complications (Comment)   Medical Staff Made Aware Rebecca Cook   Nurse Made Aware LUZMA Reynolds   Assessment   Prognosis Fair   Problem List Decreased strength;Decreased endurance; Impaired balance;Decreased mobility   Assessment Pt  72 y  o female presented w/ SOB  CXR showed 3cm rounded opacity in the medial R upper lung zone, CT recommended  XR spine showed Mild superior compression deformity of the T12 vertebral body of indeterminate age  Pt referred to PT for mobility assessment & D/C planning w/ orders of activity as tolerated  Please see above for information re: home set-up & PLOF as well as objective findings during PT assessment  PTA, pt reports being fairly I w/ functional mobility however require assistance w/ ADL's  Pt on home O2 3L NC  On eval, pt functioning below baseline hence will continue skilled PT to improve function & safety  Pt require S for bed mobility & transfers + cues for techniques & safety  Initially require minAx1 for amb w/ RW but progressed to S w/ further amb w/ RW + cues for techniques & safety  Gait deviations as above, slow w/ dec foot clearance but no gross LOB noted  (+) SOB but relieved w/ rest  Spo2 at 91-94% w/ 2L O2 NC  The patient's AM-PAC Basic Mobility Inpatient Short Form Raw Score is 18  A Raw score of greater than 16 suggests the patient may benefit from discharge to home  Please also refer to the recommendation of the Physical Therapist for safe discharge planning  From PT standpoint, will anticipate safe return to home w/ family support when medically cleared  Will recommend HHPT & previous home care services at D/C  No dizziness reported t/o session  Nsg staff most recent vital signs as follows: /64 (BP Location: Right arm)   Pulse 86   Temp 99 °F (37 2 °C) (Oral)   Resp 16   LMP 04/01/2013 (Approximate)   SpO2 100%   At end of session, pt back in bed in stable condition, call bell & phone in reach, all lines intact  Fall precautions reinforced w/ good understanding  CM to follow  Nsg staff to continue to mobilized pt (OOB in chair for all meals & ambulate in room/unit) as tolerated to prevent further decline in function  Nsg notified  Co-eval was necessary to complete this PT eval for the pt's best interest given pt's medical acuity & complexity     Barriers to Discharge Inaccessible home environment   Barriers to Discharge Comments (+) stairs   Goals   Patient Goals to get better   STG Expiration Date 12/15/22   Short Term Goal #1 Goals to be met in 10 days; pt will be able to: 1) inc strength & balance by 1/2 grade to improve overall functional mobility & dec fall risk; 2) inc bed mobility to modified I for pt to be able to get in/OOB safely w/ proper techniques 100% of the time, to dec caregiver burden & safely function at home; 3) inc transfers to modified I for pt to transition safely from one surface to another w/o % of the time, to dec caregiver burden & safely function at home; 4) inc amb w/ RW approx  >80' w/ modified I for pt to ambulate household distances w/o any % of the time, to dec caregiver burden & safely function at home; 5) negotiate stairs w/ S for inc safety during stair mgt inside/outside of home & dec caregiver burden; 6) pt/caregiver ed   PT Treatment Day 0   Plan   Treatment/Interventions Functional transfer training;LE strengthening/ROM; Elevations; Therapeutic exercise; Endurance training;Patient/family training;Bed mobility;Gait training;Spoke to nursing;OT   PT Frequency 3-5x/wk   Recommendation   PT Discharge Recommendation Home with home health rehabilitation   Equipment Recommended Walker  (pt has)   AM-PAC Basic Mobility Inpatient   Turning in Bed Without Bedrails 3   Lying on Back to Sitting on Edge of Flat Bed 3   Moving Bed to Chair 3   Standing Up From Chair 3   Walk in Room 3   Climb 3-5 Stairs 3   Basic Mobility Inpatient Raw Score 18   Basic Mobility Standardized Score 41 05   Highest Level Of Mobility   JH-HLM Goal 6: Walk 10 steps or more   JH-HLM Achieved 5: Stand (1 or more minutes)   End of Consult   Patient Position at End of Consult Supine; All needs within reach   End of Consult Comments Pt in stable condition  All needs in reach  All lines intact     Hx/personal factors: co-morbidities, inaccessible home, advanced age, use of AD, pain, h/o of falls, fall risk, assist w/ ADL's, obesity, and O2  Examination: dec mobility, dec balance, dec endurance, dec amb, risk for falls, pain  Clinical: unpredictable (ongoing medical status, risk for falls, and pain mgt)  Complexity: high Renato Ortiz

## 2022-12-05 NOTE — ED NOTES
Round trip anurag texted RN and stated that [facility] would  patient at approx 2030     Isidoro Islas RN  12/05/22 0924

## 2022-12-05 NOTE — ED NOTES
Respiratory at [facility], will place on bipap   Pt nathaniel Ashley RN  12/05/22 3035

## 2022-12-05 NOTE — ED NOTES
Up OOB with assistance to bedside commode  Bfast ordered, will give meds at that time       Sergio Jensen RN  12/05/22 4287

## 2022-12-05 NOTE — ED NOTES
Pt taken of bipap and placed back on 3L to eat her lunch tray  This RN and another RN came in the room to find patient asleep with mouthfuls of her lunch in her mouth  Easy to arouse  Asked patient if she normally falls asleep mid meal  Unable to say yes or no        Rancho Judd RN  12/05/22 1547

## 2022-12-05 NOTE — PLAN OF CARE
Problem: OCCUPATIONAL THERAPY ADULT  Goal: Performs self-care activities at highest level of function for planned discharge setting  See evaluation for individualized goals  Description: Treatment Interventions: ADL retraining, Functional transfer training, UE strengthening/ROM, Endurance training, Patient/family training, Equipment evaluation/education, Compensatory technique education, Continued evaluation, Activityengagement          See flowsheet documentation for full assessment, interventions and recommendations  Note: Limitation: Decreased ADL status, Decreased UE strength, Decreased Safe judgement during ADL, Decreased endurance, Decreased self-care trans, Decreased high-level ADLs  Prognosis: Good  Assessment: Pt is a 81 y o  female seen for OT evaluation s/p adm to [facility] [address] on 12/4/2022 w/ SOB  CXR showed 3cm rounded opacity in the medial R upper lung zone, CT recommended  XR spine showed Mild superior compression deformity of the T12 vertebral body of indeterminate age  Comorbidities affecting pt’s functional performance include a significant PMH of Asthma, Bipolar 1 disorder, Cardiac disease, COPD, DM, HIV, HTN, OA  Pt with active OT orders and activity orders for Activity as tolerated  Pt lives with dtr and grandchildren in a two level house with 3 SARAVANAN and FOS to 2nd floor bed/bath  Dtr works [employer]  Pt has HHA 7 days/wk for 8 hrs/day  At baseline, pt required assist w/ ADLs and IADLs  Reports I w/ functional transfers/mobility w/o use of AD  (-)   Denies falls PTA  Upon evaluation, pt currently requires Supervision for UB ADLs, Min A for LB ADLs, Supervision for toileting, Supervision for bed mobility, and Supervision for functional mobility/transfers 2* the following deficits impacting occupational performance: SOB, decreased strength, decreased balance, decreased tolerance and increased pain   These impairments, as well at pt’s fall risk, steps to enter environment and difficulty performing ADLS limit pt’s ability to safely engage in all baseline areas of occupation  Based on the aforementioned OT evaluation, functional performance deficits, and assessments, pt has been identified as a Moderate complexity evaluation  Pt to continue to benefit from continued acute OT services during hospital stay to address defined deficits and to maximize level of functional independence in the following Occupational Performance areas: bathing/shower, toilet hygiene, dressing, medication management, health maintenance, functional mobility, community mobility and clothing management  From OT standpoint, recommend Home OT upon D/C   OT will continue to follow pt 2-3x/wk to address the following goals to  w/in 10-14 days:     OT Discharge Recommendation: Home with home health rehabilitation

## 2022-12-08 NOTE — CASE MANAGEMENT
Case Management ED Progress Note    Patient name Elsa Uriostegui  Location ED 14/ED 14 MRN [id number]  : 1950 Date 2022        OBJECTIVE:  Predictive Model Details         97% Factor Value    Risk of Hospital Admission or ED Visit Model Number of ED Visits 4     Number of Hospitalizations 5+     Has Medicaid Yes     Is in Relationship No     Has Atrial Fibrillation Yes     Has COPD Yes     Has Anemia Yes     Has CVD Yes     Has Diabetes Yes     Has Asthma Yes     Has CHF Yes     Has PCP Yes            Chief Complaint: Shortness of breath   Patient Class: Emergency  Preferred Pharmacy:   [address] [place] [address], [address] [place] [address],4[address] Floor [facility]  [address]  [address] [facility] [address]  Phone: [phone] Fax: [phone]    Primary Care Provider: Edilia Mackey DO    Primary Insurance: [employer]  Secondary Insurance: [address] [place] [address]    ED Progress Note:    FOLLOW UP    Case referred by attending 2022, completed referral for [facility] PT and to resume care for skilled RN to [facility]  Via ecin    Informed by Care n=manager Kathy ,

## 2022-12-11 ENCOUNTER — APPOINTMENT (EMERGENCY)
Dept: CT IMAGING | Facility: HOSPITAL | Age: 72
End: 2022-12-11

## 2022-12-11 ENCOUNTER — HOSPITAL ENCOUNTER (INPATIENT)
Facility: HOSPITAL | Age: 72
LOS: 1 days | Discharge: HOME | End: 2022-12-13
Attending: EMERGENCY MEDICINE | Admitting: HOSPITALIST

## 2022-12-11 ENCOUNTER — APPOINTMENT (EMERGENCY)
Dept: RADIOLOGY | Facility: HOSPITAL | Age: 72
End: 2022-12-11

## 2022-12-11 DIAGNOSIS — J45.41 ACUTE EXACERBATION OF MODERATE PERSISTENT EXTRINSIC ASTHMA: ICD-10-CM

## 2022-12-11 DIAGNOSIS — R06.00 DYSPNEA: Primary | ICD-10-CM

## 2022-12-11 LAB
2HR DELTA HS TROPONIN: -1 NG/L
4HR DELTA HS TROPONIN: -2 NG/L
ALBUMIN SERPL BCP-MCNC: 3.9 G/DL (ref 3.5–5)
ALP SERPL-CCNC: 72 U/L (ref 46–116)
ALT SERPL W P-5'-P-CCNC: 21 U/L (ref 12–78)
ANION GAP SERPL CALCULATED.3IONS-SCNC: 12 MMOL/L (ref 4–13)
AST SERPL W P-5'-P-CCNC: 14 U/L (ref 5–45)
ATRIAL RATE: 98 BPM
BASOPHILS # BLD AUTO: 0.04 THOUSANDS/ÂΜL (ref 0–0.1)
BASOPHILS NFR BLD AUTO: 1 % (ref 0–1)
BILIRUB SERPL-MCNC: 0.32 MG/DL (ref 0.2–1)
BUN SERPL-MCNC: 8 MG/DL (ref 5–25)
CALCIUM SERPL-MCNC: 10.8 MG/DL (ref 8.3–10.1)
CARDIAC TROPONIN I PNL SERPL HS: 10 NG/L
CARDIAC TROPONIN I PNL SERPL HS: 11 NG/L
CARDIAC TROPONIN I PNL SERPL HS: 9 NG/L
CHLORIDE SERPL-SCNC: 107 MMOL/L (ref 96–108)
CO2 SERPL-SCNC: 28 MMOL/L (ref 21–32)
CREAT SERPL-MCNC: 0.91 MG/DL (ref 0.6–1.3)
EOSINOPHIL # BLD AUTO: 0.1 THOUSAND/ÂΜL (ref 0–0.61)
EOSINOPHIL NFR BLD AUTO: 2 % (ref 0–6)
ERYTHROCYTE [DISTWIDTH] IN BLOOD BY AUTOMATED COUNT: 15.2 % (ref 11.6–15.1)
FLUAV RNA RESP QL NAA+PROBE: NEGATIVE
FLUBV RNA RESP QL NAA+PROBE: NEGATIVE
GFR SERPL CREATININE-BSD FRML MDRD: 64 ML/MIN/1.73SQ M
GLUCOSE SERPL-MCNC: 101 MG/DL (ref 65–140)
HCT VFR BLD AUTO: 37.3 % (ref 34.8–46.1)
HGB BLD-MCNC: 11.4 G/DL (ref 11.5–15.4)
IMM GRANULOCYTES # BLD AUTO: 0.02 THOUSAND/UL (ref 0–0.2)
IMM GRANULOCYTES NFR BLD AUTO: 0 % (ref 0–2)
LYMPHOCYTES # BLD AUTO: 1.43 THOUSANDS/ÂΜL (ref 0.6–4.47)
LYMPHOCYTES NFR BLD AUTO: 30 % (ref 14–44)
MCH RBC QN AUTO: 28.4 PG (ref 26.8–34.3)
MCHC RBC AUTO-ENTMCNC: 30.6 G/DL (ref 31.4–37.4)
MCV RBC AUTO: 93 FL (ref 82–98)
MONOCYTES # BLD AUTO: 0.45 THOUSAND/ÂΜL (ref 0.17–1.22)
MONOCYTES NFR BLD AUTO: 10 % (ref 4–12)
NEUTROPHILS # BLD AUTO: 2.72 THOUSANDS/ÂΜL (ref 1.85–7.62)
NEUTS SEG NFR BLD AUTO: 57 % (ref 43–75)
NRBC BLD AUTO-RTO: 0 /100 WBCS
NT-PROBNP SERPL-MCNC: 61 PG/ML
P AXIS: 37 DEGREES
PLATELET # BLD AUTO: 392 THOUSANDS/UL (ref 149–390)
PMV BLD AUTO: 8.7 FL (ref 8.9–12.7)
POTASSIUM SERPL-SCNC: 3.2 MMOL/L (ref 3.5–5.3)
PR INTERVAL: 172 MS
PROCALCITONIN SERPL-MCNC: <0.05 NG/ML
PROT SERPL-MCNC: 7.7 G/DL (ref 6.4–8.4)
QRS AXIS: -68 DEGREES
QRSD INTERVAL: 104 MS
QT INTERVAL: 380 MS
QTC INTERVAL: 485 MS
RBC # BLD AUTO: 4.01 MILLION/UL (ref 3.81–5.12)
RSV RNA RESP QL NAA+PROBE: NEGATIVE
SARS-COV-2 RNA RESP QL NAA+PROBE: NEGATIVE
SODIUM SERPL-SCNC: 147 MMOL/L (ref 135–147)
T WAVE AXIS: 34 DEGREES
VENTRICULAR RATE: 98 BPM
WBC # BLD AUTO: 4.76 THOUSAND/UL (ref 4.31–10.16)

## 2022-12-11 RX ORDER — METHYLPREDNISOLONE SODIUM SUCCINATE 125 MG/2ML
100 INJECTION, POWDER, LYOPHILIZED, FOR SOLUTION INTRAMUSCULAR; INTRAVENOUS ONCE
Status: COMPLETED | OUTPATIENT
Start: 2022-12-11 | End: 2022-12-11

## 2022-12-11 RX ORDER — POTASSIUM CHLORIDE 20 MEQ/1
20 TABLET, EXTENDED RELEASE ORAL DAILY
Status: DISCONTINUED | OUTPATIENT
Start: 2022-12-12 | End: 2022-12-12

## 2022-12-11 RX ORDER — MONTELUKAST SODIUM 10 MG/1
10 TABLET ORAL
Status: DISCONTINUED | OUTPATIENT
Start: 2022-12-11 | End: 2022-12-13 | Stop reason: HOSPADM

## 2022-12-11 RX ORDER — LEVALBUTEROL 1.25 MG/.5ML
1.25 SOLUTION, CONCENTRATE RESPIRATORY (INHALATION)
Status: DISCONTINUED | OUTPATIENT
Start: 2022-12-11 | End: 2022-12-13 | Stop reason: HOSPADM

## 2022-12-11 RX ORDER — SODIUM CHLORIDE FOR INHALATION 0.9 %
3 VIAL, NEBULIZER (ML) INHALATION EVERY 6 HOURS PRN
Status: DISCONTINUED | OUTPATIENT
Start: 2022-12-11 | End: 2022-12-13 | Stop reason: HOSPADM

## 2022-12-11 RX ORDER — ROFLUMILAST 250 UG/1
250 TABLET ORAL DAILY
Status: DISCONTINUED | OUTPATIENT
Start: 2022-12-12 | End: 2022-12-12

## 2022-12-11 RX ORDER — FUROSEMIDE 40 MG/1
40 TABLET ORAL 2 TIMES DAILY
Status: DISCONTINUED | OUTPATIENT
Start: 2022-12-11 | End: 2022-12-13 | Stop reason: HOSPADM

## 2022-12-11 RX ORDER — ACETAMINOPHEN 325 MG/1
975 TABLET ORAL EVERY 8 HOURS PRN
Status: DISCONTINUED | OUTPATIENT
Start: 2022-12-11 | End: 2022-12-13 | Stop reason: HOSPADM

## 2022-12-11 RX ORDER — IPRATROPIUM BROMIDE AND ALBUTEROL SULFATE .5; 3 MG/3ML; MG/3ML
1 SOLUTION RESPIRATORY (INHALATION) ONCE
Status: COMPLETED | OUTPATIENT
Start: 2022-12-11 | End: 2022-12-11

## 2022-12-11 RX ORDER — LEVALBUTEROL 1.25 MG/.5ML
1.25 SOLUTION, CONCENTRATE RESPIRATORY (INHALATION) EVERY 6 HOURS PRN
Status: DISCONTINUED | OUTPATIENT
Start: 2022-12-11 | End: 2022-12-13 | Stop reason: HOSPADM

## 2022-12-11 RX ORDER — NICOTINE 21 MG/24HR
1 PATCH, TRANSDERMAL 24 HOURS TRANSDERMAL DAILY
Status: DISCONTINUED | OUTPATIENT
Start: 2022-12-12 | End: 2022-12-13 | Stop reason: HOSPADM

## 2022-12-11 RX ORDER — DILTIAZEM HYDROCHLORIDE 120 MG/1
120 CAPSULE, COATED, EXTENDED RELEASE ORAL DAILY
Status: DISCONTINUED | OUTPATIENT
Start: 2022-12-12 | End: 2022-12-13 | Stop reason: HOSPADM

## 2022-12-11 RX ORDER — TRAMADOL HYDROCHLORIDE 50 MG/1
50 TABLET ORAL EVERY 6 HOURS PRN
Status: DISCONTINUED | OUTPATIENT
Start: 2022-12-11 | End: 2022-12-13 | Stop reason: HOSPADM

## 2022-12-11 RX ORDER — GABAPENTIN 400 MG/1
800 CAPSULE ORAL 3 TIMES DAILY
Status: DISCONTINUED | OUTPATIENT
Start: 2022-12-11 | End: 2022-12-13 | Stop reason: HOSPADM

## 2022-12-11 RX ORDER — INSULIN LISPRO 100 [IU]/ML
1-6 INJECTION, SOLUTION INTRAVENOUS; SUBCUTANEOUS
Status: DISCONTINUED | OUTPATIENT
Start: 2022-12-12 | End: 2022-12-13 | Stop reason: HOSPADM

## 2022-12-11 RX ORDER — AZITHROMYCIN 250 MG/1
500 TABLET, FILM COATED ORAL EVERY 24 HOURS
Status: DISCONTINUED | OUTPATIENT
Start: 2022-12-11 | End: 2022-12-13 | Stop reason: HOSPADM

## 2022-12-11 RX ORDER — LOSARTAN POTASSIUM 50 MG/1
100 TABLET ORAL DAILY
Status: DISCONTINUED | OUTPATIENT
Start: 2022-12-12 | End: 2022-12-12

## 2022-12-11 RX ORDER — ATORVASTATIN CALCIUM 20 MG/1
20 TABLET, FILM COATED ORAL
Status: DISCONTINUED | OUTPATIENT
Start: 2022-12-12 | End: 2022-12-13 | Stop reason: HOSPADM

## 2022-12-11 RX ORDER — BUDESONIDE 0.5 MG/2ML
0.5 INHALANT ORAL
Status: DISCONTINUED | OUTPATIENT
Start: 2022-12-11 | End: 2022-12-13 | Stop reason: HOSPADM

## 2022-12-11 RX ORDER — METHYLPREDNISOLONE SODIUM SUCCINATE 40 MG/ML
40 INJECTION, POWDER, LYOPHILIZED, FOR SOLUTION INTRAMUSCULAR; INTRAVENOUS EVERY 12 HOURS SCHEDULED
Status: DISCONTINUED | OUTPATIENT
Start: 2022-12-12 | End: 2022-12-12

## 2022-12-11 RX ORDER — SODIUM CHLORIDE FOR INHALATION 0.9 %
3 VIAL, NEBULIZER (ML) INHALATION
Status: DISCONTINUED | OUTPATIENT
Start: 2022-12-11 | End: 2022-12-11

## 2022-12-11 RX ORDER — TOPIRAMATE 25 MG/1
50 TABLET ORAL 2 TIMES DAILY
Status: DISCONTINUED | OUTPATIENT
Start: 2022-12-11 | End: 2022-12-13 | Stop reason: HOSPADM

## 2022-12-11 RX ADMIN — BUDESONIDE 0.5 MG: 0.5 INHALANT ORAL at 20:56

## 2022-12-11 RX ADMIN — MONTELUKAST SODIUM 10 MG: 10 TABLET, COATED ORAL at 21:25

## 2022-12-11 RX ADMIN — APIXABAN 5 MG: 5 TABLET, FILM COATED ORAL at 21:11

## 2022-12-11 RX ADMIN — LEVALBUTEROL 1.25 MG: 1.25 SOLUTION, CONCENTRATE RESPIRATORY (INHALATION) at 20:56

## 2022-12-11 RX ADMIN — IPRATROPIUM BROMIDE 0.5 MG: 0.5 SOLUTION RESPIRATORY (INHALATION) at 20:56

## 2022-12-11 RX ADMIN — IOHEXOL 85 ML: 350 INJECTION, SOLUTION INTRAVENOUS at 18:17

## 2022-12-11 RX ADMIN — TOPIRAMATE 50 MG: 25 TABLET, FILM COATED ORAL at 20:51

## 2022-12-11 RX ADMIN — ISODIUM CHLORIDE 3 ML: 0.03 SOLUTION RESPIRATORY (INHALATION) at 20:58

## 2022-12-11 RX ADMIN — AZITHROMYCIN MONOHYDRATE 500 MG: 250 TABLET ORAL at 20:51

## 2022-12-11 RX ADMIN — TRAMADOL HYDROCHLORIDE 50 MG: 50 TABLET, COATED ORAL at 20:51

## 2022-12-11 RX ADMIN — FUROSEMIDE 40 MG: 40 TABLET ORAL at 20:51

## 2022-12-11 RX ADMIN — GABAPENTIN 800 MG: 400 CAPSULE ORAL at 20:51

## 2022-12-11 RX ADMIN — METHYLPREDNISOLONE SODIUM SUCCINATE 100 MG: 125 INJECTION, POWDER, FOR SOLUTION INTRAMUSCULAR; INTRAVENOUS at 18:42

## 2022-12-11 RX ADMIN — ISODIUM CHLORIDE 3 ML: 0.03 SOLUTION RESPIRATORY (INHALATION) at 20:56

## 2022-12-11 NOTE — ED PROVIDER NOTES
Pt Name: Elsa Uriostegui  MRN: [id number]  [place] 1950  Age/Sex: 81 y o  female  Date of evaluation: 12/11/2022  PCP: Edilia Mackey, [address] [place] [address]    Chief Complaint   Patient presents with   • Asthma     Patient brought by EMS from home  Asthma exacerbation today since this morning, unrelieved with inhaler  EMS gave duo neb in route which patient states did not relieve SOB  Presents labored, tachypneic  HPI    Anjel Dean presents to the Emergency Department complaining of dyspnea  She feels like she can't breathe even with her oxygen on at home  She always has coughing  She used to have a CPAP but does not have one at home anymore and feels like she needs one  She had a recent appointment scheduled to see pulmonology but it got cancelled because she had covid  Now she feels that her breathing is even more labored  She also has associated chest pressure          HPI      Past Medical and Surgical History    Past Medical History:   Diagnosis Date   • Asthma    • Bipolar 1 disorder ([facility] )    • Cardiac disease    • COPD (chronic obstructive pulmonary disease) ([facility] )    • Diabetes mellitus ([facility] )    • Disease of thyroid gland    • HIV (human immunodeficiency virus infection) ([facility] ) 03/24/2016   • Hypertension    • Osteoarthritis    • Tobacco abuse        Past Surgical History:   Procedure Laterality Date   • HERNIA REPAIR     • LUMBAR FUSION N/A 4/10/2018    Procedure: T9/10 discectomy with T9-T11 fusion;  Surgeon: Jose Michael MD;  Location: [facility];  Service: Neurosurgery   • THYROIDECTOMY         Family History   Problem Relation Age of Onset   • No Known Problems Mother    • Diabetes Father    • Heart disease Neg Hx    • Cancer Neg Hx        Social History     Tobacco Use   • Smoking status: Former     Packs/day: 0 20     Years: 45 00     Pack years: 9 00     Types: Cigarettes   • Smokeless tobacco: Never   • Tobacco comments:     Currently smoking 1-2 cigarettes a day   Substance Use Topics   • Alcohol use: Not Currently     Alcohol/week: 2 0 standard drinks     Types: 1 Cans of beer, 1 Shots of liquor per week     Comment: only on holidays   • Drug use: Not Currently     Comment: former IV drug user           Allergies    Allergies   Allergen Reactions   • Lisinopril Angioedema   • Prozac [Fluoxetine Hcl] Rash   • Sulfa Antibiotics Itching   • Quinine        Home Medications    Prior to Admission medications    Medication Sig Start Date End Date Taking? Authorizing Provider   acetaminophen (TYLENOL) 325 mg tablet Take 3 tablets (975 mg total) by mouth every 8 (eight) hours as needed for mild pain 9/18/22   Raisa Gold MD   albuterol (2 5 mg/3 mL) 0 083 % nebulizer solution Take 3 mL (2 5 mg total) by nebulization every 6 (six) hours as needed for wheezing or shortness of breath 10/13/22   Tena Delacruz DO   apixaban (ELIQUIS) 5 mg Take 1 tablet by mouth 2 (two) times a day 5/9/22   Historical Provider, MD   atorvastatin (LIPITOR) 20 mg tablet Take 20 mg by mouth daily    Historical Provider, MD   bictegravir-emtricitab-tenofovir alafenamide (Biktarvy) -25 MG tablet Take 1 tablet by mouth daily 5/9/22   Historical Provider, MD   budesonide (PULMICORT) 0 5 mg/2 mL nebulizer solution Take 2 mL (0 5 mg total) by nebulization 2 (two) times a day Rinse mouth after use  9/18/22   Raisa Gold MD   carboxymethylcellulose 0 5 % SOLN Administer 1 drop into the left eye 3 (three) times a day as needed for dry eyes 10/29/22   Jacquelin Luz,    cholecalciferol (VITAMIN D3) 1,000 units tablet Take 1,000 Units by mouth daily    Historical Provider, MD   diltiazem (CARDIZEM CD) 120 mg 24 hr capsule Take 1 capsule (120 mg total) by mouth daily 9/29/22   Dima Rodriguez MD   fluticasone-umeclidinium-vilanterol (Trelegy Ellipta) 200-62 5-25 mcg/actuation AEPB inhaler Inhale 1 puff daily Rinse mouth after use      Historical Provider, MD   furosemide (LASIX) 40 mg tablet Take 1 tablet (40 mg total) by mouth 2 (two) times a day 9/29/22   Mitesh Connor MD   gabapentin (NEURONTIN) 400 mg capsule Take 800 mg by mouth 3 (three) times a day      Historical Provider, MD   levothyroxine 125 mcg tablet Take 125 mcg by mouth daily    Historical Provider, MD   losartan (COZAAR) 100 MG tablet Take 100 mg by mouth daily 11/2/22   Historical Provider, MD   lurasidone (LATUDA) 40 mg tablet Take 40 mg by mouth 9/30/22 9/30/23  Historical Provider, MD   metFORMIN (GLUCOPHAGE) 500 mg tablet Take 500 mg by mouth daily 11/2/22   Historical Provider, MD   montelukast (SINGULAIR) 10 mg tablet Take 10 mg by mouth daily at bedtime    Historical Provider, MD   nicotine (NICODERM CQ) 21 mg/24 hr TD 24 hr patch Place 1 patch on the skin daily 9/19/22   Jonathan Champagne MD   potassium chloride (K-DUR,KLOR-CON) 20 mEq tablet Take 1 tablet (20 mEq total) by mouth daily 9/19/22   Jonathan Champagne MD   roflumilast (DALIRESP) 250 MCG tablet Take 1 tablet (250 mcg total) by mouth daily 11/19/22   Pretty Nelson PA-C   topiramate (TOPAMAX) 50 MG tablet Take 50 mg by mouth 2 (two) times a day 9/30/22 9/30/23  Historical Provider, MD   traMADol Trace Mutton) 50 mg tablet Take 50 mg by mouth as needed 9/19/22   Historical Provider, MD           Review of Systems    Review of Systems   Constitutional: Positive for fatigue  Negative for chills and fever  HENT: Negative for ear pain and sore throat  Eyes: Negative for pain and visual disturbance  Respiratory: Positive for chest tightness and shortness of breath  Negative for cough  Cardiovascular: Positive for chest pain  Negative for palpitations  Gastrointestinal: Negative for abdominal pain and vomiting  Genitourinary: Negative for dysuria and hematuria  Musculoskeletal: Negative for arthralgias and back pain  Skin: Negative for color change and rash  Neurological: Negative for seizures and syncope  All other systems reviewed and are negative            Physical Exam      ED Triage Vitals   Temperature Pulse Respirations Blood Pressure SpO2   12/11/22 1330 12/11/22 1322 12/11/22 1322 12/11/22 1322 12/11/22 1322   98 3 °F (36 8 °C) 103 (!) 24 130/83 96 %      Temp Source Heart Rate Source Patient Position - Orthostatic VS BP Location FiO2 (%)   12/11/22 1330 12/11/22 1322 12/11/22 1322 12/11/22 1322 --   Oral Monitor Sitting Right arm       Pain Score       12/11/22 1322       7               Physical Exam  Vitals and nursing note reviewed  Constitutional:       General: She is not in acute distress  Appearance: She is well-developed  HENT:      Head: Normocephalic and atraumatic  Eyes:      Conjunctiva/sclera: Conjunctivae normal    Cardiovascular:      Rate and Rhythm: Normal rate and regular rhythm  Heart sounds: No murmur heard  Pulmonary:      Effort: Pulmonary effort is normal  No respiratory distress  Breath sounds: Normal breath sounds  Abdominal:      Palpations: Abdomen is soft  Tenderness: There is no abdominal tenderness  Musculoskeletal:         General: No swelling  Cervical back: Neck supple  Skin:     General: Skin is warm and dry  Capillary Refill: Capillary refill takes less than 2 seconds  Neurological:      Mental Status: She is alert  Psychiatric:         Mood and Affect: Mood normal        Assessment and Plan    Meeta Hernandez is a 81 y o  female who presents with chest pressure and dyspnea  Physical examination remarkable for same  Plan will be to perform diagnostic testing and treat symptomatically  MDM    Diagnostic Results      EKG INTERPRETATION  EKG Interpretation      EKG interpreted by me  Interpretation by Karol Espinal DO  EKG reviewed and interpreted independently      Labs:    Results for orders placed or performed during the hospital encounter of 12/11/22   FLU/RSV/COVID - if FLU/RSV clinically relevant    Specimen: Nasopharyngeal Swab; Nares   Result Value Ref Range    SARS-CoV-2 Negative Negative    INFLUENZA A PCR Negative Negative    INFLUENZA B PCR Negative Negative    RSV PCR Negative Negative   Sputum culture and Gram stain    Specimen: Expectorated Sputum   Result Value Ref Range    Gram Stain Result 2+ Epithelial cells per low power field (A)     Gram Stain Result Rare Polys (A)     Gram Stain Result 1+ Gram positive cocci in pairs and chains (A)    CBC and differential   Result Value Ref Range    WBC 4 76 4 31 - 10 16 Thousand/uL    RBC 4 01 3 81 - 5 12 Million/uL    Hemoglobin 11 4 (L) 11 5 - 15 4 g/dL    Hematocrit 37 3 34 8 - 46 1 %    MCV 93 82 - 98 fL    MCH 28 4 26 8 - 34 3 pg    MCHC 30 6 (L) 31 4 - 37 4 g/dL    RDW 15 2 (H) 11 6 - 15 1 %    MPV 8 7 (L) 8 9 - 12 7 fL    Platelets 525 (H) 524 - 390 Thousands/uL    nRBC 0 /100 WBCs    Neutrophils Relative 57 43 - 75 %    Immat GRANS % 0 0 - 2 %    Lymphocytes Relative 30 14 - 44 %    Monocytes Relative 10 4 - 12 %    Eosinophils Relative 2 0 - 6 %    Basophils Relative 1 0 - 1 %    Neutrophils Absolute 2 72 1 85 - 7 62 Thousands/µL    Immature Grans Absolute 0 02 0 00 - 0 20 Thousand/uL    Lymphocytes Absolute 1 43 0 60 - 4 47 Thousands/µL    Monocytes Absolute 0 45 0 17 - 1 22 Thousand/µL    Eosinophils Absolute 0 10 0 00 - 0 61 Thousand/µL    Basophils Absolute 0 04 0 00 - 0 10 Thousands/µL   Comprehensive metabolic panel   Result Value Ref Range    Sodium 147 135 - 147 mmol/L    Potassium 3 2 (L) 3 5 - 5 3 mmol/L    Chloride 107 96 - 108 mmol/L    CO2 28 21 - 32 mmol/L    ANION GAP 12 4 - 13 mmol/L    BUN 8 5 - 25 mg/dL    Creatinine 0 91 0 60 - 1 30 mg/dL    Glucose 101 65 - 140 mg/dL    Calcium 10 8 (H) 8 3 - 10 1 mg/dL    AST 14 5 - 45 U/L    ALT 21 12 - 78 U/L    Alkaline Phosphatase 72 46 - 116 U/L    Total Protein 7 7 6 4 - 8 4 g/dL    Albumin 3 9 3 5 - 5 0 g/dL    Total Bilirubin 0 32 0 20 - 1 00 mg/dL    eGFR 64 ml/min/1 73sq m   HS Troponin 0hr (reflex protocol)   Result Value Ref Range    hs TnI 0hr 11 "Refer to ACS Flowchart"- see link ng/L   NT-BNP PRO   Result Value Ref Range    NT-proBNP 61 <125 pg/mL   HS Troponin I 2hr   Result Value Ref Range    hs TnI 2hr 10 "Refer to ACS Flowchart"- see link ng/L    Delta 2hr hsTnI -1 <20 ng/L   HS Troponin I 4hr   Result Value Ref Range    hs TnI 4hr 9 "Refer to ACS Flowchart"- see link ng/L    Delta 4hr hsTnI -2 <20 ng/L   Procalcitonin   Result Value Ref Range    Procalcitonin <0 05 <=0 25 ng/ml   Procalcitonin, Next Day AM Collection   Result Value Ref Range    Procalcitonin <0 05 <=0 25 ng/ml   Basic metabolic panel   Result Value Ref Range    Sodium 138 135 - 147 mmol/L    Potassium 3 6 3 5 - 5 3 mmol/L    Chloride 105 96 - 108 mmol/L    CO2 24 21 - 32 mmol/L    ANION GAP 9 4 - 13 mmol/L    BUN 12 5 - 25 mg/dL    Creatinine 1 11 0 60 - 1 30 mg/dL    Glucose 229 (H) 65 - 140 mg/dL    Calcium 10 0 8 3 - 10 1 mg/dL    eGFR 50 ml/min/1 73sq m   CBC and differential   Result Value Ref Range    WBC 7 59 4 31 - 10 16 Thousand/uL    RBC 3 74 (L) 3 81 - 5 12 Million/uL    Hemoglobin 10 7 (L) 11 5 - 15 4 g/dL    Hematocrit 35 3 34 8 - 46 1 %    MCV 94 82 - 98 fL    MCH 28 6 26 8 - 34 3 pg    MCHC 30 3 (L) 31 4 - 37 4 g/dL    RDW 15 3 (H) 11 6 - 15 1 %    MPV 9 2 8 9 - 12 7 fL    Platelets 804 945 - 818 Thousands/uL    nRBC 0 /100 WBCs    Neutrophils Relative 84 (H) 43 - 75 %    Immat GRANS % 1 0 - 2 %    Lymphocytes Relative 11 (L) 14 - 44 %    Monocytes Relative 4 4 - 12 %    Eosinophils Relative 0 0 - 6 %    Basophils Relative 0 0 - 1 %    Neutrophils Absolute 6 39 1 85 - 7 62 Thousands/µL    Immature Grans Absolute 0 09 0 00 - 0 20 Thousand/uL    Lymphocytes Absolute 0 81 0 60 - 4 47 Thousands/µL    Monocytes Absolute 0 27 0 17 - 1 22 Thousand/µL    Eosinophils Absolute 0 00 0 00 - 0 61 Thousand/µL    Basophils Absolute 0 03 0 00 - 0 10 Thousands/µL   ECG 12 lead   Result Value Ref Range    Ventricular Rate 98 BPM    Atrial Rate 98 BPM    NC Interval 172 ms    QRSD Interval 104 ms    QT Interval 380 ms    QTC Interval 485 ms    P Axis 37 degrees    QRS Axis -68 degrees    T Wave Axis 34 degrees   ECG 12 lead   Result Value Ref Range    Ventricular Rate 100 BPM    Atrial Rate 100 BPM    IL Interval 182 ms    QRSD Interval 106 ms    QT Interval 310 ms    QTC Interval 399 ms    P Axis 46 degrees    QRS Axis -63 degrees    T Wave Axis 59 degrees   ECG 12 lead   Result Value Ref Range    Ventricular Rate 99 BPM    Atrial Rate 99 BPM    IL Interval 192 ms    QRSD Interval 108 ms    QT Interval 314 ms    QTC Interval 402 ms    P Ryder 53 degrees    QRS Axis -59 degrees    T Wave Axis 49 degrees   Fingerstick Glucose (POCT)   Result Value Ref Range    POC Glucose 169 (H) 65 - 140 mg/dl   Fingerstick Glucose (POCT)   Result Value Ref Range    POC Glucose 208 (H) 65 - 140 mg/dl   Fingerstick Glucose (POCT)   Result Value Ref Range    POC Glucose 160 (H) 65 - 140 mg/dl       All labs reviewed and utilized in the medical decision making process    Radiology:    CTA ED chest PE study   Final Result      No pulmonary embolus  New 1 3 cm nodular density in the right lower lobe superior segment  Given the relatively rapid onset of this finding since 11/15/2022, this is most suggestive of infectious/inflammatory process  Recommend follow-up in one month    to ensure resolution  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  Follow-up low dose CT scan in one year recommended  The study was marked in [facility] for immediate notification and follow-up  Workstation performed: [id number]         XR chest 1 view portable   Final Result      No active pulmonary disease on examination which is somewhat limited secondary to low lung volumes                    Workstation performed: [id number]             All radiology studies independently viewed by me and interpreted by the radiologist     Procedure    Procedures      ED Course of Care and Re-Assessments      Medications   acetaminophen (TYLENOL) tablet 975 mg (has no administration in time range)   apixaban (ELIQUIS) tablet 5 mg (5 mg Oral Given 12/12/22 1706)   atorvastatin (LIPITOR) tablet 20 mg (20 mg Oral Given 12/12/22 1614)   bictegravir-emtricitab-tenofovir alafenamide (BIKTARVY) -25 MG tablet 1 tablet (1 tablet Oral Given 12/12/22 0926)   diltiazem (CARDIZEM CD) 24 hr capsule 120 mg (0 mg Oral Hold 12/12/22 0900)   furosemide (LASIX) tablet 40 mg (40 mg Oral Given 12/12/22 1707)   gabapentin (NEURONTIN) capsule 800 mg (800 mg Oral Given 12/12/22 1614)   levothyroxine tablet 125 mcg (125 mcg Oral Given 12/12/22 0611)   lurasidone (LATUDA) tablet 40 mg (40 mg Oral Given 12/12/22 0926)   montelukast (SINGULAIR) tablet 10 mg (10 mg Oral Given 12/11/22 2125)   nicotine (NICODERM CQ) 21 mg/24 hr TD 24 hr patch 1 patch (1 patch Transdermal Not Given 12/12/22 0818)   topiramate (TOPAMAX) tablet 50 mg (50 mg Oral Given 12/12/22 1706)   traMADol (ULTRAM) tablet 50 mg (50 mg Oral Given 12/12/22 1935)   insulin lispro (HumaLOG) 100 units/mL subcutaneous injection 1-6 Units (2 Units Subcutaneous Given 12/12/22 1618)   levalbuterol (XOPENEX) inhalation solution 1 25 mg (1 25 mg Nebulization Given 12/12/22 1953)   levalbuterol (Cephus Searing) inhalation solution 1 25 mg (1 25 mg Nebulization Not Given 12/11/22 2108)     And   sodium chloride 0 9 % inhalation solution 3 mL (3 mL Nebulization Given 12/11/22 2058)   ipratropium (ATROVENT) 0 02 % inhalation solution 0 5 mg (0 5 mg Nebulization Given 12/12/22 1953)   budesonide (PULMICORT) inhalation solution 0 5 mg (0 5 mg Nebulization Given 12/12/22 1953)   azithromycin (ZITHROMAX) tablet 500 mg (500 mg Oral Given 12/12/22 1935)   predniSONE tablet 60 mg (60 mg Oral Given 12/12/22 1146)   ipratropium-albuterol (FOR EMS ONLY) (DUO-NEB) 0 5-2 5 mg/3 mL inhalation solution 3 mL (0 mL Does not apply Given to EMS 12/11/22 1323)   iohexol (OMNIPAQUE) 350 MG/ML injection (SINGLE-DOSE) 85 mL (85 mL Intravenous Given 12/11/22 1817) methylPREDNISolone sodium succinate (Solu-MEDROL) injection 100 mg (100 mg Intravenous Given 12/11/22 1842)           FINAL IMPRESSION    Final diagnoses:   Dyspnea         DISPOSITION/PLAN       DO Johanna Yao DO  12/12/22 1958

## 2022-12-12 LAB
ANION GAP SERPL CALCULATED.3IONS-SCNC: 9 MMOL/L (ref 4–13)
ATRIAL RATE: 100 BPM
ATRIAL RATE: 99 BPM
BASOPHILS # BLD AUTO: 0.03 THOUSANDS/ÂΜL (ref 0–0.1)
BASOPHILS NFR BLD AUTO: 0 % (ref 0–1)
BUN SERPL-MCNC: 12 MG/DL (ref 5–25)
CALCIUM SERPL-MCNC: 10 MG/DL (ref 8.3–10.1)
CHLORIDE SERPL-SCNC: 105 MMOL/L (ref 96–108)
CO2 SERPL-SCNC: 24 MMOL/L (ref 21–32)
CREAT SERPL-MCNC: 1.11 MG/DL (ref 0.6–1.3)
EOSINOPHIL # BLD AUTO: 0 THOUSAND/ÂΜL (ref 0–0.61)
EOSINOPHIL NFR BLD AUTO: 0 % (ref 0–6)
ERYTHROCYTE [DISTWIDTH] IN BLOOD BY AUTOMATED COUNT: 15.3 % (ref 11.6–15.1)
GFR SERPL CREATININE-BSD FRML MDRD: 50 ML/MIN/1.73SQ M
GLUCOSE SERPL-MCNC: 160 MG/DL (ref 65–140)
GLUCOSE SERPL-MCNC: 169 MG/DL (ref 65–140)
GLUCOSE SERPL-MCNC: 208 MG/DL (ref 65–140)
GLUCOSE SERPL-MCNC: 229 MG/DL (ref 65–140)
GLUCOSE SERPL-MCNC: 280 MG/DL (ref 65–140)
HCT VFR BLD AUTO: 35.3 % (ref 34.8–46.1)
HGB BLD-MCNC: 10.7 G/DL (ref 11.5–15.4)
IMM GRANULOCYTES # BLD AUTO: 0.09 THOUSAND/UL (ref 0–0.2)
IMM GRANULOCYTES NFR BLD AUTO: 1 % (ref 0–2)
LYMPHOCYTES # BLD AUTO: 0.81 THOUSANDS/ÂΜL (ref 0.6–4.47)
LYMPHOCYTES NFR BLD AUTO: 11 % (ref 14–44)
MCH RBC QN AUTO: 28.6 PG (ref 26.8–34.3)
MCHC RBC AUTO-ENTMCNC: 30.3 G/DL (ref 31.4–37.4)
MCV RBC AUTO: 94 FL (ref 82–98)
MONOCYTES # BLD AUTO: 0.27 THOUSAND/ÂΜL (ref 0.17–1.22)
MONOCYTES NFR BLD AUTO: 4 % (ref 4–12)
NEUTROPHILS # BLD AUTO: 6.39 THOUSANDS/ÂΜL (ref 1.85–7.62)
NEUTS SEG NFR BLD AUTO: 84 % (ref 43–75)
NRBC BLD AUTO-RTO: 0 /100 WBCS
P AXIS: 46 DEGREES
P AXIS: 53 DEGREES
PLATELET # BLD AUTO: 390 THOUSANDS/UL (ref 149–390)
PMV BLD AUTO: 9.2 FL (ref 8.9–12.7)
POTASSIUM SERPL-SCNC: 3.6 MMOL/L (ref 3.5–5.3)
PR INTERVAL: 182 MS
PR INTERVAL: 192 MS
PROCALCITONIN SERPL-MCNC: <0.05 NG/ML
QRS AXIS: -59 DEGREES
QRS AXIS: -63 DEGREES
QRSD INTERVAL: 106 MS
QRSD INTERVAL: 108 MS
QT INTERVAL: 310 MS
QT INTERVAL: 314 MS
QTC INTERVAL: 399 MS
QTC INTERVAL: 402 MS
RBC # BLD AUTO: 3.74 MILLION/UL (ref 3.81–5.12)
SODIUM SERPL-SCNC: 138 MMOL/L (ref 135–147)
T WAVE AXIS: 49 DEGREES
T WAVE AXIS: 59 DEGREES
VENTRICULAR RATE: 100 BPM
VENTRICULAR RATE: 99 BPM
WBC # BLD AUTO: 7.59 THOUSAND/UL (ref 4.31–10.16)

## 2022-12-12 RX ORDER — POTASSIUM CHLORIDE 20 MEQ/1
40 TABLET, EXTENDED RELEASE ORAL DAILY
Status: DISCONTINUED | OUTPATIENT
Start: 2022-12-12 | End: 2022-12-12

## 2022-12-12 RX ORDER — PREDNISONE 20 MG/1
60 TABLET ORAL DAILY
Status: DISCONTINUED | OUTPATIENT
Start: 2022-12-12 | End: 2022-12-13 | Stop reason: HOSPADM

## 2022-12-12 RX ORDER — ROFLUMILAST 250 UG/1
500 TABLET ORAL DAILY
Status: DISCONTINUED | OUTPATIENT
Start: 2022-12-13 | End: 2022-12-12 | Stop reason: CLARIF

## 2022-12-12 RX ORDER — LOSARTAN POTASSIUM 50 MG/1
100 TABLET ORAL DAILY
Status: DISCONTINUED | OUTPATIENT
Start: 2022-12-13 | End: 2022-12-12

## 2022-12-12 RX ADMIN — BUDESONIDE 0.5 MG: 0.5 INHALANT ORAL at 07:20

## 2022-12-12 RX ADMIN — TRAMADOL HYDROCHLORIDE 50 MG: 50 TABLET, COATED ORAL at 19:35

## 2022-12-12 RX ADMIN — INSULIN LISPRO 2 UNITS: 100 INJECTION, SOLUTION INTRAVENOUS; SUBCUTANEOUS at 16:18

## 2022-12-12 RX ADMIN — GABAPENTIN 800 MG: 400 CAPSULE ORAL at 16:14

## 2022-12-12 RX ADMIN — FUROSEMIDE 40 MG: 40 TABLET ORAL at 17:07

## 2022-12-12 RX ADMIN — BUDESONIDE 0.5 MG: 0.5 INHALANT ORAL at 19:53

## 2022-12-12 RX ADMIN — LEVALBUTEROL 1.25 MG: 1.25 SOLUTION, CONCENTRATE RESPIRATORY (INHALATION) at 19:53

## 2022-12-12 RX ADMIN — APIXABAN 5 MG: 5 TABLET, FILM COATED ORAL at 08:18

## 2022-12-12 RX ADMIN — GABAPENTIN 800 MG: 400 CAPSULE ORAL at 08:17

## 2022-12-12 RX ADMIN — BICTEGRAVIR SODIUM, EMTRICITABINE, AND TENOFOVIR ALAFENAMIDE FUMARATE 1 TABLET: 50; 200; 25 TABLET ORAL at 09:26

## 2022-12-12 RX ADMIN — LEVOTHYROXINE SODIUM 125 MCG: 0.03 TABLET ORAL at 06:11

## 2022-12-12 RX ADMIN — POTASSIUM CHLORIDE 40 MEQ: 1500 TABLET, EXTENDED RELEASE ORAL at 08:17

## 2022-12-12 RX ADMIN — LEVALBUTEROL 1.25 MG: 1.25 SOLUTION, CONCENTRATE RESPIRATORY (INHALATION) at 07:20

## 2022-12-12 RX ADMIN — LEVALBUTEROL 1.25 MG: 1.25 SOLUTION, CONCENTRATE RESPIRATORY (INHALATION) at 13:41

## 2022-12-12 RX ADMIN — TOPIRAMATE 50 MG: 25 TABLET, FILM COATED ORAL at 17:06

## 2022-12-12 RX ADMIN — TOPIRAMATE 50 MG: 25 TABLET, FILM COATED ORAL at 08:17

## 2022-12-12 RX ADMIN — ATORVASTATIN CALCIUM 20 MG: 20 TABLET, FILM COATED ORAL at 16:14

## 2022-12-12 RX ADMIN — PREDNISONE 60 MG: 20 TABLET ORAL at 11:46

## 2022-12-12 RX ADMIN — IPRATROPIUM BROMIDE 0.5 MG: 0.5 SOLUTION RESPIRATORY (INHALATION) at 19:53

## 2022-12-12 RX ADMIN — IPRATROPIUM BROMIDE 0.5 MG: 0.5 SOLUTION RESPIRATORY (INHALATION) at 13:41

## 2022-12-12 RX ADMIN — INSULIN LISPRO 1 UNITS: 100 INJECTION, SOLUTION INTRAVENOUS; SUBCUTANEOUS at 08:17

## 2022-12-12 RX ADMIN — GABAPENTIN 800 MG: 400 CAPSULE ORAL at 21:04

## 2022-12-12 RX ADMIN — AZITHROMYCIN MONOHYDRATE 500 MG: 250 TABLET ORAL at 19:35

## 2022-12-12 RX ADMIN — APIXABAN 5 MG: 5 TABLET, FILM COATED ORAL at 17:06

## 2022-12-12 RX ADMIN — INSULIN LISPRO 2 UNITS: 100 INJECTION, SOLUTION INTRAVENOUS; SUBCUTANEOUS at 11:46

## 2022-12-12 RX ADMIN — LURASIDONE HYDROCHLORIDE 40 MG: 40 TABLET, FILM COATED ORAL at 09:26

## 2022-12-12 RX ADMIN — MONTELUKAST SODIUM 10 MG: 10 TABLET, COATED ORAL at 21:04

## 2022-12-12 RX ADMIN — IPRATROPIUM BROMIDE 0.5 MG: 0.5 SOLUTION RESPIRATORY (INHALATION) at 07:20

## 2022-12-12 NOTE — ASSESSMENT & PLAN NOTE
Lab Results   Component Value Date    HGBA1C 6 8 (H) 10/16/2022       No results for input(s): POCGLU in the last 72 hours      Blood Sugar Average: Last 72 hrs:  Diabetes, monitor for steroid-induced hyperglycemia

## 2022-12-12 NOTE — ASSESSMENT & PLAN NOTE
Lab Results   Component Value Date    HGBA1C 6 8 (H) 10/16/2022       No results for input(s): POCGLU in the last 72 hours      Blood Sugar Average: Last 72 hrs:  · monitor for steroid-induced hyperglycemia  · SSI, accuchecks

## 2022-12-12 NOTE — UTILIZATION REVIEW
Initial Clinical Review    Admission: Date/Time/Statement:   Admission Orders (From admission, onward)     Ordered        12/11/22 1922  Place in Observation  Once                      12/12/22 1209  Inpatient Admission  Once        Transfer Service: Hospitalist       Question Answer Comment   Level of Care Med Surg    Estimated length of stay More than 2 Midnights    Certification I certify that inpatient services are medically necessary for this patient for a duration of greater than two midnights  See H&P and MD Progress Notes for additional information about the patient's course of treatment  12/12/22 1208   OBSERVATION   12/11 @  [address] [place] [address]  12/12  @  [id number]  The patient will continue to require additional inpatient hospital stay due to asthma exacerbation     ED Arrival Information     Expected   -    Arrival   12/11/2022 13:15    Acuity   Urgent            Means of arrival   Wheelchair    Escorted by   [facility] ([address])    Service   Hospitalist    Admission type   Emergency            Arrival complaint   sob           Chief Complaint   Patient presents with   • Asthma     Patient brought by EMS from home  Asthma exacerbation today since this morning, unrelieved with inhaler  EMS gave duo neb in route which patient states did not relieve SOB  Presents labored, tachypneic  Initial Presentation: 81 y o  female presents to ED via  EMS  From home with shortness of breath for  24 hours  Received duo neb en route, wheezing resolved  Given IV  S/medrol  PMH  Is  Recent COVID  23, HIV, asthma,  On chronic  O2 4L,  nicotine dependence and bipolar  The patient has significant high utilization of the emergency room as well as previous admission  Apparently  Patient is home alone  Often,  Becomes  Very anxious  Admit  Observation with  Acute  Exacerbation  Of Asthma and plan is IV  S/medrol, nebulizers, pulmonary consult and continue home meds       12/12  IP ADMISSION  Pulmonary consult  Currently  On  O2  3L  NC with sats  98 %, need to maintain sats  >  88 %  Encourage incentive spirometry, cough/deep breathe  Continue  IV  S/medrol, possible transition to prednisone  12/13  Continues  To smoke  ED Triage Vitals   Temperature Pulse Respirations Blood Pressure SpO2   12/11/22 1330 12/11/22 1322 12/11/22 1322 12/11/22 1322 12/11/22 1322   98 3 °F (36 8 °C) 103 (!) 24 130/83 96 %      Temp Source Heart Rate Source Patient Position - Orthostatic VS BP Location FiO2 (%)   12/11/22 1330 12/11/22 1322 12/11/22 1322 12/11/22 1322 --   Oral Monitor Sitting Right arm       Pain Score       12/11/22 1322       7          Wt Readings from Last 1 Encounters:   12/11/22 104 kg (229 lb 4 5 oz)     Additional Vital Signs:   98 °F (36 7 °C) 92 20 95/64 69 99 % 32 3 L/min Nasal cannula Lying    12/12/22 0700 -- -- -- -- -- 97 % -- -- -- --   12/11/22 2305 98 2 °F (36 8 °C) 80 20 113/56 112 100 % 32 3 L/min Nasal cannula Lying   12/11/22 2102 -- 97 20 151/98 113 100 % 32 3 L/min Nasal cannula Sitting   12/11/22 1923 -- 96 20 113/65 83 99 % 32 3 L/min Nasal cannula Sitting   12/11/22 1709 -- 101 20 127/72 94 97 % 32 3 L/min Nasal cannula Sitting   12/11/22 1424 -- -- -- -- -- -- -- -- Nasal cannula --   12/11/22 1330 98 3 °F (36 8 °C) -- -- -- -- -- -- -- -- --   12/11/22 1322 -- 103 24 Abnormal  130/83 -- 96 % -- -- None (Room air) Sitting       Pertinent Labs/Diagnostic Test Results:   CTA ED chest PE study   Final Result by Elbert Ferris MD (12/11 1928)      No pulmonary embolus  New 1 3 cm nodular density in the right lower lobe superior segment  Given the relatively rapid onset of this finding since 11/15/2022, this is most suggestive of infectious/inflammatory process  Recommend follow-up in one month    to ensure resolution  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  Follow-up low dose CT scan in one year recommended        The study was marked in EPIC for immediate notification and follow-up  Workstation performed: [id number]         XR chest 1 view portable   Final Result by Lalo Lopez MD (12/12 1764)      No active pulmonary disease on examination which is somewhat limited secondary to low lung volumes                    Workstation performed: [id number]           Results from last 7 days   Lab Units 12/11/22  1426   SARS-COV-2  Negative     Results from last 7 days   Lab Units 12/11/22  1521   WBC Thousand/uL 4 76   HEMOGLOBIN g/dL 11 4*   HEMATOCRIT % 37 3   PLATELETS Thousands/uL 392*   NEUTROS ABS Thousands/µL 2 72         Results from last 7 days   Lab Units 12/11/22  1521 12/05/22  1002   SODIUM mmol/L 147 148*   POTASSIUM mmol/L 3 2* 3 6   CHLORIDE mmol/L 107 107   CO2 mmol/L 28 31   ANION GAP mmol/L 12 10   BUN mg/dL 8 10   CREATININE mg/dL 0 91 0 86   EGFR ml/min/1 73sq m 64 68   CALCIUM mg/dL 10 8* 9 6     Results from last 7 days   Lab Units 12/11/22  1521   AST U/L 14   ALT U/L 21   ALK PHOS U/L 72   TOTAL PROTEIN g/dL 7 7   ALBUMIN g/dL 3 9   TOTAL BILIRUBIN mg/dL 0 32     Results from last 7 days   Lab Units 12/12/22  0743   POC GLUCOSE mg/dl 169*     Results from last 7 days   Lab Units 12/11/22  1521 12/05/22  1002   GLUCOSE RANDOM mg/dL 101 133           Results from last 7 days   Lab Units 12/11/22  1922 12/11/22  1714 12/11/22  1521   HS TNI 0HR ng/L  --   --  11   HS TNI 2HR ng/L  --  10  --    HSTNI D2 ng/L  --  -1  --    HS TNI 4HR ng/L 9  --   --    HSTNI D4 ng/L -2  --   --                  Results from last 7 days   Lab Units 12/11/22  2129   PROCALCITONIN ng/ml <0 05                 Results from last 7 days   Lab Units 12/11/22  1521   NT-PRO BNP pg/mL 61                                 Results from last 7 days   Lab Units 12/11/22  1426   INFLUENZA A PCR  Negative   INFLUENZA B PCR  Negative   RSV PCR  Negative         ED Treatment:   Medication Administration from 12/11/2022 1315 to 12/11/2022 2253       Date/Time Order Dose Route Action Comments     12/11/2022 1323 EST ipratropium-albuterol (FOR EMS ONLY) (DUO-NEB) 0 5-2 5 mg/3 mL inhalation solution 3 mL 0 mL Does not apply Given to EMS --     12/11/2022 1817 EST iohexol (OMNIPAQUE) 350 MG/ML injection (SINGLE-DOSE) 85 mL 85 mL Intravenous Given --     12/11/2022 1842 EST methylPREDNISolone sodium succinate (Solu-MEDROL) injection 100 mg 100 mg Intravenous Given --     12/11/2022 2111 EST apixaban (ELIQUIS) tablet 5 mg 5 mg Oral Given --     12/11/2022 2051 EST furosemide (LASIX) tablet 40 mg 40 mg Oral Given --     12/11/2022 2051 EST gabapentin (NEURONTIN) capsule 800 mg 800 mg Oral Given --     12/11/2022 2125 EST montelukast (SINGULAIR) tablet 10 mg 10 mg Oral Given --     12/11/2022 2051 EST topiramate (TOPAMAX) tablet 50 mg 50 mg Oral Given --     12/11/2022 2051 EST traMADol (ULTRAM) tablet 50 mg 50 mg Oral Given --     12/11/2022 2056 EST levalbuterol (XOPENEX) inhalation solution 1 25 mg 1 25 mg Nebulization Given --     12/11/2022 2056 EST sodium chloride 0 9 % inhalation solution 3 mL 3 mL Nebulization Given --     12/11/2022 2108 EST levalbuterol (XOPENEX) inhalation solution 1 25 mg 1 25 mg Nebulization Not Given --     12/11/2022 2058 EST sodium chloride 0 9 % inhalation solution 3 mL 3 mL Nebulization Given given with resp tx     12/11/2022 2056 EST ipratropium (ATROVENT) 0 02 % inhalation solution 0 5 mg 0 5 mg Nebulization Given --     12/11/2022 2056 EST budesonide (PULMICORT) inhalation solution 0 5 mg 0 5 mg Nebulization Given --     12/11/2022 2051 EST azithromycin (ZITHROMAX) tablet 500 mg 500 mg Oral Given --        Present on Admission:  • Acute exacerbation of moderate persistent extrinsic asthma  • Bipolar 1 disorder ([facility] [address] )  • HIV (human immunodeficiency virus infection)   • Tobacco use  • Type 2 diabetes mellitus without complication, without long-term current use of insulin (HCC)  • Paroxysmal atrial fibrillation (HCC)  • Chronic respiratory failure with hypoxia (HCC)  • Chronic diastolic heart failure (HCC)  • HTN (hypertension)      Admitting Diagnosis: Dyspnea [R06 00]  Asthma [J45 909]  Acute exacerbation of moderate persistent extrinsic asthma [J45 41]  Age/Sex: 81 y o  female  Admission Orders:  Scheduled Medications:  apixaban, 5 mg, Oral, BID  atorvastatin, 20 mg, Oral, Daily With Dinner  azithromycin, 500 mg, Oral, Q24H  bictegravir-emtricitab-tenofovir alafenamide, 1 tablet, Oral, Daily  budesonide, 0 5 mg, Nebulization, Q12H  diltiazem, 120 mg, Oral, Daily  furosemide, 40 mg, Oral, BID  gabapentin, 800 mg, Oral, TID  insulin lispro, 1-6 Units, Subcutaneous, TID AC  ipratropium, 0 5 mg, Nebulization, TID  levalbuterol, 1 25 mg, Nebulization, TID  levothyroxine, 125 mcg, Oral, Early Morning  [START ON 12/13/2022] losartan, 100 mg, Oral, Daily  lurasidone, 40 mg, Oral, Daily With Breakfast  methylPREDNISolone sodium succinate, 40 mg, Intravenous, Q12H CARLOS  montelukast, 10 mg, Oral, HS  nicotine, 1 patch, Transdermal, Daily  potassium chloride, 40 mEq, Oral, Daily  roflumilast, 250 mcg, Oral, Daily  topiramate, 50 mg, Oral, BID      Continuous IV Infusions:     PRN Meds:  acetaminophen, 975 mg, Oral, Q8H PRN  levalbuterol, 1 25 mg, Nebulization, Q6H PRN   And  sodium chloride, 3 mL, Nebulization, Q6H PRN  traMADol, 50 mg, Oral, Q6H PRN        IP CONSULT TO PULMONOLOGY    Network Utilization Review Department  ATTENTION: Please call with any questions or concerns to [phone] and carefully listen to the prompts so that you are directed to the right person  All voicemails are confidential   [facility] ED all requests for admission clinical reviews, approved or denied determinations and any other requests to dedicated fax number below belonging to the campus where the patient is receiving treatment   List of dedicated fax numbers for the Facilities:  FACILITY NAME UR FAX NUMBER   ADMISSION DENIALS (Administrative/Medical Necessity) [phone]   [facility] [address] (Maternity/NICU/Pediatrics) Zee Jena [address] [facility] [place] [address] [phone]   [address] [place] [id number] [place] [address] [address] [facility] 310 [facility] 134 [address] [place] [phone]

## 2022-12-12 NOTE — H&P
[facility]  H&P- Verito Hu 1950, 81 y o  female MRN: [id number]  Unit/Bed#: ED 18 Encounter: [id number]  Primary Care Provider: Efra Arriola DO   Date and time admitted to hospital: 12/11/2022  1:15 PM    Assessment and Plan  * Acute exacerbation of moderate persistent extrinsic asthma  Assessment & Plan  Likely mild exacerbation of patient's asthma  Emergency room the patient has had limited care over the weekend, her daughter does work  In addition her aids are not available during the week  Start Solu-Medrol 40 mg every 12  Pulmonary consultation to be placed  Continue nebulizer treatment  Did not qualify for BiPAP at home  Patient has multiple ER visits as well as admissions, consideration for high utilizer plan  CT PE study with 1 3 cm nodular density at the right base, repeat CT in 1 month  Likely patient is not having significant wheezing, fully recovered from COVID-19 x2   Suspect some residual COVID-19 symptoms    Paroxysmal atrial fibrillation (HCC)  Assessment & Plan  Ventricular rate controlled  Continue Eliquis, Cardizem    PASHA on CPAP  Assessment & Plan  Continue CPAP at bedtime    Tobacco use  Assessment & Plan  Patient counseled on smoking cessation, she is chronically on 4 L of oxygen    Bipolar 1 disorder ([facility] [address] )  Assessment & Plan  Mood is stable, continue Latuda    HIV (human immunodeficiency virus infection)   Assessment & Plan  Continue patient's HIV medication    Type 2 diabetes mellitus without complication, without long-term current use of insulin (HCC)  Assessment & Plan  Lab Results   Component Value Date    HGBA1C 6 8 (H) 10/16/2022       No results for input(s): POCGLU in the last 72 hours      Blood Sugar Average: Last 72 hrs:  Diabetes, monitor for steroid-induced hyperglycemia        Code Status: Prior **    VTE Prophylaxis: Apixaban (Eliquis)  / sequential compression device     POLST: There is no POLST form on file for this patient (pre-hospital)  Discussion with family: Patient updating family    Anticipated Length of Stay:  Patient will be admitted on an Observation basis with an anticipated length of stay of less than 2 midnights  Justification for Hospital Stay: Acute exacerbation of moderate persistent extrinsic asthma     Total Time for Visit, including Counseling / Coordination of Care: 45 minutes  Greater than 50% of this total time spent on direct patient counseling and coordination of care  Chief Complaint:     Chief Complaint   Patient presents with   • Asthma     Patient brought by EMS from home  Asthma exacerbation today since this morning, unrelieved with inhaler  EMS gave duo neb in route which patient states did not relieve SOB  Presents labored, tachypneic  History of Present Illness:    Gurpreet Amor is a 81 y o  female who presents with shortness of breath x24 hours  The patient has a history of HIV, asthma which is severe, nicotine dependence as well as bipolar disorder  She received a DuoNeb in route, her wheezing resolved  She was given Solu-Medrol  Her most notable history is recent COVID-19 infection for which she is recovered  The patient has significant high utilization of the emergency room as well as previous admission  At the time my evaluation she reports feeling short of breath, she is chronically on 4 L of oxygen which is the same  She denies any productive sputum, no chest pain  Per the emergency room staff the patient's daughter works all day, and she has no home aids on the weekend  Patient does report she becomes significantly anxious when she has been left home alone  This often prompts he called emergency medical services and a trip to the emergency room for acute evaluation  Denies any recent medication changes    He has not had to increase her oxygen    Review of Systems:    A complete and comprehensive 14 point organ system review was performed and all other systems are negative other than stated above in the HPI    Past Medical and Surgical History:     Past Medical History:   Diagnosis Date   • Asthma    • Bipolar 1 disorder ([place] [address] )    • Cardiac disease    • COPD (chronic obstructive pulmonary disease) ([place] [address] )    • Diabetes mellitus ([place] [address] )    • Disease of thyroid gland    • HIV (human immunodeficiency virus infection) ([place] [address] ) 03/24/2016   • Hypertension    • Osteoarthritis    • Tobacco abuse        Past Surgical History:   Procedure Laterality Date   • HERNIA REPAIR     • LUMBAR FUSION N/A 4/10/2018    Procedure: T9/10 discectomy with T9-T11 fusion;  Surgeon: Jaylene Power MD;  Location: [facility];  Service: Neurosurgery   • THYROIDECTOMY         Meds/Allergies:    Prior to Admission medications    Medication Sig Start Date End Date Taking? Authorizing Provider   albuterol (2 5 mg/3 mL) 0 083 % nebulizer solution Take 3 mL (2 5 mg total) by nebulization every 6 (six) hours as needed for wheezing or shortness of breath 10/13/22  Yes Francois Banerjee,    apixaban (ELIQUIS) 5 mg Take 1 tablet by mouth 2 (two) times a day 5/9/22  Yes Historical Provider, MD   atorvastatin (LIPITOR) 20 mg tablet Take 20 mg by mouth daily   Yes Historical Provider, MD   acetaminophen (TYLENOL) 325 mg tablet Take 3 tablets (975 mg total) by mouth every 8 (eight) hours as needed for mild pain 9/18/22   Shante Parada MD   bictegravir-emtricitab-tenofovir alafenamide (Biktarvy) -25 MG tablet Take 1 tablet by mouth daily 5/9/22   Historical Provider, MD   budesonide (PULMICORT) 0 5 mg/2 mL nebulizer solution Take 2 mL (0 5 mg total) by nebulization 2 (two) times a day Rinse mouth after use   9/18/22   Shante Parada MD   carboxymethylcellulose 0 5 % SOLN Administer 1 drop into the left eye 3 (three) times a day as needed for dry eyes 10/29/22   Addi Luz,    cholecalciferol (VITAMIN D3) 1,000 units tablet Take 1,000 Units by mouth daily    Historical Provider, MD   diltiazem (CARDIZEM CD) 120 mg 24 hr capsule Take 1 capsule (120 mg total) by mouth daily 9/29/22   Elida Deal MD   fluticasone-umeclidinium-vilanterol (Trelegy Ellipta) 200-62 5-25 mcg/actuation AEPB inhaler Inhale 1 puff daily Rinse mouth after use  Historical Provider, MD   furosemide (LASIX) 40 mg tablet Take 1 tablet (40 mg total) by mouth 2 (two) times a day 9/29/22   Elida Deal MD   gabapentin (NEURONTIN) 400 mg capsule Take 800 mg by mouth 3 (three) times a day      Historical Provider, MD   levothyroxine 125 mcg tablet Take 125 mcg by mouth daily    Historical Provider, MD   losartan (COZAAR) 100 MG tablet Take 100 mg by mouth daily 11/2/22   Historical Provider, MD   lurasidone (LATUDA) 40 mg tablet Take 40 mg by mouth 9/30/22 9/30/23  Historical Provider, MD   metFORMIN (GLUCOPHAGE) 500 mg tablet Take 500 mg by mouth daily 11/2/22   Historical Provider, MD   montelukast (SINGULAIR) 10 mg tablet Take 10 mg by mouth daily at bedtime    Historical Provider, MD   nicotine (NICODERM CQ) 21 mg/24 hr TD 24 hr patch Place 1 patch on the skin daily 9/19/22   Olman Mireles MD   potassium chloride (K-DUR,KLOR-CON) 20 mEq tablet Take 1 tablet (20 mEq total) by mouth daily 9/19/22   Olman Mireles MD   roflumilast (DALIRESP) 250 MCG tablet Take 1 tablet (250 mcg total) by mouth daily 11/19/22   Manolo Moreira PA-C   topiramate (TOPAMAX) 50 MG tablet Take 50 mg by mouth 2 (two) times a day 9/30/22 9/30/23  Historical Provider, MD   traMADol Jowaldo Damonve) 50 mg tablet Take 50 mg by mouth as needed 9/19/22   Historical Provider, MD     I have reviewed home medications with patient personally  Allergies:    Allergies   Allergen Reactions   • Lisinopril Angioedema   • Prozac [Fluoxetine Hcl] Rash   • Sulfa Antibiotics Itching   • Quinine        Social History:     Marital Status:    Occupation: Tired    Substance Use History:   Social History     Substance and Sexual Activity   Alcohol Use Not Currently   • Alcohol/week: 2 0 standard drinks   • Types: 1 Cans of beer, 1 Shots of liquor per week    Comment: only on holidays     Social History     Tobacco Use   Smoking Status Former   • Packs/day: 0 20   • Years: 45 00   • Pack years: 9 00   • Types: Cigarettes   Smokeless Tobacco Never   Tobacco Comments    Currently smoking 1-2 cigarettes a day     Social History     Substance and Sexual Activity   Drug Use Not Currently    Comment: former IV drug user       Family History:    Family History   Problem Relation Age of Onset   • No Known Problems Mother    • Diabetes Father    • Heart disease Neg Hx    • Cancer Neg Hx        Physical Exam:     Vitals:   Blood Pressure: 113/65 (12/11/22 1923)  Pulse: 96 (12/11/22 1923)  Temperature: 98 3 °F (36 8 °C) (12/11/22 1330)  Temp Source: Oral (12/11/22 1330)  Respirations: 20 (12/11/22 1923)  SpO2: 99 % (12/11/22 1923)      General: Ill appearing, no acute distress  HEENT: atraumatic, PERRLA, moist mucosa, normal pharynx, normal tonsils and adenoids, normal tongue, no fluid in sinuses  Neck: Trachea midline, no carotid bruit, no masses  Respiratory: normal chest wall expansion, coarse breath sounds decreased on the right base, however no significant expiratory wheezing, no r/r/w, no rubs  Cardiovascular: RRR, no m/r/g, Normal S1 and S2  Abdomen: Soft, non-tender, non-distended, normal bowel sounds in all quadrants, no hepatosplenomegaly, no tympany  Rectal: deferred  Musculoskeletal: Moves all  Integumentary: warm, dry, and pink, with no rash, purpura, or petechia  Heme/Lymph: no lymphadenopathy, no bruises  Neurological: Cranial Nerves II-XII grossly intact  Psychiatric: cooperative with normal mood, affect, and cognition    Additional Data:     Lab Results: I have personally reviewed pertinent reports        Results from last 7 days   Lab Units 12/11/22  1521   WBC Thousand/uL 4 76   HEMOGLOBIN g/dL 11 4*   HEMATOCRIT % 37 3   PLATELETS Thousands/uL 392*   NEUTROS PCT % 57   LYMPHS PCT % 30   MONOS PCT % 10   EOS PCT % 2     Results from last 7 days   Lab Units 12/11/22  1521   SODIUM mmol/L 147   POTASSIUM mmol/L 3 2*   CHLORIDE mmol/L 107   CO2 mmol/L 28   BUN mg/dL 8   CREATININE mg/dL 0 91   ANION GAP mmol/L 12   CALCIUM mg/dL 10 8*   ALBUMIN g/dL 3 9   TOTAL BILIRUBIN mg/dL 0 32   ALK PHOS U/L 72   ALT U/L 21   AST U/L 14   GLUCOSE RANDOM mg/dL 101                     Results from last 7 days   Lab Units 12/11/22  1714 12/11/22  1521   HS TNI 0HR ng/L  --  11   HS TNI 2HR ng/L 10  --        Imaging: I have personally reviewed pertinent reports  CTA ED chest PE study   Final Result by Maryalice Goltz, MD (12/11 1928)      No pulmonary embolus  New 1 3 cm nodular density in the right lower lobe superior segment  Given the relatively rapid onset of this finding since 11/15/2022, this is most suggestive of infectious/inflammatory process  Recommend follow-up in one month    to ensure resolution  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  Follow-up low dose CT scan in one year recommended  The study was marked in [facility] for immediate notification and follow-up  Workstation performed: [id number]         XR chest 1 view portable    (Results Pending)       EKG, Pathology, and Other Studies Reviewed on Admission:   · CT PE study noted, new 1 3 cm nodular density in the right lower lobe, no other acute chest pathology    Allscripts / Epic Records Reviewed: Yes     ** Please Note: This note was completed in part utilizing M-Modal Fluency Direct Software  Grammatical errors, random word insertions, spelling mistakes, and incomplete sentences may be an occasional consequence of this system secondary to software limitations, ambient noise, and hardware issues  If you have any questions or concerns about the content, text, or information contained within the body of this dictation, please contact the provider for clarification  **

## 2022-12-12 NOTE — PROGRESS NOTES
[facility]  Progress Note - Elsa Uriostegui 1950, 81 y o  female MRN: [id number]  Unit/Bed#: E2 -02 Encounter: [id number]  Primary Care Provider: Edilia Mackey DO   Date and time admitted to hospital: 12/11/2022  1:15 PM    * Acute exacerbation of moderate persistent extrinsic asthma  Assessment & Plan  · Frequent hospitalizations for the same, recently discharged on 11/19   · Pulmonary consulted   · Continue neb treatments and IV Solumedrol 40 mg q 12 hours   · CTA negative for PE, COVID/flu/RSV negative   · Refused labs this AM and did not wear CPAP last evening   · Placed on azithromycin, day 2   · CT 1 3 cm nodular density at the right base, repeat CT in 1 month      Chronic diastolic heart failure (HCC)  Assessment & Plan  Wt Readings from Last 3 Encounters:   12/11/22 104 kg (229 lb 4 5 oz)   11/19/22 104 kg (229 lb 8 oz)   11/04/22 103 kg (227 lb 15 3 oz)     · Echo from Sept reviewed: EF 72%, grade 1 diastolic dysfunction, mild TR  · Refused AM lab work   · Is on lasix 40 mg bid   · Attempt for lab work later this afternoon to monitor renal function and electrolytes   · Daily weight   · I/O         Paroxysmal atrial fibrillation (HCC)  Assessment & Plan  · Continue Eliquis, Cardizem    PASHA on CPAP  Assessment & Plan  · Continue CPAP at bedtime  · She refused last evening, educated patient on importance of compliance     Tobacco use  Assessment & Plan  · Patient counseled on smoking cessation, she is chronically on 4 L of oxygen    Bipolar 1 disorder ([facility] )  Assessment & Plan  ·  continue Latuda    Chronic respiratory failure with hypoxia ([facility] )  Assessment & Plan  · On baseline oxygen requirements , states she wears 3 lpm nasal cannula per home regimen     HTN (hypertension)  Assessment & Plan  · Maintained on lasix, losartan and Cardizem   · Monitor VS     HIV (human immunodeficiency virus infection)   Assessment & Plan  · Continue Biktarvy     Type 2 diabetes mellitus without complication, without long-term current use of insulin (HCC)  Assessment & Plan  Lab Results   Component Value Date    HGBA1C 6 8 (H) 10/16/2022       No results for input(s): POCGLU in the last 72 hours  Blood Sugar Average: Last 72 hrs:  · monitor for steroid-induced hyperglycemia  · SSI, accuchecks       VTE Pharmacologic Prophylaxis: VTE Score: 3 Moderate Risk (Score 3-4) - Pharmacological DVT Prophylaxis Ordered: apixaban (Eliquis)  Patient Centered Rounds: I performed bedside rounds with nursing staff today  Discussions with Specialists or Other Care Team Provider: will follow up pulm recs     Education and Discussions with Family / Patient: Attempted to update  (daughter) via phone  Left voicemail  Time Spent for Care: 20 minutes  More than 50% of total time spent on counseling and coordination of care as described above  Current Length of Stay: 0 day(s)  Current Patient Status: Observation   Certification Statement: The patient will continue to require additional inpatient hospital stay due to asthma exacerbation   Discharge Plan: Anticipate discharge in 24-48 hrs to home with home services  Code Status: Level 1 - Full Code    Subjective:   Patient is lying in bed  curerntly receiving neb treatment  She was sleepign on arrival with awake to light touch  Denies chest pain  States she wear 3 lpm at baseline  Did not wear her CPAP last evening and refused Am lab work  Objective:     Vitals:   Temp (24hrs), Av 2 °F (36 8 °C), Min:98 °F (36 7 °C), Max:98 3 °F (36 8 °C)    Temp:  [98 °F (36 7 °C)-98 3 °F (36 8 °C)] 98 °F (36 7 °C)  HR:  [] 92  Resp:  [20-24] 20  BP: ()/(56-98) 95/64  SpO2:  [96 %-100 %] 99 %  Body mass index is 41 94 kg/m²  Input and Output Summary (last 24 hours):   No intake or output data in the 24 hours ending 22 0744    Physical Exam:   Physical Exam  Vitals and nursing note reviewed     Constitutional:       Appearance: She is obese  Cardiovascular:      Rate and Rhythm: Normal rate and regular rhythm  Pulmonary:      Effort: No respiratory distress  Breath sounds: Wheezing present  Comments: 3 lpm nasal cannula   Abdominal:      General: Bowel sounds are normal       Palpations: Abdomen is soft  Musculoskeletal:      Right lower leg: No edema  Left lower leg: No edema  Skin:     General: Skin is warm  Neurological:      Mental Status: She is alert  Mental status is at baseline     Psychiatric:         Mood and Affect: Mood normal           Additional Data:     Labs:  Results from last 7 days   Lab Units 12/11/22  1521   WBC Thousand/uL 4 76   HEMOGLOBIN g/dL 11 4*   HEMATOCRIT % 37 3   PLATELETS Thousands/uL 392*   NEUTROS PCT % 57   LYMPHS PCT % 30   MONOS PCT % 10   EOS PCT % 2     Results from last 7 days   Lab Units 12/11/22  1521   SODIUM mmol/L 147   POTASSIUM mmol/L 3 2*   CHLORIDE mmol/L 107   CO2 mmol/L 28   BUN mg/dL 8   CREATININE mg/dL 0 91   ANION GAP mmol/L 12   CALCIUM mg/dL 10 8*   ALBUMIN g/dL 3 9   TOTAL BILIRUBIN mg/dL 0 32   ALK PHOS U/L 72   ALT U/L 21   AST U/L 14   GLUCOSE RANDOM mg/dL 101                 Results from last 7 days   Lab Units 12/11/22  2129   PROCALCITONIN ng/ml <0 05       Lines/Drains:  Invasive Devices     Peripheral Intravenous Line  Duration           Peripheral IV 12/11/22 Left Antecubital <1 day                      Imaging: Reviewed radiology reports from this admission including: chest CT scan    Recent Cultures (last 7 days):         Last 24 Hours Medication List:   Current Facility-Administered Medications   Medication Dose Route Frequency Provider Last Rate   • acetaminophen  975 mg Oral Q8H PRN Erwin Keto, DO     • apixaban  5 mg Oral BID Erwin Keto, DO     • atorvastatin  20 mg Oral Daily With Dinner Shaun Madge Kocher, DO     • azithromycin  500 mg Oral Q24H Erwin Keto, DO     • bictegravir-emtricitab-tenofovir alafenamide  1 tablet Oral Daily Romel Garcia, DO     • budesonide  0 5 mg Nebulization Q12H Janett Hence, DO     • diltiazem  120 mg Oral Daily Romel Garcia, DO     • furosemide  40 mg Oral BID Janett Hence, DO     • gabapentin  800 mg Oral TID Janett Hence, DO     • insulin lispro  1-6 Units Subcutaneous TID AC Romel Garcia, DO     • ipratropium  0 5 mg Nebulization TID Janett Hence, DO     • levalbuterol  1 25 mg Nebulization TID Janett Hence, DO     • levalbuterol  1 25 mg Nebulization Q6H PRN Tara Hence, DO      And   • sodium chloride  3 mL Nebulization Q6H PRN Janett Hence, DO     • levothyroxine  125 mcg Oral Early Morning Romel Garcia, DO     • losartan  100 mg Oral Daily Romel Garcia, DO     • lurasidone  40 mg Oral Daily With Breakfast Romel Garcia, DO     • methylPREDNISolone sodium succinate  40 mg Intravenous Q12H [facility] Romel Garcia, DO     • montelukast  10 mg Oral HS Romel Garcia, DO     • nicotine  1 patch Transdermal Daily Romel Garcia, DO     • potassium chloride  40 mEq Oral Daily Patito Mcallister PA-C     • roflumilast  250 mcg Oral Daily Romel Garcia, DO     • topiramate  50 mg Oral BID Janett Hence, DO     • traMADol  50 mg Oral Q6H PRN Romel Garcia DO          Today, Patient Was Seen By: Patito Mcallister PA-C    **Please Note: This note may have been constructed using a voice recognition system  **

## 2022-12-12 NOTE — ASSESSMENT & PLAN NOTE
Likely mild exacerbation of patient's asthma  Emergency room the patient has had limited care over the weekend, her daughter does work  In addition her aids are not available during the week  Start Solu-Medrol 40 mg every 12  Pulmonary consultation to be placed  Continue nebulizer treatment  Did not qualify for BiPAP at home  Patient has multiple ER visits as well as admissions, consideration for high utilizer plan  CT PE study with 1 3 cm nodular density at the right base, repeat CT in 1 month  Likely patient is not having significant wheezing, fully recovered from COVID-19 x2     Suspect some residual COVID-19 symptoms

## 2022-12-12 NOTE — PLAN OF CARE
Problem: Potential for Falls  Goal: Patient will remain free of falls  Description: INTERVENTIONS:  - Educate patient/family on patient safety including physical limitations  - Instruct patient to call for assistance with activity   - Consult OT/PT to assist with strengthening/mobility   - Keep Call bell within reach  - Keep bed low and locked with side rails adjusted as appropriate  - Keep care items and personal belongings within reach  - Initiate and maintain comfort rounds  - Make Fall Risk Sign visible to staff  - Offer Toileting every 2 Hours, in advance of need  - Initiate/Maintain bed alarm  - Obtain necessary fall risk management equipment: non slip socks  - Apply yellow socks and bracelet for high fall risk patients  - Consider moving patient to room near nurses station  Outcome: Progressing     Problem: MOBILITY - ADULT  Goal: Maintain or return to baseline ADL function  Description: INTERVENTIONS:  -  Assess patient's ability to carry out ADLs; assess patient's baseline for ADL function and identify physical deficits which impact ability to perform ADLs (bathing, care of mouth/teeth, toileting, grooming, dressing, etc )  - Assess/evaluate cause of self-care deficits   - Assess range of motion  - Assess patient's mobility; develop plan if impaired  - Assess patient's need for assistive devices and provide as appropriate  - Encourage maximum independence but intervene and supervise when necessary  - Involve family in performance of ADLs  - Assess for home care needs following discharge   - Consider OT consult to assist with ADL evaluation and planning for discharge  - Provide patient education as appropriate  Outcome: Progressing  Goal: Maintains/Returns to pre admission functional level  Description: INTERVENTIONS:  - Perform BMAT or MOVE assessment daily    - Set and communicate daily mobility goal to care team and patient/family/caregiver     - Collaborate with rehabilitation services on mobility goals if consulted  - Perform Range of Motion 4 times a day  - Reposition patient every 2 hours    - Dangle patient 4 times a day  - Stand patient 4 times a day  - Ambulate patient 4 times a day  - Out of bed to chair 4 times a day   - Out of bed for meals 3 times a day  - Out of bed for toileting  - Record patient progress and toleration of activity level   Outcome: Progressing     Problem: PAIN - ADULT  Goal: Verbalizes/displays adequate comfort level or baseline comfort level  Description: Interventions:  - Encourage patient to monitor pain and request assistance  - Assess pain using appropriate pain scale  - Administer analgesics based on type and severity of pain and evaluate response  - Implement non-pharmacological measures as appropriate and evaluate response  - Consider cultural and social influences on pain and pain management  - Notify physician/advanced practitioner if interventions unsuccessful or patient reports new pain  Outcome: Progressing     Problem: INFECTION - ADULT  Goal: Absence or prevention of progression during hospitalization  Description: INTERVENTIONS:  - Assess and monitor for signs and symptoms of infection  - Monitor lab/diagnostic results  - Monitor all insertion sites, i e  indwelling lines, tubes, and drains  - Monitor endotracheal if appropriate and nasal secretions for changes in amount and color  - [place] appropriate cooling/warming therapies per order  - Administer medications as ordered  - Instruct and encourage patient and family to use good hand hygiene technique  - Identify and instruct in appropriate isolation precautions for identified infection/condition  Outcome: Progressing  Goal: Absence of fever/infection during neutropenic period  Description: INTERVENTIONS:  - Monitor WBC    Outcome: Progressing     Problem: SAFETY ADULT  Goal: Patient will remain free of falls  Description: INTERVENTIONS:  - Educate patient/family on patient safety including physical limitations  - Instruct patient to call for assistance with activity   - Consult OT/PT to assist with strengthening/mobility   - Keep Call bell within reach  - Keep bed low and locked with side rails adjusted as appropriate  - Keep care items and personal belongings within reach  - Initiate and maintain comfort rounds  - Make Fall Risk Sign visible to staff  - Offer Toileting every 2 Hours, in advance of need  - Initiate/Maintain bed alarm  - Obtain necessary fall risk management equipment: non slip socks   - Apply yellow socks and bracelet for high fall risk patients  - Consider moving patient to room near nurses station  Outcome: Progressing  Goal: Maintain or return to baseline ADL function  Description: INTERVENTIONS:  -  Assess patient's ability to carry out ADLs; assess patient's baseline for ADL function and identify physical deficits which impact ability to perform ADLs (bathing, care of mouth/teeth, toileting, grooming, dressing, etc )  - Assess/evaluate cause of self-care deficits   - Assess range of motion  - Assess patient's mobility; develop plan if impaired  - Assess patient's need for assistive devices and provide as appropriate  - Encourage maximum independence but intervene and supervise when necessary  - Involve family in performance of ADLs  - Assess for home care needs following discharge   - Consider OT consult to assist with ADL evaluation and planning for discharge  - Provide patient education as appropriate  Outcome: Progressing  Goal: Maintains/Returns to pre admission functional level  Description: INTERVENTIONS:  - Perform BMAT or MOVE assessment daily    - Set and communicate daily mobility goal to care team and patient/family/caregiver  - Collaborate with rehabilitation services on mobility goals if consulted  - Perform Range of Motion 4 times a day  - Reposition patient every 2 hours    - Dangle patient 4 times a day  - Stand patient 4 times a day  - Ambulate patient 4 times a day  - Out of bed to chair 4times a day   - Out of bed for meals 3 times a day  - Out of bed for toileting  - Record patient progress and toleration of activity level   Outcome: Progressing     Problem: DISCHARGE PLANNING  Goal: Discharge to home or other facility with appropriate resources  Description: INTERVENTIONS:  - Identify barriers to discharge w/patient and caregiver  - Arrange for needed discharge resources and transportation as appropriate  - Identify discharge learning needs (meds, wound care, etc )  - Arrange for interpretive services to assist at discharge as needed  - Refer to Case Management Department for coordinating discharge planning if the patient needs post-hospital services based on physician/advanced practitioner order or complex needs related to functional status, cognitive ability, or social support system  Outcome: Progressing     Problem: Knowledge Deficit  Goal: Patient/family/caregiver demonstrates understanding of disease process, treatment plan, medications, and discharge instructions  Description: Complete learning assessment and assess knowledge base    Interventions:  - Provide teaching at level of understanding  - Provide teaching via preferred learning methods  Outcome: Progressing     Problem: RESPIRATORY - ADULT  Goal: Achieves optimal ventilation and oxygenation  Description: INTERVENTIONS:  - Assess for changes in respiratory status  - Assess for changes in mentation and behavior  - Position to facilitate oxygenation and minimize respiratory effort  - Oxygen administered by appropriate delivery if ordered  - Initiate smoking cessation education as indicated  - Encourage broncho-pulmonary hygiene including cough, deep breathe, Incentive Spirometry  - Assess the need for suctioning and aspirate as needed  - Assess and instruct to report SOB or any respiratory difficulty  - Respiratory Therapy support as indicated  Outcome: Progressing

## 2022-12-12 NOTE — ASSESSMENT & PLAN NOTE
Wt Readings from Last 3 Encounters:   12/11/22 104 kg (229 lb 4 5 oz)   11/19/22 104 kg (229 lb 8 oz)   11/04/22 103 kg (227 lb 15 3 oz)     · Echo from Sept reviewed: EF 44%, grade 1 diastolic dysfunction, mild TR  · Refused AM lab work   · Is on lasix 40 mg bid   · Attempt for lab work later this afternoon to monitor renal function and electrolytes   · Daily weight   · I/O

## 2022-12-12 NOTE — ASSESSMENT & PLAN NOTE
· Continue CPAP at bedtime  · She refused last evening, educated patient on importance of compliance

## 2022-12-12 NOTE — PLAN OF CARE
Problem: Potential for Falls  Goal: Patient will remain free of falls  Description: INTERVENTIONS:  - Educate patient/family on patient safety including physical limitations  - Instruct patient to call for assistance with activity   - Consult OT/PT to assist with strengthening/mobility   - Keep Call bell within reach  - Keep bed low and locked with side rails adjusted as appropriate  - Keep care items and personal belongings within reach  - Initiate and maintain comfort rounds  - Make Fall Risk Sign visible to staff  - Offer Toileting every 2 Hours, in advance of need  - Initiate/Maintain bed alarm  - Apply yellow socks and bracelet for high fall risk patients  - Consider moving patient to room near nurses station  Outcome: Progressing     Problem: RESPIRATORY - ADULT  Goal: Achieves optimal ventilation and oxygenation  Description: INTERVENTIONS:  - Assess for changes in respiratory status  - Assess for changes in mentation and behavior  - Position to facilitate oxygenation and minimize respiratory effort  - Oxygen administered by appropriate delivery if ordered  - Initiate smoking cessation education as indicated  - Encourage broncho-pulmonary hygiene including cough, deep breathe, Incentive Spirometry  - Assess the need for suctioning and aspirate as needed  - Assess and instruct to report SOB or any respiratory difficulty  - Respiratory Therapy support as indicated  Outcome: Progressing

## 2022-12-12 NOTE — ASSESSMENT & PLAN NOTE
· Frequent hospitalizations for the same, recently discharged on 11/19   · Pulmonary consulted   · Continue neb treatments and IV Solumedrol 40 mg q 12 hours   · CTA negative for PE, COVID/flu/RSV negative   · Refused labs this AM and did not wear CPAP last evening   · Placed on azithromycin, day 2   · CT 1 3 cm nodular density at the right base, repeat CT in 1 month

## 2022-12-12 NOTE — ED NOTES
Attempted to get sputum culture from pt several times, notes that she can not spit or produce sputum at this time, culture cup in room        Santi Lee RN  12/11/22 [id number]

## 2022-12-12 NOTE — CONSULTS
Consultation - Pulmonary Medicine   Yahir Sandhoff 81 y o  female MRN: [id number]  Unit/Bed#: E2 -02 Encounter: [id number]      Assessment/Plan:    1  Acute pulmonary insufficiency likely multifaceted as listed below        -Currently on 3 L - 98%, O2 requirements 2 L        -Maintain saturations greater than 88%        -Pulmonary toileting: Deep breathing cough, OOB as tolerated, IS Q 1 hr    2  Asthma/COPD overlap syndrome with possible mild exacerbation        -Inpatient: Transition to prednisone tomorrow 60 mg decrease by 10 mg every 3 days, budesonide twice daily, Xopenex/Atrovent 3 times daily        -We will increase Daliresp to 500 MCG        -Home regimen: Trelegy 200/62 5/25 CG 1 puff daily, budesonide twice daily,, and albuterol as needed, Daliresp 250 mcg daily        -Reommend high utilizer plan upon ED admission    4  Acute on Chronic grade 1 diastolic CHF w/ PAF and Mild PHTN likely WHO group II & III       -  2/21/2022- EF 65%       -  9/13/2022-  EF 70    PA pressure 37 mmhg       -We will continue Eliquis       -Continue diuresing as tolerated per kidney function    4  Chronic dyspnea upon exertion        -Secondary to: Deconditioning, obesity, anxiety, noncompliant with CPAP          -NonCompliant with outpatient follow-up    5  PASHA         -  [facility]-patient is required to call Stanley Tamez however she is still not followed through        -Does not require requalification for NIVVV        -*We will continue CPAP nightly    6  Active tobacco abuse        -Continues to smoke 1 to 2 cigarettes a day        -PreContemplation stage        -Courage and educated on tobacco cessation        -NRT per primary team    7    Lung nodule        -RLL 1 3 cm lung nodule        -Need repeat imaging in 1 month      -Patient will follow-up [facility] pulmonary service  -Pulmonary will sign off            History of Present Illness   Physician Requesting Consult: Jennifer Herzog DO  Reason for Consult / Principal Problem: Respiratory failure  Hx and PE limited by: Nothing  Chief Complaint: "I feel short of breath  HPI: Donald Hyde is a 81 y o   female who presented to [address] [place] [address] with complaints of shortness of breath  Patient has past medical history of HIV, asthma, Bacot abuse, bipolar  She reports that she becomes exertion when she is left at home alone  Patient reports that this does cause her shortness of breath and she called EMS  Upon ED admission patient was administered 100 mg IV Solu-Medrol, belies her treatments and azithromycin  Pulmonary is consulted for asthma exacerbation  Is patient's seventh admission this year  Patient follows normally with [facility] however does not keep her follow-up appointments  Today patient is reporting that she is short of breath  No significant overnight events reported  There are some scattered wheezing throughout lung fields  She currently denying any fevers, chills, hemoptysis, headaches, night sweats, pleuritic chest pain, or palpitations  From a pulmonary standpoint, patient follows with Dr Kristan Cordoba for his moderate COPD  Patient reports she is still an active 1-2 cigarettes a day smoker  Patient reports she started smoking around the age of 14  Patient is currently maintained on Trelegy 200/62 5/25 CG 1 puff daily, budesonide twice daily,, and albuterol as needed, Daliresp 250 mcg daily  Patient is currently maintained on 2 L oxygen therapy  Patient reports some occupational exposures as she worked in a [employer]  Patient denies any symptoms of GERD, dysphagia, or postnasal drip  Patient does report history of seasonal allergies in which she is maintained on [facility]  Patient's sleep study 3/2022 for moderate sleep apnea  Patient is currently maintained on CPAP 5-15 cm of H2O with 2 L   Patient denies any acute exposures to dust, mold, asbestos, or silica    Inpatient consult to Pulmonology  Consult performed by: LUIS M Sanz  Consult ordered by: Andrew Saavedra DO          Review of Systems   Constitutional: Negative for chills and fever  HENT: Negative for ear pain and sore throat  Eyes: Negative for pain and visual disturbance  Respiratory: Positive for cough and shortness of breath  Negative for apnea, choking, chest tightness, wheezing and stridor  Cardiovascular: Negative for chest pain and palpitations  Gastrointestinal: Negative for abdominal pain and vomiting  Genitourinary: Negative for dysuria and hematuria  Musculoskeletal: Negative for arthralgias and back pain  Skin: Negative for color change and rash  Neurological: Negative for seizures and syncope  Psychiatric/Behavioral: Negative for agitation and behavioral problems  All other systems reviewed and are negative        Historical Information   Past Medical History:   Diagnosis Date   • Asthma    • Bipolar 1 disorder ([place] [address] )    • Cardiac disease    • COPD (chronic obstructive pulmonary disease) ([place] [address] )    • Diabetes mellitus ([place] [address] )    • Disease of thyroid gland    • HIV (human immunodeficiency virus infection) ([place] [address] ) 03/24/2016   • Hypertension    • Osteoarthritis    • Tobacco abuse      Past Surgical History:   Procedure Laterality Date   • HERNIA REPAIR     • LUMBAR FUSION N/A 4/10/2018    Procedure: T9/10 discectomy with T9-T11 fusion;  Surgeon: Perla Gonzalez MD;  Location: [facility];  Service: Neurosurgery   • THYROIDECTOMY       Social History   Social History     Substance and Sexual Activity   Alcohol Use Not Currently   • Alcohol/week: 2 0 standard drinks   • Types: 1 Cans of beer, 1 Shots of liquor per week    Comment: only on holidays     Social History     Substance and Sexual Activity   Drug Use Not Currently    Comment: former IV drug user     Social History     Tobacco Use   Smoking Status Former   • Packs/day: 0 20   • Years: 45 00   • Pack years: 9 00   • Types: Cigarettes   Smokeless Tobacco Never   Tobacco Comments    Currently smoking 1-2 cigarettes a day     E-Cigarette/Vaping     E-Cigarette/Vaping Substances     Occupational History: Noncontributory    Family History:   Family History   Problem Relation Age of Onset   • No Known Problems Mother    • Diabetes Father    • Heart disease Neg Hx    • Cancer Neg Hx        Meds/Allergies   pertinent pulmonary meds have been reviewed    Allergies   Allergen Reactions   • Lisinopril Angioedema   • Prozac [Fluoxetine Hcl] Rash   • Sulfa Antibiotics Itching   • Quinine        Objective   Vitals: Blood pressure 99/59, pulse 92, temperature 98 °F (36 7 °C), temperature source Temporal, resp  rate 20, height 5' 2" (1 575 m), weight 104 kg (229 lb 4 5 oz), last menstrual period 04/01/2013, SpO2 99 %, not currently breastfeeding  RA,Body mass index is 41 94 kg/m²  No intake or output data in the 24 hours ending 12/12/22 0805  Invasive Devices     Peripheral Intravenous Line  Duration           Peripheral IV 12/11/22 Left Antecubital <1 day                Physical Exam  Constitutional:       General: She is not in acute distress  Appearance: Normal appearance  She is normal weight  She is not ill-appearing  HENT:      Head: Normocephalic and atraumatic  Nose: Nose normal  No congestion or rhinorrhea  Mouth/Throat:      Mouth: Mucous membranes are dry  Pharynx: No oropharyngeal exudate or posterior oropharyngeal erythema  Cardiovascular:      Rate and Rhythm: Normal rate and regular rhythm  Pulses: Normal pulses  Heart sounds: Normal heart sounds  No murmur heard  No friction rub  No gallop  Pulmonary:      Effort: Pulmonary effort is normal  No tachypnea, bradypnea, accessory muscle usage or respiratory distress  Breath sounds: Decreased air movement present  No stridor  Decreased breath sounds and wheezing present  No rhonchi or rales        Comments: Some scattered wheezing  Chest:      Chest wall: No tenderness  Abdominal:      General: Abdomen is flat  Bowel sounds are normal  There is no distension  Palpations: Abdomen is soft  There is no mass  Musculoskeletal:         General: No swelling or tenderness  Normal range of motion  Cervical back: Normal range of motion  No rigidity or tenderness  Skin:     General: Skin is warm and dry  Coloration: Skin is not jaundiced or pale  Neurological:      General: No focal deficit present  Mental Status: She is alert and oriented to person, place, and time  Mental status is at baseline  Psychiatric:         Mood and Affect: Mood normal          Behavior: Behavior normal          Lab Results:   I have personally reviewed pertinent lab results CBC:   Lab Results   Component Value Date    WBC 4 76 12/11/2022    HGB 11 4 (L) 12/11/2022    HCT 37 3 12/11/2022    MCV 93 12/11/2022     (H) 12/11/2022    MCH 28 4 12/11/2022    MCHC 30 6 (L) 12/11/2022    RDW 15 2 (H) 12/11/2022    MPV 8 7 (L) 12/11/2022    NRBC 0 12/11/2022   , CMP:   Lab Results   Component Value Date    SODIUM 147 12/11/2022    K 3 2 (L) 12/11/2022     12/11/2022    CO2 28 12/11/2022    BUN 8 12/11/2022    CREATININE 0 91 12/11/2022    CALCIUM 10 8 (H) 12/11/2022    AST 14 12/11/2022    ALT 21 12/11/2022    ALKPHOS 72 12/11/2022    EGFR 64 12/11/2022         Imaging Studies: I have personally reviewed pertinent films in PACS     CTA chest 12/11/2022 new right lower lobe nodule      EKG, Pathology, and Other Studies: I have personally reviewed pertinent films in PACS     9/13/2022-   grade 1 diastolic dysfunction EF 34% PA pressure 37 mmHg    Pulmonary Results (PFTs, PSG): I have personally reviewed pertinent films in PACS     Results: 11/16/2021  The FVC is reduced [1 43L (62%)]  The FEV1 is normal [1 30L (73%)]  The FEV1/FVC is normal [91]  No bronchodilator was administered  The TLC is not measured       The DLCO is not measured  Impression:   The patient has suggestion of possible mild restriction based on   spirometry and flow volume loop  However, exhalation time was less than 2   seconds and patient had significant difficulty with the spirometry so this   is a suboptimal test and thus not able to be definitively interpreted         VTE Prophylaxis: Sequential compression device (Venodyne)     Code Status: Level 1 - Full Code    None    Portions of the record may have been created with voice recognition software  Occasional wrong word or "sound a like" substitutions may have occurred due to the inherent limitations of voice recognition software  Read the chart carefully and recognize, using context, where substitutions have occurred

## 2022-12-12 NOTE — INCIDENTAL FINDINGS
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

## 2022-12-13 VITALS
HEIGHT: 62 IN | BODY MASS INDEX: 42.19 KG/M2 | RESPIRATION RATE: 20 BRPM | SYSTOLIC BLOOD PRESSURE: 118 MMHG | OXYGEN SATURATION: 100 % | TEMPERATURE: 97.3 F | HEART RATE: 102 BPM | WEIGHT: 229.28 LBS | DIASTOLIC BLOOD PRESSURE: 64 MMHG

## 2022-12-13 LAB
GLUCOSE SERPL-MCNC: 126 MG/DL (ref 65–140)
GLUCOSE SERPL-MCNC: 144 MG/DL (ref 65–140)

## 2022-12-13 RX ORDER — PREDNISONE 10 MG/1
TABLET ORAL
Qty: 42 TABLET | Refills: 0 | Status: SHIPPED | OUTPATIENT
Start: 2022-12-13 | End: 2022-12-30

## 2022-12-13 RX ORDER — ROFLUMILAST 250 UG/1
500 TABLET ORAL DAILY
Qty: 60 TABLET | Refills: 0 | Status: SHIPPED | OUTPATIENT
Start: 2022-12-13

## 2022-12-13 RX ADMIN — LEVOTHYROXINE SODIUM 125 MCG: 0.03 TABLET ORAL at 05:54

## 2022-12-13 RX ADMIN — TOPIRAMATE 50 MG: 25 TABLET, FILM COATED ORAL at 08:49

## 2022-12-13 RX ADMIN — BICTEGRAVIR SODIUM, EMTRICITABINE, AND TENOFOVIR ALAFENAMIDE FUMARATE 1 TABLET: 50; 200; 25 TABLET ORAL at 08:53

## 2022-12-13 RX ADMIN — BUDESONIDE 0.5 MG: 0.5 INHALANT ORAL at 08:09

## 2022-12-13 RX ADMIN — APIXABAN 5 MG: 5 TABLET, FILM COATED ORAL at 08:49

## 2022-12-13 RX ADMIN — FUROSEMIDE 40 MG: 40 TABLET ORAL at 08:49

## 2022-12-13 RX ADMIN — GABAPENTIN 800 MG: 400 CAPSULE ORAL at 08:48

## 2022-12-13 RX ADMIN — TRAMADOL HYDROCHLORIDE 50 MG: 50 TABLET, COATED ORAL at 08:53

## 2022-12-13 RX ADMIN — LEVALBUTEROL 1.25 MG: 1.25 SOLUTION, CONCENTRATE RESPIRATORY (INHALATION) at 13:50

## 2022-12-13 RX ADMIN — PREDNISONE 60 MG: 20 TABLET ORAL at 08:49

## 2022-12-13 RX ADMIN — IPRATROPIUM BROMIDE 0.5 MG: 0.5 SOLUTION RESPIRATORY (INHALATION) at 08:09

## 2022-12-13 RX ADMIN — LURASIDONE HYDROCHLORIDE 40 MG: 40 TABLET, FILM COATED ORAL at 08:53

## 2022-12-13 RX ADMIN — IPRATROPIUM BROMIDE 0.5 MG: 0.5 SOLUTION RESPIRATORY (INHALATION) at 13:50

## 2022-12-13 RX ADMIN — LEVALBUTEROL 1.25 MG: 1.25 SOLUTION, CONCENTRATE RESPIRATORY (INHALATION) at 08:09

## 2022-12-13 RX ADMIN — DILTIAZEM HYDROCHLORIDE 120 MG: 120 CAPSULE, COATED, EXTENDED RELEASE ORAL at 08:48

## 2022-12-13 NOTE — UTILIZATION REVIEW
NOTIFICATION OF INPATIENT ADMISSION   AUTHORIZATION REQUEST   SERVICING FACILITY:   [address] [place] [address] [facility]  Tax ID: [id number]  NPI: [id number] ATTENDING PROVIDER:  Attending Name and NPI#: Barry Li [[id number]]  Address: [address] [place] [address] [facility]  Phone: [phone]     ADMISSION INFORMATION:  Place of Service: Inpatient [address] [place] [address]  [address]  Place of Service Code: 21  Inpatient Admission Date/Time: 12/12/22 12:08 PM  Discharge Date/Time: No discharge date for patient encounter  Admitting Diagnosis Code/Description:  Dyspnea [R06 00]  Asthma [J45 909]  Acute exacerbation of moderate persistent extrinsic asthma [J45 41]     UTILIZATION REVIEW CONTACT:  Arabella Underwood Utilization   Network Utilization Review Department  Phone: [phone]  Fax: [phone]  Email: [email or web address]  Contact for approvals/pending authorizations, clinical reviews, and discharge  PHYSICIAN ADVISORY SERVICES:  Medical Necessity Denial & [id number] Review  Phone: [phone]  Fax: [phone]  Email: [email or web address]

## 2022-12-13 NOTE — PLAN OF CARE
Problem: Potential for Falls  Goal: Patient will remain free of falls  Description: INTERVENTIONS:  - Educate patient/family on patient safety including physical limitations  - Instruct patient to call for assistance with activity   - Consult OT/PT to assist with strengthening/mobility   - Keep Call bell within reach  - Keep bed low and locked with side rails adjusted as appropriate  - Keep care items and personal belongings within reach  - Initiate and maintain comfort rounds  - Make Fall Risk Sign visible to staff  - Offer Toileting every  Hours, in advance of need  - Initiate/Maintain alarm  - Obtain necessary fall risk management equipment:   - Apply yellow socks and bracelet for high fall risk patients  - Consider moving patient to room near nurses station  Outcome: Progressing     Problem: MOBILITY - ADULT  Goal: Maintain or return to baseline ADL function  Description: INTERVENTIONS:  -  Assess patient's ability to carry out ADLs; assess patient's baseline for ADL function and identify physical deficits which impact ability to perform ADLs (bathing, care of mouth/teeth, toileting, grooming, dressing, etc )  - Assess/evaluate cause of self-care deficits   - Assess range of motion  - Assess patient's mobility; develop plan if impaired  - Assess patient's need for assistive devices and provide as appropriate  - Encourage maximum independence but intervene and supervise when necessary  - Involve family in performance of ADLs  - Assess for home care needs following discharge   - Consider OT consult to assist with ADL evaluation and planning for discharge  - Provide patient education as appropriate  Outcome: Progressing  Goal: Maintains/Returns to pre admission functional level  Description: INTERVENTIONS:  - Perform BMAT or MOVE assessment daily    - Set and communicate daily mobility goal to care team and patient/family/caregiver     - Collaborate with rehabilitation services on mobility goals if consulted  - Perform Range of Motion  times a day  - Reposition patient every  hours    - Dangle patient  times a day  - Stand patient  times a day  - Ambulate patient  times a day  - Out of bed to chair  times a day   - Out of bed for meals  times a day  - Out of bed for toileting  - Record patient progress and toleration of activity level   Outcome: Progressing     Problem: PAIN - ADULT  Goal: Verbalizes/displays adequate comfort level or baseline comfort level  Description: Interventions:  - Encourage patient to monitor pain and request assistance  - Assess pain using appropriate pain scale  - Administer analgesics based on type and severity of pain and evaluate response  - Implement non-pharmacological measures as appropriate and evaluate response  - Consider cultural and social influences on pain and pain management  - Notify physician/advanced practitioner if interventions unsuccessful or patient reports new pain  Outcome: Progressing     Problem: INFECTION - ADULT  Goal: Absence or prevention of progression during hospitalization  Description: INTERVENTIONS:  - Assess and monitor for signs and symptoms of infection  - Monitor lab/diagnostic results  - Monitor all insertion sites, i e  indwelling lines, tubes, and drains  - Monitor endotracheal if appropriate and nasal secretions for changes in amount and color  - [place] appropriate cooling/warming therapies per order  - Administer medications as ordered  - Instruct and encourage patient and family to use good hand hygiene technique  - Identify and instruct in appropriate isolation precautions for identified infection/condition  Outcome: Progressing     Problem: SAFETY ADULT  Goal: Patient will remain free of falls  Description: INTERVENTIONS:  - Educate patient/family on patient safety including physical limitations  - Instruct patient to call for assistance with activity   - Consult OT/PT to assist with strengthening/mobility   - Keep Call bell within reach  - Keep bed low and locked with side rails adjusted as appropriate  - Keep care items and personal belongings within reach  - Initiate and maintain comfort rounds  - Make Fall Risk Sign visible to staff  - Offer Toileting every  Hours, in advance of need  - Initiate/Maintain larm  - Obtain necessary fall risk management equipment: Apply yellow socks and bracelet for high fall risk patients  - Consider moving patient to room near nurses station  Outcome: Progressing  Goal: Maintain or return to baseline ADL function  Description: INTERVENTIONS:  -  Assess patient's ability to carry out ADLs; assess patient's baseline for ADL function and identify physical deficits which impact ability to perform ADLs (bathing, care of mouth/teeth, toileting, grooming, dressing, etc )  - Assess/evaluate cause of self-care deficits   - Assess range of motion  - Assess patient's mobility; develop plan if impaired  - Assess patient's need for assistive devices and provide as appropriate  - Encourage maximum independence but intervene and supervise when necessary  - Involve family in performance of ADLs  - Assess for home care needs following discharge   - Consider OT consult to assist with ADL evaluation and planning for discharge  - Provide patient education as appropriate  Outcome: Progressing  Goal: Maintains/Returns to pre admission functional level  Description: INTERVENTIONS:  - Perform BMAT or MOVE assessment daily    - Set and communicate daily mobility goal to care team and patient/family/caregiver  - Collaborate with rehabilitation services on mobility goals if consulted  - Perform Range of Motion  times a day  - Reposition patient every  hours    - Dangle patient  times a day  - Stand patient  times a day  - Ambulate patient  times a day  - Out of bed to chair  times a day   - Out of bed for meals  times a day  - Out of bed for toileting  - Record patient progress and toleration of activity level   Outcome: Progressing

## 2022-12-13 NOTE — MALNUTRITION/BMI
This medical record reflects one or more clinical indicators suggestive of malnutrition and/or morbid obesity  BMI Findings:  Adult BMI Classifications: Morbid Obesity 40-44 9        Body mass index is 41 94 kg/m²  See Nutrition note dated 12/13/22 for additional details  Completed nutrition assessment is viewable in the nutrition documentation

## 2022-12-13 NOTE — PLAN OF CARE
Problem: Potential for Falls  Goal: Patient will remain free of falls  Description: INTERVENTIONS:  - Educate patient/family on patient safety including physical limitations  - Instruct patient to call for assistance with activity   - Consult OT/PT to assist with strengthening/mobility   - Keep Call bell within reach  - Keep bed low and locked with side rails adjusted as appropriate  - Keep care items and personal belongings within reach  - Initiate and maintain comfort rounds  - Make Fall Risk Sign visible to staff  - Apply yellow socks and bracelet for high fall risk patients  - Consider moving patient to room near nurses station  Outcome: Progressing     Problem: MOBILITY - ADULT  Goal: Maintain or return to baseline ADL function  Description: INTERVENTIONS:  -  Assess patient's ability to carry out ADLs; assess patient's baseline for ADL function and identify physical deficits which impact ability to perform ADLs (bathing, care of mouth/teeth, toileting, grooming, dressing, etc )  - Assess/evaluate cause of self-care deficits   - Assess range of motion  - Assess patient's mobility; develop plan if impaired  - Assess patient's need for assistive devices and provide as appropriate  - Encourage maximum independence but intervene and supervise when necessary  - Involve family in performance of ADLs  - Assess for home care needs following discharge   - Consider OT consult to assist with ADL evaluation and planning for discharge  - Provide patient education as appropriate  Outcome: Progressing  Goal: Maintains/Returns to pre admission functional level  Description: INTERVENTIONS:  - Perform BMAT or MOVE assessment daily    - Set and communicate daily mobility goal to care team and patient/family/caregiver     - Collaborate with rehabilitation services on mobility goals if consulted  - Out of bed for toileting  - Record patient progress and toleration of activity level   Outcome: Progressing     Problem: PAIN - ADULT  Goal: Verbalizes/displays adequate comfort level or baseline comfort level  Description: Interventions:  - Encourage patient to monitor pain and request assistance  - Assess pain using appropriate pain scale  - Administer analgesics based on type and severity of pain and evaluate response  - Implement non-pharmacological measures as appropriate and evaluate response  - Consider cultural and social influences on pain and pain management  - Notify physician/advanced practitioner if interventions unsuccessful or patient reports new pain  Outcome: Progressing     Problem: INFECTION - ADULT  Goal: Absence or prevention of progression during hospitalization  Description: INTERVENTIONS:  - Assess and monitor for signs and symptoms of infection  - Monitor lab/diagnostic results  - Monitor all insertion sites, i e  indwelling lines, tubes, and drains  - Monitor endotracheal if appropriate and nasal secretions for changes in amount and color  - [place] appropriate cooling/warming therapies per order  - Administer medications as ordered  - Instruct and encourage patient and family to use good hand hygiene technique  - Identify and instruct in appropriate isolation precautions for identified infection/condition  Outcome: Progressing     Problem: SAFETY ADULT  Goal: Patient will remain free of falls  Description: INTERVENTIONS:  - Educate patient/family on patient safety including physical limitations  - Instruct patient to call for assistance with activity   - Consult OT/PT to assist with strengthening/mobility   - Keep Call bell within reach  - Keep bed low and locked with side rails adjusted as appropriate  - Keep care items and personal belongings within reach  - Initiate and maintain comfort rounds  - Make Fall Risk Sign visible to staff  - Apply yellow socks and bracelet for high fall risk patients  - Consider moving patient to room near nurses station  Outcome: Progressing  Goal: Maintain or return to baseline ADL function  Description: INTERVENTIONS:  -  Assess patient's ability to carry out ADLs; assess patient's baseline for ADL function and identify physical deficits which impact ability to perform ADLs (bathing, care of mouth/teeth, toileting, grooming, dressing, etc )  - Assess/evaluate cause of self-care deficits   - Assess range of motion  - Assess patient's mobility; develop plan if impaired  - Assess patient's need for assistive devices and provide as appropriate  - Encourage maximum independence but intervene and supervise when necessary  - Involve family in performance of ADLs  - Assess for home care needs following discharge   - Consider OT consult to assist with ADL evaluation and planning for discharge  - Provide patient education as appropriate  Outcome: Progressing  Goal: Maintains/Returns to pre admission functional level  Description: INTERVENTIONS:  - Perform BMAT or MOVE assessment daily    - Set and communicate daily mobility goal to care team and patient/family/caregiver     - Collaborate with rehabilitation services on mobility goals if consulted  - Out of bed for toileting  - Record patient progress and toleration of activity level   Outcome: Progressing     Problem: DISCHARGE PLANNING  Goal: Discharge to home or other facility with appropriate resources  Description: INTERVENTIONS:  - Identify barriers to discharge w/patient and caregiver  - Arrange for needed discharge resources and transportation as appropriate  - Identify discharge learning needs (meds, wound care, etc )  - Arrange for interpretive services to assist at discharge as needed  - Refer to Case Management Department for coordinating discharge planning if the patient needs post-hospital services based on physician/advanced practitioner order or complex needs related to functional status, cognitive ability, or social support system  Outcome: Progressing     Problem: Knowledge Deficit  Goal: Patient/family/caregiver demonstrates understanding of disease process, treatment plan, medications, and discharge instructions  Description: Complete learning assessment and assess knowledge base    Interventions:  - Provide teaching at level of understanding  - Provide teaching via preferred learning methods  Outcome: Progressing     Problem: RESPIRATORY - ADULT  Goal: Achieves optimal ventilation and oxygenation  Description: INTERVENTIONS:  - Assess for changes in respiratory status  - Assess for changes in mentation and behavior  - Position to facilitate oxygenation and minimize respiratory effort  - Oxygen administered by appropriate delivery if ordered  - Initiate smoking cessation education as indicated  - Encourage broncho-pulmonary hygiene including cough, deep breathe, Incentive Spirometry  - Assess the need for suctioning and aspirate as needed  - Assess and instruct to report SOB or any respiratory difficulty  - Respiratory Therapy support as indicated  Outcome: Progressing

## 2022-12-13 NOTE — DISCHARGE SUMMARY
[address]  Discharge- [facility] 1950, 81 y o  female MRN: [id number]  Unit/Bed#: E2 -02 Encounter: [id number]  Primary Care Provider: Yogesh Argueta DO   Date and time admitted to hospital: 12/11/2022  1:15 PM    * Acute exacerbation of moderate persistent extrinsic asthma  Assessment & Plan  · Frequent hospitalizations for the same, recently discharged on 11/19   · Pulmonary consulted   · CTA negative for PE, COVID/flu/RSV negative   · Placed on azithromycin, day 3   · CT 1 3 cm nodular density at the right base, repeat CT in 6 weeks   · Continue prednisone taper decrease by 10 mg every 3 days    Home regimen: Trelegy 200/62 5/25 CG 1 puff daily, budesonide twice daily,, and albuterol as needed, increase Daliresp 500 mcg daily    Chronic diastolic heart failure (HCC)  Assessment & Plan  Wt Readings from Last 3 Encounters:   12/11/22 104 kg (229 lb 4 5 oz)   11/19/22 104 kg (229 lb 8 oz)   11/04/22 103 kg (227 lb 15 3 oz)     · Echo from Sept reviewed: EF 31%, grade 1 diastolic dysfunction, mild TR  · Is on lasix 40 mg bid   · daily weights         Paroxysmal atrial fibrillation (HCC)  Assessment & Plan  · Continue Eliquis, Cardizem    PASHA on CPAP  Assessment & Plan  · Continue CPAP at bedtime  · educated patient on importance of compliance     Tobacco use  Assessment & Plan  · Patient counseled on smoking cessation      Bipolar 1 disorder ([facility] [address] )  Assessment & Plan  ·  continue Latuda    Chronic respiratory failure with hypoxia ([facility] [address] )  Assessment & Plan  · On baseline oxygen requirements , states she wears 3 lpm nasal cannula per home regimen     HTN (hypertension)  Assessment & Plan  · Maintained on lasix, losartan and Cardizem       HIV (human immunodeficiency virus infection)   Assessment & Plan  · Continue Biktarvy     Type 2 diabetes mellitus without complication, without long-term current use of insulin [facility])  Assessment & Plan  Lab Results   Component Value Date HGBA1C 6 8 (H) 10/16/2022       Recent Labs     12/12/22  1120 12/12/22  1610 12/12/22  2053 12/13/22  0717   POCGLU 208* 160* 280* 144*       Blood Sugar Average: Last 72 hrs:  · Continue metformin     Medical Problems     Resolved Problems  Date Reviewed: 12/13/2022   None       Discharging Physician / Practitioner: Isha Dukes PA-C  PCP: Shauna Bess DO  Admission Date:   Admission Orders (From admission, onward)     Ordered        12/12/22 1208  Inpatient Admission  Once            12/11/22 1922  Place in Observation  Once                      Discharge Date: 12/13/22    Consultations During Hospital Stay:  · Pulmonology     Procedures Performed:   · none    Significant Findings / Test Results:   · CTA chest  FINDINGS:     PULMONARY ARTERIAL TREE:  No pulmonary embolus is seen       LUNGS:  New ill-defined 1 3 cm nodular density in the right lower lobe superior segment, image 3/63  Bilateral lower lobe linear atelectasis or scarring  There is no tracheal or endobronchial lesion      PLEURA:  Unremarkable      HEART/GREAT VESSELS:  Unchanged cardiomegaly  No pericardial effusion  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, in retrospect unchanged since the prior studies      MEDIASTINUM AND KRISTYN:  Unremarkable      CHEST WALL AND LOWER NECK:   Unremarkable      VISUALIZED STRUCTURES IN THE UPPER ABDOMEN:  Unremarkable      OSSEOUS STRUCTURES:  No acute fracture or destructive osseous lesion  Posterior fusion hardware at T9-T11      IMPRESSION:     No pulmonary embolus  New 1 3 cm nodular density in the right lower lobe superior segment  Given the relatively rapid onset of this finding since 11/15/2022, this is most suggestive of infectious/inflammatory process  Recommend follow-up in one month   to ensure resolution      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  Follow-up low dose CT scan in one year recommended      · COVID/flu/RSV negative on admission       Incidental Findings:   · Mild ectasia of the ascending aorta, need repeat CT scan in 1 year  New 1 3 cm nodular density in the right lower lobe superior segment  Given the relatively rapid onset of this finding since 11/15/2022, this is most suggestive of infectious/inflammatory process  Recommend follow-up in one month   to ensure resolution  Test Results Pending at Discharge (will require follow up):   ·      Outpatient Tests Requested:  · Outpatient follow-up with pulmonology  · [facility] care provider in 1 week    Complications: Noncompliance    Reason for Admission: acute exacerbation of moderate persistent asthma    Hospital Course:   Seferino Villarreal is a 81 y o  female patient who originally presented to the hospital on 12/11/2022 due to shortness of breath  Patient was admitted for acute exacerbation of asthma  She was seen in consultation with pulmonology  Plan will be to transition to oral prednisone and decrease by 10 mg every 3 days  We will increase her Daliresp to 500 mcg daily  She will continue on Trelegy 1 puff daily, budesonide twice daily and albuterol as needed  Of note, patient will need a repeat CT of the chest in 6 weeks given rapid onset of lung nodule  Patient is a high risk for readmission given ongoing noncompliance  She needs appropriate follow-up outpatient with pulmonology  She also needs a quit smoking and received extensive education on smoking cessation while hospitalized  She also needs to be compliant with her CPAP  The patient, initially admitted to the hospital as inpatient, was discharged earlier than expected given the following: On oral steroids, pulmonology signed off  Please see above list of diagnoses and related plan for additional information  Condition at Discharge: stable    Discharge Day Visit / Exam:   Subjective: Feels well today and like breathing is back to baseline  She feels ready to go home  No other acute complaints or events overnight    Vitals: Blood Pressure: 118/64 (12/13/22 8655)  Pulse: 102 (12/13/22 0748)  Temperature: (!) 97 3 °F (36 3 °C) (12/13/22 0748)  Temp Source: Temporal (12/13/22 0748)  Respirations: 20 (12/13/22 0748)  Height: 5' 2" (157 5 cm) (12/11/22 2305)  Weight - Scale: 104 kg (229 lb 4 5 oz) (12/11/22 2305)  SpO2: 100 % (12/13/22 0748)  Exam:   Physical Exam  Vitals and nursing note reviewed  Constitutional:       General: She is not in acute distress  Appearance: She is obese  She is not toxic-appearing  Cardiovascular:      Rate and Rhythm: Normal rate and regular rhythm  Pulmonary:      Effort: Pulmonary effort is normal  No respiratory distress  Comments: 3 lpm   Abdominal:      General: Bowel sounds are normal       Palpations: Abdomen is soft  Musculoskeletal:      Right lower leg: No edema  Left lower leg: No edema  Skin:     General: Skin is warm  Neurological:      Mental Status: She is alert  Mental status is at baseline  Psychiatric:         Mood and Affect: Mood normal           Discussion with Family: Attempted to update  (daughter) via phone  Left voicemail  Discharge instructions/Information to patient and family:   See after visit summary for information provided to patient and family  Provisions for Follow-Up Care:  See after visit summary for information related to follow-up care and any pertinent home health orders  Disposition:   Home    Planned Readmission: high risk for readmission given ongoing noncompliance      Discharge Statement:  I spent 50 minutes discharging the patient  This time was spent on the day of discharge  I had direct contact with the patient on the day of discharge  Greater than 50% of the total time was spent examining patient, answering all patient questions, arranging and discussing plan of care with patient as well as directly providing post-discharge instructions  Additional time then spent on discharge activities      Discharge Medications:  See after visit summary for reconciled discharge medications provided to patient and/or family        **Please Note: This note may have been constructed using a voice recognition system**

## 2022-12-13 NOTE — UTILIZATION REVIEW
Continued Stay Review    Date: 12/13/22                        Current Patient Class: Ip Current Level of Care: MS    HPI:72 y o  female initially admitted on 12/11    Assessment/Plan:     Pt is d/c to home today 12/13  Pt feels improved, less SOB and less wheezing       Vital Signs:   12/13/22 0811 -- -- -- -- -- -- 36 4 L/min Nasal cannula --   12/13/22 0809 -- -- -- -- -- 100 % -- -- -- --   12/13/22 0748 97 3 °F (36 3 °C) Abnormal  102 20 118/64 -- 100 % -- -- Nasal cannula Lying   12/12/22 2135 98 2 °F (36 8 °C) 111 Abnormal  19 117/61 83 98 % 32 3 L/min Nasal cannula Lying   12/12/22 1953 -- -- -- -- -- 98 % 32 3 L/min Nasal cannula --   12/12/22 1607 97 1 °F (36 2 °C) Abnormal  98 20 134/80 100 99 % 32 3 L/min Nasal cannula Lying   12/12/22 1341 -- -- -- -- -- 98 % -- -- -- --   12/12/22 0755 -- -- -- 99/59 -- -- -- -- -- Lying   12/12/22 0739 98 °F (36 7 °C) 92 20 95/64 69 99 % 32 3 L/min Nasal cannula Lying   12/12/22 0700 -- -- -- -- -- 97 % -- -- -- --   12/11/22 2305 98 2 °F (36 8 °C) 80 20 113/56 112 100 % 32 3 L/min Nasal cannula Lying   12/11/22 2102 -- 97 20 151/98 113 100 % 32 3 L/min Nasal cannula Sitting   12/11/22 1923 -- 96 20 113/65 83 99 % 32 3 L/min Nasal cannula Sitting   12/11/22 1709 -- 101 20 127/72 94 97 % 32 3 L/min Nasal cannula Sitting   12/11/22 1424 -- -- -- -- -- -- -- -- Nasal cannula --   12/11/22 1330 98 3 °F (36 8 °C) -- -- -- -- -- -- -- -- --   12/11/22 1322 -- 103 24 Abnormal  130/83 -- 96 % -- -- None (Room air) Sitting     Pertinent Labs/Diagnostic Results:   Results from last 7 days   Lab Units 12/11/22  1426   SARS-COV-2  Negative     Results from last 7 days   Lab Units 12/12/22  0957 12/11/22  1521   WBC Thousand/uL 7 59 4 76   HEMOGLOBIN g/dL 10 7* 11 4*   HEMATOCRIT % 35 3 37 3   PLATELETS Thousands/uL 390 392*   NEUTROS ABS Thousands/µL 6 39 2 72         Results from last 7 days   Lab Units 12/12/22  0957 12/11/22  1521   SODIUM mmol/L 138 147   POTASSIUM mmol/L 3 6 3 2*   CHLORIDE mmol/L 105 107   CO2 mmol/L 24 28   ANION GAP mmol/L 9 12   BUN mg/dL 12 8   CREATININE mg/dL 1 11 0 91   EGFR ml/min/1 73sq m 50 64   CALCIUM mg/dL 10 0 10 8*     Results from last 7 days   Lab Units 12/11/22  1521   AST U/L 14   ALT U/L 21   ALK PHOS U/L 72   TOTAL PROTEIN g/dL 7 7   ALBUMIN g/dL 3 9   TOTAL BILIRUBIN mg/dL 0 32     Results from last 7 days   Lab Units 12/13/22  0717 12/12/22  2053 12/12/22  1610 12/12/22  1120 12/12/22  0743   POC GLUCOSE mg/dl 144* 280* 160* 208* 169*     Results from last 7 days   Lab Units 12/12/22  0957 12/11/22  1521   GLUCOSE RANDOM mg/dL 229* 101     Results from last 7 days   Lab Units 12/11/22  1922 12/11/22  1714 12/11/22  1521   HS TNI 0HR ng/L  --   --  11   HS TNI 2HR ng/L  --  10  --    HSTNI D2 ng/L  --  -1  --    HS TNI 4HR ng/L 9  --   --    HSTNI D4 ng/L -2  --   --                  Results from last 7 days   Lab Units 12/12/22  0957 12/11/22  2129   PROCALCITONIN ng/ml <0 05 <0 05     Results from last 7 days   Lab Units 12/11/22  1521   NT-PRO BNP pg/mL 61     Results from last 7 days   Lab Units 12/11/22  1426   INFLUENZA A PCR  Negative   INFLUENZA B PCR  Negative   RSV PCR  Negative     Results from last 7 days   Lab Units 12/11/22  2246   GRAM STAIN RESULT  2+ Epithelial cells per low power field*  Rare Polys*  1+ Gram positive cocci in pairs and chains*     Medications:   Scheduled Medications:  apixaban, 5 mg, Oral, BID  atorvastatin, 20 mg, Oral, Daily With Dinner  azithromycin, 500 mg, Oral, Q24H  bictegravir-emtricitab-tenofovir alafenamide, 1 tablet, Oral, Daily  budesonide, 0 5 mg, Nebulization, Q12H  diltiazem, 120 mg, Oral, Daily  furosemide, 40 mg, Oral, BID  gabapentin, 800 mg, Oral, TID  insulin lispro, 1-6 Units, Subcutaneous, TID AC  ipratropium, 0 5 mg, Nebulization, TID  levalbuterol, 1 25 mg, Nebulization, TID  levothyroxine, 125 mcg, Oral, Early Morning  lurasidone, 40 mg, Oral, Daily With Breakfast  montelukast, 10 mg, Oral, HS  nicotine, 1 patch, Transdermal, Daily  predniSONE, 60 mg, Oral, Daily  topiramate, 50 mg, Oral, BID      Continuous IV Infusions:     PRN Meds:  acetaminophen, 975 mg, Oral, Q8H PRN  levalbuterol, 1 25 mg, Nebulization, Q6H PRN   And  sodium chloride, 3 mL, Nebulization, Q6H PRN  traMADol, 50 mg, Oral, Q6H PRN - x 1 12/12, 12/13      Discharge Plan: d/c to home      Network Utilization Review Department  ATTENTION: Please call with any questions or concerns to [phone] and carefully listen to the prompts so that you are directed to the right person  All voicemails are confidential   [facility] all requests for admission clinical reviews, approved or denied determinations and any other requests to dedicated fax number below belonging to the campus where the patient is receiving treatment   List of dedicated fax numbers for the Facilities:  [address] DENIALS (Administrative/Medical Necessity) [phone]   [address] (Maternity/NICU/Pediatrics) [phone]   [address] Brenda Hilton [phone]   [facility] 77 [phone]   [address] [place] [address] Fatmata Delarosa 28 [phone]   15[address] [facility] Luis Solo [facility] 134 815 [facility] [phone]

## 2022-12-13 NOTE — ASSESSMENT & PLAN NOTE
· Frequent hospitalizations for the same, recently discharged on 11/19   · Pulmonary consulted   · CTA negative for PE, COVID/flu/RSV negative   · Refused labs this AM and did not wear CPAP last evening   · Placed on azithromycin, day 2   · CT 1 3 cm nodular density at the right base, repeat CT in 6 weeks

## 2022-12-13 NOTE — ASSESSMENT & PLAN NOTE
Lab Results   Component Value Date    HGBA1C 6 8 (H) 10/16/2022       Recent Labs     12/12/22  1120 12/12/22  1610 12/12/22 2053 12/13/22  0717   POCGLU 208* 160* 280* 144*       Blood Sugar Average: Last 72 hrs:  · Continue metformin

## 2022-12-13 NOTE — ASSESSMENT & PLAN NOTE
Wt Readings from Last 3 Encounters:   12/11/22 104 kg (229 lb 4 5 oz)   11/19/22 104 kg (229 lb 8 oz)   11/04/22 103 kg (227 lb 15 3 oz)     · Echo from Sept reviewed: EF 31%, grade 1 diastolic dysfunction, mild TR  · Is on lasix 40 mg bid   · daily weights

## 2022-12-14 LAB
BACTERIA SPT RESP CULT: ABNORMAL
GRAM STN SPEC: ABNORMAL

## 2022-12-14 NOTE — UTILIZATION REVIEW
NOTIFICATION OF ADMISSION DISCHARGE   This is a Notification of Discharge from [address]  Please be advised that this patient has been discharge from our facility  Below you will find the admission and discharge date and time including the patient’s disposition  UTILIZATION REVIEW CONTACT:  Author Karime  Utilization   Network Utilization Review Department  Phone: [phone] x carefully listen to the prompts  All voicemails are confidential   Email: [email or web address]     ADMISSION INFORMATION  PRESENTATION DATE: 12/11/2022  1:15 PM    INPATIENT ADMISSION DATE: 12/12/22 12:08 PM   DISCHARGE DATE: 12/13/2022  2:42 PM   DISPOSITION:Home/Self Care    IMPORTANT INFORMATION:  Send all requests for admission clinical reviews, approved or denied determinations and any other requests to dedicated fax number below belonging to the campus where the patient is receiving treatment  List of dedicated fax numbers:  [address] (Administrative/Medical Necessity) [phone]   [address] [place] [address] (Maternity/NICU/Pediatrics) [phone] [address] [facility] [phone]   [place] [address] [phone]   [address] [phone]   [address] [phone]   [address] [place] [address] [phone]   [facility]  [phone] [address] [facility] [phone]   [address] [place] [address] [phone] [address] [facility] [phone]           Martina House PA-C  Physician Assistant  Hospitalist  Discharge Summary      Attested  Date of Service:  12/13/2022  8:03 AM     Attested            Attestation signed by Katlin Chavarria DO at 12/13/2022  9:46 AM        I supervised the Advanced Practitioner  ? I performed, in its entirety, the history, exam, and assessment/plan component of the visit   I agree with the Advanced Practitioner's note with the following additions/exceptions:       Subjective  Feels better  Less short of breath  Less wheezing      Objective  Afebrile  Lungs: Clear to auscultation bilaterally    No rales or rhonchi nor wheezes     Labs  Reviewed     Assessment and plan  #1 exacerbation of asthma  Has resolved  Okay for discharge  [address] , DO 12/13/22                       [facility]  Discharge- Monique Bojorquez 1950, 81 y o  female MRN: [id number]  Unit/Bed#: E2 -02 Encounter: [id number]  Primary Care Provider: Miguel Angel Forbes DO   Date and time admitted to hospital: 12/11/2022  1:15 PM     * Acute exacerbation of moderate persistent extrinsic asthma  Assessment & Plan  • Frequent hospitalizations for the same, recently discharged on 11/19   • Pulmonary consulted   • CTA negative for PE, COVID/flu/RSV negative   • Placed on azithromycin, day 3   • CT 1 3 cm nodular density at the right base, repeat CT in 6 weeks   • Continue prednisone taper decrease by 10 mg every 3 days    Home regimen: Trelegy 200/62 5/25 CG 1 puff daily, budesonide twice daily,, and albuterol as needed, increase Daliresp 500 mcg daily     Chronic diastolic heart failure (HCC)  Assessment & Plan      Wt Readings from Last 3 Encounters:   12/11/22 104 kg (229 lb 4 5 oz)   11/19/22 104 kg (229 lb 8 oz)   11/04/22 103 kg (227 lb 15 3 oz)      • Echo from Sept reviewed: EF 61%, grade 1 diastolic dysfunction, mild TR  • Is on lasix 40 mg bid   • daily weights            Paroxysmal atrial fibrillation (HCC)  Assessment & Plan  • Continue Eliquis, Cardizem     PASHA on CPAP  Assessment & Plan  • Continue CPAP at bedtime  • educated patient on importance of compliance      Tobacco use  Assessment & Plan  • Patient counseled on smoking cessation        Bipolar 1 disorder ([facility] [address] )  Assessment & Plan  •  continue Latuda     Chronic respiratory failure with hypoxia ([address] )  Assessment & Plan  • On baseline oxygen requirements , states she wears 3 lpm nasal cannula per home regimen      HTN (hypertension)  Assessment & Plan  • Maintained on lasix, losartan and Cardizem         HIV (human immunodeficiency virus infection)   Assessment & Plan  • Continue Biktarvy      Type 2 diabetes mellitus without complication, without long-term current use of insulin [facility])  Assessment & Plan        Lab Results   Component Value Date     HGBA1C 6 8 (H) 10/16/2022                Recent Labs     12/12/22  1120 12/12/22  1610 12/12/22  2053 12/13/22  0717   POCGLU 208* 160* 280* 144*         Blood Sugar Average: Last 72 hrs:  • Continue metformin          Medical Problems      Resolved Problems  Date Reviewed: 12/13/2022   None         Discharging Physician / Practitioner: Ramiro Neal PA-C  PCP: Ish Garcia DO  Admission Date:       Admission Orders (From admission, onward)       Ordered         12/12/22 1208   Inpatient Admission  Once             12/11/22 1922   Place in Observation  Once                         Discharge Date: 12/13/22     Consultations During Hospital Stay:  • Pulmonology      Procedures Performed:   • none     Significant Findings / Test Results:   • CTA chest  FINDINGS:     PULMONARY ARTERIAL TREE:  No pulmonary embolus is seen       LUNGS:  New ill-defined 1 3 cm nodular density in the right lower lobe superior segment, image 3/63   Bilateral lower lobe linear atelectasis or scarring   There is no tracheal or endobronchial lesion      PLEURA:  Unremarkable      HEART/GREAT VESSELS:  Unchanged cardiomegaly   No pericardial effusion   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, in retrospect unchanged since the prior studies      MEDIASTINUM AND KRISTYN:  Unremarkable      CHEST WALL AND LOWER NECK:   Unremarkable      VISUALIZED STRUCTURES IN THE UPPER ABDOMEN:  Unremarkable      OSSEOUS STRUCTURES:  No acute fracture or destructive osseous lesion   Posterior fusion hardware at T9-T11      IMPRESSION:     No pulmonary embolus   New 1 3 cm nodular density in the right lower lobe superior segment   Given the relatively rapid onset of this finding since 11/15/2022, this is most suggestive of infectious/inflammatory process   Recommend follow-up in one month   to ensure resolution      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   Follow-up low dose CT scan in one year recommended      • COVID/flu/RSV negative on admission        Incidental Findings:   • Mild ectasia of the ascending aorta, need repeat CT scan in 1 year  New 1 3 cm nodular density in the right lower lobe superior segment   Given the relatively rapid onset of this finding since 11/15/2022, this is most suggestive of infectious/inflammatory process   Recommend follow-up in one month   to ensure resolution      Test Results Pending at Discharge (will require follow up):   •       Outpatient Tests Requested:  • Outpatient follow-up with pulmonology  • [facility] provider in 1 week     Complications: Noncompliance     Reason for Admission: acute exacerbation of moderate persistent asthma     Hospital Course:   Everett Tavares is a 81 y o  female patient who originally presented to the hospital on 12/11/2022 due to shortness of breath  Patient was admitted for acute exacerbation of asthma  She was seen in consultation with pulmonology  Plan will be to transition to oral prednisone and decrease by 10 mg every 3 days  We will increase her Daliresp to 500 mcg daily  She will continue on Trelegy 1 puff daily, budesonide twice daily and albuterol as needed      Of note, patient will need a repeat CT of the chest in 6 weeks given rapid onset of lung nodule      Patient is a high risk for readmission given ongoing noncompliance  She needs appropriate follow-up outpatient with pulmonology  She also needs a quit smoking and received extensive education on smoking cessation while hospitalized    She also needs to be compliant with her CPAP       The patient, initially admitted to the hospital as inpatient, was discharged earlier than expected given the following: On oral steroids, pulmonology signed off      Please see above list of diagnoses and related plan for additional information       Condition at Discharge: stable     Discharge Day Visit / Exam:   Subjective: Feels well today and like breathing is back to baseline  She feels ready to go home  No other acute complaints or events overnight  Vitals: Blood Pressure: 118/64 (12/13/22 0748)  Pulse: 102 (12/13/22 0748)  Temperature: (!) 97 3 °F (36 3 °C) (12/13/22 0748)  Temp Source: Temporal (12/13/22 0748)  Respirations: 20 (12/13/22 0748)  Height: 5' 2" (157 5 cm) (12/11/22 2305)  Weight - Scale: 104 kg (229 lb 4 5 oz) (12/11/22 2305)  SpO2: 100 % (12/13/22 0748)  Exam:   Physical Exam  Vitals and nursing note reviewed  Constitutional:       General: She is not in acute distress  Appearance: She is obese  She is not toxic-appearing  Cardiovascular:      Rate and Rhythm: Normal rate and regular rhythm  Pulmonary:      Effort: Pulmonary effort is normal  No respiratory distress  Comments: 3 lpm   Abdominal:      General: Bowel sounds are normal       Palpations: Abdomen is soft  Musculoskeletal:      Right lower leg: No edema  Left lower leg: No edema  Skin:     General: Skin is warm  Neurological:      Mental Status: She is alert  Mental status is at baseline  Psychiatric:         Mood and Affect: Mood normal             Discussion with Family: Attempted to update  (daughter) via phone  Left voicemail       Discharge instructions/Information to patient and family:   See after visit summary for information provided to patient and family        Provisions for Follow-Up Care:  See after visit summary for information related to follow-up care and any pertinent home health orders         Disposition:   Home     Planned Readmission: high risk for readmission given ongoing noncompliance      Discharge Statement:  I spent 50 minutes discharging the patient  This time was spent on the day of discharge  I had direct contact with the patient on the day of discharge  Greater than 50% of the total time was spent examining patient, answering all patient questions, arranging and discussing plan of care with patient as well as directly providing post-discharge instructions    Additional time then spent on discharge activities      Discharge Medications:  See after visit summary for reconciled discharge medications provided to patient and/or family        **Please Note: This note may have been constructed using a voice recognition system**              Cosigned by: Ann Marie Martin DO at 12/13/2022  9:46 AM     Revision History

## 2022-12-26 ENCOUNTER — HOSPITAL ENCOUNTER (INPATIENT)
Facility: HOSPITAL | Age: 72
LOS: 4 days | Discharge: HOME HEALTH | End: 2022-12-30
Attending: EMERGENCY MEDICINE | Admitting: INTERNAL MEDICINE

## 2022-12-26 ENCOUNTER — APPOINTMENT (EMERGENCY)
Dept: RADIOLOGY | Facility: HOSPITAL | Age: 72
End: 2022-12-26

## 2022-12-26 DIAGNOSIS — J44.1 COPD EXACERBATION (HCC): ICD-10-CM

## 2022-12-26 DIAGNOSIS — R06.00 DYSPNEA: Primary | ICD-10-CM

## 2022-12-26 PROBLEM — E66.01 CLASS 3 SEVERE OBESITY DUE TO EXCESS CALORIES WITH SERIOUS COMORBIDITY AND BODY MASS INDEX (BMI) OF 40.0 TO 44.9 IN ADULT (HCC): Status: ACTIVE | Noted: 2022-12-26

## 2022-12-26 LAB
2HR DELTA HS TROPONIN: 2 NG/L
ALBUMIN SERPL BCP-MCNC: 3.7 G/DL (ref 3.5–5)
ALP SERPL-CCNC: 68 U/L (ref 46–116)
ALT SERPL W P-5'-P-CCNC: 14 U/L (ref 12–78)
ANION GAP SERPL CALCULATED.3IONS-SCNC: 17 MMOL/L (ref 4–13)
APTT PPP: 23 SECONDS (ref 23–37)
AST SERPL W P-5'-P-CCNC: 19 U/L (ref 5–45)
ATRIAL RATE: 102 BPM
ATRIAL RATE: 105 BPM
BASOPHILS # BLD AUTO: 0.04 THOUSANDS/ÂΜL (ref 0–0.1)
BASOPHILS NFR BLD AUTO: 1 % (ref 0–1)
BILIRUB SERPL-MCNC: 0.35 MG/DL (ref 0.2–1)
BUN SERPL-MCNC: 8 MG/DL (ref 5–25)
CALCIUM SERPL-MCNC: 10.5 MG/DL (ref 8.3–10.1)
CARDIAC TROPONIN I PNL SERPL HS: 10 NG/L
CARDIAC TROPONIN I PNL SERPL HS: 12 NG/L
CHLORIDE SERPL-SCNC: 107 MMOL/L (ref 96–108)
CO2 SERPL-SCNC: 20 MMOL/L (ref 21–32)
CREAT SERPL-MCNC: 1.05 MG/DL (ref 0.6–1.3)
EOSINOPHIL # BLD AUTO: 0.12 THOUSAND/ÂΜL (ref 0–0.61)
EOSINOPHIL NFR BLD AUTO: 2 % (ref 0–6)
ERYTHROCYTE [DISTWIDTH] IN BLOOD BY AUTOMATED COUNT: 15.1 % (ref 11.6–15.1)
GFR SERPL CREATININE-BSD FRML MDRD: 53 ML/MIN/1.73SQ M
GLUCOSE SERPL-MCNC: 165 MG/DL (ref 65–140)
GLUCOSE SERPL-MCNC: 186 MG/DL (ref 65–140)
GLUCOSE SERPL-MCNC: 193 MG/DL (ref 65–140)
HCT VFR BLD AUTO: 38.4 % (ref 34.8–46.1)
HGB BLD-MCNC: 11.6 G/DL (ref 11.5–15.4)
IMM GRANULOCYTES # BLD AUTO: 0.02 THOUSAND/UL (ref 0–0.2)
IMM GRANULOCYTES NFR BLD AUTO: 0 % (ref 0–2)
INR PPP: 1.08 (ref 0.84–1.19)
LYMPHOCYTES # BLD AUTO: 1.85 THOUSANDS/ÂΜL (ref 0.6–4.47)
LYMPHOCYTES NFR BLD AUTO: 31 % (ref 14–44)
MCH RBC QN AUTO: 28.9 PG (ref 26.8–34.3)
MCHC RBC AUTO-ENTMCNC: 30.2 G/DL (ref 31.4–37.4)
MCV RBC AUTO: 96 FL (ref 82–98)
MONOCYTES # BLD AUTO: 0.4 THOUSAND/ÂΜL (ref 0.17–1.22)
MONOCYTES NFR BLD AUTO: 7 % (ref 4–12)
NEUTROPHILS # BLD AUTO: 3.52 THOUSANDS/ÂΜL (ref 1.85–7.62)
NEUTS SEG NFR BLD AUTO: 59 % (ref 43–75)
NRBC BLD AUTO-RTO: 0 /100 WBCS
NT-PROBNP SERPL-MCNC: 116 PG/ML
P AXIS: 73 DEGREES
P AXIS: 90 DEGREES
PLATELET # BLD AUTO: 290 THOUSANDS/UL (ref 149–390)
PMV BLD AUTO: 10 FL (ref 8.9–12.7)
POTASSIUM SERPL-SCNC: 3.2 MMOL/L (ref 3.5–5.3)
PR INTERVAL: 184 MS
PR INTERVAL: 194 MS
PROT SERPL-MCNC: 7.6 G/DL (ref 6.4–8.4)
PROTHROMBIN TIME: 14 SECONDS (ref 11.6–14.5)
QRS AXIS: -71 DEGREES
QRS AXIS: -77 DEGREES
QRSD INTERVAL: 108 MS
QRSD INTERVAL: 112 MS
QT INTERVAL: 306 MS
QT INTERVAL: 374 MS
QTC INTERVAL: 402 MS
QTC INTERVAL: 472 MS
RBC # BLD AUTO: 4.01 MILLION/UL (ref 3.81–5.12)
SODIUM SERPL-SCNC: 144 MMOL/L (ref 135–147)
T WAVE AXIS: 51 DEGREES
T WAVE AXIS: 77 DEGREES
VENTRICULAR RATE: 104 BPM
VENTRICULAR RATE: 96 BPM
WBC # BLD AUTO: 5.95 THOUSAND/UL (ref 4.31–10.16)

## 2022-12-26 RX ORDER — BUDESONIDE 0.5 MG/2ML
0.5 INHALANT ORAL 2 TIMES DAILY
Status: DISCONTINUED | OUTPATIENT
Start: 2022-12-26 | End: 2022-12-27

## 2022-12-26 RX ORDER — IPRATROPIUM BROMIDE AND ALBUTEROL SULFATE .5; 3 MG/3ML; MG/3ML
1 SOLUTION RESPIRATORY (INHALATION) ONCE
Status: COMPLETED | OUTPATIENT
Start: 2022-12-26 | End: 2022-12-26

## 2022-12-26 RX ORDER — INSULIN LISPRO 100 [IU]/ML
2-12 INJECTION, SOLUTION INTRAVENOUS; SUBCUTANEOUS
Status: DISCONTINUED | OUTPATIENT
Start: 2022-12-26 | End: 2022-12-30 | Stop reason: HOSPADM

## 2022-12-26 RX ORDER — LANOLIN ALCOHOL/MO/W.PET/CERES
3 CREAM (GRAM) TOPICAL
Status: DISCONTINUED | OUTPATIENT
Start: 2022-12-26 | End: 2022-12-30 | Stop reason: HOSPADM

## 2022-12-26 RX ORDER — GABAPENTIN 400 MG/1
800 CAPSULE ORAL 3 TIMES DAILY
Status: DISCONTINUED | OUTPATIENT
Start: 2022-12-26 | End: 2022-12-30 | Stop reason: HOSPADM

## 2022-12-26 RX ORDER — LEVOTHYROXINE SODIUM 0.12 MG/1
125 TABLET ORAL
Status: DISCONTINUED | OUTPATIENT
Start: 2022-12-26 | End: 2022-12-30 | Stop reason: HOSPADM

## 2022-12-26 RX ORDER — TRAMADOL HYDROCHLORIDE 50 MG/1
50 TABLET ORAL EVERY 6 HOURS PRN
Status: DISCONTINUED | OUTPATIENT
Start: 2022-12-26 | End: 2022-12-30 | Stop reason: HOSPADM

## 2022-12-26 RX ORDER — ROFLUMILAST 250 UG/1
500 TABLET ORAL DAILY
Status: DISCONTINUED | OUTPATIENT
Start: 2022-12-26 | End: 2022-12-26

## 2022-12-26 RX ORDER — TOPIRAMATE 25 MG/1
50 TABLET ORAL 2 TIMES DAILY
Status: DISCONTINUED | OUTPATIENT
Start: 2022-12-26 | End: 2022-12-30 | Stop reason: HOSPADM

## 2022-12-26 RX ORDER — FUROSEMIDE 40 MG/1
40 TABLET ORAL 2 TIMES DAILY
Status: DISCONTINUED | OUTPATIENT
Start: 2022-12-26 | End: 2022-12-30 | Stop reason: HOSPADM

## 2022-12-26 RX ORDER — LOSARTAN POTASSIUM 50 MG/1
100 TABLET ORAL DAILY
Status: DISCONTINUED | OUTPATIENT
Start: 2022-12-26 | End: 2022-12-30 | Stop reason: HOSPADM

## 2022-12-26 RX ORDER — METHYLPREDNISOLONE SODIUM SUCCINATE 40 MG/ML
40 INJECTION, POWDER, LYOPHILIZED, FOR SOLUTION INTRAMUSCULAR; INTRAVENOUS EVERY 8 HOURS SCHEDULED
Status: DISCONTINUED | OUTPATIENT
Start: 2022-12-26 | End: 2022-12-28

## 2022-12-26 RX ORDER — ALBUTEROL SULFATE 2.5 MG/3ML
5 SOLUTION RESPIRATORY (INHALATION) ONCE
Status: COMPLETED | OUTPATIENT
Start: 2022-12-26 | End: 2022-12-26

## 2022-12-26 RX ORDER — MONTELUKAST SODIUM 10 MG/1
10 TABLET ORAL
Status: DISCONTINUED | OUTPATIENT
Start: 2022-12-26 | End: 2022-12-30 | Stop reason: HOSPADM

## 2022-12-26 RX ORDER — ATORVASTATIN CALCIUM 20 MG/1
20 TABLET, FILM COATED ORAL
Status: DISCONTINUED | OUTPATIENT
Start: 2022-12-26 | End: 2022-12-30 | Stop reason: HOSPADM

## 2022-12-26 RX ORDER — BUDESONIDE AND FORMOTEROL FUMARATE DIHYDRATE 160; 4.5 UG/1; UG/1
2 AEROSOL RESPIRATORY (INHALATION) 2 TIMES DAILY
Status: DISCONTINUED | OUTPATIENT
Start: 2022-12-26 | End: 2022-12-30 | Stop reason: HOSPADM

## 2022-12-26 RX ORDER — DILTIAZEM HYDROCHLORIDE 120 MG/1
120 CAPSULE, COATED, EXTENDED RELEASE ORAL DAILY
Status: DISCONTINUED | OUTPATIENT
Start: 2022-12-26 | End: 2022-12-30 | Stop reason: HOSPADM

## 2022-12-26 RX ORDER — METHYLPREDNISOLONE SODIUM SUCCINATE 125 MG/2ML
125 INJECTION, POWDER, LYOPHILIZED, FOR SOLUTION INTRAMUSCULAR; INTRAVENOUS ONCE
Status: COMPLETED | OUTPATIENT
Start: 2022-12-26 | End: 2022-12-26

## 2022-12-26 RX ORDER — POTASSIUM CHLORIDE 20 MEQ/1
20 TABLET, EXTENDED RELEASE ORAL DAILY
Status: DISCONTINUED | OUTPATIENT
Start: 2022-12-26 | End: 2022-12-30 | Stop reason: HOSPADM

## 2022-12-26 RX ORDER — ALBUTEROL SULFATE 2.5 MG/3ML
2.5 SOLUTION RESPIRATORY (INHALATION) EVERY 6 HOURS PRN
Status: DISCONTINUED | OUTPATIENT
Start: 2022-12-26 | End: 2022-12-26

## 2022-12-26 RX ORDER — MELATONIN
1000 DAILY
Status: DISCONTINUED | OUTPATIENT
Start: 2022-12-26 | End: 2022-12-30 | Stop reason: HOSPADM

## 2022-12-26 RX ORDER — LEVALBUTEROL 1.25 MG/.5ML
1.25 SOLUTION, CONCENTRATE RESPIRATORY (INHALATION) EVERY 6 HOURS PRN
Status: DISCONTINUED | OUTPATIENT
Start: 2022-12-26 | End: 2022-12-30 | Stop reason: HOSPADM

## 2022-12-26 RX ADMIN — POTASSIUM CHLORIDE 20 MEQ: 1500 TABLET, EXTENDED RELEASE ORAL at 16:38

## 2022-12-26 RX ADMIN — LEVOTHYROXINE SODIUM 125 MCG: 125 TABLET ORAL at 16:35

## 2022-12-26 RX ADMIN — METHYLPREDNISOLONE SODIUM SUCCINATE 40 MG: 40 INJECTION, POWDER, FOR SOLUTION INTRAMUSCULAR; INTRAVENOUS at 21:33

## 2022-12-26 RX ADMIN — METHYLPREDNISOLONE SODIUM SUCCINATE 125 MG: 125 INJECTION, POWDER, FOR SOLUTION INTRAMUSCULAR; INTRAVENOUS at 14:07

## 2022-12-26 RX ADMIN — INSULIN LISPRO 2 UNITS: 100 INJECTION, SOLUTION INTRAVENOUS; SUBCUTANEOUS at 17:12

## 2022-12-26 RX ADMIN — DILTIAZEM HYDROCHLORIDE 120 MG: 120 CAPSULE, COATED, EXTENDED RELEASE ORAL at 16:37

## 2022-12-26 RX ADMIN — GABAPENTIN 800 MG: 400 CAPSULE ORAL at 21:34

## 2022-12-26 RX ADMIN — INSULIN LISPRO 2 UNITS: 100 INJECTION, SOLUTION INTRAVENOUS; SUBCUTANEOUS at 21:34

## 2022-12-26 RX ADMIN — BUDESONIDE AND FORMOTEROL FUMARATE DIHYDRATE 2 PUFF: 160; 4.5 AEROSOL RESPIRATORY (INHALATION) at 17:08

## 2022-12-26 RX ADMIN — METFORMIN HYDROCHLORIDE 500 MG: 500 TABLET ORAL at 16:37

## 2022-12-26 RX ADMIN — ALBUTEROL SULFATE 5 MG: 2.5 SOLUTION RESPIRATORY (INHALATION) at 14:05

## 2022-12-26 RX ADMIN — APIXABAN 5 MG: 5 TABLET, FILM COATED ORAL at 17:06

## 2022-12-26 RX ADMIN — ATORVASTATIN CALCIUM 20 MG: 20 TABLET, FILM COATED ORAL at 16:37

## 2022-12-26 RX ADMIN — TOPIRAMATE 50 MG: 25 TABLET, FILM COATED ORAL at 17:06

## 2022-12-26 RX ADMIN — IPRATROPIUM BROMIDE 0.5 MG: 0.5 SOLUTION RESPIRATORY (INHALATION) at 12:32

## 2022-12-26 RX ADMIN — Medication 1000 UNITS: at 16:37

## 2022-12-26 RX ADMIN — MELATONIN 3 MG: at 22:53

## 2022-12-26 RX ADMIN — LOSARTAN POTASSIUM 100 MG: 50 TABLET, FILM COATED ORAL at 16:36

## 2022-12-26 RX ADMIN — IPRATROPIUM BROMIDE 0.5 MG: 0.5 SOLUTION RESPIRATORY (INHALATION) at 14:05

## 2022-12-26 RX ADMIN — BICTEGRAVIR SODIUM, EMTRICITABINE, AND TENOFOVIR ALAFENAMIDE FUMARATE 1 TABLET: 50; 200; 25 TABLET ORAL at 16:38

## 2022-12-26 RX ADMIN — FUROSEMIDE 40 MG: 40 TABLET ORAL at 17:06

## 2022-12-26 RX ADMIN — MONTELUKAST 10 MG: 10 TABLET, FILM COATED ORAL at 21:34

## 2022-12-26 RX ADMIN — BUDESONIDE 0.5 MG: 0.5 INHALANT RESPIRATORY (INHALATION) at 20:15

## 2022-12-26 RX ADMIN — ALBUTEROL SULFATE 5 MG: 2.5 SOLUTION RESPIRATORY (INHALATION) at 12:32

## 2022-12-26 RX ADMIN — GABAPENTIN 800 MG: 400 CAPSULE ORAL at 16:35

## 2022-12-26 RX ADMIN — TRAMADOL HYDROCHLORIDE 50 MG: 50 TABLET, COATED ORAL at 17:06

## 2022-12-26 NOTE — ED PROVIDER NOTES
History  Chief Complaint   Patient presents with   • Shortness of Breath     Pt reports sob starting yesterday and getting worse today  C/o pressure in the chest  Hx of COPD and asthma  EMS gave 1 DuoNeb en route  History provided by:  Patient   used: No    Shortness of Breath  Severity:  Moderate  Onset quality:  Gradual  Duration:  6 hours  Timing:  Intermittent  Progression:  Waxing and waning  Chronicity:  Recurrent  Context comment:  Hx of COPD, comes with worsening SOB, still smokes  Relieved by:  Nothing  Worsened by:  Nothing  Ineffective treatments:  None tried  Associated symptoms: no abdominal pain, no chest pain, no cough, no fever, no headaches, no neck pain, no rash, no sore throat and no vomiting    Risk factors: obesity    Risk factors comment:  DM, HTN, COPD      Prior to Admission Medications   Prescriptions Last Dose Informant Patient Reported?  Taking?   acetaminophen (TYLENOL) 325 mg tablet   No No   Sig: Take 3 tablets (975 mg total) by mouth every 8 (eight) hours as needed for mild pain   albuterol (2 5 mg/3 mL) 0 083 % nebulizer solution   No No   Sig: Take 3 mL (2 5 mg total) by nebulization every 6 (six) hours as needed for wheezing or shortness of breath   apixaban (ELIQUIS) 5 mg   Yes No   Sig: Take 1 tablet by mouth 2 (two) times a day   atorvastatin (LIPITOR) 20 mg tablet   Yes No   Sig: Take 20 mg by mouth daily   bictegravir-emtricitab-tenofovir alafenamide (Biktarvy) -25 MG tablet   Yes No   Sig: Take 1 tablet by mouth daily   budesonide (PULMICORT) 0 5 mg/2 mL nebulizer solution   No No   Sig: Take 2 mL (0 5 mg total) by nebulization 2 (two) times a day Rinse mouth after use    carboxymethylcellulose 0 5 % SOLN   No No   Sig: Administer 1 drop into the left eye 3 (three) times a day as needed for dry eyes   cholecalciferol (VITAMIN D3) 1,000 units tablet   Yes No   Sig: Take 1,000 Units by mouth daily   diltiazem (CARDIZEM CD) 120 mg 24 hr capsule   No No   Sig: Take 1 capsule (120 mg total) by mouth daily   fluticasone-umeclidinium-vilanterol (Trelegy Ellipta) 200-62 5-25 mcg/actuation AEPB inhaler   Yes No   Sig: Inhale 1 puff daily Rinse mouth after use     furosemide (LASIX) 40 mg tablet   No No   Sig: Take 1 tablet (40 mg total) by mouth 2 (two) times a day   gabapentin (NEURONTIN) 400 mg capsule   Yes No   Sig: Take 800 mg by mouth 3 (three) times a day     levothyroxine 125 mcg tablet   Yes No   Sig: Take 125 mcg by mouth daily   losartan (COZAAR) 100 MG tablet   Yes No   Sig: Take 100 mg by mouth daily   lurasidone (LATUDA) 40 mg tablet   Yes No   Sig: Take 40 mg by mouth   metFORMIN (GLUCOPHAGE) 500 mg tablet   Yes No   Sig: Take 500 mg by mouth daily   montelukast (SINGULAIR) 10 mg tablet   Yes No   Sig: Take 10 mg by mouth daily at bedtime   nicotine (NICODERM CQ) 21 mg/24 hr TD 24 hr patch   No No   Sig: Place 1 patch on the skin daily   potassium chloride (K-DUR,KLOR-CON) 20 mEq tablet   No No   Sig: Take 1 tablet (20 mEq total) by mouth daily   predniSONE 10 mg tablet   No No   Sig: Take 6 tabs x 2 days, 4 tabs x 3 days, 3 tabs x 3 days, 2 tabs x 3 days, 1 tab x 3 days   roflumilast (DALIRESP) 250 MCG tablet   No No   Sig: Take 2 tablets (500 mcg total) by mouth daily   topiramate (TOPAMAX) 50 MG tablet   Yes No   Sig: Take 50 mg by mouth 2 (two) times a day   traMADol (ULTRAM) 50 mg tablet   Yes No   Sig: Take 50 mg by mouth as needed      Facility-Administered Medications: None       Past Medical History:   Diagnosis Date   • Asthma    • Bipolar 1 disorder (HCC)    • Cardiac disease    • COPD (chronic obstructive pulmonary disease) (HCC)    • HIV (human immunodeficiency virus infection) ([facility] ) 03/24/2016   • Hypertension    • Osteoarthritis        Past Surgical History:   Procedure Laterality Date   • HERNIA REPAIR     • LUMBAR FUSION N/A 4/10/2018    Procedure: T9/10 discectomy with T9-T11 fusion;  Surgeon: Peyman Vieyra MD;  Location: [facility]; Service: Neurosurgery   • THYROIDECTOMY         Family History   Problem Relation Age of Onset   • No Known Problems Mother    • Diabetes Father    • Heart disease Neg Hx    • Cancer Neg Hx      I have reviewed and agree with the history as documented  E-Cigarette/Vaping   • E-Cigarette Use Never User      E-Cigarette/Vaping Substances     Social History     Tobacco Use   • Smoking status: Former     Packs/day: 0 20     Years: 45 00     Pack years: 9 00     Types: Cigarettes   • Smokeless tobacco: Never   • Tobacco comments:     Currently smoking 1-2 cigarettes a day   Vaping Use   • Vaping Use: Never used   Substance Use Topics   • Alcohol use: Not Currently     Alcohol/week: 2 0 standard drinks     Types: 1 Cans of beer, 1 Shots of liquor per week     Comment: only on holidays   • Drug use: Not Currently     Comment: former IV drug user       Review of Systems   Constitutional: Negative for chills and fever  HENT: Negative for facial swelling, sore throat and trouble swallowing  Eyes: Negative for pain and visual disturbance  Respiratory: Positive for shortness of breath  Negative for cough and chest tightness  Cardiovascular: Negative for chest pain and leg swelling  Gastrointestinal: Negative for abdominal pain, diarrhea, nausea and vomiting  Genitourinary: Negative for dysuria and flank pain  Musculoskeletal: Negative for back pain, neck pain and neck stiffness  Skin: Negative for pallor and rash  Allergic/Immunologic: Negative for environmental allergies and immunocompromised state  Neurological: Negative for dizziness and headaches  Hematological: Negative for adenopathy  Does not bruise/bleed easily  Psychiatric/Behavioral: Negative for agitation and behavioral problems  All other systems reviewed and are negative  Physical Exam  Physical Exam  Vitals and nursing note reviewed  Constitutional:       General: She is not in acute distress       Appearance: She is well-developed  HENT:      Head: Normocephalic and atraumatic  Eyes:      Extraocular Movements: Extraocular movements intact  Cardiovascular:      Rate and Rhythm: Normal rate and regular rhythm  Heart sounds: Normal heart sounds  Pulmonary:      Effort: Pulmonary effort is normal       Breath sounds: Wheezing present  Abdominal:      Palpations: Abdomen is soft  Tenderness: There is no abdominal tenderness  There is no guarding or rebound  Musculoskeletal:         General: Normal range of motion  Cervical back: Normal range of motion and neck supple  Skin:     General: Skin is warm and dry  Neurological:      General: No focal deficit present  Mental Status: She is alert and oriented to person, place, and time     Psychiatric:         Mood and Affect: Mood normal          Behavior: Behavior normal          Vital Signs  ED Triage Vitals [12/26/22 1129]   Temperature Pulse Respirations Blood Pressure SpO2   98 5 °F (36 9 °C) (!) 109 (!) 36 167/71 98 %      Temp Source Heart Rate Source Patient Position - Orthostatic VS BP Location FiO2 (%)   Oral Monitor Sitting Right arm --      Pain Score       --           Vitals:    12/26/22 1300 12/26/22 1345 12/26/22 1624 12/26/22 1625   BP: 105/65 156/74  120/80   Pulse: 102 (!) 108 101 98   Patient Position - Orthostatic VS: Lying Lying  Sitting         Visual Acuity      ED Medications  Medications   apixaban (ELIQUIS) tablet 5 mg (has no administration in time range)   atorvastatin (LIPITOR) tablet 20 mg (20 mg Oral Given 12/26/22 1637)   bictegravir-emtricitab-tenofovir alafenamide (BIKTARVY) -25 MG tablet 1 tablet (1 tablet Oral Given 12/26/22 1638)   budesonide (PULMICORT) inhalation solution 0 5 mg (has no administration in time range)   cholecalciferol (VITAMIN D3) tablet 1,000 Units (1,000 Units Oral Given 12/26/22 1637)   diltiazem (CARDIZEM CD) 24 hr capsule 120 mg (120 mg Oral Given 12/26/22 1637)   budesonide-formoterol (SYMBICORT) 160-4 5 mcg/act inhaler 2 puff (has no administration in time range)   gabapentin (NEURONTIN) capsule 800 mg (800 mg Oral Given 12/26/22 1635)   furosemide (LASIX) tablet 40 mg (has no administration in time range)   levothyroxine tablet 125 mcg (125 mcg Oral Given 12/26/22 1635)   losartan (COZAAR) tablet 100 mg (100 mg Oral Given 12/26/22 1636)   lurasidone (LATUDA) tablet 40 mg (has no administration in time range)   metFORMIN (GLUCOPHAGE) tablet 500 mg (500 mg Oral Given 12/26/22 1637)   montelukast (SINGULAIR) tablet 10 mg (has no administration in time range)   potassium chloride (K-DUR,KLOR-CON) CR tablet 20 mEq (20 mEq Oral Given 12/26/22 1638)   roflumilast (DALIRESP) tablet 500 mcg (has no administration in time range)   topiramate (TOPAMAX) tablet 50 mg (has no administration in time range)   levalbuterol (XOPENEX) inhalation solution 1 25 mg (has no administration in time range)   methylPREDNISolone sodium succinate (Solu-MEDROL) injection 40 mg (has no administration in time range)   insulin lispro (HumaLOG) 100 units/mL subcutaneous injection 2-12 Units (has no administration in time range)   insulin lispro (HumaLOG) 100 units/mL subcutaneous injection 2-12 Units (has no administration in time range)   traMADol (ULTRAM) tablet 50 mg (has no administration in time range)   ipratropium-albuterol (FOR EMS ONLY) (DUO-NEB) 0 5-2 5 mg/3 mL inhalation solution 3 mL (0 mL Does not apply Given to EMS 12/26/22 1130)   albuterol inhalation solution 5 mg (5 mg Nebulization Given 12/26/22 1232)   ipratropium (ATROVENT) 0 02 % inhalation solution 0 5 mg (0 5 mg Nebulization Given 12/26/22 1232)   methylPREDNISolone sodium succinate (Solu-MEDROL) injection 125 mg (125 mg Intravenous Given 12/26/22 1407)   albuterol inhalation solution 5 mg (5 mg Nebulization Given 12/26/22 1405)   ipratropium (ATROVENT) 0 02 % inhalation solution 0 5 mg (0 5 mg Nebulization Given 12/26/22 0925)       Diagnostic Studies  Results Reviewed     Procedure Component Value Units Date/Time    HS Troponin I 2hr [[id number]]  (Normal) Collected: 12/26/22 1413    Lab Status: Final result Specimen: Blood from Arm, Left Updated: 12/26/22 1442     hs TnI 2hr 12 ng/L      Delta 2hr hsTnI 2 ng/L     Protime-INR [[id number]]  (Normal) Collected: 12/26/22 1231    Lab Status: Final result Specimen: Blood from Arm, Left Updated: 12/26/22 1257     Protime 14 0 seconds      INR 1 08    APTT [[id number]]  (Normal) Collected: 12/26/22 1231    Lab Status: Final result Specimen: Blood from Arm, Left Updated: 12/26/22 1257     PTT 23 seconds     NT-BNP PRO [[id number]]  (Normal) Collected: 12/26/22 1214    Lab Status: Final result Specimen: Blood from Hand, Right Updated: 12/26/22 1249     NT-proBNP 116 pg/mL     HS Troponin 0hr (reflex protocol) [[id number]]  (Normal) Collected: 12/26/22 1214    Lab Status: Final result Specimen: Blood from Hand, Right Updated: 12/26/22 1247     hs TnI 0hr 10 ng/L     Comprehensive metabolic panel [[id number]]  (Abnormal) Collected: 12/26/22 1214    Lab Status: Final result Specimen: Blood from Hand, Right Updated: 12/26/22 1241     Sodium 144 mmol/L      Potassium 3 2 mmol/L      Chloride 107 mmol/L      CO2 20 mmol/L      ANION GAP 17 mmol/L      BUN 8 mg/dL      Creatinine 1 05 mg/dL      Glucose 193 mg/dL      Calcium 10 5 mg/dL      AST 19 U/L      ALT 14 U/L      Alkaline Phosphatase 68 U/L      Total Protein 7 6 g/dL      Albumin 3 7 g/dL      Total Bilirubin 0 35 mg/dL      eGFR 53 ml/min/1 73sq m     Narrative:      [place] guidelines for Chronic Kidney Disease (CKD):   •  Stage 1 with normal or high GFR (GFR > 90 mL/min/1 73 square meters)  •  Stage 2 Mild CKD (GFR = 60-89 mL/min/1 73 square meters)  •  Stage 3A Moderate CKD (GFR = 45-59 mL/min/1 73 square meters)  •  Stage 3B Moderate CKD (GFR = 30-44 mL/min/1 73 square meters)  •  Stage 4 Severe CKD (GFR = 15-29 mL/min/1 73 square meters)  •  Stage 5 End Stage CKD (GFR <15 mL/min/1 73 square meters)  Note: GFR calculation is accurate only with a steady state creatinine    CBC and differential [[id number]]  (Abnormal) Collected: 12/26/22 1214    Lab Status: Final result Specimen: Blood from Hand, Right Updated: 12/26/22 1224     WBC 5 95 Thousand/uL      RBC 4 01 Million/uL      Hemoglobin 11 6 g/dL      Hematocrit 38 4 %      MCV 96 fL      MCH 28 9 pg      MCHC 30 2 g/dL      RDW 15 1 %      MPV 10 0 fL      Platelets 103 Thousands/uL      nRBC 0 /100 WBCs      Neutrophils Relative 59 %      Immat GRANS % 0 %      Lymphocytes Relative 31 %      Monocytes Relative 7 %      Eosinophils Relative 2 %      Basophils Relative 1 %      Neutrophils Absolute 3 52 Thousands/µL      Immature Grans Absolute 0 02 Thousand/uL      Lymphocytes Absolute 1 85 Thousands/µL      Monocytes Absolute 0 40 Thousand/µL      Eosinophils Absolute 0 12 Thousand/µL      Basophils Absolute 0 04 Thousands/µL                  XR chest 1 view portable    (Results Pending)              Procedures  ECG 12 Lead Documentation Only    Date/Time: 12/26/2022 3:45 PM  Performed by: Nolvia Gregorio MD  Authorized by: Nolvia Gregorio MD     Indications / Diagnosis:  Dyspnea  ECG reviewed by me, the ED Provider: yes    Patient location:  ED  Interpretation:     Interpretation: normal    Rate:     ECG rate assessment: normal    Rhythm:     Rhythm: sinus rhythm    Ectopy:     Ectopy: none    QRS:     QRS axis:  Normal  Conduction:     Conduction: normal    ST segments:     ST segments:  Normal  T waves:     T waves: normal               ED Course  ED Course as of 12/26/22 1656   Mon Dec 26, 2022   1310 WBC: 5 95   1310 Hemoglobin: 11 6   1310 Platelet Count: 700   1310 Sodium: 144   1310 Potassium(!): 3 2   1310 CO2(!): 20   1310 Anion Gap(!): 17   1310 BUN: 8   1310 Creatinine: 1 05   1310 hs TnI 0hr: 10   1310 NT-proBNP: 116  Labs reviewed     1425 Patient continues to have SOB, wheezes, we will repeat Neb, admit for COPD exacerbation  SBIRT 20yo+    Flowsheet Row Most Recent Value   SBIRT (25 yo +)    In order to provide better care to our patients, we are screening all of our patients for alcohol and drug use  Would it be okay to ask you these screening questions? Yes Filed at: 12/26/2022 1302   Initial Alcohol Screen: US AUDIT-C     1  How often do you have a drink containing alcohol? 1 Filed at: 12/26/2022 1302   2  How many drinks containing alcohol do you have on a typical day you are drinking? 0 Filed at: 12/26/2022 1302   3b  FEMALE Any Age, or MALE 65+: How often do you have 4 or more drinks on one occassion? 0 Filed at: 12/26/2022 1302   Audit-C Score 1 Filed at: 12/26/2022 1302   DEMETRIS: How many times in the past year have you    Used an illegal drug or used a prescription medication for non-medical reasons? Never Filed at: 12/26/2022 1302                    MDM  Number of Diagnoses or Management Options  COPD exacerbation ([facility] ): new and requires workup  Dyspnea: new and requires workup  Diagnosis management comments: Is a 22-year-old female, history of hypertension, diabetes, COPD, artery disease, bipolar disorder, comes in with complaints of shortness of breath, started today morning, also reports chest pressure, denies fever, phlegm, abdominal pain, back pain, headache  Exam portion is conscious, alert, vital signs stable, no acute distress, lung exam with wheezing, heart sounds regular, abdomen soft nontender, no peripheral edema, no calf tenderness or swelling    Impression: COPD exacerbation, possible cardiac etiology due to patient's age and risk factors, will check labs, EKG, chest x-ray, give neb treatment, Solu-Medrol, re-evaluate,       Amount and/or Complexity of Data Reviewed  Clinical lab tests: reviewed and ordered  Tests in the radiology section of CPT®: ordered and reviewed  Tests in the medicine section of CPT®: ordered and reviewed  Discuss the patient with other providers: yes  Independent visualization of images, tracings, or specimens: yes        Disposition  Final diagnoses:   Dyspnea   COPD exacerbation ([address] )     Time reflects when diagnosis was documented in both MDM as applicable and the Disposition within this note     Time User Action Codes Description Comment    12/26/2022  3:00 PM Cait Kraft Add [R06 00] Dyspnea     12/26/2022  3:00 PM Cait [facility] Add [J44 1] COPD exacerbation [facility])       ED Disposition     ED Disposition   Admit    Condition   Stable    Date/Time   Mon Dec 26, 2022  3:00 PM    Comment   Case was discussed with Dr Jim Anna and the patient's admission status was agreed to be Admission Status: inpatient status to the service of Dr Jim Anna  Follow-up Information    None         Current Discharge Medication List      CONTINUE these medications which have NOT CHANGED    Details   acetaminophen (TYLENOL) 325 mg tablet Take 3 tablets (975 mg total) by mouth every 8 (eight) hours as needed for mild pain  Refills: 0    Associated Diagnoses: Acute exacerbation of chronic obstructive pulmonary disease (COPD) (HCC)      albuterol (2 5 mg/3 mL) 0 083 % nebulizer solution Take 3 mL (2 5 mg total) by nebulization every 6 (six) hours as needed for wheezing or shortness of breath  Qty: 75 mL, Refills: 0    Associated Diagnoses: COPD with acute exacerbation (HCC)      apixaban (ELIQUIS) 5 mg Take 1 tablet by mouth 2 (two) times a day      atorvastatin (LIPITOR) 20 mg tablet Take 20 mg by mouth daily      bictegravir-emtricitab-tenofovir alafenamide (Biktarvy) -25 MG tablet Take 1 tablet by mouth daily      budesonide (PULMICORT) 0 5 mg/2 mL nebulizer solution Take 2 mL (0 5 mg total) by nebulization 2 (two) times a day Rinse mouth after use    Qty: 120 mL, Refills: 0    Associated Diagnoses: Acute exacerbation of chronic obstructive pulmonary disease (COPD) (HCC)      carboxymethylcellulose 0 5 % SOLN Administer 1 drop into the left eye 3 (three) times a day as needed for dry eyes  Qty: 30 each, Refills: 0    Associated Diagnoses: Irritation of left eye      cholecalciferol (VITAMIN D3) 1,000 units tablet Take 1,000 Units by mouth daily      diltiazem (CARDIZEM CD) 120 mg 24 hr capsule Take 1 capsule (120 mg total) by mouth daily  Qty: 30 capsule, Refills: 0    Associated Diagnoses: Paroxysmal atrial fibrillation (HCC)      fluticasone-umeclidinium-vilanterol (Trelegy Ellipta) 200-62 5-25 mcg/actuation AEPB inhaler Inhale 1 puff daily Rinse mouth after use        furosemide (LASIX) 40 mg tablet Take 1 tablet (40 mg total) by mouth 2 (two) times a day  Qty: 60 tablet, Refills: 0    Associated Diagnoses: Acute on chronic diastolic heart failure (HCC)      gabapentin (NEURONTIN) 400 mg capsule Take 800 mg by mouth 3 (three) times a day        levothyroxine 125 mcg tablet Take 125 mcg by mouth daily      losartan (COZAAR) 100 MG tablet Take 100 mg by mouth daily      lurasidone (LATUDA) 40 mg tablet Take 40 mg by mouth      metFORMIN (GLUCOPHAGE) 500 mg tablet Take 500 mg by mouth daily      montelukast (SINGULAIR) 10 mg tablet Take 10 mg by mouth daily at bedtime      nicotine (NICODERM CQ) 21 mg/24 hr TD 24 hr patch Place 1 patch on the skin daily  Qty: 28 patch, Refills: 0    Associated Diagnoses: Tobacco use      potassium chloride (K-DUR,KLOR-CON) 20 mEq tablet Take 1 tablet (20 mEq total) by mouth daily  Qty: 30 tablet, Refills: 0    Associated Diagnoses: Acute on chronic diastolic heart failure (HCC)      predniSONE 10 mg tablet Take 6 tabs x 2 days, 4 tabs x 3 days, 3 tabs x 3 days, 2 tabs x 3 days, 1 tab x 3 days  Qty: 42 tablet, Refills: 0    Associated Diagnoses: Acute exacerbation of moderate persistent extrinsic asthma      roflumilast (DALIRESP) 250 MCG tablet Take 2 tablets (500 mcg total) by mouth daily  Qty: 60 tablet, Refills: 0    Associated Diagnoses: Acute exacerbation of moderate persistent extrinsic asthma      topiramate (TOPAMAX) 50 MG tablet Take 50 mg by mouth 2 (two) times a day      traMADol (ULTRAM) 50 mg tablet Take 50 mg by mouth as needed             No discharge procedures on file      PDMP Review       Value Time User    PDMP Reviewed  Yes 11/3/2022  8:54 PM Carla Harry,           ED Provider  Electronically Signed by           Erleen Fabry, MD  12/26/22 0013

## 2022-12-26 NOTE — PLAN OF CARE
Problem: PAIN - ADULT  Goal: Verbalizes/displays adequate comfort level or baseline comfort level  Description: Interventions:  - Encourage patient to monitor pain and request assistance  - Assess pain using appropriate pain scale  - Administer analgesics based on type and severity of pain and evaluate response  - Implement non-pharmacological measures as appropriate and evaluate response  - Consider cultural and social influences on pain and pain management  - Notify physician/advanced practitioner if interventions unsuccessful or patient reports new pain  Outcome: Progressing     Problem: INFECTION - ADULT  Goal: Absence or prevention of progression during hospitalization  Description: INTERVENTIONS:  - Assess and monitor for signs and symptoms of infection  - Monitor lab/diagnostic results  - Monitor all insertion sites, i e  indwelling lines, tubes, and drains  - Monitor endotracheal if appropriate and nasal secretions for changes in amount and color  - [place] appropriate cooling/warming therapies per order  - Administer medications as ordered  - Instruct and encourage patient and family to use good hand hygiene technique  - Identify and instruct in appropriate isolation precautions for identified infection/condition  Outcome: Progressing     Problem: SAFETY ADULT  Goal: Patient will remain free of falls  Description: INTERVENTIONS:  - Educate patient/family on patient safety including physical limitations  - Instruct patient to call for assistance with activity   - Consult OT/PT to assist with strengthening/mobility   - Keep Call bell within reach  - Keep bed low and locked with side rails adjusted as appropriate  - Keep care items and personal belongings within reach  - Initiate and maintain comfort rounds  - Make Fall Risk Sign visible to staff  - Offer Toileting every 2 Hours, in advance of need  - Initiate/Maintain bed alarm  - Apply yellow socks and bracelet for high fall risk patients  - Consider moving patient to room near nurses station  Outcome: Progressing     Problem: DISCHARGE PLANNING  Goal: Discharge to home or other facility with appropriate resources  Description: INTERVENTIONS:  - Identify barriers to discharge w/patient and caregiver  - Arrange for needed discharge resources and transportation as appropriate  - Identify discharge learning needs (meds, wound care, etc )  - Arrange for interpretive services to assist at discharge as needed  - Refer to Case Management Department for coordinating discharge planning if the patient needs post-hospital services based on physician/advanced practitioner order or complex needs related to functional status, cognitive ability, or social support system  Outcome: Progressing     Problem: Knowledge Deficit  Goal: Patient/family/caregiver demonstrates understanding of disease process, treatment plan, medications, and discharge instructions  Description: Complete learning assessment and assess knowledge base    Interventions:  - Provide teaching at level of understanding  - Provide teaching via preferred learning methods  Outcome: Progressing     Problem: RESPIRATORY - ADULT  Goal: Achieves optimal ventilation and oxygenation  Description: INTERVENTIONS:  - Assess for changes in respiratory status  - Assess for changes in mentation and behavior  - Position to facilitate oxygenation and minimize respiratory effort  - Oxygen administered by appropriate delivery if ordered  - Initiate smoking cessation education as indicated  - Encourage broncho-pulmonary hygiene including cough, deep breathe, Incentive Spirometry  - Assess the need for suctioning and aspirate as needed  - Assess and instruct to report SOB or any respiratory difficulty  - Respiratory Therapy support as indicated  Outcome: Progressing     Problem: METABOLIC, FLUID AND ELECTROLYTES - ADULT  Goal: Glucose maintained within target range  Description: INTERVENTIONS:  - Monitor Blood Glucose as ordered  - Assess for signs and symptoms of hyperglycemia and hypoglycemia  - Administer ordered medications to maintain glucose within target range  - Assess nutritional intake and initiate nutrition service referral as needed  Outcome: Progressing     Problem: SKIN/TISSUE INTEGRITY - ADULT  Goal: Skin Integrity remains intact(Skin Breakdown Prevention)  Description: Assess:  -Perform Jonathan assessment every shift  -Clean and moisturize skin every shift  -Inspect skin when repositioning, toileting, and assisting with ADLS  -Assess extremities for adequate circulation and sensation     Bed Management:  -Have minimal linens on bed & keep smooth, unwrinkled  -Change linens as needed when moist or perspiring  -Avoid sitting or lying in one position for more than 2 hours while in bed  -Keep HOB at 30 degrees     Toileting:  -Offer bedside commode    Activity:  -Mobilize patient 4 times a day  -Encourage activity and walks on unit  -Encourage or provide ROM exercises   -Turn and reposition patient every 2 Hours  -Use appropriate equipment to lift or move patient in bed  -Instruct/ Assist with weight shifting every 2 hours when out of bed in chair  -Consider limitation of chair time 2 hours hour intervals    Skin Care:  -Avoid use of baby powder, tape, friction and shearing, hot water or constrictive clothing    Next Steps:  -Teach patient strategies to minimize risks such as using call bell   -Consider consults to  interdisciplinary teams such as respiratory  Outcome: Progressing

## 2022-12-26 NOTE — CASE MANAGEMENT
Case Management ED Progress Note    Patient name Toñito Linda  Location ED 30/ED 30 MRN [id number]  : 1950 Date 2022        OBJECTIVE:  Predictive Model Details         98% Factor Value    Risk of Hospital Admission or ED Visit Model Number of ED Visits 5+     Number of Hospitalizations 5+     Has Medicaid Yes     Is in Relationship No     Has Atrial Fibrillation Yes     Has COPD Yes     Has Anemia Yes     Has CVD Yes     Has Diabetes Yes     Has Asthma Yes     Has CHF Yes     Has PCP Yes            Chief Complaint: SOB (shortness of breath)   Patient Class: Emergency  Preferred Pharmacy:   [facility], [address] [facility] [facility]  [address]  [place] [address]  Phone: [phone] Fax: [phone]    Primary Care Provider: Jhon Garcia DO    Primary Insurance: [facility]  Secondary Insurance: [address] [place] [address]    ED Progress Note:  CM responded to Code R alert  RECENT ADMISSION:   AGE: 80 yo  SEX: Female  DX: Acute exacerbation of moderate persistent extrinsic asthma  OUTCOME: Discharge home with [facility] and breathing treatments   RESOURCES ON D/C (previous admission[facility]  CURRENT F/U APPTS: PCP and pulmonology   *REASON FOR READMISSION:  Shortness of breath   *INTERVENTIONS: provider notified; awaiting medical workup     CM met with pt at bedside  Pt reported she has nebulizer at home as well as CPAP, though she does not know how to use CPAP machine  Pt reported it was just dropped off with no instruction on how to use  When asked if pt picked up medications after the last hospital visit, pt reported the aide picks up medication  Pt reported the home nurse called to schedule an appointment with the pulmonologist, but does not believe there is a scheduled appointment date yet  Pt [facility] [place] [facility] [place] [id number]  CM sent [place] for [facility], with request for nurse to provide education on CPAP machine  Per chart review, nurse Rob Pat (Kady English) went over how to use equipment with pt on 12/21/22  Rob Pat also called pt's pulmonologist to request refills for Budesonide neb; however, pulmonology unable to refill due to pt's lack of follow up with the office  Rob Pat noted pt's PCP would be able to refill pt's Budesonide until pt is able to get established with pulmonology again  Pt reported she will need transport home at time of discharge  CM left  for Rob Pat requesting call back

## 2022-12-26 NOTE — H&P
Unit/Bed#: ED 30 Encounter: [id number]    Chief complaint: Shortness of breath    History of Present Illness     HPI:  Hieu Carolina is a 81 y o  female who came to the emergency room because of shortness of breath for 2 days  The patient has a history of COPD and chronic hypoxic respiratory failure  She was recently hospitalized for exacerbation  She was discharged on a tapering schedule of prednisone  She is finished with this  She was also supposed to be on inhaled budesonide but I do not think that she was able to get it from the pharmacy as yet  She does have a nebulizer which she has been using  She has had little sputum  She has had no chest pain  She has had no fever or hemoptysis  Treatment in the emergency room was incompletely successful  She is admitted for further care  The patient's past medical history is positive for hypertension and hypercholesterolemia  She has a history of type 2 diabetes which has been under good control  She has COPD as mentioned  She has a history of paroxysmal atrial fibrillation and has been on apixaban for stroke prophylaxis  She has a history of chronic diastolic congestive heart failure  She has obstructive sleep apnea  She is hypothyroid  She is obese  She has a history of HIV infection  She has a history of bipolar affective disorder  She has a history of osteoarthritis  She has undergone surgery for a spinal fusion and has had a thyroidectomy  The patient's medications at the time of admission include:   Albuterol by nebulization every 6 hours as needed  Apixaban 5 mg twice daily  Atorvastatin 20 mg daily  Biktarvy 1 tablet daily  Budesonide 0 5 mg twice daily by nebulization  Vitamin D 1000 units daily  Diltiazem 120 mg daily  Trelegy Ellipta 1 puff daily  Furosemide 40 mg twice daily  Latuda 40 mg daily  Metformin 500 mg daily  Montelukast 10 mg daily  Potassium chloride 20 mill equivalents daily  Daliresp 500 mg daily  Topamax 50 mg twice daily  Tramadol 50 mg as needed    The patient is allergic to lisinopril which causes angioedema  She gets a rash from fluoxetine and itching from sulfonamides  She is also allergic to quinine  Family History:   Family History   Problem Relation Age of Onset   • No Known Problems Mother    • Diabetes Father    • Heart disease Neg Hx    • Cancer Neg Hx      Social history reveals that the patient has been a smoker  She has managed to reduce her cigarette intake to 1 or 2 daily but has not been able to stop  She denies ethanol ingestion or the use of illicit drugs  She lives alone but has some home health aides  Review of Systems  Review of systems was taken in detail including 12 systems  In addition to those issues mentioned above, the patient reports generalized fatigue  She also has joint pain which is related to osteoarthritis  She has had her influenza vaccine  She has been vaccinated for COVID-19  Objective   Vitals: Blood pressure 156/74, pulse (!) 108, temperature 98 5 °F (36 9 °C), temperature source Oral, resp  rate (!) 34, last menstrual period 04/01/2013, SpO2 96 %, not currently breastfeeding  Physical Exam   The patient is a well-developed, obese woman who appears moderately short of breath  Head is atraumatic and normocephalic  ENT examination is unrevealing  Eyes show the pupils to be equal, round, and reactive to light  Extraocular movements are intact  Neck is supple  Carotids are full without bruits  There is no lymphadenopathy or goiter  Lungs reveal diminished breath sounds bilaterally with diffuse wheezing  Cardiac exam revealed a regular rhythm  I heard no murmur, gallop, or rub  The abdomen is soft with active bowel sounds  There is no mass, tenderness, or organomegaly  Extremities show no clubbing, cyanosis, or edema  There is no calf tenderness  Neurologic examination reveals the patient to be alert and oriented  No focal sign is present      Lab Results:   Results for orders placed or performed during the hospital encounter of 12/26/22   CBC and differential   Result Value Ref Range    WBC 5 95 4 31 - 10 16 Thousand/uL    RBC 4 01 3 81 - 5 12 Million/uL    Hemoglobin 11 6 11 5 - 15 4 g/dL    Hematocrit 38 4 34 8 - 46 1 %    MCV 96 82 - 98 fL    MCH 28 9 26 8 - 34 3 pg    MCHC 30 2 (L) 31 4 - 37 4 g/dL    RDW 15 1 11 6 - 15 1 %    MPV 10 0 8 9 - 12 7 fL    Platelets 701 068 - 470 Thousands/uL    nRBC 0 /100 WBCs    Neutrophils Relative 59 43 - 75 %    Immat GRANS % 0 0 - 2 %    Lymphocytes Relative 31 14 - 44 %    Monocytes Relative 7 4 - 12 %    Eosinophils Relative 2 0 - 6 %    Basophils Relative 1 0 - 1 %    Neutrophils Absolute 3 52 1 85 - 7 62 Thousands/µL    Immature Grans Absolute 0 02 0 00 - 0 20 Thousand/uL    Lymphocytes Absolute 1 85 0 60 - 4 47 Thousands/µL    Monocytes Absolute 0 40 0 17 - 1 22 Thousand/µL    Eosinophils Absolute 0 12 0 00 - 0 61 Thousand/µL    Basophils Absolute 0 04 0 00 - 0 10 Thousands/µL   Protime-INR   Result Value Ref Range    Protime 14 0 11 6 - 14 5 seconds    INR 1 08 0 84 - 1 19   APTT   Result Value Ref Range    PTT 23 23 - 37 seconds   Comprehensive metabolic panel   Result Value Ref Range    Sodium 144 135 - 147 mmol/L    Potassium 3 2 (L) 3 5 - 5 3 mmol/L    Chloride 107 96 - 108 mmol/L    CO2 20 (L) 21 - 32 mmol/L    ANION GAP 17 (H) 4 - 13 mmol/L    BUN 8 5 - 25 mg/dL    Creatinine 1 05 0 60 - 1 30 mg/dL    Glucose 193 (H) 65 - 140 mg/dL    Calcium 10 5 (H) 8 3 - 10 1 mg/dL    AST 19 5 - 45 U/L    ALT 14 12 - 78 U/L    Alkaline Phosphatase 68 46 - 116 U/L    Total Protein 7 6 6 4 - 8 4 g/dL    Albumin 3 7 3 5 - 5 0 g/dL    Total Bilirubin 0 35 0 20 - 1 00 mg/dL    eGFR 53 ml/min/1 73sq m   NT-BNP PRO   Result Value Ref Range    NT-proBNP 116 <125 pg/mL   HS Troponin 0hr (reflex protocol)   Result Value Ref Range    hs TnI 0hr 10 "Refer to ACS Flowchart"- see link ng/L   HS Troponin I 2hr   Result Value Ref Range hs TnI 2hr 12 "Refer to ACS Flowchart"- see link ng/L    Delta 2hr hsTnI 2 <20 ng/L   ECG 12 lead   Result Value Ref Range    Ventricular Rate 104 BPM    Atrial Rate 105 BPM    SC Interval 184 ms    QRSD Interval 112 ms    QT Interval 306 ms    QTC Interval 402 ms    P Axis 73 degrees    QRS Axis -71 degrees    T Wave Axis 51 degrees   ECG 12 lead   Result Value Ref Range    Ventricular Rate 96 BPM    Atrial Rate 102 BPM    SC Interval 194 ms    QRSD Interval 108 ms    QT Interval 374 ms    QTC Interval 472 ms    P Axis 90 degrees    QRS Axis -77 degrees    T Wave Axis 77 degrees           Assessment/Plan     Assessment:  1  Acute exacerbation of COPD  2  Chronic hypoxic respiratory failure  3  Obstructive sleep apnea  4  Severe obesity  5  Hypertension  6  Hyperlipidemia  7  Type 2 diabetes  8  Hypothyroidism  9  Chronic diastolic congestive heart failure  10  Paroxysmal atrial fibrillation    Plan:  The patient will be admitted to the hospital and monitored carefully  She will be treated with intensive bronchodilator therapy, intravenous steroids, and supplemental oxygen as needed  Before the patient's discharge, will be particularly important to ensure that she has access to all of her prescribed medications  In addition, she has had some difficulty in arranging timely outpatient follow-up  Hopefully, this can be corrected  Careful attention to the patient's blood sugar will be needed while she is on steroid therapy  In light of the severity of the patient's underlying disease and the futility of treatment in the emergency room, I believe that her hospitalization will span 2 or more midnights  She is assigned inpatient status  I discussed resuscitative measures with the patient and she would like all available modalities employed on her behalf in the event of a cardiac arrest   She is assigned to Ja Dahl 53 level 1          Code Status: Level 1 - Full Code

## 2022-12-27 LAB
ANION GAP SERPL CALCULATED.3IONS-SCNC: 13 MMOL/L (ref 4–13)
BASOPHILS # BLD AUTO: 0.01 THOUSANDS/ÂΜL (ref 0–0.1)
BASOPHILS NFR BLD AUTO: 0 % (ref 0–1)
BUN SERPL-MCNC: 18 MG/DL (ref 5–25)
CALCIUM SERPL-MCNC: 10.6 MG/DL (ref 8.3–10.1)
CHLORIDE SERPL-SCNC: 105 MMOL/L (ref 96–108)
CO2 SERPL-SCNC: 25 MMOL/L (ref 21–32)
CREAT SERPL-MCNC: 1.28 MG/DL (ref 0.6–1.3)
EOSINOPHIL # BLD AUTO: 0 THOUSAND/ÂΜL (ref 0–0.61)
EOSINOPHIL NFR BLD AUTO: 0 % (ref 0–6)
ERYTHROCYTE [DISTWIDTH] IN BLOOD BY AUTOMATED COUNT: 15.1 % (ref 11.6–15.1)
GFR SERPL CREATININE-BSD FRML MDRD: 42 ML/MIN/1.73SQ M
GLUCOSE SERPL-MCNC: 168 MG/DL (ref 65–140)
GLUCOSE SERPL-MCNC: 170 MG/DL (ref 65–140)
GLUCOSE SERPL-MCNC: 172 MG/DL (ref 65–140)
GLUCOSE SERPL-MCNC: 176 MG/DL (ref 65–140)
HCT VFR BLD AUTO: 34.1 % (ref 34.8–46.1)
HCV AB SER QL: ABNORMAL
HGB BLD-MCNC: 10.6 G/DL (ref 11.5–15.4)
IMM GRANULOCYTES # BLD AUTO: 0.08 THOUSAND/UL (ref 0–0.2)
IMM GRANULOCYTES NFR BLD AUTO: 1 % (ref 0–2)
LYMPHOCYTES # BLD AUTO: 0.91 THOUSANDS/ÂΜL (ref 0.6–4.47)
LYMPHOCYTES NFR BLD AUTO: 13 % (ref 14–44)
MCH RBC QN AUTO: 28.8 PG (ref 26.8–34.3)
MCHC RBC AUTO-ENTMCNC: 31.1 G/DL (ref 31.4–37.4)
MCV RBC AUTO: 93 FL (ref 82–98)
MONOCYTES # BLD AUTO: 0.21 THOUSAND/ÂΜL (ref 0.17–1.22)
MONOCYTES NFR BLD AUTO: 3 % (ref 4–12)
NEUTROPHILS # BLD AUTO: 5.74 THOUSANDS/ÂΜL (ref 1.85–7.62)
NEUTS SEG NFR BLD AUTO: 83 % (ref 43–75)
NRBC BLD AUTO-RTO: 0 /100 WBCS
PLATELET # BLD AUTO: 316 THOUSANDS/UL (ref 149–390)
PMV BLD AUTO: 9.2 FL (ref 8.9–12.7)
POTASSIUM SERPL-SCNC: 3.5 MMOL/L (ref 3.5–5.3)
RBC # BLD AUTO: 3.68 MILLION/UL (ref 3.81–5.12)
SODIUM SERPL-SCNC: 143 MMOL/L (ref 135–147)
WBC # BLD AUTO: 6.95 THOUSAND/UL (ref 4.31–10.16)

## 2022-12-27 RX ORDER — SODIUM CHLORIDE FOR INHALATION 0.9 %
VIAL, NEBULIZER (ML) INHALATION
Status: COMPLETED
Start: 2022-12-27 | End: 2022-12-27

## 2022-12-27 RX ADMIN — INSULIN LISPRO 2 UNITS: 100 INJECTION, SOLUTION INTRAVENOUS; SUBCUTANEOUS at 22:56

## 2022-12-27 RX ADMIN — ISODIUM CHLORIDE 3 ML: 0.03 SOLUTION RESPIRATORY (INHALATION) at 16:29

## 2022-12-27 RX ADMIN — INSULIN LISPRO 2 UNITS: 100 INJECTION, SOLUTION INTRAVENOUS; SUBCUTANEOUS at 12:26

## 2022-12-27 RX ADMIN — METHYLPREDNISOLONE SODIUM SUCCINATE 40 MG: 40 INJECTION, POWDER, FOR SOLUTION INTRAMUSCULAR; INTRAVENOUS at 13:28

## 2022-12-27 RX ADMIN — INSULIN LISPRO 2 UNITS: 100 INJECTION, SOLUTION INTRAVENOUS; SUBCUTANEOUS at 17:58

## 2022-12-27 RX ADMIN — POTASSIUM CHLORIDE 20 MEQ: 1500 TABLET, EXTENDED RELEASE ORAL at 09:53

## 2022-12-27 RX ADMIN — TRAMADOL HYDROCHLORIDE 50 MG: 50 TABLET, COATED ORAL at 23:08

## 2022-12-27 RX ADMIN — MELATONIN 3 MG: at 22:56

## 2022-12-27 RX ADMIN — METFORMIN HYDROCHLORIDE 500 MG: 500 TABLET ORAL at 09:53

## 2022-12-27 RX ADMIN — LURASIDONE HYDROCHLORIDE 40 MG: 40 TABLET, FILM COATED ORAL at 09:54

## 2022-12-27 RX ADMIN — LEVOTHYROXINE SODIUM 125 MCG: 125 TABLET ORAL at 05:37

## 2022-12-27 RX ADMIN — APIXABAN 5 MG: 5 TABLET, FILM COATED ORAL at 09:53

## 2022-12-27 RX ADMIN — Medication 1000 UNITS: at 09:53

## 2022-12-27 RX ADMIN — TOPIRAMATE 50 MG: 25 TABLET, FILM COATED ORAL at 09:53

## 2022-12-27 RX ADMIN — LOSARTAN POTASSIUM 100 MG: 50 TABLET, FILM COATED ORAL at 09:53

## 2022-12-27 RX ADMIN — FUROSEMIDE 40 MG: 40 TABLET ORAL at 09:54

## 2022-12-27 RX ADMIN — BICTEGRAVIR SODIUM, EMTRICITABINE, AND TENOFOVIR ALAFENAMIDE FUMARATE 1 TABLET: 50; 200; 25 TABLET ORAL at 09:54

## 2022-12-27 RX ADMIN — GABAPENTIN 800 MG: 400 CAPSULE ORAL at 17:56

## 2022-12-27 RX ADMIN — DILTIAZEM HYDROCHLORIDE 120 MG: 120 CAPSULE, COATED, EXTENDED RELEASE ORAL at 09:53

## 2022-12-27 RX ADMIN — TOPIRAMATE 50 MG: 25 TABLET, FILM COATED ORAL at 17:56

## 2022-12-27 RX ADMIN — METHYLPREDNISOLONE SODIUM SUCCINATE 40 MG: 40 INJECTION, POWDER, FOR SOLUTION INTRAMUSCULAR; INTRAVENOUS at 22:56

## 2022-12-27 RX ADMIN — TRAMADOL HYDROCHLORIDE 50 MG: 50 TABLET, COATED ORAL at 17:56

## 2022-12-27 RX ADMIN — METHYLPREDNISOLONE SODIUM SUCCINATE 40 MG: 40 INJECTION, POWDER, FOR SOLUTION INTRAMUSCULAR; INTRAVENOUS at 05:37

## 2022-12-27 RX ADMIN — MONTELUKAST 10 MG: 10 TABLET, FILM COATED ORAL at 22:56

## 2022-12-27 RX ADMIN — BUDESONIDE AND FORMOTEROL FUMARATE DIHYDRATE 2 PUFF: 160; 4.5 AEROSOL RESPIRATORY (INHALATION) at 17:58

## 2022-12-27 RX ADMIN — INSULIN LISPRO 2 UNITS: 100 INJECTION, SOLUTION INTRAVENOUS; SUBCUTANEOUS at 09:54

## 2022-12-27 RX ADMIN — TRAMADOL HYDROCHLORIDE 50 MG: 50 TABLET, COATED ORAL at 09:53

## 2022-12-27 RX ADMIN — ATORVASTATIN CALCIUM 20 MG: 20 TABLET, FILM COATED ORAL at 17:58

## 2022-12-27 RX ADMIN — GABAPENTIN 800 MG: 400 CAPSULE ORAL at 23:16

## 2022-12-27 RX ADMIN — APIXABAN 5 MG: 5 TABLET, FILM COATED ORAL at 17:56

## 2022-12-27 RX ADMIN — GABAPENTIN 800 MG: 400 CAPSULE ORAL at 09:53

## 2022-12-27 RX ADMIN — BUDESONIDE AND FORMOTEROL FUMARATE DIHYDRATE 2 PUFF: 160; 4.5 AEROSOL RESPIRATORY (INHALATION) at 09:54

## 2022-12-27 RX ADMIN — FUROSEMIDE 40 MG: 40 TABLET ORAL at 17:56

## 2022-12-27 RX ADMIN — LEVALBUTEROL 1.25 MG: 1.25 SOLUTION, CONCENTRATE RESPIRATORY (INHALATION) at 16:29

## 2022-12-27 NOTE — ASSESSMENT & PLAN NOTE
Patient frequently comes in the hospital for exacerbations  [facility] day 2 and she is not actually requiring any oxygen    I think she can likely go home tomorrow    Make sure we have high utilizer plan on her

## 2022-12-27 NOTE — PROGRESS NOTES
[address] [place] [address]  Progress Note - Minetta Bending 1950, 81 y o  female MRN: [id number]  Unit/Bed#: E5 -01 Encounter: [id number]  Primary Care Provider: Hawk Lao DO   Date and time admitted to hospital: 2022 11:23 AM    * COPD exacerbation [facility])  Assessment & Plan  Patient frequently comes in the hospital for exacerbations  [facility] day 2 and she is not actually requiring any oxygen    I think she can likely go home tomorrow  Make sure we have high utilizer plan on her            Subjective:   Feels better  Less sob  No wheeze  Objective:     Vitals:   Temp (24hrs), Av 6 °F (36 4 °C), Min:97 4 °F (36 3 °C), Max:97 8 °F (36 6 °C)    Temp:  [97 4 °F (36 3 °C)-97 8 °F (36 6 °C)] 97 8 °F (36 6 °C)  HR:  [] 97  Resp:  [17-18] 17  BP: (104-113)/(62-67) 109/62  SpO2:  [91 %-96 %] 96 %  Body mass index is 39 6 kg/m²  Input and Output Summary (last 24 hours):     No intake or output data in the 24 hours ending 22 1645    Physical Exam:     Physical Exam  Vitals and nursing note reviewed  HENT:      Head: Normocephalic and atraumatic  Eyes:      Pupils: Pupils are equal, round, and reactive to light  Cardiovascular:      Rate and Rhythm: Normal rate and regular rhythm  Heart sounds: No murmur heard  No friction rub  No gallop  Pulmonary:      Breath sounds: No wheezing or rales  Comments: Relatively clear bilateral   No rales, rhonchi, wheeze  Abdominal:      General: Bowel sounds are normal       Palpations: Abdomen is soft  Tenderness: There is no abdominal tenderness  Musculoskeletal:      Right lower leg: No edema  Left lower leg: No edema                 Additional Data:     Labs:    Results from last 7 days   Lab Units 22  0555   WBC Thousand/uL 6 95   HEMOGLOBIN g/dL 10 6*   HEMATOCRIT % 34 1*   PLATELETS Thousands/uL 316   NEUTROS PCT % 83*   LYMPHS PCT % 13*   MONOS PCT % 3*   EOS PCT % 0     Results from last 7 days   Lab Units 12/27/22  0555 12/26/22  1214   POTASSIUM mmol/L 3 5 3 2*   CHLORIDE mmol/L 105 107   CO2 mmol/L 25 20*   BUN mg/dL 18 8   CREATININE mg/dL 1 28 1 05   CALCIUM mg/dL 10 6* 10 5*   ALK PHOS U/L  --  68   ALT U/L  --  14   AST U/L  --  19     Results from last 7 days   Lab Units 12/26/22  1231   INR  1 08     Results from last 7 days   Lab Units 12/27/22  1620 12/27/22  1119 12/27/22  0737 12/26/22  2045 12/26/22  1622   POC GLUCOSE mg/dl 168* 176* 172* 186* 165*               * I Have Reviewed All Lab Data     Recent Cultures (last 7 days):             Last 24 Hours Medication List:   Current Facility-Administered Medications   Medication Dose Route Frequency Provider Last Rate   • apixaban  5 mg Oral BID Torres Malloy MD     • atorvastatin  20 mg Oral Daily With Anthony Block MD     • bictegravir-emtricitab-tenofovir alafenamide  1 tablet Oral Daily Torres Malloy MD     • budesonide-formoterol  2 puff Inhalation BID Torres Malloy MD     • cholecalciferol  1,000 Units Oral Daily Torres Malloy MD     • diltiazem  120 mg Oral Daily Torres Malloy MD     • furosemide  40 mg Oral BID Torres Malloy MD     • gabapentin  800 mg Oral TID Torres Malloy MD     • insulin lispro  2-12 Units Subcutaneous TID [facility] Torres Malloy MD     • insulin lispro  2-12 Units Subcutaneous HS Torres Malloy MD     • levalbuterol  1 25 mg Nebulization Q6H PRN Torres Malloy MD     • levothyroxine  125 mcg Oral Early Morning Torres Malloy MD     • losartan  100 mg Oral Daily Torres Malloy MD     • lurasidone  40 mg Oral Daily With Breakfast Torres Malloy MD     • melatonin  3 mg Oral HS Jen Kc PA-C     • metFORMIN  500 mg Oral Daily Torres Malloy MD     • methylPREDNISolone sodium succinate  40 mg Intravenous [facility] Torres Malloy MD     • montelukast  10 mg Oral HS Torres Malloy MD     • potassium chloride  20 mEq Oral Daily Torres Malloy MD     • topiramate  50 mg Oral BID Torres Malloy MD • traMADol  50 mg Oral Q6H PRN Garth Howell MD           VTE Pharmacologic Prophylaxis:   Pharmacologic: Apixaban (Eliquis)      Current Length of Stay: 1 day(s)    Current Patient Status: Inpatient       Discharge Plan: home tomorrow    Code Status: Level 1 - Full Code           Today, Patient Was Seen By: Cuca Obrien DO    ** Please Note: Dictation voice to text software may have been used in the creation of this document   **

## 2022-12-27 NOTE — UTILIZATION REVIEW
NOTIFICATION OF INPATIENT ADMISSION   AUTHORIZATION REQUEST   SERVICING FACILITY:   [address] [place] [address] [facility]  Tax ID: [id number]  NPI: [id number] ATTENDING PROVIDER:  Attending Name and NPI#: Kishantawnya Min [[id number]]  Address: [address] [place] [address] [facility]  Phone: [phone]     ADMISSION INFORMATION:  Place of Service: Inpatient [address]  [address]  Place of Service Code: 21  Inpatient Admission Date/Time: 12/26/22  3:00 PM  Discharge Date/Time: No discharge date for patient encounter  Admitting Diagnosis Code/Description:  Dyspnea [R06 00]  SOB (shortness of breath) [R06 02]  COPD exacerbation ([facility] [address] ) [J44 1]     UTILIZATION REVIEW CONTACT:  Agapito Cuadra Utilization   Network Utilization Review Department  Phone: [phone]  Fax: [phone]  Email: [email or web address]  Contact for approvals/pending authorizations, clinical reviews, and discharge  PHYSICIAN ADVISORY SERVICES:  Medical Necessity Denial & [employer] Review  Phone: [phone]  Fax: [phone]  Email: [email or web address]

## 2022-12-27 NOTE — UTILIZATION REVIEW
Initial Clinical Review    Admission: Date/Time/Statement:   Admission Orders (From admission, onward)     Ordered        12/26/22 1500  INPATIENT ADMISSION  Once                      Orders Placed This Encounter   Procedures   • INPATIENT ADMISSION     Standing Status:   Standing     Number of Occurrences:   1     Order Specific Question:   Level of Care     Answer:   Med Surg [16]     Order Specific Question:   Estimated length of stay     Answer:   More than 2 Midnights     Order Specific Question:   Certification     Answer:   I certify that inpatient services are medically necessary for this patient for a duration of greater than two midnights  See H&P and MD Progress Notes for additional information about the patient's course of treatment  ED Arrival Information     Expected   -    Arrival   12/26/2022 11:23    Acuity   Urgent            Means of arrival   Ambulance    Escorted by   [facility] ([address])    Service   Hospitalist    Admission type   Emergency            Arrival complaint   SOB           Chief Complaint   Patient presents with   • Shortness of Breath     Pt reports sob starting yesterday and getting worse today  C/o pressure in the chest  Hx of COPD and asthma  EMS gave 1 DuoNeb en route  Initial Presentation: 81 y o  female presents to ED   Via  EMS  From home  With  Shortness of breath for  2 days  PMH is  COPD, chronic hypoxic respiratory failure, recent admission  For exacerbation  Discharged on  Tapering doses of prednisone  Supposed to be on  Budesonide, not able to fill Rx  Has  Been  Using home nebulizer  Additional  PMH is  Smoker,  DM2, HTN, PAF on Eliquis, CHF, PASHA, hypothyroidism, HIV and bipolar  Admit  Ip with  Acute exacerbation  COPD, chronic hypoxic respiratory failure  And plan is   IV  Steroids, bronchodilators,  Monitor labs,  and O2  As needed  Date:   12/27       Day 2:   Continue  IV  S/medrol     Sats  Currently  93  %  Continue current meds/treatment plan  ED Triage Vitals   Temperature Pulse Respirations Blood Pressure SpO2   12/26/22 1129 12/26/22 1129 12/26/22 1129 12/26/22 1129 12/26/22 1129   98 5 °F (36 9 °C) (!) 109 (!) 36 167/71 98 %      Temp Source Heart Rate Source Patient Position - Orthostatic VS BP Location FiO2 (%)   12/26/22 1129 12/26/22 1129 12/26/22 1129 12/26/22 1129 --   Oral Monitor Sitting Right arm       Pain Score       12/26/22 1706       8          Wt Readings from Last 1 Encounters:   12/27/22 98 2 kg (216 lb 7 9 oz)     Additional Vital Signs:   97 8 °F (36 6 °C) 99 17 113/67 82 93 % None (Room air) Lying    12/27/22 0300 -- -- -- -- -- 91 % -- --   12/27/22 0100 -- -- -- -- -- 91 % -- --   12/26/22 23:20:32 97 4 °F (36 3 °C) Abnormal  102 18 104/62 76 94 % Nasal cannula Lying   12/26/22 2019 -- -- -- -- -- 94 % -- --   12/26/22 16:25:40 98 °F (36 7 °C) 98 18 120/80 93 94 % None (Room air) Sitting   12/26/22 16:24:10 -- 101 18 -- 77 93 % -- --   12/26/22 1345 -- 108 Abnormal  34 Abnormal  156/74 89 96 % None (Room air) Lying   12/26/22 1300 -- 102 24 Abnormal  105/65 -- 100 % None (Room air) Lying   12/26/22 1129 98 5 °F (36 9 °C) 109 Abnormal  36 Abnormal  167/71 -- 98 % None (Room air) Sitting       Pertinent Labs/Diagnostic Test Results:   XR chest 1 view portable   Final Result by Lindsay Araiza MD (12/27 9302)      No acute cardiopulmonary disease                    Workstation performed: [id number]               Results from last 7 days   Lab Units 12/27/22  0555 12/26/22  1214   WBC Thousand/uL 6 95 5 95   HEMOGLOBIN g/dL 10 6* 11 6   HEMATOCRIT % 34 1* 38 4   PLATELETS Thousands/uL 316 290   NEUTROS ABS Thousands/µL 5 74 3 52         Results from last 7 days   Lab Units 12/27/22  0555 12/26/22  1214   SODIUM mmol/L 143 144   POTASSIUM mmol/L 3 5 3 2*   CHLORIDE mmol/L 105 107   CO2 mmol/L 25 20*   ANION GAP mmol/L 13 17*   BUN mg/dL 18 8   CREATININE mg/dL 1 28 1 05   EGFR ml/min/1 73sq m 42 53   CALCIUM mg/dL 10 6* 10 5*     Results from last 7 days   Lab Units 12/26/22  1214   AST U/L 19   ALT U/L 14   ALK PHOS U/L 68   TOTAL PROTEIN g/dL 7 6   ALBUMIN g/dL 3 7   TOTAL BILIRUBIN mg/dL 0 35     Results from last 7 days   Lab Units 12/27/22  1119 12/27/22  0737 12/26/22  2045 12/26/22  1622   POC GLUCOSE mg/dl 176* 172* 186* 165*     Results from last 7 days   Lab Units 12/27/22  0555 12/26/22  1214   GLUCOSE RANDOM mg/dL 170* 193*               Results from last 7 days   Lab Units 12/26/22  1413 12/26/22  1214   HS TNI 0HR ng/L  --  10   HS TNI 2HR ng/L 12  --    HSTNI D2 ng/L 2  --          Results from last 7 days   Lab Units 12/26/22  1231   PROTIME seconds 14 0   INR  1 08   PTT seconds 23                         Results from last 7 days   Lab Units 12/26/22  1214   NT-PRO BNP pg/mL 116         Results from last 7 days   Lab Units 12/27/22  0555   HEP C AB  Low Reactive*         ED Treatment:   Medication Administration from 12/26/2022 1123 to 12/26/2022 1609       Date/Time Order Dose Route Action Comments     12/26/2022 1130 EST ipratropium-albuterol (FOR EMS ONLY) (DUO-NEB) 0 5-2 5 mg/3 mL inhalation solution 3 mL 0 mL Does not apply Given to EMS --     12/26/2022 1232 EST albuterol inhalation solution 5 mg 5 mg Nebulization Given --     12/26/2022 1232 EST ipratropium (ATROVENT) 0 02 % inhalation solution 0 5 mg 0 5 mg Nebulization Given --     12/26/2022 1407 EST methylPREDNISolone sodium succinate (Solu-MEDROL) injection 125 mg 125 mg Intravenous Given --     12/26/2022 1405 EST albuterol inhalation solution 5 mg 5 mg Nebulization Given --     12/26/2022 1405 EST ipratropium (ATROVENT) 0 02 % inhalation solution 0 5 mg 0 5 mg Nebulization Given --        Present on Admission:  • Type 2 diabetes mellitus without complication, without long-term current use of insulin (HCC)  • Chronic respiratory failure with hypoxia (HCC)  • COPD exacerbation (HCC)  • Hypertension  • Paroxysmal atrial fibrillation (HCC)  • Chronic diastolic heart failure (HCC)  • HIV (human immunodeficiency virus infection)   • Bipolar 1 disorder (HCC)  • Tobacco use  • Hyperlipidemia      Admitting Diagnosis: Dyspnea [R06 00]  SOB (shortness of breath) [R06 02]  COPD exacerbation (HCC) [J44 1]  Age/Sex: 81 y o  female  Admission Orders:  Scheduled Medications:  apixaban, 5 mg, Oral, BID  atorvastatin, 20 mg, Oral, Daily With Dinner  bictegravir-emtricitab-tenofovir alafenamide, 1 tablet, Oral, Daily  budesonide-formoterol, 2 puff, Inhalation, BID  cholecalciferol, 1,000 Units, Oral, Daily  diltiazem, 120 mg, Oral, Daily  furosemide, 40 mg, Oral, BID  gabapentin, 800 mg, Oral, TID  insulin lispro, 2-12 Units, Subcutaneous, TID AC  insulin lispro, 2-12 Units, Subcutaneous, HS  levothyroxine, 125 mcg, Oral, Early Morning  losartan, 100 mg, Oral, Daily  lurasidone, 40 mg, Oral, Daily With Breakfast  melatonin, 3 mg, Oral, HS  metFORMIN, 500 mg, Oral, Daily  methylPREDNISolone sodium succinate, 40 mg, Intravenous, Q8H CARLOS  montelukast, 10 mg, Oral, HS  potassium chloride, 20 mEq, Oral, Daily  topiramate, 50 mg, Oral, BID      Continuous IV Infusions:     PRN Meds:  levalbuterol, 1 25 mg, Nebulization, Q6H PRN  traMADol, 50 mg, Oral, Q6H PRN          Network Utilization Review Department  ATTENTION: Please call with any questions or concerns to [phone] and carefully listen to the prompts so that you are directed to the right person  All voicemails are confidential   [place] all requests for admission clinical reviews, approved or denied determinations and any other requests to dedicated fax number below belonging to the campus where the patient is receiving treatment   List of dedicated fax numbers for the Facilities:  [address] DENIALS (Administrative/Medical Necessity) [phone]   [address] (Maternity/NICU/Pediatrics) 180 [facility] [phone]   [place] [phone] [address] [place] [address] [place] [address] [phone] [address] [place] [address]15 [facility] [phone]

## 2022-12-27 NOTE — PLAN OF CARE
Problem: PAIN - ADULT  Goal: Verbalizes/displays adequate comfort level or baseline comfort level  Description: Interventions:  - Encourage patient to monitor pain and request assistance  - Assess pain using appropriate pain scale  - Administer analgesics based on type and severity of pain and evaluate response  - Implement non-pharmacological measures as appropriate and evaluate response  - Consider cultural and social influences on pain and pain management  - Notify physician/advanced practitioner if interventions unsuccessful or patient reports new pain  Outcome: Progressing     Problem: INFECTION - ADULT  Goal: Absence or prevention of progression during hospitalization  Description: INTERVENTIONS:  - Assess and monitor for signs and symptoms of infection  - Monitor lab/diagnostic results  - Monitor all insertion sites, i e  indwelling lines, tubes, and drains  - Monitor endotracheal if appropriate and nasal secretions for changes in amount and color  - Patrick appropriate cooling/warming therapies per order  - Administer medications as ordered  - Instruct and encourage patient and family to use good hand hygiene technique  - Identify and instruct in appropriate isolation precautions for identified infection/condition  Outcome: Progressing     Problem: SAFETY ADULT  Goal: Patient will remain free of falls  Description: INTERVENTIONS:  - Educate patient/family on patient safety including physical limitations  - Instruct patient to call for assistance with activity   - Consult OT/PT to assist with strengthening/mobility   - Keep Call bell within reach  - Keep bed low and locked with side rails adjusted as appropriate  - Keep care items and personal belongings within reach  - Initiate and maintain comfort rounds  - Make Fall Risk Sign visible to staff  - Offer Toileting every 2 Hours, in advance of need  - Initiate/Maintain bed alarm  - Apply yellow socks and bracelet for high fall risk patients  - Consider moving patient to room near nurses station  Outcome: Progressing     Problem: DISCHARGE PLANNING  Goal: Discharge to home or other facility with appropriate resources  Description: INTERVENTIONS:  - Identify barriers to discharge w/patient and caregiver  - Arrange for needed discharge resources and transportation as appropriate  - Identify discharge learning needs (meds, wound care, etc )  - Arrange for interpretive services to assist at discharge as needed  - Refer to Case Management Department for coordinating discharge planning if the patient needs post-hospital services based on physician/advanced practitioner order or complex needs related to functional status, cognitive ability, or social support system  Outcome: Progressing     Problem: Knowledge Deficit  Goal: Patient/family/caregiver demonstrates understanding of disease process, treatment plan, medications, and discharge instructions  Description: Complete learning assessment and assess knowledge base    Interventions:  - Provide teaching at level of understanding  - Provide teaching via preferred learning methods  Outcome: Progressing     Problem: RESPIRATORY - ADULT  Goal: Achieves optimal ventilation and oxygenation  Description: INTERVENTIONS:  - Assess for changes in respiratory status  - Assess for changes in mentation and behavior  - Position to facilitate oxygenation and minimize respiratory effort  - Oxygen administered by appropriate delivery if ordered  - Initiate smoking cessation education as indicated  - Encourage broncho-pulmonary hygiene including cough, deep breathe, Incentive Spirometry  - Assess the need for suctioning and aspirate as needed  - Assess and instruct to report SOB or any respiratory difficulty  - Respiratory Therapy support as indicated  Outcome: Progressing     Problem: METABOLIC, FLUID AND ELECTROLYTES - ADULT  Goal: Glucose maintained within target range  Description: INTERVENTIONS:  - Monitor Blood Glucose as ordered  - Assess for signs and symptoms of hyperglycemia and hypoglycemia  - Administer ordered medications to maintain glucose within target range  - Assess nutritional intake and initiate nutrition service referral as needed  Outcome: Progressing     Problem: SKIN/TISSUE INTEGRITY - ADULT  Goal: Skin Integrity remains intact(Skin Breakdown Prevention)  Description: Assess:  -Perform Jonathan assessment every shift  -Clean and moisturize skin every shift  -Inspect skin when repositioning, toileting, and assisting with ADLS  -Assess extremities for adequate circulation and sensation     Bed Management:  -Have minimal linens on bed & keep smooth, unwrinkled  -Change linens as needed when moist or perspiring  -Avoid sitting or lying in one position for more than 2 hours while in bed  -Keep HOB at 30 degrees     Toileting:  -Offer bedside commode    Activity:  -Mobilize patient 4 times a day  -Encourage activity and walks on unit  -Encourage or provide ROM exercises   -Turn and reposition patient every 2 Hours  -Use appropriate equipment to lift or move patient in bed  -Instruct/ Assist with weight shifting every 2 hours when out of bed in chair  -Consider limitation of chair time 2 hours hour intervals    Skin Care:  -Avoid use of baby powder, tape, friction and shearing, hot water or constrictive clothing    Next Steps:  -Teach patient strategies to minimize risks such as using call bell   -Consider consults to  interdisciplinary teams such as respiratory  Outcome: Progressing     Problem: Potential for Falls  Goal: Patient will remain free of falls  Description: INTERVENTIONS:  - Educate patient/family on patient safety including physical limitations  - Instruct patient to call for assistance with activity   - Consult OT/PT to assist with strengthening/mobility   - Keep Call bell within reach  - Keep bed low and locked with side rails adjusted as appropriate  - Keep care items and personal belongings within reach  - Initiate and maintain comfort rounds  - Make Fall Risk Sign visible to staff  - Offer Toileting every 2 Hours, in advance of need  - Initiate/Maintain bed alarm  - Apply yellow socks and bracelet for high fall risk patients  - Consider moving patient to room near nurses station  Outcome: Progressing

## 2022-12-27 NOTE — PLAN OF CARE
Problem: PAIN - ADULT  Goal: Verbalizes/displays adequate comfort level or baseline comfort level  Description: Interventions:  - Encourage patient to monitor pain and request assistance  - Assess pain using appropriate pain scale  - Administer analgesics based on type and severity of pain and evaluate response  - Implement non-pharmacological measures as appropriate and evaluate response  - Consider cultural and social influences on pain and pain management  - Notify physician/advanced practitioner if interventions unsuccessful or patient reports new pain  Outcome: Progressing     Problem: INFECTION - ADULT  Goal: Absence or prevention of progression during hospitalization  Description: INTERVENTIONS:  - Assess and monitor for signs and symptoms of infection  - Monitor lab/diagnostic results  - Monitor all insertion sites, i e  indwelling lines, tubes, and drains  - Monitor endotracheal if appropriate and nasal secretions for changes in amount and color  - Buda appropriate cooling/warming therapies per order  - Administer medications as ordered  - Instruct and encourage patient and family to use good hand hygiene technique  - Identify and instruct in appropriate isolation precautions for identified infection/condition  Outcome: Progressing     Problem: SAFETY ADULT  Goal: Patient will remain free of falls  Description: INTERVENTIONS:  - Educate patient/family on patient safety including physical limitations  - Instruct patient to call for assistance with activity   - Consult OT/PT to assist with strengthening/mobility   - Keep Call bell within reach  - Keep bed low and locked with side rails adjusted as appropriate  - Keep care items and personal belongings within reach  - Initiate and maintain comfort rounds  - Make Fall Risk Sign visible to staff  - Offer Toileting every 2 Hours, in advance of need  - Initiate/Maintain bed alarm  - Apply yellow socks and bracelet for high fall risk patients  - Consider moving patient to room near nurses station  Outcome: Progressing     Problem: DISCHARGE PLANNING  Goal: Discharge to home or other facility with appropriate resources  Description: INTERVENTIONS:  - Identify barriers to discharge w/patient and caregiver  - Arrange for needed discharge resources and transportation as appropriate  - Identify discharge learning needs (meds, wound care, etc )  - Arrange for interpretive services to assist at discharge as needed  - Refer to Case Management Department for coordinating discharge planning if the patient needs post-hospital services based on physician/advanced practitioner order or complex needs related to functional status, cognitive ability, or social support system  Outcome: Progressing     Problem: Knowledge Deficit  Goal: Patient/family/caregiver demonstrates understanding of disease process, treatment plan, medications, and discharge instructions  Description: Complete learning assessment and assess knowledge base    Interventions:  - Provide teaching at level of understanding  - Provide teaching via preferred learning methods  Outcome: Progressing     Problem: RESPIRATORY - ADULT  Goal: Achieves optimal ventilation and oxygenation  Description: INTERVENTIONS:  - Assess for changes in respiratory status  - Assess for changes in mentation and behavior  - Position to facilitate oxygenation and minimize respiratory effort  - Oxygen administered by appropriate delivery if ordered  - Initiate smoking cessation education as indicated  - Encourage broncho-pulmonary hygiene including cough, deep breathe, Incentive Spirometry  - Assess the need for suctioning and aspirate as needed  - Assess and instruct to report SOB or any respiratory difficulty  - Respiratory Therapy support as indicated  Outcome: Progressing     Problem: METABOLIC, FLUID AND ELECTROLYTES - ADULT  Goal: Glucose maintained within target range  Description: INTERVENTIONS:  - Monitor Blood Glucose as ordered  - Assess for signs and symptoms of hyperglycemia and hypoglycemia  - Administer ordered medications to maintain glucose within target range  - Assess nutritional intake and initiate nutrition service referral as needed  Outcome: Progressing     Problem: SKIN/TISSUE INTEGRITY - ADULT  Goal: Skin Integrity remains intact(Skin Breakdown Prevention)  Description: Assess:  -Perform Jonathan assessment every shift  -Clean and moisturize skin every shift  -Inspect skin when repositioning, toileting, and assisting with ADLS  -Assess extremities for adequate circulation and sensation     Bed Management:  -Have minimal linens on bed & keep smooth, unwrinkled  -Change linens as needed when moist or perspiring  -Avoid sitting or lying in one position for more than 2 hours while in bed  -Keep HOB at 30 degrees     Toileting:  -Offer bedside commode    Activity:  -Mobilize patient 4 times a day  -Encourage activity and walks on unit  -Encourage or provide ROM exercises   -Turn and reposition patient every 2 Hours  -Use appropriate equipment to lift or move patient in bed  -Instruct/ Assist with weight shifting every 2 hours when out of bed in chair  -Consider limitation of chair time 2 hours hour intervals    Skin Care:  -Avoid use of baby powder, tape, friction and shearing, hot water or constrictive clothing    Next Steps:  -Teach patient strategies to minimize risks such as using call bell   -Consider consults to  interdisciplinary teams such as respiratory  Outcome: Progressing     Problem: Potential for Falls  Goal: Patient will remain free of falls  Description: INTERVENTIONS:  - Educate patient/family on patient safety including physical limitations  - Instruct patient to call for assistance with activity   - Consult OT/PT to assist with strengthening/mobility   - Keep Call bell within reach  - Keep bed low and locked with side rails adjusted as appropriate  - Keep care items and personal belongings within reach  - Initiate and maintain comfort rounds  - Make Fall Risk Sign visible to staff  - Offer Toileting every 2 Hours, in advance of need  - Initiate/Maintain bed alarm  - Apply yellow socks and bracelet for high fall risk patients  - Consider moving patient to room near nurses station  Outcome: Progressing

## 2022-12-28 LAB
GLUCOSE SERPL-MCNC: 120 MG/DL (ref 65–140)
GLUCOSE SERPL-MCNC: 125 MG/DL (ref 65–140)
GLUCOSE SERPL-MCNC: 167 MG/DL (ref 65–140)
GLUCOSE SERPL-MCNC: 167 MG/DL (ref 65–140)
GLUCOSE SERPL-MCNC: 224 MG/DL (ref 65–140)

## 2022-12-28 RX ORDER — METHYLPREDNISOLONE SODIUM SUCCINATE 40 MG/ML
40 INJECTION, POWDER, LYOPHILIZED, FOR SOLUTION INTRAMUSCULAR; INTRAVENOUS ONCE
Status: COMPLETED | OUTPATIENT
Start: 2022-12-28 | End: 2022-12-28

## 2022-12-28 RX ORDER — METHYLPREDNISOLONE SODIUM SUCCINATE 125 MG/2ML
60 INJECTION, POWDER, LYOPHILIZED, FOR SOLUTION INTRAMUSCULAR; INTRAVENOUS EVERY 8 HOURS SCHEDULED
Status: DISCONTINUED | OUTPATIENT
Start: 2022-12-28 | End: 2022-12-28

## 2022-12-28 RX ORDER — PREDNISONE 20 MG/1
60 TABLET ORAL DAILY
Status: DISCONTINUED | OUTPATIENT
Start: 2022-12-29 | End: 2022-12-30 | Stop reason: HOSPADM

## 2022-12-28 RX ORDER — METHYLPREDNISOLONE SODIUM SUCCINATE 40 MG/ML
40 INJECTION, POWDER, LYOPHILIZED, FOR SOLUTION INTRAMUSCULAR; INTRAVENOUS EVERY 12 HOURS SCHEDULED
Status: DISCONTINUED | OUTPATIENT
Start: 2022-12-28 | End: 2022-12-28

## 2022-12-28 RX ORDER — METHYLPREDNISOLONE SODIUM SUCCINATE 125 MG/2ML
60 INJECTION, POWDER, LYOPHILIZED, FOR SOLUTION INTRAMUSCULAR; INTRAVENOUS EVERY 12 HOURS SCHEDULED
Status: DISCONTINUED | OUTPATIENT
Start: 2022-12-28 | End: 2022-12-28

## 2022-12-28 RX ADMIN — INSULIN LISPRO 2 UNITS: 100 INJECTION, SOLUTION INTRAVENOUS; SUBCUTANEOUS at 08:42

## 2022-12-28 RX ADMIN — METHYLPREDNISOLONE SODIUM SUCCINATE 60 MG: 125 INJECTION, POWDER, FOR SOLUTION INTRAMUSCULAR; INTRAVENOUS at 13:26

## 2022-12-28 RX ADMIN — Medication 1000 UNITS: at 08:42

## 2022-12-28 RX ADMIN — TOPIRAMATE 50 MG: 25 TABLET, FILM COATED ORAL at 08:41

## 2022-12-28 RX ADMIN — GABAPENTIN 800 MG: 400 CAPSULE ORAL at 16:47

## 2022-12-28 RX ADMIN — DILTIAZEM HYDROCHLORIDE 120 MG: 120 CAPSULE, COATED, EXTENDED RELEASE ORAL at 08:41

## 2022-12-28 RX ADMIN — BICTEGRAVIR SODIUM, EMTRICITABINE, AND TENOFOVIR ALAFENAMIDE FUMARATE 1 TABLET: 50; 200; 25 TABLET ORAL at 08:41

## 2022-12-28 RX ADMIN — TOPIRAMATE 50 MG: 25 TABLET, FILM COATED ORAL at 16:48

## 2022-12-28 RX ADMIN — FUROSEMIDE 40 MG: 40 TABLET ORAL at 08:41

## 2022-12-28 RX ADMIN — INSULIN LISPRO 4 UNITS: 100 INJECTION, SOLUTION INTRAVENOUS; SUBCUTANEOUS at 21:45

## 2022-12-28 RX ADMIN — ATORVASTATIN CALCIUM 20 MG: 20 TABLET, FILM COATED ORAL at 16:47

## 2022-12-28 RX ADMIN — LOSARTAN POTASSIUM 100 MG: 50 TABLET, FILM COATED ORAL at 08:42

## 2022-12-28 RX ADMIN — MELATONIN 3 MG: at 21:44

## 2022-12-28 RX ADMIN — BUDESONIDE AND FORMOTEROL FUMARATE DIHYDRATE 2 PUFF: 160; 4.5 AEROSOL RESPIRATORY (INHALATION) at 16:49

## 2022-12-28 RX ADMIN — GABAPENTIN 800 MG: 400 CAPSULE ORAL at 08:41

## 2022-12-28 RX ADMIN — LURASIDONE HYDROCHLORIDE 40 MG: 40 TABLET, FILM COATED ORAL at 08:41

## 2022-12-28 RX ADMIN — FUROSEMIDE 40 MG: 40 TABLET ORAL at 16:47

## 2022-12-28 RX ADMIN — METHYLPREDNISOLONE SODIUM SUCCINATE 40 MG: 40 INJECTION, POWDER, FOR SOLUTION INTRAMUSCULAR; INTRAVENOUS at 21:44

## 2022-12-28 RX ADMIN — APIXABAN 5 MG: 5 TABLET, FILM COATED ORAL at 16:48

## 2022-12-28 RX ADMIN — MONTELUKAST 10 MG: 10 TABLET, FILM COATED ORAL at 21:44

## 2022-12-28 RX ADMIN — TRAMADOL HYDROCHLORIDE 50 MG: 50 TABLET, COATED ORAL at 08:42

## 2022-12-28 RX ADMIN — LEVOTHYROXINE SODIUM 125 MCG: 125 TABLET ORAL at 06:35

## 2022-12-28 RX ADMIN — METFORMIN HYDROCHLORIDE 500 MG: 500 TABLET ORAL at 08:41

## 2022-12-28 RX ADMIN — GABAPENTIN 800 MG: 400 CAPSULE ORAL at 21:44

## 2022-12-28 RX ADMIN — APIXABAN 5 MG: 5 TABLET, FILM COATED ORAL at 08:41

## 2022-12-28 RX ADMIN — BUDESONIDE AND FORMOTEROL FUMARATE DIHYDRATE 2 PUFF: 160; 4.5 AEROSOL RESPIRATORY (INHALATION) at 08:38

## 2022-12-28 RX ADMIN — POTASSIUM CHLORIDE 20 MEQ: 1500 TABLET, EXTENDED RELEASE ORAL at 08:41

## 2022-12-28 RX ADMIN — TRAMADOL HYDROCHLORIDE 50 MG: 50 TABLET, COATED ORAL at 16:45

## 2022-12-28 RX ADMIN — METHYLPREDNISOLONE SODIUM SUCCINATE 40 MG: 40 INJECTION, POWDER, FOR SOLUTION INTRAMUSCULAR; INTRAVENOUS at 06:35

## 2022-12-28 NOTE — ASSESSMENT & PLAN NOTE
Patient frequently comes in the hospital for exacerbations  She is unfortunately still smoking      She is worse today  Will increase her steroids  Ask pulmonary to see in evaluation

## 2022-12-28 NOTE — CASE MANAGEMENT
Case Management Assessment & Discharge Planning Note    Patient name Dagoberto Cyr  Location East  [address] [facility]-* MRN [id number]  : 1950 Date 2022       Current Admission Date: 2022  Current Admission Diagnosis:COPD exacerbation [facility])   Patient Active Problem List    Diagnosis Date Noted   • Class 3 severe obesity due to excess calories with serious comorbidity and body mass index (BMI) of 40 0 to 44 9 in adult [facility]) 2022   • COVID-19 10/23/2022   • Anemia 2022   • Chronic diastolic heart failure ([facility] [address] ) 2022   • PASHA on CPAP 2020   • Acute exacerbation of moderate persistent extrinsic asthma 2020   • Paroxysmal atrial fibrillation ([facility] [address] ) 2020   • Hyperlipidemia 2018   • Thoracic disc herniation 2018   • Lumbar stenosis 2018   • Ambulatory dysfunction    • Hypertension    • Chronic respiratory failure with hypoxia (HCC)    • Bipolar 1 disorder ([facility] [address] )    • Tobacco use    • COPD exacerbation ([facility] [address] ) 2016   • Arthritis 2016   • Type 2 diabetes mellitus without complication, without long-term current use of insulin ([facility] ) 2016   • HIV (human immunodeficiency virus infection)  2016   • Osteoarthritis 2016      LOS (days): 2  Geometric Mean LOS (GMLOS) (days):   Days to GMLOS:     OBJECTIVE:  PATIENT READMITTED TO HOSPITAL  Risk of Unplanned Readmission Score: 58 33         Current admission status: Inpatient       Preferred Pharmacy:   [address] [place] [address], [address] [place] [address]  5    Alicia MOFFETT [address]  Phone: [phone] Fax: [phone]    Primary Care Provider: Thomas Machado DO    Primary Insurance: Epimenio Man REP  Secondary Insurance: Ja Lazar    ASSESSMENT:  Active Health Care Proxies     [facility] Representative - Daughter   Primary Phone: [phone] (Mobile)                 Readmission Root Cause  30 Day Readmission: Yes  Who directed you to return to the hospital?: Other (comment) ([facility])  Did you understand whom to contact if you had questions or problems?: Yes  Did you get your prescriptions before you left the hospital?: Yes  Were you able to get your prescriptions filled when you left the hospital?: Yes  Did you take your medications as prescribed?: Yes  During previous admission, was a post-acute recommendation made?: Yes  What post-acute resources were offered?: STR, Offered, but declined  Patient was readmitted due to: COPD Exacerbation  Action Plan: Therapy re-evals  If home, will be an kamryn with [facility]  Patient Information  Admitted from[de-identified] Home  Mental Status: Alert  During Assessment patient was accompanied by: Not accompanied during assessment  Assessment information provided by[de-identified] Other - please comment (Chart Review)  Primary Caregiver: Private caregiver [facility])  Caregiver's Name[de-identified] Ewelina Bolton [phone] Lynsey Hutson Relationship to Patient[de-identified] Other ([address]) ([facility])  Caregiver's Telephone Number[de-identified] Ewelina Bolton [phone] [facility],ws: Daughter, Family members  [place] of Residence: [address] [facility] do you live in?: [address] [place] [address] entry access options   Select all that apply : Stairs  Number of steps to enter home : 5  Do the steps have railings?: Yes  Type of Current Residence: 2 story home  Upon entering residence, is there a bedroom on the main floor (no further steps)?: No  A bedroom is located on the following floor levels of residence (select all that apply):: 2nd Floor  Upon entering residence, is there a bathroom on the main floor (no further steps)?: No  Indicate which floors of current residence have a bathroom (select all the apply):: 2nd Floor  Number of steps to 2nd floor from main floor:  (15)  In the last 12 months, was there a time when you were not able to pay the mortgage or rent on time?: Patient refused  In the last 12 months, how many places have you lived?: 1  In the last 12 months, was there a time when you did not have a steady place to sleep or slept in a shelter (including now)?: No  Homeless/housing insecurity resource given?: N/A  Living Arrangements: Lives w/ Daughter  Is patient a ?: No    Activities of Daily Living Prior to Admission  Functional Status: Assistance  Completes ADLs independently?: No  Level of ADL dependence: Assistance  Ambulates independently?: No  Level of ambulatory dependence: Assistance  Does patient use assisted devices?: Yes  Assisted Devices (DME) used: Reda Dotter, Home Oxygen concentrator, Portable Oxygen tanks  DME Company Name (respiratory supplies):  [employer]  O2 Rate(s): 2 Lpm  Does patient currently own DME?: Yes  What DME does the patient currently own?: Portable Oxygen tanks, Home Oxygen concentrator, Walker  Does patient have a history of Outpatient Therapy (PT/OT)?: Yes  Does the patient have a history of Short-Term Rehab?: No  Does patient have a history of HHC?: Yes  Does patient currently have [facility]?: Yes (bookjam)    Current Home Health Care  Type of Current Home Care Services: Nurse visit, Home PT, [address][de-identified] Other (please enter name in comment) ([employer])  [facility] [facility] Provider[de-identified] PCP    Patient Information Continued  Income Source: Pension/long-term  Does patient have prescription coverage?: Yes  Within the past 12 months, you worried that your food would run out before you got the money to buy more : Never true  Within the past 12 months, the food you bought just didn't last and you didn't have money to get more : Never true  Food insecurity resource given?: N/A  Does patient receive dialysis treatments?: No  Does patient have a history of substance abuse?: No  Does patient have a history of Mental Health Diagnosis?: No    Means of Transportation  Means of [facility] to [facility][de-identified] Family transport  In the past 12 months, has lack of transportation kept you from medical appointments or from getting medications?: No  In the past 12 months, has lack of transportation kept you from meetings, work, or from getting things needed for daily living?: No  Was application for public transport provided?: N/A        DISCHARGE DETAILS:      Comments - Freedom of Choice: Pt is expected to discharge back to her home with [facility]  Other recommendations may be made this hospital encounter    CM contacted family/caregiver?: No- see comments (declined)  Were Treatment Team discharge recommendations reviewed with patient/caregiver?: Yes  Did patient/caregiver verbalize understanding of patient care needs?: Yes  Were patient/caregiver advised of the risks associated with not following Treatment Team discharge recommendations?: Yes    Contacts  Patient Contacts: Darius Sandhu (Daughter)  Relationship to Patient[de-identified] Family  Contact Method: Phone  Phone Number: [phone] (M)  Reason/Outcome: Continuity of Care, Emergency Contact, Referral, Discharge [address]         Is the patient interested in [facility] at discharge?: Yes  [address] requested[de-identified] Medical Social Work, Nursing, Occupational Therapy, Physical [address] [place] [address] Name[de-identified] Other (2021 Shaina Oseguera)  [address] Provider[de-identified] PCP  Home Health Services Needed[de-identified] Evaluate Functional Status and Safety, Gait/ADL Training, Oxygen Via Nasal Cannula, Strengthening/Theraputic Exercises to Improve Function, COPD Management  Oxygen LPM Ordered (if applicable based on home health services needed):: 3 LPM  Homebound Criteria Met[de-identified] Requires the Assistance of Another Person for Safe Ambulation or to Leave the Home, Uses an Assist Device (i e  cane, walker, etc), Immunosuppressed  Supporting Clincal Findings[de-identified] Limited Endurance, Requires Oxygen, Fatigues Easliy in [employer]    DME Referral Provided  Referral made for DME?: No  Treatment Team Recommendation: Home with Home Health Care    Transport at Discharge : [facility] (w  Oxygen accessibility)

## 2022-12-28 NOTE — ASSESSMENT & PLAN NOTE
Wt Readings from Last 3 Encounters:   12/28/22 98 4 kg (216 lb 14 9 oz)   12/11/22 104 kg (229 lb 4 5 oz)   11/19/22 104 kg (229 lb 8 oz)       She examines as euvolemic

## 2022-12-28 NOTE — PLAN OF CARE
Problem: PAIN - ADULT  Goal: Verbalizes/displays adequate comfort level or baseline comfort level  Description: Interventions:  - Encourage patient to monitor pain and request assistance  - Assess pain using appropriate pain scale  - Administer analgesics based on type and severity of pain and evaluate response  - Implement non-pharmacological measures as appropriate and evaluate response  - Consider cultural and social influences on pain and pain management  - Notify physician/advanced practitioner if interventions unsuccessful or patient reports new pain  Outcome: Progressing     Problem: INFECTION - ADULT  Goal: Absence or prevention of progression during hospitalization  Description: INTERVENTIONS:  - Assess and monitor for signs and symptoms of infection  - Monitor lab/diagnostic results  - Monitor all insertion sites, i e  indwelling lines, tubes, and drains  - Monitor endotracheal if appropriate and nasal secretions for changes in amount and color  - [place] appropriate cooling/warming therapies per order  - Administer medications as ordered  - Instruct and encourage patient and family to use good hand hygiene technique  - Identify and instruct in appropriate isolation precautions for identified infection/condition  Outcome: Progressing     Problem: SAFETY ADULT  Goal: Patient will remain free of falls  Description: INTERVENTIONS:  - Educate patient/family on patient safety including physical limitations  - Instruct patient to call for assistance with activity   - Consult OT/PT to assist with strengthening/mobility   - Keep Call bell within reach  - Keep bed low and locked with side rails adjusted as appropriate  - Keep care items and personal belongings within reach  - Initiate and maintain comfort rounds  - Make Fall Risk Sign visible to staff  - Offer Toileting every 2 Hours, in advance of need  - Initiate/Maintain bed alarm  - Apply yellow socks and bracelet for high fall risk patients  - Consider moving patient to room near nurses station  Outcome: Progressing     Problem: DISCHARGE PLANNING  Goal: Discharge to home or other facility with appropriate resources  Description: INTERVENTIONS:  - Identify barriers to discharge w/patient and caregiver  - Arrange for needed discharge resources and transportation as appropriate  - Identify discharge learning needs (meds, wound care, etc )  - Arrange for interpretive services to assist at discharge as needed  - Refer to Case Management Department for coordinating discharge planning if the patient needs post-hospital services based on physician/advanced practitioner order or complex needs related to functional status, cognitive ability, or social support system  Outcome: Progressing     Problem: Knowledge Deficit  Goal: Patient/family/caregiver demonstrates understanding of disease process, treatment plan, medications, and discharge instructions  Description: Complete learning assessment and assess knowledge base    Interventions:  - Provide teaching at level of understanding  - Provide teaching via preferred learning methods  Outcome: Progressing     Problem: RESPIRATORY - ADULT  Goal: Achieves optimal ventilation and oxygenation  Description: INTERVENTIONS:  - Assess for changes in respiratory status  - Assess for changes in mentation and behavior  - Position to facilitate oxygenation and minimize respiratory effort  - Oxygen administered by appropriate delivery if ordered  - Initiate smoking cessation education as indicated  - Encourage broncho-pulmonary hygiene including cough, deep breathe, Incentive Spirometry  - Assess the need for suctioning and aspirate as needed  - Assess and instruct to report SOB or any respiratory difficulty  - Respiratory Therapy support as indicated  Outcome: Progressing     Problem: METABOLIC, FLUID AND ELECTROLYTES - ADULT  Goal: Glucose maintained within target range  Description: INTERVENTIONS:  - Monitor Blood Glucose as ordered  - Assess for signs and symptoms of hyperglycemia and hypoglycemia  - Administer ordered medications to maintain glucose within target range  - Assess nutritional intake and initiate nutrition service referral as needed  Outcome: Progressing     Problem: SKIN/TISSUE INTEGRITY - ADULT  Goal: Skin Integrity remains intact(Skin Breakdown Prevention)  Description: Assess:  -Perform Jonathan assessment every shift  -Clean and moisturize skin every shift  -Inspect skin when repositioning, toileting, and assisting with ADLS  -Assess extremities for adequate circulation and sensation     Bed Management:  -Have minimal linens on bed & keep smooth, unwrinkled  -Change linens as needed when moist or perspiring  -Avoid sitting or lying in one position for more than 2 hours while in bed  -Keep HOB at 30 degrees     Toileting:  -Offer bedside commode    Activity:  -Mobilize patient 4 times a day  -Encourage activity and walks on unit  -Encourage or provide ROM exercises   -Turn and reposition patient every 2 Hours  -Use appropriate equipment to lift or move patient in bed  -Instruct/ Assist with weight shifting every 2 hours when out of bed in chair  -Consider limitation of chair time 2 hours hour intervals    Skin Care:  -Avoid use of baby powder, tape, friction and shearing, hot water or constrictive clothing    Next Steps:  -Teach patient strategies to minimize risks such as using call bell   -Consider consults to  interdisciplinary teams such as respiratory  Outcome: Progressing     Problem: Potential for Falls  Goal: Patient will remain free of falls  Description: INTERVENTIONS:  - Educate patient/family on patient safety including physical limitations  - Instruct patient to call for assistance with activity   - Consult OT/PT to assist with strengthening/mobility   - Keep Call bell within reach  - Keep bed low and locked with side rails adjusted as appropriate  - Keep care items and personal belongings within reach  - Initiate and maintain comfort rounds  - Make Fall Risk Sign visible to staff  - Offer Toileting every 2 Hours, in advance of need  - Initiate/Maintain bed alarm  - Apply yellow socks and bracelet for high fall risk patients  - Consider moving patient to room near nurses station  Outcome: Progressing

## 2022-12-28 NOTE — PROGRESS NOTES
[address] [facility]  Progress Note - Gustavo Blue 1950, 81 y o  female MRN: [id number]  Unit/Bed#: E5 -01 Encounter: [id number]  Primary Care Provider: Alyssa Whitehead DO   Date and time admitted to hospital: 2022 11:23 AM    * COPD exacerbation [facility])  Assessment & Plan  Patient frequently comes in the hospital for exacerbations  She is unfortunately still smoking  She is worse today  Will increase her steroids  Ask pulmonary to see in evaluation    Chronic diastolic heart failure (HCC)  Assessment & Plan  Wt Readings from Last 3 Encounters:   22 98 4 kg (216 lb 14 9 oz)   22 104 kg (229 lb 4 5 oz)   22 104 kg (229 lb 8 oz)       She examines as euvolemic      Tobacco use  Assessment & Plan  Nicotine patch  I encouraged her to quit    Bipolar 1 disorder ([facility] [address] )  Assessment & Plan  On latuda    Chronic respiratory failure with hypoxia (HCC)  Assessment & Plan  Chronically wears 2 liters oxygen at home    HIV (human immunodeficiency virus infection)   Assessment & Plan  Continue Biktarvy          Subjective:   Feels worse today  More SOB  More wheezing  No cough      Objective:     Vitals:   Temp (24hrs), Av 1 °F (36 2 °C), Min:97 1 °F (36 2 °C), Max:97 1 °F (36 2 °C)    Temp:  [97 1 °F (36 2 °C)] 97 1 °F (36 2 °C)  HR:  [90-97] 90  Resp:  [17-20] 17  BP: (109-112)/(62-68) 109/64  SpO2:  [92 %-97 %] 97 %  Body mass index is 39 68 kg/m²  Input and Output Summary (last 24 hours):     No intake or output data in the 24 hours ending 22 1027    Physical Exam:     Physical Exam  Vitals and nursing note reviewed  HENT:      Head: Normocephalic and atraumatic  Eyes:      Pupils: Pupils are equal, round, and reactive to light  Cardiovascular:      Rate and Rhythm: Normal rate and regular rhythm  Heart sounds: No murmur heard  No friction rub  No gallop  Pulmonary:      Effort: Respiratory distress present        Breath sounds: Wheezing (bilateral exp wheeze) present  No rales  Comments: Very poor air movement bilateral  Abdominal:      General: Bowel sounds are normal       Palpations: Abdomen is soft  Tenderness: There is no abdominal tenderness  Musculoskeletal:      Right lower leg: No edema  Left lower leg: No edema                 Additional Data:     Labs:    Results from last 7 days   Lab Units 12/27/22  0555   WBC Thousand/uL 6 95   HEMOGLOBIN g/dL 10 6*   HEMATOCRIT % 34 1*   PLATELETS Thousands/uL 316   NEUTROS PCT % 83*   LYMPHS PCT % 13*   MONOS PCT % 3*   EOS PCT % 0     Results from last 7 days   Lab Units 12/27/22  0555 12/26/22  1214   POTASSIUM mmol/L 3 5 3 2*   CHLORIDE mmol/L 105 107   CO2 mmol/L 25 20*   BUN mg/dL 18 8   CREATININE mg/dL 1 28 1 05   CALCIUM mg/dL 10 6* 10 5*   ALK PHOS U/L  --  68   ALT U/L  --  14   AST U/L  --  19     Results from last 7 days   Lab Units 12/26/22  1231   INR  1 08     Results from last 7 days   Lab Units 12/28/22  0756 12/27/22  2302 12/27/22  1620 12/27/22  1119 12/27/22  0737 12/26/22  2045 12/26/22  1622   POC GLUCOSE mg/dl 167* 167* 168* 176* 172* 186* 165*               * I Have Reviewed All Lab Data     Recent Cultures (last 7 days):             Last 24 Hours Medication List:   Current Facility-Administered Medications   Medication Dose Route Frequency Provider Last Rate   • apixaban  5 mg Oral BID Shu Wright MD     • atorvastatin  20 mg Oral Daily With Drea Grijalva MD     • bictegravir-emtricitab-tenofovir alafenamide  1 tablet Oral Daily Shu Wright MD     • budesonide-formoterol  2 puff Inhalation BID Shu Wright MD     • cholecalciferol  1,000 Units Oral Daily Shu Wright MD     • diltiazem  120 mg Oral Daily Shu Wright MD     • furosemide  40 mg Oral BID Shu Wright MD     • gabapentin  800 mg Oral TID Shu Wright MD     • insulin lispro  2-12 Units Subcutaneous TID [facility] Shu Wright MD     • insulin lispro  2-12 Units Subcutaneous [facility] Puls Steve Gonzales MD     • levalbuterol  1 25 mg Nebulization Q6H PRN Abhinav Manzo MD     • levothyroxine  125 mcg Oral Early Morning Abhinav Manzo MD     • losartan  100 mg Oral Daily Abhinav Manzo MD     • lurasidone  40 mg Oral Daily With Breakfast Abhinav Manzo MD     • melatonin  3 mg Oral HS Jen Fiore PA-C     • metFORMIN  500 mg Oral Daily Abhinav Manzo MD     • methylPREDNISolone sodium succinate  60 mg Intravenous Q8H [address] Aaron ESAU Carrillo DO     • montelukast  10 mg Oral HS Abhinav Manzo MD     • potassium chloride  20 mEq Oral Daily Abhinav Manzo MD     • topiramate  50 mg Oral BID Abhinav Manzo MD     • traMADol  50 mg Oral Q6H PRN Abhinav Manzo MD           VTE Pharmacologic Prophylaxis:   Pharmacologic: Apixaban (Eliquis)      Current Length of Stay: 2 day(s)    Current Patient Status: Inpatient       Discharge Plan: ask pulmonary to see    Code Status: Level 1 - Full Code           Today, Patient Was Seen By: Robe Stanton DO    ** Please Note: Dictation voice to text software may have been used in the creation of this document   **

## 2022-12-28 NOTE — CONSULTS
Consultation - Pulmonary Medicine   Jazmine Reyna 81 y o  female MRN: [id number]  Unit/Bed#: E5 -01 Encounter: [id number]      Assessment/Plan:    1  Acute pulmonary insufficiency likely multifaceted as listed below        -Currently on home O2 requirements of 2 L-98%        -Maintain saturations greater than 88%        -Pulmonary toileting: Deep breathing cough, OOB as tolerated, IS Q 1 hr    2  Asthma/COPD overlap syndrome with possible mild exacerbation       -Inpatient: We will decrease IV Solu-Medrol 40 mg twice daily-transition to prednisone 60 mg decrease by 10 mg every 3 days       -Please verify that patient is taking this Home regimen: Trelegy 200/62 5/25 CG 1 puff daily, budesonide twice daily,, and albuterol as needed, Daliresp 500 mcg daily, please start prednisone 10 mg daily       -This is patient's seventh admission in 4 months-please obtain high utilizer plan       -Expiratory wheezing is patient's respiratory baseline    3  Acute on Chronic grade 1 diastolic CHF w/ PAF and Mild PHTN likely WHO group II & III       -  2/21/2022- EF 65%       -  9/13/2022-  EF 70    PA pressure 37 mmhg       -Continue to diurese as tolerated per kidney function       -Continue Eliquis    4  PASHA        -5/2022-[facility] ordered new CPAP machine however they are waiting for patient's call from Suha to request the actual machine        -Patient continues to refuse to call and follow-up        -Clear to continue CPAP while inpatient    5  Active tobacco abuse        -Continues to smoke 1 to 2 cigarettes a day        -Courage and educated tobacco cessation        -Patient appears in precontemplation stage        -NRT per primary team    6    Chronic NGUYEN        -Secondary to: Severe anxiety when at home alone, untreated PASHA, deconditioning, weight gain        -Recommend evaluation of at home support group, consider behavioral health      -Patient will have to schedule her follow-up as she does not follow-up with her scheduled pulmonary visits  -Pulmonary will sign off at this time      History of Present Illness   Physician Requesting Consult: Lencho Abbott DO  Reason for Consult / Principal Problem: COPD exacerbation  Hx and PE limited by: NoThing  Chief Complaint: " I feel little short of breath  HPI: Latricia Dorsey is a 81 y o   female who presented to [place] [address]  with complaints of shortness of breath  Patient has past medical history of COPD, CHF, and active tobacco abuse  Patient presented to the ED on 12/26/2022 for shortness of breath since admission patient has been receiving IV Solu-Medrol  Patient waxes and wanes at the day on her respiratory status  Pulmonary was consulted for COPD exacerbation  Today upon examination patient had her baseline expiratory wheezing  Unfortunately patient continues to come to the emergency department multiple times when she is feeling short of breath  The shortness of breath is her anxiety at home  Unfortunately patient does have some chronic lung disease which does cause some chronic wheezing and dyspnea upon exertion  Patient currently denying any fevers, chills, abscess, headaches, night sweats, pleuritic chest pain, or palpations  From a pulmonary standpoint, patient follows with Dr Kebede for his moderate COPD   Patient reports she is still an active 1-2 cigarettes a day smoker  Dmvincent Rothmanvarsha reports she started smoking around the age of 14  Patient is currently maintained on Trelegy 200/62 5/25 CG 1 puff daily, budesonide twice daily,, and albuterol as needed, Daliresp 500 mcg daily, please start prednisone 10 mg daily   Patient is currently maintained on 2 L oxygen therapy  Israel Benitez reports some occupational exposures 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 denies any symptoms of GERD, dysphagia, or postnasal drip   Patient does report history of seasonal allergies in which she is maintained on singular   Patient's sleep study 3/2022 for moderate sleep apnea   Patient is currently maintained on CPAP 5-15 cm of H2O with 2 L  Patient denies any acute exposures to dust, mold, asbestos, or silica  Inpatient consult to Pulmonology  Consult performed by: LUIS M Gutiérrez  Consult ordered by: Kentrell Narvaez DO          Review of Systems   Constitutional: Negative for chills and fever  HENT: Negative for ear pain and sore throat  Eyes: Negative for pain and visual disturbance  Respiratory: Positive for shortness of breath and wheezing  Negative for apnea, cough, choking, chest tightness and stridor  Cardiovascular: Negative for chest pain and palpitations  Gastrointestinal: Negative for abdominal pain and vomiting  Genitourinary: Negative for dysuria and hematuria  Musculoskeletal: Negative for arthralgias and back pain  Skin: Negative for color change and rash  Neurological: Negative for seizures and syncope  Psychiatric/Behavioral: Negative for agitation and behavioral problems  All other systems reviewed and are negative        Historical Information   Past Medical History:   Diagnosis Date   • Asthma    • Bipolar 1 disorder ([place] [address] )    • Cardiac disease    • COPD (chronic obstructive pulmonary disease) ([place] [address] )    • HIV (human immunodeficiency virus infection) ([place] [address] ) 03/24/2016   • Hypertension    • Osteoarthritis      Past Surgical History:   Procedure Laterality Date   • HERNIA REPAIR     • LUMBAR FUSION N/A 4/10/2018    Procedure: T9/10 discectomy with T9-T11 fusion;  Surgeon: Genesis Cantu MD;  Location:  MAIN OR;  Service: Neurosurgery   • THYROIDECTOMY       Social History   Social History     Substance and Sexual Activity   Alcohol Use Not Currently   • Alcohol/week: 2 0 standard drinks   • Types: 1 Cans of beer, 1 Shots of liquor per week    Comment: only on holidays     Social History     Substance and Sexual Activity   Drug Use Not Currently    Comment: former IV drug user     Social History Tobacco Use   Smoking Status Former   • Packs/day: 0 20   • Years: 45 00   • Pack years: 9 00   • Types: Cigarettes   Smokeless Tobacco Never   Tobacco Comments    Currently smoking 1-2 cigarettes a day     E-Cigarette/Vaping   • E-Cigarette Use Never User      E-Cigarette/Vaping Substances     Occupational History: Noncontributory    Family History:   Family History   Problem Relation Age of Onset   • No Known Problems Mother    • Diabetes Father    • Heart disease Neg Hx    • Cancer Neg Hx        Meds/Allergies   pertinent pulmonary meds have been reviewed    Allergies   Allergen Reactions   • Lisinopril Angioedema   • Prozac [Fluoxetine Hcl] Rash   • Sulfa Antibiotics Itching   • Quinine        Objective   Vitals: Blood pressure 109/64, pulse 90, temperature (!) 97 1 °F (36 2 °C), temperature source Oral, resp  rate 17, weight 98 4 kg (216 lb 14 9 oz), last menstrual period 04/01/2013, SpO2 97 %, not currently breastfeeding  RA,Body mass index is 39 68 kg/m²  Intake/Output Summary (Last 24 hours) at 12/28/2022 1357  Last data filed at 12/28/2022 1144  Gross per 24 hour   Intake 240 ml   Output --   Net 240 ml     Invasive Devices     Peripheral Intravenous Line  Duration           Peripheral IV 12/26/22 Left Antecubital 2 days                Physical Exam  Constitutional:       General: She is not in acute distress  Appearance: Normal appearance  She is normal weight  She is not ill-appearing  HENT:      Head: Normocephalic and atraumatic  Nose: Nose normal  No congestion or rhinorrhea  Mouth/Throat:      Mouth: Mucous membranes are dry  Pharynx: No oropharyngeal exudate or posterior oropharyngeal erythema  Cardiovascular:      Rate and Rhythm: Normal rate and regular rhythm  Pulses: Normal pulses  Heart sounds: Normal heart sounds  No murmur heard  No friction rub  No gallop     Pulmonary:      Effort: No tachypnea, bradypnea, accessory muscle usage or respiratory distress  Breath sounds: Decreased air movement present  No stridor  Decreased breath sounds and wheezing present  No rhonchi or rales  Comments: Baseline scattered wheezing  Chest:      Chest wall: No tenderness  Abdominal:      General: Abdomen is flat  Bowel sounds are normal  There is no distension  Palpations: Abdomen is soft  There is no mass  Musculoskeletal:         General: No swelling or tenderness  Normal range of motion  Cervical back: Normal range of motion  No rigidity or tenderness  Skin:     General: Skin is warm and dry  Coloration: Skin is not jaundiced or pale  Neurological:      General: No focal deficit present  Mental Status: She is alert and oriented to person, place, and time  Mental status is at baseline  Psychiatric:         Mood and Affect: Mood normal          Behavior: Behavior normal          Lab Results: I have personally reviewed pertinent lab results 12/27/2022    Imaging Studies: I have personally reviewed pertinent films in PACS     Chest x-ray-12/26/2022 no acute cardiopulmonary disease    EKG, Pathology, and Other Studies: I have personally reviewed pertinent films in PACS     Echo 2/21/2022- EF- 60 09% grade 1 diastolic dysfunction    Pulmonary Results (PFTs, PSG): I have personally reviewed pertinent films in PACS     Results: 11/16/2021  The FVC is reduced [1 43L (62%)]  The FEV1 is normal [1 30L (73%)]  The FEV1/FVC is normal [91]  No bronchodilator was administered  The TLC is not measured  The DLCO is not measured  Impression:   The patient has suggestion of possible mild restriction based on   spirometry and flow volume loop  However, exhalation time was less than 2   seconds and patient had significant difficulty with the spirometry so this   is a suboptimal test and thus not able to be definitively interpreted       VTE Prophylaxis: Sequential compression device (Venodyne)     Code Status: Level 1 - Full Code    None    Portions of the record may have been created with voice recognition software  Occasional wrong word or "sound a like" substitutions may have occurred due to the inherent limitations of voice recognition software  Read the chart carefully and recognize, using context, where substitutions have occurred

## 2022-12-28 NOTE — PLAN OF CARE
Problem: PAIN - ADULT  Goal: Verbalizes/displays adequate comfort level or baseline comfort level  Description: Interventions:  - Encourage patient to monitor pain and request assistance  - Assess pain using appropriate pain scale  - Administer analgesics based on type and severity of pain and evaluate response  - Implement non-pharmacological measures as appropriate and evaluate response  - Consider cultural and social influences on pain and pain management  - Notify physician/advanced practitioner if interventions unsuccessful or patient reports new pain  Outcome: Progressing     Problem: INFECTION - ADULT  Goal: Absence or prevention of progression during hospitalization  Description: INTERVENTIONS:  - Assess and monitor for signs and symptoms of infection  - Monitor lab/diagnostic results  - Monitor all insertion sites, i e  indwelling lines, tubes, and drains  - Monitor endotracheal if appropriate and nasal secretions for changes in amount and color  - [place] appropriate cooling/warming therapies per order  - Administer medications as ordered  - Instruct and encourage patient and family to use good hand hygiene technique  - Identify and instruct in appropriate isolation precautions for identified infection/condition  Outcome: Progressing     Problem: SAFETY ADULT  Goal: Patient will remain free of falls  Description: INTERVENTIONS:  - Educate patient/family on patient safety including physical limitations  - Instruct patient to call for assistance with activity   - Consult OT/PT to assist with strengthening/mobility   - Keep Call bell within reach  - Keep bed low and locked with side rails adjusted as appropriate  - Keep care items and personal belongings within reach  - Initiate and maintain comfort rounds  - Make Fall Risk Sign visible to staff  - Offer Toileting every 2 Hours, in advance of need  - Initiate/Maintain bed alarm  - Apply yellow socks and bracelet for high fall risk patients  - Consider moving patient to room near nurses station  Outcome: Progressing     Problem: DISCHARGE PLANNING  Goal: Discharge to home or other facility with appropriate resources  Description: INTERVENTIONS:  - Identify barriers to discharge w/patient and caregiver  - Arrange for needed discharge resources and transportation as appropriate  - Identify discharge learning needs (meds, wound care, etc )  - Arrange for interpretive services to assist at discharge as needed  - Refer to Case Management Department for coordinating discharge planning if the patient needs post-hospital services based on physician/advanced practitioner order or complex needs related to functional status, cognitive ability, or social support system  Outcome: Progressing     Problem: Knowledge Deficit  Goal: Patient/family/caregiver demonstrates understanding of disease process, treatment plan, medications, and discharge instructions  Description: Complete learning assessment and assess knowledge base    Interventions:  - Provide teaching at level of understanding  - Provide teaching via preferred learning methods  Outcome: Progressing     Problem: RESPIRATORY - ADULT  Goal: Achieves optimal ventilation and oxygenation  Description: INTERVENTIONS:  - Assess for changes in respiratory status  - Assess for changes in mentation and behavior  - Position to facilitate oxygenation and minimize respiratory effort  - Oxygen administered by appropriate delivery if ordered  - Initiate smoking cessation education as indicated  - Encourage broncho-pulmonary hygiene including cough, deep breathe, Incentive Spirometry  - Assess the need for suctioning and aspirate as needed  - Assess and instruct to report SOB or any respiratory difficulty  - Respiratory Therapy support as indicated  Outcome: Progressing     Problem: METABOLIC, FLUID AND ELECTROLYTES - ADULT  Goal: Glucose maintained within target range  Description: INTERVENTIONS:  - Monitor Blood Glucose as ordered  - Assess for signs and symptoms of hyperglycemia and hypoglycemia  - Administer ordered medications to maintain glucose within target range  - Assess nutritional intake and initiate nutrition service referral as needed  Outcome: Progressing     Problem: SKIN/TISSUE INTEGRITY - ADULT  Goal: Skin Integrity remains intact(Skin Breakdown Prevention)  Description: Assess:  -Perform Jonathan assessment every shift  -Clean and moisturize skin every shift  -Inspect skin when repositioning, toileting, and assisting with ADLS  -Assess extremities for adequate circulation and sensation     Bed Management:  -Have minimal linens on bed & keep smooth, unwrinkled  -Change linens as needed when moist or perspiring  -Avoid sitting or lying in one position for more than 2 hours while in bed  -Keep HOB at 30 degrees     Toileting:  -Offer bedside commode    Activity:  -Mobilize patient 4 times a day  -Encourage activity and walks on unit  -Encourage or provide ROM exercises   -Turn and reposition patient every 2 Hours  -Use appropriate equipment to lift or move patient in bed  -Instruct/ Assist with weight shifting every 2 hours when out of bed in chair  -Consider limitation of chair time 2 hours hour intervals    Skin Care:  -Avoid use of baby powder, tape, friction and shearing, hot water or constrictive clothing    Next Steps:  -Teach patient strategies to minimize risks such as using call bell   -Consider consults to  interdisciplinary teams such as respiratory  Outcome: Progressing     Problem: Potential for Falls  Goal: Patient will remain free of falls  Description: INTERVENTIONS:  - Educate patient/family on patient safety including physical limitations  - Instruct patient to call for assistance with activity   - Consult OT/PT to assist with strengthening/mobility   - Keep Call bell within reach  - Keep bed low and locked with side rails adjusted as appropriate  - Keep care items and personal belongings within reach  - Initiate and maintain comfort rounds  - Make Fall Risk Sign visible to staff  - Offer Toileting every 2 Hours, in advance of need  - Initiate/Maintain bed alarm  - Apply yellow socks and bracelet for high fall risk patients  - Consider moving patient to room near nurses station  Outcome: Progressing

## 2022-12-29 LAB
ANION GAP SERPL CALCULATED.3IONS-SCNC: 9 MMOL/L (ref 4–13)
BASOPHILS # BLD AUTO: 0.01 THOUSANDS/ÂΜL (ref 0–0.1)
BASOPHILS NFR BLD AUTO: 0 % (ref 0–1)
BUN SERPL-MCNC: 38 MG/DL (ref 5–25)
CALCIUM SERPL-MCNC: 10.7 MG/DL (ref 8.3–10.1)
CHLORIDE SERPL-SCNC: 104 MMOL/L (ref 96–108)
CO2 SERPL-SCNC: 29 MMOL/L (ref 21–32)
CREAT SERPL-MCNC: 1.27 MG/DL (ref 0.6–1.3)
EOSINOPHIL # BLD AUTO: 0 THOUSAND/ÂΜL (ref 0–0.61)
EOSINOPHIL NFR BLD AUTO: 0 % (ref 0–6)
ERYTHROCYTE [DISTWIDTH] IN BLOOD BY AUTOMATED COUNT: 15.6 % (ref 11.6–15.1)
GFR SERPL CREATININE-BSD FRML MDRD: 42 ML/MIN/1.73SQ M
GLUCOSE SERPL-MCNC: 135 MG/DL (ref 65–140)
GLUCOSE SERPL-MCNC: 136 MG/DL (ref 65–140)
GLUCOSE SERPL-MCNC: 142 MG/DL (ref 65–140)
GLUCOSE SERPL-MCNC: 145 MG/DL (ref 65–140)
GLUCOSE SERPL-MCNC: 169 MG/DL (ref 65–140)
HCT VFR BLD AUTO: 32.1 % (ref 34.8–46.1)
HGB BLD-MCNC: 9.7 G/DL (ref 11.5–15.4)
IMM GRANULOCYTES # BLD AUTO: 0.19 THOUSAND/UL (ref 0–0.2)
IMM GRANULOCYTES NFR BLD AUTO: 2 % (ref 0–2)
LYMPHOCYTES # BLD AUTO: 0.89 THOUSANDS/ÂΜL (ref 0.6–4.47)
LYMPHOCYTES NFR BLD AUTO: 8 % (ref 14–44)
MAGNESIUM SERPL-MCNC: 1.8 MG/DL (ref 1.6–2.6)
MCH RBC QN AUTO: 28.6 PG (ref 26.8–34.3)
MCHC RBC AUTO-ENTMCNC: 30.2 G/DL (ref 31.4–37.4)
MCV RBC AUTO: 95 FL (ref 82–98)
MONOCYTES # BLD AUTO: 0.53 THOUSAND/ÂΜL (ref 0.17–1.22)
MONOCYTES NFR BLD AUTO: 5 % (ref 4–12)
NEUTROPHILS # BLD AUTO: 10.13 THOUSANDS/ÂΜL (ref 1.85–7.62)
NEUTS SEG NFR BLD AUTO: 85 % (ref 43–75)
NRBC BLD AUTO-RTO: 0 /100 WBCS
PLATELET # BLD AUTO: 330 THOUSANDS/UL (ref 149–390)
PMV BLD AUTO: 9.7 FL (ref 8.9–12.7)
POTASSIUM SERPL-SCNC: 4.4 MMOL/L (ref 3.5–5.3)
RBC # BLD AUTO: 3.39 MILLION/UL (ref 3.81–5.12)
SODIUM SERPL-SCNC: 142 MMOL/L (ref 135–147)
WBC # BLD AUTO: 11.75 THOUSAND/UL (ref 4.31–10.16)

## 2022-12-29 RX ADMIN — PREDNISONE 60 MG: 20 TABLET ORAL at 09:05

## 2022-12-29 RX ADMIN — POTASSIUM CHLORIDE 20 MEQ: 1500 TABLET, EXTENDED RELEASE ORAL at 09:05

## 2022-12-29 RX ADMIN — APIXABAN 5 MG: 5 TABLET, FILM COATED ORAL at 18:30

## 2022-12-29 RX ADMIN — BUDESONIDE AND FORMOTEROL FUMARATE DIHYDRATE 2 PUFF: 160; 4.5 AEROSOL RESPIRATORY (INHALATION) at 09:01

## 2022-12-29 RX ADMIN — METFORMIN HYDROCHLORIDE 500 MG: 500 TABLET ORAL at 09:05

## 2022-12-29 RX ADMIN — GABAPENTIN 800 MG: 400 CAPSULE ORAL at 21:19

## 2022-12-29 RX ADMIN — BICTEGRAVIR SODIUM, EMTRICITABINE, AND TENOFOVIR ALAFENAMIDE FUMARATE 1 TABLET: 50; 200; 25 TABLET ORAL at 09:01

## 2022-12-29 RX ADMIN — LURASIDONE HYDROCHLORIDE 40 MG: 40 TABLET, FILM COATED ORAL at 09:01

## 2022-12-29 RX ADMIN — APIXABAN 5 MG: 5 TABLET, FILM COATED ORAL at 09:05

## 2022-12-29 RX ADMIN — MONTELUKAST 10 MG: 10 TABLET, FILM COATED ORAL at 21:19

## 2022-12-29 RX ADMIN — TRAMADOL HYDROCHLORIDE 50 MG: 50 TABLET, COATED ORAL at 09:05

## 2022-12-29 RX ADMIN — MELATONIN 3 MG: at 21:19

## 2022-12-29 RX ADMIN — FUROSEMIDE 40 MG: 40 TABLET ORAL at 18:37

## 2022-12-29 RX ADMIN — BUDESONIDE AND FORMOTEROL FUMARATE DIHYDRATE 2 PUFF: 160; 4.5 AEROSOL RESPIRATORY (INHALATION) at 18:30

## 2022-12-29 RX ADMIN — GABAPENTIN 800 MG: 400 CAPSULE ORAL at 09:04

## 2022-12-29 RX ADMIN — ATORVASTATIN CALCIUM 20 MG: 20 TABLET, FILM COATED ORAL at 16:14

## 2022-12-29 RX ADMIN — FUROSEMIDE 40 MG: 40 TABLET ORAL at 09:05

## 2022-12-29 RX ADMIN — LOSARTAN POTASSIUM 100 MG: 50 TABLET, FILM COATED ORAL at 09:05

## 2022-12-29 RX ADMIN — TOPIRAMATE 50 MG: 25 TABLET, FILM COATED ORAL at 09:05

## 2022-12-29 RX ADMIN — Medication 1000 UNITS: at 09:05

## 2022-12-29 RX ADMIN — DILTIAZEM HYDROCHLORIDE 120 MG: 120 CAPSULE, COATED, EXTENDED RELEASE ORAL at 09:05

## 2022-12-29 RX ADMIN — INSULIN LISPRO 2 UNITS: 100 INJECTION, SOLUTION INTRAVENOUS; SUBCUTANEOUS at 21:19

## 2022-12-29 RX ADMIN — TOPIRAMATE 50 MG: 25 TABLET, FILM COATED ORAL at 18:30

## 2022-12-29 RX ADMIN — GABAPENTIN 800 MG: 400 CAPSULE ORAL at 16:14

## 2022-12-29 RX ADMIN — LEVOTHYROXINE SODIUM 125 MCG: 125 TABLET ORAL at 05:35

## 2022-12-29 NOTE — ASSESSMENT & PLAN NOTE
Patient frequently comes in the hospital for exacerbations  She is unfortunately still smoking  Still has some significant wheezing    Continue steroids  Will discuss with pulmonary

## 2022-12-29 NOTE — PROGRESS NOTES
Adela Weeks  Progress Note - Man [place] 1950, 81 y o  female MRN: [id number]  Unit/Bed#: E5 -01 Encounter: [id number]  Primary Care Provider: Renata Mcneill DO   Date and time admitted to hospital: 2022 11:23 AM    * COPD exacerbation [facility])  Assessment & Plan  Patient frequently comes in the hospital for exacerbations  She is unfortunately still smoking  Still has some significant wheezing  Continue steroids  Will discuss with pulmonary    Paroxysmal atrial fibrillation (HCC)  Assessment & Plan  On diltiazem    Tobacco use  Assessment & Plan  Nicotine patch  I encouraged her to quit    HIV (human immunodeficiency virus infection)   Assessment & Plan  Continue Biktarvy          Subjective:   Still wheezing  No cough  No fever  Objective:     Vitals:   Temp (24hrs), Av 8 °F (36 6 °C), Min:97 4 °F (36 3 °C), Max:98 1 °F (36 7 °C)    Temp:  [97 4 °F (36 3 °C)-98 1 °F (36 7 °C)] 98 °F (36 7 °C)  HR:  [85-88] 85  Resp:  [16-18] 16  BP: ()/(61-67) 106/67  SpO2:  [96 %-98 %] 98 %  Body mass index is 39 6 kg/m²  Input and Output Summary (last 24 hours): Intake/Output Summary (Last 24 hours) at 2022 1035  Last data filed at 2022 0905  Gross per 24 hour   Intake 1140 ml   Output --   Net 1140 ml       Physical Exam:     Physical Exam  Vitals and nursing note reviewed  HENT:      Head: Normocephalic and atraumatic  Eyes:      Pupils: Pupils are equal, round, and reactive to light  Cardiovascular:      Rate and Rhythm: Normal rate and regular rhythm  Heart sounds: No murmur heard  No friction rub  No gallop  Pulmonary:      Effort: Pulmonary effort is normal       Breath sounds: Normal breath sounds  No wheezing or rales  Abdominal:      General: Bowel sounds are normal       Palpations: Abdomen is soft  Tenderness: There is no abdominal tenderness  Musculoskeletal:      Right lower leg: No edema        Left lower leg: No edema          Additional Data:     Labs:    Results from last 7 days   Lab Units 12/29/22  0456   WBC Thousand/uL 11 75*   HEMOGLOBIN g/dL 9 7*   HEMATOCRIT % 32 1*   PLATELETS Thousands/uL 330   NEUTROS PCT % 85*   LYMPHS PCT % 8*   MONOS PCT % 5   EOS PCT % 0     Results from last 7 days   Lab Units 12/29/22  0456 12/27/22  0555 12/26/22  1214   POTASSIUM mmol/L 4 4   < > 3 2*   CHLORIDE mmol/L 104   < > 107   CO2 mmol/L 29   < > 20*   BUN mg/dL 38*   < > 8   CREATININE mg/dL 1 27   < > 1 05   CALCIUM mg/dL 10 7*   < > 10 5*   ALK PHOS U/L  --   --  68   ALT U/L  --   --  14   AST U/L  --   --  19    < > = values in this interval not displayed       Results from last 7 days   Lab Units 12/26/22  1231   INR  1 08     Results from last 7 days   Lab Units 12/29/22  0737 12/28/22  2108 12/28/22  1616 12/28/22  1127 12/28/22  0756 12/27/22  2302 12/27/22  1620 12/27/22  1119 12/27/22  0737 12/26/22  2045 12/26/22  1622   POC GLUCOSE mg/dl 135 224* 125 120 167* 167* 168* 176* 172* 186* 165*               * I Have Reviewed All Lab Data     Recent Cultures (last 7 days):             Last 24 Hours Medication List:   Current Facility-Administered Medications   Medication Dose Route Frequency Provider Last Rate   • apixaban  5 mg Oral BID Araceli Zamorano MD     • atorvastatin  20 mg Oral Daily With Francis Wing MD     • bictegravir-emtricitab-tenofovir alafenamide  1 tablet Oral Daily Araceli Zamorano MD     • budesonide-formoterol  2 puff Inhalation BID Araceli Zamorano MD     • cholecalciferol  1,000 Units Oral Daily Araceli Zamorano MD     • diltiazem  120 mg Oral Daily Araceli Zamorano MD     • furosemide  40 mg Oral BID Araceli Zamorano MD     • gabapentin  800 mg Oral TID Araceli Zamorano MD     • insulin lispro  2-12 Units Subcutaneous TID [facility] Araceli Zamorano MD     • insulin lispro  2-12 Units Subcutaneous HS Araceli Zamorano MD     • levalbuterol  1 25 mg Nebulization Q6H PRN Araceli Zamorano MD     • levothyroxine 125 mcg Oral Early Morning Audelia Barker MD     • losartan  100 mg Oral Daily Audelia Barker MD     • lurasidone  40 mg Oral Daily With Breakfast Audelia Barker MD     • melatonin  3 mg Oral HS Magnolia Roberts PA-C     • metFORMIN  500 mg Oral Daily Audelia Barker MD     • montelukast  10 mg Oral HS Audelia Barker MD     • potassium chloride  20 mEq Oral Daily Audelia Barker MD     • predniSONE  60 mg Oral Daily LUIS M Barlow     • topiramate  50 mg Oral BID Audelia Barker MD     • traMADol  50 mg Oral Q6H PRN Audelia Barker MD           VTE Pharmacologic Prophylaxis:   Pharmacologic: Apixaban (Eliquis)      Current Length of Stay: 3 day(s)    Current Patient Status: Inpatient       Discharge Plan:   Code Status: Level 1 - Full Code           Today, Patient Was Seen By: Ai Valdes DO    ** Please Note: Dictation voice to text software may have been used in the creation of this document   **

## 2022-12-29 NOTE — PLAN OF CARE
Problem: PAIN - ADULT  Goal: Verbalizes/displays adequate comfort level or baseline comfort level  Description: Interventions:  - Encourage patient to monitor pain and request assistance  - Assess pain using appropriate pain scale  - Administer analgesics based on type and severity of pain and evaluate response  - Implement non-pharmacological measures as appropriate and evaluate response  - Consider cultural and social influences on pain and pain management  - Notify physician/advanced practitioner if interventions unsuccessful or patient reports new pain  Outcome: Progressing     Problem: INFECTION - ADULT  Goal: Absence or prevention of progression during hospitalization  Description: INTERVENTIONS:  - Assess and monitor for signs and symptoms of infection  - Monitor lab/diagnostic results  - Monitor all insertion sites, i e  indwelling lines, tubes, and drains  - Monitor endotracheal if appropriate and nasal secretions for changes in amount and color  - [place] appropriate cooling/warming therapies per order  - Administer medications as ordered  - Instruct and encourage patient and family to use good hand hygiene technique  - Identify and instruct in appropriate isolation precautions for identified infection/condition  Outcome: Progressing     Problem: SAFETY ADULT  Goal: Patient will remain free of falls  Description: INTERVENTIONS:  - Educate patient/family on patient safety including physical limitations  - Instruct patient to call for assistance with activity   - Consult OT/PT to assist with strengthening/mobility   - Keep Call bell within reach  - Keep bed low and locked with side rails adjusted as appropriate  - Keep care items and personal belongings within reach  - Initiate and maintain comfort rounds  - Make Fall Risk Sign visible to staff  - Offer Toileting every 2 Hours, in advance of need  - Initiate/Maintain bed alarm  - Apply yellow socks and bracelet for high fall risk patients  - Consider moving patient to room near nurses station  Outcome: Progressing     Problem: DISCHARGE PLANNING  Goal: Discharge to home or other facility with appropriate resources  Description: INTERVENTIONS:  - Identify barriers to discharge w/patient and caregiver  - Arrange for needed discharge resources and transportation as appropriate  - Identify discharge learning needs (meds, wound care, etc )  - Arrange for interpretive services to assist at discharge as needed  - Refer to Case Management Department for coordinating discharge planning if the patient needs post-hospital services based on physician/advanced practitioner order or complex needs related to functional status, cognitive ability, or social support system  Outcome: Progressing     Problem: Knowledge Deficit  Goal: Patient/family/caregiver demonstrates understanding of disease process, treatment plan, medications, and discharge instructions  Description: Complete learning assessment and assess knowledge base    Interventions:  - Provide teaching at level of understanding  - Provide teaching via preferred learning methods  Outcome: Progressing     Problem: Potential for Falls  Goal: Patient will remain free of falls  Description: INTERVENTIONS:  - Educate patient/family on patient safety including physical limitations  - Instruct patient to call for assistance with activity   - Consult OT/PT to assist with strengthening/mobility   - Keep Call bell within reach  - Keep bed low and locked with side rails adjusted as appropriate  - Keep care items and personal belongings within reach  - Initiate and maintain comfort rounds  - Make Fall Risk Sign visible to staff  - Offer Toileting every 2 Hours, in advance of need  - Initiate/Maintain bed alarm  - Apply yellow socks and bracelet for high fall risk patients  - Consider moving patient to room near nurses station  Outcome: Progressing     Problem: SKIN/TISSUE INTEGRITY - ADULT  Goal: Skin Integrity remains intact(Skin Breakdown Prevention)  Description: Assess:  -Perform Jonathan assessment every shift  -Clean and moisturize skin every shift  -Inspect skin when repositioning, toileting, and assisting with ADLS  -Assess extremities for adequate circulation and sensation     Bed Management:  -Have minimal linens on bed & keep smooth, unwrinkled  -Change linens as needed when moist or perspiring  -Avoid sitting or lying in one position for more than 2 hours while in bed  -Keep HOB at 30 degrees     Toileting:  -Offer bedside commode    Activity:  -Mobilize patient 4 times a day  -Encourage activity and walks on unit  -Encourage or provide ROM exercises   -Turn and reposition patient every 2 Hours  -Use appropriate equipment to lift or move patient in bed  -Instruct/ Assist with weight shifting every 2 hours when out of bed in chair  -Consider limitation of chair time 2 hours hour intervals    Skin Care:  -Avoid use of baby powder, tape, friction and shearing, hot water or constrictive clothing    Next Steps:  -Teach patient strategies to minimize risks such as using call bell   -Consider consults to  interdisciplinary teams such as respiratory  Outcome: Progressing     Problem: METABOLIC, FLUID AND ELECTROLYTES - ADULT  Goal: Glucose maintained within target range  Description: INTERVENTIONS:  - Monitor Blood Glucose as ordered  - Assess for signs and symptoms of hyperglycemia and hypoglycemia  - Administer ordered medications to maintain glucose within target range  - Assess nutritional intake and initiate nutrition service referral as needed  Outcome: Progressing     Problem: RESPIRATORY - ADULT  Goal: Achieves optimal ventilation and oxygenation  Description: INTERVENTIONS:  - Assess for changes in respiratory status  - Assess for changes in mentation and behavior  - Position to facilitate oxygenation and minimize respiratory effort  - Oxygen administered by appropriate delivery if ordered  - Initiate smoking cessation education as indicated  - Encourage broncho-pulmonary hygiene including cough, deep breathe, Incentive Spirometry  - Assess the need for suctioning and aspirate as needed  - Assess and instruct to report SOB or any respiratory difficulty  - Respiratory Therapy support as indicated  Outcome: Progressing

## 2022-12-29 NOTE — PLAN OF CARE
Problem: PAIN - ADULT  Goal: Verbalizes/displays adequate comfort level or baseline comfort level  Description: Interventions:  - Encourage patient to monitor pain and request assistance  - Assess pain using appropriate pain scale  - Administer analgesics based on type and severity of pain and evaluate response  - Implement non-pharmacological measures as appropriate and evaluate response  - Consider cultural and social influences on pain and pain management  - Notify physician/advanced practitioner if interventions unsuccessful or patient reports new pain  Outcome: Progressing     Problem: INFECTION - ADULT  Goal: Absence or prevention of progression during hospitalization  Description: INTERVENTIONS:  - Assess and monitor for signs and symptoms of infection  - Monitor lab/diagnostic results  - Monitor all insertion sites, i e  indwelling lines, tubes, and drains  - Monitor endotracheal if appropriate and nasal secretions for changes in amount and color  - [place] appropriate cooling/warming therapies per order  - Administer medications as ordered  - Instruct and encourage patient and family to use good hand hygiene technique  - Identify and instruct in appropriate isolation precautions for identified infection/condition  Outcome: Progressing     Problem: SAFETY ADULT  Goal: Patient will remain free of falls  Description: INTERVENTIONS:  - Educate patient/family on patient safety including physical limitations  - Instruct patient to call for assistance with activity   - Consult OT/PT to assist with strengthening/mobility   - Keep Call bell within reach  - Keep bed low and locked with side rails adjusted as appropriate  - Keep care items and personal belongings within reach  - Initiate and maintain comfort rounds  - Make Fall Risk Sign visible to staff  - Offer Toileting every 2 Hours, in advance of need  - Initiate/Maintain bed alarm  - Apply yellow socks and bracelet for high fall risk patients  - Consider moving patient to room near nurses station  Outcome: Progressing     Problem: DISCHARGE PLANNING  Goal: Discharge to home or other facility with appropriate resources  Description: INTERVENTIONS:  - Identify barriers to discharge w/patient and caregiver  - Arrange for needed discharge resources and transportation as appropriate  - Identify discharge learning needs (meds, wound care, etc )  - Arrange for interpretive services to assist at discharge as needed  - Refer to Case Management Department for coordinating discharge planning if the patient needs post-hospital services based on physician/advanced practitioner order or complex needs related to functional status, cognitive ability, or social support system  Outcome: Progressing     Problem: Knowledge Deficit  Goal: Patient/family/caregiver demonstrates understanding of disease process, treatment plan, medications, and discharge instructions  Description: Complete learning assessment and assess knowledge base    Interventions:  - Provide teaching at level of understanding  - Provide teaching via preferred learning methods  Outcome: Progressing     Problem: RESPIRATORY - ADULT  Goal: Achieves optimal ventilation and oxygenation  Description: INTERVENTIONS:  - Assess for changes in respiratory status  - Assess for changes in mentation and behavior  - Position to facilitate oxygenation and minimize respiratory effort  - Oxygen administered by appropriate delivery if ordered  - Initiate smoking cessation education as indicated  - Encourage broncho-pulmonary hygiene including cough, deep breathe, Incentive Spirometry  - Assess the need for suctioning and aspirate as needed  - Assess and instruct to report SOB or any respiratory difficulty  - Respiratory Therapy support as indicated  Outcome: Progressing     Problem: METABOLIC, FLUID AND ELECTROLYTES - ADULT  Goal: Glucose maintained within target range  Description: INTERVENTIONS:  - Monitor Blood Glucose as ordered  - Assess for signs and symptoms of hyperglycemia and hypoglycemia  - Administer ordered medications to maintain glucose within target range  - Assess nutritional intake and initiate nutrition service referral as needed  Outcome: Progressing     Problem: Potential for Falls  Goal: Patient will remain free of falls  Description: INTERVENTIONS:  - Educate patient/family on patient safety including physical limitations  - Instruct patient to call for assistance with activity   - Consult OT/PT to assist with strengthening/mobility   - Keep Call bell within reach  - Keep bed low and locked with side rails adjusted as appropriate  - Keep care items and personal belongings within reach  - Initiate and maintain comfort rounds  - Make Fall Risk Sign visible to staff  - Offer Toileting every 2 Hours, in advance of need  - Initiate/Maintain bed alarm  - Apply yellow socks and bracelet for high fall risk patients  - Consider moving patient to room near nurses station  Outcome: Progressing

## 2022-12-30 VITALS
DIASTOLIC BLOOD PRESSURE: 73 MMHG | RESPIRATION RATE: 20 BRPM | OXYGEN SATURATION: 96 % | WEIGHT: 217.15 LBS | TEMPERATURE: 97.7 F | SYSTOLIC BLOOD PRESSURE: 108 MMHG | HEART RATE: 90 BPM | BODY MASS INDEX: 39.72 KG/M2

## 2022-12-30 LAB
GLUCOSE SERPL-MCNC: 117 MG/DL (ref 65–140)
GLUCOSE SERPL-MCNC: 119 MG/DL (ref 65–140)

## 2022-12-30 RX ORDER — PREDNISONE 10 MG/1
TABLET ORAL
Qty: 42 TABLET | Refills: 0 | Status: SHIPPED | OUTPATIENT
Start: 2022-12-30 | End: 2023-01-11

## 2022-12-30 RX ADMIN — DILTIAZEM HYDROCHLORIDE 120 MG: 120 CAPSULE, COATED, EXTENDED RELEASE ORAL at 09:20

## 2022-12-30 RX ADMIN — ATORVASTATIN CALCIUM 20 MG: 20 TABLET, FILM COATED ORAL at 16:22

## 2022-12-30 RX ADMIN — LEVOTHYROXINE SODIUM 125 MCG: 125 TABLET ORAL at 06:00

## 2022-12-30 RX ADMIN — GABAPENTIN 800 MG: 400 CAPSULE ORAL at 16:22

## 2022-12-30 RX ADMIN — GABAPENTIN 800 MG: 400 CAPSULE ORAL at 09:20

## 2022-12-30 RX ADMIN — TRAMADOL HYDROCHLORIDE 50 MG: 50 TABLET, COATED ORAL at 16:27

## 2022-12-30 RX ADMIN — TOPIRAMATE 50 MG: 25 TABLET, FILM COATED ORAL at 09:20

## 2022-12-30 RX ADMIN — APIXABAN 5 MG: 5 TABLET, FILM COATED ORAL at 09:20

## 2022-12-30 RX ADMIN — BICTEGRAVIR SODIUM, EMTRICITABINE, AND TENOFOVIR ALAFENAMIDE FUMARATE 1 TABLET: 50; 200; 25 TABLET ORAL at 09:20

## 2022-12-30 RX ADMIN — PREDNISONE 60 MG: 20 TABLET ORAL at 09:20

## 2022-12-30 RX ADMIN — BUDESONIDE AND FORMOTEROL FUMARATE DIHYDRATE 2 PUFF: 160; 4.5 AEROSOL RESPIRATORY (INHALATION) at 09:19

## 2022-12-30 RX ADMIN — Medication 1000 UNITS: at 09:20

## 2022-12-30 RX ADMIN — POTASSIUM CHLORIDE 20 MEQ: 1500 TABLET, EXTENDED RELEASE ORAL at 09:20

## 2022-12-30 RX ADMIN — LOSARTAN POTASSIUM 100 MG: 50 TABLET, FILM COATED ORAL at 09:20

## 2022-12-30 RX ADMIN — LURASIDONE HYDROCHLORIDE 40 MG: 40 TABLET, FILM COATED ORAL at 09:20

## 2022-12-30 RX ADMIN — METFORMIN HYDROCHLORIDE 500 MG: 500 TABLET ORAL at 09:20

## 2022-12-30 RX ADMIN — FUROSEMIDE 40 MG: 40 TABLET ORAL at 09:20

## 2022-12-30 NOTE — PLAN OF CARE
Problem: PAIN - ADULT  Goal: Verbalizes/displays adequate comfort level or baseline comfort level  Description: Interventions:  - Encourage patient to monitor pain and request assistance  - Assess pain using appropriate pain scale  - Administer analgesics based on type and severity of pain and evaluate response  - Implement non-pharmacological measures as appropriate and evaluate response  - Consider cultural and social influences on pain and pain management  - Notify physician/advanced practitioner if interventions unsuccessful or patient reports new pain  Outcome: Progressing     Problem: INFECTION - ADULT  Goal: Absence or prevention of progression during hospitalization  Description: INTERVENTIONS:  - Assess and monitor for signs and symptoms of infection  - Monitor lab/diagnostic results  - Monitor all insertion sites, i e  indwelling lines, tubes, and drains  - Monitor endotracheal if appropriate and nasal secretions for changes in amount and color  - [place] appropriate cooling/warming therapies per order  - Administer medications as ordered  - Instruct and encourage patient and family to use good hand hygiene technique  - Identify and instruct in appropriate isolation precautions for identified infection/condition  Outcome: Progressing     Problem: SAFETY ADULT  Goal: Patient will remain free of falls  Description: INTERVENTIONS:  - Educate patient/family on patient safety including physical limitations  - Instruct patient to call for assistance with activity   - Consult OT/PT to assist with strengthening/mobility   - Keep Call bell within reach  - Keep bed low and locked with side rails adjusted as appropriate  - Keep care items and personal belongings within reach  - Initiate and maintain comfort rounds  - Make Fall Risk Sign visible to staff  - Offer Toileting every 2 Hours, in advance of need  - Initiate/Maintain bed alarm  - Apply yellow socks and bracelet for high fall risk patients  - Consider moving patient to room near nurses station  Outcome: Progressing     Problem: DISCHARGE PLANNING  Goal: Discharge to home or other facility with appropriate resources  Description: INTERVENTIONS:  - Identify barriers to discharge w/patient and caregiver  - Arrange for needed discharge resources and transportation as appropriate  - Identify discharge learning needs (meds, wound care, etc )  - Arrange for interpretive services to assist at discharge as needed  - Refer to Case Management Department for coordinating discharge planning if the patient needs post-hospital services based on physician/advanced practitioner order or complex needs related to functional status, cognitive ability, or social support system  Outcome: Progressing     Problem: Knowledge Deficit  Goal: Patient/family/caregiver demonstrates understanding of disease process, treatment plan, medications, and discharge instructions  Description: Complete learning assessment and assess knowledge base    Interventions:  - Provide teaching at level of understanding  - Provide teaching via preferred learning methods  Outcome: Progressing     Problem: RESPIRATORY - ADULT  Goal: Achieves optimal ventilation and oxygenation  Description: INTERVENTIONS:  - Assess for changes in respiratory status  - Assess for changes in mentation and behavior  - Position to facilitate oxygenation and minimize respiratory effort  - Oxygen administered by appropriate delivery if ordered  - Initiate smoking cessation education as indicated  - Encourage broncho-pulmonary hygiene including cough, deep breathe, Incentive Spirometry  - Assess the need for suctioning and aspirate as needed  - Assess and instruct to report SOB or any respiratory difficulty  - Respiratory Therapy support as indicated  Outcome: Progressing     Problem: METABOLIC, FLUID AND ELECTROLYTES - ADULT  Goal: Glucose maintained within target range  Description: INTERVENTIONS:  - Monitor Blood Glucose as ordered  - Assess for signs and symptoms of hyperglycemia and hypoglycemia  - Administer ordered medications to maintain glucose within target range  - Assess nutritional intake and initiate nutrition service referral as needed  Outcome: Progressing     Problem: SKIN/TISSUE INTEGRITY - ADULT  Goal: Skin Integrity remains intact(Skin Breakdown Prevention)  Description: Assess:  -Perform Jonathan assessment every shift  -Clean and moisturize skin every shift  -Inspect skin when repositioning, toileting, and assisting with ADLS  -Assess extremities for adequate circulation and sensation     Bed Management:  -Have minimal linens on bed & keep smooth, unwrinkled  -Change linens as needed when moist or perspiring  -Avoid sitting or lying in one position for more than 2 hours while in bed  -Keep HOB at 30 degrees     Toileting:  -Offer bedside commode    Activity:  -Mobilize patient 4 times a day  -Encourage activity and walks on unit  -Encourage or provide ROM exercises   -Turn and reposition patient every 2 Hours  -Use appropriate equipment to lift or move patient in bed  -Instruct/ Assist with weight shifting every 2 hours when out of bed in chair  -Consider limitation of chair time 2 hours hour intervals    Skin Care:  -Avoid use of baby powder, tape, friction and shearing, hot water or constrictive clothing    Next Steps:  -Teach patient strategies to minimize risks such as using call bell   -Consider consults to  interdisciplinary teams such as respiratory  Outcome: Progressing     Problem: Potential for Falls  Goal: Patient will remain free of falls  Description: INTERVENTIONS:  - Educate patient/family on patient safety including physical limitations  - Instruct patient to call for assistance with activity   - Consult OT/PT to assist with strengthening/mobility   - Keep Call bell within reach  - Keep bed low and locked with side rails adjusted as appropriate  - Keep care items and personal belongings within reach  - Initiate and maintain comfort rounds  - Make Fall Risk Sign visible to staff  - Offer Toileting every 2 Hours, in advance of need  - Initiate/Maintain bed alarm  - Apply yellow socks and bracelet for high fall risk patients  - Consider moving patient to room near nurses station  Outcome: Progressing

## 2022-12-30 NOTE — ASSESSMENT & PLAN NOTE
Patient frequently comes in the hospital for exacerbations  She is unfortunately still smoking  She is much better today    Was sent home on a steroid taper

## 2022-12-30 NOTE — DISCHARGE SUMMARY
[facility]  Discharge- Jazmine Reyna 1950, 81 y o  female MRN: [id number]  Unit/Bed#: E5 -01 Encounter: [id number]  Primary Care Provider: Lele Jaramillo DO   Date and time admitted to hospital: 12/26/2022 11:23 AM    * COPD exacerbation [facility])  Assessment & Plan  Patient frequently comes in the hospital for exacerbations  She is unfortunately still smoking  She is much better today  Was sent home on a steroid taper    Paroxysmal atrial fibrillation ([address] )  Assessment & Plan  On diltiazem    Tobacco use  Assessment & Plan  Nicotine patch  I encouraged her to quit    HIV (human immunodeficiency virus infection)   Assessment & Plan  Continue Biktarvy    Type 2 diabetes mellitus without complication, without long-term current use of insulin [facility])  Assessment & Plan  Lab Results   Component Value Date    HGBA1C 6 8 (H) 10/16/2022       Recent Labs     12/29/22  1615 12/29/22  2117 12/30/22  0746 12/30/22  1104   POCGLU 145* 169* 117 119       Blood Sugar Average: Last 72 hrs:  (P) 152 [id number]     Blood sugars well controlled        Discharging Physician / Practitioner: Thang Brush DO  PCP: Lele Jaramillo DO  Admission Date:   Admission Orders (From admission, onward)     Ordered        12/26/22 1500  INPATIENT ADMISSION  Once                      Discharge Date: 12/30/22    Medical Problems     Resolved Problems  Date Reviewed: 12/13/2022   None           Consultations During Hospital Stay:  · Pulmonary          Reason for Admission: Shortness of breath      Hospital Course:     Jazmine Reyna is a 81 y o  female patient who originally presented to the hospital on 12/26/2022 due to shortness of breath  She is well-known to the internal medicine service with frequent admissions for COPD exacerbation  She unfortunately continues to smoke at home  She was significantly wheezing  Started on IV steroids    I had her evaluated by pulmonary to see if there is anything else to add  She is already on maximum regimens at home but essentially  The biggest thing she needs to do is quit smoking  We will explain this to her  She is better now and okay to go home on a prednisone taper  Please see above list of diagnoses and related plan for additional information  Condition at Discharge: stable       Discharge Day Visit / Exam:     Subjective:    Feels much better  Much less wheezing  Feels back to her normal breathing  No cough      Vitals: Blood Pressure: 124/82 (12/30/22 0746)  Pulse: 86 (12/30/22 0746)  Temperature: 97 7 °F (36 5 °C) (12/30/22 0746)  Temp Source: Oral (12/30/22 0746)  Respirations: 20 (12/30/22 0746)  Weight - Scale: 98 5 kg (217 lb 2 5 oz) (12/30/22 0600)  SpO2: 98 % (12/30/22 0929)    Exam:     Physical Exam  Vitals and nursing note reviewed  HENT:      Head: Normocephalic and atraumatic  Eyes:      Pupils: Pupils are equal, round, and reactive to light  Cardiovascular:      Rate and Rhythm: Normal rate and regular rhythm  Heart sounds: No murmur heard  No friction rub  No gallop  Pulmonary:      Effort: Pulmonary effort is normal       Breath sounds: Wheezing (minimal   Much improved) present  No rales  Abdominal:      General: Bowel sounds are normal       Palpations: Abdomen is soft  Tenderness: There is no abdominal tenderness  Musculoskeletal:      Right lower leg: No edema  Left lower leg: No edema  0  Discharge instructions/Information to patient and family:   See after visit summary for information provided to patient and family  Provisions for Follow-Up Care:  See after visit summary for information related to follow-up care and any pertinent home health orders  Disposition:     Home       Discharge Statement:  I spent 38 minutes discharging the patient  This time was spent on the day of discharge  I had direct contact with the patient on the day of discharge   Greater than 50% of the total time was spent examining patient, answering all patient questions, arranging and discussing plan of care with patient as well as directly providing post-discharge instructions  Additional time then spent on discharge activities  Discharge Medications:  See after visit summary for reconciled discharge medications provided to patient and family        ** Please Note: This note has been constructed using a voice recognition system **

## 2022-12-30 NOTE — PLAN OF CARE
Problem: PAIN - ADULT  Goal: Verbalizes/displays adequate comfort level or baseline comfort level  Description: Interventions:  - Encourage patient to monitor pain and request assistance  - Assess pain using appropriate pain scale  - Administer analgesics based on type and severity of pain and evaluate response  - Implement non-pharmacological measures as appropriate and evaluate response  - Consider cultural and social influences on pain and pain management  - Notify physician/advanced practitioner if interventions unsuccessful or patient reports new pain  Outcome: Progressing     Problem: INFECTION - ADULT  Goal: Absence or prevention of progression during hospitalization  Description: INTERVENTIONS:  - Assess and monitor for signs and symptoms of infection  - Monitor lab/diagnostic results  - Monitor all insertion sites, i e  indwelling lines, tubes, and drains  - Monitor endotracheal if appropriate and nasal secretions for changes in amount and color  - [place] appropriate cooling/warming therapies per order  - Administer medications as ordered  - Instruct and encourage patient and family to use good hand hygiene technique  - Identify and instruct in appropriate isolation precautions for identified infection/condition  Outcome: Progressing     Problem: SAFETY ADULT  Goal: Patient will remain free of falls  Description: INTERVENTIONS:  - Educate patient/family on patient safety including physical limitations  - Instruct patient to call for assistance with activity   - Consult OT/PT to assist with strengthening/mobility   - Keep Call bell within reach  - Keep bed low and locked with side rails adjusted as appropriate  - Keep care items and personal belongings within reach  - Initiate and maintain comfort rounds  - Make Fall Risk Sign visible to staff  - Offer Toileting every 2 Hours, in advance of need  - Initiate/Maintain bed alarm  - Apply yellow socks and bracelet for high fall risk patients  - Consider moving patient to room near nurses station  Outcome: Progressing     Problem: DISCHARGE PLANNING  Goal: Discharge to home or other facility with appropriate resources  Description: INTERVENTIONS:  - Identify barriers to discharge w/patient and caregiver  - Arrange for needed discharge resources and transportation as appropriate  - Identify discharge learning needs (meds, wound care, etc )  - Arrange for interpretive services to assist at discharge as needed  - Refer to Case Management Department for coordinating discharge planning if the patient needs post-hospital services based on physician/advanced practitioner order or complex needs related to functional status, cognitive ability, or social support system  Outcome: Progressing     Problem: Knowledge Deficit  Goal: Patient/family/caregiver demonstrates understanding of disease process, treatment plan, medications, and discharge instructions  Description: Complete learning assessment and assess knowledge base    Interventions:  - Provide teaching at level of understanding  - Provide teaching via preferred learning methods  Outcome: Progressing     Problem: RESPIRATORY - ADULT  Goal: Achieves optimal ventilation and oxygenation  Description: INTERVENTIONS:  - Assess for changes in respiratory status  - Assess for changes in mentation and behavior  - Position to facilitate oxygenation and minimize respiratory effort  - Oxygen administered by appropriate delivery if ordered  - Initiate smoking cessation education as indicated  - Encourage broncho-pulmonary hygiene including cough, deep breathe, Incentive Spirometry  - Assess the need for suctioning and aspirate as needed  - Assess and instruct to report SOB or any respiratory difficulty  - Respiratory Therapy support as indicated  Outcome: Progressing     Problem: METABOLIC, FLUID AND ELECTROLYTES - ADULT  Goal: Glucose maintained within target range  Description: INTERVENTIONS:  - Monitor Blood Glucose as ordered  - Assess for signs and symptoms of hyperglycemia and hypoglycemia  - Administer ordered medications to maintain glucose within target range  - Assess nutritional intake and initiate nutrition service referral as needed  Outcome: Progressing     Problem: SKIN/TISSUE INTEGRITY - ADULT  Goal: Skin Integrity remains intact(Skin Breakdown Prevention)  Description: Assess:  -Perform Jonathan assessment every shift  -Clean and moisturize skin every shift  -Inspect skin when repositioning, toileting, and assisting with ADLS  -Assess extremities for adequate circulation and sensation     Bed Management:  -Have minimal linens on bed & keep smooth, unwrinkled  -Change linens as needed when moist or perspiring  -Avoid sitting or lying in one position for more than 2 hours while in bed  -Keep HOB at 30 degrees     Toileting:  -Offer bedside commode    Activity:  -Mobilize patient 4 times a day  -Encourage activity and walks on unit  -Encourage or provide ROM exercises   -Turn and reposition patient every 2 Hours  -Use appropriate equipment to lift or move patient in bed  -Instruct/ Assist with weight shifting every 2 hours when out of bed in chair  -Consider limitation of chair time 2 hours hour intervals    Skin Care:  -Avoid use of baby powder, tape, friction and shearing, hot water or constrictive clothing    Next Steps:  -Teach patient strategies to minimize risks such as using call bell   -Consider consults to  interdisciplinary teams such as respiratory  Outcome: Progressing     Problem: Potential for Falls  Goal: Patient will remain free of falls  Description: INTERVENTIONS:  - Educate patient/family on patient safety including physical limitations  - Instruct patient to call for assistance with activity   - Consult OT/PT to assist with strengthening/mobility   - Keep Call bell within reach  - Keep bed low and locked with side rails adjusted as appropriate  - Keep care items and personal belongings within reach  - Initiate and maintain comfort rounds  - Make Fall Risk Sign visible to staff  - Offer Toileting every 2 Hours, in advance of need  - Initiate/Maintain bed alarm  - Apply yellow socks and bracelet for high fall risk patients  - Consider moving patient to room near nurses station  Outcome: Progressing

## 2022-12-30 NOTE — PLAN OF CARE
Problem: PAIN - ADULT  Goal: Verbalizes/displays adequate comfort level or baseline comfort level  Description: Interventions:  - Encourage patient to monitor pain and request assistance  - Assess pain using appropriate pain scale  - Administer analgesics based on type and severity of pain and evaluate response  - Implement non-pharmacological measures as appropriate and evaluate response  - Consider cultural and social influences on pain and pain management  - Notify physician/advanced practitioner if interventions unsuccessful or patient reports new pain  Outcome: Progressing     Problem: INFECTION - ADULT  Goal: Absence or prevention of progression during hospitalization  Description: INTERVENTIONS:  - Assess and monitor for signs and symptoms of infection  - Monitor lab/diagnostic results  - Monitor all insertion sites, i e  indwelling lines, tubes, and drains  - Monitor endotracheal if appropriate and nasal secretions for changes in amount and color  - [place] appropriate cooling/warming therapies per order  - Administer medications as ordered  - Instruct and encourage patient and family to use good hand hygiene technique  - Identify and instruct in appropriate isolation precautions for identified infection/condition  Outcome: Progressing     Problem: SAFETY ADULT  Goal: Patient will remain free of falls  Description: INTERVENTIONS:  - Educate patient/family on patient safety including physical limitations  - Instruct patient to call for assistance with activity   - Consult OT/PT to assist with strengthening/mobility   - Keep Call bell within reach  - Keep bed low and locked with side rails adjusted as appropriate  - Keep care items and personal belongings within reach  - Initiate and maintain comfort rounds  - Make Fall Risk Sign visible to staff  - Offer Toileting every 2 Hours, in advance of need  - Initiate/Maintain bed alarm  - Apply yellow socks and bracelet for high fall risk patients  - Consider moving patient to room near nurses station  Outcome: Progressing     Problem: DISCHARGE PLANNING  Goal: Discharge to home or other facility with appropriate resources  Description: INTERVENTIONS:  - Identify barriers to discharge w/patient and caregiver  - Arrange for needed discharge resources and transportation as appropriate  - Identify discharge learning needs (meds, wound care, etc )  - Arrange for interpretive services to assist at discharge as needed  - Refer to Case Management Department for coordinating discharge planning if the patient needs post-hospital services based on physician/advanced practitioner order or complex needs related to functional status, cognitive ability, or social support system  Outcome: Progressing     Problem: Knowledge Deficit  Goal: Patient/family/caregiver demonstrates understanding of disease process, treatment plan, medications, and discharge instructions  Description: Complete learning assessment and assess knowledge base    Interventions:  - Provide teaching at level of understanding  - Provide teaching via preferred learning methods  Outcome: Progressing     Problem: RESPIRATORY - ADULT  Goal: Achieves optimal ventilation and oxygenation  Description: INTERVENTIONS:  - Assess for changes in respiratory status  - Assess for changes in mentation and behavior  - Position to facilitate oxygenation and minimize respiratory effort  - Oxygen administered by appropriate delivery if ordered  - Initiate smoking cessation education as indicated  - Encourage broncho-pulmonary hygiene including cough, deep breathe, Incentive Spirometry  - Assess the need for suctioning and aspirate as needed  - Assess and instruct to report SOB or any respiratory difficulty  - Respiratory Therapy support as indicated  Outcome: Progressing     Problem: METABOLIC, FLUID AND ELECTROLYTES - ADULT  Goal: Glucose maintained within target range  Description: INTERVENTIONS:  - Monitor Blood Glucose as ordered  - Assess for signs and symptoms of hyperglycemia and hypoglycemia  - Administer ordered medications to maintain glucose within target range  - Assess nutritional intake and initiate nutrition service referral as needed  Outcome: Progressing     Problem: SKIN/TISSUE INTEGRITY - ADULT  Goal: Skin Integrity remains intact(Skin Breakdown Prevention)  Description: Assess:  -Perform Jonathan assessment every shift  -Clean and moisturize skin every shift  -Inspect skin when repositioning, toileting, and assisting with ADLS  -Assess extremities for adequate circulation and sensation     Bed Management:  -Have minimal linens on bed & keep smooth, unwrinkled  -Change linens as needed when moist or perspiring  -Avoid sitting or lying in one position for more than 2 hours while in bed  -Keep HOB at 30 degrees     Toileting:  -Offer bedside commode    Activity:  -Mobilize patient 4 times a day  -Encourage activity and walks on unit  -Encourage or provide ROM exercises   -Turn and reposition patient every 2 Hours  -Use appropriate equipment to lift or move patient in bed  -Instruct/ Assist with weight shifting every 2 hours when out of bed in chair  -Consider limitation of chair time 2 hours hour intervals    Skin Care:  -Avoid use of baby powder, tape, friction and shearing, hot water or constrictive clothing    Next Steps:  -Teach patient strategies to minimize risks such as using call bell   -Consider consults to  interdisciplinary teams such as respiratory  Outcome: Progressing     Problem: Potential for Falls  Goal: Patient will remain free of falls  Description: INTERVENTIONS:  - Educate patient/family on patient safety including physical limitations  - Instruct patient to call for assistance with activity   - Consult OT/PT to assist with strengthening/mobility   - Keep Call bell within reach  - Keep bed low and locked with side rails adjusted as appropriate  - Keep care items and personal belongings within reach  - Initiate and maintain comfort rounds  - Make Fall Risk Sign visible to staff  - Offer Toileting every 2 Hours, in advance of need  - Initiate/Maintain bed alarm  - Apply yellow socks and bracelet for high fall risk patients  - Consider moving patient to room near nurses station  Outcome: Progressing

## 2022-12-30 NOTE — ASSESSMENT & PLAN NOTE
Lab Results   Component Value Date    HGBA1C 6 8 (H) 10/16/2022       Recent Labs     12/29/22  1615 12/29/22  2117 12/30/22  0746 12/30/22  1104   POCGLU 145* 169* 117 119       Blood Sugar Average: Last 72 hrs:  (P) 152 [id number]     Blood sugars well controlled

## 2022-12-30 NOTE — CASE MANAGEMENT
Case Management Discharge Planning Note    Patient name Jacquelyn Becerra  Location [place]  [address] [facility]- MRN [id number]  : 1950 Date 2022       Current Admission Date: 2022  Current Admission Diagnosis:COPD exacerbation [facility])   Patient Active Problem List    Diagnosis Date Noted   • Class 3 severe obesity due to excess calories with serious comorbidity and body mass index (BMI) of 40 0 to 44 9 in adult [facility]) 2022   • COVID-19 10/23/2022   • Anemia 2022   • Chronic diastolic heart failure ([facility] [address] ) 2022   • PASHA on CPAP 2020   • Acute exacerbation of moderate persistent extrinsic asthma 2020   • Paroxysmal atrial fibrillation ([facility] [address] ) 2020   • Hyperlipidemia 2018   • Thoracic disc herniation 2018   • Lumbar stenosis 2018   • Ambulatory dysfunction    • Hypertension    • Chronic respiratory failure with hypoxia (HCC)    • Bipolar 1 disorder ([facility] [address] )    • Tobacco use    • COPD exacerbation ([facility] [address] ) 2016   • Arthritis 2016   • Type 2 diabetes mellitus without complication, without long-term current use of insulin [facility] ) 2016   • HIV (human immunodeficiency virus infection)  2016   • Osteoarthritis 2016      LOS (days): 4  Geometric Mean LOS (GMLOS) (days): 2 70  Days to GMLOS:-1 3     OBJECTIVE:  Risk of Unplanned Readmission Score: 62 59         Current admission status: Inpatient   Preferred Pharmacy:   [address] [place] [address], [address]  [facility]  [address] [id number]  Phone: [phone] Fax: [phone]    Primary Care Provider: Rosaline Denny DO    Primary Insurance: [facility]  Secondary Insurance: Erica Moscoso    DISCHARGE DETAILS:    Freedom of Choice: Yes  Comments - Freedom of Choice: Pt will discharge home today, [place] with [facility] and Waiver HHAs      Discharge Destination Plan[de-identified] Home with [place] at Discharge : Wheelchair Jeremy Ortiz  Dispatcher Contacted: Yes  Number/Name of Dispatcher: [facility] via [employer]  Transported by [employer] and Unit #):  BLACK  ETA of Transport (Date): 12/30/22  ETA of Transport (Time): 2802  Transfer Mode: Wheelchair  Accompanied by: EMS personnel  Transfer Equipment: Other (comments) (Oxygen to support 3L NC)     IMM Given (Date):: 12/30/22  IMM Given to[de-identified] Patient

## 2022-12-30 NOTE — PLAN OF CARE
Problem: PAIN - ADULT  Goal: Verbalizes/displays adequate comfort level or baseline comfort level  Description: Interventions:  - Encourage patient to monitor pain and request assistance  - Assess pain using appropriate pain scale  - Administer analgesics based on type and severity of pain and evaluate response  - Implement non-pharmacological measures as appropriate and evaluate response  - Consider cultural and social influences on pain and pain management  - Notify physician/advanced practitioner if interventions unsuccessful or patient reports new pain  Outcome: Progressing     Problem: INFECTION - ADULT  Goal: Absence or prevention of progression during hospitalization  Description: INTERVENTIONS:  - Assess and monitor for signs and symptoms of infection  - Monitor lab/diagnostic results  - Monitor all insertion sites, i e  indwelling lines, tubes, and drains  - Monitor endotracheal if appropriate and nasal secretions for changes in amount and color  - [place] appropriate cooling/warming therapies per order  - Administer medications as ordered  - Instruct and encourage patient and family to use good hand hygiene technique  - Identify and instruct in appropriate isolation precautions for identified infection/condition  Outcome: Progressing     Problem: SAFETY ADULT  Goal: Patient will remain free of falls  Description: INTERVENTIONS:  - Educate patient/family on patient safety including physical limitations  - Instruct patient to call for assistance with activity   - Consult OT/PT to assist with strengthening/mobility   - Keep Call bell within reach  - Keep bed low and locked with side rails adjusted as appropriate  - Keep care items and personal belongings within reach  - Initiate and maintain comfort rounds  - Make Fall Risk Sign visible to staff  - Offer Toileting every 2 Hours, in advance of need  - Initiate/Maintain bed alarm  - Apply yellow socks and bracelet for high fall risk patients  - Consider moving patient to room near nurses station  Outcome: Progressing     Problem: DISCHARGE PLANNING  Goal: Discharge to home or other facility with appropriate resources  Description: INTERVENTIONS:  - Identify barriers to discharge w/patient and caregiver  - Arrange for needed discharge resources and transportation as appropriate  - Identify discharge learning needs (meds, wound care, etc )  - Arrange for interpretive services to assist at discharge as needed  - Refer to Case Management Department for coordinating discharge planning if the patient needs post-hospital services based on physician/advanced practitioner order or complex needs related to functional status, cognitive ability, or social support system  Outcome: Progressing     Problem: Knowledge Deficit  Goal: Patient/family/caregiver demonstrates understanding of disease process, treatment plan, medications, and discharge instructions  Description: Complete learning assessment and assess knowledge base    Interventions:  - Provide teaching at level of understanding  - Provide teaching via preferred learning methods  Outcome: Progressing     Problem: RESPIRATORY - ADULT  Goal: Achieves optimal ventilation and oxygenation  Description: INTERVENTIONS:  - Assess for changes in respiratory status  - Assess for changes in mentation and behavior  - Position to facilitate oxygenation and minimize respiratory effort  - Oxygen administered by appropriate delivery if ordered  - Initiate smoking cessation education as indicated  - Encourage broncho-pulmonary hygiene including cough, deep breathe, Incentive Spirometry  - Assess the need for suctioning and aspirate as needed  - Assess and instruct to report SOB or any respiratory difficulty  - Respiratory Therapy support as indicated  Outcome: Progressing     Problem: METABOLIC, FLUID AND ELECTROLYTES - ADULT  Goal: Glucose maintained within target range  Description: INTERVENTIONS:  - Monitor Blood Glucose as ordered  - Assess for signs and symptoms of hyperglycemia and hypoglycemia  - Administer ordered medications to maintain glucose within target range  - Assess nutritional intake and initiate nutrition service referral as needed  Outcome: Progressing     Problem: SKIN/TISSUE INTEGRITY - ADULT  Goal: Skin Integrity remains intact(Skin Breakdown Prevention)  Description: Assess:  -Perform Jonathan assessment every shift  -Clean and moisturize skin every shift  -Inspect skin when repositioning, toileting, and assisting with ADLS  -Assess extremities for adequate circulation and sensation     Bed Management:  -Have minimal linens on bed & keep smooth, unwrinkled  -Change linens as needed when moist or perspiring  -Avoid sitting or lying in one position for more than 2 hours while in bed  -Keep HOB at 30 degrees     Toileting:  -Offer bedside commode    Activity:  -Mobilize patient 4 times a day  -Encourage activity and walks on unit  -Encourage or provide ROM exercises   -Turn and reposition patient every 2 Hours  -Use appropriate equipment to lift or move patient in bed  -Instruct/ Assist with weight shifting every 2 hours when out of bed in chair  -Consider limitation of chair time 2 hours hour intervals    Skin Care:  -Avoid use of baby powder, tape, friction and shearing, hot water or constrictive clothing    Next Steps:  -Teach patient strategies to minimize risks such as using call bell   -Consider consults to  interdisciplinary teams such as respiratory  Outcome: Progressing     Problem: Potential for Falls  Goal: Patient will remain free of falls  Description: INTERVENTIONS:  - Educate patient/family on patient safety including physical limitations  - Instruct patient to call for assistance with activity   - Consult OT/PT to assist with strengthening/mobility   - Keep Call bell within reach  - Keep bed low and locked with side rails adjusted as appropriate  - Keep care items and personal belongings within reach  - Initiate and maintain comfort rounds  - Make Fall Risk Sign visible to staff  - Offer Toileting every 2 Hours, in advance of need  - Initiate/Maintain bed alarm  - Apply yellow socks and bracelet for high fall risk patients  - Consider moving patient to room near nurses station  Outcome: Progressing

## 2023-01-01 NOTE — PLAN OF CARE
Problem: PHYSICAL THERAPY ADULT  Goal: Performs mobility at highest level of function for planned discharge setting  See evaluation for individualized goals  Description: Treatment/Interventions: Functional transfer training, LE strengthening/ROM, Elevations, Therapeutic exercise, Endurance training, Patient/family training, Bed mobility, Gait training, Spoke to nursing, OT  Equipment Recommended: Other (Comment) (trial SPC or RW next tx session)       See flowsheet documentation for full assessment, interventions and recommendations  Outcome: Progressing  Note: Prognosis: Fair  Problem List: Decreased endurance, Impaired balance, Decreased mobility, Obesity  Assessment: Pt  progressing well with mobility  Recommended patient ambulate with staff on unit or in the room by herself  pt  agreeable  Pt  on 2L o2 and Spo2 remained 92-98%  Higher stats noted with ambulation  Pt  performed toileting and pericare in sitting independently  Used grab bars for toilet transfer  Pt  noted with NGUYEN with ambualtion and stair negotiation however SpO2 stats remained 96-98%  Negotiated 10 steps and after seated rest another 15 steps with BHRs and S  Decreased endurance is a limiting factor at this time  Will continue to progress as appropriate and per PT POC  Barriers to Discharge: None     PT Discharge Recommendation:  (OPPT Pulmonary rehab)    See flowsheet documentation for full assessment  The patient is Stable - Low risk of patient condition declining or worsening    Shift Goals  Clinical Goals: infant will remain stable on on LFNC, wean as able, infant will increase PO intake  Patient Goals: n/a  Family Goals: POB will remain updated to POC    Progress made toward(s) clinical / shift goals:    Problem: Oxygenation / Respiratory Function  Goal: Patient will achieve/maintain optimum respiratory ventilation/gas exchange  Description: Target End Date:  Prior to discharge or change in level of care    Outcome: Progressing  Note: Infant stable on 20 cc LFNC, infant trialed on RA 2200 to 0030, able to tolerate room air for 2.5 hours than began desatting into 60s     Problem: Glucose Imbalance  Goal: Maintain blood glucose between  mg/dL  Description: Target End Date:  Prior to discharge or change in level of care      Outcome: Progressing  Note: BGHS this morning 77      Problem: Nutrition / Feeding  Goal: Prior to discharge infant will nipple all feedings within 30 minutes  Description: Target End Date:  Prior to discharge or change in level of care      Outcome: Progressing  Note: In nippled every other round po intake 45% this shift and 46.25% for last 24 hours

## 2023-01-03 ENCOUNTER — APPOINTMENT (EMERGENCY)
Dept: RADIOLOGY | Facility: HOSPITAL | Age: 73
End: 2023-01-03

## 2023-01-03 ENCOUNTER — HOSPITAL ENCOUNTER (EMERGENCY)
Facility: HOSPITAL | Age: 73
Discharge: HOME | End: 2023-01-03
Attending: EMERGENCY MEDICINE

## 2023-01-03 VITALS
HEART RATE: 91 BPM | OXYGEN SATURATION: 96 % | DIASTOLIC BLOOD PRESSURE: 75 MMHG | SYSTOLIC BLOOD PRESSURE: 147 MMHG | TEMPERATURE: 98.2 F | RESPIRATION RATE: 24 BRPM

## 2023-01-03 DIAGNOSIS — J44.1 COPD EXACERBATION (HCC): Primary | ICD-10-CM

## 2023-01-03 DIAGNOSIS — Z76.0 MEDICATION REFILL: ICD-10-CM

## 2023-01-03 LAB
ATRIAL RATE: 91 BPM
P AXIS: 49 DEGREES
PR INTERVAL: 176 MS
QRS AXIS: -59 DEGREES
QRSD INTERVAL: 104 MS
QT INTERVAL: 328 MS
QTC INTERVAL: 403 MS
T WAVE AXIS: 14 DEGREES
VENTRICULAR RATE: 91 BPM

## 2023-01-03 RX ORDER — IPRATROPIUM BROMIDE AND ALBUTEROL SULFATE .5; 3 MG/3ML; MG/3ML
1 SOLUTION RESPIRATORY (INHALATION) ONCE
Status: COMPLETED | OUTPATIENT
Start: 2023-01-03 | End: 2023-01-03

## 2023-01-03 RX ORDER — ALBUTEROL SULFATE 2.5 MG/3ML
5 SOLUTION RESPIRATORY (INHALATION) ONCE
Status: COMPLETED | OUTPATIENT
Start: 2023-01-03 | End: 2023-01-03

## 2023-01-03 RX ORDER — ALBUTEROL SULFATE 2.5 MG/3ML
1 SOLUTION RESPIRATORY (INHALATION) ONCE
Status: COMPLETED | OUTPATIENT
Start: 2023-01-03 | End: 2023-01-03

## 2023-01-03 RX ORDER — PREDNISONE 20 MG/1
40 TABLET ORAL ONCE
Status: COMPLETED | OUTPATIENT
Start: 2023-01-03 | End: 2023-01-03

## 2023-01-03 RX ORDER — FLUTICASONE FUROATE, UMECLIDINIUM BROMIDE AND VILANTEROL TRIFENATATE 200; 62.5; 25 UG/1; UG/1; UG/1
1 POWDER RESPIRATORY (INHALATION) DAILY
Qty: 60 BLISTER | Refills: 0 | Status: SHIPPED | OUTPATIENT
Start: 2023-01-03 | End: 2023-02-02

## 2023-01-03 RX ORDER — IPRATROPIUM BROMIDE AND ALBUTEROL SULFATE 2.5; .5 MG/3ML; MG/3ML
SOLUTION RESPIRATORY (INHALATION)
Status: DISCONTINUED
Start: 2023-01-03 | End: 2023-01-03 | Stop reason: HOSPADM

## 2023-01-03 RX ORDER — BUDESONIDE 0.5 MG/2ML
0.5 INHALANT ORAL 2 TIMES DAILY
Qty: 120 ML | Refills: 0 | Status: SHIPPED | OUTPATIENT
Start: 2023-01-03 | End: 2023-01-04 | Stop reason: SDUPTHER

## 2023-01-03 RX ORDER — ALBUTEROL SULFATE 2.5 MG/3ML
SOLUTION RESPIRATORY (INHALATION)
Status: DISCONTINUED
Start: 2023-01-03 | End: 2023-01-03 | Stop reason: HOSPADM

## 2023-01-03 RX ORDER — ALBUTEROL SULFATE 90 UG/1
2 AEROSOL, METERED RESPIRATORY (INHALATION) ONCE
Status: COMPLETED | OUTPATIENT
Start: 2023-01-03 | End: 2023-01-03

## 2023-01-03 RX ADMIN — IPRATROPIUM BROMIDE 0.5 MG: 0.5 SOLUTION RESPIRATORY (INHALATION) at 15:39

## 2023-01-03 RX ADMIN — ALBUTEROL SULFATE 5 MG: 2.5 SOLUTION RESPIRATORY (INHALATION) at 15:39

## 2023-01-03 RX ADMIN — ALBUTEROL SULFATE 2 PUFF: 90 AEROSOL, METERED RESPIRATORY (INHALATION) at 16:45

## 2023-01-03 RX ADMIN — PREDNISONE 40 MG: 20 TABLET ORAL at 15:39

## 2023-01-03 NOTE — ED PROVIDER NOTES
History  Chief Complaint   Patient presents with   • Shortness of Breath     Pt reports sob, out of asthma medications for several days  EMS gave duo-neb and albuterol pta  The patient is a 81year old female with a past medical history of COPD, atrial fibrillation, and DM who presented with shortness of breath  She started to feel short of breath this morning along with wheezing when she woke up so she called EMS  She states that she ran out of her COPD medications 2 days ago  This feels like her typical COPD exacerbation  She received 1 DuoNeb treatment via EMS  She states that she no longer feels short of breath  She denies recent fever, cough, and congestion  She denies known sick contacts  She has been hospitalized for this several times in the past  She states she was intubated for it several years ago  She states that she has an upcoming appointment with pulmonology  She denies chest pain, nausea, vomiting, and diarrhea  Prior to Admission Medications   Prescriptions Last Dose Informant Patient Reported?  Taking?   acetaminophen (TYLENOL) 325 mg tablet   No No   Sig: Take 3 tablets (975 mg total) by mouth every 8 (eight) hours as needed for mild pain   albuterol (2 5 mg/3 mL) 0 083 % nebulizer solution   No No   Sig: Take 3 mL (2 5 mg total) by nebulization every 6 (six) hours as needed for wheezing or shortness of breath   apixaban (ELIQUIS) 5 mg   Yes No   Sig: Take 1 tablet by mouth 2 (two) times a day   atorvastatin (LIPITOR) 20 mg tablet   Yes No   Sig: Take 20 mg by mouth daily   bictegravir-emtricitab-tenofovir alafenamide (Biktarvy) -25 MG tablet   Yes No   Sig: Take 1 tablet by mouth daily   budesonide (PULMICORT) 0 5 mg/2 mL nebulizer solution   No No   Sig: Take 2 mL (0 5 mg total) by nebulization 2 (two) times a day Rinse mouth after use    carboxymethylcellulose 0 5 % SOLN   No No   Sig: Administer 1 drop into the left eye 3 (three) times a day as needed for dry eyes cholecalciferol (VITAMIN D3) 1,000 units tablet   Yes No   Sig: Take 1,000 Units by mouth daily   diltiazem (CARDIZEM CD) 120 mg 24 hr capsule   No No   Sig: Take 1 capsule (120 mg total) by mouth daily   fluticasone-umeclidinium-vilanterol (Trelegy Ellipta) 200-62 5-25 mcg/actuation AEPB inhaler   Yes No   Sig: Inhale 1 puff daily Rinse mouth after use     furosemide (LASIX) 40 mg tablet   No No   Sig: Take 1 tablet (40 mg total) by mouth 2 (two) times a day   gabapentin (NEURONTIN) 400 mg capsule   Yes No   Sig: Take 800 mg by mouth 3 (three) times a day     levothyroxine 125 mcg tablet   Yes No   Sig: Take 125 mcg by mouth daily   losartan (COZAAR) 100 MG tablet   Yes No   Sig: Take 100 mg by mouth daily   lurasidone (LATUDA) 40 mg tablet   Yes No   Sig: Take 40 mg by mouth   metFORMIN (GLUCOPHAGE) 500 mg tablet   Yes No   Sig: Take 500 mg by mouth daily   montelukast (SINGULAIR) 10 mg tablet   Yes No   Sig: Take 10 mg by mouth daily at bedtime   nicotine (NICODERM CQ) 21 mg/24 hr TD 24 hr patch   No No   Sig: Place 1 patch on the skin daily   potassium chloride (K-DUR,KLOR-CON) 20 mEq tablet   No No   Sig: Take 1 tablet (20 mEq total) by mouth daily   predniSONE 10 mg tablet   No No   Si tabs daily for 2 days, then 5 tabs for 2 days then 4 tabs for 2 days then 3 tabs for 2 days then 2 tab for 2 days then 1 tab for 2 days then stop   roflumilast (DALIRESP) 250 MCG tablet   No No   Sig: Take 2 tablets (500 mcg total) by mouth daily   topiramate (TOPAMAX) 50 MG tablet   Yes No   Sig: Take 50 mg by mouth 2 (two) times a day   traMADol (ULTRAM) 50 mg tablet   Yes No   Sig: Take 50 mg by mouth as needed      Facility-Administered Medications: None       Past Medical History:   Diagnosis Date   • Asthma    • Bipolar 1 disorder (HCC)    • Cardiac disease    • COPD (chronic obstructive pulmonary disease) (HCC)    • [facility] 2016   • Hypertension    • Osteoarthritis Past Surgical History:   Procedure Laterality Date   • HERNIA REPAIR     • LUMBAR FUSION N/A 4/10/2018    Procedure: T9/10 discectomy with T9-T11 fusion;  Surgeon: Jaylene Power MD;  Location: [facility];  Service: Neurosurgery   • THYROIDECTOMY         Family History   Problem Relation Age of Onset   • No Known Problems Mother    • Diabetes Father    • Heart disease Neg Hx    • Cancer Neg Hx      I have reviewed and agree with the history as documented  E-Cigarette/Vaping   • E-Cigarette Use Never User      E-Cigarette/Vaping Substances     Social History     Tobacco Use   • Smoking status: Former     Packs/day: 0 20     Years: 45 00     Pack years: 9 00     Types: Cigarettes   • Smokeless tobacco: Never   • Tobacco comments:     Currently smoking 1-2 cigarettes a day   Vaping Use   • Vaping Use: Never used   Substance Use Topics   • Alcohol use: Not Currently     Alcohol/week: 2 0 standard drinks     Types: 1 Cans of beer, 1 Shots of liquor per week     Comment: only on holidays   • Drug use: Not Currently     Comment: former IV drug user        Review of Systems   All other systems reviewed and are negative  Physical Exam  ED Triage Vitals [01/03/23 1446]   Temperature Pulse Respirations Blood Pressure SpO2   98 2 °F (36 8 °C) 91 (!) 24 147/75 96 %      Temp Source Heart Rate Source Patient Position - Orthostatic VS BP Location FiO2 (%)   Oral Monitor -- -- --      Pain Score       --             Orthostatic Vital Signs  Vitals:    01/03/23 1446   BP: 147/75   Pulse: 91       Physical Exam  Vitals and nursing note reviewed  Constitutional:       General: She is not in acute distress  Appearance: She is well-developed  HENT:      Head: Normocephalic and atraumatic  Eyes:      Extraocular Movements: Extraocular movements intact  Conjunctiva/sclera: Conjunctivae normal    Cardiovascular:      Rate and Rhythm: Normal rate and regular rhythm        Heart sounds: No murmur heard   Pulmonary:      Effort: Tachypnea present  No respiratory distress  Breath sounds: Examination of the right-upper field reveals wheezing  Examination of the left-upper field reveals wheezing  Examination of the right-middle field reveals wheezing  Examination of the left-middle field reveals wheezing  Examination of the right-lower field reveals wheezing and rhonchi  Examination of the left-lower field reveals wheezing and rhonchi  Wheezing and rhonchi present  Abdominal:      Palpations: Abdomen is soft  Tenderness: There is no abdominal tenderness  Musculoskeletal:         General: No swelling  Cervical back: Neck supple  Right lower leg: Edema present  Left lower leg: Edema present  Skin:     General: Skin is warm and dry  Capillary Refill: Capillary refill takes less than 2 seconds  Neurological:      Mental Status: She is alert and oriented to person, place, and time  Motor: No weakness  Psychiatric:         Mood and Affect: Mood normal          ED Medications  Medications   albuterol (FOR EMS ONLY) (2 5 mg/3 mL) 0 083 % inhalation solution 2 5 mg (0 mg Does not apply Given to EMS 1/3/23 1448)   ipratropium-albuterol (FOR EMS ONLY) (DUO-NEB) 0 5-2 5 mg/3 mL inhalation solution 3 mL (0 mL Does not apply Given to EMS 1/3/23 1448)   albuterol inhalation solution 5 mg (5 mg Nebulization Given 1/3/23 1539)   ipratropium (ATROVENT) 0 02 % inhalation solution 0 5 mg (0 5 mg Nebulization Given 1/3/23 1539)   predniSONE tablet 40 mg (40 mg Oral Given 1/3/23 1539)   albuterol (PROVENTIL HFA,VENTOLIN HFA) inhaler 2 puff (2 puffs Inhalation Given 1/3/23 1645)       Diagnostic Studies  Results Reviewed     None                 XR chest 2 views   Final Result by David Jiang MD (01/03 2100)      No acute cardiopulmonary disease                    Workstation performed: [id number]               Procedures  ECG 12 Lead Documentation Only    Date/Time: 1/3/2023 3:45 PM  Performed by: Nereida Akins MD  Authorized by: Nereida Akins MD     ECG reviewed by me, the ED Provider: yes    Patient location:  ED  Previous ECG:     Previous ECG:  Compared to current    Comparison ECG info:  Aberrant conduction no longer noted    Similarity:  Changes noted  Rate:     ECG rate:  91    ECG rate assessment: normal    Rhythm:     Rhythm: sinus rhythm    QRS:     QRS axis:  Normal    QRS intervals:  Normal  Conduction:     Conduction: abnormal      Abnormal conduction: LAFB    ST segments:     ST segments:  Normal  T waves:     T waves: normal            ED Course  ED Course as of 01/03/23 2127 Tue Jan 03, 2023   1631 Spo2 with walking 92-95%                                       Medical Decision Making  The patient is a 81year old female with a past medical history of COPD, atrial fibrillation, and DM who presented with shortness of breath  The patient's dyspnea has improved following her DuoNeb treatment  She has inspiratory and expiratory wheezes on exam so another DuoNeb treatment will be given  She will also receive a dose of prednisone  CXR will be ordered  Following second DuoNeb treatment patient states that her dyspnea has improved  Her CXR did not show acute changes from previous CXR  She still has expiratory wheezes on exam but per pulmonology note states that is her baseline  Her SpO2 on room air with walking is 92-95%  She states that she ran out of her Trelegy, budesonide, and albuterol inhaler  Prescriptions will be sent to her pharmacy  She lives with her daughter and states she will be able to pickup her prescriptions for her  She will be given 2 puffs of her albuterol inhaler here before discharge  She was given return precautions and encouraged to keep her upcoming pulmonology appointment  She was discharged home from the ED       COPD exacerbation ([address] ): self-limited or minor problem  Medication refill: acute illness or injury  Amount and/or Complexity of Data Reviewed  External Data Reviewed: radiology, ECG and notes  Radiology: ordered  ECG/medicine tests: ordered  Risk  Prescription drug management  Disposition  Final diagnoses:   COPD exacerbation ([facility] [address] )   Medication refill     Time reflects when diagnosis was documented in both MDM as applicable and the Disposition within this note     Time User Action Codes Description Comment    1/3/2023  4:32 PM Nazanin Santa [J44 1] COPD exacerbation ([facility] [address] )     1/3/2023  4:32 PM Nazanin Santa [Z76 0] Medication refill       ED Disposition     ED Disposition   Discharge    Condition   Stable    Date/Time   Tue Nir 3, 2023  4:32 PM    Comment   Summer Beatty discharge to home/self care  Follow-up Information     Follow up With Specialties Details Why Contact Info    Tiago Vasquez DO    [address] [facility] [address] [phone]            Discharge Medication List as of 1/3/2023  4:37 PM      START taking these medications    Details   !! budesonide (Pulmicort) 0 5 mg/2 mL nebulizer solution Take 2 mL (0 5 mg total) by nebulization 2 (two) times a day Rinse mouth after use , Starting Tue 1/3/2023, Until Thu 2/2/2023, Normal      !! fluticasone-umeclidinium-vilanterol (Trelegy Ellipta) 200-62 5-25 mcg/actuation AEPB inhaler Inhale 1 puff daily Rinse mouth after use , Starting Tue 1/3/2023, Until Thu 2/2/2023, Normal       !! - Potential duplicate medications found  Please discuss with provider        CONTINUE these medications which have NOT CHANGED    Details   acetaminophen (TYLENOL) 325 mg tablet Take 3 tablets (975 mg total) by mouth every 8 (eight) hours as needed for mild pain, Starting Sun 9/18/2022, No Print      albuterol (2 5 mg/3 mL) 0 083 % nebulizer solution Take 3 mL (2 5 mg total) by nebulization every 6 (six) hours as needed for wheezing or shortness of breath, Starting Thu 10/13/2022, Normal      apixaban (ELIQUIS) 5 mg Take 1 tablet by mouth 2 (two) times a day, Starting Mon 5/9/2022, Historical Med      atorvastatin (LIPITOR) 20 mg tablet Take 20 mg by mouth daily, Historical Med      bictegravir-emtricitab-tenofovir alafenamide (Biktarvy) -25 MG tablet Take 1 tablet by mouth daily, Starting Mon 5/9/2022, Historical Med      !! budesonide (PULMICORT) 0 5 mg/2 mL nebulizer solution Take 2 mL (0 5 mg total) by nebulization 2 (two) times a day Rinse mouth after use , Starting Sun 9/18/2022, Normal      carboxymethylcellulose 0 5 % SOLN Administer 1 drop into the left eye 3 (three) times a day as needed for dry eyes, Starting Sat 10/29/2022, Normal      cholecalciferol (VITAMIN D3) 1,000 units tablet Take 1,000 Units by mouth daily, Historical Med      diltiazem (CARDIZEM CD) 120 mg 24 hr capsule Take 1 capsule (120 mg total) by mouth daily, Starting u 9/29/2022, Normal      !! fluticasone-umeclidinium-vilanterol (Trelegy Ellipta) 200-62 5-25 mcg/actuation AEPB inhaler Inhale 1 puff daily Rinse mouth after use , Historical Med      furosemide (LASIX) 40 mg tablet Take 1 tablet (40 mg total) by mouth 2 (two) times a day, Starting u 9/29/2022, Normal      gabapentin (NEURONTIN) 400 mg capsule Take 800 mg by mouth 3 (three) times a day  , Historical Med      levothyroxine 125 mcg tablet Take 125 mcg by mouth daily, Historical Med      losartan (COZAAR) 100 MG tablet Take 100 mg by mouth daily, Starting Wed 11/2/2022, Historical Med      lurasidone (LATUDA) 40 mg tablet Take 40 mg by mouth, Starting Fri 9/30/2022, Until Sat 9/30/2023 at 2359, Historical Med      metFORMIN (GLUCOPHAGE) 500 mg tablet Take 500 mg by mouth daily, Starting Wed 11/2/2022, Historical Med      montelukast (SINGULAIR) 10 mg tablet Take 10 mg by mouth daily at bedtime, Historical Med      nicotine (NICODERM CQ) 21 mg/24 hr TD 24 hr patch Place 1 patch on the skin daily, Starting Mon 9/19/2022, Normal      potassium chloride (K-DUR,KLOR-CON) 20 mEq tablet Take 1 tablet (20 mEq total) by mouth daily, Starting Mon 9/19/2022, Normal      predniSONE 10 mg tablet 6 tabs daily for 2 days, then 5 tabs for 2 days then 4 tabs for 2 days then 3 tabs for 2 days then 2 tab for 2 days then 1 tab for 2 days then stop, Normal      roflumilast (DALIRESP) 250 MCG tablet Take 2 tablets (500 mcg total) by mouth daily, Starting Tue 12/13/2022, Normal      topiramate (TOPAMAX) 50 MG tablet Take 50 mg by mouth 2 (two) times a day, Starting Fri 9/30/2022, Until Sat 9/30/2023, Historical Med      traMADol (ULTRAM) 50 mg tablet Take 50 mg by mouth as needed, Starting Mon 9/19/2022, Historical Med       !! - Potential duplicate medications found  Please discuss with provider  No discharge procedures on file  PDMP Review       Value Time User    PDMP Reviewed  Yes 11/3/2022  8:54 PM Fritz Torres, DO           ED Provider  Attending physically available and evaluated Alvaro Lee  I managed the patient along with the ED Attending      Electronically Signed by         Fe Wright MD  01/03/23 8985

## 2023-01-03 NOTE — DISCHARGE INSTRUCTIONS
You were seen in the Emergency Department today for  COPD exacerbation  We have sent refills of your COPD medications to your pharmacy  Continue to take your COPD medications at home  Please follow up with your primary care doctor in 2-3 days  Please return to the Emergency Department if you experience worsening of your current symptoms, fever, chest pain, inability to complete sentences, or any other concerning symptoms

## 2023-01-03 NOTE — ED NOTES
Pt reports she has no means of transportation back home  Lyft ride called for patient at this time        Duc Holman RN  01/03/23 3343

## 2023-01-03 NOTE — ED ATTENDING ATTESTATION
1/3/2023  IWillis MD, saw and evaluated the patient  I have discussed the patient with the resident/non-physician practitioner and agree with the resident's/non-physician practitioner's findings, Plan of Care, and MDM as documented in the resident's/non-physician practitioner's note, except where noted  All available labs and Radiology studies were reviewed  I was present for key portions of any procedure(s) performed by the resident/non-physician practitioner and I was immediately available to provide assistance  At this point I agree with the current assessment done in the Emergency Department  I have conducted an independent evaluation of this patient a history and physical is as follows:    82 YO female presents with shortness of breath this morning, currently smoking, ran out of medications at home  Duo-neb enroute and feeling better  Patient has an upcoming appointment with pulmonology  Patient has similar presentations frequently, she has known COPD  She is sedentary at home  She denies new or different symptoms  Pt denies SOB/F/C/N/V/D/C, no dysuria, burning on urination or blood in urine  Gen: Pt is in NAD  HEENT: Head is atraumatic, EOM's intact, neck has FROM  Chest: Upper airway sounds, tachypnea, minimal wheezing  Heart: RRR  Abdomen: Soft, NT/ND  Musculoskeletal: FROM in all extremities  Skin: No rash, no ecchymosis  Neuro: Awake, alert, oriented x4; Cranial nerves II-XII intact  Psych: Normal affect    MDM -  Patient with Hx of COPD, states currently does not have her medications  She has tachypnea but saturations are reassuring  Feel likely patient has physical deconditioning  Will give another breathing treatment, steroids, check CXR  Will ambulate while checking pulse oximetry to determine whether patient is appropriate for discharge with refill of her medications   Did discuss importance of quitting smoking as patient has very frequent ED visits and admissions, she did not appear receptive      ED Course         Critical Care Time  Procedures

## 2023-01-04 ENCOUNTER — HOSPITAL ENCOUNTER (EMERGENCY)
Facility: HOSPITAL | Age: 73
Discharge: HOME | End: 2023-01-04
Attending: EMERGENCY MEDICINE

## 2023-01-04 ENCOUNTER — APPOINTMENT (EMERGENCY)
Dept: RADIOLOGY | Facility: HOSPITAL | Age: 73
End: 2023-01-04

## 2023-01-04 VITALS
HEART RATE: 92 BPM | SYSTOLIC BLOOD PRESSURE: 114 MMHG | DIASTOLIC BLOOD PRESSURE: 67 MMHG | RESPIRATION RATE: 20 BRPM | OXYGEN SATURATION: 98 % | TEMPERATURE: 98.6 F

## 2023-01-04 DIAGNOSIS — J44.1 ACUTE EXACERBATION OF CHRONIC OBSTRUCTIVE PULMONARY DISEASE (COPD) (HCC): ICD-10-CM

## 2023-01-04 DIAGNOSIS — J44.1 COPD EXACERBATION (HCC): ICD-10-CM

## 2023-01-04 DIAGNOSIS — J45.41 ACUTE EXACERBATION OF MODERATE PERSISTENT EXTRINSIC ASTHMA: ICD-10-CM

## 2023-01-04 DIAGNOSIS — J44.1 COPD WITH ACUTE EXACERBATION (HCC): Primary | ICD-10-CM

## 2023-01-04 LAB
2HR DELTA HS TROPONIN: -2 NG/L
ANION GAP SERPL CALCULATED.3IONS-SCNC: 12 MMOL/L (ref 4–13)
ATRIAL RATE: 83 BPM
BASOPHILS # BLD AUTO: 0.01 THOUSANDS/ÂΜL (ref 0–0.1)
BASOPHILS NFR BLD AUTO: 0 % (ref 0–1)
BUN SERPL-MCNC: 18 MG/DL (ref 5–25)
CALCIUM SERPL-MCNC: 10 MG/DL (ref 8.3–10.1)
CARDIAC TROPONIN I PNL SERPL HS: 7 NG/L
CARDIAC TROPONIN I PNL SERPL HS: 9 NG/L
CHLORIDE SERPL-SCNC: 106 MMOL/L (ref 96–108)
CO2 SERPL-SCNC: 26 MMOL/L (ref 21–32)
CREAT SERPL-MCNC: 0.83 MG/DL (ref 0.6–1.3)
EOSINOPHIL # BLD AUTO: 0.02 THOUSAND/ÂΜL (ref 0–0.61)
EOSINOPHIL NFR BLD AUTO: 0 % (ref 0–6)
ERYTHROCYTE [DISTWIDTH] IN BLOOD BY AUTOMATED COUNT: 14.7 % (ref 11.6–15.1)
GFR SERPL CREATININE-BSD FRML MDRD: 71 ML/MIN/1.73SQ M
GLUCOSE SERPL-MCNC: 102 MG/DL (ref 65–140)
HCT VFR BLD AUTO: 33.5 % (ref 34.8–46.1)
HGB BLD-MCNC: 10.3 G/DL (ref 11.5–15.4)
IMM GRANULOCYTES # BLD AUTO: 0.08 THOUSAND/UL (ref 0–0.2)
IMM GRANULOCYTES NFR BLD AUTO: 1 % (ref 0–2)
LYMPHOCYTES # BLD AUTO: 1.77 THOUSANDS/ÂΜL (ref 0.6–4.47)
LYMPHOCYTES NFR BLD AUTO: 23 % (ref 14–44)
MCH RBC QN AUTO: 28.6 PG (ref 26.8–34.3)
MCHC RBC AUTO-ENTMCNC: 30.7 G/DL (ref 31.4–37.4)
MCV RBC AUTO: 93 FL (ref 82–98)
MONOCYTES # BLD AUTO: 0.7 THOUSAND/ÂΜL (ref 0.17–1.22)
MONOCYTES NFR BLD AUTO: 9 % (ref 4–12)
NEUTROPHILS # BLD AUTO: 5.13 THOUSANDS/ÂΜL (ref 1.85–7.62)
NEUTS SEG NFR BLD AUTO: 67 % (ref 43–75)
NRBC BLD AUTO-RTO: 0 /100 WBCS
NT-PROBNP SERPL-MCNC: 100 PG/ML
P AXIS: 44 DEGREES
PLATELET # BLD AUTO: 306 THOUSANDS/UL (ref 149–390)
PMV BLD AUTO: 9.2 FL (ref 8.9–12.7)
POTASSIUM SERPL-SCNC: 3.4 MMOL/L (ref 3.5–5.3)
PR INTERVAL: 178 MS
QRS AXIS: -56 DEGREES
QRSD INTERVAL: 108 MS
QT INTERVAL: 342 MS
QTC INTERVAL: 401 MS
RBC # BLD AUTO: 3.6 MILLION/UL (ref 3.81–5.12)
SODIUM SERPL-SCNC: 144 MMOL/L (ref 135–147)
T WAVE AXIS: 10 DEGREES
VENTRICULAR RATE: 83 BPM
WBC # BLD AUTO: 7.71 THOUSAND/UL (ref 4.31–10.16)

## 2023-01-04 RX ORDER — FLUTICASONE FUROATE, UMECLIDINIUM BROMIDE AND VILANTEROL TRIFENATATE 200; 62.5; 25 UG/1; UG/1; UG/1
1 POWDER RESPIRATORY (INHALATION) DAILY
Qty: 60 BLISTER | Refills: 0 | Status: SHIPPED | OUTPATIENT
Start: 2023-01-04 | End: 2023-01-11

## 2023-01-04 RX ORDER — BUDESONIDE 0.5 MG/2ML
0.5 INHALANT ORAL 2 TIMES DAILY
Qty: 120 ML | Refills: 0 | Status: SHIPPED | OUTPATIENT
Start: 2023-01-04 | End: 2023-01-11

## 2023-01-04 RX ORDER — BUDESONIDE 0.5 MG/2ML
0.5 INHALANT ORAL 2 TIMES DAILY
Qty: 120 ML | Refills: 0 | Status: SHIPPED | OUTPATIENT
Start: 2023-01-04

## 2023-01-04 RX ORDER — METHYLPREDNISOLONE SODIUM SUCCINATE 125 MG/2ML
60 INJECTION, POWDER, LYOPHILIZED, FOR SOLUTION INTRAMUSCULAR; INTRAVENOUS ONCE
Status: COMPLETED | OUTPATIENT
Start: 2023-01-04 | End: 2023-01-04

## 2023-01-04 RX ORDER — ALBUTEROL SULFATE 2.5 MG/3ML
5 SOLUTION RESPIRATORY (INHALATION) ONCE
Status: COMPLETED | OUTPATIENT
Start: 2023-01-04 | End: 2023-01-04

## 2023-01-04 RX ORDER — ROFLUMILAST 250 UG/1
500 TABLET ORAL DAILY
Qty: 60 TABLET | Refills: 0 | Status: SHIPPED | OUTPATIENT
Start: 2023-01-04

## 2023-01-04 RX ORDER — ALBUTEROL SULFATE 2.5 MG/3ML
2.5 SOLUTION RESPIRATORY (INHALATION) EVERY 6 HOURS PRN
Qty: 75 ML | Refills: 0 | Status: SHIPPED | OUTPATIENT
Start: 2023-01-04

## 2023-01-04 RX ADMIN — ALBUTEROL SULFATE 5 MG: 2.5 SOLUTION RESPIRATORY (INHALATION) at 11:28

## 2023-01-04 RX ADMIN — METHYLPREDNISOLONE SODIUM SUCCINATE 60 MG: 125 INJECTION, POWDER, FOR SOLUTION INTRAMUSCULAR; INTRAVENOUS at 11:27

## 2023-01-04 NOTE — ED PROVIDER NOTES
History  Chief Complaint   Patient presents with   • Shortness of Breath     SOB, cough  Seen in department frequently for same  Hx COPD  Denies CP       79-year-old female with history of COPD chronically on 3 L of O2, atrial fibrillation, diabetes, CHF presents to the ED with ongoing shortness of breath  Patient was seen here yesterday and was recently admitted for COPD exacerbation  Yesterday she felt better after receiving a prehospital DuoNeb and states she ran out of her medications  Medications were prescribed, however she did not get them filled  Today she states she had a DuoNeb treatment prehospital and does not feel any better  She still feels like she is wheezing  She had a nonproductive cough  No fever  She has chest pain when she coughs  History provided by:  Patient   used: No    Shortness of Breath  Severity:  Moderate  Onset quality:  Gradual  Duration:  2 days  Timing:  Constant  Progression:  Unchanged  Chronicity:  Recurrent  Relieved by:  Nothing  Worsened by:  Nothing  Ineffective treatments:  Inhaler  Associated symptoms: chest pain, cough and wheezing    Associated symptoms: no abdominal pain, no claudication, no diaphoresis, no ear pain, no fever, no headaches, no hemoptysis, no neck pain, no PND, no rash, no sore throat, no sputum production, no syncope, no swollen glands and no vomiting    Wheezing: Onset quality:  Gradual    Duration:  2 days    Timing:  Constant    Progression:  Unchanged    Chronicity:  Recurrent  Risk factors: tobacco use        Prior to Admission Medications   Prescriptions Last Dose Informant Patient Reported?  Taking?   acetaminophen (TYLENOL) 325 mg tablet   No No   Sig: Take 3 tablets (975 mg total) by mouth every 8 (eight) hours as needed for mild pain   albuterol (2 5 mg/3 mL) 0 083 % nebulizer solution   No No   Sig: Take 3 mL (2 5 mg total) by nebulization every 6 (six) hours as needed for wheezing or shortness of breath albuterol (2 5 mg/3 mL) 0 083 % nebulizer solution   No Yes   Sig: Take 3 mL (2 5 mg total) by nebulization every 6 (six) hours as needed for wheezing or shortness of breath   apixaban (ELIQUIS) 5 mg   Yes No   Sig: Take 1 tablet by mouth 2 (two) times a day   atorvastatin (LIPITOR) 20 mg tablet   Yes No   Sig: Take 20 mg by mouth daily   bictegravir-emtricitab-tenofovir alafenamide (Biktarvy) -25 MG tablet   Yes No   Sig: Take 1 tablet by mouth daily   budesonide (PULMICORT) 0 5 mg/2 mL nebulizer solution   No No   Sig: Take 2 mL (0 5 mg total) by nebulization 2 (two) times a day Rinse mouth after use  budesonide (PULMICORT) 0 5 mg/2 mL nebulizer solution   No Yes   Sig: Take 2 mL (0 5 mg total) by nebulization 2 (two) times a day Rinse mouth after use  budesonide (Pulmicort) 0 5 mg/2 mL nebulizer solution   No No   Sig: Take 2 mL (0 5 mg total) by nebulization 2 (two) times a day Rinse mouth after use  budesonide (Pulmicort) 0 5 mg/2 mL nebulizer solution   No Yes   Sig: Take 2 mL (0 5 mg total) by nebulization 2 (two) times a day Rinse mouth after use    carboxymethylcellulose 0 5 % SOLN   No No   Sig: Administer 1 drop into the left eye 3 (three) times a day as needed for dry eyes   cholecalciferol (VITAMIN D3) 1,000 units tablet   Yes No   Sig: Take 1,000 Units by mouth daily   diltiazem (CARDIZEM CD) 120 mg 24 hr capsule   No No   Sig: Take 1 capsule (120 mg total) by mouth daily   fluticasone-umeclidinium-vilanterol (Trelegy Ellipta) 200-62 5-25 mcg/actuation AEPB inhaler   Yes No   Sig: Inhale 1 puff daily Rinse mouth after use  fluticasone-umeclidinium-vilanterol (Trelegy Ellipta) 200-62 5-25 mcg/actuation AEPB inhaler   No No   Sig: Inhale 1 puff daily Rinse mouth after use  fluticasone-umeclidinium-vilanterol (Trelegy Ellipta) 200-62 5-25 mcg/actuation AEPB inhaler   No Yes   Sig: Inhale 1 puff daily Rinse mouth after use     furosemide (LASIX) 40 mg tablet   No No   Sig: Take 1 tablet (40 mg total) by mouth 2 (two) times a day   gabapentin (NEURONTIN) 400 mg capsule   Yes No   Sig: Take 800 mg by mouth 3 (three) times a day     levothyroxine 125 mcg tablet   Yes No   Sig: Take 125 mcg by mouth daily   losartan (COZAAR) 100 MG tablet   Yes No   Sig: Take 100 mg by mouth daily   lurasidone (LATUDA) 40 mg tablet   Yes No   Sig: Take 40 mg by mouth   metFORMIN (GLUCOPHAGE) 500 mg tablet   Yes No   Sig: Take 500 mg by mouth daily   montelukast (SINGULAIR) 10 mg tablet   Yes No   Sig: Take 10 mg by mouth daily at bedtime   nicotine (NICODERM CQ) 21 mg/24 hr TD 24 hr patch   No No   Sig: Place 1 patch on the skin daily   potassium chloride (K-DUR,KLOR-CON) 20 mEq tablet   No No   Sig: Take 1 tablet (20 mEq total) by mouth daily   predniSONE 10 mg tablet   No No   Si tabs daily for 2 days, then 5 tabs for 2 days then 4 tabs for 2 days then 3 tabs for 2 days then 2 tab for 2 days then 1 tab for 2 days then stop   roflumilast (DALIRESP) 250 MCG tablet   No No   Sig: Take 2 tablets (500 mcg total) by mouth daily   roflumilast (DALIRESP) 250 MCG tablet   No Yes   Sig: Take 2 tablets (500 mcg total) by mouth daily   topiramate (TOPAMAX) 50 MG tablet   Yes No   Sig: Take 50 mg by mouth 2 (two) times a day   traMADol (ULTRAM) 50 mg tablet   Yes No   Sig: Take 50 mg by mouth as needed      Facility-Administered Medications: None       Past Medical History:   Diagnosis Date   • Asthma    • Bipolar 1 disorder (HCC)    • Cardiac disease    • COPD (chronic obstructive pulmonary disease) (HCC)    • HIV (human immunodeficiency virus infection) ([facility] [address] ) 2016   • Hypertension    • Osteoarthritis        Past Surgical History:   Procedure Laterality Date   • HERNIA REPAIR     • LUMBAR FUSION N/A 4/10/2018    Procedure: T9/10 discectomy with T9-T11 fusion;  Surgeon: Sujata Kerns MD;  Location: [facility];  Service: Neurosurgery   • THYROIDECTOMY         Family History   Problem Relation Age of Onset   • No Known Problems Mother    • Diabetes Father    • Heart disease Neg Hx    • Cancer Neg Hx      I have reviewed and agree with the history as documented  E-Cigarette/Vaping   • E-Cigarette Use Never User      E-Cigarette/Vaping Substances     Social History     Tobacco Use   • Smoking status: Former     Packs/day: 0 20     Years: 45 00     Pack years: 9 00     Types: Cigarettes   • Smokeless tobacco: Never   • Tobacco comments:     Currently smoking 1-2 cigarettes a day   Vaping Use   • Vaping Use: Never used   Substance Use Topics   • Alcohol use: Not Currently     Alcohol/week: 2 0 standard drinks     Types: 1 Cans of beer, 1 Shots of liquor per week     Comment: only on holidays   • Drug use: Not Currently     Comment: former IV drug user       Review of Systems   Constitutional: Negative  Negative for chills, diaphoresis, fatigue and fever  HENT: Negative  Negative for congestion, ear pain, rhinorrhea and sore throat  Eyes: Negative  Negative for discharge, redness and itching  Respiratory: Positive for cough, shortness of breath and wheezing  Negative for apnea, hemoptysis, sputum production and chest tightness  Cardiovascular: Positive for chest pain  Negative for palpitations, claudication, leg swelling, syncope and PND  Gastrointestinal: Negative  Negative for abdominal pain and vomiting  Endocrine: Negative  Genitourinary: Negative  Negative for flank pain, frequency and urgency  Musculoskeletal: Negative  Negative for back pain and neck pain  Skin: Negative  Negative for rash  Allergic/Immunologic: Negative  Neurological: Negative  Negative for dizziness, syncope, weakness, light-headedness, numbness and headaches  Hematological: Negative  All other systems reviewed and are negative  Physical Exam  Physical Exam  Vitals and nursing note reviewed  Constitutional:       General: She is awake  She is not in acute distress       Appearance: She is well-developed and overweight  She is not ill-appearing, toxic-appearing or diaphoretic  Comments: Speaking in full sentences without difficulty   HENT:      Head: Normocephalic  Right Ear: External ear normal       Left Ear: External ear normal       Nose: Nose normal       Mouth/Throat:      Mouth: Mucous membranes are moist       Pharynx: No oropharyngeal exudate  Eyes:      General: No scleral icterus  Right eye: No discharge  Left eye: No discharge  Conjunctiva/sclera: Conjunctivae normal       Pupils: Pupils are equal, round, and reactive to light  Neck:      Thyroid: No thyromegaly  Vascular: No JVD  Trachea: No tracheal deviation  Cardiovascular:      Rate and Rhythm: Normal rate and regular rhythm  Heart sounds: Normal heart sounds  No murmur heard  No friction rub  No gallop  Pulmonary:      Effort: Pulmonary effort is normal  No tachypnea, accessory muscle usage or respiratory distress  Breath sounds: No stridor  Examination of the right-upper field reveals wheezing  Examination of the left-upper field reveals wheezing  Examination of the right-middle field reveals wheezing  Examination of the left-middle field reveals wheezing  Examination of the right-lower field reveals wheezing  Examination of the left-lower field reveals wheezing  Wheezing present  No rhonchi or rales  Chest:      Chest wall: No tenderness  Abdominal:      General: Bowel sounds are normal  There is no distension  Palpations: Abdomen is soft  There is no mass  Tenderness: There is no abdominal tenderness  Hernia: No hernia is present  Musculoskeletal:         General: No tenderness or deformity  Normal range of motion  Cervical back: Normal range of motion and neck supple  Right lower leg: No edema  Left lower leg: No edema  Lymphadenopathy:      Cervical: No cervical adenopathy  Skin:     General: Skin is warm and dry        Coloration: Skin is not jaundiced or pale  Findings: No bruising, erythema, lesion or rash  Neurological:      General: No focal deficit present  Mental Status: She is alert and oriented to person, place, and time  Motor: No weakness or abnormal muscle tone  Deep Tendon Reflexes: Reflexes are normal and symmetric  Reflexes normal    Psychiatric:         Mood and Affect: Mood normal          Behavior: Behavior is cooperative           Vital Signs  ED Triage Vitals   Temperature Pulse Respirations Blood Pressure SpO2   01/04/23 1103 01/04/23 1102 01/04/23 1102 01/04/23 1102 01/04/23 1102   98 6 °F (37 °C) 92 (!) 24 126/80 99 %      Temp src Heart Rate Source Patient Position - Orthostatic VS BP Location FiO2 (%)   -- 01/04/23 1102 -- -- --    Monitor         Pain Score       --                  Vitals:    01/04/23 1102 01/04/23 1330   BP: 126/80 114/67   Pulse: 92 92         Visual Acuity      ED Medications  Medications   albuterol inhalation solution 5 mg (5 mg Nebulization Given 1/4/23 1128)   methylPREDNISolone sodium succinate (Solu-MEDROL) injection 60 mg (60 mg Intravenous Given 1/4/23 1127)       Diagnostic Studies  Results Reviewed     Procedure Component Value Units Date/Time    HS Troponin I 2hr [[id number]]  (Normal) Collected: 01/04/23 1326    Lab Status: Final result Specimen: Blood from Line Updated: 01/04/23 1400     hs TnI 2hr 7 ng/L      Delta 2hr hsTnI -2 ng/L     HS Troponin I 4hr [[id number]]     Lab Status: No result Specimen: Blood     HS Troponin 0hr (reflex protocol) [[id number]]  (Normal) Collected: 01/04/23 1127    Lab Status: Final result Specimen: Blood from Hand, Left Updated: 01/04/23 1225     hs TnI 0hr 9 ng/L     NT-BNP PRO [[id number]]  (Normal) Collected: 01/04/23 1127    Lab Status: Final result Specimen: Blood from Hand, Left Updated: 01/04/23 1224     NT-proBNP 100 pg/mL     Basic metabolic panel [[id number]]  (Abnormal) Collected: 01/04/23 1127    Lab Status: Final result Specimen: Blood from Hand, Left Updated: 01/04/23 1224     Sodium 144 mmol/L      Potassium 3 4 mmol/L      Chloride 106 mmol/L      CO2 26 mmol/L      ANION GAP 12 mmol/L      BUN 18 mg/dL      Creatinine 0 83 mg/dL      Glucose 102 mg/dL      Calcium 10 0 mg/dL      eGFR 71 ml/min/1 73sq m     Narrative:      [place] guidelines for Chronic Kidney Disease (CKD):   •  Stage 1 with normal or high GFR (GFR > 90 mL/min/1 73 square meters)  •  Stage 2 Mild CKD (GFR = 60-89 mL/min/1 73 square meters)  •  Stage 3A Moderate CKD (GFR = 45-59 mL/min/1 73 square meters)  •  Stage 3B Moderate CKD (GFR = 30-44 mL/min/1 73 square meters)  •  Stage 4 Severe CKD (GFR = 15-29 mL/min/1 73 square meters)  •  Stage 5 End Stage CKD (GFR <15 mL/min/1 73 square meters)  Note: GFR calculation is accurate only with a steady state creatinine    CBC and differential [[id number]]  (Abnormal) Collected: 01/04/23 1127    Lab Status: Final result Specimen: Blood from Hand, Left Updated: 01/04/23 1137     WBC 7 71 Thousand/uL      RBC 3 60 Million/uL      Hemoglobin 10 3 g/dL      Hematocrit 33 5 %      MCV 93 fL      MCH 28 6 pg      MCHC 30 7 g/dL      RDW 14 7 %      MPV 9 2 fL      Platelets 830 Thousands/uL      nRBC 0 /100 WBCs      Neutrophils Relative 67 %      Immat GRANS % 1 %      Lymphocytes Relative 23 %      Monocytes Relative 9 %      Eosinophils Relative 0 %      Basophils Relative 0 %      Neutrophils Absolute 5 13 Thousands/µL      Immature Grans Absolute 0 08 Thousand/uL      Lymphocytes Absolute 1 77 Thousands/µL      Monocytes Absolute 0 70 Thousand/µL      Eosinophils Absolute 0 02 Thousand/µL      Basophils Absolute 0 01 Thousands/µL                  XR chest 1 view portable   ED Interpretation by Maria Teresa Costa DO (01/04 1232)   nad      Final Result by Sirena Rojas MD (01/04 1333)      Persistent strand-like opacities at both lung bases suspicious for subsegmental atelectasis/scar, unchanged Workstation performed: [id number]                    Procedures  ECG 12 Lead Documentation Only    Date/Time: 1/4/2023 11:29 AM  Performed by: Coleen Plaza DO  Authorized by: Coleen Plaza DO     Indications / Diagnosis:  Sob  ECG reviewed by me, the ED Provider: yes    Patient location:  ED  Previous ECG:     Previous ECG:  Compared to current    Comparison ECG info:  1/3/23    Similarity:  No change  Interpretation:     Interpretation: non-specific    Rate:     ECG rate:  83    ECG rate assessment: normal    Rhythm:     Rhythm: sinus rhythm    Ectopy:     Ectopy: none    Conduction:     Conduction: abnormal      Abnormal conduction: non-specific intraventricular conduction delay    ST segments:     ST segments:  Normal  T waves:     T waves: normal               ED Course  ED Course as of 01/04/23 1426   Wed Jan 04, 2023   1247 Pt feeling better  Ate lunch  Lungs have few rhonchi on exam  Wheezing resolved             HEART Risk Score    Flowsheet Row Most Recent Value   Heart Score Risk Calculator    History 0 Filed at: 01/04/2023 1422   ECG 1 Filed at: 01/04/2023 1422   Age 2 Filed at: 01/04/2023 1422   Risk Factors 2 Filed at: 01/04/2023 1422   Troponin 0 Filed at: 01/04/2023 1422   HEART Score 5 Filed at: 01/04/2023 1422                        SBIRT 20yo+    Flowsheet Row Most Recent Value   SBIRT (25 yo +)    In order to provide better care to our patients, we are screening all of our patients for alcohol and drug use  Would it be okay to ask you these screening questions? Unable to answer at this time Filed at: 01/04/2023 1103                    Medical Decision Making  72-year-old female with history of COPD on 3 L of O2, CHF presents with ongoing shortness of breath  She was seen here yesterday for the same but felt better after prehospital DuoNeb  She states she had ran out of her medications  These medications for her COPD were prescribed    Today she states her wheezing was not any better  She did not get the medications filled  EMS was called  She was given a prehospital DuoNeb but states she does not feel any better  She has a nonproductive cough  Chest pain with the cough  No fevers  On exam she is alert in no distress speaking full sentences without difficulty  She has diffuse wheezing on exam   No respiratory distress  Concern for COPD exacerbation  She also has history of CHF so will do cardiac work-up, obtain x-ray to rule out CHF versus pneumonia  Will give additional albuterol and IV Solu-Medrol  Will reassess  Amount and/or Complexity of Data Reviewed  Labs: ordered  Radiology: ordered  Disposition  Final diagnoses:   COPD with acute exacerbation ([place] [address] )   Acute exacerbation of chronic obstructive pulmonary disease (COPD) ([place] [address] )   COPD exacerbation (HCC)   Acute exacerbation of moderate persistent extrinsic asthma     Time reflects when diagnosis was documented in both MDM as applicable and the Disposition within this note     Time User Action Codes Description Comment    1/4/2023  2:22 PM Arroyo Stager Add [J44 1] COPD with acute exacerbation ([place] [address] )     1/4/2023  2:24 PM Reynaldo Berger A Add [J44 1] Acute exacerbation of chronic obstructive pulmonary disease (COPD) ([place] [address] )     1/4/2023  2:24 PM Cassia Berger A Add [J44 1] COPD exacerbation ([place] [address] )     1/4/2023  2:25 PM Reynaldo Berger A Add [J45 41] Acute exacerbation of moderate persistent extrinsic asthma       ED Disposition     ED Disposition   Discharge    Condition   Good    Date/Time   Wed Jan 4, 2023  2:22 PM    [place] [address] discharge to home/self care                 Follow-up Information     Follow up With Specialties Details Why Contact Info    Tegan Huggins DO  Schedule an appointment as soon as possible for a visit in 1 day  [address] [facility] [address]  [phone]            Current Discharge Medication List      CONTINUE these medications which have CHANGED    Details   albuterol (2 5 mg/3 mL) 0 083 % nebulizer solution Take 3 mL (2 5 mg total) by nebulization every 6 (six) hours as needed for wheezing or shortness of breath  Qty: 75 mL, Refills: 0    Associated Diagnoses: COPD with acute exacerbation ([place] [address] )      ! ! budesonide (PULMICORT) 0 5 mg/2 mL nebulizer solution Take 2 mL (0 5 mg total) by nebulization 2 (two) times a day Rinse mouth after use  Qty: 120 mL, Refills: 0    Associated Diagnoses: Acute exacerbation of chronic obstructive pulmonary disease (COPD) ([place] [address] )      ! ! budesonide (Pulmicort) 0 5 mg/2 mL nebulizer solution Take 2 mL (0 5 mg total) by nebulization 2 (two) times a day Rinse mouth after use  Qty: 120 mL, Refills: 0    Associated Diagnoses: COPD exacerbation ([place] [address] )      ! ! fluticasone-umeclidinium-vilanterol (Trelegy Ellipta) 200-62 5-25 mcg/actuation AEPB inhaler Inhale 1 puff daily Rinse mouth after use  Qty: 60 blister, Refills: 0    Associated Diagnoses: COPD with acute exacerbation (HCC)      roflumilast (DALIRESP) 250 MCG tablet Take 2 tablets (500 mcg total) by mouth daily  Qty: 60 tablet, Refills: 0    Associated Diagnoses: Acute exacerbation of moderate persistent extrinsic asthma       !! - Potential duplicate medications found  Please discuss with provider        CONTINUE these medications which have NOT CHANGED    Details   acetaminophen (TYLENOL) 325 mg tablet Take 3 tablets (975 mg total) by mouth every 8 (eight) hours as needed for mild pain  Refills: 0    Associated Diagnoses: Acute exacerbation of chronic obstructive pulmonary disease (COPD) (HCC)      apixaban (ELIQUIS) 5 mg Take 1 tablet by mouth 2 (two) times a day      atorvastatin (LIPITOR) 20 mg tablet Take 20 mg by mouth daily      bictegravir-emtricitab-tenofovir alafenamide (Biktarvy) -25 MG tablet Take 1 tablet by mouth daily      carboxymethylcellulose 0 5 % SOLN Administer 1 drop into the left eye 3 (three) times a day as needed for dry eyes  Qty: 30 each, Refills: 0    Associated Diagnoses: Irritation of left eye      cholecalciferol (VITAMIN D3) 1,000 units tablet Take 1,000 Units by mouth daily      diltiazem (CARDIZEM CD) 120 mg 24 hr capsule Take 1 capsule (120 mg total) by mouth daily  Qty: 30 capsule, Refills: 0    Associated Diagnoses: Paroxysmal atrial fibrillation (HCC)      ! ! fluticasone-umeclidinium-vilanterol (Trelegy Ellipta) 200-62 5-25 mcg/actuation AEPB inhaler Inhale 1 puff daily Rinse mouth after use    Qty: 60 blister, Refills: 0    Associated Diagnoses: COPD exacerbation ([address] )      furosemide (LASIX) 40 mg tablet Take 1 tablet (40 mg total) by mouth 2 (two) times a day  Qty: 60 tablet, Refills: 0    Associated Diagnoses: Acute on chronic diastolic heart failure (HCC)      gabapentin (NEURONTIN) 400 mg capsule Take 800 mg by mouth 3 (three) times a day        levothyroxine 125 mcg tablet Take 125 mcg by mouth daily      losartan (COZAAR) 100 MG tablet Take 100 mg by mouth daily      lurasidone (LATUDA) 40 mg tablet Take 40 mg by mouth      metFORMIN (GLUCOPHAGE) 500 mg tablet Take 500 mg by mouth daily      montelukast (SINGULAIR) 10 mg tablet Take 10 mg by mouth daily at bedtime      nicotine (NICODERM CQ) 21 mg/24 hr TD 24 hr patch Place 1 patch on the skin daily  Qty: 28 patch, Refills: 0    Associated Diagnoses: Tobacco use      potassium chloride (K-DUR,KLOR-CON) 20 mEq tablet Take 1 tablet (20 mEq total) by mouth daily  Qty: 30 tablet, Refills: 0    Associated Diagnoses: Acute on chronic diastolic heart failure (HCC)      predniSONE 10 mg tablet 6 tabs daily for 2 days, then 5 tabs for 2 days then 4 tabs for 2 days then 3 tabs for 2 days then 2 tab for 2 days then 1 tab for 2 days then stop  Qty: 42 tablet, Refills: 0    Associated Diagnoses: Dyspnea      topiramate (TOPAMAX) 50 MG tablet Take 50 mg by mouth 2 (two) times a day      traMADol (ULTRAM) 50 mg tablet Take 50 mg by mouth as needed       !! - Potential duplicate medications found  Please discuss with provider  No discharge procedures on file      PDMP Review       Value Time User    PDMP Reviewed  Yes 11/3/2022  8:54 PM Stefania Tran DO          ED Provider  Electronically Signed by           Karoline Villarreal DO  01/04/23 Ja Walters DO  01/04/23 1427

## 2023-01-04 NOTE — CASE MANAGEMENT
Case Management ED Discharge Planning Note    Patient name Brett Bañuelos ED 24/ED 24 MRN [id number]  : 1950 Date 2023        OBJECTIVE:  Predictive Model Details         98% Factor Value    Risk of Hospital Admission or ED Visit Model Number of ED Visits 5+     Number of Hospitalizations 5+     Has Medicaid Yes     Is in Relationship No     Has Atrial Fibrillation Yes     Has COPD Yes     Has Anemia Yes     Has CVD Yes     Has Diabetes Yes     Has Asthma Yes     Has CHF Yes     Has PCP Yes            Chief Complaint: SOB (shortness of breath)   Patient Class: Emergency  Preferred Pharmacy:   [address] [place] [address], [facility] [facility] [facility]  [address]  [place] [address]  Phone: [phone] Fax: [phone]    Primary Care Provider: Jim Pro DO    Primary Insurance: Amina VILLANUEAV  Secondary Insurance: [address] [place] [address]    ED Discharge Details:    Discharge planning discussed with[de-identified] Patient  Freedom of Choice: Yes                        Requested  [facility]         Is the patient interested in [facility] at discharge?:  (Patient has [facility] services, non medical  6 /7 hours dailuy 7 days pre week     Skilled RN twice a week)    DME Referral Provided  Referral made for DME?:  (Patient on 3 ltrs oxygen prn  )

## 2023-01-04 NOTE — CASE MANAGEMENT
Case Management ED Assessment    Patient name John Brown  Location ED 24/ED 24 MRN [id number]  : 1950 Date 2023        OBJECTIVE:  Predictive Model Details         98% Factor Value    Risk of Hospital Admission or ED Visit Model Number of ED Visits 5+     Number of Hospitalizations 5+     Has Medicaid Yes     Is in Relationship No     Has Atrial Fibrillation Yes     Has COPD Yes     Has Anemia Yes     Has CVD Yes     Has Diabetes Yes     Has Asthma Yes     Has CHF Yes     Has PCP Yes            Chief Complaint: SOB (shortness of breath)   Patient Class: Emergency  Preferred Pharmacy:   [facility], [address] [address] [facility],4[address] Floor [facility]  [address]   [place] [address]  Phone: [phone] Fax: [phone]    Primary Care Provider: Samm Brownlee DO    Primary Insurance: Shirley VILLANUEVA  Secondary Insurance: [facility]    ED ASSESSMENT:  Active Health Care Proxies     Susie [address]  - Daughter   Primary Phone: [phone] (Mobile)                    Readmission Root Cause  30 Day Readmission: Yes  Who directed you to return to the hospital?: Self  Did you understand whom to contact if you had questions or problems?: Yes  Did you get your prescriptions before you left the hospital?: Yes  Were you able to get your prescriptions filled when you left the hospital?: No  Reason[de-identified] Unable to get to pharmacy  Did you take your medications as prescribed?: Yes  Were you able to get to your follow-up appointments?: Yes    Outpatient Care Information  Have you seen a doctor in the last year?: Yes  Specify which physician group(s) you have seen in the past year :  ([address])    Prescribed Medications Prior to Admission/ED Visit  Has patient been prescribed medications (prior to this ED visit/admission)?: Yes  Any difficulty obtaining medications?: No  Has the patient been taking all prescribed medication(s)?: Yes  Does the patient have difficulty affording medication(s)?: No    Patient Information  Admitted from[de-identified] Home  Mental Status: Alert  During Assessment patient was accompanied by: Not accompanied during assessment  Assessment information provided by[de-identified] Patient  Primary Caregiver: Self  Support Systems: Self, Daughter, Home care staff  [place] of Residence: [address] [place] [address] do you live in?: [address] [facility],[address]: Lives w/ Daughter    Patient Information Continued  Income Source: Pension/jail  Does patient have prescription coverage?: Yes  Within the past 12 months, you worried that your food would run out before you got the money to buy more : Never true  Within the past 12 months, the food you bought just didn't last and you didn't have money to get more : Never true  Does patient receive dialysis treatments?: No  Does patient have a history of substance abuse?: No  Does patient have a history of Mental Health Diagnosis?: Yes  Is patient receiving treatment for mental health?: Yes  Has patient received inpatient treatment related to mental health in the last 2 years?: No    Food and Transportation  What is patient's usual means of transportation?: Family Transport, [employer], Public transport/taxi  Ask patient:  How would you describe your eating habits?: Good

## 2023-01-05 ENCOUNTER — HOSPITAL ENCOUNTER (EMERGENCY)
Facility: HOSPITAL | Age: 73
Discharge: HOME | End: 2023-01-05
Attending: EMERGENCY MEDICINE

## 2023-01-05 VITALS
SYSTOLIC BLOOD PRESSURE: 125 MMHG | DIASTOLIC BLOOD PRESSURE: 81 MMHG | OXYGEN SATURATION: 98 % | RESPIRATION RATE: 20 BRPM | HEART RATE: 87 BPM | TEMPERATURE: 98.3 F

## 2023-01-05 DIAGNOSIS — J44.9 COPD (CHRONIC OBSTRUCTIVE PULMONARY DISEASE) (HCC): Primary | ICD-10-CM

## 2023-01-05 LAB
ATRIAL RATE: 88 BPM
ATRIAL RATE: 99 BPM
P AXIS: 47 DEGREES
P AXIS: 57 DEGREES
PR INTERVAL: 174 MS
PR INTERVAL: 186 MS
QRS AXIS: -50 DEGREES
QRS AXIS: -59 DEGREES
QRSD INTERVAL: 104 MS
QRSD INTERVAL: 118 MS
QT INTERVAL: 314 MS
QT INTERVAL: 334 MS
QTC INTERVAL: 402 MS
QTC INTERVAL: 404 MS
T WAVE AXIS: 40 DEGREES
T WAVE AXIS: 43 DEGREES
VENTRICULAR RATE: 88 BPM
VENTRICULAR RATE: 99 BPM

## 2023-01-05 RX ORDER — ALBUTEROL SULFATE 2.5 MG/3ML
SOLUTION RESPIRATORY (INHALATION)
Status: COMPLETED
Start: 2023-01-05 | End: 2023-01-05

## 2023-01-05 RX ORDER — ALBUTEROL SULFATE 2.5 MG/3ML
2.5 SOLUTION RESPIRATORY (INHALATION) ONCE
Status: COMPLETED | OUTPATIENT
Start: 2023-01-05 | End: 2023-01-05

## 2023-01-05 RX ORDER — PREDNISONE 20 MG/1
40 TABLET ORAL ONCE
Status: COMPLETED | OUTPATIENT
Start: 2023-01-05 | End: 2023-01-05

## 2023-01-05 RX ORDER — ALBUTEROL SULFATE 2.5 MG/3ML
5 SOLUTION RESPIRATORY (INHALATION) ONCE
Status: COMPLETED | OUTPATIENT
Start: 2023-01-05 | End: 2023-01-05

## 2023-01-05 RX ADMIN — ALBUTEROL SULFATE 5 MG: 2.5 SOLUTION RESPIRATORY (INHALATION) at 13:18

## 2023-01-05 RX ADMIN — IPRATROPIUM BROMIDE 0.5 MG: 0.5 SOLUTION RESPIRATORY (INHALATION) at 13:19

## 2023-01-05 RX ADMIN — PREDNISONE 40 MG: 20 TABLET ORAL at 13:19

## 2023-01-05 NOTE — CASE MANAGEMENT
Case Management ED Progress Note    Patient name Aimee Sinha  Location ED 29/ED 29 MRN [id number]  : 1950 Date 2023        OBJECTIVE:  Predictive Model Details         98% Factor Value    Risk of Hospital Admission or ED Visit Model Number of ED Visits 5+     Number of Hospitalizations 5+     Has Medicaid Yes     Is in Relationship No     Has Atrial Fibrillation Yes     Has COPD Yes     Has Anemia Yes     Has CVD Yes     Has Diabetes Yes     Has Asthma Yes     Has CHF Yes     Has PCP Yes            Chief Complaint: SOB (shortness of breath)   Patient Class: Emergency  Preferred Pharmacy:   [address] [place] [address], [address] [place] [address],4Th Floor [facility]  [address]  [address] [facility] [address]  Phone: [phone] Fax: [phone] [facility] 60 ,  [facility] 60 ,  [address] [place] [address]  Phone: [phone] Fax: [phone]    Primary Care Provider: Silvia De Jesus DO    Primary Insurance: [facility]  Secondary Insurance: Emiliano Franks    ED Progress Note:    Message left on [facility]  Requesting return phone call in order to explore  Options  To prevent multiple re admission to ED

## 2023-01-05 NOTE — CASE MANAGEMENT
Case Management ED Progress Note    Patient name Radha Tipton  Location ED 29/ED 29 MRN [id number]  : 1950 Date 2023        OBJECTIVE:  Predictive Model Details         98% Factor Value    Risk of Hospital Admission or ED Visit Model Number of ED Visits 5+     Number of Hospitalizations 5+     Has Medicaid Yes     Is in Relationship No     Has Atrial Fibrillation Yes     Has COPD Yes     Has Anemia Yes     Has CVD Yes     Has Diabetes Yes     Has Asthma Yes     Has CHF Yes     Has PCP Yes            Chief Complaint: SOB (shortness of breath)   Patient Class: Emergency  Preferred Pharmacy:   [address] [facility], [facility] [address] [facility],4Th Floor [facility]  [address]   [place] [address]  Phone: [phone] Fax: [address] [place] [address] - [facility] [address] ,  [address] ,  [address] [facility] [address]  Phone: [phone] Fax: [phone]    Primary Care Provider: Sarah Ying DO    Primary Insurance: [facility]  Secondary Insurance: [address] [place] [address]    ED Progress Note:    Received phone call from [address] from [address] [place] [address]  who informs this worker patient's compliance with their medical care is poor, and agreed to follow up with a referral to outpatient casemanagement

## 2023-01-05 NOTE — ED NOTES
Poor historian about meds - meds unknown to pt  They come in packets   States she took all her morning meds       Peyman Cruz RN  01/05/23 3708

## 2023-01-05 NOTE — CASE MANAGEMENT
Case Management ED Progress Note    Patient name Brendan Gilmore  Location ED 29/ED 29 MRN [id number]  : 1950 Date 2023        OBJECTIVE:  Predictive Model Details         98% Factor Value    Risk of Hospital Admission or ED Visit Model Number of ED Visits 5+     Number of Hospitalizations 5+     Has Medicaid Yes     Is in Relationship No     Has Atrial Fibrillation Yes     Has COPD Yes     Has Anemia Yes     Has CVD Yes     Has Diabetes Yes     Has Asthma Yes     Has CHF Yes     Has PCP Yes            Chief Complaint: SOB (shortness of breath)   Patient Class: Emergency  Preferred Pharmacy:   [address] [place] [address], [address] [place] [address],4Th Floor [facility]  [address]  [address] [facility] [address]  Phone: [phone] Fax: [address] [place], [place] [address] ,  [facility] [address] [facility] [address]  Phone: [phone] Fax: [phone]    Primary Care Provider: Erica Benites DO    Primary Insurance: Lieutenant Fleming [facility]  Secondary Insurance: [address] [place] [address]    ED Progress Note:    Patient returns to ED with similar complains, "I can not breath"  Did not follow up with her pharmacy, this worker consult case with our retail pharmacy, medication  Daliresp not in stock  Consult case with attending, Pulmonology consult pending  This worker to follow

## 2023-01-05 NOTE — ED PROVIDER NOTES
History  Chief Complaint   Patient presents with   • Shortness of Breath     SOB, states she feels no different than when she left yesterday  States this is the same as always - "wake up & out of breath, I can't keep doing this"     Patient is a 81-year-old female with history of COPD chronically on 3 L of O2, atrial fibrillation, diabetes, CHF          Prior to Admission Medications   Prescriptions Last Dose Informant Patient Reported? Taking?   acetaminophen (TYLENOL) 325 mg tablet   No No   Sig: Take 3 tablets (975 mg total) by mouth every 8 (eight) hours as needed for mild pain   albuterol (2 5 mg/3 mL) 0 083 % nebulizer solution   No No   Sig: Take 3 mL (2 5 mg total) by nebulization every 6 (six) hours as needed for wheezing or shortness of breath   apixaban (ELIQUIS) 5 mg   Yes No   Sig: Take 1 tablet by mouth 2 (two) times a day   atorvastatin (LIPITOR) 20 mg tablet   Yes No   Sig: Take 20 mg by mouth daily   bictegravir-emtricitab-tenofovir alafenamide (Biktarvy) -25 MG tablet   Yes No   Sig: Take 1 tablet by mouth daily   budesonide (PULMICORT) 0 5 mg/2 mL nebulizer solution   No No   Sig: Take 2 mL (0 5 mg total) by nebulization 2 (two) times a day Rinse mouth after use  budesonide (Pulmicort) 0 5 mg/2 mL nebulizer solution   No No   Sig: Take 2 mL (0 5 mg total) by nebulization 2 (two) times a day Rinse mouth after use    carboxymethylcellulose 0 5 % SOLN   No No   Sig: Administer 1 drop into the left eye 3 (three) times a day as needed for dry eyes   cholecalciferol (VITAMIN D3) 1,000 units tablet   Yes No   Sig: Take 1,000 Units by mouth daily   diltiazem (CARDIZEM CD) 120 mg 24 hr capsule   No No   Sig: Take 1 capsule (120 mg total) by mouth daily   fluticasone-umeclidinium-vilanterol (Trelegy Ellipta) 200-62 5-25 mcg/actuation AEPB inhaler   No No   Sig: Inhale 1 puff daily Rinse mouth after use     fluticasone-umeclidinium-vilanterol (Trelegy Ellipta) 200-62 5-25 mcg/actuation AEPB inhaler No No   Sig: Inhale 1 puff daily Rinse mouth after use     furosemide (LASIX) 40 mg tablet   No No   Sig: Take 1 tablet (40 mg total) by mouth 2 (two) times a day   gabapentin (NEURONTIN) 400 mg capsule   Yes No   Sig: Take 800 mg by mouth 3 (three) times a day     levothyroxine 125 mcg tablet   Yes No   Sig: Take 125 mcg by mouth daily   losartan (COZAAR) 100 MG tablet   Yes No   Sig: Take 100 mg by mouth daily   lurasidone (LATUDA) 40 mg tablet   Yes No   Sig: Take 40 mg by mouth   metFORMIN (GLUCOPHAGE) 500 mg tablet   Yes No   Sig: Take 500 mg by mouth daily   montelukast (SINGULAIR) 10 mg tablet   Yes No   Sig: Take 10 mg by mouth daily at bedtime   nicotine (NICODERM CQ) 21 mg/24 hr TD 24 hr patch   No No   Sig: Place 1 patch on the skin daily   potassium chloride (K-DUR,KLOR-CON) 20 mEq tablet   No No   Sig: Take 1 tablet (20 mEq total) by mouth daily   predniSONE 10 mg tablet   No No   Si tabs daily for 2 days, then 5 tabs for 2 days then 4 tabs for 2 days then 3 tabs for 2 days then 2 tab for 2 days then 1 tab for 2 days then stop   roflumilast (DALIRESP) 250 MCG tablet   No No   Sig: Take 2 tablets (500 mcg total) by mouth daily   topiramate (TOPAMAX) 50 MG tablet   Yes No   Sig: Take 50 mg by mouth 2 (two) times a day   traMADol (ULTRAM) 50 mg tablet   Yes No   Sig: Take 50 mg by mouth as needed      Facility-Administered Medications: None       Past Medical History:   Diagnosis Date   • Asthma    • Bipolar 1 disorder (HCC)    • Cardiac disease    • COPD (chronic obstructive pulmonary disease) (HCC)    • HIV (human immunodeficiency virus infection) ([facility] [address] [facility] ) 2016   • Hypertension    • Osteoarthritis        Past Surgical History:   Procedure Laterality Date   • HERNIA REPAIR     • LUMBAR FUSION N/A 4/10/2018    Procedure: T9/10 discectomy with T9-T11 fusion;  Surgeon: Lina Duarte MD;  Location: [facility];  Service: Neurosurgery   • THYROIDECTOMY         Family History   Problem Relation Age of Onset   • No Known Problems Mother    • Diabetes Father    • Heart disease Neg Hx    • Cancer Neg Hx      I have reviewed and agree with the history as documented  E-Cigarette/Vaping   • E-Cigarette Use Never User      E-Cigarette/Vaping Substances     Social History     Tobacco Use   • Smoking status: Former     Packs/day: 0 50     Years: 45 00     Pack years: 22 50     Types: Cigarettes   • Smokeless tobacco: Never   Vaping Use   • Vaping Use: Never used   Substance Use Topics   • Alcohol use: Not Currently     Alcohol/week: 2 0 standard drinks     Types: 1 Cans of beer, 1 Shots of liquor per week     Comment: only on holidays   • Drug use: Not Currently     Comment: former IV drug user       Review of Systems    Physical Exam  Physical Exam    Vital Signs  ED Triage Vitals   Temperature Pulse Respirations Blood Pressure SpO2   01/05/23 1132 01/05/23 1132 01/05/23 1132 01/05/23 1132 01/05/23 1127   98 3 °F (36 8 °C) 87 20 125/81 100 %      Temp Source Heart Rate Source Patient Position - Orthostatic VS BP Location FiO2 (%)   01/05/23 1132 01/05/23 1132 -- 01/05/23 1132 --   Oral Monitor;Right  Right arm       Pain Score       --                  Vitals:    01/05/23 1132   BP: 125/81   Pulse: 87         Visual Acuity      ED Medications  Medications   albuterol inhalation solution 2 5 mg (0 mg Nebulization Given to EMS 1/5/23 1130)       Diagnostic Studies  Results Reviewed     None                 No orders to display              Procedures  Procedures         ED Course                               SBIRT 22yo+    Flowsheet Row Most Recent Value   SBIRT (23 yo +)    In order to provide better care to our patients, we are screening all of our patients for alcohol and drug use  Would it be okay to ask you these screening questions? Yes Filed at: 01/05/2023 1142   Initial Alcohol Screen: US AUDIT-C     1  How often do you have a drink containing alcohol? 0 Filed at: 01/05/2023 1142   2   How many drinks containing alcohol do you have on a typical day you are drinking? 0 Filed at: 01/05/2023 1142   3a  Male UNDER 65: How often do you have five or more drinks on one occasion? 0 Filed at: 01/05/2023 1142   3b  FEMALE Any Age, or MALE 65+: How often do you have 4 or more drinks on one occassion? 0 Filed at: 01/05/2023 1142   Audit-C Score 0 Filed at: 01/05/2023 1142   DEMETRIS: How many times in the past year have you    Used an illegal drug or used a prescription medication for non-medical reasons? Never Filed at: 01/05/2023 1142                    MDM    Disposition  Final diagnoses:   None     ED Disposition     None      Follow-up Information    None         Patient's Medications   Discharge Prescriptions    No medications on file       No discharge procedures on file      PDMP Review       Value Time User    PDMP Reviewed  Yes 11/3/2022  8:54 PM Sundeep Perez DO          ED Provider  Electronically Signed by Given to EMS 1/5/23 1130)   predniSONE tablet 40 mg (40 mg Oral Given 1/5/23 1319)   albuterol inhalation solution 5 mg (5 mg Nebulization Given 1/5/23 1318)   ipratropium (ATROVENT) 0 02 % inhalation solution 0 5 mg (0 5 mg Nebulization Given 1/5/23 1319)       Diagnostic Studies  Results Reviewed     None                 No orders to display              Procedures  Procedures         ED Course                               SBIRT 20yo+    Flowsheet Row Most Recent Value   SBIRT (23 yo +)    In order to provide better care to our patients, we are screening all of our patients for alcohol and drug use  Would it be okay to ask you these screening questions? Yes Filed at: 01/05/2023 1142   Initial Alcohol Screen: US AUDIT-C     1  How often do you have a drink containing alcohol? 0 Filed at: 01/05/2023 1142   2  How many drinks containing alcohol do you have on a typical day you are drinking? 0 Filed at: 01/05/2023 1142   3a  Male UNDER 65: How often do you have five or more drinks on one occasion? 0 Filed at: 01/05/2023 1142   3b  FEMALE Any Age, or MALE 65+: How often do you have 4 or more drinks on one occassion? 0 Filed at: 01/05/2023 1142   Audit-C Score 0 Filed at: 01/05/2023 1142   DEMETRIS: How many times in the past year have you    Used an illegal drug or used a prescription medication for non-medical reasons? Never Filed at: 01/05/2023 1142                    Medical Decision Making  Patient is a 74-year-old female with history of COPD chronically on 3 L of O2, atrial fibrillation, diabetes, CHF, presenting to the ED for evaluation of SOB  Diffuse wheezing - additional neb treatment given in ED with improvement in wheezing  No additional oxygen requirement, speaking in full sentences, no respiratory distress   Work up from 1/3 and 1/4 reviewed, patient had EKGs and labs at that time which were WNL  Patient did not yet  prednisone rx, will give her dose today  Case management saw patient, discussed options with her regarding her rx medication - will follow up  Patient reporting she just wants a turkey sandwich because she has no food at home  Recommend patient f/u with Pulmonology and PCP and  rx    Patient verbalizes understanding and agrees with plan  The management plan was discussed in detail with the patient at bedside and all questions were answered  Prior to discharge, I provided both verbal and written instructions  I discussed with the patient the signs and symptoms for which to return to the emergency department  All questions were answered and patient was comfortable with the plan of care and discharged to home  The patient agrees to return to the Emergency Department for concerns and/or progression of illness  [facility] acute illness or injury  Risk  Prescription drug management  Disposition  Final diagnoses:   COPD (chronic obstructive pulmonary disease) ([facility] )     Time reflects when diagnosis was documented in both MDM as applicable and the Disposition within this note     Time User Action Codes Description Comment    1/5/2023  1:43 PM King Santa [J44 9] COPD (chronic obstructive pulmonary disease) [facility])       ED Disposition     ED Disposition   Discharge    Condition   Stable    Date/Time   Thu Jan 5, 2023  1:43 PM    Comment   Jose Cooper discharge to home/self care                 Follow-up Information     Follow up With Specialties Details Why Contact Info    Pulmonology  Schedule an appointment as soon as possible for a visit             Discharge Medication List as of 1/5/2023  1:43 PM      CONTINUE these medications which have NOT CHANGED    Details   albuterol (2 5 mg/3 mL) 0 083 % nebulizer solution Take 3 mL (2 5 mg total) by nebulization every 6 (six) hours as needed for wheezing or shortness of breath, Starting Wed 1/4/2023, Normal      !! budesonide (PULMICORT) 0 5 mg/2 mL nebulizer solution Take 2 mL (0 5 mg total) by nebulization 2 (two) times a day Rinse mouth after use , Starting Wed 1/4/2023, Normal      !! budesonide (Pulmicort) 0 5 mg/2 mL nebulizer solution Take 2 mL (0 5 mg total) by nebulization 2 (two) times a day Rinse mouth after use , Starting Wed 1/4/2023, Until Fri 2/3/2023, Normal      !! fluticasone-umeclidinium-vilanterol (Trelegy Ellipta) 200-62 5-25 mcg/actuation AEPB inhaler Inhale 1 puff daily Rinse mouth after use , Starting Wed 1/4/2023, Normal      roflumilast (DALIRESP) 250 MCG tablet Take 2 tablets (500 mcg total) by mouth daily, Starting Wed 1/4/2023, Normal      acetaminophen (TYLENOL) 325 mg tablet Take 3 tablets (975 mg total) by mouth every 8 (eight) hours as needed for mild pain, Starting Sun 9/18/2022, No Print      apixaban (ELIQUIS) 5 mg Take 1 tablet by mouth 2 (two) times a day, Starting Mon 5/9/2022, Historical Med      atorvastatin (LIPITOR) 20 mg tablet Take 20 mg by mouth daily, Historical Med      bictegravir-emtricitab-tenofovir alafenamide (Biktarvy) -25 MG tablet Take 1 tablet by mouth daily, Starting Mon 5/9/2022, Historical Med      carboxymethylcellulose 0 5 % SOLN Administer 1 drop into the left eye 3 (three) times a day as needed for dry eyes, Starting Sat 10/29/2022, Normal      cholecalciferol (VITAMIN D3) 1,000 units tablet Take 1,000 Units by mouth daily, Historical Med      diltiazem (CARDIZEM CD) 120 mg 24 hr capsule Take 1 capsule (120 mg total) by mouth daily, Starting Thu 9/29/2022, Normal      !! fluticasone-umeclidinium-vilanterol (Trelegy Ellipta) 200-62 5-25 mcg/actuation AEPB inhaler Inhale 1 puff daily Rinse mouth after use , Starting Tue 1/3/2023, Until Thu 2/2/2023, Normal      furosemide (LASIX) 40 mg tablet Take 1 tablet (40 mg total) by mouth 2 (two) times a day, Starting Thu 9/29/2022, Normal      gabapentin (NEURONTIN) 400 mg capsule Take 800 mg by mouth 3 (three) times a day  , Historical Med      levothyroxine 125 mcg tablet Take 125 mcg by mouth daily, Historical Med      losartan (COZAAR) 100 MG tablet Take 100 mg by mouth daily, Starting Wed 11/2/2022, Historical Med      lurasidone (LATUDA) 40 mg tablet Take 40 mg by mouth, Starting Fri 9/30/2022, Until Sat 9/30/2023 at 2359, Historical Med      metFORMIN (GLUCOPHAGE) 500 mg tablet Take 500 mg by mouth daily, Starting Wed 11/2/2022, Historical Med      montelukast (SINGULAIR) 10 mg tablet Take 10 mg by mouth daily at bedtime, Historical Med      nicotine (NICODERM CQ) 21 mg/24 hr TD 24 hr patch Place 1 patch on the skin daily, Starting Mon 9/19/2022, Normal      potassium chloride (K-DUR,KLOR-CON) 20 mEq tablet Take 1 tablet (20 mEq total) by mouth daily, Starting Mon 9/19/2022, Normal      predniSONE 10 mg tablet 6 tabs daily for 2 days, then 5 tabs for 2 days then 4 tabs for 2 days then 3 tabs for 2 days then 2 tab for 2 days then 1 tab for 2 days then stop, Normal      topiramate (TOPAMAX) 50 MG tablet Take 50 mg by mouth 2 (two) times a day, Starting Fri 9/30/2022, Until Sat 9/30/2023, Historical Med      traMADol (ULTRAM) 50 mg tablet Take 50 mg by mouth as needed, Starting Mon 9/19/2022, Historical Med       !! - Potential duplicate medications found  Please discuss with provider  No discharge procedures on file      PDMP Review       Value Time User    PDMP Reviewed  Yes 11/3/2022  8:54 PM Mingo Wayne DO          ED Provider  Electronically Signed by           Lissett Hernández PA-C  01/09/23 1879

## 2023-01-08 ENCOUNTER — HOSPITAL ENCOUNTER (INPATIENT)
Facility: HOSPITAL | Age: 73
LOS: 2 days | Discharge: HOME HEALTH | End: 2023-01-11
Attending: INTERNAL MEDICINE | Admitting: INTERNAL MEDICINE

## 2023-01-08 ENCOUNTER — APPOINTMENT (EMERGENCY)
Dept: RADIOLOGY | Facility: HOSPITAL | Age: 73
End: 2023-01-08

## 2023-01-08 DIAGNOSIS — J44.9 COPD (CHRONIC OBSTRUCTIVE PULMONARY DISEASE) (HCC): ICD-10-CM

## 2023-01-08 DIAGNOSIS — E87.6 HYPOKALEMIA: ICD-10-CM

## 2023-01-08 DIAGNOSIS — Z65.9 POOR SOCIAL SITUATION: Primary | ICD-10-CM

## 2023-01-08 LAB
ALBUMIN SERPL BCP-MCNC: 4 G/DL (ref 3.5–5)
ALP SERPL-CCNC: 58 U/L (ref 43–122)
ALT SERPL W P-5'-P-CCNC: 11 U/L
ANION GAP SERPL CALCULATED.3IONS-SCNC: 9 MMOL/L (ref 5–14)
AST SERPL W P-5'-P-CCNC: 14 U/L (ref 14–36)
ATRIAL RATE: 99 BPM
BASOPHILS # BLD AUTO: 0.01 THOUSANDS/ÂΜL (ref 0–0.1)
BASOPHILS NFR BLD AUTO: 0 % (ref 0–1)
BILIRUB SERPL-MCNC: 0.6 MG/DL (ref 0.2–1)
BUN SERPL-MCNC: 11 MG/DL (ref 5–25)
CALCIUM SERPL-MCNC: 10.3 MG/DL (ref 8.4–10.2)
CARDIAC TROPONIN I PNL SERPL HS: 8 NG/L
CHLORIDE SERPL-SCNC: 107 MMOL/L (ref 96–108)
CO2 SERPL-SCNC: 27 MMOL/L (ref 21–32)
CREAT SERPL-MCNC: 0.89 MG/DL (ref 0.6–1.2)
EOSINOPHIL # BLD AUTO: 0.2 THOUSAND/ÂΜL (ref 0–0.61)
EOSINOPHIL NFR BLD AUTO: 3 % (ref 0–6)
ERYTHROCYTE [DISTWIDTH] IN BLOOD BY AUTOMATED COUNT: 14.4 % (ref 11.6–15.1)
FLUAV RNA RESP QL NAA+PROBE: NEGATIVE
FLUBV RNA RESP QL NAA+PROBE: NEGATIVE
GFR SERPL CREATININE-BSD FRML MDRD: 65 ML/MIN/1.73SQ M
GLUCOSE SERPL-MCNC: 93 MG/DL (ref 65–140)
GLUCOSE SERPL-MCNC: 97 MG/DL (ref 70–99)
HCT VFR BLD AUTO: 37.3 % (ref 34.8–46.1)
HGB BLD-MCNC: 11.1 G/DL (ref 11.5–15.4)
IMM GRANULOCYTES # BLD AUTO: 0.03 THOUSAND/UL (ref 0–0.2)
IMM GRANULOCYTES NFR BLD AUTO: 0 % (ref 0–2)
LYMPHOCYTES # BLD AUTO: 1.88 THOUSANDS/ÂΜL (ref 0.6–4.47)
LYMPHOCYTES NFR BLD AUTO: 26 % (ref 14–44)
MAGNESIUM SERPL-MCNC: 2.2 MG/DL (ref 1.6–2.3)
MCH RBC QN AUTO: 27.7 PG (ref 26.8–34.3)
MCHC RBC AUTO-ENTMCNC: 29.8 G/DL (ref 31.4–37.4)
MCV RBC AUTO: 93 FL (ref 82–98)
MONOCYTES # BLD AUTO: 0.77 THOUSAND/ÂΜL (ref 0.17–1.22)
MONOCYTES NFR BLD AUTO: 11 % (ref 4–12)
NEUTROPHILS # BLD AUTO: 4.34 THOUSANDS/ÂΜL (ref 1.85–7.62)
NEUTS SEG NFR BLD AUTO: 60 % (ref 43–75)
NRBC BLD AUTO-RTO: 0 /100 WBCS
NT-PROBNP SERPL-MCNC: 90.2 PG/ML (ref 0–299)
P AXIS: 23 DEGREES
PLATELET # BLD AUTO: 301 THOUSANDS/UL (ref 149–390)
PMV BLD AUTO: 8.7 FL (ref 8.9–12.7)
POTASSIUM SERPL-SCNC: 2.8 MMOL/L (ref 3.5–5.3)
PR INTERVAL: 174 MS
PROT SERPL-MCNC: 7 G/DL (ref 6.4–8.4)
QRS AXIS: -57 DEGREES
QRSD INTERVAL: 106 MS
QT INTERVAL: 376 MS
QTC INTERVAL: 482 MS
RBC # BLD AUTO: 4.01 MILLION/UL (ref 3.81–5.12)
RSV RNA RESP QL NAA+PROBE: NEGATIVE
SARS-COV-2 RNA RESP QL NAA+PROBE: NEGATIVE
SODIUM SERPL-SCNC: 143 MMOL/L (ref 135–147)
T WAVE AXIS: 32 DEGREES
VENTRICULAR RATE: 99 BPM
WBC # BLD AUTO: 7.23 THOUSAND/UL (ref 4.31–10.16)

## 2023-01-08 RX ORDER — FLUTICASONE FUROATE AND VILANTEROL 200; 25 UG/1; UG/1
1 POWDER RESPIRATORY (INHALATION) DAILY
Status: DISCONTINUED | OUTPATIENT
Start: 2023-01-08 | End: 2023-01-11 | Stop reason: HOSPADM

## 2023-01-08 RX ORDER — LANOLIN ALCOHOL/MO/W.PET/CERES
6 CREAM (GRAM) TOPICAL
Status: DISCONTINUED | OUTPATIENT
Start: 2023-01-08 | End: 2023-01-11 | Stop reason: HOSPADM

## 2023-01-08 RX ORDER — NICOTINE 21 MG/24HR
1 PATCH, TRANSDERMAL 24 HOURS TRANSDERMAL DAILY
Status: DISCONTINUED | OUTPATIENT
Start: 2023-01-08 | End: 2023-01-11 | Stop reason: HOSPADM

## 2023-01-08 RX ORDER — POTASSIUM CHLORIDE 14.9 MG/ML
20 INJECTION INTRAVENOUS ONCE
Status: DISCONTINUED | OUTPATIENT
Start: 2023-01-08 | End: 2023-01-11 | Stop reason: HOSPADM

## 2023-01-08 RX ORDER — ACETAMINOPHEN 325 MG/1
650 TABLET ORAL EVERY 6 HOURS PRN
Status: DISCONTINUED | OUTPATIENT
Start: 2023-01-08 | End: 2023-01-11 | Stop reason: HOSPADM

## 2023-01-08 RX ORDER — LEVALBUTEROL 1.25 MG/.5ML
1.25 SOLUTION, CONCENTRATE RESPIRATORY (INHALATION) 2 TIMES DAILY
Status: DISCONTINUED | OUTPATIENT
Start: 2023-01-08 | End: 2023-01-09

## 2023-01-08 RX ORDER — INSULIN LISPRO 100 [IU]/ML
1-6 INJECTION, SOLUTION INTRAVENOUS; SUBCUTANEOUS
Status: DISCONTINUED | OUTPATIENT
Start: 2023-01-08 | End: 2023-01-11 | Stop reason: HOSPADM

## 2023-01-08 RX ORDER — DILTIAZEM HYDROCHLORIDE 120 MG/1
120 CAPSULE, COATED, EXTENDED RELEASE ORAL DAILY
Status: DISCONTINUED | OUTPATIENT
Start: 2023-01-08 | End: 2023-01-11 | Stop reason: HOSPADM

## 2023-01-08 RX ORDER — ATORVASTATIN CALCIUM 20 MG/1
20 TABLET, FILM COATED ORAL DAILY
Status: DISCONTINUED | OUTPATIENT
Start: 2023-01-08 | End: 2023-01-11 | Stop reason: HOSPADM

## 2023-01-08 RX ORDER — ALBUTEROL SULFATE 2.5 MG/3ML
2.5 SOLUTION RESPIRATORY (INHALATION) EVERY 6 HOURS PRN
Status: DISCONTINUED | OUTPATIENT
Start: 2023-01-08 | End: 2023-01-11 | Stop reason: HOSPADM

## 2023-01-08 RX ORDER — GABAPENTIN 400 MG/1
800 CAPSULE ORAL 3 TIMES DAILY
Status: DISCONTINUED | OUTPATIENT
Start: 2023-01-08 | End: 2023-01-11 | Stop reason: HOSPADM

## 2023-01-08 RX ORDER — ROFLUMILAST 250 UG/1
500 TABLET ORAL DAILY
Status: DISCONTINUED | OUTPATIENT
Start: 2023-01-08 | End: 2023-01-11 | Stop reason: HOSPADM

## 2023-01-08 RX ORDER — FUROSEMIDE 40 MG/1
40 TABLET ORAL 2 TIMES DAILY
Status: DISCONTINUED | OUTPATIENT
Start: 2023-01-08 | End: 2023-01-11 | Stop reason: HOSPADM

## 2023-01-08 RX ORDER — LEVOTHYROXINE SODIUM 0.12 MG/1
125 TABLET ORAL
Status: DISCONTINUED | OUTPATIENT
Start: 2023-01-09 | End: 2023-01-11 | Stop reason: HOSPADM

## 2023-01-08 RX ORDER — POTASSIUM CHLORIDE 20 MEQ/1
20 TABLET, EXTENDED RELEASE ORAL DAILY
Status: DISCONTINUED | OUTPATIENT
Start: 2023-01-09 | End: 2023-01-11 | Stop reason: HOSPADM

## 2023-01-08 RX ORDER — MELATONIN
1000 DAILY
Status: DISCONTINUED | OUTPATIENT
Start: 2023-01-08 | End: 2023-01-11 | Stop reason: HOSPADM

## 2023-01-08 RX ORDER — ONDANSETRON 2 MG/ML
4 INJECTION INTRAMUSCULAR; INTRAVENOUS EVERY 6 HOURS PRN
Status: DISCONTINUED | OUTPATIENT
Start: 2023-01-08 | End: 2023-01-11 | Stop reason: HOSPADM

## 2023-01-08 RX ORDER — POTASSIUM CHLORIDE 750 MG/1
40 TABLET, EXTENDED RELEASE ORAL ONCE
Status: COMPLETED | OUTPATIENT
Start: 2023-01-08 | End: 2023-01-08

## 2023-01-08 RX ORDER — MONTELUKAST SODIUM 10 MG/1
10 TABLET ORAL
Status: DISCONTINUED | OUTPATIENT
Start: 2023-01-08 | End: 2023-01-11 | Stop reason: HOSPADM

## 2023-01-08 RX ORDER — MAGNESIUM HYDROXIDE/ALUMINUM HYDROXICE/SIMETHICONE 120; 1200; 1200 MG/30ML; MG/30ML; MG/30ML
30 SUSPENSION ORAL EVERY 6 HOURS PRN
Status: DISCONTINUED | OUTPATIENT
Start: 2023-01-08 | End: 2023-01-11 | Stop reason: HOSPADM

## 2023-01-08 RX ORDER — MINERAL OIL AND PETROLATUM 150; 830 MG/G; MG/G
OINTMENT OPHTHALMIC
Status: DISCONTINUED | OUTPATIENT
Start: 2023-01-08 | End: 2023-01-11 | Stop reason: HOSPADM

## 2023-01-08 RX ORDER — POLYETHYLENE GLYCOL 3350 17 G/17G
17 POWDER, FOR SOLUTION ORAL DAILY PRN
Status: DISCONTINUED | OUTPATIENT
Start: 2023-01-08 | End: 2023-01-11 | Stop reason: HOSPADM

## 2023-01-08 RX ORDER — POTASSIUM CHLORIDE 20 MEQ/1
20 TABLET, EXTENDED RELEASE ORAL DAILY
Status: DISCONTINUED | OUTPATIENT
Start: 2023-01-08 | End: 2023-01-08

## 2023-01-08 RX ORDER — ALBUTEROL SULFATE 2.5 MG/3ML
1 SOLUTION RESPIRATORY (INHALATION) ONCE
Status: COMPLETED | OUTPATIENT
Start: 2023-01-08 | End: 2023-01-08

## 2023-01-08 RX ORDER — LOSARTAN POTASSIUM 50 MG/1
100 TABLET ORAL DAILY
Status: DISCONTINUED | OUTPATIENT
Start: 2023-01-08 | End: 2023-01-11 | Stop reason: HOSPADM

## 2023-01-08 RX ORDER — TOPIRAMATE 25 MG/1
50 TABLET ORAL 2 TIMES DAILY
Status: DISCONTINUED | OUTPATIENT
Start: 2023-01-08 | End: 2023-01-11 | Stop reason: HOSPADM

## 2023-01-08 RX ORDER — IPRATROPIUM BROMIDE AND ALBUTEROL SULFATE 2.5; .5 MG/3ML; MG/3ML
3 SOLUTION RESPIRATORY (INHALATION) ONCE
Status: DISCONTINUED | OUTPATIENT
Start: 2023-01-08 | End: 2023-01-11 | Stop reason: HOSPADM

## 2023-01-08 RX ORDER — BUDESONIDE 0.5 MG/2ML
0.5 INHALANT ORAL 2 TIMES DAILY
Status: DISCONTINUED | OUTPATIENT
Start: 2023-01-08 | End: 2023-01-09

## 2023-01-08 RX ADMIN — LEVALBUTEROL 1.25 MG: 1.25 SOLUTION, CONCENTRATE RESPIRATORY (INHALATION) at 19:54

## 2023-01-08 RX ADMIN — TOPIRAMATE 50 MG: 25 TABLET, FILM COATED ORAL at 17:23

## 2023-01-08 RX ADMIN — WHITE PETROLATUM 57.7 %-MINERAL OIL 31.9 % EYE OINTMENT: at 22:29

## 2023-01-08 RX ADMIN — ATORVASTATIN CALCIUM 20 MG: 20 TABLET, FILM COATED ORAL at 16:07

## 2023-01-08 RX ADMIN — APIXABAN 5 MG: 5 TABLET, FILM COATED ORAL at 17:23

## 2023-01-08 RX ADMIN — BICTEGRAVIR SODIUM, EMTRICITABINE, AND TENOFOVIR ALAFENAMIDE FUMARATE 1 TABLET: 50; 200; 25 TABLET ORAL at 16:07

## 2023-01-08 RX ADMIN — LOSARTAN POTASSIUM 100 MG: 50 TABLET, FILM COATED ORAL at 16:07

## 2023-01-08 RX ADMIN — CHOLECALCIFEROL TAB 25 MCG (1000 UNIT) 1000 UNITS: 25 TAB at 16:07

## 2023-01-08 RX ADMIN — Medication 6 MG: at 21:43

## 2023-01-08 RX ADMIN — MONTELUKAST 10 MG: 10 TABLET, FILM COATED ORAL at 21:43

## 2023-01-08 RX ADMIN — GABAPENTIN 800 MG: 400 CAPSULE ORAL at 21:43

## 2023-01-08 RX ADMIN — POTASSIUM CHLORIDE 40 MEQ: 750 TABLET, EXTENDED RELEASE ORAL at 14:50

## 2023-01-08 RX ADMIN — FUROSEMIDE 40 MG: 40 TABLET ORAL at 17:23

## 2023-01-08 RX ADMIN — DILTIAZEM HYDROCHLORIDE 120 MG: 120 CAPSULE, COATED, EXTENDED RELEASE ORAL at 16:07

## 2023-01-08 RX ADMIN — BUDESONIDE 0.5 MG: 0.5 INHALANT ORAL at 19:54

## 2023-01-08 RX ADMIN — GABAPENTIN 800 MG: 400 CAPSULE ORAL at 16:07

## 2023-01-08 NOTE — ASSESSMENT & PLAN NOTE
Lab Results   Component Value Date    HGBA1C 6 8 (H) 10/16/2022       No results for input(s): POCGLU in the last 72 hours      Blood Sugar Average: Last 72 hrs:     · Carbohydrate controlled diet  · Sliding scale insulin

## 2023-01-08 NOTE — ASSESSMENT & PLAN NOTE
· Per patient, the caregiver just quit and she needs to find another one  · Patient is not answering calls from her doctor and case management due to hearing issue    ·  consulted

## 2023-01-08 NOTE — ASSESSMENT & PLAN NOTE
· Multiple hospitalization for COPD exacerbations, recently discharged on 12/30    · Patient had 3 ED visits for COPD exacerbation since then  · Currently not in acute exacerbation  · Continue home inhalers and nebulizer treatment

## 2023-01-08 NOTE — ASSESSMENT & PLAN NOTE
· Presented with potassium of 2 8  · EKG seems to be similar with previous EKG showing NSR with PVCs, fascicular block, with prolonged QTC  · Received p o  KCl 40+ IV KCl 20  · Follow BMP

## 2023-01-08 NOTE — RESPIRATORY THERAPY NOTE
RT had a conversation with the Pt at the recommendation of the PA,  Pt stated she hasn't been wearing her CPAP machine at night due to her forgetting how to use it  Pt also stated that when she is a sleep and wears 3lpm /nc at home currently and Pt waking up in the middle of the night feeling like she couldn't breath  Pt also have not been taking her LABA medication qd, Pt stated her aid quit who use to help her out, and her daughter and granddaughter is rarely home, due to them having work , and  Pt is home by herself most of the time  Pt stated she doesn't like being by herself, it makes her feel sad and alone  RT asked how come Pt don't answer her phone calls, from her Dr  and care management, Pt stated she can't hear very well on the phone, so either she picks up and then hang up or she doesn't  at all  Rt asked if the Pt is open to go to a nursing home or a facility that will help her, Pt stated she needs time to think about it  Pt O2 level is 96 on 1 lpm/ nc at rest and needs 3 lpm/nc while ambulation

## 2023-01-08 NOTE — PLAN OF CARE
Problem: Potential for Falls  Goal: Patient will remain free of falls  Description: INTERVENTIONS:  - Educate patient/family on patient safety including physical limitations  - Instruct patient to call for assistance with activity   - Consult OT/PT to assist with strengthening/mobility   - Keep Call bell within reach  - Keep bed low and locked with side rails adjusted as appropriate  - Keep care items and personal belongings within reach  - Initiate and maintain comfort rounds  - Make Fall Risk Sign visible to staff  - Apply yellow socks and bracelet for high fall risk patients  - Consider moving patient to room near nurses station  Outcome: Progressing     Problem: PAIN - ADULT  Goal: Verbalizes/displays adequate comfort level or baseline comfort level  Description: Interventions:  - Encourage patient to monitor pain and request assistance  - Assess pain using appropriate pain scale  - Administer analgesics based on type and severity of pain and evaluate response  - Implement non-pharmacological measures as appropriate and evaluate response  - Consider cultural and social influences on pain and pain management  - Notify physician/advanced practitioner if interventions unsuccessful or patient reports new pain  Outcome: Progressing     Problem: INFECTION - ADULT  Goal: Absence or prevention of progression during hospitalization  Description: INTERVENTIONS:  - Assess and monitor for signs and symptoms of infection  - Monitor lab/diagnostic results  - Monitor all insertion sites, i e  indwelling lines, tubes, and drains  - Monitor endotracheal if appropriate and nasal secretions for changes in amount and color  - [place] appropriate cooling/warming therapies per order  - Administer medications as ordered  - Instruct and encourage patient and family to use good hand hygiene technique  - Identify and instruct in appropriate isolation precautions for identified infection/condition  Outcome: Progressing  Goal: Absence of fever/infection during neutropenic period  Description: INTERVENTIONS:  - Monitor WBC    Outcome: Progressing     Problem: SAFETY ADULT  Goal: Patient will remain free of falls  Description: INTERVENTIONS:  - Educate patient/family on patient safety including physical limitations  - Instruct patient to call for assistance with activity   - Consult OT/PT to assist with strengthening/mobility   - Keep Call bell within reach  - Keep bed low and locked with side rails adjusted as appropriate  - Keep care items and personal belongings within reach  - Initiate and maintain comfort rounds  - Make Fall Risk Sign visible to staff    - Apply yellow socks and bracelet for high fall risk patients  - Consider moving patient to room near nurses station  Outcome: Progressing  Goal: Maintain or return to baseline ADL function  Description: INTERVENTIONS:  -  Assess patient's ability to carry out ADLs; assess patient's baseline for ADL function and identify physical deficits which impact ability to perform ADLs (bathing, care of mouth/teeth, toileting, grooming, dressing, etc )  - Assess/evaluate cause of self-care deficits   - Assess range of motion  - Assess patient's mobility; develop plan if impaired  - Assess patient's need for assistive devices and provide as appropriate  - Encourage maximum independence but intervene and supervise when necessary  - Involve family in performance of ADLs  - Assess for home care needs following discharge   - Consider OT consult to assist with ADL evaluation and planning for discharge  - Provide patient education as appropriate  Outcome: Progressing  Goal: Maintains/Returns to pre admission functional level  Description: INTERVENTIONS:  - Perform BMAT or MOVE assessment daily    - Set and communicate daily mobility goal to care team and patient/family/caregiver     - Collaborate with rehabilitation services on mobility goals if consulted  - Out of bed for toileting  - Record patient progress and toleration of activity level   Outcome: Progressing     Problem: DISCHARGE PLANNING  Goal: Discharge to home or other facility with appropriate resources  Description: INTERVENTIONS:  - Identify barriers to discharge w/patient and caregiver  - Arrange for needed discharge resources and transportation as appropriate  - Identify discharge learning needs (meds, wound care, etc )  - Arrange for interpretive services to assist at discharge as needed  - Refer to Case Management Department for coordinating discharge planning if the patient needs post-hospital services based on physician/advanced practitioner order or complex needs related to functional status, cognitive ability, or social support system  Outcome: Progressing     Problem: Knowledge Deficit  Goal: Patient/family/caregiver demonstrates understanding of disease process, treatment plan, medications, and discharge instructions  Description: Complete learning assessment and assess knowledge base    Interventions:  - Provide teaching at level of understanding  - Provide teaching via preferred learning methods  Outcome: Progressing

## 2023-01-08 NOTE — H&P
[facility]  H&P- Dagoberto Ger 1950, 81 y o  female MRN: [id number]  Unit/Bed#: ED 09 Encounter: [id number]  Primary Care Provider: Thomas Machado DO   Date and time admitted to hospital: 1/8/2023 12:56 PM    * Hypokalemia  Assessment & Plan  · Presented with potassium of 2 8  · EKG seems to be similar with previous EKG showing NSR with PVCs, fascicular block, with prolonged QTC  · Received p o  KCl 40+ IV KCl 20  · Follow BMP    Poor social situation  Assessment & Plan  · Per patient, the caregiver just quit and she needs to find another one  · Patient is not answering calls from her doctor and case management due to hearing issue  ·  consulted    Paroxysmal atrial fibrillation (HCC)  Assessment & Plan  · Heart rate is well controlled  · Continue diltiazem and Eliquis      Tobacco use  Assessment & Plan  · Nicotine patch replacement  · Encourage smoking cessation    Chronic respiratory failure with hypoxia [facility])  Assessment & Plan  · Multiple hospitalization for COPD exacerbations, recently discharged on 12/30  · Patient had 3 ED visits for COPD exacerbation since then  · Currently not in acute exacerbation  · Continue home inhalers and nebulizer treatment      HIV (human immunodeficiency virus infection)   Assessment & Plan  · Continue Biktarvy    Type 2 diabetes mellitus without complication, without long-term current use of insulin ([facility] )  Assessment & Plan  Lab Results   Component Value Date    HGBA1C 6 8 (H) 10/16/2022       No results for input(s): POCGLU in the last 72 hours      Blood Sugar Average: Last 72 hrs:     · Carbohydrate controlled diet  · Sliding scale insulin      VTE Prophylaxis: Apixaban (Eliquis)  / sequential compression device   Code Status: Full code  POLST: There is no POLST form on file for this patient (pre-hospital)  Discussion with family: Patient update the family    Anticipated Length of Stay:  Patient will be admitted on an Observation basis with an anticipated length of stay of less than 2 midnights  Justification for Hospital Stay: Hypokalemia, poor social situation    Total Time for Visit, including Counseling / Coordination of Care: 45 minutes  Greater than 50% of this total time spent on direct patient counseling and coordination of care  Chief Complaint:   Shortness of breath    History of Present Illness:    Nabila Zuñiga is a 81 y o  female with PMH of COPD chronically on 3 L oxygen via NC who presents with shortness of breath  Patient has multiple hospitalization for COPD exacerbation  She continues to be smoking  She was recently discharged from [facility] for COPD exacerbation on 12/30  Patient has not been wearing her CPAP machine at nighttime because she forgets how to use it  She said her caregiver just quit and that she needs to find a new one  Patient is not answering calls from her doctor and case management due to hearing issues  In ED, potassium is 2 8  QTC is longer than previous EKG  Review of Systems:    Review of Systems Patient was seen and examined at bedside  The patient has shortness of breath but denies any fever, chills, cough chest pain, palpitation, N/V, abd pain  Past Medical and Surgical History:     Past Medical History:   Diagnosis Date   • Asthma    • Bipolar 1 disorder ([facility] )    • Cardiac disease    • COPD (chronic obstructive pulmonary disease) ([facility] )    • HIV (human immunodeficiency virus infection) ([facility] ) 03/24/2016   • Hypertension    • Osteoarthritis        Past Surgical History:   Procedure Laterality Date   • HERNIA REPAIR     • LUMBAR FUSION N/A 4/10/2018    Procedure: T9/10 discectomy with T9-T11 fusion;  Surgeon: Susu Jolly MD;  Location: [facility];  Service: Neurosurgery   • THYROIDECTOMY         Meds/Allergies:    Prior to Admission medications    Medication Sig Start Date End Date Taking?  Authorizing Provider   acetaminophen (TYLENOL) 325 mg tablet Take 3 tablets (975 mg total) by mouth every 8 (eight) hours as needed for mild pain 9/18/22   Kamryn Schaefer MD   albuterol (2 5 mg/3 mL) 0 083 % nebulizer solution Take 3 mL (2 5 mg total) by nebulization every 6 (six) hours as needed for wheezing or shortness of breath 1/4/23   Jay Child, DO   apixaban (ELIQUIS) 5 mg Take 1 tablet by mouth 2 (two) times a day 5/9/22   Historical Provider, MD   atorvastatin (LIPITOR) 20 mg tablet Take 20 mg by mouth daily    Historical Provider, MD   bictegravir-emtricitab-tenofovir alafenamide (Biktarvy) -25 MG tablet Take 1 tablet by mouth daily 5/9/22   Historical Provider, MD   budesonide (PULMICORT) 0 5 mg/2 mL nebulizer solution Take 2 mL (0 5 mg total) by nebulization 2 (two) times a day Rinse mouth after use  1/4/23   Jay Child, DO   budesonide (Pulmicort) 0 5 mg/2 mL nebulizer solution Take 2 mL (0 5 mg total) by nebulization 2 (two) times a day Rinse mouth after use  1/4/23 2/3/23  Jay Child, DO   carboxymethylcellulose 0 5 % SOLN Administer 1 drop into the left eye 3 (three) times a day as needed for dry eyes 10/29/22   Quita Luz, DO   cholecalciferol (VITAMIN D3) 1,000 units tablet Take 1,000 Units by mouth daily    Historical Provider, MD   diltiazem (CARDIZEM CD) 120 mg 24 hr capsule Take 1 capsule (120 mg total) by mouth daily 9/29/22   Sushma Sahu MD   fluticasone-umeclidinium-vilanterol (Trelegy Ellipta) 200-62 5-25 mcg/actuation AEPB inhaler Inhale 1 puff daily Rinse mouth after use  1/3/23 2/2/23  Feng Skinner MD   fluticasone-umeclidinium-vilanterol (Trelegy Ellipta) 200-62 5-25 mcg/actuation AEPB inhaler Inhale 1 puff daily Rinse mouth after use   1/4/23   Fayetta Child, DO   furosemide (LASIX) 40 mg tablet Take 1 tablet (40 mg total) by mouth 2 (two) times a day 9/29/22   Sushma Sahu MD   gabapentin (NEURONTIN) 400 mg capsule Take 800 mg by mouth 3 (three) times a day      Historical Provider, MD   levothyroxine 125 mcg tablet Take 125 mcg by mouth daily    Historical Provider, MD   losartan (COZAAR) 100 MG tablet Take 100 mg by mouth daily 11/2/22   Historical Provider, MD   lurasidone (LATUDA) 40 mg tablet Take 40 mg by mouth 9/30/22 9/30/23  Historical Provider, MD   metFORMIN (GLUCOPHAGE) 500 mg tablet Take 500 mg by mouth daily 11/2/22   Historical Provider, MD   montelukast (SINGULAIR) 10 mg tablet Take 10 mg by mouth daily at bedtime    Historical Provider, MD   nicotine (NICODERM CQ) 21 mg/24 hr TD 24 hr patch Place 1 patch on the skin daily 9/19/22   Sixto Gann MD   potassium chloride (K-DUR,KLOR-CON) 20 mEq tablet Take 1 tablet (20 mEq total) by mouth daily 9/19/22   Sixto Gann MD   predniSONE 10 mg tablet 6 tabs daily for 2 days, then 5 tabs for 2 days then 4 tabs for 2 days then 3 tabs for 2 days then 2 tab for 2 days then 1 tab for 2 days then stop 12/30/22   Rissa Carrillo,    roflumilast (DALIRESP) 250 MCG tablet Take 2 tablets (500 mcg total) by mouth daily 1/4/23   Fannie Litten, DO   topiramate (TOPAMAX) 50 MG tablet Take 50 mg by mouth 2 (two) times a day 9/30/22 9/30/23  Historical Provider, MD   traMADol Raulito Brod) 50 mg tablet Take 50 mg by mouth as needed 9/19/22   Historical Provider, MD     I have reviewed home medications with patient personally  Allergies:    Allergies   Allergen Reactions   • Lisinopril Angioedema   • Prozac [Fluoxetine Hcl] Rash   • Sulfa Antibiotics Itching   • Quinine        Social History:     Marital Status:    Occupation: Retired  Patient Pre-hospital Living Situation: Lives at home  Patient Pre-hospital Level of Mobility: Independent  Patient Pre-hospital Diet Restrictions: No restrictions  Substance Use History:   Social History     Substance and Sexual Activity   Alcohol Use Not Currently   • Alcohol/week: 2 0 standard drinks   • Types: 1 Cans of beer, 1 Shots of liquor per week    Comment: only on holidays     Social History     Tobacco Use Smoking Status Former   • Packs/day: 0 50   • Years: 45 00   • Pack years: 22 50   • Types: Cigarettes   Smokeless Tobacco Never     Social History     Substance and Sexual Activity   Drug Use Not Currently    Comment: former IV drug user       Family History:    Family History   Problem Relation Age of Onset   • No Known Problems Mother    • Diabetes Father    • Heart disease Neg Hx    • Cancer Neg Hx        Physical Exam:     Vitals:   Blood Pressure: 102/69 (01/08/23 1517)  Pulse: 91 (01/08/23 1517)  Temperature: 99 2 °F (37 3 °C) (01/08/23 1308)  Temp Source: Oral (01/08/23 1308)  Respirations: 18 (01/08/23 1517)  SpO2: 100 % (01/08/23 1517)    Physical Exam    General: breathing well on 3 L oxygen via NC, no acute distress  HEENT: NC/AT, PERRL, EOM - normal  Neck: Supple  Pulm/Chest: Normal chest wall expansion, decreased breath sounds on both side, no wheezing/rhonchi or crackles appreciated  CVS: RRR, normal S1&S2, no murmur appreciated, capillary refill <2s  Abd: soft, non tender, non distended, bowel sounds +  MSK: move all 4 extremities spontaneously  Skin: warm  CNS: no acute focal neuro deficit      Additional Data:     Lab Results: I have personally reviewed pertinent reports  Results from last 7 days   Lab Units 01/08/23  1355   WBC Thousand/uL 7 23   HEMOGLOBIN g/dL 11 1*   HEMATOCRIT % 37 3   PLATELETS Thousands/uL 301   NEUTROS PCT % 60   LYMPHS PCT % 26   MONOS PCT % 11   EOS PCT % 3     Results from last 7 days   Lab Units 01/08/23  1355   SODIUM mmol/L 143   POTASSIUM mmol/L 2 8*   CHLORIDE mmol/L 107   CO2 mmol/L 27   BUN mg/dL 11   CREATININE mg/dL 0 89   ANION GAP mmol/L 9   CALCIUM mg/dL 10 3*   ALBUMIN g/dL 4 0   TOTAL BILIRUBIN mg/dL 0 60   ALK PHOS U/L 58   ALT U/L 11   AST U/L 14   GLUCOSE RANDOM mg/dL 97                       Imaging: I have personally reviewed pertinent reports        XR chest 2 views    (Results Pending)       EKG, Pathology, and Other Studies Reviewed on Admission:   · EKG: NSR, PVCs, left anterior fascicular block,     Allscripts / Epic Records Reviewed: Yes     ** Please Note: This note has been constructed using a voice recognition system   **

## 2023-01-08 NOTE — ED NOTES
This tech went to inventory patient belongings  Pt had cigarettes and lighter and asked this tech to throw cigarettes and lighter away  Security and RN witnessed this tech dispose of items properly        Randi Leung  01/08/23 [id number]

## 2023-01-09 LAB
ANION GAP SERPL CALCULATED.3IONS-SCNC: 9 MMOL/L (ref 5–14)
ATRIAL RATE: 92 BPM
BUN SERPL-MCNC: 10 MG/DL (ref 5–25)
CALCIUM SERPL-MCNC: 10 MG/DL (ref 8.4–10.2)
CARDIAC TROPONIN I PNL SERPL HS: 8 NG/L
CHLORIDE SERPL-SCNC: 109 MMOL/L (ref 96–108)
CO2 SERPL-SCNC: 23 MMOL/L (ref 21–32)
CREAT SERPL-MCNC: 0.88 MG/DL (ref 0.6–1.2)
GFR SERPL CREATININE-BSD FRML MDRD: 66 ML/MIN/1.73SQ M
GLUCOSE P FAST SERPL-MCNC: 107 MG/DL (ref 70–99)
GLUCOSE SERPL-MCNC: 107 MG/DL (ref 70–99)
GLUCOSE SERPL-MCNC: 119 MG/DL (ref 65–140)
GLUCOSE SERPL-MCNC: 154 MG/DL (ref 65–140)
GLUCOSE SERPL-MCNC: 222 MG/DL (ref 65–140)
GLUCOSE SERPL-MCNC: 251 MG/DL (ref 65–140)
P AXIS: 37 DEGREES
POTASSIUM SERPL-SCNC: 3.4 MMOL/L (ref 3.5–5.3)
PR INTERVAL: 180 MS
QRS AXIS: -64 DEGREES
QRSD INTERVAL: 108 MS
QT INTERVAL: 368 MS
QTC INTERVAL: 455 MS
SODIUM SERPL-SCNC: 141 MMOL/L (ref 135–147)
T WAVE AXIS: 9 DEGREES
VENTRICULAR RATE: 92 BPM

## 2023-01-09 RX ORDER — POTASSIUM CHLORIDE 20 MEQ/1
40 TABLET, EXTENDED RELEASE ORAL ONCE
Status: COMPLETED | OUTPATIENT
Start: 2023-01-09 | End: 2023-01-09

## 2023-01-09 RX ORDER — METHYLPREDNISOLONE SODIUM SUCCINATE 40 MG/ML
40 INJECTION, POWDER, LYOPHILIZED, FOR SOLUTION INTRAMUSCULAR; INTRAVENOUS EVERY 8 HOURS SCHEDULED
Status: DISCONTINUED | OUTPATIENT
Start: 2023-01-09 | End: 2023-01-10

## 2023-01-09 RX ADMIN — APIXABAN 5 MG: 5 TABLET, FILM COATED ORAL at 09:35

## 2023-01-09 RX ADMIN — SODIUM CHLORIDE 1000 ML: 0.9 INJECTION, SOLUTION INTRAVENOUS at 09:58

## 2023-01-09 RX ADMIN — Medication 6 MG: at 21:25

## 2023-01-09 RX ADMIN — LEVOTHYROXINE SODIUM 125 MCG: 125 TABLET ORAL at 05:50

## 2023-01-09 RX ADMIN — TOPIRAMATE 50 MG: 25 TABLET, FILM COATED ORAL at 09:34

## 2023-01-09 RX ADMIN — LEVALBUTEROL 1.25 MG: 1.25 SOLUTION, CONCENTRATE RESPIRATORY (INHALATION) at 07:22

## 2023-01-09 RX ADMIN — BICTEGRAVIR SODIUM, EMTRICITABINE, AND TENOFOVIR ALAFENAMIDE FUMARATE 1 TABLET: 50; 200; 25 TABLET ORAL at 09:46

## 2023-01-09 RX ADMIN — METHYLPREDNISOLONE SODIUM SUCCINATE 40 MG: 40 INJECTION, POWDER, FOR SOLUTION INTRAMUSCULAR; INTRAVENOUS at 09:34

## 2023-01-09 RX ADMIN — APIXABAN 5 MG: 5 TABLET, FILM COATED ORAL at 17:31

## 2023-01-09 RX ADMIN — ACETAMINOPHEN 650 MG: 325 TABLET ORAL at 13:42

## 2023-01-09 RX ADMIN — WHITE PETROLATUM 57.7 %-MINERAL OIL 31.9 % EYE OINTMENT: at 21:25

## 2023-01-09 RX ADMIN — FLUTICASONE FUROATE AND VILANTEROL TRIFENATATE 1 PUFF: 200; 25 POWDER RESPIRATORY (INHALATION) at 09:55

## 2023-01-09 RX ADMIN — GABAPENTIN 800 MG: 400 CAPSULE ORAL at 21:25

## 2023-01-09 RX ADMIN — BUDESONIDE 0.5 MG: 0.5 INHALANT ORAL at 07:22

## 2023-01-09 RX ADMIN — LURASIDONE HYDROCHLORIDE 40 MG: 40 TABLET, FILM COATED ORAL at 09:34

## 2023-01-09 RX ADMIN — TOPIRAMATE 50 MG: 25 TABLET, FILM COATED ORAL at 17:31

## 2023-01-09 RX ADMIN — INSULIN LISPRO 1 UNITS: 100 INJECTION, SOLUTION INTRAVENOUS; SUBCUTANEOUS at 11:59

## 2023-01-09 RX ADMIN — METHYLPREDNISOLONE SODIUM SUCCINATE 40 MG: 40 INJECTION, POWDER, FOR SOLUTION INTRAMUSCULAR; INTRAVENOUS at 14:25

## 2023-01-09 RX ADMIN — ROFLUMILAST 500 MCG: 250 TABLET ORAL at 09:46

## 2023-01-09 RX ADMIN — INSULIN LISPRO 2 UNITS: 100 INJECTION, SOLUTION INTRAVENOUS; SUBCUTANEOUS at 17:31

## 2023-01-09 RX ADMIN — ATORVASTATIN CALCIUM 20 MG: 20 TABLET, FILM COATED ORAL at 09:35

## 2023-01-09 RX ADMIN — POTASSIUM CHLORIDE 20 MEQ: 1500 TABLET, EXTENDED RELEASE ORAL at 11:58

## 2023-01-09 RX ADMIN — MONTELUKAST 10 MG: 10 TABLET, FILM COATED ORAL at 21:25

## 2023-01-09 RX ADMIN — GABAPENTIN 800 MG: 400 CAPSULE ORAL at 17:31

## 2023-01-09 RX ADMIN — POTASSIUM CHLORIDE 40 MEQ: 1500 TABLET, EXTENDED RELEASE ORAL at 09:34

## 2023-01-09 RX ADMIN — METHYLPREDNISOLONE SODIUM SUCCINATE 40 MG: 40 INJECTION, POWDER, FOR SOLUTION INTRAMUSCULAR; INTRAVENOUS at 21:25

## 2023-01-09 RX ADMIN — GABAPENTIN 800 MG: 400 CAPSULE ORAL at 09:45

## 2023-01-09 RX ADMIN — CHOLECALCIFEROL TAB 25 MCG (1000 UNIT) 1000 UNITS: 25 TAB at 09:35

## 2023-01-09 NOTE — UTILIZATION REVIEW
Initial Clinical Review    Observation 1/8 @ 1512 and changed to Inpatient on 1/9 @ 1437  Pt requiring continued stay for Chronic respiratory failure with hypoxia and Hypokalemia and Treat    Admission: Date/Time/Statement:   Admission Orders (From admission, onward)     Ordered        01/09/23 1437  Inpatient Admission  Once            01/08/23 1512  Place in Observation  Once                      Orders Placed This Encounter   Procedures   • Inpatient Admission     Standing Status:   Standing     Number of Occurrences:   1     Order Specific Question:   Level of Care     Answer:   Med Surg [16]     Order Specific Question:   Estimated length of stay     Answer:   More than 2 Midnights     Order Specific Question:   Certification     Answer:   I certify that inpatient services are medically necessary for this patient for a duration of greater than two midnights  See H&P and MD Progress Notes for additional information about the patient's course of treatment  ED Arrival Information     Expected   -    Arrival   1/8/2023 12:56    Acuity   Urgent            Means of arrival   Ambulance    Escorted by   [place] ([address] [facility])    Service   Hospitalist    Admission type   Emergency            Arrival complaint   sob             Chief Complaint   Patient presents with   • Asthma     Pt brought by EMS from home for asthma exacerbation since yesterday  Pt denies fever, chills, and cough  Pt received albuterol en route given by EMS  Pt uses 3LPM O2 at home via n/c  Initial Presentation: 81 y o  female to ED presents for shortness of breath  Multiple hospitalization for COPD exacerbation and continues smoking  Recently discharge from [facility] for COPD exacerbation on 12/30  Pt has not been wearing her CPAP machine at nighttime because she forgets how to use it  She said her caregiver just quit and that she needs to find a new one   PMH for COPD chronically on 3 L oxygen via NC, Paroxysmal atrial fib, Tobacco use, HIV and T2DM  In ED, noted with K of 2 8, Qtc is longer than previous EKG  Admit Observation level of care for Chronic respiratory failure with hypoxia and Hypokalemia  Poor social situation  K 2 8  Given KCL 40+ Iv KCL 20 meq  F/u BMP  EKG seems to be similar with previous EKG showing NSR with PVCs, fascicular block, with prolonged QTC     1/9 Changed to inpatient status  Progress notes; K improved to 3 4  Continues on Iv Steriod  Date: 1/10    Day 3: Has surpassed a 2nd midnight with active treatments and services for chronic respiratory failure with hypoxia on Iv Steriod   O2 3L NC        ED Triage Vitals   Temperature Pulse Respirations Blood Pressure SpO2   01/08/23 1308 01/08/23 1308 01/08/23 1308 01/08/23 1308 01/08/23 1308   99 2 °F (37 3 °C) 96 22 119/69 96 %      Temp Source Heart Rate Source Patient Position - Orthostatic VS BP Location FiO2 (%)   01/08/23 1308 01/08/23 1308 01/08/23 1308 01/08/23 1308 --   Oral Monitor Sitting Right arm       Pain Score       01/08/23 1704       No Pain          Wt Readings from Last 1 Encounters:   01/08/23 95 3 kg (210 lb 1 6 oz)     Additional Vital Signs:   01/10/23 0731 97 4 °F (36 3 °C)   Abnormal  58 17 102/78 -- 98 % 32 3 L/min Nasal cannula -- Lying   01/10/23 0451 -- -- -- -- -- -- -- -- -- Face mask --     01/09/23 0738 96 7 °F (35 9 °C)   Abnormal  92 20 103/80 -- 100 % 32 3 L/min Nasal cannula -- Lying   01/09/23 0722 -- -- -- -- -- 97 % -- -- -- -- --   01/08/23 2306 -- -- -- -- -- 98 % -- -- -- Face mask --   01/08/23 2233 96 5 °F (35 8 °C)   Abnormal  85 18 100/71 76 98 % 32 3 L/min Nasal cannula -- Lying   01/08/23 1956 -- -- -- -- -- 98 % -- -- -- -- --   01/08/23 1712 97 8 °F (36 6 °C) 74 18 144/98 89 98 % 32 3 L/min Nasal cannula -- Lying   01/08/23 1606 -- 88 18 119/68 -- 100 % 32 3 L/min Nasal cannula -- --   01/08/23 1517 -- 91 18 102/69 -- 100 % -- -- -- --      Pertinent Labs/Diagnostic Test Results:   XR chest 2 views Final Result by Rancho Boss MD (01/08 2159)      Mild bibasilar atelectasis  Pneumonia not excluded in the appropriate clinical setting  Question trace effusions                    Workstation performed: [id number]           Results from last 7 days   Lab Units 01/08/23  1355   SARS-COV-2  Negative     Lab Units 01/08/23  1355   WBC Thousand/uL 7 23   HEMOGLOBIN g/dL 11 1*   HEMATOCRIT % 37 3   PLATELETS Thousands/uL 301   NEUTROS ABS Thousands/µL 4 34         Lab Units 01/09/23  0614 01/08/23  1355   SODIUM mmol/L 141 143   POTASSIUM mmol/L 3 4* 2 8*   CHLORIDE mmol/L 109* 107   CO2 mmol/L 23 27   ANION GAP mmol/L 9 9   BUN mg/dL 10 11   CREATININE mg/dL 0 88 0 89   EGFR ml/min/1 73sq m 66 65   CALCIUM mg/dL 10 0 10 3*   MAGNESIUM mg/dL  --  2 2     Results from last 7 days   Lab Units 01/08/23  1355   AST U/L 14   ALT U/L 11   ALK PHOS U/L 58   TOTAL PROTEIN g/dL 7 0   ALBUMIN g/dL 4 0   TOTAL BILIRUBIN mg/dL 0 60     Results from last 7 days   Lab Units 01/10/23  0544 01/09/23  2030 01/09/23  1612 01/09/23  1120 01/09/23  0530 01/08/23  1605   POC GLUCOSE mg/dl 219* 251* 222* 154* 119 93     Lab Units 01/09/23  0614 01/08/23  1355   GLUCOSE RANDOM mg/dL 107* 97         Lab Units 01/08/23  1355   HS TNI 0HR ng/L 8   HS TNI 2HR ng/L  --    HSTNI D2 ng/L  --              Lab Units 01/08/23  1355   NT-PRO BNP pg/mL 90 2         Results from last 7 days   Lab Units 01/08/23  1355   INFLUENZA A PCR  Negative   INFLUENZA B PCR  Negative   RSV PCR  Negative       ED Treatment:   Medication Administration from 01/08/2023 1256 to 01/08/2023 1642       Date/Time Order Dose Route Action     01/08/2023 1450 EST potassium chloride (K-DUR,KLOR-CON) CR tablet 40 mEq 40 mEq Oral Given     01/08/2023 1455 EST potassium chloride 20 mEq IVPB (premix) -- Intravenous Restarted     01/08/2023 1607 EST atorvastatin (LIPITOR) tablet 20 mg 20 mg Oral Given     01/08/2023 1607 EST bictegravir-emtricitab-tenofovir alafenamide (BIKTARVY) -25 MG tablet 1 tablet 1 tablet Oral Given     01/08/2023 1607 EST cholecalciferol (VITAMIN D3) tablet 1,000 Units 1,000 Units Oral Given     01/08/2023 1607 EST diltiazem (CARDIZEM CD) 24 hr capsule 120 mg 120 mg Oral Given     01/08/2023 1607 EST gabapentin (NEURONTIN) capsule 800 mg 800 mg Oral Given     01/08/2023 1607 EST losartan (COZAAR) tablet 100 mg 100 mg Oral Given        Past Medical History:   Diagnosis Date   • Asthma    • Bipolar 1 disorder ([place] [address] )    • Cardiac disease    • COPD (chronic obstructive pulmonary disease) ([place] [address] )    • HIV (human immunodeficiency virus infection) ([place] [address] ) 03/24/2016   • Hypertension    • Osteoarthritis      Present on Admission:  • Type 2 diabetes mellitus without complication, without long-term current use of insulin ([facility])  • Tobacco use  • Paroxysmal atrial fibrillation ([facility])  • HIV (human immunodeficiency virus infection)   • Chronic respiratory failure with hypoxia ([facility])  • Hypokalemia      Admitting Diagnosis: COPD (chronic obstructive pulmonary disease) ([place] [address] ) [J44 9]  Hypokalemia [E87 6]  Asthma [J45 909]  Age/Sex: 81 y o  female     Admission Orders:  Scheduled Medications:  apixaban, 5 mg, Oral, BID  artificial tear, , Both Eyes, HS  atorvastatin, 20 mg, Oral, Daily  bictegravir-emtricitab-tenofovir alafenamide, 1 tablet, Oral, Daily  cholecalciferol, 1,000 Units, Oral, Daily  diltiazem, 120 mg, Oral, Daily  fluticasone-vilanterol, 1 puff, Inhalation, Daily  furosemide, 40 mg, Oral, BID  gabapentin, 800 mg, Oral, TID  insulin lispro, 1-6 Units, Subcutaneous, TID AC  ipratropium-albuterol, 3 mL, Nebulization, Once  levothyroxine, 125 mcg, Oral, Early Morning  losartan, 100 mg, Oral, Daily  lurasidone, 40 mg, Oral, Daily With Breakfast  melatonin, 6 mg, Oral, HS  methylPREDNISolone sodium succinate, 40 mg, Intravenous, Q8H CARLOS  montelukast, 10 mg, Oral, HS  nicotine, 1 patch, Transdermal, Daily  potassium chloride, 20 mEq, Oral, Daily  potassium chloride, 20 mEq, Intravenous, Once  roflumilast, 500 mcg, Oral, Daily  topiramate, 50 mg, Oral, BID      Continuous IV Infusions: None     PRN Meds:  acetaminophen, 650 mg, Oral, Q6H PRN  albuterol, 2 5 mg, Nebulization, Q6H PRN  aluminum-magnesium hydroxide-simethicone, 30 mL, Oral, Q6H PRN  ondansetron, 4 mg, Intravenous, Q6H PRN  polyethylene glycol, 17 g, Oral, Daily PRN        None    Network Utilization Review Department  ATTENTION: Please call with any questions or concerns to [phone] and carefully listen to the prompts so that you are directed to the right person  All voicemails are confidential   Braxton Early all requests for admission clinical reviews, approved or denied determinations and any other requests to dedicated fax number below belonging to the campus where the patient is receiving treatment   List of dedicated fax numbers for the Facilities:  [address] DENIALS (Administrative/Medical Necessity) [phone]   [address] (Maternity/NICU/Pediatrics) [phone]   [address] [phone]   [facility] [place] [address] [phone]   [address] [place] [address] [facility] Kenny Delar[facility] [address] [phone]   [address] [place] Luis Solo [facility] 134 815 [facility] [phone]

## 2023-01-09 NOTE — ASSESSMENT & PLAN NOTE
Lab Results   Component Value Date    HGBA1C 6 8 (H) 10/16/2022       Recent Labs     01/08/23  1605 01/09/23  0530   POCGLU 93 119       Blood Sugar Average: Last 72 hrs:  (P) 106   · Carbohydrate controlled diet  · Sliding scale insulin

## 2023-01-09 NOTE — PLAN OF CARE
Problem: PHYSICAL THERAPY ADULT  Goal: Performs mobility at highest level of function for planned discharge setting  See evaluation for individualized goals  Description: Treatment/Interventions: ADL retraining, LE strengthening/ROM, Functional transfer training, Elevations, Therapeutic exercise, Endurance training, Patient/family training, Equipment eval/education, Bed mobility, Gait training, Spoke to nursing, Spoke to case management, OT          See flowsheet documentation for full assessment, interventions and recommendations  Outcome: Progressing  Note: Prognosis: Good  Problem List: Decreased endurance, Impaired balance, Decreased strength, Decreased mobility, Decreased coordination, Impaired sensation, Obesity     Barriers to Discharge: Decreased caregiver support, Inaccessible home environment     PT Discharge Recommendation: Home with home health rehabilitation    See flowsheet documentation for full assessment

## 2023-01-09 NOTE — PLAN OF CARE
Problem: Potential for Falls  Goal: Patient will remain free of falls  Description: INTERVENTIONS:  - Educate patient/family on patient safety including physical limitations  - Instruct patient to call for assistance with activity   - Consult OT/PT to assist with strengthening/mobility   - Keep Call bell within reach  - Keep bed low and locked with side rails adjusted as appropriate  - Keep care items and personal belongings within reach  - Initiate and maintain comfort rounds  - Make Fall Risk Sign visible to staff    - Consider moving patient to room near nurses station  Outcome: Progressing     Problem: PAIN - ADULT  Goal: Verbalizes/displays adequate comfort level or baseline comfort level  Description: Interventions:  - Encourage patient to monitor pain and request assistance  - Assess pain using appropriate pain scale  - Administer analgesics based on type and severity of pain and evaluate response  - Implement non-pharmacological measures as appropriate and evaluate response  - Consider cultural and social influences on pain and pain management  - Notify physician/advanced practitioner if interventions unsuccessful or patient reports new pain  Outcome: Progressing     Problem: INFECTION - ADULT  Goal: Absence or prevention of progression during hospitalization  Description: INTERVENTIONS:  - Assess and monitor for signs and symptoms of infection  - Monitor lab/diagnostic results  - Monitor all insertion sites, i e  indwelling lines, tubes, and drains  - Monitor endotracheal if appropriate and nasal secretions for changes in amount and color  - [place] appropriate cooling/warming therapies per order  - Administer medications as ordered  - Instruct and encourage patient and family to use good hand hygiene technique  - Identify and instruct in appropriate isolation precautions for identified infection/condition  Outcome: Progressing  Goal: Absence of fever/infection during neutropenic period  Description: INTERVENTIONS:  - Monitor WBC    Outcome: Progressing     Problem: SAFETY ADULT  Goal: Patient will remain free of falls  Description: INTERVENTIONS:  - Educate patient/family on patient safety including physical limitations  - Instruct patient to call for assistance with activity   - Consult OT/PT to assist with strengthening/mobility   - Keep Call bell within reach  - Keep bed low and locked with side rails adjusted as appropriate  - Keep care items and personal belongings within reach  - Initiate and maintain comfort rounds    - Apply yellow socks and bracelet for high fall risk patients  - Consider moving patient to room near nurses station  Outcome: Progressing  Goal: Maintain or return to baseline ADL function  Description: INTERVENTIONS:  -  Assess patient's ability to carry out ADLs; assess patient's baseline for ADL function and identify physical deficits which impact ability to perform ADLs (bathing, care of mouth/teeth, toileting, grooming, dressing, etc )  - Assess/evaluate cause of self-care deficits   - Assess range of motion  - Assess patient's mobility; develop plan if impaired  - Assess patient's need for assistive devices and provide as appropriate  - Encourage maximum independence but intervene and supervise when necessary  - Involve family in performance of ADLs  - Assess for home care needs following discharge   - Consider OT consult to assist with ADL evaluation and planning for discharge  - Provide patient education as appropriate  Outcome: Progressing  Goal: Maintains/Returns to pre admission functional level  Description: INTERVENTIONS:  - Perform BMAT or MOVE assessment daily    - Set and communicate daily mobility goal to care team and patient/family/caregiver     - Collaborate with rehabilitation services on mobility goals if consulted    -  - Out of bed for toileting  - Record patient progress and toleration of activity level   Outcome: Progressing     Problem: DISCHARGE PLANNING  Goal: Discharge to home or other facility with appropriate resources  Description: INTERVENTIONS:  - Identify barriers to discharge w/patient and caregiver  - Arrange for needed discharge resources and transportation as appropriate  - Identify discharge learning needs (meds, wound care, etc )  - Arrange for interpretive services to assist at discharge as needed  - Refer to Case Management Department for coordinating discharge planning if the patient needs post-hospital services based on physician/advanced practitioner order or complex needs related to functional status, cognitive ability, or social support system  Outcome: Progressing     Problem: Knowledge Deficit  Goal: Patient/family/caregiver demonstrates understanding of disease process, treatment plan, medications, and discharge instructions  Description: Complete learning assessment and assess knowledge base    Interventions:  - Provide teaching at level of understanding  - Provide teaching via preferred learning methods  Outcome: Progressing     Problem: RESPIRATORY - ADULT  Goal: Achieves optimal ventilation and oxygenation  Description: INTERVENTIONS:  - Assess for changes in respiratory status  - Assess for changes in mentation and behavior  - Position to facilitate oxygenation and minimize respiratory effort  - Oxygen administered by appropriate delivery if ordered  - Initiate smoking cessation education as indicated  - Encourage broncho-pulmonary hygiene including cough, deep breathe, Incentive Spirometry  - Assess the need for suctioning and aspirate as needed  - Assess and instruct to report SOB or any respiratory difficulty  - Respiratory Therapy support as indicated  Outcome: Progressing     Problem: METABOLIC, FLUID AND ELECTROLYTES - ADULT  Goal: Glucose maintained within target range  Description: INTERVENTIONS:  - Monitor Blood Glucose as ordered  - Assess for signs and symptoms of hyperglycemia and hypoglycemia  - Administer ordered medications to maintain glucose within target range  - Assess nutritional intake and initiate nutrition service referral as needed  Outcome: Progressing

## 2023-01-09 NOTE — ED PROVIDER NOTES
History  Chief Complaint   Patient presents with   • Asthma     Pt brought by EMS from home for asthma exacerbation since yesterday  Pt denies fever, chills, and cough  Pt received albuterol en route given by EMS  Pt uses 3LPM O2 at home via n/c       51-year-old female with past medical history of COPD on 3L oxygen at home, obstructive sleep apnea, HIV, hypertension, cardiac disease, osteoarthritis, psychiatric disorder presents to emergency department via EMS for shortness of breath  She states it has been for the past 2 days  It is worse at night, she states that she has been waking up around 3 or 4 AM feeling like she cannot breathe  She states that she has a CPAP but does not know how to use it so she does not use it  She is unsure if she is taking the prescribed prednisone, she states that she just takes the medicines in her pill pack that she gets from the pharmacy  She denies the use of daily inhaler, just states she uses her albuterol inhaler whenever she feels like it  She last used it 2 days ago  She denies any new symptoms, or other symptoms than shortness of breath  Of note she was recently admitted at [facility], discharged on 12/30/2022  Since her release she has been to the emergency department 3 times, today's visit is her fourth  History provided by:  Patient and medical records  Shortness of Breath  Chronicity:  Chronic  Associated symptoms: cough and PND    Associated symptoms: no abdominal pain, no chest pain, no claudication, no diaphoresis, no ear pain, no fever, no headaches, no hemoptysis, no neck pain, no rash, no sore throat, no sputum production, no syncope, no vomiting and no wheezing    Cough:     Cough characteristics:  Dry    Chronicity:  Chronic  Risk factors: no hx of PE/DVT        Prior to Admission Medications   Prescriptions Last Dose Informant Patient Reported?  Taking?   acetaminophen (TYLENOL) 325 mg tablet   No No   Sig: Take 3 tablets (975 mg total) by mouth every 8 (eight) hours as needed for mild pain   albuterol (2 5 mg/3 mL) 0 083 % nebulizer solution   No No   Sig: Take 3 mL (2 5 mg total) by nebulization every 6 (six) hours as needed for wheezing or shortness of breath   apixaban (ELIQUIS) 5 mg   Yes No   Sig: Take 1 tablet by mouth 2 (two) times a day   atorvastatin (LIPITOR) 20 mg tablet   Yes No   Sig: Take 20 mg by mouth daily   bictegravir-emtricitab-tenofovir alafenamide (Biktarvy) -25 MG tablet   Yes No   Sig: Take 1 tablet by mouth daily   budesonide (PULMICORT) 0 5 mg/2 mL nebulizer solution   No No   Sig: Take 2 mL (0 5 mg total) by nebulization 2 (two) times a day Rinse mouth after use  budesonide (Pulmicort) 0 5 mg/2 mL nebulizer solution   No No   Sig: Take 2 mL (0 5 mg total) by nebulization 2 (two) times a day Rinse mouth after use    carboxymethylcellulose 0 5 % SOLN   No No   Sig: Administer 1 drop into the left eye 3 (three) times a day as needed for dry eyes   cholecalciferol (VITAMIN D3) 1,000 units tablet   Yes No   Sig: Take 1,000 Units by mouth daily   diltiazem (CARDIZEM CD) 120 mg 24 hr capsule   No No   Sig: Take 1 capsule (120 mg total) by mouth daily   fluticasone-umeclidinium-vilanterol (Trelegy Ellipta) 200-62 5-25 mcg/actuation AEPB inhaler   No No   Sig: Inhale 1 puff daily Rinse mouth after use  fluticasone-umeclidinium-vilanterol (Trelegy Ellipta) 200-62 5-25 mcg/actuation AEPB inhaler   No No   Sig: Inhale 1 puff daily Rinse mouth after use     furosemide (LASIX) 40 mg tablet   No No   Sig: Take 1 tablet (40 mg total) by mouth 2 (two) times a day   gabapentin (NEURONTIN) 400 mg capsule   Yes No   Sig: Take 800 mg by mouth 3 (three) times a day     levothyroxine 125 mcg tablet   Yes No   Sig: Take 125 mcg by mouth daily   losartan (COZAAR) 100 MG tablet   Yes No   Sig: Take 100 mg by mouth daily   lurasidone (LATUDA) 40 mg tablet   Yes No   Sig: Take 40 mg by mouth   metFORMIN (GLUCOPHAGE) 500 mg tablet   Yes No Sig: Take 500 mg by mouth daily   montelukast (SINGULAIR) 10 mg tablet   Yes No   Sig: Take 10 mg by mouth daily at bedtime   nicotine (NICODERM CQ) 21 mg/24 hr TD 24 hr patch   No No   Sig: Place 1 patch on the skin daily   potassium chloride (K-DUR,KLOR-CON) 20 mEq tablet   No No   Sig: Take 1 tablet (20 mEq total) by mouth daily   predniSONE 10 mg tablet   No No   Si tabs daily for 2 days, then 5 tabs for 2 days then 4 tabs for 2 days then 3 tabs for 2 days then 2 tab for 2 days then 1 tab for 2 days then stop   roflumilast (DALIRESP) 250 MCG tablet   No No   Sig: Take 2 tablets (500 mcg total) by mouth daily   topiramate (TOPAMAX) 50 MG tablet   Yes No   Sig: Take 50 mg by mouth 2 (two) times a day   traMADol (ULTRAM) 50 mg tablet   Yes No   Sig: Take 50 mg by mouth as needed      Facility-Administered Medications: None       Past Medical History:   Diagnosis Date   • Asthma    • Bipolar 1 disorder (HCC)    • Cardiac disease    • COPD (chronic obstructive pulmonary disease) (HCC)    • HIV (human immunodeficiency virus infection) ([facility] [address] ) 2016   • Hypertension    • Osteoarthritis        Past Surgical History:   Procedure Laterality Date   • HERNIA REPAIR     • LUMBAR FUSION N/A 4/10/2018    Procedure: T9/10 discectomy with T9-T11 fusion;  Surgeon: Nata Swanson MD;  Location: [facility];  Service: Neurosurgery   • THYROIDECTOMY         Family History   Problem Relation Age of Onset   • No Known Problems Mother    • Diabetes Father    • Heart disease Neg Hx    • Cancer Neg Hx      I have reviewed and agree with the history as documented      E-Cigarette/Vaping   • E-Cigarette Use Never User      E-Cigarette/Vaping Substances     Social History     Tobacco Use   • Smoking status: Former     Packs/day: 0 50     Years: 45 00     Pack years: 22 50     Types: Cigarettes   • Smokeless tobacco: Never   Vaping Use   • Vaping Use: Never used   Substance Use Topics   • Alcohol use: Not Currently     Alcohol/week: 2 0 standard drinks     Types: 1 Cans of beer, 1 Shots of liquor per week     Comment: only on holidays   • Drug use: Not Currently     Comment: former IV drug user       Review of Systems   Constitutional: Negative for chills, diaphoresis and fever  HENT: Negative for congestion, ear pain, rhinorrhea and sore throat  Eyes: Negative for visual disturbance  Respiratory: Positive for cough, chest tightness and shortness of breath  Negative for hemoptysis, sputum production and wheezing  Cardiovascular: Positive for PND  Negative for chest pain, palpitations, claudication, leg swelling and syncope  Gastrointestinal: Negative for abdominal pain and vomiting  Genitourinary: Negative for decreased urine volume  Musculoskeletal: Negative for back pain and neck pain  Skin: Negative for color change and rash  Neurological: Negative for dizziness, seizures, syncope, weakness and headaches  All other systems reviewed and are negative  Physical Exam  Physical Exam  Vitals and nursing note reviewed  Constitutional:       General: She is not in acute distress  Appearance: She is not ill-appearing, toxic-appearing or diaphoretic  HENT:      Head: Normocephalic and atraumatic  Nose: Nose normal       Mouth/Throat:      Mouth: Mucous membranes are moist       Pharynx: Oropharynx is clear  Eyes:      Conjunctiva/sclera: Conjunctivae normal       Pupils: Pupils are equal, round, and reactive to light  Cardiovascular:      Rate and Rhythm: Normal rate and regular rhythm  Heart sounds: Normal heart sounds  Pulmonary:      Effort: No respiratory distress  Breath sounds: No stridor  Wheezing and rhonchi present  No rales  Comments: Patient in no respiratory distress, speaking in full sentences, managing oral secretions without difficulty, no accessory muscle use, retractions, or belly breathing noted  No focal lung findings B/L       Abdominal:      General: There is no distension  Palpations: Abdomen is soft  Tenderness: There is no abdominal tenderness  Musculoskeletal:      Right lower leg: No edema  Left lower leg: No edema  Skin:     General: Skin is warm and dry  Findings: No erythema or rash  Neurological:      Mental Status: She is alert and oriented to person, place, and time  Psychiatric:         Behavior: Behavior is cooperative           Vital Signs  ED Triage Vitals   Temperature Pulse Respirations Blood Pressure SpO2   01/08/23 1308 01/08/23 1308 01/08/23 1308 01/08/23 1308 01/08/23 1308   99 2 °F (37 3 °C) 96 22 119/69 96 %      Temp Source Heart Rate Source Patient Position - Orthostatic VS BP Location FiO2 (%)   01/08/23 1308 01/08/23 1308 01/08/23 1308 01/08/23 1308 --   Oral Monitor Sitting Right arm       Pain Score       01/08/23 1704       No Pain           Vitals:    01/08/23 1517 01/08/23 1606 01/08/23 1712 01/08/23 2233   BP: 102/69 119/68 144/98 100/71   Pulse: 91 88 74 85   Patient Position - Orthostatic VS:   Lying Lying         Visual Acuity      ED Medications  Medications   ipratropium-albuterol (DUO-NEB) 0 5-2 5 mg/3 mL inhalation solution 3 mL (3 mL Nebulization Not Given 1/8/23 1550)   potassium chloride 20 mEq IVPB (premix) ( Intravenous Restarted 1/8/23 1455)   apixaban (ELIQUIS) tablet 5 mg (5 mg Oral Given 1/8/23 1723)   albuterol inhalation solution 2 5 mg (has no administration in time range)   atorvastatin (LIPITOR) tablet 20 mg (20 mg Oral Given 1/8/23 1607)   bictegravir-emtricitab-tenofovir alafenamide (BIKTARVY) -25 MG tablet 1 tablet (1 tablet Oral Given 1/8/23 1607)   budesonide (PULMICORT) inhalation solution 0 5 mg (0 5 mg Nebulization Given 1/8/23 1954)   artificial tear (LUBRIFRESH P M ) ophthalmic ointment ( Both Eyes Given 1/8/23 2229)   cholecalciferol (VITAMIN D3) tablet 1,000 Units (1,000 Units Oral Given 1/8/23 1607)   diltiazem (CARDIZEM CD) 24 hr capsule 120 mg (120 mg Oral Given 1/8/23 1607)   fluticasone-vilanterol 200-25 mcg/actuation 1 puff (1 puff Inhalation Not Given 1/8/23 1611)   furosemide (LASIX) tablet 40 mg (40 mg Oral Given 1/8/23 1723)   gabapentin (NEURONTIN) capsule 800 mg (800 mg Oral Given 1/8/23 2143)   levothyroxine tablet 125 mcg (has no administration in time range)   losartan (COZAAR) tablet 100 mg (100 mg Oral Given 1/8/23 1607)   lurasidone (LATUDA) tablet 40 mg (has no administration in time range)   montelukast (SINGULAIR) tablet 10 mg (10 mg Oral Given 1/8/23 2143)   nicotine (NICODERM CQ) 21 mg/24 hr TD 24 hr patch 1 patch (1 patch Transdermal Not Given 1/8/23 1607)   roflumilast (DALIRESP) tablet 500 mcg (500 mcg Oral Not Given 1/8/23 1725)   topiramate (TOPAMAX) tablet 50 mg (50 mg Oral Given 1/8/23 1723)   acetaminophen (TYLENOL) tablet 650 mg (has no administration in time range)   polyethylene glycol (MIRALAX) packet 17 g (has no administration in time range)   ondansetron (ZOFRAN) injection 4 mg (has no administration in time range)   aluminum-magnesium hydroxide-simethicone (MYLANTA) oral suspension 30 mL (has no administration in time range)   insulin lispro (HumaLOG) 100 units/mL subcutaneous injection 1-6 Units (1 Units Subcutaneous Not Given 1/8/23 1612)   levalbuterol (XOPENEX) inhalation solution 1 25 mg (1 25 mg Nebulization Given 1/8/23 1954)   potassium chloride (K-DUR,KLOR-CON) CR tablet 20 mEq (has no administration in time range)   melatonin tablet 6 mg (6 mg Oral Given 1/8/23 2143)   albuterol (FOR EMS ONLY) (2 5 mg/3 mL) 0 083 % inhalation solution 2 5 mg (0 mg Does not apply Given to EMS 1/8/23 1308)   potassium chloride (K-DUR,KLOR-CON) CR tablet 40 mEq (40 mEq Oral Given 1/8/23 1450)       Diagnostic Studies  Results Reviewed     Procedure Component Value Units Date/Time    Magnesium [[id number]]  (Normal) Collected: 01/08/23 1355    Lab Status: Final result Specimen: Blood from Arm, Left Updated: 01/08/23 1620     Magnesium 2 2 mg/dL Fingerstick Glucose (POCT) [[id number]]  (Normal) Collected: 01/08/23 1605    Lab Status: Final result Updated: 01/08/23 1606     POC Glucose 93 mg/dl     FLU/RSV/COVID - if FLU/RSV clinically relevant [[id number]]  (Normal) Collected: 01/08/23 1355    Lab Status: Final result Specimen: Nares from Nose Updated: 01/08/23 1459     SARS-CoV-2 Negative     INFLUENZA A PCR Negative     INFLUENZA B PCR Negative     RSV PCR Negative    Narrative:      FOR PEDIATRIC PATIENTS - copy/paste COVID Guidelines URL to browser: [email or web address]  [employer]    SARS-CoV-2 assay is a Nucleic Acid Amplification assay intended for the  qualitative detection of nucleic acid from SARS-CoV-2 in nasopharyngeal  swabs  Results are for the presumptive identification of SARS-CoV-2 RNA  Positive results are indicative of infection with SARS-CoV-2, the virus  causing COVID-19, but do not rule out bacterial infection or co-infection  with other viruses  Laboratories within the [place] and its  territories are required to report all positive results to the appropriate  public health authorities  Negative results do not preclude SARS-CoV-2  infection and should not be used as the sole basis for treatment or other  patient management decisions  Negative results must be combined with  clinical observations, patient history, and epidemiological information  This test has not been FDA cleared or approved  This test has been authorized by FDA under an Emergency Use Authorization  (EUA)  This test is only authorized for the duration of time the  declaration that circumstances exist justifying the authorization of the  emergency use of an in vitro diagnostic tests for detection of SARS-CoV-2  virus and/or diagnosis of COVID-19 infection under section 564(b)(1) of  the Act, 21 U  S C  521JHE-1(D)(0), unless the authorization is terminated  or revoked sooner   The test has been validated but independent review by FDA  and CLIA is pending  Test performed using [employer] GeneXpert: This RT-PCR assay targets N2,  a region unique to SARS-CoV-2  A conserved region in the E-gene was chosen  for pan-Sarbecovirus detection which includes SARS-CoV-2  According to CMS-2020-01-R, this platform meets the definition of high-throughput technology      HS Troponin 0hr (reflex protocol) [[id number]]  (Normal) Collected: 01/08/23 1355    Lab Status: Final result Specimen: Blood from Arm, Left Updated: 01/08/23 1426     hs TnI 0hr 8 ng/L     NT-BNP PRO [[id number]]  (Normal) Collected: 01/08/23 1355    Lab Status: Final result Specimen: Blood from Arm, Left Updated: 01/08/23 1423     NT-proBNP 90 2 pg/mL     Comprehensive metabolic panel [[id number]]  (Abnormal) Collected: 01/08/23 1355    Lab Status: Final result Specimen: Blood from Arm, Left Updated: 01/08/23 1415     Sodium 143 mmol/L      Potassium 2 8 mmol/L      Chloride 107 mmol/L      CO2 27 mmol/L      ANION GAP 9 mmol/L      BUN 11 mg/dL      Creatinine 0 89 mg/dL      Glucose 97 mg/dL      Calcium 10 3 mg/dL      AST 14 U/L      ALT 11 U/L      Alkaline Phosphatase 58 U/L      Total Protein 7 0 g/dL      Albumin 4 0 g/dL      Total Bilirubin 0 60 mg/dL      eGFR 65 ml/min/1 73sq m     Narrative:      [place] guidelines for Chronic Kidney Disease (CKD):   •  Stage 1 with normal or high GFR (GFR > 90 mL/min/1 73 square meters)  •  Stage 2 Mild CKD (GFR = 60-89 mL/min/1 73 square meters)  •  Stage 3A Moderate CKD (GFR = 45-59 mL/min/1 73 square meters)  •  Stage 3B Moderate CKD (GFR = 30-44 mL/min/1 73 square meters)  •  Stage 4 Severe CKD (GFR = 15-29 mL/min/1 73 square meters)  •  Stage 5 End Stage CKD (GFR <15 mL/min/1 73 square meters)  Note: GFR calculation is accurate only with a steady state creatinine    CBC and differential [[id number]]  (Abnormal) Collected: 01/08/23 1355    Lab Status: Final result Specimen: Blood from Arm, Left Updated: 01/08/23 1402     WBC 7 23 Thousand/uL      RBC 4 01 Million/uL      Hemoglobin 11 1 g/dL      Hematocrit 37 3 %      MCV 93 fL      MCH 27 7 pg      MCHC 29 8 g/dL      RDW 14 4 %      MPV 8 7 fL      Platelets 938 Thousands/uL      nRBC 0 /100 WBCs      Neutrophils Relative 60 %      Immat GRANS % 0 %      Lymphocytes Relative 26 %      Monocytes Relative 11 %      Eosinophils Relative 3 %      Basophils Relative 0 %      Neutrophils Absolute 4 34 Thousands/µL      Immature Grans Absolute 0 03 Thousand/uL      Lymphocytes Absolute 1 88 Thousands/µL      Monocytes Absolute 0 77 Thousand/µL      Eosinophils Absolute 0 20 Thousand/µL      Basophils Absolute 0 01 Thousands/µL                  XR chest 2 views   Final Result by Abbie Arambula MD (01/08 2159)      Mild bibasilar atelectasis  Pneumonia not excluded in the appropriate clinical setting  Question trace effusions  Workstation performed: [id number]                    Procedures  Procedures         ED Course  ED Course as of 01/09/23 [email or web address]   Nicole Ambrose Jan 08, 2023   1305 Per chart review, was discharged with a 12 day prednisone taper on 12/30   She has had 3 ED visits since then, received steroids during those visits as well     1321 States that she has a CPAP machine but does not know how to use it so she has not been using it and the past 2 nights she has been waking up around 3 AM feeling like she can't breathe     1347 RT in room for patient education concerning CPAP   1354 Procedure Note: EKG  Date/Time: 01/08/23 1:54 PM   Performed by: Ney Manrique by: Janice Gamble  ECG interpreted by me, the ED Provider: yes   The EKG demonstrates:  Rate 99 bpm  Rhythm sinus rhythm with frequent PVCs   QTc 482 ms   No ST elevations/depressions  LAFB   Nonepecifc st t wave changes   Qtc now prolonged, PVCs more frequent compared to previous    1407 Hemoglobin(!): 11 1  Improved from 10 3 g/dL 4 days ago    1422 Potassium(!): 2 8  Likely 2/2 albuterol use  Slightly prolonged Qtc and PVCs noted  1434 hs TnI 0hr: 8  2 days of symptoms    1437 Will repeat EKG after K administration                                              Medical Decision Making  Vital signs are stable  No hypoxia noted on home oxygen  No acute respiratory distress  No focal lung findings  Rhonchi, bilateral expiratory wheezes noted  DuoNeb ordered  Lung sounds improved bilaterally  Do not feel she needs additional steroids at this time given she should still be on taper  CBC, BMP, mag  Troponin, BNP, COVID/flu/RSV ordered  Chronic anemia noted  Hypokalemia noted  Remainder of labs grossly normal   EKG changes consistent with hypokalemia noted, increased PVCs, increased QTC   P o  and IV replacement ordered  Patient is on cardiac monitor currently  Chest x-ray without focal consolidation, effusion on wet read  Patient is afebrile  Low clinical suspicion for pneumonia at this time  Do not believe patient is currently experiencing a COPD exacerbation, no increased lung requirements, no increased cough, no sputum production  will admit for hypokalemia patient to be admitted with telemetry  Patient with poor health literacy, respiratory therapy educated patient on CPAP  Case management consult recommended  All imaging and/or lab testing discussed with patient  Patient and/or family members verbalizes understanding and agrees with plan for admission  Patient is stable for admission      Portions of the record may have been created with voice recognition software  Occasional wrong word or "sound a like" substitutions may have occurred due to the inherent limitations of voice recognition software  Read the chart carefully and recognize, using context, where substitutions have occurred            COPD (chronic obstructive pulmonary disease) ([facility] [address] ): complicated acute illness or injury  Hypokalemia: self-limited or minor problem  Amount and/or Complexity of Data Reviewed  Labs: ordered  Decision-making details documented in ED Course  Radiology: ordered  Risk  Prescription drug management  Decision regarding hospitalization  Disposition  Final diagnoses:   COPD (chronic obstructive pulmonary disease) ([facility] )   Hypokalemia     Time reflects when diagnosis was documented in both MDM as applicable and the Disposition within this note     Time User Action Codes Description Comment    1/8/2023  2:35 PM Richard Samano Add [J44 9] COPD (chronic obstructive pulmonary disease) ([facility] )     1/8/2023  2:35 PM Richard Samano Add [E87 6] Hypokalemia     1/8/2023  3:11 PM Samantha Simin [J44 9] COPD (chronic obstructive pulmonary disease) ([facility] )     1/8/2023  3:11 PM Richard Samano Modify [E87 6] Hypokalemia       ED Disposition     ED Disposition   Admit    Condition   Stable    Date/Time   Sun Jan 8, 2023  3:11 PM    Comment   Case was discussed with BETTY and the patient's admission status was agreed to be Admission Status: observation status to the service of Dr Milo Sprague              Follow-up Information    None         Current Discharge Medication List      CONTINUE these medications which have NOT CHANGED    Details   acetaminophen (TYLENOL) 325 mg tablet Take 3 tablets (975 mg total) by mouth every 8 (eight) hours as needed for mild pain  Refills: 0    Associated Diagnoses: Acute exacerbation of chronic obstructive pulmonary disease (COPD) (HCC)      albuterol (2 5 mg/3 mL) 0 083 % nebulizer solution Take 3 mL (2 5 mg total) by nebulization every 6 (six) hours as needed for wheezing or shortness of breath  Qty: 75 mL, Refills: 0    Associated Diagnoses: COPD with acute exacerbation (HCC)      apixaban (ELIQUIS) 5 mg Take 1 tablet by mouth 2 (two) times a day      atorvastatin (LIPITOR) 20 mg tablet Take 20 mg by mouth daily      bictegravir-emtricitab-tenofovir alafenamide (Biktarvy) -25 MG tablet Take 1 tablet by mouth daily      !! budesonide (PULMICORT) 0 5 mg/2 mL nebulizer solution Take 2 mL (0 5 mg total) by nebulization 2 (two) times a day Rinse mouth after use  Qty: 120 mL, Refills: 0    Associated Diagnoses: Acute exacerbation of chronic obstructive pulmonary disease (COPD) ([place] [address] )      ! ! budesonide (Pulmicort) 0 5 mg/2 mL nebulizer solution Take 2 mL (0 5 mg total) by nebulization 2 (two) times a day Rinse mouth after use  Qty: 120 mL, Refills: 0    Associated Diagnoses: COPD exacerbation ([facility])      carboxymethylcellulose 0 5 % SOLN Administer 1 drop into the left eye 3 (three) times a day as needed for dry eyes  Qty: 30 each, Refills: 0    Associated Diagnoses: Irritation of left eye      cholecalciferol (VITAMIN D3) 1,000 units tablet Take 1,000 Units by mouth daily      diltiazem (CARDIZEM CD) 120 mg 24 hr capsule Take 1 capsule (120 mg total) by mouth daily  Qty: 30 capsule, Refills: 0    Associated Diagnoses: Paroxysmal atrial fibrillation ([place] [address] )      ! ! fluticasone-umeclidinium-vilanterol (Trelegy Ellipta) 200-62 5-25 mcg/actuation AEPB inhaler Inhale 1 puff daily Rinse mouth after use  Qty: 60 blister, Refills: 0    Associated Diagnoses: COPD exacerbation ([place] [address] )      ! ! fluticasone-umeclidinium-vilanterol (Trelegy Ellipta) 200-62 5-25 mcg/actuation AEPB inhaler Inhale 1 puff daily Rinse mouth after use    Qty: 60 blister, Refills: 0    Associated Diagnoses: COPD with acute exacerbation ([facility])      furosemide (LASIX) 40 mg tablet Take 1 tablet (40 mg total) by mouth 2 (two) times a day  Qty: 60 tablet, Refills: 0    Associated Diagnoses: Acute on chronic diastolic heart failure (HCC)      gabapentin (NEURONTIN) 400 mg capsule Take 800 mg by mouth 3 (three) times a day        levothyroxine 125 mcg tablet Take 125 mcg by mouth daily      losartan (COZAAR) 100 MG tablet Take 100 mg by mouth daily      lurasidone (LATUDA) 40 mg tablet Take 40 mg by mouth      metFORMIN (GLUCOPHAGE) 500 mg tablet Take 500 mg by mouth daily      montelukast (SINGULAIR) 10 mg tablet Take 10 mg by mouth daily at bedtime      nicotine (NICODERM CQ) 21 mg/24 hr TD 24 hr patch Place 1 patch on the skin daily  Qty: 28 patch, Refills: 0    Associated Diagnoses: Tobacco use      potassium chloride (K-DUR,KLOR-CON) 20 mEq tablet Take 1 tablet (20 mEq total) by mouth daily  Qty: 30 tablet, Refills: 0    Associated Diagnoses: Acute on chronic diastolic heart failure (HCC)      predniSONE 10 mg tablet 6 tabs daily for 2 days, then 5 tabs for 2 days then 4 tabs for 2 days then 3 tabs for 2 days then 2 tab for 2 days then 1 tab for 2 days then stop  Qty: 42 tablet, Refills: 0    Associated Diagnoses: Dyspnea      roflumilast (DALIRESP) 250 MCG tablet Take 2 tablets (500 mcg total) by mouth daily  Qty: 60 tablet, Refills: 0    Associated Diagnoses: Acute exacerbation of moderate persistent extrinsic asthma      topiramate (TOPAMAX) 50 MG tablet Take 50 mg by mouth 2 (two) times a day      traMADol (ULTRAM) 50 mg tablet Take 50 mg by mouth as needed       !! - Potential duplicate medications found  Please discuss with provider  No discharge procedures on file      PDMP Review       Value Time User    PDMP Reviewed  Yes 11/3/2022  8:54 PM Huang Shelton DO          ED Provider  Electronically Signed by           Shaye Caballero PA-C  01/09/23 3367

## 2023-01-09 NOTE — PROGRESS NOTES
[facility]  Progress Note Pauly Grullon 1950, 81 y o  female MRN: [id number]  Unit/Bed#: 7T [facility] 705-01 Encounter: [id number]  Primary Care Provider: Cynthia Montgomery DO   Date and time admitted to hospital: 1/8/2023 12:56 PM    Poor social situation  Assessment & Plan  · Per patient, the caregiver just quit and she needs to find another one  · Patient is not answering calls from her doctor and case management due to hearing issue  ·  consulted    Paroxysmal atrial fibrillation (HCC)  Assessment & Plan  · Heart rate is well controlled  · Continue diltiazem and Eliquis      Tobacco use  Assessment & Plan  · Nicotine patch replacement  · Encourage smoking cessation    Chronic respiratory failure with hypoxia [facility])  Assessment & Plan  · Multiple hospitalization for COPD exacerbations, recently discharged on 12/30    · Patient had 3 ED visits for COPD exacerbation since then  · Currently not in acute exacerbation  · Continue home inhalers and nebulizer treatment      HIV (human immunodeficiency virus infection)   Assessment & Plan  · Continue Biktarvy    Type 2 diabetes mellitus without complication, without long-term current use of insulin ([facility] [address] )  Assessment & Plan  Lab Results   Component Value Date    HGBA1C 6 8 (H) 10/16/2022       Recent Labs     01/08/23  1605 01/09/23  0530   POCGLU 93 119       Blood Sugar Average: Last 72 hrs:  (P) 106   · Carbohydrate controlled diet  · Sliding scale insulin    * Hypokalemia  Assessment & Plan  · Presented with potassium of 2 8  · EKG seems to be similar with previous EKG showing NSR with PVCs, fascicular block, with prolonged QTC  · Received p o  KCl 40+ IV KCl 20  · Follow BMP      VTE Pharmacologic Prophylaxis: Eliquis    Patient Centered Rounds:  Patient care rounds were performed with nursing    Discussions with Specialists or Other Care Team Provider: Case management, nursing, PT/OT    Education and Discussions with Family / Patient: Spoke with patient at bedside    Time Spent for Care: 30  More than 50% of total time spent on counseling and coordination of care as described above  Current Length of Stay: 0 day(s)    Current Patient Status: Observation     Certification Statement: The patient will continue to require additional inpatient hospital stay due to social issues    Discharge Plan: Pending PT/OT evaluation and treatment    Code Status: Level 1 - Full Code      Subjective:   Patient seen and examined at bedside  No acute events overnight  Denies chest pain, SOB, diaphoresis, nausea/vomiting/diarrhea, fevers/chills  Objective:     Vitals:   Temp (24hrs), Av 6 °F (36 4 °C), Min:96 5 °F (35 8 °C), Max:99 2 °F (37 3 °C)    Temp:  [96 5 °F (35 8 °C)-99 2 °F (37 3 °C)] 96 7 °F (35 9 °C)  HR:  [74-96] 92  Resp:  [18-22] 20  BP: (100-144)/(68-98) 103/80  SpO2:  [96 %-100 %] 100 %  Body mass index is 38 43 kg/m²  Input and Output Summary (last 24 hours):     No intake or output data in the 24 hours ending 23 0829    Physical Exam:     Physical Exam  Vitals and nursing note reviewed  Constitutional:       General: She is not in acute distress  Appearance: She is well-developed  HENT:      Head: Normocephalic and atraumatic  Eyes:      Conjunctiva/sclera: Conjunctivae normal    Cardiovascular:      Rate and Rhythm: Normal rate and regular rhythm  Heart sounds: No murmur heard  Pulmonary:      Effort: Pulmonary effort is normal  No respiratory distress  Breath sounds: Normal breath sounds  Abdominal:      Palpations: Abdomen is soft  Tenderness: There is no abdominal tenderness  Musculoskeletal:         General: No swelling  Cervical back: Neck supple  Skin:     General: Skin is warm and dry  Capillary Refill: Capillary refill takes less than 2 seconds  Neurological:      Mental Status: She is alert     Psychiatric:         Mood and Affect: Mood normal          Additional Data: Labs: I have reviewed pertinent results     Results from last 7 days   Lab Units 01/08/23  1355   WBC Thousand/uL 7 23   HEMOGLOBIN g/dL 11 1*   HEMATOCRIT % 37 3   PLATELETS Thousands/uL 301   NEUTROS PCT % 60   LYMPHS PCT % 26   MONOS PCT % 11   EOS PCT % 3     Results from last 7 days   Lab Units 01/09/23  0614 01/08/23  1355   SODIUM mmol/L 141 143   POTASSIUM mmol/L 3 4* 2 8*   CHLORIDE mmol/L 109* 107   CO2 mmol/L 23 27   BUN mg/dL 10 11   CREATININE mg/dL 0 88 0 89   ANION GAP mmol/L 9 9   CALCIUM mg/dL 10 0 10 3*   ALBUMIN g/dL  --  4 0   TOTAL BILIRUBIN mg/dL  --  0 60   ALK PHOS U/L  --  58   ALT U/L  --  11   AST U/L  --  14   GLUCOSE RANDOM mg/dL 107* 97         Results from last 7 days   Lab Units 01/09/23  0530 01/08/23  1605   POC GLUCOSE mg/dl 119 93                 Imaging: I have reviewed pertinent imaging       Recent Cultures (last 7 days):           Last 24 Hours Medication List:   Current Facility-Administered Medications   Medication Dose Route Frequency Provider Last Rate   • acetaminophen  650 mg Oral Q6H PRN Zeynep Sigala MD     • albuterol  2 5 mg Nebulization Q6H PRN Zeynep Sigala MD     • aluminum-magnesium hydroxide-simethicone  30 mL Oral Q6H PRN Zeynep Sigala MD     • apixaban  5 mg Oral BID Zeynep Sigala MD     • artificial tear   Both Eyes HS Zeynep Sigala MD     • atorvastatin  20 mg Oral Daily Zeynep Sigala MD     • bictegravir-emtricitab-tenofovir alafenamide  1 tablet Oral Daily Zeynep Sigala MD     • cholecalciferol  1,000 Units Oral Daily Zeynep Sigala MD     • diltiazem  120 mg Oral Daily Zeynep Sigala MD     • fluticasone-vilanterol  1 puff Inhalation Daily Zeynep Sigala MD     • furosemide  40 mg Oral BID Zeynep Sigala MD     • gabapentin  800 mg Oral TID Zeynep Sigala MD     • insulin lispro  1-6 Units Subcutaneous TID [facility] Zeynep Sigala MD     • ipratropium-albuterol  3 mL Nebulization Once Hiram Lombard, PA-C     • levothyroxine  125 mcg Oral Early Morning Zeynep Sigala MD     • losartan  100 mg Oral Daily Mikayla Zarate MD Remigio     • lurasidone  40 mg Oral Daily With Breakfast Zandra Smith MD     • melatonin  6 mg Oral HS Kristofer Pérez PA-C     • montelukast  10 mg Oral HS Zandra Smith MD     • nicotine  1 patch Transdermal Daily Zandra Smith MD     • ondansetron  4 mg Intravenous Q6H PRN Zandra Smith MD     • polyethylene glycol  17 g Oral Daily PRN Zandra Smith MD     • potassium chloride  20 mEq Oral Daily Zandra Smith MD     • potassium chloride  40 mEq Oral Once Deb Gomez DO     • potassium chloride  20 mEq Intravenous Once Maria Teresa Serna PA-C Stopped (01/08/23 2000)   • roflumilast  500 mcg Oral Daily Zandra Smith MD     • topiramate  50 mg Oral BID Zandra Smith MD          Today, Patient Was Seen By: Deb Gomez DO    ** Please Note: Dictation voice to text software may have been used in the creation of this document   **

## 2023-01-09 NOTE — PLAN OF CARE
Problem: METABOLIC, FLUID AND ELECTROLYTES - ADULT  Goal: Glucose maintained within target range  Description: INTERVENTIONS:  - Monitor Blood Glucose as ordered  - Assess for signs and symptoms of hyperglycemia and hypoglycemia  - Administer ordered medications to maintain glucose within target range  - Assess nutritional intake and initiate nutrition service referral as needed  Outcome: Progressing     Problem: RESPIRATORY - ADULT  Goal: Achieves optimal ventilation and oxygenation  Description: INTERVENTIONS:  - Assess for changes in respiratory status  - Assess for changes in mentation and behavior  - Position to facilitate oxygenation and minimize respiratory effort  - Oxygen administered by appropriate delivery if ordered  - Initiate smoking cessation education as indicated  - Encourage broncho-pulmonary hygiene including cough, deep breathe, Incentive Spirometry  - Assess the need for suctioning and aspirate as needed  - Assess and instruct to report SOB or any respiratory difficulty  - Respiratory Therapy support as indicated  Outcome: Progressing     Problem: Knowledge Deficit  Goal: Patient/family/caregiver demonstrates understanding of disease process, treatment plan, medications, and discharge instructions  Description: Complete learning assessment and assess knowledge base    Interventions:  - Provide teaching at level of understanding  - Provide teaching via preferred learning methods  1/8/2023 2253 by Denzel Cox RN  Outcome: Progressing  1/8/2023 2253 by Denzel Cox RN  Outcome: Progressing     Problem: DISCHARGE PLANNING  Goal: Discharge to home or other facility with appropriate resources  Description: INTERVENTIONS:  - Identify barriers to discharge w/patient and caregiver  - Arrange for needed discharge resources and transportation as appropriate  - Identify discharge learning needs (meds, wound care, etc )  - Arrange for interpretive services to assist at discharge as needed  - Refer to Case Management Department for coordinating discharge planning if the patient needs post-hospital services based on physician/advanced practitioner order or complex needs related to functional status, cognitive ability, or social support system  1/8/2023 2253 by Tyree Su RN  Outcome: Progressing  1/8/2023 2253 by Tyree Su, RN  Outcome: Progressing

## 2023-01-09 NOTE — PHYSICAL THERAPY NOTE
Physical Therapy Evaluation    Patient's Name: Branden Valerio    Admitting Diagnosis  COPD (chronic obstructive pulmonary disease) ([place] [address] ) [J44 9]  Hypokalemia [E87 6]  Asthma [J45 909]    Problem List  Patient Active Problem List   Diagnosis    COPD exacerbation ([place] [address] )    Arthritis    Type 2 diabetes mellitus without complication, without long-term current use of insulin ([place] [address] )    HIV (human immunodeficiency virus infection)     Osteoarthritis    Hypertension    Chronic respiratory failure with hypoxia ([place] [address] )    Bipolar 1 disorder (HCC)    Tobacco use    Ambulatory dysfunction    Thoracic disc herniation    Lumbar stenosis    Hyperlipidemia    PASHA on CPAP    Acute exacerbation of moderate persistent extrinsic asthma    Paroxysmal atrial fibrillation (HCC)    Chronic diastolic heart failure (HCC)    Anemia    COVID-19    Class 3 severe obesity due to excess calories with serious comorbidity and body mass index (BMI) of 40 0 to 44 9 in [facility])    Poor social situation    Hypokalemia       Past Medical History  Past Medical History:   Diagnosis Date    Asthma     Bipolar 1 disorder ([place] [address] )     Cardiac disease     COPD (chronic obstructive pulmonary disease) ([place] [address] )     HIV (human immunodeficiency virus infection) ([place] [address] ) 03/24/2016    Hypertension     Osteoarthritis        Past Surgical History  Past Surgical History:   Procedure Laterality Date    HERNIA REPAIR      LUMBAR FUSION N/A 4/10/2018    Procedure: T9/10 discectomy with T9-T11 fusion;  Surgeon: Gian Garcia MD;  Location: [facility];  Service: Neurosurgery    THYROIDECTOMY         Recent Imaging  XR chest 2 views   Final Result by Ami Rain MD (01/08 2159)      Mild bibasilar atelectasis  Pneumonia not excluded in the appropriate clinical setting  Question trace effusions                    Workstation performed: [id number]             Recent Vital Signs  Vitals:    01/09/23 0738 01/09/23 0943 01/09/23 1138 01/09/23 1538   BP: 103/80 91/67 93/70 97/72   BP Location: Left arm Left arm Left arm Left arm   Pulse: 92 92  86   Resp: 20   20   Temp: (!) 96 7 °F (35 9 °C)   (!) 97 4 °F (36 3 °C)   TempSrc: Temporal   Temporal   SpO2: 100%   100%   Weight:       Height:            01/09/23 1220   PT Last Visit   PT Visit Date 01/09/23   Note Type   Note type Evaluation   Pain Assessment   Pain Assessment Tool 0-10   Pain Score No Pain   Restrictions/Precautions   Other Precautions O2;Multiple lines   Home Living   Type of [facility] Two level;Stairs to enter with rails;Bed/bath upstairs   Bathroom Toilet Standard   Bathroom Accessibility Accessible   Prior Function   Level of [place] Independent with ADLs; Independent with functional mobility; Needs assistance with IADLS   Lives With [facility] in the last 6 months 1 to 4   Vocational Retired   General   Family/Caregiver Present No   Cognition   Overall Cognitive Status WFL   Arousal/Participation Alert   Orientation Level Oriented X4   Memory Within functional limits   Following Commands Follows all commands and directions without difficulty   RLE Assessment   RLE Assessment   (3+/5)   LLE Assessment   LLE Assessment   (3+/5)   Coordination   Movements are Fluid and Coordinated 0   Coordination and Movement Description mild unsteadiness with gait; decreased gross motor   Sensation X   Light Touch   RLE Light Touch Impaired   LLE Light Touch Impaired   Bed Mobility   Supine to Sit 6  Modified independent   Sit to Supine 6  Modified independent   Transfers   Sit to Stand 5  Supervision   Stand to Sit 5  Supervision   Ambulation/Elevation   Gait pattern Step through pattern;Decreased toe off;Decreased heel strike   Gait Assistance 5  Supervision   Additional items Verbal cues   Assistive Device None   Distance 20ft, x2   Ambulation/Elevation Additional Comments limited by SOB; ambulated with 3L O2 which she reports is home O2, Saturating above 95%   Balance   Static Sitting Fair +   Dynamic Sitting Fair +   Static Standing Fair   Dynamic Standing Fair -   Ambulatory Fair -   Endurance Deficit   Endurance Deficit Yes   Endurance Deficit Description SOB/NGUYEN   Activity Tolerance   Activity Tolerance Patient limited by fatigue   Medical Staff Made Aware spoke to CM   Nurse Made Aware spoke to RN   Assessment   Prognosis Good   Problem List Decreased endurance; Impaired balance;Decreased strength;Decreased mobility; Decreased coordination; Impaired sensation;Obesity   Barriers to Discharge Decreased caregiver support; Inaccessible home environment   Goals   Patient Goals to breath better   STG Expiration Date 01/19/23   Short Term Goal #1 see eval note   Plan   Treatment/Interventions ADL retraining;LE strengthening/ROM; Functional transfer training;Elevations; Therapeutic exercise; Endurance training;Patient/family training;Equipment eval/education; Bed mobility;Gait training;Spoke to nursing;Spoke to case management;OT   PT Frequency 2-3x/wk   Recommendation   PT Discharge Recommendation Home with home health rehabilitation   AM-PAC Basic Mobility Inpatient   Turning in Flat Bed Without Bedrails 4   Lying on Back to Sitting on Edge of Flat Bed Without Bedrails 4   Moving Bed to Chair 4   Standing Up From Chair Using Arms 4   Walk in Room 3   Climb 3-5 Stairs With Railing 3   Basic Mobility Inpatient Raw Score 22   Basic Mobility Standardized Score 47 4   Highest Level Of Mobility   JH-HLM Goal 7: Walk 25 feet or more   JH-HLM Achieved 7: Walk 25 feet or more   End of Consult   Patient Position at End of Consult Seated edge of bed; All needs within reach         ASSESSMENT                                                                                                                     Dolores Shelton is a 81 y o  female admitted to [facility] on 1/8/2023 for Hypokalemia   Pt  has a past medical history of Asthma, Bipolar 1 disorder ([address] ), Cardiac disease, COPD (chronic obstructive pulmonary disease) ([facility] [address] HIV (human immunodeficiency virus infection) ([facility] [address] ) (03/24/2016), Hypertension, and Osteoarthritis    PT was consulted and pt was seen on 1/9/2023 for mobility assessment and d/c planning  Pt presents supine in bed alert and agreeable to therapy  Impairments limiting pt at this time include impaired balance, decreased endurance, decreased coordination, new onset of impairment of functional mobility, decreased IADLS, decreased activity tolerance, SOB upon exertion, decreased sensation, and decreased strength  Pt is currently functioning at a modified independent assistance level for bed mobility, supervision assistance x1 level for transfers, supervision assistance x1 level for ambulation with no assistive device  The patient's AM-PAC Basic Mobility Inpatient Short Form Raw Score is 22  A Raw score of greater than 16 suggests the patient may benefit from discharge to home  Please also refer to the recommendation of the Physical Therapist for safe discharge planning  Goals                                                                                                                                    1) Bed mobility skills with modified independent assistance to facilitate safe return to previous living environment 2) Functional transfers with modified independent assistance to facilitate safe return to previous living environment  3) Ambulation with least restrictive AD modified independent assistance without LOB and stable vitals for safe ambulation home/ community distances  4) Stair training up/down flight 12 step/s with appropriate rail/s  and modified independent assistance for safe access to previous living environment  5) Improve balance grades to fair + to reduce risk of falls  6)Improve LE strength grades by 1 to increase independence w/ transfers and gait    7) PT for ongoing pt and family education; DME needs and D/C planning to promote highest level of function in least restrictive environment  Recommendations                                                                                                              Pt will benefit from continued skilled IP PT to address the above mentioned impairments in order to maximize recovery and increase functional independence when completing mobility and ADLs  See flow sheet for goals and POC       DME: None    Discharge Disposition:  Home with Home Physical Therapy      Willy Das PT, DPT

## 2023-01-10 LAB
GLUCOSE SERPL-MCNC: 145 MG/DL (ref 65–140)
GLUCOSE SERPL-MCNC: 183 MG/DL (ref 65–140)
GLUCOSE SERPL-MCNC: 219 MG/DL (ref 65–140)
GLUCOSE SERPL-MCNC: 242 MG/DL (ref 65–140)

## 2023-01-10 RX ORDER — PREDNISONE 20 MG/1
40 TABLET ORAL DAILY
Status: DISCONTINUED | OUTPATIENT
Start: 2023-01-10 | End: 2023-01-11 | Stop reason: HOSPADM

## 2023-01-10 RX ADMIN — APIXABAN 5 MG: 5 TABLET, FILM COATED ORAL at 17:36

## 2023-01-10 RX ADMIN — GABAPENTIN 800 MG: 400 CAPSULE ORAL at 09:47

## 2023-01-10 RX ADMIN — Medication 6 MG: at 21:26

## 2023-01-10 RX ADMIN — LEVOTHYROXINE SODIUM 125 MCG: 125 TABLET ORAL at 05:43

## 2023-01-10 RX ADMIN — TOPIRAMATE 50 MG: 25 TABLET, FILM COATED ORAL at 09:47

## 2023-01-10 RX ADMIN — TOPIRAMATE 50 MG: 25 TABLET, FILM COATED ORAL at 17:36

## 2023-01-10 RX ADMIN — POTASSIUM CHLORIDE 20 MEQ: 1500 TABLET, EXTENDED RELEASE ORAL at 09:48

## 2023-01-10 RX ADMIN — INSULIN LISPRO 3 UNITS: 100 INJECTION, SOLUTION INTRAVENOUS; SUBCUTANEOUS at 12:01

## 2023-01-10 RX ADMIN — APIXABAN 5 MG: 5 TABLET, FILM COATED ORAL at 09:47

## 2023-01-10 RX ADMIN — BICTEGRAVIR SODIUM, EMTRICITABINE, AND TENOFOVIR ALAFENAMIDE FUMARATE 1 TABLET: 50; 200; 25 TABLET ORAL at 09:47

## 2023-01-10 RX ADMIN — WHITE PETROLATUM 57.7 %-MINERAL OIL 31.9 % EYE OINTMENT: at 21:26

## 2023-01-10 RX ADMIN — ROFLUMILAST 500 MCG: 250 TABLET ORAL at 09:47

## 2023-01-10 RX ADMIN — FUROSEMIDE 40 MG: 40 TABLET ORAL at 17:36

## 2023-01-10 RX ADMIN — FLUTICASONE FUROATE AND VILANTEROL TRIFENATATE 1 PUFF: 200; 25 POWDER RESPIRATORY (INHALATION) at 09:47

## 2023-01-10 RX ADMIN — GABAPENTIN 800 MG: 400 CAPSULE ORAL at 17:36

## 2023-01-10 RX ADMIN — MONTELUKAST 10 MG: 10 TABLET, FILM COATED ORAL at 21:26

## 2023-01-10 RX ADMIN — GABAPENTIN 800 MG: 400 CAPSULE ORAL at 21:26

## 2023-01-10 RX ADMIN — LURASIDONE HYDROCHLORIDE 40 MG: 40 TABLET, FILM COATED ORAL at 09:47

## 2023-01-10 RX ADMIN — CHOLECALCIFEROL TAB 25 MCG (1000 UNIT) 1000 UNITS: 25 TAB at 09:47

## 2023-01-10 RX ADMIN — METHYLPREDNISOLONE SODIUM SUCCINATE 40 MG: 40 INJECTION, POWDER, FOR SOLUTION INTRAMUSCULAR; INTRAVENOUS at 05:43

## 2023-01-10 RX ADMIN — ATORVASTATIN CALCIUM 20 MG: 20 TABLET, FILM COATED ORAL at 09:47

## 2023-01-10 RX ADMIN — INSULIN LISPRO 2 UNITS: 100 INJECTION, SOLUTION INTRAVENOUS; SUBCUTANEOUS at 06:23

## 2023-01-10 NOTE — CASE MANAGEMENT
Case Management Assessment & Discharge Planning Note    Patient name Cecilio Van  Location 7T [facility] 705/7T [facility] 705-01 MRN [id number]  : 1950 Date 1/10/2023       Current Admission Date: 2023  Current Admission Diagnosis:Hypokalemia   Patient Active Problem List    Diagnosis Date Noted   • Poor social situation 2023   • Hypokalemia 2023   • Class 3 severe obesity due to excess calories with serious comorbidity and body mass index (BMI) of 40 0 to 44 9 in adult [facility]) 2022   • COVID-19 10/23/2022   • Anemia 2022   • Chronic diastolic heart failure ([facility] [address] ) 2022   • PASHA on CPAP 2020   • Acute exacerbation of moderate persistent extrinsic asthma 2020   • Paroxysmal atrial fibrillation ([facility] [address] ) 2020   • Hyperlipidemia 2018   • Thoracic disc herniation 2018   • Lumbar stenosis 2018   • Ambulatory dysfunction    • Hypertension    • Chronic respiratory failure with hypoxia (HCC)    • Bipolar 1 disorder ([facility] [address] )    • Tobacco use    • COPD exacerbation ([facility] [address] ) 2016   • Arthritis 2016   • Type 2 diabetes mellitus without complication, without long-term current use of insulin ([facility] [address] ) 2016   • HIV (human immunodeficiency virus infection)  2016   • Osteoarthritis 2016      LOS (days): 1  Geometric Mean LOS (GMLOS) (days): 2 60  Days to GMLOS:1 5     OBJECTIVE:  PATIENT READMITTED TO HOSPITAL  Risk of Unplanned Readmission Score: 74 09         Current admission status: Inpatient       Preferred Pharmacy:   [facility], [address] [address]  Phone: [phone] Fax: [address] [place] [address] - [address] ,  [address] ,  [address] [facility] [address]  Phone: [phone] Fax: [phone]    Primary Care Provider: Ken Mccarty DO    [facility]  Secondary Insurance: [facility]    ASSESSMENT:  Active Health Care Proxies     MAEGAN JohnTAR [facility] Representative - Daughter   Primary Phone: [phone] (Mobile)               Readmission Root Cause  30 Day Readmission: Yes  Who directed you to return to the hospital?: Family, Self  Did you understand whom to contact if you had questions or problems?: Yes  Did you get your prescriptions before you left the hospital?: Yes  Were you able to get your prescriptions filled when you left the hospital?: Yes  Did you take your medications as prescribed?: Yes  Were you able to get to your follow-up appointments?: Yes  Patient was readmitted due to: Pt was admitted in [facility] [place] for COPD five days ago  Action Plan: Pt was discharged home with [place] [address] services    Patient Information  Admitted from[de-identified] Home  Mental Status: Alert  During Assessment patient was accompanied by: Not accompanied during assessment  Assessment information provided by[de-identified] Patient  Primary Caregiver: Self  Support Systems: Family members, Self, [address] of Residence: [email or web address] do you live in?: [address] [place] [address] entry access options   Select all that apply : Stairs  Number of steps to enter home : 4  Do the steps have railings?: Yes  Type of Current Residence: 2 story home  Upon entering residence, is there a bedroom on the main floor (no further steps)?: No  A bedroom is located on the following floor levels of residence (select all that apply):: 2nd Floor  Upon entering residence, is there a bathroom on the main floor (no further steps)?: Yes  Number of steps to 2nd floor from main floor: One Flight  In the last 12 months, was there a time when you were not able to pay the mortgage or rent on time?: No  In the last 12 months, how many places have you lived?: 1  In the last 12 months, was there a time when you did not have a steady place to sleep or slept in a shelter (including now)?: No  Homeless/housing insecurity resource given?: Refused  Living Arrangements: Lives w/ Daughter, Lives w/ Son  Is patient a ?: No    Activities of Daily Living Prior to Admission  Functional Status: Assistance  Completes ADLs independently?: No  Level of ADL dependence: Assistance  Ambulates independently?: No  Level of ambulatory dependence: Assistance  Does patient use assisted devices?: Yes  Assisted Devices (DME) used: Carnella Keep, Portable Oxygen concentrator, Home Oxygen concentrator, [address]  DME Company Name (respiratory supplies):  [employer]  O2 Rate(s): 3  Does patient currently own DME?: Yes  What DME does the patient currently own?: Home Oxygen concentrator, Portable Oxygen tanks, Walker  Does patient have a history of Outpatient Therapy (PT/OT)?: Yes  Does the patient have a history of Short-Term Rehab?: Yes  Does patient have a history of HHC?: Yes  Does patient currently have [address]?: Yes    Current Home Health Care  Type of Current Home Care Services: Nurse visit, Home PT, [address][de-identified] Other (please enter name in comment) ([facility])  [address] [place] [address] Provider[de-identified] PCP    Patient Information Continued  Income Source: SSI/SSD  Does patient have prescription coverage?: Yes  Within the past 12 months, you worried that your food would run out before you got the money to buy more : Never true  Within the past 12 months, the food you bought just didn't last and you didn't have money to get more : Never true  Food insecurity resource given?: Refused  Does patient receive dialysis treatments?: No  Does patient have a history of substance abuse?: No  Does patient have a history of Mental Health Diagnosis?: Yes  Is patient receiving treatment for mental health?: Yes  Has patient received inpatient treatment related to mental health in the last 2 years?: No    PHQ 2/9 Screening   Reviewed PHQ 2/9 Depression Screening Score?: No    Means of Transportation  Means of Transport to [facility][de-identified] Family transport  In the past 12 months, has lack of transportation kept you from medical appointments or from getting medications?: No  In the past 12 months, has lack of transportation kept you from meetings, work, or from getting things needed for daily living?: No  Was application for public transport provided?: Refused    Discharge details    CM was notified at morning rounds that pt is medical cleared to be discharged today  CM met with pt at bedside  CM Discussed Freedom of choice  Pt will be discharging home with [address]   Transportation: Round trip   Round trip booked trip for pt for tomorrow as there is delays with transportation [address] claimed the ride for [place] in unit/room 7T U 705 bed 7T [facility] 705-01, and will arrive on 01/11/2023 at 8:30am EST  Contact them at [phone]  CM reviewed Discharge planning process including the following: identifying help at home, patient preference for discharge planning needs, Discharge lounge, [facility] to bed program, availability of treatment team to discuss questions or concerns patient and family may have regarding understanding medications and recognizing signs and symptoms once discharged  CM also encouraged pt to follow up with all recommended appointments after discharge  Patient advised of importance for patient and family to participate in managing pt's medical wellbeing     CM department will continue to follow through pt’s D/C

## 2023-01-10 NOTE — PLAN OF CARE
Problem: OCCUPATIONAL THERAPY ADULT  Goal: Performs self-care activities at highest level of function for planned discharge setting  See evaluation for individualized goals  Description: Treatment Interventions: ADL retraining, Functional transfer training, UE strengthening/ROM, Endurance training, Continued evaluation, Activityengagement          See flowsheet documentation for full assessment, interventions and recommendations  Note: Limitation: Decreased ADL status, Decreased UE strength, Decreased endurance, Decreased high-level ADLs  Prognosis: Oneyda Sethi is a 81 y o  female seen for OT evaluation s/p admit to [facility] on 1/8/2023 w/ Hypokalemia  See above for extensive list of comorbidities affecting Pt's functional performance at time of assessment  Personal factors affecting Pt at time of IE include:difficulty performing ADLS, difficulty performing IADLS , health management  and environment  Prior to admission, Pt was living at home and had a caregiver for IADLs PRN  Upon evaluation: Pt able to complete transfers and ambulation Landon Martin Caller Pt did require Estuardo to manage toilet hygiene due to decreased standing balance and endurance  The following deficits impact occupational performance: decreased strength, decreased balance and decreased tolerance  Pt to benefit from continued skilled OT services while in the hospital to address deficits as defined above and maximize level of functional independence w ADL's and functional mobility  Occupational performance areas to address include: bathing/shower, toilet hygiene, dressing, health maintenance, functional mobility and clothing management  From OT standpoint, recommendation at time of d/c would be home with home health      Assessment:      OT Discharge Recommendation: Home with home health rehabilitation

## 2023-01-10 NOTE — PLAN OF CARE
Problem: RESPIRATORY - ADULT  Goal: Achieves optimal ventilation and oxygenation  Description: INTERVENTIONS:  - Assess for changes in respiratory status  - Assess for changes in mentation and behavior  - Position to facilitate oxygenation and minimize respiratory effort  - Oxygen administered by appropriate delivery if ordered  - Initiate smoking cessation education as indicated  - Encourage broncho-pulmonary hygiene including cough, deep breathe, Incentive Spirometry  - Assess the need for suctioning and aspirate as needed  - Assess and instruct to report SOB or any respiratory difficulty  - Respiratory Therapy support as indicated  Outcome: Progressing     Problem: METABOLIC, FLUID AND ELECTROLYTES - ADULT  Goal: Glucose maintained within target range  Description: INTERVENTIONS:  - Monitor Blood Glucose as ordered  - Assess for signs and symptoms of hyperglycemia and hypoglycemia  - Administer ordered medications to maintain glucose within target range  - Assess nutritional intake and initiate nutrition service referral as needed  Outcome: Progressing

## 2023-01-10 NOTE — PLAN OF CARE
Problem: Potential for Falls  Goal: Patient will remain free of falls  Description: INTERVENTIONS:  - Educate patient/family on patient safety including physical limitations  - Instruct patient to call for assistance with activity   - Consult OT/PT to assist with strengthening/mobility   - Keep Call bell within reach  - Keep bed low and locked with side rails adjusted as appropriate  - Keep care items and personal belongings within reach  - Initiate and maintain comfort rounds  -    - Apply yellow socks and bracelet for high fall risk patients  - Consider moving patient to room near nurses station  Outcome: Progressing     Problem: PAIN - ADULT  Goal: Verbalizes/displays adequate comfort level or baseline comfort level  Description: Interventions:  - Encourage patient to monitor pain and request assistance  - Assess pain using appropriate pain scale  - Administer analgesics based on type and severity of pain and evaluate response  - Implement non-pharmacological measures as appropriate and evaluate response  - Consider cultural and social influences on pain and pain management  - Notify physician/advanced practitioner if interventions unsuccessful or patient reports new pain  Outcome: Progressing     Problem: INFECTION - ADULT  Goal: Absence or prevention of progression during hospitalization  Description: INTERVENTIONS:  - Assess and monitor for signs and symptoms of infection  - Monitor lab/diagnostic results  - Monitor all insertion sites, i e  indwelling lines, tubes, and drains  - Monitor endotracheal if appropriate and nasal secretions for changes in amount and color  - [place] appropriate cooling/warming therapies per order  - Administer medications as ordered  - Instruct and encourage patient and family to use good hand hygiene technique  - Identify and instruct in appropriate isolation precautions for identified infection/condition  Outcome: Progressing  Goal: Absence of fever/infection during neutropenic period  Description: INTERVENTIONS:  - Monitor WBC    Outcome: Progressing     Problem: SAFETY ADULT  Goal: Patient will remain free of falls  Description: INTERVENTIONS:  - Educate patient/family on patient safety including physical limitations  - Instruct patient to call for assistance with activity   - Consult OT/PT to assist with strengthening/mobility   - Keep Call bell within reach  - Keep bed low and locked with side rails adjusted as appropriate  - Keep care items and personal belongings within reach  - Initiate and maintain comfort rounds  - Make Fall Risk Sign visible to staff    - Apply yellow socks and bracelet for high fall risk patients  - Consider moving patient to room near nurses station  Outcome: Progressing  Goal: Maintain or return to baseline ADL function  Description: INTERVENTIONS:  -  Assess patient's ability to carry out ADLs; assess patient's baseline for ADL function and identify physical deficits which impact ability to perform ADLs (bathing, care of mouth/teeth, toileting, grooming, dressing, etc )  - Assess/evaluate cause of self-care deficits   - Assess range of motion  - Assess patient's mobility; develop plan if impaired  - Assess patient's need for assistive devices and provide as appropriate  - Encourage maximum independence but intervene and supervise when necessary  - Involve family in performance of ADLs  - Assess for home care needs following discharge   - Consider OT consult to assist with ADL evaluation and planning for discharge  - Provide patient education as appropriate  Outcome: Progressing  Goal: Maintains/Returns to pre admission functional level  Description: INTERVENTIONS:  - Perform BMAT or MOVE assessment daily    - Set and communicate daily mobility goal to care team and patient/family/caregiver     - Collaborate with rehabilitation services on mobility goals if consulted    - Out of bed for toileting  - Record patient progress and toleration of activity level   Outcome: Progressing     Problem: DISCHARGE PLANNING  Goal: Discharge to home or other facility with appropriate resources  Description: INTERVENTIONS:  - Identify barriers to discharge w/patient and caregiver  - Arrange for needed discharge resources and transportation as appropriate  - Identify discharge learning needs (meds, wound care, etc )  - Arrange for interpretive services to assist at discharge as needed  - Refer to Case Management Department for coordinating discharge planning if the patient needs post-hospital services based on physician/advanced practitioner order or complex needs related to functional status, cognitive ability, or social support system  Outcome: Progressing     Problem: Knowledge Deficit  Goal: Patient/family/caregiver demonstrates understanding of disease process, treatment plan, medications, and discharge instructions  Description: Complete learning assessment and assess knowledge base    Interventions:  - Provide teaching at level of understanding  - Provide teaching via preferred learning methods  Outcome: Progressing     Problem: RESPIRATORY - ADULT  Goal: Achieves optimal ventilation and oxygenation  Description: INTERVENTIONS:  - Assess for changes in respiratory status  - Assess for changes in mentation and behavior  - Position to facilitate oxygenation and minimize respiratory effort  - Oxygen administered by appropriate delivery if ordered  - Initiate smoking cessation education as indicated  - Encourage broncho-pulmonary hygiene including cough, deep breathe, Incentive Spirometry  - Assess the need for suctioning and aspirate as needed  - Assess and instruct to report SOB or any respiratory difficulty  - Respiratory Therapy support as indicated  Outcome: Progressing     Problem: METABOLIC, FLUID AND ELECTROLYTES - ADULT  Goal: Glucose maintained within target range  Description: INTERVENTIONS:  - Monitor Blood Glucose as ordered  - Assess for signs and symptoms of hyperglycemia and hypoglycemia  - Administer ordered medications to maintain glucose within target range  - Assess nutritional intake and initiate nutrition service referral as needed  Outcome: Progressing

## 2023-01-10 NOTE — ASSESSMENT & PLAN NOTE
Lab Results   Component Value Date    HGBA1C 6 8 (H) 10/16/2022       Recent Labs     01/09/23  1120 01/09/23  1612 01/09/23  2030 01/10/23  0544   POCGLU 154* 222* 251* 219*       Blood Sugar Average: Last 72 hrs:  (P) 595 [id number]   · Carbohydrate controlled diet  · Sliding scale insulin

## 2023-01-10 NOTE — ASSESSMENT & PLAN NOTE
· Per patient, the caregiver just quit and she needs to find another one  · Patient is not answering calls from her doctor and case management due to hearing issue    ·  consulted  · Case management has arranged for home health care  · Will discharge home on 1/11/2023 with transportation

## 2023-01-10 NOTE — DISCHARGE SUMMARY
[facility]  Discharge- Swapna Remy 1950, 81 y o  female MRN: [id number]  Unit/Bed#: 7T [facility] 705-01 Encounter: [id number]  Primary Care Provider: Marcia Hanson DO   Date and time admitted to hospital: 1/8/2023 12:56 PM    Poor social situation  Assessment & Plan  · Per patient, the caregiver just quit and she needs to find another one  · Patient is not answering calls from her doctor and case management due to hearing issue  ·  consulted    Paroxysmal atrial fibrillation (HCC)  Assessment & Plan  · Heart rate is well controlled  · Continue diltiazem and Eliquis      Tobacco use  Assessment & Plan  · Nicotine patch replacement  · Encourage smoking cessation    Chronic respiratory failure with hypoxia [facility])  Assessment & Plan  · Multiple hospitalization for COPD exacerbations, recently discharged on 12/30    · Patient had 3 ED visits for COPD exacerbation since then  · Currently not in acute exacerbation  · Continue home inhalers and nebulizer treatment      HIV (human immunodeficiency virus infection)   Assessment & Plan  · Continue Biktarvy    Type 2 diabetes mellitus without complication, without long-term current use of insulin [facility])  Assessment & Plan  Lab Results   Component Value Date    HGBA1C 6 8 (H) 10/16/2022       Recent Labs     01/09/23  1120 01/09/23  1612 01/09/23  2030 01/10/23  0544   POCGLU 154* 222* 251* 219*       Blood Sugar Average: Last 72 hrs:  (P) 787 [id number]   · Carbohydrate controlled diet  · Sliding scale insulin    * Hypokalemia  Assessment & Plan  · Presented with potassium of 2 8  · EKG seems to be similar with previous EKG showing NSR with PVCs, fascicular block, with prolonged QTC  · Received p o  KCl 40+ IV KCl 20  · Follow BMP      Discharging Physician / Practitioner: Jamie Garcia DO  PCP: Marcia Hanson DO  Admission Date:   Admission Orders (From admission, onward)     Ordered        01/09/23 1437  Inpatient Admission Once            01/08/23 1512  Place in Observation  Once                      Discharge Date: 01/10/23    Medical Problems     Resolved Problems  Date Reviewed: 1/10/2023   None         Consultations During Hospital Stay: PT/OT    Procedures Performed: Not applicable    Significant Findings / Test Results:     XR chest 2 views    Result Date: 1/8/2023  Impression: Mild bibasilar atelectasis  Pneumonia not excluded in the appropriate clinical setting  Question trace effusions  Workstation performed: [id number]       Incidental Findings: Not applicable    Test Results Pending at Discharge (will require follow up): Not applicable     Outpatient Tests Requested: Not applicable    Reason for Admission: Shortness of breath    Hospital Course:     Toñito Linda is a 81 y o  female with PMH of COPD chronically on 3 L oxygen via NC who presents with shortness of breath  Patient has multiple hospitalization for COPD exacerbation  She continues to be smoking  She was recently discharged from [facility] for COPD exacerbation on 12/30  Patient has not been wearing her CPAP machine at nighttime because she forgets how to use it  She said her caregiver just quit and that she needs to find a new one  Patient is not answering calls from her doctor and case management due to hearing issues  In ED, potassium is 2 8  QTC is longer than previous EKG  The patient remained hemodynamically stable and saturating well on home baseline O2 requirement of 3 L of nasal cannula  PT/OT recommended home health care  Case management will arrange for nursing and physical and Occupational Therapy to come to the patient's home  She was strongly encouraged to resume all preadmission medications at all preadmission dosages  She was also encouraged to follow-up with her PCP within 7-14 days  Case management will also arrange for a ride home      Condition at Discharge: stable     Discharge Day Visit / Exam:     Subjective: Patient seen and examined at bedside  No acute events overnight  Denies chest pain, SOB, diaphoresis, nausea/vomiting/diarrhea, fevers/chills  Vitals: Blood Pressure: 100/76 (01/10/23 0000)  Pulse: 89 (01/09/23 2321)  Temperature: (!) (P) 97 4 °F (36 3 °C) (01/10/23 0731)  Temp Source: Temporal (01/09/23 2321)  Respirations: 19 (01/09/23 2321)  Height: 5' 2" (157 5 cm) (01/08/23 1712)  Weight - Scale: 95 3 kg (210 lb 1 6 oz) (01/08/23 1712)  SpO2: 98 % (01/09/23 2300)     Exam:   Physical Exam  Vitals and nursing note reviewed  Constitutional:       General: She is not in acute distress  Appearance: She is well-developed  HENT:      Head: Normocephalic and atraumatic  Eyes:      Conjunctiva/sclera: Conjunctivae normal    Cardiovascular:      Rate and Rhythm: Normal rate and regular rhythm  Heart sounds: No murmur heard  Pulmonary:      Effort: Pulmonary effort is normal  No respiratory distress  Breath sounds: Normal breath sounds  Abdominal:      Palpations: Abdomen is soft  Tenderness: There is no abdominal tenderness  Musculoskeletal:         General: No swelling  Cervical back: Neck supple  Skin:     General: Skin is warm and dry  Capillary Refill: Capillary refill takes less than 2 seconds  Neurological:      Mental Status: She is alert  Psychiatric:         Mood and Affect: Mood normal        Discharge instructions/Information to patient and family:   See after visit summary for information provided to patient and family  Provisions for Follow-Up Care:  See after visit summary for information related to follow-up care and any pertinent home health orders  Disposition:     Home health care  Resume preadmission medications  Outpatient follow-up with PCP     Discharge Statement:  I spent 60 minutes discharging the patient  This time was spent on the day of discharge  I had direct contact with the patient on the day of discharge   Greater than 50% of the total time was spent examining patient, answering all patient questions, arranging and discussing plan of care with patient as well as directly providing post-discharge instructions  Additional time then spent on discharge activities  Discharge Medications:  See after visit summary for reconciled discharge medications provided to patient and family        ** Please Note: This note has been constructed using a voice recognition system **

## 2023-01-10 NOTE — PLAN OF CARE
Problem: PHYSICAL THERAPY ADULT  Goal: Performs mobility at highest level of function for planned discharge setting  See evaluation for individualized goals  Description: Treatment/Interventions: ADL retraining, Functional transfer training, LE strengthening/ROM, Elevations, Therapeutic exercise, Endurance training, Patient/family training, Equipment eval/education, Gait training, Bed mobility, Spoke to nursing, Spoke to case management, OT          See flowsheet documentation for full assessment, interventions and recommendations  Outcome: Progressing  Note: Prognosis: Good  Problem List: Decreased endurance, Impaired balance, Decreased strength, Decreased mobility, Decreased coordination, Impaired sensation, Obesity  Assessment: Pt is transfering and ambulating without LOB with overall steady gait  Does report significant SOB will all activity as well as with conversation seated at EOB  Saturation is good on 3L which is her home O2 level  While she is safe and ind for short distance mobility she does continue to report that SOB is worse than normal and that she is below baseline endurance  Continue to recommend home with home PT  Barriers to Discharge: Inaccessible home environment     PT Discharge Recommendation: Home with home health rehabilitation    See flowsheet documentation for full assessment

## 2023-01-10 NOTE — PHYSICAL THERAPY NOTE
Physical TherapyTreatment Note    Patient's Name: Jacquelyn Becerra    Admitting Diagnosis  COPD (chronic obstructive pulmonary disease) ([facility] ) [J44 9]  Hypokalemia [E87 6]  Asthma [J45 909]    Problem List  Patient Active Problem List   Diagnosis    COPD exacerbation ([facility] )    Arthritis    Type 2 diabetes mellitus without complication, without long-term current use of insulin ([place] [address] )    HIV (human immunodeficiency virus infection)     Osteoarthritis    Hypertension    Chronic respiratory failure with hypoxia ([place] [address] )    Bipolar 1 disorder (HCC)    Tobacco use    Ambulatory dysfunction    Thoracic disc herniation    Lumbar stenosis    Hyperlipidemia    PASHA on CPAP    Acute exacerbation of moderate persistent extrinsic asthma    Paroxysmal atrial fibrillation (HCC)    Chronic diastolic heart failure (HCC)    Anemia    COVID-19    Class 3 severe obesity due to excess calories with serious comorbidity and body mass index (BMI) of 40 0 to 44 9 in [facility])    Poor social situation    Hypokalemia       Past Medical History  Past Medical History:   Diagnosis Date    Asthma     Bipolar 1 disorder ([place] [address] )     Cardiac disease     COPD (chronic obstructive pulmonary disease) ([place] [address] )     HIV (human immunodeficiency virus infection) ([place] [address] ) 03/24/2016    Hypertension     Osteoarthritis        Past Surgical History  Past Surgical History:   Procedure Laterality Date    HERNIA REPAIR      LUMBAR FUSION N/A 4/10/2018    Procedure: T9/10 discectomy with T9-T11 fusion;  Surgeon: Jami King MD;  [facility];  Service: Neurosurgery    THYROIDECTOMY         Recent Imaging  XR chest 2 views   Final Result by Eugenia Costa MD (01/08 2159)      Mild bibasilar atelectasis  Pneumonia not excluded in the appropriate clinical setting  Question trace effusions                    Workstation performed: [id number]             Recent Vital Signs  Vitals:    01/09/23 2300 01/09/23 2321 01/10/23 0000 01/10/23 0731   BP:  96/70 100/76 102/78 BP Location:  Left arm Left arm Left arm   Pulse:  89  58   Resp:  19  17   Temp:  (!) 97 2 °F (36 2 °C)  (!) 97 4 °F (36 3 °C)   TempSrc:  Temporal  Temporal   SpO2: 98%   98%   Weight:       Height:            01/10/23 1150   PT Last Visit   PT Visit Date 01/10/23   Note Type   Note Type Treatment   Pain Assessment   Pain Assessment Tool 0-10   Pain Score No Pain   Restrictions/Precautions   Other Precautions O2   General   Chart Reviewed Yes   Response to Previous Treatment Patient with no complaints from previous session  Family/Caregiver Present No   Cognition   Overall Cognitive Status WFL   Bed Mobility   Supine to Sit 6  Modified independent   Sit to Supine 6  Modified independent   Transfers   Sit to Stand 6  Modified independent   Stand to Sit 6  Modified independent   Ambulation/Elevation   Gait pattern Decreased foot clearance; Wide SELENE; Short stride; Excessively slow;Decreased heel strike;Decreased toe off;Step through pattern   Gait Assistance 5  Supervision   Additional items Verbal cues   Assistive Device None   Distance 30ft x2   Ambulation/Elevation Additional Comments pt reporting SOB with ambulation limiting her to short distances  She feels that SOB is worse now than at baseline   Balance   Static Sitting Fair +   Dynamic Sitting Fair +   Static Standing Fair   Dynamic Standing Fair   Ambulatory Fair   Endurance Deficit   Endurance Deficit Yes   Endurance Deficit Description SOB/NGUYEN   Activity Tolerance   Activity Tolerance Patient limited by fatigue   Medical Staff Made Aware spoke to CM; updated MD regarding continued SOB   Nurse Made Aware spoke to RN   Assessment   Prognosis Good   Problem List Decreased endurance; Impaired balance;Decreased strength;Decreased mobility; Decreased coordination; Impaired sensation;Obesity   Assessment Pt is transfering and ambulating without LOB with overall steady gait  Does report significant SOB will all activity as well as with conversation seated at EOB  Saturation is good on 3L which is her home O2 level  While she is safe and ind for short distance mobility she does continue to report that SOB is worse than normal and that she is below baseline endurance  Continue to recommend home with home PT  Barriers to Discharge Inaccessible home environment   Goals   Patient Goals to be less SOB   STG Expiration Date 01/19/23   Short Term Goal #1 see eval note   PT Treatment Day 1   Plan   Treatment/Interventions ADL retraining;Functional transfer training;LE strengthening/ROM; Elevations; Therapeutic exercise; Endurance training;Patient/family training;Equipment eval/education;Gait training;Bed mobility;Spoke to nursing;Spoke to case management;OT   Progress Progressing toward goals   PT Frequency 2-3x/wk   Recommendation   PT Discharge Recommendation Home with home health rehabilitation   AM-PAC Basic Mobility Inpatient   Turning in Flat Bed Without Bedrails 4   Lying on Back to Sitting on Edge of Flat Bed Without Bedrails 4   Moving Bed to Chair 4   Standing Up From Chair Using Arms 4   Walk in Room 3   Climb 3-5 Stairs With Railing 3   Basic Mobility Inpatient Raw Score 22   Basic Mobility Standardized Score 47 4   Highest Level Of Mobility   JH-HLM Goal 7: Walk 25 feet or more   JH-HLM Achieved 7: Walk 25 feet or more   Education   Education Provided Mobility training;Home exercise program;Assistive device   Patient Explanation/teachback used;Demonstrates verbal understanding   End of Consult   Patient Position at End of Consult Seated edge of bed; All needs within reach         Ayden Lemons PT, DPT

## 2023-01-10 NOTE — PROGRESS NOTES
[facility]  Progress Note Antonia Pandey 1950, 81 y o  female MRN: [id number]  Unit/Bed#: 7T [facility] 705-01 Encounter: [id number]  Primary Care Provider: Addie Holter, DO   Date and time admitted to hospital: 1/8/2023 12:56 PM    * Poor social situation  Assessment & Plan  · Per patient, the caregiver just quit and she needs to find another one  · Patient is not answering calls from her doctor and case management due to hearing issue  ·  consulted  · Case management has arranged for home health care  · Will discharge home on 1/11/2023 with transportation    Hypokalemia  Assessment & Plan  · Presented with potassium of 2 8  · EKG seems to be similar with previous EKG showing NSR with PVCs, fascicular block, with prolonged QTC  · Received p o  KCl 40+ IV KCl 20  · Follow BMP    Paroxysmal atrial fibrillation (HCC)  Assessment & Plan  · Heart rate is well controlled  · Continue diltiazem and Eliquis      Tobacco use  Assessment & Plan  · Nicotine patch replacement  · Encourage smoking cessation    Chronic respiratory failure with hypoxia (HCC)  Assessment & Plan  · Multiple hospitalization for COPD exacerbations, recently discharged on 12/30    · Patient had 3 ED visits for COPD exacerbation since then  · Currently not in acute exacerbation  · Continue home inhalers and nebulizer treatment      HIV (human immunodeficiency virus infection)   Assessment & Plan  · Continue Biktarvy    Type 2 diabetes mellitus without complication, without long-term current use of insulin [facility])  Assessment & Plan  Lab Results   Component Value Date    HGBA1C 6 8 (H) 10/16/2022       Recent Labs     01/09/23  2030 01/10/23  0544 01/10/23  1051 01/10/23  1615   POCGLU 251* 219* 242* 145*       Blood Sugar Average: Last 72 hrs:  (P) 180 625   · Carbohydrate controlled diet  · Sliding scale insulin    VTE Pharmacologic Prophylaxis: Eliquis    Patient Centered Rounds:  Patient care rounds were performed with nursing    Discussions with Specialists or Other Care Team Provider: Case management, nursing, PT/OT    Education and Discussions with Family / Patient: Spoke with patient at bedside    Time Spent for Care: 30  More than 50% of total time spent on counseling and coordination of care as described above  Current Length of Stay: 1 day(s)    Current Patient Status: Inpatient     Certification Statement: The patient will continue to require additional inpatient hospital stay due to social issues    Discharge Plan: Home health care on 1/10/2023    Code Status: Level 1 - Full Code      Subjective:   Patient seen and examined at bedside  No acute events overnight  Denies chest pain, SOB, diaphoresis, nausea/vomiting/diarrhea, fevers/chills  Objective:     Vitals:   Temp (24hrs), Av °F (36 1 °C), Min:96 5 °F (35 8 °C), Max:97 4 °F (36 3 °C)    Temp:  [96 5 °F (35 8 °C)-97 4 °F (36 3 °C)] 96 5 °F (35 8 °C)  HR:  [58-89] 88  Resp:  [17-20] 20  BP: ()/(70-82) 111/82  SpO2:  [94 %-98 %] 94 %  Body mass index is 38 43 kg/m²  Input and Output Summary (last 24 hours): Intake/Output Summary (Last 24 hours) at 1/10/2023 1702  Last data filed at 1/10/2023 1700  Gross per 24 hour   Intake 840 ml   Output 200 ml   Net 640 ml       Physical Exam:     Physical Exam  Vitals and nursing note reviewed  Constitutional:       General: She is not in acute distress  Appearance: She is well-developed  HENT:      Head: Normocephalic and atraumatic  Eyes:      Conjunctiva/sclera: Conjunctivae normal    Cardiovascular:      Rate and Rhythm: Normal rate and regular rhythm  Heart sounds: No murmur heard  Pulmonary:      Effort: Pulmonary effort is normal  No respiratory distress  Breath sounds: Normal breath sounds  Abdominal:      Palpations: Abdomen is soft  Tenderness: There is no abdominal tenderness  Musculoskeletal:         General: No swelling        Cervical back: Neck supple  Skin:     General: Skin is warm and dry  Capillary Refill: Capillary refill takes less than 2 seconds  Neurological:      Mental Status: She is alert  Psychiatric:         Mood and Affect: Mood normal          Additional Data:     Labs:  I have reviewed pertinent results     Results from last 7 days   Lab Units 01/08/23  1355   WBC Thousand/uL 7 23   HEMOGLOBIN g/dL 11 1*   HEMATOCRIT % 37 3   PLATELETS Thousands/uL 301   NEUTROS PCT % 60   LYMPHS PCT % 26   MONOS PCT % 11   EOS PCT % 3     Results from last 7 days   Lab Units 01/09/23  0614 01/08/23  1355   SODIUM mmol/L 141 143   POTASSIUM mmol/L 3 4* 2 8*   CHLORIDE mmol/L 109* 107   CO2 mmol/L 23 27   BUN mg/dL 10 11   CREATININE mg/dL 0 88 0 89   ANION GAP mmol/L 9 9   CALCIUM mg/dL 10 0 10 3*   ALBUMIN g/dL  --  4 0   TOTAL BILIRUBIN mg/dL  --  0 60   ALK PHOS U/L  --  58   ALT U/L  --  11   AST U/L  --  14   GLUCOSE RANDOM mg/dL 107* 97         Results from last 7 days   Lab Units 01/10/23  1615 01/10/23  1051 01/10/23  0544 01/09/23  2030 01/09/23  1612 01/09/23  1120 01/09/23  0530 01/08/23  1605   POC GLUCOSE mg/dl 145* 242* 219* 251* 222* 154* 119 93                 Imaging: I have reviewed pertinent imaging       Recent Cultures (last 7 days):           Last 24 Hours Medication List:   Current Facility-Administered Medications   Medication Dose Route Frequency Provider Last Rate   • acetaminophen  650 mg Oral Q6H PRN Suly Garcias MD     • albuterol  2 5 mg Nebulization Q6H PRN Suly Garcias MD     • aluminum-magnesium hydroxide-simethicone  30 mL Oral Q6H PRN Suly Garcias MD     • apixaban  5 mg Oral BID Suly Garcias MD     • artificial tear   Both Eyes HS Suly Garcias MD     • atorvastatin  20 mg Oral Daily Suly Garcias MD     • bictegravir-emtricitab-tenofovir alafenamide  1 tablet Oral Daily Suly Garcias MD     • cholecalciferol  1,000 Units Oral Daily Suly Garcias MD     • diltiazem  120 mg Oral Daily Suly Garcias MD     • fluticasone-vilanterol  1 puff Inhalation Daily Ramone Watters MD     • furosemide  40 mg Oral BID Ramone Watters MD     • gabapentin  800 mg Oral TID Ramone Watters MD     • insulin lispro  1-6 Units Subcutaneous TID [facility] Ramone Watters MD     • ipratropium-albuterol  3 mL Nebulization Once Simon Holter, PA-C     • levothyroxine  125 mcg Oral Early Morning Ramone Watters MD     • losartan  100 mg Oral Daily Ramone Watters MD     • lurasidone  40 mg Oral Daily With Breakfast Ramone Watters MD     • melatonin  6 mg Oral HS Giovanna Guzman PA-C     • montelukast  10 mg Oral HS Ramone Watters MD     • nicotine  1 patch Transdermal Daily Ramone Watters MD     • ondansetron  4 mg Intravenous Q6H PRN Ramone Watters MD     • polyethylene glycol  17 g Oral Daily PRN Ramone Watters MD     • potassium chloride  20 mEq Oral Daily Ramone Watters MD     • potassium chloride  20 mEq Intravenous Once Simon Holter, PA-C Stopped (01/08/23 2000)   • predniSONE  40 mg Oral Daily Weston Gibson DO     • roflumilast  500 mcg Oral Daily Ramone Watters MD     • topiramate  50 mg Oral BID Ramone Watters MD          Today, Patient Was Seen By: Weston Gibson DO    ** Please Note: Dictation voice to text software may have been used in the creation of this document   **

## 2023-01-10 NOTE — PROGRESS NOTES
-- Patient: Farzad Briceno  -- MRN: [id number]  -- Aidin Request ID: [id number]  -- Level of care reserved: [address] [place] [address]  -- Partner Reserved: [facility] , [address] [place] [address] [phone]  -- Clinical needs requested:  -- Geography searched: [id number]  -- Start of Service:  -- Request sent: 9:13am EST on 1/10/2023 by Lissa Jacques  -- Partner reserved: 10:30am EST on 1/10/2023 by Lissa Jacques  -- Choice list shared: 10:29am EST on 1/10/2023 by Lissa Jacques

## 2023-01-10 NOTE — UTILIZATION REVIEW
NOTIFICATION OF INPATIENT ADMISSION   AUTHORIZATION REQUEST   SERVICING FACILITY:   [address] [place] [address]  [address] , [facility]  Tax ID: [id number]  NPI: [id number] ATTENDING PROVIDER:  Attending Name and NPI#: Tim Smoker [[id number]]  Address: [address] , [facility], [address] [place] [address]  Phone: [phone]     ADMISSION INFORMATION:  Place of Service: Inpatient [address] [facility]  [address]  Place of Service Code: 21  Inpatient Admission Date/Time: 1/9/23  2:37 PM  Discharge Date/Time: No discharge date for patient encounter  Admitting Diagnosis Code/Description:  COPD (chronic obstructive pulmonary disease) ([facility] ) [J44 9]  Hypokalemia [E87 6]  Asthma [J45 909]     UTILIZATION REVIEW CONTACT:  Devon Carter, Utilization   Network Utilization Review Department  Phone: [phone]  Fax [phone]  Email: [email or web address]  Contact for approvals/pending authorizations, clinical reviews, and discharge  PHYSICIAN ADVISORY SERVICES:  Medical Necessity Denial & Vsln-vp-Dqiy Review  Phone: [phone]  Fax: [phone]  Email: [email or web address]

## 2023-01-10 NOTE — OCCUPATIONAL THERAPY NOTE
Occupational Therapy Evaluation     Patient Name: Ron Dwyer  Today's Date: 1/10/2023  Problem List  Principal Problem:    Hypokalemia  Active Problems:    Type 2 diabetes mellitus without complication, without long-term current use of insulin (HCC)    HIV (human immunodeficiency virus infection)     Chronic respiratory failure with hypoxia (HCC)    Tobacco use    Paroxysmal atrial fibrillation (HCC)    Poor social situation    Past Medical History  Past Medical History:   Diagnosis Date   • Asthma    • [facility]    • Cardiac disease    • COPD (chronic obstructive pulmonary disease) ([facility] )    • HIV (human immunodeficiency virus infection) ([facility] ) 03/24/2016   • Hypertension    • Osteoarthritis      Past Surgical History  Past Surgical History:   Procedure Laterality Date   • HERNIA REPAIR     • LUMBAR FUSION N/A 4/10/2018    Procedure: T9/10 discectomy with T9-T11 fusion;  Surgeon: Babatunde Keen MD;  Location[facility];  Service: Neurosurgery   • THYROIDECTOMY             01/10/23 0748   OT Last Visit   OT Visit Date 01/10/23   Note Type   Note type Evaluation   Pain Assessment   Pain Assessment Tool 0-10   Pain Score No Pain   Restrictions/Precautions   Weight Bearing Precautions Per Order No   Other Precautions O2   Home Living   Type of [address] [place] [address] Two level;Stairs to enter with rails   Bathroom Toilet Standard   Bathroom Accessibility Accessible   Prior Function   Level of [place] Independent with ADLs   Lives With Family   Receives Help From [facility] in the last 6 months 1 to 4   ADL   Grooming Assistance 6  Modified Independent   UB Bathing Assistance 5  Supervision/Setup   LB Bathing Assistance 5  Supervision/Setup   UB Dressing Assistance 5  Supervision/Setup   LB Dressing Assistance 5  [address] [facility] Deficit Clothing management up;Clothing management down;Perineal hygiene   Bed Mobility   Supine to Sit 6 Modified independent   Sit to Supine 6  Modified independent   Transfers   Sit to Stand 6  Modified independent   Stand to Sit 6  Modified independent   Toilet transfer 6  Modified independent   Functional Mobility   Functional Mobility 6  Modified independent   Additional items Rolling walker   Balance   Static Sitting Good   Dynamic Sitting Fair +   Static Standing Fair   Dynamic Standing Fair   Ambulatory Fair   Activity Tolerance   Activity Tolerance Patient tolerated treatment well   RUE Assessment   RUE Assessment WFL   LUE Assessment   LUE Assessment WFL   Hand Function   Gross Motor Coordination Functional   Sensation   Light Touch No apparent deficits   Proprioception   Proprioception No apparent deficits   Psychosocial   Psychosocial (WDL) WDL   Cognition   Overall Cognitive Status WFL   Arousal/Participation Alert; Cooperative   Attention Within functional limits   Orientation Level Oriented X4   Memory Within functional limits   Following Commands Follows all commands and directions without difficulty   Assessment   Limitation Decreased ADL status; Decreased UE strength;Decreased endurance;Decreased high-level ADLs   Prognosis Good   Assessment   Pt is a 81 y o  female seen for OT evaluation s/p admit to [facility] on 1/8/2023 w/ Hypokalemia  See above for extensive list of comorbidities affecting Pt's functional performance at time of assessment  Personal factors affecting Pt at time of IE include:difficulty performing ADLS, difficulty performing IADLS , health management  and environment  Prior to admission, Pt was living at home and had a caregiver for IADLs PRN  Upon evaluation: Pt able to complete transfers and ambulation Landon  Birdia  Pt did require Estuardo to manage toilet hygiene due to decreased standing balance and endurance  The following deficits impact occupational performance: decreased strength, decreased balance and decreased tolerance   Pt to benefit from continued skilled OT services while in the hospital to address deficits as defined above and maximize level of functional independence w ADL's and functional mobility  Occupational performance areas to address include: bathing/shower, toilet hygiene, dressing, health maintenance, functional mobility and clothing management  From OT standpoint, recommendation at time of d/c would be home with home health  Goals   STG Time Frame  STGs 1-2 more sessions   1  Pt will tolerate ~10 minutes of standing activity without seated rest break  2  Pt will complete toilet hygiene Landon  Plan   Treatment Interventions ADL retraining;Functional transfer training;UE strengthening/ROM; Endurance training;Continued evaluation; Activityengagement   Goal Expiration Date 01/17/23   OT Frequency 1-2x/wk   Recommendation   OT Discharge Recommendation Home with home health rehabilitation   AM-[facility] Daily Activity Inpatient   Lower Body Dressing 3   Bathing 3   Toileting 3   Upper Body Dressing 4   Grooming 4   Eating 4   Daily Activity Raw Score 21   Daily Activity Standardized Score (Calc for Raw Score >=11) 44 27    From OT standpoint, recommendation at time of d/c would be home  The patient's raw score on the AM-PAC Daily Activity inpatient short form low function score is 21, standardized score is 44 27  Patients with a standardized score greater than 39 4 are likely to benefit from discharge to home  Please refer to the recommendation of the Occupational Therapist for safe discharge planning

## 2023-01-11 VITALS
BODY MASS INDEX: 40.37 KG/M2 | WEIGHT: 219.36 LBS | HEART RATE: 86 BPM | SYSTOLIC BLOOD PRESSURE: 134 MMHG | HEIGHT: 62 IN | OXYGEN SATURATION: 100 % | DIASTOLIC BLOOD PRESSURE: 91 MMHG | RESPIRATION RATE: 22 BRPM | TEMPERATURE: 96.1 F

## 2023-01-11 LAB — GLUCOSE SERPL-MCNC: 165 MG/DL (ref 65–140)

## 2023-01-11 RX ADMIN — TOPIRAMATE 50 MG: 25 TABLET, FILM COATED ORAL at 08:07

## 2023-01-11 RX ADMIN — LOSARTAN POTASSIUM 100 MG: 50 TABLET, FILM COATED ORAL at 08:07

## 2023-01-11 RX ADMIN — LEVOTHYROXINE SODIUM 125 MCG: 125 TABLET ORAL at 06:00

## 2023-01-11 RX ADMIN — ROFLUMILAST 500 MCG: 250 TABLET ORAL at 08:06

## 2023-01-11 RX ADMIN — PREDNISONE 40 MG: 20 TABLET ORAL at 08:07

## 2023-01-11 RX ADMIN — GABAPENTIN 800 MG: 400 CAPSULE ORAL at 08:07

## 2023-01-11 RX ADMIN — POTASSIUM CHLORIDE 20 MEQ: 1500 TABLET, EXTENDED RELEASE ORAL at 08:07

## 2023-01-11 RX ADMIN — CHOLECALCIFEROL TAB 25 MCG (1000 UNIT) 1000 UNITS: 25 TAB at 08:06

## 2023-01-11 RX ADMIN — LURASIDONE HYDROCHLORIDE 40 MG: 40 TABLET, FILM COATED ORAL at 08:06

## 2023-01-11 RX ADMIN — BICTEGRAVIR SODIUM, EMTRICITABINE, AND TENOFOVIR ALAFENAMIDE FUMARATE 1 TABLET: 50; 200; 25 TABLET ORAL at 08:06

## 2023-01-11 RX ADMIN — APIXABAN 5 MG: 5 TABLET, FILM COATED ORAL at 08:07

## 2023-01-11 RX ADMIN — FUROSEMIDE 40 MG: 40 TABLET ORAL at 08:07

## 2023-01-11 RX ADMIN — INSULIN LISPRO 1 UNITS: 100 INJECTION, SOLUTION INTRAVENOUS; SUBCUTANEOUS at 06:00

## 2023-01-11 RX ADMIN — ATORVASTATIN CALCIUM 20 MG: 20 TABLET, FILM COATED ORAL at 08:07

## 2023-01-11 RX ADMIN — FLUTICASONE FUROATE AND VILANTEROL TRIFENATATE 1 PUFF: 200; 25 POWDER RESPIRATORY (INHALATION) at 08:11

## 2023-01-11 RX ADMIN — DILTIAZEM HYDROCHLORIDE 120 MG: 120 CAPSULE, COATED, EXTENDED RELEASE ORAL at 08:07

## 2023-01-11 NOTE — PLAN OF CARE
Problem: Potential for Falls  Goal: Patient will remain free of falls  Description: INTERVENTIONS:  - Educate patient/family on patient safety including physical limitations  - Instruct patient to call for assistance with activity   - Consult OT/PT to assist with strengthening/mobility   - Keep Call bell within reach  - Keep bed low and locked with side rails adjusted as appropriate  - Keep care items and personal belongings within reach  - Initiate and maintain comfort rounds  - Make Fall Risk Sign visible to staff    - Apply yellow socks and bracelet for high fall risk patients  - Consider moving patient to room near nurses station  1/11/2023 1312 by Jong Blakely RN  Outcome: Completed  1/11/2023 0842 by Jong Blakely RN  Outcome: Progressing     Problem: PAIN - ADULT  Goal: Verbalizes/displays adequate comfort level or baseline comfort level  Description: Interventions:  - Encourage patient to monitor pain and request assistance  - Assess pain using appropriate pain scale  - Administer analgesics based on type and severity of pain and evaluate response  - Implement non-pharmacological measures as appropriate and evaluate response  - Consider cultural and social influences on pain and pain management  - Notify physician/advanced practitioner if interventions unsuccessful or patient reports new pain  1/11/2023 1312 by Jong Blakely RN  Outcome: Completed  1/11/2023 0842 by Jong Blakely RN  Outcome: Progressing     Problem: INFECTION - ADULT  Goal: Absence or prevention of progression during hospitalization  Description: INTERVENTIONS:  - Assess and monitor for signs and symptoms of infection  - Monitor lab/diagnostic results  - Monitor all insertion sites, i e  indwelling lines, tubes, and drains  - Monitor endotracheal if appropriate and nasal secretions for changes in amount and color  - [place] appropriate cooling/warming therapies per order  - Administer medications as ordered  - Instruct and encourage patient and family to use good hand hygiene technique  - Identify and instruct in appropriate isolation precautions for identified infection/condition  1/11/2023 1312 by Neeraj Mcgovern RN  Outcome: Completed  1/11/2023 0842 by Neeraj Mcgovern RN  Outcome: Progressing  Goal: Absence of fever/infection during neutropenic period  Description: INTERVENTIONS:  - Monitor WBC    1/11/2023 1312 by Neeraj Mcgovern RN  Outcome: Completed  1/11/2023 0842 by Neeraj Mcgovern RN  Outcome: Progressing     Problem: SAFETY ADULT  Goal: Patient will remain free of falls  Description: INTERVENTIONS:  - Educate patient/family on patient safety including physical limitations  - Instruct patient to call for assistance with activity   - Consult OT/PT to assist with strengthening/mobility   - Keep Call bell within reach  - Keep bed low and locked with side rails adjusted as appropriate  - Keep care items and personal belongings within reach  - Initiate and maintain comfort rounds  - Make Fall Risk Sign visible to staff    - Apply yellow socks and bracelet for high fall risk patients  - Consider moving patient to room near nurses station  1/11/2023 1312 by Neeraj Mcgovern RN  Outcome: Completed  1/11/2023 0842 by Neeraj Mcgovern RN  Outcome: Progressing  Goal: Maintain or return to baseline ADL function  Description: INTERVENTIONS:  -  Assess patient's ability to carry out ADLs; assess patient's baseline for ADL function and identify physical deficits which impact ability to perform ADLs (bathing, care of mouth/teeth, toileting, grooming, dressing, etc )  - Assess/evaluate cause of self-care deficits   - Assess range of motion  - Assess patient's mobility; develop plan if impaired  - Assess patient's need for assistive devices and provide as appropriate  - Encourage maximum independence but intervene and supervise when necessary  - Involve family in performance of ADLs  - Assess for home care needs following discharge   - Consider OT consult to assist with ADL evaluation and planning for discharge  - Provide patient education as appropriate  1/11/2023 1312 by January Greene RN  Outcome: Completed  1/11/2023 0842 by January Greene RN  Outcome: Progressing  Goal: Maintains/Returns to pre admission functional level  Description: INTERVENTIONS:  - Perform BMAT or MOVE assessment daily    - Set and communicate daily mobility goal to care team and patient/family/caregiver  - Collaborate with rehabilitation services on mobility goals if consulted    - Out of bed for toileting  - Record patient progress and toleration of activity level   1/11/2023 1312 by January Greene RN  Outcome: Completed  1/11/2023 0842 by January Greene RN  Outcome: Progressing     Problem: DISCHARGE PLANNING  Goal: Discharge to home or other facility with appropriate resources  Description: INTERVENTIONS:  - Identify barriers to discharge w/patient and caregiver  - Arrange for needed discharge resources and transportation as appropriate  - Identify discharge learning needs (meds, wound care, etc )  - Arrange for interpretive services to assist at discharge as needed  - Refer to Case Management Department for coordinating discharge planning if the patient needs post-hospital services based on physician/advanced practitioner order or complex needs related to functional status, cognitive ability, or social support system  1/11/2023 1312 by January Greene RN  Outcome: Completed  1/11/2023 0842 by January Greene RN  Outcome: Progressing     Problem: Knowledge Deficit  Goal: Patient/family/caregiver demonstrates understanding of disease process, treatment plan, medications, and discharge instructions  Description: Complete learning assessment and assess knowledge base    Interventions:  - Provide teaching at level of understanding  - Provide teaching via preferred learning methods  1/11/2023 1312 by January Greene RN  Outcome: Completed  1/11/2023 0842 by January Greene RN  Outcome: Progressing     Problem: RESPIRATORY - ADULT  Goal: Achieves optimal ventilation and oxygenation  Description: INTERVENTIONS:  - Assess for changes in respiratory status  - Assess for changes in mentation and behavior  - Position to facilitate oxygenation and minimize respiratory effort  - Oxygen administered by appropriate delivery if ordered  - Initiate smoking cessation education as indicated  - Encourage broncho-pulmonary hygiene including cough, deep breathe, Incentive Spirometry  - Assess the need for suctioning and aspirate as needed  - Assess and instruct to report SOB or any respiratory difficulty  - Respiratory Therapy support as indicated  2023 by Calli Huber RN  Outcome: Completed  2023 by Calli Huber RN  Outcome: Progressing     Problem: METABOLIC, FLUID AND ELECTROLYTES - ADULT  Goal: Glucose maintained within target range  Description: INTERVENTIONS:  - Monitor Blood Glucose as ordered  - Assess for signs and symptoms of hyperglycemia and hypoglycemia  - Administer ordered medications to maintain glucose within target range  - Assess nutritional intake and initiate nutrition service referral as needed  2023 by Calli Huber RN  Outcome: Completed  2023 by Calli Huber RN  Outcome: Progressing

## 2023-01-11 NOTE — PLAN OF CARE
Problem: Potential for Falls  Goal: Patient will remain free of falls  Description: INTERVENTIONS:  - Educate patient/family on patient safety including physical limitations  - Instruct patient to call for assistance with activity   - Consult OT/PT to assist with strengthening/mobility   - Keep Call bell within reach  - Keep bed low and locked with side rails adjusted as appropriate  - Keep care items and personal belongings within reach  - Initiate and maintain comfort rounds  - Make Fall Risk Sign visible to staff    - Apply yellow socks and bracelet for high fall risk patients  - Consider moving patient to room near nurses station  Outcome: Progressing     Problem: PAIN - ADULT  Goal: Verbalizes/displays adequate comfort level or baseline comfort level  Description: Interventions:  - Encourage patient to monitor pain and request assistance  - Assess pain using appropriate pain scale  - Administer analgesics based on type and severity of pain and evaluate response  - Implement non-pharmacological measures as appropriate and evaluate response  - Consider cultural and social influences on pain and pain management  - Notify physician/advanced practitioner if interventions unsuccessful or patient reports new pain  Outcome: Progressing     Problem: INFECTION - ADULT  Goal: Absence or prevention of progression during hospitalization  Description: INTERVENTIONS:  - Assess and monitor for signs and symptoms of infection  - Monitor lab/diagnostic results  - Monitor all insertion sites, i e  indwelling lines, tubes, and drains  - Monitor endotracheal if appropriate and nasal secretions for changes in amount and color  - [place] appropriate cooling/warming therapies per order  - Administer medications as ordered  - Instruct and encourage patient and family to use good hand hygiene technique  - Identify and instruct in appropriate isolation precautions for identified infection/condition  Outcome: Progressing  Goal: Absence of fever/infection during neutropenic period  Description: INTERVENTIONS:  - Monitor WBC    Outcome: Progressing     Problem: SAFETY ADULT  Goal: Patient will remain free of falls  Description: INTERVENTIONS:  - Educate patient/family on patient safety including physical limitations  - Instruct patient to call for assistance with activity   - Consult OT/PT to assist with strengthening/mobility   - Keep Call bell within reach  - Keep bed low and locked with side rails adjusted as appropriate  - Keep care items and personal belongings within reach  - Initiate and maintain comfort rounds    - Apply yellow socks and bracelet for high fall risk patients  - Consider moving patient to room near nurses station  Outcome: Progressing  Goal: Maintain or return to baseline ADL function  Description: INTERVENTIONS:  -  Assess patient's ability to carry out ADLs; assess patient's baseline for ADL function and identify physical deficits which impact ability to perform ADLs (bathing, care of mouth/teeth, toileting, grooming, dressing, etc )  - Assess/evaluate cause of self-care deficits   - Assess range of motion  - Assess patient's mobility; develop plan if impaired  - Assess patient's need for assistive devices and provide as appropriate  - Encourage maximum independence but intervene and supervise when necessary  - Involve family in performance of ADLs  - Assess for home care needs following discharge   - Consider OT consult to assist with ADL evaluation and planning for discharge  - Provide patient education as appropriate  Outcome: Progressing  Goal: Maintains/Returns to pre admission functional level  Description: INTERVENTIONS:  - Perform BMAT or MOVE assessment daily    - Set and communicate daily mobility goal to care team and patient/family/caregiver       - Out of bed for toileting  - Record patient progress and toleration of activity level   Outcome: Progressing     Problem: DISCHARGE PLANNING  Goal: Discharge to home or other facility with appropriate resources  Description: INTERVENTIONS:  - Identify barriers to discharge w/patient and caregiver  - Arrange for needed discharge resources and transportation as appropriate  - Identify discharge learning needs (meds, wound care, etc )  - Arrange for interpretive services to assist at discharge as needed  - Refer to Case Management Department for coordinating discharge planning if the patient needs post-hospital services based on physician/advanced practitioner order or complex needs related to functional status, cognitive ability, or social support system  Outcome: Progressing     Problem: Knowledge Deficit  Goal: Patient/family/caregiver demonstrates understanding of disease process, treatment plan, medications, and discharge instructions  Description: Complete learning assessment and assess knowledge base    Interventions:  - Provide teaching at level of understanding  - Provide teaching via preferred learning methods  Outcome: Progressing     Problem: RESPIRATORY - ADULT  Goal: Achieves optimal ventilation and oxygenation  Description: INTERVENTIONS:  - Assess for changes in respiratory status  - Assess for changes in mentation and behavior  - Position to facilitate oxygenation and minimize respiratory effort  - Oxygen administered by appropriate delivery if ordered  - Initiate smoking cessation education as indicated  - Encourage broncho-pulmonary hygiene including cough, deep breathe, Incentive Spirometry  - Assess the need for suctioning and aspirate as needed  - Assess and instruct to report SOB or any respiratory difficulty  - Respiratory Therapy support as indicated  Outcome: Progressing     Problem: METABOLIC, FLUID AND ELECTROLYTES - ADULT  Goal: Glucose maintained within target range  Description: INTERVENTIONS:  - Monitor Blood Glucose as ordered  - Assess for signs and symptoms of hyperglycemia and hypoglycemia  - Administer ordered medications to maintain glucose within target range  - Assess nutritional intake and initiate nutrition service referral as needed  Outcome: Progressing

## 2023-01-11 NOTE — NURSING NOTE
Pt home with oxygen and transport team, instructions given to pt on home medication as well as as follow up, pt states understanding, no distress on discharge, educated on diet and tobacco cesssation, pt states understanding, no distress on discharge

## 2023-01-12 ENCOUNTER — HOSPITAL ENCOUNTER (EMERGENCY)
Facility: HOSPITAL | Age: 73
Discharge: HOME | End: 2023-01-12
Attending: EMERGENCY MEDICINE

## 2023-01-12 ENCOUNTER — APPOINTMENT (EMERGENCY)
Dept: RADIOLOGY | Facility: HOSPITAL | Age: 73
End: 2023-01-12

## 2023-01-12 VITALS
SYSTOLIC BLOOD PRESSURE: 126 MMHG | OXYGEN SATURATION: 96 % | HEART RATE: 104 BPM | RESPIRATION RATE: 22 BRPM | TEMPERATURE: 97.6 F | DIASTOLIC BLOOD PRESSURE: 65 MMHG

## 2023-01-12 DIAGNOSIS — J44.1 COPD EXACERBATION (HCC): Primary | ICD-10-CM

## 2023-01-12 LAB
ATRIAL RATE: 97 BPM
P AXIS: 55 DEGREES
PR INTERVAL: 164 MS
QRS AXIS: -61 DEGREES
QRSD INTERVAL: 112 MS
QT INTERVAL: 346 MS
QTC INTERVAL: 439 MS
T WAVE AXIS: 48 DEGREES
VENTRICULAR RATE: 97 BPM

## 2023-01-12 RX ORDER — IPRATROPIUM BROMIDE AND ALBUTEROL SULFATE .5; 3 MG/3ML; MG/3ML
1 SOLUTION RESPIRATORY (INHALATION) ONCE
Status: DISCONTINUED | OUTPATIENT
Start: 2023-01-12 | End: 2023-01-12 | Stop reason: HOSPADM

## 2023-01-12 RX ORDER — PREDNISONE 20 MG/1
60 TABLET ORAL ONCE
Status: COMPLETED | OUTPATIENT
Start: 2023-01-12 | End: 2023-01-12

## 2023-01-12 RX ORDER — IPRATROPIUM BROMIDE AND ALBUTEROL SULFATE 2.5; .5 MG/3ML; MG/3ML
3 SOLUTION RESPIRATORY (INHALATION) ONCE
Status: COMPLETED | OUTPATIENT
Start: 2023-01-12 | End: 2023-01-12

## 2023-01-12 RX ORDER — PREDNISONE 20 MG/1
40 TABLET ORAL DAILY
Qty: 8 TABLET | Refills: 0 | Status: SHIPPED | OUTPATIENT
Start: 2023-01-12

## 2023-01-12 RX ORDER — ALBUTEROL SULFATE 2.5 MG/3ML
1 SOLUTION RESPIRATORY (INHALATION) ONCE
Status: DISCONTINUED | OUTPATIENT
Start: 2023-01-12 | End: 2023-01-12 | Stop reason: HOSPADM

## 2023-01-12 RX ORDER — ALBUTEROL SULFATE 2.5 MG/3ML
2.5 SOLUTION RESPIRATORY (INHALATION) EVERY 6 HOURS PRN
Qty: 75 ML | Refills: 0 | Status: SHIPPED | OUTPATIENT
Start: 2023-01-12

## 2023-01-12 RX ADMIN — PREDNISONE 60 MG: 20 TABLET ORAL at 15:59

## 2023-01-12 RX ADMIN — IPRATROPIUM BROMIDE AND ALBUTEROL SULFATE 3 ML: .5; 3 SOLUTION RESPIRATORY (INHALATION) at 16:00
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
## 2023-01-12 NOTE — CASE MANAGEMENT
Case Management ED Progress Note    Patient name Jose Cooper  Location Coleen Baez MRN [id number]  : 1950 Date 2023        OBJECTIVE:  Predictive Model Details         98% Factor Value    Risk of Hospital Admission or ED Visit Model Number of ED Visits 5+     Number of Hospitalizations 5+     Has Medicaid Yes     Is in Relationship No     Has Atrial Fibrillation Yes     Has COPD Yes     Has Anemia Yes     Has CVD Yes     Has Diabetes Yes     Has Asthma Yes     Has CHF Yes     Has PCP Yes            Chief Complaint: SOB (shortness of breath)   Patient Class: Emergency  Preferred Pharmacy:   [address] [facility], [address] [facility]  [address]  [address] [facility] [address]  Phone: [phone] Fax: [address] , [address] [facility] - [address] ,  [address] ,  [address] [facility] [place] [address]  Phone: [phone] Fax: [phone]    Primary Care Provider: Maryann Oseguera DO    Primary Insurance: [facility]  Secondary Insurance: [address] [place] [address]    ED Progress Note:    Ervin Gomez returns with similar complains "unable to breath"   Patient states has a non medical home care attendant and an RN weekly, did not recall specifics  Patient claims is taking all of her medications as prescribed with minimal improvement  Discussed referral to [facility] [employer] for a "welfare check" but declined  Patient Outreach was contact via TT   At this time d/c and follow up care is pending  CM/SW to follow

## 2023-01-12 NOTE — UTILIZATION REVIEW
NOTIFICATION OF ADMISSION DISCHARGE   This is a Notification of Discharge from [address]  Please be advised that this patient has been discharge from our facility  Below you will find the admission and discharge date and time including the patient’s disposition  UTILIZATION REVIEW CONTACT:  John Perrin MA  Utilization   Network Utilization Review Department  Phone: [phone] x carefully listen to the prompts  All voicemails are confidential   Email: [email or web address]     ADMISSION INFORMATION  PRESENTATION DATE: 1/8/2023 12:56 PM  OBERVATION ADMISSION DATE:   INPATIENT ADMISSION DATE: 1/9/23  2:37 PM   DISCHARGE DATE: 1/11/2023  8:46 AM   DISPOSITION:Home with Home Health Care    IMPORTANT INFORMATION:  Send all requests for admission clinical reviews, approved or denied determinations and any other requests to dedicated fax number below belonging to the campus where the patient is receiving treatment   List of dedicated fax numbers:  [address] DENIALS (Administrative/Medical Necessity) [phone]   [address] (Maternity/NICU/Pediatrics) [phone]   [facility] [phone]   [facility] 87 [phone]   [address] [phone]   [address] [facility] [phone] [address] [facility] [phone]   [address] [place], [place] [address] [phone]   [facility]  [phone]   [address] [facility] [phone]   [address] [facility] [address] [facility] [phone]

## 2023-01-12 NOTE — ED PROVIDER NOTES
History  Chief Complaint   Patient presents with   • Shortness of Breath     Pt woke up this morning and felt SOB  Pt has hx of asthma  Denies being sick recently  Pt had an albuterol, and a duo neb  treatment pre-hospital      82 y/o female with a hx of COPD presents with sob, wheezing and chest heaviness for the past 2 days  Pt  Has frequent visits to the ER for the same - this is her 5th ER visit in the past 9 days  No fevers  Pt  Doesn't have a nebulizer machine at home and states that her inhaler only helps a little  Prior to Admission Medications   Prescriptions Last Dose Informant Patient Reported? Taking?   acetaminophen (TYLENOL) 325 mg tablet   No No   Sig: Take 3 tablets (975 mg total) by mouth every 8 (eight) hours as needed for mild pain   albuterol (2 5 mg/3 mL) 0 083 % nebulizer solution   No No   Sig: Take 3 mL (2 5 mg total) by nebulization every 6 (six) hours as needed for wheezing or shortness of breath   apixaban (ELIQUIS) 5 mg   Yes No   Sig: Take 1 tablet by mouth 2 (two) times a day   atorvastatin (LIPITOR) 20 mg tablet   Yes No   Sig: Take 20 mg by mouth daily   bictegravir-emtricitab-tenofovir alafenamide (Biktarvy) -25 MG tablet   Yes No   Sig: Take 1 tablet by mouth daily   budesonide (PULMICORT) 0 5 mg/2 mL nebulizer solution   No No   Sig: Take 2 mL (0 5 mg total) by nebulization 2 (two) times a day Rinse mouth after use    carboxymethylcellulose 0 5 % SOLN   No No   Sig: Administer 1 drop into the left eye 3 (three) times a day as needed for dry eyes   cholecalciferol (VITAMIN D3) 1,000 units tablet   Yes No   Sig: Take 1,000 Units by mouth daily   diltiazem (CARDIZEM CD) 120 mg 24 hr capsule   No No   Sig: Take 1 capsule (120 mg total) by mouth daily   fluticasone-umeclidinium-vilanterol (Trelegy Ellipta) 200-62 5-25 mcg/actuation AEPB inhaler   No No   Sig: Inhale 1 puff daily Rinse mouth after use     furosemide (LASIX) 40 mg tablet   No No   Sig: Take 1 tablet (40 mg total) by mouth 2 (two) times a day   gabapentin (NEURONTIN) 400 mg capsule   Yes No   Sig: Take 800 mg by mouth 3 (three) times a day     levothyroxine 125 mcg tablet   Yes No   Sig: Take 125 mcg by mouth daily   losartan (COZAAR) 100 MG tablet   Yes No   Sig: Take 100 mg by mouth daily   lurasidone (LATUDA) 40 mg tablet   Yes No   Sig: Take 40 mg by mouth   metFORMIN (GLUCOPHAGE) 500 mg tablet   Yes No   Sig: Take 500 mg by mouth daily   montelukast (SINGULAIR) 10 mg tablet   Yes No   Sig: Take 10 mg by mouth daily at bedtime   nicotine (NICODERM CQ) 21 mg/24 hr TD 24 hr patch   No No   Sig: Place 1 patch on the skin daily   potassium chloride (K-DUR,KLOR-CON) 20 mEq tablet   No No   Sig: Take 1 tablet (20 mEq total) by mouth daily   roflumilast (DALIRESP) 250 MCG tablet   No No   Sig: Take 2 tablets (500 mcg total) by mouth daily   topiramate (TOPAMAX) 50 MG tablet   Yes No   Sig: Take 50 mg by mouth 2 (two) times a day   traMADol (ULTRAM) 50 mg tablet   Yes No   Sig: Take 50 mg by mouth as needed      Facility-Administered Medications: None       Past Medical History:   Diagnosis Date   • Asthma    • Bipolar 1 disorder (HCC)    • Cardiac disease    • COPD (chronic obstructive pulmonary disease) (HCC)    • HIV (human immunodeficiency virus infection) ([facility] ) 03/24/2016   • Hypertension    • Osteoarthritis        Past Surgical History:   Procedure Laterality Date   • HERNIA REPAIR     • LUMBAR FUSION N/A 4/10/2018    Procedure: T9/10 discectomy with T9-T11 fusion;  Surgeon: Griselda Thomas MD;  Location: [facility];  Service: Neurosurgery   • THYROIDECTOMY         Family History   Problem Relation Age of Onset   • No Known Problems Mother    • Diabetes Father    • Heart disease Neg Hx    • Cancer Neg Hx      I have reviewed and agree with the history as documented      E-Cigarette/Vaping   • E-Cigarette Use Never User      E-Cigarette/Vaping Substances     Social History     Tobacco Use   • Smoking status: Former Packs/day: 0 50     Years: 45 00     Pack years: 22 50     Types: Cigarettes   • Smokeless tobacco: Never   Vaping Use   • Vaping Use: Never used   Substance Use Topics   • Alcohol use: Not Currently     Alcohol/week: 2 0 standard drinks     Types: 1 Cans of beer, 1 Shots of liquor per week     Comment: only on holidays   • Drug use: Not Currently     Comment: former IV drug user       Review of Systems   Constitutional: Negative for appetite change, fatigue and fever  HENT: Negative for rhinorrhea and sore throat  Eyes: Negative for pain  Respiratory: Positive for cough, shortness of breath and wheezing  Cardiovascular: Positive for chest pain  Negative for leg swelling  Gastrointestinal: Negative for abdominal pain, diarrhea and vomiting  Genitourinary: Negative for dysuria and flank pain  Musculoskeletal: Negative for back pain and neck pain  Skin: Negative for rash  Neurological: Negative for syncope and headaches  Psychiatric/Behavioral:        Mood normal       Physical Exam  Physical Exam  Vitals and nursing note reviewed  Constitutional:       Appearance: She is well-developed  HENT:      Head: Normocephalic and atraumatic  Right Ear: External ear normal       Left Ear: External ear normal    Eyes:      General: No scleral icterus  Extraocular Movements: Extraocular movements intact  Cardiovascular:      Rate and Rhythm: Normal rate and regular rhythm  Pulmonary:      Effort: Pulmonary effort is normal  No respiratory distress  Comments: Occasional exp  wheezing  Abdominal:      Palpations: Abdomen is soft  Tenderness: There is no abdominal tenderness  Musculoskeletal:         General: No deformity or signs of injury  Normal range of motion  Cervical back: Normal range of motion and neck supple  Right lower leg: No edema  Left lower leg: No edema  Skin:     General: Skin is warm and dry  Coloration: Skin is not jaundiced or pale  Neurological:      General: No focal deficit present  Mental Status: She is alert and oriented to person, place, and time  Psychiatric:         Mood and Affect: Mood normal          Behavior: Behavior normal          Vital Signs  ED Triage Vitals [01/12/23 1450]   Temperature Pulse Respirations Blood Pressure SpO2   97 6 °F (36 4 °C) 104 22 126/65 96 %      Temp Source Heart Rate Source Patient Position - Orthostatic VS BP Location FiO2 (%)   Oral Monitor Sitting Left arm --      Pain Score       6           Vitals:    01/12/23 1450   BP: 126/65   Pulse: 104   Patient Position - Orthostatic VS: Sitting         Visual Acuity      ED Medications  Medications   albuterol (FOR EMS ONLY) (2 5 mg/3 mL) 0 083 % inhalation solution 2 5 mg (has no administration in time range)   ipratropium-albuterol (FOR EMS ONLY) (DUO-NEB) 0 5-2 5 mg/3 mL inhalation solution 3 mL (has no administration in time range)   ipratropium-albuterol (DUO-NEB) 0 5-2 5 mg/3 mL inhalation solution 3 mL (3 mL Nebulization Given 1/12/23 1600)   predniSONE tablet 60 mg (60 mg Oral Given 1/12/23 1559)       Diagnostic Studies  Results Reviewed     Procedure Component Value Units Date/Time    CBC and differential [[id number]] Updated: 01/12/23 1646    Lab Status: No result Specimen: Blood from Arm, Left     Comprehensive metabolic panel [[id number]] Updated: 01/12/23 1645    Lab Status: No result Specimen: Blood from Arm, Left     NT-BNP PRO [[id number]] Updated: 01/12/23 1645    Lab Status: No result Specimen: Blood from Arm, Left     HS Troponin 0hr (reflex protocol) [[id number]]     Lab Status: No result Specimen: Blood from Arm, Left                  XR chest 1 view portable   Final Result by Misha Serna MD (01/12 1703)      No acute cardiopulmonary disease                    Workstation performed: [id number]                    Procedures  Procedures         ED Course                                             Medical Decision Making  The  from the ER saw the pt  Again and offered office of the aging to come out to her house and help her and she didn't want anyone to come to her house  Pt  Was stuck multiple times for bloodwork - she didn't want to get stuck anymore  She says she knows this is her copd acting up    ekg no ischemia    COPD exacerbation ([facility] ): self-limited or minor problem  Amount and/or Complexity of Data Reviewed  Labs: ordered  Radiology: ordered  Risk  Prescription drug management  Disposition  Final diagnoses:   COPD exacerbation ([facility] )     Time reflects when diagnosis was documented in both MDM as applicable and the Disposition within this note     Time User Action Codes Description Comment    1/12/2023  4:53 PM Storm Bronson Add [J44 1] COPD exacerbation [facility])       ED Disposition     ED Disposition   Discharge    Condition   Stable    Date/Time   Thu Jan 12, 2023  4:53 PM    Comment   Edward oGrdon discharge to home/self care                 Follow-up Information     Follow up With Specialties Details Why Contact Info    Ish Garcia DO    [address] [facility] [address]  [place] [address]  [phone]            Patient's Medications   Discharge Prescriptions    ALBUTEROL (2 5 MG/3 ML) 0 083 % NEBULIZER SOLUTION    Take 3 mL (2 5 mg total) by nebulization every 6 (six) hours as needed for wheezing or shortness of breath       Start Date: 1/12/2023 End Date: --       Order Dose: 2 5 mg       Quantity: 75 mL    Refills: 0    PREDNISONE 20 MG TABLET    Take 2 tablets (40 mg total) by mouth daily       Start Date: 1/12/2023 End Date: --       Order Dose: 40 mg       Quantity: 8 tablet    Refills: 0       Outpatient Discharge Orders   Nebulizer       PDMP Review       Value Time User    PDMP Reviewed  Yes 11/3/2022  8:54 PM Stefania Tran DO          ED Provider  Electronically Signed by           Cortez Berger MD  01/12/23

## 2023-01-16 ENCOUNTER — PATIENT OUTREACH (OUTPATIENT)
Dept: CASE MANAGEMENT | Facility: OTHER | Age: 73
End: 2023-01-16

## 2023-01-16 NOTE — PROGRESS NOTES
Roanna Schlatter OP RT CM called and was unable to leave  due to Vm being full  I will outreach daughter  I spoke with daughter Breonna Mcintyre at great length regarding her mom  Tawana's breathing, medications and O2  Breonna Mcintyre states that her mom likes to call 911 and is attention seeking; also stating that her mom tells health care professionals that she doesn't have nebulizer equipment, medications etc    Breonna Mcintyre states that her mom has 9 boxes of Albuterol nebulizer solution but unsure about Trelegy  I informed her that a Rx for Trelegy was ordered on January 3, 2023  Breonna Mcintyre has asthma and is familiar with the inhalers and nebulizers and understands how to use and the importance of the maintenance inhalers  Tawana uses 2lpm nasal cannula and although the family doesn't own a pulse ox, home health checks regularly  Breonna Mcintyre is interested in purchasing a pulse ox for home  Tawana needs constant reminding to use nebulizer  Breonna Mcintyre stated that she has been working a lot and her mom threatens to call 911  Breonna Mcintyre has talked with her mom regarding long term care and Jade Noe is not interested  Breonna Mcintyre isn't sure if her mom should be wearing a BIPAP at night  I will outreach to [facility] and get more information  She sees [facility] pulmonary for her care  Breonna Mcintyre also states that her mom is non compliant with her O2, not wearing regularly nor using when outside the home  She currently uses tanks for portability and needs a POC evaluation due to the weight of the tanks and her rheumatoid arthritis  I will call later in the week once I have information from Sadie Navarro and Breonna Mcintyre has my contact information

## 2023-01-17 ENCOUNTER — PATIENT OUTREACH (OUTPATIENT)
Dept: CASE MANAGEMENT | Facility: OTHER | Age: 73
End: 2023-01-17

## 2023-01-17 NOTE — PROGRESS NOTES
Rekha Greenberg OP RT CM called Merlene Cabot X 3 attempts and phone was disconnected after a few rings  I will outreach again Shabbirferlakeisha Dear in effort to get DME information to her for her mom

## 2023-01-20 ENCOUNTER — PATIENT OUTREACH (OUTPATIENT)
Dept: CASE MANAGEMENT | Facility: OTHER | Age: 73
End: 2023-01-20

## 2023-01-20 NOTE — PROGRESS NOTES
Layla Ball OP RT CM called and phone rang once and disconnected X 2  I will attempt again next week      Iformation from [employer]: Pt   Is to wear her BIPAP nightly with 2lpm

## 2023-01-30 ENCOUNTER — PATIENT OUTREACH (OUTPATIENT)
Dept: CASE MANAGEMENT | Facility: OTHER | Age: 73
End: 2023-01-30

## 2023-01-30 NOTE — PROGRESS NOTES
OP RT CM called and spoke with patient regarding her breathing, medication and BIPAP/O2  Lizabeth Dodson stated that she is feeling 'so so'  I asked her if she is using her medication and she would like someone to show her how to use it (Trelegy)  I offered to instruct her over the phone how to use and she declined  She cannot find her Trelegy and isn't using  She is using her nebulizer and O2 when needed  She stated that she is not using her BIPAP regularly and I explained how important it is to use nightly and even during the day with napping  I asked if her daughter Jaylin Ordaz was available to talk and she stated that she is at work  I asked Lizabeth Dodson to find her medication and I will have a house call to instruct her how to use  I will call at the end of the week in hoping patient can find her medication  Lizabeth Dodson was eager to get off the phone and abruptly said goodbye and hung up  I will outreach in two days and nargis/Michelle have my contact information

## 2023-02-06 ENCOUNTER — PATIENT OUTREACH (OUTPATIENT)
Dept: CASE MANAGEMENT | Facility: OTHER | Age: 73
End: 2023-02-06

## 2023-02-06 NOTE — PROGRESS NOTES
An attempt was made to contact Tawana to discuss the usage of her Trelegy  The mailbox was full  Unable to record a message  Another attempt will be  Made later this week

## 2023-02-10 ENCOUNTER — PATIENT OUTREACH (OUTPATIENT)
Dept: CASE MANAGEMENT | Facility: OTHER | Age: 73
End: 2023-02-10

## 2023-02-10 NOTE — PROGRESS NOTES
OP RT CM called Tawana and spoke with her regarding her medications and BIPAP  She found her Trelegy inhaler and has been using  I instructed her on use and proper mouth rinsing after summers  She understood  She has not been using her BIPAP HS  She stated that she needs someone to come out and show her how to use  I reached out to [employer], awaiting a response  Cynthia Engel has my contact information and I will outreach once I get more information or in two weeks

## 2023-02-24 ENCOUNTER — PATIENT OUTREACH (OUTPATIENT)
Dept: CASE MANAGEMENT | Facility: OTHER | Age: 73
End: 2023-02-24

## 2023-02-24 NOTE — PROGRESS NOTES
OP RT CM called and spoke with daughter Jennifer Foy regarding her mom Perfecto Cluster  Perfecto Cluster is doing well  Taking all respiratory medications as prescribed but getting low on medications  She is in between changing services from [facility] to [facility] and does not have established care with pulmonary  I advised her to call her mom's PCP from [facility] to get a refill and I encouraged her to reschedule her appointment with Dr Karli Evans  from pulmonary  Willme Bo is not wearing her BIPAP per daughter dov to not having a cord for the unit  I inquired with [facility]/Suha', awaiting a response  Tawana appears at her baseline, no fever, chills nor sputum at this time  I will outreach once I get more information and Jennifer Foy has my contact information 
detailed exam

## 2023-02-28 ENCOUNTER — PATIENT OUTREACH (OUTPATIENT)
Dept: CASE MANAGEMENT | Facility: OTHER | Age: 73
End: 2023-02-28

## 2023-02-28 NOTE — PROGRESS NOTES
OP RT NILAM called and spoke with Nargis regarding her plug for her BIPAP machine  [employer] has one for her at their office  At [place] [id number]  For pickup  I told nargis the information  She asked that I call back to tell her caregiver in an hour  Will do    _____________________________  OP RT NILAM called and left  with address for [employer] for BIPAP cord replacement  Their address is [address]  I left the information on her phone to get equipment  I will outreach in two weeks and Lizabeth Dodson has my contact information

## 2023-03-13 ENCOUNTER — PATIENT OUTREACH (OUTPATIENT)
Dept: CASE MANAGEMENT | Facility: OTHER | Age: 73
End: 2023-03-13

## 2023-03-13 NOTE — PROGRESS NOTES
Anastasia Burt OP RT CM called and left a VM requesting a return phone call  I will outreach next week unless I hear back from patient prior

## 2023-03-20 ENCOUNTER — PATIENT OUTREACH (OUTPATIENT)
Dept: CASE MANAGEMENT | Facility: OTHER | Age: 73
End: 2023-03-20

## 2023-03-20 NOTE — PROGRESS NOTES
OP RT CM called and seems like phone was picked up but no answer and no one speaking when prompted  I will outreach in two weeks unless I hear back from family/Tawana  The family and patient have my contact information

## 2023-04-03 ENCOUNTER — PATIENT OUTREACH (OUTPATIENT)
Dept: CASE MANAGEMENT | Facility: OTHER | Age: 73
End: 2023-04-03

## 2023-04-03 NOTE — PROGRESS NOTES
Crispin Brown OP RT CM called and left a VM requesting a return phone call     I will remove from outreach list if I do not hear back from Tawana/family

## 2023-04-17 ENCOUNTER — APPOINTMENT (EMERGENCY)
Dept: RADIOLOGY | Facility: HOSPITAL | Age: 73
End: 2023-04-17

## 2023-04-17 ENCOUNTER — HOSPITAL ENCOUNTER (INPATIENT)
Facility: HOSPITAL | Age: 73
LOS: 2 days | Discharge: HOME | End: 2023-04-19
Attending: EMERGENCY MEDICINE | Admitting: INTERNAL MEDICINE

## 2023-04-17 DIAGNOSIS — I50.9 CHF (CONGESTIVE HEART FAILURE) (HCC): ICD-10-CM

## 2023-04-17 DIAGNOSIS — B20 HIV (HUMAN IMMUNODEFICIENCY VIRUS INFECTION) (HCC): ICD-10-CM

## 2023-04-17 DIAGNOSIS — F31.9 BIPOLAR 1 DISORDER (HCC): ICD-10-CM

## 2023-04-17 DIAGNOSIS — J44.1 COPD EXACERBATION (HCC): Primary | ICD-10-CM

## 2023-04-17 LAB
2HR DELTA HS TROPONIN: 0 NG/L
ALBUMIN SERPL BCP-MCNC: 4.3 G/DL (ref 3.5–5)
ALP SERPL-CCNC: 66 U/L (ref 34–104)
ALT SERPL W P-5'-P-CCNC: 10 U/L (ref 7–52)
ANION GAP SERPL CALCULATED.3IONS-SCNC: 9 MMOL/L (ref 4–13)
AST SERPL W P-5'-P-CCNC: 13 U/L (ref 13–39)
ATRIAL RATE: 95 BPM
ATRIAL RATE: 98 BPM
BASOPHILS # BLD AUTO: 0.05 THOUSANDS/ΜL (ref 0–0.1)
BASOPHILS NFR BLD AUTO: 1 % (ref 0–1)
BILIRUB SERPL-MCNC: 0.5 MG/DL (ref 0.2–1)
BNP SERPL-MCNC: 70 PG/ML (ref 0–100)
BUN SERPL-MCNC: 18 MG/DL (ref 5–25)
CALCIUM SERPL-MCNC: 11 MG/DL (ref 8.4–10.2)
CARDIAC TROPONIN I PNL SERPL HS: 22 NG/L
CARDIAC TROPONIN I PNL SERPL HS: 22 NG/L
CHLORIDE SERPL-SCNC: 111 MMOL/L (ref 96–108)
CO2 SERPL-SCNC: 22 MMOL/L (ref 21–32)
CREAT SERPL-MCNC: 1.12 MG/DL (ref 0.6–1.3)
EOSINOPHIL # BLD AUTO: 0.04 THOUSAND/ΜL (ref 0–0.61)
EOSINOPHIL NFR BLD AUTO: 1 % (ref 0–6)
ERYTHROCYTE [DISTWIDTH] IN BLOOD BY AUTOMATED COUNT: 16.1 % (ref 11.6–15.1)
GFR SERPL CREATININE-BSD FRML MDRD: 49 ML/MIN/1.73SQ M
GLUCOSE SERPL-MCNC: 92 MG/DL (ref 65–140)
HCT VFR BLD AUTO: 33.1 % (ref 34.8–46.1)
HGB BLD-MCNC: 10.2 G/DL (ref 11.5–15.4)
IMM GRANULOCYTES # BLD AUTO: 0.01 THOUSAND/UL (ref 0–0.2)
IMM GRANULOCYTES NFR BLD AUTO: 0 % (ref 0–2)
LYMPHOCYTES # BLD AUTO: 1.69 THOUSANDS/ΜL (ref 0.6–4.47)
LYMPHOCYTES NFR BLD AUTO: 32 % (ref 14–44)
MCH RBC QN AUTO: 27.7 PG (ref 26.8–34.3)
MCHC RBC AUTO-ENTMCNC: 30.8 G/DL (ref 31.4–37.4)
MCV RBC AUTO: 90 FL (ref 82–98)
MONOCYTES # BLD AUTO: 0.53 THOUSAND/ΜL (ref 0.17–1.22)
MONOCYTES NFR BLD AUTO: 10 % (ref 4–12)
NEUTROPHILS # BLD AUTO: 2.99 THOUSANDS/ΜL (ref 1.85–7.62)
NEUTS SEG NFR BLD AUTO: 56 % (ref 43–75)
NRBC BLD AUTO-RTO: 0 /100 WBCS
P AXIS: 35 DEGREES
P AXIS: 42 DEGREES
PLATELET # BLD AUTO: 338 THOUSANDS/UL (ref 149–390)
PMV BLD AUTO: 10.1 FL (ref 8.9–12.7)
POTASSIUM SERPL-SCNC: 3.6 MMOL/L (ref 3.5–5.3)
PR INTERVAL: 164 MS
PR INTERVAL: 182 MS
PROCALCITONIN SERPL-MCNC: <0.05 NG/ML
PROT SERPL-MCNC: 7.1 G/DL (ref 6.4–8.4)
QRS AXIS: -50 DEGREES
QRS AXIS: -53 DEGREES
QRSD INTERVAL: 104 MS
QRSD INTERVAL: 108 MS
QT INTERVAL: 362 MS
QT INTERVAL: 388 MS
QTC INTERVAL: 454 MS
QTC INTERVAL: 495 MS
RBC # BLD AUTO: 3.68 MILLION/UL (ref 3.81–5.12)
SODIUM SERPL-SCNC: 142 MMOL/L (ref 135–147)
T WAVE AXIS: 20 DEGREES
T WAVE AXIS: 35 DEGREES
VENTRICULAR RATE: 95 BPM
VENTRICULAR RATE: 98 BPM
WBC # BLD AUTO: 5.31 THOUSAND/UL (ref 4.31–10.16)

## 2023-04-17 RX ORDER — DILTIAZEM HYDROCHLORIDE 120 MG/1
120 CAPSULE, COATED, EXTENDED RELEASE ORAL DAILY
Status: DISCONTINUED | OUTPATIENT
Start: 2023-04-18 | End: 2023-04-19 | Stop reason: HOSPADM

## 2023-04-17 RX ORDER — SODIUM CHLORIDE FOR INHALATION 0.9 %
3 VIAL, NEBULIZER (ML) INHALATION
Status: DISCONTINUED | OUTPATIENT
Start: 2023-04-17 | End: 2023-04-17

## 2023-04-17 RX ORDER — NICOTINE 21 MG/24HR
1 PATCH, TRANSDERMAL 24 HOURS TRANSDERMAL DAILY
Status: DISCONTINUED | OUTPATIENT
Start: 2023-04-18 | End: 2023-04-19 | Stop reason: HOSPADM

## 2023-04-17 RX ORDER — BUDESONIDE 0.5 MG/2ML
0.5 INHALANT ORAL
Status: DISCONTINUED | OUTPATIENT
Start: 2023-04-17 | End: 2023-04-19 | Stop reason: HOSPADM

## 2023-04-17 RX ORDER — SODIUM CHLORIDE FOR INHALATION 0.9 %
3 VIAL, NEBULIZER (ML) INHALATION ONCE
Status: COMPLETED | OUTPATIENT
Start: 2023-04-17 | End: 2023-04-17

## 2023-04-17 RX ORDER — METHYLPREDNISOLONE SODIUM SUCCINATE 125 MG/2ML
125 INJECTION, POWDER, LYOPHILIZED, FOR SOLUTION INTRAMUSCULAR; INTRAVENOUS ONCE
Status: COMPLETED | OUTPATIENT
Start: 2023-04-17 | End: 2023-04-17

## 2023-04-17 RX ORDER — DOCUSATE SODIUM 100 MG/1
100 CAPSULE, LIQUID FILLED ORAL 2 TIMES DAILY
Status: DISCONTINUED | OUTPATIENT
Start: 2023-04-17 | End: 2023-04-19 | Stop reason: HOSPADM

## 2023-04-17 RX ORDER — ROFLUMILAST 250 UG/1
500 TABLET ORAL DAILY
Status: DISCONTINUED | OUTPATIENT
Start: 2023-04-18 | End: 2023-04-19 | Stop reason: HOSPADM

## 2023-04-17 RX ORDER — METHYLPREDNISOLONE SODIUM SUCCINATE 40 MG/ML
40 INJECTION, POWDER, LYOPHILIZED, FOR SOLUTION INTRAMUSCULAR; INTRAVENOUS EVERY 8 HOURS
Status: DISCONTINUED | OUTPATIENT
Start: 2023-04-17 | End: 2023-04-19 | Stop reason: HOSPADM

## 2023-04-17 RX ORDER — ATORVASTATIN CALCIUM 20 MG/1
20 TABLET, FILM COATED ORAL
Status: DISCONTINUED | OUTPATIENT
Start: 2023-04-17 | End: 2023-04-19 | Stop reason: HOSPADM

## 2023-04-17 RX ORDER — LURASIDONE HYDROCHLORIDE 20 MG/1
20 TABLET, FILM COATED ORAL
Status: DISCONTINUED | OUTPATIENT
Start: 2023-04-18 | End: 2023-04-19 | Stop reason: HOSPADM

## 2023-04-17 RX ORDER — BENZTROPINE MESYLATE 1 MG/1
1 TABLET ORAL 2 TIMES DAILY
Status: DISCONTINUED | OUTPATIENT
Start: 2023-04-17 | End: 2023-04-19 | Stop reason: HOSPADM

## 2023-04-17 RX ORDER — MONTELUKAST SODIUM 10 MG/1
10 TABLET ORAL
Status: DISCONTINUED | OUTPATIENT
Start: 2023-04-17 | End: 2023-04-19 | Stop reason: HOSPADM

## 2023-04-17 RX ORDER — GABAPENTIN 300 MG/1
300 CAPSULE ORAL 3 TIMES DAILY
Status: DISCONTINUED | OUTPATIENT
Start: 2023-04-17 | End: 2023-04-19 | Stop reason: HOSPADM

## 2023-04-17 RX ORDER — ALBUTEROL SULFATE 2.5 MG/3ML
1 SOLUTION RESPIRATORY (INHALATION) ONCE
Status: COMPLETED | OUTPATIENT
Start: 2023-04-17 | End: 2023-04-17

## 2023-04-17 RX ORDER — GUAIFENESIN 600 MG/1
600 TABLET, EXTENDED RELEASE ORAL 2 TIMES DAILY
Status: DISCONTINUED | OUTPATIENT
Start: 2023-04-17 | End: 2023-04-19 | Stop reason: HOSPADM

## 2023-04-17 RX ORDER — LEVOTHYROXINE SODIUM 0.12 MG/1
125 TABLET ORAL
Status: DISCONTINUED | OUTPATIENT
Start: 2023-04-18 | End: 2023-04-19 | Stop reason: HOSPADM

## 2023-04-17 RX ORDER — LANOLIN ALCOHOL/MO/W.PET/CERES
6 CREAM (GRAM) TOPICAL
Status: DISCONTINUED | OUTPATIENT
Start: 2023-04-17 | End: 2023-04-19 | Stop reason: HOSPADM

## 2023-04-17 RX ORDER — IPRATROPIUM BROMIDE AND ALBUTEROL SULFATE .5; 3 MG/3ML; MG/3ML
1 SOLUTION RESPIRATORY (INHALATION) ONCE
Status: COMPLETED | OUTPATIENT
Start: 2023-04-17 | End: 2023-04-17

## 2023-04-17 RX ORDER — FUROSEMIDE 40 MG/1
40 TABLET ORAL 2 TIMES DAILY
Status: DISCONTINUED | OUTPATIENT
Start: 2023-04-17 | End: 2023-04-19 | Stop reason: HOSPADM

## 2023-04-17 RX ORDER — ACETAMINOPHEN 325 MG/1
975 TABLET ORAL EVERY 8 HOURS PRN
Status: DISCONTINUED | OUTPATIENT
Start: 2023-04-17 | End: 2023-04-19 | Stop reason: HOSPADM

## 2023-04-17 RX ORDER — TOPIRAMATE 25 MG/1
50 TABLET ORAL 2 TIMES DAILY
Status: DISCONTINUED | OUTPATIENT
Start: 2023-04-17 | End: 2023-04-19 | Stop reason: HOSPADM

## 2023-04-17 RX ORDER — LEVALBUTEROL 1.25 MG/.5ML
1.25 SOLUTION, CONCENTRATE RESPIRATORY (INHALATION)
Status: DISCONTINUED | OUTPATIENT
Start: 2023-04-17 | End: 2023-04-19 | Stop reason: HOSPADM

## 2023-04-17 RX ADMIN — ALBUTEROL SULFATE 10 MG: 2.5 SOLUTION RESPIRATORY (INHALATION) at 14:37

## 2023-04-17 RX ADMIN — DOCUSATE SODIUM 100 MG: 100 CAPSULE, LIQUID FILLED ORAL at 18:28

## 2023-04-17 RX ADMIN — GABAPENTIN 300 MG: 300 CAPSULE ORAL at 21:34

## 2023-04-17 RX ADMIN — MONTELUKAST 10 MG: 10 TABLET, FILM COATED ORAL at 21:35

## 2023-04-17 RX ADMIN — FUROSEMIDE 40 MG: 40 TABLET ORAL at 18:28

## 2023-04-17 RX ADMIN — Medication 3 ML: at 14:37

## 2023-04-17 RX ADMIN — METHYLPREDNISOLONE SODIUM SUCCINATE 125 MG: 125 INJECTION, POWDER, FOR SOLUTION INTRAMUSCULAR; INTRAVENOUS at 14:39

## 2023-04-17 RX ADMIN — IPRATROPIUM BROMIDE 0.5 MG: 0.5 SOLUTION RESPIRATORY (INHALATION) at 20:00

## 2023-04-17 RX ADMIN — IPRATROPIUM BROMIDE 1 MG: 0.5 SOLUTION RESPIRATORY (INHALATION) at 14:37

## 2023-04-17 RX ADMIN — AZITHROMYCIN 500 MG: 500 INJECTION, POWDER, LYOPHILIZED, FOR SOLUTION INTRAVENOUS at 15:35

## 2023-04-17 RX ADMIN — BENZTROPINE MESYLATE 1 MG: 1 TABLET ORAL at 18:28

## 2023-04-17 RX ADMIN — BUDESONIDE 0.5 MG: 0.5 INHALANT RESPIRATORY (INHALATION) at 20:00

## 2023-04-17 RX ADMIN — TOPIRAMATE 50 MG: 25 TABLET, FILM COATED ORAL at 18:28

## 2023-04-17 RX ADMIN — GABAPENTIN 300 MG: 300 CAPSULE ORAL at 18:28

## 2023-04-17 RX ADMIN — GUAIFENESIN 600 MG: 600 TABLET, EXTENDED RELEASE ORAL at 18:28

## 2023-04-17 RX ADMIN — ATORVASTATIN CALCIUM 20 MG: 20 TABLET, FILM COATED ORAL at 18:28

## 2023-04-17 RX ADMIN — ACETAMINOPHEN 325MG 975 MG: 325 TABLET ORAL at 20:29

## 2023-04-17 RX ADMIN — Medication 6 MG: at 21:34

## 2023-04-17 RX ADMIN — LEVALBUTEROL 1.25 MG: 1.25 SOLUTION, CONCENTRATE RESPIRATORY (INHALATION) at 20:00

## 2023-04-17 RX ADMIN — APIXABAN 5 MG: 5 TABLET, FILM COATED ORAL at 18:28

## 2023-04-17 RX ADMIN — METHYLPREDNISOLONE SODIUM SUCCINATE 40 MG: 40 INJECTION, POWDER, FOR SOLUTION INTRAMUSCULAR; INTRAVENOUS at 23:07

## 2023-04-17 NOTE — ED PROVIDER NOTES
History  Chief Complaint   Patient presents with   • Shortness of Breath     Pt started with SOB today, seen many times for the same  Arrives on a neb tx from EMS  Denies pain  56-year-old female past medical history significant for COPD and CHF, HIV, schizophrenia that presents ED today for shortness breath  Patient states the shortness breath started earlier today  She is been seen many times for the past for similar symptoms however she says that today her symptoms feel little bit worse  She states that she is been reportedly compliant with all her medications at home  She denies any chest pain  She also had secondary complaint of a lipsmacking motion that she says started 2 days ago  She states that this is never happened before and has no pain  Prior to Admission Medications   Prescriptions Last Dose Informant Patient Reported?  Taking?   acetaminophen (TYLENOL) 325 mg tablet   No Yes   Sig: Take 3 tablets (975 mg total) by mouth every 8 (eight) hours as needed for mild pain   albuterol (2 5 mg/3 mL) 0 083 % nebulizer solution   No Yes   Sig: Take 3 mL (2 5 mg total) by nebulization every 6 (six) hours as needed for wheezing or shortness of breath   albuterol (2 5 mg/3 mL) 0 083 % nebulizer solution   No Yes   Sig: Take 3 mL (2 5 mg total) by nebulization every 6 (six) hours as needed for wheezing or shortness of breath   apixaban (ELIQUIS) 5 mg   Yes Yes   Sig: Take 1 tablet by mouth 2 (two) times a day   atorvastatin (LIPITOR) 20 mg tablet   Yes Yes   Sig: Take 20 mg by mouth daily   bictegravir-emtricitab-tenofovir alafenamide (Biktarvy) -25 MG tablet   Yes Yes   Sig: Take 1 tablet by mouth daily   budesonide (PULMICORT) 0 5 mg/2 mL nebulizer solution   No Yes   Sig: Take 2 mL (0 5 mg total) by nebulization 2 (two) times a day Rinse mouth after use    carboxymethylcellulose 0 5 % SOLN   No Yes   Sig: Administer 1 drop into the left eye 3 (three) times a day as needed for dry eyes cholecalciferol (VITAMIN D3) 1,000 units tablet   Yes Yes   Sig: Take 1,000 Units by mouth daily   diltiazem (CARDIZEM CD) 120 mg 24 hr capsule   No Yes   Sig: Take 1 capsule (120 mg total) by mouth daily   fluticasone-umeclidinium-vilanterol (Trelegy Ellipta) 200-62 5-25 mcg/actuation AEPB inhaler   No Yes   Sig: Inhale 1 puff daily Rinse mouth after use     furosemide (LASIX) 40 mg tablet   No Yes   Sig: Take 1 tablet (40 mg total) by mouth 2 (two) times a day   gabapentin (NEURONTIN) 400 mg capsule   Yes Yes   Sig: Take 800 mg by mouth 3 (three) times a day     levothyroxine 125 mcg tablet   Yes Yes   Sig: Take 125 mcg by mouth daily   losartan (COZAAR) 100 MG tablet   Yes Yes   Sig: Take 100 mg by mouth daily   lurasidone (LATUDA) 40 mg tablet   Yes Yes   Sig: Take 40 mg by mouth   metFORMIN (GLUCOPHAGE) 500 mg tablet   Yes Yes   Sig: Take 500 mg by mouth daily   montelukast (SINGULAIR) 10 mg tablet   Yes Yes   Sig: Take 10 mg by mouth daily at bedtime   nicotine (NICODERM CQ) 21 mg/24 hr TD 24 hr patch   No Yes   Sig: Place 1 patch on the skin daily   potassium chloride (K-DUR,KLOR-CON) 20 mEq tablet   No Yes   Sig: Take 1 tablet (20 mEq total) by mouth daily   predniSONE 20 mg tablet   No Yes   Sig: Take 2 tablets (40 mg total) by mouth daily   roflumilast (DALIRESP) 250 MCG tablet   No Yes   Sig: Take 2 tablets (500 mcg total) by mouth daily   topiramate (TOPAMAX) 50 MG tablet   Yes Yes   Sig: Take 50 mg by mouth 2 (two) times a day   traMADol (ULTRAM) 50 mg tablet   Yes Yes   Sig: Take 50 mg by mouth as needed      Facility-Administered Medications: None       Past Medical History:   Diagnosis Date   • Asthma    • Bipolar 1 disorder (HCC)    • Cardiac disease    • COPD (chronic obstructive pulmonary disease) (HCC)    • HIV (human immunodeficiency virus infection) ([facility] ) 03/24/2016   • Hypertension    • Osteoarthritis        Past Surgical History:   Procedure Laterality Date   • HERNIA REPAIR     • LUMBAR FUSION N/A 4/10/2018    Procedure: T9/10 discectomy with T9-T11 fusion;  Surgeon: Blank Hess MD;  Location: [facility];  Service: Neurosurgery   • THYROIDECTOMY         Family History   Problem Relation Age of Onset   • No Known Problems Mother    • Diabetes Father    • Heart disease Neg Hx    • Cancer Neg Hx      I have reviewed and agree with the history as documented  E-Cigarette/Vaping   • E-Cigarette Use Never User      E-Cigarette/Vaping Substances     Social History     Tobacco Use   • Smoking status: Former     Packs/day: 0 50     Years: 45 00     Pack years: 22 50     Types: Cigarettes   • Smokeless tobacco: Never   Vaping Use   • Vaping Use: Never used   Substance Use Topics   • Alcohol use: Not Currently     Alcohol/week: 2 0 standard drinks     Types: 1 Cans of beer, 1 Shots of liquor per week     Comment: only on holidays   • Drug use: Yes     Types: Cocaine     Comment: sometimes cocaine        Review of Systems   Constitutional: Negative for chills and fever  HENT: Negative for hearing loss  Eyes: Negative for visual disturbance  Respiratory: Positive for shortness of breath  Cardiovascular: Negative for chest pain  Gastrointestinal: Negative for abdominal pain, constipation, diarrhea, nausea and vomiting  Genitourinary: Negative for difficulty urinating  Musculoskeletal: Negative for myalgias  Skin: Negative for color change  Neurological: Negative for dizziness and headaches  Psychiatric/Behavioral: Negative for agitation  All other systems reviewed and are negative        Physical Exam  ED Triage Vitals   Temperature Pulse Respirations Blood Pressure SpO2   04/17/23 1401 04/17/23 1400 04/17/23 1400 04/17/23 1400 04/17/23 1400   98 4 °F (36 9 °C) 98 (!) 24 120/92 100 %      Temp Source Heart Rate Source Patient Position - Orthostatic VS BP Location FiO2 (%)   04/17/23 1401 -- -- -- --   Oral          Pain Score       04/17/23 1401       No Pain             Orthostatic Vital Signs  Vitals:    04/17/23 1400   BP: 120/92   Pulse: 98       Physical Exam  Vitals and nursing note reviewed  Constitutional:       General: She is not in acute distress  Appearance: Normal appearance  She is well-developed  She is not ill-appearing  HENT:      Head: Normocephalic and atraumatic  Right Ear: External ear normal       Left Ear: External ear normal       Nose: Nose normal       Mouth/Throat:      Mouth: Mucous membranes are moist       Pharynx: Oropharynx is clear  No oropharyngeal exudate  Eyes:      General:         Right eye: No discharge  Left eye: No discharge  Extraocular Movements: Extraocular movements intact  Conjunctiva/sclera: Conjunctivae normal       Pupils: Pupils are equal, round, and reactive to light  Cardiovascular:      Rate and Rhythm: Normal rate and regular rhythm  Heart sounds: Normal heart sounds  No murmur heard  No friction rub  No gallop  Pulmonary:      Effort: Pulmonary effort is normal  Tachypnea present  No respiratory distress  Breath sounds: No stridor  Wheezing and rhonchi present  Abdominal:      General: Bowel sounds are normal  There is no distension  Palpations: Abdomen is soft  Tenderness: There is no abdominal tenderness  Musculoskeletal:         General: No swelling  Normal range of motion  Cervical back: Normal range of motion and neck supple  No rigidity  Skin:     General: Skin is warm and dry  Capillary Refill: Capillary refill takes less than 2 seconds  Neurological:      General: No focal deficit present  Mental Status: She is alert and oriented to person, place, and time  Mental status is at baseline  Comments: Continuous lipsmacking present     Psychiatric:         Mood and Affect: Mood normal          Behavior: Behavior normal          ED Medications  Medications   azithromycin (ZITHROMAX) 500 mg in sodium chloride 0 9 % 250 mL IVPB (has no administration in time range)   albuterol (FOR EMS ONLY) (2 5 mg/3 mL) 0 083 % inhalation solution 2 5 mg (0 mg Does not apply Given to EMS 4/17/23 1352)   ipratropium-albuterol (FOR EMS ONLY) (DUO-NEB) 0 5-2 5 mg/3 mL inhalation solution 3 mL (0 mL Does not apply Given to EMS 4/17/23 1352)   albuterol inhalation solution 10 mg (10 mg Nebulization Given 4/17/23 1437)     And   ipratropium (ATROVENT) 0 02 % inhalation solution 1 mg (1 mg Nebulization Given 4/17/23 1437)     And   sodium chloride 0 9 % inhalation solution 3 mL (3 mL Nebulization Given 4/17/23 1437)   methylPREDNISolone sodium succinate (Solu-MEDROL) injection 125 mg (125 mg Intravenous Given 4/17/23 1439)       Diagnostic Studies  Results Reviewed     Procedure Component Value Units Date/Time    HS Troponin I 2hr [[id number]]     Lab Status: No result Specimen: Blood     HS Troponin 0hr (reflex protocol) [[id number]]  (Normal) Collected: 04/17/23 1437    Lab Status: Final result Specimen: Blood from Arm, Left Updated: 04/17/23 1513     hs TnI 0hr 22 ng/L     B-Type Natriuretic Peptide(BNP) [[id number]]  (Normal) Collected: 04/17/23 1437    Lab Status: Final result Specimen: Blood from Arm, Left Updated: 04/17/23 1513     BNP 70 pg/mL     Comprehensive metabolic panel [[id number]]  (Abnormal) Collected: 04/17/23 1437    Lab Status: Final result Specimen: Blood from Arm, Left Updated: 04/17/23 1505     Sodium 142 mmol/L      Potassium 3 6 mmol/L      Chloride 111 mmol/L      CO2 22 mmol/L      ANION GAP 9 mmol/L      BUN 18 mg/dL      Creatinine 1 12 mg/dL      Glucose 92 mg/dL      Calcium 11 0 mg/dL      AST 13 U/L      ALT 10 U/L      Alkaline Phosphatase 66 U/L      Total Protein 7 1 g/dL      Albumin 4 3 g/dL      Total Bilirubin 0 50 mg/dL      eGFR 49 ml/min/1 73sq m     Narrative:      [place] guidelines for Chronic Kidney Disease (CKD):   •  Stage 1 with normal or high GFR (GFR > 90 mL/min/1 73 square meters)  •  Stage 2 Mild CKD (GFR = 60-89 mL/min/1 73 square meters)  •  Stage 3A Moderate CKD (GFR = 45-59 mL/min/1 73 square meters)  •  Stage 3B Moderate CKD (GFR = 30-44 mL/min/1 73 square meters)  •  Stage 4 Severe CKD (GFR = 15-29 mL/min/1 73 square meters)  •  Stage 5 End Stage CKD (GFR <15 mL/min/1 73 square meters)  Note: GFR calculation is accurate only with a steady state creatinine    CBC and differential [[id number]]  (Abnormal) Collected: 04/17/23 1437    Lab Status: Final result Specimen: Blood from Arm, Left Updated: 04/17/23 1453     WBC 5 31 Thousand/uL      RBC 3 68 Million/uL      Hemoglobin 10 2 g/dL      Hematocrit 33 1 %      MCV 90 fL      MCH 27 7 pg      MCHC 30 8 g/dL      RDW 16 1 %      MPV 10 1 fL      Platelets 947 Thousands/uL      nRBC 0 /100 WBCs      Neutrophils Relative 56 %      Immat GRANS % 0 %      Lymphocytes Relative 32 %      Monocytes Relative 10 %      Eosinophils Relative 1 %      Basophils Relative 1 %      Neutrophils Absolute 2 99 Thousands/µL      Immature Grans Absolute 0 01 Thousand/uL      Lymphocytes Absolute 1 69 Thousands/µL      Monocytes Absolute 0 53 Thousand/µL      Eosinophils Absolute 0 04 Thousand/µL      Basophils Absolute 0 05 Thousands/µL                  XR chest 1 view portable   ED Interpretation by Tomer Escobar MD (04/17 1439)   I independently interpreted this Chest XR as no acute cardiopulmonary disease            Procedures  Procedures      ED Course  ED Course as of 04/17/23 1522   Mon Apr 17, 2023   1413 Procedure Note: EKG  Date/Time: 04/17/23 2:13 PM   Interpreted by: Chris Bhakta   Indications / Diagnosis: SOB  ECG reviewed by me, the ED Provider: yes   The EKG demonstrates:  Rhythm: normal sinus  Intervals: normal intervals  Axis: normal axis  QRS/Blocks: incomplete RBBB  ST Changes: No acute ST Changes, no STD/SARAVANAN        1515 BNP: 70  Normal                             SBIRT 22yo+    Flowsheet Row Most Recent Value   Initial Alcohol Screen: US AUDIT-C     1   How often do "you have a drink containing alcohol? 0 Filed at: 04/17/2023 1457   2  How many drinks containing alcohol do you have on a typical day you are drinking? 0 Filed at: 04/17/2023 1457   3b  FEMALE Any Age, or MALE 65+: How often do you have 4 or more drinks on one occassion? 0 Filed at: 04/17/2023 1457   Audit-C Score 0 Filed at: 04/17/2023 1457   DEMETRIS: How many times in the past year have you    Used an illegal drug or used a prescription medication for non-medical reasons? Once or Twice Filed at: 04/17/2023 1457   DAST-10: In the past 12 months       1  Have you used drugs other than those required for medical reasons? 1 Filed at: 04/17/2023 1457   2  Do you use more than one drug at a time? 0 Filed at: 04/17/2023 1457   3  Have you had medical problems as a result of your drug use (e g , memory loss, hepatitis, convulsions, bleeding, etc )? 1 Filed at: 04/17/2023 1457   4  Have you had \"blackouts\" or \"flashbacks\" as a result of drug use? YesNo 0 Filed at: 04/17/2023 1457   5  Do you ever feel bad or guilty about your drug use? 0 Filed at: 04/17/2023 1457   6  Does your spouse (or parent) ever complain about your involvement with drugs? 0 Filed at: 04/17/2023 1457   7  Have you neglected your family because of your use of drugs? 0 Filed at: 04/17/2023 1457   8  Have you engaged in illegal activities in order to obtain drugs? 1 Filed at: 04/17/2023 1457   9  Have you ever experienced withdrawal symptoms (felt sick) when you stopped taking drugs? 0 Filed at: 04/17/2023 1457   10  Are you always able to stop using drugs when you want to? 0 Filed at: 04/17/2023 1457   DAST-10 Score 3 Filed at: 04/17/2023 108 [place] [employer] Making  46-year-old female presenting to ED today with shortness of breath  At this time differential diagnosis includes CHF exacerbation versus COPD exacerbation versus new respiratory pathology such as pneumonia versus ACS    Will evaluate a CBC, CMP, troponin, twelve-lead EKG, " chest x-ray  We will treat with a heart neb as well as 125 mg of IV Solu-Medrol  In terms of her lipsmacking after reviewing her medications I noticed that the patient was on Latuda for her schizophrenia which could cause tardive dyskinesia which her symptoms are consistent with  Discussed this with the patient and told her that she will need to follow-up as an outpatient if this should continue and in order to be weaned off that medication  Patient was reassessed after heart neb and was still feeling short of breath  Case was discussed with [facility] internal medicine and patient was admitted to their service for further treatment  Amount and/or Complexity of Data Reviewed  Labs: ordered  Decision-making details documented in ED Course  Radiology: ordered and independent interpretation performed  Risk  Prescription drug management  Decision regarding hospitalization  Disposition  Final diagnoses:   COPD exacerbation ([place] [address] )   CHF (congestive heart failure) ([place] [address] )   HIV (human immunodeficiency virus infection) ([place] [address] )     Time reflects when diagnosis was documented in both MDM as applicable and the Disposition within this note     Time User Action Codes Description Comment    4/17/2023  3:19 PM Laurie Flaming Add [J44 1] COPD exacerbation ([place] [address] )     4/17/2023  3:19 PM Laurie Flaming Add [I50 9] CHF (congestive heart failure) ([place] [address] )     4/17/2023  3:19 PM Laurie Flaming Add [B20] HIV (human immunodeficiency virus infection) [facility])       ED Disposition     ED Disposition   Admit    Condition   Stable    Date/Time   Mon Apr 17, 2023  3:19 PM    Comment   Case was discussed with Dr Brittani Tam and the patient's admission status was agreed to be Admission Status: observation status to the service of Dr Brittani Tam   Follow-up Information    None         Patient's Medications   Discharge Prescriptions    No medications on file     No discharge procedures on file      PDMP Review       Value Time User PDMP Reviewed  Yes 11/3/2022  8:54 PM Sharron [place], DO           ED Provider  Attending physically available and evaluated Radha Vila  FRANCISCO managed the patient along with the ED Attending      Electronically Signed by         Jer Leigh MD  04/18/23 9014

## 2023-04-17 NOTE — ASSESSMENT & PLAN NOTE
Presented to the hospital today with worsening shortness of breath that began this morning  · With multiple previous admissions for the same  · Administered IV Solu-Medrol, nebulizer treatments in the ED with improvement  · We will continue scheduled nebulizer treatments with Atrovent, Xopenex, Pulmicort  · IV Solu-Medrol to be administered with subsequent transition to oral steroids within the next 24 to 48 hours pending clinical improvement  · Patient does not appear infectious, without leukocytosis  · Procalcitonin pending  · Respiratory protocol

## 2023-04-17 NOTE — H&P
[address] [place] [address]  H&P  Name: Earl Overton 81 y o  female I MRN: [id number]  Unit/Bed#: ED-23 I Date of Admission: 4/17/2023   Date of Service: 4/17/2023 I Hospital Day: 0      Assessment/Plan   * COPD exacerbation [facility])  Assessment & Plan  Presented to the hospital today with worsening shortness of breath that began this morning  · With multiple previous admissions for the same  · Administered IV Solu-Medrol, nebulizer treatments in the ED with improvement  · We will continue scheduled nebulizer treatments with Atrovent, Xopenex, Pulmicort  · IV Solu-Medrol to be administered with subsequent transition to oral steroids within the next 24 to 48 hours pending clinical improvement  · Patient does not appear infectious, without leukocytosis  · Procalcitonin pending  · Respiratory protocol    Chronic diastolic heart failure (HCC)  Assessment & Plan  Wt Readings from Last 3 Encounters:   01/11/23 99 5 kg (219 lb 5 7 oz)   12/30/22 98 5 kg (217 lb 2 5 oz)   12/11/22 104 kg (229 lb 4 5 oz)   Euvolemic on exam, continue Lasix 40 mg twice daily          Paroxysmal atrial fibrillation (HCC)  Assessment & Plan  Maintained on Cardizem 120 mg daily, continue  · Anticoagulated with Eliquis    Hyperlipidemia  Assessment & Plan  Continue Lipitor    Bipolar 1 disorder [facility])  Assessment & Plan  Patient with recent admission to [facility], discharged on 4/8/2023 for tardive dyskinesia  · Evaluated by psychiatry at that time  · Latuda decreased to 20 mg daily, initiated on Cogentin 1 mg twice daily however patient reports that she did not start these medications    Chronic respiratory failure with hypoxia ([address] )  Assessment & Plan  Patient reports using 2 L nasal cannula oxygen at baseline    HIV (human immunodeficiency virus infection)   Assessment & Plan  Continue Biktarvy    Type 2 diabetes mellitus without complication, without long-term current use of insulin [facility])  Assessment & Plan  Lab Results "  Component Value Date    HGBA1C 6 5 (H) 03/07/2023       No results for input(s): POCGLU in the last 72 hours  Blood Sugar Average: Last 72 hrs:  Maintained on metformin as an outpatient  · Hold metformin  · Sliding-scale insulin ACHS  · Diabetic diet  · Hypoglycemia protocol    VTE Pharmacologic Prophylaxis:   Moderate Risk (Score 3-4) - Pharmacological DVT Prophylaxis Ordered: apixaban (Eliquis)  Code Status: Prior full code  Discussion with family: Patient declined call to   Anticipated Length of Stay: Patient will be admitted on an inpatient basis with an anticipated length of stay of greater than 2 midnights secondary to COPD  Total Time Spent on Date of Encounter in care of patient: 65 minutes This time was spent on one or more of the following: performing physical exam; counseling and coordination of care; obtaining or reviewing history; documenting in the medical record; reviewing/ordering tests, medications or procedures; communicating with other healthcare professionals and discussing with patient's family/caregivers  Chief Complaint: Shortness of breath    History of Present Illness:  Donnie Wallis is a 81 y o  female with a PMH of COPD, bipolar disorder, tardive dyskinesia, HIV, hyperlipidemia, hypertension, type 2 diabetes not maintained on insulin who presents with shortness of breath  The patient reports shortness of breath that began this morning upon waking up  She states she was in her normal state of health yesterday  She denies any sick contacts  Nuys any rhinorrhea, cough, fevers or chills  She also reports continued \"mouth smacking\"  She was recently admitted to [facility] and at that time she was evaluated in consultation by psychiatry who recommend decreasing her Latuda to 20 mg daily and adding Cogentin 1 mg twice daily    She states that she was unaware of these medication changes, she reports that she gets her medications through blister pack and " she did not get any new blister packs since that time  She has been taking all of her medications as previously prescribed  While in the emergency department, she was found to be in acute bronchospasm  She was administered IV steroids, nebulizer treatments with improvement  She will be admitted to the hospital for COPD exacerbation  Review of Systems:  Review of Systems   Constitutional: Negative for appetite change, chills, fever and unexpected weight change  HENT: Negative for hearing loss, rhinorrhea and sore throat  Eyes: Negative for pain, redness and visual disturbance  Respiratory: Positive for shortness of breath and wheezing  Negative for cough and chest tightness  Cardiovascular: Negative for chest pain, palpitations and leg swelling  Gastrointestinal: Negative for constipation, diarrhea, nausea and vomiting  Endocrine: Negative for polydipsia, polyphagia and polyuria  Genitourinary: Negative for dysuria, frequency and urgency  Neurological: Negative for dizziness, light-headedness and headaches  Past Medical and Surgical History:   Past Medical History:   Diagnosis Date   • Asthma    • Bipolar 1 disorder ([place] [address] )    • Cardiac disease    • COPD (chronic obstructive pulmonary disease) ([place] [address] )    • HIV (human immunodeficiency virus infection) ([place] [address] ) 03/24/2016   • Hypertension    • Osteoarthritis        Past Surgical History:   Procedure Laterality Date   • HERNIA REPAIR     • LUMBAR FUSION N/A 4/10/2018    Procedure: T9/10 discectomy with T9-T11 fusion;  Surgeon: Vandana Melendez MD;  Location: [facility];  Service: Neurosurgery   • THYROIDECTOMY         Meds/Allergies:  Prior to Admission medications    Medication Sig Start Date End Date Taking?  Authorizing Provider   acetaminophen (TYLENOL) 325 mg tablet Take 3 tablets (975 mg total) by mouth every 8 (eight) hours as needed for mild pain 9/18/22  Yes Bailee De La Cruz MD   albuterol (2 5 mg/3 mL) 0 083 % nebulizer solution Take 3 mL (2 5 mg total) by nebulization every 6 (six) hours as needed for wheezing or shortness of breath 1/4/23  Yes Raoul Byrne, DO   albuterol (2 5 mg/3 mL) 0 083 % nebulizer solution Take 3 mL (2 5 mg total) by nebulization every 6 (six) hours as needed for wheezing or shortness of breath 1/12/23  Yes Edson Ba MD   apixaban [place]) 5 mg Take 1 tablet by mouth 2 (two) times a day 5/9/22  Yes Historical Provider, MD   atorvastatin (LIPITOR) 20 mg tablet Take 20 mg by mouth daily   Yes Historical Provider, MD   bictegravir-emtricitab-tenofovir alafenamide (Biktarvy) -25 MG tablet Take 1 tablet by mouth daily 5/9/22  Yes Historical Provider, MD   budesonide (PULMICORT) 0 5 mg/2 mL nebulizer solution Take 2 mL (0 5 mg total) by nebulization 2 (two) times a day Rinse mouth after use  1/4/23  Yes Raoul Byrne, DO   carboxymethylcellulose 0 5 % SOLN Administer 1 drop into the left eye 3 (three) times a day as needed for dry eyes 10/29/22  Yes Gonzales Harris, DO   cholecalciferol (VITAMIN D3) 1,000 units tablet Take 1,000 Units by mouth daily   Yes Historical Provider, MD   diltiazem (CARDIZEM CD) 120 mg 24 hr capsule Take 1 capsule (120 mg total) by mouth daily 9/29/22  Yes Xavier Lemons MD   fluticasone-umeclidinium-vilanterol (Trelegy Ellipta) 200-62 5-25 mcg/actuation AEPB inhaler Inhale 1 puff daily Rinse mouth after use   1/3/23 4/17/23 Yes Elias Nunn MD   furosemide (LASIX) 40 mg tablet Take 1 tablet (40 mg total) by mouth 2 (two) times a day 9/29/22  Yes Xavier Lemons MD   gabapentin (NEURONTIN) 400 mg capsule Take 800 mg by mouth 3 (three) times a day     Yes Historical Provider, MD   levothyroxine 125 mcg tablet Take 125 mcg by mouth daily   Yes Historical Provider, MD   losartan (COZAAR) 100 MG tablet Take 100 mg by mouth daily 11/2/22  Yes Historical Provider, MD   lurasidone (LATUDA) 40 mg tablet Take 40 mg by mouth 9/30/22 9/30/23 Yes Historical Provider, MD metFORMIN (GLUCOPHAGE) 500 mg tablet Take 500 mg by mouth daily 11/2/22  Yes Historical Provider, MD   montelukast (SINGULAIR) 10 mg tablet Take 10 mg by mouth daily at bedtime   Yes Historical Provider, MD   nicotine (NICODERM CQ) 21 mg/24 hr TD 24 hr patch Place 1 patch on the skin daily 9/19/22  Yes Nohemi iXong MD   potassium chloride (K-DUR,KLOR-CON) 20 mEq tablet Take 1 tablet (20 mEq total) by mouth daily 9/19/22  Yes Nohemi Xiong MD   predniSONE 20 mg tablet Take 2 tablets (40 mg total) by mouth daily 1/12/23  Yes Cecilia Crawford MD   roflumilast (DALIRESP) 250 MCG tablet Take 2 tablets (500 mcg total) by mouth daily 1/4/23  Yes Estelita Bhatti DO   topiramate (TOPAMAX) 50 MG tablet Take 50 mg by mouth 2 (two) times a day 9/30/22 9/30/23 Yes Historical Provider, MD   traMADol (ULTRAM) 50 mg tablet Take 50 mg by mouth as needed 9/19/22  Yes Historical Provider, MD     I have reviewed home medications using recent Epic encounter  Allergies:    Allergies   Allergen Reactions   • Lisinopril Angioedema   • Prozac [Fluoxetine Hcl] Rash   • Sulfa Antibiotics Itching   • Quinine        Social History:  Marital Status:    Occupation: none  Patient Pre-hospital Living Situation: Home  Patient Pre-hospital Level of Mobility: walks  Patient Pre-hospital Diet Restrictions: none  Substance Use History:   Social History     Substance and Sexual Activity   Alcohol Use Not Currently   • Alcohol/week: 2 0 standard drinks   • Types: 1 Cans of beer, 1 Shots of liquor per week    Comment: only on holidays     Social History     Tobacco Use   Smoking Status Former   • Packs/day: 0 50   • Years: 45 00   • Pack years: 22 50   • Types: Cigarettes   Smokeless Tobacco Never     Social History     Substance and Sexual Activity   Drug Use Yes   • Types: Cocaine    Comment: sometimes cocaine       Family History:  Family History   Problem Relation Age of Onset   • No Known Problems Mother    • Diabetes Father    • Heart disease Neg Hx    • Cancer Neg Hx        Physical Exam:     Vitals:   Blood Pressure: 120/92 (04/17/23 1400)  Pulse: 98 (04/17/23 1400)  Temperature: 98 4 °F (36 9 °C) (04/17/23 1401)  Temp Source: Oral (04/17/23 1401)  Respirations: (!) 24 (04/17/23 1400)  SpO2: 100 % (04/17/23 1400)    Physical Exam  Vitals and nursing note reviewed  Constitutional:       General: She is not in acute distress  Appearance: Normal appearance  She is not ill-appearing, toxic-appearing or diaphoretic  HENT:      Head: Normocephalic and atraumatic  Cardiovascular:      Rate and Rhythm: Normal rate and regular rhythm  Heart sounds: No murmur heard  No friction rub  No gallop  Pulmonary:      Effort: Pulmonary effort is normal  No respiratory distress  Breath sounds: Wheezing present  No rhonchi or rales  Abdominal:      General: Abdomen is flat  Bowel sounds are normal  There is no distension  Palpations: Abdomen is soft  Tenderness: There is no abdominal tenderness  Musculoskeletal:      Right lower leg: No edema  Left lower leg: No edema  Skin:     General: Skin is warm and dry  Coloration: Skin is not jaundiced or pale  Neurological:      General: No focal deficit present  Mental Status: She is alert  Mental status is at baseline     Psychiatric:      Comments: Tardive dyskinesia, lip smacking          Additional Data:     Lab Results:  Results from last 7 days   Lab Units 04/17/23  1437   WBC Thousand/uL 5 31   HEMOGLOBIN g/dL 10 2*   HEMATOCRIT % 33 1*   PLATELETS Thousands/uL 338   NEUTROS PCT % 56   LYMPHS PCT % 32   MONOS PCT % 10   EOS PCT % 1     Results from last 7 days   Lab Units 04/17/23  1437   SODIUM mmol/L 142   POTASSIUM mmol/L 3 6   CHLORIDE mmol/L 111*   CO2 mmol/L 22   BUN mg/dL 18   CREATININE mg/dL 1 12   ANION GAP mmol/L 9   CALCIUM mg/dL 11 0*   ALBUMIN g/dL 4 3   TOTAL BILIRUBIN mg/dL 0 50   ALK PHOS U/L 66   ALT U/L 10   AST U/L 13 GLUCOSE RANDOM mg/dL 92                       Lines/Drains:  Invasive Devices     Peripheral Intravenous Line  Duration           Peripheral IV 04/17/23 Distal;Left;Upper;Ventral (anterior) Arm <1 day                    Imaging: Reviewed radiology reports from this admission including: chest xray  XR chest 1 view portable   ED Interpretation by Nav Alberts MD (04/17 6004)   I independently interpreted this Chest XR as no acute cardiopulmonary disease      Final Result by Opal De La Rosa MD (04/17 2532)      No acute cardiopulmonary disease  Workstation performed: [id number]             EKG and Other Studies Reviewed on Admission:   · EKG: NSR  HR 94     ** Please Note: This note has been constructed using a voice recognition system   **

## 2023-04-17 NOTE — ASSESSMENT & PLAN NOTE
Patient with recent admission to [facility], discharged on 4/8/2023 for tardive dyskinesia  · Evaluated by psychiatry at that time  · Latuda decreased to 20 mg daily, initiated on Cogentin 1 mg twice daily however patient reports that she did not start these medications

## 2023-04-17 NOTE — Clinical Note
Case was discussed with Dr Vanita Gupta and the patient's admission status was agreed to be Admission Status: observation status to the service of Dr Vanita Gupta

## 2023-04-17 NOTE — ASSESSMENT & PLAN NOTE
Acute exacerbation secondary to ongoing tobacco abuse  Chest x-ray without infiltrate  Procalcitonin x2 negative    Continue Solu-Medrol and respiratory protocol  DC antibiotic
Continue nicotine patch daily    Needs to avoid tobacco given COPD and chronic respiratory failure
Fully anticoagulated on Xarelto  Rate controlled on Cardizem 
Lab Results   Component Value Date    HGBA1C 6 3 (H) 06/21/2022       No results for input(s): POCGLU in the last 72 hours      Blood Sugar Average: Last 72 hrs:   hold metformin  Place on HISS
Lab Results   Component Value Date    HGBA1C 6 3 (H) 06/21/2022       No results for input(s): POCGLU in the last 72 hours      · Blood glucose elevated in BMP  · Initiate glucose checks and insulin use while receiving steroids
Lab Results   Component Value Date    HGBA1C 6 3 (H) 06/21/2022     A1c is well controlled    Continue sliding scale insulin  Watch for steroid induced hyperglycemia
Lab Results   Component Value Date    HGBA1C 6 3 (H) 06/21/2022     · A1c is well controlled at 6 3  · Add sliding scale insulin and accuchecks   · Watch for steroid induced hyperglycemia
Lab Results   Component Value Date    HGBA1C 6 3 (H) 06/21/2022     · A1c is well controlled at 6 3  · C/w sliding scale insulin and accuchecks   · Watch for steroid induced hyperglycemia
Lab Results   Component Value Date    HGBA1C 6 3 (H) 06/21/2022     · A1c is well controlled at 6 3  · C/w sliding scale insulin and accuchecks   · Watch for steroid induced hyperglycemia
On HAART
On diltiazem and xarelto
Patient has acute on chronic respiratory failure due to COPD exacerbation  Will place her on IV Solu-Medrol, around the clock nebulizer treatments and doxycycline
Presented to the hospital with reports of SOB for 2 days prior to admission  PMH of chronic respiratory failure on 2-3LNC, moderate persistent asthma, PASHA on CPAP, congestive heart failure with pEF, ongoing nicotine abuse  Patient has been admitted or seen in the ER 12 times over the past year  Typically follows with Dr Carleton Closs at [facility]  · Chest x-ray without infiltrate    · Procalcitonin x2 negative- abxs dcd   · C/w scheduled budesonide BID and duonebs TID  · Transitioned to PO prednisone 40mg daily taper   · respiratory protocol  · Tobacco cessation   · Currently at baseline o2 needs   · S/p lasix yesterday with mild improvement, repeat dose today
Presented to the hospital with reports of SOB for 2 days prior to admission  PMH of chronic respiratory failure on 2-3LNC, moderate persistent asthma, PASHA on CPAP, congestive heart failure with pEF, ongoing nicotine abuse  Patient has been admitted or seen in the ER 12 times over the past year  Typically follows with Dr Kelly Rodriguez at [facility]      · Patient feels breathing is worse today as she got short of breath while walking back and forth from bathroom  · Appreciate Pulmonary input  · Give 40 mg IV Furosemide  · Encourage incentive spirometer use  · Ongoing education on smoking cessation
Presented to the hospital with reports of SOB for 2 days prior to admission  PMH of chronic respiratory failure on 2-3LNC, moderate persistent asthma, PASHA on CPAP, congestive heart failure with pEF, ongoing nicotine abuse  Patient has been admitted or seen in the ER 12 times over the past year  Typically follows with Dr Moustapha Santos at [facility]  · Suspect a/c heart failure contributing to worsening respiratory status   · Chest x-ray without infiltrate    · Procalcitonin x2 negative- abxs dcd   · C/w scheduled budesonide BID and duonebs TID  · Transitioned to PO prednisone 40mg daily day #2 taper by 10mg q3d  · respiratory protocol  · Tobacco cessation   · Currently at baseline o2 needs
Presented to the hospital with reports of SOB for 2 days prior to admission  PMH of chronic respiratory failure on 2-3LNC, moderate persistent asthma, PASHA on CPAP, congestive heart failure with pEF, ongoing nicotine abuse  Patient has been admitted or seen in the ER 12 times over the past year  Typically follows with Dr Rico Wilder at [facility]  · Chest x-ray without infiltrate    · Procalcitonin x2 negative- abxs dcd   · C/w scheduled budesonide BID and duonebs TID  · Solu-Medrol 40mg IV q8h  · respiratory protocol  · Tobacco cessation   · Currently at baseline o2 needs   · Trial diuretics
S/p T9-11 fusion with T9-10 discectomy in 2018   · Pt reports ongoing discomfort and pain  · improvement with lidocaine patch in the past- ordered this admission   · C/w PRN tramadol   · D/c IV morphine
S/p T9-11 fusion with T9-10 discectomy in 2018   · Pt reports ongoing discomfort and pain  · improvement with lidocaine patch in the past- ordered this admission   · C/w PRN tramadol   · D/cd IV morphine
S/p T9-11 fusion with T9-10 discectomy in 2018   · Pt reports ongoing discomfort and pain  · improvement with lidocaine patch in the past- will order this admission   · C/w PRN tramadol and morphine
Secondary to COPD on chronic oxygen at 2 L    Monitor and watch for increasing oxygen requirement
Stable    Continue HAART
Wt Readings from Last 3 Encounters:   09/07/22 97 7 kg (215 lb 6 2 oz)   07/31/22 99 2 kg (218 lb 11 1 oz)   06/21/22 100 kg (220 lb 7 4 oz)     · Last echo 2/22 showing EF 65% and G1DD   ·   · Trial dose of lasix 40mg IV today given ongoing respiratory complaints   · Monitor daily weights and I&O
Wt Readings from Last 3 Encounters:   09/10/22 101 kg (223 lb 12 3 oz)   07/31/22 99 2 kg (218 lb 11 1 oz)   06/21/22 100 kg (220 lb 7 4 oz)     · Last echo 2/22 showing EF 65% and G1DD   ·   · Repeat IV dose of lasix   · Monitor daily weights and I&O
Wt Readings from Last 3 Encounters:   09/11/22 102 kg (224 lb 6 9 oz)   07/31/22 99 2 kg (218 lb 11 1 oz)   06/21/22 100 kg (220 lb 7 4 oz)     · Last echo 2/22 showing EF 65% and G1DD   ·  on admission   · Lasix 40mg IV x2 days and weights are now increasing and lower extremity edema worsening   Will increase lasix to 40mg IV BID   · Modify diet to cardiac 1800ml FR  · Consult cardiology   · Monitor daily weights and I&O
· BMI 39 40  · Encourage diet and exercise
· C/w encouraging compliance
· Continue nicotine patch daily    · Discussed cessation- pt reports she's not ready to quit completely but maybe wean to 1 pack per month
· EKG 9/7 read Sinus rhythm with sinus arrhythmia  · Continue Xarelto and Cardizem
· Fully anticoagulated on elequis   · Rate controlled on Cardizem 
· Patient complaining of 8/10 lower back pain  · Continue PRN analgesic medications  · Start Lidocaine patch
· Patient refused CPAP last night as she felt her breathing was too bad for it  · Encouraged CPAP use this evening
· Patient says she "doesn't smoke much anymore" and Nicotine patch is working  · Continue Nicotine patch  · Encouraged smoking cessation
· Pt refused CPAP overnight   · C/w encouraging compliance
· Pt refused CPAP overnight   · C/w encouraging compliance
· Stable    · Continue HAART
· chronic oxygen at 2-3 L    · Follows with Dr Catrachito Villalobos with [facility] outpatient- poor outpatient f/u noted   · Monitor and watch for increasing oxygen requirement
· chronic oxygen at 2-3 L    · Follows with Dr Riddhi Arita with [facility] outpatient- poor outpatient f/u noted   · Monitor and watch for increasing oxygen requirement
· chronic oxygen at 2-3 L    · Follows with Dr Tiffany Rodriguez with [address] outpatient- poor outpatient f/u noted   · Monitor and watch for increasing oxygen requirement
Simple: Patient demonstrates quick and easy understanding/Straightforward: Basic instructions, no meds, no home treatment/Verbalized Understanding

## 2023-04-17 NOTE — ASSESSMENT & PLAN NOTE
Lab Results   Component Value Date    HGBA1C 6 5 (H) 03/07/2023       No results for input(s): POCGLU in the last 72 hours      Blood Sugar Average: Last 72 hrs:  Maintained on metformin as an outpatient  · Hold metformin  · Sliding-scale insulin ACHS  · Diabetic diet  · Hypoglycemia protocol

## 2023-04-17 NOTE — ASSESSMENT & PLAN NOTE
Wt Readings from Last 3 Encounters:   01/11/23 99 5 kg (219 lb 5 7 oz)   12/30/22 98 5 kg (217 lb 2 5 oz)   12/11/22 104 kg (229 lb 4 5 oz)   Euvolemic on exam, continue Lasix 40 mg twice daily

## 2023-04-18 LAB
ANION GAP SERPL CALCULATED.3IONS-SCNC: 10 MMOL/L (ref 4–13)
BUN SERPL-MCNC: 20 MG/DL (ref 5–25)
CALCIUM SERPL-MCNC: 10.5 MG/DL (ref 8.4–10.2)
CHLORIDE SERPL-SCNC: 109 MMOL/L (ref 96–108)
CO2 SERPL-SCNC: 22 MMOL/L (ref 21–32)
CREAT SERPL-MCNC: 1.18 MG/DL (ref 0.6–1.3)
ERYTHROCYTE [DISTWIDTH] IN BLOOD BY AUTOMATED COUNT: 16.4 % (ref 11.6–15.1)
GFR SERPL CREATININE-BSD FRML MDRD: 46 ML/MIN/1.73SQ M
GLUCOSE SERPL-MCNC: 140 MG/DL (ref 65–140)
HCT VFR BLD AUTO: 31.3 % (ref 34.8–46.1)
HGB BLD-MCNC: 9.9 G/DL (ref 11.5–15.4)
MCH RBC QN AUTO: 28.5 PG (ref 26.8–34.3)
MCHC RBC AUTO-ENTMCNC: 31.6 G/DL (ref 31.4–37.4)
MCV RBC AUTO: 90 FL (ref 82–98)
PLATELET # BLD AUTO: 322 THOUSANDS/UL (ref 149–390)
PMV BLD AUTO: 9.6 FL (ref 8.9–12.7)
POTASSIUM SERPL-SCNC: 4 MMOL/L (ref 3.5–5.3)
PROCALCITONIN SERPL-MCNC: <0.05 NG/ML
RBC # BLD AUTO: 3.47 MILLION/UL (ref 3.81–5.12)
SODIUM SERPL-SCNC: 141 MMOL/L (ref 135–147)
WBC # BLD AUTO: 5.62 THOUSAND/UL (ref 4.31–10.16)

## 2023-04-18 RX ORDER — TRAMADOL HYDROCHLORIDE 50 MG/1
50 TABLET ORAL EVERY 8 HOURS PRN
Status: DISCONTINUED | OUTPATIENT
Start: 2023-04-18 | End: 2023-04-19 | Stop reason: HOSPADM

## 2023-04-18 RX ADMIN — LURASIDONE HYDROCHLORIDE 20 MG: 20 TABLET, FILM COATED ORAL at 08:36

## 2023-04-18 RX ADMIN — Medication 6 MG: at 21:40

## 2023-04-18 RX ADMIN — LEVOTHYROXINE SODIUM 125 MCG: 125 TABLET ORAL at 06:29

## 2023-04-18 RX ADMIN — GABAPENTIN 300 MG: 300 CAPSULE ORAL at 08:37

## 2023-04-18 RX ADMIN — BUDESONIDE 0.5 MG: 0.5 INHALANT RESPIRATORY (INHALATION) at 07:01

## 2023-04-18 RX ADMIN — BENZTROPINE MESYLATE 1 MG: 1 TABLET ORAL at 08:37

## 2023-04-18 RX ADMIN — IPRATROPIUM BROMIDE 0.5 MG: 0.5 SOLUTION RESPIRATORY (INHALATION) at 13:22

## 2023-04-18 RX ADMIN — DOCUSATE SODIUM 100 MG: 100 CAPSULE, LIQUID FILLED ORAL at 17:39

## 2023-04-18 RX ADMIN — IPRATROPIUM BROMIDE 0.5 MG: 0.5 SOLUTION RESPIRATORY (INHALATION) at 20:09

## 2023-04-18 RX ADMIN — LEVALBUTEROL 1.25 MG: 1.25 SOLUTION, CONCENTRATE RESPIRATORY (INHALATION) at 20:09

## 2023-04-18 RX ADMIN — LEVALBUTEROL 1.25 MG: 1.25 SOLUTION, CONCENTRATE RESPIRATORY (INHALATION) at 07:01

## 2023-04-18 RX ADMIN — GUAIFENESIN 600 MG: 600 TABLET, EXTENDED RELEASE ORAL at 17:39

## 2023-04-18 RX ADMIN — TOPIRAMATE 50 MG: 25 TABLET, FILM COATED ORAL at 08:37

## 2023-04-18 RX ADMIN — DILTIAZEM HYDROCHLORIDE 120 MG: 120 CAPSULE, COATED, EXTENDED RELEASE ORAL at 08:37

## 2023-04-18 RX ADMIN — GABAPENTIN 300 MG: 300 CAPSULE ORAL at 21:40

## 2023-04-18 RX ADMIN — GABAPENTIN 300 MG: 300 CAPSULE ORAL at 15:37

## 2023-04-18 RX ADMIN — ATORVASTATIN CALCIUM 20 MG: 20 TABLET, FILM COATED ORAL at 15:37

## 2023-04-18 RX ADMIN — IPRATROPIUM BROMIDE 0.5 MG: 0.5 SOLUTION RESPIRATORY (INHALATION) at 07:01

## 2023-04-18 RX ADMIN — METHYLPREDNISOLONE SODIUM SUCCINATE 40 MG: 40 INJECTION, POWDER, FOR SOLUTION INTRAMUSCULAR; INTRAVENOUS at 06:29

## 2023-04-18 RX ADMIN — METHYLPREDNISOLONE SODIUM SUCCINATE 40 MG: 40 INJECTION, POWDER, FOR SOLUTION INTRAMUSCULAR; INTRAVENOUS at 21:40

## 2023-04-18 RX ADMIN — TRAMADOL HYDROCHLORIDE 50 MG: 50 TABLET, COATED ORAL at 15:37

## 2023-04-18 RX ADMIN — TOPIRAMATE 50 MG: 25 TABLET, FILM COATED ORAL at 17:39

## 2023-04-18 RX ADMIN — APIXABAN 5 MG: 5 TABLET, FILM COATED ORAL at 08:37

## 2023-04-18 RX ADMIN — APIXABAN 5 MG: 5 TABLET, FILM COATED ORAL at 17:39

## 2023-04-18 RX ADMIN — MONTELUKAST 10 MG: 10 TABLET, FILM COATED ORAL at 21:40

## 2023-04-18 RX ADMIN — BUDESONIDE 0.5 MG: 0.5 INHALANT RESPIRATORY (INHALATION) at 20:09

## 2023-04-18 RX ADMIN — LEVALBUTEROL 1.25 MG: 1.25 SOLUTION, CONCENTRATE RESPIRATORY (INHALATION) at 13:24

## 2023-04-18 RX ADMIN — METHYLPREDNISOLONE SODIUM SUCCINATE 40 MG: 40 INJECTION, POWDER, FOR SOLUTION INTRAMUSCULAR; INTRAVENOUS at 14:26

## 2023-04-18 RX ADMIN — FUROSEMIDE 40 MG: 40 TABLET ORAL at 08:37

## 2023-04-18 RX ADMIN — DOCUSATE SODIUM 100 MG: 100 CAPSULE, LIQUID FILLED ORAL at 08:37

## 2023-04-18 RX ADMIN — GUAIFENESIN 600 MG: 600 TABLET, EXTENDED RELEASE ORAL at 08:36

## 2023-04-18 RX ADMIN — FUROSEMIDE 40 MG: 40 TABLET ORAL at 17:39

## 2023-04-18 RX ADMIN — BICTEGRAVIR SODIUM, EMTRICITABINE, AND TENOFOVIR ALAFENAMIDE FUMARATE 1 TABLET: 50; 200; 25 TABLET ORAL at 08:36

## 2023-04-18 RX ADMIN — BENZTROPINE MESYLATE 1 MG: 1 TABLET ORAL at 17:39

## 2023-04-18 NOTE — ASSESSMENT & PLAN NOTE
Patient with recent admission to [facility], discharged on 4/8/2023 for tardive dyskinesia  · Evaluated by psychiatry at that time  · Latuda decreased to 20 mg daily, initiated on Cogentin 1 mg twice daily however patient reports that she did not start these medications

## 2023-04-18 NOTE — PROGRESS NOTES
[facility]  Progress Note  Name: Shauna Carter  MRN: [id number]  Unit/Bed#: [place] [address] 222-02 I Date of Admission: 4/17/2023   Date of Service: 4/18/2023 I Hospital Day: 1    Assessment/Plan   * COPD exacerbation ([facility] [address] )  Assessment & Plan  Presented to the hospital today with worsening shortness of breath that began this morning  · With multiple previous admissions for the same  · Administered IV Solu-Medrol, nebulizer treatments in the ED with improvement  · We will continue scheduled nebulizer treatments with Atrovent, Xopenex, Pulmicort  · IV Solu-Medrol to be administered with subsequent transition to oral steroids within the next 24 to 48 hours pending clinical improvement  · Transition patient to p o  prednisone tomorrow  · Patient does not appear infectious, without leukocytosis  · Procalcitonin negative  · Respiratory protocol    Chronic diastolic heart failure (HCC)  Assessment & Plan  Wt Readings from Last 3 Encounters:   04/17/23 99 5 kg (219 lb 5 7 oz)   01/11/23 99 5 kg (219 lb 5 7 oz)   12/30/22 98 5 kg (217 lb 2 5 oz)   Euvolemic on exam, continue Lasix 40 mg twice daily          Paroxysmal atrial fibrillation (HCC)  Assessment & Plan  Maintained on Cardizem 120 mg daily, continue  · Anticoagulated with Eliquis    Hyperlipidemia  Assessment & Plan  Continue Lipitor    Bipolar 1 disorder [facility])  Assessment & Plan  Patient with recent admission to [facility], discharged on 4/8/2023 for tardive dyskinesia  · Evaluated by psychiatry at that time  · Latuda decreased to 20 mg daily, initiated on Cogentin 1 mg twice daily however patient reports that she did not start these medications    Chronic respiratory failure with hypoxia ([facility] [address] )  Assessment & Plan  Patient reports using 2 L nasal cannula oxygen at baseline    HIV (human immunodeficiency virus infection)   Assessment & Plan  Continue Biktarvy    Type 2 diabetes mellitus without complication, without long-term current use of insulin ([facility] )  Assessment & Plan  Lab Results   Component Value Date    HGBA1C 6 5 (H) 2023       No results for input(s): POCGLU in the last 72 hours  Blood Sugar Average: Last 72 hrs:  Maintained on metformin as an outpatient  · Hold metformin  · Sliding-scale insulin ACHS  · Diabetic diet  · Hypoglycemia protocol           VTE Pharmacologic Prophylaxis:   Moderate Risk (Score 3-4) - Pharmacological DVT Prophylaxis Ordered: apixaban (Eliquis)  Patient Centered Rounds: I performed bedside rounds with nursing staff today  Discussions with Specialists or Other Care Team Provider: None    Education and Discussions with Family / Patient: Patient declined call to   Total Time Spent on Date of Encounter in care of patient: 35 minutes This time was spent on one or more of the following: performing physical exam; counseling and coordination of care; obtaining or reviewing history; documenting in the medical record; reviewing/ordering tests, medications or procedures; communicating with other healthcare professionals and discussing with patient's family/caregivers  Current Length of Stay: 1 day(s)  Current Patient Status: Inpatient   Certification Statement: The patient will continue to require additional inpatient hospital stay due to COPD  Discharge Plan: Anticipate discharge tomorrow to home  Code Status: Level 1 - Full Code    Subjective: The patient is seen resting in chair at bedside  She states that she had a difficult time ambulating to the bathroom on her own due to shortness of breath  She also reports continued lipsmacking, I reminded her that we change some of her antipsychotic medications in order to help with this and it will take some time for this to resolve  She denies any chest pain, lower extremity swelling, palpitations, cough      Objective:     Vitals:   Temp (24hrs), Av 3 °F (36 8 °C), Min:97 8 °F (36 6 °C), Max:98 5 °F (36 9 °C)    Temp:  [97 8 °F (36 6 °C)-98 5 °F (36 9 °C)] 98 4 °F (36 9 °C)  HR:  [] 96  Resp:  [15-26] 18  BP: (120-128)/(74-92) 122/82  SpO2:  [95 %-100 %] 97 %  Body mass index is 40 12 kg/m²  Input and Output Summary (last 24 hours):   No intake or output data in the 24 hours ending 04/18/23 1300    Physical Exam:   Physical Exam  Vitals and nursing note reviewed  Constitutional:       General: She is not in acute distress  Appearance: Normal appearance  She is not ill-appearing, toxic-appearing or diaphoretic  HENT:      Head: Normocephalic and atraumatic  Cardiovascular:      Rate and Rhythm: Normal rate and regular rhythm  Heart sounds: No murmur heard  No friction rub  No gallop  Pulmonary:      Effort: Pulmonary effort is normal  No respiratory distress  Breath sounds: No wheezing, rhonchi or rales  Comments: Mild expiratory wheezing  Abdominal:      General: Abdomen is flat  Bowel sounds are normal  There is no distension  Palpations: Abdomen is soft  Tenderness: There is no abdominal tenderness  Musculoskeletal:      Right lower leg: No edema  Left lower leg: No edema  Skin:     General: Skin is warm and dry  Coloration: Skin is not jaundiced or pale  Neurological:      General: No focal deficit present  Mental Status: She is alert  Mental status is at baseline          Additional Data:     Labs:  Results from last 7 days   Lab Units 04/18/23  0547 04/17/23  1437   WBC Thousand/uL 5 62 5 31   HEMOGLOBIN g/dL 9 9* 10 2*   HEMATOCRIT % 31 3* 33 1*   PLATELETS Thousands/uL 322 338   NEUTROS PCT %  --  56   LYMPHS PCT %  --  32   MONOS PCT %  --  10   EOS PCT %  --  1     Results from last 7 days   Lab Units 04/18/23  0547 04/17/23  1437   SODIUM mmol/L 141 142   POTASSIUM mmol/L 4 0 3 6   CHLORIDE mmol/L 109* 111*   CO2 mmol/L 22 22   BUN mg/dL 20 18   CREATININE mg/dL 1 18 1 12   ANION GAP mmol/L 10 9   CALCIUM mg/dL 10 5* 11 0*   ALBUMIN g/dL  --  4 3   TOTAL BILIRUBIN mg/dL  --  0 50   ALK PHOS U/L  --  66   ALT U/L  --  10   AST U/L  --  13   GLUCOSE RANDOM mg/dL 140 92                 Results from last 7 days   Lab Units 04/18/23  0547 04/17/23  1437   PROCALCITONIN ng/ml <0 05 <0 05       Lines/Drains:  Invasive Devices     Peripheral Intravenous Line  Duration           Peripheral IV 04/17/23 Distal;Left;Upper;Ventral (anterior) Arm <1 day                      Imaging: No pertinent imaging reviewed      Recent Cultures (last 7 days):         Last 24 Hours Medication List:   Current Facility-Administered Medications   Medication Dose Route Frequency Provider Last Rate   • acetaminophen  975 mg Oral Q8H PRN Alberto LEA Serrano     • apixaban  5 mg Oral BID [facility]     • atorvastatin  20 mg Oral Daily With [place], [place]     • benztropine  1 mg Oral BID Gasquet LEA Serrano     • bictegravir-emtricitab-tenofovir alafenamide  1 tablet Oral Daily Gasquet LEA Serrano     • budesonide  0 5 mg Nebulization Q12H Alberto LEA Serrano     • diltiazem  120 mg Oral Daily Gasquet LEA Serrano     • docusate sodium  100 mg Oral BID Alberto LEA Serrano     • furosemide  40 mg Oral BID Gasquet LEA Serrano     • gabapentin  300 mg Oral TID Gasquet LEA Serrano     • guaiFENesin  600 mg Oral BID [facility]     • ipratropium  0 5 mg Nebulization TID Alberto LEA Serrano     • levalbuterol  1 25 mg Nebulization TID Gasquet LEA Serrano     • levothyroxine  125 mcg Oral Early Morning Gasquet LEA Serrano     • lurasidone  20 mg Oral Daily With Breakfast Gasquet LEA Serrano     • melatonin  6 mg Oral HS Isidro Seaman DO     • methylPREDNISolone sodium succinate  40 mg Intravenous Q8H Gasquet LEA Serrano     • montelukast  10 mg Oral HS [facility]     • nicotine  1 patch Transdermal Daily [place], [place]     • roflumilast  500 mcg Oral Daily [place], [place]     • topiramate  50 mg Oral BID Alberto Serrano PA-C          Today, Patient Was Seen By: Alberto Serrano PA-C    **Please Note: This note may have been constructed using a voice recognition system  **

## 2023-04-18 NOTE — PLAN OF CARE
Problem: Potential for Falls  Goal: Patient will remain free of falls  Description: INTERVENTIONS:  - Educate patient/family on patient safety including physical limitations  - Instruct patient to call for assistance with activity   - Consult OT/PT to assist with strengthening/mobility   - Keep Call bell within reach  - Keep bed low and locked with side rails adjusted as appropriate  - Keep care items and personal belongings within reach  - Initiate and maintain comfort rounds  - Make Fall Risk Sign visible to staff  - Apply yellow socks and bracelet for high fall risk patients  - Consider moving patient to room near nurses station  Outcome: Progressing     Problem: RESPIRATORY - ADULT  Goal: Achieves optimal ventilation and oxygenation  Description: INTERVENTIONS:  - Assess for changes in respiratory status  - Assess for changes in mentation and behavior  - Position to facilitate oxygenation and minimize respiratory effort  - Oxygen administered by appropriate delivery if ordered  - Initiate smoking cessation education as indicated  - Encourage broncho-pulmonary hygiene including cough, deep breathe, Incentive Spirometry  - Assess the need for suctioning and aspirate as needed  - Assess and instruct to report SOB or any respiratory difficulty  - Respiratory Therapy support as indicated  Outcome: Progressing     Problem: PAIN - ADULT  Goal: Verbalizes/displays adequate comfort level or baseline comfort level  Description: Interventions:  - Encourage patient to monitor pain and request assistance  - Assess pain using appropriate pain scale  - Administer analgesics based on type and severity of pain and evaluate response  - Implement non-pharmacological measures as appropriate and evaluate response  - Consider cultural and social influences on pain and pain management  - Notify physician/advanced practitioner if interventions unsuccessful or patient reports new pain  Outcome: Progressing     Problem: SAFETY ADULT  Goal: Patient will remain free of falls  Description: INTERVENTIONS:  - Educate patient/family on patient safety including physical limitations  - Instruct patient to call for assistance with activity   - Consult OT/PT to assist with strengthening/mobility   - Keep Call bell within reach  - Keep bed low and locked with side rails adjusted as appropriate  - Keep care items and personal belongings within reach  - Initiate and maintain comfort rounds  - Make Fall Risk Sign visible to staff  - Apply yellow socks and bracelet for high fall risk patients  - Consider moving patient to room near nurses station  Outcome: Progressing  Goal: Maintain or return to baseline ADL function  Description: INTERVENTIONS:  -  Assess patient's ability to carry out ADLs; assess patient's baseline for ADL function and identify physical deficits which impact ability to perform ADLs (bathing, care of mouth/teeth, toileting, grooming, dressing, etc )  - Assess/evaluate cause of self-care deficits   - Assess range of motion  - Assess patient's mobility; develop plan if impaired  - Assess patient's need for assistive devices and provide as appropriate  - Encourage maximum independence but intervene and supervise when necessary  - Involve family in performance of ADLs  - Assess for home care needs following discharge   - Consider OT consult to assist with ADL evaluation and planning for discharge  - Provide patient education as appropriate  Outcome: Progressing  Goal: Maintains/Returns to pre admission functional level  Description: INTERVENTIONS:  - Perform BMAT or MOVE assessment daily    - Set and communicate daily mobility goal to care team and patient/family/caregiver     - Collaborate with rehabilitation services on mobility goals if consulted  - Out of bed for toileting  - Record patient progress and toleration of activity level   Outcome: Progressing     Problem: DISCHARGE PLANNING  Goal: Discharge to home or other facility with appropriate resources  Description: INTERVENTIONS:  - Identify barriers to discharge w/patient and caregiver  - Arrange for needed discharge resources and transportation as appropriate  - Identify discharge learning needs (meds, wound care, etc )  - Arrange for interpretive services to assist at discharge as needed  - Refer to Case Management Department for coordinating discharge planning if the patient needs post-hospital services based on physician/advanced practitioner order or complex needs related to functional status, cognitive ability, or social support system  Outcome: Progressing     Problem: Knowledge Deficit  Goal: Patient/family/caregiver demonstrates understanding of disease process, treatment plan, medications, and discharge instructions  Description: Complete learning assessment and assess knowledge base    Interventions:  - Provide teaching at level of understanding  - Provide teaching via preferred learning methods  Outcome: Progressing     Problem: Prexisting or High Potential for Compromised Skin Integrity  Goal: Skin integrity is maintained or improved  Description: INTERVENTIONS:  - Identify patients at risk for skin breakdown  - Assess and monitor skin integrity  - Assess and monitor nutrition and hydration status  - Monitor labs   - Assess for incontinence   - Turn and reposition patient  - Assist with mobility/ambulation  - Relieve pressure over bony prominences  - Avoid friction and shearing  - Provide appropriate hygiene as needed including keeping skin clean and dry  - Evaluate need for skin moisturizer/barrier cream  - Collaborate with interdisciplinary team   - Patient/family teaching  - Consider wound care consult   Outcome: Progressing

## 2023-04-18 NOTE — ASSESSMENT & PLAN NOTE
Wt Readings from Last 3 Encounters:   04/17/23 99 5 kg (219 lb 5 7 oz)   01/11/23 99 5 kg (219 lb 5 7 oz)   12/30/22 98 5 kg (217 lb 2 5 oz)   Euvolemic on exam, continue Lasix 40 mg twice daily

## 2023-04-18 NOTE — PLAN OF CARE
Problem: Potential for Falls  Goal: Patient will remain free of falls  Description: INTERVENTIONS:  - Educate patient/family on patient safety including physical limitations  - Instruct patient to call for assistance with activity   - Consult OT/PT to assist with strengthening/mobility   - Keep Call bell within reach  - Keep bed low and locked with side rails adjusted as appropriate  - Keep care items and personal belongings within reach  - Initiate and maintain comfort rounds  - Make Fall Risk Sign visible to staff  - Obtain necessary fall risk management equipment: bed rails  - Apply yellow socks and bracelet for high fall risk patients  - Consider moving patient to room near nurses station  Outcome: Progressing     Problem: RESPIRATORY - ADULT  Goal: Achieves optimal ventilation and oxygenation  Description: INTERVENTIONS:  - Assess for changes in respiratory status  - Assess for changes in mentation and behavior  - Position to facilitate oxygenation and minimize respiratory effort  - Oxygen administered by appropriate delivery if ordered  - Initiate smoking cessation education as indicated  - Encourage broncho-pulmonary hygiene including cough, deep breathe, Incentive Spirometry  - Assess the need for suctioning and aspirate as needed  - Assess and instruct to report SOB or any respiratory difficulty  - Respiratory Therapy support as indicated  Outcome: Progressing     Problem: PAIN - ADULT  Goal: Verbalizes/displays adequate comfort level or baseline comfort level  Description: Interventions:  - Encourage patient to monitor pain and request assistance  - Assess pain using appropriate pain scale  - Administer analgesics based on type and severity of pain and evaluate response  - Implement non-pharmacological measures as appropriate and evaluate response  - Consider cultural and social influences on pain and pain management  - Notify physician/advanced practitioner if interventions unsuccessful or patient reports new pain  Outcome: Progressing     Problem: SAFETY ADULT  Goal: Patient will remain free of falls  Description: INTERVENTIONS:  - Educate patient/family on patient safety including physical limitations  - Instruct patient to call for assistance with activity   - Consult OT/PT to assist with strengthening/mobility   - Keep Call bell within reach  - Keep bed low and locked with side rails adjusted as appropriate  - Keep care items and personal belongings within reach  - Initiate and maintain comfort rounds  - Make Fall Risk Sign visible to staff  - Obtain necessary fall risk management equipment: bed rails  - Apply yellow socks and bracelet for high fall risk patients  - Consider moving patient to room near nurses station  Outcome: Progressing  Goal: Maintain or return to baseline ADL function  Description: INTERVENTIONS:  -  Assess patient's ability to carry out ADLs; assess patient's baseline for ADL function and identify physical deficits which impact ability to perform ADLs (bathing, care of mouth/teeth, toileting, grooming, dressing, etc )  - Assess/evaluate cause of self-care deficits   - Assess range of motion  - Assess patient's mobility; develop plan if impaired  - Assess patient's need for assistive devices and provide as appropriate  - Encourage maximum independence but intervene and supervise when necessary  - Involve family in performance of ADLs  - Assess for home care needs following discharge   - Consider OT consult to assist with ADL evaluation and planning for discharge  - Provide patient education as appropriate  Outcome: Progressing  Goal: Maintains/Returns to pre admission functional level  Description: INTERVENTIONS:  - Perform BMAT or MOVE assessment daily    - Set and communicate daily mobility goal to care team and patient/family/caregiver     - Collaborate with rehabilitation services on mobility goals if consulted  - Stand patient 4 times a day  - Ambulate patient 4 times a day  - Out of bed to chair 2 times a day   - Out of bed for meals 2 times a day  - Out of bed for toileting  - Record patient progress and toleration of activity level   Outcome: Progressing     Problem: DISCHARGE PLANNING  Goal: Discharge to home or other facility with appropriate resources  Description: INTERVENTIONS:  - Identify barriers to discharge w/patient and caregiver  - Arrange for needed discharge resources and transportation as appropriate  - Identify discharge learning needs (meds, wound care, etc )  - Arrange for interpretive services to assist at discharge as needed  - Refer to Case Management Department for coordinating discharge planning if the patient needs post-hospital services based on physician/advanced practitioner order or complex needs related to functional status, cognitive ability, or social support system  Outcome: Progressing     Problem: Knowledge Deficit  Goal: Patient/family/caregiver demonstrates understanding of disease process, treatment plan, medications, and discharge instructions  Description: Complete learning assessment and assess knowledge base    Interventions:  - Provide teaching at level of understanding  - Provide teaching via preferred learning methods  Outcome: Progressing     Problem: Prexisting or High Potential for Compromised Skin Integrity  Goal: Skin integrity is maintained or improved  Description: INTERVENTIONS:  - Identify patients at risk for skin breakdown  - Assess and monitor skin integrity  - Assess and monitor nutrition and hydration status  - Monitor labs   - Assess for incontinence   - Turn and reposition patient  - Assist with mobility/ambulation  - Relieve pressure over bony prominences  - Avoid friction and shearing  - Provide appropriate hygiene as needed including keeping skin clean and dry  - Evaluate need for skin moisturizer/barrier cream  - Collaborate with interdisciplinary team   - Patient/family teaching  - Consider wound care consult   Outcome: Progressing

## 2023-04-18 NOTE — ASSESSMENT & PLAN NOTE
Presented to the hospital today with worsening shortness of breath that began this morning  · With multiple previous admissions for the same  · Administered IV Solu-Medrol, nebulizer treatments in the ED with improvement  · We will continue scheduled nebulizer treatments with Atrovent, Xopenex, Pulmicort  · IV Solu-Medrol to be administered with subsequent transition to oral steroids within the next 24 to 48 hours pending clinical improvement  · Transition patient to p o  prednisone tomorrow  · Patient does not appear infectious, without leukocytosis  · Procalcitonin negative  · Respiratory protocol

## 2023-04-18 NOTE — UTILIZATION REVIEW
Date: 4/19   Day 3: Has surpassed a 2nd midnight with active treatments and services  tx of COPD, IV steroids, nebs  Initial Clinical Review    Admission: Date/Time/Statement:   Admission Orders (From admission, onward)     Ordered        04/17/23 1524  [address] [facility] [place],[address]  Once                      Orders Placed This Encounter   Procedures   • INPATIENT ADMISSION     Standing Status:   Standing     Number of Occurrences:   1     Order Specific Question:   Level of Care     Answer:   Med Surg [16]     Order Specific Question:   Estimated length of stay     Answer:   More than 2 Midnights     Order Specific Question:   Certification     Answer:   I certify that inpatient services are medically necessary for this patient for a duration of greater than two midnights  See H&P and MD Progress Notes for additional information about the patient's course of treatment  ED Arrival Information     Expected   -    Arrival   4/17/2023 13:45    Acuity   Urgent            Means of arrival   Ambulance    Escorted by   [facility] ([facility])    Service   Hospitalist    Admission type   Emergency            Arrival complaint   SOB           Chief Complaint   Patient presents with   • Shortness of Breath     Pt started with SOB today, seen many times for the same  Arrives on a neb tx from EMS  Denies pain  Initial Presentation: 81 y o  female presents to the ED via EMS from home with c/o SOB since earlier in the day  No c/o CP  Notes new onset lipsmacking motion x last 2 days  EMS gave nebs  Pt notes med compliance  Was recently seen at [facility] and psych recommended decrease in [place] to 20 mg daily and adding Cogentin 1 mg BID but she was unaware of these changes  PMH:  COPD, CHF, HIV on Biktarvy, PAF on Eliquis, chronic hypoxic resp failure on home oxygen at 2L NC, schizophrenia, bipolar disorder, tardive dyskinesia, HTN, HLD, NIDDM    In the ED pt is tachypneic and found to have an acute bronchospasm, she was treated with IV SoluMedrol, nebs, IV antibiotics  Labs - elevated calcium, chloride, negative troponins  Imaging - negative for acute disease  On exam she has wheezing and has tardive dyskinesia w/ lip smacking  She is admitted to INPATIENT status with COPD exacerbation - scheduled nebs, IV SoluMedrol with plan to flip to PO in 24-48 hrs  Date: 4/18   Day 2:   COPD continue IV Steroids for another day  On exam has continued mild expiratory wheezing  Pt continues to have SOB and having difficulty ambulating to BR  Continues with lipsmacking but this will take time to resolve with med changes   She is on home dose oxygen at 2L [place]       ED Triage Vitals   Temperature Pulse Respirations Blood Pressure SpO2   04/17/23 1401 04/17/23 1400 04/17/23 1400 04/17/23 1400 04/17/23 1400   98 4 °F (36 9 °C) 98 (!) 24 120/92 100 %      Temp Source Heart Rate Source Patient Position - Orthostatic VS BP Location FiO2 (%)   04/17/23 1401 -- 04/17/23 2131 04/17/23 2131 --   Oral  Lying Left arm       Pain Score       04/17/23 1401       No Pain          Wt Readings from Last 1 Encounters:   04/17/23 99 5 kg (219 lb 5 7 oz)     Additional Vital Signs:   04/18/23 08:38:42 98 4 °F (36 9 °C) 96 18 122/82 95 97 % -- -- None (Room air) Lying   04/18/23 08:34:09 -- 96 -- 122/82 95 97 % -- -- -- --   04/18/23 0705 -- -- -- -- -- 97 % -- -- None (Room air) --   04/17/23 21:31:28 97 8 °F (36 6 °C) 99 18 122/82 95 97 % -- -- None (Room air) Lying   04/17/23 2001 -- -- -- -- -- 95 % -- -- -- --   04/17/23 2000 -- -- -- -- -- -- 28 2 L/min Nasal cannula --   04/17/23 18:18:21 98 5 °F (36 9 °C) 104 15 124/84 97 98 % -- -- -- --   04/17/23 1752 -- 96 26 Abnormal  128/74 -- 98 % -- -- None (Room air) --     Pertinent Labs/Diagnostic Test Results:     4/17 ECG - Sinus rhythm with frequent Premature ventricular complexes  Low voltage QRS  Incomplete right bundle branch block  Left anterior fascicular block  Poor anterior R wave progression is noted  Abnormal ECG    4/17 ECG - Normal sinus rhythm  Low voltage QRS  Right bundle branch block  Left anterior fascicular block  Bifascicular block   Abnormal ECG    XR chest 1 view portable      No acute cardiopulmonary disease           Results from last 7 days   Lab Units 04/18/23  0547 04/17/23  1437   WBC Thousand/uL 5 62 5 31   HEMOGLOBIN g/dL 9 9* 10 2*   HEMATOCRIT % 31 3* 33 1*   PLATELETS Thousands/uL 322 338   NEUTROS ABS Thousands/µL  --  2 99         Results from last 7 days   Lab Units 04/18/23  0547 04/17/23  1437   SODIUM mmol/L 141 142   POTASSIUM mmol/L 4 0 3 6   CHLORIDE mmol/L 109* 111*   CO2 mmol/L 22 22   ANION GAP mmol/L 10 9   BUN mg/dL 20 18   CREATININE mg/dL 1 18 1 12   EGFR ml/min/1 73sq m 46 49   CALCIUM mg/dL 10 5* 11 0*     Results from last 7 days   Lab Units 04/17/23  1437   AST U/L 13   ALT U/L 10   ALK PHOS U/L 66   TOTAL PROTEIN g/dL 7 1   ALBUMIN g/dL 4 3   TOTAL BILIRUBIN mg/dL 0 50         Results from last 7 days   Lab Units 04/18/23  0547 04/17/23  1437   GLUCOSE RANDOM mg/dL 140 92     Results from last 7 days   Lab Units 04/17/23  1645 04/17/23  1437   HS TNI 0HR ng/L  --  22   HS TNI 2HR ng/L 22  --    HSTNI D2 ng/L 0  --          Results from last 7 days   Lab Units 04/18/23  0547 04/17/23  1437   PROCALCITONIN ng/ml <0 05 <0 05           Results from last 7 days   Lab Units 04/17/23  1437   BNP pg/mL 70     ED Treatment:   Medication Administration from 04/17/2023 1345 to 04/17/2023 1804       Date/Time Order Dose Route Action     04/17/2023 1437 EDT albuterol inhalation solution 10 mg 10 mg Nebulization Given     04/17/2023 1437 EDT ipratropium (ATROVENT) 0 02 % inhalation solution 1 mg 1 mg Nebulization Given     04/17/2023 1437 EDT sodium chloride 0 9 % inhalation solution 3 mL 3 mL Nebulization Given     04/17/2023 1439 EDT methylPREDNISolone sodium succinate (Solu-MEDROL) injection 125 mg 125 mg Intravenous Given     04/17/2023 1535 EDT azithromycin (ZITHROMAX) 500 mg in sodium chloride 0 9 % 250 mL IVPB 500 mg Intravenous New Bag        Past Medical History:   Diagnosis Date   • Asthma    • Bipolar 1 disorder ([facility])    • Cardiac disease    • COPD (chronic obstructive pulmonary disease) ([facility])    • HIV (human immunodeficiency virus infection) ([place] [address] ) 03/24/2016   • Hypertension    • Osteoarthritis      Present on Admission:  • COPD exacerbation ([place] [address] )  • Bipolar 1 disorder ([place] [address] )  • Chronic diastolic heart failure ([facility])  • Chronic respiratory failure with hypoxia ([facility])  • HIV (human immunodeficiency virus infection)   • Hyperlipidemia  • Paroxysmal atrial fibrillation ([facility])  • Type 2 diabetes mellitus without complication, without long-term current use of insulin ([facility])      Admitting Diagnosis: CHF (congestive heart failure) ([facility]) [I50 9]  SOB (shortness of breath) [R06 02]  HIV (human immunodeficiency virus infection) ([place] [address] ) [B20]  COPD exacerbation ([place] [address] ) [J44 1]  Age/Sex: 81 y o  female  Admission Orders:  Scheduled Medications:  apixaban, 5 mg, Oral, BID  atorvastatin, 20 mg, Oral, Daily With Dinner  benztropine, 1 mg, Oral, BID  bictegravir-emtricitab-tenofovir alafenamide, 1 tablet, Oral, Daily  budesonide, 0 5 mg, Nebulization, Q12H  diltiazem, 120 mg, Oral, Daily  docusate sodium, 100 mg, Oral, BID  furosemide, 40 mg, Oral, BID  gabapentin, 300 mg, Oral, TID  guaiFENesin, 600 mg, Oral, BID  ipratropium, 0 5 mg, Nebulization, TID  levalbuterol, 1 25 mg, Nebulization, TID  levothyroxine, 125 mcg, Oral, Early Morning  lurasidone, 20 mg, Oral, Daily With Breakfast  melatonin, 6 mg, Oral, HS  methylPREDNISolone sodium succinate, 40 mg, Intravenous, Q8H  montelukast, 10 mg, Oral, HS  nicotine, 1 patch, Transdermal, Daily  roflumilast, 500 mcg, Oral, Daily  topiramate, 50 mg, Oral, BID      Continuous IV Infusions:     PRN Meds:  acetaminophen, 975 mg, Oral, Q8H PRN    OOB  Trend troponins      Network Utilization Review Department  ATTENTION: Please call with any questions or concerns to [phone] and carefully listen to the prompts so that you are directed to the right person  All voicemails are confidential   [facility] all requests for admission clinical reviews, approved or denied determinations and any other requests to dedicated fax number below belonging to the campus where the patient is receiving treatment   List of dedicated fax numbers for the Facilities:  [address] DENIALS (Administrative/Medical Necessity) [phone]   [address] (Maternity/NICU/Pediatrics) [phone]   [address] Brenda Hilton [phone]   [place] [address] [phone]   [address] [address] [facility] [phone]   15[address] [facility] Luis Solo [facility] 134 [address] [place] [phone]

## 2023-04-19 VITALS
WEIGHT: 219.36 LBS | TEMPERATURE: 97.8 F | RESPIRATION RATE: 20 BRPM | OXYGEN SATURATION: 97 % | HEART RATE: 87 BPM | BODY MASS INDEX: 40.37 KG/M2 | SYSTOLIC BLOOD PRESSURE: 125 MMHG | DIASTOLIC BLOOD PRESSURE: 79 MMHG | HEIGHT: 62 IN

## 2023-04-19 LAB
ANION GAP SERPL CALCULATED.3IONS-SCNC: 9 MMOL/L (ref 4–13)
BUN SERPL-MCNC: 32 MG/DL (ref 5–25)
CALCIUM SERPL-MCNC: 10.5 MG/DL (ref 8.4–10.2)
CHLORIDE SERPL-SCNC: 108 MMOL/L (ref 96–108)
CO2 SERPL-SCNC: 22 MMOL/L (ref 21–32)
CREAT SERPL-MCNC: 1.37 MG/DL (ref 0.6–1.3)
ERYTHROCYTE [DISTWIDTH] IN BLOOD BY AUTOMATED COUNT: 16.7 % (ref 11.6–15.1)
GFR SERPL CREATININE-BSD FRML MDRD: 39 ML/MIN/1.73SQ M
GLUCOSE SERPL-MCNC: 126 MG/DL (ref 65–140)
HCT VFR BLD AUTO: 30.9 % (ref 34.8–46.1)
HGB BLD-MCNC: 9.5 G/DL (ref 11.5–15.4)
MCH RBC QN AUTO: 27.8 PG (ref 26.8–34.3)
MCHC RBC AUTO-ENTMCNC: 30.7 G/DL (ref 31.4–37.4)
MCV RBC AUTO: 90 FL (ref 82–98)
PLATELET # BLD AUTO: 327 THOUSANDS/UL (ref 149–390)
PMV BLD AUTO: 10.2 FL (ref 8.9–12.7)
POTASSIUM SERPL-SCNC: 4.5 MMOL/L (ref 3.5–5.3)
RBC # BLD AUTO: 3.42 MILLION/UL (ref 3.81–5.12)
SODIUM SERPL-SCNC: 139 MMOL/L (ref 135–147)
WBC # BLD AUTO: 8.56 THOUSAND/UL (ref 4.31–10.16)

## 2023-04-19 RX ORDER — BENZTROPINE MESYLATE 1 MG/1
1 TABLET ORAL 2 TIMES DAILY
Qty: 60 TABLET | Refills: 0 | Status: SHIPPED | OUTPATIENT
Start: 2023-04-19

## 2023-04-19 RX ORDER — PREDNISONE 10 MG/1
TABLET ORAL
Qty: 18 TABLET | Refills: 0 | Status: SHIPPED | OUTPATIENT
Start: 2023-04-19

## 2023-04-19 RX ORDER — LURASIDONE HYDROCHLORIDE 20 MG/1
20 TABLET, FILM COATED ORAL
Qty: 30 TABLET | Refills: 0 | Status: SHIPPED | OUTPATIENT
Start: 2023-04-20

## 2023-04-19 RX ADMIN — BUDESONIDE 0.5 MG: 0.5 INHALANT RESPIRATORY (INHALATION) at 07:30

## 2023-04-19 RX ADMIN — DOCUSATE SODIUM 100 MG: 100 CAPSULE, LIQUID FILLED ORAL at 08:34

## 2023-04-19 RX ADMIN — APIXABAN 5 MG: 5 TABLET, FILM COATED ORAL at 08:35

## 2023-04-19 RX ADMIN — BICTEGRAVIR SODIUM, EMTRICITABINE, AND TENOFOVIR ALAFENAMIDE FUMARATE 1 TABLET: 50; 200; 25 TABLET ORAL at 08:37

## 2023-04-19 RX ADMIN — METHYLPREDNISOLONE SODIUM SUCCINATE 40 MG: 40 INJECTION, POWDER, FOR SOLUTION INTRAMUSCULAR; INTRAVENOUS at 05:48

## 2023-04-19 RX ADMIN — TOPIRAMATE 50 MG: 25 TABLET, FILM COATED ORAL at 08:37

## 2023-04-19 RX ADMIN — DILTIAZEM HYDROCHLORIDE 120 MG: 120 CAPSULE, COATED, EXTENDED RELEASE ORAL at 08:35

## 2023-04-19 RX ADMIN — LURASIDONE HYDROCHLORIDE 20 MG: 20 TABLET, FILM COATED ORAL at 08:36

## 2023-04-19 RX ADMIN — LEVOTHYROXINE SODIUM 125 MCG: 125 TABLET ORAL at 05:48

## 2023-04-19 RX ADMIN — BENZTROPINE MESYLATE 1 MG: 1 TABLET ORAL at 08:35

## 2023-04-19 RX ADMIN — GUAIFENESIN 600 MG: 600 TABLET, EXTENDED RELEASE ORAL at 08:35

## 2023-04-19 RX ADMIN — GABAPENTIN 300 MG: 300 CAPSULE ORAL at 08:35

## 2023-04-19 RX ADMIN — FUROSEMIDE 40 MG: 40 TABLET ORAL at 08:35

## 2023-04-19 RX ADMIN — IPRATROPIUM BROMIDE 0.5 MG: 0.5 SOLUTION RESPIRATORY (INHALATION) at 07:30

## 2023-04-19 RX ADMIN — LEVALBUTEROL 1.25 MG: 1.25 SOLUTION, CONCENTRATE RESPIRATORY (INHALATION) at 07:30

## 2023-04-19 RX ADMIN — TRAMADOL HYDROCHLORIDE 50 MG: 50 TABLET, COATED ORAL at 09:53

## 2023-04-19 NOTE — NURSING NOTE
Patient was d/c'd to home in no distress  No c/o pain, able to make needs known  Instructions were reviewed with the patient  Patient also made aware that prescriptions were sent to pharmacy of choice and ready for   Patient was transferred to the front entrance via wheelchair and transferred into the [facility] with all safety precautions taken  All belongings left with the patient

## 2023-04-19 NOTE — UTILIZATION REVIEW
Notification of Unplanned, Urgent, or   Emergency Inpatient Admission   [address]  Tax ID: [id number]  NPI: [id number]  Place of Service: [address] [place] [address]  [address]  Admission Level of Care: Inpatient  Place of Service Code: 24     Attending Physician Information  Attending Name and NPI#: Valerie High, Johnathan Abad [[id number]]  Phone: [phone]     Admission Information  Inpatient Admission Date/Time: 4/17/23  3:24 PM  Discharge Date/Time: No discharge date for patient encounter  Admitting Diagnosis Code/Description:  CHF (congestive heart failure) ([facility]) [I50 9]  SOB (shortness of breath) [R06 02]  HIV (human immunodeficiency virus infection) ([facility] ) [B20]  COPD exacerbation ([facility] ) [J44 1]     Utilization Review Contact  Anika Portillo Utilization   Phone: [phone]  Fax: [phone]  Email: [email or web address]  Contact for approvals/pending authorizations, clinical reviews, and discharge  Physician Advisory Services Contact  Medical Necessity Denial & [id number]-[id number] Discussion  Phone: [phone]  Fax: [phone]  Email: [email or web address]

## 2023-04-19 NOTE — ASSESSMENT & PLAN NOTE
Patient with recent admission to [facility], discharged on 4/8/2023 for tardive dyskinesia  · Evaluated by psychiatry at that time  · Latuda decreased to 20 mg daily, initiated on Cogentin 1 mg twice daily however patient reports that she did not start these medications  · Sent Latuda and Cogentin to patient's pharmacy

## 2023-04-19 NOTE — PLAN OF CARE
Problem: Potential for Falls  Goal: Patient will remain free of falls  Description: INTERVENTIONS:  - Educate patient/family on patient safety including physical limitations  - Instruct patient to call for assistance with activity   - Consult OT/PT to assist with strengthening/mobility   - Keep Call bell within reach  - Keep bed low and locked with side rails adjusted as appropriate  - Keep care items and personal belongings within reach  - Initiate and maintain comfort rounds  - Make Fall Risk Sign visible to staff  - Apply yellow socks and bracelet for high fall risk patients  - Consider moving patient to room near nurses station  4/19/2023 1317 by Angel Berry RN  Outcome: Adequate for Discharge  4/19/2023 1050 by Angel Berry RN  Outcome: Progressing     Problem: RESPIRATORY - ADULT  Goal: Achieves optimal ventilation and oxygenation  Description: INTERVENTIONS:  - Assess for changes in respiratory status  - Assess for changes in mentation and behavior  - Position to facilitate oxygenation and minimize respiratory effort  - Oxygen administered by appropriate delivery if ordered  - Initiate smoking cessation education as indicated  - Encourage broncho-pulmonary hygiene including cough, deep breathe, Incentive Spirometry  - Assess the need for suctioning and aspirate as needed  - Assess and instruct to report SOB or any respiratory difficulty  - Respiratory Therapy support as indicated  4/19/2023 1317 by Angel Berry RN  Outcome: Adequate for Discharge  4/19/2023 1050 by Angel Berry RN  Outcome: Progressing     Problem: PAIN - ADULT  Goal: Verbalizes/displays adequate comfort level or baseline comfort level  Description: Interventions:  - Encourage patient to monitor pain and request assistance  - Assess pain using appropriate pain scale  - Administer analgesics based on type and severity of pain and evaluate response  - Implement non-pharmacological measures as appropriate and evaluate response  - Consider cultural and social influences on pain and pain management  - Notify physician/advanced practitioner if interventions unsuccessful or patient reports new pain  4/19/2023 1317 by Tor Beck RN  Outcome: Adequate for Discharge  4/19/2023 1050 by Tor Beck RN  Outcome: Progressing     Problem: SAFETY ADULT  Goal: Patient will remain free of falls  Description: INTERVENTIONS:  - Educate patient/family on patient safety including physical limitations  - Instruct patient to call for assistance with activity   - Consult OT/PT to assist with strengthening/mobility   - Keep Call bell within reach  - Keep bed low and locked with side rails adjusted as appropriate  - Keep care items and personal belongings within reach  - Initiate and maintain comfort rounds  - Make Fall Risk Sign visible to staff  - Apply yellow socks and bracelet for high fall risk patients  - Consider moving patient to room near nurses station  4/19/2023 1317 by Tor Beck RN  Outcome: Adequate for Discharge  4/19/2023 1050 by Tor Beck RN  Outcome: Progressing  Goal: Maintain or return to baseline ADL function  Description: INTERVENTIONS:  -  Assess patient's ability to carry out ADLs; assess patient's baseline for ADL function and identify physical deficits which impact ability to perform ADLs (bathing, care of mouth/teeth, toileting, grooming, dressing, etc )  - Assess/evaluate cause of self-care deficits   - Assess range of motion  - Assess patient's mobility; develop plan if impaired  - Assess patient's need for assistive devices and provide as appropriate  - Encourage maximum independence but intervene and supervise when necessary  - Involve family in performance of ADLs  - Assess for home care needs following discharge   - Consider OT consult to assist with ADL evaluation and planning for discharge  - Provide patient education as appropriate  4/19/2023 1317 by Tor Beck RN  Outcome: Adequate for Discharge  4/19/2023 1050 by Arnulfo Lin RN  Outcome: Progressing  Goal: Maintains/Returns to pre admission functional level  Description: INTERVENTIONS:  - Perform BMAT or MOVE assessment daily    - Set and communicate daily mobility goal to care team and patient/family/caregiver  - Collaborate with rehabilitation services on mobility goals if consulted  - Out of bed for toileting  - Record patient progress and toleration of activity level   4/19/2023 1317 by Arnulfo Lin RN  Outcome: Adequate for Discharge  4/19/2023 1050 by Arnulfo Lin RN  Outcome: Progressing     Problem: DISCHARGE PLANNING  Goal: Discharge to home or other facility with appropriate resources  Description: INTERVENTIONS:  - Identify barriers to discharge w/patient and caregiver  - Arrange for needed discharge resources and transportation as appropriate  - Identify discharge learning needs (meds, wound care, etc )  - Arrange for interpretive services to assist at discharge as needed  - Refer to Case Management Department for coordinating discharge planning if the patient needs post-hospital services based on physician/advanced practitioner order or complex needs related to functional status, cognitive ability, or social support system  4/19/2023 1317 by Arnulfo Lin RN  Outcome: Adequate for Discharge  4/19/2023 1050 by Arnulfo Lin RN  Outcome: Progressing     Problem: Knowledge Deficit  Goal: Patient/family/caregiver demonstrates understanding of disease process, treatment plan, medications, and discharge instructions  Description: Complete learning assessment and assess knowledge base    Interventions:  - Provide teaching at level of understanding  - Provide teaching via preferred learning methods  4/19/2023 1317 by Arnulfo Lin RN  Outcome: Adequate for Discharge  4/19/2023 1050 by Arnulfo Lin RN  Outcome: Progressing     Problem: Prexisting or High Potential for Compromised Skin Integrity  Goal: Skin integrity is maintained or improved  Description: INTERVENTIONS:  - Identify patients at risk for skin breakdown  - Assess and monitor skin integrity  - Assess and monitor nutrition and hydration status  - Monitor labs   - Assess for incontinence   - Turn and reposition patient  - Assist with mobility/ambulation  - Relieve pressure over bony prominences  - Avoid friction and shearing  - Provide appropriate hygiene as needed including keeping skin clean and dry  - Evaluate need for skin moisturizer/barrier cream  - Collaborate with interdisciplinary team   - Patient/family teaching  - Consider wound care consult   4/19/2023 1317 by Dustin Mayer RN  Outcome: Adequate for Discharge  4/19/2023 1050 by Dustin Mayer RN  Outcome: Progressing

## 2023-04-19 NOTE — ASSESSMENT & PLAN NOTE
Presented to the hospital today with worsening shortness of breath that began the morning of admission  · With multiple previous admissions for the same  · Administered IV Solu-Medrol, nebulizer treatments in the ED with improvement  · We will continue scheduled nebulizer treatments with Atrovent, Xopenex, Pulmicort  · IV Solu-Medrol to be administered with subsequent transition to oral steroids within the next 24 to 48 hours pending clinical improvement  · patient transitioned to oral prednisone today  · Patient does not appear infectious, without leukocytosis  · Procalcitonin negative  · Respiratory protocol

## 2023-04-19 NOTE — DISCHARGE SUMMARY
[address]  Discharge- Merrilee Phalen 1950, 81 y o  female MRN: [id number]  Unit/Bed#: [facility] 68 2 [facility] 87 [id number] Encounter: [id number]  Primary Care Provider: Arnoldo Brown DO   Date and time admitted to hospital: 4/17/2023  1:45 PM    * COPD exacerbation (HCC)-resolved as of 4/19/2023  Assessment & Plan  Presented to the hospital today with worsening shortness of breath that began the morning of admission  · With multiple previous admissions for the same  · Administered IV Solu-Medrol, nebulizer treatments in the ED with improvement  · We will continue scheduled nebulizer treatments with Atrovent, Xopenex, Pulmicort  · IV Solu-Medrol to be administered with subsequent transition to oral steroids within the next 24 to 48 hours pending clinical improvement  · patient transitioned to oral prednisone today  · Patient does not appear infectious, without leukocytosis  · Procalcitonin negative  · Respiratory protocol    Chronic diastolic heart failure (HCC)  Assessment & Plan  Wt Readings from Last 3 Encounters:   04/17/23 99 5 kg (219 lb 5 7 oz)   01/11/23 99 5 kg (219 lb 5 7 oz)   12/30/22 98 5 kg (217 lb 2 5 oz)   Euvolemic on exam, continue Lasix 40 mg twice daily          Paroxysmal atrial fibrillation (HCC)  Assessment & Plan  Maintained on Cardizem 120 mg daily, continue  · Anticoagulated with Eliquis    Hyperlipidemia  Assessment & Plan  Continue Lipitor    Bipolar 1 disorder [facility])  Assessment & Plan  Patient with recent admission to [facility], discharged on 4/8/2023 for tardive dyskinesia  · Evaluated by psychiatry at that time  · Latuda decreased to 20 mg daily, initiated on Cogentin 1 mg twice daily however patient reports that she did not start these medications  · Sent Latuda and Cogentin to patient's pharmacy    Chronic respiratory failure with hypoxia [facility])  Assessment & Plan  Patient reports using 2 L nasal cannula oxygen at baseline    HIV (human immunodeficiency virus "infection)   Assessment & Plan  Continue Biktarvy    Type 2 diabetes mellitus without complication, without long-term current use of insulin ([facility] 75 )  Assessment & Plan  Lab Results   Component Value Date    HGBA1C 6 5 (H) 03/07/2023       No results for input(s): POCGLU in the last 72 hours  Blood Sugar Average: Last 72 hrs:  Maintained on metformin as an outpatient  · Hold metformin  · Sliding-scale insulin ACHS  · Diabetic diet  · Hypoglycemia protocol    Medical Problems     Resolved Problems  Date Reviewed: 4/18/2023          Resolved    * (Principal) COPD exacerbation ([facility] ) 4/19/2023     Resolved by  Omaira Mendenhall PA-C        Discharging Physician / Practitioner: Omaira Mendenhall PA-C  PCP: Wu Barrett DO  Admission Date:   Admission Orders (From admission, onward)     Ordered        04/17/23 1524  INPATIENT ADMISSION  Once                      Discharge Date: 04/19/23    Consultations During Hospital Stay:  · None    Procedures Performed:   · None    Significant Findings / Test Results:   XR chest 1 view portable  Result Date: 4/17/2023    Impression: No acute cardiopulmonary disease  Incidental Findings:   · none     Test Results Pending at Discharge (will require follow up):   · none     Outpatient Tests Requested:  · none    Complications:  none    Reason for Admission: COPD exacerbation    Hospital Course:   Radha Vila is a 81 y o  female patient with a past medical history of COPD, bipolar disorder, tardive dyskinesia, HIV, hyperlipidemia, hypertension, type 2 diabetes who originally presented to the hospital on 4/17/2023 due to shortness of breath  On the day of admission, she presented to the emergency department due to worsening shortness of breath that began the morning of her admission  She also reported \"mouth smacking\" for which she recently was admitted to [facility] for    At that time, it appears that she was seen in consultation by psychiatry who recommended decreasing her " "Latuda to 20 mg daily and adding Cogentin 1 mg twice daily however she was unaware of these changes and therefore was continuing to take her Latuda 40 mg daily  During her admission, she was treated with IV steroids, scheduled nebulizer treatments with significant improvement in her COPD symptoms  Her psychiatry medications were also changed to the previous recommendations from [facility]   She will continue these medications as prescribed  Please see above list of diagnoses and related plan for additional information  Condition at Discharge: stable    Discharge Day Visit / Exam:   Subjective: Patient is seen resting comfortably in chair at bedside  She denies any worsening shortness of breath, cough, fevers or chills overnight, nausea or vomiting, chest pain  She continues to experience mouth smacking  Vitals: Blood Pressure: 125/79 (04/19/23 0754)  Pulse: 87 (04/19/23 0754)  Temperature: 97 8 °F (36 6 °C) (04/19/23 0754)  Temp Source: Oral (04/19/23 0754)  Respirations: 20 (04/19/23 0754)  Height: 5' 2\" (157 5 cm) (04/17/23 1818)  Weight - Scale: 99 5 kg (219 lb 5 7 oz) (04/17/23 1818)  SpO2: 97 % (04/19/23 0754)  Exam:   Physical Exam  Vitals and nursing note reviewed  Constitutional:       General: She is not in acute distress  Appearance: Normal appearance  She is not ill-appearing, toxic-appearing or diaphoretic  HENT:      Head: Normocephalic and atraumatic  Cardiovascular:      Rate and Rhythm: Normal rate and regular rhythm  Heart sounds: No murmur heard  No friction rub  No gallop  Pulmonary:      Effort: Pulmonary effort is normal  No respiratory distress  Breath sounds: Normal breath sounds  No wheezing, rhonchi or rales  Abdominal:      General: Abdomen is flat  Bowel sounds are normal  There is no distension  Palpations: Abdomen is soft  Tenderness: There is no abdominal tenderness  Musculoskeletal:      Right lower leg: No edema        Left " lower leg: No edema  Skin:     General: Skin is warm and dry  Coloration: Skin is not jaundiced or pale  Neurological:      General: No focal deficit present  Mental Status: She is alert  Mental status is at baseline  Discussion with Family: Patient declined call to   Discharge instructions/Information to patient and family:   See after visit summary for information provided to patient and family  Provisions for Follow-Up Care:  See after visit summary for information related to follow-up care and any pertinent home health orders  Disposition:   Home with VNA Services (Reminder: Complete face to face encounter)    Planned Readmission: n/a     Discharge Statement:  I spent 38 minutes discharging the patient  This time was spent on the day of discharge  I had direct contact with the patient on the day of discharge  Greater than 50% of the total time was spent examining patient, answering all patient questions, arranging and discussing plan of care with patient as well as directly providing post-discharge instructions  Additional time then spent on discharge activities  Discharge Medications:  See after visit summary for reconciled discharge medications provided to patient and/or family        **Please Note: This note may have been constructed using a voice recognition system**

## 2023-04-19 NOTE — UTILIZATION REVIEW
NOTIFICATION OF ADMISSION DISCHARGE   This is a Notification of Discharge from [facility]  Please be advised that this patient has been discharge from our facility  Below you will find the admission and discharge date and time including the patient’s disposition  UTILIZATION REVIEW CONTACT:  Khari Quiroz  Utilization   Network Utilization Review Department  Phone: [phone] x carefully listen to the prompts  All voicemails are confidential   Email: [email or web address]     ADMISSION INFORMATION  PRESENTATION DATE: 4/17/2023  1:45 PM  OBERVATION ADMISSION DATE:   INPATIENT ADMISSION DATE: 4/17/23  3:24 PM   DISCHARGE DATE: 4/19/2023  1:20 PM   DISPOSITION:Home/Self Care    IMPORTANT INFORMATION:  Send all requests for admission clinical reviews, approved or denied determinations and any other requests to dedicated fax number below belonging to the campus where the patient is receiving treatment   List of dedicated fax numbers:  [address] DENIALS (Administrative/Medical Necessity) [phone]   [address] (Maternity/NICU/Pediatrics) [phone]   [facility] [phone]   [facility] [phone]   Cheryesa Gaiola 134 [phone]   220 [facility] [phone] 90 [facility] [phone]   [address] [place] [address] 119 [phone]   [facility]  [phone]   [facility] [phone]   412 [facility] 850 [facility] [phone]

## 2023-04-19 NOTE — PLAN OF CARE
Problem: Potential for Falls  Goal: Patient will remain free of falls  Description: INTERVENTIONS:  - Educate patient/family on patient safety including physical limitations  - Instruct patient to call for assistance with activity   - Consult OT/PT to assist with strengthening/mobility   - Keep Call bell within reach  - Keep bed low and locked with side rails adjusted as appropriate  - Keep care items and personal belongings within reach  - Initiate and maintain comfort rounds  - Make Fall Risk Sign visible to staff  - Offer Toileting every 2 Hours, in advance of need  - Obtain necessary fall risk management equipment: bed rails  - Apply yellow socks and bracelet for high fall risk patients  - Consider moving patient to room near nurses station  Outcome: Progressing     Problem: RESPIRATORY - ADULT  Goal: Achieves optimal ventilation and oxygenation  Description: INTERVENTIONS:  - Assess for changes in respiratory status  - Assess for changes in mentation and behavior  - Position to facilitate oxygenation and minimize respiratory effort  - Oxygen administered by appropriate delivery if ordered  - Initiate smoking cessation education as indicated  - Encourage broncho-pulmonary hygiene including cough, deep breathe, Incentive Spirometry  - Assess the need for suctioning and aspirate as needed  - Assess and instruct to report SOB or any respiratory difficulty  - Respiratory Therapy support as indicated  Outcome: Progressing     Problem: PAIN - ADULT  Goal: Verbalizes/displays adequate comfort level or baseline comfort level  Description: Interventions:  - Encourage patient to monitor pain and request assistance  - Assess pain using appropriate pain scale  - Administer analgesics based on type and severity of pain and evaluate response  - Implement non-pharmacological measures as appropriate and evaluate response  - Consider cultural and social influences on pain and pain management  - Notify physician/advanced practitioner if interventions unsuccessful or patient reports new pain  Outcome: Progressing     Problem: SAFETY ADULT  Goal: Patient will remain free of falls  Description: INTERVENTIONS:  - Educate patient/family on patient safety including physical limitations  - Instruct patient to call for assistance with activity   - Consult OT/PT to assist with strengthening/mobility   - Keep Call bell within reach  - Keep bed low and locked with side rails adjusted as appropriate  - Keep care items and personal belongings within reach  - Initiate and maintain comfort rounds  - Make Fall Risk Sign visible to staff  - Obtain necessary fall risk management equipment: bed rails  - Apply yellow socks and bracelet for high fall risk patients  - Consider moving patient to room near nurses station  Outcome: Progressing  Goal: Maintain or return to baseline ADL function  Description: INTERVENTIONS:  -  Assess patient's ability to carry out ADLs; assess patient's baseline for ADL function and identify physical deficits which impact ability to perform ADLs (bathing, care of mouth/teeth, toileting, grooming, dressing, etc )  - Assess/evaluate cause of self-care deficits   - Assess range of motion  - Assess patient's mobility; develop plan if impaired  - Assess patient's need for assistive devices and provide as appropriate  - Encourage maximum independence but intervene and supervise when necessary  - Involve family in performance of ADLs  - Assess for home care needs following discharge   - Consider OT consult to assist with ADL evaluation and planning for discharge  - Provide patient education as appropriate  Outcome: Progressing  Goal: Maintains/Returns to pre admission functional level  Description: INTERVENTIONS:  - Perform BMAT or MOVE assessment daily    - Set and communicate daily mobility goal to care team and patient/family/caregiver     - Collaborate with rehabilitation services on mobility goals if consulted  - Perform Range of Motion 2 times a day  - Dangle patient 4 times a day  - Stand patient 4 times a day  - Ambulate patient 3 times a day  - Out of bed to chair 2 times a day   - Out of bed for meals 2 times a day  - Out of bed for toileting  - Record patient progress and toleration of activity level   Outcome: Progressing     Problem: DISCHARGE PLANNING  Goal: Discharge to home or other facility with appropriate resources  Description: INTERVENTIONS:  - Identify barriers to discharge w/patient and caregiver  - Arrange for needed discharge resources and transportation as appropriate  - Identify discharge learning needs (meds, wound care, etc )  - Arrange for interpretive services to assist at discharge as needed  - Refer to Case Management Department for coordinating discharge planning if the patient needs post-hospital services based on physician/advanced practitioner order or complex needs related to functional status, cognitive ability, or social support system  Outcome: Progressing     Problem: Knowledge Deficit  Goal: Patient/family/caregiver demonstrates understanding of disease process, treatment plan, medications, and discharge instructions  Description: Complete learning assessment and assess knowledge base    Interventions:  - Provide teaching at level of understanding  - Provide teaching via preferred learning methods  Outcome: Progressing     Problem: Prexisting or High Potential for Compromised Skin Integrity  Goal: Skin integrity is maintained or improved  Description: INTERVENTIONS:  - Identify patients at risk for skin breakdown  - Assess and monitor skin integrity  - Assess and monitor nutrition and hydration status  - Monitor labs   - Assess for incontinence   - Turn and reposition patient  - Assist with mobility/ambulation  - Relieve pressure over bony prominences  - Avoid friction and shearing  - Provide appropriate hygiene as needed including keeping skin clean and dry  - Evaluate need for skin moisturizer/barrier cream  - Collaborate with interdisciplinary team   - Patient/family teaching  - Consider wound care consult   Outcome: Progressing

## 2023-04-20 NOTE — ED ATTENDING ATTESTATION
4/17/2023  I, Rodrigo Millard MD, saw and evaluated the patient  I have discussed the patient with the resident/non-physician practitioner and agree with the resident's/non-physician practitioner's findings, Plan of Care, and MDM as documented in the resident's/non-physician practitioner's note, except where noted  All available labs and Radiology studies were reviewed  I was present for key portions of any procedure(s) performed by the resident/non-physician practitioner and I was immediately available to provide assistance  At this point I agree with the current assessment done in the Emergency Department  I have conducted an independent evaluation of this patient a history and physical is as follows:    82 YO female presents with shortness of breath  States this began today, similar to previous, she has a history of COPD and congestive heart failure  Statess he has been compliant with her home medications  Pt denies CP/SOB/F/C/N/V/D/C, no dysuria, burning on urination or blood in urine  Gen: Pt is in NAD  HEENT: Head is atraumatic, EOM's intact, neck has FROM  Chest: CTAB, non-tender  Heart: RRR  Abdomen: Soft, NT/ND  Musculoskeletal: FROM in all extremities  Skin: No rash, no ecchymosis  Neuro: Awake, alert, oriented x4; Cranial nerves II-XII intact, tardive dyskinesia  Psych: Normal affect    MDM -  Will check CXR to assess PNA, ECG and troponin, CBC for leukocytosis, metabolic panel for electrolyte abnormalities and dehydration      ED Course         Critical Care Time  Procedures

## 2023-07-04 NOTE — ASSESSMENT & PLAN NOTE
2/2 OA  Appreciate ortho eval   Conservative management, PT/OOB  self fed/fed by clinician spoon/fed by clinician cup/self fed spoon/self fed/fed by clinician

## 2024-10-11 NOTE — PROGRESS NOTES
[address] [facility]  Progress Note - Pati Buchanan 1950, 73 y o  female MRN: [id number]  Unit[facility] [id number] Encounter: [id number]  Primary Care Provider: Godwin Lewis DO   Date and time admitted to hospital: 6/21/2022  1:14 PM    * COPD with acute exacerbation [facility])  Assessment & Plan  Presenting to the emergency department with worsening shortness of breath that began this morning, feels similar to previous COPD exacerbations  · Received DuoNeb as well as steroids with improvement, still experiencing mild dyspnea  · On baseline oxygen, 2-3L NC  · Continue Xopenex/Atrovent t i d , Pulmicort b i d  · Breathing improved, patient on baseline oxygen though reports not yet feeling at her baseline  · Continue IV steroids, plan to transition to p o  Tomorrow and possible discharge home    Tobacco dependence  Assessment & Plan  Nicotine patch ordered, recommend cessation    Paroxysmal atrial fibrillation (HCC)  Assessment & Plan  Continue Cardizem 180 mg daily  · Not maintained on anticoagulation secondary to fall risk    PASHA on CPAP  Assessment & Plan  Continue CPAP q h s  Bipolar 1 disorder ([employer] [facility] [address] )  Assessment & Plan  Continue home regimen of Seroquel 200 mg HS    Chronic respiratory failure with hypoxia (HCC)  Assessment & Plan  Currently at baseline, 2-3 L nasal cannula    Type 2 diabetes mellitus with hyperglycemia [facility])  Assessment & Plan  Lab Results   Component Value Date    HGBA1C 6 3 (H) 06/21/2022       Recent Labs     06/22/22  1605 06/22/22  2104 06/23/22  0727 06/23/22  1100   POCGLU 172* 191* 184* 127     Continue sliding scale insulin, monitor on IV steroids    HIV (human immunodeficiency virus infection)   Assessment & Plan  Maintained on Biktarvy        VTE Pharmacologic Prophylaxis:   Pharmacologic: Heparin  Mechanical VTE Prophylaxis in Place: Yes    Patient Centered Rounds: I have performed bedside rounds with nursing staff today      Discussions with Specialists or Other Care Team Provider: none    Education and Discussions with Family / Patient: patient    Time Spent for Care: 30 minutes  More than 50% of total time spent on counseling and coordination of care as described above  Current Length of Stay: 1 day(s)    Current Patient Status: Inpatient   Certification Statement: The patient will continue to require additional inpatient hospital stay due to IV steroids    Discharge Plan:  Tomorrow, possible transition to p o  Steroids    Code Status: Level 1 - Full Code      Subjective:   Patient reports that her breathing has improved, though does note some shortness of breath while ambulating  Discussed discharge home, patient reports not being ready, would like state additional day  She denies any cough, fevers, chest pain  Objective:     Vitals:   Temp (24hrs), Av 8 °F (36 6 °C), Min:97 7 °F (36 5 °C), Max:97 8 °F (36 6 °C)    Temp:  [97 7 °F (36 5 °C)-97 8 °F (36 6 °C)] 97 8 °F (36 6 °C)  HR:  [89-99] 89  Resp:  [20-22] 22  BP: (114-118)/(75) 114/75  SpO2:  [95 %-96 %] 95 %  Body mass index is 40 32 kg/m²  Input and Output Summary (last 24 hours):     No intake or output data in the 24 hours ending 22 1251    Physical Exam:     Physical Exam  Vitals and nursing note reviewed  Constitutional:       General: She is not in acute distress  Appearance: Normal appearance  She is not ill-appearing  Eyes:      General: No scleral icterus  Conjunctiva/sclera: Conjunctivae normal    Cardiovascular:      Rate and Rhythm: Normal rate and regular rhythm  Pulmonary:      Effort: Pulmonary effort is normal  No respiratory distress  Breath sounds: No stridor  Wheezing ( improved) and rhonchi present  Comments: Mild expiratory wheezing  Abdominal:      General: Abdomen is flat  Bowel sounds are normal  There is no distension  Palpations: Abdomen is soft  Musculoskeletal:      Right lower leg: No edema  Left lower leg: No edema  Skin:     General: Skin is warm and dry  Coloration: Skin is not jaundiced or pale  Neurological:      General: No focal deficit present  Mental Status: She is alert  Mental status is at baseline  Additional Data:     Labs:    Results from last 7 days   Lab Units 06/21/22  1422   WBC Thousand/uL 8 12   HEMOGLOBIN g/dL 12 6   HEMATOCRIT % 41 3   PLATELETS Thousands/uL 337   NEUTROS PCT % 70   LYMPHS PCT % 20   MONOS PCT % 7   EOS PCT % 1     Results from last 7 days   Lab Units 06/21/22  1422   SODIUM mmol/L 141   POTASSIUM mmol/L 3 7   CHLORIDE mmol/L 105   CO2 mmol/L 24   BUN mg/dL 7   CREATININE mg/dL 0 69   ANION GAP mmol/L 12   CALCIUM mg/dL 9 9   GLUCOSE RANDOM mg/dL 111         Results from last 7 days   Lab Units 06/23/22  1100 06/23/22  0727 06/22/22  2104 06/22/22  1605 06/22/22  1055 06/22/22  0736 06/21/22  2118 06/21/22  1750   POC GLUCOSE mg/dl 127 184* 191* 172* 245* 157* 255* 275*     Results from last 7 days   Lab Units 06/21/22  1422   HEMOGLOBIN A1C % 6 3*     Results from last 7 days   Lab Units 06/21/22  1422   PROCALCITONIN ng/ml <0 05           * I Have Reviewed All Lab Data Listed Above  * Additional Pertinent Lab Tests Reviewed: All Labs For Current Hospital Admission Reviewed    Imaging:    XR chest 1 view portable    Result Date: 6/22/2022  Impression: No acute cardiopulmonary disease   Workstation performed: [id number]       Recent Cultures (last 7 days):           Last 24 Hours Medication List:   Current Facility-Administered Medications   Medication Dose Route Frequency Provider Last Rate    acetaminophen  650 mg Oral Q6H PRN Merline Mary, PA-C      albuterol  2 5 mg Nebulization TID Merline Mary, PA-C      aspirin  81 mg Oral Daily Merline [place], [place]      atorvastatin  20 mg Oral Daily With Holidu [place], [place]      benzonatate  100 mg Oral TID PRN Merline Mary, PA-C      budesonide  0 5 mg Nebulization Q12H Merline Mary, PA-C      diltiazem  180 mg Oral Daily Edwin [place], [place]      docusate sodium  100 mg Oral BID Edwin Ferguson PA-C      gabapentin  800 mg Oral TID Edwin Ferguson PA-C      guaiFENesin  600 mg Oral BID Edwin Ferguson PA-C      heparin (porcine)  5,000 Units Subcutaneous [facility] [place]      insulin lispro  1-5 Units Subcutaneous HS LUIS M Trejo      insulin lispro  1-6 Units Subcutaneous TID AC LUIS M [place]      ipratropium  0 5 mg Nebulization TID Edwin Ferguson PA-C      levothyroxine  100 mcg Oral Early Morning Edwin Ferguson [place]      melatonin  6 mg Oral HS LUIS M Trejo      methylPREDNISolone sodium succinate  40 mg Intravenous [facility] Shad Hidalgo MD      montelukast  10 mg Oral HS Edwin Ferguson PA-C      nicotine  1 patch Transdermal Daily Edwin Ferguson, [place]      ondansetron  4 mg Intravenous Q6H PRN Edwin Ferguson PA-C      QUEtiapine  200 mg Oral HS Edwin Ferguson PA-C      tiZANidine  2 mg Oral Q8H PRN Edwin Ferguson PA-C      topiramate  50 mg Oral Q12H [address] Edwin Ferguson PA-C      traMADol  50 mg Oral Q6H PRN LUIS M Trejo          Today, Patient Was Seen By: Teresa Ladd MD    ** Please Note: Dictation voice to text software may have been used in the creation of this document   ** Scheduled/Direct

## (undated) DEVICE — TOOL 14MH30 LEGEND 14CM 3MM: Brand: MIDAS REX ™

## (undated) DEVICE — ABSORBABLE WOUND CLOSURE DEVICE: Brand: V-LOC 180

## (undated) DEVICE — CYSTO TUBING SINGLE IRRIGATION

## (undated) DEVICE — TELFA ADHESIVE ISLAND DRESSING: Brand: TELFA

## (undated) DEVICE — DRAPE SHEET X-LG

## (undated) DEVICE — LIGHT HANDLE COVER SLEEVE DISP BLUE STELLAR

## (undated) DEVICE — ADHESIVE SKN CLSR HISTOACRYL FLEX 0.5ML LF

## (undated) DEVICE — SPONGE PVP SCRUB WING STERILE

## (undated) DEVICE — PROXIMATE PLUS MD MULTI-DIRECTIONAL RELEASE SKIN STAPLERS CONTAINS 35 STAINLESS STEEL STAPLES APPROXIMATE CLOSED DIMENSIONS: 6.9MM X 3.9MM WIDE: Brand: PROXIMATE

## (undated) DEVICE — NEURO PATTIES 1/2 X 1 1/2

## (undated) DEVICE — DRAPE SHEET THREE QUARTER

## (undated) DEVICE — TRAY FOLEY 16FR URIMETER SURESTEP

## (undated) DEVICE — JP CHAN DRN SIL HUBLESS 15FR W/TRO: Brand: CARDINAL HEALTH

## (undated) DEVICE — SPECIMEN CONTAINER STERILE PEEL PACK

## (undated) DEVICE — BUTTON SWITCH PENCIL HOLSTER: Brand: VALLEYLAB

## (undated) DEVICE — SURGIFOAM 8.5 X 12.5

## (undated) DEVICE — ELECTRODE BLADE MOD E-Z CLEAN 4IN -0014AM

## (undated) DEVICE — TIBURON SPLIT SHEET: Brand: CONVERTORS

## (undated) DEVICE — UTILITY MARKER,BLACK WITH LABELS: Brand: DEVON

## (undated) DEVICE — ANTIBACTERIAL VIOLET BRAIDED (POLYGLACTIN 910), SYNTHETIC ABSORBABLE SUTURE: Brand: COATED VICRYL

## (undated) DEVICE — CHLORAPREP HI-LITE 26ML ORANGE

## (undated) DEVICE — GLOVE INDICATOR PI UNDERGLOVE SZ 8 BLUE

## (undated) DEVICE — FLOSEAL MATRIX IS INDICATED IN SURGICAL PROCEDURES (OTHER THAN IN OPHTHALMIC) AS AN ADJUNCT TO HEMOSTASIS WHEN CONTROL OF BLEEDING BY LIGATURE OR CONVENTIONALPROCEDURES IS INEFFECTIVE OR IMPRACTICAL.: Brand: FLOSEAL HEMOSTATIC MATRIX

## (undated) DEVICE — DRAPE SURGIKIT SADDLE BAG

## (undated) DEVICE — DRAPE EQUIPMENT RF WAND

## (undated) DEVICE — JACKSON-PRATT 100CC BULB RESERVOIR: Brand: CARDINAL HEALTH

## (undated) DEVICE — BIPOLAR SEALER 23-112-1 AQM 6.0: Brand: AQUAMANTYS™

## (undated) DEVICE — UNIVERSAL SPINE,KIT: Brand: CARDINAL HEALTH

## (undated) DEVICE — DRAPE C-ARM X-RAY

## (undated) DEVICE — INTENDED FOR TISSUE SEPARATION, AND OTHER PROCEDURES THAT REQUIRE A SHARP SURGICAL BLADE TO PUNCTURE OR CUT.: Brand: BARD-PARKER ® CARBON RIB-BACK BLADES

## (undated) DEVICE — INTENDED FOR TISSUE SEPARATION, AND OTHER PROCEDURES THAT REQUIRE A SHARP SURGICAL BLADE TO PUNCTURE OR CUT.: Brand: BARD-PARKER SAFETY BLADES SIZE 10, STERILE

## (undated) DEVICE — GLOVE SRG BIOGEL 7.5

## (undated) DEVICE — JACKSON TABLE FOAM POSITIONING KIT: Brand: CARDINAL HEALTH

## (undated) DEVICE — COVER PROBE INTRAOPERATIVE 6 X 96 IN

## (undated) DEVICE — BOWL ASSY BM210 DUAL BLADE DISPOSABLE: Brand: MIDAS REX™

## (undated) DEVICE — DISPOSABLE EQUIPMENT COVER: Brand: SMALL TOWEL DRAPE

## (undated) DEVICE — DRAPE C-ARMOUR